# Patient Record
Sex: MALE | Race: WHITE | Employment: OTHER
[De-identification: names, ages, dates, MRNs, and addresses within clinical notes are randomized per-mention and may not be internally consistent; named-entity substitution may affect disease eponyms.]

---

## 2017-01-17 ENCOUNTER — TELEPHONE (OUTPATIENT)
Dept: INTERNAL MEDICINE | Facility: CLINIC | Age: 62
End: 2017-01-17

## 2017-01-25 ENCOUNTER — TELEPHONE (OUTPATIENT)
Dept: ONCOLOGY | Facility: CLINIC | Age: 62
End: 2017-01-25

## 2017-01-25 ENCOUNTER — OFFICE VISIT (OUTPATIENT)
Dept: ONCOLOGY | Facility: CLINIC | Age: 62
End: 2017-01-25

## 2017-01-25 VITALS
DIASTOLIC BLOOD PRESSURE: 67 MMHG | BODY MASS INDEX: 49.42 KG/M2 | RESPIRATION RATE: 16 BRPM | WEIGHT: 287.9 LBS | TEMPERATURE: 97.6 F | SYSTOLIC BLOOD PRESSURE: 133 MMHG | HEART RATE: 62 BPM

## 2017-01-25 DIAGNOSIS — D47.2 MGUS (MONOCLONAL GAMMOPATHY OF UNKNOWN SIGNIFICANCE): ICD-10-CM

## 2017-01-25 DIAGNOSIS — I25.5 ISCHEMIC CARDIOMYOPATHY: Primary | ICD-10-CM

## 2017-01-25 PROCEDURE — 99214 OFFICE O/P EST MOD 30 MIN: CPT | Performed by: INTERNAL MEDICINE

## 2017-01-26 ENCOUNTER — TELEPHONE (OUTPATIENT)
Dept: INTERNAL MEDICINE | Facility: CLINIC | Age: 62
End: 2017-01-26

## 2017-01-27 DIAGNOSIS — Z12.11 COLON CANCER SCREENING: Primary | ICD-10-CM

## 2017-01-27 LAB
CONTROL: ABNORMAL
HEMOCCULT STL QL: POSITIVE

## 2017-01-27 PROCEDURE — 82274 ASSAY TEST FOR BLOOD FECAL: CPT | Performed by: INTERNAL MEDICINE

## 2017-01-28 PROBLEM — K92.2 MICROSCOPIC GASTROINTESTINAL BLEEDING: Status: ACTIVE | Noted: 2017-01-28

## 2017-02-01 ENCOUNTER — TELEPHONE (OUTPATIENT)
Dept: GASTROENTEROLOGY | Facility: CLINIC | Age: 62
End: 2017-02-01

## 2017-03-21 DIAGNOSIS — Z12.11 SCREENING FOR COLON CANCER: Primary | ICD-10-CM

## 2017-03-31 ENCOUNTER — TELEPHONE (OUTPATIENT)
Dept: INTERNAL MEDICINE | Age: 62
End: 2017-03-31

## 2017-04-03 DIAGNOSIS — E11.9 TYPE 2 DIABETES MELLITUS WITHOUT COMPLICATION, UNSPECIFIED LONG TERM INSULIN USE STATUS: ICD-10-CM

## 2017-04-03 DIAGNOSIS — I25.10 CORONARY ARTERY DISEASE INVOLVING NATIVE CORONARY ARTERY OF NATIVE HEART WITHOUT ANGINA PECTORIS: Primary | ICD-10-CM

## 2017-04-04 ENCOUNTER — ANESTHESIA (OUTPATIENT)
Dept: ENDOSCOPY | Age: 62
End: 2017-04-04

## 2017-04-04 ENCOUNTER — ANESTHESIA EVENT (OUTPATIENT)
Dept: ENDOSCOPY | Age: 62
End: 2017-04-04

## 2017-04-04 ENCOUNTER — HOSPITAL ENCOUNTER (OUTPATIENT)
Age: 62
Setting detail: OUTPATIENT SURGERY
Discharge: HOME | End: 2017-04-04
Attending: INTERNAL MEDICINE | Admitting: INTERNAL MEDICINE
Payer: COMMERCIAL

## 2017-04-04 VITALS
RESPIRATION RATE: 18 BRPM | TEMPERATURE: 98.1 F | HEIGHT: 64 IN | WEIGHT: 185 LBS | SYSTOLIC BLOOD PRESSURE: 132 MMHG | BODY MASS INDEX: 31.58 KG/M2 | OXYGEN SATURATION: 98 % | HEART RATE: 58 BPM | DIASTOLIC BLOOD PRESSURE: 73 MMHG

## 2017-04-04 VITALS
RESPIRATION RATE: 13 BRPM | DIASTOLIC BLOOD PRESSURE: 55 MMHG | OXYGEN SATURATION: 100 % | SYSTOLIC BLOOD PRESSURE: 99 MMHG

## 2017-04-04 LAB — GLUCOSE BLD-MCNC: 79 MG/DL (ref 75–110)

## 2017-04-04 PROCEDURE — 2580000003 HC RX 258: Performed by: INTERNAL MEDICINE

## 2017-04-04 PROCEDURE — 88305 TISSUE EXAM BY PATHOLOGIST: CPT

## 2017-04-04 PROCEDURE — 3609010600 HC COLONOSCOPY POLYPECTOMY SNARE/COLD BIOPSY: Performed by: INTERNAL MEDICINE

## 2017-04-04 PROCEDURE — 3700000001 HC ADD 15 MINUTES (ANESTHESIA): Performed by: INTERNAL MEDICINE

## 2017-04-04 PROCEDURE — 82947 ASSAY GLUCOSE BLOOD QUANT: CPT

## 2017-04-04 PROCEDURE — 7100000010 HC PHASE II RECOVERY - FIRST 15 MIN: Performed by: INTERNAL MEDICINE

## 2017-04-04 PROCEDURE — 2580000003 HC RX 258: Performed by: NURSE ANESTHETIST, CERTIFIED REGISTERED

## 2017-04-04 PROCEDURE — 7100000011 HC PHASE II RECOVERY - ADDTL 15 MIN: Performed by: INTERNAL MEDICINE

## 2017-04-04 PROCEDURE — 2500000003 HC RX 250 WO HCPCS: Performed by: NURSE ANESTHETIST, CERTIFIED REGISTERED

## 2017-04-04 PROCEDURE — 6360000002 HC RX W HCPCS: Performed by: NURSE ANESTHETIST, CERTIFIED REGISTERED

## 2017-04-04 PROCEDURE — 3700000000 HC ANESTHESIA ATTENDED CARE: Performed by: INTERNAL MEDICINE

## 2017-04-04 RX ORDER — PROPOFOL 10 MG/ML
INJECTION, EMULSION INTRAVENOUS CONTINUOUS PRN
Status: DISCONTINUED | OUTPATIENT
Start: 2017-04-04 | End: 2017-04-04 | Stop reason: SDUPTHER

## 2017-04-04 RX ORDER — SODIUM CHLORIDE 9 MG/ML
INJECTION, SOLUTION INTRAVENOUS CONTINUOUS PRN
Status: DISCONTINUED | OUTPATIENT
Start: 2017-04-04 | End: 2017-04-04 | Stop reason: SDUPTHER

## 2017-04-04 RX ORDER — LIDOCAINE HYDROCHLORIDE 10 MG/ML
INJECTION, SOLUTION EPIDURAL; INFILTRATION; INTRACAUDAL; PERINEURAL PRN
Status: DISCONTINUED | OUTPATIENT
Start: 2017-04-04 | End: 2017-04-04 | Stop reason: SDUPTHER

## 2017-04-04 RX ORDER — PROPOFOL 10 MG/ML
INJECTION, EMULSION INTRAVENOUS PRN
Status: DISCONTINUED | OUTPATIENT
Start: 2017-04-04 | End: 2017-04-04 | Stop reason: SDUPTHER

## 2017-04-04 RX ORDER — SODIUM CHLORIDE 9 MG/ML
INJECTION, SOLUTION INTRAVENOUS ONCE
Status: COMPLETED | OUTPATIENT
Start: 2017-04-04 | End: 2017-04-04

## 2017-04-04 RX ORDER — KETAMINE HYDROCHLORIDE 100 MG/ML
INJECTION, SOLUTION INTRAMUSCULAR; INTRAVENOUS PRN
Status: DISCONTINUED | OUTPATIENT
Start: 2017-04-04 | End: 2017-04-04 | Stop reason: SDUPTHER

## 2017-04-04 RX ADMIN — SODIUM CHLORIDE: 9 INJECTION, SOLUTION INTRAVENOUS at 11:18

## 2017-04-04 RX ADMIN — LIDOCAINE HYDROCHLORIDE 50 MG: 10 INJECTION, SOLUTION EPIDURAL; INFILTRATION; INTRACAUDAL; PERINEURAL at 12:24

## 2017-04-04 RX ADMIN — PROPOFOL 50 MG: 10 INJECTION, EMULSION INTRAVENOUS at 12:24

## 2017-04-04 RX ADMIN — SODIUM CHLORIDE: 9 INJECTION, SOLUTION INTRAVENOUS at 12:22

## 2017-04-04 RX ADMIN — KETAMINE HYDROCHLORIDE 30 MG: 100 INJECTION, SOLUTION, CONCENTRATE INTRAMUSCULAR; INTRAVENOUS at 12:41

## 2017-04-04 RX ADMIN — PROPOFOL 120 MCG/KG/MIN: 10 INJECTION, EMULSION INTRAVENOUS at 12:24

## 2017-04-04 ASSESSMENT — PAIN SCALES - GENERAL
PAINLEVEL_OUTOF10: 0

## 2017-04-04 ASSESSMENT — PAIN - FUNCTIONAL ASSESSMENT: PAIN_FUNCTIONAL_ASSESSMENT: 0-10

## 2017-04-05 LAB — SURGICAL PATHOLOGY REPORT: NORMAL

## 2017-06-01 ENCOUNTER — OFFICE VISIT (OUTPATIENT)
Dept: INTERNAL MEDICINE | Age: 62
End: 2017-06-01
Payer: COMMERCIAL

## 2017-06-01 ENCOUNTER — HOSPITAL ENCOUNTER (OUTPATIENT)
Age: 62
Setting detail: SPECIMEN
Discharge: HOME | End: 2017-06-01
Payer: COMMERCIAL

## 2017-06-01 VITALS
WEIGHT: 286 LBS | HEIGHT: 64 IN | HEART RATE: 66 BPM | DIASTOLIC BLOOD PRESSURE: 71 MMHG | SYSTOLIC BLOOD PRESSURE: 108 MMHG | BODY MASS INDEX: 48.83 KG/M2

## 2017-06-01 DIAGNOSIS — G47.33 OSA ON CPAP: ICD-10-CM

## 2017-06-01 DIAGNOSIS — Z99.89 OSA ON CPAP: ICD-10-CM

## 2017-06-01 DIAGNOSIS — E11.8 CONTROLLED TYPE 2 DIABETES MELLITUS WITH COMPLICATION, WITHOUT LONG-TERM CURRENT USE OF INSULIN (HCC): Primary | ICD-10-CM

## 2017-06-01 DIAGNOSIS — I25.5 ISCHEMIC CARDIOMYOPATHY: ICD-10-CM

## 2017-06-01 DIAGNOSIS — E11.42 TYPE 2 DIABETES MELLITUS WITH DIABETIC POLYNEUROPATHY, WITHOUT LONG-TERM CURRENT USE OF INSULIN (HCC): ICD-10-CM

## 2017-06-01 DIAGNOSIS — D47.2 MGUS (MONOCLONAL GAMMOPATHY OF UNKNOWN SIGNIFICANCE): ICD-10-CM

## 2017-06-01 DIAGNOSIS — Z95.810 CARDIAC DEFIBRILLATOR IN PLACE: ICD-10-CM

## 2017-06-01 DIAGNOSIS — E66.01 MORBID OBESITY DUE TO EXCESS CALORIES (HCC): ICD-10-CM

## 2017-06-01 DIAGNOSIS — I25.10 CORONARY ARTERY DISEASE INVOLVING NATIVE CORONARY ARTERY OF NATIVE HEART WITHOUT ANGINA PECTORIS: ICD-10-CM

## 2017-06-01 DIAGNOSIS — I50.42 CHRONIC COMBINED SYSTOLIC AND DIASTOLIC HEART FAILURE (HCC): ICD-10-CM

## 2017-06-01 DIAGNOSIS — E78.5 HYPERLIPIDEMIA, UNSPECIFIED HYPERLIPIDEMIA TYPE: ICD-10-CM

## 2017-06-01 DIAGNOSIS — I10 ESSENTIAL HYPERTENSION: ICD-10-CM

## 2017-06-01 PROBLEM — E11.9 TYPE 2 DIABETES MELLITUS WITHOUT COMPLICATION (HCC): Status: ACTIVE | Noted: 2017-06-01

## 2017-06-01 LAB
CREATININE URINE: 239.2 MG/DL (ref 39–259)
HBA1C MFR BLD: 6.9 %
MICROALBUMIN/CREAT 24H UR: 27 MG/L
MICROALBUMIN/CREAT UR-RTO: 11 MCG/MG CREAT

## 2017-06-01 PROCEDURE — 99214 OFFICE O/P EST MOD 30 MIN: CPT | Performed by: INTERNAL MEDICINE

## 2017-06-01 PROCEDURE — 83036 HEMOGLOBIN GLYCOSYLATED A1C: CPT | Performed by: INTERNAL MEDICINE

## 2017-06-01 RX ORDER — CLOPIDOGREL BISULFATE 75 MG/1
TABLET ORAL
Qty: 30 TABLET | Refills: 5 | Status: SHIPPED | OUTPATIENT
Start: 2017-06-01 | End: 2017-07-17 | Stop reason: ALTCHOICE

## 2017-06-01 RX ORDER — OMEPRAZOLE 20 MG/1
CAPSULE, DELAYED RELEASE ORAL
Qty: 30 CAPSULE | Refills: 5 | Status: SHIPPED | OUTPATIENT
Start: 2017-06-01 | End: 2017-12-07 | Stop reason: SDUPTHER

## 2017-06-01 RX ORDER — ATORVASTATIN CALCIUM 40 MG/1
TABLET, FILM COATED ORAL
Qty: 30 TABLET | Refills: 5 | Status: SHIPPED | OUTPATIENT
Start: 2017-06-01 | End: 2017-11-24 | Stop reason: SDUPTHER

## 2017-06-01 RX ORDER — ISOSORBIDE MONONITRATE 30 MG/1
TABLET, EXTENDED RELEASE ORAL
Qty: 30 TABLET | Refills: 5 | Status: SHIPPED | OUTPATIENT
Start: 2017-06-01 | End: 2017-12-07 | Stop reason: SDUPTHER

## 2017-06-01 RX ORDER — ATORVASTATIN CALCIUM 40 MG/1
40 TABLET, FILM COATED ORAL DAILY
Qty: 30 TABLET | Refills: 11 | Status: CANCELLED | OUTPATIENT
Start: 2017-06-01

## 2017-06-01 RX ORDER — LISINOPRIL 5 MG/1
TABLET ORAL
Qty: 30 TABLET | Refills: 5 | Status: SHIPPED | OUTPATIENT
Start: 2017-06-01 | End: 2017-12-07 | Stop reason: SDUPTHER

## 2017-06-01 RX ORDER — FUROSEMIDE 40 MG/1
TABLET ORAL
Qty: 30 TABLET | Refills: 5 | Status: SHIPPED | OUTPATIENT
Start: 2017-06-01 | End: 2017-07-17 | Stop reason: SDUPTHER

## 2017-06-01 RX ORDER — ASPIRIN 81 MG
TABLET, DELAYED RELEASE (ENTERIC COATED) ORAL
COMMUNITY
Start: 2017-05-30 | End: 2017-07-17 | Stop reason: SDUPTHER

## 2017-06-01 RX ORDER — GLIMEPIRIDE 2 MG/1
TABLET ORAL
Qty: 30 TABLET | Refills: 5 | Status: SHIPPED | OUTPATIENT
Start: 2017-06-01 | End: 2017-12-07 | Stop reason: SDUPTHER

## 2017-06-14 ENCOUNTER — PROCEDURE VISIT (OUTPATIENT)
Dept: PODIATRY | Age: 62
End: 2017-06-14
Payer: COMMERCIAL

## 2017-06-14 VITALS
WEIGHT: 281 LBS | HEIGHT: 64 IN | HEART RATE: 64 BPM | DIASTOLIC BLOOD PRESSURE: 67 MMHG | SYSTOLIC BLOOD PRESSURE: 105 MMHG | TEMPERATURE: 98.4 F | BODY MASS INDEX: 47.97 KG/M2

## 2017-06-14 DIAGNOSIS — M79.672 PAIN IN BOTH FEET: ICD-10-CM

## 2017-06-14 DIAGNOSIS — M79.671 PAIN IN BOTH FEET: ICD-10-CM

## 2017-06-14 DIAGNOSIS — L85.3 DRY SKIN: ICD-10-CM

## 2017-06-14 DIAGNOSIS — E11.9 TYPE 2 DIABETES MELLITUS WITHOUT COMPLICATION, WITHOUT LONG-TERM CURRENT USE OF INSULIN (HCC): Primary | ICD-10-CM

## 2017-06-14 DIAGNOSIS — B35.1 ONYCHOMYCOSIS: ICD-10-CM

## 2017-06-14 PROCEDURE — 99213 OFFICE O/P EST LOW 20 MIN: CPT | Performed by: PODIATRIST

## 2017-06-14 PROCEDURE — 11721 DEBRIDE NAIL 6 OR MORE: CPT | Performed by: PODIATRIST

## 2017-06-14 RX ORDER — AMMONIUM LACTATE 12 G/100G
LOTION TOPICAL
Qty: 1 BOTTLE | Refills: 5 | Status: SHIPPED | OUTPATIENT
Start: 2017-06-14 | End: 2017-07-17 | Stop reason: SDUPTHER

## 2017-07-17 ENCOUNTER — OFFICE VISIT (OUTPATIENT)
Dept: PULMONOLOGY | Age: 62
End: 2017-07-17
Payer: COMMERCIAL

## 2017-07-17 VITALS
HEART RATE: 64 BPM | SYSTOLIC BLOOD PRESSURE: 92 MMHG | HEIGHT: 64 IN | OXYGEN SATURATION: 98 % | DIASTOLIC BLOOD PRESSURE: 57 MMHG | BODY MASS INDEX: 48.01 KG/M2 | WEIGHT: 281.2 LBS

## 2017-07-17 DIAGNOSIS — F51.04 PSYCHOPHYSIOLOGIC INSOMNIA: ICD-10-CM

## 2017-07-17 DIAGNOSIS — E66.01 MORBID OBESITY DUE TO EXCESS CALORIES (HCC): ICD-10-CM

## 2017-07-17 DIAGNOSIS — G47.31 COMPLEX SLEEP APNEA SYNDROME: Primary | ICD-10-CM

## 2017-07-17 DIAGNOSIS — I50.22 CHRONIC SYSTOLIC CONGESTIVE HEART FAILURE (HCC): ICD-10-CM

## 2017-07-17 PROCEDURE — 99213 OFFICE O/P EST LOW 20 MIN: CPT | Performed by: INTERNAL MEDICINE

## 2017-07-17 RX ORDER — LANOLIN ALCOHOL/MO/W.PET/CERES
3 CREAM (GRAM) TOPICAL NIGHTLY PRN
Qty: 30 TABLET | Refills: 11 | Status: SHIPPED | OUTPATIENT
Start: 2017-07-17 | End: 2017-12-07 | Stop reason: SDUPTHER

## 2017-07-17 RX ORDER — FUROSEMIDE 40 MG/1
40 TABLET ORAL 2 TIMES DAILY
COMMUNITY
End: 2017-10-03 | Stop reason: SDUPTHER

## 2017-07-17 ASSESSMENT — ENCOUNTER SYMPTOMS
BACK PAIN: 1
EYES NEGATIVE: 1

## 2017-07-17 ASSESSMENT — SLEEP AND FATIGUE QUESTIONNAIRES
HOW LIKELY ARE YOU TO NOD OFF OR FALL ASLEEP WHILE SITTING INACTIVE IN A PUBLIC PLACE: 0
HOW LIKELY ARE YOU TO NOD OFF OR FALL ASLEEP WHEN YOU ARE A PASSENGER IN A CAR FOR AN HOUR WITHOUT A BREAK: 0
HOW LIKELY ARE YOU TO NOD OFF OR FALL ASLEEP WHILE SITTING AND TALKING TO SOMEONE: 0
HOW LIKELY ARE YOU TO NOD OFF OR FALL ASLEEP WHILE SITTING AND READING: 1
HOW LIKELY ARE YOU TO NOD OFF OR FALL ASLEEP WHILE SITTING QUIETLY AFTER LUNCH WITHOUT ALCOHOL: 2
HOW LIKELY ARE YOU TO NOD OFF OR FALL ASLEEP IN A CAR, WHILE STOPPED FOR A FEW MINUTES IN TRAFFIC: 0
HOW LIKELY ARE YOU TO NOD OFF OR FALL ASLEEP WHILE WATCHING TV: 1
ESS TOTAL SCORE: 6
HOW LIKELY ARE YOU TO NOD OFF OR FALL ASLEEP WHILE LYING DOWN TO REST IN THE AFTERNOON WHEN CIRCUMSTANCES PERMIT: 2

## 2017-07-21 ENCOUNTER — HOSPITAL ENCOUNTER (OUTPATIENT)
Facility: MEDICAL CENTER | Age: 62
End: 2017-07-21
Payer: COMMERCIAL

## 2017-07-24 ENCOUNTER — HOSPITAL ENCOUNTER (OUTPATIENT)
Age: 62
Discharge: HOME | End: 2017-07-24
Payer: COMMERCIAL

## 2017-07-24 DIAGNOSIS — D47.2 MGUS (MONOCLONAL GAMMOPATHY OF UNKNOWN SIGNIFICANCE): ICD-10-CM

## 2017-07-24 DIAGNOSIS — D47.2 MGUS (MONOCLONAL GAMMOPATHY OF UNKNOWN SIGNIFICANCE): Primary | ICD-10-CM

## 2017-07-24 LAB
ABSOLUTE EOS #: 0.2 K/UL (ref 0–0.4)
ABSOLUTE LYMPH #: 1.1 K/UL (ref 1–4.8)
ABSOLUTE MONO #: 0.5 K/UL (ref 0.1–1.2)
ALBUMIN SERPL-MCNC: 3.8 G/DL (ref 3.5–5.2)
ALBUMIN/GLOBULIN RATIO: 1 (ref 1–2.5)
ALP BLD-CCNC: 67 U/L (ref 40–129)
ALT SERPL-CCNC: 11 U/L (ref 5–41)
ANION GAP SERPL CALCULATED.3IONS-SCNC: 17 MMOL/L (ref 9–17)
AST SERPL-CCNC: 16 U/L
BASOPHILS # BLD: 0 %
BASOPHILS ABSOLUTE: 0 K/UL (ref 0–0.2)
BILIRUB SERPL-MCNC: 0.54 MG/DL (ref 0.3–1.2)
BUN BLDV-MCNC: 17 MG/DL (ref 8–23)
BUN/CREAT BLD: ABNORMAL (ref 9–20)
CALCIUM SERPL-MCNC: 8.7 MG/DL (ref 8.6–10.4)
CHLORIDE BLD-SCNC: 101 MMOL/L (ref 98–107)
CO2: 25 MMOL/L (ref 20–31)
CREAT SERPL-MCNC: 0.76 MG/DL (ref 0.7–1.2)
DIFFERENTIAL TYPE: ABNORMAL
EOSINOPHILS RELATIVE PERCENT: 3 %
FREE KAPPA/LAMBDA RATIO: 0.93 (ref 0.26–1.65)
GFR AFRICAN AMERICAN: >60 ML/MIN
GFR NON-AFRICAN AMERICAN: >60 ML/MIN
GFR SERPL CREATININE-BSD FRML MDRD: ABNORMAL ML/MIN/{1.73_M2}
GFR SERPL CREATININE-BSD FRML MDRD: ABNORMAL ML/MIN/{1.73_M2}
GLUCOSE BLD-MCNC: 103 MG/DL (ref 70–99)
HCT VFR BLD CALC: 40.8 % (ref 41–53)
HEMOGLOBIN: 13.1 G/DL (ref 13.5–17.5)
IGA: 254 MG/DL (ref 70–400)
IGG: 1058 MG/DL (ref 700–1600)
IGM: 360 MG/DL (ref 40–230)
KAPPA FREE LIGHT CHAINS QNT: 2.61 MG/DL (ref 0.37–1.94)
LAMBDA FREE LIGHT CHAINS QNT: 2.8 MG/DL (ref 0.57–2.63)
LYMPHOCYTES # BLD: 15 %
MCH RBC QN AUTO: 26.5 PG (ref 26–34)
MCHC RBC AUTO-ENTMCNC: 32.1 G/DL (ref 31–37)
MCV RBC AUTO: 82.6 FL (ref 80–100)
MONOCYTES # BLD: 6 %
PDW BLD-RTO: 19 % (ref 12.5–15.4)
PLATELET # BLD: 184 K/UL (ref 140–450)
PLATELET ESTIMATE: ABNORMAL
PMV BLD AUTO: 9.3 FL (ref 6–12)
POTASSIUM SERPL-SCNC: 4.2 MMOL/L (ref 3.7–5.3)
RBC # BLD: 4.95 M/UL (ref 4.5–5.9)
RBC # BLD: ABNORMAL 10*6/UL
SEG NEUTROPHILS: 76 %
SEGMENTED NEUTROPHILS ABSOLUTE COUNT: 5.7 K/UL (ref 1.8–7.7)
SODIUM BLD-SCNC: 143 MMOL/L (ref 135–144)
TOTAL PROTEIN: 7.5 G/DL (ref 6.4–8.3)
WBC # BLD: 7.6 K/UL (ref 3.5–11)
WBC # BLD: ABNORMAL 10*3/UL

## 2017-07-24 PROCEDURE — 85025 COMPLETE CBC W/AUTO DIFF WBC: CPT

## 2017-07-24 PROCEDURE — 36415 COLL VENOUS BLD VENIPUNCTURE: CPT

## 2017-07-24 PROCEDURE — 83883 ASSAY NEPHELOMETRY NOT SPEC: CPT

## 2017-07-24 PROCEDURE — 82784 ASSAY IGA/IGD/IGG/IGM EACH: CPT

## 2017-07-24 PROCEDURE — 80053 COMPREHEN METABOLIC PANEL: CPT

## 2017-07-27 DIAGNOSIS — M10.9 GOUT, UNSPECIFIED: ICD-10-CM

## 2017-07-27 DIAGNOSIS — E11.9 TYPE 2 DIABETES MELLITUS WITHOUT COMPLICATION (HCC): ICD-10-CM

## 2017-07-28 ENCOUNTER — HOSPITAL ENCOUNTER (OUTPATIENT)
Facility: MEDICAL CENTER | Age: 62
End: 2017-07-28
Payer: COMMERCIAL

## 2017-07-31 ENCOUNTER — TELEPHONE (OUTPATIENT)
Dept: ONCOLOGY | Age: 62
End: 2017-07-31

## 2017-07-31 ENCOUNTER — OFFICE VISIT (OUTPATIENT)
Dept: ONCOLOGY | Age: 62
End: 2017-07-31
Payer: COMMERCIAL

## 2017-07-31 VITALS
BODY MASS INDEX: 48.46 KG/M2 | HEART RATE: 65 BPM | WEIGHT: 282.3 LBS | SYSTOLIC BLOOD PRESSURE: 127 MMHG | RESPIRATION RATE: 18 BRPM | TEMPERATURE: 98.1 F | DIASTOLIC BLOOD PRESSURE: 58 MMHG

## 2017-07-31 DIAGNOSIS — D47.2 MGUS (MONOCLONAL GAMMOPATHY OF UNKNOWN SIGNIFICANCE): Primary | ICD-10-CM

## 2017-07-31 PROCEDURE — 99211 OFF/OP EST MAY X REQ PHY/QHP: CPT

## 2017-07-31 PROCEDURE — 99214 OFFICE O/P EST MOD 30 MIN: CPT | Performed by: INTERNAL MEDICINE

## 2017-07-31 RX ORDER — GLUCOSAM/CHON-MSM1/C/MANG/BOSW 500-416.6
TABLET ORAL
Qty: 100 EACH | Refills: 0 | Status: SHIPPED | OUTPATIENT
Start: 2017-07-31 | End: 2017-08-02

## 2017-07-31 RX ORDER — ISOPROPYL ALCOHOL 0.75 G/1
SWAB TOPICAL
Qty: 100 EACH | Refills: 0 | Status: SHIPPED | OUTPATIENT
Start: 2017-07-31 | End: 2018-06-21 | Stop reason: SDUPTHER

## 2017-07-31 RX ORDER — ALLOPURINOL 300 MG/1
TABLET ORAL
Qty: 30 TABLET | Refills: 0 | Status: SHIPPED | OUTPATIENT
Start: 2017-07-31 | End: 2017-12-07 | Stop reason: SDUPTHER

## 2017-08-01 DIAGNOSIS — E11.9 TYPE 2 DIABETES MELLITUS WITHOUT COMPLICATION (HCC): ICD-10-CM

## 2017-08-01 DIAGNOSIS — F41.9 ANXIETY DISORDER, UNSPECIFIED TYPE: ICD-10-CM

## 2017-08-02 RX ORDER — TRAZODONE HYDROCHLORIDE 50 MG/1
TABLET ORAL
Qty: 30 TABLET | Refills: 0 | Status: SHIPPED | OUTPATIENT
Start: 2017-08-02 | End: 2017-12-07 | Stop reason: SDUPTHER

## 2017-08-02 RX ORDER — GLUCOSAM/CHON-MSM1/C/MANG/BOSW 500-416.6
TABLET ORAL
Qty: 100 EACH | Refills: 0 | Status: SHIPPED | OUTPATIENT
Start: 2017-08-02 | End: 2017-12-07 | Stop reason: SDUPTHER

## 2017-08-22 DIAGNOSIS — I25.10 CORONARY ARTERY DISEASE INVOLVING NATIVE CORONARY ARTERY OF NATIVE HEART WITHOUT ANGINA PECTORIS: ICD-10-CM

## 2017-09-12 ENCOUNTER — HOSPITAL ENCOUNTER (INPATIENT)
Age: 62
LOS: 3 days | Discharge: HOME | DRG: 133 | End: 2017-09-15
Attending: EMERGENCY MEDICINE | Admitting: INTERNAL MEDICINE
Payer: COMMERCIAL

## 2017-09-12 ENCOUNTER — APPOINTMENT (OUTPATIENT)
Dept: GENERAL RADIOLOGY | Age: 62
DRG: 133 | End: 2017-09-12
Payer: COMMERCIAL

## 2017-09-12 DIAGNOSIS — I50.42 CHRONIC COMBINED SYSTOLIC AND DIASTOLIC HEART FAILURE (HCC): ICD-10-CM

## 2017-09-12 DIAGNOSIS — I50.9 ACUTE ON CHRONIC CONGESTIVE HEART FAILURE, UNSPECIFIED CONGESTIVE HEART FAILURE TYPE: Primary | ICD-10-CM

## 2017-09-12 DIAGNOSIS — I21.4 NSTEMI (NON-ST ELEVATED MYOCARDIAL INFARCTION) (HCC): ICD-10-CM

## 2017-09-12 LAB
ABSOLUTE EOS #: 0.16 K/UL (ref 0–0.4)
ABSOLUTE LYMPH #: 6.03 K/UL (ref 1–4.8)
ABSOLUTE MONO #: 1.96 K/UL (ref 0.1–0.8)
ANION GAP SERPL CALCULATED.3IONS-SCNC: 17 MMOL/L (ref 9–17)
BASOPHILS # BLD: 1 %
BASOPHILS ABSOLUTE: 0.16 K/UL (ref 0–0.2)
BNP INTERPRETATION: ABNORMAL
BUN BLDV-MCNC: 16 MG/DL (ref 8–23)
BUN/CREAT BLD: ABNORMAL (ref 9–20)
CALCIUM SERPL-MCNC: 8.7 MG/DL (ref 8.6–10.4)
CHLORIDE BLD-SCNC: 100 MMOL/L (ref 98–107)
CO2: 23 MMOL/L (ref 20–31)
CREAT SERPL-MCNC: 1.06 MG/DL (ref 0.7–1.2)
DIFFERENTIAL TYPE: ABNORMAL
EOSINOPHILS RELATIVE PERCENT: 1 %
GFR AFRICAN AMERICAN: >60 ML/MIN
GFR NON-AFRICAN AMERICAN: >60 ML/MIN
GFR SERPL CREATININE-BSD FRML MDRD: ABNORMAL ML/MIN/{1.73_M2}
GFR SERPL CREATININE-BSD FRML MDRD: ABNORMAL ML/MIN/{1.73_M2}
GLUCOSE BLD-MCNC: 313 MG/DL (ref 70–99)
HCT VFR BLD CALC: 46.2 % (ref 41–53)
HEMOGLOBIN: 14.4 G/DL (ref 13.5–17.5)
LYMPHOCYTES # BLD: 37 %
MCH RBC QN AUTO: 26.5 PG (ref 26–34)
MCHC RBC AUTO-ENTMCNC: 31.3 G/DL (ref 31–37)
MCV RBC AUTO: 84.7 FL (ref 80–100)
MONOCYTES # BLD: 12 %
MORPHOLOGY: ABNORMAL
PDW BLD-RTO: 18.8 % (ref 12.5–15.4)
PLATELET # BLD: 279 K/UL (ref 140–450)
PLATELET ESTIMATE: ABNORMAL
PMV BLD AUTO: 10 FL (ref 6–12)
POC TROPONIN I: 0.04 NG/ML (ref 0–0.1)
POC TROPONIN I: 11.16 NG/ML (ref 0–0.1)
POC TROPONIN INTERP: ABNORMAL
POC TROPONIN INTERP: NORMAL
POTASSIUM SERPL-SCNC: 3.6 MMOL/L (ref 3.7–5.3)
PRO-BNP: 1007 PG/ML
RBC # BLD: 5.45 M/UL (ref 4.5–5.9)
RBC # BLD: ABNORMAL 10*6/UL
SEG NEUTROPHILS: 49 %
SEGMENTED NEUTROPHILS ABSOLUTE COUNT: 7.99 K/UL (ref 1.8–7.7)
SODIUM BLD-SCNC: 140 MMOL/L (ref 135–144)
WBC # BLD: 16.3 K/UL (ref 3.5–11)
WBC # BLD: ABNORMAL 10*3/UL

## 2017-09-12 PROCEDURE — 85025 COMPLETE CBC W/AUTO DIFF WBC: CPT

## 2017-09-12 PROCEDURE — 80048 BASIC METABOLIC PNL TOTAL CA: CPT

## 2017-09-12 PROCEDURE — 2580000003 HC RX 258: Performed by: EMERGENCY MEDICINE

## 2017-09-12 PROCEDURE — 96376 TX/PRO/DX INJ SAME DRUG ADON: CPT

## 2017-09-12 PROCEDURE — 84484 ASSAY OF TROPONIN QUANT: CPT

## 2017-09-12 PROCEDURE — 2060000000 HC ICU INTERMEDIATE R&B

## 2017-09-12 PROCEDURE — 96375 TX/PRO/DX INJ NEW DRUG ADDON: CPT

## 2017-09-12 PROCEDURE — 6360000002 HC RX W HCPCS

## 2017-09-12 PROCEDURE — 2500000003 HC RX 250 WO HCPCS: Performed by: EMERGENCY MEDICINE

## 2017-09-12 PROCEDURE — 96365 THER/PROPH/DIAG IV INF INIT: CPT

## 2017-09-12 PROCEDURE — 93005 ELECTROCARDIOGRAM TRACING: CPT

## 2017-09-12 PROCEDURE — 2500000003 HC RX 250 WO HCPCS

## 2017-09-12 PROCEDURE — 51703 INSERT BLADDER CATH COMPLEX: CPT

## 2017-09-12 PROCEDURE — 99285 EMERGENCY DEPT VISIT HI MDM: CPT

## 2017-09-12 PROCEDURE — 71010 XR CHEST PORTABLE: CPT

## 2017-09-12 PROCEDURE — 94660 CPAP INITIATION&MGMT: CPT

## 2017-09-12 PROCEDURE — 96366 THER/PROPH/DIAG IV INF ADDON: CPT

## 2017-09-12 PROCEDURE — 83880 ASSAY OF NATRIURETIC PEPTIDE: CPT

## 2017-09-12 RX ORDER — ONDANSETRON 2 MG/ML
INJECTION INTRAMUSCULAR; INTRAVENOUS
Status: COMPLETED
Start: 2017-09-12 | End: 2017-09-12

## 2017-09-12 RX ORDER — LEVOFLOXACIN 5 MG/ML
750 INJECTION, SOLUTION INTRAVENOUS ONCE
Status: DISCONTINUED | OUTPATIENT
Start: 2017-09-13 | End: 2017-09-13

## 2017-09-12 RX ORDER — DILTIAZEM HYDROCHLORIDE 5 MG/ML
20 INJECTION INTRAVENOUS ONCE
Status: COMPLETED | OUTPATIENT
Start: 2017-09-12 | End: 2017-09-12

## 2017-09-12 RX ORDER — FUROSEMIDE 10 MG/ML
80 INJECTION INTRAMUSCULAR; INTRAVENOUS ONCE
Status: COMPLETED | OUTPATIENT
Start: 2017-09-12 | End: 2017-09-12

## 2017-09-12 RX ORDER — NITROGLYCERIN 20 MG/100ML
5 INJECTION INTRAVENOUS CONTINUOUS
Status: DISCONTINUED | OUTPATIENT
Start: 2017-09-12 | End: 2017-09-13

## 2017-09-12 RX ORDER — FUROSEMIDE 10 MG/ML
INJECTION INTRAMUSCULAR; INTRAVENOUS
Status: COMPLETED
Start: 2017-09-12 | End: 2017-09-12

## 2017-09-12 RX ORDER — ONDANSETRON 2 MG/ML
4 INJECTION INTRAMUSCULAR; INTRAVENOUS ONCE
Status: COMPLETED | OUTPATIENT
Start: 2017-09-12 | End: 2017-09-12

## 2017-09-12 RX ORDER — NITROGLYCERIN 20 MG/100ML
INJECTION INTRAVENOUS
Status: DISCONTINUED
Start: 2017-09-12 | End: 2017-09-13

## 2017-09-12 RX ORDER — DILTIAZEM HYDROCHLORIDE 5 MG/ML
INJECTION INTRAVENOUS
Status: DISPENSED
Start: 2017-09-12 | End: 2017-09-13

## 2017-09-12 RX ORDER — DEXTROSE MONOHYDRATE 50 MG/ML
INJECTION, SOLUTION INTRAVENOUS
Status: DISPENSED
Start: 2017-09-12 | End: 2017-09-13

## 2017-09-13 ENCOUNTER — APPOINTMENT (OUTPATIENT)
Dept: GENERAL RADIOLOGY | Age: 62
DRG: 133 | End: 2017-09-13
Payer: COMMERCIAL

## 2017-09-13 PROBLEM — R79.89 ELEVATED TROPONIN: Status: ACTIVE | Noted: 2017-09-13

## 2017-09-13 PROBLEM — J96.00 ACUTE RESPIRATORY FAILURE (HCC): Status: ACTIVE | Noted: 2017-09-13

## 2017-09-13 PROBLEM — I48.0 PAF (PAROXYSMAL ATRIAL FIBRILLATION) (HCC): Status: ACTIVE | Noted: 2017-09-13

## 2017-09-13 PROBLEM — Z95.810 AICD (AUTOMATIC CARDIOVERTER/DEFIBRILLATOR) PRESENT: Status: ACTIVE | Noted: 2017-09-13

## 2017-09-13 PROBLEM — I50.21 ACUTE SYSTOLIC CONGESTIVE HEART FAILURE (HCC): Status: ACTIVE | Noted: 2017-09-13

## 2017-09-13 PROBLEM — R77.8 ELEVATED TROPONIN: Status: ACTIVE | Noted: 2017-09-13

## 2017-09-13 LAB
ABSOLUTE EOS #: 0 K/UL (ref 0–0.4)
ABSOLUTE LYMPH #: 1 K/UL (ref 1–4.8)
ABSOLUTE MONO #: 0.7 K/UL (ref 0.1–1.2)
ANION GAP SERPL CALCULATED.3IONS-SCNC: 19 MMOL/L (ref 9–17)
BASOPHILS # BLD: 0 %
BASOPHILS ABSOLUTE: 0 K/UL (ref 0–0.2)
BUN BLDV-MCNC: 21 MG/DL (ref 8–23)
BUN/CREAT BLD: ABNORMAL (ref 9–20)
CALCIUM SERPL-MCNC: 8.3 MG/DL (ref 8.6–10.4)
CHLORIDE BLD-SCNC: 97 MMOL/L (ref 98–107)
CO2: 25 MMOL/L (ref 20–31)
CREAT SERPL-MCNC: 0.89 MG/DL (ref 0.7–1.2)
DIFFERENTIAL TYPE: ABNORMAL
EKG ATRIAL RATE: 132 BPM
EKG P AXIS: 5 DEGREES
EKG P-R INTERVAL: 176 MS
EKG Q-T INTERVAL: 386 MS
EKG QRS DURATION: 132 MS
EKG QTC CALCULATION (BAZETT): 571 MS
EKG R AXIS: -56 DEGREES
EKG T AXIS: 74 DEGREES
EKG VENTRICULAR RATE: 132 BPM
EOSINOPHILS RELATIVE PERCENT: 0 %
GFR AFRICAN AMERICAN: >60 ML/MIN
GFR NON-AFRICAN AMERICAN: >60 ML/MIN
GFR SERPL CREATININE-BSD FRML MDRD: ABNORMAL ML/MIN/{1.73_M2}
GFR SERPL CREATININE-BSD FRML MDRD: ABNORMAL ML/MIN/{1.73_M2}
GLUCOSE BLD-MCNC: 107 MG/DL (ref 75–110)
GLUCOSE BLD-MCNC: 133 MG/DL (ref 75–110)
GLUCOSE BLD-MCNC: 152 MG/DL (ref 75–110)
GLUCOSE BLD-MCNC: 161 MG/DL (ref 75–110)
GLUCOSE BLD-MCNC: 162 MG/DL (ref 75–110)
GLUCOSE BLD-MCNC: 195 MG/DL (ref 70–99)
HCT VFR BLD CALC: 39.7 % (ref 41–53)
HEMOGLOBIN: 12.6 G/DL (ref 13.5–17.5)
INR BLD: 1
LV EF: 18 %
LVEF MODALITY: NORMAL
LYMPHOCYTES # BLD: 7 %
MAGNESIUM: 1.5 MG/DL (ref 1.6–2.6)
MAGNESIUM: 1.5 MG/DL (ref 1.6–2.6)
MAGNESIUM: 1.8 MG/DL (ref 1.6–2.6)
MCH RBC QN AUTO: 26.7 PG (ref 26–34)
MCHC RBC AUTO-ENTMCNC: 31.8 G/DL (ref 31–37)
MCV RBC AUTO: 84.2 FL (ref 80–100)
MONOCYTES # BLD: 5 %
PARTIAL THROMBOPLASTIN TIME: 18.4 SEC (ref 21.3–31.3)
PARTIAL THROMBOPLASTIN TIME: 27.4 SEC (ref 21.3–31.3)
PARTIAL THROMBOPLASTIN TIME: 47.2 SEC (ref 21.3–31.3)
PARTIAL THROMBOPLASTIN TIME: 68.8 SEC (ref 21.3–31.3)
PDW BLD-RTO: 18.4 % (ref 12.5–15.4)
PHOSPHORUS: 3.1 MG/DL (ref 2.5–4.5)
PLATELET # BLD: 220 K/UL (ref 140–450)
PLATELET ESTIMATE: ABNORMAL
PMV BLD AUTO: 9.2 FL (ref 6–12)
POTASSIUM SERPL-SCNC: 3.9 MMOL/L (ref 3.7–5.3)
PROTHROMBIN TIME: 10.9 SEC (ref 9.4–12.6)
RBC # BLD: 4.71 M/UL (ref 4.5–5.9)
RBC # BLD: ABNORMAL 10*6/UL
SEG NEUTROPHILS: 88 %
SEGMENTED NEUTROPHILS ABSOLUTE COUNT: 12.5 K/UL (ref 1.8–7.7)
SODIUM BLD-SCNC: 141 MMOL/L (ref 135–144)
TROPONIN INTERP: ABNORMAL
TROPONIN T: 1.32 NG/ML
TROPONIN T: 2.14 NG/ML
TROPONIN T: 2.98 NG/ML
TROPONIN T: 3.18 NG/ML
TSH SERPL DL<=0.05 MIU/L-ACNC: 2.43 MIU/L (ref 0.3–5)
WBC # BLD: 14.2 K/UL (ref 3.5–11)
WBC # BLD: ABNORMAL 10*3/UL

## 2017-09-13 PROCEDURE — 6360000002 HC RX W HCPCS: Performed by: EMERGENCY MEDICINE

## 2017-09-13 PROCEDURE — 85025 COMPLETE CBC W/AUTO DIFF WBC: CPT

## 2017-09-13 PROCEDURE — G8978 MOBILITY CURRENT STATUS: HCPCS

## 2017-09-13 PROCEDURE — 94762 N-INVAS EAR/PLS OXIMTRY CONT: CPT

## 2017-09-13 PROCEDURE — 97166 OT EVAL MOD COMPLEX 45 MIN: CPT

## 2017-09-13 PROCEDURE — 2580000003 HC RX 258: Performed by: STUDENT IN AN ORGANIZED HEALTH CARE EDUCATION/TRAINING PROGRAM

## 2017-09-13 PROCEDURE — 85610 PROTHROMBIN TIME: CPT

## 2017-09-13 PROCEDURE — 71010 XR CHEST PORTABLE: CPT

## 2017-09-13 PROCEDURE — 36415 COLL VENOUS BLD VENIPUNCTURE: CPT

## 2017-09-13 PROCEDURE — 93306 TTE W/DOPPLER COMPLETE: CPT

## 2017-09-13 PROCEDURE — 2060000000 HC ICU INTERMEDIATE R&B

## 2017-09-13 PROCEDURE — 6370000000 HC RX 637 (ALT 250 FOR IP): Performed by: STUDENT IN AN ORGANIZED HEALTH CARE EDUCATION/TRAINING PROGRAM

## 2017-09-13 PROCEDURE — 85730 THROMBOPLASTIN TIME PARTIAL: CPT

## 2017-09-13 PROCEDURE — 97162 PT EVAL MOD COMPLEX 30 MIN: CPT

## 2017-09-13 PROCEDURE — G8988 SELF CARE GOAL STATUS: HCPCS

## 2017-09-13 PROCEDURE — 6360000002 HC RX W HCPCS: Performed by: STUDENT IN AN ORGANIZED HEALTH CARE EDUCATION/TRAINING PROGRAM

## 2017-09-13 PROCEDURE — 97530 THERAPEUTIC ACTIVITIES: CPT

## 2017-09-13 PROCEDURE — 97535 SELF CARE MNGMENT TRAINING: CPT

## 2017-09-13 PROCEDURE — G8979 MOBILITY GOAL STATUS: HCPCS

## 2017-09-13 PROCEDURE — 80048 BASIC METABOLIC PNL TOTAL CA: CPT

## 2017-09-13 PROCEDURE — 93005 ELECTROCARDIOGRAM TRACING: CPT

## 2017-09-13 PROCEDURE — 83735 ASSAY OF MAGNESIUM: CPT

## 2017-09-13 PROCEDURE — G8987 SELF CARE CURRENT STATUS: HCPCS

## 2017-09-13 PROCEDURE — 99223 1ST HOSP IP/OBS HIGH 75: CPT | Performed by: INTERNAL MEDICINE

## 2017-09-13 PROCEDURE — 84484 ASSAY OF TROPONIN QUANT: CPT

## 2017-09-13 PROCEDURE — 84443 ASSAY THYROID STIM HORMONE: CPT

## 2017-09-13 PROCEDURE — 82947 ASSAY GLUCOSE BLOOD QUANT: CPT

## 2017-09-13 PROCEDURE — 87040 BLOOD CULTURE FOR BACTERIA: CPT

## 2017-09-13 PROCEDURE — 84100 ASSAY OF PHOSPHORUS: CPT

## 2017-09-13 RX ORDER — DEXTROSE MONOHYDRATE 25 G/50ML
12.5 INJECTION, SOLUTION INTRAVENOUS PRN
Status: DISCONTINUED | OUTPATIENT
Start: 2017-09-13 | End: 2017-09-15 | Stop reason: HOSPADM

## 2017-09-13 RX ORDER — ATORVASTATIN CALCIUM 40 MG/1
40 TABLET, FILM COATED ORAL DAILY
Status: DISCONTINUED | OUTPATIENT
Start: 2017-09-13 | End: 2017-09-15 | Stop reason: HOSPADM

## 2017-09-13 RX ORDER — MAGNESIUM SULFATE 1 G/100ML
2 INJECTION INTRAVENOUS ONCE
Status: COMPLETED | OUTPATIENT
Start: 2017-09-13 | End: 2017-09-13

## 2017-09-13 RX ORDER — DEXTROSE MONOHYDRATE 50 MG/ML
100 INJECTION, SOLUTION INTRAVENOUS PRN
Status: DISCONTINUED | OUTPATIENT
Start: 2017-09-13 | End: 2017-09-15 | Stop reason: HOSPADM

## 2017-09-13 RX ORDER — PANTOPRAZOLE SODIUM 40 MG/1
40 TABLET, DELAYED RELEASE ORAL
Status: DISCONTINUED | OUTPATIENT
Start: 2017-09-13 | End: 2017-09-15 | Stop reason: HOSPADM

## 2017-09-13 RX ORDER — ONDANSETRON 2 MG/ML
4 INJECTION INTRAMUSCULAR; INTRAVENOUS EVERY 6 HOURS PRN
Status: DISCONTINUED | OUTPATIENT
Start: 2017-09-13 | End: 2017-09-15 | Stop reason: HOSPADM

## 2017-09-13 RX ORDER — POTASSIUM CHLORIDE 20 MEQ/1
40 TABLET, EXTENDED RELEASE ORAL PRN
Status: DISCONTINUED | OUTPATIENT
Start: 2017-09-13 | End: 2017-09-15 | Stop reason: HOSPADM

## 2017-09-13 RX ORDER — ASPIRIN 81 MG/1
81 TABLET ORAL 2 TIMES DAILY
Status: DISCONTINUED | OUTPATIENT
Start: 2017-09-13 | End: 2017-09-15 | Stop reason: HOSPADM

## 2017-09-13 RX ORDER — SODIUM CHLORIDE 0.9 % (FLUSH) 0.9 %
10 SYRINGE (ML) INJECTION EVERY 12 HOURS SCHEDULED
Status: DISCONTINUED | OUTPATIENT
Start: 2017-09-13 | End: 2017-09-15 | Stop reason: HOSPADM

## 2017-09-13 RX ORDER — SODIUM CHLORIDE 0.9 % (FLUSH) 0.9 %
10 SYRINGE (ML) INJECTION PRN
Status: DISCONTINUED | OUTPATIENT
Start: 2017-09-13 | End: 2017-09-15 | Stop reason: HOSPADM

## 2017-09-13 RX ORDER — HEPARIN SODIUM 1000 [USP'U]/ML
4000 INJECTION, SOLUTION INTRAVENOUS; SUBCUTANEOUS PRN
Status: DISCONTINUED | OUTPATIENT
Start: 2017-09-13 | End: 2017-09-14 | Stop reason: ALTCHOICE

## 2017-09-13 RX ORDER — FUROSEMIDE 10 MG/ML
40 INJECTION INTRAMUSCULAR; INTRAVENOUS 2 TIMES DAILY
Status: DISCONTINUED | OUTPATIENT
Start: 2017-09-13 | End: 2017-09-14

## 2017-09-13 RX ORDER — HEPARIN SODIUM 1000 [USP'U]/ML
2000 INJECTION, SOLUTION INTRAVENOUS; SUBCUTANEOUS PRN
Status: DISCONTINUED | OUTPATIENT
Start: 2017-09-13 | End: 2017-09-14 | Stop reason: ALTCHOICE

## 2017-09-13 RX ORDER — HEPARIN SODIUM 10000 [USP'U]/100ML
1000 INJECTION, SOLUTION INTRAVENOUS CONTINUOUS
Status: DISCONTINUED | OUTPATIENT
Start: 2017-09-13 | End: 2017-09-14

## 2017-09-13 RX ORDER — ACETAMINOPHEN 325 MG/1
650 TABLET ORAL EVERY 4 HOURS PRN
Status: DISCONTINUED | OUTPATIENT
Start: 2017-09-13 | End: 2017-09-13 | Stop reason: SDUPTHER

## 2017-09-13 RX ORDER — HEPARIN SODIUM 10000 [USP'U]/100ML
12 INJECTION, SOLUTION INTRAVENOUS CONTINUOUS
Status: DISCONTINUED | OUTPATIENT
Start: 2017-09-13 | End: 2017-09-13

## 2017-09-13 RX ORDER — POTASSIUM CHLORIDE 7.45 MG/ML
10 INJECTION INTRAVENOUS PRN
Status: DISCONTINUED | OUTPATIENT
Start: 2017-09-13 | End: 2017-09-15 | Stop reason: HOSPADM

## 2017-09-13 RX ORDER — NITROGLYCERIN 0.4 MG/1
0.4 TABLET SUBLINGUAL EVERY 5 MIN PRN
Status: DISCONTINUED | OUTPATIENT
Start: 2017-09-13 | End: 2017-09-15 | Stop reason: HOSPADM

## 2017-09-13 RX ORDER — ACETAMINOPHEN 325 MG/1
650 TABLET ORAL EVERY 4 HOURS PRN
Status: DISCONTINUED | OUTPATIENT
Start: 2017-09-13 | End: 2017-09-15 | Stop reason: HOSPADM

## 2017-09-13 RX ORDER — POTASSIUM CHLORIDE 20MEQ/15ML
40 LIQUID (ML) ORAL PRN
Status: DISCONTINUED | OUTPATIENT
Start: 2017-09-13 | End: 2017-09-15 | Stop reason: HOSPADM

## 2017-09-13 RX ORDER — MAGNESIUM SULFATE 1 G/100ML
1 INJECTION INTRAVENOUS PRN
Status: DISCONTINUED | OUTPATIENT
Start: 2017-09-13 | End: 2017-09-15 | Stop reason: HOSPADM

## 2017-09-13 RX ORDER — HEPARIN SODIUM 1000 [USP'U]/ML
4000 INJECTION, SOLUTION INTRAVENOUS; SUBCUTANEOUS ONCE
Status: COMPLETED | OUTPATIENT
Start: 2017-09-13 | End: 2017-09-13

## 2017-09-13 RX ORDER — NICOTINE POLACRILEX 4 MG
15 LOZENGE BUCCAL PRN
Status: DISCONTINUED | OUTPATIENT
Start: 2017-09-13 | End: 2017-09-15 | Stop reason: HOSPADM

## 2017-09-13 RX ADMIN — HEPARIN SODIUM 2000 UNITS: 1000 INJECTION, SOLUTION INTRAVENOUS; SUBCUTANEOUS at 21:06

## 2017-09-13 RX ADMIN — METOPROLOL TARTRATE 25 MG: 25 TABLET ORAL at 21:05

## 2017-09-13 RX ADMIN — LEVOFLOXACIN 750 MG: 5 INJECTION, SOLUTION INTRAVENOUS at 02:52

## 2017-09-13 RX ADMIN — PANTOPRAZOLE SODIUM 40 MG: 40 TABLET, DELAYED RELEASE ORAL at 09:25

## 2017-09-13 RX ADMIN — HEPARIN SODIUM AND DEXTROSE 6.3 UNITS/KG/HR: 10000; 5 INJECTION INTRAVENOUS at 02:55

## 2017-09-13 RX ADMIN — ASPIRIN 81 MG: 81 TABLET ORAL at 09:25

## 2017-09-13 RX ADMIN — Medication 10 ML: at 09:25

## 2017-09-13 RX ADMIN — HEPARIN SODIUM 4000 UNITS: 1000 INJECTION, SOLUTION INTRAVENOUS; SUBCUTANEOUS at 14:08

## 2017-09-13 RX ADMIN — ASPIRIN 81 MG: 81 TABLET ORAL at 21:05

## 2017-09-13 RX ADMIN — METOPROLOL TARTRATE 25 MG: 25 TABLET ORAL at 02:17

## 2017-09-13 RX ADMIN — ATORVASTATIN CALCIUM 40 MG: 40 TABLET, FILM COATED ORAL at 09:25

## 2017-09-13 RX ADMIN — FUROSEMIDE 40 MG: 10 INJECTION, SOLUTION INTRAMUSCULAR; INTRAVENOUS at 21:05

## 2017-09-13 RX ADMIN — HEPARIN SODIUM AND DEXTROSE 10.3 UNITS/KG/HR: 10000; 5 INJECTION INTRAVENOUS at 14:08

## 2017-09-13 RX ADMIN — Medication 10 ML: at 21:05

## 2017-09-13 RX ADMIN — INSULIN LISPRO 2 UNITS: 100 INJECTION, SOLUTION INTRAVENOUS; SUBCUTANEOUS at 21:08

## 2017-09-13 RX ADMIN — FUROSEMIDE 40 MG: 10 INJECTION, SOLUTION INTRAMUSCULAR; INTRAVENOUS at 09:25

## 2017-09-13 RX ADMIN — INSULIN LISPRO 1 UNITS: 100 INJECTION, SOLUTION INTRAVENOUS; SUBCUTANEOUS at 03:14

## 2017-09-13 RX ADMIN — ASPIRIN 81 MG: 81 TABLET ORAL at 02:15

## 2017-09-13 RX ADMIN — FUROSEMIDE 40 MG: 10 INJECTION, SOLUTION INTRAMUSCULAR; INTRAVENOUS at 02:15

## 2017-09-13 RX ADMIN — INSULIN LISPRO 1 UNITS: 100 INJECTION, SOLUTION INTRAVENOUS; SUBCUTANEOUS at 18:24

## 2017-09-13 RX ADMIN — MAGNESIUM SULFATE IN DEXTROSE 2 G: 10 INJECTION, SOLUTION INTRAVENOUS at 06:54

## 2017-09-13 RX ADMIN — HEPARIN SODIUM 4000 UNITS: 1000 INJECTION, SOLUTION INTRAVENOUS; SUBCUTANEOUS at 02:54

## 2017-09-13 RX ADMIN — HEPARIN SODIUM AND DEXTROSE 10.3 UNITS/KG/HR: 10000; 5 INJECTION INTRAVENOUS at 19:24

## 2017-09-13 ASSESSMENT — ENCOUNTER SYMPTOMS
SHORTNESS OF BREATH: 1
NAUSEA: 0
ABDOMINAL PAIN: 0
COUGH: 1
RHINORRHEA: 0
VOMITING: 0

## 2017-09-14 LAB
ABSOLUTE EOS #: 0.1 K/UL (ref 0–0.4)
ABSOLUTE LYMPH #: 1.5 K/UL (ref 1–4.8)
ABSOLUTE MONO #: 0.5 K/UL (ref 0.1–1.2)
ANION GAP SERPL CALCULATED.3IONS-SCNC: 18 MMOL/L (ref 9–17)
BASOPHILS # BLD: 0 %
BASOPHILS ABSOLUTE: 0 K/UL (ref 0–0.2)
BUN BLDV-MCNC: 25 MG/DL (ref 8–23)
BUN/CREAT BLD: ABNORMAL (ref 9–20)
CALCIUM SERPL-MCNC: 8.5 MG/DL (ref 8.6–10.4)
CHLORIDE BLD-SCNC: 96 MMOL/L (ref 98–107)
CO2: 24 MMOL/L (ref 20–31)
CREAT SERPL-MCNC: 0.86 MG/DL (ref 0.7–1.2)
DIFFERENTIAL TYPE: ABNORMAL
EKG ATRIAL RATE: 78 BPM
EKG ATRIAL RATE: 82 BPM
EKG ATRIAL RATE: 94 BPM
EKG P AXIS: 38 DEGREES
EKG P AXIS: 62 DEGREES
EKG P AXIS: 64 DEGREES
EKG P-R INTERVAL: 182 MS
EKG P-R INTERVAL: 196 MS
EKG P-R INTERVAL: 198 MS
EKG Q-T INTERVAL: 356 MS
EKG Q-T INTERVAL: 362 MS
EKG Q-T INTERVAL: 362 MS
EKG QRS DURATION: 134 MS
EKG QRS DURATION: 138 MS
EKG QRS DURATION: 138 MS
EKG QTC CALCULATION (BAZETT): 405 MS
EKG QTC CALCULATION (BAZETT): 422 MS
EKG QTC CALCULATION (BAZETT): 452 MS
EKG R AXIS: -63 DEGREES
EKG R AXIS: -83 DEGREES
EKG R AXIS: -95 DEGREES
EKG T AXIS: -169 DEGREES
EKG T AXIS: 102 DEGREES
EKG T AXIS: 97 DEGREES
EKG VENTRICULAR RATE: 78 BPM
EKG VENTRICULAR RATE: 82 BPM
EKG VENTRICULAR RATE: 94 BPM
EOSINOPHILS RELATIVE PERCENT: 2 %
GFR AFRICAN AMERICAN: >60 ML/MIN
GFR NON-AFRICAN AMERICAN: >60 ML/MIN
GFR SERPL CREATININE-BSD FRML MDRD: ABNORMAL ML/MIN/{1.73_M2}
GFR SERPL CREATININE-BSD FRML MDRD: ABNORMAL ML/MIN/{1.73_M2}
GLUCOSE BLD-MCNC: 118 MG/DL (ref 75–110)
GLUCOSE BLD-MCNC: 126 MG/DL (ref 70–99)
GLUCOSE BLD-MCNC: 145 MG/DL (ref 75–110)
GLUCOSE BLD-MCNC: 171 MG/DL (ref 75–110)
GLUCOSE BLD-MCNC: 198 MG/DL (ref 75–110)
HCT VFR BLD CALC: 40 % (ref 41–53)
HEMOGLOBIN: 12.8 G/DL (ref 13.5–17.5)
LYMPHOCYTES # BLD: 15 %
MAGNESIUM: 1.9 MG/DL (ref 1.6–2.6)
MCH RBC QN AUTO: 26.6 PG (ref 26–34)
MCHC RBC AUTO-ENTMCNC: 31.9 G/DL (ref 31–37)
MCV RBC AUTO: 83.2 FL (ref 80–100)
MONOCYTES # BLD: 5 %
PARTIAL THROMBOPLASTIN TIME: 42.1 SEC (ref 21.3–31.3)
PARTIAL THROMBOPLASTIN TIME: 54.6 SEC (ref 21.3–31.3)
PARTIAL THROMBOPLASTIN TIME: 62.1 SEC (ref 21.3–31.3)
PARTIAL THROMBOPLASTIN TIME: 71.8 SEC (ref 21.3–31.3)
PDW BLD-RTO: 18.6 % (ref 12.5–15.4)
PLATELET # BLD: 165 K/UL (ref 140–450)
PLATELET ESTIMATE: ABNORMAL
PMV BLD AUTO: 8.9 FL (ref 6–12)
POTASSIUM SERPL-SCNC: 3.9 MMOL/L (ref 3.7–5.3)
RBC # BLD: 4.81 M/UL (ref 4.5–5.9)
RBC # BLD: ABNORMAL 10*6/UL
SEG NEUTROPHILS: 78 %
SEGMENTED NEUTROPHILS ABSOLUTE COUNT: 7.6 K/UL (ref 1.8–7.7)
SODIUM BLD-SCNC: 138 MMOL/L (ref 135–144)
TROPONIN INTERP: ABNORMAL
TROPONIN T: 1.58 NG/ML
TROPONIN T: 1.66 NG/ML
TROPONIN T: 1.72 NG/ML
TROPONIN T: 1.88 NG/ML
WBC # BLD: 9.8 K/UL (ref 3.5–11)
WBC # BLD: ABNORMAL 10*3/UL

## 2017-09-14 PROCEDURE — 36415 COLL VENOUS BLD VENIPUNCTURE: CPT

## 2017-09-14 PROCEDURE — 97110 THERAPEUTIC EXERCISES: CPT

## 2017-09-14 PROCEDURE — 6370000000 HC RX 637 (ALT 250 FOR IP): Performed by: INTERNAL MEDICINE

## 2017-09-14 PROCEDURE — 6370000000 HC RX 637 (ALT 250 FOR IP): Performed by: STUDENT IN AN ORGANIZED HEALTH CARE EDUCATION/TRAINING PROGRAM

## 2017-09-14 PROCEDURE — 6360000002 HC RX W HCPCS: Performed by: STUDENT IN AN ORGANIZED HEALTH CARE EDUCATION/TRAINING PROGRAM

## 2017-09-14 PROCEDURE — 83735 ASSAY OF MAGNESIUM: CPT

## 2017-09-14 PROCEDURE — 84484 ASSAY OF TROPONIN QUANT: CPT

## 2017-09-14 PROCEDURE — 6370000000 HC RX 637 (ALT 250 FOR IP): Performed by: NURSE PRACTITIONER

## 2017-09-14 PROCEDURE — 2060000000 HC ICU INTERMEDIATE R&B

## 2017-09-14 PROCEDURE — 2580000003 HC RX 258: Performed by: STUDENT IN AN ORGANIZED HEALTH CARE EDUCATION/TRAINING PROGRAM

## 2017-09-14 PROCEDURE — 85730 THROMBOPLASTIN TIME PARTIAL: CPT

## 2017-09-14 PROCEDURE — 6360000002 HC RX W HCPCS: Performed by: EMERGENCY MEDICINE

## 2017-09-14 PROCEDURE — 97116 GAIT TRAINING THERAPY: CPT

## 2017-09-14 PROCEDURE — 82947 ASSAY GLUCOSE BLOOD QUANT: CPT

## 2017-09-14 PROCEDURE — 80048 BASIC METABOLIC PNL TOTAL CA: CPT

## 2017-09-14 PROCEDURE — 99232 SBSQ HOSP IP/OBS MODERATE 35: CPT | Performed by: INTERNAL MEDICINE

## 2017-09-14 PROCEDURE — 94762 N-INVAS EAR/PLS OXIMTRY CONT: CPT

## 2017-09-14 PROCEDURE — 85025 COMPLETE CBC W/AUTO DIFF WBC: CPT

## 2017-09-14 RX ORDER — MAGNESIUM SULFATE 1 G/100ML
1 INJECTION INTRAVENOUS ONCE
Status: COMPLETED | OUTPATIENT
Start: 2017-09-14 | End: 2017-09-14

## 2017-09-14 RX ORDER — ISOSORBIDE MONONITRATE 30 MG/1
30 TABLET, EXTENDED RELEASE ORAL DAILY
Status: DISCONTINUED | OUTPATIENT
Start: 2017-09-14 | End: 2017-09-15 | Stop reason: HOSPADM

## 2017-09-14 RX ORDER — FUROSEMIDE 40 MG/1
40 TABLET ORAL 2 TIMES DAILY
Status: DISCONTINUED | OUTPATIENT
Start: 2017-09-15 | End: 2017-09-15 | Stop reason: HOSPADM

## 2017-09-14 RX ADMIN — HEPARIN SODIUM 2000 UNITS: 1000 INJECTION, SOLUTION INTRAVENOUS; SUBCUTANEOUS at 15:47

## 2017-09-14 RX ADMIN — FUROSEMIDE 40 MG: 10 INJECTION, SOLUTION INTRAMUSCULAR; INTRAVENOUS at 10:39

## 2017-09-14 RX ADMIN — MAGNESIUM SULFATE IN DEXTROSE 1 G: 10 INJECTION, SOLUTION INTRAVENOUS at 15:45

## 2017-09-14 RX ADMIN — INSULIN LISPRO 1 UNITS: 100 INJECTION, SOLUTION INTRAVENOUS; SUBCUTANEOUS at 17:43

## 2017-09-14 RX ADMIN — INSULIN LISPRO 1 UNITS: 100 INJECTION, SOLUTION INTRAVENOUS; SUBCUTANEOUS at 10:39

## 2017-09-14 RX ADMIN — INSULIN LISPRO 1 UNITS: 100 INJECTION, SOLUTION INTRAVENOUS; SUBCUTANEOUS at 20:37

## 2017-09-14 RX ADMIN — APIXABAN 5 MG: 5 TABLET, FILM COATED ORAL at 21:34

## 2017-09-14 RX ADMIN — HEPARIN SODIUM AND DEXTROSE 10.3 UNITS/KG/HR: 10000; 5 INJECTION INTRAVENOUS at 09:56

## 2017-09-14 RX ADMIN — ASPIRIN 81 MG: 81 TABLET ORAL at 20:37

## 2017-09-14 RX ADMIN — METOPROLOL TARTRATE 25 MG: 25 TABLET ORAL at 20:40

## 2017-09-14 RX ADMIN — ATORVASTATIN CALCIUM 40 MG: 40 TABLET, FILM COATED ORAL at 09:55

## 2017-09-14 RX ADMIN — PANTOPRAZOLE SODIUM 40 MG: 40 TABLET, DELAYED RELEASE ORAL at 09:56

## 2017-09-14 RX ADMIN — Medication 10 ML: at 20:37

## 2017-09-14 RX ADMIN — ISOSORBIDE MONONITRATE 30 MG: 30 TABLET ORAL at 10:39

## 2017-09-14 RX ADMIN — ASPIRIN 81 MG: 81 TABLET ORAL at 09:55

## 2017-09-14 RX ADMIN — Medication 10 ML: at 10:40

## 2017-09-15 VITALS
RESPIRATION RATE: 14 BRPM | BODY MASS INDEX: 46.64 KG/M2 | HEIGHT: 64 IN | HEART RATE: 84 BPM | DIASTOLIC BLOOD PRESSURE: 65 MMHG | SYSTOLIC BLOOD PRESSURE: 99 MMHG | WEIGHT: 273.2 LBS | OXYGEN SATURATION: 95 % | TEMPERATURE: 98.5 F

## 2017-09-15 LAB
ANION GAP SERPL CALCULATED.3IONS-SCNC: 15 MMOL/L (ref 9–17)
BUN BLDV-MCNC: 23 MG/DL (ref 8–23)
BUN/CREAT BLD: ABNORMAL (ref 9–20)
CALCIUM IONIZED: 1.04 MMOL/L (ref 1.13–1.33)
CALCIUM SERPL-MCNC: 8.5 MG/DL (ref 8.6–10.4)
CHLORIDE BLD-SCNC: 97 MMOL/L (ref 98–107)
CO2: 28 MMOL/L (ref 20–31)
CREAT SERPL-MCNC: 0.78 MG/DL (ref 0.7–1.2)
GFR AFRICAN AMERICAN: >60 ML/MIN
GFR NON-AFRICAN AMERICAN: >60 ML/MIN
GFR SERPL CREATININE-BSD FRML MDRD: ABNORMAL ML/MIN/{1.73_M2}
GFR SERPL CREATININE-BSD FRML MDRD: ABNORMAL ML/MIN/{1.73_M2}
GLUCOSE BLD-MCNC: 117 MG/DL (ref 75–110)
GLUCOSE BLD-MCNC: 123 MG/DL (ref 70–99)
GLUCOSE BLD-MCNC: 202 MG/DL (ref 75–110)
MAGNESIUM: 2 MG/DL (ref 1.6–2.6)
POTASSIUM SERPL-SCNC: 3.5 MMOL/L (ref 3.7–5.3)
SODIUM BLD-SCNC: 140 MMOL/L (ref 135–144)
TROPONIN INTERP: ABNORMAL
TROPONIN T: 1.62 NG/ML
TROPONIN T: 1.73 NG/ML
TROPONIN T: 1.83 NG/ML

## 2017-09-15 PROCEDURE — 6360000002 HC RX W HCPCS: Performed by: HOSPITALIST

## 2017-09-15 PROCEDURE — 6370000000 HC RX 637 (ALT 250 FOR IP): Performed by: STUDENT IN AN ORGANIZED HEALTH CARE EDUCATION/TRAINING PROGRAM

## 2017-09-15 PROCEDURE — 94660 CPAP INITIATION&MGMT: CPT

## 2017-09-15 PROCEDURE — 82330 ASSAY OF CALCIUM: CPT

## 2017-09-15 PROCEDURE — 2580000003 HC RX 258: Performed by: HOSPITALIST

## 2017-09-15 PROCEDURE — 36415 COLL VENOUS BLD VENIPUNCTURE: CPT

## 2017-09-15 PROCEDURE — 99232 SBSQ HOSP IP/OBS MODERATE 35: CPT | Performed by: INTERNAL MEDICINE

## 2017-09-15 PROCEDURE — 2580000003 HC RX 258: Performed by: INTERNAL MEDICINE

## 2017-09-15 PROCEDURE — 2580000003 HC RX 258: Performed by: STUDENT IN AN ORGANIZED HEALTH CARE EDUCATION/TRAINING PROGRAM

## 2017-09-15 PROCEDURE — 97530 THERAPEUTIC ACTIVITIES: CPT

## 2017-09-15 PROCEDURE — 83735 ASSAY OF MAGNESIUM: CPT

## 2017-09-15 PROCEDURE — 97110 THERAPEUTIC EXERCISES: CPT

## 2017-09-15 PROCEDURE — 6370000000 HC RX 637 (ALT 250 FOR IP): Performed by: INTERNAL MEDICINE

## 2017-09-15 PROCEDURE — 82947 ASSAY GLUCOSE BLOOD QUANT: CPT

## 2017-09-15 PROCEDURE — 84484 ASSAY OF TROPONIN QUANT: CPT

## 2017-09-15 PROCEDURE — 97116 GAIT TRAINING THERAPY: CPT

## 2017-09-15 PROCEDURE — 80048 BASIC METABOLIC PNL TOTAL CA: CPT

## 2017-09-15 PROCEDURE — 6370000000 HC RX 637 (ALT 250 FOR IP): Performed by: NURSE PRACTITIONER

## 2017-09-15 PROCEDURE — 94762 N-INVAS EAR/PLS OXIMTRY CONT: CPT

## 2017-09-15 RX ORDER — POTASSIUM CHLORIDE 20 MEQ/1
20 TABLET, EXTENDED RELEASE ORAL ONCE
Status: DISCONTINUED | OUTPATIENT
Start: 2017-09-15 | End: 2017-09-15

## 2017-09-15 RX ADMIN — Medication 10 ML: at 08:23

## 2017-09-15 RX ADMIN — POTASSIUM CHLORIDE 40 MEQ: 20 TABLET, EXTENDED RELEASE ORAL at 08:22

## 2017-09-15 RX ADMIN — PANTOPRAZOLE SODIUM 40 MG: 40 TABLET, DELAYED RELEASE ORAL at 06:03

## 2017-09-15 RX ADMIN — Medication 10 ML: at 08:22

## 2017-09-15 RX ADMIN — METOPROLOL TARTRATE 25 MG: 25 TABLET ORAL at 08:22

## 2017-09-15 RX ADMIN — APIXABAN 5 MG: 5 TABLET, FILM COATED ORAL at 08:22

## 2017-09-15 RX ADMIN — ASPIRIN 81 MG: 81 TABLET ORAL at 08:22

## 2017-09-15 RX ADMIN — CALCIUM GLUCONATE 1 G: 94 INJECTION, SOLUTION INTRAVENOUS at 14:56

## 2017-09-15 RX ADMIN — ISOSORBIDE MONONITRATE 30 MG: 30 TABLET ORAL at 08:22

## 2017-09-15 RX ADMIN — FUROSEMIDE 40 MG: 40 TABLET ORAL at 08:22

## 2017-09-15 RX ADMIN — ATORVASTATIN CALCIUM 40 MG: 40 TABLET, FILM COATED ORAL at 08:22

## 2017-09-15 RX ADMIN — INSULIN LISPRO 2 UNITS: 100 INJECTION, SOLUTION INTRAVENOUS; SUBCUTANEOUS at 11:53

## 2017-09-19 ENCOUNTER — HOSPITAL ENCOUNTER (OUTPATIENT)
Age: 62
Setting detail: SPECIMEN
Discharge: HOME | End: 2017-09-19
Payer: COMMERCIAL

## 2017-09-19 DIAGNOSIS — I50.42 CHRONIC COMBINED SYSTOLIC AND DIASTOLIC HEART FAILURE (HCC): ICD-10-CM

## 2017-09-19 LAB
ANION GAP SERPL CALCULATED.3IONS-SCNC: 17 MMOL/L (ref 9–17)
BUN BLDV-MCNC: 26 MG/DL (ref 8–23)
BUN/CREAT BLD: ABNORMAL (ref 9–20)
CALCIUM SERPL-MCNC: 8.8 MG/DL (ref 8.6–10.4)
CHLORIDE BLD-SCNC: 96 MMOL/L (ref 98–107)
CO2: 26 MMOL/L (ref 20–31)
CREAT SERPL-MCNC: 0.91 MG/DL (ref 0.7–1.2)
CULTURE: NORMAL
GFR AFRICAN AMERICAN: >60 ML/MIN
GFR NON-AFRICAN AMERICAN: >60 ML/MIN
GFR SERPL CREATININE-BSD FRML MDRD: ABNORMAL ML/MIN/{1.73_M2}
GFR SERPL CREATININE-BSD FRML MDRD: ABNORMAL ML/MIN/{1.73_M2}
GLUCOSE BLD-MCNC: 148 MG/DL (ref 70–99)
Lab: NORMAL
Lab: NORMAL
POTASSIUM SERPL-SCNC: 4.4 MMOL/L (ref 3.7–5.3)
SODIUM BLD-SCNC: 139 MMOL/L (ref 135–144)
SPECIMEN DESCRIPTION: NORMAL
SPECIMEN DESCRIPTION: NORMAL
STATUS: NORMAL
STATUS: NORMAL

## 2017-09-19 PROCEDURE — 80048 BASIC METABOLIC PNL TOTAL CA: CPT

## 2017-09-19 PROCEDURE — 36415 COLL VENOUS BLD VENIPUNCTURE: CPT

## 2017-09-25 DIAGNOSIS — E11.9 TYPE 2 DIABETES MELLITUS WITHOUT COMPLICATION (HCC): ICD-10-CM

## 2017-09-25 RX ORDER — GLUCOSAM/CHON-MSM1/C/MANG/BOSW 500-416.6
TABLET ORAL
Qty: 100 EACH | Refills: 0 | Status: CANCELLED | OUTPATIENT
Start: 2017-09-25

## 2017-10-03 ENCOUNTER — OFFICE VISIT (OUTPATIENT)
Dept: INTERNAL MEDICINE | Age: 62
End: 2017-10-03
Payer: COMMERCIAL

## 2017-10-03 VITALS
HEART RATE: 82 BPM | WEIGHT: 268 LBS | OXYGEN SATURATION: 97 % | BODY MASS INDEX: 46 KG/M2 | DIASTOLIC BLOOD PRESSURE: 63 MMHG | SYSTOLIC BLOOD PRESSURE: 86 MMHG

## 2017-10-03 DIAGNOSIS — I48.91 ATRIAL FIBRILLATION WITH RVR (HCC): ICD-10-CM

## 2017-10-03 DIAGNOSIS — E11.9 TYPE 2 DIABETES MELLITUS WITHOUT COMPLICATION, WITHOUT LONG-TERM CURRENT USE OF INSULIN (HCC): ICD-10-CM

## 2017-10-03 DIAGNOSIS — Z23 FLU VACCINE NEED: ICD-10-CM

## 2017-10-03 DIAGNOSIS — I50.42 CHRONIC COMBINED SYSTOLIC AND DIASTOLIC HEART FAILURE (HCC): Primary | ICD-10-CM

## 2017-10-03 PROCEDURE — 90688 IIV4 VACCINE SPLT 0.5 ML IM: CPT | Performed by: INTERNAL MEDICINE

## 2017-10-03 PROCEDURE — 99213 OFFICE O/P EST LOW 20 MIN: CPT | Performed by: INTERNAL MEDICINE

## 2017-10-03 PROCEDURE — 90471 IMMUNIZATION ADMIN: CPT | Performed by: INTERNAL MEDICINE

## 2017-10-03 RX ORDER — BLOOD-GLUCOSE METER
1 KIT MISCELLANEOUS DAILY PRN
Qty: 1 KIT | Refills: 0 | Status: SHIPPED | OUTPATIENT
Start: 2017-10-03 | End: 2017-12-07 | Stop reason: SDUPTHER

## 2017-10-03 RX ORDER — FUROSEMIDE 40 MG/1
40 TABLET ORAL 2 TIMES DAILY
Qty: 60 TABLET | Refills: 3 | Status: SHIPPED | OUTPATIENT
Start: 2017-10-03 | End: 2017-12-07 | Stop reason: SDUPTHER

## 2017-10-03 RX ORDER — CLOPIDOGREL BISULFATE 75 MG/1
TABLET ORAL
COMMUNITY
Start: 2017-07-27 | End: 2017-10-03 | Stop reason: ALTCHOICE

## 2017-10-03 RX ORDER — BLOOD PRESSURE TEST KIT
KIT MISCELLANEOUS
COMMUNITY
Start: 2017-09-27 | End: 2018-10-26

## 2017-10-03 NOTE — PROGRESS NOTES
Date of patient's visit: 10/3/2017  YOB: 1955  Patient's Name:  Salo Vega    Patient Care Team:  Lupillo Driscoll MD as PCP - General (Internal Medicine)  Kallie Abdi MD as Consulting Physician (Hematology and Oncology)  Fer Ibrahim MD as Consulting Physician (Cardiology)  Ben Arriaza DO as Consulting Physician (Pulmonology)  Woody Molina MD as Consulting Physician (Ophthalmology)    REASON FOR VISIT: Routine outpatient follow     HISTORY OF PRESENT ILLNESS:    History was obtained from the patient. Saol Vega is a 57 y.o. is here for a  Follow up after hospital visit. He was discharged from [place] [address] on 9/15 for acute decompensated systolic heart failure with EF of 15-20% . He has PMH of CHF, CAD with Stent follows with Dr. Santos Jones, s/p AICD, HTN, IDDM, Sleep apnea on BiPAP, non compliant with medications and cpap. He was on iv lasix and improved. He is using his CPAP more often now and had an appointment with Dr. Santos Jones last month 10 days ago and he was told to take lasix 40 mg twice daily . He was started on eliquis and has been taking it regularly . No complaints , no SOB. Cp or le edema.          Patient Active Problem List   Diagnosis    Chronic combined systolic and diastolic heart failure (HCC)    Chronic kidney disease, stage II (mild)    Morbid obesity ([address]    Hyperlipidemia    Iron deficiency anemia    Cardiac defibrillator in place    Anxiety disorder     DJD (degenerative joint disease)    TANNER variably compliant with BiPAP    CAD (coronary artery disease) s/p stenting of L anterior descending artery 2004    Diabetic retinopathy ([facility]    Ischemic cardiomyopathy    HTN (hypertension)    MGUS (monoclonal gammopathy of unknown significance)    Type 2 diabetes mellitus without complication (HCC)    Acute systolic congestive heart failure (HCC)    Acute respiratory failure (HCC)    Elevated troponin    AICD (automatic cardioverter/defibrillator) change from previous record. Roberto Ashley  reports that he has quit smoking.  He has never used smokeless tobacco.    FAMILY HISTORY:    Reviewed and No change from previous visit    REVIEW OF SYSTEMS:    ENT: negative  Respiratory: no cough, shortness of breath, or wheezing  Cardiovascular: no chest pain or dyspnea on exertion  Gastrointestinal: no abdominal pain, black or bloody stools  Neurological: no TIA or stroke symptoms  Endocrine: negative  Genito-Urinary: no dysuria, trouble voiding, or hematuria  Musculoskeletal: negative  Dermatological: negative    PHYSICAL EXAM:      Vitals:    10/03/17 1331   BP: 86/63   Pulse: 82   SpO2: 97%     General appearance - alert, well appearing, and in no distress  Mental status - alert, oriented to person, place, and time  Chest - clear to auscultation, no wheezes, rales or rhonchi, symmetric air entry  Heart - normal rate, regular rhythm, normal S1, S2, no murmurs, rubs, clicks or gallops  Abdomen - soft, nontender, nondistended, no masses or organomegaly  Neurological - alert, oriented, normal speech, no focal findings or movement disorder noted  Musculoskeletal - no joint tenderness, deformity or swelling  Extremities - peripheral pulses normal, no pedal edema, no clubbing or cyanosis  Skin - normal coloration and turgor, no rashes, no suspicious skin lesions noted    LABORATORY FINDINGS:    CBC:  Lab Results   Component Value Date    WBC 9.8 09/14/2017    HGB 12.8 09/14/2017     09/14/2017     12/08/2011     BMP:    Lab Results   Component Value Date     09/19/2017    K 4.4 09/19/2017    CL 96 09/19/2017    CO2 26 09/19/2017    BUN 26 09/19/2017    CREATININE 0.91 09/19/2017    GLUCOSE 148 09/19/2017    GLUCOSE 123 03/19/2012     HEMOGLOBIN A1C:   Lab Results   Component Value Date    LABA1C 6.9 06/01/2017     MICROALBUMIN URINE:   Lab Results   Component Value Date    MICROALBUR 27 06/01/2017     FASTING LIPID PANEL:  Lab Results   Component Value Date

## 2017-10-03 NOTE — PROGRESS NOTES
Visit Information    Have you changed or started any medications since your last visit including any over-the-counter medicines, vitamins, or herbal medicines? no   Are you having any side effects from any of your medications? -  no  Have you stopped taking any of your medications? Is so, why? -  no    Have you seen any other physician or provider since your last visit? Yes - Records Obtained  Have you had any other diagnostic tests since your last visit? Yes - Records Obtained  Have you been seen in the emergency room and/or had an admission to a hospital since we last saw you? Yes - Records Obtained  Have you had your routine dental cleaning in the past 6 months? no    Have you activated your [employer] account? If not, what are your barriers?  Yes     Patient Care Team:  Chayo Herron MD as PCP - General (Internal Medicine)  Monica Ta MD as Consulting Physician (Hematology and Oncology)  Vibha Carlton MD as Consulting Physician (Cardiology)  Ifeoma Ortega DO as Consulting Physician (Pulmonology)  Nas Boston MD as Consulting Physician (Ophthalmology)    Medical History Review  Past Medical, Family, and Social History reviewed and does contribute to the patient presenting condition    Health Maintenance   Topic Date Due    Diabetic retinal exam  05/06/2014    Flu vaccine (1) 09/01/2017    Hepatitis C screen  01/03/2018 (Originally 1955)    HIV screen  01/03/2018 (Originally 1/23/1970)    Lipid screen  11/04/2017    Diabetic foot exam  12/14/2017    Diabetic hemoglobin A1C test  06/01/2018    Colon cancer screen colonoscopy  04/04/2020    DTaP/Tdap/Td vaccine (2 - Td) 06/30/2026    Zostavax vaccine  Completed    Pneumococcal med risk  Completed

## 2017-10-03 NOTE — MR AVS SNAPSHOT
After Visit Summary             Gera Howard   10/3/2017 1:25 PM   Office Visit    Description:  Male : 1955   Provider:  Fidel Shelton MD   Department:  [facility] and Future Appointments         Below is a list of your follow-up and future appointments. This may not be a complete list as you may have made appointments directly with providers that we are not aware of or your providers may have made some for you. Please call your providers to confirm appointments. It is important to keep your appointments. Please bring your current insurance card, photo ID, co-pay, and all medication bottles to your appointment. If self-pay, payment is expected at the time of service. Your To-Do List     Future Appointments Provider Department Dept Phone    2017 1:00 PM Karis [facility] [phone]    Please arrive 15 minutes prior to appointment time, bring insurance card and photo ID.     2017 1:45 PM Chrisman Gray [address] [place] [address] [phone]    Patient must bring photo ID, insurance card, co-pay and medication bottles to appointment All patient appointments must be kept or cancelled within 24 hours Patient should be prepared to provide PCP name and date of last PCP visit during time of appointment    2018 10:00 AM SCHEDULE, [facility] Elias [facility] Sou[facility] [phone]    If this is a fasting lab, please do not eat or drink past midnight other than water. 2018 1:00 PM Ana Farrar MD [facility] [phone]    Please arrive 15 minutes prior to appointment time, bring insurance card and photo ID.     2018 2:30 PM Adriana [address]one [address] [phone]    Please arrive 15 minutes prior to appointment time, bring insurance card and photo ID.      Follow-Up Return in about 6 months (around 4/3/2018). Information from Your Visit        Department     Name Address Phone Fax    [facility]EL[address] [facility] [address] [facility] [phone]      You Were Seen for:         Comments    Chronic combined systolic and diastolic heart failure ([address]   [428.42. ICD-9-CM]         Vital Signs     Blood Pressure Pulse Weight Oxygen Saturation Body Mass Index Smoking Status    86/63 (Site: Left Arm, Position: Sitting, Cuff Size: Large Adult) 82 268 lb (121.6 kg) 97% 46 kg/m2 Former Smoker      Additional Information about your Body Mass Index (BMI)           Your BMI as listed above is considered obese (30 or more). BMI is an estimate of body fat, calculated from your height and weight. The higher your BMI, the greater your risk of heart disease, high blood pressure, type 2 diabetes, stroke, gallstones, arthritis, sleep apnea, and certain cancers. BMI is not perfect. It may overestimate body fat in athletes and people who are more muscular. Even a small weight loss (between 5 and 10 percent of your current weight) by decreasing your calorie intake and becoming more physically active will help lower your risk of developing or worsening diseases associated with obesity. Learn more at: [employer]             Today's Medication Changes          These changes are accurate as of: 10/3/17  2:48 PM.  If you have any questions, ask your nurse or doctor.                START taking these medications           glucose monitoring kit monitoring kit   Instructions:  1 kit by Does not apply route daily as needed (use daily as needed)   Quantity:  1 kit   Refills:  0         STOP taking these medications           apixaban 5 MG Tabs tablet   Commonly known as:  ELIQUIS       clopidogrel 75 MG tablet   Commonly known as:  PLAVIX            Where to Get Your Medications These medications were sent to [address] [place] [address] [address] [facility],  [address] [place] [address]     Phone:  [phone]     furosemide 40 MG tablet         You can get these medications from any pharmacy     Bring a paper prescription for each of these medications     glucose monitoring kit monitoring kit               Your Current Medications Are              Blood Pressure Monitoring (WRIST BLOOD PRESSURE MONITOR) MISC     furosemide (LASIX) 40 MG tablet Take 1 tablet by mouth 2 times daily    glucose monitoring kit (FREESTYLE) monitoring kit 1 kit by Does not apply route daily as needed (use daily as needed)    aspirin 81 MG tablet Take 1 tablet by mouth 2 times daily    TRUEPLUS LANCETS 33G MISC TEST AS DIRECTED    allopurinol (ZYLOPRIM) 300 MG tablet TAKE 1 TABLET DAILY    Alcohol Swabs (B-D SINGLE USE SWABS REGULAR) PADS USE AS DIRECTED    melatonin (RA MELATONIN) 3 MG TABS tablet Take 1 tablet by mouth nightly as needed (insomnia)    glimepiride (AMARYL) 2 MG tablet TAKE 1 TABLET EVERY MORNING    isosorbide mononitrate (IMDUR) 30 MG extended release tablet TAKE 1 TABLET DAILY    lisinopril (PRINIVIL;ZESTRIL) 5 MG tablet TAKE 1 TABLET DAILY    metFORMIN (GLUCOPHAGE) 1000 MG tablet TAKE 1 TABLET TWICE A DAY WITH MEALS    metoprolol tartrate (LOPRESSOR) 25 MG tablet TAKE 1 TABLET TWICE A DAY    omeprazole (PRILOSEC) 20 MG delayed release capsule TAKE 1 CAPSULE DAILY    atorvastatin (LIPITOR) 40 MG tablet TAKE 1 TABLET DAILY    Alcohol Swabs (ALCOHOL PREP) 70 % PADS USE AS DIRECTED    glucose blood VI test strips (FREESTYLE LITE) strip USE AS DIRECTED TWICE A DAY    ammonium lactate (LAC-HYDRIN) 12 % lotion Apply topically daily after bathing sparing the space between the toes. traZODone (DESYREL) 50 MG tablet TAKE 1 TABLET BY MOUTH NIGHTLY    MULTIPLE VITAMINS-MINERALS PO Take  by mouth daily.       Allergies

## 2017-10-03 NOTE — PROGRESS NOTES
Attending Physician Statement  I have discussed the care of Manjit Martinez, including pertinent history and exam findings with the resident. I have reviewed the key elements of all parts of the encounter with the resident. I agree with the assessment, and status of the problem list as documented. 1. Chronic combined systolic and diastolic heart failure (HCC)  furosemide (LASIX) 40 MG tablet. Compensated at this time  F/u with cardio   2. Type 2 diabetes mellitus without complication, without long-term current use of insulin (HCC)  glucose monitoring kit (FREESTYLE) monitoring kit  Good control of DM. Recheck A1C in 6 months   3. Flu vaccine need  INFLUENZA, QUADV, 3 YRS AND OLDER, IM, MDV, 0.5ML (FLUZONE QUADV)    KY IMMUNIZ ADMIN,1 SINGLE/COMB VAC/TOXOID   4. Atrial fibrillation with RVR (HCC)        The plan and orders should include   Orders Placed This Encounter   Procedures    INFLUENZA, QUADV, 3 YRS AND OLDER, IM, MDV, 0.5ML (FLUZONE QUADV)    [place] IMMUNIZ ADMIN,1 SINGLE/COMB VAC/TOXOID    and this was also documented by the resident. The medication list was reviewed with the resident and is up to date. The return visit should be in 6 months .     Irma Quiroz MD, [address] [place] [address] Internal Medicine Associate & [facility]  Associate  Department of Internal Medicine  Internal Medicine Clerkship - Dolly Borja  10/3/2017, 3:04 PM

## 2017-11-16 ENCOUNTER — HOSPITAL ENCOUNTER (OUTPATIENT)
Age: 62
Discharge: HOME | End: 2017-11-16
Payer: COMMERCIAL

## 2017-11-16 LAB
ALT SERPL-CCNC: 9 U/L (ref 5–41)
AST SERPL-CCNC: 16 U/L
CHOLESTEROL/HDL RATIO: 3.1
CHOLESTEROL: 106 MG/DL
HDLC SERPL-MCNC: 34 MG/DL
LDL CHOLESTEROL: 45 MG/DL (ref 0–130)
TRIGL SERPL-MCNC: 135 MG/DL
VLDLC SERPL CALC-MCNC: ABNORMAL MG/DL (ref 1–30)

## 2017-11-16 PROCEDURE — 84450 TRANSFERASE (AST) (SGOT): CPT

## 2017-11-16 PROCEDURE — 80061 LIPID PANEL: CPT

## 2017-11-16 PROCEDURE — 84460 ALANINE AMINO (ALT) (SGPT): CPT

## 2017-11-16 PROCEDURE — 36415 COLL VENOUS BLD VENIPUNCTURE: CPT

## 2017-11-24 DIAGNOSIS — I25.5 ISCHEMIC CARDIOMYOPATHY: ICD-10-CM

## 2017-11-25 NOTE — TELEPHONE ENCOUNTER
Resident patient
Chronic kidney disease, stage II (mild)     Morbid obesity (HCC)     Hyperlipidemia     Iron deficiency anemia     Cardiac defibrillator in place     Anxiety disorder      DJD (degenerative joint disease)     TANNER variably compliant with BiPAP     CAD (coronary artery disease) s/p stenting of L anterior descending artery 2004     Diabetic retinopathy ([address]     Ischemic cardiomyopathy     HTN (hypertension)     MGUS (monoclonal gammopathy of unknown significance)     Type 2 diabetes mellitus without complication (HCC)     Acute systolic congestive heart failure (HCC)     Acute respiratory failure (HCC)     Elevated troponin     AICD (automatic cardioverter/defibrillator) present     PAF (paroxysmal atrial fibrillation) ([address]     Acute respiratory failure with hypoxia ([address]

## 2017-11-27 RX ORDER — ATORVASTATIN CALCIUM 40 MG/1
TABLET, FILM COATED ORAL
Qty: 30 TABLET | Refills: 3 | Status: SHIPPED | OUTPATIENT
Start: 2017-11-27 | End: 2017-12-07 | Stop reason: SDUPTHER

## 2017-12-07 ENCOUNTER — OFFICE VISIT (OUTPATIENT)
Dept: INTERNAL MEDICINE | Age: 62
End: 2017-12-07
Payer: COMMERCIAL

## 2017-12-07 VITALS
DIASTOLIC BLOOD PRESSURE: 67 MMHG | SYSTOLIC BLOOD PRESSURE: 104 MMHG | BODY MASS INDEX: 45.24 KG/M2 | HEART RATE: 70 BPM | WEIGHT: 265 LBS | HEIGHT: 64 IN

## 2017-12-07 DIAGNOSIS — M1A.00X0 IDIOPATHIC CHRONIC GOUT WITHOUT TOPHUS, UNSPECIFIED SITE: ICD-10-CM

## 2017-12-07 DIAGNOSIS — I50.42 CHRONIC COMBINED SYSTOLIC AND DIASTOLIC HEART FAILURE (HCC): Primary | ICD-10-CM

## 2017-12-07 DIAGNOSIS — K21.9 GASTROESOPHAGEAL REFLUX DISEASE WITHOUT ESOPHAGITIS: ICD-10-CM

## 2017-12-07 DIAGNOSIS — F51.04 PSYCHOPHYSIOLOGIC INSOMNIA: ICD-10-CM

## 2017-12-07 DIAGNOSIS — I25.10 CORONARY ARTERY DISEASE INVOLVING NATIVE CORONARY ARTERY OF NATIVE HEART WITHOUT ANGINA PECTORIS: ICD-10-CM

## 2017-12-07 DIAGNOSIS — M17.0 PRIMARY OSTEOARTHRITIS OF BOTH KNEES: ICD-10-CM

## 2017-12-07 DIAGNOSIS — E11.8 CONTROLLED TYPE 2 DIABETES MELLITUS WITH COMPLICATION, WITHOUT LONG-TERM CURRENT USE OF INSULIN (HCC): ICD-10-CM

## 2017-12-07 DIAGNOSIS — F41.9 ANXIETY DISORDER, UNSPECIFIED TYPE: ICD-10-CM

## 2017-12-07 DIAGNOSIS — Z86.79 ATRIAL FIBRILLATION, CURRENTLY IN SINUS RHYTHM: ICD-10-CM

## 2017-12-07 PROBLEM — R77.8 ELEVATED TROPONIN: Status: RESOLVED | Noted: 2017-09-13 | Resolved: 2017-12-07

## 2017-12-07 PROBLEM — I50.21 ACUTE SYSTOLIC CONGESTIVE HEART FAILURE (HCC): Status: RESOLVED | Noted: 2017-09-13 | Resolved: 2017-12-07

## 2017-12-07 PROBLEM — Z95.810 AICD (AUTOMATIC CARDIOVERTER/DEFIBRILLATOR) PRESENT: Status: RESOLVED | Noted: 2017-09-13 | Resolved: 2017-12-07

## 2017-12-07 PROBLEM — R79.89 ELEVATED TROPONIN: Status: RESOLVED | Noted: 2017-09-13 | Resolved: 2017-12-07

## 2017-12-07 PROBLEM — J96.00 ACUTE RESPIRATORY FAILURE (HCC): Status: RESOLVED | Noted: 2017-09-13 | Resolved: 2017-12-07

## 2017-12-07 PROCEDURE — 3044F HG A1C LEVEL LT 7.0%: CPT | Performed by: STUDENT IN AN ORGANIZED HEALTH CARE EDUCATION/TRAINING PROGRAM

## 2017-12-07 PROCEDURE — 99214 OFFICE O/P EST MOD 30 MIN: CPT | Performed by: STUDENT IN AN ORGANIZED HEALTH CARE EDUCATION/TRAINING PROGRAM

## 2017-12-07 PROCEDURE — G8417 CALC BMI ABV UP PARAM F/U: HCPCS | Performed by: STUDENT IN AN ORGANIZED HEALTH CARE EDUCATION/TRAINING PROGRAM

## 2017-12-07 PROCEDURE — G8427 DOCREV CUR MEDS BY ELIG CLIN: HCPCS | Performed by: STUDENT IN AN ORGANIZED HEALTH CARE EDUCATION/TRAINING PROGRAM

## 2017-12-07 PROCEDURE — G8598 ASA/ANTIPLAT THER USED: HCPCS | Performed by: STUDENT IN AN ORGANIZED HEALTH CARE EDUCATION/TRAINING PROGRAM

## 2017-12-07 PROCEDURE — 1036F TOBACCO NON-USER: CPT | Performed by: STUDENT IN AN ORGANIZED HEALTH CARE EDUCATION/TRAINING PROGRAM

## 2017-12-07 PROCEDURE — G8484 FLU IMMUNIZE NO ADMIN: HCPCS | Performed by: STUDENT IN AN ORGANIZED HEALTH CARE EDUCATION/TRAINING PROGRAM

## 2017-12-07 PROCEDURE — 3017F COLORECTAL CA SCREEN DOC REV: CPT | Performed by: STUDENT IN AN ORGANIZED HEALTH CARE EDUCATION/TRAINING PROGRAM

## 2017-12-07 RX ORDER — TRAZODONE HYDROCHLORIDE 50 MG/1
TABLET ORAL
Qty: 30 TABLET | Refills: 2 | Status: SHIPPED | OUTPATIENT
Start: 2017-12-07 | End: 2018-03-15 | Stop reason: ALTCHOICE

## 2017-12-07 RX ORDER — AMMONIUM LACTATE 12 G/100G
LOTION TOPICAL
Qty: 222 ML | Refills: 3 | Status: SHIPPED | OUTPATIENT
Start: 2017-12-07 | End: 2018-03-15 | Stop reason: SDUPTHER

## 2017-12-07 RX ORDER — GLIMEPIRIDE 2 MG/1
2 TABLET ORAL
Qty: 30 TABLET | Refills: 2 | Status: SHIPPED | OUTPATIENT
Start: 2017-12-07 | End: 2018-03-15 | Stop reason: SDUPTHER

## 2017-12-07 RX ORDER — ISOSORBIDE MONONITRATE 30 MG/1
30 TABLET, EXTENDED RELEASE ORAL DAILY
Qty: 30 TABLET | Refills: 2 | Status: SHIPPED | OUTPATIENT
Start: 2017-12-07 | End: 2018-03-15 | Stop reason: SDUPTHER

## 2017-12-07 RX ORDER — BLOOD-GLUCOSE METER
1 KIT MISCELLANEOUS DAILY PRN
Qty: 1 KIT | Refills: 0 | Status: SHIPPED | OUTPATIENT
Start: 2017-12-07 | End: 2018-03-15 | Stop reason: SDUPTHER

## 2017-12-07 RX ORDER — OMEPRAZOLE 20 MG/1
20 CAPSULE, DELAYED RELEASE ORAL DAILY
Qty: 30 CAPSULE | Refills: 2 | Status: SHIPPED | OUTPATIENT
Start: 2017-12-07 | End: 2018-03-15 | Stop reason: SDUPTHER

## 2017-12-07 RX ORDER — GLUCOSAM/CHON-MSM1/C/MANG/BOSW 500-416.6
TABLET ORAL
Qty: 100 EACH | Refills: 11 | Status: SHIPPED | OUTPATIENT
Start: 2017-12-07 | End: 2018-03-15 | Stop reason: SDUPTHER

## 2017-12-07 RX ORDER — ALLOPURINOL 300 MG/1
300 TABLET ORAL DAILY
Qty: 30 TABLET | Refills: 2 | Status: SHIPPED | OUTPATIENT
Start: 2017-12-07 | End: 2018-02-02 | Stop reason: SDUPTHER

## 2017-12-07 RX ORDER — UBIQUINOL 100 MG
CAPSULE ORAL
Qty: 100 EACH | Refills: 11 | Status: SHIPPED | OUTPATIENT
Start: 2017-12-07 | End: 2018-03-15 | Stop reason: SDUPTHER

## 2017-12-07 RX ORDER — ATORVASTATIN CALCIUM 40 MG/1
TABLET, FILM COATED ORAL
Qty: 30 TABLET | Refills: 2 | Status: SHIPPED | OUTPATIENT
Start: 2017-12-07 | End: 2018-03-15 | Stop reason: SDUPTHER

## 2017-12-07 RX ORDER — LISINOPRIL 5 MG/1
5 TABLET ORAL DAILY
Qty: 30 TABLET | Refills: 2 | Status: SHIPPED | OUTPATIENT
Start: 2017-12-07 | End: 2018-03-15 | Stop reason: SDUPTHER

## 2017-12-07 RX ORDER — FUROSEMIDE 40 MG/1
40 TABLET ORAL 2 TIMES DAILY
Qty: 60 TABLET | Refills: 2 | Status: SHIPPED | OUTPATIENT
Start: 2017-12-07 | End: 2018-03-15 | Stop reason: SDUPTHER

## 2017-12-07 RX ORDER — LANOLIN ALCOHOL/MO/W.PET/CERES
3 CREAM (GRAM) TOPICAL NIGHTLY PRN
Qty: 30 TABLET | Refills: 2 | Status: SHIPPED | OUTPATIENT
Start: 2017-12-07 | End: 2018-03-15 | Stop reason: SDUPTHER

## 2017-12-07 RX ORDER — ACETAMINOPHEN 500 MG
500 TABLET ORAL 2 TIMES DAILY PRN
Qty: 60 TABLET | Refills: 2 | Status: SHIPPED | OUTPATIENT
Start: 2017-12-07 | End: 2018-03-15 | Stop reason: SDUPTHER

## 2017-12-07 ASSESSMENT — ENCOUNTER SYMPTOMS
ORTHOPNEA: 0
ABDOMINAL PAIN: 0
EYE REDNESS: 0
DOUBLE VISION: 0
BLURRED VISION: 0
PHOTOPHOBIA: 0
VOMITING: 0
COUGH: 0
SPUTUM PRODUCTION: 0
CONSTIPATION: 0
SINUS PAIN: 0
NAUSEA: 0
BACK PAIN: 1
SHORTNESS OF BREATH: 0
HEARTBURN: 0
HEMOPTYSIS: 0
EYE PAIN: 0

## 2017-12-07 NOTE — PROGRESS NOTES
Attending Physician Statement [facility])  Internal Medicine Clinic Progress Note    SUBJECTIVE:    Chief Complaint   Patient presents with    6 Month Follow-Up    Forms     transportation forms filled out, on the door     Diabetes     BS been running really well less then 100 every day     Leg Pain     pain in right leg          Tegan Flannery, is here for a follow up visit. Overall patient is doing well. He has systolic congestive heart failure with AICD placed. His weight has remained the same at 263 compared to 2 months ago. He has a baseline shortness of breath at rest.  He denies any chest pain. He has diabetes which is controlled with A1c of 6.9. He is due for medication refills today. He has no new complaints today. Problem list, medications and blood work reviewed      OBJECTIVE:  Patient Active Problem List   Diagnosis    Chronic combined systolic and diastolic heart failure ([address] s/[ AICD placed    Chronic kidney disease, stage II (mild)    Morbid obesity ([address]    Hyperlipidemia    Iron deficiency anemia    Anxiety disorder     DJD (degenerative joint disease)    TANNER variably compliant with BiPAP    CAD (coronary artery disease) s/p stenting of L anterior descending artery 2004    Diabetic retinopathy ([address]    HTN (hypertension)    MGUS (monoclonal gammopathy of unknown significance)    Type 2 diabetes mellitus without complication ([address]    PAF (paroxysmal atrial fibrillation) ([address]       Physical Examination:   Vitals:    12/07/17 1301   BP: 104/67   Pulse: 70       ASSESSMENT:  1. Chronic combined systolic and diastolic heart failure ([address]    2. Coronary artery disease involving native coronary artery of native heart without angina pectoris    3. Anxiety disorder, unspecified type    4. Psychophysiologic insomnia    5. Controlled type 2 diabetes mellitus with complication, without long-term current use of insulin ([address]    6. Primary osteoarthritis of both knees    7.

## 2017-12-07 NOTE — PATIENT INSTRUCTIONS
Return To Clinic 3/15/18. After Visit Summary given and reviewed. bb    It is very important for your care that you keep your appointment. If for some reason you are unable to keep your appointment it is equally important that you call our office at [phone] to cancel your appointment and reschedule. Failure to do so may result in your termination from our practice.      transportation forms faxed back to number on forms and scanned into the chart

## 2017-12-07 NOTE — PROGRESS NOTES
[facility]  [facility] [address] [facility]  [address] [facility] [address]  Dept: [phone]  Dept Fax: [phone]    Office Progress/Follow Up Note  Date of patient's visit: 12/7/2017  Patient's Name:  Darlin Rendon YOB: 1955            Patient Care Team:  Manohar Rowley MD as PCP - General (Internal Medicine)  Georgia Yee MD as Consulting Physician (Hematology and Oncology)  Missael Oneal MD as Consulting Physician (Cardiology)  Layla Peace DO as Consulting Physician (Pulmonology)  Sudarshan Silva MD as Consulting Physician (Ophthalmology)  ================================================================    REASON FOR VISIT/CHIEF COMPLAINT:  6 Month Follow-Up; Forms (transportation forms filled out, on the door ); Diabetes (BS been running really well less then 100 every day ); and Leg Pain (pain in right leg )    HISTORY OF PRESENTING ILLNESS:  History was obtained from: patient. Darlin Rendon is a 57 y.o. is here for a for regular 6 month follow-up he has a past medical history of potential hyperlipidemia,  Artery disease with ejection fraction 25% is post AICD placed , has a history of atrial fibrillation on Eliquis 5 mg twice a day his following cardiologist regularly, aspirin and statin,  lisinopril, beta blockers and Imdur. He said he is doing fine no new complaint, as any chest pain shortness of breath take palpitation dizziness denies any nausea vomiting, constipation, new leg swelling. Blood pressure and blood sugar is within normal limits, BG is under good control as per patient, not on insulin, is following the podiatry for foot examination and ophthalmology for a diabetic retinopathy. Screenings are up-to-date , got flu shot this year. He is complaining of leg pain and right-sided , running from lower back pain downwards up to knee, and on, keep a close of the position of couch while sleeping.   Not Taking any pain medications      Patient Active Problem List   Diagnosis    Chronic combined systolic and diastolic heart failure (HCC) s/[ AICD placed    Chronic kidney disease, stage II (mild)    Morbid [facility]    Hyperlipidemia    Iron deficiency anemia    Anxiety disorder     DJD (degenerative joint disease)    TANNER variably compliant with BiPAP    CAD (coronary artery disease) s/p stenting of L anterior descending artery 2004    Diabetic retinopathy ([facility] [facility]    HTN (hypertension)    MGUS (monoclonal gammopathy of unknown significance)    [facility]    PAF (paroxysmal atrial fibrillation) ([facility]       Health Maintenance Due   Topic Date Due    Diabetic retinal exam  05/06/2014    Diabetic foot exam  12/14/2017       Allergies   Allergen Reactions    Zetia [Ezetimibe] Shortness Of Breath    Bactrim     Cephalexin          Current Outpatient Prescriptions   Medication Sig Dispense Refill    omeprazole (PRILOSEC) 20 MG delayed release capsule Take 1 capsule by mouth Daily 30 capsule 2    atorvastatin (LIPITOR) 40 MG tablet TAKE 1 TABLET DAILY 30 tablet 2    furosemide (LASIX) 40 MG tablet Take 1 tablet by mouth 2 times daily 60 tablet 2    glucose monitoring kit (FREESTYLE) monitoring kit 1 kit by Does not apply route daily as needed (use daily as needed) 1 kit 0    aspirin 81 MG tablet Take 1 tablet by mouth 2 times daily 60 tablet 2    TRUEPLUS LANCETS 33G MISC TEST AS DIRECTED 100 each 11    traZODone (DESYREL) 50 MG tablet TAKE 1 TABLET BY MOUTH NIGHTLY 30 tablet 2    allopurinol (ZYLOPRIM) 300 MG tablet Take 1 tablet by mouth daily 30 tablet 2    melatonin (RA MELATONIN) 3 MG TABS tablet Take 1 tablet by mouth nightly as needed (insomnia) 30 tablet 2    glimepiride (AMARYL) 2 MG tablet Take 1 tablet by mouth every morning (before breakfast) 30 tablet 2    lisinopril (PRINIVIL;ZESTRIL) 5 MG tablet Take 1 tablet by mouth daily 30 tablet 2    isosorbide mononitrate (IMDUR) 30 MG extended release tablet Take 1 tablet by mouth daily 30 tablet 2    metFORMIN (GLUCOPHAGE) 1000 MG tablet Take 1 tablet by mouth 2 times daily (with meals) 60 tablet 2    metoprolol tartrate (LOPRESSOR) 25 MG tablet Take 1 tablet by mouth 2 times daily 60 tablet 2    Alcohol Swabs (ALCOHOL PREP) 70 % PADS USE AS DIRECTED 100 each 11    glucose blood VI test strips (FREESTYLE LITE) strip USE AS DIRECTED TWICE A  each 11    ammonium lactate (LAC-HYDRIN) 12 % lotion Apply topically daily after bathing sparing the space between the toes. 222 mL 3    acetaminophen (TYLENOL) 500 MG tablet Take 1 tablet by mouth 2 times daily as needed for Pain 60 tablet 2    Blood Pressure Monitoring (WRIST BLOOD PRESSURE MONITOR) MISC       Alcohol Swabs (B-D SINGLE USE SWABS REGULAR) PADS USE AS DIRECTED 100 each 0    MULTIPLE VITAMINS-MINERALS PO Take  by mouth daily. No current facility-administered medications for this visit. Social History   Substance Use Topics    Smoking status: Former Smoker    Smokeless tobacco: Never Used      Comment: quit age 22    Alcohol use No       Family History   Problem Relation Age of Onset    Cancer Mother     Heart Disease Mother      due to smoking    Heart Disease Maternal Uncle     Heart Disease Maternal Grandmother         REVIEW OF SYSTEMS:  Review of Systems   Constitutional: Negative. Negative for chills, fever and weight loss. HENT: Negative for ear discharge, ear pain, hearing loss, nosebleeds, sinus pain and tinnitus. Eyes: Negative for blurred vision, double vision, photophobia, pain and redness. Respiratory: Negative for cough, hemoptysis, sputum production and shortness of breath. Cardiovascular: Negative for chest pain, palpitations, orthopnea, claudication and leg swelling. Gastrointestinal: Negative for abdominal pain, constipation, heartburn, nausea and vomiting.    Genitourinary: Negative for frequency, hematuria and urgency. Musculoskeletal: Positive for back pain and joint pain. Skin: Negative for itching and rash. Neurological: Negative for dizziness, tingling and headaches. Psychiatric/Behavioral: Insomnia: .shou. .ros  PHYSICAL EXAM:  Vitals:    12/07/17 1301   BP: 104/67   Site: Left Arm   Position: Sitting   Cuff Size: Medium Adult   Pulse: 70   Weight: 265 lb (120.2 kg)   Height: 5' 4\" (1.626 m)     BP Readings from Last 3 Encounters:   12/07/17 104/67   10/03/17 86/63   09/15/17 99/65        Physical Exam   Constitutional: He is oriented to person, place, and time and well-developed, well-nourished, and in no distress. No distress. HENT:   Head: Normocephalic and atraumatic. Mouth/Throat: No oropharyngeal exudate. Neck: Normal range of motion. Neck supple. No JVD present. No tracheal deviation present. No thyromegaly present. Cardiovascular: Normal rate, regular rhythm, normal heart sounds and intact distal pulses. Exam reveals no gallop and no friction rub. No murmur heard. Pulmonary/Chest: Effort normal and breath sounds normal. No stridor. No respiratory distress. He has no wheezes. He exhibits no tenderness. Abdominal: Bowel sounds are normal. He exhibits no distension and no mass. There is no tenderness. There is no guarding. Musculoskeletal: Normal range of motion. He exhibits no edema, tenderness or deformity. Lymphadenopathy:     He has no cervical adenopathy. Neurological: He is alert and oriented to person, place, and time. He has normal reflexes. He displays normal reflexes. No cranial nerve deficit. He exhibits normal muscle tone. Gait normal. Coordination normal. GCS score is 15. Skin: Skin is warm and dry. No rash noted. He is not diaphoretic. No erythema. No pallor.          DIAGNOSTIC FINDINGS:  CBC:  Lab Results   Component Value Date    WBC 9.8 09/14/2017    HGB 12.8 09/14/2017     09/14/2017     12/08/2011       BMP:    Lab Results (ALCOHOL PREP) 70 % PADS; USE AS DIRECTED  -     glucose blood VI test strips (FREESTYLE LITE) strip; USE AS DIRECTED TWICE A DAY    Primary osteoarthritis of both knees  -     acetaminophen (TYLENOL) 500 MG tablet; Take 1 tablet by mouth 2 times daily as needed for Pain    Gastroesophageal reflux disease without esophagitis  -     omeprazole (PRILOSEC) 20 MG delayed release capsule; Take 1 capsule by mouth Daily    Idiopathic chronic gout without tophus, unspecified site  -     allopurinol (ZYLOPRIM) 300 MG tablet; Take 1 tablet by mouth daily    Atrial fibrillation, currently in sinus rhythm  -     apixaban (ELIQUIS) 5 MG TABS tablet; Take 1 tablet by mouth 2 times daily    Other orders  -     ammonium lactate (LAC-HYDRIN) 12 % lotion; Apply topically daily after bathing sparing the space between the toes. FOLLOW UP AND INSTRUCTIONS:  Return in about 3 months (around 3/7/2018) for HTN, DM-2.    · Elizabeth Cr received counseling on the following healthy behaviors: nutrition, exercise and medication adherence    · Discussed use, benefit, and side effects of prescribed medications. Barriers to medication compliance addressed. All patient questions answered. Pt voiced understanding. · Patient given educational materials - see patient instructions    Pily Paulino MD  PGY-1, Internal Medicine resident  [facility]. 12/7/2017, 2:15 PM    This note is created with the assistance of a speech-recognition program. While intending to generate a document that actually reflects the content of the visit, the document can still have some mistakes which may not have been identified and corrected by editing.

## 2017-12-07 NOTE — PROGRESS NOTES
DIABETES and HYPERTENSION visit    BP Readings from Last 3 Encounters:   10/03/17 86/63   09/15/17 99/65   07/31/17 (!) 127/58        Hemoglobin A1C (%)   Date Value   06/01/2017 6.9   12/29/2016 6.4   06/30/2016 6.0     Microalb/Crt. Ratio (mcg/mg creat)   Date Value   06/01/2017 11     LDL Cholesterol (mg/dL)   Date Value   11/16/2017 45     HDL (mg/dL)   Date Value   11/16/2017 34 (L)     BUN (mg/dL)   Date Value   09/19/2017 26 (H)     CREATININE (mg/dL)   Date Value   09/19/2017 0.91     Glucose (mg/dL)   Date Value   09/19/2017 148 (H)   03/19/2012 123 (H)            Have you changed or started any medications since your last visit including any over-the-counter medicines, vitamins, or herbal medicines? no   Have you stopped taking any of your medications? Is so, why? -  no  Are you having any side effects from any of your medications? - no    Have you seen any other physician or provider since your last visit?  no   Have you had any other diagnostic tests since your last visit? yes    Have you been seen in the emergency room and/or had an admission in a hospital since we last saw you?  no   Have you had your routine dental cleaning in the past 6 months?  no     Have you had your annual diabetic retinal (eye) exam? No   (ensure copy of exam is in the chart)    Do you have an active MyChart account? If no, what is the barrier?   Yes    Patient Care Team:  Naima Vasquez MD as PCP - General (Internal Medicine)  Osvaldo Barnhart MD as Consulting Physician (Hematology and Oncology)  Johanna Morris MD as Consulting Physician (Cardiology)  Meaghan Valderrama DO as Consulting Physician (Pulmonology)  Kaden Ma MD as Consulting Physician (Ophthalmology)    Medical History Review  Past Medical, Family, and Social History reviewed and does contribute to the patient presenting condition    Health Maintenance   Topic Date Due    Diabetic retinal exam  05/06/2014    Diabetic foot exam  12/14/2017    Hepatitis C screen  01/03/2018 (Originally 1955)    HIV screen  01/03/2018 (Originally 1/23/1970)    Diabetic hemoglobin A1C test  06/01/2018    Lipid screen  11/16/2018    Colon cancer screen colonoscopy  04/04/2020    DTaP/Tdap/Td vaccine (2 - Td) 06/30/2026    Zostavax vaccine  Completed    Flu vaccine  Completed    Pneumococcal med risk  Completed

## 2017-12-20 ENCOUNTER — OFFICE VISIT (OUTPATIENT)
Dept: PODIATRY | Age: 62
End: 2017-12-20
Payer: COMMERCIAL

## 2017-12-20 VITALS
BODY MASS INDEX: 46.13 KG/M2 | DIASTOLIC BLOOD PRESSURE: 73 MMHG | TEMPERATURE: 97.9 F | SYSTOLIC BLOOD PRESSURE: 118 MMHG | WEIGHT: 270.2 LBS | HEIGHT: 64 IN | HEART RATE: 61 BPM

## 2017-12-20 DIAGNOSIS — E11.9 TYPE 2 DIABETES MELLITUS WITHOUT COMPLICATION, WITHOUT LONG-TERM CURRENT USE OF INSULIN (HCC): ICD-10-CM

## 2017-12-20 DIAGNOSIS — L84 CALLUS: ICD-10-CM

## 2017-12-20 DIAGNOSIS — L85.3 DRY SKIN: ICD-10-CM

## 2017-12-20 DIAGNOSIS — M79.672 PAIN IN BOTH FEET: Primary | ICD-10-CM

## 2017-12-20 DIAGNOSIS — B35.1 ONYCHOMYCOSIS: ICD-10-CM

## 2017-12-20 DIAGNOSIS — M79.671 PAIN IN BOTH FEET: Primary | ICD-10-CM

## 2017-12-20 PROCEDURE — 99212 OFFICE O/P EST SF 10 MIN: CPT | Performed by: PODIATRIST

## 2017-12-20 PROCEDURE — 11721 DEBRIDE NAIL 6 OR MORE: CPT | Performed by: PODIATRIST

## 2017-12-20 NOTE — PROGRESS NOTES
year     Blood Pressure  BP Readings from Last 3 Encounters:   12/07/17 104/67   10/03/17 86/63   09/15/17 99/65      []  If SBP >140 mmhg - refer to PCP for follow up   []  If DBP > 90 mmhg - refer to PCP for follow up   [] BP is controlled <140/90     Order labs as PCP ordered.   (ie: Lipids, A1C, CMP)

## 2017-12-20 NOTE — PROGRESS NOTES
debrided without incident with sterile nail nippers. Debrided HPK down to the layer of the dermis with #15 blade without incident. Discussed importance of blood sugar monitoring. Patient will return to clinic in 6 months, or sooner should problem arise.     Electronically signed by Bradley Lara DPM on 12/20/2017 at 2:34 PM

## 2018-01-19 ENCOUNTER — HOSPITAL ENCOUNTER (OUTPATIENT)
Facility: MEDICAL CENTER | Age: 63
End: 2018-01-19
Payer: MEDICARE

## 2018-01-22 ENCOUNTER — HOSPITAL ENCOUNTER (OUTPATIENT)
Age: 63
Discharge: HOME | End: 2018-01-22
Payer: MEDICARE

## 2018-01-22 DIAGNOSIS — I25.10 CAD (CORONARY ARTERY DISEASE): ICD-10-CM

## 2018-01-22 DIAGNOSIS — D47.2 MGUS (MONOCLONAL GAMMOPATHY OF UNKNOWN SIGNIFICANCE): Chronic | ICD-10-CM

## 2018-01-22 LAB
ABSOLUTE EOS #: 0.3 K/UL (ref 0–0.44)
ABSOLUTE IMMATURE GRANULOCYTE: <0.03 K/UL (ref 0–0.3)
ABSOLUTE LYMPH #: 1.08 K/UL (ref 1.1–3.7)
ABSOLUTE MONO #: 0.36 K/UL (ref 0.1–1.2)
ALBUMIN SERPL-MCNC: 3.6 G/DL (ref 3.5–5.2)
ALBUMIN/GLOBULIN RATIO: 1 (ref 1–2.5)
ALP BLD-CCNC: 72 U/L (ref 40–129)
ALT SERPL-CCNC: 13 U/L (ref 5–41)
ANION GAP SERPL CALCULATED.3IONS-SCNC: 13 MMOL/L (ref 9–17)
AST SERPL-CCNC: 20 U/L
BASOPHILS # BLD: 0 % (ref 0–2)
BASOPHILS ABSOLUTE: 0.03 K/UL (ref 0–0.2)
BILIRUB SERPL-MCNC: 0.36 MG/DL (ref 0.3–1.2)
BUN BLDV-MCNC: 16 MG/DL (ref 8–23)
BUN/CREAT BLD: ABNORMAL (ref 9–20)
CALCIUM SERPL-MCNC: 8.5 MG/DL (ref 8.6–10.4)
CHLORIDE BLD-SCNC: 100 MMOL/L (ref 98–107)
CO2: 29 MMOL/L (ref 20–31)
CREAT SERPL-MCNC: 0.61 MG/DL (ref 0.7–1.2)
DIFFERENTIAL TYPE: ABNORMAL
EOSINOPHILS RELATIVE PERCENT: 4 % (ref 1–4)
FREE KAPPA/LAMBDA RATIO: 1.14 (ref 0.26–1.65)
GFR AFRICAN AMERICAN: >60 ML/MIN
GFR NON-AFRICAN AMERICAN: >60 ML/MIN
GFR SERPL CREATININE-BSD FRML MDRD: ABNORMAL ML/MIN/{1.73_M2}
GFR SERPL CREATININE-BSD FRML MDRD: ABNORMAL ML/MIN/{1.73_M2}
GLUCOSE BLD-MCNC: 106 MG/DL (ref 70–99)
HCT VFR BLD CALC: 41.6 % (ref 40.7–50.3)
HEMOGLOBIN: 12.7 G/DL (ref 13–17)
IGA: 255 MG/DL (ref 70–400)
IGG: 1009 MG/DL (ref 700–1600)
IGM: 345 MG/DL (ref 40–230)
IMMATURE GRANULOCYTES: 0 %
KAPPA FREE LIGHT CHAINS QNT: 3.08 MG/DL (ref 0.37–1.94)
LAMBDA FREE LIGHT CHAINS QNT: 2.69 MG/DL (ref 0.57–2.63)
LYMPHOCYTES # BLD: 16 % (ref 24–43)
MCH RBC QN AUTO: 26.5 PG (ref 25.2–33.5)
MCHC RBC AUTO-ENTMCNC: 30.5 G/DL (ref 28.4–34.8)
MCV RBC AUTO: 86.7 FL (ref 82.6–102.9)
MONOCYTES # BLD: 5 % (ref 3–12)
NRBC AUTOMATED: 0 PER 100 WBC
PDW BLD-RTO: 16.3 % (ref 11.8–14.4)
PLATELET # BLD: 185 K/UL (ref 138–453)
PLATELET ESTIMATE: ABNORMAL
PMV BLD AUTO: 11.2 FL (ref 8.1–13.5)
POTASSIUM SERPL-SCNC: 3.8 MMOL/L (ref 3.7–5.3)
RBC # BLD: 4.8 M/UL (ref 4.21–5.77)
RBC # BLD: ABNORMAL 10*6/UL
SEG NEUTROPHILS: 75 % (ref 36–65)
SEGMENTED NEUTROPHILS ABSOLUTE COUNT: 5.14 K/UL (ref 1.5–8.1)
SODIUM BLD-SCNC: 142 MMOL/L (ref 135–144)
TOTAL PROTEIN: 7.3 G/DL (ref 6.4–8.3)
WBC # BLD: 6.9 K/UL (ref 3.5–11.3)
WBC # BLD: ABNORMAL 10*3/UL

## 2018-01-22 PROCEDURE — 80053 COMPREHEN METABOLIC PANEL: CPT

## 2018-01-22 PROCEDURE — 83883 ASSAY NEPHELOMETRY NOT SPEC: CPT

## 2018-01-22 PROCEDURE — 82784 ASSAY IGA/IGD/IGG/IGM EACH: CPT

## 2018-01-22 PROCEDURE — 84155 ASSAY OF PROTEIN SERUM: CPT

## 2018-01-22 PROCEDURE — 84165 PROTEIN E-PHORESIS SERUM: CPT

## 2018-01-22 PROCEDURE — 85025 COMPLETE CBC W/AUTO DIFF WBC: CPT

## 2018-01-24 LAB
ALBUMIN (CALCULATED): 4.1 G/DL (ref 3.2–5.2)
ALBUMIN PERCENT: 60 % (ref 45–65)
ALPHA 1 PERCENT: 3 % (ref 3–6)
ALPHA 2 PERCENT: 11 % (ref 6–13)
ALPHA-1-GLOBULIN: 0.2 G/DL (ref 0.1–0.4)
ALPHA-2-GLOBULIN: 0.7 G/DL (ref 0.5–0.9)
BETA GLOBULIN: 0.7 G/DL (ref 0.5–1.1)
BETA PERCENT: 11 % (ref 11–19)
GAMMA GLOBULIN %: 15 % (ref 9–20)
GAMMA GLOBULIN: 1 G/DL (ref 0.5–1.5)
PATHOLOGIST: NORMAL
PROTEIN ELECTROPHORESIS, SERUM: NORMAL
TOTAL PROT. SUM,%: 100 % (ref 98–102)
TOTAL PROT. SUM: 6.7 G/DL (ref 6.3–8.2)
TOTAL PROTEIN: 6.7 G/DL (ref 6.4–8.3)

## 2018-01-26 ENCOUNTER — HOSPITAL ENCOUNTER (OUTPATIENT)
Facility: MEDICAL CENTER | Age: 63
End: 2018-01-26
Payer: MEDICARE

## 2018-01-29 ENCOUNTER — TELEPHONE (OUTPATIENT)
Dept: ONCOLOGY | Age: 63
End: 2018-01-29

## 2018-01-29 ENCOUNTER — OFFICE VISIT (OUTPATIENT)
Dept: ONCOLOGY | Age: 63
End: 2018-01-29
Payer: MEDICARE

## 2018-01-29 VITALS
HEART RATE: 74 BPM | BODY MASS INDEX: 46.23 KG/M2 | TEMPERATURE: 98.6 F | SYSTOLIC BLOOD PRESSURE: 118 MMHG | DIASTOLIC BLOOD PRESSURE: 70 MMHG | RESPIRATION RATE: 18 BRPM | WEIGHT: 269.3 LBS

## 2018-01-29 DIAGNOSIS — I48.0 PAF (PAROXYSMAL ATRIAL FIBRILLATION) (HCC): ICD-10-CM

## 2018-01-29 DIAGNOSIS — D47.2 MGUS (MONOCLONAL GAMMOPATHY OF UNKNOWN SIGNIFICANCE): ICD-10-CM

## 2018-01-29 DIAGNOSIS — N18.2 CHRONIC KIDNEY DISEASE, STAGE II (MILD): Primary | ICD-10-CM

## 2018-01-29 PROCEDURE — 99211 OFF/OP EST MAY X REQ PHY/QHP: CPT

## 2018-01-29 PROCEDURE — 99214 OFFICE O/P EST MOD 30 MIN: CPT | Performed by: INTERNAL MEDICINE

## 2018-01-29 NOTE — PROGRESS NOTES
DIAGNOSIS:   1. MGUS  CURRENT THERAPY:  Observation            REASON FOR VISIT:  Follow-up visit on MGUS. SUMMARY OF THE CASE:  He is a 70 y.o. patient who has multiple comorbidities in the form of cardiomyopathy, hypertension, diabetes, morbid obesity, and mild renal insufficiency that resolved completely. 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 INTERIM HISTORY: Patient is here today for the annual follow-up regarding MGUS. He reports he feels well and has had no change in activity. He denies weight loss, fever, chills. He was admitted to the hospital in September because of atrial fibrillation. He was started on eliquis and he has been on it for a while. No bleeding or bruising  PAST MEDICAL HISTORY: Significant for coronary artery disease, MI, ischemic cardiomyopathy, sleep apnea, diabetes, hypertension and resolved acute renal failure and hyperkalemia. CURRENT MEDICATIONS: Outpatient medications including Lasix, lisinopril, Zocor, allopurinol, aspirin, Plavix, iron, Lopressor, Prilosec, trazodone. SOCIAL HISTORY:  He is nonsmoker or drinker at this point but he quit a few years ago. He used to work as a  in the past. He is unemployed. He lives with a roommate. FAMILY HISTORY: Significant for coronary artery disease and diabetes as well. REVIEW OF SYSTEMS:   Review of Systems :     Constitutional: No fever or chills.  No night sweats, no weight loss   HEENT: negative for sore mouth, sore throat, hoarseness and voice change   Respiratory: negative for cough , sputum, dyspnea, wheezing, hemoptysis, chest pain   Cardiovascular: negative for chest pain, dyspnea, palpitations, orthopnea, PND   Gastrointestinal: negative for nausea, vomiting, diarrhea, constipation, abdominal pain,   Genitourinary: negative for frequency, dysuria, nocturia, urinary incontinence, and hematuria   Hematologic/Lymphatic: negative for easy bruising, bleeding, lymphadenopathy, petechiae and swelling/edema   Endocrine: negative for heat or cold intolerance, tremor, weight changes, change in bowel habits and hair loss   Musculoskeletal: negative for myalgias, arthralgias, pain, joint swelling,and bone pain   Neurological: negative for headaches, dizziness, seizures, weakness, numbness  Integument: negative for rash, skin lesions, bruises.     Physical Exam   /70 (Site: Left Arm, Position: Sitting)   Pulse 74   Temp 98.6 °F (37 °C) (Oral)   Resp 18   Wt 269 lb 4.8 oz (122.2 kg)   BMI 46.23 kg/m²     General appearance - well appearing, no in pain or distress,  He is overweight  Mental status - alert and cooperative   Neck - supple, no palpable  adenopathy , trach midline   Lymphatics - no palpable lymphadenopathy, no hepatosplenomegaly   Chest - clear to auscultation, no wheezes, rales or rhonchi, decreased air entry   Heart - normal rate, regular rhythm, normal S1, S2, no murmurs, distal heart sounds  Abdomen - soft, nontender, nondistended, no masses or organomegaly   Neurological - alert, oriented, normal speech, no focal findings or movement disorder noted   Musculoskeletal - no joint tenderness, deformity or swelling   Extremities - peripheral pulses normal, no pedal edema, no clubbing or cyanosis   Skin - normal coloration and turgor, no rashes, no suspicious skin lesions noted    REVIEW OF LABORATORY DATA:  Lab Results   Component Value Date    WBC 6.9 01/22/2018    HGB 12.7 (L) 01/22/2018    HCT 41.6 01/22/2018    MCV 86.7 01/22/2018     01/22/2018       Chemistry        Component Value Date/Time     01/22/2018 1230    K 3.8 01/22/2018 1230     01/22/2018 1230    CO2 29 01/22/2018 1230    BUN 16 01/22/2018 1230    CREATININE 0.61 (L) 01/22/2018 1230        Component Value Date/Time    CALCIUM 8.5 (L) 01/22/2018 1230    ALKPHOS 72 01/22/2018 1230    AST 20 01/22/2018 1230    ALT 13 01/22/2018 1230    BILITOT 0.36 01/22/2018 1230

## 2018-02-02 DIAGNOSIS — M1A.00X0 IDIOPATHIC CHRONIC GOUT WITHOUT TOPHUS, UNSPECIFIED SITE: ICD-10-CM

## 2018-02-02 RX ORDER — ALLOPURINOL 300 MG/1
300 TABLET ORAL DAILY
Qty: 30 TABLET | Refills: 2 | Status: SHIPPED | OUTPATIENT
Start: 2018-02-02 | End: 2018-03-15 | Stop reason: SDUPTHER

## 2018-02-08 DIAGNOSIS — Z86.79 ATRIAL FIBRILLATION, CURRENTLY IN SINUS RHYTHM: ICD-10-CM

## 2018-02-09 RX ORDER — APIXABAN 5 MG/1
5 TABLET, FILM COATED ORAL 2 TIMES DAILY
Qty: 60 TABLET | Refills: 0 | Status: SHIPPED | OUTPATIENT
Start: 2018-02-09 | End: 2018-03-15 | Stop reason: SDUPTHER

## 2018-03-01 RX ORDER — LANCETS 30 GAUGE
EACH MISCELLANEOUS
Qty: 100 EACH | Refills: 3 | Status: SHIPPED | OUTPATIENT
Start: 2018-03-01 | End: 2018-06-21 | Stop reason: SDUPTHER

## 2018-03-15 ENCOUNTER — OFFICE VISIT (OUTPATIENT)
Dept: INTERNAL MEDICINE | Age: 63
End: 2018-03-15
Payer: MEDICARE

## 2018-03-15 VITALS
HEART RATE: 70 BPM | SYSTOLIC BLOOD PRESSURE: 100 MMHG | BODY MASS INDEX: 45.58 KG/M2 | DIASTOLIC BLOOD PRESSURE: 62 MMHG | WEIGHT: 267 LBS | HEIGHT: 64 IN

## 2018-03-15 DIAGNOSIS — E11.9 TYPE 2 DIABETES MELLITUS WITHOUT COMPLICATION, WITHOUT LONG-TERM CURRENT USE OF INSULIN (HCC): Primary | ICD-10-CM

## 2018-03-15 DIAGNOSIS — F51.04 PSYCHOPHYSIOLOGIC INSOMNIA: ICD-10-CM

## 2018-03-15 DIAGNOSIS — Z11.4 SCREENING FOR HIV (HUMAN IMMUNODEFICIENCY VIRUS): ICD-10-CM

## 2018-03-15 DIAGNOSIS — Z86.79 ATRIAL FIBRILLATION, CURRENTLY IN SINUS RHYTHM: ICD-10-CM

## 2018-03-15 DIAGNOSIS — M1A.00X0 IDIOPATHIC CHRONIC GOUT WITHOUT TOPHUS, UNSPECIFIED SITE: ICD-10-CM

## 2018-03-15 DIAGNOSIS — I25.10 CORONARY ARTERY DISEASE INVOLVING NATIVE CORONARY ARTERY OF NATIVE HEART WITHOUT ANGINA PECTORIS: ICD-10-CM

## 2018-03-15 DIAGNOSIS — E11.8 CONTROLLED TYPE 2 DIABETES MELLITUS WITH COMPLICATION, WITHOUT LONG-TERM CURRENT USE OF INSULIN (HCC): ICD-10-CM

## 2018-03-15 DIAGNOSIS — M17.0 PRIMARY OSTEOARTHRITIS OF BOTH KNEES: ICD-10-CM

## 2018-03-15 DIAGNOSIS — I50.42 CHRONIC COMBINED SYSTOLIC AND DIASTOLIC HEART FAILURE (HCC): ICD-10-CM

## 2018-03-15 DIAGNOSIS — Z00.00 HEALTH CARE MAINTENANCE: ICD-10-CM

## 2018-03-15 DIAGNOSIS — K21.9 GASTROESOPHAGEAL REFLUX DISEASE WITHOUT ESOPHAGITIS: ICD-10-CM

## 2018-03-15 DIAGNOSIS — M10.9 GOUTY ARTHRITIS: ICD-10-CM

## 2018-03-15 LAB — HBA1C MFR BLD: 6.3 %

## 2018-03-15 PROCEDURE — 83036 HEMOGLOBIN GLYCOSYLATED A1C: CPT | Performed by: STUDENT IN AN ORGANIZED HEALTH CARE EDUCATION/TRAINING PROGRAM

## 2018-03-15 PROCEDURE — 99213 OFFICE O/P EST LOW 20 MIN: CPT

## 2018-03-15 PROCEDURE — 99213 OFFICE O/P EST LOW 20 MIN: CPT | Performed by: STUDENT IN AN ORGANIZED HEALTH CARE EDUCATION/TRAINING PROGRAM

## 2018-03-15 RX ORDER — ISOSORBIDE MONONITRATE 30 MG/1
30 TABLET, EXTENDED RELEASE ORAL DAILY
Qty: 30 TABLET | Refills: 2 | Status: SHIPPED | OUTPATIENT
Start: 2018-03-15 | End: 2018-06-01 | Stop reason: SDUPTHER

## 2018-03-15 RX ORDER — AMMONIUM LACTATE 12 G/100G
LOTION TOPICAL
Qty: 222 ML | Refills: 3 | Status: SHIPPED | OUTPATIENT
Start: 2018-03-15 | End: 2018-06-21 | Stop reason: SDUPTHER

## 2018-03-15 RX ORDER — LANCETS 30 GAUGE
EACH MISCELLANEOUS
Qty: 100 EACH | Refills: 3 | Status: CANCELLED | OUTPATIENT
Start: 2018-03-15

## 2018-03-15 RX ORDER — FUROSEMIDE 40 MG/1
40 TABLET ORAL 2 TIMES DAILY
Qty: 60 TABLET | Refills: 2 | Status: SHIPPED | OUTPATIENT
Start: 2018-03-15 | End: 2018-06-21 | Stop reason: SDUPTHER

## 2018-03-15 RX ORDER — BLOOD-GLUCOSE METER
1 KIT MISCELLANEOUS DAILY PRN
Qty: 1 KIT | Refills: 0 | Status: SHIPPED | OUTPATIENT
Start: 2018-03-15 | End: 2018-10-26 | Stop reason: SDUPTHER

## 2018-03-15 RX ORDER — LISINOPRIL 5 MG/1
5 TABLET ORAL DAILY
Qty: 30 TABLET | Refills: 2 | Status: SHIPPED | OUTPATIENT
Start: 2018-03-15 | End: 2018-06-01 | Stop reason: SDUPTHER

## 2018-03-15 RX ORDER — LANOLIN ALCOHOL/MO/W.PET/CERES
3 CREAM (GRAM) TOPICAL NIGHTLY PRN
Qty: 30 TABLET | Refills: 2 | Status: SHIPPED | OUTPATIENT
Start: 2018-03-15 | End: 2018-06-21 | Stop reason: SDUPTHER

## 2018-03-15 RX ORDER — UBIQUINOL 100 MG
CAPSULE ORAL
Qty: 100 EACH | Refills: 11 | Status: SHIPPED | OUTPATIENT
Start: 2018-03-15 | End: 2018-06-21 | Stop reason: SDUPTHER

## 2018-03-15 RX ORDER — ACETAMINOPHEN 500 MG
500 TABLET ORAL 2 TIMES DAILY PRN
Qty: 60 TABLET | Refills: 2 | Status: SHIPPED | OUTPATIENT
Start: 2018-03-15 | End: 2018-06-01 | Stop reason: SDUPTHER

## 2018-03-15 RX ORDER — OMEPRAZOLE 20 MG/1
20 CAPSULE, DELAYED RELEASE ORAL DAILY
Qty: 30 CAPSULE | Refills: 2 | Status: SHIPPED | OUTPATIENT
Start: 2018-03-15 | End: 2018-06-01 | Stop reason: SDUPTHER

## 2018-03-15 RX ORDER — GLUCOSAM/CHON-MSM1/C/MANG/BOSW 500-416.6
TABLET ORAL
Qty: 100 EACH | Refills: 11 | Status: SHIPPED | OUTPATIENT
Start: 2018-03-15 | End: 2018-06-21 | Stop reason: SDUPTHER

## 2018-03-15 RX ORDER — ALLOPURINOL 300 MG/1
300 TABLET ORAL DAILY
Qty: 30 TABLET | Refills: 2 | Status: SHIPPED | OUTPATIENT
Start: 2018-03-15 | End: 2018-06-21 | Stop reason: SDUPTHER

## 2018-03-15 RX ORDER — ATORVASTATIN CALCIUM 40 MG/1
TABLET, FILM COATED ORAL
Qty: 30 TABLET | Refills: 2 | Status: SHIPPED | OUTPATIENT
Start: 2018-03-15 | End: 2018-06-01 | Stop reason: SDUPTHER

## 2018-03-15 RX ORDER — ISOPROPYL ALCOHOL 0.75 G/1
SWAB TOPICAL
Qty: 100 EACH | Refills: 0 | Status: CANCELLED | OUTPATIENT
Start: 2018-03-15

## 2018-03-15 RX ORDER — GLIMEPIRIDE 2 MG/1
2 TABLET ORAL
Qty: 30 TABLET | Refills: 3 | Status: SHIPPED | OUTPATIENT
Start: 2018-03-15 | End: 2018-06-21 | Stop reason: SDUPTHER

## 2018-03-15 ASSESSMENT — PATIENT HEALTH QUESTIONNAIRE - PHQ9
2. FEELING DOWN, DEPRESSED OR HOPELESS: 0
1. LITTLE INTEREST OR PLEASURE IN DOING THINGS: 0
SUM OF ALL RESPONSES TO PHQ QUESTIONS 1-9: 0
SUM OF ALL RESPONSES TO PHQ9 QUESTIONS 1 & 2: 0

## 2018-03-15 NOTE — PROGRESS NOTES
apply route daily as needed (use daily as needed)  -     TRUEPLUS LANCETS 33G MISC; TEST AS DIRECTED  -     melatonin (RA MELATONIN) 3 MG TABS tablet; Take 1 tablet by mouth nightly as needed (insomnia)  -     Alcohol Swabs (B-D SINGLE USE SWABS REGULAR) PADS; USE AS DIRECTED    Atrial fibrillation, currently in sinus rhythm  -     apixaban (ELIQUIS) 5 MG TABS tablet; Take 1 tablet by mouth 2 times daily    Idiopathic chronic gout without tophus, unspecified site  -     allopurinol (ZYLOPRIM) 300 MG tablet; Take 1 tablet by mouth daily    Gastroesophageal reflux disease without esophagitis  -     omeprazole (PRILOSEC) 20 MG delayed release capsule; Take 1 capsule by mouth Daily    Coronary artery disease involving native coronary artery of native heart without angina pectoris  -     atorvastatin (LIPITOR) 40 MG tablet; TAKE 1 TABLET DAILY  -     aspirin 81 MG tablet; Take 1 tablet by mouth 2 times daily    Chronic combined systolic and diastolic heart failure (HCC)  -     furosemide (LASIX) 40 MG tablet; Take 1 tablet by mouth 2 times daily  -     lisinopril (PRINIVIL;ZESTRIL) 5 MG tablet; Take 1 tablet by mouth daily  -     isosorbide mononitrate (IMDUR) 30 MG extended release tablet; Take 1 tablet by mouth daily  -     metoprolol tartrate (LOPRESSOR) 25 MG tablet; Take 1 tablet by mouth 2 times daily    Controlled type 2 diabetes mellitus with complication, without long-term current use of insulin (HCC)  -     metFORMIN (GLUCOPHAGE) 1000 MG tablet; Take 1 tablet by mouth 2 times daily (with meals)  -     glimepiride (AMARYL) 2 MG tablet; Take 1 tablets by mouth every morning (before breakfast)  -     Alcohol Swabs (ALCOHOL PREP) 70 % PADS; USE AS DIRECTED  -     glucose blood VI test strips (FREESTYLE LITE) strip; USE AS DIRECTED TWICE A DAY    Psychophysiologic insomnia  -     melatonin (RA MELATONIN) 3 MG TABS tablet;  Take 1 tablet by mouth nightly as needed (insomnia)  Discontinue trazodone    Primary

## 2018-03-22 ENCOUNTER — TELEPHONE (OUTPATIENT)
Dept: INTERNAL MEDICINE | Age: 63
End: 2018-03-22

## 2018-03-22 DIAGNOSIS — Z13.6 SCREENING FOR AAA (AORTIC ABDOMINAL ANEURYSM): Primary | ICD-10-CM

## 2018-03-22 NOTE — TELEPHONE ENCOUNTER
Fax received from [facility] regarding AAA Screening US from 3/15/18 appointment, originally ordered with dx of Health Maintenance, needs to be changed to Screening. Order Pended.

## 2018-03-28 ENCOUNTER — HOSPITAL ENCOUNTER (OUTPATIENT)
Dept: VASCULAR LAB | Age: 63
Discharge: HOME | End: 2018-03-28
Payer: MEDICARE

## 2018-03-28 DIAGNOSIS — Z13.6 SCREENING FOR AAA (AORTIC ABDOMINAL ANEURYSM): ICD-10-CM

## 2018-03-28 PROCEDURE — 76706 US ABDL AORTA SCREEN AAA: CPT

## 2018-05-14 DIAGNOSIS — Z86.79 ATRIAL FIBRILLATION, CURRENTLY IN SINUS RHYTHM: ICD-10-CM

## 2018-05-14 RX ORDER — APIXABAN 5 MG/1
5 TABLET, FILM COATED ORAL 2 TIMES DAILY
Qty: 60 TABLET | Refills: 0 | Status: SHIPPED | OUTPATIENT
Start: 2018-05-14 | End: 2018-06-21 | Stop reason: SDUPTHER

## 2018-06-01 DIAGNOSIS — E11.8 CONTROLLED TYPE 2 DIABETES MELLITUS WITH COMPLICATION, WITHOUT LONG-TERM CURRENT USE OF INSULIN (HCC): ICD-10-CM

## 2018-06-01 DIAGNOSIS — K21.9 GASTROESOPHAGEAL REFLUX DISEASE WITHOUT ESOPHAGITIS: ICD-10-CM

## 2018-06-01 DIAGNOSIS — I25.10 CORONARY ARTERY DISEASE INVOLVING NATIVE CORONARY ARTERY OF NATIVE HEART WITHOUT ANGINA PECTORIS: ICD-10-CM

## 2018-06-01 DIAGNOSIS — M17.0 PRIMARY OSTEOARTHRITIS OF BOTH KNEES: ICD-10-CM

## 2018-06-01 DIAGNOSIS — I50.42 CHRONIC COMBINED SYSTOLIC AND DIASTOLIC HEART FAILURE (HCC): ICD-10-CM

## 2018-06-01 RX ORDER — ACETAMINOPHEN 500 MG
500 TABLET ORAL 2 TIMES DAILY PRN
Qty: 60 TABLET | Refills: 0 | Status: SHIPPED | OUTPATIENT
Start: 2018-06-01 | End: 2018-06-21 | Stop reason: SDUPTHER

## 2018-06-01 RX ORDER — ATORVASTATIN CALCIUM 40 MG/1
TABLET, FILM COATED ORAL
Qty: 30 TABLET | Refills: 0 | Status: SHIPPED | OUTPATIENT
Start: 2018-06-01 | End: 2018-06-21 | Stop reason: SDUPTHER

## 2018-06-01 RX ORDER — ISOSORBIDE MONONITRATE 30 MG/1
30 TABLET, EXTENDED RELEASE ORAL DAILY
Qty: 30 TABLET | Refills: 0 | Status: SHIPPED | OUTPATIENT
Start: 2018-06-01 | End: 2018-06-21 | Stop reason: SDUPTHER

## 2018-06-01 RX ORDER — OMEPRAZOLE 20 MG/1
20 CAPSULE, DELAYED RELEASE ORAL DAILY
Qty: 30 CAPSULE | Refills: 0 | Status: SHIPPED | OUTPATIENT
Start: 2018-06-01 | End: 2018-06-21 | Stop reason: SDUPTHER

## 2018-06-01 RX ORDER — LISINOPRIL 5 MG/1
5 TABLET ORAL DAILY
Qty: 30 TABLET | Refills: 0 | Status: SHIPPED | OUTPATIENT
Start: 2018-06-01 | End: 2018-06-21 | Stop reason: SDUPTHER

## 2018-06-14 ENCOUNTER — HOSPITAL ENCOUNTER (OUTPATIENT)
Age: 63
Setting detail: SPECIMEN
Discharge: HOME | End: 2018-06-14
Payer: MEDICARE

## 2018-06-14 DIAGNOSIS — Z00.00 HEALTH CARE MAINTENANCE: ICD-10-CM

## 2018-06-14 DIAGNOSIS — Z11.4 SCREENING FOR HIV (HUMAN IMMUNODEFICIENCY VIRUS): ICD-10-CM

## 2018-06-14 LAB
HEPATITIS C ANTIBODY: NONREACTIVE
HIV AG/AB: NONREACTIVE

## 2018-06-14 PROCEDURE — 87389 HIV-1 AG W/HIV-1&-2 AB AG IA: CPT

## 2018-06-14 PROCEDURE — 36415 COLL VENOUS BLD VENIPUNCTURE: CPT

## 2018-06-14 PROCEDURE — 86803 HEPATITIS C AB TEST: CPT

## 2018-06-20 ENCOUNTER — OFFICE VISIT (OUTPATIENT)
Dept: PODIATRY | Age: 63
End: 2018-06-20
Payer: MEDICARE

## 2018-06-20 VITALS
DIASTOLIC BLOOD PRESSURE: 87 MMHG | WEIGHT: 274 LBS | HEART RATE: 84 BPM | SYSTOLIC BLOOD PRESSURE: 137 MMHG | BODY MASS INDEX: 46.78 KG/M2 | HEIGHT: 64 IN

## 2018-06-20 DIAGNOSIS — B35.1 ONYCHOMYCOSIS: ICD-10-CM

## 2018-06-20 DIAGNOSIS — E11.9 TYPE 2 DIABETES MELLITUS WITHOUT COMPLICATION, WITHOUT LONG-TERM CURRENT USE OF INSULIN (HCC): Primary | ICD-10-CM

## 2018-06-20 DIAGNOSIS — L84 CALLUS: ICD-10-CM

## 2018-06-20 DIAGNOSIS — M79.672 PAIN IN BOTH FEET: ICD-10-CM

## 2018-06-20 DIAGNOSIS — M79.671 PAIN IN BOTH FEET: ICD-10-CM

## 2018-06-20 PROCEDURE — 99212 OFFICE O/P EST SF 10 MIN: CPT | Performed by: STUDENT IN AN ORGANIZED HEALTH CARE EDUCATION/TRAINING PROGRAM

## 2018-06-20 PROCEDURE — 11056 PARNG/CUTG B9 HYPRKR LES 2-4: CPT | Performed by: STUDENT IN AN ORGANIZED HEALTH CARE EDUCATION/TRAINING PROGRAM

## 2018-06-20 PROCEDURE — 11721 DEBRIDE NAIL 6 OR MORE: CPT | Performed by: STUDENT IN AN ORGANIZED HEALTH CARE EDUCATION/TRAINING PROGRAM

## 2018-06-21 ENCOUNTER — OFFICE VISIT (OUTPATIENT)
Dept: INTERNAL MEDICINE | Age: 63
End: 2018-06-21
Payer: MEDICARE

## 2018-06-21 VITALS
HEIGHT: 64 IN | WEIGHT: 273.4 LBS | BODY MASS INDEX: 46.67 KG/M2 | HEART RATE: 62 BPM | DIASTOLIC BLOOD PRESSURE: 62 MMHG | SYSTOLIC BLOOD PRESSURE: 102 MMHG

## 2018-06-21 DIAGNOSIS — I48.0 PAF (PAROXYSMAL ATRIAL FIBRILLATION) (HCC): ICD-10-CM

## 2018-06-21 DIAGNOSIS — E78.2 MIXED HYPERLIPIDEMIA: ICD-10-CM

## 2018-06-21 DIAGNOSIS — E11.9 TYPE 2 DIABETES MELLITUS WITHOUT COMPLICATION, WITHOUT LONG-TERM CURRENT USE OF INSULIN (HCC): Primary | ICD-10-CM

## 2018-06-21 DIAGNOSIS — F51.04 PSYCHOPHYSIOLOGIC INSOMNIA: ICD-10-CM

## 2018-06-21 DIAGNOSIS — I10 ESSENTIAL HYPERTENSION: ICD-10-CM

## 2018-06-21 DIAGNOSIS — I25.10 CORONARY ARTERY DISEASE WITHOUT ANGINA PECTORIS, UNSPECIFIED VESSEL OR LESION TYPE, UNSPECIFIED WHETHER NATIVE OR TRANSPLANTED HEART: ICD-10-CM

## 2018-06-21 DIAGNOSIS — G47.33 OSA ON CPAP: ICD-10-CM

## 2018-06-21 DIAGNOSIS — E66.01 MORBID OBESITY (HCC): ICD-10-CM

## 2018-06-21 DIAGNOSIS — M17.0 PRIMARY OSTEOARTHRITIS OF BOTH KNEES: ICD-10-CM

## 2018-06-21 DIAGNOSIS — K21.9 GASTROESOPHAGEAL REFLUX DISEASE WITHOUT ESOPHAGITIS: ICD-10-CM

## 2018-06-21 DIAGNOSIS — I25.10 CORONARY ARTERY DISEASE INVOLVING NATIVE CORONARY ARTERY OF NATIVE HEART WITHOUT ANGINA PECTORIS: ICD-10-CM

## 2018-06-21 DIAGNOSIS — E11.8 CONTROLLED TYPE 2 DIABETES MELLITUS WITH COMPLICATION, WITHOUT LONG-TERM CURRENT USE OF INSULIN (HCC): ICD-10-CM

## 2018-06-21 DIAGNOSIS — Z99.89 OSA ON CPAP: ICD-10-CM

## 2018-06-21 DIAGNOSIS — I50.42 CHRONIC COMBINED SYSTOLIC AND DIASTOLIC HEART FAILURE (HCC): ICD-10-CM

## 2018-06-21 DIAGNOSIS — E11.9 TYPE 2 DIABETES MELLITUS WITHOUT COMPLICATION, WITHOUT LONG-TERM CURRENT USE OF INSULIN (HCC): Chronic | ICD-10-CM

## 2018-06-21 DIAGNOSIS — M1A.00X0 IDIOPATHIC CHRONIC GOUT WITHOUT TOPHUS, UNSPECIFIED SITE: ICD-10-CM

## 2018-06-21 DIAGNOSIS — Z23 NEED FOR VACCINATION WITH 13-POLYVALENT PNEUMOCOCCAL CONJUGATE VACCINE: ICD-10-CM

## 2018-06-21 PROCEDURE — 90670 PCV13 VACCINE IM: CPT | Performed by: STUDENT IN AN ORGANIZED HEALTH CARE EDUCATION/TRAINING PROGRAM

## 2018-06-21 PROCEDURE — 99214 OFFICE O/P EST MOD 30 MIN: CPT | Performed by: STUDENT IN AN ORGANIZED HEALTH CARE EDUCATION/TRAINING PROGRAM

## 2018-06-21 PROCEDURE — 99213 OFFICE O/P EST LOW 20 MIN: CPT | Performed by: INTERNAL MEDICINE

## 2018-06-21 RX ORDER — LISINOPRIL 5 MG/1
5 TABLET ORAL DAILY
Qty: 30 TABLET | Refills: 3 | Status: SHIPPED | OUTPATIENT
Start: 2018-06-21 | End: 2018-07-24 | Stop reason: SDUPTHER

## 2018-06-21 RX ORDER — UBIQUINOL 100 MG
CAPSULE ORAL
Qty: 100 EACH | Refills: 11 | Status: SHIPPED | OUTPATIENT
Start: 2018-06-21 | End: 2018-10-26 | Stop reason: SDUPTHER

## 2018-06-21 RX ORDER — ISOSORBIDE MONONITRATE 30 MG/1
30 TABLET, EXTENDED RELEASE ORAL DAILY
Qty: 30 TABLET | Refills: 3 | Status: SHIPPED | OUTPATIENT
Start: 2018-06-21 | End: 2018-07-24 | Stop reason: SDUPTHER

## 2018-06-21 RX ORDER — ISOPROPYL ALCOHOL 0.75 G/1
SWAB TOPICAL
Qty: 100 EACH | Refills: 0 | Status: SHIPPED | OUTPATIENT
Start: 2018-06-21 | End: 2018-10-26 | Stop reason: SDUPTHER

## 2018-06-21 RX ORDER — OMEPRAZOLE 20 MG/1
20 CAPSULE, DELAYED RELEASE ORAL DAILY
Qty: 30 CAPSULE | Refills: 2 | Status: SHIPPED | OUTPATIENT
Start: 2018-06-21 | End: 2018-07-24 | Stop reason: SDUPTHER

## 2018-06-21 RX ORDER — FUROSEMIDE 40 MG/1
40 TABLET ORAL 2 TIMES DAILY
Qty: 60 TABLET | Refills: 2 | Status: SHIPPED | OUTPATIENT
Start: 2018-06-21 | End: 2018-09-01 | Stop reason: SDUPTHER

## 2018-06-21 RX ORDER — GLIMEPIRIDE 1 MG/1
1 TABLET ORAL
Qty: 30 TABLET | Refills: 2 | Status: SHIPPED | OUTPATIENT
Start: 2018-06-21 | End: 2018-09-01 | Stop reason: SDUPTHER

## 2018-06-21 RX ORDER — ACETAMINOPHEN 500 MG
500 TABLET ORAL 2 TIMES DAILY PRN
Qty: 60 TABLET | Refills: 0 | Status: SHIPPED | OUTPATIENT
Start: 2018-06-21 | End: 2018-10-26 | Stop reason: SDUPTHER

## 2018-06-21 RX ORDER — AMMONIUM LACTATE 12 G/100G
LOTION TOPICAL
Qty: 222 ML | Refills: 3 | Status: SHIPPED | OUTPATIENT
Start: 2018-06-21 | End: 2018-10-26 | Stop reason: SDUPTHER

## 2018-06-21 RX ORDER — GLUCOSAM/CHON-MSM1/C/MANG/BOSW 500-416.6
TABLET ORAL
Qty: 100 EACH | Refills: 11 | Status: SHIPPED | OUTPATIENT
Start: 2018-06-21 | End: 2018-10-26 | Stop reason: SDUPTHER

## 2018-06-21 RX ORDER — LANOLIN ALCOHOL/MO/W.PET/CERES
3 CREAM (GRAM) TOPICAL NIGHTLY PRN
Qty: 30 TABLET | Refills: 2 | Status: SHIPPED | OUTPATIENT
Start: 2018-06-21 | End: 2018-08-13 | Stop reason: SDUPTHER

## 2018-06-21 RX ORDER — LANCETS 30 GAUGE
EACH MISCELLANEOUS
Qty: 100 EACH | Refills: 3 | Status: SHIPPED | OUTPATIENT
Start: 2018-06-21 | End: 2018-10-26 | Stop reason: SDUPTHER

## 2018-06-21 RX ORDER — ATORVASTATIN CALCIUM 40 MG/1
TABLET, FILM COATED ORAL
Qty: 30 TABLET | Refills: 5 | Status: SHIPPED | OUTPATIENT
Start: 2018-06-21 | End: 2018-10-26 | Stop reason: SDUPTHER

## 2018-06-21 RX ORDER — ALLOPURINOL 300 MG/1
300 TABLET ORAL DAILY
Qty: 30 TABLET | Refills: 2 | Status: SHIPPED | OUTPATIENT
Start: 2018-06-21 | End: 2018-09-01 | Stop reason: SDUPTHER

## 2018-06-22 ENCOUNTER — TELEPHONE (OUTPATIENT)
Dept: PULMONOLOGY | Age: 63
End: 2018-06-22

## 2018-07-24 DIAGNOSIS — K21.9 GASTROESOPHAGEAL REFLUX DISEASE WITHOUT ESOPHAGITIS: ICD-10-CM

## 2018-07-24 DIAGNOSIS — I50.42 CHRONIC COMBINED SYSTOLIC AND DIASTOLIC HEART FAILURE (HCC): ICD-10-CM

## 2018-07-24 DIAGNOSIS — E11.8 CONTROLLED TYPE 2 DIABETES MELLITUS WITH COMPLICATION, WITHOUT LONG-TERM CURRENT USE OF INSULIN (HCC): ICD-10-CM

## 2018-07-24 RX ORDER — ISOSORBIDE MONONITRATE 30 MG/1
30 TABLET, EXTENDED RELEASE ORAL DAILY
Qty: 30 TABLET | Refills: 0 | Status: SHIPPED | OUTPATIENT
Start: 2018-07-24 | End: 2018-10-26 | Stop reason: SDUPTHER

## 2018-07-24 RX ORDER — LISINOPRIL 5 MG/1
5 TABLET ORAL DAILY
Qty: 30 TABLET | Refills: 0 | Status: SHIPPED | OUTPATIENT
Start: 2018-07-24 | End: 2018-09-14 | Stop reason: SDUPTHER

## 2018-07-24 RX ORDER — OMEPRAZOLE 20 MG/1
20 CAPSULE, DELAYED RELEASE ORAL DAILY
Qty: 30 CAPSULE | Refills: 0 | Status: SHIPPED | OUTPATIENT
Start: 2018-07-24 | End: 2018-09-01 | Stop reason: SDUPTHER

## 2018-07-24 NOTE — TELEPHONE ENCOUNTER
kidney disease, stage II (mild)     Morbid obesity (HCC)     Hyperlipidemia     Iron deficiency anemia     Anxiety disorder      DJD (degenerative joint disease)     TANNER variably compliant with BiPAP     CAD (coronary artery disease) s/p stenting of L anterior descending artery 2004     Diabetic retinopathy (HCC)     HTN (hypertension)     MGUS (monoclonal gammopathy of unknown significance)     Type 2 diabetes mellitus without complication (HCC)     PAF (paroxysmal atrial fibrillation) ([facility] [address] [address]

## 2018-07-30 DIAGNOSIS — I50.42 CHRONIC COMBINED SYSTOLIC AND DIASTOLIC HEART FAILURE (HCC): ICD-10-CM

## 2018-07-31 RX ORDER — ISOSORBIDE MONONITRATE 30 MG/1
30 TABLET, EXTENDED RELEASE ORAL DAILY
Qty: 30 TABLET | Refills: 0 | Status: SHIPPED | OUTPATIENT
Start: 2018-07-31 | End: 2018-10-01 | Stop reason: SDUPTHER

## 2018-07-31 NOTE — TELEPHONE ENCOUNTER
Refill for imdur      Next Visit Date:  Future Appointments  Date Time Provider Keerthi England   8/13/2018 1:15 PM Magda Doll DO Resp Spec [facility]   10/24/2018 10:15 AM SCHEDULE, [facility]   10/26/2018 11:00 AM Anayeli Watson MD [address] IM [facility]   12/26/2018 1:15 PM Glendy Rosales DPM [facility] Maintenance   Topic Date Due    Shingles Vaccine (1 of 2 - 2 Dose Series) 01/23/2003    Diabetic retinal exam  05/06/2014    Diabetic foot exam  12/14/2017    Flu vaccine (1) 09/01/2018    Lipid screen  11/16/2018    Potassium monitoring  01/22/2019    Creatinine monitoring  01/22/2019    A1C test (Diabetic or Prediabetic)  03/15/2019    Colon cancer screen colonoscopy  04/04/2020    Pneumococcal low/med risk (2 of 2 - PPSV23) 06/30/2021    DTaP/Tdap/Td vaccine (2 - Td) 06/30/2026    AAA screen  Completed    Hepatitis C screen  Completed    HIV screen  Completed       Hemoglobin A1C (%)   Date Value   03/15/2018 6.3   06/01/2017 6.9   12/29/2016 6.4             ( goal A1C is < 7)   Microalb/Crt.  Ratio (mcg/mg creat)   Date Value   06/01/2017 11     LDL Cholesterol (mg/dL)   Date Value   11/16/2017 45   11/04/2016 46       (goal LDL is <100)   AST (U/L)   Date Value   01/22/2018 20     ALT (U/L)   Date Value   01/22/2018 13     BUN (mg/dL)   Date Value   01/22/2018 16     BP Readings from Last 3 Encounters:   06/21/18 102/62   06/20/18 137/87   03/15/18 100/62          (goal 120/80)    All Future Testing planned in [facility]  Lab Frequency Next Occurrence   US SCREENING FOR AAA Once 10/02/2018   CBC with Differential     Comprehensive metabolic panel     IgG, IgA, IgM     Kappa/Lambda quant free light chains serum     Protein electrophoresis, serum                 Patient Active Problem List:     Chronic combined systolic and diastolic heart failure (HCC) s/[ AICD placed     Chronic kidney disease, stage II (mild)     Morbid obesity ([facility] Hyperlipidemia     Iron deficiency anemia     Anxiety disorder      DJD (degenerative joint disease)     TANNER variably compliant with BiPAP     CAD (coronary artery disease) s/p stenting of L anterior descending artery 2004     Diabetic retinopathy (HCC)     HTN (hypertension)     MGUS (monoclonal gammopathy of unknown significance)     Type 2 diabetes mellitus without complication (HCC)     PAF (paroxysmal atrial fibrillation) ([facility] [address]

## 2018-08-13 ENCOUNTER — OFFICE VISIT (OUTPATIENT)
Dept: PULMONOLOGY | Age: 63
End: 2018-08-13
Payer: COMMERCIAL

## 2018-08-13 VITALS
SYSTOLIC BLOOD PRESSURE: 110 MMHG | HEART RATE: 102 BPM | TEMPERATURE: 97 F | DIASTOLIC BLOOD PRESSURE: 76 MMHG | HEIGHT: 64 IN | WEIGHT: 271 LBS | RESPIRATION RATE: 12 BRPM | BODY MASS INDEX: 46.26 KG/M2

## 2018-08-13 DIAGNOSIS — E66.01 MORBID OBESITY DUE TO EXCESS CALORIES (HCC): ICD-10-CM

## 2018-08-13 DIAGNOSIS — G47.31 COMPLEX SLEEP APNEA SYNDROME: Primary | ICD-10-CM

## 2018-08-13 DIAGNOSIS — E11.9 TYPE 2 DIABETES MELLITUS WITHOUT COMPLICATION, WITHOUT LONG-TERM CURRENT USE OF INSULIN (HCC): ICD-10-CM

## 2018-08-13 DIAGNOSIS — F51.04 PSYCHOPHYSIOLOGIC INSOMNIA: ICD-10-CM

## 2018-08-13 DIAGNOSIS — I48.0 PAF (PAROXYSMAL ATRIAL FIBRILLATION) (HCC): ICD-10-CM

## 2018-08-13 DIAGNOSIS — I42.9 CARDIOMYOPATHY, UNSPECIFIED TYPE (HCC): ICD-10-CM

## 2018-08-13 DIAGNOSIS — I50.22 CHRONIC SYSTOLIC CONGESTIVE HEART FAILURE (HCC): ICD-10-CM

## 2018-08-13 PROCEDURE — 99213 OFFICE O/P EST LOW 20 MIN: CPT | Performed by: INTERNAL MEDICINE

## 2018-08-13 RX ORDER — LANOLIN ALCOHOL/MO/W.PET/CERES
3 CREAM (GRAM) TOPICAL NIGHTLY PRN
Qty: 90 TABLET | Refills: 3 | Status: SHIPPED | OUTPATIENT
Start: 2018-08-13 | End: 2018-10-26

## 2018-08-13 ASSESSMENT — ENCOUNTER SYMPTOMS
BACK PAIN: 1
EYES NEGATIVE: 1
APNEA: 1

## 2018-08-13 NOTE — PROGRESS NOTES
6.4 oz (124 kg)   06/20/18 274 lb (124.3 kg)       Results for orders placed or performed during the hospital encounter of 06/14/18   Hepatitis C Antibody   Result Value Ref Range    Hepatitis C Ab NONREACTIVE NR   HIV-1 And HIV-2 Antibodies   Result Value Ref Range    HIV Ag/Ab NONREACTIVE NR       Assessment:      1. Complex sleep apnea syndrome    2. Psychophysiologic insomnia    3. Chronic systolic congestive heart failure ([address]    4. Morbid obesity due to excess calories ([address]    5. PAF (paroxysmal atrial fibrillation) ([address]          Plan:      1. Continue current treatment. Patient declines conventional CPAP. 2. Weight loss. 3. Continue efforts to optimize cardiac function. 4. Refilled melatonin. 5. Sleep with head of the bed elevated in the lateral decubitus position. 6. Flu shot in fall. 7. Return in one year. Sooner if new or advancing symptoms.       Electronically signed by Kianna Luther DO on 8/13/2018 at 1:14 PM

## 2018-08-16 ENCOUNTER — TELEPHONE (OUTPATIENT)
Dept: INTERNAL MEDICINE | Age: 63
End: 2018-08-16

## 2018-09-01 DIAGNOSIS — I50.42 CHRONIC COMBINED SYSTOLIC AND DIASTOLIC HEART FAILURE (HCC): ICD-10-CM

## 2018-09-01 DIAGNOSIS — K21.9 GASTROESOPHAGEAL REFLUX DISEASE WITHOUT ESOPHAGITIS: ICD-10-CM

## 2018-09-01 DIAGNOSIS — M1A.00X0 IDIOPATHIC CHRONIC GOUT WITHOUT TOPHUS, UNSPECIFIED SITE: ICD-10-CM

## 2018-09-01 DIAGNOSIS — E11.8 CONTROLLED TYPE 2 DIABETES MELLITUS WITH COMPLICATION, WITHOUT LONG-TERM CURRENT USE OF INSULIN (HCC): ICD-10-CM

## 2018-09-04 RX ORDER — OMEPRAZOLE 20 MG/1
20 CAPSULE, DELAYED RELEASE ORAL DAILY
Qty: 30 CAPSULE | Refills: 0 | Status: SHIPPED | OUTPATIENT
Start: 2018-09-04 | End: 2018-10-01 | Stop reason: SDUPTHER

## 2018-09-04 RX ORDER — GLIMEPIRIDE 1 MG/1
1 TABLET ORAL
Qty: 30 TABLET | Refills: 0 | Status: SHIPPED | OUTPATIENT
Start: 2018-09-04 | End: 2018-10-26 | Stop reason: SDUPTHER

## 2018-09-04 RX ORDER — FUROSEMIDE 40 MG/1
40 TABLET ORAL 2 TIMES DAILY
Qty: 60 TABLET | Refills: 0 | Status: SHIPPED | OUTPATIENT
Start: 2018-09-04 | End: 2018-10-26 | Stop reason: SDUPTHER

## 2018-09-04 RX ORDER — ALLOPURINOL 300 MG/1
300 TABLET ORAL DAILY
Qty: 30 TABLET | Refills: 0 | Status: SHIPPED | OUTPATIENT
Start: 2018-09-04 | End: 2018-10-26 | Stop reason: SDUPTHER

## 2018-09-04 NOTE — TELEPHONE ENCOUNTER
Escribe request for allupurinol, furosemide and glimiperide . Please escribe if appropriate      Next Visit Date:  10/26/18     Health Maintenance   Topic Date Due    Shingles Vaccine (1 of 2 - 2 Dose Series) 01/23/2003    Diabetic retinal exam  05/06/2014    Diabetic foot exam  12/14/2017    Flu vaccine (1) 09/01/2018    Lipid screen  11/16/2018    Potassium monitoring  01/22/2019    Creatinine monitoring  01/22/2019    A1C test (Diabetic or Prediabetic)  03/15/2019    Colon cancer screen colonoscopy  04/04/2020    Pneumococcal low/med risk (2 of 2 - PPSV23) 06/30/2021    DTaP/Tdap/Td vaccine (2 - Td) 06/30/2026    AAA screen  Completed    Hepatitis C screen  Completed    HIV screen  Completed       Hemoglobin A1C (%)   Date Value   03/15/2018 6.3   06/01/2017 6.9   12/29/2016 6.4             ( goal A1C is < 7)   Microalb/Crt.  Ratio (mcg/mg creat)   Date Value   06/01/2017 11     LDL Cholesterol (mg/dL)   Date Value   11/16/2017 45       (goal LDL is <100)   AST (U/L)   Date Value   01/22/2018 20     ALT (U/L)   Date Value   01/22/2018 13     BUN (mg/dL)   Date Value   01/22/2018 16     BP Readings from Last 3 Encounters:   08/13/18 110/76   06/21/18 102/62   06/20/18 137/87          (goal 120/80)          Patient Active Problem List:     Chronic combined systolic and diastolic heart failure (HCC) s/[ AICD placed     Chronic kidney disease, stage II (mild)     Morbid obesity (HCC)     Hyperlipidemia     Iron deficiency anemia     Anxiety disorder      DJD (degenerative joint disease)     TANNER variably compliant with BiPAP     CAD (coronary artery disease) s/p stenting of L anterior descending artery 2004     Diabetic retinopathy (HCC)     HTN (hypertension)     MGUS (monoclonal gammopathy of unknown significance)     Type 2 diabetes mellitus without complication (HCC)     PAF (paroxysmal atrial fibrillation) ([facility] [address]

## 2018-09-04 NOTE — TELEPHONE ENCOUNTER
Escribe request for metformin and omeprazole . Please escribe if appropriate      Next Visit Date:  10/26/18     Health Maintenance   Topic Date Due    Shingles Vaccine (1 of 2 - 2 Dose Series) 01/23/2003    Diabetic retinal exam  05/06/2014    Diabetic foot exam  12/14/2017    Flu vaccine (1) 09/01/2018    Lipid screen  11/16/2018    Potassium monitoring  01/22/2019    Creatinine monitoring  01/22/2019    A1C test (Diabetic or Prediabetic)  03/15/2019    Colon cancer screen colonoscopy  04/04/2020    Pneumococcal low/med risk (2 of 2 - PPSV23) 06/30/2021    DTaP/Tdap/Td vaccine (2 - Td) 06/30/2026    AAA screen  Completed    Hepatitis C screen  Completed    HIV screen  Completed       Hemoglobin A1C (%)   Date Value   03/15/2018 6.3   06/01/2017 6.9   12/29/2016 6.4             ( goal A1C is < 7)   Microalb/Crt.  Ratio (mcg/mg creat)   Date Value   06/01/2017 11     LDL Cholesterol (mg/dL)   Date Value   11/16/2017 45       (goal LDL is <100)   AST (U/L)   Date Value   01/22/2018 20     ALT (U/L)   Date Value   01/22/2018 13     BUN (mg/dL)   Date Value   01/22/2018 16     BP Readings from Last 3 Encounters:   08/13/18 110/76   06/21/18 102/62   06/20/18 137/87          (goal 120/80)          Patient Active Problem List:     Chronic combined systolic and diastolic heart failure (HCC) s/[ AICD placed     Chronic kidney disease, stage II (mild)     Morbid obesity (HCC)     Hyperlipidemia     Iron deficiency anemia     Anxiety disorder      DJD (degenerative joint disease)     TANNER variably compliant with BiPAP     CAD (coronary artery disease) s/p stenting of L anterior descending artery 2004     Diabetic retinopathy (HCC)     HTN (hypertension)     MGUS (monoclonal gammopathy of unknown significance)     Type 2 diabetes mellitus without complication (HCC)     PAF (paroxysmal atrial fibrillation) ([facility] [address]

## 2018-09-14 DIAGNOSIS — I50.42 CHRONIC COMBINED SYSTOLIC AND DIASTOLIC HEART FAILURE (HCC): ICD-10-CM

## 2018-09-14 NOTE — TELEPHONE ENCOUNTER
serena request for lisinopril, lopressor future appointment scheduled. Health Maintenance   Topic Date Due    Shingles Vaccine (1 of 2 - 2 Dose Series) 01/23/2003    Diabetic retinal exam  05/06/2014    Diabetic foot exam  12/14/2017    Flu vaccine (1) 09/01/2018    Lipid screen  11/16/2018    Potassium monitoring  01/22/2019    Creatinine monitoring  01/22/2019    A1C test (Diabetic or Prediabetic)  03/15/2019    Colon cancer screen colonoscopy  04/04/2020    Pneumococcal low/med risk (2 of 2 - PPSV23) 06/30/2021    DTaP/Tdap/Td vaccine (2 - Td) 06/30/2026    AAA screen  Completed    Hepatitis C screen  Completed    HIV screen  Completed             (applicable per patient's age: Cancer Screenings, Depression Screening, Fall Risk Screening, Immunizations)    Hemoglobin A1C (%)   Date Value   03/15/2018 6.3   06/01/2017 6.9   12/29/2016 6.4     Microalb/Crt.  Ratio (mcg/mg creat)   Date Value   06/01/2017 11     LDL Cholesterol (mg/dL)   Date Value   11/16/2017 45     AST (U/L)   Date Value   01/22/2018 20     ALT (U/L)   Date Value   01/22/2018 13     BUN (mg/dL)   Date Value   01/22/2018 16      (goal A1C is < 7)   (goal LDL is <100) need 30-50% reduction from baseline     BP Readings from Last 3 Encounters:   08/13/18 110/76   06/21/18 102/62   06/20/18 137/87    (goal /80)      All Future Testing planned in [facility]:  Lab Frequency Next Occurrence   US SCREENING FOR AAA Once 10/02/2018   CBC with Differential     Comprehensive metabolic panel     IgG, IgA, IgM     Kappa/Lambda quant free light chains serum     Protein electrophoresis, serum         Next Visit Date:  Future Appointments  Date Time Provider Keerthi [place]   10/24/2018 10:15 AM SCHEDULE, TAYLOR [facility]   10/26/2018 11:00 AM [facility]   12/26/2018 1:15 PM Hafsa Tamez DPM [facility]   8/12/2019 1:00 PM David Coleman DO Resp Spec [facility]            Patient

## 2018-09-17 RX ORDER — LISINOPRIL 5 MG/1
5 TABLET ORAL DAILY
Qty: 30 TABLET | Refills: 0 | Status: SHIPPED | OUTPATIENT
Start: 2018-09-17 | End: 2018-10-01 | Stop reason: SDUPTHER

## 2018-10-01 DIAGNOSIS — I25.10 CORONARY ARTERY DISEASE INVOLVING NATIVE CORONARY ARTERY OF NATIVE HEART WITHOUT ANGINA PECTORIS: ICD-10-CM

## 2018-10-01 DIAGNOSIS — I50.42 CHRONIC COMBINED SYSTOLIC AND DIASTOLIC HEART FAILURE (HCC): ICD-10-CM

## 2018-10-01 DIAGNOSIS — K21.9 GASTROESOPHAGEAL REFLUX DISEASE WITHOUT ESOPHAGITIS: ICD-10-CM

## 2018-10-01 NOTE — TELEPHONE ENCOUNTER
Aspirin pending for refill       Health Maintenance   Topic Date Due    Shingles Vaccine (1 of 2 - 2 Dose Series) 01/23/2003    Diabetic retinal exam  05/06/2014    Diabetic foot exam  12/14/2017    Flu vaccine (1) 09/01/2018    Lipid screen  11/16/2018    Potassium monitoring  01/22/2019    Creatinine monitoring  01/22/2019    A1C test (Diabetic or Prediabetic)  03/15/2019    Colon cancer screen colonoscopy  04/04/2020    Pneumococcal low/med risk (2 of 2 - PPSV23) 06/30/2021    DTaP/Tdap/Td vaccine (2 - Td) 06/30/2026    AAA screen  Completed    Hepatitis C screen  Completed    HIV screen  Completed             (applicable per patient's age: Cancer Screenings, Depression Screening, Fall Risk Screening, Immunizations)    Hemoglobin A1C (%)   Date Value   03/15/2018 6.3   06/01/2017 6.9   12/29/2016 6.4     Microalb/Crt.  Ratio (mcg/mg creat)   Date Value   06/01/2017 11     LDL Cholesterol (mg/dL)   Date Value   11/16/2017 45     AST (U/L)   Date Value   01/22/2018 20     ALT (U/L)   Date Value   01/22/2018 13     BUN (mg/dL)   Date Value   01/22/2018 16      (goal A1C is < 7)   (goal LDL is <100) need 30-50% reduction from baseline     BP Readings from Last 3 Encounters:   08/13/18 110/76   06/21/18 102/62   06/20/18 137/87    (goal /80)      All Future Testing planned in [facility]:  Lab Frequency Next Occurrence   US SCREENING FOR AAA Once 10/02/2018   CBC with Differential     Comprehensive metabolic panel     IgG, IgA, IgM     Kappa/Lambda quant free light chains serum     Protein electrophoresis, serum         Next Visit Date:  Future Appointments  Date Time Provider Keerthi [place]   10/24/2018 10:15 AM SCHEDULE, MHP SV CANCER [facility]   10/26/2018 11:00 AM Max Serna MD [facility] IM [facility]   12/26/2018 1:15 PM Harrell Goodell, DPM [facility]   8/12/2019 1:00 PM Smitha Rudolph DO Resp Spec [facility]            Patient Active Problem List:     Chronic combined systolic and diastolic heart failure (HCC) s/[ AICD placed     Chronic kidney disease, stage II (mild)     Morbid obesity (HCC)     Hyperlipidemia     Iron deficiency anemia     Anxiety disorder      DJD (degenerative joint disease)     TANNER variably compliant with BiPAP     CAD (coronary artery disease) s/p stenting of L anterior descending artery 2004     Diabetic retinopathy (HCC)     HTN (hypertension)     MGUS (monoclonal gammopathy of unknown significance)     Type 2 diabetes mellitus without complication (HCC)     PAF (paroxysmal atrial fibrillation) ([facility] [address]

## 2018-10-05 RX ORDER — OMEPRAZOLE 20 MG/1
CAPSULE, DELAYED RELEASE ORAL
Qty: 30 CAPSULE | Refills: 0 | Status: SHIPPED | OUTPATIENT
Start: 2018-10-05 | End: 2018-10-26 | Stop reason: SDUPTHER

## 2018-10-05 RX ORDER — ASPIRIN 81 MG/1
81 TABLET ORAL 2 TIMES DAILY
Qty: 60 TABLET | Refills: 0 | Status: SHIPPED | OUTPATIENT
Start: 2018-10-05 | End: 2018-10-26 | Stop reason: SDUPTHER

## 2018-10-05 RX ORDER — ISOSORBIDE MONONITRATE 30 MG/1
30 TABLET, EXTENDED RELEASE ORAL DAILY
Qty: 30 TABLET | Refills: 0 | Status: SHIPPED | OUTPATIENT
Start: 2018-10-05 | End: 2018-10-26 | Stop reason: SDUPTHER

## 2018-10-05 RX ORDER — LISINOPRIL 5 MG/1
5 TABLET ORAL DAILY
Qty: 30 TABLET | Refills: 0 | Status: SHIPPED | OUTPATIENT
Start: 2018-10-05 | End: 2018-10-26 | Stop reason: SDUPTHER

## 2018-10-26 ENCOUNTER — OFFICE VISIT (OUTPATIENT)
Dept: INTERNAL MEDICINE | Age: 63
End: 2018-10-26
Payer: COMMERCIAL

## 2018-10-26 VITALS
WEIGHT: 266.6 LBS | SYSTOLIC BLOOD PRESSURE: 103 MMHG | DIASTOLIC BLOOD PRESSURE: 64 MMHG | BODY MASS INDEX: 45.52 KG/M2 | HEART RATE: 66 BPM | HEIGHT: 64 IN

## 2018-10-26 DIAGNOSIS — I48.0 PAF (PAROXYSMAL ATRIAL FIBRILLATION) (HCC): ICD-10-CM

## 2018-10-26 DIAGNOSIS — E11.9 TYPE 2 DIABETES MELLITUS WITHOUT COMPLICATION, WITHOUT LONG-TERM CURRENT USE OF INSULIN (HCC): Primary | ICD-10-CM

## 2018-10-26 DIAGNOSIS — K21.9 GASTROESOPHAGEAL REFLUX DISEASE WITHOUT ESOPHAGITIS: ICD-10-CM

## 2018-10-26 DIAGNOSIS — I50.42 CHRONIC COMBINED SYSTOLIC AND DIASTOLIC HEART FAILURE (HCC): ICD-10-CM

## 2018-10-26 DIAGNOSIS — M17.0 PRIMARY OSTEOARTHRITIS OF BOTH KNEES: ICD-10-CM

## 2018-10-26 DIAGNOSIS — E11.8 CONTROLLED TYPE 2 DIABETES MELLITUS WITH COMPLICATION, WITHOUT LONG-TERM CURRENT USE OF INSULIN (HCC): ICD-10-CM

## 2018-10-26 DIAGNOSIS — M1A.00X0 IDIOPATHIC CHRONIC GOUT WITHOUT TOPHUS, UNSPECIFIED SITE: ICD-10-CM

## 2018-10-26 DIAGNOSIS — Z23 NEED FOR INFLUENZA VACCINATION: ICD-10-CM

## 2018-10-26 DIAGNOSIS — Z13.9 SCREENING PROCEDURE: ICD-10-CM

## 2018-10-26 DIAGNOSIS — I25.10 CORONARY ARTERY DISEASE INVOLVING NATIVE CORONARY ARTERY OF NATIVE HEART WITHOUT ANGINA PECTORIS: ICD-10-CM

## 2018-10-26 LAB — HBA1C MFR BLD: 6.4 %

## 2018-10-26 PROCEDURE — 99211 OFF/OP EST MAY X REQ PHY/QHP: CPT | Performed by: STUDENT IN AN ORGANIZED HEALTH CARE EDUCATION/TRAINING PROGRAM

## 2018-10-26 PROCEDURE — 83036 HEMOGLOBIN GLYCOSYLATED A1C: CPT | Performed by: STUDENT IN AN ORGANIZED HEALTH CARE EDUCATION/TRAINING PROGRAM

## 2018-10-26 PROCEDURE — 99213 OFFICE O/P EST LOW 20 MIN: CPT | Performed by: STUDENT IN AN ORGANIZED HEALTH CARE EDUCATION/TRAINING PROGRAM

## 2018-10-26 PROCEDURE — 90686 IIV4 VACC NO PRSV 0.5 ML IM: CPT | Performed by: STUDENT IN AN ORGANIZED HEALTH CARE EDUCATION/TRAINING PROGRAM

## 2018-10-26 RX ORDER — LISINOPRIL 5 MG/1
5 TABLET ORAL DAILY
Qty: 30 TABLET | Refills: 0 | Status: SHIPPED | OUTPATIENT
Start: 2018-10-26 | End: 2019-02-01 | Stop reason: SDUPTHER

## 2018-10-26 RX ORDER — UBIQUINOL 100 MG
CAPSULE ORAL
Qty: 100 EACH | Refills: 11 | Status: SHIPPED | OUTPATIENT
Start: 2018-10-26 | End: 2019-02-01 | Stop reason: SDUPTHER

## 2018-10-26 RX ORDER — GLUCOSAM/CHON-MSM1/C/MANG/BOSW 500-416.6
TABLET ORAL
Qty: 100 EACH | Refills: 11 | Status: SHIPPED | OUTPATIENT
Start: 2018-10-26 | End: 2019-02-01 | Stop reason: SDUPTHER

## 2018-10-26 RX ORDER — LANCETS 30 GAUGE
EACH MISCELLANEOUS
Qty: 100 EACH | Refills: 3 | Status: SHIPPED | OUTPATIENT
Start: 2018-10-26 | End: 2019-02-01 | Stop reason: SDUPTHER

## 2018-10-26 RX ORDER — BLOOD-GLUCOSE METER
1 KIT MISCELLANEOUS DAILY PRN
Qty: 1 KIT | Refills: 0 | Status: SHIPPED | OUTPATIENT
Start: 2018-10-26 | End: 2019-02-01 | Stop reason: SDUPTHER

## 2018-10-26 RX ORDER — OMEPRAZOLE 20 MG/1
CAPSULE, DELAYED RELEASE ORAL
Qty: 30 CAPSULE | Refills: 0 | Status: SHIPPED | OUTPATIENT
Start: 2018-10-26 | End: 2018-11-26 | Stop reason: SDUPTHER

## 2018-10-26 RX ORDER — ISOPROPYL ALCOHOL 0.75 G/1
SWAB TOPICAL
Qty: 100 EACH | Refills: 0 | Status: SHIPPED | OUTPATIENT
Start: 2018-10-26 | End: 2019-02-01 | Stop reason: SDUPTHER

## 2018-10-26 RX ORDER — AMMONIUM LACTATE 12 G/100G
LOTION TOPICAL
Qty: 222 ML | Refills: 3 | Status: SHIPPED | OUTPATIENT
Start: 2018-10-26 | End: 2019-02-01 | Stop reason: SDUPTHER

## 2018-10-26 RX ORDER — ALLOPURINOL 300 MG/1
300 TABLET ORAL DAILY
Qty: 30 TABLET | Refills: 0 | Status: SHIPPED | OUTPATIENT
Start: 2018-10-26 | End: 2018-11-26 | Stop reason: SDUPTHER

## 2018-10-26 RX ORDER — ISOSORBIDE MONONITRATE 30 MG/1
30 TABLET, EXTENDED RELEASE ORAL DAILY
Qty: 30 TABLET | Refills: 0 | Status: SHIPPED | OUTPATIENT
Start: 2018-10-26 | End: 2018-11-26 | Stop reason: SDUPTHER

## 2018-10-26 RX ORDER — ASPIRIN 81 MG/1
81 TABLET ORAL 2 TIMES DAILY
Qty: 60 TABLET | Refills: 0 | Status: SHIPPED | OUTPATIENT
Start: 2018-10-26 | End: 2019-02-01 | Stop reason: SDUPTHER

## 2018-10-26 RX ORDER — ATORVASTATIN CALCIUM 40 MG/1
TABLET, FILM COATED ORAL
Qty: 30 TABLET | Refills: 5 | Status: SHIPPED | OUTPATIENT
Start: 2018-10-26 | End: 2019-02-01 | Stop reason: SDUPTHER

## 2018-10-26 RX ORDER — ACETAMINOPHEN 500 MG
500 TABLET ORAL 2 TIMES DAILY PRN
Qty: 60 TABLET | Refills: 0 | Status: SHIPPED | OUTPATIENT
Start: 2018-10-26 | End: 2018-11-26 | Stop reason: SDUPTHER

## 2018-10-26 RX ORDER — GLIMEPIRIDE 1 MG/1
1 TABLET ORAL
Qty: 30 TABLET | Refills: 2 | Status: SHIPPED | OUTPATIENT
Start: 2018-10-26 | End: 2018-12-26 | Stop reason: SDUPTHER

## 2018-10-26 RX ORDER — FUROSEMIDE 40 MG/1
40 TABLET ORAL 2 TIMES DAILY
Qty: 60 TABLET | Refills: 0 | Status: SHIPPED | OUTPATIENT
Start: 2018-10-26 | End: 2018-11-26 | Stop reason: SDUPTHER

## 2018-10-26 NOTE — PROGRESS NOTES
numbness/tingling  · Psychiatric: negative for change in mood, affect    PHYSICAL EXAM:  Vitals:    10/26/18 1110   BP: 103/64   Site: Right Upper Arm   Position: Sitting   Cuff Size: Large Adult   Pulse: 66   Weight: 266 lb 9.6 oz (120.9 kg)   Height: 5' 4\" (1.626 m)     BP Readings from Last 3 Encounters:   10/26/18 103/64   08/13/18 110/76   06/21/18 102/62        Physical Exam    · General appearance: awake, alert, cooperative, morbid obesity  · HEENT: Atraumatic, normocephalic, neck supple, normal EOM, thyroid normal, no lymphadenopathy   · Lungs: clear to auscultation bilaterally  · Heart: regular rate and rhythm, S1, S2 normal, no murmur  · Abdomen: soft, non-tender; bowel sounds normal; no masses,  no organomegaly  · Extremities: No cyanosis or edema  · Neurological:  Awake, alert, oriented to name, place and time. Cranial nerves II-XII are grossly intact. Reflexes normal and symmetric. Sensation grossly normal  · Psych-normal affect     DIAGNOSTIC FINDINGS:  CBC:  Lab Results   Component Value Date    WBC 6.9 01/22/2018    HGB 12.7 01/22/2018     01/22/2018     12/08/2011       BMP:    Lab Results   Component Value Date     01/22/2018    K 3.8 01/22/2018     01/22/2018    CO2 29 01/22/2018    BUN 16 01/22/2018    CREATININE 0.61 01/22/2018    GLUCOSE 106 01/22/2018    GLUCOSE 123 03/19/2012       HEMOGLOBIN A1C:   Lab Results   Component Value Date    LABA1C 6.4 10/26/2018       FASTING LIPID PANEL:  Lab Results   Component Value Date    CHOL 106 11/16/2017    HDL 34 (L) 11/16/2017    TRIG 135 11/16/2017       ASSESSMENT AND PLAN:  Mya Chen was seen today for diabetes and hypertension.     Diagnoses and all orders for this visit:    Continue with same treatment,  no changes in medication was made on this office visit    Type 2 diabetes mellitus without complication, without long-term current use of insulin (HCC)  -     POCT glycosylated hemoglobin (Hb A1C)  -     glimepiride (AMARYL)

## 2018-11-06 ENCOUNTER — TELEPHONE (OUTPATIENT)
Dept: INTERNAL MEDICINE | Age: 63
End: 2018-11-06

## 2018-11-26 DIAGNOSIS — M1A.00X0 IDIOPATHIC CHRONIC GOUT WITHOUT TOPHUS, UNSPECIFIED SITE: ICD-10-CM

## 2018-11-26 DIAGNOSIS — E11.8 CONTROLLED TYPE 2 DIABETES MELLITUS WITH COMPLICATION, WITHOUT LONG-TERM CURRENT USE OF INSULIN (HCC): ICD-10-CM

## 2018-11-26 DIAGNOSIS — I50.42 CHRONIC COMBINED SYSTOLIC AND DIASTOLIC HEART FAILURE (HCC): ICD-10-CM

## 2018-11-26 DIAGNOSIS — M17.0 PRIMARY OSTEOARTHRITIS OF BOTH KNEES: ICD-10-CM

## 2018-11-26 DIAGNOSIS — K21.9 GASTROESOPHAGEAL REFLUX DISEASE WITHOUT ESOPHAGITIS: ICD-10-CM

## 2018-11-27 RX ORDER — FUROSEMIDE 40 MG/1
40 TABLET ORAL 2 TIMES DAILY
Qty: 60 TABLET | Refills: 0 | Status: SHIPPED | OUTPATIENT
Start: 2018-11-27 | End: 2018-12-26 | Stop reason: SDUPTHER

## 2018-11-27 RX ORDER — ACETAMINOPHEN 500 MG
500 TABLET ORAL 2 TIMES DAILY PRN
Qty: 60 TABLET | Refills: 0 | Status: SHIPPED | OUTPATIENT
Start: 2018-11-27 | End: 2018-12-26 | Stop reason: SDUPTHER

## 2018-11-27 RX ORDER — ISOSORBIDE MONONITRATE 30 MG/1
30 TABLET, EXTENDED RELEASE ORAL DAILY
Qty: 30 TABLET | Refills: 0 | Status: SHIPPED | OUTPATIENT
Start: 2018-11-27 | End: 2019-02-01 | Stop reason: SDUPTHER

## 2018-11-27 RX ORDER — OMEPRAZOLE 20 MG/1
CAPSULE, DELAYED RELEASE ORAL
Qty: 30 CAPSULE | Refills: 0 | Status: SHIPPED | OUTPATIENT
Start: 2018-11-27 | End: 2019-02-01 | Stop reason: SDUPTHER

## 2018-11-27 RX ORDER — ALLOPURINOL 300 MG/1
TABLET ORAL
Qty: 30 TABLET | Refills: 0 | Status: SHIPPED | OUTPATIENT
Start: 2018-11-27 | End: 2018-12-26 | Stop reason: SDUPTHER

## 2018-12-06 ENCOUNTER — TELEPHONE (OUTPATIENT)
Dept: INTERNAL MEDICINE | Age: 63
End: 2018-12-06

## 2018-12-26 ENCOUNTER — OFFICE VISIT (OUTPATIENT)
Dept: PODIATRY | Age: 63
End: 2018-12-26
Payer: COMMERCIAL

## 2018-12-26 VITALS
DIASTOLIC BLOOD PRESSURE: 69 MMHG | WEIGHT: 268.6 LBS | HEIGHT: 64 IN | SYSTOLIC BLOOD PRESSURE: 113 MMHG | BODY MASS INDEX: 45.85 KG/M2

## 2018-12-26 DIAGNOSIS — B35.1 ONYCHOMYCOSIS: ICD-10-CM

## 2018-12-26 DIAGNOSIS — M79.672 PAIN IN BOTH FEET: ICD-10-CM

## 2018-12-26 DIAGNOSIS — I50.42 CHRONIC COMBINED SYSTOLIC AND DIASTOLIC HEART FAILURE (HCC): ICD-10-CM

## 2018-12-26 DIAGNOSIS — L84 CALLUS: ICD-10-CM

## 2018-12-26 DIAGNOSIS — E11.9 TYPE 2 DIABETES MELLITUS WITHOUT COMPLICATION, WITHOUT LONG-TERM CURRENT USE OF INSULIN (HCC): ICD-10-CM

## 2018-12-26 DIAGNOSIS — M79.671 PAIN IN BOTH FEET: ICD-10-CM

## 2018-12-26 DIAGNOSIS — E11.9 TYPE 2 DIABETES MELLITUS WITHOUT COMPLICATION, WITHOUT LONG-TERM CURRENT USE OF INSULIN (HCC): Primary | ICD-10-CM

## 2018-12-26 DIAGNOSIS — M1A.00X0 IDIOPATHIC CHRONIC GOUT WITHOUT TOPHUS, UNSPECIFIED SITE: ICD-10-CM

## 2018-12-26 DIAGNOSIS — M17.0 PRIMARY OSTEOARTHRITIS OF BOTH KNEES: ICD-10-CM

## 2018-12-26 DIAGNOSIS — E11.8 CONTROLLED TYPE 2 DIABETES MELLITUS WITH COMPLICATION, WITHOUT LONG-TERM CURRENT USE OF INSULIN (HCC): ICD-10-CM

## 2018-12-26 PROBLEM — Z98.61 CORONARY ANGIOPLASTY STATUS: Status: ACTIVE | Noted: 2017-11-07

## 2018-12-26 PROCEDURE — 99213 OFFICE O/P EST LOW 20 MIN: CPT | Performed by: STUDENT IN AN ORGANIZED HEALTH CARE EDUCATION/TRAINING PROGRAM

## 2018-12-26 PROCEDURE — 11055 PARING/CUTG B9 HYPRKER LES 1: CPT | Performed by: STUDENT IN AN ORGANIZED HEALTH CARE EDUCATION/TRAINING PROGRAM

## 2018-12-26 PROCEDURE — 99212 OFFICE O/P EST SF 10 MIN: CPT

## 2018-12-26 PROCEDURE — 11721 DEBRIDE NAIL 6 OR MORE: CPT | Performed by: STUDENT IN AN ORGANIZED HEALTH CARE EDUCATION/TRAINING PROGRAM

## 2018-12-26 NOTE — TELEPHONE ENCOUNTER
Escribe request for Glimepiride . Please escribe if appropriate      Next Visit Date:  2/1/19     Health Maintenance   Topic Date Due    Shingles Vaccine (1 of 2 - 2 Dose Series) 01/23/2003    Diabetic foot exam  12/14/2017    Lipid screen  11/16/2018    Potassium monitoring  01/22/2019    Creatinine monitoring  01/22/2019    A1C test (Diabetic or Prediabetic)  10/26/2019    Diabetic retinal exam  11/12/2019    Colon cancer screen colonoscopy  04/04/2020    Pneumococcal low/med risk (2 of 2 - PPSV23) 06/30/2021    DTaP/Tdap/Td vaccine (2 - Td) 06/30/2026    Flu vaccine  Completed    AAA screen  Completed    Hepatitis C screen  Completed    HIV screen  Completed       Hemoglobin A1C (%)   Date Value   10/26/2018 6.4   03/15/2018 6.3   06/01/2017 6.9             ( goal A1C is < 7)   Microalb/Crt.  Ratio (mcg/mg creat)   Date Value   06/01/2017 11     LDL Cholesterol (mg/dL)   Date Value   11/16/2017 45       (goal LDL is <100)   AST (U/L)   Date Value   01/22/2018 20     ALT (U/L)   Date Value   01/22/2018 13     BUN (mg/dL)   Date Value   01/22/2018 16     BP Readings from Last 3 Encounters:   12/26/18 113/69   10/26/18 103/64   08/13/18 110/76          (goal 120/80)          Patient Active Problem List:     Chronic combined systolic and diastolic heart failure (HCC) s/[ AICD placed     Chronic kidney disease, stage II (mild)     Morbid obesity (HCC)     Hyperlipidemia     Iron deficiency anemia     Anxiety disorder      DJD (degenerative joint disease)     TANNER variably compliant with BiPAP     CAD (coronary artery disease) s/p stenting of L anterior descending artery 2004     Diabetic retinopathy ([facility] [address]     Ischemic cardiomyopathy     HTN (hypertension)     MGUS (monoclonal gammopathy of unknown significance)     Type 2 diabetes mellitus without complication ([facility])     AICD (automatic cardioverter/defibrillator) present     PAF (paroxysmal atrial fibrillation) ([facility])     Coronary angioplasty status

## 2018-12-27 NOTE — PROGRESS NOTES
Patient instructed to remove shoes and socks, instructed to sit in exam chair. Current PCP name is Dr Dominique Rose and date of last visit 10/26/2018. Do you have a follow up visit scheduled?   Yes or no    If yes the date is 02/01/2019
Plan:      Patient seen and evaluated. Nails 1-5, aleshia debrided without incident with sterile nail nippers. Debrided HPK down to the layer of the dermis with #15 blade without incident. Discussed importance of blood sugar monitoring. Patient will return to clinic in 6 months, or sooner should problem arise.     Electronically signed by Jerrica Cates DPM on 12/26/2018 at 2:09 PM

## 2018-12-28 RX ORDER — GLIMEPIRIDE 1 MG/1
1 TABLET ORAL
Qty: 30 TABLET | Refills: 0 | Status: SHIPPED | OUTPATIENT
Start: 2018-12-28 | End: 2019-02-01 | Stop reason: SDUPTHER

## 2018-12-28 RX ORDER — PSEUDOEPHED/ACETAMINOPH/DIPHEN 30MG-500MG
TABLET ORAL
Qty: 60 TABLET | Refills: 0 | Status: SHIPPED | OUTPATIENT
Start: 2018-12-28 | End: 2019-02-01 | Stop reason: SDUPTHER

## 2018-12-28 RX ORDER — ALLOPURINOL 300 MG/1
TABLET ORAL
Qty: 30 TABLET | Refills: 0 | Status: SHIPPED | OUTPATIENT
Start: 2018-12-28 | End: 2019-02-01 | Stop reason: SDUPTHER

## 2018-12-28 RX ORDER — FUROSEMIDE 40 MG/1
TABLET ORAL
Qty: 60 TABLET | Refills: 0 | Status: SHIPPED | OUTPATIENT
Start: 2018-12-28 | End: 2019-02-01 | Stop reason: SDUPTHER

## 2018-12-29 DIAGNOSIS — I50.42 CHRONIC COMBINED SYSTOLIC AND DIASTOLIC HEART FAILURE (HCC): ICD-10-CM

## 2018-12-29 DIAGNOSIS — I25.10 CORONARY ARTERY DISEASE INVOLVING NATIVE CORONARY ARTERY OF NATIVE HEART WITHOUT ANGINA PECTORIS: ICD-10-CM

## 2018-12-31 RX ORDER — LISINOPRIL 5 MG/1
5 TABLET ORAL DAILY
Qty: 30 TABLET | Refills: 0 | Status: SHIPPED | OUTPATIENT
Start: 2018-12-31 | End: 2019-02-01 | Stop reason: SDUPTHER

## 2018-12-31 RX ORDER — ASPIRIN 81 MG/1
TABLET ORAL
Qty: 60 TABLET | Refills: 0 | Status: SHIPPED | OUTPATIENT
Start: 2018-12-31 | End: 2019-01-31 | Stop reason: SDUPTHER

## 2018-12-31 NOTE — TELEPHONE ENCOUNTER
serena request for aspirin, lisinopril future appointment scheduled. Health Maintenance   Topic Date Due    Shingles Vaccine (1 of 2 - 2 Dose Series) 01/23/2003    Diabetic foot exam  12/14/2017    Lipid screen  11/16/2018    Potassium monitoring  01/22/2019    Creatinine monitoring  01/22/2019    A1C test (Diabetic or Prediabetic)  10/26/2019    Diabetic retinal exam  11/12/2019    Colon cancer screen colonoscopy  04/04/2020    Pneumococcal low/med risk (2 of 2 - PPSV23) 06/30/2021    DTaP/Tdap/Td vaccine (2 - Td) 06/30/2026    Flu vaccine  Completed    AAA screen  Completed    Hepatitis C screen  Completed    HIV screen  Completed             (applicable per patient's age: Cancer Screenings, Depression Screening, Fall Risk Screening, Immunizations)    Hemoglobin A1C (%)   Date Value   10/26/2018 6.4   03/15/2018 6.3   06/01/2017 6.9     Microalb/Crt.  Ratio (mcg/mg creat)   Date Value   06/01/2017 11     LDL Cholesterol (mg/dL)   Date Value   11/16/2017 45     AST (U/L)   Date Value   01/22/2018 20     ALT (U/L)   Date Value   01/22/2018 13     BUN (mg/dL)   Date Value   01/22/2018 16      (goal A1C is < 7)   (goal LDL is <100) need 30-50% reduction from baseline     BP Readings from Last 3 Encounters:   12/26/18 113/69   10/26/18 103/64   08/13/18 110/76    (goal /80)      All Future Testing planned in [facility]:  Lab Frequency Next Occurrence   CBC with Differential     Comprehensive metabolic panel     IgG, IgA, IgM     Kappa/Lambda quant free light chains serum     Protein electrophoresis, serum         Next Visit Date:  Future Appointments  Date Time Provider Keerthi [place]   1/25/2019 11:00 AM SCHEDULE, P SV CANCER [facility]   2/1/2019 10:00 AM Tanna Oh MD [facility] IM [facility]   2/1/2019 12:40 PM Dominga Olson MD [facility]   3/27/2019 1:15 PM Tracy Judd DPM [facility]   8/12/2019 1:00 PM Alvarez Daniel,  Resp Spec Evie Jordan Patient Active Problem List:     Chronic combined systolic and diastolic heart failure ([facility] [address] s/[ AICD placed     Chronic kidney disease, stage II (mild)     Morbid obesity (HCC)     Hyperlipidemia     Iron deficiency anemia     Anxiety disorder      DJD (degenerative joint disease)     TANNER variably compliant with BiPAP     CAD (coronary artery disease) s/p stenting of L anterior descending artery 2004     Diabetic retinopathy ([facility] [address]     Ischemic cardiomyopathy     HTN (hypertension)     MGUS (monoclonal gammopathy of unknown significance)     Type 2 diabetes mellitus without complication ([facility])     AICD (automatic cardioverter/defibrillator) present     PAF (paroxysmal atrial fibrillation) ([facility])     Coronary angioplasty status

## 2019-01-23 DIAGNOSIS — D47.2 MGUS (MONOCLONAL GAMMOPATHY OF UNKNOWN SIGNIFICANCE): Primary | ICD-10-CM

## 2019-01-25 ENCOUNTER — HOSPITAL ENCOUNTER (OUTPATIENT)
Facility: MEDICAL CENTER | Age: 64
Discharge: HOME | End: 2019-01-25
Payer: COMMERCIAL

## 2019-01-25 DIAGNOSIS — D47.2 MGUS (MONOCLONAL GAMMOPATHY OF UNKNOWN SIGNIFICANCE): ICD-10-CM

## 2019-01-25 LAB
ABSOLUTE EOS #: 0.3 K/UL (ref 0–0.4)
ABSOLUTE IMMATURE GRANULOCYTE: ABNORMAL K/UL (ref 0–0.3)
ABSOLUTE LYMPH #: 0.9 K/UL (ref 1–4.8)
ABSOLUTE MONO #: 0.5 K/UL (ref 0.2–0.8)
ALBUMIN SERPL-MCNC: 3.8 G/DL (ref 3.5–5.2)
ALBUMIN/GLOBULIN RATIO: ABNORMAL (ref 1–2.5)
ALP BLD-CCNC: 84 U/L (ref 40–129)
ALT SERPL-CCNC: 11 U/L (ref 5–41)
ANION GAP SERPL CALCULATED.3IONS-SCNC: 10 MMOL/L (ref 9–17)
AST SERPL-CCNC: 16 U/L
BASOPHILS # BLD: 0 % (ref 0–2)
BASOPHILS ABSOLUTE: 0 K/UL (ref 0–0.2)
BILIRUB SERPL-MCNC: 0.18 MG/DL (ref 0.3–1.2)
BUN BLDV-MCNC: 17 MG/DL (ref 8–23)
BUN/CREAT BLD: 21 (ref 9–20)
CALCIUM SERPL-MCNC: 8.5 MG/DL (ref 8.6–10.4)
CHLORIDE BLD-SCNC: 103 MMOL/L (ref 98–107)
CO2: 28 MMOL/L (ref 20–31)
CREAT SERPL-MCNC: 0.8 MG/DL (ref 0.7–1.2)
DIFFERENTIAL TYPE: ABNORMAL
EOSINOPHILS RELATIVE PERCENT: 5 % (ref 1–4)
FREE KAPPA/LAMBDA RATIO: 0.93 (ref 0.26–1.65)
GFR AFRICAN AMERICAN: >60 ML/MIN
GFR NON-AFRICAN AMERICAN: >60 ML/MIN
GFR SERPL CREATININE-BSD FRML MDRD: ABNORMAL ML/MIN/{1.73_M2}
GFR SERPL CREATININE-BSD FRML MDRD: ABNORMAL ML/MIN/{1.73_M2}
GLUCOSE BLD-MCNC: 140 MG/DL (ref 70–99)
HCT VFR BLD CALC: 39.8 % (ref 41–53)
HEMOGLOBIN: 12.4 G/DL (ref 13.5–17.5)
IGA: 279 MG/DL (ref 70–400)
IGG: 1149 MG/DL (ref 700–1600)
IGM: 312 MG/DL (ref 40–230)
IMMATURE GRANULOCYTES: ABNORMAL %
KAPPA FREE LIGHT CHAINS QNT: 2.96 MG/DL (ref 0.37–1.94)
LAMBDA FREE LIGHT CHAINS QNT: 3.19 MG/DL (ref 0.57–2.63)
LYMPHOCYTES # BLD: 13 % (ref 24–44)
MCH RBC QN AUTO: 25.9 PG (ref 26–34)
MCHC RBC AUTO-ENTMCNC: 31.3 G/DL (ref 31–37)
MCV RBC AUTO: 82.8 FL (ref 80–100)
MONOCYTES # BLD: 7 % (ref 1–7)
NRBC AUTOMATED: ABNORMAL PER 100 WBC
PDW BLD-RTO: 18.9 % (ref 11.5–14.5)
PLATELET # BLD: 186 K/UL (ref 130–400)
PLATELET ESTIMATE: ABNORMAL
PMV BLD AUTO: 8.6 FL (ref 6–12)
POTASSIUM SERPL-SCNC: 4.1 MMOL/L (ref 3.7–5.3)
RBC # BLD: 4.81 M/UL (ref 4.5–5.9)
RBC # BLD: ABNORMAL 10*6/UL
SEG NEUTROPHILS: 75 % (ref 36–66)
SEGMENTED NEUTROPHILS ABSOLUTE COUNT: 5.2 K/UL (ref 1.8–7.7)
SODIUM BLD-SCNC: 141 MMOL/L (ref 135–144)
TOTAL PROTEIN: 7.3 G/DL (ref 6.4–8.3)
WBC # BLD: 6.9 K/UL (ref 3.5–11)
WBC # BLD: ABNORMAL 10*3/UL

## 2019-01-25 PROCEDURE — 82784 ASSAY IGA/IGD/IGG/IGM EACH: CPT

## 2019-01-25 PROCEDURE — 83883 ASSAY NEPHELOMETRY NOT SPEC: CPT

## 2019-01-25 PROCEDURE — 86334 IMMUNOFIX E-PHORESIS SERUM: CPT

## 2019-01-25 PROCEDURE — 85025 COMPLETE CBC W/AUTO DIFF WBC: CPT

## 2019-01-25 PROCEDURE — 80053 COMPREHEN METABOLIC PANEL: CPT

## 2019-01-25 PROCEDURE — 84155 ASSAY OF PROTEIN SERUM: CPT

## 2019-01-25 PROCEDURE — 36415 COLL VENOUS BLD VENIPUNCTURE: CPT

## 2019-01-25 PROCEDURE — 84165 PROTEIN E-PHORESIS SERUM: CPT

## 2019-01-28 ENCOUNTER — HOSPITAL ENCOUNTER (OUTPATIENT)
Facility: MEDICAL CENTER | Age: 64
End: 2019-01-28
Payer: COMMERCIAL

## 2019-01-28 LAB
ALBUMIN (CALCULATED): 4.3 G/DL (ref 3.2–5.2)
ALBUMIN PERCENT: 62 % (ref 45–65)
ALPHA 1 PERCENT: 2 % (ref 3–6)
ALPHA 2 PERCENT: 10 % (ref 6–13)
ALPHA-1-GLOBULIN: 0.2 G/DL (ref 0.1–0.4)
ALPHA-2-GLOBULIN: 0.7 G/DL (ref 0.5–0.9)
BETA GLOBULIN: 0.7 G/DL (ref 0.5–1.1)
BETA PERCENT: 10 % (ref 11–19)
GAMMA GLOBULIN %: 15 % (ref 9–20)
GAMMA GLOBULIN: 1.1 G/DL (ref 0.5–1.5)
PATHOLOGIST: ABNORMAL
PATHOLOGIST: NORMAL
PROTEIN ELECTROPHORESIS, SERUM: ABNORMAL
SERUM IFX INTERP: NORMAL
TOTAL PROT. SUM,%: 99 % (ref 98–102)
TOTAL PROT. SUM: 7 G/DL (ref 6.3–8.2)
TOTAL PROTEIN: 6.9 G/DL (ref 6.4–8.3)

## 2019-01-31 DIAGNOSIS — I50.42 CHRONIC COMBINED SYSTOLIC AND DIASTOLIC HEART FAILURE (HCC): ICD-10-CM

## 2019-01-31 DIAGNOSIS — I25.10 CORONARY ARTERY DISEASE INVOLVING NATIVE CORONARY ARTERY OF NATIVE HEART WITHOUT ANGINA PECTORIS: ICD-10-CM

## 2019-01-31 RX ORDER — LISINOPRIL 5 MG/1
5 TABLET ORAL DAILY
Qty: 30 TABLET | Refills: 0 | Status: CANCELLED | OUTPATIENT
Start: 2019-01-31

## 2019-02-01 ENCOUNTER — TELEPHONE (OUTPATIENT)
Dept: ONCOLOGY | Age: 64
End: 2019-02-01

## 2019-02-01 ENCOUNTER — OFFICE VISIT (OUTPATIENT)
Dept: INTERNAL MEDICINE | Age: 64
End: 2019-02-01
Payer: COMMERCIAL

## 2019-02-01 ENCOUNTER — OFFICE VISIT (OUTPATIENT)
Dept: ONCOLOGY | Age: 64
End: 2019-02-01
Payer: COMMERCIAL

## 2019-02-01 VITALS
DIASTOLIC BLOOD PRESSURE: 71 MMHG | HEIGHT: 64 IN | HEART RATE: 61 BPM | WEIGHT: 265 LBS | SYSTOLIC BLOOD PRESSURE: 124 MMHG | BODY MASS INDEX: 45.24 KG/M2

## 2019-02-01 VITALS
WEIGHT: 265.8 LBS | HEART RATE: 64 BPM | RESPIRATION RATE: 18 BRPM | SYSTOLIC BLOOD PRESSURE: 98 MMHG | DIASTOLIC BLOOD PRESSURE: 64 MMHG | TEMPERATURE: 97.6 F | BODY MASS INDEX: 45.62 KG/M2

## 2019-02-01 DIAGNOSIS — D47.2 MGUS (MONOCLONAL GAMMOPATHY OF UNKNOWN SIGNIFICANCE): ICD-10-CM

## 2019-02-01 DIAGNOSIS — I48.0 PAF (PAROXYSMAL ATRIAL FIBRILLATION) (HCC): ICD-10-CM

## 2019-02-01 DIAGNOSIS — E66.01 MORBID OBESITY WITH BMI OF 45.0-49.9, ADULT (HCC): ICD-10-CM

## 2019-02-01 DIAGNOSIS — E08.311 DIABETIC RETINOPATHY OF BOTH EYES WITH MACULAR EDEMA ASSOCIATED WITH DIABETES MELLITUS DUE TO UNDERLYING CONDITION, UNSPECIFIED RETINOPATHY SEVERITY (HCC): ICD-10-CM

## 2019-02-01 DIAGNOSIS — E11.8 CONTROLLED TYPE 2 DIABETES MELLITUS WITH COMPLICATION, WITHOUT LONG-TERM CURRENT USE OF INSULIN (HCC): ICD-10-CM

## 2019-02-01 DIAGNOSIS — M1A.00X0 IDIOPATHIC CHRONIC GOUT WITHOUT TOPHUS, UNSPECIFIED SITE: ICD-10-CM

## 2019-02-01 DIAGNOSIS — I50.42 CHRONIC COMBINED SYSTOLIC AND DIASTOLIC HEART FAILURE (HCC): ICD-10-CM

## 2019-02-01 DIAGNOSIS — M17.0 PRIMARY OSTEOARTHRITIS OF BOTH KNEES: ICD-10-CM

## 2019-02-01 DIAGNOSIS — N18.2 CHRONIC KIDNEY DISEASE, STAGE II (MILD): ICD-10-CM

## 2019-02-01 DIAGNOSIS — I50.42 CHRONIC COMBINED SYSTOLIC AND DIASTOLIC HEART FAILURE (HCC): Primary | ICD-10-CM

## 2019-02-01 DIAGNOSIS — I25.10 CORONARY ARTERY DISEASE INVOLVING NATIVE CORONARY ARTERY OF NATIVE HEART WITHOUT ANGINA PECTORIS: ICD-10-CM

## 2019-02-01 DIAGNOSIS — K21.9 GASTROESOPHAGEAL REFLUX DISEASE WITHOUT ESOPHAGITIS: ICD-10-CM

## 2019-02-01 DIAGNOSIS — E11.9 TYPE 2 DIABETES MELLITUS WITHOUT COMPLICATION, WITHOUT LONG-TERM CURRENT USE OF INSULIN (HCC): Primary | ICD-10-CM

## 2019-02-01 DIAGNOSIS — Z13.220 SCREENING FOR HYPERLIPIDEMIA: ICD-10-CM

## 2019-02-01 PROCEDURE — 99214 OFFICE O/P EST MOD 30 MIN: CPT | Performed by: INTERNAL MEDICINE

## 2019-02-01 PROCEDURE — 99211 OFF/OP EST MAY X REQ PHY/QHP: CPT | Performed by: INTERNAL MEDICINE

## 2019-02-01 PROCEDURE — 99213 OFFICE O/P EST LOW 20 MIN: CPT | Performed by: STUDENT IN AN ORGANIZED HEALTH CARE EDUCATION/TRAINING PROGRAM

## 2019-02-01 RX ORDER — ASPIRIN 81 MG/1
TABLET ORAL
Qty: 60 TABLET | Refills: 0 | Status: CANCELLED | OUTPATIENT
Start: 2019-02-01

## 2019-02-01 RX ORDER — UBIQUINOL 100 MG
CAPSULE ORAL
Qty: 100 EACH | Refills: 11 | Status: SHIPPED | OUTPATIENT
Start: 2019-02-01 | End: 2019-05-31 | Stop reason: SDUPTHER

## 2019-02-01 RX ORDER — ACETAMINOPHEN 500 MG
TABLET ORAL
Qty: 60 TABLET | Refills: 0 | Status: ON HOLD | OUTPATIENT
Start: 2019-02-01 | End: 2021-06-25 | Stop reason: HOSPADM

## 2019-02-01 RX ORDER — GLIMEPIRIDE 1 MG/1
1 TABLET ORAL
Qty: 30 TABLET | Refills: 0 | Status: SHIPPED | OUTPATIENT
Start: 2019-02-01 | End: 2019-03-09 | Stop reason: SDUPTHER

## 2019-02-01 RX ORDER — GLUCOSAM/CHON-MSM1/C/MANG/BOSW 500-416.6
TABLET ORAL
Qty: 100 EACH | Refills: 11 | Status: SHIPPED | OUTPATIENT
Start: 2019-02-01 | End: 2019-05-31 | Stop reason: SDUPTHER

## 2019-02-01 RX ORDER — OMEPRAZOLE 20 MG/1
CAPSULE, DELAYED RELEASE ORAL
Qty: 30 CAPSULE | Refills: 0 | Status: SHIPPED | OUTPATIENT
Start: 2019-02-01 | End: 2019-05-16 | Stop reason: SDUPTHER

## 2019-02-01 RX ORDER — ALLOPURINOL 300 MG/1
TABLET ORAL
Qty: 30 TABLET | Refills: 0 | Status: SHIPPED | OUTPATIENT
Start: 2019-02-01 | End: 2019-03-09 | Stop reason: SDUPTHER

## 2019-02-01 RX ORDER — LISINOPRIL 5 MG/1
5 TABLET ORAL DAILY
Qty: 30 TABLET | Refills: 0 | Status: CANCELLED | OUTPATIENT
Start: 2019-02-01

## 2019-02-01 RX ORDER — LANCETS 30 GAUGE
EACH MISCELLANEOUS
Qty: 100 EACH | Refills: 3 | Status: SHIPPED | OUTPATIENT
Start: 2019-02-01 | End: 2020-12-02 | Stop reason: SDUPTHER

## 2019-02-01 RX ORDER — FUROSEMIDE 40 MG/1
TABLET ORAL
Qty: 60 TABLET | Refills: 0 | Status: SHIPPED | OUTPATIENT
Start: 2019-02-01 | End: 2019-03-09 | Stop reason: SDUPTHER

## 2019-02-01 RX ORDER — ATORVASTATIN CALCIUM 40 MG/1
TABLET, FILM COATED ORAL
Qty: 30 TABLET | Refills: 5 | Status: SHIPPED | OUTPATIENT
Start: 2019-02-01 | End: 2019-03-27 | Stop reason: SDUPTHER

## 2019-02-01 RX ORDER — ASPIRIN 81 MG/1
81 TABLET ORAL 2 TIMES DAILY
Qty: 60 TABLET | Refills: 0 | Status: SHIPPED | OUTPATIENT
Start: 2019-02-01 | End: 2019-02-01 | Stop reason: ALTCHOICE

## 2019-02-01 RX ORDER — ISOSORBIDE MONONITRATE 30 MG/1
30 TABLET, EXTENDED RELEASE ORAL DAILY
Qty: 30 TABLET | Refills: 0 | Status: SHIPPED | OUTPATIENT
Start: 2019-02-01 | End: 2019-03-09 | Stop reason: SDUPTHER

## 2019-02-01 RX ORDER — ASPIRIN 81 MG/1
TABLET ORAL
Qty: 60 TABLET | Refills: 0 | Status: SHIPPED | OUTPATIENT
Start: 2019-02-01 | End: 2019-03-09 | Stop reason: SDUPTHER

## 2019-02-01 RX ORDER — AMMONIUM LACTATE 12 G/100G
LOTION TOPICAL
Qty: 222 ML | Refills: 3 | Status: SHIPPED | OUTPATIENT
Start: 2019-02-01 | End: 2020-01-02 | Stop reason: SDUPTHER

## 2019-02-01 RX ORDER — LISINOPRIL 5 MG/1
5 TABLET ORAL DAILY
Qty: 30 TABLET | Refills: 0 | Status: SHIPPED | OUTPATIENT
Start: 2019-02-01 | End: 2019-03-09 | Stop reason: SDUPTHER

## 2019-02-04 ENCOUNTER — TELEPHONE (OUTPATIENT)
Dept: INTERNAL MEDICINE | Age: 64
End: 2019-02-04

## 2019-02-11 ENCOUNTER — HOSPITAL ENCOUNTER (OUTPATIENT)
Age: 64
Setting detail: SPECIMEN
Discharge: HOME | End: 2019-02-11
Payer: COMMERCIAL

## 2019-02-11 DIAGNOSIS — Z13.220 SCREENING FOR HYPERLIPIDEMIA: ICD-10-CM

## 2019-02-11 LAB
CHOLESTEROL, FASTING: 117 MG/DL
CHOLESTEROL/HDL RATIO: 2.9
HDLC SERPL-MCNC: 40 MG/DL
LDL CHOLESTEROL: 47 MG/DL (ref 0–130)
TRIGLYCERIDE, FASTING: 148 MG/DL
VLDLC SERPL CALC-MCNC: ABNORMAL MG/DL (ref 1–30)

## 2019-02-11 PROCEDURE — 36415 COLL VENOUS BLD VENIPUNCTURE: CPT

## 2019-02-11 PROCEDURE — 80061 LIPID PANEL: CPT

## 2019-03-08 DIAGNOSIS — I50.42 CHRONIC COMBINED SYSTOLIC AND DIASTOLIC HEART FAILURE (HCC): ICD-10-CM

## 2019-03-08 DIAGNOSIS — E11.8 CONTROLLED TYPE 2 DIABETES MELLITUS WITH COMPLICATION, WITHOUT LONG-TERM CURRENT USE OF INSULIN (HCC): ICD-10-CM

## 2019-03-08 DIAGNOSIS — I25.10 CORONARY ARTERY DISEASE INVOLVING NATIVE CORONARY ARTERY OF NATIVE HEART WITHOUT ANGINA PECTORIS: ICD-10-CM

## 2019-03-08 DIAGNOSIS — K21.9 GASTROESOPHAGEAL REFLUX DISEASE WITHOUT ESOPHAGITIS: ICD-10-CM

## 2019-03-08 DIAGNOSIS — E11.9 TYPE 2 DIABETES MELLITUS WITHOUT COMPLICATION, WITHOUT LONG-TERM CURRENT USE OF INSULIN (HCC): ICD-10-CM

## 2019-03-08 DIAGNOSIS — M1A.00X0 IDIOPATHIC CHRONIC GOUT WITHOUT TOPHUS, UNSPECIFIED SITE: ICD-10-CM

## 2019-03-08 DIAGNOSIS — M17.0 PRIMARY OSTEOARTHRITIS OF BOTH KNEES: ICD-10-CM

## 2019-03-09 DIAGNOSIS — E11.9 TYPE 2 DIABETES MELLITUS WITHOUT COMPLICATION, WITHOUT LONG-TERM CURRENT USE OF INSULIN (HCC): ICD-10-CM

## 2019-03-09 DIAGNOSIS — E11.8 CONTROLLED TYPE 2 DIABETES MELLITUS WITH COMPLICATION, WITHOUT LONG-TERM CURRENT USE OF INSULIN (HCC): ICD-10-CM

## 2019-03-09 DIAGNOSIS — I25.10 CORONARY ARTERY DISEASE INVOLVING NATIVE CORONARY ARTERY OF NATIVE HEART WITHOUT ANGINA PECTORIS: ICD-10-CM

## 2019-03-09 DIAGNOSIS — M1A.00X0 IDIOPATHIC CHRONIC GOUT WITHOUT TOPHUS, UNSPECIFIED SITE: ICD-10-CM

## 2019-03-09 DIAGNOSIS — I50.42 CHRONIC COMBINED SYSTOLIC AND DIASTOLIC HEART FAILURE (HCC): ICD-10-CM

## 2019-03-11 RX ORDER — LISINOPRIL 5 MG/1
5 TABLET ORAL DAILY
Qty: 30 TABLET | OUTPATIENT
Start: 2019-03-11

## 2019-03-11 RX ORDER — FUROSEMIDE 40 MG/1
TABLET ORAL
Qty: 60 TABLET | Refills: 0 | Status: SHIPPED | OUTPATIENT
Start: 2019-03-11 | End: 2019-03-27 | Stop reason: SDUPTHER

## 2019-03-11 RX ORDER — ASPIRIN 81 MG/1
TABLET ORAL
Qty: 60 TABLET | Refills: 0 | Status: SHIPPED | OUTPATIENT
Start: 2019-03-11 | End: 2019-03-27 | Stop reason: SDUPTHER

## 2019-03-11 RX ORDER — ALLOPURINOL 300 MG/1
TABLET ORAL
Qty: 30 TABLET | Refills: 0 | Status: SHIPPED | OUTPATIENT
Start: 2019-03-11 | End: 2019-03-27 | Stop reason: SDUPTHER

## 2019-03-11 RX ORDER — GLIMEPIRIDE 1 MG/1
TABLET ORAL
Qty: 30 TABLET | Refills: 0 | Status: SHIPPED | OUTPATIENT
Start: 2019-03-11 | End: 2019-05-06 | Stop reason: SDUPTHER

## 2019-03-11 RX ORDER — FUROSEMIDE 40 MG/1
TABLET ORAL
Qty: 60 TABLET | OUTPATIENT
Start: 2019-03-11

## 2019-03-11 RX ORDER — ISOSORBIDE MONONITRATE 30 MG/1
30 TABLET, EXTENDED RELEASE ORAL DAILY
Qty: 30 TABLET | Refills: 0 | Status: SHIPPED | OUTPATIENT
Start: 2019-03-11 | End: 2019-04-10 | Stop reason: SDUPTHER

## 2019-03-11 RX ORDER — ASPIRIN 81 MG/1
81 TABLET ORAL DAILY
Qty: 60 TABLET | OUTPATIENT
Start: 2019-03-11

## 2019-03-11 RX ORDER — ALLOPURINOL 300 MG/1
TABLET ORAL
Qty: 30 TABLET | OUTPATIENT
Start: 2019-03-11

## 2019-03-11 RX ORDER — ACETAMINOPHEN 500 MG
TABLET ORAL
Qty: 60 TABLET | OUTPATIENT
Start: 2019-03-11

## 2019-03-11 RX ORDER — LISINOPRIL 5 MG/1
5 TABLET ORAL DAILY
Qty: 30 TABLET | Refills: 0 | Status: SHIPPED | OUTPATIENT
Start: 2019-03-11 | End: 2019-04-10 | Stop reason: SDUPTHER

## 2019-03-11 RX ORDER — OMEPRAZOLE 20 MG/1
CAPSULE, DELAYED RELEASE ORAL
Qty: 30 CAPSULE | OUTPATIENT
Start: 2019-03-11

## 2019-03-11 RX ORDER — ISOSORBIDE MONONITRATE 30 MG/1
30 TABLET, EXTENDED RELEASE ORAL DAILY
Qty: 30 TABLET | OUTPATIENT
Start: 2019-03-11

## 2019-03-11 RX ORDER — GLIMEPIRIDE 1 MG/1
1 TABLET ORAL
Qty: 30 TABLET | OUTPATIENT
Start: 2019-03-11

## 2019-03-27 ENCOUNTER — OFFICE VISIT (OUTPATIENT)
Dept: PODIATRY | Age: 64
End: 2019-03-27
Payer: COMMERCIAL

## 2019-03-27 VITALS
RESPIRATION RATE: 20 BRPM | HEIGHT: 64 IN | SYSTOLIC BLOOD PRESSURE: 123 MMHG | HEART RATE: 61 BPM | WEIGHT: 263 LBS | BODY MASS INDEX: 44.9 KG/M2 | DIASTOLIC BLOOD PRESSURE: 74 MMHG

## 2019-03-27 DIAGNOSIS — L84 CALLUS: ICD-10-CM

## 2019-03-27 DIAGNOSIS — M79.671 PAIN IN BOTH FEET: ICD-10-CM

## 2019-03-27 DIAGNOSIS — E11.9 TYPE 2 DIABETES MELLITUS WITHOUT COMPLICATION, WITHOUT LONG-TERM CURRENT USE OF INSULIN (HCC): Primary | ICD-10-CM

## 2019-03-27 DIAGNOSIS — L85.3 DRY SKIN: ICD-10-CM

## 2019-03-27 DIAGNOSIS — B35.1 ONYCHOMYCOSIS: ICD-10-CM

## 2019-03-27 DIAGNOSIS — M79.672 PAIN IN BOTH FEET: ICD-10-CM

## 2019-03-27 PROCEDURE — 99213 OFFICE O/P EST LOW 20 MIN: CPT | Performed by: STUDENT IN AN ORGANIZED HEALTH CARE EDUCATION/TRAINING PROGRAM

## 2019-03-27 PROCEDURE — 11055 PARING/CUTG B9 HYPRKER LES 1: CPT | Performed by: STUDENT IN AN ORGANIZED HEALTH CARE EDUCATION/TRAINING PROGRAM

## 2019-03-27 PROCEDURE — 11721 DEBRIDE NAIL 6 OR MORE: CPT | Performed by: STUDENT IN AN ORGANIZED HEALTH CARE EDUCATION/TRAINING PROGRAM

## 2019-03-27 RX ORDER — ALLOPURINOL 300 MG/1
1 TABLET ORAL
COMMUNITY
Start: 2017-07-31 | End: 2019-03-27 | Stop reason: SDUPTHER

## 2019-03-27 RX ORDER — FUROSEMIDE 40 MG/1
1 TABLET ORAL
COMMUNITY
End: 2019-03-27 | Stop reason: SDUPTHER

## 2019-03-27 RX ORDER — LANOLIN ALCOHOL/MO/W.PET/CERES
1 CREAM (GRAM) TOPICAL
COMMUNITY
Start: 2017-07-17 | End: 2020-01-02 | Stop reason: ALTCHOICE

## 2019-03-27 RX ORDER — ATORVASTATIN CALCIUM 40 MG/1
1 TABLET, FILM COATED ORAL
COMMUNITY
Start: 2017-06-01 | End: 2019-03-27 | Stop reason: SDUPTHER

## 2019-03-27 NOTE — PROGRESS NOTES
[address],  [address] [place]  Tel: [phone]   Fax: [phone]    Subjective     CC: Painful toe nails    HPI:  Jesse Smiely is a 75y.o. year old male who presents to clinic today. Patient presents to clinic for diabetic foot exam and for thickened painful nails. Patient denies any numbness, burning, or tingling sensations in his feet today. Reports his diabetes is well controlled and that his PCP  Decreased his diabetes medication. He denies any other pedal complaints today. He follows up with his PCP regularly      Primary care physician is Trisha Lobo MD.    ROS:    Constitutional: Denies nausea, vomiting, fever, chills. Neurologic: Denies numbness, tingling, and burning in the feet. Vascular: Denies symptoms of lower extremity claudication. Skin: Denies open wounds. Otherwise negative except as noted in the HPI.      PMH:  Past Medical History:   Diagnosis Date    Anemia     Anxiety     when he lies flat, no RX    CAD (coronary artery disease)     MI stents x5, follows with cardiology Dr. Chris Flanagan Cardiac defibrillator in place     CHF (congestive heart failure) ([address]     Chronic combined systolic and diastolic heart failure ([address] s/[ AICD placed 9/25/2011    Chronic kidney disease     Diabetic retinopathy ([address] 9/25/2011    Diverticulitis     DJD (degenerative joint disease) 9/25/2011    Edentulous     Gout     HTN (hypertension) 3/30/2012    Hyperlipidemia     Hypertension     Iron deficiency anemia 9/25/2011    Ischemic cardiomyopathy     Morbid obesity ([address] 9/25/2011    Obesity     TANNER variably compliant with BiPAP 9/25/2011    Osteoarthritis     Type II or unspecified type diabetes mellitus without mention of complication, not stated as uncontrolled     Unspecified sleep apnea     uses V-pap       Surgical History:   Past Surgical History:   Procedure Laterality Date    BONE MARROW BIOPSY  2012 2/1/19   Ezra Verma MD   blood glucose test strips (FREESTYLE LITE) strip USE AS DIRECTED TWICE A DAY 2/1/19   Ezra Verma MD   ammonium lactate (LAC-HYDRIN) 12 % lotion Apply topically daily after bathing sparing the space between the toes. 2/1/19   Ezra Verma MD   Lancets MISC Daily 2/1/19   Ezra Verma MD   omeprazole (PRILOSEC) 20 MG delayed release capsule TAKE 1 CAPSULE DAILY 2/1/19   Ezra Verma MD       Objective     Vitals:    03/27/19 1311   BP: 123/74   Pulse: 61   Resp: 20       Lab Results   Component Value Date    LABA1C 6.4 10/26/2018       Physical Exam:  General:  Alert and oriented x3. In no acute distress. Lower Extremity Physical Exam:    Vascular: DP/PT pulses are palpable +2/4, bilateral. CFT is brisk to all digits, Bilateral.  Skin temp is cool to warm proximal to distal, bilateral. No edema or erythema noted. Neuro: Protective sensation intact to 10 /10 pedal sites as tested with [place]-Kim Monofilament 5.07g.  Light touch sensation intact to the level of the digits, Bilateral.     Musculoskeletal: Muscle strength 5/5 for all LE muscle groups, aleshia. No gross deformities noted, bilateral. Pain on palpation of nails 1-5, aleshia. Dermatologic: Nails 1-5, aleshia are thickened, elongated, yellow, and discolored with the presence of subungual debris. Webspaces 1-4 are c/d/i, bilateral. No open lesions or subcutaneous nodules noted, bilateral. Skin is xerotic to the heel, Bilateral.  Hyperkeratotic tissue noted on left hallux IPJ. Imaging: Not indicated    Assessment   Sherley Frankel is a 75 y.o. male with     Diagnosis Orders   1. Type 2 diabetes mellitus without complication, without long-term current use of insulin (HCC)  NE DEBRIDEMENT OF NAILS, 6 OR MORE    HM DIABETES FOOT EXAM   2. Onychomycosis  NE DEBRIDEMENT OF NAILS, 6 OR MORE    HM DIABETES FOOT EXAM   3. Callus     4. Pain in both feet  NE DEBRIDEMENT OF NAILS, 6 OR MORE   5. Dry skin   DIABETES FOOT EXAM        Plan     · Patient seen and evaluated. · Nails 1-5, aleshia debrided without incident with sterile nail nippers. · Debrided HPK down to the layer of the dermis with #15 blade without incident. · Discussed importance of blood sugar monitoring. · Patient will return to clinic in 3 months, or sooner should problem arise. · Please, call the office with any questions or concerns     No orders of the defined types were placed in this encounter.     Orders Placed This Encounter   Procedures     DIABETES FOOT EXAM    TX DEBRIDEMENT OF NAILS, 6 OR MORE       Briggs Levels, DPM   Podiatric Medicine & Surgery   3/27/2019 at 1:40 PM

## 2019-04-10 DIAGNOSIS — M1A.00X0 IDIOPATHIC CHRONIC GOUT WITHOUT TOPHUS, UNSPECIFIED SITE: ICD-10-CM

## 2019-04-10 DIAGNOSIS — I50.42 CHRONIC COMBINED SYSTOLIC AND DIASTOLIC HEART FAILURE (HCC): ICD-10-CM

## 2019-04-10 DIAGNOSIS — E11.8 CONTROLLED TYPE 2 DIABETES MELLITUS WITH COMPLICATION, WITHOUT LONG-TERM CURRENT USE OF INSULIN (HCC): ICD-10-CM

## 2019-04-10 NOTE — TELEPHONE ENCOUNTER
Refill request for all meds pended          Next Visit Date:  Future Appointments   Date Time Provider Keerthi Samanta   5/3/2019 10:35 AM Trisha Lobo MD [facility] IM [facility]   7/3/2019  1:15 PM Julia Lua DPM [facility] Podiatry [facility]   8/12/2019  1:00 PM Ash Perry,  Resp Spec [address] Maintenance   Topic Date Due    Shingles Vaccine (1 of 2) 01/23/2003    A1C test (Diabetic or Prediabetic)  10/26/2019    Diabetic retinal exam  11/12/2019    Potassium monitoring  01/25/2020    Creatinine monitoring  01/25/2020    Lipid screen  02/11/2020    Diabetic foot exam  03/27/2020    Colon cancer screen colonoscopy  04/04/2020    Pneumococcal 65+ years Vaccine (2 of 2 - PPSV23) 06/30/2021    DTaP/Tdap/Td vaccine (2 - Td) 06/30/2026    Flu vaccine  Completed    AAA screen  Completed    Hepatitis C screen  Completed       Hemoglobin A1C (%)   Date Value   10/26/2018 6.4   03/15/2018 6.3   06/01/2017 6.9             ( goal A1C is < 7)   Microalb/Crt.  Ratio (mcg/mg creat)   Date Value   06/01/2017 11     LDL Cholesterol (mg/dL)   Date Value   02/11/2019 47   11/16/2017 45       (goal LDL is <100)   AST (U/L)   Date Value   01/25/2019 16     ALT (U/L)   Date Value   01/25/2019 11     BUN (mg/dL)   Date Value   01/25/2019 17     BP Readings from Last 3 Encounters:   03/27/19 123/74   02/01/19 98/64   02/01/19 124/71          (goal 120/80)    All Future Testing planned in [facility]  Lab Frequency Next Occurrence   CBC with Differential     IgG, IgA, IgM     CBC Auto Differential     Comprehensive Metabolic Panel     IgG     IgA     IgM     [place]/Lambda Quant Free Light Chains Serum     Protein Electrophoresis, Serum                 Patient Active Problem List:     Chronic combined systolic and diastolic heart failure (HCC) s/[ AICD placed     Chronic kidney disease, stage II (mild)     Morbid obesity (HCC)     Hyperlipidemia     Iron deficiency anemia     Anxiety disorder      DJD (degenerative joint disease)     TANNER variably compliant with BiPAP     CAD (coronary artery disease) s/p stenting of L anterior descending artery 2004     Diabetic retinopathy ([address]     Ischemic cardiomyopathy     HTN (hypertension)     MGUS (monoclonal gammopathy of unknown significance)     Type 2 diabetes mellitus without complication (HCC)     AICD (automatic cardioverter/defibrillator) present     PAF (paroxysmal atrial fibrillation) ([address]     Coronary angioplasty status

## 2019-04-13 RX ORDER — ISOSORBIDE MONONITRATE 30 MG/1
30 TABLET, EXTENDED RELEASE ORAL DAILY
Qty: 30 TABLET | Refills: 0 | Status: SHIPPED | OUTPATIENT
Start: 2019-04-13 | End: 2019-05-06 | Stop reason: SDUPTHER

## 2019-04-13 RX ORDER — ALLOPURINOL 300 MG/1
TABLET ORAL
Qty: 30 TABLET | Refills: 0 | Status: SHIPPED | OUTPATIENT
Start: 2019-04-13 | End: 2019-05-06 | Stop reason: SDUPTHER

## 2019-04-13 RX ORDER — LISINOPRIL 5 MG/1
5 TABLET ORAL DAILY
Qty: 30 TABLET | Refills: 0 | Status: SHIPPED | OUTPATIENT
Start: 2019-04-13 | End: 2019-05-06 | Stop reason: SDUPTHER

## 2019-04-13 RX ORDER — FUROSEMIDE 40 MG/1
TABLET ORAL
Qty: 60 TABLET | Refills: 0 | Status: SHIPPED | OUTPATIENT
Start: 2019-04-13 | End: 2019-05-06 | Stop reason: SDUPTHER

## 2019-05-06 DIAGNOSIS — I50.42 CHRONIC COMBINED SYSTOLIC AND DIASTOLIC HEART FAILURE (HCC): ICD-10-CM

## 2019-05-06 DIAGNOSIS — M1A.00X0 IDIOPATHIC CHRONIC GOUT WITHOUT TOPHUS, UNSPECIFIED SITE: ICD-10-CM

## 2019-05-06 DIAGNOSIS — E11.8 CONTROLLED TYPE 2 DIABETES MELLITUS WITH COMPLICATION, WITHOUT LONG-TERM CURRENT USE OF INSULIN (HCC): ICD-10-CM

## 2019-05-06 DIAGNOSIS — E11.9 TYPE 2 DIABETES MELLITUS WITHOUT COMPLICATION, WITHOUT LONG-TERM CURRENT USE OF INSULIN (HCC): ICD-10-CM

## 2019-05-06 DIAGNOSIS — I25.10 CORONARY ARTERY DISEASE INVOLVING NATIVE CORONARY ARTERY OF NATIVE HEART WITHOUT ANGINA PECTORIS: ICD-10-CM

## 2019-05-06 NOTE — TELEPHONE ENCOUNTER
Escribe request for 8 pended medications  . Please escribe if appropriate      Next Visit Date:  5/31/19     Health Maintenance   Topic Date Due    Shingles Vaccine (1 of 2) 01/23/2003    A1C test (Diabetic or Prediabetic)  10/26/2019    Diabetic retinal exam  11/12/2019    Potassium monitoring  01/25/2020    Creatinine monitoring  01/25/2020    Lipid screen  02/11/2020    Diabetic foot exam  03/27/2020    Colon cancer screen colonoscopy  04/04/2020    Pneumococcal 65+ years Vaccine (2 of 2 - PPSV23) 06/30/2021    DTaP/Tdap/Td vaccine (2 - Td) 06/30/2026    Flu vaccine  Completed    AAA screen  Completed    Hepatitis C screen  Completed       Hemoglobin A1C (%)   Date Value   10/26/2018 6.4   03/15/2018 6.3   06/01/2017 6.9             ( goal A1C is < 7)   Microalb/Crt.  Ratio (mcg/mg creat)   Date Value   06/01/2017 11     LDL Cholesterol (mg/dL)   Date Value   02/11/2019 47       (goal LDL is <100)   AST (U/L)   Date Value   01/25/2019 16     ALT (U/L)   Date Value   01/25/2019 11     BUN (mg/dL)   Date Value   01/25/2019 17     BP Readings from Last 3 Encounters:   03/27/19 123/74   02/01/19 98/64   02/01/19 124/71          (goal 120/80)          Patient Active Problem List:     Chronic combined systolic and diastolic heart failure (HCC) s/[ AICD placed     Chronic kidney disease, stage II (mild)     Morbid obesity (HCC)     Hyperlipidemia     Iron deficiency anemia     Anxiety disorder      DJD (degenerative joint disease)     TANNER variably compliant with BiPAP     CAD (coronary artery disease) s/p stenting of L anterior descending artery 2004     Diabetic retinopathy ([address]     Ischemic cardiomyopathy     HTN (hypertension)     MGUS (monoclonal gammopathy of unknown significance)     Type 2 diabetes mellitus without complication (HCC)     AICD (automatic cardioverter/defibrillator) present     PAF (paroxysmal atrial fibrillation) ([facility]     Coronary angioplasty status

## 2019-05-09 RX ORDER — ALLOPURINOL 300 MG/1
TABLET ORAL
Qty: 30 TABLET | Refills: 0 | Status: SHIPPED | OUTPATIENT
Start: 2019-05-09 | End: 2019-05-31 | Stop reason: SDUPTHER

## 2019-05-09 RX ORDER — GLIMEPIRIDE 1 MG/1
TABLET ORAL
Qty: 30 TABLET | Refills: 0 | Status: SHIPPED | OUTPATIENT
Start: 2019-05-09 | End: 2019-05-31 | Stop reason: SDUPTHER

## 2019-05-09 RX ORDER — FUROSEMIDE 40 MG/1
TABLET ORAL
Qty: 60 TABLET | Refills: 0 | Status: SHIPPED | OUTPATIENT
Start: 2019-05-09 | End: 2019-05-31 | Stop reason: SDUPTHER

## 2019-05-09 RX ORDER — LISINOPRIL 5 MG/1
5 TABLET ORAL DAILY
Qty: 30 TABLET | Refills: 0 | Status: SHIPPED | OUTPATIENT
Start: 2019-05-09 | End: 2019-05-31 | Stop reason: SDUPTHER

## 2019-05-09 RX ORDER — ASPIRIN 81 MG/1
TABLET, DELAYED RELEASE ORAL
Qty: 60 TABLET | Refills: 0 | Status: SHIPPED | OUTPATIENT
Start: 2019-05-09 | End: 2019-05-31 | Stop reason: SDUPTHER

## 2019-05-09 RX ORDER — ISOSORBIDE MONONITRATE 30 MG/1
30 TABLET, EXTENDED RELEASE ORAL DAILY
Qty: 30 TABLET | Refills: 0 | Status: SHIPPED | OUTPATIENT
Start: 2019-05-09 | End: 2019-05-31 | Stop reason: SDUPTHER

## 2019-05-16 DIAGNOSIS — K21.9 GASTROESOPHAGEAL REFLUX DISEASE WITHOUT ESOPHAGITIS: ICD-10-CM

## 2019-05-16 NOTE — TELEPHONE ENCOUNTER
Escribe request for omeprazole    Next Visit Date:  Future Appointments   Date Time Provider Keerthi Laceyi   5/31/2019 11:10 AM López Gamboa MD [facility] IM [facility]   7/3/2019  1:15 PM Aloha Kehr, DPM [facility]   8/12/2019  1:00 PM Rebecca Stiles, DO Resp Spec [address] Maintenance   Topic Date Due    Shingles Vaccine (1 of 2) 01/23/2003    A1C test (Diabetic or Prediabetic)  10/26/2019    Diabetic retinal exam  11/12/2019    Potassium monitoring  01/25/2020    Creatinine monitoring  01/25/2020    Lipid screen  02/11/2020    Diabetic foot exam  03/27/2020    Colon cancer screen colonoscopy  04/04/2020    Pneumococcal 65+ years Vaccine (2 of 2 - PPSV23) 06/30/2021    DTaP/Tdap/Td vaccine (2 - Td) 06/30/2026    Flu vaccine  Completed    AAA screen  Completed    Hepatitis C screen  Completed             (applicable per patient's age: Cancer Screenings, Depression Screening, Fall Risk Screening, Immunizations)    Hemoglobin A1C (%)   Date Value   10/26/2018 6.4   03/15/2018 6.3   06/01/2017 6.9     Microalb/Crt.  Ratio (mcg/mg creat)   Date Value   06/01/2017 11     LDL Cholesterol (mg/dL)   Date Value   02/11/2019 47     AST (U/L)   Date Value   01/25/2019 16     ALT (U/L)   Date Value   01/25/2019 11     BUN (mg/dL)   Date Value   01/25/2019 17      (goal A1C is < 7)   (goal LDL is <100) need 30-50% reduction from baseline     BP Readings from Last 3 Encounters:   03/27/19 123/74   02/01/19 98/64   02/01/19 124/71    (goal /80)      All Future Testing planned in [facility]:  Lab Frequency Next Occurrence   CBC with Differential     IgG, IgA, IgM     CBC Auto Differential     Comprehensive Metabolic Panel     IgG     IgA     IgM     [place]/Lambda Quant Free Light Chains Serum     Protein Electrophoresis, Serum              Patient Active Problem List:     Chronic combined systolic and diastolic heart failure (HCC) s/[ AICD placed     Chronic kidney disease, stage II (mild) Morbid obesity ([address]     Hyperlipidemia     Iron deficiency anemia     Anxiety disorder      DJD (degenerative joint disease)     TANNER variably compliant with BiPAP     CAD (coronary artery disease) s/p stenting of L anterior descending artery 2004     Diabetic retinopathy ([address]     Ischemic cardiomyopathy     HTN (hypertension)     MGUS (monoclonal gammopathy of unknown significance)     Type 2 diabetes mellitus without complication (HCC)     AICD (automatic cardioverter/defibrillator) present     PAF (paroxysmal atrial fibrillation) ([address]     Coronary angioplasty status

## 2019-05-19 RX ORDER — OMEPRAZOLE 20 MG/1
CAPSULE, DELAYED RELEASE ORAL
Qty: 30 CAPSULE | Refills: 0 | Status: SHIPPED | OUTPATIENT
Start: 2019-05-19 | End: 2019-07-01 | Stop reason: SDUPTHER

## 2019-05-31 ENCOUNTER — OFFICE VISIT (OUTPATIENT)
Dept: INTERNAL MEDICINE | Age: 64
End: 2019-05-31
Payer: COMMERCIAL

## 2019-05-31 VITALS
WEIGHT: 253 LBS | BODY MASS INDEX: 43.19 KG/M2 | SYSTOLIC BLOOD PRESSURE: 88 MMHG | DIASTOLIC BLOOD PRESSURE: 54 MMHG | HEIGHT: 64 IN | HEART RATE: 83 BPM

## 2019-05-31 DIAGNOSIS — E11.9 TYPE 2 DIABETES MELLITUS WITHOUT COMPLICATION, WITHOUT LONG-TERM CURRENT USE OF INSULIN (HCC): ICD-10-CM

## 2019-05-31 DIAGNOSIS — M1A.00X0 IDIOPATHIC CHRONIC GOUT WITHOUT TOPHUS, UNSPECIFIED SITE: ICD-10-CM

## 2019-05-31 DIAGNOSIS — I50.42 CHRONIC COMBINED SYSTOLIC AND DIASTOLIC HEART FAILURE (HCC): ICD-10-CM

## 2019-05-31 DIAGNOSIS — E11.8 CONTROLLED TYPE 2 DIABETES MELLITUS WITH COMPLICATION, WITHOUT LONG-TERM CURRENT USE OF INSULIN (HCC): ICD-10-CM

## 2019-05-31 DIAGNOSIS — I25.10 CORONARY ARTERY DISEASE INVOLVING NATIVE CORONARY ARTERY OF NATIVE HEART WITHOUT ANGINA PECTORIS: ICD-10-CM

## 2019-05-31 DIAGNOSIS — E08.01 DIABETES MELLITUS DUE TO UNDERLYING CONDITION WITH HYPEROSMOLARITY AND COMA, WITHOUT LONG-TERM CURRENT USE OF INSULIN (HCC): Primary | ICD-10-CM

## 2019-05-31 LAB — HBA1C MFR BLD: 5.9 %

## 2019-05-31 PROCEDURE — 99211 OFF/OP EST MAY X REQ PHY/QHP: CPT | Performed by: INTERNAL MEDICINE

## 2019-05-31 PROCEDURE — 99213 OFFICE O/P EST LOW 20 MIN: CPT | Performed by: INTERNAL MEDICINE

## 2019-05-31 PROCEDURE — 83036 HEMOGLOBIN GLYCOSYLATED A1C: CPT | Performed by: INTERNAL MEDICINE

## 2019-05-31 RX ORDER — ASPIRIN 81 MG/1
TABLET ORAL
Qty: 60 TABLET | Refills: 3 | Status: SHIPPED | OUTPATIENT
Start: 2019-05-31 | End: 2019-11-26 | Stop reason: SDUPTHER

## 2019-05-31 RX ORDER — METOPROLOL SUCCINATE 25 MG/1
25 TABLET, EXTENDED RELEASE ORAL DAILY
Qty: 30 TABLET | Refills: 3 | Status: SHIPPED | OUTPATIENT
Start: 2019-05-31 | End: 2020-01-02 | Stop reason: SDUPTHER

## 2019-05-31 RX ORDER — METOPROLOL SUCCINATE 25 MG/1
25 TABLET, EXTENDED RELEASE ORAL DAILY
COMMUNITY
End: 2019-05-31 | Stop reason: SDUPTHER

## 2019-05-31 RX ORDER — ATORVASTATIN CALCIUM 40 MG/1
40 TABLET, FILM COATED ORAL DAILY
COMMUNITY
End: 2019-05-31 | Stop reason: SDUPTHER

## 2019-05-31 RX ORDER — BLOOD PRESSURE TEST KIT
1 KIT MISCELLANEOUS DAILY
Qty: 1 KIT | Refills: 0 | Status: SHIPPED | OUTPATIENT
Start: 2019-05-31 | End: 2020-01-02

## 2019-05-31 RX ORDER — ISOSORBIDE MONONITRATE 30 MG/1
30 TABLET, EXTENDED RELEASE ORAL DAILY
Qty: 30 TABLET | Refills: 0 | Status: SHIPPED | OUTPATIENT
Start: 2019-05-31 | End: 2019-07-01 | Stop reason: SDUPTHER

## 2019-05-31 RX ORDER — GLIMEPIRIDE 1 MG/1
TABLET ORAL
Qty: 30 TABLET | Refills: 3 | Status: SHIPPED | OUTPATIENT
Start: 2019-05-31 | End: 2019-11-11 | Stop reason: SDUPTHER

## 2019-05-31 RX ORDER — ALLOPURINOL 300 MG/1
TABLET ORAL
Qty: 30 TABLET | Refills: 3 | Status: SHIPPED | OUTPATIENT
Start: 2019-05-31 | End: 2019-11-21 | Stop reason: SDUPTHER

## 2019-05-31 RX ORDER — ATORVASTATIN CALCIUM 40 MG/1
40 TABLET, FILM COATED ORAL NIGHTLY
Qty: 30 TABLET | Refills: 4 | Status: SHIPPED | OUTPATIENT
Start: 2019-05-31 | End: 2019-11-12 | Stop reason: SDUPTHER

## 2019-05-31 RX ORDER — FUROSEMIDE 40 MG/1
TABLET ORAL
Qty: 60 TABLET | Refills: 3 | Status: SHIPPED | OUTPATIENT
Start: 2019-05-31 | End: 2019-11-26 | Stop reason: SDUPTHER

## 2019-05-31 RX ORDER — LISINOPRIL 5 MG/1
5 TABLET ORAL DAILY
Qty: 30 TABLET | Refills: 0 | Status: SHIPPED | OUTPATIENT
Start: 2019-05-31 | End: 2019-07-01 | Stop reason: SDUPTHER

## 2019-05-31 RX ORDER — UBIQUINOL 100 MG
CAPSULE ORAL
Qty: 100 EACH | Refills: 11 | Status: SHIPPED | OUTPATIENT
Start: 2019-05-31

## 2019-05-31 RX ORDER — GLUCOSAM/CHON-MSM1/C/MANG/BOSW 500-416.6
TABLET ORAL
Qty: 100 EACH | Refills: 11 | Status: SHIPPED | OUTPATIENT
Start: 2019-05-31 | End: 2020-01-02 | Stop reason: SDUPTHER

## 2019-05-31 ASSESSMENT — PATIENT HEALTH QUESTIONNAIRE - PHQ9
2. FEELING DOWN, DEPRESSED OR HOPELESS: 0
1. LITTLE INTEREST OR PLEASURE IN DOING THINGS: 0
SUM OF ALL RESPONSES TO PHQ9 QUESTIONS 1 & 2: 0
SUM OF ALL RESPONSES TO PHQ QUESTIONS 1-9: 0
SUM OF ALL RESPONSES TO PHQ QUESTIONS 1-9: 0

## 2019-05-31 ASSESSMENT — ENCOUNTER SYMPTOMS
WHEEZING: 0
BACK PAIN: 0
CHEST TIGHTNESS: 0
NAUSEA: 0
ABDOMINAL PAIN: 0
VOMITING: 0
TROUBLE SWALLOWING: 0
SHORTNESS OF BREATH: 0
VOICE CHANGE: 0

## 2019-05-31 NOTE — PATIENT INSTRUCTIONS
Medications e-scribe to pharmacy of pt's choice. Follow-up appointment scheduled for    08/27/19, AVS given to patient.         tv

## 2019-05-31 NOTE — PROGRESS NOTES
(1 of 2) 01/23/2003    A1C test (Diabetic or Prediabetic)  10/26/2019    Diabetic retinal exam  11/12/2019    Potassium monitoring  01/25/2020    Creatinine monitoring  01/25/2020    Lipid screen  02/11/2020    Diabetic foot exam  03/27/2020    Colon cancer screen colonoscopy  04/04/2020    Pneumococcal 65+ years Vaccine (2 of 2 - PPSV23) 06/30/2021    DTaP/Tdap/Td vaccine (2 - Td) 06/30/2026    Flu vaccine  Completed    AAA screen  Completed    Hepatitis C screen  Completed

## 2019-05-31 NOTE — PROGRESS NOTES
[facility]    Progress Note    Date of patient's visit: 5/31/2019  Date ofBirth: 1955  Patient's Name:  Bang Shine    Patient Care Team:  Gabby Qureshi MD as PCP - General (Internal Medicine)  Kavon Kwok MD as PCP - [facility]  Monica Reynoso MD as Consulting Physician (Hematology and Oncology)  Lillian Zhang MD as Consulting Physician (Cardiology)  Preeti Cid DO as Consulting Physician (Pulmonology)  Erik Mccartney MD as Consulting Physician (Ophthalmology)    REASON FOR VISIT: Routine outpatient follow up diabetes, hypertension and cardio myopathy    HISTORY OFPRESENT ILLNESS:    History was obtained from the patient. Bang Shine is a 75 y.o. is here for a  follow up diabetes, hypertension and cardio myopathy ejection fraction 20-25%    Patient is taking Toprol, Imdur and lisinopril for his hypertension and ischemic cardiomyopathy. Her pressure is 88/54 this morning. Patient denies any shortness of breath, lightheadedness, dizziness or chest pain. Patient has AICD placed and had her last check in February  He is on Eliquis twice a day for A. fib    For his diabetes he takes metformin 1 g twice a day and glipizide 1 mg daily. His blood sugars at home are within range. His A1c is 5.9  He had appointment with podiatry in March. He is going to have his diabetic eye check in November    Next colonoscopy in 2020    Follows with Dr. Elaina Benitez for [facility]          REVIEW OF SYSTEMS:    Review of Systems   Constitutional: Negative for chills and fatigue. HENT: Negative for trouble swallowing and voice change. Eyes: Negative for visual disturbance. Respiratory: Negative for chest tightness, shortness of breath and wheezing. Cardiovascular: Negative for chest pain, palpitations and leg swelling. Gastrointestinal: Negative for abdominal pain, nausea and vomiting. Genitourinary: Negative for dysuria. melatonin 3 MG TABS tablet Take 1 tablet by mouth      acetaminophen (ACETAMINOPHEN EXTRA STRENGTH) 500 MG tablet TAKE 1 TABLET BY MOUTH 2 TIMES A DAY AS NEEDED FOR PAIN 60 tablet 0    TRUEPLUS LANCETS 33G MISC TEST AS DIRECTED 100 each 11    Alcohol Swabs (ALCOHOL PREP) 70 % PADS USE AS DIRECTED 100 each 11    blood glucose test strips (FREESTYLE LITE) strip USE AS DIRECTED TWICE A  each 11    ammonium lactate (LAC-HYDRIN) 12 % lotion Apply topically daily after bathing sparing the space between the toes. 222 mL 3    Lancets MISC Daily 100 each 3    omeprazole (PRILOSEC) 20 MG delayed release capsule TAKE 1 CAPSULE DAILY 30 capsule 0    metFORMIN (GLUCOPHAGE) 1000 MG tablet TAKE 1 TABLET BY MOUTH 2 TIMES A DAY WITH MEALS 60 tablet 0     No current facility-administered medications on file prior to visit. SOCIAL HISTORY    Reviewed and no change from previous record. Poppy Levin  reports that he has quit smoking. His smoking use included cigarettes. He has never used smokeless tobacco.    FAMILY HISTORY:    Reviewed and No change from previous visit      PHYSICAL EXAM:      Vitals:    05/31/19 1108   BP: (!) 88/54   Pulse: 83     Physical Exam   Constitutional: He is oriented to person, place, and time. No distress. HENT:   Mouth/Throat: Oropharynx is clear and moist.   Eyes: Conjunctivae are normal.   Neck: No JVD present. Cardiovascular: Normal rate, regular rhythm and normal heart sounds. Exam reveals no friction rub. No murmur heard. Pulmonary/Chest: Effort normal and breath sounds normal. No respiratory distress. He has no wheezes. He has no rales. Abdominal: Soft. He exhibits no distension. There is no tenderness. Musculoskeletal: He exhibits no edema. Neurological: He is alert and oriented to person, place, and time. No cranial nerve deficit. He exhibits normal muscle tone.          LABORATORY FINDINGS:    CBC:  Lab Results   Component Value Date    WBC 6.9 01/25/2019    HGB 12.4 01/25/2019     01/25/2019     12/08/2011     BMP:    Lab Results   Component Value Date     01/25/2019    K 4.1 01/25/2019     01/25/2019    CO2 28 01/25/2019    BUN 17 01/25/2019    CREATININE 0.80 01/25/2019    GLUCOSE 140 01/25/2019    GLUCOSE 123 03/19/2012     HEMOGLOBIN A1C:   Lab Results   Component Value Date    LABA1C 6.4 10/26/2018     MICROALBUMIN URINE:   Lab Results   Component Value Date    MICROALBUR 27 06/01/2017     FASTINGLIPID PANEL:  Lab Results   Component Value Date    CHOL 106 11/16/2017    HDL 40 (L) 02/11/2019    TRIG 135 11/16/2017     LIVER [email or web address]   THYROID FUNCTION:   Lab Results   Component Value Date    TSH 2.43 09/13/2017      URINE ANALYSIS: No results found for: [id number] Shanita:      Health Maintenance Due   Topic Date Due    Shingles Vaccine (1 of 2) 01/23/2003       ASSESSMENT AND PLAN:    1. Controlled type 2 diabetes mellitus with complication, without long-term current use of insulin (HCC)    - Alcohol Swabs (ALCOHOL PREP) 70 % PADS; USE AS DIRECTED  Dispense: 100 each; Refill: 11  - blood glucose test strips (FREESTYLE LITE) strip; USE AS DIRECTED TWICE A DAY  Dispense: 100 each; Refill: 11  - metFORMIN (GLUCOPHAGE) 1000 MG tablet; TAKE 1 TABLET BY MOUTH 2 TIMES A DAY WITH MEALS  Dispense: 60 tablet; Refill: 5  glimepiride (AMARYL) 1 MG tablet; TAKE 1 TABLET BY MOUTH EVERY MORNING BEFORE BREAKFAST  Dispense: 30 tablet; Refill: 3  - TRUEPLUS LANCETS 33G MISC; TEST AS DIRECTED  Dispense: 100 each; Refill: 11    - POCT glycosylated hemoglobin (Hb A1C)    2. Chronic combined systolic and diastolic heart failure (HCC)    - furosemide (LASIX) 40 MG tablet; TAKE 1 TABLET BY MOUTH 2 TIMES A DAY  Dispense: 60 tablet; Refill: 3  - isosorbide mononitrate (IMDUR) 30 MG extended release tablet; Take 1 tablet by mouth daily  Dispense: 30 tablet;  Refill: 0  - lisinopril (PRINIVIL;ZESTRIL) 5 MG tablet; Take 1 tablet by mouth daily  Dispense: 30 tablet; Refill: 0    3. Idiopathic chronic gout without tophus, unspecified site    - allopurinol (ZYLOPRIM) 300 MG tablet; TAKE 1 TABLET DAILY  Dispense: 30 tablet; Refill: 3    4. Coronary artery disease involving native coronary artery of native heart without angina pectoris    - aspirin (HM ASPIRIN EC LOW DOSE) 81 MG EC tablet; TAKE 1 TABLET TWICE A DAY  Dispense: 60 tablet; Refill: 3      FOLLOW UP:       1. Paual Canales receivedcounseling on the following healthy behaviors: nutrition, exercise and medication adherence  2. Patient given educational materials - see patient instructions  3. Discussed use, benefit, and side effects of prescribed medications. Barriers to medication compliance addressed. All patient questions answered. Pt voiced understanding. 4.  Reviewed prior labs and healthmaintenance  5. Continue current medications, diet and exercise.          Marko Dickinson MD  PGY-3 Internal Medicine Resident  [facility]  5/31/2019  11:32 AM

## 2019-06-07 NOTE — PROGRESS NOTES
Attending Physician Statement GE  I have discussed the care of Rao Cameron, including pertinent history and exam findings with the resident. I have reviewed the key elements of all parts of the encounter with the resident.          Gerard Marin MD

## 2019-07-01 DIAGNOSIS — K21.9 GASTROESOPHAGEAL REFLUX DISEASE WITHOUT ESOPHAGITIS: ICD-10-CM

## 2019-07-01 DIAGNOSIS — I48.0 PAF (PAROXYSMAL ATRIAL FIBRILLATION) (HCC): ICD-10-CM

## 2019-07-01 DIAGNOSIS — I50.42 CHRONIC COMBINED SYSTOLIC AND DIASTOLIC HEART FAILURE (HCC): ICD-10-CM

## 2019-07-01 NOTE — TELEPHONE ENCOUNTER
Request for Omeprazole. Next Visit Date:  Future Appointments   Date Time Provider Keerthi [place]   7/17/2019  2:15 PM Enrique Butcher DPM [facility] Podiatry [facility]   8/12/2019  1:00 PM Andrea Kern DO Resp Spec [address] Maintenance   Topic Date Due    Shingles Vaccine (1 of 2) 01/23/2003    Annual Wellness Visit (AWV)  01/23/2016    Flu vaccine (1) 09/01/2019    Diabetic retinal exam  11/12/2019    Potassium monitoring  01/25/2020    Creatinine monitoring  01/25/2020    Lipid screen  02/11/2020    Diabetic foot exam  03/27/2020    Colon cancer screen colonoscopy  04/04/2020    A1C test (Diabetic or Prediabetic)  05/31/2020    Pneumococcal 65+ years Vaccine (2 of 2 - PPSV23) 06/30/2021    DTaP/Tdap/Td vaccine (2 - Td) 06/30/2026    AAA screen  Completed    Hepatitis C screen  Completed       Hemoglobin A1C (%)   Date Value   05/31/2019 5.9   10/26/2018 6.4   03/15/2018 6.3             ( goal A1C is < 7)   Microalb/Crt.  Ratio (mcg/mg creat)   Date Value   06/01/2017 11     LDL Cholesterol (mg/dL)   Date Value   02/11/2019 47       (goal LDL is <100)   AST (U/L)   Date Value   01/25/2019 16     ALT (U/L)   Date Value   01/25/2019 11     BUN (mg/dL)   Date Value   01/25/2019 17     BP Readings from Last 3 Encounters:   05/31/19 (!) 88/54   03/27/19 123/74   02/01/19 98/64          (goal 120/80)    All Future Testing planned in [facility]  Lab Frequency Next Occurrence   CBC with Differential     IgG, IgA, IgM     CBC Auto Differential     Comprehensive Metabolic Panel     IgG     IgA     IgM     [place]/Lambda Quant Free Light Chains Serum     Protein Electrophoresis, Serum           Patient Active Problem List:     Chronic combined systolic and diastolic heart failure (HCC) s/[ AICD placed     Chronic kidney disease, stage II (mild)     Morbid obesity (HCC)     Hyperlipidemia     Iron deficiency anemia     Anxiety disorder      DJD (degenerative joint disease)     TANNER variably compliant with BiPAP     CAD (coronary artery disease) s/p stenting of L anterior descending artery 2004     Diabetic retinopathy ([address]     Ischemic cardiomyopathy     HTN (hypertension)     MGUS (monoclonal gammopathy of unknown significance)     Type 2 diabetes mellitus without complication (HCC)     AICD (automatic cardioverter/defibrillator) present     PAF (paroxysmal atrial fibrillation) ([address]     Coronary angioplasty status

## 2019-07-01 NOTE — TELEPHONE ENCOUNTER
compliant with BiPAP     CAD (coronary artery disease) s/p stenting of L anterior descending artery 2004     Diabetic retinopathy ([facility] [address]     Ischemic cardiomyopathy     HTN (hypertension)     MGUS (monoclonal gammopathy of unknown significance)     Type 2 diabetes mellitus without complication (HCC)     AICD (automatic cardioverter/defibrillator) present     PAF (paroxysmal atrial fibrillation) ([address]     Coronary angioplasty status

## 2019-07-01 NOTE — TELEPHONE ENCOUNTER
Request for Eliquis. Next Visit Date:  Future Appointments   Date Time Provider Keerthi [place]   7/17/2019  2:15 PM Lord Kendra DPM [facility] Podiatry [facility]   8/12/2019  1:00 PM Edy Swanson DO Resp Spec [address] Maintenance   Topic Date Due    Shingles Vaccine (1 of 2) 01/23/2003    Annual Wellness Visit (AWV)  01/23/2016    Flu vaccine (1) 09/01/2019    Diabetic retinal exam  11/12/2019    Potassium monitoring  01/25/2020    Creatinine monitoring  01/25/2020    Lipid screen  02/11/2020    Diabetic foot exam  03/27/2020    Colon cancer screen colonoscopy  04/04/2020    A1C test (Diabetic or Prediabetic)  05/31/2020    Pneumococcal 65+ years Vaccine (2 of 2 - PPSV23) 06/30/2021    DTaP/Tdap/Td vaccine (2 - Td) 06/30/2026    AAA screen  Completed    Hepatitis C screen  Completed       Hemoglobin A1C (%)   Date Value   05/31/2019 5.9   10/26/2018 6.4   03/15/2018 6.3             ( goal A1C is < 7)   Microalb/Crt.  Ratio (mcg/mg creat)   Date Value   06/01/2017 11     LDL Cholesterol (mg/dL)   Date Value   02/11/2019 47       (goal LDL is <100)   AST (U/L)   Date Value   01/25/2019 16     ALT (U/L)   Date Value   01/25/2019 11     BUN (mg/dL)   Date Value   01/25/2019 17     BP Readings from Last 3 Encounters:   05/31/19 (!) 88/54   03/27/19 123/74   02/01/19 98/64          (goal 120/80)    All Future Testing planned in [facility]  Lab Frequency Next Occurrence   CBC with Differential     IgG, IgA, IgM     CBC Auto Differential     Comprehensive Metabolic Panel     IgG     IgA     IgM     [place]/Lambda Quant Free Light Chains Serum     Protein Electrophoresis, Serum           Patient Active Problem List:     Chronic combined systolic and diastolic heart failure (HCC) s/[ AICD placed     Chronic kidney disease, stage II (mild)     Morbid obesity (HCC)     Hyperlipidemia     Iron deficiency anemia     Anxiety disorder      DJD (degenerative joint disease)     TANNER variably compliant with BiPAP     CAD (coronary artery disease) s/p stenting of L anterior descending artery 2004     Diabetic retinopathy ([address]     Ischemic cardiomyopathy     HTN (hypertension)     MGUS (monoclonal gammopathy of unknown significance)     Type 2 diabetes mellitus without complication (HCC)     AICD (automatic cardioverter/defibrillator) present     PAF (paroxysmal atrial fibrillation) ([address]     Coronary angioplasty status

## 2019-07-05 RX ORDER — OMEPRAZOLE 20 MG/1
CAPSULE, DELAYED RELEASE ORAL
Qty: 30 CAPSULE | Refills: 0 | Status: SHIPPED | OUTPATIENT
Start: 2019-07-05 | End: 2019-09-04 | Stop reason: SDUPTHER

## 2019-07-05 RX ORDER — APIXABAN 5 MG/1
TABLET, FILM COATED ORAL
Qty: 60 TABLET | Refills: 3 | Status: SHIPPED | OUTPATIENT
Start: 2019-07-05 | End: 2019-11-11 | Stop reason: SDUPTHER

## 2019-07-05 RX ORDER — ISOSORBIDE MONONITRATE 30 MG/1
30 TABLET, EXTENDED RELEASE ORAL DAILY
Qty: 30 TABLET | Refills: 0 | Status: SHIPPED | OUTPATIENT
Start: 2019-07-05 | End: 2019-08-02 | Stop reason: SDUPTHER

## 2019-07-05 RX ORDER — LISINOPRIL 5 MG/1
5 TABLET ORAL DAILY
Qty: 30 TABLET | Refills: 0 | Status: SHIPPED | OUTPATIENT
Start: 2019-07-05 | End: 2019-08-02 | Stop reason: SDUPTHER

## 2019-07-17 ENCOUNTER — OFFICE VISIT (OUTPATIENT)
Dept: PODIATRY | Age: 64
End: 2019-07-17
Payer: COMMERCIAL

## 2019-07-17 VITALS
BODY MASS INDEX: 44.22 KG/M2 | WEIGHT: 259 LBS | HEIGHT: 64 IN | HEART RATE: 70 BPM | DIASTOLIC BLOOD PRESSURE: 69 MMHG | SYSTOLIC BLOOD PRESSURE: 107 MMHG

## 2019-07-17 DIAGNOSIS — B35.1 ONYCHOMYCOSIS: Primary | ICD-10-CM

## 2019-07-17 DIAGNOSIS — E11.9 TYPE 2 DIABETES MELLITUS WITHOUT COMPLICATION, WITHOUT LONG-TERM CURRENT USE OF INSULIN (HCC): ICD-10-CM

## 2019-07-17 DIAGNOSIS — L84 CALLUS OF FOOT: ICD-10-CM

## 2019-07-17 PROCEDURE — 11721 DEBRIDE NAIL 6 OR MORE: CPT | Performed by: STUDENT IN AN ORGANIZED HEALTH CARE EDUCATION/TRAINING PROGRAM

## 2019-07-17 PROCEDURE — 99212 OFFICE O/P EST SF 10 MIN: CPT | Performed by: STUDENT IN AN ORGANIZED HEALTH CARE EDUCATION/TRAINING PROGRAM

## 2019-07-17 PROCEDURE — 11056 PARNG/CUTG B9 HYPRKR LES 2-4: CPT | Performed by: STUDENT IN AN ORGANIZED HEALTH CARE EDUCATION/TRAINING PROGRAM

## 2019-07-17 NOTE — PROGRESS NOTES
Patient instructed to remove shoes and socks, instructed to sit in exam chair. Current PCP name is kayla and date of last visit 5/31/19. Do you have a follow up visit scheduled?   no       Diabetic visit information    Blood pressure (Control is BP <140/90)  BP Readings from Last 3 Encounters:   05/31/19 (!) 88/54   03/27/19 123/74   02/01/19 98/64       BP taken with correct size cuff? - Yes   Repeated if > 140/90 Yes      Tobacco use:  Patient  reports that he has quit smoking. His smoking use included cigarettes. He has never used smokeless tobacco.  If Smoker - Cessation materials given? - Yes       Diabetic Health Maintenance Items due  There are no preventive care reminders to display for this patient. Diabetic retinal exam done in last year? - Yes   If No: remind patient that it is due and they should schedule an exam    Medications  Is patient taking any medications for diabetes? -   Yes  Have blood sugars been controlled? Fasting blood sugars under 120   -   Yes   Random home sugars or today's POCT glucose is under 180 -   Yes   []  If No to the above then patient should schedule appt with PCP. Diabetic Plan    A1C Plan  Lab Results   Component Value Date    LABA1C 5.9 05/31/2019    LABA1C 6.4 10/26/2018    LABA1C 6.3 03/15/2018      []  If A1C over 8 and last result >3 months ago - Order A1C and refer to PCP   []  If last A1C over 6 months ago - Order A1C and refer to PCP for follow up   []  If elevated blood sugars > 180 - refer to PCP for follow up    [x]  Blood sugar controlled - A1C under 8 and last check was < 6 months      Cholesterol Plan   Lab Results   Component Value Date    LDLCHOLESTEROL 47 02/11/2019      []  If LDL > 100 and last result >3 months ago - order Fasting lipids and refer to PCP for follow up   []  If LDL < 100 and over 1 year ago - Order Fasting lipids and refer to PCP for follow up   [x] LDL is controlled.   LDL < 100 and checked within the last year     Blood Pressure  BP Readings from Last 3 Encounters:   05/31/19 (!) 88/54   03/27/19 123/74   02/01/19 98/64      []  If SBP >140 mmhg - refer to PCP for follow up   []  If DBP > 90 mmhg - refer to PCP for follow up   [x] BP is controlled <140/90     Order labs as PCP ordered.   (ie: Lipids, A1C, CMP)

## 2019-07-17 NOTE — PATIENT INSTRUCTIONS
Patient Education        Diabetes Foot Health: Care Instructions  Your Care Instructions    When you have diabetes, your feet need extra care and attention. Diabetes can damage the nerve endings and blood vessels in your feet, making you less likely to notice when your feet are injured. Diabetes also limits your body's ability to fight infection and get blood to areas that need it. If you get a minor foot injury, it could become an ulcer or a serious infection. With good foot care, you can prevent most of these problems. Caring for your feet can be quick and easy. Most of the care can be done when you are bathing or getting ready for bed. Follow-up care is a key part of your treatment and safety. Be sure to make and go to all appointments, and call your doctor if you are having problems. It's also a good idea to know your test results and keep a list of the medicines you take. How can you care for yourself at home? · Keep your blood sugar close to normal by watching what and how much you eat, monitoring blood sugar, taking medicines if prescribed, and getting regular exercise. · Do not smoke. Smoking affects blood flow and can make foot problems worse. If you need help quitting, talk to your doctor about stop-smoking programs and medicines. These can increase your chances of quitting for good. · Eat a diet that is low in fats. High fat intake can cause fat to build up in your blood vessels and decrease blood flow. · Inspect your feet daily for blisters, cuts, cracks, or sores. If you cannot see well, use a mirror or have someone help you. · Take care of your feet:  ? Wash your feet every day. Use warm (not hot) water. Check the water temperature with your wrists or other part of your body, not your feet. ? Dry your feet well. Pat them dry. Do not rub the skin on your feet too hard. Dry well between your toes.  If the skin on your feet stays moist, bacteria or a fungus can grow, which can lead to infection. ? Keep your skin soft. Use moisturizing skin cream to keep the skin on your feet soft and prevent calluses and cracks. But do not put the cream between your toes, and stop using any cream that causes a rash. ? Clean underneath your toenails carefully. Do not use a sharp object to clean underneath your toenails. Use the blunt end of a nail file or other rounded tool. ? Trim and file your toenails straight across to prevent ingrown toenails. Use a nail clipper, not scissors. Use an emery board to smooth the edges. · Change socks daily. Socks without seams are best, because seams often rub the feet. You can find socks for people with diabetes from specialty catalogs. · Look inside your shoes every day for things like gravel or torn linings, which could cause blisters or sores. · Buy shoes that fit well:  ? Look for shoes that have plenty of space around the toes. This helps prevent bunions and blisters. ? Try on shoes while wearing the kind of socks you will usually wear with the shoes. ? Avoid plastic shoes. They may rub your feet and cause blisters. Good shoes should be made of materials that are flexible and breathable, such as leather or cloth. ? Break in new shoes slowly by wearing them for no more than an hour a day for several days. Take extra time to check your feet for red areas, blisters, or other problems after you wear new shoes. · Do not go barefoot. Do not wear sandals, and do not wear shoes with very thin soles. Thin soles are easy to puncture. They also do not protect your feet from hot pavement or cold weather. · Have your doctor check your feet during each visit. If you have a foot problem, see your doctor. Do not try to treat an early foot problem at home. Home remedies or treatments that you can buy without a prescription (such as corn removers) can be harmful. · Always get early treatment for foot problems.  A minor irritation can lead to a major problem if not properly cared

## 2019-08-02 DIAGNOSIS — I50.42 CHRONIC COMBINED SYSTOLIC AND DIASTOLIC HEART FAILURE (HCC): ICD-10-CM

## 2019-08-03 RX ORDER — ISOSORBIDE MONONITRATE 30 MG/1
30 TABLET, EXTENDED RELEASE ORAL DAILY
Qty: 30 TABLET | Refills: 0 | Status: SHIPPED | OUTPATIENT
Start: 2019-08-03 | End: 2019-09-04 | Stop reason: SDUPTHER

## 2019-08-03 RX ORDER — LISINOPRIL 5 MG/1
5 TABLET ORAL DAILY
Qty: 30 TABLET | Refills: 0 | Status: SHIPPED | OUTPATIENT
Start: 2019-08-03 | End: 2019-09-04 | Stop reason: SDUPTHER

## 2019-08-12 ENCOUNTER — OFFICE VISIT (OUTPATIENT)
Dept: PULMONOLOGY | Age: 64
End: 2019-08-12
Payer: COMMERCIAL

## 2019-08-12 VITALS
WEIGHT: 256 LBS | HEIGHT: 64 IN | HEART RATE: 69 BPM | SYSTOLIC BLOOD PRESSURE: 114 MMHG | RESPIRATION RATE: 12 BRPM | DIASTOLIC BLOOD PRESSURE: 68 MMHG | BODY MASS INDEX: 43.71 KG/M2 | OXYGEN SATURATION: 99 %

## 2019-08-12 DIAGNOSIS — G47.33 OSA ON CPAP: Primary | ICD-10-CM

## 2019-08-12 DIAGNOSIS — F51.04 PSYCHOPHYSIOLOGIC INSOMNIA: ICD-10-CM

## 2019-08-12 DIAGNOSIS — I50.22 CHRONIC SYSTOLIC CONGESTIVE HEART FAILURE (HCC): ICD-10-CM

## 2019-08-12 DIAGNOSIS — I48.0 PAF (PAROXYSMAL ATRIAL FIBRILLATION) (HCC): ICD-10-CM

## 2019-08-12 DIAGNOSIS — E11.9 TYPE 2 DIABETES MELLITUS WITHOUT COMPLICATION, WITHOUT LONG-TERM CURRENT USE OF INSULIN (HCC): ICD-10-CM

## 2019-08-12 DIAGNOSIS — Z99.89 OSA ON CPAP: Primary | ICD-10-CM

## 2019-08-12 DIAGNOSIS — I42.9 CARDIOMYOPATHY, UNSPECIFIED TYPE (HCC): ICD-10-CM

## 2019-08-12 DIAGNOSIS — G47.31 COMPLEX SLEEP APNEA SYNDROME: ICD-10-CM

## 2019-08-12 DIAGNOSIS — E66.01 MORBID OBESITY DUE TO EXCESS CALORIES (HCC): ICD-10-CM

## 2019-08-12 PROCEDURE — 99213 OFFICE O/P EST LOW 20 MIN: CPT | Performed by: INTERNAL MEDICINE

## 2019-08-12 ASSESSMENT — ENCOUNTER SYMPTOMS
EYES NEGATIVE: 1
RESPIRATORY NEGATIVE: 1

## 2019-08-12 NOTE — PROGRESS NOTES
Subjective:      Patient ID: Johanna Andino is a 75 y.o. male. HPI  Follow-up visit for sleep apnea. Since his visit a year ago he is actually been doing well. Claims this is that he has lost a great deal of weight although not reflected in readings on chart. Variably compliant with BiPAP. States \"I do not feel any better with it. \"  Nonetheless denies loud snoring disturbed sleep or excessive daytime sleepiness. Claims that his hemoglobin A1c levels are less than 6. Review of Systems   Constitutional: Negative. HENT: Negative. Eyes: Negative. Respiratory: Negative. Cardiovascular: Negative. All other systems reviewed and are negative. Objective:     Physical Exam   Constitutional: He is oriented to person, place, and time. He appears well-developed and well-nourished. HENT:   Large tongue , low hanging soft palate and large uvula. Overall pharyngeal orifice moderately decreased     Eyes: Conjunctivae are normal. No scleral icterus. Neck: Neck supple. No JVD present. No tracheal deviation present. No thyromegaly present. Cardiovascular: Normal rate, regular rhythm and normal heart sounds. Exam reveals no gallop. No murmur heard. Pulmonary/Chest: Effort normal. No respiratory distress. He has no wheezes. He has no rales. He exhibits no tenderness. Abdominal: Soft. There is no tenderness. Musculoskeletal: He exhibits no edema. Lymphadenopathy:     He has no cervical adenopathy. Neurological: He is alert and oriented to person, place, and time. Skin: Skin is warm and dry. Nursing note and vitals reviewed. Wt Readings from Last 3 Encounters:   08/12/19 256 lb (116.1 kg)   07/17/19 259 lb (117.5 kg)   05/31/19 253 lb (114.8 kg)          Results for orders placed or performed in visit on 05/31/19   POCT glycosylated hemoglobin (Hb A1C)   Result Value Ref Range    Hemoglobin A1C 5.9 %       Assessment:         1. TANNER variably compliant with BiPAP    2.  Complex

## 2019-09-01 ENCOUNTER — HOSPITAL ENCOUNTER (INPATIENT)
Age: 64
LOS: 2 days | Discharge: HOME | DRG: 281 | End: 2019-09-03
Attending: EMERGENCY MEDICINE | Admitting: INTERNAL MEDICINE
Payer: COMMERCIAL

## 2019-09-01 ENCOUNTER — APPOINTMENT (OUTPATIENT)
Dept: GENERAL RADIOLOGY | Age: 64
DRG: 281 | End: 2019-09-01
Payer: COMMERCIAL

## 2019-09-01 DIAGNOSIS — R07.9 CHEST PAIN AT REST: Primary | ICD-10-CM

## 2019-09-01 PROBLEM — E87.6 HYPOKALEMIA: Status: ACTIVE | Noted: 2019-09-01

## 2019-09-01 LAB
ABSOLUTE EOS #: 0.25 K/UL (ref 0–0.44)
ABSOLUTE IMMATURE GRANULOCYTE: 0.03 K/UL (ref 0–0.3)
ABSOLUTE LYMPH #: 2.62 K/UL (ref 1.1–3.7)
ABSOLUTE MONO #: 0.76 K/UL (ref 0.1–1.2)
ALBUMIN SERPL-MCNC: 4 G/DL (ref 3.5–5.2)
ALBUMIN/GLOBULIN RATIO: 1 (ref 1–2.5)
ALP BLD-CCNC: 79 U/L (ref 40–129)
ALT SERPL-CCNC: 11 U/L (ref 5–41)
ANION GAP SERPL CALCULATED.3IONS-SCNC: 17 MMOL/L (ref 9–17)
AST SERPL-CCNC: 17 U/L
BASOPHILS # BLD: 0 % (ref 0–2)
BASOPHILS ABSOLUTE: 0.04 K/UL (ref 0–0.2)
BILIRUB SERPL-MCNC: 0.48 MG/DL (ref 0.3–1.2)
BNP INTERPRETATION: ABNORMAL
BUN BLDV-MCNC: 27 MG/DL (ref 8–23)
BUN/CREAT BLD: ABNORMAL (ref 9–20)
CALCIUM SERPL-MCNC: 8.8 MG/DL (ref 8.6–10.4)
CHLORIDE BLD-SCNC: 98 MMOL/L (ref 98–107)
CO2: 23 MMOL/L (ref 20–31)
CREAT SERPL-MCNC: 0.76 MG/DL (ref 0.7–1.2)
DIFFERENTIAL TYPE: ABNORMAL
EOSINOPHILS RELATIVE PERCENT: 3 % (ref 1–4)
GFR AFRICAN AMERICAN: >60 ML/MIN
GFR NON-AFRICAN AMERICAN: >60 ML/MIN
GFR SERPL CREATININE-BSD FRML MDRD: ABNORMAL ML/MIN/{1.73_M2}
GFR SERPL CREATININE-BSD FRML MDRD: ABNORMAL ML/MIN/{1.73_M2}
GLUCOSE BLD-MCNC: 117 MG/DL (ref 75–110)
GLUCOSE BLD-MCNC: 149 MG/DL (ref 70–99)
GLUCOSE BLD-MCNC: 172 MG/DL (ref 75–110)
HCT VFR BLD CALC: 39 % (ref 40.7–50.3)
HCT VFR BLD CALC: 45.4 % (ref 40.7–50.3)
HEMOGLOBIN: 12.1 G/DL (ref 13–17)
HEMOGLOBIN: 13.2 G/DL (ref 13–17)
IMMATURE GRANULOCYTES: 0 %
INR BLD: 1
LYMPHOCYTES # BLD: 27 % (ref 24–43)
MCH RBC QN AUTO: 25.2 PG (ref 25.2–33.5)
MCH RBC QN AUTO: 26.4 PG (ref 25.2–33.5)
MCHC RBC AUTO-ENTMCNC: 29.1 G/DL (ref 28.4–34.8)
MCHC RBC AUTO-ENTMCNC: 31 G/DL (ref 28.4–34.8)
MCV RBC AUTO: 85.2 FL (ref 82.6–102.9)
MCV RBC AUTO: 86.6 FL (ref 82.6–102.9)
MONOCYTES # BLD: 8 % (ref 3–12)
NRBC AUTOMATED: 0 PER 100 WBC
NRBC AUTOMATED: 0 PER 100 WBC
PARTIAL THROMBOPLASTIN TIME: 25.1 SEC (ref 20.5–30.5)
PARTIAL THROMBOPLASTIN TIME: 27.6 SEC (ref 20.5–30.5)
PDW BLD-RTO: 17 % (ref 11.8–14.4)
PDW BLD-RTO: 17.2 % (ref 11.8–14.4)
PLATELET # BLD: 197 K/UL (ref 138–453)
PLATELET # BLD: 217 K/UL (ref 138–453)
PLATELET ESTIMATE: ABNORMAL
PMV BLD AUTO: 11.3 FL (ref 8.1–13.5)
PMV BLD AUTO: 11.5 FL (ref 8.1–13.5)
POTASSIUM SERPL-SCNC: 3.6 MMOL/L (ref 3.7–5.3)
PRO-BNP: 702 PG/ML
PROTHROMBIN TIME: 10.6 SEC (ref 9–12)
RBC # BLD: 4.58 M/UL (ref 4.21–5.77)
RBC # BLD: 5.24 M/UL (ref 4.21–5.77)
RBC # BLD: ABNORMAL 10*6/UL
SEG NEUTROPHILS: 62 % (ref 36–65)
SEGMENTED NEUTROPHILS ABSOLUTE COUNT: 5.96 K/UL (ref 1.5–8.1)
SODIUM BLD-SCNC: 138 MMOL/L (ref 135–144)
TOTAL PROTEIN: 8 G/DL (ref 6.4–8.3)
TROPONIN INTERP: ABNORMAL
TROPONIN INTERP: NORMAL
TROPONIN T: ABNORMAL NG/ML
TROPONIN T: NORMAL NG/ML
TROPONIN, HIGH SENSITIVITY: 117 NG/L (ref 0–22)
TROPONIN, HIGH SENSITIVITY: 12 NG/L (ref 0–22)
TROPONIN, HIGH SENSITIVITY: 131 NG/L (ref 0–22)
TROPONIN, HIGH SENSITIVITY: 133 NG/L (ref 0–22)
TROPONIN, HIGH SENSITIVITY: 24 NG/L (ref 0–22)
WBC # BLD: 8.6 K/UL (ref 3.5–11.3)
WBC # BLD: 9.7 K/UL (ref 3.5–11.3)
WBC # BLD: ABNORMAL 10*3/UL

## 2019-09-01 PROCEDURE — 6370000000 HC RX 637 (ALT 250 FOR IP): Performed by: STUDENT IN AN ORGANIZED HEALTH CARE EDUCATION/TRAINING PROGRAM

## 2019-09-01 PROCEDURE — 85730 THROMBOPLASTIN TIME PARTIAL: CPT

## 2019-09-01 PROCEDURE — 36415 COLL VENOUS BLD VENIPUNCTURE: CPT

## 2019-09-01 PROCEDURE — 6360000002 HC RX W HCPCS: Performed by: STUDENT IN AN ORGANIZED HEALTH CARE EDUCATION/TRAINING PROGRAM

## 2019-09-01 PROCEDURE — 2060000000 HC ICU INTERMEDIATE R&B

## 2019-09-01 PROCEDURE — 83880 ASSAY OF NATRIURETIC PEPTIDE: CPT

## 2019-09-01 PROCEDURE — 71045 X-RAY EXAM CHEST 1 VIEW: CPT

## 2019-09-01 PROCEDURE — 99285 EMERGENCY DEPT VISIT HI MDM: CPT

## 2019-09-01 PROCEDURE — 85025 COMPLETE CBC W/AUTO DIFF WBC: CPT

## 2019-09-01 PROCEDURE — 93005 ELECTROCARDIOGRAM TRACING: CPT | Performed by: STUDENT IN AN ORGANIZED HEALTH CARE EDUCATION/TRAINING PROGRAM

## 2019-09-01 PROCEDURE — 85027 COMPLETE CBC AUTOMATED: CPT

## 2019-09-01 PROCEDURE — 84484 ASSAY OF TROPONIN QUANT: CPT

## 2019-09-01 PROCEDURE — 80053 COMPREHEN METABOLIC PANEL: CPT

## 2019-09-01 PROCEDURE — 99223 1ST HOSP IP/OBS HIGH 75: CPT | Performed by: INTERNAL MEDICINE

## 2019-09-01 PROCEDURE — 82947 ASSAY GLUCOSE BLOOD QUANT: CPT

## 2019-09-01 PROCEDURE — 85610 PROTHROMBIN TIME: CPT

## 2019-09-01 RX ORDER — HEPARIN SODIUM 1000 [USP'U]/ML
2000 INJECTION, SOLUTION INTRAVENOUS; SUBCUTANEOUS PRN
Status: DISCONTINUED | OUTPATIENT
Start: 2019-09-01 | End: 2019-09-03

## 2019-09-01 RX ORDER — POTASSIUM CHLORIDE 7.45 MG/ML
10 INJECTION INTRAVENOUS PRN
Status: DISCONTINUED | OUTPATIENT
Start: 2019-09-01 | End: 2019-09-03 | Stop reason: HOSPADM

## 2019-09-01 RX ORDER — HEPARIN SODIUM 10000 [USP'U]/100ML
8.6 INJECTION, SOLUTION INTRAVENOUS CONTINUOUS
Status: DISCONTINUED | OUTPATIENT
Start: 2019-09-01 | End: 2019-09-02

## 2019-09-01 RX ORDER — PANTOPRAZOLE SODIUM 40 MG/1
40 TABLET, DELAYED RELEASE ORAL
Status: DISCONTINUED | OUTPATIENT
Start: 2019-09-02 | End: 2019-09-03 | Stop reason: HOSPADM

## 2019-09-01 RX ORDER — SODIUM CHLORIDE 0.9 % (FLUSH) 0.9 %
10 SYRINGE (ML) INJECTION EVERY 12 HOURS SCHEDULED
Status: DISCONTINUED | OUTPATIENT
Start: 2019-09-01 | End: 2019-09-03 | Stop reason: HOSPADM

## 2019-09-01 RX ORDER — UREA 10 %
1 LOTION (ML) TOPICAL NIGHTLY PRN
Status: DISCONTINUED | OUTPATIENT
Start: 2019-09-02 | End: 2019-09-03 | Stop reason: HOSPADM

## 2019-09-01 RX ORDER — ASPIRIN 81 MG/1
81 TABLET ORAL DAILY
Status: DISCONTINUED | OUTPATIENT
Start: 2019-09-02 | End: 2019-09-03 | Stop reason: HOSPADM

## 2019-09-01 RX ORDER — LISINOPRIL 5 MG/1
5 TABLET ORAL NIGHTLY
Status: DISCONTINUED | OUTPATIENT
Start: 2019-09-01 | End: 2019-09-03 | Stop reason: HOSPADM

## 2019-09-01 RX ORDER — ASPIRIN 81 MG/1
324 TABLET, CHEWABLE ORAL ONCE
Status: COMPLETED | OUTPATIENT
Start: 2019-09-01 | End: 2019-09-01

## 2019-09-01 RX ORDER — HEPARIN SODIUM 1000 [USP'U]/ML
4000 INJECTION, SOLUTION INTRAVENOUS; SUBCUTANEOUS PRN
Status: DISCONTINUED | OUTPATIENT
Start: 2019-09-01 | End: 2019-09-03

## 2019-09-01 RX ORDER — ACETAMINOPHEN 500 MG
1000 TABLET ORAL ONCE
Status: DISCONTINUED | OUTPATIENT
Start: 2019-09-01 | End: 2019-09-01

## 2019-09-01 RX ORDER — FUROSEMIDE 40 MG/1
40 TABLET ORAL 2 TIMES DAILY
Status: DISCONTINUED | OUTPATIENT
Start: 2019-09-01 | End: 2019-09-03 | Stop reason: HOSPADM

## 2019-09-01 RX ORDER — NICOTINE POLACRILEX 4 MG
15 LOZENGE BUCCAL PRN
Status: DISCONTINUED | OUTPATIENT
Start: 2019-09-01 | End: 2019-09-03 | Stop reason: HOSPADM

## 2019-09-01 RX ORDER — ACETAMINOPHEN 325 MG/1
650 TABLET ORAL EVERY 4 HOURS PRN
Status: DISCONTINUED | OUTPATIENT
Start: 2019-09-01 | End: 2019-09-03 | Stop reason: HOSPADM

## 2019-09-01 RX ORDER — LISINOPRIL 5 MG/1
5 TABLET ORAL DAILY
Status: DISCONTINUED | OUTPATIENT
Start: 2019-09-02 | End: 2019-09-01

## 2019-09-01 RX ORDER — DEXTROSE MONOHYDRATE 25 G/50ML
12.5 INJECTION, SOLUTION INTRAVENOUS PRN
Status: DISCONTINUED | OUTPATIENT
Start: 2019-09-01 | End: 2019-09-03 | Stop reason: HOSPADM

## 2019-09-01 RX ORDER — ATORVASTATIN CALCIUM 40 MG/1
40 TABLET, FILM COATED ORAL NIGHTLY
Status: DISCONTINUED | OUTPATIENT
Start: 2019-09-01 | End: 2019-09-03 | Stop reason: HOSPADM

## 2019-09-01 RX ORDER — SODIUM CHLORIDE 0.9 % (FLUSH) 0.9 %
10 SYRINGE (ML) INJECTION PRN
Status: DISCONTINUED | OUTPATIENT
Start: 2019-09-01 | End: 2019-09-03 | Stop reason: HOSPADM

## 2019-09-01 RX ORDER — ALLOPURINOL 300 MG/1
300 TABLET ORAL DAILY
Status: DISCONTINUED | OUTPATIENT
Start: 2019-09-02 | End: 2019-09-03 | Stop reason: HOSPADM

## 2019-09-01 RX ORDER — DEXTROSE MONOHYDRATE 50 MG/ML
100 INJECTION, SOLUTION INTRAVENOUS PRN
Status: DISCONTINUED | OUTPATIENT
Start: 2019-09-01 | End: 2019-09-03 | Stop reason: HOSPADM

## 2019-09-01 RX ORDER — ISOSORBIDE MONONITRATE 30 MG/1
30 TABLET, EXTENDED RELEASE ORAL DAILY
Status: DISCONTINUED | OUTPATIENT
Start: 2019-09-02 | End: 2019-09-03 | Stop reason: HOSPADM

## 2019-09-01 RX ORDER — ONDANSETRON 2 MG/ML
4 INJECTION INTRAMUSCULAR; INTRAVENOUS EVERY 6 HOURS PRN
Status: DISCONTINUED | OUTPATIENT
Start: 2019-09-01 | End: 2019-09-03 | Stop reason: HOSPADM

## 2019-09-01 RX ORDER — POTASSIUM CHLORIDE 20 MEQ/1
40 TABLET, EXTENDED RELEASE ORAL PRN
Status: DISCONTINUED | OUTPATIENT
Start: 2019-09-01 | End: 2019-09-03 | Stop reason: HOSPADM

## 2019-09-01 RX ORDER — METOPROLOL SUCCINATE 25 MG/1
25 TABLET, EXTENDED RELEASE ORAL DAILY
Status: DISCONTINUED | OUTPATIENT
Start: 2019-09-02 | End: 2019-09-03 | Stop reason: HOSPADM

## 2019-09-01 RX ADMIN — ASPIRIN 81 MG 324 MG: 81 TABLET ORAL at 15:40

## 2019-09-01 RX ADMIN — HEPARIN SODIUM AND DEXTROSE 10 ML/HR: 10000; 5 INJECTION INTRAVENOUS at 22:22

## 2019-09-01 RX ADMIN — LISINOPRIL 5 MG: 5 TABLET ORAL at 22:22

## 2019-09-01 RX ADMIN — Medication 1 MG: at 22:22

## 2019-09-01 RX ADMIN — INSULIN LISPRO 1 UNITS: 100 INJECTION, SOLUTION INTRAVENOUS; SUBCUTANEOUS at 22:29

## 2019-09-01 RX ADMIN — DESMOPRESSIN ACETATE 40 MG: 0.2 TABLET ORAL at 22:22

## 2019-09-01 RX ADMIN — FUROSEMIDE 40 MG: 40 TABLET ORAL at 22:22

## 2019-09-01 ASSESSMENT — ENCOUNTER SYMPTOMS
ABDOMINAL PAIN: 0
CONSTIPATION: 0
DIARRHEA: 0
VOMITING: 0
SINUS PAIN: 0
SHORTNESS OF BREATH: 1
COLOR CHANGE: 0
APNEA: 0
CHEST TIGHTNESS: 1
NAUSEA: 0
SINUS PRESSURE: 0
BLOOD IN STOOL: 0
COUGH: 0
TROUBLE SWALLOWING: 0
CHOKING: 0
ABDOMINAL DISTENTION: 0

## 2019-09-01 ASSESSMENT — PAIN SCALES - GENERAL
PAINLEVEL_OUTOF10: 0
PAINLEVEL_OUTOF10: 0

## 2019-09-01 NOTE — ED PROVIDER NOTES
[facility] [address]  Emergency Department Encounter   Emergency Medicine Resident     Pt Name: Randee Mueller  MRN: [id number]  [place] 1955  Date of evaluation: 9/1/19  PCP:  Villa Akins MD    CHIEF COMPLAINT       Chief Complaint   Patient presents with    Dizziness       HISTORY OF PRESENT ILLNESS  (Location/Symptom, Timing/Onset, Context/Setting, Quality,Duration, Modifying Factors, Severity.)      Randee Mueller is a 75 y.o. male with history of coronary artery disease and CHF who presents with acute substernal chest pain, which began this morning and he had some associated dizziness. Chest pain began on rest and has since subsided. Patient states that his blood pressure was elevated at home. patient states that the pain was substernal and felt like pressure which radiated to the left side of his neck. States that he has a cardiac history of stenting in 2005 due to an MI. He also has a defibrillator in place but states that he has not received a shock today. He reports some shortness of breath. Denies fever, cough, back pain, loss of motor function, loss of sensation, seizure-like activity, loss of consciousness, nausea, vomiting, diarrhea, or recent trauma.       PAST MEDICAL / SURGICAL / SOCIAL / FAMILY HISTORY      has a past medical history of Anemia, Anxiety, CAD (coronary artery disease), Cardiac defibrillator in place, Cardiomyopathy [facility]), CHF (congestive heart failure) ([address] Chronic combined systolic and diastolic heart failure ([address] s/[ AICD placed, Chronic kidney disease, Complex sleep apnea syndrome, Diabetic retinopathy ([address] Diverticulitis, DJD (degenerative joint disease), Edentulous, Gout, HTN (hypertension), Hyperlipidemia, Hypertension, Iron deficiency anemia, Ischemic cardiomyopathy, Morbid obesity ([address] Obesity, TANNER variably compliant with BiPAP, Osteoarthritis, PAF (paroxysmal atrial fibrillation) ([address] Psychophysiologic insomnia, Type II or unspecified type diabetes mellitus without mention of complication, not stated as uncontrolled, and Unspecified sleep apnea. has a past surgical history that includes Colonoscopy (2007); pacemaker placement ( or ); Cardiac catheterization (); Cardiac catheterization (2013); Coronary angioplasty (, ); Cardiac defibrillator placement (2015); bone marrow biopsy ( approx); and pr colsc flx w/rmvl of tumor polyp lesion snare tq (N/A, 2017).     Social History     Socioeconomic History    Marital status: Single     Spouse name: Not on file    Number of children: Not on file    Years of education: Not on file    Highest education level: Not on file   Occupational History    Not on file   Social Needs    Financial resource strain: Not on file    Food insecurity:     Worry: Not on file     Inability: Not on file    Transportation needs:     Medical: Not on file     Non-medical: Not on file   Tobacco Use    Smoking status: Former Smoker     Packs/day: 1.00     Years: 3.00     Pack years: 3.00     Types: Cigarettes     Start date: 1971     Last attempt to quit: 1974     Years since quittin.6    Smokeless tobacco: Never Used   Substance and Sexual Activity    Alcohol use: No     Alcohol/week: 0.0 standard drinks    Drug use: Not Currently     Types: Marijuana     Comment: hx THC quit approx     Sexual activity: Not Currently     Partners: Female   Lifestyle    Physical activity:     Days per week: Not on file     Minutes per session: Not on file    Stress: Not on file   Relationships    Social connections:     Talks on phone: Not on file     Gets together: Not on file     Attends Orthodox service: Not on file     Active member of club or organization: Not on file     Attends meetings of clubs or organizations: Not on file     Relationship status: Not on file    Intimate partner violence:     Fear of current or ex partner: Not on file     Emotionally abused: Not on file to Spiritual Services    OT eval and treat    PT evaluation and treat    Initiate Oxygen Therapy Protocol    Pulse oximetry, continuous    CPAP    Pacer Interrogate    POCT Glucose    POC Glucose Fingerstick    POC Glucose Fingerstick    EKG 12 Lead    EKG 12 Lead    EKG 12 lead    EKG 12 Lead    ECHO Complete 2D W Doppler W Color    Insert peripheral IV    PATIENT STATUS (FROM ED OR OR/PROCEDURAL) Inpatient       MEDICATIONS ORDERED:  Orders Placed This Encounter   Medications    DISCONTD: acetaminophen (TYLENOL) tablet 1,000 mg    aspirin chewable tablet 324 mg    allopurinol (ZYLOPRIM) tablet 300 mg    aspirin EC tablet 81 mg    atorvastatin (LIPITOR) tablet 40 mg    apixaban (ELIQUIS) tablet 5 mg    furosemide (LASIX) tablet 40 mg    isosorbide mononitrate (IMDUR) extended release tablet 30 mg    lisinopril (PRINIVIL;ZESTRIL) tablet 5 mg    metoprolol succinate (TOPROL XL) extended release tablet 25 mg    pantoprazole (PROTONIX) tablet 40 mg    sodium chloride flush 0.9 % injection 10 mL    sodium chloride flush 0.9 % injection 10 mL    OR Linked Order Group     potassium chloride (KLOR-CON M) extended release tablet 40 mEq     potassium bicarb-citric acid (EFFER-K) effervescent tablet 40 mEq     potassium chloride 10 mEq/100 mL IVPB (Peripheral Line)    magnesium hydroxide (MILK OF MAGNESIA) 400 MG/5ML suspension 30 mL    ondansetron (ZOFRAN) injection 4 mg    acetaminophen (TYLENOL) tablet 650 mg    glucose (GLUTOSE) 40 % oral gel 15 g    dextrose 50 % IV solution    glucagon (rDNA) injection 1 mg    dextrose 5 % solution    insulin lispro (HUMALOG) injection vial 0-12 Units    insulin lispro (HUMALOG) injection vial 0-6 Units    DISCONTD: enoxaparin (LOVENOX) injection 100 mg    heparin 25,000 units in dextrose 5% 250 mL infusion       DDX: Emergent: ACS/NSTEMI/STEMI/angina, arrhythmia, trauma, aortic dissection,  PE, PNA, pneumothroax, esophageal rupture, Non-African American >60 >60 mL/min    GFR African American >60 >60 mL/min    GFR Comment          GFR Staging NOT REPORTED    Protime-INR   Result Value Ref Range    Protime 10.6 9.0 - 12.0 sec    INR 1.0    APTT   Result Value Ref Range    PTT 27.6 20.5 - 30.5 sec   Troponin   Result Value Ref Range    Troponin, High Sensitivity 12 0 - 22 ng/L    Troponin T NOT REPORTED <0.03 ng/mL    Troponin Interp NOT REPORTED    Troponin   Result Value Ref Range    Troponin, High Sensitivity 24 (H) 0 - 22 ng/L    Troponin T NOT REPORTED <0.03 ng/mL    Troponin Interp NOT REPORTED    Brain Natriuretic Peptide   Result Value Ref Range    Pro- (H) <300 pg/mL    BNP Interpretation Pro-BNP Reference Range:    Troponin   Result Value Ref Range    Troponin, High Sensitivity 117 (HH) 0 - 22 ng/L    Troponin T NOT REPORTED <0.03 ng/mL    Troponin Interp NOT REPORTED    Troponin   Result Value Ref Range    Troponin, High Sensitivity 133 (HH) 0 - 22 ng/L    Troponin T NOT REPORTED <0.03 ng/mL    Troponin Interp NOT REPORTED    POC Glucose Fingerstick   Result Value Ref Range    POC Glucose 117 (H) 75 - 110 mg/dL   POC Glucose Fingerstick   Result Value Ref Range    POC Glucose 172 (H) 75 - 110 mg/dL   EKG 12 Lead   Result Value Ref Range    Ventricular Rate 84 BPM    Atrial Rate 84 BPM    P-R Interval 192 ms    QRS Duration 138 ms    Q-T Interval 390 ms    QTc Calculation (Bazett) 460 ms    P Axis 37 degrees    R Axis -61 degrees    T Axis 133 degrees       IMPRESSION: Troponin elevated, trending up. Elevated BNP. RADIOLOGY:  Xr Chest Portable    Result Date: 9/1/2019  EXAMINATION: ONE XRAY VIEW OF THE CHEST 9/1/2019 1:04 pm COMPARISON: None. HISTORY: ORDERING SYSTEM PROVIDED HISTORY: Chest pain TECHNOLOGIST PROVIDED HISTORY: Chest pain Reason for Exam: RT upper chest pain/  AP erect Acuity: Unknown Type of Exam: Unknown FINDINGS: Mild cardiomegaly. Pacemaker defibrillator placed via left subclavian approach. Mild CHF.   No

## 2019-09-01 NOTE — H&P
ASSESSMENT & PLAN     ASSESSMENT / PLAN:     IMPRESSION  This is a 75 y.o. male who presented with fatigue and chest pain and found to have NSTEMI. NSTEMI  -EKG showing T wave changes, elevation in V2  -Repeat EKG at 6pm and tomorrow AM  -Cardiology consulted  -Trend troponins. 12 --> 24   -If troponins continue to rise, will start heparin drip    Essential HTN  -Well controlled  -Resume home dose Imdur 30 mg daily, lisinopril 5 mg daily, toprol XL 25 mg daily  -Check orthostatics    Paroxysmal A. Fib  -Currently NSR  -Resume home dose eliquis 5 mg BID  -EKG at 6pm and tomorrow AM  -Pacer interrogation     Diabetes mellitus type 2   -Hold home metformin  -Medium dose ISS  -Hypoglycemia protocol  -Carb control diet    Chronic combined systolic and diastolic CHF s/p AICD  -EF 15-20% (9/13/17)  -echo ordered  -Lasix 40 mg BID  -Strict I and Os  -Daily weights  -monitor for signs of fluid overload  -Carb control diet  -Fluid restriction    Hypokalemia  -3.6 on admission  -K replacement protocol     CAD s/p LAD stent (2004)  -Resume home metoprolol, ASA, plavix, statin    Obstructive sleep apnea  -CPAP at night    Gout  -No acute flare  -Resume home allopurinol 300 mg daily      DVT ppx: On eliquis  GI ppx: protonix 40 mg daily    PT/OT/SW: following  Discharge Planning:     Angelita Stevens MD  Internal Medicine Resident, PGY-1  [address] [place], [place]  9/1/2019, 4:11 PM   Attending Physician Statement  I have discussed the care of Zehra Barnhart, including pertinent history and exam findings,  with the resident. I have seen and examined the patient and the key elements of all parts of the encounter have been performed by me. I agree with the assessment, plan and orders as documented by the resident with additions . Attending Physician Statement  I have discussed the care of Zehra Barnhart, including pertinent history and exam findings,  with the resident.  I have seen

## 2019-09-01 NOTE — ED NOTES
Patient to ed with c/o lightheadedness and a feeling of pain to chest that started before arrival today. Patient states he had to get some work done in yard so was sitting drinking coffee. Patient states he began having a very lightheaded feeling accompanied by chest pain, sob and the need to have a bowel movement. Patient states he no longer feels lightheaded or dizzy now but has a slight headache. Patient states he could also feel heart beating inside chest during the time of dizziness. Patient states hx of heart problems. Patient has defibrillator but denies it going off. Patient is alert and oriented upon arrival. Patient placed on cardiac monitor including BP and pulse ox.       Geronimo Mcgraw RN  09/01/19 [id number]

## 2019-09-01 NOTE — ED NOTES
Patient resting on stretcher and denies any concerns at this time. Will continue to monitor.          Areli Rome RN  09/01/19 [id number]

## 2019-09-02 LAB
ABSOLUTE EOS #: 0.2 K/UL (ref 0–0.44)
ABSOLUTE IMMATURE GRANULOCYTE: 0.03 K/UL (ref 0–0.3)
ABSOLUTE LYMPH #: 1.69 K/UL (ref 1.1–3.7)
ABSOLUTE MONO #: 0.71 K/UL (ref 0.1–1.2)
ANION GAP SERPL CALCULATED.3IONS-SCNC: 15 MMOL/L (ref 9–17)
BASOPHILS # BLD: 1 % (ref 0–2)
BASOPHILS ABSOLUTE: 0.04 K/UL (ref 0–0.2)
BUN BLDV-MCNC: 20 MG/DL (ref 8–23)
BUN/CREAT BLD: ABNORMAL (ref 9–20)
CALCIUM SERPL-MCNC: 8.4 MG/DL (ref 8.6–10.4)
CHLORIDE BLD-SCNC: 99 MMOL/L (ref 98–107)
CO2: 26 MMOL/L (ref 20–31)
CREAT SERPL-MCNC: 0.76 MG/DL (ref 0.7–1.2)
DIFFERENTIAL TYPE: ABNORMAL
EOSINOPHILS RELATIVE PERCENT: 2 % (ref 1–4)
GFR AFRICAN AMERICAN: >60 ML/MIN
GFR NON-AFRICAN AMERICAN: >60 ML/MIN
GFR SERPL CREATININE-BSD FRML MDRD: ABNORMAL ML/MIN/{1.73_M2}
GFR SERPL CREATININE-BSD FRML MDRD: ABNORMAL ML/MIN/{1.73_M2}
GLUCOSE BLD-MCNC: 122 MG/DL (ref 75–110)
GLUCOSE BLD-MCNC: 191 MG/DL (ref 75–110)
GLUCOSE BLD-MCNC: 84 MG/DL (ref 70–99)
HCT VFR BLD CALC: 39.8 % (ref 40.7–50.3)
HEMOGLOBIN: 12 G/DL (ref 13–17)
IMMATURE GRANULOCYTES: 0 %
LYMPHOCYTES # BLD: 19 % (ref 24–43)
MAGNESIUM: 1.6 MG/DL (ref 1.6–2.6)
MCH RBC QN AUTO: 26 PG (ref 25.2–33.5)
MCHC RBC AUTO-ENTMCNC: 30.2 G/DL (ref 28.4–34.8)
MCV RBC AUTO: 86.1 FL (ref 82.6–102.9)
MONOCYTES # BLD: 8 % (ref 3–12)
NRBC AUTOMATED: 0 PER 100 WBC
PARTIAL THROMBOPLASTIN TIME: 27.4 SEC (ref 20.5–30.5)
PARTIAL THROMBOPLASTIN TIME: 38.2 SEC (ref 20.5–30.5)
PDW BLD-RTO: 17.2 % (ref 11.8–14.4)
PLATELET # BLD: 208 K/UL (ref 138–453)
PLATELET ESTIMATE: ABNORMAL
PMV BLD AUTO: 11.9 FL (ref 8.1–13.5)
POTASSIUM SERPL-SCNC: 3.3 MMOL/L (ref 3.7–5.3)
RBC # BLD: 4.62 M/UL (ref 4.21–5.77)
RBC # BLD: ABNORMAL 10*6/UL
SEG NEUTROPHILS: 70 % (ref 36–65)
SEGMENTED NEUTROPHILS ABSOLUTE COUNT: 6.17 K/UL (ref 1.5–8.1)
SODIUM BLD-SCNC: 140 MMOL/L (ref 135–144)
WBC # BLD: 8.8 K/UL (ref 3.5–11.3)
WBC # BLD: ABNORMAL 10*3/UL

## 2019-09-02 PROCEDURE — 6360000002 HC RX W HCPCS: Performed by: STUDENT IN AN ORGANIZED HEALTH CARE EDUCATION/TRAINING PROGRAM

## 2019-09-02 PROCEDURE — 80048 BASIC METABOLIC PNL TOTAL CA: CPT

## 2019-09-02 PROCEDURE — 85025 COMPLETE CBC W/AUTO DIFF WBC: CPT

## 2019-09-02 PROCEDURE — 93005 ELECTROCARDIOGRAM TRACING: CPT | Performed by: STUDENT IN AN ORGANIZED HEALTH CARE EDUCATION/TRAINING PROGRAM

## 2019-09-02 PROCEDURE — 82947 ASSAY GLUCOSE BLOOD QUANT: CPT

## 2019-09-02 PROCEDURE — 83735 ASSAY OF MAGNESIUM: CPT

## 2019-09-02 PROCEDURE — 6370000000 HC RX 637 (ALT 250 FOR IP): Performed by: STUDENT IN AN ORGANIZED HEALTH CARE EDUCATION/TRAINING PROGRAM

## 2019-09-02 PROCEDURE — 94761 N-INVAS EAR/PLS OXIMETRY MLT: CPT

## 2019-09-02 PROCEDURE — 2580000003 HC RX 258: Performed by: STUDENT IN AN ORGANIZED HEALTH CARE EDUCATION/TRAINING PROGRAM

## 2019-09-02 PROCEDURE — 36415 COLL VENOUS BLD VENIPUNCTURE: CPT

## 2019-09-02 PROCEDURE — 85730 THROMBOPLASTIN TIME PARTIAL: CPT

## 2019-09-02 PROCEDURE — 2060000000 HC ICU INTERMEDIATE R&B

## 2019-09-02 PROCEDURE — 6370000000 HC RX 637 (ALT 250 FOR IP): Performed by: INTERNAL MEDICINE

## 2019-09-02 RX ORDER — POTASSIUM CHLORIDE 20 MEQ/1
40 TABLET, EXTENDED RELEASE ORAL ONCE
Status: COMPLETED | OUTPATIENT
Start: 2019-09-02 | End: 2019-09-02

## 2019-09-02 RX ADMIN — FUROSEMIDE 40 MG: 40 TABLET ORAL at 16:53

## 2019-09-02 RX ADMIN — Medication 1 MG: at 22:10

## 2019-09-02 RX ADMIN — POTASSIUM CHLORIDE 40 MEQ: 1500 TABLET, EXTENDED RELEASE ORAL at 06:12

## 2019-09-02 RX ADMIN — ACETAMINOPHEN 650 MG: 325 TABLET ORAL at 22:14

## 2019-09-02 RX ADMIN — METOPROLOL SUCCINATE 25 MG: 25 TABLET, FILM COATED, EXTENDED RELEASE ORAL at 11:32

## 2019-09-02 RX ADMIN — ALLOPURINOL 300 MG: 300 TABLET ORAL at 08:12

## 2019-09-02 RX ADMIN — ISOSORBIDE MONONITRATE 30 MG: 30 TABLET ORAL at 08:12

## 2019-09-02 RX ADMIN — APIXABAN 5 MG: 5 TABLET, FILM COATED ORAL at 22:09

## 2019-09-02 RX ADMIN — Medication 10 ML: at 22:10

## 2019-09-02 RX ADMIN — HEPARIN SODIUM 4000 UNITS: 1000 INJECTION INTRAVENOUS; SUBCUTANEOUS at 06:12

## 2019-09-02 RX ADMIN — LISINOPRIL 5 MG: 5 TABLET ORAL at 22:10

## 2019-09-02 RX ADMIN — POTASSIUM CHLORIDE 40 MEQ: 1500 TABLET, EXTENDED RELEASE ORAL at 14:08

## 2019-09-02 RX ADMIN — PANTOPRAZOLE SODIUM 40 MG: 40 TABLET, DELAYED RELEASE ORAL at 08:12

## 2019-09-02 RX ADMIN — DESMOPRESSIN ACETATE 40 MG: 0.2 TABLET ORAL at 22:10

## 2019-09-02 RX ADMIN — Medication 81 MG: at 08:12

## 2019-09-02 RX ADMIN — FUROSEMIDE 40 MG: 40 TABLET ORAL at 08:12

## 2019-09-02 RX ADMIN — HEPARIN SODIUM AND DEXTROSE 12.6 UNITS/KG/HR: 10000; 5 INJECTION INTRAVENOUS at 06:13

## 2019-09-02 ASSESSMENT — PAIN SCALES - GENERAL: PAINLEVEL_OUTOF10: 4

## 2019-09-02 NOTE — CARE COORDINATION
Case Management Initial Discharge Plan  Merline Jacob,             Met with:patient and friend to discuss discharge plans. Information verified: address, contacts, phone number, , insurance Yes  PCP: Del Hoang MD  Date of last visit: 3 months ago    Insurance Provider: Jeanette King    Discharge Planning    Living Arrangements:  Alone   Support Systems:  Family Members, Friends/Neighbors    Home has 1 stories  3 stairs to climb to get into front door, n/a stairs to climb to reach second floor  Location of bedroom/bathroom in home main    Patient able to perform ADL's:Independent    Current Services (outpatient & in home) none  DME equipment: none  DME provider: n/a    Pharmacy: [facility] Medications:  No  Does patient want to participate in local refill/ meds to beds program?  Yes    Potential Assistance Needed:  N/A    Patient agreeable to home care: No  [place] of choice provided:  n/a    Prior SNF/Rehab Placement and Facility: none  Agreeable to SNF/Rehab: No  [place] of choice provided: n/a   Evaluation: n/a    Expected Discharge date:  19  Patient expects to be discharged to:  home  Follow Up Appointment: Best Day/ Time: Monday AM    Transportation provider: friend  Transportation arrangements needed for discharge: No    Readmission Risk              Risk of Unplanned Readmission:        19             Does patient have a readmission risk score greater than 14?: Yes  If yes, follow-up appointment must be made within 7 days of discharge. Discharge Plan: home, will need pcp appt, and may need cardiac rehab. AMI zone given.           Electronically signed by Madhu Herron RN on 19 at 3:08 PM

## 2019-09-02 NOTE — CONSULTS
 sodium chloride flush  10 mL Intravenous 2 times per day    insulin lispro  0-12 Units Subcutaneous TID WC    insulin lispro  0-6 Units Subcutaneous Nightly    lisinopril  5 mg Oral Nightly     Continuous Infusions:   dextrose        Outpatient Medications Marked as Taking for the 9/1/19 encounter [facility] Encounter)   Medication Sig Dispense Refill    isosorbide mononitrate (IMDUR) 30 MG extended release tablet TAKE 1 TABLET BY MOUTH DAILY 30 tablet 0    lisinopril (PRINIVIL;ZESTRIL) 5 MG tablet TAKE 1 TABLET BY MOUTH DAILY (Patient taking differently: Take 5 mg by mouth daily Pt taking at night) 30 tablet 0    omeprazole (PRILOSEC) 20 MG delayed release capsule TAKE 1 CAPSULE DAILY 30 capsule 0    ELIQUIS 5 MG TABS tablet TAKE 1 TABLET BY MOUTH 2 TIMES DAILY 60 tablet 3    atorvastatin (LIPITOR) 40 MG tablet Take 1 tablet by mouth nightly 30 tablet 4    metoprolol succinate (TOPROL XL) 25 MG extended release tablet Take 1 tablet by mouth daily 30 tablet 3    furosemide (LASIX) 40 MG tablet TAKE 1 TABLET BY MOUTH 2 TIMES A DAY 60 tablet 3    allopurinol (ZYLOPRIM) 300 MG tablet TAKE 1 TABLET DAILY 30 tablet 3    aspirin (HM ASPIRIN EC LOW DOSE) 81 MG EC tablet TAKE 1 TABLET TWICE A DAY 60 tablet 3    glimepiride (AMARYL) 1 MG tablet TAKE 1 TABLET BY MOUTH EVERY MORNING BEFORE BREAKFAST 30 tablet 3    metFORMIN (GLUCOPHAGE) 1000 MG tablet TAKE 1 TABLET BY MOUTH 2 TIMES A DAY WITH MEALS 60 tablet 5    melatonin 3 MG TABS tablet Take 1 tablet by mouth      ammonium lactate (LAC-HYDRIN) 12 % lotion Apply topically daily after bathing sparing the space between the toes. 222 mL 3       Allergies:   Zetia [ezetimibe]; Bactrim; and Cephalexin    Social History:    reports that he quit smoking about 45 years ago. His smoking use included cigarettes. He started smoking about 48 years ago. He has a 3.00 pack-year smoking history.  He has never used smokeless tobacco. He reports that he has current or past

## 2019-09-02 NOTE — PROGRESS NOTES
[facility]  Internal Medicine Teaching Residency Program  Inpatient Daily Progress Note  ______________________________________________________________________________    Patient: El Lowery  YOB: 1955   NJO:[id number]    Acct: [de-identified]     Room: 2010/2010-01  Admit date: 9/1/2019  Today's date: 09/02/19  Number of days in the hospital: 1    SUBJECTIVE   Admitting Diagnosis: NSTEMI (non-ST elevated myocardial infarction) ([facility] [address]  CC: lightheadedness, chest pain  Pt examined at bedside. Chart & results reviewed. Patient feeling very good this AM. Used bipap overnight. Denies any CP, SOB, diaphoresis. Cardiology had not yet rounded on patient. Remains NPO, was started on heparin drip last night. ROS:  Constitutional:  negative for chills, fevers, sweats  Respiratory:  negative for cough, dyspnea on exertion, hemoptysis, shortness of breath, wheezing  Cardiovascular:  negative for chest pain, chest pressure/discomfort, lower extremity edema, palpitations  Gastrointestinal:  negative for abdominal pain, constipation, diarrhea, nausea, vomiting  Neurological:  negative for dizziness, headache  BRIEF HISTORY     The patient is a pleasant 75 y.o. male with PMH of CAD s/p stenting of LAD (2004), chronic combined systolic and diastolic CHF s/p AICD, essental HTN, paroxysmal A. Fib, type 2 DM who presents with a chief complaint of lightheadedness and chest pain. Patient states he woke up this morning and had a coffee while watching TV. Shortly after, he become lightheaded and dizzy while sitting. It was not caused by standing up nor did he lose consciousness. He denies any palpitations or diaphoresis. He then made it to the bathroom to have a BM and this did not worsen or improve his lightheadedness. He then drove himself to the ED. He states he has only had 1-2 similar episodes to this in the past 10 years.   Patient also states he had associated chest

## 2019-09-03 VITALS
WEIGHT: 255.5 LBS | DIASTOLIC BLOOD PRESSURE: 58 MMHG | OXYGEN SATURATION: 100 % | RESPIRATION RATE: 18 BRPM | SYSTOLIC BLOOD PRESSURE: 99 MMHG | HEIGHT: 64 IN | TEMPERATURE: 98.1 F | HEART RATE: 65 BPM | BODY MASS INDEX: 43.62 KG/M2

## 2019-09-03 LAB
ABSOLUTE EOS #: 0.21 K/UL (ref 0–0.44)
ABSOLUTE IMMATURE GRANULOCYTE: 0.03 K/UL (ref 0–0.3)
ABSOLUTE LYMPH #: 1.36 K/UL (ref 1.1–3.7)
ABSOLUTE MONO #: 0.65 K/UL (ref 0.1–1.2)
ANION GAP SERPL CALCULATED.3IONS-SCNC: 13 MMOL/L (ref 9–17)
BASOPHILS # BLD: 0 % (ref 0–2)
BASOPHILS ABSOLUTE: 0.03 K/UL (ref 0–0.2)
BUN BLDV-MCNC: 19 MG/DL (ref 8–23)
BUN/CREAT BLD: ABNORMAL (ref 9–20)
CALCIUM SERPL-MCNC: 8.3 MG/DL (ref 8.6–10.4)
CHLORIDE BLD-SCNC: 103 MMOL/L (ref 98–107)
CO2: 26 MMOL/L (ref 20–31)
CREAT SERPL-MCNC: 0.88 MG/DL (ref 0.7–1.2)
DIFFERENTIAL TYPE: ABNORMAL
EKG ATRIAL RATE: 107 BPM
EKG ATRIAL RATE: 68 BPM
EKG ATRIAL RATE: 75 BPM
EKG ATRIAL RATE: 80 BPM
EKG P AXIS: 32 DEGREES
EKG P AXIS: 37 DEGREES
EKG P AXIS: 46 DEGREES
EKG P AXIS: 52 DEGREES
EKG P-R INTERVAL: 186 MS
EKG P-R INTERVAL: 190 MS
EKG P-R INTERVAL: 198 MS
EKG P-R INTERVAL: 200 MS
EKG Q-T INTERVAL: 320 MS
EKG Q-T INTERVAL: 352 MS
EKG Q-T INTERVAL: 360 MS
EKG Q-T INTERVAL: 434 MS
EKG QRS DURATION: 134 MS
EKG QRS DURATION: 142 MS
EKG QRS DURATION: 142 MS
EKG QRS DURATION: 148 MS
EKG QTC CALCULATION (BAZETT): 402 MS
EKG QTC CALCULATION (BAZETT): 405 MS
EKG QTC CALCULATION (BAZETT): 427 MS
EKG QTC CALCULATION (BAZETT): 461 MS
EKG R AXIS: -59 DEGREES
EKG R AXIS: -60 DEGREES
EKG R AXIS: -61 DEGREES
EKG R AXIS: -61 DEGREES
EKG T AXIS: -164 DEGREES
EKG T AXIS: 106 DEGREES
EKG T AXIS: 112 DEGREES
EKG T AXIS: 126 DEGREES
EKG VENTRICULAR RATE: 107 BPM
EKG VENTRICULAR RATE: 68 BPM
EKG VENTRICULAR RATE: 75 BPM
EKG VENTRICULAR RATE: 80 BPM
EOSINOPHILS RELATIVE PERCENT: 3 % (ref 1–4)
GFR AFRICAN AMERICAN: >60 ML/MIN
GFR NON-AFRICAN AMERICAN: >60 ML/MIN
GFR SERPL CREATININE-BSD FRML MDRD: ABNORMAL ML/MIN/{1.73_M2}
GFR SERPL CREATININE-BSD FRML MDRD: ABNORMAL ML/MIN/{1.73_M2}
GLUCOSE BLD-MCNC: 104 MG/DL (ref 70–99)
GLUCOSE BLD-MCNC: 109 MG/DL (ref 75–110)
GLUCOSE BLD-MCNC: 135 MG/DL (ref 75–110)
GLUCOSE BLD-MCNC: 152 MG/DL (ref 75–110)
HCT VFR BLD CALC: 41.1 % (ref 40.7–50.3)
HEMOGLOBIN: 12 G/DL (ref 13–17)
IMMATURE GRANULOCYTES: 0 %
LYMPHOCYTES # BLD: 18 % (ref 24–43)
MCH RBC QN AUTO: 25 PG (ref 25.2–33.5)
MCHC RBC AUTO-ENTMCNC: 29.2 G/DL (ref 28.4–34.8)
MCV RBC AUTO: 85.6 FL (ref 82.6–102.9)
MONOCYTES # BLD: 9 % (ref 3–12)
NRBC AUTOMATED: 0 PER 100 WBC
PDW BLD-RTO: 17.4 % (ref 11.8–14.4)
PLATELET # BLD: 181 K/UL (ref 138–453)
PLATELET ESTIMATE: ABNORMAL
PMV BLD AUTO: 10.8 FL (ref 8.1–13.5)
POTASSIUM SERPL-SCNC: 4 MMOL/L (ref 3.7–5.3)
RBC # BLD: 4.8 M/UL (ref 4.21–5.77)
RBC # BLD: ABNORMAL 10*6/UL
SEG NEUTROPHILS: 70 % (ref 36–65)
SEGMENTED NEUTROPHILS ABSOLUTE COUNT: 5.1 K/UL (ref 1.5–8.1)
SODIUM BLD-SCNC: 142 MMOL/L (ref 135–144)
WBC # BLD: 7.4 K/UL (ref 3.5–11.3)
WBC # BLD: ABNORMAL 10*3/UL

## 2019-09-03 PROCEDURE — 93010 ELECTROCARDIOGRAM REPORT: CPT | Performed by: INTERNAL MEDICINE

## 2019-09-03 PROCEDURE — 6370000000 HC RX 637 (ALT 250 FOR IP): Performed by: STUDENT IN AN ORGANIZED HEALTH CARE EDUCATION/TRAINING PROGRAM

## 2019-09-03 PROCEDURE — 80048 BASIC METABOLIC PNL TOTAL CA: CPT

## 2019-09-03 PROCEDURE — 2580000003 HC RX 258: Performed by: STUDENT IN AN ORGANIZED HEALTH CARE EDUCATION/TRAINING PROGRAM

## 2019-09-03 PROCEDURE — 85025 COMPLETE CBC W/AUTO DIFF WBC: CPT

## 2019-09-03 PROCEDURE — 36415 COLL VENOUS BLD VENIPUNCTURE: CPT

## 2019-09-03 PROCEDURE — 99238 HOSP IP/OBS DSCHRG MGMT 30/<: CPT | Performed by: INTERNAL MEDICINE

## 2019-09-03 PROCEDURE — 82947 ASSAY GLUCOSE BLOOD QUANT: CPT

## 2019-09-03 RX ADMIN — MAGNESIUM HYDROXIDE 30 ML: 400 SUSPENSION ORAL at 11:45

## 2019-09-03 RX ADMIN — FUROSEMIDE 40 MG: 40 TABLET ORAL at 07:59

## 2019-09-03 RX ADMIN — Medication 81 MG: at 07:59

## 2019-09-03 RX ADMIN — FUROSEMIDE 40 MG: 40 TABLET ORAL at 17:14

## 2019-09-03 RX ADMIN — ALLOPURINOL 300 MG: 300 TABLET ORAL at 07:59

## 2019-09-03 RX ADMIN — APIXABAN 5 MG: 5 TABLET, FILM COATED ORAL at 08:04

## 2019-09-03 RX ADMIN — ISOSORBIDE MONONITRATE 30 MG: 30 TABLET ORAL at 07:59

## 2019-09-03 RX ADMIN — Medication 10 ML: at 08:03

## 2019-09-03 RX ADMIN — INSULIN LISPRO 2 UNITS: 100 INJECTION, SOLUTION INTRAVENOUS; SUBCUTANEOUS at 11:44

## 2019-09-03 RX ADMIN — METOPROLOL SUCCINATE 25 MG: 25 TABLET, FILM COATED, EXTENDED RELEASE ORAL at 07:58

## 2019-09-03 RX ADMIN — PANTOPRAZOLE SODIUM 40 MG: 40 TABLET, DELAYED RELEASE ORAL at 07:58

## 2019-09-03 ASSESSMENT — PAIN SCALES - GENERAL: PAINLEVEL_OUTOF10: 0

## 2019-09-03 NOTE — FLOWSHEET NOTE
SPIRITUAL CARE PROGRESS NOTE    Spiritual Assessment: Spiritual Consult rec'd for purpose of discussing the Advance Directives with pt. Pt has been brought up as a \"believer\" in a [address]. However,, pt has looked at various mainline religions, and not one fits his ideas of the consuelo he holds. He admits he does pray, and know that the content of our discussion on Advance Directives will lead to prayer and pondering just what he wishes to have in these documents. Pt is very involved in this discussion, and has very many questions, has concerns about various potential life situations, the limits and benefits of having the Advance Directives completed. Pt admits that he has spoken about doing these documents, with his cousin who is an . Intervention: confirmed reason for this consultation, spent a lengthy time going over the documents, answering pt's very appropriate and thought-filled questions. Inquired as to pt's consuelo status as well, offered prayer with pt. Enc. Pt to ask questions of his cousins - and pointed out the number to Spiritual care office here in hospital, that he may call as needed, for direction or questions, as pt is to be discharged most likely today, and will not have time to read the documents, and ponder his responses. Outcome:Pt was pleased to have opportunity to sit and have a discussion with  re the advance directives, pt. Had most well thought out questions, and concerns, and these were addressed by the  to pt's satisfaction.  left the \"You have a voice\" leaflet and a packet of advance directive materials, and instructed pt to have nurse call Spiritual care if is able to address documents, prior to discharge, or to call Spiritual care dept. With questions. Reminded pt NOT to sign documents until have appropriate witnesses. Pt appreciated time and information and prayer.                            09/03/19 1025   Encounter

## 2019-09-03 NOTE — PROGRESS NOTES
lasted about 1 hour in total and ended while in the ED. The pain is located in the left parasternal region and also in his neck. Nothing alleviates or worsens the pain. He described it as a mild pressure-type pain. The pain had since resolved at the time of my examination. He denies any SOB or diaphoresis. Patient is a nonsmoker, nondrinker, no illicit drug use. He denies any fever,chills, sick contacts, recent travel.     EKG in the ED showed normal sinus rhythm, nonspecific intraventricular conduction delay suggesting LBBB, ST elevation in V2, T wave inversions in aVL. Initial troponins were 12 but rising to 24 then 117. OBJECTIVE     Vital Signs:  /73   Pulse 58   Temp 97.7 °F (36.5 °C) (Oral)   Resp 16   Ht 5' 4\" (1.626 m)   Wt 255 lb 8 oz (115.9 kg)   SpO2 98%   BMI 43.86 kg/m²     Temp (24hrs), Av.1 °F (36.7 °C), Min:97.7 °F (36.5 °C), Max:98.4 °F (36.9 °C)    In: -   Out: 300 [Urine:300]    Physical Exam:  Constitutional: This is a well developed, well nourished, obese 75y.o. year old male who is alert, oriented, cooperative and in no apparent distress. Head:normocephalic and atraumatic. EENT:  PERRLA. No conjunctival injections. Septum was midline, mucosa was without erythema, exudates or cobblestoning. No thrush was noted. Neck: Supple without thyromegaly. No elevated JVP. Trachea was midline. Respiratory:  Breath sounds bilaterally were clear to auscultation. There were no wheezes, rhonchi or rales. There is no intercostal retraction or use of accessory muscles. Cardiovascular: Regular without murmur, clicks, gallops or rubs. Abdomen: Nontender to palpation. BS +. Slightly rounded and soft without organomegaly. No rebound, rigidity or guarding was appreciated. Lymphatic: No lymphadenopathy. Musculoskeletal: Normal curvature of the spine. No gross muscle weakness. Extremities:  No lower extremity edema, ulcerations, tenderness, varicosities or erythema.   Muscle 09/02/19  0448 09/02/19  1146   APTT 25.1 27.4 38.2*     CARDIAC ENZYMES: No results for input(s): CKMB, CKMBINDEX, TROPONINI in the last 72 hours. Invalid input(s): CKTOTAL;3  FASTING LIPID PANEL:  Lab Results   Component Value Date    CHOL 106 11/16/2017    HDL 40 (L) 02/11/2019    TRIG 135 11/16/2017     LIVER PROFILE:   Recent Labs     09/01/19  1300   AST 17   ALT 11   BILITOT 0.48   ALKPHOS 79      MICROBIOLOGY:   Lab Results   Component Value Date/Time    CULTURE NO GROWTH 6 DAYS 09/13/2017 01:59 AM    CULTURE  09/13/2017 01:59 AM     [address], [address] [place] [address] [phone]    CULTURE NO GROWTH 6 DAYS 09/13/2017 01:59 AM    CULTURE  09/13/2017 01:59 AM     [address] [phone]       Imaging:    Xr Chest Portable    Result Date: 9/1/2019  Mild CHF . ASSESSMENT & PLAN     ASSESSMENT / PLAN:     NSTEMI  -EKG showing T wave changes, elevation in V2  -Cardiology following  -Trend troponins. 12 --> 24 -->131  -No invasive procedures per cardiology  -Heparin drip stopped, resume eliquis  -cardiac diet     Essential HTN  -Well controlled  -Resume home dose Imdur 30 mg daily, lisinopril 5 mg daily, toprol XL 25 mg daily     Paroxysmal A. Fib  -Still in NSR  -Eliquis restarted  -Pacer interrogation done today.  OK to DC today if pacer interrogation is unremarkable and cleared by cardiology     Diabetes mellitus type 2   -Hold home metformin  -Medium dose ISS  -Hypoglycemia protocol  -Carb control diet     Chronic combined systolic and diastolic CHF s/p AICD  -EF 15-20% (9/13/17)  -echo ordered  -Lasix 40 mg BID  -Strict I and Os  -Daily weights  -monitor for signs of fluid overload  -Carb control diet  -Fluid restriction     Hypokalemia  -3.6 on admission  -K replacement protocol      CAD s/p LAD stent (2004)  -Resume home metoprolol, ASA, plavix, statin     Obstructive sleep apnea  -CPAP at night     Gout  -No acute flare  -Resume home

## 2019-09-03 NOTE — PROGRESS NOTES
Physical Therapy  DATE: 9/3/2019    NAME: Kristy Camacho  MRN: [id number]   : 1955    Patient not seen this date for Physical Therapy due to:  [] Blood transfusion in progress  [] Hemodialysis  []  Patient Declined  [] Spine Precautions   [] Strict Bedrest  [] Surgery/ Procedure  [] Testing      [x] Other - Pt's Troponin levels are rising as of last test. PT to check back. [] PT being discontinued at this time. Patient independent. No further needs. [] PT being discontinued at this time as the patient has been transferred to palliative care. No further needs. Deidra Murillo, This treatment/evaluation completed by signing SPT. Signing PT agrees with treatment and documentation.

## 2019-09-03 NOTE — PROGRESS NOTES
Patient cleared by Cardiology for discharge. Discharge order placed.     Marcia Huber MD  PGY-2, Department of Internal Medicine  [address], [place]  9/3/2019 4:34 PM

## 2019-09-04 ENCOUNTER — CARE COORDINATION (OUTPATIENT)
Dept: CASE MANAGEMENT | Age: 64
End: 2019-09-04

## 2019-09-04 DIAGNOSIS — I21.4 NSTEMI (NON-ST ELEVATED MYOCARDIAL INFARCTION) (HCC): Primary | ICD-10-CM

## 2019-09-04 DIAGNOSIS — K21.9 GASTROESOPHAGEAL REFLUX DISEASE WITHOUT ESOPHAGITIS: ICD-10-CM

## 2019-09-04 DIAGNOSIS — I50.42 CHRONIC COMBINED SYSTOLIC AND DIASTOLIC HEART FAILURE (HCC): ICD-10-CM

## 2019-09-04 RX ORDER — LISINOPRIL 5 MG/1
5 TABLET ORAL DAILY
Qty: 30 TABLET | Refills: 0 | Status: SHIPPED | OUTPATIENT
Start: 2019-09-04 | End: 2019-10-09 | Stop reason: SDUPTHER

## 2019-09-04 RX ORDER — ISOSORBIDE MONONITRATE 30 MG/1
30 TABLET, EXTENDED RELEASE ORAL DAILY
Qty: 30 TABLET | Refills: 0 | Status: SHIPPED | OUTPATIENT
Start: 2019-09-04 | End: 2019-10-09 | Stop reason: SDUPTHER

## 2019-09-04 RX ORDER — OMEPRAZOLE 20 MG/1
CAPSULE, DELAYED RELEASE ORAL
Qty: 30 CAPSULE | Refills: 0 | Status: SHIPPED | OUTPATIENT
Start: 2019-09-04 | End: 2019-10-09 | Stop reason: SDUPTHER

## 2019-09-04 NOTE — TELEPHONE ENCOUNTER
Refill request for meds pended        Next Visit Date:  Future Appointments   Date Time Provider Keerthi [place]   9/17/2019  3:20 PM LIDIA Tran CNP [facility]   10/17/2019 11:30 AM Janak Saldana MD [facility]   10/23/2019  1:15 PM Jo Scott DPM [facility]   8/17/2020  1:00 PM Carmita Adolfo, DO Resp Spec [address] Maintenance   Topic Date Due    Shingles Vaccine (2 of 3) 06/17/2015    Annual Wellness Visit (AWV)  01/23/2016    Flu vaccine (1) 09/01/2019    Diabetic retinal exam  11/12/2019    Lipid screen  02/11/2020    Diabetic foot exam  03/27/2020    Colon cancer screen colonoscopy  04/04/2020    A1C test (Diabetic or Prediabetic)  05/31/2020    Potassium monitoring  09/03/2020    Creatinine monitoring  09/03/2020    Pneumococcal 65+ years Vaccine (2 of 2 - PPSV23) 06/30/2021    DTaP/Tdap/Td vaccine (2 - Td) 06/30/2026    AAA screen  Completed    Hepatitis C screen  Completed       Hemoglobin A1C (%)   Date Value   05/31/2019 5.9   10/26/2018 6.4   03/15/2018 6.3             ( goal A1C is < 7)   Microalb/Crt.  Ratio (mcg/mg creat)   Date Value   06/01/2017 11     LDL Cholesterol (mg/dL)   Date Value   02/11/2019 47   11/16/2017 45       (goal LDL is <100)   AST (U/L)   Date Value   09/01/2019 17     ALT (U/L)   Date Value   09/01/2019 11     BUN (mg/dL)   Date Value   09/03/2019 19     BP Readings from Last 3 Encounters:   09/03/19 (!) 99/58   08/12/19 114/68   07/17/19 107/69          (goal 120/80)    All Future Testing planned in [facility]  Lab Frequency Next Occurrence   CBC with Differential     IgG, IgA, IgM     CBC Auto Differential     Comprehensive Metabolic Panel     IgG     IgA     IgM     [place]oxie/Lambda Quant Free Light Chains Serum     Protein Electrophoresis, Serum                 Patient Active Problem List:     Chronic combined systolic and diastolic heart failure (HCC) s/[ AICD placed     Chronic kidney disease, stage II (mild)     Chronic gout     Morbid obesity (HCC)     Hyperlipidemia     Iron deficiency anemia     Anxiety disorder      DJD (degenerative joint disease)     TANNER variably compliant with BiPAP     CAD (coronary artery disease) s/p stenting of L anterior descending artery 2004     Diabetic retinopathy ([facility] [address]     Ischemic cardiomyopathy     HTN (hypertension)     NSTEMI (non-ST elevated myocardial infarction) (HCC)     MGUS (monoclonal gammopathy of unknown significance)     Type 2 diabetes mellitus without complication ([facility])     AICD (automatic cardioverter/defibrillator) present     PAF (paroxysmal atrial fibrillation) ([facility])     Coronary angioplasty status     Hypokalemia

## 2019-09-05 NOTE — DISCHARGE SUMMARY
pressure-type pain.      EKG in the ED showed normal sinus rhythm, nonspecific intraventricular conduction delay suggesting LBBB, ST elevation in V2, T wave inversions in aVL. Initial troponins were 12 but rising to 24 then 117. Heparin drip was started overnight and eliquis was held. Cardiology were consulted and diagnosed the patient with demand ischemia. Heparin drip was stopped the following day and chest pain resolved. Patient was discharged on 9/3/2019 with plan to follow up with cardiology.       Procedures/ Significant Interventions:    none    Consults:     Consults:     Final Specialist Recommendations/Findings:   IP CONSULT TO CARDIOLOGY  IP CONSULT TO INTERNAL MEDICINE  IP CONSULT TO SOCIAL WORK  IP CONSULT TO CARDIOLOGY  IP CONSULT TO [address] [place] [address] Acquired Infections: none    Discharge Functional Status:  stable    DISCHARGE PLAN     Disposition: home    Patient Instructions:   Discharge Medication List as of 9/3/2019  5:21 PM      CONTINUE these medications which have NOT CHANGED    Details   isosorbide mononitrate (IMDUR) 30 MG extended release tablet TAKE 1 TABLET BY MOUTH DAILY, Disp-30 tablet, R-0Normal      lisinopril (PRINIVIL;ZESTRIL) 5 MG tablet TAKE 1 TABLET BY MOUTH DAILY, Disp-30 tablet, R-0Normal      ELIQUIS 5 MG TABS tablet TAKE 1 TABLET BY MOUTH 2 TIMES DAILY, Disp-60 tablet, R-3Normal      omeprazole (PRILOSEC) 20 MG delayed release capsule TAKE 1 CAPSULE DAILY, Disp-30 capsule, R-0Normal      atorvastatin (LIPITOR) 40 MG tablet Take 1 tablet by mouth nightly, Disp-30 tablet, R-4Normal      metoprolol succinate (TOPROL XL) 25 MG extended release tablet Take 1 tablet by mouth daily, Disp-30 tablet, R-3Normal      furosemide (LASIX) 40 MG tablet TAKE 1 TABLET BY MOUTH 2 TIMES A DAY, Disp-60 tablet, R-3Normal      allopurinol (ZYLOPRIM) 300 MG tablet TAKE 1 TABLET DAILY, Disp-30 tablet, R-3Normal      aspirin (HM ASPIRIN EC LOW DOSE) 81 MG EC tablet TAKE 1 TABLET TWICE A DAY, Disp-60 tablet, R-3Normal      glimepiride (AMARYL) 1 MG tablet TAKE 1 TABLET BY MOUTH EVERY MORNING BEFORE BREAKFAST, Disp-30 tablet, R-3Normal      metFORMIN (GLUCOPHAGE) 1000 MG tablet TAKE 1 TABLET BY MOUTH 2 TIMES A DAY WITH MEALS, Disp-60 tablet, R-5Normal      melatonin 3 MG TABS tablet Take 1 tablet by mouthHistorical Med      ammonium lactate (LAC-HYDRIN) 12 % lotion Apply topically daily after bathing sparing the space between the toes. , Disp-222 mL, R-3, Normal      Alcohol Swabs (ALCOHOL PREP) 70 % PADS Disp-100 each, R-11, NormalUSE AS DIRECTED      blood glucose test strips (FREESTYLE LITE) strip Disp-100 each, R-11, NormalUSE AS DIRECTED TWICE A DAY      !! TRUEPLUS LANCETS 33G MISC Disp-100 each, R-11, NormalTEST AS DIRECTED      Blood Pressure KIT DAILY Starting Fri 5/31/2019, Disp-1 kit, R-0, Print      acetaminophen (ACETAMINOPHEN EXTRA STRENGTH) 500 MG tablet TAKE 1 TABLET BY MOUTH 2 TIMES A DAY AS NEEDED FOR PAIN, Disp-60 tablet, R-0Normal      !! Lancets MISC Disp-100 each, R-3, NormalDaily       ! ! - Potential duplicate medications found. Please discuss with provider. Activity: activity as tolerated    Diet: cardiac diet    Follow-up:    Molly Burrell MD  [place] [address]  [address]  [phone]          Your cardiologist      September 17th 2019 at 3:20pm      Patient Instructions: f/u with cardiologist  Follow up labs: none  Follow up imaging: none    Note that over 30 minutes was spent in preparing discharge papers, discussing discharge with patient, medication review, etc.      Maciej Casas MD, MD  Internal Medicine Resident, PGY-1  [facility];  [place], [place]  9/5/2019, 2:44 PM

## 2019-09-09 ENCOUNTER — CARE COORDINATION (OUTPATIENT)
Dept: CASE MANAGEMENT | Age: 64
End: 2019-09-09

## 2019-09-12 ENCOUNTER — CARE COORDINATION (OUTPATIENT)
Dept: CASE MANAGEMENT | Age: 64
End: 2019-09-12

## 2019-09-12 NOTE — CARE COORDINATION
Regi [facility] Transitions Follow Up Call    2019    Patient: Shashank Mccloud  Patient : 1955   MRN: [id number]  Reason for Admission: Chest pain at rest  Discharge Date: 9/3/19 RARS: Readmission Risk Score: 18         Spoke with: Shashank Mccloud    Was able to contact [facility]han[facility] Last for transitional outreach. He stated that he was doing well. He was just cleaning the house. He denied chest pain, shortness of breath, cough or N/V. He thinks that the chest pain came from him drinking caffeine. He has not taken in any caffeine and he is feeling better. He has a cardiology appointment on Tues. No questions or concerns at this time    Care Transitions Subsequent and Final Call    Subsequent and Final Calls  Do you have any ongoing symptoms?:  No  Have your medications changed?:  No  Do you have any questions related to your medications?:  No  Do you currently have any active services?:  No  Do you have any needs or concerns that I can assist you with?:  No  Care Transitions Interventions  Other Interventions:             Follow Up  Future Appointments   Date Time Provider Keerthi [place]   2019  3:20 PM LIDIA Garrett - CNP [facility]   10/17/2019 11:30 AM Kip Bolton MD [facility]   10/23/2019  1:15 PM Heather Ruiz DPM [facility]iat[facility]   2020  1:00 PM Isai Glalegos DO Resp Spec Foster Hendrix RN

## 2019-09-18 ENCOUNTER — CARE COORDINATION (OUTPATIENT)
Dept: CASE MANAGEMENT | Age: 64
End: 2019-09-18

## 2019-09-18 NOTE — CARE COORDINATION
Regi 45 Transitions Follow Up Call    2019    Patient: Darline Marcelino  Patient : 1955   MRN: [id number]  Reason for Admission: . Discharge Date: 9/3/19 RARS: Readmission Risk Score: 18         Final call- unable to reach patient, left vm message informing patient of final call, left contact information, encouraged to call with any needs or concerns, care transitions completed//JU    Care Transitions Subsequent and Final Call    Subsequent and Final Calls  Care Transitions Interventions  Other Interventions:             Follow Up  Future Appointments   Date Time Provider Keerthi [place]   2019  3:20 PM LIDIA Freitas - CNP [facility]   10/23/2019  1:15 PM Renata Lovelace DPM [facility] Podiatry [facility]   10/24/2019 11:30 AM Parrish Cramer MD [facility]   2020  1:00 PM Bonita Berger DO Resp Spec Obadiah Severe, RN

## 2019-09-30 ENCOUNTER — OFFICE VISIT (OUTPATIENT)
Dept: INTERNAL MEDICINE | Age: 64
End: 2019-09-30
Payer: COMMERCIAL

## 2019-09-30 VITALS
WEIGHT: 251 LBS | BODY MASS INDEX: 43.08 KG/M2 | OXYGEN SATURATION: 98 % | DIASTOLIC BLOOD PRESSURE: 59 MMHG | HEART RATE: 72 BPM | SYSTOLIC BLOOD PRESSURE: 104 MMHG

## 2019-09-30 DIAGNOSIS — Z23 NEED FOR SHINGLES VACCINE: ICD-10-CM

## 2019-09-30 DIAGNOSIS — I25.10 CORONARY ARTERY DISEASE INVOLVING NATIVE CORONARY ARTERY OF NATIVE HEART WITHOUT ANGINA PECTORIS: ICD-10-CM

## 2019-09-30 DIAGNOSIS — I48.0 PAF (PAROXYSMAL ATRIAL FIBRILLATION) (HCC): ICD-10-CM

## 2019-09-30 DIAGNOSIS — I50.42 CHRONIC COMBINED SYSTOLIC AND DIASTOLIC HEART FAILURE (HCC): Primary | ICD-10-CM

## 2019-09-30 DIAGNOSIS — Z23 NEED FOR INFLUENZA VACCINATION: ICD-10-CM

## 2019-09-30 PROCEDURE — 1111F DSCHRG MED/CURRENT MED MERGE: CPT | Performed by: NURSE PRACTITIONER

## 2019-09-30 PROCEDURE — 90686 IIV4 VACC NO PRSV 0.5 ML IM: CPT | Performed by: NURSE PRACTITIONER

## 2019-10-09 DIAGNOSIS — K21.9 GASTROESOPHAGEAL REFLUX DISEASE WITHOUT ESOPHAGITIS: ICD-10-CM

## 2019-10-09 DIAGNOSIS — I50.42 CHRONIC COMBINED SYSTOLIC AND DIASTOLIC HEART FAILURE (HCC): ICD-10-CM

## 2019-10-10 RX ORDER — ISOSORBIDE MONONITRATE 30 MG/1
30 TABLET, EXTENDED RELEASE ORAL DAILY
Qty: 30 TABLET | Refills: 0 | Status: SHIPPED | OUTPATIENT
Start: 2019-10-10 | End: 2019-11-11 | Stop reason: SDUPTHER

## 2019-10-10 RX ORDER — OMEPRAZOLE 20 MG/1
CAPSULE, DELAYED RELEASE ORAL
Qty: 30 CAPSULE | Refills: 0 | Status: SHIPPED | OUTPATIENT
Start: 2019-10-10 | End: 2019-11-11 | Stop reason: SDUPTHER

## 2019-10-10 RX ORDER — LISINOPRIL 5 MG/1
5 TABLET ORAL DAILY
Qty: 30 TABLET | Refills: 0 | Status: SHIPPED | OUTPATIENT
Start: 2019-10-10 | End: 2019-11-11 | Stop reason: SDUPTHER

## 2019-10-23 ENCOUNTER — OFFICE VISIT (OUTPATIENT)
Dept: PODIATRY | Age: 64
End: 2019-10-23
Payer: COMMERCIAL

## 2019-10-23 VITALS
BODY MASS INDEX: 43.19 KG/M2 | HEIGHT: 64 IN | HEART RATE: 68 BPM | WEIGHT: 253 LBS | DIASTOLIC BLOOD PRESSURE: 68 MMHG | SYSTOLIC BLOOD PRESSURE: 107 MMHG

## 2019-10-23 DIAGNOSIS — E11.9 TYPE 2 DIABETES MELLITUS WITHOUT COMPLICATION, WITHOUT LONG-TERM CURRENT USE OF INSULIN (HCC): ICD-10-CM

## 2019-10-23 DIAGNOSIS — B35.1 ONYCHOMYCOSIS: Primary | ICD-10-CM

## 2019-10-23 DIAGNOSIS — L84 CALLUS OF FOOT: ICD-10-CM

## 2019-10-23 DIAGNOSIS — M79.671 PAIN IN BOTH FEET: ICD-10-CM

## 2019-10-23 DIAGNOSIS — M79.672 PAIN IN BOTH FEET: ICD-10-CM

## 2019-10-23 PROCEDURE — 99213 OFFICE O/P EST LOW 20 MIN: CPT | Performed by: STUDENT IN AN ORGANIZED HEALTH CARE EDUCATION/TRAINING PROGRAM

## 2019-10-23 PROCEDURE — 11056 PARNG/CUTG B9 HYPRKR LES 2-4: CPT | Performed by: STUDENT IN AN ORGANIZED HEALTH CARE EDUCATION/TRAINING PROGRAM

## 2019-10-23 PROCEDURE — 11721 DEBRIDE NAIL 6 OR MORE: CPT | Performed by: STUDENT IN AN ORGANIZED HEALTH CARE EDUCATION/TRAINING PROGRAM

## 2019-10-23 PROCEDURE — 99212 OFFICE O/P EST SF 10 MIN: CPT | Performed by: STUDENT IN AN ORGANIZED HEALTH CARE EDUCATION/TRAINING PROGRAM

## 2019-11-11 DIAGNOSIS — I48.0 PAF (PAROXYSMAL ATRIAL FIBRILLATION) (HCC): ICD-10-CM

## 2019-11-11 DIAGNOSIS — I25.10 CORONARY ARTERY DISEASE INVOLVING NATIVE CORONARY ARTERY OF NATIVE HEART WITHOUT ANGINA PECTORIS: ICD-10-CM

## 2019-11-11 DIAGNOSIS — K21.9 GASTROESOPHAGEAL REFLUX DISEASE WITHOUT ESOPHAGITIS: ICD-10-CM

## 2019-11-11 DIAGNOSIS — I50.42 CHRONIC COMBINED SYSTOLIC AND DIASTOLIC HEART FAILURE (HCC): ICD-10-CM

## 2019-11-11 DIAGNOSIS — E11.9 TYPE 2 DIABETES MELLITUS WITHOUT COMPLICATION, WITHOUT LONG-TERM CURRENT USE OF INSULIN (HCC): ICD-10-CM

## 2019-11-12 RX ORDER — LISINOPRIL 5 MG/1
5 TABLET ORAL DAILY
Qty: 30 TABLET | Refills: 0 | Status: SHIPPED | OUTPATIENT
Start: 2019-11-12 | End: 2019-12-12 | Stop reason: SDUPTHER

## 2019-11-12 RX ORDER — GLIMEPIRIDE 1 MG/1
TABLET ORAL
Qty: 30 TABLET | Refills: 0 | Status: SHIPPED | OUTPATIENT
Start: 2019-11-12 | End: 2019-12-12 | Stop reason: SDUPTHER

## 2019-11-12 RX ORDER — APIXABAN 5 MG/1
TABLET, FILM COATED ORAL
Qty: 60 TABLET | Refills: 3 | Status: SHIPPED | OUTPATIENT
Start: 2019-11-12 | End: 2019-11-21 | Stop reason: SDUPTHER

## 2019-11-12 RX ORDER — ISOSORBIDE MONONITRATE 30 MG/1
30 TABLET, EXTENDED RELEASE ORAL DAILY
Qty: 30 TABLET | Refills: 0 | Status: SHIPPED | OUTPATIENT
Start: 2019-11-12 | End: 2019-12-16 | Stop reason: SDUPTHER

## 2019-11-12 RX ORDER — OMEPRAZOLE 20 MG/1
CAPSULE, DELAYED RELEASE ORAL
Qty: 30 CAPSULE | Refills: 0 | Status: SHIPPED | OUTPATIENT
Start: 2019-11-12 | End: 2019-12-12 | Stop reason: SDUPTHER

## 2019-11-12 RX ORDER — ATORVASTATIN CALCIUM 40 MG/1
TABLET, FILM COATED ORAL
Qty: 30 TABLET | Refills: 5 | Status: SHIPPED | OUTPATIENT
Start: 2019-11-12 | End: 2020-01-02 | Stop reason: SDUPTHER

## 2019-11-21 DIAGNOSIS — I50.42 CHRONIC COMBINED SYSTOLIC AND DIASTOLIC HEART FAILURE (HCC): ICD-10-CM

## 2019-11-21 DIAGNOSIS — M1A.00X0 IDIOPATHIC CHRONIC GOUT WITHOUT TOPHUS, UNSPECIFIED SITE: ICD-10-CM

## 2019-11-21 DIAGNOSIS — I48.0 PAF (PAROXYSMAL ATRIAL FIBRILLATION) (HCC): ICD-10-CM

## 2019-11-21 DIAGNOSIS — I25.10 CORONARY ARTERY DISEASE INVOLVING NATIVE CORONARY ARTERY OF NATIVE HEART WITHOUT ANGINA PECTORIS: ICD-10-CM

## 2019-11-26 RX ORDER — ALLOPURINOL 300 MG/1
TABLET ORAL
Qty: 30 TABLET | Refills: 3 | Status: SHIPPED | OUTPATIENT
Start: 2019-11-26 | End: 2020-01-02 | Stop reason: SDUPTHER

## 2019-11-26 RX ORDER — FUROSEMIDE 40 MG/1
TABLET ORAL
Qty: 60 TABLET | Refills: 3 | Status: SHIPPED | OUTPATIENT
Start: 2019-11-26 | End: 2020-01-02 | Stop reason: SDUPTHER

## 2019-11-26 RX ORDER — ASPIRIN 81 MG/1
TABLET ORAL
Qty: 60 TABLET | Refills: 3 | Status: SHIPPED | OUTPATIENT
Start: 2019-11-26 | End: 2020-01-02 | Stop reason: SDUPTHER

## 2019-12-12 DIAGNOSIS — E11.9 TYPE 2 DIABETES MELLITUS WITHOUT COMPLICATION, WITHOUT LONG-TERM CURRENT USE OF INSULIN (HCC): ICD-10-CM

## 2019-12-12 DIAGNOSIS — K21.9 GASTROESOPHAGEAL REFLUX DISEASE WITHOUT ESOPHAGITIS: ICD-10-CM

## 2019-12-12 DIAGNOSIS — I50.42 CHRONIC COMBINED SYSTOLIC AND DIASTOLIC HEART FAILURE (HCC): ICD-10-CM

## 2019-12-15 RX ORDER — GLIMEPIRIDE 1 MG/1
TABLET ORAL
Qty: 30 TABLET | Refills: 0 | Status: SHIPPED | OUTPATIENT
Start: 2019-12-15 | End: 2020-01-02 | Stop reason: ALTCHOICE

## 2019-12-15 RX ORDER — OMEPRAZOLE 20 MG/1
CAPSULE, DELAYED RELEASE ORAL
Qty: 30 CAPSULE | Refills: 0 | Status: SHIPPED | OUTPATIENT
Start: 2019-12-15 | End: 2020-01-02 | Stop reason: SDUPTHER

## 2019-12-15 RX ORDER — LISINOPRIL 5 MG/1
5 TABLET ORAL DAILY
Qty: 30 TABLET | Refills: 0 | Status: SHIPPED | OUTPATIENT
Start: 2019-12-15 | End: 2020-01-02 | Stop reason: SDUPTHER

## 2019-12-16 DIAGNOSIS — I50.42 CHRONIC COMBINED SYSTOLIC AND DIASTOLIC HEART FAILURE (HCC): ICD-10-CM

## 2019-12-17 RX ORDER — ISOSORBIDE MONONITRATE 30 MG/1
30 TABLET, EXTENDED RELEASE ORAL DAILY
Qty: 30 TABLET | Refills: 0 | Status: SHIPPED | OUTPATIENT
Start: 2019-12-17 | End: 2020-01-02 | Stop reason: SDUPTHER

## 2020-01-02 ENCOUNTER — OFFICE VISIT (OUTPATIENT)
Dept: INTERNAL MEDICINE | Age: 65
End: 2020-01-02
Payer: COMMERCIAL

## 2020-01-02 VITALS
HEART RATE: 62 BPM | BODY MASS INDEX: 43.19 KG/M2 | WEIGHT: 253 LBS | DIASTOLIC BLOOD PRESSURE: 69 MMHG | SYSTOLIC BLOOD PRESSURE: 108 MMHG | HEIGHT: 64 IN

## 2020-01-02 LAB — HBA1C MFR BLD: 5.6 %

## 2020-01-02 PROCEDURE — 99213 OFFICE O/P EST LOW 20 MIN: CPT | Performed by: STUDENT IN AN ORGANIZED HEALTH CARE EDUCATION/TRAINING PROGRAM

## 2020-01-02 PROCEDURE — 83036 HEMOGLOBIN GLYCOSYLATED A1C: CPT | Performed by: STUDENT IN AN ORGANIZED HEALTH CARE EDUCATION/TRAINING PROGRAM

## 2020-01-02 PROCEDURE — 99211 OFF/OP EST MAY X REQ PHY/QHP: CPT | Performed by: INTERNAL MEDICINE

## 2020-01-02 RX ORDER — ATORVASTATIN CALCIUM 40 MG/1
40 TABLET, FILM COATED ORAL DAILY
Qty: 30 TABLET | Refills: 5 | Status: SHIPPED | OUTPATIENT
Start: 2020-01-02 | End: 2021-01-06 | Stop reason: SDUPTHER

## 2020-01-02 RX ORDER — OMEPRAZOLE 20 MG/1
20 CAPSULE, DELAYED RELEASE ORAL DAILY
Qty: 30 CAPSULE | Refills: 0 | Status: SHIPPED | OUTPATIENT
Start: 2020-01-02 | End: 2020-02-06

## 2020-01-02 RX ORDER — GLUCOSAM/CHON-MSM1/C/MANG/BOSW 500-416.6
TABLET ORAL
Qty: 100 EACH | Refills: 11 | Status: SHIPPED | OUTPATIENT
Start: 2020-01-02 | End: 2022-02-04

## 2020-01-02 RX ORDER — ASPIRIN 81 MG/1
TABLET ORAL
Qty: 60 TABLET | Refills: 3 | Status: SHIPPED | OUTPATIENT
Start: 2020-01-02 | End: 2020-05-10

## 2020-01-02 RX ORDER — AMMONIUM LACTATE 12 G/100G
LOTION TOPICAL
Qty: 222 ML | Refills: 3 | Status: SHIPPED | OUTPATIENT
Start: 2020-01-02 | End: 2022-09-14

## 2020-01-02 RX ORDER — FUROSEMIDE 40 MG/1
TABLET ORAL
Qty: 60 TABLET | Refills: 3 | Status: SHIPPED | OUTPATIENT
Start: 2020-01-02 | End: 2020-05-10

## 2020-01-02 RX ORDER — ISOSORBIDE MONONITRATE 30 MG/1
30 TABLET, EXTENDED RELEASE ORAL DAILY
Qty: 30 TABLET | Refills: 0 | Status: SHIPPED | OUTPATIENT
Start: 2020-01-02 | End: 2020-02-06

## 2020-01-02 RX ORDER — ALLOPURINOL 300 MG/1
300 TABLET ORAL DAILY
Qty: 30 TABLET | Refills: 3 | Status: SHIPPED | OUTPATIENT
Start: 2020-01-02 | End: 2020-05-10

## 2020-01-02 RX ORDER — METOPROLOL SUCCINATE 25 MG/1
25 TABLET, EXTENDED RELEASE ORAL DAILY
Qty: 30 TABLET | Refills: 3 | Status: SHIPPED | OUTPATIENT
Start: 2020-01-02 | End: 2020-05-10

## 2020-01-02 RX ORDER — LISINOPRIL 5 MG/1
5 TABLET ORAL DAILY
Qty: 30 TABLET | Refills: 0 | Status: SHIPPED | OUTPATIENT
Start: 2020-01-02 | End: 2020-02-06

## 2020-01-02 ASSESSMENT — PATIENT HEALTH QUESTIONNAIRE - PHQ9
SUM OF ALL RESPONSES TO PHQ QUESTIONS 1-9: 0
SUM OF ALL RESPONSES TO PHQ9 QUESTIONS 1 & 2: 0
2. FEELING DOWN, DEPRESSED OR HOPELESS: 0
SUM OF ALL RESPONSES TO PHQ QUESTIONS 1-9: 0
1. LITTLE INTEREST OR PLEASURE IN DOING THINGS: 0

## 2020-01-02 NOTE — PATIENT INSTRUCTIONS
infection. ? Keep your skin soft. Use moisturizing skin cream to keep the skin on your feet soft and prevent calluses and cracks. But do not put the cream between your toes, and stop using any cream that causes a rash. ? Clean underneath your toenails carefully. Do not use a sharp object to clean underneath your toenails. Use the blunt end of a nail file or other rounded tool. ? Trim and file your toenails straight across to prevent ingrown toenails. Use a nail clipper, not scissors. Use an emery board to smooth the edges. · Change socks daily. Socks without seams are best, because seams often rub the feet. You can find socks for people with diabetes from specialty catalogs. · Look inside your shoes every day for things like gravel or torn linings, which could cause blisters or sores. · Buy shoes that fit well:  ? Look for shoes that have plenty of space around the toes. This helps prevent bunions and blisters. ? Try on shoes while wearing the kind of socks you will usually wear with the shoes. ? Avoid plastic shoes. They may rub your feet and cause blisters. Good shoes should be made of materials that are flexible and breathable, such as leather or cloth. ? Break in new shoes slowly by wearing them for no more than an hour a day for several days. Take extra time to check your feet for red areas, blisters, or other problems after you wear new shoes. · Do not go barefoot. Do not wear sandals, and do not wear shoes with very thin soles. Thin soles are easy to puncture. They also do not protect your feet from hot pavement or cold weather. · Have your doctor check your feet during each visit. If you have a foot problem, see your doctor. Do not try to treat an early foot problem at home. Home remedies or treatments that you can buy without a prescription (such as corn removers) can be harmful. · Always get early treatment for foot problems.  A minor irritation can lead to a major problem if not properly cared for early. When should you call for help? Call your doctor now or seek immediate medical care if:    · You have a foot sore, an ulcer or break in the skin that is not healing after 4 days, bleeding corns or calluses, or an ingrown toenail.     · You have blue or black areas, which can mean bruising or blood flow problems.     · You have peeling skin or tiny blisters between your toes or cracking or oozing of the skin.     · You have a fever for more than 24 hours and a foot sore.     · You have new numbness or tingling in your feet that does not go away after you move your feet or change positions.     · You have unexplained or unusual swelling of the foot or ankle.    Watch closely for changes in your health, and be sure to contact your doctor if:    · You cannot do proper foot care. Where can you learn more? Go to https://inMarketpepiceweb.[employer]. org and sign in to your [employer] account. Enter A739 in the ClickDelivery box to learn more about \"Diabetes Foot Health: Care Instructions. \"     If you do not have an account, please click on the \"Sign Up Now\" link. Current as of: July 25, 2018  Content Version: 12.1  © 5520-5069 Healthwise, Incorporated. Care instructions adapted under license by [facility] ([facility]). If you have questions about a medical condition or this instruction, always ask your healthcare professional. [place] [address] any warranty or liability for your use of this information.

## 2020-01-02 NOTE — PROGRESS NOTES
DIABETES and HYPERTENSION visit    BP Readings from Last 3 Encounters:   10/23/19 107/68   09/30/19 (!) 104/59   09/03/19 (!) 99/58        Hemoglobin A1C (%)   Date Value   05/31/2019 5.9   10/26/2018 6.4   03/15/2018 6.3     Microalb/Crt. Ratio (mcg/mg creat)   Date Value   06/01/2017 11     LDL Cholesterol (mg/dL)   Date Value   02/11/2019 47     HDL (mg/dL)   Date Value   02/11/2019 40 (L)     BUN (mg/dL)   Date Value   09/03/2019 19     CREATININE (mg/dL)   Date Value   09/03/2019 0.88     Glucose (mg/dL)   Date Value   09/03/2019 104 (H)   03/19/2012 123 (H)            Have you changed or started any medications since your last visit including any over-the-counter medicines, vitamins, or herbal medicines? no   Have you stopped taking any of your medications? Is so, why? -  no  Are you having any side effects from any of your medications? - no    Have you seen any other physician or provider since your last visit?  no   Have you had any other diagnostic tests since your last visit?  no   Have you been seen in the emergency room and/or had an admission in a hospital since we last saw you?  no   Have you had your routine dental cleaning in the past 6 months?  no     Have you had your annual diabetic retinal (eye) exam? Yes   (ensure copy of exam is in the chart)    Do you have an active MyChart account? If no, what is the barrier?   Yes    Patient Care Team:  Darlyne Mortimer, MD as PCP - General (Internal Medicine)  LIDIA Ibarra - CNP as PCP - [facility] Provider  Eric Calvert MD as Consulting Physician (Hematology and Oncology)  Courtney Dobbins MD as Consulting Physician (Cardiology)  Joanna Steward DO as Consulting Physician (Pulmonology)  Sindy Bruce MD as Consulting Physician (Ophthalmology)    Medical History Review  Past Medical, Family, and Social History reviewed and does not contribute to the patient presenting condition    Health Maintenance   Topic Date Due   

## 2020-01-02 NOTE — PROGRESS NOTES
[facility] [address] [address]  Dept: [phone]  Dept Fax: [phone]    Office Progress/Follow Up Note  Date ofpatient's visit: 1/2/2020  Patient's Name:  Chester Phipps YOB: 1955            Patient Care Team:  Ortiz Solis MD as PCP - General (Internal Medicine)  LIDIA Olvera CNP as PCP - [address] [place] [address] Provider  Roxana Gayle MD as Consulting Physician (Hematology and Oncology)  Lavelle Kamara MD as Consulting Physician (Cardiology)  Linna Nageotte, DO as Consulting Physician (Pulmonology)  Yeyo Velasco MD as Consulting Physician (Ophthalmology)  Ileana Molina MD as Consulting Physician (Internal Medicine)  ================================================================    REASON FOR VISIT/CHIEF COMPLAINT:  3 Month Follow-Up; Hypertension; and Diabetes    HISTORY OF PRESENTING ILLNESS:    78-year-old male is here for follow-up of her diabetes mellitus, hypertension, he has a history of ischemic cardiomyopathy with multiple stent placed in 2015, ejection fraction recent 115 to 20% status post AICD placed RVSP 28, patient has a history of atrial fibrillation on Eliquis 5 mg twice a day, is following cardiology regularly,  Currently he is on aspirin, statin, metoprolol 25 mg XL, lisinopril 5 mg and Imdur 30 mg, Lasix 40 mg once a day  For diabetes mellitus he is on metformin 1 g twice daily and glimepiride 1 mg, that were decreased last time 5 months back. Currently he is HbA1c in office is 5.    Denies any shortness of breath, leg swelling, dizziness  He follows podiatry and ophthalmology for retinal exam  Gout on allopurinol 300 mg daily  Melatonin for insomnia  Physical examination is benign, he do not smoke or drink alcohol      Patient Active Problem List   Diagnosis    Chronic combined systolic and diastolic heart failure ([address] s/[ AICD placed    Chronic kidney disease, stage II (mild)    Chronic gout    Morbid obesity (HCC)    Hyperlipidemia    Iron deficiency anemia    Anxiety disorder     DJD (degenerative joint disease)    TANNER variably compliant with BiPAP    CAD (coronary artery disease) s/p stenting of L anterior descending artery 2004    Diabetic retinopathy ([facility]    Ischemic cardiomyopathy    HTN (hypertension)    NSTEMI (non-ST elevated myocardial infarction) (HCC)    MGUS (monoclonal gammopathy of unknown significance)    Type 2 diabetes mellitus without complication (HCC)    AICD (automatic cardioverter/defibrillator) present    PAF (paroxysmal atrial fibrillation) ([facility]    Coronary angioplasty status    Hypokalemia       Health Maintenance Due   Topic Date Due    Shingles Vaccine (2 of 3) 06/17/2015    Annual Wellness Visit (AWV)  06/19/2019       Allergies   Allergen Reactions    Zetia [Ezetimibe] Shortness Of Breath    Bactrim     Cephalexin     Cephalexin     Sulfamethoxazole-Trimethoprim          Current Outpatient Medications   Medication Sig Dispense Refill    isosorbide mononitrate (IMDUR) 30 MG extended release tablet TAKE 1 TABLET BY MOUTH DAILY 30 tablet 0    lisinopril (PRINIVIL;ZESTRIL) 5 MG tablet TAKE 1 TABLET BY MOUTH DAILY 30 tablet 0    omeprazole (PRILOSEC) 20 MG delayed release capsule TAKE 1 CAPSULE BY MOUTH DAILY 30 capsule 0    glimepiride (AMARYL) 1 MG tablet TAKE 1 TABLET BY MOUTH DAILY IN THE MORNING BEFORE BREAKFAST 30 tablet 0    allopurinol (ZYLOPRIM) 300 MG tablet TAKE 1 TABLET DAILY 30 tablet 3    apixaban (ELIQUIS) 5 MG TABS tablet TAKE 1 TABLET BY MOUTH 2 TIMES DAILY 60 tablet 3    furosemide (LASIX) 40 MG tablet TAKE 1 TABLET BY MOUTH 2 TIMES A DAY 60 tablet 3    aspirin (HM ASPIRIN EC LOW DOSE) 81 MG EC tablet TAKE 1 TABLET TWICE A DAY 60 tablet 3    atorvastatin (LIPITOR) 40 MG tablet TAKE 1 TABLET DAILY 30 tablet 5    zoster recombinant adjuvanted vaccine (SHINGRIX) 50 MCG/0.5ML SUSR injection Inject 0.5 mLs change in bowel habits, abdominal pain  · Genitourinary:Negative for change in bladder habits, dysuria, hematuria. · Musculoskeletal: Negative for joint pain   · Neurological: Negative for headache, change in muscle strength numbness/tingling  · Psychiatric: negative for change in mood, affect  PHYSICAL EXAM:  Vitals:    01/02/20 1126   BP: 108/69   Site: Left Upper Arm   Position: Sitting   Cuff Size: Medium Adult   Pulse: 62   Weight: 253 lb (114.8 kg)   Height: 5' 4\" (1.626 m)     BP Readings from Last 3 Encounters:   01/02/20 108/69   10/23/19 107/68   09/30/19 (!) 104/59        Physical Exam    · General appearance: awake, alert, cooperative  · HEENT: Atraumatic, normocephalic, neck supple, normal EOM, thyroid normal, no lymphadenopathy   · Lungs: clear to auscultation bilaterally  · Heart: regular rate and rhythm, S1, S2 normal, no murmur  · Abdomen: soft, non-tender; bowel sounds normal; no masses,  no organomegaly  · Extremities: No cyanosis or edema  · Neurological:  Awake, alert, oriented to name, place and time. Cranial nerves II-XII are grossly intact. Reflexes normal and symmetric. Sensation grossly normal  · Psych-normal affect     DIAGNOSTIC FINDINGS:  CBC:  Lab Results   Component Value Date    WBC 7.4 09/03/2019    HGB 12.0 09/03/2019     09/03/2019     12/08/2011       BMP:    Lab Results   Component Value Date     09/03/2019    K 4.0 09/03/2019     09/03/2019    CO2 26 09/03/2019    BUN 19 09/03/2019    CREATININE 0.88 09/03/2019    GLUCOSE 104 09/03/2019    GLUCOSE 123 03/19/2012       HEMOGLOBIN A1C:   Lab Results   Component Value Date    LABA1C 5.9 05/31/2019       FASTING LIPID PANEL:  Lab Results   Component Value Date    CHOL 106 11/16/2017    HDL 40 (L) 02/11/2019    TRIG 135 11/16/2017       ASSESSMENT AND PLAN:  There are no diagnoses linked to this encounter.    Hypertension  Ischemic cardiomyopathy status post AICD, ejection fraction 10 to 15%  Continue aspirin, Imdur, statin, metoprolol 25 mg XL, lisinopril 5 mg, and Lasix 40 mg daily, follows cardiology  All medications refilled on this admission    Gout  Continue allopurinol 300 mg daily  \  Diabetes mellitus  HbA1c 5.6 today   discontinue glimepiride  Continue metoprolol for now  Check HbA1c in 3 months    FOLLOW UP AND INSTRUCTIONS:  · No follow-ups on file. · Prakash De Los Santos received counseling on the following healthy behaviors: {Health Counselin    · Discussed use, benefit, and side effects of prescribed medications. Barriers to medication compliance addressed. All patient questions answered. Pt voiced understanding. · Patient given educational materials - see patient instructions      Shahab Cruz MD  PGY-3, Internal Medicine resident  [facility]. 2020, 11:58 AM    This note is created with the assistance of a speech-recognition program. While intending to generate a document that actually reflects the content of thevisit, the document can still have some mistakes which may not have been identified and corrected by editing. Attending Physician Statement  I have discussed the care of Vandana Sauceda, including pertinent history and exam findings,  with the resident. I have reviewed the key elements of all parts of the encounter with the resident. I agree with the assessment, plan and orders as documented by the resident. (GE Modifier)    BP and blood glucose under stable control. Urged continued close fu with cardiology. Will d/c glimepiride.

## 2020-01-27 ENCOUNTER — HOSPITAL ENCOUNTER (OUTPATIENT)
Facility: MEDICAL CENTER | Age: 65
Discharge: HOME | End: 2020-01-27
Payer: COMMERCIAL

## 2020-01-27 DIAGNOSIS — D47.2 MGUS (MONOCLONAL GAMMOPATHY OF UNKNOWN SIGNIFICANCE): ICD-10-CM

## 2020-01-27 LAB
ABSOLUTE EOS #: 0.14 K/UL (ref 0–0.44)
ABSOLUTE IMMATURE GRANULOCYTE: 0.01 K/UL (ref 0–0.3)
ABSOLUTE LYMPH #: 0.99 K/UL (ref 1.1–3.7)
ABSOLUTE MONO #: 0.43 K/UL (ref 0.1–1.2)
ALBUMIN SERPL-MCNC: 4 G/DL (ref 3.5–5.2)
ALBUMIN/GLOBULIN RATIO: ABNORMAL (ref 1–2.5)
ALP BLD-CCNC: 72 U/L (ref 40–129)
ALT SERPL-CCNC: 10 U/L (ref 5–41)
ANION GAP SERPL CALCULATED.3IONS-SCNC: 13 MMOL/L (ref 9–17)
AST SERPL-CCNC: 17 U/L
BASOPHILS # BLD: 1 % (ref 0–2)
BASOPHILS ABSOLUTE: 0.03 K/UL (ref 0–0.2)
BILIRUB SERPL-MCNC: 0.49 MG/DL (ref 0.3–1.2)
BUN BLDV-MCNC: 19 MG/DL (ref 8–23)
BUN/CREAT BLD: 22 (ref 9–20)
CALCIUM SERPL-MCNC: 9 MG/DL (ref 8.6–10.4)
CHLORIDE BLD-SCNC: 99 MMOL/L (ref 98–107)
CO2: 29 MMOL/L (ref 20–31)
CREAT SERPL-MCNC: 0.86 MG/DL (ref 0.7–1.2)
DIFFERENTIAL TYPE: ABNORMAL
EOSINOPHILS RELATIVE PERCENT: 3 % (ref 1–4)
FREE KAPPA/LAMBDA RATIO: 1.36 (ref 0.26–1.65)
GFR AFRICAN AMERICAN: >60 ML/MIN
GFR NON-AFRICAN AMERICAN: >60 ML/MIN
GFR SERPL CREATININE-BSD FRML MDRD: ABNORMAL ML/MIN/{1.73_M2}
GFR SERPL CREATININE-BSD FRML MDRD: ABNORMAL ML/MIN/{1.73_M2}
GLUCOSE BLD-MCNC: 112 MG/DL (ref 70–99)
HCT VFR BLD CALC: 41.2 % (ref 40.7–50.3)
HEMOGLOBIN: 12.5 G/DL (ref 13–17)
IGA: 307 MG/DL (ref 70–400)
IGG: 1162 MG/DL (ref 700–1600)
IGM: 318 MG/DL (ref 40–230)
IMMATURE GRANULOCYTES: 0 %
KAPPA FREE LIGHT CHAINS QNT: 3.07 MG/DL (ref 0.37–1.94)
LAMBDA FREE LIGHT CHAINS QNT: 2.26 MG/DL (ref 0.57–2.63)
LYMPHOCYTES # BLD: 18 % (ref 24–43)
MCH RBC QN AUTO: 26 PG (ref 25.2–33.5)
MCHC RBC AUTO-ENTMCNC: 30.3 G/DL (ref 28.4–34.8)
MCV RBC AUTO: 85.7 FL (ref 82.6–102.9)
MONOCYTES # BLD: 8 % (ref 3–12)
NRBC AUTOMATED: 0 PER 100 WBC
PDW BLD-RTO: 16.9 % (ref 11.8–14.4)
PLATELET # BLD: 166 K/UL (ref 138–453)
PLATELET ESTIMATE: ABNORMAL
PMV BLD AUTO: 11.9 FL (ref 8.1–13.5)
POTASSIUM SERPL-SCNC: 3.9 MMOL/L (ref 3.7–5.3)
RBC # BLD: 4.81 M/UL (ref 4.21–5.77)
RBC # BLD: ABNORMAL 10*6/UL
SEG NEUTROPHILS: 70 % (ref 36–65)
SEGMENTED NEUTROPHILS ABSOLUTE COUNT: 3.94 K/UL (ref 1.5–8.1)
SODIUM BLD-SCNC: 141 MMOL/L (ref 135–144)
TOTAL PROTEIN: 7.6 G/DL (ref 6.4–8.3)
WBC # BLD: 5.5 K/UL (ref 3.5–11.3)
WBC # BLD: ABNORMAL 10*3/UL

## 2020-01-27 PROCEDURE — 85025 COMPLETE CBC W/AUTO DIFF WBC: CPT

## 2020-01-27 PROCEDURE — 80053 COMPREHEN METABOLIC PANEL: CPT

## 2020-01-27 PROCEDURE — 84155 ASSAY OF PROTEIN SERUM: CPT

## 2020-01-27 PROCEDURE — 83883 ASSAY NEPHELOMETRY NOT SPEC: CPT

## 2020-01-27 PROCEDURE — 84165 PROTEIN E-PHORESIS SERUM: CPT

## 2020-01-27 PROCEDURE — 82784 ASSAY IGA/IGD/IGG/IGM EACH: CPT

## 2020-01-27 PROCEDURE — 36415 COLL VENOUS BLD VENIPUNCTURE: CPT

## 2020-01-28 LAB
ALBUMIN (CALCULATED): 4 G/DL (ref 3.2–5.2)
ALBUMIN PERCENT: 57 % (ref 45–65)
ALPHA 1 PERCENT: 3 % (ref 3–6)
ALPHA 2 PERCENT: 11 % (ref 6–13)
ALPHA-1-GLOBULIN: 0.2 G/DL (ref 0.1–0.4)
ALPHA-2-GLOBULIN: 0.8 G/DL (ref 0.5–0.9)
BETA GLOBULIN: 0.8 G/DL (ref 0.5–1.1)
BETA PERCENT: 11 % (ref 11–19)
GAMMA GLOBULIN %: 18 % (ref 9–20)
GAMMA GLOBULIN: 1.3 G/DL (ref 0.5–1.5)
PATHOLOGIST: NORMAL
PROTEIN ELECTROPHORESIS, SERUM: NORMAL
TOTAL PROT. SUM,%: 100 % (ref 98–102)
TOTAL PROT. SUM: 7.1 G/DL (ref 6.3–8.2)
TOTAL PROTEIN: 7 G/DL (ref 6.4–8.3)

## 2020-01-29 ENCOUNTER — OFFICE VISIT (OUTPATIENT)
Dept: PODIATRY | Age: 65
End: 2020-01-29
Payer: COMMERCIAL

## 2020-01-29 VITALS
DIASTOLIC BLOOD PRESSURE: 64 MMHG | HEIGHT: 64 IN | BODY MASS INDEX: 42.85 KG/M2 | WEIGHT: 251 LBS | SYSTOLIC BLOOD PRESSURE: 103 MMHG | HEART RATE: 67 BPM

## 2020-01-29 PROCEDURE — 11721 DEBRIDE NAIL 6 OR MORE: CPT | Performed by: STUDENT IN AN ORGANIZED HEALTH CARE EDUCATION/TRAINING PROGRAM

## 2020-01-29 PROCEDURE — 11056 PARNG/CUTG B9 HYPRKR LES 2-4: CPT | Performed by: STUDENT IN AN ORGANIZED HEALTH CARE EDUCATION/TRAINING PROGRAM

## 2020-01-29 PROCEDURE — 99212 OFFICE O/P EST SF 10 MIN: CPT | Performed by: STUDENT IN AN ORGANIZED HEALTH CARE EDUCATION/TRAINING PROGRAM

## 2020-01-29 NOTE — PROGRESS NOTES
toes. 1/2/20  Yes Dyan Lopez MD   allopurinol (ZYLOPRIM) 300 MG tablet Take 1 tablet by mouth daily 1/2/20  Yes Dyan Lopez MD   omeprazole (PRILOSEC) 20 MG delayed release capsule Take 1 capsule by mouth Daily 1/2/20  Yes Dyan Lopez MD   metFORMIN (GLUCOPHAGE) 1000 MG tablet TAKE 1 TABLET BY MOUTH 2 TIMES A DAY WITH MEALS 1/2/20  Yes Dyan Lopez MD   metoprolol succinate (TOPROL XL) 25 MG extended release tablet Take 1 tablet by mouth daily 1/2/20  Yes Dyan Lopez MD   isosorbide mononitrate (IMDUR) 30 MG extended release tablet Take 1 tablet by mouth daily 1/2/20  Yes Dyan Lopez MD   lisinopril (PRINIVIL;ZESTRIL) 5 MG tablet Take 1 tablet by mouth daily 1/2/20  Yes Dyan Lopez MD   TRUEPLUS LANCETS 33G MISC TEST AS DIRECTED 1/2/20  Yes Dyan Lopez MD   Alcohol Swabs (ALCOHOL PREP) 70 % PADS USE AS DIRECTED 5/31/19  Yes Daron Farley MD   acetaminophen (ACETAMINOPHEN EXTRA STRENGTH) 500 MG tablet TAKE 1 TABLET BY MOUTH 2 TIMES A DAY AS NEEDED FOR PAIN 2/1/19  Yes Dyan Lopez MD   Lancets MISC Daily 2/1/19  Yes Dyan Lopez MD       Objective     Vitals:    01/29/20 1311   BP: 103/64   Pulse: 67       Lab Results   Component Value Date    LABA1C 5.6 01/02/2020       Physical Exam:  General:  Alert and oriented x3. In no acute distress. Lower Extremity Physical Exam:  Vascular: DP/PT pulses are palpable +2/4, bilateral. CFT is brisk to all digits, Bilateral. Skin temp is cool to warm proximal to distal, bilateral. No edema or erythema noted. Neuro: Protective sensation intact to 10 /10 pedal sites as tested with Berwick-Kim Monofilament 5.07g.  Light touch sensation intact to the level of the digits, Bilateral.     Musculoskeletal: Muscle strength 5/5 for all LE muscle groups, aleshia. No gross deformities noted, bilateral. Pain on palpation of nails 1-5, aleshia.      Dermatologic:  Nails 1-5 are thickened, elongated, and

## 2020-02-04 ENCOUNTER — TELEPHONE (OUTPATIENT)
Dept: INTERNAL MEDICINE | Age: 65
End: 2020-02-04

## 2020-02-04 NOTE — TELEPHONE ENCOUNTER
Patient calling about lump in ear/on earlobe. He states he noticed it Friday afternoon & it busted Monday morning. Patient states he last had this occur 8 years ago & throughout his adult life he has probably had almost a dozen show up periodically but only one at a time. The bump is always swollen, red, & tender. Patient states he put a band-aid on it after it busted & when he took if off this morning there was a black spot where it was red the day before. Patient states the bump was oozing dark red blood & clear fluid. The last bump patient had he states there was something in it that felt like a grain of sand that came out when it busted. He was seen at an urgent care back in 2011 where he states the doctor walked right in, looked at his ear, said he knew exactly what it was, & prescribed an antibiotic, which patient states almost killed him - he was hospitalized due to this & found out he is allergic to the antibiotic. Patient is requesting advice on this matter as he has never been told exactly what the bump is. Referral to dermatology may be appropriate. Please advise.

## 2020-02-06 RX ORDER — LISINOPRIL 5 MG/1
5 TABLET ORAL DAILY
Qty: 30 TABLET | Refills: 0 | Status: SHIPPED | OUTPATIENT
Start: 2020-02-06 | End: 2020-02-20 | Stop reason: SDUPTHER

## 2020-02-06 RX ORDER — ISOSORBIDE MONONITRATE 30 MG/1
30 TABLET, EXTENDED RELEASE ORAL DAILY
Qty: 30 TABLET | Refills: 0 | Status: SHIPPED | OUTPATIENT
Start: 2020-02-06 | End: 2020-02-20 | Stop reason: SDUPTHER

## 2020-02-06 RX ORDER — OMEPRAZOLE 20 MG/1
20 CAPSULE, DELAYED RELEASE ORAL DAILY
Qty: 30 CAPSULE | Refills: 0 | Status: SHIPPED | OUTPATIENT
Start: 2020-02-06 | End: 2020-02-20 | Stop reason: SDUPTHER

## 2020-02-07 ENCOUNTER — HOSPITAL ENCOUNTER (OUTPATIENT)
Facility: MEDICAL CENTER | Age: 65
End: 2020-02-07
Payer: COMMERCIAL

## 2020-02-07 NOTE — TELEPHONE ENCOUNTER
I cant tell or prescribe medicating without examining , Kindly make appointment ASAP with me or any of resident or PCR resident. If got worse he need to go to ED.

## 2020-02-10 ENCOUNTER — OFFICE VISIT (OUTPATIENT)
Dept: ONCOLOGY | Age: 65
End: 2020-02-10
Payer: COMMERCIAL

## 2020-02-10 ENCOUNTER — TELEPHONE (OUTPATIENT)
Dept: ONCOLOGY | Age: 65
End: 2020-02-10

## 2020-02-10 VITALS
TEMPERATURE: 97.4 F | WEIGHT: 255.8 LBS | HEART RATE: 65 BPM | SYSTOLIC BLOOD PRESSURE: 128 MMHG | BODY MASS INDEX: 43.91 KG/M2 | DIASTOLIC BLOOD PRESSURE: 72 MMHG

## 2020-02-10 PROCEDURE — 99214 OFFICE O/P EST MOD 30 MIN: CPT | Performed by: INTERNAL MEDICINE

## 2020-02-10 PROCEDURE — 99211 OFF/OP EST MAY X REQ PHY/QHP: CPT | Performed by: INTERNAL MEDICINE

## 2020-02-10 NOTE — PROGRESS NOTES
Ref. Range 1/27/2020 13:05   IgA Latest Ref Range: 70 - 400 mg/dL 307   Total IgG Latest Ref Range: 700 - 1600 mg/dL 1162   IgM Latest Ref Range: 40 - 230 mg/dL 318 (H)     Results for Desmond Duxbury (MRN [id number]) as of 2/10/2020 14:59   Ref. Range 1/27/2020 13:05   Sattley Free Light Chains QNT Latest Ref Range: 0.37 - 1.94 mg/dL 3.07 (H)   Lambda Free Light Chains QNT Latest Ref Range: 0.57 - 2.63 mg/dL 2.26   Free Kappa/Lambda Ratio Latest Ref Range: 0.26 - 1.65  1.36         IMPRESSION:    3 A 70year-old patient with IgM MGUS, without evidence of end organ damage or any progression by blood testing today, he is asymptomatic, no cytopenia. 2. Multiple comorbidities including diabetes and coronary artery disease: stable over the last year    3. New onset atrial fibrillation, examinations today's shows regular rhythm. Likely his atrial fibrillation is paroxysmal      Plan    1. We reviewed his lab work and his counts remain elevated but within range of fluctuation. 2. Clinically he is doing well with no symptoms. 3. We will continue with observation unchanged. 4. We discussed his pain and he plans to follow up with PCP. 5. Exam shows mild boil behind ear and I recommend he use antibiotic cream.   6. Return in 1 year.            AMERICA AGUILAR [facility] MD [place]  Hematologist/Medical Oncologist  Cell: [phone]

## 2020-02-11 NOTE — TELEPHONE ENCOUNTER
Phone call to patient - he states the bump has pretty much resolved itself yet again. I scheduled an appointment with you 2/20. As it is a recurring issue, a punch biopsy may be able to provide more information. Patient states he has never had any testing done on it.

## 2020-02-18 ENCOUNTER — HOSPITAL ENCOUNTER (OUTPATIENT)
Age: 65
Setting detail: SPECIMEN
Discharge: HOME | End: 2020-02-18
Payer: COMMERCIAL

## 2020-02-18 LAB
CHOLESTEROL/HDL RATIO: 2.8
CHOLESTEROL: 120 MG/DL
HDLC SERPL-MCNC: 43 MG/DL
LDL CHOLESTEROL: 54 MG/DL (ref 0–130)
TRIGL SERPL-MCNC: 114 MG/DL
VLDLC SERPL CALC-MCNC: NORMAL MG/DL (ref 1–30)

## 2020-02-18 PROCEDURE — 80061 LIPID PANEL: CPT

## 2020-02-18 PROCEDURE — 36415 COLL VENOUS BLD VENIPUNCTURE: CPT

## 2020-02-20 ENCOUNTER — OFFICE VISIT (OUTPATIENT)
Dept: INTERNAL MEDICINE | Age: 65
End: 2020-02-20
Payer: COMMERCIAL

## 2020-02-20 VITALS
SYSTOLIC BLOOD PRESSURE: 112 MMHG | HEART RATE: 67 BPM | WEIGHT: 252 LBS | HEIGHT: 64 IN | BODY MASS INDEX: 43.02 KG/M2 | DIASTOLIC BLOOD PRESSURE: 68 MMHG

## 2020-02-20 PROCEDURE — 99211 OFF/OP EST MAY X REQ PHY/QHP: CPT | Performed by: INTERNAL MEDICINE

## 2020-02-20 PROCEDURE — 99213 OFFICE O/P EST LOW 20 MIN: CPT | Performed by: STUDENT IN AN ORGANIZED HEALTH CARE EDUCATION/TRAINING PROGRAM

## 2020-02-20 RX ORDER — OMEPRAZOLE 20 MG/1
20 CAPSULE, DELAYED RELEASE ORAL DAILY
Qty: 30 CAPSULE | Refills: 0 | Status: SHIPPED | OUTPATIENT
Start: 2020-02-20 | End: 2020-04-06

## 2020-02-20 RX ORDER — LISINOPRIL 5 MG/1
5 TABLET ORAL DAILY
Qty: 30 TABLET | Refills: 0 | Status: SHIPPED | OUTPATIENT
Start: 2020-02-20 | End: 2020-04-06

## 2020-02-20 RX ORDER — ISOSORBIDE MONONITRATE 30 MG/1
30 TABLET, EXTENDED RELEASE ORAL DAILY
Qty: 30 TABLET | Refills: 0 | Status: SHIPPED | OUTPATIENT
Start: 2020-02-20 | End: 2020-04-06

## 2020-02-20 NOTE — PATIENT INSTRUCTIONS
Labs given to patient, they will have them done before their next     Printed script for shingles       Follow-up appointment scheduled for    05/28/20   , AVS given to patient.       tv

## 2020-02-20 NOTE — PROGRESS NOTES
[facility] [address] [address]  Dept: [phone]  Dept Fax: [phone]    Office Progress/Follow Up Note  Date ofpatient's visit: 2/20/2020  Patient's Name:  Jocelyn Bryant YOB: 1955            Patient Care Team:  Darlyne Mortimer, MD as PCP - General (Internal Medicine)  LIDIA Ibarra CNP as PCP - [facility] Provider  Eric Calvert MD as Consulting Physician (Hematology and Oncology)  Courtney Dobbins MD as Consulting Physician (Cardiology)  Joanna Steward DO as Consulting Physician (Pulmonology)  Sindy Bruce MD as Consulting Physician (Ophthalmology)  Yen Garza MD as Consulting Physician (Internal Medicine)  ================================================================    REASON FOR VISIT/CHIEF COMPLAINT:  Mass (f/u bump on ear comes and goes.) and Health Maintenance (shingles vac script pended.)    HISTORY OF PRESENTING ILLNESS:      24-year-old male is here for follow-up of her diabetes mellitus, hypertension, he has a history of ischemic cardiomyopathy with multiple stent placed in 2015, ejection fraction recent 115 to 20% status post AICD placed RVSP 28, patient has a history of atrial fibrillation on Eliquis 5 mg twice a day, is following cardiology regularly,  Currently he is on aspirin, statin, metoprolol 25 mg XL, lisinopril 5 mg and Imdur 30 mg, Lasix 40 mg once a day  For diabetes mellitus he is on metformin 1 g twice daily glimepiride was discontinued on last visit,  HbA1c was 5.3 denies any shortness of breath, leg swelling, dizziness  He follows podiatry and ophthalmology for retinal exam last foot exam in January 2020 and last retinal exam and November 2019  Gout on allopurinol 300 mg daily  Melatonin for insomnia  Patient is active complaint is bone pain both in lower and upper extremities since 2 3 months and getting worse  Physical examination is benign, he do not smoke or drink alcohol       Patient Active Problem List   Diagnosis    Chronic combined systolic and diastolic heart failure (HCC) s/[ AICD placed    Chronic kidney disease, stage II (mild)    Chronic gout    Morbid obesity ([facility]    Hyperlipidemia    Iron deficiency anemia    Anxiety disorder     DJD (degenerative joint disease)    TANNER variably compliant with BiPAP    CAD (coronary artery disease) s/p stenting of L anterior descending artery 2004    Diabetic retinopathy ([facility]    Ischemic cardiomyopathy    HTN (hypertension)    NSTEMI (non-ST elevated myocardial infarction) ([facility]    MGUS (monoclonal gammopathy of unknown significance)    Type 2 diabetes mellitus without complication (HCC)    AICD (automatic cardioverter/defibrillator) present    PAF (paroxysmal atrial fibrillation) ([facility]    Coronary angioplasty status    Hypokalemia       Health Maintenance Due   Topic Date Due    Hepatitis B vaccine (1 of 3 - Risk 3-dose series) 01/23/1972    Shingles Vaccine (2 of 3) 06/17/2015    Annual Wellness Visit (AWV)  06/19/2019       Allergies   Allergen Reactions    Zetia [Ezetimibe] Shortness Of Breath    Bactrim     Cephalexin     Cephalexin     Sulfamethoxazole-Trimethoprim          Current Outpatient Medications   Medication Sig Dispense Refill    zoster recombinant adjuvanted vaccine (SHINGRIX) 50 MCG/0.5ML SUSR injection Inject 0.5 mLs into the muscle once for 1 dose 50 MCG IM then repeat 2-6 months.  1 each 1    lisinopril (PRINIVIL;ZESTRIL) 5 MG tablet Take 1 tablet by mouth daily 30 tablet 0    omeprazole (PRILOSEC) 20 MG delayed release capsule Take 1 capsule by mouth Daily 30 capsule 0    isosorbide mononitrate (IMDUR) 30 MG extended release tablet Take 1 tablet by mouth daily 30 tablet 0    blood glucose test strips (FREESTYLE LITE) strip USE AS DIRECTED TWICE A  each 11    furosemide (LASIX) 40 MG tablet TAKE 1 TABLET BY MOUTH 2 TIMES A DAY 60 tablet 3    atorvastatin (LIPITOR) 40 MG tablet Take 1 tablet by mouth daily 30 tablet 5    aspirin (HM ASPIRIN EC LOW DOSE) 81 MG EC tablet TAKE 1 TABLET TWICE A DAY 60 tablet 3    apixaban (ELIQUIS) 5 MG TABS tablet TAKE 1 TABLET BY MOUTH 2 TIMES DAILY 60 tablet 3    ammonium lactate (LAC-HYDRIN) 12 % lotion Apply topically daily after bathing sparing the space between the toes. 222 mL 3    allopurinol (ZYLOPRIM) 300 MG tablet Take 1 tablet by mouth daily 30 tablet 3    metFORMIN (GLUCOPHAGE) 1000 MG tablet TAKE 1 TABLET BY MOUTH 2 TIMES A DAY WITH MEALS 60 tablet 5    metoprolol succinate (TOPROL XL) 25 MG extended release tablet Take 1 tablet by mouth daily 30 tablet 3    TRUEPLUS LANCETS 33G MISC TEST AS DIRECTED 100 each 11    Alcohol Swabs (ALCOHOL PREP) 70 % PADS USE AS DIRECTED 100 each 11    acetaminophen (ACETAMINOPHEN EXTRA STRENGTH) 500 MG tablet TAKE 1 TABLET BY MOUTH 2 TIMES A DAY AS NEEDED FOR PAIN 60 tablet 0    Lancets MISC Daily 100 each 3     No current facility-administered medications for this visit. Social History     Tobacco Use    Smoking status: Former Smoker     Packs/day: 1.00     Years: 3.00     Pack years: 3.00     Types: Cigarettes     Start date: 1971     Last attempt to quit: 1974     Years since quittin.1    Smokeless tobacco: Never Used   Substance Use Topics    Alcohol use: No     Alcohol/week: 0.0 standard drinks    Drug use: Not Currently     Types: Marijuana     Comment: hx THC quit approx        Family History   Problem Relation Age of Onset    Cancer Mother     Heart Disease Mother         due to smoking    Heart Disease Maternal Uncle     Heart Disease Maternal Grandmother         REVIEW OF SYSTEMS:  Review of Systems  · Constitutional: Negative for Fever, chills  · Eyes: Negative for visual changes, diplopia  · ENT: Negative for mouth sores, sore throat.   · Cardiovascular: Negative for lightheadedness ,chest pain, palpitations   · Respiratory:Negative for

## 2020-02-24 ENCOUNTER — HOSPITAL ENCOUNTER (OUTPATIENT)
Age: 65
Setting detail: SPECIMEN
Discharge: HOME | End: 2020-02-24
Payer: COMMERCIAL

## 2020-02-24 LAB
CREATININE URINE: 125.9 MG/DL (ref 39–259)
ESTIMATED AVERAGE GLUCOSE: 146 MG/DL
FOLATE: 6.3 NG/ML
HBA1C MFR BLD: 6.7 % (ref 4–6)
MICROALBUMIN/CREAT 24H UR: <12 MG/L
MICROALBUMIN/CREAT UR-RTO: NORMAL MCG/MG CREAT
TSH SERPL DL<=0.05 MIU/L-ACNC: 3.06 MIU/L (ref 0.3–5)
URIC ACID: 4.7 MG/DL (ref 3.4–7)
VITAMIN B-12: 290 PG/ML (ref 232–1245)
VITAMIN D 25-HYDROXY: 15.6 NG/ML (ref 30–100)

## 2020-02-24 PROCEDURE — 82746 ASSAY OF FOLIC ACID SERUM: CPT

## 2020-02-24 PROCEDURE — 82607 VITAMIN B-12: CPT

## 2020-02-24 PROCEDURE — 84550 ASSAY OF BLOOD/URIC ACID: CPT

## 2020-02-24 PROCEDURE — 82570 ASSAY OF URINE CREATININE: CPT

## 2020-02-24 PROCEDURE — 36415 COLL VENOUS BLD VENIPUNCTURE: CPT

## 2020-02-24 PROCEDURE — 84443 ASSAY THYROID STIM HORMONE: CPT

## 2020-02-24 PROCEDURE — 83036 HEMOGLOBIN GLYCOSYLATED A1C: CPT

## 2020-02-24 PROCEDURE — 82043 UR ALBUMIN QUANTITATIVE: CPT

## 2020-02-24 PROCEDURE — 82306 VITAMIN D 25 HYDROXY: CPT

## 2020-02-27 RX ORDER — ERGOCALCIFEROL 1.25 MG/1
50000 CAPSULE ORAL WEEKLY
Qty: 8 CAPSULE | Refills: 0 | Status: SHIPPED | OUTPATIENT
Start: 2020-02-27 | End: 2020-04-06

## 2020-02-27 RX ORDER — B-COMPLEX WITH VITAMIN C
1 TABLET ORAL DAILY
Qty: 30 TABLET | Refills: 0 | Status: SHIPPED | OUTPATIENT
Start: 2020-02-27 | End: 2020-03-31

## 2020-03-19 ENCOUNTER — HOSPITAL ENCOUNTER (INPATIENT)
Age: 65
LOS: 2 days | Discharge: HOME | DRG: 291 | End: 2020-03-21
Attending: EMERGENCY MEDICINE | Admitting: INTERNAL MEDICINE
Payer: COMMERCIAL

## 2020-03-19 ENCOUNTER — APPOINTMENT (OUTPATIENT)
Dept: GENERAL RADIOLOGY | Age: 65
DRG: 291 | End: 2020-03-19
Payer: COMMERCIAL

## 2020-03-19 ENCOUNTER — APPOINTMENT (OUTPATIENT)
Dept: CT IMAGING | Age: 65
DRG: 291 | End: 2020-03-19
Payer: COMMERCIAL

## 2020-03-19 PROBLEM — I50.23 ACUTE ON CHRONIC SYSTOLIC HEART FAILURE (HCC): Status: ACTIVE | Noted: 2020-03-19

## 2020-03-19 PROBLEM — J06.9 VIRAL UPPER RESPIRATORY TRACT INFECTION: Status: ACTIVE | Noted: 2020-03-19

## 2020-03-19 LAB
ABSOLUTE EOS #: 0.17 K/UL (ref 0–0.44)
ABSOLUTE IMMATURE GRANULOCYTE: 0.03 K/UL (ref 0–0.3)
ABSOLUTE LYMPH #: 1.82 K/UL (ref 1.1–3.7)
ABSOLUTE MONO #: 0.59 K/UL (ref 0.1–1.2)
ADENOVIRUS PCR: NOT DETECTED
ANION GAP SERPL CALCULATED.3IONS-SCNC: 15 MMOL/L (ref 9–17)
BASOPHILS # BLD: 1 % (ref 0–2)
BASOPHILS ABSOLUTE: 0.04 K/UL (ref 0–0.2)
BNP INTERPRETATION: ABNORMAL
BORDETELLA PARAPERTUSSIS: NOT DETECTED
BORDETELLA PERTUSSIS PCR: NOT DETECTED
BUN BLDV-MCNC: 14 MG/DL (ref 8–23)
BUN/CREAT BLD: ABNORMAL (ref 9–20)
CALCIUM SERPL-MCNC: 9.1 MG/DL (ref 8.6–10.4)
CHLAMYDIA PNEUMONIAE BY PCR: NOT DETECTED
CHLORIDE BLD-SCNC: 102 MMOL/L (ref 98–107)
CO2: 24 MMOL/L (ref 20–31)
CORONAVIRUS 229E PCR: NOT DETECTED
CORONAVIRUS HKU1 PCR: NOT DETECTED
CORONAVIRUS NL63 PCR: NOT DETECTED
CORONAVIRUS OC43 PCR: NOT DETECTED
CREAT SERPL-MCNC: 0.76 MG/DL (ref 0.7–1.2)
DIFFERENTIAL TYPE: ABNORMAL
EOSINOPHILS RELATIVE PERCENT: 2 % (ref 1–4)
GFR AFRICAN AMERICAN: >60 ML/MIN
GFR NON-AFRICAN AMERICAN: >60 ML/MIN
GFR SERPL CREATININE-BSD FRML MDRD: ABNORMAL ML/MIN/{1.73_M2}
GFR SERPL CREATININE-BSD FRML MDRD: ABNORMAL ML/MIN/{1.73_M2}
GLUCOSE BLD-MCNC: 126 MG/DL (ref 75–110)
GLUCOSE BLD-MCNC: 130 MG/DL (ref 75–110)
GLUCOSE BLD-MCNC: 201 MG/DL (ref 70–99)
HCT VFR BLD CALC: 43.2 % (ref 40.7–50.3)
HEMOGLOBIN: 13.1 G/DL (ref 13–17)
HUMAN METAPNEUMOVIRUS PCR: NOT DETECTED
IMMATURE GRANULOCYTES: 0 %
INFLUENZA A BY PCR: NOT DETECTED
INFLUENZA A H1 (2009) PCR: ABNORMAL
INFLUENZA A H1 PCR: ABNORMAL
INFLUENZA A H3 PCR: ABNORMAL
INFLUENZA B BY PCR: NOT DETECTED
LYMPHOCYTES # BLD: 23 % (ref 24–43)
MCH RBC QN AUTO: 26 PG (ref 25.2–33.5)
MCHC RBC AUTO-ENTMCNC: 30.3 G/DL (ref 28.4–34.8)
MCV RBC AUTO: 85.9 FL (ref 82.6–102.9)
MONOCYTES # BLD: 7 % (ref 3–12)
MYCOPLASMA PNEUMONIAE PCR: NOT DETECTED
NRBC AUTOMATED: 0 PER 100 WBC
PARAINFLUENZA 1 PCR: NOT DETECTED
PARAINFLUENZA 2 PCR: NOT DETECTED
PARAINFLUENZA 3 PCR: NOT DETECTED
PARAINFLUENZA 4 PCR: NOT DETECTED
PDW BLD-RTO: 17.5 % (ref 11.8–14.4)
PLATELET # BLD: 222 K/UL (ref 138–453)
PLATELET ESTIMATE: ABNORMAL
PMV BLD AUTO: 10.9 FL (ref 8.1–13.5)
POTASSIUM SERPL-SCNC: 4.2 MMOL/L (ref 3.7–5.3)
PRO-BNP: 1846 PG/ML
RBC # BLD: 5.03 M/UL (ref 4.21–5.77)
RBC # BLD: ABNORMAL 10*6/UL
RESP SYNCYTIAL VIRUS PCR: NOT DETECTED
RHINO/ENTEROVIRUS PCR: DETECTED
SEG NEUTROPHILS: 67 % (ref 36–65)
SEGMENTED NEUTROPHILS ABSOLUTE COUNT: 5.43 K/UL (ref 1.5–8.1)
SODIUM BLD-SCNC: 141 MMOL/L (ref 135–144)
SPECIMEN DESCRIPTION: ABNORMAL
TROPONIN INTERP: ABNORMAL
TROPONIN INTERP: NORMAL
TROPONIN T: ABNORMAL NG/ML
TROPONIN T: NORMAL NG/ML
TROPONIN, HIGH SENSITIVITY: 122 NG/L (ref 0–22)
TROPONIN, HIGH SENSITIVITY: 15 NG/L (ref 0–22)
TROPONIN, HIGH SENSITIVITY: 179 NG/L (ref 0–22)
TROPONIN, HIGH SENSITIVITY: 28 NG/L (ref 0–22)
WBC # BLD: 8.1 K/UL (ref 3.5–11.3)
WBC # BLD: ABNORMAL 10*3/UL

## 2020-03-19 PROCEDURE — 71260 CT THORAX DX C+: CPT

## 2020-03-19 PROCEDURE — 85025 COMPLETE CBC W/AUTO DIFF WBC: CPT

## 2020-03-19 PROCEDURE — 1200000000 HC SEMI PRIVATE

## 2020-03-19 PROCEDURE — 99223 1ST HOSP IP/OBS HIGH 75: CPT | Performed by: INTERNAL MEDICINE

## 2020-03-19 PROCEDURE — 82947 ASSAY GLUCOSE BLOOD QUANT: CPT

## 2020-03-19 PROCEDURE — 80048 BASIC METABOLIC PNL TOTAL CA: CPT

## 2020-03-19 PROCEDURE — 6370000000 HC RX 637 (ALT 250 FOR IP): Performed by: STUDENT IN AN ORGANIZED HEALTH CARE EDUCATION/TRAINING PROGRAM

## 2020-03-19 PROCEDURE — 36415 COLL VENOUS BLD VENIPUNCTURE: CPT

## 2020-03-19 PROCEDURE — 0100U HC RESPIRPTHGN MULT REV TRANS & AMP PRB TECH 21 TRGT: CPT

## 2020-03-19 PROCEDURE — 83880 ASSAY OF NATRIURETIC PEPTIDE: CPT

## 2020-03-19 PROCEDURE — 6360000004 HC RX CONTRAST MEDICATION: Performed by: STUDENT IN AN ORGANIZED HEALTH CARE EDUCATION/TRAINING PROGRAM

## 2020-03-19 PROCEDURE — 6360000002 HC RX W HCPCS: Performed by: STUDENT IN AN ORGANIZED HEALTH CARE EDUCATION/TRAINING PROGRAM

## 2020-03-19 PROCEDURE — 84484 ASSAY OF TROPONIN QUANT: CPT

## 2020-03-19 PROCEDURE — 99285 EMERGENCY DEPT VISIT HI MDM: CPT

## 2020-03-19 PROCEDURE — 2580000003 HC RX 258: Performed by: STUDENT IN AN ORGANIZED HEALTH CARE EDUCATION/TRAINING PROGRAM

## 2020-03-19 PROCEDURE — 93005 ELECTROCARDIOGRAM TRACING: CPT | Performed by: STUDENT IN AN ORGANIZED HEALTH CARE EDUCATION/TRAINING PROGRAM

## 2020-03-19 PROCEDURE — 71046 X-RAY EXAM CHEST 2 VIEWS: CPT

## 2020-03-19 RX ORDER — NICOTINE POLACRILEX 4 MG
15 LOZENGE BUCCAL PRN
Status: DISCONTINUED | OUTPATIENT
Start: 2020-03-19 | End: 2020-03-21 | Stop reason: HOSPADM

## 2020-03-19 RX ORDER — ACETAMINOPHEN 650 MG/1
650 SUPPOSITORY RECTAL EVERY 6 HOURS PRN
Status: DISCONTINUED | OUTPATIENT
Start: 2020-03-19 | End: 2020-03-21 | Stop reason: HOSPADM

## 2020-03-19 RX ORDER — DEXTROSE MONOHYDRATE 50 MG/ML
100 INJECTION, SOLUTION INTRAVENOUS PRN
Status: DISCONTINUED | OUTPATIENT
Start: 2020-03-19 | End: 2020-03-21 | Stop reason: HOSPADM

## 2020-03-19 RX ORDER — ONDANSETRON 2 MG/ML
4 INJECTION INTRAMUSCULAR; INTRAVENOUS EVERY 6 HOURS PRN
Status: DISCONTINUED | OUTPATIENT
Start: 2020-03-19 | End: 2020-03-21 | Stop reason: HOSPADM

## 2020-03-19 RX ORDER — ATORVASTATIN CALCIUM 40 MG/1
40 TABLET, FILM COATED ORAL DAILY
Status: DISCONTINUED | OUTPATIENT
Start: 2020-03-19 | End: 2020-03-21 | Stop reason: HOSPADM

## 2020-03-19 RX ORDER — SODIUM CHLORIDE 0.9 % (FLUSH) 0.9 %
10 SYRINGE (ML) INJECTION EVERY 12 HOURS SCHEDULED
Status: DISCONTINUED | OUTPATIENT
Start: 2020-03-19 | End: 2020-03-21 | Stop reason: HOSPADM

## 2020-03-19 RX ORDER — POLYETHYLENE GLYCOL 3350 17 G/17G
17 POWDER, FOR SOLUTION ORAL DAILY PRN
Status: DISCONTINUED | OUTPATIENT
Start: 2020-03-19 | End: 2020-03-21 | Stop reason: HOSPADM

## 2020-03-19 RX ORDER — 0.9 % SODIUM CHLORIDE 0.9 %
1000 INTRAVENOUS SOLUTION INTRAVENOUS ONCE
Status: DISCONTINUED | OUTPATIENT
Start: 2020-03-19 | End: 2020-03-19

## 2020-03-19 RX ORDER — ACETAMINOPHEN 325 MG/1
650 TABLET ORAL EVERY 6 HOURS PRN
Status: DISCONTINUED | OUTPATIENT
Start: 2020-03-19 | End: 2020-03-21 | Stop reason: HOSPADM

## 2020-03-19 RX ORDER — PROMETHAZINE HYDROCHLORIDE 25 MG/1
12.5 TABLET ORAL EVERY 6 HOURS PRN
Status: DISCONTINUED | OUTPATIENT
Start: 2020-03-19 | End: 2020-03-21 | Stop reason: HOSPADM

## 2020-03-19 RX ORDER — 0.9 % SODIUM CHLORIDE 0.9 %
500 INTRAVENOUS SOLUTION INTRAVENOUS ONCE
Status: COMPLETED | OUTPATIENT
Start: 2020-03-19 | End: 2020-03-19

## 2020-03-19 RX ORDER — INSULIN GLARGINE 100 [IU]/ML
10 INJECTION, SOLUTION SUBCUTANEOUS NIGHTLY
Status: DISCONTINUED | OUTPATIENT
Start: 2020-03-19 | End: 2020-03-21 | Stop reason: HOSPADM

## 2020-03-19 RX ORDER — ISOSORBIDE MONONITRATE 30 MG/1
30 TABLET, EXTENDED RELEASE ORAL DAILY
Status: DISCONTINUED | OUTPATIENT
Start: 2020-03-19 | End: 2020-03-21 | Stop reason: HOSPADM

## 2020-03-19 RX ORDER — ACETAMINOPHEN 325 MG/1
650 TABLET ORAL EVERY 4 HOURS PRN
Status: DISCONTINUED | OUTPATIENT
Start: 2020-03-19 | End: 2020-03-19

## 2020-03-19 RX ORDER — SODIUM CHLORIDE 0.9 % (FLUSH) 0.9 %
10 SYRINGE (ML) INJECTION EVERY 12 HOURS SCHEDULED
Status: DISCONTINUED | OUTPATIENT
Start: 2020-03-19 | End: 2020-03-19

## 2020-03-19 RX ORDER — PANTOPRAZOLE SODIUM 40 MG/1
40 TABLET, DELAYED RELEASE ORAL
Status: DISCONTINUED | OUTPATIENT
Start: 2020-03-20 | End: 2020-03-21 | Stop reason: HOSPADM

## 2020-03-19 RX ORDER — LISINOPRIL 5 MG/1
5 TABLET ORAL DAILY
Status: DISCONTINUED | OUTPATIENT
Start: 2020-03-19 | End: 2020-03-21 | Stop reason: HOSPADM

## 2020-03-19 RX ORDER — FUROSEMIDE 10 MG/ML
40 INJECTION INTRAMUSCULAR; INTRAVENOUS 2 TIMES DAILY
Status: DISCONTINUED | OUTPATIENT
Start: 2020-03-19 | End: 2020-03-20

## 2020-03-19 RX ORDER — DEXTROSE MONOHYDRATE 25 G/50ML
12.5 INJECTION, SOLUTION INTRAVENOUS PRN
Status: DISCONTINUED | OUTPATIENT
Start: 2020-03-19 | End: 2020-03-21 | Stop reason: HOSPADM

## 2020-03-19 RX ORDER — SODIUM CHLORIDE 0.9 % (FLUSH) 0.9 %
10 SYRINGE (ML) INJECTION PRN
Status: DISCONTINUED | OUTPATIENT
Start: 2020-03-19 | End: 2020-03-21 | Stop reason: HOSPADM

## 2020-03-19 RX ORDER — SODIUM CHLORIDE 0.9 % (FLUSH) 0.9 %
10 SYRINGE (ML) INJECTION PRN
Status: DISCONTINUED | OUTPATIENT
Start: 2020-03-19 | End: 2020-03-19

## 2020-03-19 RX ORDER — ALLOPURINOL 300 MG/1
300 TABLET ORAL DAILY
Status: DISCONTINUED | OUTPATIENT
Start: 2020-03-19 | End: 2020-03-21 | Stop reason: HOSPADM

## 2020-03-19 RX ORDER — METOPROLOL SUCCINATE 25 MG/1
25 TABLET, EXTENDED RELEASE ORAL DAILY
Status: DISCONTINUED | OUTPATIENT
Start: 2020-03-19 | End: 2020-03-21 | Stop reason: HOSPADM

## 2020-03-19 RX ADMIN — SODIUM CHLORIDE, PRESERVATIVE FREE 10 ML: 5 INJECTION INTRAVENOUS at 21:05

## 2020-03-19 RX ADMIN — FUROSEMIDE 40 MG: 10 INJECTION, SOLUTION INTRAMUSCULAR; INTRAVENOUS at 18:59

## 2020-03-19 RX ADMIN — SODIUM CHLORIDE 500 ML: 9 INJECTION, SOLUTION INTRAVENOUS at 11:14

## 2020-03-19 RX ADMIN — DESMOPRESSIN ACETATE 40 MG: 0.2 TABLET ORAL at 18:08

## 2020-03-19 RX ADMIN — IOHEXOL 75 ML: 350 INJECTION, SOLUTION INTRAVENOUS at 12:04

## 2020-03-19 RX ADMIN — INSULIN GLARGINE 10 UNITS: 100 INJECTION, SOLUTION SUBCUTANEOUS at 21:05

## 2020-03-19 NOTE — ED NOTES
Pt back from [facility], [address] [facility]  03/19/20 0253

## 2020-03-19 NOTE — H&P
of complication, not stated as uncontrolled     Unspecified sleep apnea     uses V-pap       Past Surgical History:        Procedure Laterality Date    BONE MARROW BIOPSY  2012 approx    CARDIAC CATHETERIZATION  8-2007    severe stenosis pre-stent area    CARDIAC CATHETERIZATION  09/11/2013    Very peripheral stenosis, not amendable to PCI, stents patent    CARDIAC DEFIBRILLATOR PLACEMENT  8/26/2015    COLONOSCOPY  11 7 2007   [address] 2004    stent LAD    PACEMAKER PLACEMENT  2005 or 2006    AICD    [place] COLSC FLX W/RMVL OF TUMOR POLYP LESION SNARE TQ N/A 4/4/2017    COLONOSCOPY POLYPECTOMY SNARE/COLD BIOPSY performed by Eder Stringer DO at [facility]       Allergies: Allergies   Allergen Reactions    Zetia [Ezetimibe] Shortness Of Breath    Bactrim     Cephalexin     Cephalexin     Sulfamethoxazole-Trimethoprim          Home Meds:   Prior to Admission medications    Medication Sig Start Date End Date Taking?  Authorizing Provider   vitamin D (ERGOCALCIFEROL) 1.25 MG (16687 UT) CAPS capsule Take 1 capsule by mouth once a week 2/27/20   Inocencia Keyes MD   Calcium Carbonate-Vitamin D (OYSTER SHELL CALCIUM/D) 500-200 MG-UNIT TABS Take 1 tablet by mouth daily 2/27/20 2/26/21  Inocencia Keyes MD   lisinopril (PRINIVIL;ZESTRIL) 5 MG tablet Take 1 tablet by mouth daily 2/20/20   Jalil Roblero MD   omeprazole (PRILOSEC) 20 MG delayed release capsule Take 1 capsule by mouth Daily 2/20/20   Jalil Roblero MD   isosorbide mononitrate (IMDUR) 30 MG extended release tablet Take 1 tablet by mouth daily 2/20/20   Jalil Roblero MD   blood glucose test strips (FREESTYLE LITE) strip USE AS DIRECTED TWICE A DAY 1/2/20   Jalil Roblero MD   furosemide (LASIX) 40 MG tablet TAKE 1 TABLET BY MOUTH 2 TIMES A DAY 1/2/20   Jalil Roblero MD   atorvastatin (LIPITOR) 40 MG tablet Take 1 tablet by mouth daily 1/2/20   Jalil Roblero MD Problems:    TANNER variably compliant with BiPAP    CAD (coronary artery disease) s/p stenting of L anterior descending artery 2004    Ischemic cardiomyopathy    Chronic gout    Morbid obesity ([facility]    Hyperlipidemia    HTN (hypertension)    Type 2 diabetes mellitus without complication ([facility]    AICD (automatic cardioverter/defibrillator) present    PAF (paroxysmal atrial fibrillation) (HCC)    Acute on chronic systolic heart failure (HCC)    Viral upper respiratory tract infection  Resolved Problems:    * No resolved hospital problems. *    Acute on chronic systolic and diastolic congestive heart failure due to URI  We will start patient on Lasix 40 mg IV BD. We will get a 2D echo  We will check BMP daily. Will check troponins for 2 more occurrences. Check daily weights. Inpatient consult to heart failure nurse placed. Continue telemetry monitoring. Will check Viral panel for URI    Coronary artery disease status post stenting of LAD in 2004  Continue Lipitor for now. Will hold off on aspirin for any anticipated intervention. Hypertension  Continue Imdur 30 mg daily  Continue lisinopril 5 mg daily  Continue Toprol-XL 25 mg daily  Lasix 40 mg IV BD    Hyperlipidemia  Lipitor 40 mg daily  We will check lipid panel    Type 2 diabetes mellitus  Lantus 10 units daily with medium dose sliding scale. POCT glucose before meals and at bedtime. Hypoglycemia protocol in place. Paroxysmal atrial fibrillation  Continue Toprol-XL 25 mg daily. Will hold off on Eliquis for any anticipated intervention. Chronic gout  Allopurinol 300 mg daily    Obstructive sleep apnea with BiPAP at home  BiPaP ordered. DVT Prophylaxis: EPC Cuffs. Will hold off on Anticoagulation for now until a definite plan from Cardiology   Diet: Cardiac diet, low-sodium  Discharge Planning: Consult to  placed    The primary Internal medicine team assigned to the patient will be following up the patient on the floor.  The above

## 2020-03-19 NOTE — ED PROVIDER NOTES
[facility]     Emergency Department     Faculty Attestation        I performed a history and physical examination of the patient and discussed management with the resident. I reviewed the residents note and agree with the documented findings and plan of care. Any areas of disagreement are noted on the chart. I was personally present for the key portions of any procedures. I have documented in the chart those procedures where I was not present during the key portions. I have reviewed the emergency nurses triage note. I agree with the chief complaint, past medical history, past surgical history, allergies, medications, social and family history as documented unless otherwise noted below. For Physician Assistant/ Nurse Practitioner cases/documentation I have personally evaluated this patient and have completed at least one if not all key elements of the E/M (history, physical exam, and MDM). Additional findings are as noted. Vital Signs:    Pulse: 135  Resp: 24  Temp: 98.1 °F (36.7 °C) SpO2: 96 %  PCP:  Liliya Jones MD    Pertinent Comments:         EKG Interpretation    Interpreted by emergency department physician    Rhythm: Sinus rhythm, but Tachycardic  Rate: Tachycardic at 133 bpm  Axis: normal  Conduction: normal  ST Segments: LVH in the anterior lateral leads with no change from previous  T Waves: no acute change  Q Waves: no acute change    Clinical Impression: Sinus Tachycardia with unchanged overall morphology when compared to previous EKG on 9/1/2019 at 1244 hrs. Critical Care  None      (Please note that portions of this note were completed with a voice recognition program. Efforts were made to edit the dictations but occasionally words are mis-transcribed.  Whenever words are used in this note in any gender, they shall be construed as though they were used in the gender appropriate to the circumstances; and whenever words are used in this note in the singular or plural form, they shall be construed as though they were used in the form appropriate to the circumstances.)    MD Nasreen Islas  Attending Emergency Medicine Physician             Ashkan Peter MD  03/19/20 818 DM Andrade MD  03/19/20 2947

## 2020-03-20 LAB
ANION GAP SERPL CALCULATED.3IONS-SCNC: 12 MMOL/L (ref 9–17)
BUN BLDV-MCNC: 16 MG/DL (ref 8–23)
BUN/CREAT BLD: ABNORMAL (ref 9–20)
CALCIUM SERPL-MCNC: 8.8 MG/DL (ref 8.6–10.4)
CHLORIDE BLD-SCNC: 99 MMOL/L (ref 98–107)
CHOLESTEROL/HDL RATIO: 3.1
CHOLESTEROL: 124 MG/DL
CO2: 28 MMOL/L (ref 20–31)
CREAT SERPL-MCNC: 0.92 MG/DL (ref 0.7–1.2)
EKG ATRIAL RATE: 133 BPM
EKG P AXIS: -13 DEGREES
EKG P-R INTERVAL: 176 MS
EKG Q-T INTERVAL: 296 MS
EKG QRS DURATION: 130 MS
EKG QTC CALCULATION (BAZETT): 440 MS
EKG R AXIS: -55 DEGREES
EKG T AXIS: 98 DEGREES
EKG VENTRICULAR RATE: 133 BPM
GFR AFRICAN AMERICAN: >60 ML/MIN
GFR NON-AFRICAN AMERICAN: >60 ML/MIN
GFR SERPL CREATININE-BSD FRML MDRD: ABNORMAL ML/MIN/{1.73_M2}
GFR SERPL CREATININE-BSD FRML MDRD: ABNORMAL ML/MIN/{1.73_M2}
GLUCOSE BLD-MCNC: 121 MG/DL (ref 75–110)
GLUCOSE BLD-MCNC: 130 MG/DL (ref 75–110)
GLUCOSE BLD-MCNC: 134 MG/DL (ref 70–99)
GLUCOSE BLD-MCNC: 145 MG/DL (ref 75–110)
GLUCOSE BLD-MCNC: 182 MG/DL (ref 75–110)
HCT VFR BLD CALC: 38.4 % (ref 40.7–50.3)
HDLC SERPL-MCNC: 40 MG/DL
HEMOGLOBIN: 12 G/DL (ref 13–17)
LDL CHOLESTEROL: 67 MG/DL (ref 0–130)
LV EF: 18 %
LVEF MODALITY: NORMAL
MCH RBC QN AUTO: 26.9 PG (ref 25.2–33.5)
MCHC RBC AUTO-ENTMCNC: 31.3 G/DL (ref 28.4–34.8)
MCV RBC AUTO: 86.1 FL (ref 82.6–102.9)
NRBC AUTOMATED: 0 PER 100 WBC
PARTIAL THROMBOPLASTIN TIME: 27.4 SEC (ref 20.5–30.5)
PARTIAL THROMBOPLASTIN TIME: 29.7 SEC (ref 20.5–30.5)
PARTIAL THROMBOPLASTIN TIME: 31.7 SEC (ref 20.5–30.5)
PARTIAL THROMBOPLASTIN TIME: 32.9 SEC (ref 20.5–30.5)
PDW BLD-RTO: 17.7 % (ref 11.8–14.4)
PLATELET # BLD: 225 K/UL (ref 138–453)
PMV BLD AUTO: 11.1 FL (ref 8.1–13.5)
POTASSIUM SERPL-SCNC: 4 MMOL/L (ref 3.7–5.3)
RBC # BLD: 4.46 M/UL (ref 4.21–5.77)
SODIUM BLD-SCNC: 139 MMOL/L (ref 135–144)
TRIGL SERPL-MCNC: 84 MG/DL
VLDLC SERPL CALC-MCNC: ABNORMAL MG/DL (ref 1–30)
WBC # BLD: 8.4 K/UL (ref 3.5–11.3)

## 2020-03-20 PROCEDURE — 80061 LIPID PANEL: CPT

## 2020-03-20 PROCEDURE — C8929 TTE W OR WO FOL WCON,DOPPLER: HCPCS

## 2020-03-20 PROCEDURE — 6360000002 HC RX W HCPCS: Performed by: STUDENT IN AN ORGANIZED HEALTH CARE EDUCATION/TRAINING PROGRAM

## 2020-03-20 PROCEDURE — 2580000003 HC RX 258: Performed by: STUDENT IN AN ORGANIZED HEALTH CARE EDUCATION/TRAINING PROGRAM

## 2020-03-20 PROCEDURE — 85027 COMPLETE CBC AUTOMATED: CPT

## 2020-03-20 PROCEDURE — 6370000000 HC RX 637 (ALT 250 FOR IP): Performed by: STUDENT IN AN ORGANIZED HEALTH CARE EDUCATION/TRAINING PROGRAM

## 2020-03-20 PROCEDURE — 82947 ASSAY GLUCOSE BLOOD QUANT: CPT

## 2020-03-20 PROCEDURE — 6360000002 HC RX W HCPCS

## 2020-03-20 PROCEDURE — 80048 BASIC METABOLIC PNL TOTAL CA: CPT

## 2020-03-20 PROCEDURE — 36415 COLL VENOUS BLD VENIPUNCTURE: CPT

## 2020-03-20 PROCEDURE — 93010 ELECTROCARDIOGRAM REPORT: CPT | Performed by: INTERNAL MEDICINE

## 2020-03-20 PROCEDURE — 1200000000 HC SEMI PRIVATE

## 2020-03-20 PROCEDURE — 99233 SBSQ HOSP IP/OBS HIGH 50: CPT | Performed by: INTERNAL MEDICINE

## 2020-03-20 PROCEDURE — 85730 THROMBOPLASTIN TIME PARTIAL: CPT

## 2020-03-20 RX ORDER — ASPIRIN 81 MG/1
81 TABLET ORAL 2 TIMES DAILY
Status: DISCONTINUED | OUTPATIENT
Start: 2020-03-20 | End: 2020-03-21 | Stop reason: HOSPADM

## 2020-03-20 RX ORDER — HEPARIN SODIUM 1000 [USP'U]/ML
4000 INJECTION, SOLUTION INTRAVENOUS; SUBCUTANEOUS PRN
Status: DISCONTINUED | OUTPATIENT
Start: 2020-03-20 | End: 2020-03-21

## 2020-03-20 RX ORDER — FUROSEMIDE 10 MG/ML
20 INJECTION INTRAMUSCULAR; INTRAVENOUS 2 TIMES DAILY
Status: DISCONTINUED | OUTPATIENT
Start: 2020-03-20 | End: 2020-03-21

## 2020-03-20 RX ORDER — HEPARIN SODIUM 1000 [USP'U]/ML
2000 INJECTION, SOLUTION INTRAVENOUS; SUBCUTANEOUS PRN
Status: DISCONTINUED | OUTPATIENT
Start: 2020-03-20 | End: 2020-03-21

## 2020-03-20 RX ORDER — HEPARIN SODIUM 10000 [USP'U]/100ML
8.8 INJECTION, SOLUTION INTRAVENOUS CONTINUOUS
Status: DISCONTINUED | OUTPATIENT
Start: 2020-03-20 | End: 2020-03-20

## 2020-03-20 RX ORDER — HEPARIN SODIUM 1000 [USP'U]/ML
4000 INJECTION, SOLUTION INTRAVENOUS; SUBCUTANEOUS ONCE
Status: COMPLETED | OUTPATIENT
Start: 2020-03-20 | End: 2020-03-20

## 2020-03-20 RX ORDER — FUROSEMIDE 10 MG/ML
20 INJECTION INTRAMUSCULAR; INTRAVENOUS 2 TIMES DAILY
Status: DISCONTINUED | OUTPATIENT
Start: 2020-03-20 | End: 2020-03-20

## 2020-03-20 RX ADMIN — HEPARIN SODIUM 4000 UNITS: 1000 INJECTION, SOLUTION INTRAVENOUS; SUBCUTANEOUS at 02:55

## 2020-03-20 RX ADMIN — Medication 81 MG: at 02:52

## 2020-03-20 RX ADMIN — FUROSEMIDE 20 MG: 10 INJECTION, SOLUTION INTRAMUSCULAR; INTRAVENOUS at 19:55

## 2020-03-20 RX ADMIN — HEPARIN SODIUM AND DEXTROSE 10.82 UNITS/KG/HR: 10000; 5 INJECTION INTRAVENOUS at 10:04

## 2020-03-20 RX ADMIN — Medication 81 MG: at 10:11

## 2020-03-20 RX ADMIN — DESMOPRESSIN ACETATE 40 MG: 0.2 TABLET ORAL at 10:10

## 2020-03-20 RX ADMIN — HEPARIN SODIUM AND DEXTROSE 8.8 UNITS/KG/HR: 10000; 5 INJECTION INTRAVENOUS at 02:54

## 2020-03-20 RX ADMIN — LISINOPRIL 5 MG: 5 TABLET ORAL at 11:17

## 2020-03-20 RX ADMIN — INSULIN LISPRO 2 UNITS: 100 INJECTION, SOLUTION INTRAVENOUS; SUBCUTANEOUS at 19:02

## 2020-03-20 RX ADMIN — Medication 81 MG: at 19:54

## 2020-03-20 RX ADMIN — PANTOPRAZOLE SODIUM 40 MG: 40 TABLET, DELAYED RELEASE ORAL at 10:11

## 2020-03-20 RX ADMIN — HEPARIN SODIUM 2000 UNITS: 1000 INJECTION, SOLUTION INTRAVENOUS; SUBCUTANEOUS at 10:05

## 2020-03-20 RX ADMIN — HEPARIN SODIUM 4000 UNITS: 1000 INJECTION, SOLUTION INTRAVENOUS; SUBCUTANEOUS at 18:53

## 2020-03-20 RX ADMIN — ACETAMINOPHEN 650 MG: 325 TABLET ORAL at 05:42

## 2020-03-20 RX ADMIN — INSULIN GLARGINE 10 UNITS: 100 INJECTION, SOLUTION SUBCUTANEOUS at 19:54

## 2020-03-20 RX ADMIN — ALLOPURINOL 300 MG: 300 TABLET ORAL at 10:09

## 2020-03-20 RX ADMIN — SODIUM CHLORIDE, PRESERVATIVE FREE 10 ML: 5 INJECTION INTRAVENOUS at 19:58

## 2020-03-20 RX ADMIN — FUROSEMIDE 40 MG: 10 INJECTION, SOLUTION INTRAMUSCULAR; INTRAVENOUS at 10:07

## 2020-03-20 RX ADMIN — ISOSORBIDE MONONITRATE 30 MG: 30 TABLET ORAL at 11:17

## 2020-03-20 RX ADMIN — METOPROLOL SUCCINATE 25 MG: 25 TABLET, FILM COATED, EXTENDED RELEASE ORAL at 10:09

## 2020-03-20 RX ADMIN — HEPARIN SODIUM AND DEXTROSE 14.81 UNITS/KG/HR: 10000; 5 INJECTION INTRAVENOUS at 18:54

## 2020-03-20 ASSESSMENT — PAIN SCALES - GENERAL: PAINLEVEL_OUTOF10: 4

## 2020-03-20 NOTE — PROGRESS NOTES
On-call internal med resident notified of troponin result. Orders placed. Cardiology consulted per order.

## 2020-03-20 NOTE — PROGRESS NOTES
[facility]  Internal Medicine Teaching Residency Program  Inpatient Daily Progress Note  ______________________________________________________________________________    Patient: Lida Prabhakar  YOB: 1955   CMF:[id number]    Acct: [de-identified]     Room: 2021/2021-01  Admit date: 3/19/2020  Today's date: 03/20/20  Number of days in the hospital: 1    SUBJECTIVE   Admitting Diagnosis: Acute on chronic combined systolic and diastolic congestive heart failure ([address]  CC: Shortness of breath  Pt examined at bedside. Chart & results reviewed. Patient seen and examined at bedside. Alert and oriented. Stable vitals. Labs are normal as well except her troponins which are elevating. The patient is currently on heparin drip. Cardiology consulted. ROS:  Constitutional:  negative for chills, fevers, sweats  Respiratory:  negative for cough, dyspnea on exertion, hemoptysis, shortness of breath, wheezing  Cardiovascular:  negative for chest pain, chest pressure/discomfort, lower extremity edema, palpitations  Gastrointestinal:  negative for abdominal pain, constipation, diarrhea, nausea, vomiting  Neurological:  negative for dizziness, headache  BRIEF HISTORY     History was obtained from chart review and the patient. Lida Prabhakar is a 77 y.o. male who comes into the hospital with complaints of shortness of breath associated with cough with initially clear sputum production which turned green and then changed to clear again for the last 9 days. The patient also had associated complaints of nasal congestion for the last 9 days. He did have complaints of lightheadedness for a while but it resolved. He denied any complaints of chest pain, palpitations, fever, chills, nausea, vomiting, muscle or joint pains. The patient woke up this morning to defecate.   While he was applying pressure, he felt really short of breath after that so he made the decision to chloride flush, 10 mL, PRN        Diagnostic Labs:  CBC:   Recent Labs     03/19/20  1100 03/20/20  0139   WBC 8.1 8.4   RBC 5.03 4.46   HGB 13.1 12.0*   HCT 43.2 38.4*   MCV 85.9 86.1   RDW 17.5* 17.7*    225     BMP:   Recent Labs     03/19/20  1100 03/20/20  0450    139   K 4.2 4.0    99   CO2 24 28   BUN 14 16   CREATININE 0.76 0.92     APTT:   Recent Labs     03/20/20  0139 03/20/20  0714   APTT 27.4 31.7*     FASTING LIPID PANEL:  Lab Results   Component Value Date    CHOL 124 03/20/2020    HDL 40 (L) 03/20/2020    TRIG 84 03/20/2020       MICROBIOLOGY:   Lab Results   Component Value Date/Time    CULTURE NO GROWTH 6 DAYS 09/13/2017 01:59 AM    CULTURE  09/13/2017 01:59 AM     [facility] [address] [address] [phone]    CULTURE NO GROWTH 6 DAYS 09/13/2017 01:59 AM    CULTURE  09/13/2017 01:59 AM     [facility] [address] [address] [phone]       Imaging:    Xr Chest Standard (2 Vw)    Result Date: 3/19/2020  Cardiomegaly and pulmonary vascular congestion consistent with CHF. Ct Chest Pulmonary Embolism W Contrast    Result Date: 3/19/2020  1. No definite pulmonary embolism. Portions of the peripheral aspect of pulmonary artery branches are suboptimally evaluated. 2. Calcific atherosclerosis aorta and coronary arteries. 3. Cardiomegaly with left ventricular prominence. 4. Partial visualization of what appears to be cholelithiasis, confirmed on prior study.        ASSESSMENT & PLAN     ASSESSMENT / PLAN:     Principal Problem:    Acute on chronic combined systolic and diastolic congestive heart failure (HCC)  Active Problems:    TANNER variably compliant with BiPAP    CAD (coronary artery disease) s/p stenting of L anterior descending artery 2004    Ischemic cardiomyopathy    Chronic gout    Morbid obesity ([address]    Hyperlipidemia    HTN (hypertension)    Type 2 diabetes mellitus without complication ([address]    AICD (automatic cardioverter/defibrillator) present    PAF (paroxysmal atrial fibrillation) (HCC)    Acute on chronic systolic heart failure (HCC)    Viral upper respiratory tract infection    Acute on chronic congestive heart failure ([address]  Resolved Problems:    * No resolved hospital problems. *    Acute on chronic systolic and diastolic congestive heart failure due to URI  Continue Lasix 40 mg IV BD. We will get a 2D echo  We will check BMP daily. Troponin have been rising. Patient in heparin gtt. Awaiting Cardiology recommendations  Check daily weights. Inpatient consult to heart failure nurse placed. Continue telemetry monitoring.     Coronary artery disease status post stenting of LAD in 2004  Continue Lipitor for now. Continue Aspirin     Hypertension  Continue Imdur 30 mg daily  Continue lisinopril 5 mg daily  Continue Toprol-XL 25 mg daily  Lasix 40 mg IV BD     Hyperlipidemia  Lipitor 40 mg daily     Type 2 diabetes mellitus  Lantus 10 units daily with medium dose sliding scale. POCT glucose before meals and at bedtime. Hypoglycemia protocol in place.     Paroxysmal atrial fibrillation  Continue Toprol-XL 25 mg daily.   Patient is on heparin gtt     Chronic gout  Allopurinol 300 mg daily     Obstructive sleep apnea with BiPAP at home  BiPaP ordered.     DVT Prophylaxis: On Heparin gtt  Diet: Cardiac diet, low-sodium  Discharge Planning: Consult to  placed    Mary Chisholm MD  PGY-2, Department of Internal Medicine  [facility], [facility]  3/20/2020 9:56 AM

## 2020-03-20 NOTE — CONSULTS
patent    CARDIAC DEFIBRILLATOR PLACEMENT  8/26/2015    COLONOSCOPY  11 7 2007    CORONARY ANGIOPLASTY  1998, 2004    stent LAD    PACEMAKER PLACEMENT  2005 or 2006    AICD    GA COLSC FLX W/RMVL OF TUMOR POLYP LESION SNARE TQ N/A 4/4/2017    COLONOSCOPY POLYPECTOMY SNARE/COLD BIOPSY performed by Babs Beck DO at [facility]       Medications:   Scheduled Meds:   aspirin  81 mg Oral BID    allopurinol  300 mg Oral Daily    atorvastatin  40 mg Oral Daily    furosemide  40 mg Intravenous BID    isosorbide mononitrate  30 mg Oral Daily    lisinopril  5 mg Oral Daily    metoprolol succinate  25 mg Oral Daily    pantoprazole  40 mg Oral QAM AC    insulin lispro  0-12 Units Subcutaneous TID WC    insulin lispro  0-6 Units Subcutaneous Nightly    insulin glargine  10 Units Subcutaneous Nightly    sodium chloride flush  10 mL Intravenous 2 times per day     Continuous Infusions:   heparin (porcine) 10.82 Units/kg/hr (03/20/20 1004)    dextrose        No outpatient medications have been marked as taking for the 3/19/20 encounter [facility] Encounter). Allergies:   Zetia [ezetimibe]; Bactrim; Cephalexin; Cephalexin; and Sulfamethoxazole-trimethoprim    Social History:    reports that he quit smoking about 46 years ago. His smoking use included cigarettes. He started smoking about 49 years ago. He has a 3.00 pack-year smoking history. He has never used smokeless tobacco. He reports previous drug use. Drug: Marijuana. He reports that he does not drink alcohol. Family History:    family history includes Cancer in his mother; Heart Disease in his maternal grandmother, maternal uncle, and mother. Review of Systems:     Constitutional: No fever/chills. HENT: No headache, neck pain or neck stiffnes. No sore throat or dysphagia. Eyes: No blurred vision. Respiratory: As above. Cardiovascular: As above. Gastrointestinal: Negative. Genitourinary: Negative  Endocrine: Negative.    Musculoskeletal: Negative. Skin: Negative. Allergic/Immunologic: Negative. Neurologic: Negative. Hematological: Negative. Psychiatric: Negative. All other systems are are noted to be otherwise negative. Physical Exam:   BP (!) 128/58   Pulse 84   Temp 97.5 °F (36.4 °C) (Oral)   Resp 22   Ht 5' 5\" (1.651 m)   Wt 250 lb (113.4 kg)   SpO2 96%   BMI 41.60 kg/m²     Intake/Output Summary (Last 24 hours) at 3/20/2020 1608  Last data filed at 3/20/2020 0544  Gross per 24 hour   Intake 26 ml   Output 500 ml   Net -474 ml       GENERAL:  Alert, appropriate, oriented, in NAD. HEENT:  Head is atraumatic and normocephalic. No Pallor. No icterus. NECK: Supple without any thyromegaly. LUNGS: Generally decreased breath sounds. CARDIAC: S1, S2, RRR. ABD:  Soft non-tender . EXT: No edema. MS: No obvious deformities. SKIN: No obvious skin rashes. NEURO: No focal neurologic deficits. Labs/ Ancillary data:     CBC:   Recent Labs     03/19/20  1100 03/20/20  0139   WBC 8.1 8.4   HGB 13.1 12.0*    225     BMP:    Recent Labs     03/19/20  1100 03/20/20  0450    139   K 4.2 4.0    99   CO2 24 28   BUN 14 16   CREATININE 0.76 0.92   GLUCOSE 201* 134*     Troponin:   Recent Labs     03/19/20  2248   TROPONINT NOT REPORTED   Lipids:   Recent Labs     03/20/20  0450   CHOL 124   HDL 40*     Imaging:    CXR: Increased pulmonary vascular congestion. Echo: Severe LV systolic dysfunction    EKG: Sinus tachycardia, IVCD. Impression :     Acute on chronic systolic dysfunction. Viral URI  Type II  MI  CAD with hx of LAD stent and non-revascularizable CAD. Severe LV systolic dysfunction   Type II DM. Hyperlipidemia. PAF - currently in sinus rhythm. Orthostatic hypotension. Plan :     Supportive care. Agree with diuresis. FU BMP. Reduce diuresis if orthosis worsens. Elevated enzymes more from HF exacerbation - doubt ACS. OK to DC heparin. Will follow.     Thank you very much for allowing us to

## 2020-03-20 NOTE — PROGRESS NOTES
Resident on call team:  Oncology notified regarding orthostatics positive. Currently on IV Lasix 40 mg. Cardiology evaluated and okay to discontinue heparin. Decreased Lasix to 20 mg and administer at 9 PM, after rechecking orthostatics. Reviewed echo with EF 15-20%. Currently BP low normotensive, not requiring any fluids. Discontinued heparin drip as per cardiology. Will notify the primary team in the a.m. and continue to monitor the patient.       Julianne Pantoja MD, PGY-1  Internal Medicine Residency Program  [facility]  3/20/2020 7:27 PM

## 2020-03-21 VITALS
SYSTOLIC BLOOD PRESSURE: 121 MMHG | OXYGEN SATURATION: 97 % | HEART RATE: 64 BPM | BODY MASS INDEX: 41.65 KG/M2 | TEMPERATURE: 97.8 F | WEIGHT: 250 LBS | RESPIRATION RATE: 18 BRPM | HEIGHT: 65 IN | DIASTOLIC BLOOD PRESSURE: 64 MMHG

## 2020-03-21 LAB
ANION GAP SERPL CALCULATED.3IONS-SCNC: 14 MMOL/L (ref 9–17)
BUN BLDV-MCNC: 20 MG/DL (ref 8–23)
BUN/CREAT BLD: ABNORMAL (ref 9–20)
CALCIUM SERPL-MCNC: 8.6 MG/DL (ref 8.6–10.4)
CHLORIDE BLD-SCNC: 100 MMOL/L (ref 98–107)
CO2: 23 MMOL/L (ref 20–31)
CREAT SERPL-MCNC: 0.87 MG/DL (ref 0.7–1.2)
GFR AFRICAN AMERICAN: >60 ML/MIN
GFR NON-AFRICAN AMERICAN: >60 ML/MIN
GFR SERPL CREATININE-BSD FRML MDRD: ABNORMAL ML/MIN/{1.73_M2}
GFR SERPL CREATININE-BSD FRML MDRD: ABNORMAL ML/MIN/{1.73_M2}
GLUCOSE BLD-MCNC: 121 MG/DL (ref 70–99)
GLUCOSE BLD-MCNC: 124 MG/DL (ref 75–110)
GLUCOSE BLD-MCNC: 125 MG/DL (ref 75–110)
PARTIAL THROMBOPLASTIN TIME: 24.2 SEC (ref 20.5–30.5)
PARTIAL THROMBOPLASTIN TIME: 24.8 SEC (ref 20.5–30.5)
POTASSIUM SERPL-SCNC: 3.6 MMOL/L (ref 3.7–5.3)
SODIUM BLD-SCNC: 137 MMOL/L (ref 135–144)

## 2020-03-21 PROCEDURE — 6370000000 HC RX 637 (ALT 250 FOR IP): Performed by: STUDENT IN AN ORGANIZED HEALTH CARE EDUCATION/TRAINING PROGRAM

## 2020-03-21 PROCEDURE — 97530 THERAPEUTIC ACTIVITIES: CPT

## 2020-03-21 PROCEDURE — 80048 BASIC METABOLIC PNL TOTAL CA: CPT

## 2020-03-21 PROCEDURE — 6360000002 HC RX W HCPCS: Performed by: STUDENT IN AN ORGANIZED HEALTH CARE EDUCATION/TRAINING PROGRAM

## 2020-03-21 PROCEDURE — 82947 ASSAY GLUCOSE BLOOD QUANT: CPT

## 2020-03-21 PROCEDURE — 97116 GAIT TRAINING THERAPY: CPT

## 2020-03-21 PROCEDURE — 36415 COLL VENOUS BLD VENIPUNCTURE: CPT

## 2020-03-21 PROCEDURE — 99232 SBSQ HOSP IP/OBS MODERATE 35: CPT | Performed by: INTERNAL MEDICINE

## 2020-03-21 PROCEDURE — 2580000003 HC RX 258: Performed by: STUDENT IN AN ORGANIZED HEALTH CARE EDUCATION/TRAINING PROGRAM

## 2020-03-21 PROCEDURE — 85730 THROMBOPLASTIN TIME PARTIAL: CPT

## 2020-03-21 PROCEDURE — 97162 PT EVAL MOD COMPLEX 30 MIN: CPT

## 2020-03-21 RX ORDER — FUROSEMIDE 40 MG/1
40 TABLET ORAL 2 TIMES DAILY
Status: DISCONTINUED | OUTPATIENT
Start: 2020-03-21 | End: 2020-03-21 | Stop reason: HOSPADM

## 2020-03-21 RX ADMIN — ALLOPURINOL 300 MG: 300 TABLET ORAL at 08:27

## 2020-03-21 RX ADMIN — LISINOPRIL 5 MG: 5 TABLET ORAL at 08:27

## 2020-03-21 RX ADMIN — DESMOPRESSIN ACETATE 40 MG: 0.2 TABLET ORAL at 08:27

## 2020-03-21 RX ADMIN — SODIUM CHLORIDE, PRESERVATIVE FREE 10 ML: 5 INJECTION INTRAVENOUS at 08:29

## 2020-03-21 RX ADMIN — APIXABAN 5 MG: 5 TABLET, FILM COATED ORAL at 09:43

## 2020-03-21 RX ADMIN — Medication 81 MG: at 08:27

## 2020-03-21 RX ADMIN — ISOSORBIDE MONONITRATE 30 MG: 30 TABLET ORAL at 08:27

## 2020-03-21 RX ADMIN — PANTOPRAZOLE SODIUM 40 MG: 40 TABLET, DELAYED RELEASE ORAL at 05:37

## 2020-03-21 RX ADMIN — METOPROLOL SUCCINATE 25 MG: 25 TABLET, FILM COATED, EXTENDED RELEASE ORAL at 08:28

## 2020-03-21 RX ADMIN — INSULIN LISPRO 4 UNITS: 100 INJECTION, SOLUTION INTRAVENOUS; SUBCUTANEOUS at 12:17

## 2020-03-21 RX ADMIN — FUROSEMIDE 20 MG: 10 INJECTION, SOLUTION INTRAMUSCULAR; INTRAVENOUS at 08:28

## 2020-03-21 ASSESSMENT — PAIN SCALES - GENERAL: PAINLEVEL_OUTOF10: 0

## 2020-03-21 NOTE — PROGRESS NOTES
Physical Therapy    Facility/Department: [facility] CAR 2  Initial Assessment    NAME: Lida Prabhakar  : 1955  MRN: [id number]    Date of Service: 3/21/2020    Discharge Recommendations:  Home independently     Assessment   Assessment: Pt independent with functional mobility and gait; discharge PT. Treatment Diagnosis: general weakness  Prognosis: Good  Decision Making: Medium Complexity  Patient Education: Purpose of PT eval and reasons for discharge  No Skilled PT: At baseline function  REQUIRES PT FOLLOW UP: No  Activity Tolerance  Activity Tolerance: Patient Tolerated treatment well       Patient Diagnosis(es): The primary encounter diagnosis was Acute on chronic congestive heart failure, unspecified heart failure type ([address] Diagnoses of Shortness of breath, Tachycardia, and Acute on chronic combined systolic and diastolic congestive heart failure ([address] were also pertinent to this visit. has a past medical history of Anemia, Anxiety, CAD (coronary artery disease), Cardiac defibrillator in place, Cardiomyopathy [facility]), CHF (congestive heart failure) ([address] Chronic combined systolic and diastolic heart failure ([address] s/[ AICD placed, Chronic kidney disease, Complex sleep apnea syndrome, Diabetic retinopathy ([address] Diverticulitis, DJD (degenerative joint disease), Edentulous, Gout, HTN (hypertension), Hyperlipidemia, Hypertension, Iron deficiency anemia, Ischemic cardiomyopathy, Morbid obesity ([address] Obesity, TANNER variably compliant with BiPAP, Osteoarthritis, PAF (paroxysmal atrial fibrillation) ([address] Psychophysiologic insomnia, Type II or unspecified type diabetes mellitus without mention of complication, not stated as uncontrolled, and Unspecified sleep apnea. has a past surgical history that includes Colonoscopy (2007); pacemaker placement ( or ); Cardiac catheterization (-); Cardiac catheterization (2013); Coronary angioplasty (, );  Cardiac defibrillator placement 1332)  Mobility Inpatient CMS 0-100% Score: 0 (03/21/20 1332)  Mobility Inpatient without Stair CMS G-Code Modifier : [facility] (03/21/20 1332)       Goals  Short term goals  Time Frame for Short term goals: 1 visit  Short term goal 1: Pt to demonstrate independence with transfers, functional mobility, and gait to ensure safe discharge home.    Patient Goals   Patient goals : home following discharge       Therapy Time   Individual Concurrent Group Co-treatment   Time In 1151         Time Out 1233         Minutes Dipak Nascimento, PT, DPT, CMPT

## 2020-03-21 NOTE — PROGRESS NOTES
really short of breath after that so he made the decision to come to the emergency department for evaluation.     In the emergency department, the patient was afebrile. Tachycardic in 120s and 130s. Blood pressure was elevated as well. The patient labs showed a troponin level of 15 which increased to 28. His proBNP was elevated at 1800 as well which is higher than his baseline. His chest x-ray showed cardiomegaly and pulmonary vascular congestion. CT scan of the chest was done for pulmonary embolism which was negative. Calcific atherosclerosis of the aorta and coronary arteries was noted. Cardiomegaly was also noted.     The patient's past medical history is significant for ischemic cardiomyopathy resulting in chronic diastolic and systolic dysfunction. His last ejection fraction was 15 to 20% in September 2017. The patient has AICD placed. He follows Dr. Sonja Guzman for Cardiology as outpatient. He has a history of stent in the LAD. Last cardiac catheterization showed his left anterior descending artery stenosis to be non- revascularizeable. He was found to have severe akinesis of the anterior wall. The patient also has history of paroxysmal atrial fibrillation for which she is on Eliquis for anticoagulation. He is [place] Heart Association functional class III. His past medical history is also significant for hypertension and diabetes mellitus. The patient is on metformin for diabetes mellitus. He is on multiple medications for hypertension. The patient has obstructive sleep apnea for which he uses CPAP or BiPAP at home but is not compliant with it. The patient states that he is compliant with all of his medications. He is unsure about his dry weight. He used to follow CHF clinic before but is not following it anymore. He is not sure about his dry weight as well.     OBJECTIVE     Vital Signs:  /64   Pulse 73   Temp 97.8 °F (36.6 °C) (Oral)   Resp 23   Ht 5' 5\" (1.651 m)   Wt 250 lb (113.4 kg)   SpO2 97%   BMI 41.60 kg/m²     Temp (24hrs), Av.2 °F (36.8 °C), Min:97.8 °F (36.6 °C), Max:98.6 °F (37 °C)    In: -   Out: 150 [Urine:150]    Physical Exam:  General appearance - alert, well appearing, and in no distress. The patient is morbidly obese. Mental status - alert, oriented to person, place, and time  Eyes - pupils equal and reactive, extraocular eye movements intact  Mouth - mucous membranes moist, pharynx normal without lesions  Chest - clear to auscultation, crackles heard in the bases bilaterally. Heart - normal rate, regular rhythm, normal S1, S2, no murmurs, rubs, clicks or gallops  Abdomen - soft, nontender, nondistended, no masses or organomegaly  Musculoskeletal - no joint tenderness, deformity or swelling  Extremities - peripheral pulses normal, 1+ pedal edema, no clubbing or cyanosis  Skin - normal coloration and turgor, no rashes, no suspicious skin lesions noted. Multiple scratch marks noted in the lower extremities bilaterally.       Medications:  Scheduled Medications:    apixaban  5 mg Oral BID    furosemide  40 mg Oral BID    aspirin  81 mg Oral BID    allopurinol  300 mg Oral Daily    atorvastatin  40 mg Oral Daily    isosorbide mononitrate  30 mg Oral Daily    lisinopril  5 mg Oral Daily    metoprolol succinate  25 mg Oral Daily    pantoprazole  40 mg Oral QAM AC    insulin lispro  0-12 Units Subcutaneous TID WC    insulin lispro  0-6 Units Subcutaneous Nightly    insulin glargine  10 Units Subcutaneous Nightly    sodium chloride flush  10 mL Intravenous 2 times per day     Continuous Infusions:    dextrose       PRN Medicationsglucose, 15 g, PRN  dextrose, 12.5 g, PRN  glucagon (rDNA), 1 mg, PRN  dextrose, 100 mL/hr, PRN  acetaminophen, 650 mg, Q6H PRN    Or  acetaminophen, 650 mg, Q6H PRN  polyethylene glycol, 17 g, Daily PRN  promethazine, 12.5 mg, Q6H PRN    Or  ondansetron, 4 mg, Q6H PRN  sodium chloride flush, 10 mL, PRN        Diagnostic

## 2020-03-22 LAB — GLUCOSE BLD-MCNC: 227 MG/DL (ref 75–110)

## 2020-03-22 NOTE — DISCHARGE SUMMARY
glucose test strips (FREESTYLE LITE) strip Disp-100 each, R-11, PrintUSE AS DIRECTED TWICE A DAY      furosemide (LASIX) 40 MG tablet TAKE 1 TABLET BY MOUTH 2 TIMES A DAY, Disp-60 tablet, R-3Normal      atorvastatin (LIPITOR) 40 MG tablet Take 1 tablet by mouth daily, Disp-30 tablet, R-5Print      aspirin (HM ASPIRIN EC LOW DOSE) 81 MG EC tablet TAKE 1 TABLET TWICE A DAY, Disp-60 tablet, R-3Normal      apixaban (ELIQUIS) 5 MG TABS tablet TAKE 1 TABLET BY MOUTH 2 TIMES DAILY, Disp-60 tablet, R-3Normal      ammonium lactate (LAC-HYDRIN) 12 % lotion Apply topically daily after bathing sparing the space between the toes. , Disp-222 mL, R-3, Normal      allopurinol (ZYLOPRIM) 300 MG tablet Take 1 tablet by mouth daily, Disp-30 tablet, R-3Print      metFORMIN (GLUCOPHAGE) 1000 MG tablet TAKE 1 TABLET BY MOUTH 2 TIMES A DAY WITH MEALS, Disp-60 tablet, R-5Normal      metoprolol succinate (TOPROL XL) 25 MG extended release tablet Take 1 tablet by mouth daily, Disp-30 tablet, R-3Normal      !! TRUEPLUS LANCETS 33G MISC Disp-100 each, R-11, PrintTEST AS DIRECTED      Alcohol Swabs (ALCOHOL PREP) 70 % PADS Disp-100 each, R-11, NormalUSE AS DIRECTED      acetaminophen (ACETAMINOPHEN EXTRA STRENGTH) 500 MG tablet TAKE 1 TABLET BY MOUTH 2 TIMES A DAY AS NEEDED FOR PAIN, Disp-60 tablet, R-0Normal      !! Lancets MISC Disp-100 each, R-3, NormalDaily       ! ! - Potential duplicate medications found. Please discuss with provider.         Activity: activity as tolerated    Diet: cardiac diet    Disposition: home    Follow-up:  in the next few days with Khang Milian MD,  in the next few days with Dr Anita Nielsen      Time spent on discharge is more than 30 minutes in the examination, evaluation, counseling and review of medications and discharge plan      Tod Martinez MD  [address], PGY-2  [address] [place], [place]  3/22/2020,11:37 AM

## 2020-03-23 ENCOUNTER — CARE COORDINATION (OUTPATIENT)
Dept: CASE MANAGEMENT | Age: 65
End: 2020-03-23

## 2020-03-23 NOTE — CARE COORDINATION
[facility] Transitions Initial Follow Up Call    Call within 2 business days of discharge: Yes    Patient: Tami Canas Patient : 1955   MRN: [id number]  Reason for Admission: CHF  Discharge Date: 3/21/20 RARS: Readmission Risk Score: 17      Last Discharge [facility]       Complaint Diagnosis Description Type Department Provider    3/19/20 Shortness of Breath; Dental Pain Acute on chronic congestive heart failure, unspecified heart failure type ([address] . .. ED to Hosp-Admission (Discharged) (ADMITTED) [facility] 2 Karen Ann MD; Yuliya Hurtado ... Spoke with: [address]: [facility]    Non-face-to-face services provided:  Obtained and reviewed discharge summary and/or continuity of care documents     Spoke with patient who states he is feeling much better today. He denies any shortness of breath, swelling, chest pains, has cough of clear sputum, denies f/c, n/v. He states he has no swelling today and is feeling improved. He has to go to the store for essentials today, he was advised to limit his outings as much as possible. He denies any needs or concerns at this time. COVID-19 Screening Initial Follow-up Note    Patient contacted regarding COVID-19  risk. Care Transition Nurse/ Ambulatory Care Manager contacted the family by telephone to perform post discharge assessment. Verified name and  with patient as identifiers. Provided introduction to self, and explanation of the CTN/ACM role, and reason for call due to risk factors for infection and/or exposure to COVID-19.      Symptoms reviewed with patient who verbalized the following symptoms:   Fever no    Fatigue no   Pain or aching joints no  Cough yes  Shortness of breath no    Confusion or unusual change in mental status no    Chills or shaking no    Sweating no    Fast heart rate no    Fast breathing no    Dizziness/lightheadedness no    Less urine output no    Cold, clammy, and pale skin no  Low body

## 2020-03-24 ASSESSMENT — ENCOUNTER SYMPTOMS
SHORTNESS OF BREATH: 1
PHOTOPHOBIA: 0
VOMITING: 0
CONSTIPATION: 0
RHINORRHEA: 1
BACK PAIN: 0
WHEEZING: 0
DIARRHEA: 0
ABDOMINAL PAIN: 0
COUGH: 0
NAUSEA: 0
CHEST TIGHTNESS: 0

## 2020-03-24 NOTE — ED PROVIDER NOTES
[address]  ED  Emergency Department Encounter  Emergency Medicine Resident     Pt Name: Stan Flores  MRN: [id number]  [place] 1955  Date of evaluation: 3/24/20  PCP:  Girish Carlin MD    [address] [place] [address]       Chief Complaint   Patient presents with    Shortness of Breath    Dental Pain       HISTORY [facility]  (Location/Symptom, Timing/Onset, Context/Setting, Quality, Duration, Modifying Factors,Severity.)      Stan Flores is a 77 y.o. male who presents with shortness of breath and multiple teeth that hurt. Patient states that he has been taking his heart failure medications appropriately. Patient has a history of heart failure, cardiomyopathy, CKD, hypertension, anxiety. He also is type 2 diabetes. Patient has an AICD. Patient also complains of nasal congestion. No chest pain, no known sick contacts, no recent travel. PAST MEDICAL / SURGICAL / SOCIAL / FAMILY HISTORY      has a past medical history of Anemia, Anxiety, CAD (coronary artery disease), Cardiac defibrillator in place, Cardiomyopathy [facility]), CHF (congestive heart failure) ([address] Chronic combined systolic and diastolic heart failure ([address] s/[ AICD placed, Chronic kidney disease, Complex sleep apnea syndrome, Diabetic retinopathy ([address] Diverticulitis, DJD (degenerative joint disease), Edentulous, Gout, HTN (hypertension), Hyperlipidemia, Hypertension, Iron deficiency anemia, Ischemic cardiomyopathy, Morbid obesity ([address] Obesity, TANNER variably compliant with BiPAP, Osteoarthritis, PAF (paroxysmal atrial fibrillation) ([address] Psychophysiologic insomnia, Type II or unspecified type diabetes mellitus without mention of complication, not stated as uncontrolled, and Unspecified sleep apnea. has a past surgical history that includes Colonoscopy (11 7 2007); pacemaker placement (2005 or 2006); Cardiac catheterization (8-2007); Cardiac catheterization (09/11/2013);  Coronary angioplasty Uncle     Heart Disease Maternal Grandmother         Allergies:  Zetia [ezetimibe]; Bactrim; Cephalexin; Cephalexin; and Sulfamethoxazole-trimethoprim    Home Medications:  Prior to Admission medications    Medication Sig Start Date End Date Taking? Authorizing Provider   vitamin D (ERGOCALCIFEROL) 1.25 MG (45168 UT) CAPS capsule Take 1 capsule by mouth once a week 2/27/20   Olive Puentes MD   Calcium Carbonate-Vitamin D (OYSTER SHELL CALCIUM/D) 500-200 MG-UNIT TABS Take 1 tablet by mouth daily 2/27/20 2/26/21  Olive Puentes MD   lisinopril (PRINIVIL;ZESTRIL) 5 MG tablet Take 1 tablet by mouth daily 2/20/20   Tali Mancera MD   omeprazole (PRILOSEC) 20 MG delayed release capsule Take 1 capsule by mouth Daily 2/20/20   Tali Mancera MD   isosorbide mononitrate (IMDUR) 30 MG extended release tablet Take 1 tablet by mouth daily 2/20/20   Tali Mancera MD   blood glucose test strips (FREESTYLE LITE) strip USE AS DIRECTED TWICE A DAY 1/2/20   Tali Mancera MD   furosemide (LASIX) 40 MG tablet TAKE 1 TABLET BY MOUTH 2 TIMES A DAY 1/2/20   aTli Mancera MD   atorvastatin (LIPITOR) 40 MG tablet Take 1 tablet by mouth daily 1/2/20   Tali Mancera MD   aspirin (HM ASPIRIN EC LOW DOSE) 81 MG EC tablet TAKE 1 TABLET TWICE A DAY 1/2/20   Tali Mancera MD   apixaban (ELIQUIS) 5 MG TABS tablet TAKE 1 TABLET BY MOUTH 2 TIMES DAILY 1/2/20   Tali Mancera MD   ammonium lactate (LAC-HYDRIN) 12 % lotion Apply topically daily after bathing sparing the space between the toes.  1/2/20   Tali Mancera MD   allopurinol (ZYLOPRIM) 300 MG tablet Take 1 tablet by mouth daily 1/2/20   Tali Mancera MD   metFORMIN (GLUCOPHAGE) 1000 MG tablet TAKE 1 TABLET BY MOUTH 2 TIMES A DAY WITH MEALS 1/2/20   Tali Mancera MD   metoprolol succinate (TOPROL XL) 25 MG extended release tablet Take 1 tablet by mouth daily 1/2/20   Tali Mancera MD Fingerstick    POC Glucose Fingerstick    POC Glucose Fingerstick    POC Glucose Fingerstick    EKG 12 Lead    EKG REPORT    RHYTHM STRIP REPORT    ECHO Complete 2D W Doppler W Color    Saline lock IV    Insert peripheral IV    PATIENT STATUS (FROM ED OR OR/PROCEDURAL) Inpatient    Discharge patient       MEDICATIONS ORDERED:  Orders Placed This Encounter   Medications    DISCONTD: 0.9 % sodium chloride bolus    0.9 % sodium chloride bolus    iohexol (OMNIPAQUE 350) solution 75 mL    DISCONTD: allopurinol (ZYLOPRIM) tablet 300 mg    DISCONTD: atorvastatin (LIPITOR) tablet 40 mg    DISCONTD: furosemide (LASIX) injection 40 mg    DISCONTD: isosorbide mononitrate (IMDUR) extended release tablet 30 mg    DISCONTD: lisinopril (PRINIVIL;ZESTRIL) tablet 5 mg    DISCONTD: metoprolol succinate (TOPROL XL) extended release tablet 25 mg    DISCONTD: pantoprazole (PROTONIX) tablet 40 mg    DISCONTD: insulin lispro (HUMALOG) injection vial 0-12 Units    DISCONTD: insulin lispro (HUMALOG) injection vial 0-6 Units    DISCONTD: insulin glargine (LANTUS) injection vial 10 Units    DISCONTD: glucose (GLUTOSE) 40 % oral gel 15 g    DISCONTD: dextrose 50 % IV solution    DISCONTD: glucagon (rDNA) injection 1 mg    DISCONTD: dextrose 5 % solution    DISCONTD: sodium chloride flush 0.9 % injection 10 mL    DISCONTD: sodium chloride flush 0.9 % injection 10 mL    DISCONTD: acetaminophen (TYLENOL) tablet 650 mg    DISCONTD: acetaminophen (TYLENOL) suppository 650 mg    DISCONTD: polyethylene glycol (GLYCOLAX) packet 17 g    DISCONTD: promethazine (PHENERGAN) tablet 12.5 mg    DISCONTD: ondansetron (ZOFRAN) injection 4 mg    DISCONTD: sodium chloride flush 0.9 % injection 10 mL    DISCONTD: sodium chloride flush 0.9 % injection 10 mL    DISCONTD: acetaminophen (TYLENOL) tablet 650 mg    heparin (porcine) injection 4,000 Units    DISCONTD: heparin (porcine) injection 4,000 Units    DISCONTD: heparin (porcine) injection 2,000 Units    DISCONTD: heparin 25,000 units in dextrose 5% 250 mL infusion    DISCONTD: aspirin EC tablet 81 mg    DISCONTD: furosemide (LASIX) injection 20 mg    DISCONTD: furosemide (LASIX) injection 20 mg    DISCONTD: apixaban (ELIQUIS) tablet 5 mg    DISCONTD: furosemide (LASIX) tablet 40 mg       DDX: CHF, COPD, pneumonia    Initial MDM/Plan: 77 y.o. male who presents with shortness of breath and sinus congestion. Patient also has several teeth that hurt. Patient arrives in no acute distress, but is tachypneic and tachycardic. Vital signs otherwise stable. Patient has a history of A. fib, likely contributing to his tachycardia. He also appears dehydrated clinically and will start IV fluids slowly given his CHF. Plan for cardiac work-up, likely admission.     DIAGNOSTIC RESULTS / EMERGENCYDEPARTMENT COURSE / MDM     LABS:  Labs Reviewed   RESPIRATORY VIRUS PCR PANEL - Abnormal; Notable for the following components:       Result Value    Rhino/Enterovirus PCR DETECTED (*)     All other components within normal limits   CBC WITH AUTO DIFFERENTIAL - Abnormal; Notable for the following components:    RDW 17.5 (*)     Seg Neutrophils 67 (*)     Lymphocytes 23 (*)     All other components within normal limits   BASIC METABOLIC PANEL W/ REFLEX TO MG FOR LOW K - Abnormal; Notable for the following components:    Glucose 201 (*)     All other components within normal limits   TROPONIN - Abnormal; Notable for the following components:    Troponin, High Sensitivity 28 (*)     All other components within normal limits   BRAIN NATRIURETIC PEPTIDE - Abnormal; Notable for the following components:    Pro-BNP 1,846 (*)     All other components within normal limits   TROPONIN - Abnormal; Notable for the following components:    Troponin, High Sensitivity 122 (*)     All other components within normal limits   TROPONIN - Abnormal; Notable for the following components:    Troponin, High Sensitivity 179 (*)     All other components within normal limits   LIPID PANEL - Abnormal; Notable for the following components:    HDL 40 (*)     All other components within normal limits   BASIC METABOLIC PANEL - Abnormal; Notable for the following components:    Glucose 134 (*)     All other components within normal limits   CBC - Abnormal; Notable for the following components:    Hemoglobin 12.0 (*)     Hematocrit 38.4 (*)     RDW 17.7 (*)     All other components within normal limits   APTT - Abnormal; Notable for the following components:    PTT 31.7 (*)     All other components within normal limits   APTT - Abnormal; Notable for the following components:    PTT 32.9 (*)     All other components within normal limits   BASIC METABOLIC PANEL - Abnormal; Notable for the following components:    Glucose 121 (*)     Potassium 3.6 (*)     All other components within normal limits   POC GLUCOSE FINGERSTICK - Abnormal; Notable for the following components:    POC Glucose 126 (*)     All other components within normal limits   POC GLUCOSE FINGERSTICK - Abnormal; Notable for the following components:    POC Glucose 130 (*)     All other components within normal limits   POC GLUCOSE FINGERSTICK - Abnormal; Notable for the following components:    POC Glucose 121 (*)     All other components within normal limits   POC GLUCOSE FINGERSTICK - Abnormal; Notable for the following components:    POC Glucose 145 (*)     All other components within normal limits   POC GLUCOSE FINGERSTICK - Abnormal; Notable for the following components:    POC Glucose 182 (*)     All other components within normal limits   POC GLUCOSE FINGERSTICK - Abnormal; Notable for the following components:    POC Glucose 130 (*)     All other components within normal limits   POC GLUCOSE FINGERSTICK - Abnormal; Notable for the following components:    POC Glucose 125 (*)     All other components within normal limits   POC GLUCOSE FINGERSTICK - Abnormal; Notable for the following components:    POC Glucose 124 (*)     All other components within normal limits   POC GLUCOSE FINGERSTICK - Abnormal; Notable for the following components:    POC Glucose 227 (*)     All other components within normal limits   TROPONIN   APTT   APTT   APTT   APTT   POCT GLUCOSE   POCT GLUCOSE   POCT GLUCOSE   POCT GLUCOSE   POCT GLUCOSE   POCT GLUCOSE   POCT GLUCOSE   POCT GLUCOSE         RADIOLOGY:  No results found. EKG  EKG Interpretation    Interpreted by emergency department physician    Rhythm: normal sinus   Rate: tachycardia  Axis: normal  Ectopy: none  Conduction: normal  ST Segments: no acute change  T Waves: no acute change  Q Waves: none    Clinical Impression: non-specific EKG,LVH    Kathy Mobley Anita      All EKG's are interpreted by the Emergency Department Physicianwho either signs or Co-signs this chart in the absence of a cardiologist.    EMERGENCY DEPARTMENT COURSE:  ED Course as of Mar 24 0903   Thu Mar 19, 2020   1256 Troponin(!):    Troponin, High Sensitivity 28(!)   Troponin T NOT REPORTED   Troponin Interp NOT REPORTED [JG]   1256 Trop increase from 15-28. Will consult cardiology. Discussed patient with Internal medicine about admission for heart failure. [JG]   1300 Discussed patient with cardiology, no need to heparin at this time as he is asymptomatic. Patient admitted to internal medicine. [JG]      ED Course User Index  [JG] Kathysanchez Howard, DO         PROCEDURES:  None    CONSULTS:  IP CONSULT TO INTERNAL MEDICINE  IP CONSULT TO CARDIOLOGY  IP CONSULT TO CASE MANAGEMENT  IP CONSULT TO HEART FAILURE NURSE/COORDINATOR  IP CONSULT TO HEART FAILURE NURSE/COORDINATOR    CRITICAL CARE:  Please see attending note    FINAL IMPRESSION      1. Acute on chronic congestive heart failure, unspecified heart failure type ([address]    2. Shortness of breath    3. Tachycardia    4.  Acute on chronic combined systolic and diastolic congestive heart failure ([address]

## 2020-03-30 ENCOUNTER — CARE COORDINATION (OUTPATIENT)
Dept: CASE MANAGEMENT | Age: 65
End: 2020-03-30

## 2020-03-30 NOTE — TELEPHONE ENCOUNTER
Request for oyster shell - medication pended. Please fill if appropriate. Next Visit Date:  Future Appointments   Date Time Provider Keerthi Laceyi   4/29/2020  1:15 PM Emperatriz Stevens DPM [facility] Podiatry [facility]   5/28/2020 11:10 AM Mildred Ramos MD [facility]   8/17/2020  1:00 PM Elizabeth Davis, DO Resp Spec [address] Maintenance   Topic Date Due    Annual Wellness Visit (AWV)  06/19/2019    Colon cancer screen colonoscopy  04/04/2020    Shingles Vaccine (3 of 3) 05/04/2020    Diabetic retinal exam  11/15/2020    Diabetic foot exam  01/29/2021    A1C test (Diabetic or Prediabetic)  02/24/2021    Lipid screen  03/20/2021    Potassium monitoring  03/21/2021    Creatinine monitoring  03/21/2021    Pneumococcal 65+ years Vaccine (2 of 2 - PPSV23) 06/30/2021    DTaP/Tdap/Td vaccine (2 - Td) 06/30/2026    Flu vaccine  Completed    AAA screen  Completed    Hepatitis C screen  Completed    Hepatitis A vaccine  Aged Out    Hib vaccine  Aged Out    Meningococcal (ACWY) vaccine  Aged Out       Hemoglobin A1C (%)   Date Value   02/24/2020 6.7 (H)   01/02/2020 5.6   05/31/2019 5.9             ( goal A1C is < 7)   Microalb/Crt.  Ratio (mcg/mg creat)   Date Value   02/24/2020 CANNOT BE CALCULATED     LDL Cholesterol (mg/dL)   Date Value   03/20/2020 67       (goal LDL is <100)   AST (U/L)   Date Value   01/27/2020 17     ALT (U/L)   Date Value   01/27/2020 10     BUN (mg/dL)   Date Value   03/21/2020 20     BP Readings from Last 3 Encounters:   03/21/20 121/64   02/20/20 112/68   02/10/20 128/72          (goal 120/80)    All Future Testing planned in [facility]  Lab Frequency Next Occurrence   Immunoglobulin Panel (IgG, IgA, IgM) Once 02/10/2020   CBC Auto Differential     Comprehensive Metabolic Panel     [place]/Lambda Free Lt Chains, Serum Quant     Electrophoresis Protein, Serum without Reflex to Immunofixation           Patient Active Problem List:     Acute on chronic combined systolic and diastolic congestive heart failure (HCC)     Chronic kidney disease, stage II (mild)     Chronic gout     Morbid obesity (HCC)     Hyperlipidemia     Iron deficiency anemia     Anxiety disorder      DJD (degenerative joint disease)     TANNER variably compliant with BiPAP     CAD (coronary artery disease) s/p stenting of L anterior descending artery 2004     Diabetic retinopathy ([address]     Ischemic cardiomyopathy     HTN (hypertension)     NSTEMI (non-ST elevated myocardial infarction) (HCC)     MGUS (monoclonal gammopathy of unknown significance)     Type 2 diabetes mellitus without complication ([address]     AICD (automatic cardioverter/defibrillator) present     PAF (paroxysmal atrial fibrillation) ([address]     Coronary angioplasty status     Hypokalemia     Acute on chronic systolic heart failure (HCC)     Viral upper respiratory tract infection     Acute on chronic congestive heart failure ([address]

## 2020-03-30 NOTE — CARE COORDINATION
[place] [address] Transitions Follow Up Call    3/30/2020    Patient: James Villareal  Patient : 1955   MRN: [id number]  Reason for Admission: CHF  Discharge Date: 3/21/20 RARS: Readmission Risk Score: 17         Spoke with: Jen Hernandez    Spoke with patient who states he is feeling well and having no new or ongoing symptoms. He denies any chest pains, shortness of breath, cough, f/c, n/v or other symptoms. He has been adhering to the stay at home order and only going out for necessities. He states his PCP office is not taking patients at this time and he has been in contact with both cardiology and pulmonology offices and will follow up with them in the next couple of weeks. Denies any needs or concerns. COVID-19 Screening Follow-up Note    Patient contacted regarding COVID-19 risk and screening. Care Transition Nurse/ Ambulatory Care Manager contacted the patient by telephone to perform follow-up assessment. Verified name and  with patient as identifiers. Patient has following risk factor of heart failure. Symptoms reviewed with patient who verbalized the following symptoms: no new/worsening symptoms       Due to no new or worsening symptoms encounter was not routed to provider for escalation. Education provided regarding infection prevention, and signs and symptoms of COVID-19 and when to seek medical attention with patient who verbalized understanding. Discussed exposure protocols and quarantine from [address] you at higher risk for severe illness  and given an opportunity for questions and concerns. The patient agrees to contact the COVID-19 hotline [phone] or PCP office for questions related to their healthcare. CTN/ACM provided contact information for future reference. From CDC: Are you at higher risk for severe illness?  Wash your hands often.  Avoid close contact (6 feet, which is about two arm lengths) with people who are sick.    Put distance

## 2020-03-31 RX ORDER — B-COMPLEX WITH VITAMIN C
1 TABLET ORAL DAILY
Qty: 30 TABLET | Refills: 0 | Status: SHIPPED | OUTPATIENT
Start: 2020-03-31 | End: 2020-04-06

## 2020-04-06 ENCOUNTER — CARE COORDINATION (OUTPATIENT)
Dept: CASE MANAGEMENT | Age: 65
End: 2020-04-06

## 2020-04-06 RX ORDER — ERGOCALCIFEROL 1.25 MG/1
50000 CAPSULE ORAL WEEKLY
Qty: 8 CAPSULE | Refills: 0 | Status: SHIPPED | OUTPATIENT
Start: 2020-04-06 | End: 2020-05-01 | Stop reason: ALTCHOICE

## 2020-04-06 RX ORDER — B-COMPLEX WITH VITAMIN C
1 TABLET ORAL DAILY
Qty: 30 TABLET | Refills: 0 | Status: SHIPPED | OUTPATIENT
Start: 2020-04-06 | End: 2020-05-12 | Stop reason: SDUPTHER

## 2020-04-06 NOTE — TELEPHONE ENCOUNTER
MD [address] [facility]  [facility]   8/17/2020  1:00 PM Nicholas Villarreal DO Resp Spec Maximilian Alok            Patient Active Problem List:     Acute on chronic combined systolic and diastolic congestive heart failure ([facility]     Chronic kidney disease, stage II (mild)     Chronic gout     Morbid obesity (HCC)     Hyperlipidemia     Iron deficiency anemia     Anxiety disorder      DJD (degenerative joint disease)     TANNER variably compliant with BiPAP     CAD (coronary artery disease) s/p stenting of L anterior descending artery 2004     Diabetic retinopathy ([facility]     Ischemic cardiomyopathy     HTN (hypertension)     NSTEMI (non-ST elevated myocardial infarction) ([facility])     MGUS (monoclonal gammopathy of unknown significance)     Type 2 diabetes mellitus without complication ([facility]     AICD (automatic cardioverter/defibrillator) present     PAF (paroxysmal atrial fibrillation) ([facility]     Coronary angioplasty status     Hypokalemia     Acute on chronic systolic heart failure (HCC)     Viral upper respiratory tract infection     Acute on chronic congestive heart failure ([facility]

## 2020-04-06 NOTE — CARE COORDINATION
[facility] Transitions Final Call    2020    Patient: Jacquelyn Zimmerman  Patient : 1955   MRN: [id number]  Reason for Admission: CHF  Discharge Date: 3/21/20 RARS: Readmission Risk Score: 17         Spoke with: Kari Bergman    Spoke with patient who states he is feeling much better. He denies any f/c, achy joints, or other viral type symptoms. He does daily weights, no weight gains, no swelling and no shortness of breath. Denies any needs or concerns. Will have e-visit with cardiology on the . You Patient resolved from the Care Transitions episode on 20  Patient/family has been provided the following resources and education related to COVID-19:                         Signs, symptoms and red flags related to COVID-19            CDC exposure and quarantine guidelines            [employer] exposure contact - [phone]            Contact for their local Department of Health                 Patient currently reports that the following symptoms have improved:  cough and no new/worsening symptoms     No further outreach scheduled with this CTN/ACM. Episode of Care resolved. Patient has this CTN/ACM contact information if future needs arise.     June Magdaleno RN

## 2020-05-01 RX ORDER — ERGOCALCIFEROL 1.25 MG/1
50000 CAPSULE ORAL WEEKLY
Qty: 8 CAPSULE | Refills: 0 | OUTPATIENT
Start: 2020-05-01

## 2020-05-04 NOTE — TELEPHONE ENCOUNTER
Refill request for pended medications. If appropriate please send medication(s) to patients pharmacy. Next appt: 5/28/2020      Health Maintenance   Topic Date Due    Annual Wellness Visit (AWV)  06/19/2019    Colon cancer screen colonoscopy  04/04/2020    Shingles Vaccine (3 of 3) 05/04/2020    Diabetic retinal exam  11/15/2020    Diabetic foot exam  01/29/2021    A1C test (Diabetic or Prediabetic)  02/24/2021    Lipid screen  03/20/2021    Potassium monitoring  03/21/2021    Creatinine monitoring  03/21/2021    Pneumococcal 65+ years Vaccine (2 of 2 - PPSV23) 06/30/2021    DTaP/Tdap/Td vaccine (2 - Td) 06/30/2026    Flu vaccine  Completed    AAA screen  Completed    Hepatitis C screen  Completed    Hepatitis A vaccine  Aged Out    Hib vaccine  Aged Out    Meningococcal (ACWY) vaccine  Aged Out       Hemoglobin A1C (%)   Date Value   02/24/2020 6.7 (H)   01/02/2020 5.6   05/31/2019 5.9             ( goal A1C is < 7)   Microalb/Crt.  Ratio (mcg/mg creat)   Date Value   02/24/2020 CANNOT BE CALCULATED     LDL Cholesterol (mg/dL)   Date Value   03/20/2020 67       (goal LDL is <100)   AST (U/L)   Date Value   01/27/2020 17     ALT (U/L)   Date Value   01/27/2020 10     BUN (mg/dL)   Date Value   03/21/2020 20     BP Readings from Last 3 Encounters:   03/21/20 121/64   02/20/20 112/68   02/10/20 128/72          (goal 120/80)          Patient Active Problem List:     Acute on chronic combined systolic and diastolic congestive heart failure (HCC)     Chronic kidney disease, stage II (mild)     Chronic gout     Morbid obesity (HCC)     Hyperlipidemia     Iron deficiency anemia     Anxiety disorder      DJD (degenerative joint disease)     TANNER variably compliant with BiPAP     CAD (coronary artery disease) s/p stenting of L anterior descending artery 2004     Diabetic retinopathy ([facility]     Ischemic cardiomyopathy     HTN (hypertension)     NSTEMI (non-ST elevated myocardial infarction) ([facility]     MGUS

## 2020-05-10 RX ORDER — FUROSEMIDE 40 MG/1
TABLET ORAL
Qty: 60 TABLET | Refills: 3 | Status: SHIPPED | OUTPATIENT
Start: 2020-05-10 | End: 2020-10-26 | Stop reason: SDUPTHER

## 2020-05-10 RX ORDER — ASPIRIN 81 MG/1
TABLET ORAL
Qty: 60 TABLET | Refills: 3 | Status: SHIPPED | OUTPATIENT
Start: 2020-05-10 | End: 2021-02-04

## 2020-05-10 RX ORDER — METOPROLOL SUCCINATE 25 MG/1
25 TABLET, EXTENDED RELEASE ORAL DAILY
Qty: 30 TABLET | Refills: 3 | Status: SHIPPED | OUTPATIENT
Start: 2020-05-10 | End: 2021-08-13 | Stop reason: DRUGHIGH

## 2020-05-10 RX ORDER — ALLOPURINOL 300 MG/1
TABLET ORAL
Qty: 30 TABLET | Refills: 3 | Status: SHIPPED | OUTPATIENT
Start: 2020-05-10 | End: 2020-10-26 | Stop reason: SDUPTHER

## 2020-05-11 NOTE — TELEPHONE ENCOUNTER
([address]     Chronic kidney disease, stage II (mild)     Chronic gout     Morbid obesity (HCC)     Hyperlipidemia     Iron deficiency anemia     Anxiety disorder      DJD (degenerative joint disease)     TANNER variably compliant with BiPAP     CAD (coronary artery disease) s/p stenting of L anterior descending artery 2004     Diabetic retinopathy ([address]     Ischemic cardiomyopathy     HTN (hypertension)     NSTEMI (non-ST elevated myocardial infarction) (HCC)     MGUS (monoclonal gammopathy of unknown significance)     Type 2 diabetes mellitus without complication ([address]     AICD (automatic cardioverter/defibrillator) present     PAF (paroxysmal atrial fibrillation) ([address]     Coronary angioplasty status     Hypokalemia     Acute on chronic systolic heart failure (HCC)     Viral upper respiratory tract infection     Acute on chronic congestive heart failure ([address]

## 2020-05-12 RX ORDER — B-COMPLEX WITH VITAMIN C
1 TABLET ORAL DAILY
Qty: 30 TABLET | Refills: 0 | Status: SHIPPED | OUTPATIENT
Start: 2020-05-12 | End: 2021-04-26

## 2020-05-26 ENCOUNTER — TELEPHONE (OUTPATIENT)
Dept: INTERNAL MEDICINE | Age: 65
End: 2020-05-26

## 2020-06-24 NOTE — PATIENT INSTRUCTIONS
Patient Education        Diabetes Foot Health: Care Instructions  Your Care Instructions     When you have diabetes, your feet need extra care and attention. Diabetes can damage the nerve endings and blood vessels in your feet, making you less likely to notice when your feet are injured. Diabetes also limits your body's ability to fight infection and get blood to areas that need it. If you get a minor foot injury, it could become an ulcer or a serious infection. With good foot care, you can prevent most of these problems. Caring for your feet can be quick and easy. Most of the care can be done when you are bathing or getting ready for bed. Follow-up care is a key part of your treatment and safety. Be sure to make and go to all appointments, and call your doctor if you are having problems. It's also a good idea to know your test results and keep a list of the medicines you take. How can you care for yourself at home? · Keep your blood sugar close to normal by watching what and how much you eat, monitoring blood sugar, taking medicines if prescribed, and getting regular exercise. · Do not smoke. Smoking affects blood flow and can make foot problems worse. If you need help quitting, talk to your doctor about stop-smoking programs and medicines. These can increase your chances of quitting for good. · Eat a diet that is low in fats. High fat intake can cause fat to build up in your blood vessels and decrease blood flow. · Inspect your feet daily for blisters, cuts, cracks, or sores. If you cannot see well, use a mirror or have someone help you. · Take care of your feet:  ? Wash your feet every day. Use warm (not hot) water. Check the water temperature with your wrists or other part of your body, not your feet. ? Dry your feet well. Pat them dry. Do not rub the skin on your feet too hard. Dry well between your toes.  If the skin on your feet stays moist, bacteria or a fungus can grow, which can lead to infection. ? Keep your skin soft. Use moisturizing skin cream to keep the skin on your feet soft and prevent calluses and cracks. But do not put the cream between your toes, and stop using any cream that causes a rash. ? Clean underneath your toenails carefully. Do not use a sharp object to clean underneath your toenails. Use the blunt end of a nail file or other rounded tool. ? Trim and file your toenails straight across to prevent ingrown toenails. Use a nail clipper, not scissors. Use an emery board to smooth the edges. · Change socks daily. Socks without seams are best, because seams often rub the feet. You can find socks for people with diabetes from specialty catalogs. · Look inside your shoes every day for things like gravel or torn linings, which could cause blisters or sores. · Buy shoes that fit well:  ? Look for shoes that have plenty of space around the toes. This helps prevent bunions and blisters. ? Try on shoes while wearing the kind of socks you will usually wear with the shoes. ? Avoid plastic shoes. They may rub your feet and cause blisters. Good shoes should be made of materials that are flexible and breathable, such as leather or cloth. ? Break in new shoes slowly by wearing them for no more than an hour a day for several days. Take extra time to check your feet for red areas, blisters, or other problems after you wear new shoes. · Do not go barefoot. Do not wear sandals, and do not wear shoes with very thin soles. Thin soles are easy to puncture. They also do not protect your feet from hot pavement or cold weather. · Have your doctor check your feet during each visit. If you have a foot problem, see your doctor. Do not try to treat an early foot problem at home. Home remedies or treatments that you can buy without a prescription (such as corn removers) can be harmful. · Always get early treatment for foot problems.  A minor irritation can lead to a major problem if not properly cared for early. When should you call for help? Call your doctor now or seek immediate medical care if:  · You have a foot sore, an ulcer or break in the skin that is not healing after 4 days, bleeding corns or calluses, or an ingrown toenail. · You have blue or black areas, which can mean bruising or blood flow problems. · You have peeling skin or tiny blisters between your toes or cracking or oozing of the skin. · You have a fever for more than 24 hours and a foot sore. · You have new numbness or tingling in your feet that does not go away after you move your feet or change positions. · You have unexplained or unusual swelling of the foot or ankle. Watch closely for changes in your health, and be sure to contact your doctor if:  · You cannot do proper foot care. Where can you learn more? Go to [email or web address] and sign in to your [employer] account. Enter A739 in the Yillio box to learn more about \"Diabetes Foot Health: Care Instructions. \"     If you do not have an account, please click on the \"Sign Up Now\" link. Current as of: December 20, 2019               Content Version: 12.5  © 6805-7773 Healthwise, Incorporated. Care instructions adapted under license by [facility] ([facility]). If you have questions about a medical condition or this instruction, always ask your healthcare professional. [place] [address] any warranty or liability for your use of this information.

## 2020-07-01 ENCOUNTER — OFFICE VISIT (OUTPATIENT)
Dept: PODIATRY | Age: 65
End: 2020-07-01
Payer: COMMERCIAL

## 2020-07-01 VITALS
HEART RATE: 71 BPM | BODY MASS INDEX: 41.21 KG/M2 | DIASTOLIC BLOOD PRESSURE: 68 MMHG | SYSTOLIC BLOOD PRESSURE: 122 MMHG | WEIGHT: 241.4 LBS | HEIGHT: 64 IN

## 2020-07-01 LAB
CHP ED QC CHECK: NORMAL
GLUCOSE BLD-MCNC: 123 MG/DL

## 2020-07-01 PROCEDURE — 82962 GLUCOSE BLOOD TEST: CPT | Performed by: STUDENT IN AN ORGANIZED HEALTH CARE EDUCATION/TRAINING PROGRAM

## 2020-07-01 PROCEDURE — 11056 PARNG/CUTG B9 HYPRKR LES 2-4: CPT | Performed by: STUDENT IN AN ORGANIZED HEALTH CARE EDUCATION/TRAINING PROGRAM

## 2020-07-01 PROCEDURE — 11721 DEBRIDE NAIL 6 OR MORE: CPT | Performed by: STUDENT IN AN ORGANIZED HEALTH CARE EDUCATION/TRAINING PROGRAM

## 2020-07-01 PROCEDURE — 99213 OFFICE O/P EST LOW 20 MIN: CPT | Performed by: STUDENT IN AN ORGANIZED HEALTH CARE EDUCATION/TRAINING PROGRAM

## 2020-07-01 NOTE — PROGRESS NOTES
[address],  [address]  Tel: [phone]   Fax: [phone]    Subjective     CC: Painful toe nails    HPI:  Maia Peace is a 77y.o. year old male who presents to clinic today. Patient presents to clinic for diabetic foot exam, complaining of cramping of his left foot and painful calluses. He denies any numbness, burning, or tingling sensation in his feet. He states his blood sugar is well controlled in the 90 to low 100s. His last hemoglobin A1c was 6.7 in February of 2020. He admits to dry skin to both feet and has been using his ammonium lactate as previously prescribed, and states it has been helping. Primary care physician is Yen Dixon MD.    ROS:    Constitutional: Denies nausea, vomiting, fever, chills. Neurologic: Denies numbness, tingling, and burning in the feet. Vascular: Denies symptoms of lower extremity claudication. Skin: Denies open wounds. Otherwise negative except as noted in the HPI.      PMH:  Past Medical History:   Diagnosis Date    Anemia     Anxiety     when he lies flat, no RX    CAD (coronary artery disease)     MI stents x5, follows with cardiology Dr. Brittney Wheeler Cardiac defibrillator in place     Cardiomyopathy [facility])     CHF (congestive heart failure) ([address]     Chronic combined systolic and diastolic heart failure ([address] s/[ AICD placed 9/25/2011    Chronic kidney disease     Complex sleep apnea syndrome     Diabetic retinopathy ([address] 9/25/2011    Diverticulitis     DJD (degenerative joint disease) 9/25/2011    Edentulous     Gout     HTN (hypertension) 3/30/2012    Hyperlipidemia     Hypertension     Iron deficiency anemia 9/25/2011    Ischemic cardiomyopathy     Morbid obesity ([address] 9/25/2011    Obesity     Morbid obesity due to excess calories    TANNER variably compliant with BiPAP 9/25/2011    Osteoarthritis     PAF (paroxysmal atrial fibrillation) ([address]     Psychophysiologic insomnia     Type II or unspecified type diabetes mellitus without mention of complication, not stated as uncontrolled     Unspecified sleep apnea     uses V-pap       Surgical History:   Past Surgical History:   Procedure Laterality Date    BONE MARROW BIOPSY  2012 approx    CARDIAC CATHETERIZATION      severe stenosis pre-stent area    CARDIAC CATHETERIZATION  2013    Very peripheral stenosis, not amendable to PCI, stents patent    CARDIAC DEFIBRILLATOR PLACEMENT  2015    COLONOSCOPY  11 7 2007   [address]  ,     stent LAD    PACEMAKER PLACEMENT   or     AICD    HI COLSC FLX W/RMVL OF TUMOR POLYP LESION SNARE TQ N/A 2017    COLONOSCOPY POLYPECTOMY SNARE/COLD BIOPSY performed by Suze Quijano DO at [facility]       Social History:  Social History     Tobacco Use    Smoking status: Former Smoker     Packs/day: 1.00     Years: 3.00     Pack years: 3.00     Types: Cigarettes     Start date: 1971     Last attempt to quit: 1974     Years since quittin.5    Smokeless tobacco: Never Used   Substance Use Topics    Alcohol use: No     Alcohol/week: 0.0 standard drinks    Drug use: Not Currently     Types: Marijuana     Comment: hx THC quit approx        Medications:  Prior to Admission medications    Medication Sig Start Date End Date Taking?  Authorizing Provider   Calcium Carbonate-Vitamin D (OYSTER SHELL CALCIUM/D) 500-200 MG-UNIT TABS Take 1 tablet by mouth daily 20 Yes Anthony Zapata MD   allopurinol (ZYLOPRIM) 300 MG tablet TAKE 1 TABLET DAILY 5/10/20  Yes Anthony Zapata MD   apixaban (ELIQUIS) 5 MG TABS tablet TAKE 1 TABLET BY MOUTH 2 TIMES DAILY 5/10/20  Yes Anthony Zapata MD   furosemide (LASIX) 40 MG tablet TAKE 1 TABLET BY MOUTH 2 TIMES A DAY 5/10/20  Yes Anthony Zapata MD   aspirin (ASPIRIN LOW DOSE) 81 MG EC tablet TAKE 1 TABLET TWICE A DAY 5/10/20  Yes Royce Hodgkin, MD   metoprolol succinate (TOPROL XL) 25 MG extended release tablet TAKE 1 TABLET BY MOUTH DAILY 5/10/20  Yes Royce Hodgkin, MD   isosorbide mononitrate (IMDUR) 30 MG extended release tablet TAKE 1 TABLET BY MOUTH DAILY 4/6/20  Yes Royce Hodgkin, MD   omeprazole (PRILOSEC) 20 MG delayed release capsule TAKE 1 CAPSULE BY MOUTH DAILY 4/6/20  Yes Royce Hodgkin, MD   lisinopril (PRINIVIL;ZESTRIL) 5 MG tablet TAKE 1 TABLET BY MOUTH DAILY 4/6/20  Yes Royce Hodgkin, MD   blood glucose test strips (FREESTYLE LITE) strip USE AS DIRECTED TWICE A DAY 1/2/20  Yes Vasu Walden MD   atorvastatin (LIPITOR) 40 MG tablet Take 1 tablet by mouth daily 1/2/20  Yes Vasu Walden MD   ammonium lactate (LAC-HYDRIN) 12 % lotion Apply topically daily after bathing sparing the space between the toes. 1/2/20  Yes Vasu Walden MD   metFORMIN (GLUCOPHAGE) 1000 MG tablet TAKE 1 TABLET BY MOUTH 2 TIMES A DAY WITH MEALS 1/2/20  Yes Vasu Wadlen MD   TRUEPLUS LANCETS 33G MISC TEST AS DIRECTED 1/2/20  Yes Vasu Walden MD   Alcohol Swabs (ALCOHOL PREP) 70 % PADS USE AS DIRECTED 5/31/19  Yes Gurjit Grace MD   acetaminophen (ACETAMINOPHEN EXTRA STRENGTH) 500 MG tablet TAKE 1 TABLET BY MOUTH 2 TIMES A DAY AS NEEDED FOR PAIN 2/1/19  Yes Vasu Walden MD   Lancets MISC Daily 2/1/19  Yes Vasu Walden MD       Objective     Vitals:    07/01/20 1303   BP: 122/68   Pulse:        Lab Results   Component Value Date    LABA1C 6.7 (H) 02/24/2020       Physical Exam:  General:  Alert and oriented x3. In no acute distress. Lower Extremity Physical Exam:  Vascular: DP/PT pulses are palpable +2/4, bilateral. CFT is brisk to all digits, Bilateral. Skin temp is cool to warm proximal to distal, bilateral. No edema or erythema noted.     Neuro: Protective sensation intact to 10 /10 pedal sites as tested with [place] Monofilament 5.07g.  Light touch sensation intact to the level of the digits, Bilateral.     Musculoskeletal: Muscle strength 5/5 for all LE muscle groups, aleshia. No gross deformities noted, bilateral. Pain on palpation of nails 1-5, aleshia. Decreased motion of 1st MPJ b/l without pain or crepitus. Patient reports cramping when he dorsiflexes his left hallux, especially at night. Not reproducible in clinic. Dermatologic:  Nails 1-5 are thickened, elongated, and discolored with presence of subungual debris bilateral.  Interdigital macerations absent. No open lesions or subcutaneous nodules noted, bilateral.  Skin is well hydrated to plantar aspects of feet b/l  No open lesions noted. Hyperkeratotic tissue noted on hallux IPJ bilat and hyperkeratotic tissue to sub 5th MPJ bilateral is minimal.     Imaging: Not indicated    Assessment   Lizzy Edwards is a 77 y.o. male with     Diagnosis Orders   1. Type 2 diabetes mellitus without complication, without long-term current use of insulin ([facility])  POCT Glucose     DIABETES FOOT EXAM   2. Edema of lower extremity     3. Cramping of feet          Plan     · Patient seen and evaluated. · Debrided nails 1-5 bilateral with sterile nail nippers without incident. · Debrided hyperkeratotic as noted above  · Discussed with patient to continue using ammonium lactate as prescribed for dry, xerotic skin. Discussed with patient the importance of staying hydrated to help with the cramping he is experiencing at night. Recommended stretching of lower extremity to help prevent cramps. · Patient to return to clinic in 6 months for follow-up. Patient to return sooner if any new problem arises. · Please, call the office with any questions or concerns     No orders of the defined types were placed in this encounter.     Orders Placed This Encounter   Procedures    POCT Glucose     DIABETES FOOT [address] [facility] SPECIALTY [facility]   Podiatric Medicine & Surgery 7/1/2020 at 1:56 PM

## 2020-07-01 NOTE — PROGRESS NOTES
Patient instructed to remove shoes and socks, instructed to sit in exam chair. Current PCP name is Hali Salas MD and date of last visit 2/20/20. Do you have a follow up visit scheduled?   no    If yes the date is  Diabetic visit information    Blood pressure (Control is BP <140/90)  BP Readings from Last 3 Encounters:   07/01/20 (!) 66/47   03/21/20 121/64   02/20/20 112/68       BP taken with correct size cuff? - Yes   Repeated if > 140/90 Yes      Tobacco use:  Patient  reports that he quit smoking about 46 years ago. His smoking use included cigarettes. He started smoking about 49 years ago. He has a 3.00 pack-year smoking history. He has never used smokeless tobacco.  If Smoker - Cessation materials given?- No       Diabetic Health Maintenance Items due  There are no preventive care reminders to display for this patient. Diabetic retinal exam done in last year? - Yes   If No: remind patient that it is due and they should schedule an exam    Medications  Is patient taking any medications for diabetes? -   Yes  Have blood sugars been controlled? Fasting blood sugars under 120   -   Yes   Random home sugars or today's POCT glucose is under 180 -   Yes   []  If No to the above then patient should schedule appt with PCP.      Diabetic Plan    A1C Plan  Lab Results   Component Value Date    LABA1C 6.7 (H) 02/24/2020    LABA1C 5.6 01/02/2020    LABA1C 5.9 05/31/2019      []  If A1C over 8 and last result >3 months ago - Order A1C and refer to PCP   []  If last A1C over 6 months ago - Order A1C and refer to PCP for follow up   []  If elevated blood sugars > 180 - refer to PCP for follow up    []  Blood sugar controlled - A1C under 8 and last check was < 6 months      Cholesterol Plan   Lab Results   Component Value Date    LDLCHOLESTEROL 67 03/20/2020      []  If LDL > 100 and last result >3 months ago - order Fasting lipids and refer to PCP for follow up   []  If LDL < 100 and over 1 year ago - Order Fasting lipids and refer to PCP for follow up   [] LDL is controlled. LDL < 100 and checked within the last year     Blood Pressure  BP Readings from Last 3 Encounters:   07/01/20 (!) 66/47   03/21/20 121/64   02/20/20 112/68      []  If SBP >140 mmhg - refer to PCP for follow up   []  If DBP > 90 mmhg - refer to PCP for follow up   [] BP is controlled <140/90     Order labs as PCP ordered.   (ie: Lipids, A1C, CMP)

## 2020-07-24 ENCOUNTER — OFFICE VISIT (OUTPATIENT)
Dept: PRIMARY CARE CLINIC | Age: 65
End: 2020-07-24
Payer: COMMERCIAL

## 2020-07-24 VITALS
TEMPERATURE: 97.9 F | BODY MASS INDEX: 40.51 KG/M2 | HEART RATE: 58 BPM | SYSTOLIC BLOOD PRESSURE: 100 MMHG | OXYGEN SATURATION: 99 % | DIASTOLIC BLOOD PRESSURE: 62 MMHG | WEIGHT: 236 LBS

## 2020-07-24 PROCEDURE — 99214 OFFICE O/P EST MOD 30 MIN: CPT | Performed by: NURSE PRACTITIONER

## 2020-07-24 RX ORDER — AMIODARONE HYDROCHLORIDE 200 MG/1
200 TABLET ORAL DAILY
COMMUNITY
Start: 2020-07-12 | End: 2020-08-13 | Stop reason: SDUPTHER

## 2020-07-24 RX ORDER — LIDOCAINE 4 G/G
1 PATCH TOPICAL DAILY
Qty: 30 PATCH | Refills: 0 | Status: SHIPPED | OUTPATIENT
Start: 2020-07-24 | End: 2020-08-23

## 2020-07-24 RX ORDER — PANTOPRAZOLE SODIUM 40 MG/1
40 TABLET, DELAYED RELEASE ORAL
COMMUNITY
Start: 2020-07-12 | End: 2020-07-26

## 2020-07-24 ASSESSMENT — ENCOUNTER SYMPTOMS
ABDOMINAL PAIN: 0
BLOOD IN STOOL: 0
CHEST TIGHTNESS: 0
WHEEZING: 0
TROUBLE SWALLOWING: 0
SHORTNESS OF BREATH: 0
CONSTIPATION: 0
DIARRHEA: 0
VOMITING: 0

## 2020-07-24 NOTE — PROGRESS NOTES
Visit Information    Have you changed or started any medications since your last visit including any over-the-counter medicines, vitamins, or herbal medicines? yes - multi vit  B-pollen  Are you having any side effects from any of your medications? -  no  Have you stopped taking any of your medications? Is so, why? -  no    Have you seen any other physician or provider since your last visit? Yes - Records Requested  Have you had any other diagnostic tests since your last visit? Yes - Records Requested  Have you been seen in the emergency room and/or had an admission to a hospital since we last saw you? Yes - Records Requested  Have you had your routine dental cleaning in the past 6 months? no    Have you activated your MyChart account? If not, what are your barriers?  Yes     Patient Care Team:  Mic Zavala MD as PCP - General (Internal Medicine)  LIDIA Marc - CNP as PCP - [facility] Provider  Valeria Bettencourt MD as Consulting Physician (Hematology and Oncology)  Salvador Osman MD as Consulting Physician (Cardiology)  Cee Dave DO as Consulting Physician (Pulmonology)  Navneet Pereira MD as Consulting Physician (Ophthalmology)  Michelle Duarte MD as Consulting Physician (Internal Medicine)    Medical History Review  Past Medical, Family, and Social History reviewed and does not contribute to the patient presenting condition    Health Maintenance   Topic Date Due    Annual Wellness Visit (AWV)  06/19/2019    Colon cancer screen colonoscopy  04/04/2020    Shingles Vaccine (3 of 3) 05/04/2020    Flu vaccine (1) 09/01/2020    Diabetic retinal exam  11/15/2020    A1C test (Diabetic or Prediabetic)  02/24/2021    Lipid screen  03/20/2021    Potassium monitoring  03/21/2021    Creatinine monitoring  03/21/2021    Pneumococcal 65+ years Vaccine (2 of 2 - PPSV23) 06/30/2021    Diabetic foot exam  07/01/2021    DTaP/Tdap/Td vaccine (2 - Td) 06/30/2026    AAA screen Completed    Hepatitis C screen  Completed    Hepatitis A vaccine  Aged Out    Hib vaccine  Aged Out    Meningococcal (ACWY) vaccine  Aged Out

## 2020-07-24 NOTE — PROGRESS NOTES
[facility]  [address] [place] [id number]  Dept: [phone]  Dept Fax: [phone]    Patient Care Team:  Anna Packer MD as PCP - General (Internal Medicine)  LIDIA Avalos CNP as PCP - [facility]  Judd Herrera MD as Consulting Physician (Hematology and Oncology)  Anselmo Garcia MD as Consulting Physician (Cardiology)  Mary Talbert DO as Consulting Physician (Pulmonology)  Jason Amador MD as Consulting Physician (Ophthalmology)  Chad Reinoso MD as Consulting Physician (Internal Medicine)    2020    Lissy Bunch (:  1955) dwayne 77 y.o. male, here for evaluation of the following medical concerns:   Chief Complaint   Patient presents with   [address]         77-year-old  male is here to establish care. He reports a history of diabetes mellitus, hypertension, he has a history of ischemic cardiomyopathy with multiple stent placed in , ejection fraction recent 115 to 20% status post AICD, patient has a history of atrial fibrillation on Eliquis 5 mg twice a day, is following cardiology regularly. For diabetes mellitus he is on metformin 1 g twice daily. He denies any shortness of breath, leg swelling, dizziness  He follows podiatry and ophthalmology for retinal exam last foot exam in 2020 and last retinal exam and 2019  Gout on allopurinol 300 mg daily    Patient admits his previous complaint was bone pain both in lower and upper extremities however he chose to take vitamin D and calcium and that has since improved. Had F/U with urology as he had hematuria from the henry, but it has resolved. Patient states balloon was not fully deflated when henry was removed. He has TANNER, admits he struggles to wear a mask.  Has a f/u with Pulmonology coming  Using incentive Spirometer at home    Recently admitted to [facility] following cardiac arrest from 7/5/20-7/11/20    DISCHARGE DIAGNOSES:   1. Cardiac arrest secondary to sustained V-tach status post CPR with ROSC. Likely secondary to ischemic cardiomyopathy. 2. Cardiogenic shock: Resolved  3. Acute hypoxic respiratory failure: Resolved, extubated7/6/2020  4. Ischemic cardiomyopathy with heart failure reduced ejection fraction with EF 10-15%, status post AICD placement  5. Coronary artery disease with history of non revascularizable chronic disease  6. Community-acquired pneumonia  7. Paroxysmal atrial fibrillation  8. Type 2 diabetes mellitus  9. Mild asymptomatic hypokalemia   10. Critical care myopathy with weakness  11. Hematuria secondary to Pinto trauma: Stable    HOSPITAL COURSE SUMMARY:  24-year-old man with known history of coronary artery disease , hypertension, hyperlipidemia, type 2 diabetes, TANNER, diverticulitis proximal atrial fibrillation, ischemic cardiomyopathy, heart failure with reduced ejection fraction status post AICD placement was admitted to the hospital after episode of cardiac arrest secondary to sustained ventricular tachycardia. He received CPR, 2 rounds of epinephrine and 2 amps of sodium bicarbonate, downtime of 8 minute and had ROSC. He was admitted to the cardiac ICU on vasopressin infusion and norepinephrine infusion for hypertension. Patient eventually improved, was extubated and was transferred to floors. Is also diagnosed with community-acquired pneumonia, was on ceftriaxone in the hospital and will complete a 7 day course with p.o. Augmentin. Patient is discharged with plan for outpatient follow-up with cardiology. Per notes, patient's AICD was interrogated by Cardiology Service while in the hospital. Patient will be discharged with home health care and home physical therapy with a rolling walker.  Patient has been advised to avoid strenuous activities and exertion, and come to the ER immediately if he has any new or worsening symptoms or if his hematuria persists. Review of Systems   Constitutional: Positive for fatigue. Negative for appetite change, fever and unexpected weight change. HENT: Negative for hearing loss and trouble swallowing. Eyes: Negative for visual disturbance. Respiratory: Negative for chest tightness, shortness of breath and wheezing. Cardiovascular: Positive for chest pain (chest wall form CPR). Negative for palpitations. Gastrointestinal: Negative for abdominal pain, blood in stool, constipation, diarrhea and vomiting. Endocrine: Negative for polydipsia and polyuria. Genitourinary: Negative for decreased urine volume, difficulty urinating, dysuria, frequency, hematuria and urgency. Musculoskeletal: Negative for myalgias and neck pain. Skin: Negative for wound. Neurological: Negative for seizures, numbness and headaches. Psychiatric/Behavioral: Negative for suicidal ideas. The patient is not nervous/anxious. Prior to Visit Medications    Medication Sig Taking?  Authorizing Provider   pantoprazole (PROTONIX) 40 MG tablet Take 40 mg by mouth every morning (before breakfast) Yes Historical Provider, MD   amiodarone (CORDARONE) 200 MG tablet Take 200 mg by mouth daily Yes Historical Provider, MD   lidocaine 4 % external patch Place 1 patch onto the skin daily Yes Figueroa Miles, APRN - CNP   allopurinol (ZYLOPRIM) 300 MG tablet TAKE 1 TABLET DAILY Yes Janak Saldana MD   apixaban (ELIQUIS) 5 MG TABS tablet TAKE 1 TABLET BY MOUTH 2 TIMES DAILY Yes Janak Saldana MD   furosemide (LASIX) 40 MG tablet TAKE 1 TABLET BY MOUTH 2 TIMES A DAY Yes Janak Saldana MD   aspirin (ASPIRIN LOW DOSE) 81 MG EC tablet TAKE 1 TABLET TWICE A DAY Yes Janak Saldana MD   metoprolol succinate (TOPROL XL) 25 MG extended release tablet TAKE 1 TABLET BY MOUTH DAILY Yes Janak Saldana MD   isosorbide mononitrate (IMDUR) 30 MG extended release tablet TAKE 1 Currently     Types: Marijuana     Comment: hx THC quit approx 2004    Sexual activity: Not Currently     Partners: Female   Lifestyle    Physical activity     Days per week: Not on file     Minutes per session: Not on file    Stress: Not on file   Relationships    Social connections     Talks on phone: Not on file     Gets together: Not on file     Attends Alevism service: Not on file     Active member of club or organization: Not on file     Attends meetings of clubs or organizations: Not on file     Relationship status: Not on file    Intimate partner violence     Fear of current or ex partner: Not on file     Emotionally abused: Not on file     Physically abused: Not on file     Forced sexual activity: Not on file   Other Topics Concern    Not on file   Social History Narrative    Not on file        Family History   Problem Relation Age of Onset    Cancer Mother     Heart Disease Mother         due to smoking    Heart Disease Maternal Uncle     Heart Disease Maternal Grandmother        Vitals:    07/24/20 0858   BP: 100/62   Site: Right Upper Arm   Position: Sitting   Cuff Size: Medium Adult   Pulse: 58   Temp: 97.9 °F (36.6 °C)   SpO2: 99%   Weight: 236 lb (107 kg)     Estimated body mass index is 40.51 kg/m² as calculated from the following:    Height as of 7/1/20: 5' 4\" (1.626 m). Weight as of this encounter: 236 lb (107 kg). Physical Exam  Vitals signs and nursing note reviewed. Constitutional:       General: He is not in acute distress. Appearance: He is well-developed. He is obese. HENT:      Head: Normocephalic. Eyes:      General: No scleral icterus. Pupils: Pupils are equal, round, and reactive to light. Neck:      Musculoskeletal: Normal range of motion and neck supple. Cardiovascular:      Rate and Rhythm: Normal rate and regular rhythm. Pulmonary:      Effort: Pulmonary effort is normal.      Breath sounds: Normal breath sounds.    Chest:      Chest wall: Tenderness present. Abdominal:      General: Abdomen is protuberant. Palpations: Abdomen is soft. Skin:     General: Skin is warm and dry. Neurological:      Mental Status: He is alert and oriented to person, place, and time. Coordination: Coordination normal.   Psychiatric:         Behavior: Behavior normal. Behavior is cooperative. Thought Content: Thought content normal.         Judgment: Judgment normal.         ASSESSMENT     Diagnosis Orders   1. Anterior chest wall pain  lidocaine 4 % external patch   2. Essential hypertension     3. Coronary artery disease without angina pectoris, unspecified vessel or lesion type, unspecified whether native or transplanted heart     4. TANNER variably compliant with BiPAP            PLAN:  Orders Placed This Encounter   Medications    lidocaine 4 % external patch     Sig: Place 1 patch onto the skin daily     Dispense:  30 patch     Refill:  0         1. Patient does not require any refills today. Hospital course reviewed. 2. Patient states hematuria has resolved, however he has an appointment later today with urology for further evaluation. 3. Agreed chest wall pain secondary to compressions. Mainly substernal pain. NSAIDs not advisable given his CAD. Lidocaine patches ordered, patient advised to purchase salonpas or other OTC topical patch if insurance would not cover lidocaine patches  4. Cardiology follow-up also planned. FOLLOW UP AND INSTRUCTIONS:  Return in about 3 months (around 10/24/2020). · Domingo Manzano received counseling on the following healthy behaviors:exercise and medication adherence    · Discussed use, benefit, and side effects of prescribed medications. Barriers to  medication compliance addressed. All patient questions answered. Pt voiced  understanding.      · Patient given educational materials - see patient instructions    · Patient advised to contact scheduling offices for any referrals or imaging orders  placed

## 2020-08-13 RX ORDER — AMIODARONE HYDROCHLORIDE 200 MG/1
200 TABLET ORAL DAILY
Qty: 30 TABLET | Refills: 0 | Status: ON HOLD | OUTPATIENT
Start: 2020-08-13 | End: 2021-06-25

## 2020-08-13 NOTE — TELEPHONE ENCOUNTER
Patient called back, stated he needs refill on Amiodarone. Patient stated he was put on this medication while in the hospital and the doctor that put him on it told patient that his pcp needs to manage it. Please advise      Health Maintenance   Topic Date Due    Annual Wellness Visit (AWV)  06/19/2019    Colon cancer screen colonoscopy  04/04/2020    Shingles Vaccine (3 of 3) 05/04/2020    Flu vaccine (1) 09/01/2020    Diabetic retinal exam  11/15/2020    A1C test (Diabetic or Prediabetic)  02/24/2021    Lipid screen  03/20/2021    Potassium monitoring  03/21/2021    Creatinine monitoring  03/21/2021    Pneumococcal 65+ years Vaccine (2 of 2 - PPSV23) 06/30/2021    Diabetic foot exam  07/01/2021    DTaP/Tdap/Td vaccine (2 - Td) 06/30/2026    AAA screen  Completed    Hepatitis C screen  Completed    Hepatitis A vaccine  Aged Out    Hib vaccine  Aged Out    Meningococcal (ACWY) vaccine  Aged Out             (applicable per patient's age: Cancer Screenings, Depression Screening, Fall Risk Screening, Immunizations)    Hemoglobin A1C (%)   Date Value   02/24/2020 6.7 (H)   01/02/2020 5.6   05/31/2019 5.9     Microalb/Crt.  Ratio (mcg/mg creat)   Date Value   02/24/2020 CANNOT BE CALCULATED     LDL Cholesterol (mg/dL)   Date Value   03/20/2020 67     AST (U/L)   Date Value   01/27/2020 17     ALT (U/L)   Date Value   01/27/2020 10     BUN (mg/dL)   Date Value   03/21/2020 20      (goal A1C is < 7)   (goal LDL is <100) need 30-50% reduction from baseline     BP Readings from Last 3 Encounters:   07/24/20 100/62   07/01/20 122/68   03/21/20 121/64    (goal /80)      All Future Testing planned in [facility]:  Lab Frequency Next Occurrence   Immunoglobulin Panel (IgG, IgA, IgM) Once 02/10/2020   CBC Auto Differential     Comprehensive Metabolic Panel     [place]/Lambda Free Lt Chains, Serum Quant     Electrophoresis Protein, Serum without Reflex to Immunofixation         Next Visit Date:  Future Appointments   Date Time Provider Keerthi [place]   8/17/2020  1:00 PM Darlene Roman DO Resp Spec [facility]   10/26/2020 12:30 PM LIDIA Pearson - CNP [facility]   1/6/2021 12:45 PM Matthieu Castorena DPM [facility] Pod[facility] [address] [facility]            Patient Active Problem List:     Acute on chronic combined systolic and diastolic congestive heart failure (HCC)     Chronic kidney disease, stage II (mild)     Chronic gout     Morbid obesity (HCC)     Hyperlipidemia     Iron deficiency anemia     Anxiety disorder      DJD (degenerative joint disease)     TANNER variably compliant with BiPAP     CAD (coronary artery disease) s/p stenting of L anterior descending artery 2004     Diabetic retinopathy ([address]     Ischemic cardiomyopathy     HTN (hypertension)     NSTEMI (non-ST elevated myocardial infarction) ([facility])     MGUS (monoclonal gammopathy of unknown significance)     Type 2 diabetes mellitus without complication ([address]     AICD (automatic cardioverter/defibrillator) present     PAF (paroxysmal atrial fibrillation) ([address]     Coronary angioplasty status     Hypokalemia     Acute on chronic systolic heart failure (HCC)     Viral upper respiratory tract infection     Acute on chronic congestive heart failure ([address]

## 2020-08-17 ENCOUNTER — VIRTUAL VISIT (OUTPATIENT)
Dept: PULMONOLOGY | Age: 65
End: 2020-08-17
Payer: COMMERCIAL

## 2020-08-17 PROCEDURE — 99213 OFFICE O/P EST LOW 20 MIN: CPT | Performed by: INTERNAL MEDICINE

## 2020-08-17 ASSESSMENT — ENCOUNTER SYMPTOMS
EYES NEGATIVE: 1
SHORTNESS OF BREATH: 1
CHEST TIGHTNESS: 1
BACK PAIN: 1
APNEA: 1

## 2020-08-17 NOTE — PROGRESS NOTES
2020    TELEHEALTH EVALUATION -- Audio/Visual (During NFEQG-27 public health emergency)    Patient and physician are located in their individual locations. This is visit is completed via [employer] application []/ Doxy. me[] / Telephone [x]     HPI:    Luz Ford (:  1955) has requested an audio/video evaluation for the following concern(s):    Follow-up visit for sleep apnea. Since his last visit over a year ago he states that he has not been very compliant with BiPAP. Offers no clear explanation. He states that in early July he suffered a cardiac arrest while at home. Circumstances not clear. Claims that he got up out of bed felt dizzy and called 911. Reportedly he was able to walk out to the ambulance however he was admitted to the [facility] and reports going through \"2 rounds of CPR. \"  Complains of a sore, tender chest.  Patient has an ICD in place. Follows with Dr. Anabelle Brady for cardiology. Review of Systems   Constitutional: Negative. HENT: Negative. Eyes: Negative. Respiratory: Positive for apnea, chest tightness and shortness of breath. Cardiovascular: Positive for chest pain. Musculoskeletal: Positive for back pain and gait problem. All other systems reviewed and are negative. Prior to Visit Medications    Medication Sig Taking?  Authorizing Provider   amiodarone (CORDARONE) 200 MG tablet Take 1 tablet by mouth daily Yes LIDIA Lombardi - CNP   lidocaine 4 % external patch Place 1 patch onto the skin daily Yes LIDIA Lombardi CNP   Calcium Carbonate-Vitamin D (OYSTER SHELL CALCIUM/D) 500-200 MG-UNIT TABS Take 1 tablet by mouth daily Yes Eleno García MD   allopurinol (ZYLOPRIM) 300 MG tablet TAKE 1 TABLET DAILY Yes Eleno García MD   apixaban (ELIQUIS) 5 MG TABS tablet TAKE 1 TABLET BY MOUTH 2 TIMES DAILY Yes Eleno García MD   furosemide (LASIX) 40 MG tablet TAKE 1 TABLET BY MOUTH 2 TIMES A DAY Yes Yisel Crowe MD   aspirin (ASPIRIN LOW DOSE) 81 MG EC tablet TAKE 1 TABLET TWICE A DAY Yes Yisel Crowe MD   metoprolol succinate (TOPROL XL) 25 MG extended release tablet TAKE 1 TABLET BY MOUTH DAILY Yes Yisel Crowe MD   isosorbide mononitrate (IMDUR) 30 MG extended release tablet TAKE 1 TABLET BY MOUTH DAILY Yes Yisel Crowe MD   lisinopril (PRINIVIL;ZESTRIL) 5 MG tablet TAKE 1 TABLET BY MOUTH DAILY Yes Yisel Crowe MD   blood glucose test strips (FREESTYLE LITE) strip USE AS DIRECTED TWICE A DAY Yes Jocelyn Tyson MD   atorvastatin (LIPITOR) 40 MG tablet Take 1 tablet by mouth daily Yes Jocelyn Tyson MD   ammonium lactate (LAC-HYDRIN) 12 % lotion Apply topically daily after bathing sparing the space between the toes. Yes Jocelyn Tyson MD   metFORMIN (GLUCOPHAGE) 1000 MG tablet TAKE 1 TABLET BY MOUTH 2 TIMES A DAY WITH MEALS Yes Jocelyn Tyson MD   TRUEPLUS LANCETS 33G MISC TEST AS DIRECTED Yes Jocelyn Tyson MD   Alcohol Swabs (ALCOHOL PREP) 70 % PADS USE AS DIRECTED Yes Salo Belle MD   acetaminophen (ACETAMINOPHEN EXTRA STRENGTH) 500 MG tablet TAKE 1 TABLET BY MOUTH 2 TIMES A DAY AS NEEDED FOR PAIN Yes Jocelyn Tyson MD   Lancets [address] [facility] Daily Yes Jocelyn Tyson MD       Social History     Tobacco Use    Smoking status: Former Smoker     Packs/day: 1.00     Years: 3.00     Pack years: 3.00     Types: Cigarettes     Start date: 1971     Last attempt to quit: 1974     Years since quittin.6    Smokeless tobacco: Never Used   Substance Use Topics    Alcohol use: No     Alcohol/week: 0.0 standard drinks    Drug use: Not Currently     Types: Marijuana     Comment: hx THC quit approx             RECORD REVIEW: Previous medical records were reviewed at today's visit.     Wt Readings from Last 3 Encounters:   20 236 lb (107 kg)   20 241 lb 6.4 oz (109.5 maker is aware that that he may receive a bill for this telephone service, depending on his insurance coverage, and has provided verbal consent to proceed: Yes      I affirm this is a Patient Initiated Episode with an Established Patient who has not had a related appointment within my department in the past 7 days or scheduled within the next 24 hours.     Total Time: minutes: 11-20 minutes    Note: not billable if this call serves to triage the patient into an appointment for the relevant concern

## 2020-08-19 RX ORDER — LISINOPRIL 5 MG/1
5 TABLET ORAL DAILY
Qty: 30 TABLET | Refills: 3 | Status: SHIPPED | OUTPATIENT
Start: 2020-08-19 | End: 2020-10-26 | Stop reason: SDUPTHER

## 2020-08-19 RX ORDER — ISOSORBIDE MONONITRATE 30 MG/1
30 TABLET, EXTENDED RELEASE ORAL DAILY
Qty: 30 TABLET | Refills: 3 | Status: SHIPPED | OUTPATIENT
Start: 2020-08-19 | End: 2020-10-26 | Stop reason: SDUPTHER

## 2020-10-26 ENCOUNTER — OFFICE VISIT (OUTPATIENT)
Dept: PRIMARY CARE CLINIC | Age: 65
End: 2020-10-26
Payer: COMMERCIAL

## 2020-10-26 VITALS
OXYGEN SATURATION: 100 % | SYSTOLIC BLOOD PRESSURE: 107 MMHG | WEIGHT: 233.4 LBS | TEMPERATURE: 98.4 F | HEART RATE: 56 BPM | DIASTOLIC BLOOD PRESSURE: 65 MMHG | BODY MASS INDEX: 40.06 KG/M2

## 2020-10-26 PROBLEM — J06.9 VIRAL UPPER RESPIRATORY TRACT INFECTION: Status: RESOLVED | Noted: 2020-03-19 | Resolved: 2020-10-26

## 2020-10-26 PROCEDURE — 99214 OFFICE O/P EST MOD 30 MIN: CPT | Performed by: NURSE PRACTITIONER

## 2020-10-26 RX ORDER — ALLOPURINOL 300 MG/1
TABLET ORAL
Qty: 30 TABLET | Refills: 5 | Status: SHIPPED | OUTPATIENT
Start: 2020-10-26 | End: 2021-05-05

## 2020-10-26 RX ORDER — FUROSEMIDE 40 MG/1
20 TABLET ORAL 2 TIMES DAILY
Qty: 90 TABLET | Refills: 1 | Status: SHIPPED | OUTPATIENT
Start: 2020-10-26 | End: 2021-04-09

## 2020-10-26 RX ORDER — LISINOPRIL 5 MG/1
5 TABLET ORAL DAILY
Qty: 30 TABLET | Refills: 3 | Status: SHIPPED | OUTPATIENT
Start: 2020-10-26 | End: 2021-05-05

## 2020-10-26 RX ORDER — ISOSORBIDE MONONITRATE 30 MG/1
30 TABLET, EXTENDED RELEASE ORAL DAILY
Qty: 30 TABLET | Refills: 3 | Status: SHIPPED | OUTPATIENT
Start: 2020-10-26 | End: 2021-05-05

## 2020-10-26 ASSESSMENT — ENCOUNTER SYMPTOMS
CONSTIPATION: 0
BLOOD IN STOOL: 0
SHORTNESS OF BREATH: 0
DIARRHEA: 0
VOMITING: 0
TROUBLE SWALLOWING: 0
CHEST TIGHTNESS: 0
WHEEZING: 0
ABDOMINAL PAIN: 0

## 2020-10-26 NOTE — PROGRESS NOTES
[facility]  [id number] [facility] [address]  Dept: [phone]  Dept Fax: [phone]    Patient Care Team:  LIDIA Fuller CNP as PCP - General (Family Medicine)  LIDIA Fuller CNP as PCP - [facility] Empaneled Provider  David Cole MD as Consulting Physician (Hematology and Oncology)  Cami Chris MD as Consulting Physician (Cardiology)  Walker Ventura DO as Consulting Physician (Pulmonology)  Renuka Grace MD as Consulting Physician (Ophthalmology)  Palmira Flores MD as Consulting Physician (Internal Medicine)    10/26/2020     Maurice Mcrae (:  1955)is a 77 y.o. male, here for evaluation of the following medical concerns:   Chief Complaint   Patient presents with    3 Month Follow-Up       38-year-old  male is here for a routine follow up. He reports a history of diabetes mellitus, hypertension, he has a history of ischemic cardiomyopathy with multiple stent placed in , ejection fraction recent 115 to 20% status post AICD, patient has a history of atrial fibrillation on Eliquis 5 mg twice a day, is following cardiology regularly, Dr Connie Giraldo. For diabetes mellitus he is on metformin 1 g twice daily. FBS running 100-110 normally  He denies any shortness of breath, leg swelling, dizziness  He follows podiatry and ophthalmology for retinal exam last foot exam in 2020 and last retinal exam and 2019. Eye exam now scheduled for . Gout on allopurinol 300 mg daily    Had F/U with urology as he had hematuria from the henry, but it has resolved. Patient states balloon was not fully deflated when henry was removed. Vickii Amend states he had a follow-up with urology  following a kidney biopsy on . Advised there is no evidence of malignancy. Lesions were hyperdense cysts, repeat CT scan was recommended in 6 months.     He has TANNER, admits he struggles to wear a mask. Pulmonology encourages him to wear it, he was given a new machine. Still struggling with wearing the CPAP  Using incentive Spirometer at home    . Review of Systems   Constitutional: Positive for fatigue. Negative for appetite change, fever and unexpected weight change. HENT: Negative for hearing loss and trouble swallowing. Eyes: Negative for visual disturbance. Respiratory: Negative for chest tightness, shortness of breath and wheezing. Cardiovascular: Negative for chest pain and palpitations. Gastrointestinal: Negative for abdominal pain, blood in stool, constipation, diarrhea and vomiting. Endocrine: Negative for polydipsia and polyuria. Genitourinary: Negative for decreased urine volume, difficulty urinating, dysuria, frequency, hematuria and urgency. Musculoskeletal: Negative for myalgias and neck pain. Skin: Negative for wound. Neurological: Negative for dizziness, seizures, numbness and headaches. Psychiatric/Behavioral: Negative for suicidal ideas. The patient is not nervous/anxious. Prior to Visit Medications    Medication Sig Taking?  Authorizing Provider   lisinopril (PRINIVIL;ZESTRIL) 5 MG tablet Take 1 tablet by mouth daily Yes LIDIA Palomo CNP   allopurinol (ZYLOPRIM) 300 MG tablet TAKE 1 TABLET DAILY Yes LIDIA Palomo CNP   apixaban (ELIQUIS) 5 MG TABS tablet TAKE 1 TABLET BY MOUTH 2 TIMES DAILY Yes LIDIA Palomo CNP   isosorbide mononitrate (IMDUR) 30 MG extended release tablet Take 1 tablet by mouth daily Yes LIDIA Palomo CNP   furosemide (LASIX) 40 MG tablet Take 0.5 tablets by mouth 2 times daily Yes LIDIA Palomo CNP   metFORMIN (GLUCOPHAGE) 1000 MG tablet TAKE 1 TABLET BY MOUTH 2 TIMES A DAY WITH MEALS Yes LIDIA Palomo CNP   amiodarone (CORDARONE) 200 MG tablet Take 1 tablet by mouth daily Yes LIDIA Palomo CNP   aspirin (ASPIRIN LOW DOSE) 81 MG EC tablet TAKE 1 TABLET TWICE A DAY Yes Janak Saldana MD   metoprolol succinate (TOPROL XL) 25 MG extended release tablet TAKE 1 TABLET BY MOUTH DAILY Yes Janak Saldana MD   blood glucose test strips (FREESTYLE LITE) strip USE AS DIRECTED TWICE A DAY Yes Mariaelena Carney MD   atorvastatin (LIPITOR) 40 MG tablet Take 1 tablet by mouth daily Yes Mariaelena Carney MD   ammonium lactate (LAC-HYDRIN) 12 % lotion Apply topically daily after bathing sparing the space between the toes. Yes Mariaelena Carney MD   TRUEPLUS LANCETS 33G MISC TEST AS DIRECTED Yes Mariaelena Carney MD   Alcohol Swabs (ALCOHOL PREP) 70 % PADS USE AS DIRECTED Yes Jordan Reese MD   acetaminophen (ACETAMINOPHEN EXTRA STRENGTH) 500 MG tablet TAKE 1 TABLET BY MOUTH 2 TIMES A DAY AS NEEDED FOR PAIN Yes Mariaelena Carney MD   Lancets MISC Daily Yes Mariaelena Carney MD   Calcium Carbonate-Vitamin D (OYSTER SHELL CALCIUM/D) 500-200 MG-UNIT TABS Take 1 tablet by mouth daily  Patient not taking: Reported on 10/26/2020  Janak Saldana MD        Social History     Tobacco Use    Smoking status: Former Smoker     Packs/day: 1.00     Years: 3.00     Pack years: 3.00     Types: Cigarettes     Start date: 1971     Last attempt to quit: 1974     Years since quittin.8    Smokeless tobacco: Never Used   Substance Use Topics    Alcohol use: No     Alcohol/week: 0.0 standard drinks        Vitals:    10/26/20 1220   BP: 107/65   Site: Right Upper Arm   Position: Sitting   Cuff Size: Large Adult   Pulse: 56   Temp: 98.4 °F (36.9 °C)   TempSrc: Temporal   SpO2: 100%   Weight: 233 lb 6.4 oz (105.9 kg)     Estimated body mass index is 40.06 kg/m² as calculated from the following:    Height as of 20: 5' 4\" (1.626 m). Weight as of this encounter: 233 lb 6.4 oz (105.9 kg).     DIAGNOSTIC FINDINGS:  CBC:  Lab Results   Component Value Date    WBC 8.4 2020    HGB 12.0 2020     03/20/2020     12/08/2011       BMP:    Lab Results   Component Value Date     03/21/2020    K 3.6 03/21/2020     03/21/2020    CO2 23 03/21/2020    BUN 20 03/21/2020    CREATININE 0.87 03/21/2020    GLUCOSE 123 07/01/2020    GLUCOSE 123 03/19/2012       HEMOGLOBIN A1C:   Lab Results   Component Value Date    LABA1C 6.7 02/24/2020       FASTING LIPID PANEL:  Lab Results   Component Value Date    CHOL 124 03/20/2020    HDL 40 (L) 03/20/2020    TRIG 84 03/20/2020       Physical Exam  Vitals signs and nursing note reviewed. Constitutional:       General: He is not in acute distress. Appearance: He is well-developed. He is obese. HENT:      Head: Normocephalic. Eyes:      General: No scleral icterus. Pupils: Pupils are equal, round, and reactive to light. Neck:      Musculoskeletal: Normal range of motion and neck supple. Cardiovascular:      Rate and Rhythm: Normal rate and regular rhythm. Pulmonary:      Effort: Pulmonary effort is normal.      Breath sounds: Normal breath sounds. Abdominal:      General: Abdomen is protuberant. Palpations: Abdomen is soft. Musculoskeletal:        Feet:    Skin:     General: Skin is warm and dry. Neurological:      Mental Status: He is alert and oriented to person, place, and time. Coordination: Coordination normal.   Psychiatric:         Behavior: Behavior normal. Behavior is cooperative. Thought Content: Thought content normal.         Judgment: Judgment normal.         ASSESSMENT     Diagnosis Orders   1. Chronic combined systolic and diastolic heart failure (HCC)  lisinopril (PRINIVIL;ZESTRIL) 5 MG tablet    isosorbide mononitrate (IMDUR) 30 MG extended release tablet    furosemide (LASIX) 40 MG tablet   2. Screening for colon cancer  POCT FECAL IMMUNOCHEMICAL TEST (FIT)   3. Idiopathic chronic gout without tophus, unspecified site  allopurinol (ZYLOPRIM) 300 MG tablet   4.  PAF (paroxysmal atrial fibrillation) ([facility] apixaban (ELIQUIS) 5 MG TABS tablet   5. Type 2 diabetes mellitus without complication, without long-term current use of insulin ([facility]     6. Diabetic retinopathy of both eyes with macular edema associated with diabetes mellitus due to underlying condition, unspecified retinopathy severity ([facility]     7. TANNER variably compliant with BiPAP            PLAN:  Orders Placed This Encounter   Medications    lisinopril (PRINIVIL;ZESTRIL) 5 MG tablet     Sig: Take 1 tablet by mouth daily     Dispense:  30 tablet     Refill:  3    allopurinol (ZYLOPRIM) 300 MG tablet     Sig: TAKE 1 TABLET DAILY     Dispense:  30 tablet     Refill:  5    apixaban (ELIQUIS) 5 MG TABS tablet     Sig: TAKE 1 TABLET BY MOUTH 2 TIMES DAILY     Dispense:  60 tablet     Refill:  3    isosorbide mononitrate (IMDUR) 30 MG extended release tablet     Sig: Take 1 tablet by mouth daily     Dispense:  30 tablet     Refill:  3    furosemide (LASIX) 40 MG tablet     Sig: Take 0.5 tablets by mouth 2 times daily     Dispense:  90 tablet     Refill:  1         1. Blood pressure at goal.  Nataly Records routine annual blood work from his oncologist, CBC and CMP are pending for early February. 2. Urged use of CPAP when resting or watching television, may utilize when not sleeping. Patient verbalized understanding  3. Fasting blood sugars at goal, no ADRs reported with metformin. 4. No evidence of infection to great toe, patient advised toenail may likely fall off. Advised to protect his foot and make sure he schedules his annual podiatry appointment. FOLLOW UP AND INSTRUCTIONS:  Return in about 6 months (around 4/26/2021) for Diabetes, CHF. · Olen Schwab received counseling on the following healthy behaviors:nutrition and medication adherence    · Discussed use, benefit, and side effects of prescribed medications. Barriers to  medication compliance addressed. All patient questions answered.   Pt  verbalized understanding of all instructions

## 2020-10-26 NOTE — PROGRESS NOTES
Visit Information    Have you changed or started any medications since your last visit including any over-the-counter medicines, vitamins, or herbal medicines? no   Are you having any side effects from any of your medications? -  no  Have you stopped taking any of your medications? Is so, why? -  no    Have you seen any other physician or provider since your last visit? No  Have you had any other diagnostic tests since your last visit? No  Have you been seen in the emergency room and/or had an admission to a hospital since we last saw you? No  Have you had your routine dental cleaning in the past 6 months? no    Have you activated your [employer] account? If not, what are your barriers?  Yes     Patient Care Team:  LIDIA Glez CNP as PCP - General (Family Medicine)  LIDIA Glez CNP as PCP - [facility] Provider  Portia Maloney MD as Consulting Physician (Hematology and Oncology)  Vikki Goff MD as Consulting Physician (Cardiology)  Mauri Andino DO as Consulting Physician (Pulmonology)  Elijah Chambers MD as Consulting Physician (Ophthalmology)  Ilene Alford MD as Consulting Physician (Internal Medicine)    Medical History Review  Past Medical, Family, and Social History reviewed and does not contribute to the patient presenting condition    Health Maintenance   Topic Date Due    Annual Wellness Visit (AWV)  06/19/2019    Colon cancer screen colonoscopy  04/04/2020    Diabetic retinal exam  11/15/2020    A1C test (Diabetic or Prediabetic)  02/24/2021    Lipid screen  03/20/2021    Potassium monitoring  03/21/2021    Creatinine monitoring  03/21/2021    Diabetic foot exam  07/01/2021    DTaP/Tdap/Td vaccine (2 - Td) 06/30/2026    Flu vaccine  Completed    Shingles Vaccine  Completed    Pneumococcal 65+ years Vaccine  Completed    AAA screen  Completed    Hepatitis C screen  Completed    Hepatitis A vaccine  Aged Out    Hib vaccine  Aged Out    Meningococcal (ACWY) vaccine  Aged Out

## 2020-12-02 ENCOUNTER — TELEPHONE (OUTPATIENT)
Dept: PRIMARY CARE CLINIC | Age: 65
End: 2020-12-02

## 2020-12-02 RX ORDER — GLUCOSAMINE HCL/CHONDROITIN SU 500-400 MG
1 CAPSULE ORAL DAILY
Qty: 300 STRIP | Refills: 2 | Status: SHIPPED | OUTPATIENT
Start: 2020-12-02 | End: 2021-12-01

## 2020-12-02 RX ORDER — LANCETS 30 GAUGE
EACH MISCELLANEOUS
Qty: 100 EACH | Refills: 3 | Status: SHIPPED | OUTPATIENT
Start: 2020-12-02

## 2020-12-02 NOTE — TELEPHONE ENCOUNTER
Patient called and stated his insurance is no longer going to cover his lancets and strips for his Freestyle meter. He was told he needs to change to One Step meter. He would like a prescription for the meter, strips and lancets to go to his pharmacy. Please advise      Health Maintenance   Topic Date Due    Annual Wellness Visit (AWV)  06/19/2019    Colon cancer screen colonoscopy  04/04/2020    Diabetic retinal exam  11/15/2020    A1C test (Diabetic or Prediabetic)  02/24/2021    Lipid screen  03/20/2021    Potassium monitoring  03/21/2021    Creatinine monitoring  03/21/2021    Diabetic foot exam  07/01/2021    DTaP/Tdap/Td vaccine (2 - Td) 06/30/2026    Flu vaccine  Completed    Shingles Vaccine  Completed    Pneumococcal 65+ years Vaccine  Completed    AAA screen  Completed    Hepatitis C screen  Completed    Hepatitis A vaccine  Aged Out    Hib vaccine  Aged Out    Meningococcal (ACWY) vaccine  Aged Out             (applicable per patient's age: Cancer Screenings, Depression Screening, Fall Risk Screening, Immunizations)    Hemoglobin A1C (%)   Date Value   02/24/2020 6.7 (H)   01/02/2020 5.6   05/31/2019 5.9     Microalb/Crt.  Ratio (mcg/mg creat)   Date Value   02/24/2020 CANNOT BE CALCULATED     LDL Cholesterol (mg/dL)   Date Value   03/20/2020 67     AST (U/L)   Date Value   01/27/2020 17     ALT (U/L)   Date Value   01/27/2020 10     BUN (mg/dL)   Date Value   03/21/2020 20      (goal A1C is < 7)   (goal LDL is <100) need 30-50% reduction from baseline     BP Readings from Last 3 Encounters:   10/26/20 107/65   07/24/20 100/62   07/01/20 122/68    (goal /80)      All Future Testing planned in [facility]:  Lab Frequency Next Occurrence   Immunoglobulin Panel (IgG, IgA, IgM) Once 02/10/2020   POCT FECAL IMMUNOCHEMICAL TEST (FIT) Once 11/26/2020   CBC Auto Differential     Comprehensive Metabolic Panel     [place]/Lambda Free Lt Chains, Serum Quant     Electrophoresis Protein, Serum without Reflex to Immunofixation         Next Visit Date:  Future Appointments   Date Time Provider Department Center   1/6/2021 12:45 PM Maida Arguello DPM [facility] Podiatry [facility]   2/5/2021 11:15 AM SCHEDULE, [facility]   2/12/2021  3:00 PM Mikael Corley MD [facility]   4/26/2021 12:30 PM LIDIA Peacock - CNP [facility] WALK IN [facility]   8/23/2021  2:30 PM Ferny Lewis DO Resp Spec [address]            Patient Active Problem List:     Acute on chronic combined systolic and diastolic congestive heart failure (HCC)     Chronic kidney disease, stage II (mild)     Chronic gout     Morbid obesity ([address]     Hyperlipidemia     Iron deficiency anemia     Anxiety disorder      DJD (degenerative joint disease)     TANNER variably compliant with BiPAP     CAD (coronary artery disease) s/p stenting of L anterior descending artery 2004     Diabetic retinopathy ([address]     Ischemic cardiomyopathy     HTN (hypertension)     NSTEMI (non-ST elevated myocardial infarction) ([facility])     MGUS (monoclonal gammopathy of unknown significance)     Type 2 diabetes mellitus without complication ([facility])     AICD (automatic cardioverter/defibrillator) present     PAF (paroxysmal atrial fibrillation) ([facility])     Coronary angioplasty status     Hypokalemia     Acute on chronic systolic heart failure ([address]     Acute on chronic congestive heart failure ([address]

## 2020-12-22 ENCOUNTER — TELEPHONE (OUTPATIENT)
Dept: PRIMARY CARE CLINIC | Age: 65
End: 2020-12-22

## 2020-12-22 NOTE — TELEPHONE ENCOUNTER
Pt is requesting a prescription for an Handicap Placard in order too receive submit to [facility] for renewal, previously prescribe by previous PCP , due to CHF. Patient is fine with request being mailed to home,pls advise

## 2020-12-23 NOTE — TELEPHONE ENCOUNTER
Please let Cruz Salgado know his handicap placard is ready for him to come , or can be mailed to him if he prefers

## 2021-01-06 ENCOUNTER — OFFICE VISIT (OUTPATIENT)
Dept: PODIATRY | Age: 66
End: 2021-01-06
Payer: COMMERCIAL

## 2021-01-06 VITALS
SYSTOLIC BLOOD PRESSURE: 102 MMHG | WEIGHT: 235 LBS | TEMPERATURE: 98 F | HEART RATE: 64 BPM | DIASTOLIC BLOOD PRESSURE: 60 MMHG | BODY MASS INDEX: 40.34 KG/M2

## 2021-01-06 DIAGNOSIS — I48.0 PAF (PAROXYSMAL ATRIAL FIBRILLATION) (HCC): ICD-10-CM

## 2021-01-06 DIAGNOSIS — M79.672 PAIN IN BOTH FEET: ICD-10-CM

## 2021-01-06 DIAGNOSIS — B35.1 ONYCHOMYCOSIS: ICD-10-CM

## 2021-01-06 DIAGNOSIS — E11.9 TYPE 2 DIABETES MELLITUS WITHOUT COMPLICATION, WITHOUT LONG-TERM CURRENT USE OF INSULIN (HCC): Primary | ICD-10-CM

## 2021-01-06 DIAGNOSIS — R60.0 EDEMA OF LOWER EXTREMITY: ICD-10-CM

## 2021-01-06 DIAGNOSIS — M79.671 PAIN IN BOTH FEET: ICD-10-CM

## 2021-01-06 DIAGNOSIS — L84 CALLUS OF FOOT: ICD-10-CM

## 2021-01-06 DIAGNOSIS — I25.10 CORONARY ARTERY DISEASE INVOLVING NATIVE CORONARY ARTERY OF NATIVE HEART WITHOUT ANGINA PECTORIS: ICD-10-CM

## 2021-01-06 PROCEDURE — 99212 OFFICE O/P EST SF 10 MIN: CPT | Performed by: STUDENT IN AN ORGANIZED HEALTH CARE EDUCATION/TRAINING PROGRAM

## 2021-01-06 PROCEDURE — 11056 PARNG/CUTG B9 HYPRKR LES 2-4: CPT | Performed by: STUDENT IN AN ORGANIZED HEALTH CARE EDUCATION/TRAINING PROGRAM

## 2021-01-06 PROCEDURE — 99213 OFFICE O/P EST LOW 20 MIN: CPT | Performed by: STUDENT IN AN ORGANIZED HEALTH CARE EDUCATION/TRAINING PROGRAM

## 2021-01-06 RX ORDER — ATORVASTATIN CALCIUM 40 MG/1
40 TABLET, FILM COATED ORAL DAILY
Qty: 30 TABLET | Refills: 5 | Status: SHIPPED | OUTPATIENT
Start: 2021-01-06 | End: 2021-07-09

## 2021-01-06 NOTE — TELEPHONE ENCOUNTER
Health Maintenance   Topic Date Due    Annual Wellness Visit (AWV)  06/19/2019    Colon cancer screen colonoscopy  04/04/2020    Diabetic retinal exam  11/15/2020    A1C test (Diabetic or Prediabetic)  02/24/2021    Lipid screen  03/20/2021    Potassium monitoring  03/21/2021    Creatinine monitoring  03/21/2021    Diabetic foot exam  07/01/2021    DTaP/Tdap/Td vaccine (2 - Td) 06/30/2026    Flu vaccine  Completed    Shingles Vaccine  Completed    Pneumococcal 65+ years Vaccine  Completed    AAA screen  Completed    Hepatitis C screen  Completed    Hepatitis A vaccine  Aged Out    Hib vaccine  Aged Out    Meningococcal (ACWY) vaccine  Aged Out             (applicable per patient's age: Cancer Screenings, Depression Screening, Fall Risk Screening, Immunizations)    Hemoglobin A1C (%)   Date Value   02/24/2020 6.7 (H)   01/02/2020 5.6   05/31/2019 5.9     Microalb/Crt.  Ratio (mcg/mg creat)   Date Value   02/24/2020 CANNOT BE CALCULATED     LDL Cholesterol (mg/dL)   Date Value   03/20/2020 67     AST (U/L)   Date Value   01/27/2020 17     ALT (U/L)   Date Value   01/27/2020 10     BUN (mg/dL)   Date Value   03/21/2020 20      (goal A1C is < 7)   (goal LDL is <100) need 30-50% reduction from baseline     BP Readings from Last 3 Encounters:   10/26/20 107/65   07/24/20 100/62   07/01/20 122/68    (goal /80)      All Future Testing planned in [facility]:  Lab Frequency Next Occurrence   Immunoglobulin Panel (IgG, IgA, IgM) Once 02/10/2020   POCT FECAL IMMUNOCHEMICAL TEST (FIT) Once 11/26/2020   CBC Auto Differential     Comprehensive Metabolic Panel     [place]/Lambda Free Lt Chains, Serum Quant     Electrophoresis Protein, Serum without Reflex to Immunofixation         Next Visit Date:  Future Appointments   Date Time Provider Keerthi [place]   2/5/2021 11:15 AM SCHEDULE, [facility]   2/12/2021  3:00 PM Zenobia Uribe MD [facility]   4/26/2021 12:30 PM Kendra Johansen Yaquelin Drivers, APRN - CNP [facility] WALK IN [facility]   8/23/2021  2:30 PM Kyaw Sampson DO Resp Spec [facility]            Patient Active Problem List:     Acute on chronic combined systolic and diastolic congestive heart failure ([address]     Chronic kidney disease, stage II (mild)     Chronic gout     Morbid obesity (HCC)     Hyperlipidemia     Iron deficiency anemia     Anxiety disorder      DJD (degenerative joint disease)     TANNER variably compliant with BiPAP     CAD (coronary artery disease) s/p stenting of L anterior descending artery 2004     Diabetic retinopathy ([address]     Ischemic cardiomyopathy     HTN (hypertension)     NSTEMI (non-ST elevated myocardial infarction) (HCC)     MGUS (monoclonal gammopathy of unknown significance)     Type 2 diabetes mellitus without complication ([address]     AICD (automatic cardioverter/defibrillator) present     PAF (paroxysmal atrial fibrillation) ([address]     Coronary angioplasty status     Hypokalemia     Acute on chronic systolic heart failure ([address]     Acute on chronic congestive heart failure ([address]

## 2021-01-06 NOTE — PROGRESS NOTES
Patient instructed to remove shoes and socks and instructed to sit in exam chair. Current PCP is LIDIA Louie CNP and date of last visit was 10/26/20. Do you have a follow up visit scheduled? No  If yes, the date is n/a  Diabetic visit information    Blood pressure (Control is BP <140/90)  BP Readings from Last 3 Encounters:   10/26/20 107/65   07/24/20 100/62   07/01/20 122/68       BP taken with correct size cuff? - Yes   Repeated if > 140/90 Yes      Tobacco use:  Patient  reports that he quit smoking about 47 years ago. His smoking use included cigarettes. He started smoking about 50 years ago. He has a 3.00 pack-year smoking history. He has never used smokeless tobacco.  If Smoker - Cessation materials given? - Yes       Diabetic Health Maintenance Items due  Diabetes Management   Topic Date Due    Diabetic retinal exam  11/15/2020       Diabetic retinal exam done in last year? - Yes   If No: remind patient that it is due and they should schedule an exam    Medications  Is patient taking any medications for diabetes? -   Yes  Have blood sugars been controlled? Fasting blood sugars under 120   -   Yes   Random home sugars or today's POCT glucose is under 180 -   Yes   []  If No to the above then patient should schedule appt with PCP.      Diabetic Plan    A1C Plan  Lab Results   Component Value Date    LABA1C 6.7 (H) 02/24/2020    LABA1C 5.6 01/02/2020    LABA1C 5.9 05/31/2019      []  If A1C over 8 and last result >3 months ago - Order A1C and refer to PCP   []  If last A1C over 6 months ago - Order A1C and refer to PCP for follow up   []  If elevated blood sugars > 180 - refer to PCP for follow up    []  Blood sugar controlled - A1C under 8 and last check was < 6 months      Cholesterol Plan   Lab Results   Component Value Date    LDLCHOLESTEROL 67 03/20/2020      []  If LDL > 100 and last result >3 months ago - order Fasting lipids and refer to PCP for follow up   []  If LDL < 100 and over 1 year ago - Order Fasting lipids and refer to PCP for follow up   [] LDL is controlled. LDL < 100 and checked within the last year     Blood Pressure  BP Readings from Last 3 Encounters:   10/26/20 107/65   07/24/20 100/62   07/01/20 122/68      []  If SBP >140 mmhg - refer to PCP for follow up   []  If DBP > 90 mmhg - refer to PCP for follow up   [] BP is controlled <140/90     Order labs as PCP ordered.   (ie: Lipids, A1C, CMP)

## 2021-01-06 NOTE — PROGRESS NOTES
[address],  S Harry Ochoa  Tel: [phone]   Fax: [phone]    Subjective     CC: Painful toe nails    HPI:  Carolyn Bennett is a 77y.o. year old male who presents to clinic today for routine diabetic foot exam. Reports cramping to left foot and calluses of the left foot. who presents to clinic today. Denies numbness, burning, or tingling sensation in his feet. Reports he dropped a weight on his right hallux nail approximately 1 month ago but went to PCP, no issues other than \"dried blood\" under the nail. Patient has DM2, states his blood sugar well controlled, most recent HgbA1c was 6.7% (2/24/20). Admits to dry skin of both feet but has been using ammonium lactate PRN. No other pedal complaints. Primary care physician is LIDIA Gagnon CNP.    ROS:    Constitutional: Denies nausea, vomiting, fever, chills. Neurologic: Denies numbness, tingling, and burning in the feet. Vascular: Denies symptoms of lower extremity claudication. Skin: Denies open wounds. Otherwise negative except as noted in the HPI.      PMH:  Past Medical History:   Diagnosis Date    Anemia     Anxiety     when he lies flat, no RX    CAD (coronary artery disease)     MI stents x5, follows with cardiology Dr. Margie Eddy Cardiac defibrillator in place     Cardiomyopathy [facility])     CHF (congestive heart failure) ([address]     Chronic combined systolic and diastolic heart failure ([address] s/[ AICD placed 9/25/2011    Chronic kidney disease     Complex sleep apnea syndrome     Diabetic retinopathy ([address] 9/25/2011    Diverticulitis     DJD (degenerative joint disease) 9/25/2011    Edentulous     Gout     HTN (hypertension) 3/30/2012    Hyperlipidemia     Hypertension     Iron deficiency anemia 9/25/2011    Ischemic cardiomyopathy     Morbid obesity ([address] 9/25/2011    Obesity     Morbid obesity due to excess calories    TANNER variably compliant with BiPAP 9/25/2011  Osteoarthritis     PAF (paroxysmal atrial fibrillation) (HCC)     Psychophysiologic insomnia     Type II or unspecified type diabetes mellitus without mention of complication, not stated as uncontrolled     Unspecified sleep apnea     uses V-pap       Surgical History:   Past Surgical History:   Procedure Laterality Date    BONE MARROW BIOPSY  2012 approx    CARDIAC CATHETERIZATION  8-    severe stenosis pre-stent area    CARDIAC CATHETERIZATION  2013    Very peripheral stenosis, not amendable to PCI, stents patent    CARDIAC DEFIBRILLATOR PLACEMENT  2015    COLONOSCOPY  [address]  ,     stent LAD    PACEMAKER PLACEMENT   or 2006    AICD    OR COLSC FLX W/RMVL OF TUMOR POLYP LESION SNARE TQ N/A 2017    COLONOSCOPY POLYPECTOMY SNARE/COLD BIOPSY performed by Rod Corrales DO at [facility]       Social History:  Social History     Tobacco Use    Smoking status: Former Smoker     Packs/day: 1.00     Years: 3.00     Pack years: 3.00     Types: Cigarettes     Start date: 1971     Quit date: 1974     Years since quittin.0    Smokeless tobacco: Never Used   Substance Use Topics    Alcohol use: No     Alcohol/week: 0.0 standard drinks    Drug use: Not Currently     Types: Marijuana     Comment: hx THC quit approx        Medications:  Prior to Admission medications    Medication Sig Start Date End Date Taking?  Authorizing Provider   apixaban (ELIQUIS) 5 MG TABS tablet TAKE 1 TABLET BY MOUTH 2 TIMES DAILY 21  Yes LIDIA Alberto CNP   atorvastatin (LIPITOR) 40 MG tablet Take 1 tablet by mouth daily 21  Yes LIDIA Alberto CNP   Handicap Placard MISC by Does not apply route Exp: 20  Yes LIDIA Alberto CNP   Lancets [address] [facility] Daily 20  Yes LIDIA Alberto CNP   Blood Gluc Meter Disp-Strips (BLOOD GLUCOSE METER DISPOSABLE) SHAN Daily and as needed 20  Yes LIDIA Alberto - CNP   lisinopril (PRINIVIL;ZESTRIL) 5 MG tablet Take 1 tablet by mouth daily 10/26/20  Yes LIDIA Howard - CNP   allopurinol (ZYLOPRIM) 300 MG tablet TAKE 1 TABLET DAILY 10/26/20  Yes LIDIA Howard - CNP   isosorbide mononitrate (IMDUR) 30 MG extended release tablet Take 1 tablet by mouth daily 10/26/20  Yes LIDIA Howard - CNP   furosemide (LASIX) 40 MG tablet Take 0.5 tablets by mouth 2 times daily 10/26/20 1/24/21 Yes LIDIA Howard - CNP   metFORMIN (GLUCOPHAGE) 1000 MG tablet TAKE 1 TABLET BY MOUTH 2 TIMES A DAY WITH MEALS 9/24/20  Yes LIDIA Howard - CNP   Calcium Carbonate-Vitamin D (OYSTER SHELL CALCIUM/D) 500-200 MG-UNIT TABS Take 1 tablet by mouth daily 5/12/20 5/12/21 Yes Quentin Bañuelos MD   aspirin (ASPIRIN LOW DOSE) 81 MG EC tablet TAKE 1 TABLET TWICE A DAY 5/10/20  Yes Quentin Bañuelos MD   metoprolol succinate (TOPROL XL) 25 MG extended release tablet TAKE 1 TABLET BY MOUTH DAILY 5/10/20  Yes Quentin Bañuelos MD   blood glucose test strips (FREESTYLE LITE) strip USE AS DIRECTED TWICE A DAY 1/2/20  Yes Katie Oscar MD   ammonium lactate (LAC-HYDRIN) 12 % lotion Apply topically daily after bathing sparing the space between the toes. 1/2/20  Yes Katie Oscar MD   TRUEPLUS LANCETS 33G MISC TEST AS DIRECTED 1/2/20  Yes Katie Oscar MD   Alcohol Swabs (ALCOHOL PREP) 70 % PADS USE AS DIRECTED 5/31/19  Yes Rowan Colin MD   acetaminophen (ACETAMINOPHEN EXTRA STRENGTH) 500 MG tablet TAKE 1 TABLET BY MOUTH 2 TIMES A DAY AS NEEDED FOR PAIN 2/1/19  Yes Katie Oscar MD   blood glucose monitor strips 1 strip by Other route daily Test 1 times a day & as needed for symptoms of irregular blood glucose.  12/2/20 1/1/21  LIDIA Howard CNP   amiodarone (CORDARONE) 200 MG tablet Take 1 tablet by mouth daily 8/13/20 10/26/20  LIDIA Howard - CNP       Objective     Vitals:    01/06/21 0394 BP: 102/60   Pulse: 64   Temp: 98 °F (36.7 °C)       Lab Results   Component Value Date    LABA1C 6.7 (H) 02/24/2020       Physical Exam:  General:  Alert and oriented x3. In no acute distress. Lower Extremity Physical Exam:  Vascular: DP/PT pulses are palpable +2/4, bilateral. CFT is brisk to all digits, Bilateral. Skin temp is cool to warm proximal to distal, bilateral. No edema or erythema noted. Neuro: Protective sensation intact to 10 /10 pedal sites as tested with Kelly-Kim Monofilament 5.07g.  Light touch sensation intact to the level of the digits, Bilateral.     Musculoskeletal: Muscle strength 5/5 for all LE muscle groups, aleshia. No gross deformities noted, bilateral. No tenderness to palpation of nails, 1-5, bilateral. Decreased motion of 1st MPJ b/l without pain or crepitus. Patient reports cramping when he dorsiflexes his left hallux, especially at night. Not reproducible in clinic. Dermatologic:  Nails 1-5 are WNL on the left foot, 2-5 on the right foot. Hallux nail on right foot has subungual hematoma present, nail well adhered to nail bed, no evidence of loosening. Interdigital macerations absent. No open lesions or subcutaneous nodules noted, bilateral.  Skin is well hydrated to plantar aspects of feet b/l. Hyperkeratotic lesions noted to IPJ of hallux on the left foot and sub 5th metatarsal, left. Imaging: Not indicated    Assessment   Marylen Hoe is a 77 y.o. male with     Diagnosis Orders   1. Type 2 diabetes mellitus without complication, without long-term current use of insulin ([facility])   DIABETES FOOT EXAM   2. Onychomycosis     3. Pain in both feet     4. Edema of lower extremity     5. Callus of foot          Plan     · Patient seen and evaluated. · Hyperkeratotic lesions x 2 debrided with #15 blade without incident. · Instructed patient to continue using ammonium lactate PRN to bilateral feet. · Patient to return to clinic in 1 year for follow-up.  Patient to return sooner if any new problem arises.   · Please, call the office with any questions or concerns   · Discussed with Dr. Jamar Coon DPM   Podiatric Medicine & Surgery   1/6/2021 at 1:59 PM

## 2021-02-04 DIAGNOSIS — I25.10 CORONARY ARTERY DISEASE INVOLVING NATIVE CORONARY ARTERY OF NATIVE HEART WITHOUT ANGINA PECTORIS: ICD-10-CM

## 2021-02-04 RX ORDER — ASPIRIN 81 MG/1
TABLET ORAL
Qty: 60 TABLET | Refills: 3 | Status: SHIPPED | OUTPATIENT
Start: 2021-02-04 | End: 2021-06-07

## 2021-02-05 ENCOUNTER — HOSPITAL ENCOUNTER (OUTPATIENT)
Facility: MEDICAL CENTER | Age: 66
Discharge: HOME | End: 2021-02-05
Payer: COMMERCIAL

## 2021-02-05 DIAGNOSIS — D47.2 MGUS (MONOCLONAL GAMMOPATHY OF UNKNOWN SIGNIFICANCE): ICD-10-CM

## 2021-02-05 LAB
ABSOLUTE EOS #: 0.26 K/UL (ref 0–0.44)
ABSOLUTE IMMATURE GRANULOCYTE: 0.01 K/UL (ref 0–0.3)
ABSOLUTE LYMPH #: 1.28 K/UL (ref 1.1–3.7)
ABSOLUTE MONO #: 0.48 K/UL (ref 0.1–1.2)
ALBUMIN SERPL-MCNC: 3.8 G/DL (ref 3.5–5.2)
ALBUMIN/GLOBULIN RATIO: ABNORMAL (ref 1–2.5)
ALP BLD-CCNC: 72 U/L (ref 40–129)
ALT SERPL-CCNC: 5 U/L (ref 5–41)
ANION GAP SERPL CALCULATED.3IONS-SCNC: 11 MMOL/L (ref 9–17)
AST SERPL-CCNC: 15 U/L
BASOPHILS # BLD: 1 % (ref 0–2)
BASOPHILS ABSOLUTE: 0.05 K/UL (ref 0–0.2)
BILIRUB SERPL-MCNC: 0.31 MG/DL (ref 0.3–1.2)
BUN BLDV-MCNC: 39 MG/DL (ref 8–23)
BUN/CREAT BLD: 26 (ref 9–20)
CALCIUM SERPL-MCNC: 8.6 MG/DL (ref 8.6–10.4)
CHLORIDE BLD-SCNC: 101 MMOL/L (ref 98–107)
CO2: 28 MMOL/L (ref 20–31)
CREAT SERPL-MCNC: 1.48 MG/DL (ref 0.7–1.2)
DIFFERENTIAL TYPE: ABNORMAL
EOSINOPHILS RELATIVE PERCENT: 4 % (ref 1–4)
GFR AFRICAN AMERICAN: 57 ML/MIN
GFR NON-AFRICAN AMERICAN: 47 ML/MIN
GFR SERPL CREATININE-BSD FRML MDRD: ABNORMAL ML/MIN/{1.73_M2}
GFR SERPL CREATININE-BSD FRML MDRD: ABNORMAL ML/MIN/{1.73_M2}
GLUCOSE BLD-MCNC: 117 MG/DL (ref 70–99)
HCT VFR BLD CALC: 37.7 % (ref 40.7–50.3)
HEMOGLOBIN: 11.2 G/DL (ref 13–17)
IMMATURE GRANULOCYTES: 0 %
LYMPHOCYTES # BLD: 20 % (ref 24–43)
MCH RBC QN AUTO: 26.5 PG (ref 25.2–33.5)
MCHC RBC AUTO-ENTMCNC: 29.7 G/DL (ref 28.4–34.8)
MCV RBC AUTO: 89.1 FL (ref 82.6–102.9)
MONOCYTES # BLD: 8 % (ref 3–12)
NRBC AUTOMATED: 0 PER 100 WBC
PDW BLD-RTO: 17.3 % (ref 11.8–14.4)
PLATELET # BLD: 179 K/UL (ref 138–453)
PLATELET ESTIMATE: ABNORMAL
PMV BLD AUTO: 10.1 FL (ref 8.1–13.5)
POTASSIUM SERPL-SCNC: 4.3 MMOL/L (ref 3.7–5.3)
RBC # BLD: 4.23 M/UL (ref 4.21–5.77)
RBC # BLD: ABNORMAL 10*6/UL
SEG NEUTROPHILS: 67 % (ref 36–65)
SEGMENTED NEUTROPHILS ABSOLUTE COUNT: 4.26 K/UL (ref 1.5–8.1)
SODIUM BLD-SCNC: 140 MMOL/L (ref 135–144)
TOTAL PROTEIN: 7.5 G/DL (ref 6.4–8.3)
WBC # BLD: 6.3 K/UL (ref 3.5–11.3)
WBC # BLD: ABNORMAL 10*3/UL

## 2021-02-05 PROCEDURE — 84165 PROTEIN E-PHORESIS SERUM: CPT

## 2021-02-05 PROCEDURE — 82784 ASSAY IGA/IGD/IGG/IGM EACH: CPT

## 2021-02-05 PROCEDURE — 84155 ASSAY OF PROTEIN SERUM: CPT

## 2021-02-05 PROCEDURE — 36415 COLL VENOUS BLD VENIPUNCTURE: CPT

## 2021-02-05 PROCEDURE — 85025 COMPLETE CBC W/AUTO DIFF WBC: CPT

## 2021-02-05 PROCEDURE — 83883 ASSAY NEPHELOMETRY NOT SPEC: CPT

## 2021-02-05 PROCEDURE — 80053 COMPREHEN METABOLIC PANEL: CPT

## 2021-02-06 LAB
FREE KAPPA/LAMBDA RATIO: 1.37 (ref 0.26–1.65)
IGA: 262 MG/DL (ref 70–400)
IGG: 1090 MG/DL (ref 700–1600)
IGM: 277 MG/DL (ref 40–230)
KAPPA FREE LIGHT CHAINS QNT: 4.38 MG/DL (ref 0.37–1.94)
LAMBDA FREE LIGHT CHAINS QNT: 3.19 MG/DL (ref 0.57–2.63)

## 2021-02-08 ENCOUNTER — HOSPITAL ENCOUNTER (OUTPATIENT)
Facility: MEDICAL CENTER | Age: 66
End: 2021-02-08
Payer: COMMERCIAL

## 2021-02-08 LAB
ALBUMIN (CALCULATED): 4.6 G/DL (ref 3.2–5.2)
ALBUMIN PERCENT: 62 % (ref 45–65)
ALPHA 1 PERCENT: 3 % (ref 3–6)
ALPHA 2 PERCENT: 10 % (ref 6–13)
ALPHA-1-GLOBULIN: 0.2 G/DL (ref 0.1–0.4)
ALPHA-2-GLOBULIN: 0.8 G/DL (ref 0.5–0.9)
BETA GLOBULIN: 0.9 G/DL (ref 0.5–1.1)
BETA PERCENT: 12 % (ref 11–19)
GAMMA GLOBULIN %: 14 % (ref 9–20)
GAMMA GLOBULIN: 1 G/DL (ref 0.5–1.5)
PATHOLOGIST: NORMAL
PROTEIN ELECTROPHORESIS, SERUM: NORMAL
TOTAL PROT. SUM,%: 101 % (ref 98–102)
TOTAL PROT. SUM: 7.5 G/DL (ref 6.3–8.2)
TOTAL PROTEIN: 7.5 G/DL (ref 6.4–8.3)

## 2021-02-12 ENCOUNTER — OFFICE VISIT (OUTPATIENT)
Dept: ONCOLOGY | Age: 66
End: 2021-02-12
Payer: COMMERCIAL

## 2021-02-12 ENCOUNTER — TELEPHONE (OUTPATIENT)
Dept: ONCOLOGY | Age: 66
End: 2021-02-12

## 2021-02-12 VITALS
SYSTOLIC BLOOD PRESSURE: 118 MMHG | HEART RATE: 53 BPM | TEMPERATURE: 98 F | BODY MASS INDEX: 41.45 KG/M2 | WEIGHT: 241.5 LBS | DIASTOLIC BLOOD PRESSURE: 60 MMHG | RESPIRATION RATE: 16 BRPM

## 2021-02-12 DIAGNOSIS — D47.2 MGUS (MONOCLONAL GAMMOPATHY OF UNKNOWN SIGNIFICANCE): ICD-10-CM

## 2021-02-12 DIAGNOSIS — N18.2 CHRONIC KIDNEY DISEASE, STAGE II (MILD): Primary | ICD-10-CM

## 2021-02-12 PROCEDURE — 99211 OFF/OP EST MAY X REQ PHY/QHP: CPT | Performed by: INTERNAL MEDICINE

## 2021-02-12 PROCEDURE — 99214 OFFICE O/P EST MOD 30 MIN: CPT | Performed by: INTERNAL MEDICINE

## 2021-02-12 NOTE — TELEPHONE ENCOUNTER
AUDREY HERE FOR MD VISIT  RV 1 YR W/ CDP CMP SPEP KAPPA/LAMBDA FREE LT CHAINS  BEFORE RV  LABS CDP CMP SPEP KAPPA/LAMBDA FREE LT CHAINS 2/2022  MD VISIT 2/2022  AVS PRINTED W/ INSTRUCTIONS

## 2021-02-12 NOTE — PROGRESS NOTES
DIAGNOSIS:   1. MGUS  CURRENT THERAPY:  Observation      REASON FOR VISIT:  Follow-up visit on MGUS. SUMMARY OF THE CASE:  He is a 74 y.o. patient who has multiple comorbidities in the form of cardiomyopathy, hypertension, diabetes, morbid obesity, and mild renal insufficiency that resolved completely. 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 INTERIM HISTORY: Patient is here today for the annual follow-up regarding MGUS and to review lab work. He reports he is doing well currently with no issues. He did have cardiac event last year and feels the best he has in 6 years since recovery. He had some hematuria following [place] Cath removal, imaging was done showing renal masses, biopsy was negative. He does so some low weight strength training. PAST MEDICAL HISTORY: Significant for coronary artery disease, MI, ischemic cardiomyopathy, sleep apnea, diabetes, hypertension and resolved acute renal failure and hyperkalemia. CURRENT MEDICATIONS: Outpatient medications including Lasix, lisinopril, Zocor, allopurinol, aspirin, Plavix, iron, Lopressor, Prilosec, trazodone. SOCIAL HISTORY:  He is nonsmoker or drinker at this point but he quit a few years ago. He used to work as a  in the past. He is unemployed. He lives with a roommate. FAMILY HISTORY: Significant for coronary artery disease and diabetes as well. REVIEW OF SYSTEMS:     Constitutional: No fever or chills.  No night sweats, no weight loss   HEENT: negative for sore mouth, sore throat, hoarseness and voice change   Respiratory: negative for cough , sputum, dyspnea, wheezing, hemoptysis, chest pain   Cardiovascular: negative for chest pain, dyspnea, palpitations, orthopnea, PND   Gastrointestinal: negative for nausea, vomiting, diarrhea, constipation, abdominal pain,   Genitourinary: negative for frequency, dysuria, nocturia, urinary incontinence, and hematuria Hematologic/Lymphatic: negative for easy bruising, bleeding, lymphadenopathy, petechiae and swelling/edema   Endocrine: negative for heat or cold intolerance, tremor, weight changes, change in bowel habits and hair loss   Musculoskeletal: negative for myalgias, arthralgias, joint swelling,and bone pain  Neurological: negative for headaches, dizziness, seizures, weakness, numbness  Integument: negative for rash, skin lesions, bruises. +boil behind right ear    Physical Exam   /60   Pulse 53   Temp 98 °F (36.7 °C) (Oral)   Resp 16   Wt 241 lb 8 oz (109.5 kg)   BMI 41.45 kg/m²     General appearance - well appearing, no in pain or distress,  He is overweight  Mental status - alert and cooperative   Neck - supple, no palpable  adenopathy , trach midline   Lymphatics - no palpable lymphadenopathy, no hepatosplenomegaly   Chest - clear to auscultation, no wheezes, rales or rhonchi, decreased air entry   Heart - Afib  Abdomen - soft, nontender, nondistended, no masses or organomegaly   Neurological - alert, oriented, normal speech, no focal findings or movement disorder noted   Musculoskeletal - no joint tenderness, deformity or swelling   Extremities - peripheral pulses normal, no pedal edema, no clubbing or cyanosis   Skin - normal coloration and turgor, no rashes, no suspicious skin lesions noted +boil behind right ear    REVIEW OF LABORATORY DATA:  SPEP 1/27/2020  IGM LAMBDA. THE APPROXIMATE DENSITOMETRIC QUANTITATION OF PARAPROTEIN IS   0.21 G/DL. SPEP 2/2021  IgM, Lambda. THE APPROXIMATE DENSITOMETRIC QUANTITATION OF PARAPROTEIN IS   0.17 G/DL.    Lab Results   Component Value Date    WBC 6.3 02/05/2021    HGB 11.2 (L) 02/05/2021    HCT 37.7 (L) 02/05/2021    MCV 89.1 02/05/2021     02/05/2021       Chemistry        Component Value Date/Time     02/05/2021 1125    K 4.3 02/05/2021 1125     02/05/2021 1125    CO2 28 02/05/2021 1125    BUN 39 (H) 02/05/2021 1125    CREATININE 1.48 (H) 02/05/2021 1125        Component Value Date/Time    CALCIUM 8.6 02/05/2021 1125    ALKPHOS 72 02/05/2021 1125    AST 15 02/05/2021 1125    ALT 5 02/05/2021 1125    BILITOT 0.31 02/05/2021 1125        Results for Doc Orozco (MRN [id number]) as of 2/10/2020 14:59   Ref. Range 1/27/2020 13:05   IgA Latest Ref Range: 70 - 400 mg/dL 307   Total IgG Latest Ref Range: 700 - 1600 mg/dL 1162   IgM Latest Ref Range: 40 - 230 mg/dL 318 (H)        Results for Doc Orozco (MRN [id number]) as of 2/12/2021 15:26   Ref. Range 1/25/2019 11:56 1/27/2020 13:05 2/5/2021 11:25   Lluveras Free Light Chains QNT Latest Ref Range: 0.37 - 1.94 mg/dL 2.96 (H) 3.07 (H) 4.38 (H)   Lambda Free Light Chains QNT Latest Ref Range: 0.57 - 2.63 mg/dL 3.19 (H) 2.26 3.19 (H)   Free Kappa/Lambda Ratio Latest Ref Range: 0.26 - 1.65  0.93 1.36 1.37     IMPRESSION:    3 A 70year-old patient with IgM MGUS, without evidence of end organ damage or any progression by blood testing today, he is asymptomatic, no cytopenia. 2. Multiple comorbidities including diabetes and coronary artery disease: stable over the last year    3. New onset atrial fibrillation, examinations today's shows regular rhythm. Likely his atrial fibrillation is paroxysmal      Plan  1. We reviewed his lab work and his SPEP shows no change, counts and electrolytes are stable. 2. He is doing well with no symptoms. 3. We will continue with surveillance per the guidelines. 4. He is aware of Afib and we will defer to cardiology. 5. He plan to receive COVID vaccination. 6. Return in 1 year with repeat lab work.          [facility] MD Barney  Hematologist/Medical Oncologist  Cell: [phone]

## 2021-04-06 DIAGNOSIS — E11.8 CONTROLLED TYPE 2 DIABETES MELLITUS WITH COMPLICATION, WITHOUT LONG-TERM CURRENT USE OF INSULIN (HCC): ICD-10-CM

## 2021-04-09 DIAGNOSIS — I50.42 CHRONIC COMBINED SYSTOLIC AND DIASTOLIC HEART FAILURE (HCC): ICD-10-CM

## 2021-04-09 RX ORDER — FUROSEMIDE 40 MG/1
TABLET ORAL
Qty: 90 TABLET | Refills: 1 | Status: SHIPPED | OUTPATIENT
Start: 2021-04-09 | End: 2021-09-28

## 2021-04-09 NOTE — TELEPHONE ENCOUNTER
E-scribe request for . Please review and e-scribe if applicable. Last Visit Date:    Next Visit Date:  4/26/2021    Hemoglobin A1C (%)   Date Value   02/24/2020 6.7 (H)   01/02/2020 5.6   05/31/2019 5.9             ( goal A1C is < 7)   Microalb/Crt.  Ratio (mcg/mg creat)   Date Value   02/24/2020 CANNOT BE CALCULATED     LDL Cholesterol (mg/dL)   Date Value   03/20/2020 67       (goal LDL is <100)   AST (U/L)   Date Value   02/05/2021 15     ALT (U/L)   Date Value   02/05/2021 5     BUN (mg/dL)   Date Value   02/05/2021 39 (H)     BP Readings from Last 3 Encounters:   02/12/21 118/60   01/06/21 102/60   10/26/20 107/65          (goal 120/80)        Patient Active Problem List:     Acute on chronic combined systolic and diastolic congestive heart failure (HCC)     Chronic kidney disease, stage II (mild)     Chronic gout     Morbid obesity ([facility])     Hyperlipidemia     Iron deficiency anemia     Anxiety disorder      DJD (degenerative joint disease)     TANNER variably compliant with BiPAP     CAD (coronary artery disease) s/p stenting of L anterior descending artery 2004     Diabetic retinopathy ([address]     Ischemic cardiomyopathy     HTN (hypertension)     NSTEMI (non-ST elevated myocardial infarction) ([facility])     MGUS (monoclonal gammopathy of unknown significance)     Type 2 diabetes mellitus without complication ([address]     AICD (automatic cardioverter/defibrillator) present     PAF (paroxysmal atrial fibrillation) ([address]     Coronary angioplasty status     Hypokalemia     Acute on chronic systolic heart failure (HCC)     Acute on chronic congestive heart failure ([address]      ----Yared Crouch

## 2021-04-26 ENCOUNTER — OFFICE VISIT (OUTPATIENT)
Dept: PRIMARY CARE CLINIC | Age: 66
End: 2021-04-26
Payer: COMMERCIAL

## 2021-04-26 VITALS
BODY MASS INDEX: 41.68 KG/M2 | WEIGHT: 242.8 LBS | TEMPERATURE: 98.1 F | HEART RATE: 59 BPM | OXYGEN SATURATION: 98 % | SYSTOLIC BLOOD PRESSURE: 104 MMHG | DIASTOLIC BLOOD PRESSURE: 63 MMHG

## 2021-04-26 DIAGNOSIS — N18.30 CKD STAGE 3 DUE TO TYPE 2 DIABETES MELLITUS (HCC): ICD-10-CM

## 2021-04-26 DIAGNOSIS — I50.42 CHRONIC COMBINED SYSTOLIC AND DIASTOLIC HEART FAILURE (HCC): ICD-10-CM

## 2021-04-26 DIAGNOSIS — I48.0 PAF (PAROXYSMAL ATRIAL FIBRILLATION) (HCC): ICD-10-CM

## 2021-04-26 DIAGNOSIS — I10 ESSENTIAL HYPERTENSION: ICD-10-CM

## 2021-04-26 DIAGNOSIS — E11.22 CKD STAGE 3 DUE TO TYPE 2 DIABETES MELLITUS (HCC): ICD-10-CM

## 2021-04-26 DIAGNOSIS — E66.01 MORBID OBESITY DUE TO EXCESS CALORIES (HCC): ICD-10-CM

## 2021-04-26 DIAGNOSIS — Z12.11 SCREENING FOR COLON CANCER: ICD-10-CM

## 2021-04-26 DIAGNOSIS — E11.9 TYPE 2 DIABETES MELLITUS WITHOUT COMPLICATION, WITHOUT LONG-TERM CURRENT USE OF INSULIN (HCC): Primary | ICD-10-CM

## 2021-04-26 DIAGNOSIS — E08.311 DIABETIC RETINOPATHY OF BOTH EYES WITH MACULAR EDEMA ASSOCIATED WITH DIABETES MELLITUS DUE TO UNDERLYING CONDITION, UNSPECIFIED RETINOPATHY SEVERITY (HCC): ICD-10-CM

## 2021-04-26 LAB — HBA1C MFR BLD: 6.3 %

## 2021-04-26 PROCEDURE — 83036 HEMOGLOBIN GLYCOSYLATED A1C: CPT | Performed by: NURSE PRACTITIONER

## 2021-04-26 PROCEDURE — 99214 OFFICE O/P EST MOD 30 MIN: CPT | Performed by: NURSE PRACTITIONER

## 2021-04-26 SDOH — ECONOMIC STABILITY: INCOME INSECURITY: HOW HARD IS IT FOR YOU TO PAY FOR THE VERY BASICS LIKE FOOD, HOUSING, MEDICAL CARE, AND HEATING?: NOT HARD AT ALL

## 2021-04-26 SDOH — ECONOMIC STABILITY: TRANSPORTATION INSECURITY
IN THE PAST 12 MONTHS, HAS LACK OF TRANSPORTATION KEPT YOU FROM MEETINGS, WORK, OR FROM GETTING THINGS NEEDED FOR DAILY LIVING?: NO

## 2021-04-26 SDOH — ECONOMIC STABILITY: FOOD INSECURITY: WITHIN THE PAST 12 MONTHS, YOU WORRIED THAT YOUR FOOD WOULD RUN OUT BEFORE YOU GOT MONEY TO BUY MORE.: NEVER TRUE

## 2021-04-26 ASSESSMENT — PATIENT HEALTH QUESTIONNAIRE - PHQ9
SUM OF ALL RESPONSES TO PHQ QUESTIONS 1-9: 0
SUM OF ALL RESPONSES TO PHQ9 QUESTIONS 1 & 2: 0
SUM OF ALL RESPONSES TO PHQ QUESTIONS 1-9: 0
SUM OF ALL RESPONSES TO PHQ QUESTIONS 1-9: 0

## 2021-04-26 ASSESSMENT — ENCOUNTER SYMPTOMS
BLOOD IN STOOL: 0
VOMITING: 0
SHORTNESS OF BREATH: 0
TROUBLE SWALLOWING: 0
DIARRHEA: 0
CHEST TIGHTNESS: 0
WHEEZING: 0
CONSTIPATION: 0
ABDOMINAL PAIN: 0

## 2021-04-26 NOTE — PROGRESS NOTES
[facility]  [id number] [facility] [address]  Dept: [phone]  Dept Fax: [phone]    Patient Care Team:  LIDIA Marquez CNP as PCP - General (Family Medicine)  LIDIA Marquez CNP as PCP - [facility] Provider  Joey Lynch MD as Consulting Physician (Hematology and Oncology)  Jacinta Gonzales MD as Consulting Physician (Cardiology)  Valentin Stallings DO as Consulting Physician (Pulmonology)  Lizzy Gonzalez MD as Consulting Physician (Ophthalmology)  Debra Nance MD as Consulting Physician (Internal Medicine)    2021     Sunny Galindo (:  1955)is a 74 y.o. male, here for evaluation of the following medical concerns:   Chief Complaint   Patient presents with    6 Month Follow-Up       59-year-old  male is here for a routine follow up. He reports a history of diabetes mellitus, hypertension, he has a history of ischemic cardiomyopathy with multiple stent placed in , ejection fraction recent 115 to 20% status post AICD, patient has a history of atrial fibrillation on Eliquis 5 mg twice a day, is following cardiology regularly, Dr Raya Lala. For diabetes mellitus he is on metformin 1 g twice daily. FBS running 100-110 normally  He denies any shortness of breath, leg swelling, dizziness  He follows podiatry and ophthalmology for retinal exam last foot exam in 2020 and last retinal exam and 2019. Eye exam now scheduled for . Gout on allopurinol 300 mg daily    Had F/U with urology on  as he had hematuria from the henry, but it has resolved. Patient states balloon was not fully deflated when henry was removed. Blade Valenzuela states he had a kidney biopsy on . Advised there is no evidence of malignancy. Lesions were hyperdense cysts, repeat CT scan was recommended in 6 months, which he just completed at the end of March.  No further visible blood, no trouble emptying bladder or starting his stream    He has TANNER, admits he struggles to wear a mask. Pulmonology encourages him to wear it, he was given a new machine. Still struggling with wearing the CPAP  Using incentive Spirometer at home    . Review of Systems   Constitutional: Positive for fatigue. Negative for appetite change, fever and unexpected weight change. HENT: Negative for hearing loss and trouble swallowing. Eyes: Negative for visual disturbance. Respiratory: Negative for chest tightness, shortness of breath and wheezing. Cardiovascular: Negative for chest pain and palpitations. Gastrointestinal: Negative for abdominal pain, blood in stool, constipation, diarrhea and vomiting. Endocrine: Negative for polydipsia and polyuria. Genitourinary: Negative for decreased urine volume, difficulty urinating, dysuria, frequency, hematuria and urgency. Musculoskeletal: Negative for myalgias and neck pain. Skin: Negative for wound. Neurological: Negative for dizziness, seizures, numbness and headaches. Psychiatric/Behavioral: Negative for suicidal ideas. The patient is not nervous/anxious. Prior to Visit Medications    Medication Sig Taking?  Authorizing Provider   furosemide (LASIX) 40 MG tablet TAKE 1/2 TABLET BY MOUTH 2 TIMES A DAY Yes LIDIA Simon CNP   metFORMIN (GLUCOPHAGE) 1000 MG tablet Take 1 tablet by mouth 2 times daily (with meals) Yes LIDIA Simon CNP   aspirin 81 MG EC tablet TAKE 1 TABLET TWICE A DAY Yes LIDIA Simon CNP   apixaban (ELIQUIS) 5 MG TABS tablet TAKE 1 TABLET BY MOUTH 2 TIMES DAILY Yes LIDIA Simon CNP   atorvastatin (LIPITOR) 40 MG tablet Take 1 tablet by mouth daily Yes LIDIA Simon CNP   Handicap Placard MISC by Does not apply route Exp: 1/2025 Yes LIDIA Simon CNP   Lancets MISC Daily Yes LIDIA Simon CNP   Blood Gluc Meter Disp-Strips (BLOOD GLUCOSE METER DISPOSABLE) SHAN Daily and as needed Yes Gaynel Goldberg, APRN - CNP   blood glucose monitor strips 1 strip by Other route daily Test 1 times a day & as needed for symptoms of irregular blood glucose. Yes Gaynel Goldberg, APRN - CNP   lisinopril (PRINIVIL;ZESTRIL) 5 MG tablet Take 1 tablet by mouth daily Yes Gaynel Goldberg, APRN - CNP   allopurinol (ZYLOPRIM) 300 MG tablet TAKE 1 TABLET DAILY Yes Gaynel Goldberg, APRN - CNP   isosorbide mononitrate (IMDUR) 30 MG extended release tablet Take 1 tablet by mouth daily Yes Gaynel Goldberg, APRN - CNP   amiodarone (CORDARONE) 200 MG tablet Take 1 tablet by mouth daily Yes Gaynel Goldberg, APRN - CNP   metoprolol succinate (TOPROL XL) 25 MG extended release tablet TAKE 1 TABLET BY MOUTH DAILY Yes Zulma Welsh MD   blood glucose test strips (FREESTYLE LITE) strip USE AS DIRECTED TWICE A DAY Yes Kate Medina MD   ammonium lactate (LAC-HYDRIN) 12 % lotion Apply topically daily after bathing sparing the space between the toes.  Yes Kate Medina MD   TRUEPLUS LANCETS 33G MISC TEST AS DIRECTED Yes Kate Medina MD   Alcohol Swabs (ALCOHOL PREP) 70 % PADS USE AS DIRECTED Yes Boom Banuelos MD   acetaminophen (ACETAMINOPHEN EXTRA STRENGTH) 500 MG tablet TAKE 1 TABLET BY MOUTH 2 TIMES A DAY AS NEEDED FOR PAIN Yes Kate Medina MD        Social History     Tobacco Use    Smoking status: Former Smoker     Packs/day: 1.00     Years: 3.00     Pack years: 3.00     Types: Cigarettes     Start date: 1971     Quit date: 1974     Years since quittin.2    Smokeless tobacco: Never Used   Substance Use Topics    Alcohol use: No     Alcohol/week: 0.0 standard drinks        Vitals:    21 1234   BP: 104/63   Site: Left Upper Arm   Position: Sitting   Cuff Size: Large Adult   Pulse: 59   Temp: 98.1 °F (36.7 °C)   TempSrc: Temporal   SpO2: 98%   Weight: 242 lb 12.8 oz (110.1 kg)     Estimated body mass index is 41.68 kg/m² as calculated from the following:    Height as of 7/1/20: 5' 4\" (1.626 m). Weight as of this encounter: 242 lb 12.8 oz (110.1 kg). DIAGNOSTIC FINDINGS:  CBC:  Lab Results   Component Value Date    WBC 6.3 02/05/2021    HGB 11.2 02/05/2021     02/05/2021     12/08/2011       BMP:    Lab Results   Component Value Date     02/05/2021    K 4.3 02/05/2021     02/05/2021    CO2 28 02/05/2021    BUN 39 02/05/2021    CREATININE 1.48 02/05/2021    GLUCOSE 117 02/05/2021    GLUCOSE 123 03/19/2012       HEMOGLOBIN A1C:   Lab Results   Component Value Date    LABA1C 6.3 04/26/2021       FASTING LIPID PANEL:  Lab Results   Component Value Date    CHOL 124 03/20/2020    HDL 40 (L) 03/20/2020    TRIG 84 03/20/2020       Physical Exam  Vitals signs and nursing note reviewed. Constitutional:       General: He is not in acute distress. Appearance: He is well-developed. He is obese. HENT:      Head: Normocephalic. Eyes:      General: No scleral icterus. Pupils: Pupils are equal, round, and reactive to light. Neck:      Musculoskeletal: Normal range of motion and neck supple. Cardiovascular:      Rate and Rhythm: Normal rate and regular rhythm. Pulmonary:      Effort: Pulmonary effort is normal.      Breath sounds: Normal breath sounds. Abdominal:      General: Abdomen is protuberant. Palpations: Abdomen is soft. Skin:     General: Skin is warm and dry. Neurological:      Mental Status: He is alert and oriented to person, place, and time. Coordination: Coordination normal.   Psychiatric:         Behavior: Behavior normal. Behavior is cooperative. Thought Content: Thought content normal.         Judgment: Judgment normal.         ASSESSMENT     Diagnosis Orders   1. Type 2 diabetes mellitus without complication, without long-term current use of insulin (HCC)  POCT glycosylated hemoglobin (Hb A1C)    TSH With Reflex Ft4   2. Screening for colon cancer     3. signature was used to authenticate this note. --LIDIA Chaidez CNP on 4/26/2021 at 1:03 PM  Visit Information    Have you changed or started any medications since your last visit including any over-the-counter medicines, vitamins, or herbal medicines? no   Are you having any side effects from any of your medications? -  no  Have you stopped taking any of your medications? Is so, why? -  no    Have you seen any other physician or provider since your last visit? Yes - Records Obtained  Have you had any other diagnostic tests since your last visit? No  Have you been seen in the emergency room and/or had an admission to a hospital since we last saw you? No  Have you had your routine dental cleaning in the past 6 months? no    Have you activated your [employer] account? If not, what are your barriers?  Yes     Patient Care Team:  LIDIA Chaidez CNP as PCP - General (Family Medicine)  LIDIA Chaidez CNP as PCP - [facility] Provider  Valarie Oliver MD as Consulting Physician (Hematology and Oncology)  Adri Khan MD as Consulting Physician (Cardiology)  Carol Monday, DO as Consulting Physician (Pulmonology)  Liat Bond MD as Consulting Physician (Ophthalmology)  Susan Villa MD as Consulting Physician (Internal Medicine)    Medical History Review  Past Medical, Family, and Social History reviewed and does not contribute to the patient presenting condition    Health Maintenance   Topic Date Due    Annual Wellness Visit (AWV)  Never done    Colon cancer screen colonoscopy  04/04/2020    Diabetic retinal exam  11/15/2020    TSH testing  02/24/2021    Lipid screen  03/20/2021    Diabetic foot exam  01/06/2022    Potassium monitoring  02/05/2022    Creatinine monitoring  02/05/2022    A1C test (Diabetic or Prediabetic)  04/26/2022    DTaP/Tdap/Td vaccine (2 - Td) 06/30/2026    Flu vaccine  Completed    Shingles Vaccine  Completed    Pneumococcal 65+ years Vaccine

## 2021-05-05 DIAGNOSIS — M1A.00X0 IDIOPATHIC CHRONIC GOUT WITHOUT TOPHUS, UNSPECIFIED SITE: ICD-10-CM

## 2021-05-05 DIAGNOSIS — I50.42 CHRONIC COMBINED SYSTOLIC AND DIASTOLIC HEART FAILURE (HCC): ICD-10-CM

## 2021-05-05 RX ORDER — LISINOPRIL 5 MG/1
5 TABLET ORAL DAILY
Qty: 30 TABLET | Refills: 3 | Status: SHIPPED | OUTPATIENT
Start: 2021-05-05 | End: 2021-08-23

## 2021-05-05 RX ORDER — ISOSORBIDE MONONITRATE 30 MG/1
30 TABLET, EXTENDED RELEASE ORAL DAILY
Qty: 30 TABLET | Refills: 3 | Status: SHIPPED | OUTPATIENT
Start: 2021-05-05 | End: 2021-08-30 | Stop reason: SDUPTHER

## 2021-05-05 RX ORDER — ALLOPURINOL 300 MG/1
TABLET ORAL
Qty: 30 TABLET | Refills: 5 | Status: SHIPPED | OUTPATIENT
Start: 2021-05-05 | End: 2021-08-30 | Stop reason: SDUPTHER

## 2021-06-05 DIAGNOSIS — I25.10 CORONARY ARTERY DISEASE INVOLVING NATIVE CORONARY ARTERY OF NATIVE HEART WITHOUT ANGINA PECTORIS: ICD-10-CM

## 2021-06-07 RX ORDER — ASPIRIN 81 MG/1
TABLET ORAL
Qty: 60 TABLET | Refills: 3 | Status: SHIPPED | OUTPATIENT
Start: 2021-06-07 | End: 2021-12-01

## 2021-06-22 ENCOUNTER — HOSPITAL ENCOUNTER (INPATIENT)
Age: 66
LOS: 3 days | Discharge: HOME | DRG: 291 | End: 2021-06-25
Attending: EMERGENCY MEDICINE | Admitting: INTERNAL MEDICINE
Payer: COMMERCIAL

## 2021-06-22 ENCOUNTER — APPOINTMENT (OUTPATIENT)
Dept: GENERAL RADIOLOGY | Age: 66
DRG: 291 | End: 2021-06-22
Payer: COMMERCIAL

## 2021-06-22 DIAGNOSIS — R06.03 RESPIRATORY DISTRESS: ICD-10-CM

## 2021-06-22 DIAGNOSIS — I50.9 ACUTE ON CHRONIC CONGESTIVE HEART FAILURE, UNSPECIFIED HEART FAILURE TYPE (HCC): Primary | ICD-10-CM

## 2021-06-22 PROBLEM — I50.21 ACUTE HFREF (HEART FAILURE WITH REDUCED EJECTION FRACTION) (HCC): Status: ACTIVE | Noted: 2021-06-22

## 2021-06-22 LAB
-: NORMAL
ABSOLUTE EOS #: 0.32 K/UL (ref 0–0.44)
ABSOLUTE IMMATURE GRANULOCYTE: 0.06 K/UL (ref 0–0.3)
ABSOLUTE LYMPH #: 4.38 K/UL (ref 1.1–3.7)
ABSOLUTE MONO #: 0.88 K/UL (ref 0.1–1.2)
ALBUMIN SERPL-MCNC: 3.9 G/DL (ref 3.5–5.2)
ALBUMIN/GLOBULIN RATIO: 1 (ref 1–2.5)
ALLEN TEST: ABNORMAL
ALP BLD-CCNC: 81 U/L (ref 40–129)
ALT SERPL-CCNC: 12 U/L (ref 5–41)
ANION GAP SERPL CALCULATED.3IONS-SCNC: 15 MMOL/L (ref 9–17)
AST SERPL-CCNC: 23 U/L
BASOPHILS # BLD: 1 % (ref 0–2)
BASOPHILS ABSOLUTE: 0.06 K/UL (ref 0–0.2)
BILIRUB SERPL-MCNC: 0.37 MG/DL (ref 0.3–1.2)
BNP INTERPRETATION: ABNORMAL
BUN BLDV-MCNC: 26 MG/DL (ref 8–23)
BUN/CREAT BLD: ABNORMAL (ref 9–20)
CALCIUM SERPL-MCNC: 8.8 MG/DL (ref 8.6–10.4)
CARBOXYHEMOGLOBIN: 1.4 % (ref 0–5)
CHLORIDE BLD-SCNC: 99 MMOL/L (ref 98–107)
CO2: 20 MMOL/L (ref 20–31)
CREAT SERPL-MCNC: 1.26 MG/DL (ref 0.7–1.2)
DIFFERENTIAL TYPE: ABNORMAL
EOSINOPHILS RELATIVE PERCENT: 3 % (ref 1–4)
FIO2: ABNORMAL
GFR AFRICAN AMERICAN: >60 ML/MIN
GFR NON-AFRICAN AMERICAN: 57 ML/MIN
GFR SERPL CREATININE-BSD FRML MDRD: ABNORMAL ML/MIN/{1.73_M2}
GFR SERPL CREATININE-BSD FRML MDRD: ABNORMAL ML/MIN/{1.73_M2}
GLUCOSE BLD-MCNC: 140 MG/DL (ref 75–110)
GLUCOSE BLD-MCNC: 250 MG/DL (ref 70–99)
HCO3 VENOUS: 27.4 MMOL/L (ref 24–30)
HCT VFR BLD CALC: 41.2 % (ref 40.7–50.3)
HEMOGLOBIN: 12.3 G/DL (ref 13–17)
IMMATURE GRANULOCYTES: 1 %
LYMPHOCYTES # BLD: 39 % (ref 24–43)
MCH RBC QN AUTO: 26.9 PG (ref 25.2–33.5)
MCHC RBC AUTO-ENTMCNC: 29.9 G/DL (ref 28.4–34.8)
MCV RBC AUTO: 90.2 FL (ref 82.6–102.9)
METHEMOGLOBIN: ABNORMAL % (ref 0–1.5)
MODE: ABNORMAL
MONOCYTES # BLD: 8 % (ref 3–12)
NEGATIVE BASE EXCESS, VEN: 0.7 MMOL/L (ref 0–2)
NOTIFICATION TIME: ABNORMAL
NOTIFICATION: ABNORMAL
NRBC AUTOMATED: 0.2 PER 100 WBC
O2 DEVICE/FLOW/%: ABNORMAL
O2 SAT, VEN: 75.9 % (ref 60–85)
OXYHEMOGLOBIN: ABNORMAL % (ref 95–98)
PATIENT TEMP: 37
PCO2, VEN, TEMP ADJ: ABNORMAL MMHG (ref 39–55)
PCO2, VEN: 64.5 (ref 39–55)
PDW BLD-RTO: 16.6 % (ref 11.8–14.4)
PEEP/CPAP: ABNORMAL
PH VENOUS: 7.25 (ref 7.32–7.42)
PH, VEN, TEMP ADJ: ABNORMAL (ref 7.32–7.42)
PLATELET # BLD: 264 K/UL (ref 138–453)
PLATELET ESTIMATE: ABNORMAL
PMV BLD AUTO: 11.3 FL (ref 8.1–13.5)
PO2, VEN, TEMP ADJ: ABNORMAL MMHG (ref 30–50)
PO2, VEN: 50.1 (ref 30–50)
POSITIVE BASE EXCESS, VEN: ABNORMAL MMOL/L (ref 0–2)
POTASSIUM SERPL-SCNC: 4.5 MMOL/L (ref 3.7–5.3)
PRO-BNP: 2283 PG/ML
PSV: ABNORMAL
PT. POSITION: ABNORMAL
RBC # BLD: 4.57 M/UL (ref 4.21–5.77)
RBC # BLD: ABNORMAL 10*6/UL
REASON FOR REJECTION: NORMAL
RESPIRATORY RATE: ABNORMAL
SAMPLE SITE: ABNORMAL
SEG NEUTROPHILS: 48 % (ref 36–65)
SEGMENTED NEUTROPHILS ABSOLUTE COUNT: 5.54 K/UL (ref 1.5–8.1)
SET RATE: ABNORMAL
SODIUM BLD-SCNC: 134 MMOL/L (ref 135–144)
TEXT FOR RESPIRATORY: ABNORMAL
TOTAL HB: ABNORMAL G/DL (ref 12–16)
TOTAL PROTEIN: 7.7 G/DL (ref 6.4–8.3)
TOTAL RATE: ABNORMAL
TROPONIN INTERP: NORMAL
TROPONIN INTERP: NORMAL
TROPONIN T: NORMAL NG/ML
TROPONIN T: NORMAL NG/ML
TROPONIN, HIGH SENSITIVITY: 15 NG/L (ref 0–22)
TROPONIN, HIGH SENSITIVITY: 16 NG/L (ref 0–22)
VT: ABNORMAL
WBC # BLD: 11.2 K/UL (ref 3.5–11.3)
WBC # BLD: ABNORMAL 10*3/UL
ZZ NTE CLEAN UP: ORDERED TEST: NORMAL
ZZ NTE WITH NAME CLEAN UP: SPECIMEN SOURCE: NORMAL

## 2021-06-22 PROCEDURE — 2700000000 HC OXYGEN THERAPY PER DAY

## 2021-06-22 PROCEDURE — 94761 N-INVAS EAR/PLS OXIMETRY MLT: CPT

## 2021-06-22 PROCEDURE — 99223 1ST HOSP IP/OBS HIGH 75: CPT | Performed by: NURSE PRACTITIONER

## 2021-06-22 PROCEDURE — 82805 BLOOD GASES W/O2 SATURATION: CPT

## 2021-06-22 PROCEDURE — 71045 X-RAY EXAM CHEST 1 VIEW: CPT

## 2021-06-22 PROCEDURE — 96366 THER/PROPH/DIAG IV INF ADDON: CPT

## 2021-06-22 PROCEDURE — 6370000000 HC RX 637 (ALT 250 FOR IP): Performed by: INTERNAL MEDICINE

## 2021-06-22 PROCEDURE — 99285 EMERGENCY DEPT VISIT HI MDM: CPT

## 2021-06-22 PROCEDURE — 80053 COMPREHEN METABOLIC PANEL: CPT

## 2021-06-22 PROCEDURE — 5A09357 ASSISTANCE WITH RESPIRATORY VENTILATION, LESS THAN 24 CONSECUTIVE HOURS, CONTINUOUS POSITIVE AIRWAY PRESSURE: ICD-10-PCS | Performed by: INTERNAL MEDICINE

## 2021-06-22 PROCEDURE — 2060000000 HC ICU INTERMEDIATE R&B

## 2021-06-22 PROCEDURE — 83880 ASSAY OF NATRIURETIC PEPTIDE: CPT

## 2021-06-22 PROCEDURE — 36415 COLL VENOUS BLD VENIPUNCTURE: CPT

## 2021-06-22 PROCEDURE — 2580000003 HC RX 258: Performed by: INTERNAL MEDICINE

## 2021-06-22 PROCEDURE — 93005 ELECTROCARDIOGRAM TRACING: CPT

## 2021-06-22 PROCEDURE — 84484 ASSAY OF TROPONIN QUANT: CPT

## 2021-06-22 PROCEDURE — 82947 ASSAY GLUCOSE BLOOD QUANT: CPT

## 2021-06-22 PROCEDURE — 2500000003 HC RX 250 WO HCPCS: Performed by: STUDENT IN AN ORGANIZED HEALTH CARE EDUCATION/TRAINING PROGRAM

## 2021-06-22 PROCEDURE — 85025 COMPLETE CBC W/AUTO DIFF WBC: CPT

## 2021-06-22 PROCEDURE — 96365 THER/PROPH/DIAG IV INF INIT: CPT

## 2021-06-22 PROCEDURE — 94660 CPAP INITIATION&MGMT: CPT

## 2021-06-22 RX ORDER — SODIUM CHLORIDE 0.9 % (FLUSH) 0.9 %
10 SYRINGE (ML) INJECTION PRN
Status: DISCONTINUED | OUTPATIENT
Start: 2021-06-22 | End: 2021-06-25 | Stop reason: HOSPADM

## 2021-06-22 RX ORDER — ONDANSETRON 4 MG/1
4 TABLET, ORALLY DISINTEGRATING ORAL EVERY 8 HOURS PRN
Status: DISCONTINUED | OUTPATIENT
Start: 2021-06-22 | End: 2021-06-25 | Stop reason: HOSPADM

## 2021-06-22 RX ORDER — ALLOPURINOL 300 MG/1
300 TABLET ORAL DAILY
Status: DISCONTINUED | OUTPATIENT
Start: 2021-06-22 | End: 2021-06-25 | Stop reason: HOSPADM

## 2021-06-22 RX ORDER — ACETAMINOPHEN 325 MG/1
650 TABLET ORAL EVERY 6 HOURS PRN
Status: DISCONTINUED | OUTPATIENT
Start: 2021-06-22 | End: 2021-06-25 | Stop reason: HOSPADM

## 2021-06-22 RX ORDER — SODIUM CHLORIDE 9 MG/ML
25 INJECTION, SOLUTION INTRAVENOUS PRN
Status: DISCONTINUED | OUTPATIENT
Start: 2021-06-22 | End: 2021-06-25 | Stop reason: HOSPADM

## 2021-06-22 RX ORDER — FUROSEMIDE 10 MG/ML
40 INJECTION INTRAMUSCULAR; INTRAVENOUS 2 TIMES DAILY
Status: DISCONTINUED | OUTPATIENT
Start: 2021-06-22 | End: 2021-06-23

## 2021-06-22 RX ORDER — ATORVASTATIN CALCIUM 80 MG/1
40 TABLET, FILM COATED ORAL NIGHTLY
Status: DISCONTINUED | OUTPATIENT
Start: 2021-06-22 | End: 2021-06-25 | Stop reason: HOSPADM

## 2021-06-22 RX ORDER — LISINOPRIL 5 MG/1
5 TABLET ORAL DAILY
Status: DISCONTINUED | OUTPATIENT
Start: 2021-06-22 | End: 2021-06-24

## 2021-06-22 RX ORDER — NITROGLYCERIN 20 MG/100ML
5-200 INJECTION INTRAVENOUS CONTINUOUS
Status: DISCONTINUED | OUTPATIENT
Start: 2021-06-22 | End: 2021-06-23

## 2021-06-22 RX ORDER — ACETAMINOPHEN 650 MG/1
650 SUPPOSITORY RECTAL EVERY 6 HOURS PRN
Status: DISCONTINUED | OUTPATIENT
Start: 2021-06-22 | End: 2021-06-25 | Stop reason: HOSPADM

## 2021-06-22 RX ORDER — ONDANSETRON 2 MG/ML
4 INJECTION INTRAMUSCULAR; INTRAVENOUS EVERY 6 HOURS PRN
Status: DISCONTINUED | OUTPATIENT
Start: 2021-06-22 | End: 2021-06-25 | Stop reason: HOSPADM

## 2021-06-22 RX ORDER — SODIUM CHLORIDE 0.9 % (FLUSH) 0.9 %
5-40 SYRINGE (ML) INJECTION EVERY 12 HOURS SCHEDULED
Status: DISCONTINUED | OUTPATIENT
Start: 2021-06-22 | End: 2021-06-25 | Stop reason: HOSPADM

## 2021-06-22 RX ORDER — ASPIRIN 81 MG/1
81 TABLET ORAL 2 TIMES DAILY
Status: DISCONTINUED | OUTPATIENT
Start: 2021-06-22 | End: 2021-06-25 | Stop reason: HOSPADM

## 2021-06-22 RX ORDER — NICOTINE POLACRILEX 4 MG
15 LOZENGE BUCCAL PRN
Status: DISCONTINUED | OUTPATIENT
Start: 2021-06-22 | End: 2021-06-25 | Stop reason: HOSPADM

## 2021-06-22 RX ORDER — DEXTROSE MONOHYDRATE 50 MG/ML
100 INJECTION, SOLUTION INTRAVENOUS PRN
Status: DISCONTINUED | OUTPATIENT
Start: 2021-06-22 | End: 2021-06-25 | Stop reason: HOSPADM

## 2021-06-22 RX ORDER — METOPROLOL SUCCINATE 25 MG/1
25 TABLET, EXTENDED RELEASE ORAL DAILY
Status: DISCONTINUED | OUTPATIENT
Start: 2021-06-22 | End: 2021-06-25 | Stop reason: HOSPADM

## 2021-06-22 RX ORDER — DEXTROSE MONOHYDRATE 25 G/50ML
12.5 INJECTION, SOLUTION INTRAVENOUS PRN
Status: DISCONTINUED | OUTPATIENT
Start: 2021-06-22 | End: 2021-06-25 | Stop reason: HOSPADM

## 2021-06-22 RX ADMIN — SODIUM CHLORIDE, PRESERVATIVE FREE 10 ML: 5 INJECTION INTRAVENOUS at 21:08

## 2021-06-22 RX ADMIN — NITROGLYCERIN 10 MCG/MIN: 20 INJECTION INTRAVENOUS at 14:59

## 2021-06-22 RX ADMIN — APIXABAN 5 MG: 5 TABLET, FILM COATED ORAL at 21:06

## 2021-06-22 RX ADMIN — ATORVASTATIN CALCIUM 40 MG: 80 TABLET, FILM COATED ORAL at 21:05

## 2021-06-22 ASSESSMENT — ENCOUNTER SYMPTOMS
COUGH: 1
PHOTOPHOBIA: 0
CHEST TIGHTNESS: 1
VOMITING: 0
NAUSEA: 0
SHORTNESS OF BREATH: 1
ABDOMINAL PAIN: 0

## 2021-06-22 ASSESSMENT — PAIN SCALES - GENERAL: PAINLEVEL_OUTOF10: 0

## 2021-06-22 NOTE — ED NOTES
Pt provided with urinal and placed in gown, call light within reach     [facility], RN  06/22/21 7028

## 2021-06-22 NOTE — ED NOTES
Bed: 28  Expected date:   Expected time:   Means of arrival:   Comments:  POORNIMA [address] [facility] [place], CANDE  06/22/21 2353

## 2021-06-22 NOTE — ED NOTES
Report received, pt resting comfortably on stretcher and eating dinner, no respiratory distress noted     [facility] [place], [facility]  06/22/21 1950

## 2021-06-22 NOTE — ED TRIAGE NOTES
Pt arrived to the ED with c/o difficulty breathing. Pt upon arrival was on bi pap, throwing up pink frothy vomit. Pt was then placed on our BiPAP machine. Pt states he has a hx of cardiac arrest in July of last year. Pt is alert and oriented x4. Pt was given 1 spray of nitro in route. Pt denies chest pain at this time. Pt was having troubles breathing 15 minutes prior to EMS picking him.

## 2021-06-22 NOTE — ED PROVIDER NOTES
[facility] ED     Emergency Department     Faculty Attestation    I performed a history and physical examination of the patient and discussed management with the resident. I reviewed the residents note and agree with the documented findings and plan of care. Any areas of disagreement are noted on the chart. I was personally present for the key portions of any procedures. I have documented in the chart those procedures where I was not present during the key portions. I have reviewed the emergency nurses triage note. I agree with the chief complaint, past medical history, past surgical history, allergies, medications, social and family history as documented unless otherwise noted below. For Physician Assistant/ Nurse Practitioner cases/documentation I have personally evaluated this patient and have completed at least one if not all key elements of the E/M (history, physical exam, and MDM). Additional findings are as noted. With history of CHF presents with worsening shortness of breath over the past few days. He says he was coughing today. He denies fever. He denies chest pain. Patient was brought in on CPAP by EMS and continued on BiPAP here. He is tolerating that well at this time. There is diffuse rales on auscultation of the lungs bilaterally. Abdomen is soft and nontender. The bilateral calves are mildly edematous. Will start patient on nitro drip. Will get EKG, chest x-ray, labs and plan to admit patient.     EKG Interpretation    Interpreted by emergency department physician    Rhythm: atrial fibrillation - rapid  Rate: 100-110  Axis: left  Ectopy: none  Conduction: nonspecific interventricular conduction block  ST Segments: nonspecific changes  T Waves: non specific changes  Q Waves: nonspecific    Clinical Impression: non-specific EKG and atrial fibrillation (chronic)    MD Jo Talavera MD  Attending Emergency  Physician              Zeeshan Carrasco, MD  06/22/21 1455

## 2021-06-22 NOTE — ED PROVIDER NOTES
[facility] [address]  Emergency Department Encounter  EmergencyMedicine Resident     Pt Name:Claudio Keating  MRN: [id number]  [place] 1955  Date of evaluation: 6/22/21  PCP:  LIDIA Palomo CNP    CHIEF COMPLAINT       Chief Complaint   Patient presents with    Respiratory Distress     hx of cardiac arrest last july        HISTORY OF PRESENT ILLNESS  (Location/Symptom, Timing/Onset, Context/Setting, Quality, Duration, Modifying Factors, Severity.)      Geovanny Clark is a 74 y.o. male who presents with difficulty breathing. Patient with history of CHF, prior cardiac arrest 1 year ago. States symptoms have been getting progressively worse over the past 2 days and today he has been coughing up blood-tinged sputum. Patient reports severe trouble breathing at rest.  States that he is supposed to wear a CPAP machine at night at home but does not wear it because it is uncomfortable. Denies recent fever, chills, chest pain, abdominal pain, nausea, vomiting    PAST MEDICAL / SURGICAL / SOCIAL / FAMILY HISTORY      has a past medical history of Anemia, Anxiety, CAD (coronary artery disease), Cardiac defibrillator in place, Cardiomyopathy [facility]), CHF (congestive heart failure) ([facility] [address] Chronic combined systolic and diastolic heart failure ([address] s/[ AICD placed, Chronic kidney disease, Complex sleep apnea syndrome, Diabetic retinopathy ([address] Diverticulitis, DJD (degenerative joint disease), Edentulous, Gout, HTN (hypertension), Hyperlipidemia, Hypertension, Iron deficiency anemia, Ischemic cardiomyopathy, Morbid obesity ([address] Obesity, TANNER variably compliant with BiPAP, Osteoarthritis, PAF (paroxysmal atrial fibrillation) ([address] Psychophysiologic insomnia, Type II or unspecified type diabetes mellitus without mention of complication, not stated as uncontrolled, and Unspecified sleep apnea. has a past surgical history that includes Colonoscopy (11 7 2007); pacemaker placement (2005 or 2006);  Cardiac catheterization (); Cardiac catheterization (2013); Coronary angioplasty (, ); Cardiac defibrillator placement (2015); bone marrow biopsy (2012); and pr colsc flx w/rmvl of tumor polyp lesion snare tq (N/A, 2017). Social History     Socioeconomic History    Marital status: Single     Spouse name: Not on file    Number of children: Not on file    Years of education: Not on file    Highest education level: Not on file   Occupational History    Not on file   Tobacco Use    Smoking status: Former Smoker     Packs/day: 1.00     Years: 3.00     Pack years: 3.00     Types: Cigarettes     Start date: 1971     Quit date: 1974     Years since quittin.11    Smokeless tobacco: Never Used   Vaping Use    Vaping Use: Never used   Substance and Sexual Activity    Alcohol use: No     Alcohol/week: 0.0 standard drinks    Drug use: Not Currently     Types: Marijuana     Comment: hx THC quit approx     Sexual activity: Not Currently     Partners: Female   Other Topics Concern    Not on file   Social History Narrative    Not on file     Social Determinants of Health     Financial Resource Strain: Low Risk     Difficulty of Paying Living Expenses: Not hard at all   Food Insecurity: No Food Insecurity    Worried About [address] in the Last Year: Never true    [address] in the Last Year: Never true   Transportation Needs: No Transportation Needs    Lack of Transportation (Medical): No    Lack of Transportation (Non-Medical):  No   Physical Activity:     Days of Exercise per Week:     Minutes of Exercise per Session:    Stress:     Feeling of Stress :    Social Connections:     Frequency of Communication with Friends and Family:     Frequency of Social Gatherings with Friends and Family:     Attends Jewish Services:     Active Member of Clubs or Organizations:     Attends Club or Organization Meetings:     Marital Status:    Intimate Partner Violence:     Fear of Current or Ex-Partner:     Emotionally Abused:     Physically Abused:     Sexually Abused:        Family History   Problem Relation Age of Onset    Cancer Mother     Heart Disease Mother         due to smoking    Heart Disease Maternal Uncle     Heart Disease Maternal Grandmother        Allergies:  Zetia [ezetimibe], Bactrim, Cephalexin, Cephalexin, and Sulfamethoxazole-trimethoprim    Home Medications:  Prior to Admission medications    Medication Sig Start Date End Date Taking? Authorizing Provider   aspirin (ASPIRIN ADULT LOW STRENGTH) 81 MG EC tablet TAKE 1 TABLET TWICE A DAY 6/7/21   LIDIA Shepard CNP   lisinopril (PRINIVIL;ZESTRIL) 5 MG tablet TAKE 1 TABLET BY MOUTH DAILY 5/5/21   LIDIA Shepard CNP   isosorbide mononitrate (IMDUR) 30 MG extended release tablet TAKE 1 TABLET BY MOUTH DAILY 5/5/21   LIDIA Shepard CNP   allopurinol (ZYLOPRIM) 300 MG tablet TAKE 1 TABLET DAILY 5/5/21   LIDIA Shepard CNP   furosemide (LASIX) 40 MG tablet TAKE 1/2 TABLET BY MOUTH 2 TIMES A DAY 4/9/21   LIDIA Shepard CNP   metFORMIN (GLUCOPHAGE) 1000 MG tablet Take 1 tablet by mouth 2 times daily (with meals) 4/6/21   LIDIA Shepard CNP   apixaban (ELIQUIS) 5 MG TABS tablet TAKE 1 TABLET BY MOUTH 2 TIMES DAILY 1/6/21   LIDIA Shepard CNP   atorvastatin (LIPITOR) 40 MG tablet Take 1 tablet by mouth daily 1/6/21   LIDIA Shepard CNP   Handicap Placard MISC by Does not apply route Exp: 1/2025 12/23/20   LIDIA Shepard CNP   Lancets MISC Daily 12/2/20   LIDIA Shepard CNP   Blood Gluc Meter Disp-Strips (BLOOD GLUCOSE METER DISPOSABLE) SHAN Daily and as needed 12/2/20   LIDIA Shepard CNP   blood glucose monitor strips 1 strip by Other route daily Test 1 times a day & as needed for symptoms of irregular blood glucose.  12/2/20 4/26/21  LIDIA Shepard CNP   amiodarone (CORDARONE) 200 MG tablet Take 1 tablet by mouth daily 8/13/20 4/26/21  Madalynn Area, APRN - CNP   metoprolol succinate (TOPROL XL) 25 MG extended release tablet TAKE 1 TABLET BY MOUTH DAILY 5/10/20   Reinaldo Conley MD   blood glucose test strips (FREESTYLE LITE) strip USE AS DIRECTED TWICE A DAY 1/2/20   Dougie Carter MD   ammonium lactate (LAC-HYDRIN) 12 % lotion Apply topically daily after bathing sparing the space between the toes. 1/2/20   Dougie Carter MD   TRUEPLUS LANCETS 33G MISC TEST AS DIRECTED 1/2/20   Dougie Carter MD   Alcohol Swabs (ALCOHOL PREP) 70 % PADS USE AS DIRECTED 5/31/19   Maeve Mcnair MD   acetaminophen (ACETAMINOPHEN EXTRA STRENGTH) 500 MG tablet TAKE 1 TABLET BY MOUTH 2 TIMES A DAY AS NEEDED FOR PAIN 2/1/19   Dougie Carter MD       REVIEW OF SYSTEMS    (2-9 systems for level 4, 10 or more for level 5)      Review of Systems   Constitutional: Positive for fatigue. Negative for fever. Eyes: Negative for photophobia. Respiratory: Positive for cough, chest tightness and shortness of breath. Gastrointestinal: Negative for abdominal pain, nausea and vomiting. Genitourinary: Negative for decreased urine volume, dysuria and flank pain. Musculoskeletal: Negative for neck pain and neck stiffness. Skin: Negative for rash and wound. Neurological: Negative for dizziness, syncope and weakness. Psychiatric/Behavioral: Negative for confusion. PHYSICAL EXAM   (up to 7 for level 4, 8 or more for level 5)      INITIAL VITALS:   BP (!) 142/80   Pulse 60   Temp 97.7 °F (36.5 °C) (Axillary)   Resp 19   Ht 5' 4\" (1.626 m)   Wt 242 lb (109.8 kg)   SpO2 100%   BMI 41.54 kg/m²     Physical Exam  Constitutional:       General: He is in acute distress. Appearance: He is ill-appearing. HENT:      Head: Normocephalic and atraumatic. Nose: No congestion or rhinorrhea. Eyes:      Extraocular Movements: Extraocular movements intact.       Pupils: Pupils are equal, round, and reactive to light. Cardiovascular:      Rate and Rhythm: Tachycardia present. Rhythm irregular. Heart sounds: No friction rub. No gallop. Pulmonary:      Effort: Respiratory distress present. Breath sounds: Rhonchi and rales (Diffuse) present. No wheezing. Abdominal:      General: There is no distension. Tenderness: There is no abdominal tenderness. There is no guarding or rebound. Musculoskeletal:      Right lower leg: No edema. Left lower leg: No edema. Skin:     Findings: No erythema, lesion or rash. Neurological:      Mental Status: He is alert. Sensory: No sensory deficit. Motor: No weakness. DIFFERENTIAL  DIAGNOSIS     PLAN (LABS / IMAGING / EKG):  Orders Placed This Encounter   Procedures    XR CHEST PORTABLE    CBC Auto Differential    Comprehensive Metabolic Panel w/ Reflex to MG    Troponin    Brain Natriuretic Peptide    SPECIMEN REJECTION    PREVIOUS SPECIMEN    Blood Gas, Venous    Basic Metabolic Panel w/ Reflex to MG    CBC auto differential    Lipid panel - fasting    TSH with Reflex    ADULT DIET; Regular; 5 carb choices (75 gm/meal);  Low Sodium (2 gm)    Vital signs per unit routine    Notify physician    Up with assistance    Daily weights    Intake and output    Encourage deep breathing and coughing every two hours while awake    Place intermittent pneumatic compression device    HYPOGLYCEMIA TREATMENT: blood glucose less than 50 mg/dL and patient  ALERT and TOLERATING PO    HYPOGLYCEMIA TREATMENT: blood glucose less than 70 mg/dL and patient ALERT and TOLERATING PO    HYPOGLYCEMIA TREATMENT: blood glucose less than 70 mg/dL and patient NOT ALERT or NPO    Full Code    Inpatient consult to Hospitalist    Inpatient consult to Heart Failure Nurse/Coordinator    Inpatient consult to Dietitian    Inpatient consult to Respiratory Care    OT eval and treat    PT evaluation and treat    BIPAP  Initiate Oxygen Therapy Protocol    Pulse Oximetry Spot Check    POCT Glucose    POC Glucose Fingerstick    EKG 12 Lead    ECHO Complete 2D W Doppler W Color    PATIENT STATUS (FROM ED OR OR/PROCEDURAL) Inpatient       MEDICATIONS ORDERED:  Orders Placed This Encounter   Medications    nitroGLYCERIN 50 mg in dextrose 5% 250 mL infusion    acetaminophen (TYLENOL) tablet 650 mg    allopurinol (ZYLOPRIM) tablet 300 mg    apixaban (ELIQUIS) tablet 5 mg    aspirin EC tablet 81 mg    atorvastatin (LIPITOR) tablet 40 mg    furosemide (LASIX) injection 40 mg    lisinopril (PRINIVIL;ZESTRIL) tablet 5 mg    metoprolol succinate (TOPROL XL) extended release tablet 25 mg    sodium chloride flush 0.9 % injection 5-40 mL    sodium chloride flush 0.9 % injection 10 mL    0.9 % sodium chloride infusion    OR Linked Order Group     ondansetron (ZOFRAN-ODT) disintegrating tablet 4 mg     ondansetron (ZOFRAN) injection 4 mg    OR Linked Order Group     acetaminophen (TYLENOL) tablet 650 mg     acetaminophen (TYLENOL) suppository 650 mg    insulin lispro (HUMALOG) injection vial 0-12 Units    insulin lispro (HUMALOG) injection vial 0-6 Units    glucose (GLUTOSE) 40 % oral gel 15 g    dextrose 50 % IV solution    glucagon (rDNA) injection 1 mg    dextrose 5 % solution    perflutren lipid microspheres (DEFINITY) injection 1.65 mg    bisacodyl (DULCOLAX) EC tablet 5 mg       DDX: CHF exacerbation, COPD exacerbation, pneumonia, bronchitis, ACS    DIAGNOSTIC RESULTS / EMERGENCY DEPARTMENT COURSE / MDM   LAB RESULTS:  Results for orders placed or performed during the hospital encounter of 06/22/21   CBC Auto Differential   Result Value Ref Range    WBC 11.2 3.5 - 11.3 k/uL    RBC 4.57 4.21 - 5.77 m/uL    Hemoglobin 12.3 (L) 13.0 - 17.0 g/dL    Hematocrit 41.2 40.7 - 50.3 %    MCV 90.2 82.6 - 102.9 fL    MCH 26.9 25.2 - 33.5 pg    MCHC 29.9 28.4 - 34.8 g/dL    RDW 16.6 (H) 11.8 - 14.4 %    Platelets 594 143 - 453 k/uL    MPV 11.3 8.1 - 13.5 fL    NRBC Automated 0.2 (H) 0.0 per 100 WBC    Differential Type NOT REPORTED     Seg Neutrophils 48 36 - 65 %    Lymphocytes 39 24 - 43 %    Monocytes 8 3 - 12 %    Eosinophils % 3 1 - 4 %    Basophils 1 0 - 2 %    Immature Granulocytes 1 (H) 0 %    Segs Absolute 5.54 1.50 - 8.10 k/uL    Absolute Lymph # 4.38 (H) 1.10 - 3.70 k/uL    Absolute Mono # 0.88 0.10 - 1.20 k/uL    Absolute Eos # 0.32 0.00 - 0.44 k/uL    Basophils Absolute 0.06 0.00 - 0.20 k/uL    Absolute Immature Granulocyte 0.06 0.00 - 0.30 k/uL    WBC Morphology NOT REPORTED     RBC Morphology ANISOCYTOSIS PRESENT     Platelet Estimate NOT REPORTED    Comprehensive Metabolic Panel w/ Reflex to MG   Result Value Ref Range    Glucose 250 (H) 70 - 99 mg/dL    BUN 26 (H) 8 - 23 mg/dL    CREATININE 1.26 (H) 0.70 - 1.20 mg/dL    Bun/Cre Ratio NOT REPORTED 9 - 20    Calcium 8.8 8.6 - 10.4 mg/dL    Sodium 134 (L) 135 - 144 mmol/L    Potassium 4.5 3.7 - 5.3 mmol/L    Chloride 99 98 - 107 mmol/L    CO2 20 20 - 31 mmol/L    Anion Gap 15 9 - 17 mmol/L    Alkaline Phosphatase 81 40 - 129 U/L    ALT 12 5 - 41 U/L    AST 23 <40 U/L    Total Bilirubin 0.37 0.3 - 1.2 mg/dL    Total Protein 7.7 6.4 - 8.3 g/dL    Albumin 3.9 3.5 - 5.2 g/dL    Albumin/Globulin Ratio 1.0 1.0 - 2.5    GFR Non- 57 (L) >60 mL/min    GFR African American >60 >60 mL/min    GFR Comment          GFR Staging NOT REPORTED    Troponin   Result Value Ref Range    Troponin, High Sensitivity 15 0 - 22 ng/L    Troponin T NOT REPORTED <0.03 ng/mL    Troponin Interp NOT REPORTED    Troponin   Result Value Ref Range    Troponin, High Sensitivity 16 0 - 22 ng/L    Troponin T NOT REPORTED <0.03 ng/mL    Troponin Interp NOT REPORTED    Brain Natriuretic Peptide   Result Value Ref Range    Pro-BNP 2,283 (H) <300 pg/mL    BNP Interpretation Pro-BNP Reference Range:    SPECIMEN REJECTION   Result Value Ref Range    Specimen Source . BLOOD     Ordered Test VBG Reason for Rejection       Unable to perform testing: Specimen quantity not sufficient.    - NOT REPORTED    Blood Gas, Venous   Result Value Ref Range    pH, Ramon 7.250 (L) 7.320 - 7.420    pCO2, Ramon 64.5 (H) 39 - 55    pO2, Ramon 50.1 (H) 30 - 50    HCO3, Venous 27.4 24 - 30 mmol/L    Positive Base Excess, Ramon NOT REPORTED 0.0 - 2.0 mmol/L    Negative Base Excess, Ramon 0.7 0.0 - 2.0 mmol/L    O2 Sat, Ramon 75.9 60.0 - 85.0 %    Total Hb NOT REPORTED 12.0 - 16.0 g/dl    Oxyhemoglobin NOT REPORTED 95.0 - 98.0 %    Carboxyhemoglobin 1.4 0 - 5 %    Methemoglobin NOT REPORTED 0.0 - 1.5 %    Pt Temp 37.0     pH, Ramon, Temp Adj NOT REPORTED 7.320 - 7.420    pCO2, Ramon, Temp Adj NOT REPORTED 39 - 55 mmHg    pO2, Ramon, Temp Adj NOT REPORTED 30 - 50 mmHg    O2 Device/Flow/% NOT REPORTED     Respiratory Rate NOT REPORTED     Pb Test NOT REPORTED     Sample Site NOT REPORTED     Pt. Position NOT REPORTED     Mode NOT REPORTED     Set Rate NOT REPORTED     Total Rate NOT REPORTED     VT NOT REPORTED     FIO2 INFORMATION NOT PROVIDED     Peep/Cpap NOT REPORTED     PSV NOT REPORTED     Text for Respiratory NOT REPORTED     NOTIFICATION NOT REPORTED     NOTIFICATION TIME NOT REPORTED    POC Glucose Fingerstick   Result Value Ref Range    POC Glucose 140 (H) 75 - 110 mg/dL       IMPRESSION/ ED Course: 18-year-old male presenting with respiratory distress, tachypneic, tachycardic on arrival.  Patient with frothy, pink sputum while coughing. Patient was placed on BiPAP in emergency department with improvement in respiratory rate and effort afterwards. Oxygen saturation 97 to 100% on BiPAP. Patient started on nitroglycerin drip as well due to diffuse rales heard on auscultation of the lungs and concern for severe CHF exacerbation. Of note, patient's last echocardiogram had 15% ejection fraction. Hemodynamically stable throughout entire emergency department course.   Patient improved significantly after short time on BiPAP and nitroglycerin administration. Patient was able to be weaned down to 4 L nasal cannula in emergency department. Admitted to medicine service for CHF exacerbation with respiratory distress. RADIOLOGY:  XR CHEST PORTABLE    Result Date: 6/22/2021  EXAMINATION: ONE XRAY VIEW OF THE CHEST 6/22/2021 3:06 pm COMPARISON: 19 March 2020 HISTORY: ORDERING SYSTEM PROVIDED HISTORY: Respiratory distress TECHNOLOGIST PROVIDED HISTORY: Respiratory distress Reason for Exam: portable, upright FINDINGS: AP portable view of the chest time stamped at 1458 hours demonstrates overlying cardiac monitoring electrodes, borderline cardiac size an indwelling unipolar pacemaker entering from the right with intact lead in appropriate position. Pulmonary vascular congestion is noted without extrapleural air. Cardiomegaly and pulmonary vascular congestion. No gross effusions. EKG  EKG Interpretation    Interpreted by me    Rhythm: Rapid atrial fibrillation  Rate: 100 to 110 bpm  Axis: Left axis deviation  Ectopy: none  Conduction: Atrial fibrillation  ST Segments: no acute change  T Waves: no acute change  Q Waves: none    Clinical Impression: Chronic rapid A. fib, nonspecific EKG    All EKG's are interpreted by the Emergency Department Physician who either signs or Co-signs this chart in the absence of a cardiologist.      PROCEDURES:  None    CONSULTS:  IP CONSULT TO HOSPITALIST  IP CONSULT TO HEART FAILURE NURSE/COORDINATOR  IP CONSULT TO DIETITIAN  IP CONSULT TO RESPIRATORY CARE    CRITICAL CARE:  Please see attending note    FINAL IMPRESSION      1. Acute on chronic congestive heart failure, unspecified heart failure type ([address]    2. Respiratory distress          DISPOSITION / PLAN     DISPOSITION Admitted 06/22/2021 04:57:16 PM      PATIENT REFERRED TO:  No follow-up provider specified.     DISCHARGE MEDICATIONS:  Current Discharge Medication List          Ciro Christian DO  Emergency Medicine Resident    (Please note that portions of thisnote were completed with a voice recognition program.  Efforts were made to edit the dictations but occasionally words are mis-transcribed.)        Rock Stone,   Resident  06/22/21 [id number] EOAE (evoked otoacoustic emission)

## 2021-06-23 LAB
ABSOLUTE EOS #: 0.12 K/UL (ref 0–0.44)
ABSOLUTE IMMATURE GRANULOCYTE: <0.03 K/UL (ref 0–0.3)
ABSOLUTE LYMPH #: 1.3 K/UL (ref 1.1–3.7)
ABSOLUTE MONO #: 0.64 K/UL (ref 0.1–1.2)
ANION GAP SERPL CALCULATED.3IONS-SCNC: 10 MMOL/L (ref 9–17)
BASOPHILS # BLD: 0 % (ref 0–2)
BASOPHILS ABSOLUTE: <0.03 K/UL (ref 0–0.2)
BUN BLDV-MCNC: 34 MG/DL (ref 8–23)
BUN/CREAT BLD: ABNORMAL (ref 9–20)
CALCIUM SERPL-MCNC: 8.3 MG/DL (ref 8.6–10.4)
CHLORIDE BLD-SCNC: 101 MMOL/L (ref 98–107)
CHOLESTEROL/HDL RATIO: 2.9
CHOLESTEROL: 124 MG/DL
CO2: 28 MMOL/L (ref 20–31)
CREAT SERPL-MCNC: 1.37 MG/DL (ref 0.7–1.2)
DIFFERENTIAL TYPE: ABNORMAL
EOSINOPHILS RELATIVE PERCENT: 2 % (ref 1–4)
GFR AFRICAN AMERICAN: >60 ML/MIN
GFR NON-AFRICAN AMERICAN: 52 ML/MIN
GFR SERPL CREATININE-BSD FRML MDRD: ABNORMAL ML/MIN/{1.73_M2}
GFR SERPL CREATININE-BSD FRML MDRD: ABNORMAL ML/MIN/{1.73_M2}
GLUCOSE BLD-MCNC: 148 MG/DL (ref 75–110)
GLUCOSE BLD-MCNC: 148 MG/DL (ref 75–110)
GLUCOSE BLD-MCNC: 91 MG/DL (ref 75–110)
GLUCOSE BLD-MCNC: 99 MG/DL (ref 70–99)
HCT VFR BLD CALC: 35.8 % (ref 40.7–50.3)
HDLC SERPL-MCNC: 43 MG/DL
HEMOGLOBIN: 10.6 G/DL (ref 13–17)
IMMATURE GRANULOCYTES: 0 %
LDL CHOLESTEROL: 55 MG/DL (ref 0–130)
LYMPHOCYTES # BLD: 16 % (ref 24–43)
MCH RBC QN AUTO: 26.8 PG (ref 25.2–33.5)
MCHC RBC AUTO-ENTMCNC: 29.6 G/DL (ref 28.4–34.8)
MCV RBC AUTO: 90.4 FL (ref 82.6–102.9)
MONOCYTES # BLD: 8 % (ref 3–12)
NRBC AUTOMATED: 0 PER 100 WBC
PDW BLD-RTO: 16.5 % (ref 11.8–14.4)
PLATELET # BLD: 197 K/UL (ref 138–453)
PLATELET ESTIMATE: ABNORMAL
PMV BLD AUTO: 11.7 FL (ref 8.1–13.5)
POTASSIUM SERPL-SCNC: 4.4 MMOL/L (ref 3.7–5.3)
RBC # BLD: 3.96 M/UL (ref 4.21–5.77)
RBC # BLD: ABNORMAL 10*6/UL
SEG NEUTROPHILS: 73 % (ref 36–65)
SEGMENTED NEUTROPHILS ABSOLUTE COUNT: 5.82 K/UL (ref 1.5–8.1)
SODIUM BLD-SCNC: 139 MMOL/L (ref 135–144)
THYROXINE, FREE: 0.59 NG/DL (ref 0.93–1.7)
TRIGL SERPL-MCNC: 129 MG/DL
TSH SERPL DL<=0.05 MIU/L-ACNC: 13.61 MIU/L (ref 0.3–5)
VLDLC SERPL CALC-MCNC: NORMAL MG/DL (ref 1–30)
WBC # BLD: 7.9 K/UL (ref 3.5–11.3)
WBC # BLD: ABNORMAL 10*3/UL

## 2021-06-23 PROCEDURE — 80061 LIPID PANEL: CPT

## 2021-06-23 PROCEDURE — 6370000000 HC RX 637 (ALT 250 FOR IP): Performed by: NURSE PRACTITIONER

## 2021-06-23 PROCEDURE — 80048 BASIC METABOLIC PNL TOTAL CA: CPT

## 2021-06-23 PROCEDURE — 6370000000 HC RX 637 (ALT 250 FOR IP): Performed by: INTERNAL MEDICINE

## 2021-06-23 PROCEDURE — 94761 N-INVAS EAR/PLS OXIMETRY MLT: CPT

## 2021-06-23 PROCEDURE — 2060000000 HC ICU INTERMEDIATE R&B

## 2021-06-23 PROCEDURE — 6360000002 HC RX W HCPCS: Performed by: INTERNAL MEDICINE

## 2021-06-23 PROCEDURE — 82947 ASSAY GLUCOSE BLOOD QUANT: CPT

## 2021-06-23 PROCEDURE — 36415 COLL VENOUS BLD VENIPUNCTURE: CPT

## 2021-06-23 PROCEDURE — 99233 SBSQ HOSP IP/OBS HIGH 50: CPT | Performed by: INTERNAL MEDICINE

## 2021-06-23 PROCEDURE — 94660 CPAP INITIATION&MGMT: CPT

## 2021-06-23 PROCEDURE — 85025 COMPLETE CBC W/AUTO DIFF WBC: CPT

## 2021-06-23 PROCEDURE — 84439 ASSAY OF FREE THYROXINE: CPT

## 2021-06-23 PROCEDURE — 84443 ASSAY THYROID STIM HORMONE: CPT

## 2021-06-23 PROCEDURE — 2580000003 HC RX 258: Performed by: INTERNAL MEDICINE

## 2021-06-23 PROCEDURE — 2700000000 HC OXYGEN THERAPY PER DAY

## 2021-06-23 RX ORDER — AMIODARONE HYDROCHLORIDE 200 MG/1
200 TABLET ORAL DAILY
Status: DISCONTINUED | OUTPATIENT
Start: 2021-06-23 | End: 2021-06-25 | Stop reason: HOSPADM

## 2021-06-23 RX ORDER — FUROSEMIDE 10 MG/ML
40 INJECTION INTRAMUSCULAR; INTRAVENOUS DAILY
Status: DISCONTINUED | OUTPATIENT
Start: 2021-06-24 | End: 2021-06-24

## 2021-06-23 RX ORDER — LEVOTHYROXINE SODIUM 0.03 MG/1
25 TABLET ORAL DAILY
Status: DISCONTINUED | OUTPATIENT
Start: 2021-06-24 | End: 2021-06-23

## 2021-06-23 RX ORDER — LEVOTHYROXINE SODIUM 0.03 MG/1
12.5 TABLET ORAL DAILY
Status: DISCONTINUED | OUTPATIENT
Start: 2021-06-24 | End: 2021-06-25 | Stop reason: HOSPADM

## 2021-06-23 RX ORDER — MIDODRINE HYDROCHLORIDE 5 MG/1
5 TABLET ORAL ONCE
Status: COMPLETED | OUTPATIENT
Start: 2021-06-23 | End: 2021-06-23

## 2021-06-23 RX ADMIN — SODIUM CHLORIDE, PRESERVATIVE FREE 10 ML: 5 INJECTION INTRAVENOUS at 22:28

## 2021-06-23 RX ADMIN — FUROSEMIDE 40 MG: 10 INJECTION, SOLUTION INTRAMUSCULAR; INTRAVENOUS at 09:25

## 2021-06-23 RX ADMIN — METOPROLOL SUCCINATE 25 MG: 25 TABLET, FILM COATED, EXTENDED RELEASE ORAL at 09:24

## 2021-06-23 RX ADMIN — MIDODRINE HYDROCHLORIDE 5 MG: 5 TABLET ORAL at 00:25

## 2021-06-23 RX ADMIN — APIXABAN 5 MG: 5 TABLET, FILM COATED ORAL at 22:27

## 2021-06-23 RX ADMIN — INSULIN LISPRO 2 UNITS: 100 INJECTION, SOLUTION INTRAVENOUS; SUBCUTANEOUS at 11:57

## 2021-06-23 RX ADMIN — INSULIN LISPRO 1 UNITS: 100 INJECTION, SOLUTION INTRAVENOUS; SUBCUTANEOUS at 22:28

## 2021-06-23 RX ADMIN — ATORVASTATIN CALCIUM 40 MG: 80 TABLET, FILM COATED ORAL at 22:27

## 2021-06-23 RX ADMIN — Medication 81 MG: at 09:25

## 2021-06-23 RX ADMIN — APIXABAN 5 MG: 5 TABLET, FILM COATED ORAL at 09:25

## 2021-06-23 RX ADMIN — LISINOPRIL 5 MG: 5 TABLET ORAL at 09:24

## 2021-06-23 RX ADMIN — INSULIN LISPRO 2 UNITS: 100 INJECTION, SOLUTION INTRAVENOUS; SUBCUTANEOUS at 09:23

## 2021-06-23 RX ADMIN — Medication 81 MG: at 22:27

## 2021-06-23 RX ADMIN — SODIUM CHLORIDE, PRESERVATIVE FREE 10 ML: 5 INJECTION INTRAVENOUS at 09:26

## 2021-06-23 RX ADMIN — ALLOPURINOL 300 MG: 300 TABLET ORAL at 09:24

## 2021-06-23 ASSESSMENT — ENCOUNTER SYMPTOMS
COLOR CHANGE: 0
ABDOMINAL DISTENTION: 0
COUGH: 1
TROUBLE SWALLOWING: 0
VOMITING: 0
SORE THROAT: 0
DIARRHEA: 0
WHEEZING: 0
ABDOMINAL PAIN: 0
PHOTOPHOBIA: 0
CHEST TIGHTNESS: 0
NAUSEA: 0
SHORTNESS OF BREATH: 1

## 2021-06-23 ASSESSMENT — PAIN SCALES - GENERAL: PAINLEVEL_OUTOF10: 0

## 2021-06-23 NOTE — PLAN OF CARE
Problem: RESPIRATORY  Intervention: Respiratory assessment  Note:   PROVIDE ADEQUATE OXYGENATION WITH ACCEPTABLE SP02/ABG'S    [x]  IDENTIFY APPROPRIATE OXYGEN THERAPY  [x]   MONITOR SP02/ABG'S AS NEEDED   [x]   PATIENT EDUCATION AS NEEDED     NONINVASIVE VENTILATION    PROVIDE OPTIMAL VENTILATION/ACCEPTABLE SPO2   IMPLEMENT NONINVASIVE VENTILATION PROTOCOL   MAINTAIN ACCEPTABLE SPO2   ASSESS SKIN INTEGRITY/BREAKDOWN SCORE   PATIENT EDUCATION AS NEEDED   BIPAP AS NEEDED

## 2021-06-23 NOTE — H&P
[facility]  Office: [address], DO, Lehmaninderjit Mobley, DO, Kuldip Zuleta, DO, Tatiana Peña, DO, Yovany Pollock MD, Pavel Gil MD, Brandon Yoder MD, Sugey Muñoz MD, Benjy Cesar MD, Dyan Melgar MD, Toya Santiago MD, Luke Palma MD, Jabier Rodriguez, DO, Chichi Godinez MD, Carolin Martinez DO, Gomez Cope MD,  Shorty Fong, DO, Amber Sellers MD, Sen Farnsworth MD, Estella Bahena MD, Jenniffer Raymond MD, Lan Manzano [facility], [facility], CNP, Maria Del Rosario Ayala, CNP, Josy Fairbanks, [facility], Fred Reyes, CNP, Cyndy Colin, CNP, Jason Garza, CNP, Herson Jacinto, CNP, Sanjay Nicole, CNP, Porsha Sheppard PA-C, Pepper Love, [facility], Naima Aguero, CNP, Cecil Fleming, CNP, Nelsy Ray, CNP, Renata Vora, CNP, Damon Webb, CNP, Wally Dahl, [facility]    HISTORY AND PHYSICAL EXAMINATION            Date:   6/23/2021  Patient name:  Librado Martin  Date of admission:  6/22/2021  2:48 PM  MRN:   [id number]  Account:  [de-identified]  YOB: 1955  PCP:    LIDIA Harris CNP  Room:   3014/3014-01  Code Status:    Full Code    Chief Complaint:     Chief Complaint   Patient presents with    Respiratory Distress     hx of cardiac arrest last july        History Obtained From:     patient, electronic medical record    History of Present Illness:     Librado Martin is a 74 y.o. Non-/non  male who presents with Respiratory Distress (hx of cardiac arrest last july )   and is admitted to the hospital for the management of Acute HFrEF (heart failure with reduced ejection fraction) ([facility]     This is a 43-year-old male with underlying history of hypertension, CAD, type 2 diabetes, cardiomyopathy/ severe LVSD s/p cardiac arrest in July, 2020 s/p AICD, CHF,IgM MGUS (follows with hem/onc), hematuria (follows with urology), and TANNER with varying BiPAP compliance presents to the emergency department with worsening shortness of breath over the last 3 days and new cough with pink frothy sputum production that started earlier today. Patient called cardiology office prior to ER presentation. States he was told to take additional dose of metoprolol and additional dose of p.o. Lasix. In total, patient has taken 3 doses of home Lasix prior to ER presentation. Patient endorses dyspnea at rest but most significantly on activity. Was gardening and doing housework today and had to stop multiple times for rest periods. Denies symptoms of orthopnea. Does admit to possibly missing a dose of Lasix yesterday and today. Denies any chest pain, abdominal pain, nausea/vomiting, diarrhea, fevers, myalgias or arthralgias, and denies bilateral lower extremity edema. Patient in respiratory distress on arrival with tachypnea and tachycardia. Was placed on BiPAP in the emergency department with rapid improvement in symptoms. Pertinent presenting labs include sodium: 134, BUN: 26, serum creatinine: 1.26, GFR: 57, glucose: 140, proBNP: 2283, troponin: 15>16, hemoglobin: 12.3, VB.25/64.5/50/27.4, CXR: Cardiomegaly and pulmonary vascular congestion. Patient was started on nitroglycerin drip in the ER and continued on BiPAP. He is admitted to [facility] for further management of CHF exacerbation. 2D ECHO (3/2109)  Left ventricle is enlarged. Global left ventricular systolic function isseverely reduced. Calculated ejection fraction is 13 % by Negron's method. Visually estimated EF 15-20%. [place] akinetic. Normal right ventricular size and function. No significant valvular regurgitation or stenosis seen. No pericardial effusion seen.       On my arrival, patient is awake alert and oriented resting in bed. No signs are stable with oxygen saturations at 98% on 3 L nasal cannula. Endorses improvement in symptoms since arrival.  Nitro drip has since been weaned off.   A long discussion about the importance of BiPAP compliance with underlying history of TANNER, patient follows outpatient with Dr. Angel Abraham. Patient is agreeable to wearing BiPAP overnight considering current status.     Past Medical History:     Past Medical History:   Diagnosis Date    Anemia     Anxiety     when he lies flat, no RX    CAD (coronary artery disease)     MI stents x5, follows with cardiology Dr. Awilda Cohen Cardiac defibrillator in place     Cardiomyopathy [facility])     CHF (congestive heart failure) ([address]     Chronic combined systolic and diastolic heart failure ([address] s/[ AICD placed 9/25/2011    Chronic kidney disease     Complex sleep apnea syndrome     Diabetic retinopathy ([facility] 9/25/2011    Diverticulitis     DJD (degenerative joint disease) 9/25/2011    Edentulous     Gout     HTN (hypertension) 3/30/2012    Hyperlipidemia     Hypertension     Iron deficiency anemia 9/25/2011    Ischemic cardiomyopathy     Morbid obesity ([address] 9/25/2011    Obesity     Morbid obesity due to excess calories    TANNER variably compliant with BiPAP 9/25/2011    Osteoarthritis     PAF (paroxysmal atrial fibrillation) (HCC)     Psychophysiologic insomnia     Type II or unspecified type diabetes mellitus without mention of complication, not stated as uncontrolled     Unspecified sleep apnea     uses V-pap        Past Surgical History:     Past Surgical History:   Procedure Laterality Date    BONE MARROW BIOPSY  2012 approx    CARDIAC CATHETERIZATION  8-2007    severe stenosis pre-stent area    CARDIAC CATHETERIZATION  09/11/2013    Very peripheral stenosis, not amendable to PCI, stents patent    CARDIAC DEFIBRILLATOR PLACEMENT  8/26/2015    COLONOSCOPY  11 7 2007   [address]  1998, 2004    stent LAD    PACEMAKER PLACEMENT  2005 or 2006    AICD    [place] COLSC FLX W/RMVL OF TUMOR POLYP LESION SNARE TQ N/A 4/4/2017    COLONOSCOPY POLYPECTOMY SNARE/COLD BIOPSY performed by Mi Rubio DO at [facility]        Medications Prior to Admission:     Prior to Admission medications    Medication Sig Start Date End Date Taking? Authorizing Provider   aspirin (ASPIRIN ADULT LOW STRENGTH) 81 MG EC tablet TAKE 1 TABLET TWICE A DAY 6/7/21   LIDIA Cui CNP   lisinopril (PRINIVIL;ZESTRIL) 5 MG tablet TAKE 1 TABLET BY MOUTH DAILY 5/5/21   LIDIA Cui CNP   isosorbide mononitrate (IMDUR) 30 MG extended release tablet TAKE 1 TABLET BY MOUTH DAILY 5/5/21   LIDIA Cui CNP   allopurinol (ZYLOPRIM) 300 MG tablet TAKE 1 TABLET DAILY 5/5/21   LIDIA Cui CNP   furosemide (LASIX) 40 MG tablet TAKE 1/2 TABLET BY MOUTH 2 TIMES A DAY 4/9/21   LIDIA Cui CNP   metFORMIN (GLUCOPHAGE) 1000 MG tablet Take 1 tablet by mouth 2 times daily (with meals) 4/6/21   LIDIA Cui CNP   apixaban (ELIQUIS) 5 MG TABS tablet TAKE 1 TABLET BY MOUTH 2 TIMES DAILY 1/6/21   LIDIA Cui CNP   atorvastatin (LIPITOR) 40 MG tablet Take 1 tablet by mouth daily 1/6/21   LIDIA Cui CNP   Handicap Placard MISC by Does not apply route Exp: 1/2025 12/23/20   LIDIA Cui CNP   Lancets MISC Daily 12/2/20   LIDIA Cui CNP   Blood Gluc Meter Disp-Strips (BLOOD GLUCOSE METER DISPOSABLE) SHAN Daily and as needed 12/2/20   LIDIA Cui CNP   blood glucose monitor strips 1 strip by Other route daily Test 1 times a day & as needed for symptoms of irregular blood glucose.  12/2/20 4/26/21  LIDIA Cui CNP   amiodarone (CORDARONE) 200 MG tablet Take 1 tablet by mouth daily 8/13/20 4/26/21  LIDIA Cui CNP   metoprolol succinate (TOPROL XL) 25 MG extended release tablet TAKE 1 TABLET BY MOUTH DAILY 5/10/20   Brenda Stokes MD   blood glucose test strips (FREESTYLE LITE) strip USE AS DIRECTED TWICE A DAY 1/2/20   Francis Barba MD   ammonium lactate (LAC-HYDRIN) 12 % lotion Apply topically daily after bathing sparing the space between the toes. 1/2/20   Dion Bowen MD   TRUEPLUS LANCETS 33G MISC TEST AS DIRECTED 1/2/20   Dion Bowen MD   Alcohol Swabs (ALCOHOL PREP) 70 % PADS USE AS DIRECTED 5/31/19   Stella Hitchcock MD   acetaminophen (ACETAMINOPHEN EXTRA STRENGTH) 500 MG tablet TAKE 1 TABLET BY MOUTH 2 TIMES A DAY AS NEEDED FOR PAIN 2/1/19   Dion Bowen MD        Allergies:     Zetia [ezetimibe], Bactrim, Cephalexin, Cephalexin, and Sulfamethoxazole-trimethoprim    Social History:     Tobacco:    reports that he quit smoking about 47 years ago. His smoking use included cigarettes. He started smoking about 50 years ago. He has a 3.00 pack-year smoking history. He has never used smokeless tobacco.  Alcohol:      reports no history of alcohol use. Drug Use:  reports previous drug use. Drug: Marijuana. Family History:     Family History   Problem Relation Age of Onset    Cancer Mother     Heart Disease Mother         due to smoking    Heart Disease Maternal Uncle     Heart Disease Maternal Grandmother        Review of Systems:     Positive and Negative as described in HPI. Review of Systems   Constitutional: Positive for activity change. Negative for diaphoresis, fatigue and fever. HENT: Negative for sore throat and trouble swallowing. Eyes: Negative for photophobia and visual disturbance. Respiratory: Positive for cough and shortness of breath. Negative for chest tightness and wheezing. Cardiovascular: Negative for chest pain, palpitations and leg swelling. Gastrointestinal: Negative for abdominal distention, abdominal pain, diarrhea, nausea and vomiting. Endocrine: Negative for polyphagia and polyuria. Genitourinary: Negative for decreased urine volume, difficulty urinating and hematuria. Musculoskeletal: Negative for arthralgias, myalgias, neck pain and neck stiffness. Skin: Negative for color change and wound. Allergic/Immunologic: Negative for immunocompromised state.    Neurological: Skin:     General: Skin is warm and dry. Capillary Refill: Capillary refill takes less than 2 seconds. Coloration: Skin is not jaundiced. Findings: No bruising, erythema or lesion. Neurological:      General: No focal deficit present. Mental Status: He is alert and oriented to person, place, and time. Mental status is at baseline. Cranial Nerves: No cranial nerve deficit. Sensory: No sensory deficit. Motor: No weakness. Psychiatric:         Mood and Affect: Mood normal.         Behavior: Behavior normal.         Thought Content:  Thought content normal.         Judgment: Judgment normal.         Investigations:      Laboratory Testing:  Recent Results (from the past 24 hour(s))   CBC Auto Differential    Collection Time: 06/22/21  2:59 PM   Result Value Ref Range    WBC 11.2 3.5 - 11.3 k/uL    RBC 4.57 4.21 - 5.77 m/uL    Hemoglobin 12.3 (L) 13.0 - 17.0 g/dL    Hematocrit 41.2 40.7 - 50.3 %    MCV 90.2 82.6 - 102.9 fL    MCH 26.9 25.2 - 33.5 pg    MCHC 29.9 28.4 - 34.8 g/dL    RDW 16.6 (H) 11.8 - 14.4 %    Platelets 080 855 - 380 k/uL    MPV 11.3 8.1 - 13.5 fL    NRBC Automated 0.2 (H) 0.0 per 100 WBC    Differential Type NOT REPORTED     Seg Neutrophils 48 36 - 65 %    Lymphocytes 39 24 - 43 %    Monocytes 8 3 - 12 %    Eosinophils % 3 1 - 4 %    Basophils 1 0 - 2 %    Immature Granulocytes 1 (H) 0 %    Segs Absolute 5.54 1.50 - 8.10 k/uL    Absolute Lymph # 4.38 (H) 1.10 - 3.70 k/uL    Absolute Mono # 0.88 0.10 - 1.20 k/uL    Absolute Eos # 0.32 0.00 - 0.44 k/uL    Basophils Absolute 0.06 0.00 - 0.20 k/uL    Absolute Immature Granulocyte 0.06 0.00 - 0.30 k/uL    WBC Morphology NOT REPORTED     RBC Morphology ANISOCYTOSIS PRESENT     Platelet Estimate NOT REPORTED    Comprehensive Metabolic Panel w/ Reflex to MG    Collection Time: 06/22/21  2:59 PM   Result Value Ref Range    Glucose 250 (H) 70 - 99 mg/dL    BUN 26 (H) 8 - 23 mg/dL    CREATININE 1.26 (H) 0.70 - 1.20 mg/dL Bun/Cre Ratio NOT REPORTED 9 - 20    Calcium 8.8 8.6 - 10.4 mg/dL    Sodium 134 (L) 135 - 144 mmol/L    Potassium 4.5 3.7 - 5.3 mmol/L    Chloride 99 98 - 107 mmol/L    CO2 20 20 - 31 mmol/L    Anion Gap 15 9 - 17 mmol/L    Alkaline Phosphatase 81 40 - 129 U/L    ALT 12 5 - 41 U/L    AST 23 <40 U/L    Total Bilirubin 0.37 0.3 - 1.2 mg/dL    Total Protein 7.7 6.4 - 8.3 g/dL    Albumin 3.9 3.5 - 5.2 g/dL    Albumin/Globulin Ratio 1.0 1.0 - 2.5    GFR Non- 57 (L) >60 mL/min    GFR African American >60 >60 mL/min    GFR Comment          GFR Staging NOT REPORTED    Troponin    Collection Time: 06/22/21  2:59 PM   Result Value Ref Range    Troponin, High Sensitivity 15 0 - 22 ng/L    Troponin T NOT REPORTED <0.03 ng/mL    Troponin Interp NOT REPORTED    Brain Natriuretic Peptide    Collection Time: 06/22/21  2:59 PM   Result Value Ref Range    Pro-BNP 2,283 (H) <300 pg/mL    BNP Interpretation Pro-BNP Reference Range:    Troponin    Collection Time: 06/22/21  4:16 PM   Result Value Ref Range    Troponin, High Sensitivity 16 0 - 22 ng/L    Troponin T NOT REPORTED <0.03 ng/mL    Troponin Interp NOT REPORTED    SPECIMEN REJECTION    Collection Time: 06/22/21  4:16 PM   Result Value Ref Range    Specimen Source . BLOOD     Ordered Test VBG     Reason for Rejection       Unable to perform testing: Specimen quantity not sufficient.    - NOT REPORTED    Blood Gas, Venous    Collection Time: 06/22/21  4:45 PM   Result Value Ref Range    pH, Ramon 7.250 (L) 7.320 - 7.420    pCO2, Ramon 64.5 (H) 39 - 55    pO2, Ramon 50.1 (H) 30 - 50    HCO3, Venous 27.4 24 - 30 mmol/L    Positive Base Excess, Ramon NOT REPORTED 0.0 - 2.0 mmol/L    Negative Base Excess, Ramon 0.7 0.0 - 2.0 mmol/L    O2 Sat, Ramon 75.9 60.0 - 85.0 %    Total Hb NOT REPORTED 12.0 - 16.0 g/dl    Oxyhemoglobin NOT REPORTED 95.0 - 98.0 %    Carboxyhemoglobin 1.4 0 - 5 %    Methemoglobin NOT REPORTED 0.0 - 1.5 %    Pt Temp 37.0     pH, Ramon, Temp Adj NOT REPORTED 7.320 - 7.420    pCO2, Ramon, Temp Adj NOT REPORTED 39 - 55 mmHg    pO2, Ramon, Temp Adj NOT REPORTED 30 - 50 mmHg    O2 Device/Flow/% NOT REPORTED     Respiratory Rate NOT REPORTED     Pb Test NOT REPORTED     Sample Site NOT REPORTED     Pt. Position NOT REPORTED     Mode NOT REPORTED     Set Rate NOT REPORTED     Total Rate NOT REPORTED     VT NOT REPORTED     FIO2 INFORMATION NOT PROVIDED     Peep/Cpap NOT REPORTED     PSV NOT REPORTED     Text for Respiratory NOT REPORTED     NOTIFICATION NOT REPORTED     NOTIFICATION TIME NOT REPORTED    POC Glucose Fingerstick    Collection Time: 06/22/21  8:26 PM   Result Value Ref Range    POC Glucose 140 (H) 75 - 110 mg/dL       Imaging/Diagnostics:  XR CHEST PORTABLE    Result Date: 6/22/2021  Cardiomegaly and pulmonary vascular congestion. No gross effusions. Assessment :      Hospital Problems         Last Modified POA    * (Principal) Acute HFrEF (heart failure with reduced ejection fraction) ([address] 6/23/2021 Yes    TANNER variably compliant with BiPAP 6/23/2021 Yes    CAD (coronary artery disease) s/p stenting of L anterior descending artery 2004 6/23/2021 Yes    Ischemic cardiomyopathy 6/23/2021 Yes    Chronic kidney disease, stage II (mild) 6/23/2021 Yes    Normocytic normochromic anemia 6/23/2021 Yes    HTN (hypertension) 6/23/2021 Yes    Type 2 diabetes mellitus without complication ([address] 9/18/0314 Yes    AICD (automatic cardioverter/defibrillator) present 6/23/2021 Yes    Acute respiratory failure with hypoxia ([address] 6/23/2021 Yes          Plan:     Patient status inpatient in the Progressive Unit/Step down    1. AHFrEF: EF: 10-15% on ECHO in 2019, IV Lasix BID for diuresis, Nitro gtt has since been weaned off, continue BiPAP as needed for support and wean supplemental oxygen as tolerated, continue beta blocker so long as BP tolerates, daily weights, am CXR, trend bnp, I/O, and continuous telemetry. Follows OP with Dr. Enmanuel De La Vega  2.  Acute Respiratory Failure with hypoxia: 2/1, continue treatment as above and BiPAP as needed, continue to monitor  3. CKD: serum creatinine is around baseline, continue to trend with daily labs and diurese as above  4. PAF: on eliquis for long term AC, amiodarone held for now and resume betablocker  5. HTN: wean nitro gtt as tolerated and resume home antihypertensives lisinopril, beta blockers, and now on IV diuretics  6. Type II DM: ACHS glucose checks, ISS for glycemic control, and hypoglycemia protocol  7. Normocytic Normochromic Anemia: anemia workup  8. TANNER: varying BiPAP compliance, continue with HS use and follow up OP with pulmonology  9. ICM s/p AICD  10. Routine labs, home medications resumed, cardiac diet as tolerated  11. DVT prophylaxis sufficient with Eliquis  12. Full Code    Consultations:   IP CONSULT TO HOSPITALIST  IP CONSULT TO HEART FAILURE NURSE/COORDINATOR  IP CONSULT TO DIETITIAN  IP CONSULT TO RESPIRATORY CARE    Patient is admitted as inpatient status because of co-morbidities listed above, severity of signs and symptoms as outlined, requirement for current medical therapies and most importantly because of direct risk to patient if care not provided in a hospital setting. Expected length of stay > 48 hours.     LIDIA Mac NP  6/23/2021  12:55 AM    Copy sent to LIDIA Dueñas - CNP

## 2021-06-23 NOTE — PROGRESS NOTES
Congestive Heart Failure Education completed and charted. CHF booklet given. Patient was receptive to education. Discussed the  importance of medication compliance. Discussed the importance of a heart healthy diet. Discussed 2000 mg sodium-restricted daily diet. Patient instructed to limit fluid intake to  1.5 to 2 liters per day. Patient instructed to weigh self at the same time of each day each morning, reinforced teaching to monitor for 3-5 lb weight increase over 1-2 days notify physician if change noted. Signs and symptoms of CHF discussed with patient, such as feeling more tired than normal, feeling short of breath, coughing that increases when lying down, sudden weight gain, swelling of the feet, legs or belly. Patient verbalized understanding to notify physician office if these symptoms occur.     EF 13%     Patient refusing referral to  CHF Clinic today

## 2021-06-23 NOTE — ED NOTES
Pt & family notified of room number, pt in no distress at this time, tolerating 4LNC without distress     [facility] [address] [facility]  06/22/21 2288

## 2021-06-23 NOTE — CARE COORDINATION
Case Management Initial Discharge Plan  Tony Licona             Met with:patient to discuss discharge plans. Information verified: address, contacts, phone number, , insurance Yes  Insurance Provider: [facility]    Emergency Contact/Next of Kin name & number: Madison Huber cousin  [phone]  Who are involved in patient's support system? Sayra Walker and also pt's landlord and friend Nicolerosemary Coffey  [phone]    PCP: Marika Lambert, APRN - CNP  Date of last visit:       Discharge Planning    Living Arrangements:  Alone     Home has 1 stories  3 stairs to climb to get into front door    Patient able to perform ADL's:Independent    Current Services (outpatient & in home) none  DME equipment: cpap,walker(doesnt use), cane(rare),glucometer  DME provider:     Is patient receiving oral anticoagulation therapy? Yes    If indicated:   Physician managing anticoagulation treatment: PCP  Where does patient obtain lab work for ATC treatment? N/a eliquis      Potential Assistance Needed:  N/A    Patient agreeable to home care: No  [place] of choice provided:  n/a    Prior SNF/Rehab Placement and Facility: none  Agreeable to SNF/Rehab: No  [place] of choice provided: n/a     Evaluation: no    Expected Discharge date:  21    Patient expects to be discharged to:  home    If home: is the family and/or caregiver wiling & able to provide support at home? yes  Who will be providing this support? Cousins or friend    Follow Up Appointment: Best Day/ Time:      Transportation provider: family or friend  Transportation arrangements needed for discharge: No    Readmission Risk              Risk of Unplanned Readmission:  21             Does patient have a readmission risk score greater than 14?: Yes  If yes, follow-up appointment must be made within 7 days of discharge.      Goals of Care: Fluid off heart      Educated pt on transitional options, provided freedom of choice and are agreeable with plan      Discharge Plan: Home independent, has family/friends for support if needed.           Electronically signed by Kanu Perdomo RN on 6/23/21 at 3:50 PM EDT

## 2021-06-23 NOTE — PLAN OF CARE
Problem: Falls - Risk of:  Goal: Will remain free from falls  Description: Will remain free from falls  Outcome: Ongoing  Goal: Absence of physical injury  Description: Absence of physical injury  Outcome: Ongoing     Problem: Breathing Pattern - Ineffective:  Goal: Ability to achieve and maintain a regular respiratory rate will improve  Description: Ability to achieve and maintain a regular respiratory rate will improve  Outcome: Ongoing     Problem: Skin Integrity:  Goal: Will show no infection signs and symptoms  Description: Will show no infection signs and symptoms  Outcome: Ongoing  Goal: Absence of new skin breakdown  Description: Absence of new skin breakdown  Outcome: Ongoing

## 2021-06-23 NOTE — PROGRESS NOTES
Nutrition Education    · Verbally reviewed information with Patient  · Educated on Heart Healthy- Reduced Sodium Nutrition Therapy. Pt received education previously and has been implementing changes in his diet. Recently, he had fallen back into his old ways. Reiterated the importance of balance and moderation. Pt was receptive to information and had no further questions at that time. · Written educational materials provided. · Contact name and number provided.   .    Electronically signed by Kyra Coulter on 6/23/21 at 10:39 AM EDT    Contact: [phone]

## 2021-06-23 NOTE — PROGRESS NOTES
[facility]  Occupational Therapy Not Seen Note    DATE: 2021    NAME: Juli Jiang  MRN: [id number]   : 1955      Patient not seen this date for Occupational Therapy due to:    Patient is not appropriate for active participation in OT evaluation/treatment at this time d/t low blood pressure; BP 71/35 upon entering room pt in supine, ck'd again 87/51. RN notified.      Next Scheduled Treatment: CK     Electronically signed by Ivan Saez S/OT on 2021 at 1:23 PM

## 2021-06-23 NOTE — ED NOTES
Report called to [facility], pt resting without distress     Nallely Harding  06/22/21 2042

## 2021-06-23 NOTE — PROGRESS NOTES
Physical Therapy        Physical Therapy Cancel Note      DATE: 2021    NAME: Cynthia Corona  MRN: [id number]   : 1955      Patient not seen this date for Physical Therapy due to:    Patient is not appropriate for PT evaluation/treatment at this time d/t Pt hypotensive upon writer's arrival with BP of 73/45 mmHg. Covering RN notified, PT will check back for eval appropriateness .       Electronically signed by REDD Angel on 2021 at 1:36 PM

## 2021-06-23 NOTE — ED NOTES
Report attempted, Receiving RN unavailable at this time, RN transferring another patient to ICU at this time     [facility]  06/22/21 2018

## 2021-06-23 NOTE — PROGRESS NOTES
[facility]  Office: [address], DO, Kimo Gear, DO, Frances Molina, DO, Armando Ceballos Blood, DO, Harvinder Ashton MD, Rosa Rosenthal MD, Keith Michelle MD, Adam Gale MD, Gary Pichardo MD, Sugey Boyd MD, Nathalie Malagon MD, Madison Keenan MD, Ai Mora, DO, Mel Feng MD, Georges Walsh, DO, Rose Marie Bryan MD,  Veronika Ahn, DO, Rowan Dolan MD, Bob Gomez MD, Piedad Mccoy MD, Felipa Zhang MD, Kimani Maria, Faizan Claros, CNP, Damian Shah, CNP, Madina Mask, CNS, Elsa Okabena, CNP, Verena Can, CNP, Ascencion Manriquez, CNP, Mariano Lieberman, CNP, Leonel Streeter, CNP, Lendel Nissen, PA-C, Yasmeen Peña, [facility], Trinidad Gilmore, CNP, Carmen Trejo, CNP, Blanca Cook, CNP, Miryam Rodriguez, CNP, Logan Man, CNP, Portia Donaldson, [address] [place] [address]    Progress Note    6/23/2021    3:57 PM    Name:   Magdalene Gaucher  MRN:     [id number]     Acct:      [de-identified]   Room:   [address] [place] [address] Day:  1  Admit Date:  6/22/2021  2:48 PM    PCP:   LIDIA Glez CNP  Code Status:  Full Code    Subjective:     C/C:   Chief Complaint   Patient presents with    Respiratory Distress     hx of cardiac arrest last july      Interval History Status: improved. Patient feels significantly improved today compared to arrival.  He is resting comfortably in bed and able to communicate in full sentences. He currently denies any chest pain, nausea, vomiting, diarrhea. He still has some dyspnea on exertion. Blood pressure has been running low ranging between 78/48 and 107/56. Patient states his pressures normally run in this range. No current evidence of endorgan damage. Patient mentation is intact and extremities are warm to touch. He states he feels 100% better today compared to arrival.     Brief History:    This is a 78-year-old male with underlying history of hypertension, CAD, type 2 diabetes, cardiomyopathy/ severe LVSD s/p cardiac arrest in  s/p AICD, CHF,IgM MGUS (follows with hem/onc), hematuria (follows with urology), and TANNER with varying BiPAP compliance presents to the emergency department with worsening shortness of breath over the last 3 days and new cough with pink frothy sputum production that started earlier today. Patient called cardiology office prior to ER presentation. States he was told to take additional dose of metoprolol and additional dose of p.o. Lasix. In total, patient has taken 3 doses of home Lasix prior to ER presentation. Patient endorses dyspnea at rest but most significantly on activity. Was gardening and doing housework today and had to stop multiple times for rest periods. Denies symptoms of orthopnea. Does admit to possibly missing a dose of Lasix yesterday and today. Denies any chest pain, abdominal pain, nausea/vomiting, diarrhea, fevers, myalgias or arthralgias, and denies bilateral lower extremity edema. Patient in respiratory distress on arrival with tachypnea and tachycardia. Was placed on BiPAP in the emergency department with rapid improvement in symptoms. Pertinent presenting labs include sodium: 134, BUN: 26, serum creatinine: 1.26, GFR: 57, glucose: 140, proBNP: 2283, troponin: 15>16, hemoglobin: 12.3, VB.25/64.5/50/27.4, CXR: Cardiomegaly and pulmonary vascular congestion. Patient was started on nitroglycerin drip in the ER and continued on BiPAP. He is admitted to [facility] for further management of CHF exacerbation.     Review of Systems:     Constitutional:  negative for chills, fevers, sweats  Respiratory:  negative for cough, admits to dyspnea on exertion, shortness of breath, denies wheezing  Cardiovascular:  negative for chest pain, chest pressure/discomfort,admits to some lower extremity edema, no palpitations  Gastrointestinal:  negative for abdominal pain, constipation, diarrhea, nausea, vomiting  Neurological:  negative for dizziness, headache    Medications: Allergies: Allergies   Allergen Reactions    Zetia [Ezetimibe] Shortness Of Breath    Bactrim Other (See Comments)     palpitations    Cephalexin     Cephalexin     Sulfamethoxazole-Trimethoprim        Current Meds:   Scheduled Meds:    allopurinol  300 mg Oral Daily    apixaban  5 mg Oral BID    aspirin  81 mg Oral BID    atorvastatin  40 mg Oral Nightly    furosemide  40 mg Intravenous BID    lisinopril  5 mg Oral Daily    metoprolol succinate  25 mg Oral Daily    sodium chloride flush  5-40 mL Intravenous 2 times per day    insulin lispro  0-12 Units Subcutaneous TID WC    insulin lispro  0-6 Units Subcutaneous Nightly     Continuous Infusions:    nitroGLYCERIN Stopped (06/22/21 1752)    sodium chloride      dextrose       PRN Meds: acetaminophen, sodium chloride flush, sodium chloride, ondansetron **OR** ondansetron, acetaminophen **OR** acetaminophen, glucose, dextrose, glucagon (rDNA), dextrose, perflutren lipid microspheres, bisacodyl    Data:     Past Medical History:   has a past medical history of Anemia, Anxiety, CAD (coronary artery disease), Cardiac defibrillator in place, Cardiomyopathy [facility]), CHF (congestive heart failure) ([facility] [address] Chronic combined systolic and diastolic heart failure ([facility] [address] s/[ AICD placed, Chronic kidney disease, Complex sleep apnea syndrome, Diabetic retinopathy ([facility] [address] Diverticulitis, DJD (degenerative joint disease), Edentulous, Gout, HTN (hypertension), Hyperlipidemia, Hypertension, Iron deficiency anemia, Ischemic cardiomyopathy, Morbid obesity ([facility] [address] Obesity, TANNER variably compliant with BiPAP, Osteoarthritis, PAF (paroxysmal atrial fibrillation) ([facility] [address] Psychophysiologic insomnia, Type II or unspecified type diabetes mellitus without mention of complication, not stated as uncontrolled, and Unspecified sleep apnea. Social History:   reports that he quit smoking about 47 years ago. His smoking use included cigarettes.  He started smoking about 50 years ago. He has a 3.00 pack-year smoking history. He has never used smokeless tobacco. He reports previous drug use. Drug: Marijuana. He reports that he does not drink alcohol. Family History:   Family History   Problem Relation Age of Onset    Cancer Mother     Heart Disease Mother         due to smoking    Heart Disease Maternal Uncle     Heart Disease Maternal Grandmother        Vitals:  BP (!) 80/47   Pulse 57   Temp 98 °F (36.7 °C) (Oral)   Resp 13   Ht 5' 4\" (1.626 m)   Wt 242 lb (109.8 kg)   SpO2 99%   BMI 41.54 kg/m²   Temp (24hrs), Av.7 °F (36.5 °C), Min:97 °F (36.1 °C), Max:98.3 °F (36.8 °C)    Recent Labs     21  0851   POCGLU 140* 148*       I/O (24Hr):     Intake/Output Summary (Last 24 hours) at 2021 1557  Last data filed at 2021 1100  Gross per 24 hour   Intake --   Output 500 ml   Net -500 ml       Labs:  Hematology:  Recent Labs     21  0503   WBC 11.2 7.9   RBC 4.57 3.96*   HGB 12.3* 10.6*   HCT 41.2 35.8*   MCV 90.2 90.4   MCH 26.9 26.8   MCHC 29.9 29.6   RDW 16.6* 16.5*    197   MPV 11.3 11.7     Chemistry:  Recent Labs     21  1616 21  0503   *  --  139   K 4.5  --  4.4   CL 99  --  101   CO2 20  --  28   GLUCOSE 250*  --  99   BUN 26*  --  34*   CREATININE 1.26*  --  1.37*   ANIONGAP 15  --  10   LABGLOM 57*  --  52*   GFRAA >60  --  >60   CALCIUM 8.8  --  8.3*   PROBNP 2,283*  --   --    TROPHS 15 16  --      Recent Labs     21  14521  0503 21  0851   PROT 7.7  --   --   --    LABALBU 3.9  --   --   --    TSH  --   --  13.61*  --    AST 23  --   --   --    ALT 12  --   --   --    ALKPHOS 81  --   --   --    BILITOT 0.37  --   --   --    CHOL  --   --  124  --    HDL  --   --  43  --    LDLCHOLESTEROL  --   --  55  --    CHOLHDLRATIO  --   --  2.9  --    TRIG  --   --  129  --    VLDL  --   --  NOT REPORTED  --    POCGLU  -- 140*  --  148*     ABG:  Lab Results   Component Value Date    FIO2 INFORMATION NOT PROVIDED 06/22/2021     Lab Results   Component Value Date/Time    SPECIAL RT HAND ML 09/13/2017 01:59 AM    SPECIAL RT ARM 24ML 09/13/2017 01:59 AM     Lab Results   Component Value Date/Time    CULTURE NO GROWTH 6 DAYS 09/13/2017 01:59 AM    CULTURE  09/13/2017 01:59 AM     Anurag Palmaab [id number] [facility], [address] [place] [address] [phone]    CULTURE NO GROWTH 6 DAYS 09/13/2017 01:59 AM    CULTURE  09/13/2017 01:59 AM     Anurag Schwab [address] [facility], [address] [place] [address] [phone]       Radiology:  XR CHEST PORTABLE    Result Date: 6/22/2021  Cardiomegaly and pulmonary vascular congestion. No gross effusions. Physical Examination:        General appearance:  alert, cooperative and no distress, chronically ill appearing male. Mental Status:  oriented to person, place and time and normal affect  Lungs:  Diminished breath sounds at bases normal effort. No crackles. Heart:  regular rate and rhythm, no murmur  Abdomen:  soft, nontender, nondistended, normal bowel sounds, no masses, hepatomegaly, splenomegaly  Extremities:  no edema, redness, tenderness in the calves  Skin:  no gross lesions, rashes, induration    Assessment:        Hospital Problems         Last Modified POA    * (Principal) Acute HFrEF (heart failure with reduced ejection fraction) ([facility] [address] 6/23/2021 Yes    TANNER variably compliant with BiPAP 6/23/2021 Yes    CAD (coronary artery disease) s/p stenting of L anterior descending artery 2004 6/23/2021 Yes    Ischemic cardiomyopathy 6/23/2021 Yes    Chronic kidney disease, stage II (mild) 6/23/2021 Yes    Normocytic normochromic anemia 6/23/2021 Yes    HTN (hypertension) 6/23/2021 Yes    Type 2 diabetes mellitus without complication ([address] 7/04/4992 Yes    AICD (automatic cardioverter/defibrillator) present 6/23/2021 Yes    Acute respiratory failure with hypoxia ([address] 6/23/2021 Yes          Plan:        1.  Acute exacerbation of HFrEF with hypotension: Echo from 3/21 EF:13%. Pro-BNP 2283. Improved with IV lasix initially. Now appears Euvolemic. Monitor I/O's, check daily weight. Pt does have hypotension however he states he chronically runs low which could be l 2/2 his advanced CHF. Monitor for signs of end organ damage. Spoke with Cardiology office, will see pt in am. Continue GDMT as tolerated. Bipap PRN. Replace electrolytes. No midodrine given educed EF. 2. Acute Kidney injury: Scr 0.9 at baseline, up to 1.37 today. Could be 2/2 cardiorenal syndrome. Pt now appears more Euvolemic. Monitor Scr. Avoid nephrotoxic agents. Continue diureses if blood pressure allows. 3. Paroxysmal Atrial fibrillation: Continue Eliquis, amiodarone, BB. Hold parameters added  4. Hx of NICM: continue ASA, Statin, BB. Add ACE if tolerated and Scr improves. AICD in place  5. Due to mellitus type II with hyperglycemia: continue current regimen. 6. Obesity BMI of 41: recommend weight loss and lifestyle modification. 7. Hypothyroidism: TSH: 13. T4: 0.59. Ideal body weight 58 kg. Will start very low dose synthroid and monitor for cardiac symptoms. Of note, pt is on amiodarone. 8. Gout: continue allopurinol  9.  PT/OT        July Cooper DO  6/23/2021  3:57 PM

## 2021-06-23 NOTE — ED NOTES
ED to inpatient nurses report    Chief Complaint   Patient presents with    Respiratory Distress     hx of cardiac arrest last july       Present to ED from home with c/o shortness of breath, pt on Cpap upon arrival by EMS  LOC: Alert & oriented x 4  Vital signs   Vitals:    06/22/21 1805 06/22/21 1846 06/22/21 1932 06/22/21 1947   BP: (!) 95/54  110/69 109/66   Pulse: 52 52 58 61   Resp: 15 15 18 19   Temp:       TempSrc:       SpO2:  99% 100% 97%   Weight:       Height:          Oxygen Baseline room air    Current needs required 4L NC, pt was taken off bipap at 1812  LDAs:   Peripheral IV 06/22/21 Left Antecubital (Active)   Site Assessment Clean;Dry; Intact 06/22/21 1454   Line Status Blood return noted; Flushed 06/22/21 1454   Dressing Status Clean;Dry; Intact 06/22/21 1454   Dressing Intervention New 06/22/21 1454     Mobility: up x 1 assist  Pending ED orders: labs, medications and bipap PRN  Present condition: pt resting comfortably at this time, no distress noted, tolerating 4L NC at this time, no respiratory at this time  Code Status: Full code  Consults:  []  Hospitalist  Completed  [x] yes [] no  []  Medicine  Completed  [] yes [] No  []  Cardiology  Completed  [] yes [] No  []  GI   Completed  [] yes [] No  []  Neurology  Completed  [] yes [] No  []  Nephrology Completed  [] yes [] No  []  Vascular  Completed  [] yes [] No   []  Surgery  Completed  [] yes [] No   []  Urology  Completed  [] yes [] No   []  Plastics  Completed  [] yes [] No   []  ENT  Completed  [] yes [] No   []  Other Completed  [] yes [] No  Pertinent event(s)   Pertinent event(s)  Electronically signed by Jadon Appiah RN on 6/22/2021 at 8:11 PM     Jadon Appiah [facility]  06/22/21 2016

## 2021-06-23 NOTE — PROGRESS NOTES
Paged primary team to make aware bp dropping since arrival to floor. Midodrine one time dose ordered. Pt on bipap and responding to questions.

## 2021-06-24 ENCOUNTER — APPOINTMENT (OUTPATIENT)
Dept: GENERAL RADIOLOGY | Age: 66
DRG: 291 | End: 2021-06-24
Payer: COMMERCIAL

## 2021-06-24 LAB
ABSOLUTE EOS #: 0.15 K/UL (ref 0–0.44)
ABSOLUTE IMMATURE GRANULOCYTE: <0.03 K/UL (ref 0–0.3)
ABSOLUTE LYMPH #: 0.99 K/UL (ref 1.1–3.7)
ABSOLUTE MONO #: 0.4 K/UL (ref 0.1–1.2)
ALBUMIN SERPL-MCNC: 3.2 G/DL (ref 3.5–5.2)
ANION GAP SERPL CALCULATED.3IONS-SCNC: 9 MMOL/L (ref 9–17)
BASOPHILS # BLD: 0 % (ref 0–2)
BASOPHILS ABSOLUTE: <0.03 K/UL (ref 0–0.2)
BNP INTERPRETATION: ABNORMAL
BUN BLDV-MCNC: 34 MG/DL (ref 8–23)
BUN/CREAT BLD: ABNORMAL (ref 9–20)
CALCIUM SERPL-MCNC: 8.2 MG/DL (ref 8.6–10.4)
CHLORIDE BLD-SCNC: 103 MMOL/L (ref 98–107)
CO2: 27 MMOL/L (ref 20–31)
CREAT SERPL-MCNC: 1.22 MG/DL (ref 0.7–1.2)
DIFFERENTIAL TYPE: ABNORMAL
EOSINOPHILS RELATIVE PERCENT: 3 % (ref 1–4)
FERRITIN: 16 UG/L (ref 30–400)
GFR AFRICAN AMERICAN: >60 ML/MIN
GFR NON-AFRICAN AMERICAN: 59 ML/MIN
GFR SERPL CREATININE-BSD FRML MDRD: ABNORMAL ML/MIN/{1.73_M2}
GFR SERPL CREATININE-BSD FRML MDRD: ABNORMAL ML/MIN/{1.73_M2}
GLUCOSE BLD-MCNC: 101 MG/DL (ref 70–99)
GLUCOSE BLD-MCNC: 113 MG/DL (ref 75–110)
GLUCOSE BLD-MCNC: 136 MG/DL (ref 75–110)
GLUCOSE BLD-MCNC: 197 MG/DL (ref 75–110)
GLUCOSE BLD-MCNC: 92 MG/DL (ref 75–110)
HCT VFR BLD CALC: 35.2 % (ref 40.7–50.3)
HEMOGLOBIN: 10.5 G/DL (ref 13–17)
IMMATURE GRANULOCYTES: 0 %
IRON SATURATION: 15 % (ref 20–55)
IRON: 36 UG/DL (ref 59–158)
LYMPHOCYTES # BLD: 18 % (ref 24–43)
MAGNESIUM: 2.1 MG/DL (ref 1.6–2.6)
MCH RBC QN AUTO: 27.1 PG (ref 25.2–33.5)
MCHC RBC AUTO-ENTMCNC: 29.8 G/DL (ref 28.4–34.8)
MCV RBC AUTO: 90.7 FL (ref 82.6–102.9)
MONOCYTES # BLD: 7 % (ref 3–12)
NRBC AUTOMATED: 0 PER 100 WBC
PDW BLD-RTO: 16.4 % (ref 11.8–14.4)
PHOSPHORUS: 4 MG/DL (ref 2.5–4.5)
PLATELET # BLD: 168 K/UL (ref 138–453)
PLATELET ESTIMATE: ABNORMAL
PMV BLD AUTO: 11.4 FL (ref 8.1–13.5)
POTASSIUM SERPL-SCNC: 4.4 MMOL/L (ref 3.7–5.3)
PRO-BNP: 3081 PG/ML
RBC # BLD: 3.88 M/UL (ref 4.21–5.77)
RBC # BLD: ABNORMAL 10*6/UL
SEG NEUTROPHILS: 72 % (ref 36–65)
SEGMENTED NEUTROPHILS ABSOLUTE COUNT: 3.87 K/UL (ref 1.5–8.1)
SODIUM BLD-SCNC: 139 MMOL/L (ref 135–144)
TOTAL IRON BINDING CAPACITY: 243 UG/DL (ref 250–450)
UNSATURATED IRON BINDING CAPACITY: 207 UG/DL (ref 112–347)
WBC # BLD: 5.4 K/UL (ref 3.5–11.3)
WBC # BLD: ABNORMAL 10*3/UL

## 2021-06-24 PROCEDURE — 97166 OT EVAL MOD COMPLEX 45 MIN: CPT

## 2021-06-24 PROCEDURE — 6370000000 HC RX 637 (ALT 250 FOR IP): Performed by: INTERNAL MEDICINE

## 2021-06-24 PROCEDURE — 6360000002 HC RX W HCPCS: Performed by: INTERNAL MEDICINE

## 2021-06-24 PROCEDURE — 83550 IRON BINDING TEST: CPT

## 2021-06-24 PROCEDURE — 71045 X-RAY EXAM CHEST 1 VIEW: CPT

## 2021-06-24 PROCEDURE — 36415 COLL VENOUS BLD VENIPUNCTURE: CPT

## 2021-06-24 PROCEDURE — 2060000000 HC ICU INTERMEDIATE R&B

## 2021-06-24 PROCEDURE — 83735 ASSAY OF MAGNESIUM: CPT

## 2021-06-24 PROCEDURE — 2580000003 HC RX 258: Performed by: INTERNAL MEDICINE

## 2021-06-24 PROCEDURE — 99232 SBSQ HOSP IP/OBS MODERATE 35: CPT | Performed by: INTERNAL MEDICINE

## 2021-06-24 PROCEDURE — 97535 SELF CARE MNGMENT TRAINING: CPT

## 2021-06-24 PROCEDURE — 82947 ASSAY GLUCOSE BLOOD QUANT: CPT

## 2021-06-24 PROCEDURE — 85025 COMPLETE CBC W/AUTO DIFF WBC: CPT

## 2021-06-24 PROCEDURE — 83880 ASSAY OF NATRIURETIC PEPTIDE: CPT

## 2021-06-24 PROCEDURE — 6360000002 HC RX W HCPCS

## 2021-06-24 PROCEDURE — 76937 US GUIDE VASCULAR ACCESS: CPT

## 2021-06-24 PROCEDURE — 94660 CPAP INITIATION&MGMT: CPT

## 2021-06-24 PROCEDURE — 83540 ASSAY OF IRON: CPT

## 2021-06-24 PROCEDURE — 97530 THERAPEUTIC ACTIVITIES: CPT

## 2021-06-24 PROCEDURE — 82728 ASSAY OF FERRITIN: CPT

## 2021-06-24 PROCEDURE — 97162 PT EVAL MOD COMPLEX 30 MIN: CPT

## 2021-06-24 PROCEDURE — 80069 RENAL FUNCTION PANEL: CPT

## 2021-06-24 RX ORDER — FUROSEMIDE 10 MG/ML
INJECTION INTRAMUSCULAR; INTRAVENOUS
Status: COMPLETED
Start: 2021-06-24 | End: 2021-06-24

## 2021-06-24 RX ORDER — FUROSEMIDE 20 MG/1
20 TABLET ORAL 2 TIMES DAILY
Status: DISCONTINUED | OUTPATIENT
Start: 2021-06-24 | End: 2021-06-25 | Stop reason: HOSPADM

## 2021-06-24 RX ORDER — ISOSORBIDE MONONITRATE 30 MG/1
30 TABLET, EXTENDED RELEASE ORAL DAILY
Status: DISCONTINUED | OUTPATIENT
Start: 2021-06-24 | End: 2021-06-25 | Stop reason: HOSPADM

## 2021-06-24 RX ORDER — LISINOPRIL 5 MG/1
5 TABLET ORAL DAILY
Status: DISCONTINUED | OUTPATIENT
Start: 2021-06-24 | End: 2021-06-25 | Stop reason: HOSPADM

## 2021-06-24 RX ADMIN — LEVOTHYROXINE SODIUM 12.5 MCG: 25 TABLET ORAL at 09:00

## 2021-06-24 RX ADMIN — Medication 81 MG: at 21:51

## 2021-06-24 RX ADMIN — Medication 81 MG: at 09:01

## 2021-06-24 RX ADMIN — APIXABAN 5 MG: 5 TABLET, FILM COATED ORAL at 09:01

## 2021-06-24 RX ADMIN — IRON SUCROSE 300 MG: 20 INJECTION, SOLUTION INTRAVENOUS at 12:33

## 2021-06-24 RX ADMIN — INSULIN LISPRO 2 UNITS: 100 INJECTION, SOLUTION INTRAVENOUS; SUBCUTANEOUS at 12:30

## 2021-06-24 RX ADMIN — SODIUM CHLORIDE, PRESERVATIVE FREE 10 ML: 5 INJECTION INTRAVENOUS at 21:52

## 2021-06-24 RX ADMIN — FUROSEMIDE 40 MG: 10 INJECTION, SOLUTION INTRAVENOUS at 09:00

## 2021-06-24 RX ADMIN — ATORVASTATIN CALCIUM 40 MG: 80 TABLET, FILM COATED ORAL at 21:52

## 2021-06-24 RX ADMIN — ALLOPURINOL 300 MG: 300 TABLET ORAL at 09:01

## 2021-06-24 RX ADMIN — AMIODARONE HYDROCHLORIDE 200 MG: 200 TABLET ORAL at 09:01

## 2021-06-24 RX ADMIN — APIXABAN 5 MG: 5 TABLET, FILM COATED ORAL at 21:51

## 2021-06-24 RX ADMIN — SODIUM CHLORIDE, PRESERVATIVE FREE 10 ML: 5 INJECTION INTRAVENOUS at 09:00

## 2021-06-24 ASSESSMENT — PAIN SCALES - GENERAL
PAINLEVEL_OUTOF10: 0
PAINLEVEL_OUTOF10: 2

## 2021-06-24 ASSESSMENT — PAIN DESCRIPTION - LOCATION: LOCATION: KNEE

## 2021-06-24 ASSESSMENT — PAIN DESCRIPTION - ORIENTATION: ORIENTATION: LEFT

## 2021-06-24 NOTE — PLAN OF CARE
Problem: Falls - Risk of:  Goal: Will remain free from falls  Description: Will remain free from falls  Outcome: Ongoing  Goal: Absence of physical injury  Description: Absence of physical injury  Outcome: Ongoing     Problem: Breathing Pattern - Ineffective:  Goal: Ability to achieve and maintain a regular respiratory rate will improve  Description: Ability to achieve and maintain a regular respiratory rate will improve  Outcome: Ongoing     Problem: Skin Integrity:  Goal: Will show no infection signs and symptoms  Description: Will show no infection signs and symptoms  Outcome: Ongoing  Goal: Absence of new skin breakdown  Description: Absence of new skin breakdown  Outcome: Ongoing     Problem: Anxiety:  Goal: Level of anxiety will decrease  Description: Level of anxiety will decrease  Outcome: Ongoing     Problem: Infection:  Goal: Will remain free from infection  Description: Will remain free from infection  Outcome: Ongoing     Problem: Safety:  Goal: Free from accidental physical injury  Description: Free from accidental physical injury  Outcome: Ongoing  Goal: Free from intentional harm  Description: Free from intentional harm  Outcome: Ongoing     Problem: Daily Care:  Goal: Daily care needs are met  Description: Daily care needs are met  Outcome: Ongoing     Problem: Pain:  Goal: Patient's pain/discomfort is manageable  Description: Patient's pain/discomfort is manageable  Outcome: Ongoing     Problem: Skin Integrity:  Goal: Skin integrity will stabilize  Description: Skin integrity will stabilize  Outcome: Ongoing     Problem: Discharge Planning:  Goal: Patients continuum of care needs are met  Description: Patients continuum of care needs are met  Outcome: Ongoing

## 2021-06-24 NOTE — CONSULTS
Cardiovascular Consult Note     TODAY'S DATE: 6/24/2021    Patient name: Yared Haines   YOB: 1955  Date of admission:  6/22/2021       Patient seen, examined. Previous clinical entries reviewed. All available laboratory, imaging and ancillary data reviewed. Reason for Consult: Heart failure    History of present Illness:     Yared Haines is a 74 y.o. male with past medical history significant for ischemic cardiomyopathy with severe left ventricular systolic dysfunction and nonrevascularizable coronary artery disease involving the left anterior descending artery, paroxysmal atrial fibrillation on Eliquis, hypertension, hyperlipidemia, iron deficiency anemia and morbid obesity who has been having episodes of increasing fatigue and shortness of breath along with some palpitations while he was gardening and doing housework today. He denies any new worsening lower extremity edema or orthopnea. On routine work-up in the emergency room, his lab indicis showed normal troponin, reduced hemoglobin along with cardiomegaly and increased pulmonary vascular congestion. He was seen in the emergency room and started on nitroglycerin drip and continued on BiPAP. All these have been weaned off. Since his admission, he has remained stable. He was noted to have mildly worsening anemia with blood indices showing iron deficiency anemia. He denies any new chest pain. His lower extremity edema is stable.     Past Medical History:    has a past medical history of Anemia, Anxiety, CAD (coronary artery disease), Cardiac defibrillator in place, Cardiomyopathy [facility]), CHF (congestive heart failure) ([facility] [address] Chronic combined systolic and diastolic heart failure ([facility] [address] s/[ AICD placed, Chronic kidney disease, Complex sleep apnea syndrome, Diabetic retinopathy ([facility] [address] Diverticulitis, DJD (degenerative joint disease), Edentulous, Gout, HTN (hypertension), Hyperlipidemia, Hypertension, Iron deficiency anemia, Ischemic cardiomyopathy, Morbid obesity ([facility] [address] Obesity, TANNER variably compliant with BiPAP, Osteoarthritis, PAF (paroxysmal atrial fibrillation) ([facility] [address] Psychophysiologic insomnia, Type II or unspecified type diabetes mellitus without mention of complication, not stated as uncontrolled, and Unspecified sleep apnea. Surgical History:     Past Surgical History:   Procedure Laterality Date    BONE MARROW BIOPSY  2012 approx    CARDIAC CATHETERIZATION  8-2007    severe stenosis pre-stent area    CARDIAC CATHETERIZATION  09/11/2013    Very peripheral stenosis, not amendable to PCI, stents patent    CARDIAC DEFIBRILLATOR PLACEMENT  8/26/2015    COLONOSCOPY  11 7 2007   [address]  1998, 2004    stent LAD    PACEMAKER PLACEMENT  2005 or 2006    AICD    GA COLSC FLX W/RMVL OF TUMOR POLYP LESION SNARE TQ N/A 4/4/2017    COLONOSCOPY POLYPECTOMY SNARE/COLD BIOPSY performed by Mary Ann Posada DO at [facility]       Medications:   Scheduled Meds:   amiodarone  200 mg Oral Daily    furosemide  40 mg Intravenous Daily    levothyroxine  12.5 mcg Oral Daily    allopurinol  300 mg Oral Daily    apixaban  5 mg Oral BID    aspirin  81 mg Oral BID    atorvastatin  40 mg Oral Nightly    [Held by provider] lisinopril  5 mg Oral Daily    metoprolol succinate  25 mg Oral Daily    sodium chloride flush  5-40 mL Intravenous 2 times per day    insulin lispro  0-12 Units Subcutaneous TID WC    insulin lispro  0-6 Units Subcutaneous Nightly     Continuous Infusions:   sodium chloride      dextrose        No outpatient medications have been marked as taking for the 6/22/21 encounter [facility] Encounter). Allergies:   Zetia [ezetimibe], Bactrim, Cephalexin, Cephalexin, and Sulfamethoxazole-trimethoprim    Social History:    reports that he quit smoking about 47 years ago. His smoking use included cigarettes. He started smoking about 50 years ago. He has a 3.00 pack-year smoking history.  He has never used fraction close to 20% with apical dyskinesis. No significant changes from his prior echocardiograms. Impression :     Acute on chronic systolic heart failure. Ischemic cardiomyopathy with severe left ventricular systolic dysfunction. Presence of ICD. Coronary disease with history of nonrevascularizable left anterior descending artery with apical akinesis. Diabetes mellitus. Hypertension. Hyperlipidemia. Iron deficiency anemia. Chronic kidney disease-stage III. Morbid obesity. Plan : For his iron deficiency anemia, I would recommend to give some IV infusion of iron (please see orders). Continue current cardiac medications. We will hold off on repeating echocardiogram for right now. We will follow patient with you. Thank you very much for allowing us to participate in the care of this patient. Please call us with any questions.     Electronically signed by Alma Lyons MD on 6/24/2021 at 10:51 AM

## 2021-06-24 NOTE — PROGRESS NOTES
Occupational Therapy   Occupational Therapy Initial Assessment  Date: 2021   Patient Name: Maurice Mcrae  MRN: [id number]     : 1955    Date of Service: 2021  Obtained From Medical Chart:   Maurice Mcrae is a 74 y.o. male with past medical history significant for ischemic cardiomyopathy with severe left ventricular systolic dysfunction and nonrevascularizable coronary artery disease involving the left anterior descending artery, paroxysmal atrial fibrillation on Eliquis, hypertension, hyperlipidemia, iron deficiency anemia and morbid obesity who has been having episodes of increasing fatigue and shortness of breath along with some palpitations while he was gardening and doing housework today. He denies any new worsening lower extremity edema or orthopnea. On routine work-up in the emergency room, his lab indicis showed normal troponin, reduced hemoglobin along with cardiomegaly and increased pulmonary vascular congestion. He was seen in the emergency room and started on nitroglycerin drip and continued on BiPAP. All these have been weaned off. Since his admission, he has remained stable. He was noted to have mildly worsening anemia with blood indices showing iron deficiency anemia. He denies any new chest pain. His lower extremity edema is stable. Discharge Recommendations:  Patient would benefit from continued therapy after discharge  OT Equipment Recommendations  Equipment Needed: Yes  Mobility Devices: Basilia John; ADL Assistive Devices  Walker: Rolling  ADL Assistive Devices: Shower Chair without back    Assessment   Performance deficits / Impairments: Decreased functional mobility ; Decreased endurance;Decreased ADL status; Decreased balance;Decreased high-level IADLs;Decreased safe awareness  Assessment: Pt in recliner on arrival. Fucntional transfers/mobility completed at [facility] w/ RW. Pt required VC for RW use and to keep RW on the floor.  Pt engaged in toileting in seating at [facility] and catheterization (8-2007); Cardiac catheterization (09/11/2013); Coronary angioplasty (1998, 2004); Cardiac defibrillator placement (8/26/2015); bone marrow biopsy (2012 approx); and pr colsc flx w/rmvl of tumor polyp lesion snare tq (N/A, 4/4/2017). Restrictions  Restrictions/Precautions  Required Braces or Orthoses?: No  Position Activity Restriction  Other position/activity restrictions: Up w/ assist    Subjective   General  Patient assessed for rehabilitation services?: Yes  Family / Caregiver Present: No  Diagnosis: Acute HFrEF (heart failure with reduced ejection fraction) ([facility] [address]  General Comment  Comments: RN ok'd pt for OT eval. Pt pleasant and cooperative throughout. Patient Currently in Pain: Yes  Pain Assessment  Pain Assessment: 0-10  Pain Level: 2  Pain Location: Knee  Pain Orientation: Left  Non-Pharmaceutical Pain Intervention(s): Ambulation/Increased Activity; Distraction  Response to Pain Intervention: Patient Satisfied  Vital Signs  Patient Currently in Pain: Yes    Social/Functional History  Social/Functional History  Lives With: Alone  Type of Home: House  Home Layout: One level  Home Access: Stairs to enter with rails  Entrance Stairs - Number of Steps: 3  Entrance Stairs - Rails: Left  Bathroom Shower/Tub: Tub/Shower unit  Bathroom Toilet: Standard  Home Equipment: Rolling walker, Cane  ADL Assistance: Independent  Homemaking Assistance: Independent  Homemaking Responsibilities: Yes  Meal Prep Responsibility: Primary  Laundry Responsibility: Primary  Cleaning Responsibility: Primary  Shopping Responsibility: Primary  Ambulation Assistance: Independent  Transfer Assistance: Independent  Active : Yes  Mode of Transportation: Car  Occupation: Retired  Type of occupation:  for [employer], transportation [address]: [employer]     Objective   Vision: Impaired  Vision Exceptions: Wears glasses for reading  Hearing: Within functional limits    Orientation  Overall Orientation Status: Within Functional Limits     Balance  Sitting Balance: Stand by assistance (Edge of recliner/toilet ~15min)  Standing Balance: Contact guard assistance  Standing Balance  Time: ~3-4min  Activity: At sink and toilet for ADLs  Comment: W/ RW, demoed B UE release from RW  Functional Mobility  Functional - Mobility Device: Rolling Walker  Activity: To/from bathroom  Assist Level: Contact guard assistance  Functional Mobility Comments: Required VC for RW use and to keept RW on floor  Toilet Transfers  Toilet - Technique: Ambulating  Equipment Used: Grab bars  Toilet Transfer: Contact guard assistance  ADL  Feeding: Modified independent ;Setup  Grooming: Modified independent ;Setup; Increased time to complete (Pt engaged in hand washing at sink at Mod I, CGA for standing balance only)  UE Bathing: Stand by assistance;Setup; Increased time to complete  LE Bathing: Contact guard assistance; Increased time to complete;Setup  UE Dressing: Stand by assistance; Increased time to complete;Setup  LE Dressing: Contact guard assistance; Increased time to complete;Setup (Pt doffed/donned socks while edge of recliner w/ figure 4 tech)  Toileting: Contact guard assistance; Increased time to complete;Setup (Pt engaged in toileting in seated position at [facility], pericare completed in standing at [facility] w/ B UE release from RW)  Tone RUE  RUE Tone: Normotonic  Tone LUE  LUE Tone: Normotonic  Coordination  Movements Are Fluid And Coordinated: Yes     Bed mobility  Comment: Pt in recliner on arrival and exit  Transfers  Sit to stand: Contact guard assistance  Stand to sit: Contact guard assistance     Cognition  Overall Cognitive Status: WFL     Sensation  Overall Sensation Status: WFL      LUE AROM (degrees)  LUE AROM : WFL  Left Hand AROM (degrees)  Left Hand AROM: WFL  RUE AROM (degrees)  RUE AROM : WFL  Right Hand AROM (degrees)  Right Hand AROM: WFL  LUE Strength  Gross LUE Strength: WFL  LUE Strength Comment: grossly 4+/5  RUE Strength  Gross RUE Strength: WFL  RUE Strength Comment: grossly 4+/5      Plan   Plan  Times per week: 3-4 x/wk  Current Treatment Recommendations: Functional Mobility Training, Patient/Caregiver Education & Training, Endurance Training, Equipment Evaluation, Education, & procurement, Home Management Training, Balance Training, Safety Education & Training, Self-Care / ADL    AM-PAC Score  AM-PAC Inpatient Daily Activity Raw Score: 20 (06/24/21 1104)  AM-PAC Inpatient ADL T-Scale Score : 42.03 (06/24/21 1104)  ADL Inpatient CMS 0-100% Score: 38.32 (06/24/21 1104)  ADL Inpatient CMS G-Code Modifier : Judd Barroso (06/24/21 1104)    Goals  Short term goals  Short term goal 1: Pt will, by discharge  Short term goal 2: Demo UB/LB ADLs at Mod I  Short term goal 3: Demo functional mobility/trnasfers at [facility] w/ LRD PRN  Short term goal 4: Demo +10 min of dynamic standing balance reaching outside PAIGE at SBA w/ LRD PRN to engage in ADLs  Short term goal 5: Demo increased safety awareness throughout functional mobility and ADLs w/ 0 VC       Therapy Time   Individual Concurrent Group Co-treatment   Time In 0928         Time Out 0957         Minutes 29         Timed Code Treatment Minutes: 24 Minutes       Preeti Soler S/OT I will STOP taking the medications listed below when I get home from the hospital:    naproxen 500 mg oral tablet  -- 1 tab(s) by mouth 2 times a day, As Needed -for moderate pain  -- Check with your doctor before becoming pregnant.  May cause drowsiness or dizziness.  Obtain medical advice before taking any non-prescription drugs as some may affect the action of this medication.  Take with food or milk.

## 2021-06-24 NOTE — PROGRESS NOTES
[facility]  Office: [address], DO, Doc Kras, DO, Opal Ng, DO, Jessica Hall Blood, DO, Akila Garcia MD, Wang Hernandez MD, Nilsa Wilks MD, Michael Oliva MD, Ashkan Henry MD, Garrett Mcdaniels MD, Yovany Saini MD, Tiffany Quiñones MD, Jenelle Bergman, DO, Hunter Reyes MD, Rosalio Cano, DO, Regina Luther MD,  Payam Barragan, DO, Damián Ramirez MD, To Quiñonez MD, Lillie Hall MD, Morena Small MD, Abdelrahman Chavez, Juana Hernandez, CNP, Gerardo Tolbert CNP, Ashley Ruelas, CNS, Conrad Colbert, CNP, Jerome Faulkner, CNP, Eli Hall, CNP, Agustin Fragoso, CNP, Deepika Solders, CNP, Kendrick Campos PA-C, Vinicius Agustin, [facility], Candice Flores, CNP, Virginia Soda, CNP, Anish Husbands, CNP, Colletta Moros, CNP, Adriana Diaz, CNP, [facility], [address] [place] [address]    Progress Note    6/24/2021    1:42 PM    Name:   Perez Pacheco  MRN:     [id number]     Acct:      [de-identified]   Room:   [address] [place] [address] Day:  2  Admit Date:  6/22/2021  2:48 PM    PCP:   LIDIA Mercedes CNP  Code Status:  Full Code    Subjective:     C/C:   Chief Complaint   Patient presents with    Respiratory Distress     hx of cardiac arrest last july      Interval History Status: improved. Patient feels better today but is still complaining of some weakness and shortness of breath. Blood pressure has been stable overnight. Denies any numbness, tingling, fevers, chills or chest pain    Brief History:    This is a 67-year-old male with underlying history of hypertension, CAD, type 2 diabetes, cardiomyopathy/ severe LVSD s/p cardiac arrest in July, 2020 s/p AICD, CHF,IgM MGUS (follows with hem/onc), hematuria (follows with urology), and TANNER with varying BiPAP compliance presents to the emergency department with worsening shortness of breath over the last 3 days and new cough with pink frothy sputum production that started earlier today. Patient called cardiology office prior to ER presentation. States he was told to take additional dose of metoprolol and additional dose of p.o. Lasix. In total, patient has taken 3 doses of home Lasix prior to ER presentation. Patient endorses dyspnea at rest but most significantly on activity. Was gardening and doing housework today and had to stop multiple times for rest periods. Denies symptoms of orthopnea. Does admit to possibly missing a dose of Lasix yesterday and today. Denies any chest pain, abdominal pain, nausea/vomiting, diarrhea, fevers, myalgias or arthralgias, and denies bilateral lower extremity edema. Patient in respiratory distress on arrival with tachypnea and tachycardia. Was placed on BiPAP in the emergency department with rapid improvement in symptoms. Pertinent presenting labs include sodium: 134, BUN: 26, serum creatinine: 1.26, GFR: 57, glucose: 140, proBNP: 2283, troponin: 15>16, hemoglobin: 12.3, VB.25/64.5/50/27.4, CXR: Cardiomegaly and pulmonary vascular congestion. Patient was started on nitroglycerin drip in the ER and continued on BiPAP. He is admitted to [facility] for further management of CHF exacerbation. Review of Systems:     Constitutional:  negative for chills, fevers, sweats  Respiratory:  negative for cough, admits to dyspnea on exertion, shortness of breath is improving, denies wheezing  Cardiovascular:  negative for chest pain, chest pressure/discomfort,admits to some lower extremity edema, no palpitations  Gastrointestinal:  negative for abdominal pain, constipation, diarrhea, nausea, vomiting  Neurological:  negative for dizziness, headache    Medications: Allergies:     Allergies   Allergen Reactions    Zetia [Ezetimibe] Shortness Of Breath    Bactrim Other (See Comments)     palpitations    Cephalexin     Cephalexin     Sulfamethoxazole-Trimethoprim        Current Meds:   Scheduled Meds:    iron sucrose  300 mg Intravenous Once    [START ON 6/25/2021] iron sucrose  200 mg Intravenous Once    furosemide  20 mg Oral BID    isosorbide mononitrate  30 mg Oral Daily    amiodarone  200 mg Oral Daily    levothyroxine  12.5 mcg Oral Daily    allopurinol  300 mg Oral Daily    apixaban  5 mg Oral BID    aspirin  81 mg Oral BID    atorvastatin  40 mg Oral Nightly    [Held by provider] lisinopril  5 mg Oral Daily    metoprolol succinate  25 mg Oral Daily    sodium chloride flush  5-40 mL Intravenous 2 times per day    insulin lispro  0-12 Units Subcutaneous TID WC    insulin lispro  0-6 Units Subcutaneous Nightly     Continuous Infusions:    sodium chloride      dextrose       PRN Meds: acetaminophen, sodium chloride flush, sodium chloride, ondansetron **OR** ondansetron, acetaminophen **OR** acetaminophen, glucose, dextrose, glucagon (rDNA), dextrose, perflutren lipid microspheres, bisacodyl    Data:     Past Medical History:   has a past medical history of Anemia, Anxiety, CAD (coronary artery disease), Cardiac defibrillator in place, Cardiomyopathy [facility]), CHF (congestive heart failure) ([facility] [address] Chronic combined systolic and diastolic heart failure ([address] s/[ AICD placed, Chronic kidney disease, Complex sleep apnea syndrome, Diabetic retinopathy ([address] Diverticulitis, DJD (degenerative joint disease), Edentulous, Gout, HTN (hypertension), Hyperlipidemia, Hypertension, Iron deficiency anemia, Ischemic cardiomyopathy, Morbid obesity ([address] Obesity, TANNER variably compliant with BiPAP, Osteoarthritis, PAF (paroxysmal atrial fibrillation) ([facility] [address] Psychophysiologic insomnia, Type II or unspecified type diabetes mellitus without mention of complication, not stated as uncontrolled, and Unspecified sleep apnea. Social History:   reports that he quit smoking about 47 years ago. His smoking use included cigarettes. He started smoking about 50 years ago. He has a 3.00 pack-year smoking history.  He has never used smokeless tobacco. He reports previous drug use. Drug: Marijuana. He reports that he does not drink alcohol. Family History:   Family History   Problem Relation Age of Onset    Cancer Mother     Heart Disease Mother         due to smoking    Heart Disease Maternal Uncle     Heart Disease Maternal Grandmother        Vitals:  BP (!) 148/119   Pulse 56   Temp 97.9 °F (36.6 °C) (Oral)   Resp 14   Ht 5' 4\" (1.626 m)   Wt 241 lb 6.5 oz (109.5 kg)   SpO2 95%   BMI 41.44 kg/m²   Temp (24hrs), Av °F (36.7 °C), Min:97.7 °F (36.5 °C), Max:98.5 °F (36.9 °C)    Recent Labs     21  1106 21  1629 21  1856 21  0649   POCGLU 197* 91 148* 92       I/O (24Hr):     Intake/Output Summary (Last 24 hours) at 2021 1342  Last data filed at 2021 1233  Gross per 24 hour   Intake 690 ml   Output 1850 ml   Net -1160 ml       Labs:  Hematology:  Recent Labs     21  1459 21  0503 21  0610   WBC 11.2 7.9 5.4   RBC 4.57 3.96* 3.88*   HGB 12.3* 10.6* 10.5*   HCT 41.2 35.8* 35.2*   MCV 90.2 90.4 90.7   MCH 26.9 26.8 27.1   MCHC 29.9 29.6 29.8   RDW 16.6* 16.5* 16.4*    197 168   MPV 11.3 11.7 11.4     Chemistry:  Recent Labs     21  1459 21  1616 21  0503 21  0610   *  --  139 139   K 4.5  --  4.4 4.4   CL 99  --  101 103   CO2 20  --  28 27   GLUCOSE 250*  --  99 101*   BUN 26*  --  34* 34*   CREATININE 1.26*  --  1.37* 1.22*   MG  --   --   --  2.1   ANIONGAP 15  --  10 9   LABGLOM 57*  --  52* 59*   GFRAA >60  --  >60 >60   CALCIUM 8.8  --  8.3* 8.2*   PHOS  --   --   --  4.0   PROBNP 2,283*  --   --  3,081*   TROPHS 15 16  --   --      Recent Labs     21  1459 216 21  0503 21  0851 21  1106 21  1629 21  1856 21  0610 21  0649   PROT 7.7  --   --   --   --   --   --   --   --    LABALBU 3.9  --   --   --   --   --   --  3.2*  --    TSH  --   --  13.61*  --   --   --   --   --   --    AST 23  --   --   --   --   --   -- BiPAP 6/23/2021 Yes    CAD (coronary artery disease) s/p stenting of L anterior descending artery 2004 6/23/2021 Yes    Ischemic cardiomyopathy 6/23/2021 Yes    Chronic kidney disease, stage II (mild) 6/23/2021 Yes    Normocytic normochromic anemia 6/23/2021 Yes    HTN (hypertension) 6/23/2021 Yes    Type 2 diabetes mellitus without complication ([facility] [address] 2/34/5377 Yes    AICD (automatic cardioverter/defibrillator) present 6/23/2021 Yes    Acute respiratory failure with hypoxia ([facility] [address] 6/23/2021 Yes          Plan:        1. Acute exacerbation of HFrEF with chronic hypotension: Echo from 3/21 EF:13%. Pro-BNP 2283. Improved with IV lasix initially. Now appears Euvolemic however still does not feel like hes back to baseline. Continue Lisinopril, Toprol XL, and Imdur . Switch IV lasix back to PO. Will give 2 doses of IV iron. Monitor pressures, if stable will d/c in am.   2. Acute Kidney injury-resolved: Scr 0.9 at baseline, up to 1.37 today. Likely pre-renal 2/2 decreased EVV. Pt now appears more Euvolemic. Monitor Scr. Avoid nephrotoxic agents. 3. Functional SHYLA: give IV iron today and tomorrow. Will need age appropriate cancer screening including colonoscopy. 4. Paroxysmal Atrial fibrillation: Continue Eliquis, amiodarone, BB. Hold parameters added  5. Hx of NICM and Vtach arrest: continue ASA, Statin, BB. AICD in place. Continue Amiodarone  6. Due to mellitus type II with hyperglycemia: continue current regimen. 7. Obesity BMI of 41: recommend weight loss and lifestyle modification. 8. Hypothyroidism: TSH: 13. T4: 0.59. Ideal body weight 58 kg. Will start  low dose synthroid and monitor for cardiac symptoms. Will need outpt follow up with PCP   9. Gout: continue allopurinol  10. PT/OT      Dispo: Give IV iron today and tomorrow. Restart antihypertensive regimen and transition IV Lasix to p.o.   Monitor overnight, will likely discharge in the morning if his symptoms improve    Ezekiel Dakin, DO  6/24/2021  1:41 PM

## 2021-06-24 NOTE — DISCHARGE SUMMARY
[facility]  Office: [address], DO, Oni Uriostegui, DO, Adal Macedo, DO, Riri Peña, DO, Jeimy Palacios MD, Galilea Grimes MD, Edgar Wilhelm MD, Jimena Yu MD, Jessica Grossman MD, Heidi Mathews MD, Mildred Santiago MD, Chino Macedo MD, Manda Sharma, DO, Franciso Schaumann, MD, Clinton Vora DO, José Luis Armstrong MD,  Anahi Loredo DO, Sandi Emmanuel MD, Denman Najjar, MD, Keiko Varela MD, Paola Sanchez MD, Ryanne Vazquez, [facility] Connor Grant CNP, Rudi Carballo, CNP, Xi Goff, CNS, Marshal Fragoso, CNP, Eh Michael, CNP, Gloria Romero, CNP, Judy Giordano, CNP, Jerod Gilmore, CNP, Rafael Odom PA-C, Quinton Perez, [facility], Riley Del Angel, CNP, Dayan Gillis, CNP, Suma Preciado, CNP, Sylvie Higginbotham, CNP, Gigi Farley, CNP, Lis Pierson, [facility]    Discharge Summary     Patient ID: He Daigle  :  1955   MRN: [id number]     ACCOUNT:  [de-identified]   Patient's PCP: LIDIA Shepard CNP  Admit Date: 2021   Discharge Date: 2021     Length of Stay: 3  Code Status:  Full Code  Admitting Physician: Mohamud Major DO  Discharge Physician: Mohamud Major DO     Active Discharge Diagnoses:     Hospital Problem Lists:  Principal Problem:    Acute HFrEF (heart failure with reduced ejection fraction) [facility])  Active Problems:    TANNER variably compliant with BiPAP    CAD (coronary artery disease) s/p stenting of L anterior descending artery     Ischemic cardiomyopathy    Chronic kidney disease, stage II (mild)    Normocytic normochromic anemia    Iron deficiency anemia    HTN (hypertension)    Type 2 diabetes mellitus without complication (HCC)    AICD (automatic cardioverter/defibrillator) present    Acute respiratory failure with hypoxia ([facility] [address]  Resolved Problems:    * No resolved hospital problems.  *      Admission Condition:  serious     Discharged Condition: stable    Hospital Stay:     Hospital Course:  Pilo García is a 74 y.o. male with underlying history of hypertension, CAD, type 2 diabetes, cardiomyopathy/ severe LVSD s/p cardiac arrest in July, 2020 s/p AICD, CHF,IgM MGUS (follows with hem/onc), hematuria (follows with urology), and TANNER with varying BiPAP compliance presents to the emergency department with worsening shortness of breath over the last 3 days and new cough with pink frothy sputum production. Patient also noted to be in respiratory distress requiring NIVV. Chest x-ray showed cardiomegaly and pulmonary vascular congestion without gross effusions. He was admitted and treated with IV Lasix with resolution of his symptoms. Patient was also treated for an acute kidney injury which was thought to be cardiorenal.  On discharge, creatinine was back at baseline. Currently, patient is medically stable for discharge. He follows with his cardiologist Dr. Viktor Chaudhary. He will need follow-up with his cardiologist within 1 to 2 weeks of discharge and with his primary care physician within 1 week. Patient directed to return to the hospital if he develop worsening chest pain, shortness of breath comfortable to breathing, nausea, vomiting, diarrhea    Significant therapeutic interventions: see above. He was started on synthroid but no other medication changes were made.      Significant Diagnostic Studies:   Labs / Micro:  CBC:   Lab Results   Component Value Date    WBC 5.4 06/24/2021    RBC 3.88 06/24/2021    RBC 4.85 12/08/2011    HGB 10.5 06/24/2021    HCT 35.2 06/24/2021    MCV 90.7 06/24/2021    MCH 27.1 06/24/2021    MCHC 29.8 06/24/2021    RDW 16.4 06/24/2021     06/24/2021     12/08/2011     BMP:    Lab Results   Component Value Date    GLUCOSE 99 06/25/2021    GLUCOSE 123 03/19/2012     06/25/2021    K 4.7 06/25/2021     06/25/2021    CO2 29 06/25/2021    ANIONGAP 7 06/25/2021    BUN 32 06/25/2021    CREATININE 1.26 06/25/2021    BUNCRER NOT REPORTED 06/25/2021    CALCIUM 8.5 06/25/2021    LABGLOM 57 06/25/2021    GFRAA >60 06/25/2021    GFR      06/25/2021    GFR NOT REPORTED 06/25/2021        Radiology:  XR CHEST PORTABLE    Result Date: 6/24/2021  Improved vascular congestion. No acute findings. XR CHEST PORTABLE    Result Date: 6/22/2021  Cardiomegaly and pulmonary vascular congestion. No gross effusions. Consultations:    Consults:     Final Specialist Recommendations/Findings:   IP CONSULT TO HOSPITALIST  IP CONSULT TO HEART FAILURE NURSE/COORDINATOR  IP CONSULT TO DIETITIAN  IP CONSULT TO RESPIRATORY CARE  IP CONSULT TO CARDIOLOGY  IP CONSULT TO IV TEAM      The patient was seen and examined on day of discharge and this discharge summary is in conjunction with any daily progress note from day of discharge. Discharge plan:     Disposition: Home    Physician Follow Up:     Starr Kinney, APRN - CNP  [address]  [phone]    In 1 week  Follow up     [facility], [address] [place] [address] [address] [facility]  [phone]    Schedule an appointment as soon as possible for a visit  Managment of [address] [facility]  [address]  [address] [place] [address]  [phone]  Schedule an appointment as soon as possible for a visit  Colonoscopy       Requiring Further Evaluation/Follow Up POST HOSPITALIZATION/Incidental Findings:   -Age-appropriate cancer screening including colonoscopy for iron deficiency anemia  -Continue outpatient follow-up with cardiology  -Follow-up with primary care physician for management of hypothyroidism  -Check your blood pressure daily, return to the hospital develop any headache, heaviness, dizziness, nausea, vomiting, diarrhea or shortness of breath.     Diet: cardiac diet    Activity: As tolerated    Instructions to Patient: see above    Discharge Medications:      Medication List      START taking these medications    levothyroxine 25 MCG tablet  Commonly known as: SYNTHROID  Take 0.5 tablets by mouth Daily  Start taking on: June 26, 2021        Fabian Ramirez taking these medications    Alcohol Prep 70 % Pads  USE AS DIRECTED     allopurinol 300 MG tablet  Commonly known as: ZYLOPRIM  TAKE 1 TABLET DAILY     amiodarone 200 MG tablet  Commonly known as: CORDARONE  Take 1 tablet by mouth daily     ammonium lactate 12 % lotion  Commonly known as: Lac-Hydrin  Apply topically daily after bathing sparing the space between the toes. apixaban 5 MG Tabs tablet  Commonly known as: Eliquis  TAKE 1 TABLET BY MOUTH 2 TIMES DAILY     aspirin 81 MG EC tablet  Commonly known as: Aspirin Adult Low Strength  TAKE 1 TABLET TWICE A DAY     atorvastatin 40 MG tablet  Commonly known as: LIPITOR  Take 1 tablet by mouth daily     BLOOD GLUCOSE METER DISPOSABLE Vi  Daily and as needed     * blood glucose test strips strip  Commonly known as: FREESTYLE LITE  USE AS DIRECTED TWICE A DAY     * blood glucose test strips  1 strip by Other route daily Test 1 times a day & as needed for symptoms of irregular blood glucose. furosemide 40 MG tablet  Commonly known as: LASIX  TAKE 1/2 TABLET BY MOUTH 2 TIMES A DAY     Handicap Placard Misc  by Does not apply route Exp: 1/2025     isosorbide mononitrate 30 MG extended release tablet  Commonly known as: IMDUR  TAKE 1 TABLET BY MOUTH DAILY     lisinopril 5 MG tablet  Commonly known as: PRINIVIL;ZESTRIL  TAKE 1 TABLET BY MOUTH DAILY     metFORMIN 1000 MG tablet  Commonly known as: GLUCOPHAGE  Take 1 tablet by mouth 2 times daily (with meals)     metoprolol succinate 25 MG extended release tablet  Commonly known as: TOPROL XL  TAKE 1 TABLET BY MOUTH DAILY     * TRUEplus Lancets 33G Misc  TEST AS DIRECTED     * Lancets Misc  Daily         * This list has 4 medication(s) that are the same as other medications prescribed for you.  Read the directions carefully, and ask your doctor or other care provider to review them with you. STOP taking these medications    acetaminophen 500 MG tablet  Commonly known as: Acetaminophen Extra Strength           Where to Get Your Medications      Information about where to get these medications is not yet available    Ask your nurse or doctor about these medications  · amiodarone 200 MG tablet  · levothyroxine 25 MCG tablet         No discharge procedures on file. Time Spent on discharge is  39 mins in patient examination, evaluation, counseling as well as medication reconciliation, prescriptions for required medications, discharge plan and follow up. Electronically signed by   Ezekiel Dakin, DO  6/25/2021  3:42 PM      Thank you Dr. Nimo Frankel, APRN - CNP for the opportunity to be involved in this patient's care.

## 2021-06-24 NOTE — PROGRESS NOTES
[facility] phoned Dr. Barbi Freitas office to make aware of consult. Initially [facility] came to see pt. But pt. Is a  Dr. Briana Guillory pt.

## 2021-06-24 NOTE — PROGRESS NOTES
Physical Therapy    Facility/Department: [facility] CAR 3  Initial Assessment    NAME: Corby Quintero  : 1955  MRN: [id number]  Chief Complaint   Patient presents with    Respiratory Distress     hx of cardiac arrest last july      Date of Service: 2021    Discharge Recommendations: Further therapy recommended at discharge. PT Equipment Recommendations  Equipment Needed: No  Other: pt reports ownign RW    Assessment   Body structures, Functions, Activity limitations: Decreased functional mobility ; Decreased ADL status; Decreased endurance;Decreased balance;Decreased posture  Assessment: Pt ambulated a total of 140ft with CGA, completed transfers with CGA and RW. Pt could benefit from continued PT in order to improve functional ambulation. Pt currently safe to return to prior living situation. Prognosis: Good  Decision Making: Medium Complexity  PT Education: Goals; General Safety;PT Role;Plan of Care;Precautions;Transfer Training;Gait Training;Functional Mobility Training  REQUIRES PT FOLLOW UP: Yes  Activity Tolerance  Activity Tolerance: Patient Tolerated treatment well  Activity Tolerance: pt denies SOB       Patient Diagnosis(es): The primary encounter diagnosis was Acute on chronic congestive heart failure, unspecified heart failure type ([address] A diagnosis of Respiratory distress was also pertinent to this visit.      has a past medical history of Anemia, Anxiety, CAD (coronary artery disease), Cardiac defibrillator in place, Cardiomyopathy [facility]), CHF (congestive heart failure) ([address] Chronic combined systolic and diastolic heart failure ([address] s/[ AICD placed, Chronic kidney disease, Complex sleep apnea syndrome, Diabetic retinopathy ([address] Diverticulitis, DJD (degenerative joint disease), Edentulous, Gout, HTN (hypertension), Hyperlipidemia, Hypertension, Iron deficiency anemia, Ischemic cardiomyopathy, Morbid obesity ([address] Obesity, TANNER variably compliant with BiPAP, Osteoarthritis, PAF (paroxysmal atrial fibrillation) ([facility] [address] Psychophysiologic insomnia, Type II or unspecified type diabetes mellitus without mention of complication, not stated as uncontrolled, and Unspecified sleep apnea. has a past surgical history that includes Colonoscopy (11 7 2007); pacemaker placement (2005 or 2006); Cardiac catheterization (8-2007); Cardiac catheterization (09/11/2013); Coronary angioplasty (1998, 2004); Cardiac defibrillator placement (8/26/2015); bone marrow biopsy (2012 approx); and pr colsc flx w/rmvl of tumor polyp lesion snare tq (N/A, 4/4/2017). Restrictions  Restrictions/Precautions  Restrictions/Precautions: Fall Risk (up with assist)  Required Braces or Orthoses?: No  Position Activity Restriction  Other position/activity restrictions: Up w/ assist  Vision/Hearing  Vision: Impaired  Vision Exceptions: Wears glasses for reading  Hearing: Within functional limits     Subjective  General  Patient assessed for rehabilitation services?: Yes  Response To Previous Treatment: Not applicable  Family / Caregiver Present: No  Follows Commands: Within Functional Limits  General Comment  Comments: pt BP upon arrival 85/86, nurse in room states \"okay\" to do exerises and retake BP to determine ability for OOB activity  Subjective  Subjective: Pt and RN agreeable to PT. Pt in recliner upon arrival and exit. Pt cooperative and pleasant throughout.   Pain Screening  Patient Currently in Pain: Denies  Pain Assessment  Pain Assessment: 0-10  Pain Level: 0  Vital Signs  Patient Currently in Pain: Denies       Orientation  Orientation  Overall Orientation Status: Within Functional Limits  Social/Functional History  Social/Functional History  Lives With: Alone  Type of Home: House  Home Layout: One level  Home Access: Stairs to enter with rails  Entrance Stairs - Number of Steps: 3  Entrance Stairs - Rails: Left  Bathroom Shower/Tub: Tub/Shower unit  Bathroom Toilet: Standard  Home Equipment: Rolling walker, Cane  ADL Assistance: Independent  Homemaking Assistance: Independent  Homemaking Responsibilities: Yes  Meal Prep Responsibility: Primary  Laundry Responsibility: Primary  Cleaning Responsibility: Primary  Shopping Responsibility: Primary  Ambulation Assistance: Independent  Transfer Assistance: Independent  Active : Yes  Mode of Transportation: Car  Occupation: Retired  Type of occupation:  for [employer],   Leisure & Hobbies: gardening  Additional Comments: information obtained from OT eval; pt reports not using RW since last year  Cognition   Cognition  Overall Cognitive Status: WFL    Objective        Seated LE exercise program: Long Arc Quads, hip abduction/adduction, heel/toe raises, and marches. Reps: x10    Joint Mobility  Spine: WFL  ROM RLE: WFL  ROM LLE: WFL  ROM RUE: WFL  ROM LUE: WFL  Strength RLE  Strength RLE: WFL  Comment: grossly 4-/5  Strength LLE  Strength LLE: WFL  Comment: grossly 4-/5  Strength RUE  Strength RUE: WFL  Comment: grossly 4-/5  Strength LUE  Strength LUE: WFL  Comment: grossly 4-/5  Tone RLE  RLE Tone: Normotonic  Tone LLE  LLE Tone: Normotonic  Sensation  Overall Sensation Status: WFL (denies numbness and tingling)  Bed mobility  Comment: not formally assessed at this date due to pt being in recliner upon arrival and exit; exercises completed in recliner, BP taken after 111/56  Transfers  Sit to Stand: Contact guard assistance  Stand to sit: Contact guard assistance  Comment: assessed with RW. BP taken upon standing 112/66. Per RN, if pt non-symptomatic than able to ambulate.   Ambulation  Ambulation?: Yes  More Ambulation?: Yes  Ambulation 1  Surface: level tile  Device: Rolling Walker  Assistance: Contact guard assistance  Gait Deviations: Increased PAIGE  Distance: 120ft  Comments: with increased distance ambulation slowed, but consistent and no LOB noted  Ambulation 2  Surface - 2: level tile  Device 2: No device  Assistance 2: Contact guard assistance  Gait Deviations: Slow Brianne; Increased PAIGE  Distance: 20ft  Comments: no LOB noted; BP taken upon sitting 154/80; pt reports no SOB, HA, lightheadedness or dizziness  Stairs/Curb  Stairs?: No     Balance  Posture: Fair  Sitting - Static: Good;-  Sitting - Dynamic: Fair;+  Standing - Static: Fair  Standing - Dynamic: Fair  Comments: standing balance assessed without RW        Plan   Plan  Times per week: 5x/wk  Current Treatment Recommendations: ROM, Balance Training, Strengthening, Functional Mobility Training, Gait Training, Transfer Training, ADL/Self-care Training, Stair training, Endurance Training, Home Exercise Program, Safety Education & Training, Patient/Caregiver Education & Training, Positioning  Safety Devices  Type of devices: All fall risk precautions in place, Call light within reach, Chair alarm in place, Patient at risk for falls, Left in bed, Nurse notified  Restraints  Initially in place: No      AM-PAC Score  AM-PAC Inpatient Mobility Raw Score : 18 (06/24/21 1603)  AM-PAC Inpatient T-Scale Score : 43.63 (06/24/21 1603)  Mobility Inpatient CMS 0-100% Score: 46.58 (06/24/21 1603)  Mobility Inpatient CMS G-Code Modifier : CK (06/24/21 1603)          Goals  Short term goals  Time Frame for Short term goals: 14 visits  Short term goal 1: Ambulate 300ft independenetly without AD  Short term goal 2: Ascend/descend 3 stairs with left handrail with supervision  Short term goal 3: Demo independent bed mobility  Short term goal 4: Perform transfers independently  Short term goal 5: Perform Good- dynamic standing balance       Therapy Time   Individual Concurrent Group Co-treatment   Time In 1438         Time Out 1507         Minutes 29         Timed Code Treatment Minutes: 23 Minutes       Tali Jasso    Evaluation/treatment performed by Student PT under the supervision of co-signing PT who agrees with all evaluation/treatment and documentation.

## 2021-06-25 VITALS
WEIGHT: 243.17 LBS | HEIGHT: 64 IN | DIASTOLIC BLOOD PRESSURE: 61 MMHG | SYSTOLIC BLOOD PRESSURE: 105 MMHG | BODY MASS INDEX: 41.51 KG/M2 | HEART RATE: 54 BPM | TEMPERATURE: 97.9 F | RESPIRATION RATE: 18 BRPM | OXYGEN SATURATION: 97 %

## 2021-06-25 LAB
ALBUMIN SERPL-MCNC: 3.2 G/DL (ref 3.5–5.2)
ANION GAP SERPL CALCULATED.3IONS-SCNC: 7 MMOL/L (ref 9–17)
BUN BLDV-MCNC: 32 MG/DL (ref 8–23)
BUN/CREAT BLD: ABNORMAL (ref 9–20)
CALCIUM SERPL-MCNC: 8.5 MG/DL (ref 8.6–10.4)
CHLORIDE BLD-SCNC: 119 MMOL/L (ref 98–107)
CO2: 29 MMOL/L (ref 20–31)
CREAT SERPL-MCNC: 1.26 MG/DL (ref 0.7–1.2)
GFR AFRICAN AMERICAN: >60 ML/MIN
GFR NON-AFRICAN AMERICAN: 57 ML/MIN
GFR SERPL CREATININE-BSD FRML MDRD: ABNORMAL ML/MIN/{1.73_M2}
GFR SERPL CREATININE-BSD FRML MDRD: ABNORMAL ML/MIN/{1.73_M2}
GLUCOSE BLD-MCNC: 159 MG/DL (ref 75–110)
GLUCOSE BLD-MCNC: 93 MG/DL (ref 75–110)
GLUCOSE BLD-MCNC: 99 MG/DL (ref 70–99)
MAGNESIUM: 2.3 MG/DL (ref 1.6–2.6)
PHOSPHORUS: 3.8 MG/DL (ref 2.5–4.5)
POTASSIUM SERPL-SCNC: 4.7 MMOL/L (ref 3.7–5.3)
SODIUM BLD-SCNC: 141 MMOL/L (ref 135–144)
SODIUM BLD-SCNC: 155 MMOL/L (ref 135–144)

## 2021-06-25 PROCEDURE — 6370000000 HC RX 637 (ALT 250 FOR IP): Performed by: INTERNAL MEDICINE

## 2021-06-25 PROCEDURE — 6360000002 HC RX W HCPCS: Performed by: INTERNAL MEDICINE

## 2021-06-25 PROCEDURE — 80069 RENAL FUNCTION PANEL: CPT

## 2021-06-25 PROCEDURE — 2580000003 HC RX 258: Performed by: INTERNAL MEDICINE

## 2021-06-25 PROCEDURE — 83735 ASSAY OF MAGNESIUM: CPT

## 2021-06-25 PROCEDURE — 36415 COLL VENOUS BLD VENIPUNCTURE: CPT

## 2021-06-25 PROCEDURE — 99239 HOSP IP/OBS DSCHRG MGMT >30: CPT | Performed by: INTERNAL MEDICINE

## 2021-06-25 PROCEDURE — 84295 ASSAY OF SERUM SODIUM: CPT

## 2021-06-25 PROCEDURE — 97110 THERAPEUTIC EXERCISES: CPT

## 2021-06-25 PROCEDURE — 82947 ASSAY GLUCOSE BLOOD QUANT: CPT

## 2021-06-25 PROCEDURE — 97116 GAIT TRAINING THERAPY: CPT

## 2021-06-25 RX ORDER — LEVOTHYROXINE SODIUM 0.03 MG/1
12.5 TABLET ORAL DAILY
Qty: 15 TABLET | Refills: 0 | Status: SHIPPED | OUTPATIENT
Start: 2021-06-26 | End: 2021-08-02

## 2021-06-25 RX ORDER — AMIODARONE HYDROCHLORIDE 200 MG/1
200 TABLET ORAL DAILY
Qty: 30 TABLET | Refills: 0 | Status: SHIPPED | OUTPATIENT
Start: 2021-06-25 | End: 2021-08-13 | Stop reason: ALTCHOICE

## 2021-06-25 RX ADMIN — AMIODARONE HYDROCHLORIDE 200 MG: 200 TABLET ORAL at 08:29

## 2021-06-25 RX ADMIN — ALLOPURINOL 300 MG: 300 TABLET ORAL at 08:30

## 2021-06-25 RX ADMIN — APIXABAN 5 MG: 5 TABLET, FILM COATED ORAL at 08:28

## 2021-06-25 RX ADMIN — Medication 81 MG: at 08:28

## 2021-06-25 RX ADMIN — SODIUM CHLORIDE, PRESERVATIVE FREE 10 ML: 5 INJECTION INTRAVENOUS at 08:30

## 2021-06-25 RX ADMIN — LEVOTHYROXINE SODIUM 12.5 MCG: 25 TABLET ORAL at 08:29

## 2021-06-25 RX ADMIN — METOPROLOL SUCCINATE 25 MG: 25 TABLET, FILM COATED, EXTENDED RELEASE ORAL at 08:29

## 2021-06-25 RX ADMIN — IRON SUCROSE 200 MG: 20 INJECTION, SOLUTION INTRAVENOUS at 12:44

## 2021-06-25 RX ADMIN — FUROSEMIDE 20 MG: 20 TABLET ORAL at 08:30

## 2021-06-25 ASSESSMENT — PAIN SCALES - GENERAL
PAINLEVEL_OUTOF10: 0

## 2021-06-25 NOTE — DISCHARGE INSTR - COC
Continuity of Care Form    Patient Name: Fadia Keyes   :  1955  MRN:  [id number]    Admit date:  2021  Discharge date:  ***    Code Status Order: Full Code   Advance Directives:   Advance Care Flowsheet Documentation       Date/Time Healthcare Directive Type of Healthcare Directive Copy in [address] Agent's Name Healthcare Agent's Phone Number    [phone]  No, patient does not have an advance directive for healthcare treatment -- -- -- -- --            Admitting Physician:  Dejon Martinez DO  PCP: LIDIA Gallegos CNP    Discharging Nurse: [facility]/Room#: 4188/4223-89  Discharging Unit Phone Number: ***    Emergency Contact:   Extended Emergency Contact Information  Primary Emergency Contact: Oscar [address] Phone: [phone]  Work Phone: [phone]  Mobile Phone: [phone]  Relation: Other  Hearing or visual needs: None  Other needs: None  Preferred language: English   needed?  No  Secondary Emergency Contact: Gely sherman [address] [employer] Phone: [phone]  Relation: None    Past Surgical History:  Past Surgical History:   Procedure Laterality Date    BONE MARROW BIOPSY  2012 approx    CARDIAC CATHETERIZATION  -    severe stenosis pre-stent area    CARDIAC CATHETERIZATION  2013    Very peripheral stenosis, not amendable to PCI, stents patent    CARDIAC DEFIBRILLATOR PLACEMENT  2015    COLONOSCOPY  11 7     [address] [place] [address]    stent LAD    PACEMAKER PLACEMENT  2005 or 2006    AICD    MN COLSC FLX W/RMVL OF TUMOR POLYP LESION SNARE TQ N/A 2017    COLONOSCOPY POLYPECTOMY SNARE/COLD BIOPSY performed by Ai Ball DO at [facility]       Immunization History:   Immunization History   Administered Date(s) Administered    COVID-19, POORNIMA Kim, 30mcg/0.3mL 2021, 03/10/2021    Influenza 10/04/2012, 2013    Influenza Vaccine, unspecified formulation 11/21/2013, 10/30/2015    Influenza Virus Vaccine 11/21/2014, 10/01/2016, 10/03/2017    Influenza Whole 10/01/2016    Influenza, Quadv, IM, (6 mo and older Fluzone, Flulaval, Fluarix and 3 yrs and older Afluria) 10/03/2017    Influenza, Quadv, IM, PF (6 mo and older Fluzone, Flulaval, Fluarix, and 3 yrs and older Afluria) 10/05/2016, 10/26/2018, 09/30/2019    Influenza, Quadv, adjuvanted, 65 yrs +, IM, PF (Fluad) 09/25/2020    Pneumococcal Conjugate 13-valent ([id number]) 06/21/2018    Pneumococcal Polysaccharide ([id number]) 06/30/2016, 09/25/2020    Tdap (Boostrix, Adacel) 06/30/2016    Zoster Live (Zostavax) 04/22/2015    Zoster Recombinant (Shingrix) 03/09/2020, 08/18/2020       Active Problems:  Patient Active Problem List   Diagnosis Code    Acute on chronic combined systolic and diastolic congestive heart failure (HCC) I50.43    Chronic kidney disease, stage II (mild) N18.2    Chronic gout M1A. 9XX0    Morbid obesity ([facility] E66.01    Normocytic normochromic anemia D64.9    Hyperlipidemia E78.5    Iron deficiency anemia D50.9    Anxiety disorder  F41.9    DJD (degenerative joint disease) M19.90    TANNER variably compliant with BiPAP G47.33, Z99.89    CAD (coronary artery disease) s/p stenting of L anterior descending artery 2004 I25.10    Diabetic retinopathy ([facility] [facility] E11.319    Ischemic cardiomyopathy I25.5    HTN (hypertension) I10    NSTEMI (non-ST elevated myocardial infarction) ([facility]) I21.4    MGUS (monoclonal gammopathy of unknown significance) D47.2    Type 2 diabetes mellitus without complication ([facility]) N94.1    AICD (automatic cardioverter/defibrillator) present Z95.810    PAF (paroxysmal atrial fibrillation) ([facility]) I48.0    Acute respiratory failure with hypoxia ([facility]) J96.01    Coronary angioplasty status Z98.61    Hypokalemia E87.6    Acute on chronic systolic heart failure (HCC) I50.23    Acute on chronic congestive heart failure (HCC) I50.9    Acute HFrEF (heart failure with reduced ejection fraction) (HCC) I50.21       Isolation/Infection:   Isolation            No Isolation          Patient Infection Status       None to display            Nurse Assessment:  Last Vital Signs: /61   Pulse 59   Temp 97.9 °F (36.6 °C) (Oral)   Resp 18   Ht 5' 4\" (1.626 m)   Wt 243 lb 2.7 oz (110.3 kg)   SpO2 97%   BMI 41.74 kg/m²     Last documented pain score (0-10 scale): Pain Level: 0  Last Weight:   Wt Readings from Last 1 Encounters:   06/25/21 243 lb 2.7 oz (110.3 kg)     Mental Status:  {IP PT MENTAL STATUS:20030:::0}    IV Access:  { JOAQUIM IV ACCESS:868846682:::0}    Nursing Mobility/ADLs:  Walking   {CHP DME ADLs:583352930:::0}  Transfer  {CHP DME ADLs:753473931:::0}  Bathing  {CHP DME ADLs:407977585:::0}  Dressing  {CHP DME ADLs:613699459:::0}  Toileting  {CHP DME ADLs:577149634:::0}  Feeding  {CHP DME ADLs:375327842:::0}  Med Admin  {CHP DME ADLs:301619520:::0}  Med Delivery   { JOAQUIM MED Delivery:511303267:::0}    Wound Care Documentation and Therapy:  Incision 08/26/15 Shoulder Left (Active)   Number of days: 2129        Elimination:  Continence: Bowel: {YES / TM:15264}  Bladder: {YES / UY:69059}  Urinary Catheter: {Urinary Catheter:027760364:::0}   Colostomy/Ileostomy/Ileal Conduit: {YES / OW:45780}       Date of Last BM: ***    Intake/Output Summary (Last 24 hours) at 6/25/2021 1335  Last data filed at 6/25/2021 1200  Gross per 24 hour   Intake 1050.9 ml   Output 1125 ml   Net -74.1 ml     I/O last 3 completed shifts: In: 1020.9 [P.O.:690;  I.V.:330.9]  Out: 1475 [Urine:1475]    Safety Concerns:     [address] JOAQUIM Safety Concerns:025915247:::0}    Impairments/Disabilities:      [address] Impairments/Disabilities:011226298:::0}    Nutrition Therapy:  Current Nutrition Therapy:   [address] JOAQUIM Diet List:267050407:::0}    Routes of Feeding: {CHP DME Other Feedings:395815245:::0}  Liquids: {Slp liquid thickness:86031}  Daily Fluid Restriction: {CHP DME Yes amt example:940669325:::0}  Last Modified

## 2021-06-25 NOTE — PROGRESS NOTES
Pt discharged via wheelchair to private car. prescriptions sent to [facility]. Pt denies further needs, all questions answered, discharge instructions explained.

## 2021-06-25 NOTE — PROGRESS NOTES
[facility]  Office: [address], DO, Lena Glen, DO, Lois Mcguire, DO, Katherynrhonda Josh Blood, DO, Yusef Carter MD, Goyo Kerns MD, Paola Orlando MD, Sky Williamson MD, Salvatore Cartwright MD, Sissy Ochoa MD, Anita Manuel MD, Kiley Garcia MD, Eugene Rojas, DO, Claudio Whittaker MD, Maciej Tovar, DO, Damon Garcia MD,  Yeny Sifuentes DO, Chin Campos MD, Raza Wagner MD, Reba Hernandez MD, Alistair Montiel MD, Kvng Wallace, Dyan Jerome, CNP, Anny Wong, [facility], Lashonda Muñoz, CNS, Barbara Crowell, CNP, Morena Vasquez, CNP, Bernice Frankel, CNP, Sylvester Penn, CNP, Ehsan Parents, CNP, Cheli Hayes PA-C, Betsey Willson, [facility], Alek Shay, CNP, Hugo Phoenix, CNP, Arlet Kruse, CNP, Jessica Ruiz, CNP, Jacklyn Frankel, CNP, Renetta Roman, [address] [place] [address]    Progress Note    6/25/2021    1:23 PM    Name:   Daniel Gregg  MRN:     [id number]     Acct:      [de-identified]   Room:   [address] [place] [address] Day:  3  Admit Date:  6/22/2021  2:48 PM    PCP:   LIDIA Cartagena CNP  Code Status:  Full Code    Subjective:     C/C:   Chief Complaint   Patient presents with    Respiratory Distress     hx of cardiac arrest last july      Interval History Status: improved. Today, patient is back to his baseline. He is able to ambulate without difficulty. He denies any chest pain, shortness of breath, difficulty breathing, nausea, vomiting, diarrhea. No fevers or chills. Vital signs overnight have been stable. Received IV iron yesterday and good. Brief History:    This is a 26-year-old male with underlying history of hypertension, CAD, type 2 diabetes, cardiomyopathy/ severe LVSD s/p cardiac arrest in July, 2020 s/p AICD, CHF,IgM MGUS (follows with hem/onc), hematuria (follows with urology), and TANNER with varying BiPAP compliance presents to the emergency department with worsening shortness of breath over the last 3 days and new cough with pink frothy sputum production that started earlier today. Patient called cardiology office prior to ER presentation. States he was told to take additional dose of metoprolol and additional dose of p.o. Lasix. In total, patient has taken 3 doses of home Lasix prior to ER presentation. Patient endorses dyspnea at rest but most significantly on activity. Was gardening and doing housework today and had to stop multiple times for rest periods. Denies symptoms of orthopnea. Does admit to possibly missing a dose of Lasix yesterday and today. Denies any chest pain, abdominal pain, nausea/vomiting, diarrhea, fevers, myalgias or arthralgias, and denies bilateral lower extremity edema. Patient in respiratory distress on arrival with tachypnea and tachycardia. Was placed on BiPAP in the emergency department with rapid improvement in symptoms. Pertinent presenting labs include sodium: 134, BUN: 26, serum creatinine: 1.26, GFR: 57, glucose: 140, proBNP: 2283, troponin: 15>16, hemoglobin: 12.3, VB.25/64.5/50/27.4, CXR: Cardiomegaly and pulmonary vascular congestion. Patient was started on nitroglycerin drip in the ER and continued on BiPAP. He is admitted to [facility] for further management of CHF exacerbation. Review of Systems:     Constitutional:  negative for chills, fevers, sweats  Respiratory:  negative for cough, admits to dyspnea on exertion, shortness of breath is improving, denies wheezing  Cardiovascular:  negative for chest pain, chest pressure/discomfort,admits to some lower extremity edema, no palpitations  Gastrointestinal:  negative for abdominal pain, constipation, diarrhea, nausea, vomiting  Neurological:  negative for dizziness, headache    Medications: Allergies:     Allergies   Allergen Reactions    Zetia [Ezetimibe] Shortness Of Breath    Bactrim Other (See Comments)     palpitations    Cephalexin     Cephalexin     Sulfamethoxazole-Trimethoprim        Current Meds:   Scheduled Meds:    iron sucrose  200 mg Intravenous Once    furosemide  20 mg Oral BID    isosorbide mononitrate  30 mg Oral Daily    lisinopril  5 mg Oral Daily    amiodarone  200 mg Oral Daily    levothyroxine  12.5 mcg Oral Daily    allopurinol  300 mg Oral Daily    apixaban  5 mg Oral BID    aspirin  81 mg Oral BID    atorvastatin  40 mg Oral Nightly    metoprolol succinate  25 mg Oral Daily    sodium chloride flush  5-40 mL Intravenous 2 times per day    insulin lispro  0-12 Units Subcutaneous TID WC    insulin lispro  0-6 Units Subcutaneous Nightly     Continuous Infusions:    sodium chloride      dextrose       PRN Meds: acetaminophen, sodium chloride flush, sodium chloride, ondansetron **OR** ondansetron, acetaminophen **OR** acetaminophen, glucose, dextrose, glucagon (rDNA), dextrose, perflutren lipid microspheres, bisacodyl    Data:     Past Medical History:   has a past medical history of Anemia, Anxiety, CAD (coronary artery disease), Cardiac defibrillator in place, Cardiomyopathy [facility]), CHF (congestive heart failure) ([facility] Chronic combined systolic and diastolic heart failure ([facility] s/[ AICD placed, Chronic kidney disease, Complex sleep apnea syndrome, Diabetic retinopathy ([facility] Diverticulitis, DJD (degenerative joint disease), Edentulous, Gout, HTN (hypertension), Hyperlipidemia, Hypertension, Iron deficiency anemia, Ischemic cardiomyopathy, Morbid obesity ([facility] Obesity, TANNER variably compliant with BiPAP, Osteoarthritis, PAF (paroxysmal atrial fibrillation) ([facility] Psychophysiologic insomnia, Type II or unspecified type diabetes mellitus without mention of complication, not stated as uncontrolled, and Unspecified sleep apnea. Social History:   reports that he quit smoking about 47 years ago. His smoking use included cigarettes. He started smoking about 50 years ago. He has a 3.00 pack-year smoking history.  He has never used smokeless tobacco. He reports previous drug use. Drug: Marijuana. He reports that he does not drink alcohol. Family History:   Family History   Problem Relation Age of Onset    Cancer Mother     Heart Disease Mother         due to smoking    Heart Disease Maternal Uncle     Heart Disease Maternal Grandmother        Vitals:  /61   Pulse 59   Temp 97.9 °F (36.6 °C) (Oral)   Resp 18   Ht 5' 4\" (1.626 m)   Wt 243 lb 2.7 oz (110.3 kg)   SpO2 97%   BMI 41.74 kg/m²   Temp (24hrs), Av.8 °F (36.6 °C), Min:97 °F (36.1 °C), Max:97.9 °F (36.6 °C)    Recent Labs     21  1630 21  1957 21  0709 21  1114   POCGLU 113* 136* 93 159*       I/O (24Hr):     Intake/Output Summary (Last 24 hours) at 2021 1323  Last data filed at 2021 1200  Gross per 24 hour   Intake 1050.9 ml   Output 1125 ml   Net -74.1 ml       Labs:  Hematology:  Recent Labs     21  1459 21  0503 21  0610   WBC 11.2 7.9 5.4   RBC 4.57 3.96* 3.88*   HGB 12.3* 10.6* 10.5*   HCT 41.2 35.8* 35.2*   MCV 90.2 90.4 90.7   MCH 26.9 26.8 27.1   MCHC 29.9 29.6 29.8   RDW 16.6* 16.5* 16.4*    197 168   MPV 11.3 11.7 11.4     Chemistry:  Recent Labs     21  1459 21  1616 21  0503 21  0610 21  0604   *  --  139 139 155*   K 4.5  --  4.4 4.4 4.7   CL 99  --  101 103 119*   CO2 20  --  28 27 29   GLUCOSE 250*  --  99 101* 99   BUN 26*  --  34* 34* 32*   CREATININE 1.26*  --  1.37* 1.22* 1.26*   MG  --   --   --  2.1 2.3   ANIONGAP 15  --  10 9 7*   LABGLOM 57*  --  52* 59* 57*   GFRAA >60  --  >60 >60 >60   CALCIUM 8.8  --  8.3* 8.2* 8.5*   PHOS  --   --   --  4.0 3.8   PROBNP 2,283*  --   --  3,081*  --    TROPHS 15 16  --   --   --      Recent Labs     21  1459 21  0503 21  1856 21  0610 21  0649 21  1630 21  1957 21  0604 21  0709 21  1114   PROT 7.7  --   --   --   --   --   --   --   --   --    LABALBU 3.9  --   --  3.2*  --   --   --  3.2*  --   --    TSH  --  13.61*  --   --   --   --   --   --   --   --    AST 23  --   --   --   --   --   --   --   --   --    ALT 12  --   --   --   --   --   --   --   --   --    ALKPHOS 81  --   --   --   --   --   --   --   --   --    BILITOT 0.37  --   --   --   --   --   --   --   --   --    CHOL  --  124  --   --   --   --   --   --   --   --    HDL  --  43  --   --   --   --   --   --   --   --    LDLCHOLESTEROL  --  55  --   --   --   --   --   --   --   --    CHOLHDLRATIO  --  2.9  --   --   --   --   --   --   --   --    TRIG  --  129  --   --   --   --   --   --   --   --    VLDL  --  NOT REPORTED  --   --   --   --   --   --   --   --    POCGLU  --   --  148*  --  92 113* 136*  --  93 159*     ABG:  Lab Results   Component Value Date    FIO2 INFORMATION NOT PROVIDED 06/22/2021     Lab Results   Component Value Date/Time    SPECIAL RT HAND ML 09/13/2017 01:59 AM    SPECIAL RT ARM 24ML 09/13/2017 01:59 AM     Lab Results   Component Value Date/Time    CULTURE NO GROWTH 6 DAYS 09/13/2017 01:59 AM    CULTURE  09/13/2017 01:59 AM     [employer] [address] [place] [address] [phone]    CULTURE NO GROWTH 6 DAYS 09/13/2017 01:59 AM    CULTURE  09/13/2017 01:59 AM     [facility] [address] [place] [address] [phone]       Radiology:  XR CHEST PORTABLE    Result Date: 6/22/2021  Cardiomegaly and pulmonary vascular congestion. No gross effusions. Physical Examination:        General appearance:  alert, cooperative and no distress, chronically ill appearing male. Mental Status:  oriented to person, place and time and normal affect  Lungs:  Diminished breath sounds at bases normal effort. No crackles.    Heart:  regular rate and rhythm, no murmur  Abdomen:  soft, nontender, nondistended, normal bowel sounds, no masses, hepatomegaly, splenomegaly  Extremities:  no edema, redness, tenderness in the calves  Skin:  no gross lesions, rashes, induration    Assessment:        Hospital Problems         Last Modified POA    * (Principal) Acute HFrEF (heart failure with reduced ejection fraction) ([facility] 6/23/2021 Yes    TANNER variably compliant with BiPAP 6/23/2021 Yes    CAD (coronary artery disease) s/p stenting of L anterior descending artery 2004 6/23/2021 Yes    Ischemic cardiomyopathy 6/23/2021 Yes    Chronic kidney disease, stage II (mild) 6/23/2021 Yes    Normocytic normochromic anemia 6/23/2021 Yes    Iron deficiency anemia 6/24/2021 Yes    HTN (hypertension) 6/23/2021 Yes    Type 2 diabetes mellitus without complication ([facility] 5/22/5906 Yes    AICD (automatic cardioverter/defibrillator) present 6/23/2021 Yes    Acute respiratory failure with hypoxia ([facility] 6/23/2021 Yes          Plan:        1. Acute exacerbation of HFrEF with chronic hypotension: Echo from 3/21 EF:13%. Pro-BNP 2283. Improved with IV lasix initially. Now appears Euvolemic however still does not feel like hes back to baseline. Continue Lisinopril, Toprol XL, and Imdur . Switch IV lasix back to PO. Will give 2 doses of IV iron. Monitor pressures, if stable will d/c in am.   2. Acute Kidney injury-resolved: Pt now appears more Euvolemic. Monitor Scr. Avoid nephrotoxic agents. 3. Functional SHYLA: s/p IV iron. Will need age appropriate cancer screening including colonoscopy. 4. Paroxysmal Atrial fibrillation: Continue Eliquis, amiodarone, BB.   5. Hx of NICM and Vtach arrest: continue ASA, Statin, BB. AICD in place. Continue Amiodarone  6. Due to mellitus type II with hyperglycemia: continue current regimen. 7. Obesity BMI of 41: recommend weight loss and lifestyle modification. 8. Hypothyroidism: TSH: 13. T4: 0.59. Ideal body weight 58 kg. Will start  low dose synthroid and monitor for cardiac symptoms. Will need outpt follow up with PCP   9. Gout: continue allopurinol  10. PT/OT      Dispo: recheck sodium if stable will discharge today.      Ezekiel Dakin, DO  6/25/2021  1:23 PM

## 2021-06-25 NOTE — PLAN OF CARE
Problem: Falls - Risk of:  Goal: Will remain free from falls  Description: Will remain free from falls  6/25/2021 1056 by Kaylynn Du RN  Outcome: Met This Shift  6/25/2021 0517 by Rick Lawton RN  Outcome: Ongoing  Goal: Absence of physical injury  Description: Absence of physical injury  6/25/2021 1056 by Kaylynn Du RN  Outcome: Met This Shift  6/25/2021 0517 by Rick Lawton RN  Outcome: Ongoing     Problem: Breathing Pattern - Ineffective:  Goal: Ability to achieve and maintain a regular respiratory rate will improve  Description: Ability to achieve and maintain a regular respiratory rate will improve  6/25/2021 1056 by Kaylynn Du RN  Outcome: Met This Shift  6/25/2021 0517 by Rick Lawton RN  Outcome: Ongoing     Problem: Skin Integrity:  Goal: Will show no infection signs and symptoms  Description: Will show no infection signs and symptoms  6/25/2021 1056 by Kaylynn Du RN  Outcome: Met This Shift  6/25/2021 0517 by Rick Lawton RN  Outcome: Ongoing  Goal: Absence of new skin breakdown  Description: Absence of new skin breakdown  6/25/2021 1056 by Kaylynn Du RN  Outcome: Met This Shift  6/25/2021 0517 by Rick Lawton RN  Outcome: Ongoing     Problem: Infection:  Goal: Will remain free from infection  Description: Will remain free from infection  6/25/2021 1056 by Kaylynn Du RN  Outcome: Met This Shift  6/25/2021 0517 by Rick Lawton RN  Outcome: Ongoing     Problem: Safety:  Goal: Free from accidental physical injury  Description: Free from accidental physical injury  6/25/2021 1056 by Kaylynn Du RN  Outcome: Met This Shift  6/25/2021 0517 by Rick Lawton RN  Outcome: Ongoing  Goal: Free from intentional harm  Description: Free from intentional harm  6/25/2021 1056 by Kaylynn Du RN  Outcome: Met This Shift  6/25/2021 0517 by Rick Lawton RN  Outcome: Ongoing     Problem: Daily Care:  Goal: Daily care needs are met  Description: Daily care needs are met  6/25/2021 1056 by Deisy Bar RN  Outcome: Met This Shift  6/25/2021 0517 by Sheree Boland RN  Outcome: Ongoing     Problem: Pain:  Goal: Patient's pain/discomfort is manageable  Description: Patient's pain/discomfort is manageable  6/25/2021 1056 by Deisy Bar RN  Outcome: Met This Shift  6/25/2021 0517 by Sheree Boland RN  Outcome: Ongoing  Goal: Pain level will decrease  Description: Pain level will decrease  6/25/2021 1056 by Deisy Bar RN  Outcome: Met This Shift  6/25/2021 0517 by Sheree Boland RN  Outcome: Ongoing  Goal: Control of acute pain  Description: Control of acute pain  6/25/2021 1056 by Deisy Bar RN  Outcome: Met This Shift  6/25/2021 0517 by Sheree Boland RN  Outcome: Ongoing  Goal: Control of chronic pain  Description: Control of chronic pain  6/25/2021 1056 by Deisy Bar RN  Outcome: Met This Shift  6/25/2021 0517 by Sheree Boland RN  Outcome: Ongoing     Problem: Skin Integrity:  Goal: Skin integrity will stabilize  Description: Skin integrity will stabilize  6/25/2021 1056 by Deisy Bar RN  Outcome: Met This Shift  6/25/2021 0517 by Sheree Boland RN  Outcome: Ongoing     Problem: Discharge Planning:  Goal: Patients continuum of care needs are met  Description: Patients continuum of care needs are met  6/25/2021 1056 by Deisy Bar RN  Outcome: Met This Shift  6/25/2021 0517 by Sheree Boland RN  Outcome: Ongoing

## 2021-06-25 NOTE — PLAN OF CARE
Problem: Falls - Risk of:  Goal: Will remain free from falls  Description: Will remain free from falls  Outcome: Ongoing  Goal: Absence of physical injury  Description: Absence of physical injury  Outcome: Ongoing     Problem: Breathing Pattern - Ineffective:  Goal: Ability to achieve and maintain a regular respiratory rate will improve  Description: Ability to achieve and maintain a regular respiratory rate will improve  Outcome: Ongoing     Problem: Skin Integrity:  Goal: Will show no infection signs and symptoms  Description: Will show no infection signs and symptoms  Outcome: Ongoing  Goal: Absence of new skin breakdown  Description: Absence of new skin breakdown  Outcome: Ongoing     Problem: Anxiety:  Goal: Level of anxiety will decrease  Description: Level of anxiety will decrease  Outcome: Ongoing     Problem: Infection:  Goal: Will remain free from infection  Description: Will remain free from infection  Outcome: Ongoing     Problem: Safety:  Goal: Free from accidental physical injury  Description: Free from accidental physical injury  Outcome: Ongoing  Goal: Free from intentional harm  Description: Free from intentional harm  Outcome: Ongoing     Problem: Daily Care:  Goal: Daily care needs are met  Description: Daily care needs are met  Outcome: Ongoing     Problem: Pain:  Goal: Patient's pain/discomfort is manageable  Description: Patient's pain/discomfort is manageable  Outcome: Ongoing  Goal: Pain level will decrease  Description: Pain level will decrease  Outcome: Ongoing  Goal: Control of acute pain  Description: Control of acute pain  Outcome: Ongoing  Goal: Control of chronic pain  Description: Control of chronic pain  Outcome: Ongoing     Problem: Skin Integrity:  Goal: Skin integrity will stabilize  Description: Skin integrity will stabilize  Outcome: Ongoing     Problem: Discharge Planning:  Goal: Patients continuum of care needs are met  Description: Patients continuum of care needs are met  Outcome: Ongoing

## 2021-06-25 NOTE — PROGRESS NOTES
Jamilah Hermosillo, [facility] complete. Acute [facility]FrEF (heart failure with reduced ejection fraction) [facility] [I50.21] . Vitals:    06/24/21 2342   BP:    Pulse:    Resp: 17   Temp:    SpO2:    . Patients home meds are   Prior to Admission medications    Medication Sig Start Date End Date Taking? Authorizing Provider   aspirin (ASPIRIN ADULT LOW STRENGTH) 81 MG EC tablet TAKE 1 TABLET TWICE A DAY 6/7/21   LIDIA Gallegos CNP   lisinopril (PRINIVIL;ZESTRIL) 5 MG tablet TAKE 1 TABLET BY MOUTH DAILY 5/5/21   LIDIA Gallegos CNP   isosorbide mononitrate (IMDUR) 30 MG extended release tablet TAKE 1 TABLET BY MOUTH DAILY 5/5/21   LIDIA Gallegos CNP   allopurinol (ZYLOPRIM) 300 MG tablet TAKE 1 TABLET DAILY 5/5/21   LIDIA Gallegos CNP   furosemide (LASIX) 40 MG tablet TAKE 1/2 TABLET BY MOUTH 2 TIMES A DAY 4/9/21   LIDIA Gallegos CNP   metFORMIN (GLUCOPHAGE) 1000 MG tablet Take 1 tablet by mouth 2 times daily (with meals) 4/6/21   LIDIA Gallegos CNP   apixaban (ELIQUIS) 5 MG TABS tablet TAKE 1 TABLET BY MOUTH 2 TIMES DAILY 1/6/21   LIDIA Gallegos CNP   atorvastatin (LIPITOR) 40 MG tablet Take 1 tablet by mouth daily 1/6/21   LIDIA Gallegos CNP   Handicap Placard MISC by Does not apply route Exp: 1/2025 12/23/20   LIDIA Gallegos CNP   Lancets MISC Daily 12/2/20   LIDIA Gallegos CNP   Blood Gluc Meter Disp-Strips (BLOOD GLUCOSE METER DISPOSABLE) SHAN Daily and as needed 12/2/20   LIDIA Gallegos CNP   blood glucose monitor strips 1 strip by Other route daily Test 1 times a day & as needed for symptoms of irregular blood glucose.  12/2/20 4/26/21  LIDIA Gallegos CNP   amiodarone (CORDARONE) 200 MG tablet Take 1 tablet by mouth daily 8/13/20 4/26/21  LIDIA Gallegos - CNP   metoprolol succinate (TOPROL XL) 25 MG extended release tablet TAKE 1 TABLET BY MOUTH DAILY 5/10/20   Sky Lowery MD   blood glucose test strips (FREESTYLE LITE) strip USE AS DIRECTED TWICE A DAY 1/2/20   Mitch Torres MD   ammonium lactate (LAC-HYDRIN) 12 % lotion Apply topically daily after bathing sparing the space between the toes.  1/2/20   Mitch Torres MD   TRUEPLUS LANCETS 33G MISC TEST AS DIRECTED 1/2/20   Mitch Torres MD   Alcohol Swabs (ALCOHOL PREP) 70 % PADS USE AS DIRECTED 5/31/19   Heike Freeman MD   acetaminophen (ACETAMINOPHEN EXTRA STRENGTH) 500 MG tablet TAKE 1 TABLET BY MOUTH 2 TIMES A DAY AS NEEDED FOR PAIN 2/1/19   Mitch Torres MD   .      Assessment       RR 17  Breath Sounds: clear diminished      · Bronchodilator assessment at level  1  · []    Bronchodilator Assessment  BRONCHODILATOR ASSESSMENT SCORE  Score 0 1 2 3 4 5   Breath Sounds   []  Patient Baseline [x]  No Wheeze good aeration []  Faint, scattered wheezing, good aeration []  Expiratory Wheezing and or moderately diminished []  Insp/Exp wheeze and/or very diminished []  Insp/Exp and/ or marked distress   Respiratory Rate   []  Patient Baseline [x]  Less than 20 []  Less than 20 []  20-25 []  Greater than 25 []  Greater than 25   Peak flow % of Pred or PB [x]  NA   []  Greater than 90%  []  81-90% []  71-80% []  Less than or equal to 70%  or unable to perform []  Unable due to Respiratory Distress   Dyspnea re []  Patient Baseline [x]  No SOB []  No SOB []  SOB on exertion []  SOB min activity []  At rest/acute   e FEV% Predicted       [x]  NA []  Above 69%  []  Unable []  Above 60-69%  []  Unable []  Above 50-59%  []  Unable []  Above 35-49%  []  Unable []  Less than 35%  []  Unable

## 2021-06-25 NOTE — PROGRESS NOTES
Physical Therapy  Facility/Department: [facility] CAR 3  Daily Treatment Note  NAME: Alysha Martinez  : 1955  MRN: [id number]    Date of Service: 2021    Discharge Recommendations:  Patient would benefit from continued therapy after discharge   PT Equipment Recommendations  Equipment Needed: No  Other: pt reports owning RW    Assessment   Body structures, Functions, Activity limitations: Decreased functional mobility ; Decreased ADL status; Decreased endurance;Decreased balance;Decreased posture  Assessment: Pt ambulated a total of 300ft with CGA progressing to SBA both with and without RW, completed transfers with CGA. Pt could benefit from continued PT in order to improve functional ambulation. Pt currently safe to return to prior living situation. Prognosis: Good  PT Education: Goals; General Safety;PT Role;Precautions;Transfer Training;Gait Training;Functional Mobility Training;Home Exercise Program;Energy Conservation  Patient Education: Claudia Cassatt for seated and standing B ALEKSANDAR exs, all questions answered at this time  REQUIRES PT FOLLOW UP: Yes  Activity Tolerance  Activity Tolerance: Patient Tolerated treatment well;Patient limited by endurance     Patient Diagnosis(es): The primary encounter diagnosis was Acute on chronic congestive heart failure, unspecified heart failure type ([facility] [address] A diagnosis of Respiratory distress was also pertinent to this visit.      has a past medical history of Anemia, Anxiety, CAD (coronary artery disease), Cardiac defibrillator in place, Cardiomyopathy [facility]), CHF (congestive heart failure) ([address] Chronic combined systolic and diastolic heart failure ([address] s/[ AICD placed, Chronic kidney disease, Complex sleep apnea syndrome, Diabetic retinopathy ([address] Diverticulitis, DJD (degenerative joint disease), Edentulous, Gout, HTN (hypertension), Hyperlipidemia, Hypertension, Iron deficiency anemia, Ischemic cardiomyopathy, Morbid obesity ([address] Obesity, TANNER variably compliant with BiPAP, Osteoarthritis, PAF (paroxysmal atrial fibrillation) ([facility] [address] Psychophysiologic insomnia, Type II or unspecified type diabetes mellitus without mention of complication, not stated as uncontrolled, and Unspecified sleep apnea. has a past surgical history that includes Colonoscopy (11 7 2007); pacemaker placement (2005 or 2006); Cardiac catheterization (8-2007); Cardiac catheterization (09/11/2013); Coronary angioplasty (1998, 2004); Cardiac defibrillator placement (8/26/2015); bone marrow biopsy (2012 approx); and pr colsc flx w/rmvl of tumor polyp lesion snare tq (N/A, 4/4/2017). Restrictions  Restrictions/Precautions  Restrictions/Precautions: Fall Risk (up with assist)  Required Braces or Orthoses?: No  Position Activity Restriction  Other position/activity restrictions: Up w/ assist  Subjective   General  Response To Previous Treatment: Patient with no complaints from previous session. Family / Caregiver Present: No  Subjective  Subjective: Pt and RN agreeable to PT. Pt resting in bed upon arrival, cooperative and pleasant throughout. General Comment  Comments: Pt left seated in recliner with call light within reach, alarm activated  Pain Screening  Patient Currently in Pain: Denies  Vital Signs  Patient Currently in Pain: Denies       Orientation  Orientation  Overall Orientation Status: Within Functional Limits  Cognition      Objective   Bed mobility  Rolling to Right: Stand by assistance  Supine to Sit: Stand by assistance  Sit to Supine:  (pt left seated in recliner)  Scooting: Stand by assistance  Transfers  Sit to Stand: Contact guard assistance  Stand to sit: Stand by assistance  Stand Pivot Transfers: Contact guard assistance  Comment:  increased time to complete, no LOB noted  Ambulation  Ambulation?: Yes  More Ambulation?: Yes  Ambulation 1  Surface: level tile  Device: Rolling Walker  Assistance: Stand by assistance  Gait Deviations: Increased PAIGE; Slow Brianne  Distance: 100ft  Comments: no LOB, very low reliance on RW  Ambulation 2  Surface - 2: level tile  Device 2: No device  Assistance 2: Contact guard assistance progressing to SBA  Gait Deviations: Slow Brianne; Increased PAIGE  Distance: 200ft  Comments: no LOB, larger lateral sways with unassisted ambulation  Stairs/Curb  Stairs?: No     Balance  Posture: Good  Sitting - Static: Good  Sitting - Dynamic: Good  Standing - Static: Fair;+  Standing - Dynamic: Fair;+  Comments: standing balance assessed without RW  Exercises  Seated LE exercise program: Long Arc Quads, hip abduction/adduction, heel/toe raises, and marches. Reps: 20x   Gluteal sets, Heel Slides, Quad Sets, SLR. Reps: x10 long sitting in recliner  Comments: Pt educated on B LE seated and standing exs, all questions answered     Goals  Short term goals  Time Frame for Short term goals: 14 visits  Short term goal 1: Ambulate 300ft independenetly without AD  Short term goal 2: Ascend/descend 3 stairs with left handrail with supervision  Short term goal 3: Demo independent bed mobility  Short term goal 4: Perform transfers independently  Short term goal 5: Perform Good- dynamic standing balance    Plan    Plan  Times per week: 5x/wk  Current Treatment Recommendations: ROM, Balance Training, Strengthening, Functional Mobility Training, Gait Training, Transfer Training, ADL/Self-care Training, Stair training, Endurance Training, Home Exercise Program, Safety Education & Training, Patient/Caregiver Education & Training, Positioning  Safety Devices  Type of devices:  All fall risk precautions in place, Call light within reach, Chair alarm in place, Patient at risk for falls, Nurse notified, Left in chair  Restraints  Initially in place: No     Therapy Time   Individual Concurrent Group Co-treatment   Time In 1011         Time Out 1049         Minutes 38         Timed Code Treatment Minutes: [address]

## 2021-06-25 NOTE — PROGRESS NOTES
Physician Progress Note      Gabby Harding  [facility] #:                  [id number]  :                       1955  ADMIT DATE:       2021 2:48 PM  DISCH DATE:  RESPONDING  PROVIDER #:        Evan Lawler          QUERY TEXT:    Pt admitted with Acute systolic CHF . Pt noted to also have HTN, CAD and   ischemic cardiomyopathy . If possible, please document in progress notes and   discharge summary the etiology of CHF, if able to be determined. The medical record reflects the following:  Risk Factors: history dm, ckd, htn, cad, ischemic cardiomyopathy  Clinical Indicators: noted acute on chronic systolic chf with history of htn,   ischemic cardiomyopathy  and cad. bp  on admission 142/80. Treatment: NTG iv, iv lasix    Please Call if any questions Thank you  Ziyad CAMPO CCDS  Options provided:  -- CHF due to Hypertensive Heart Disease  -- CHF due to Hypertensive Heart Disease and CAD  -- CHF not due to Hypertension but due to CAD  -- CHF due to Hypertensive Heart Disease and ICMP  -- CHF not due to Hypertension but due to ICMP  -- Other - I will add my own diagnosis  -- Disagree - Not applicable / Not valid  -- Disagree - Clinically unable to determine / Unknown  -- Refer to Clinical Documentation Reviewer    PROVIDER RESPONSE TEXT:    This patient has CHF not due to hypertensive heart disease, CHF is due to   ischemic cardiomyopathy. Query created by: Georges Wolfe on 2021 7:31 AM      QUERY TEXT:    Patient admitted with BMI 41.6 . If possible, please document in progress   notes and discharge summary if you are evaluating and /or treating any of the   following:     The medical record reflects the following:  Risk Factors: history of dm, chf, htn, ckd cad  Clinical Indicators: BMI 41.6  Treatment: dietician consult, carb control diet    Please Call if any questions Thank you  Ziyad CAMPO CCDS  Options provided:  -- Obesity  -- Morbid obesity  -- Overweight  -- BMI not clinically significant  -- Other - I will add my own diagnosis  -- Disagree - Not applicable / Not valid  -- Disagree - Clinically unable to determine / Unknown  -- Refer to Clinical Documentation Reviewer    PROVIDER RESPONSE TEXT:    This patient has morbid obesity. Query created by: Jose E Hudson on 6/23/2021 7:33 AM      Electronically signed by:   Kathyanne Collet 6/25/2021 7:37 AM

## 2021-06-25 NOTE — CARE COORDINATION
Transitional Planning    Spoke to patient about plan for discharge. He plans to go home. He lives alone, but has friends and neighbors to help him if he needs it. He can call a friend for a ride home.

## 2021-06-28 ENCOUNTER — CARE COORDINATION (OUTPATIENT)
Dept: CASE MANAGEMENT | Age: 66
End: 2021-06-28

## 2021-06-28 DIAGNOSIS — I50.21 ACUTE HFREF (HEART FAILURE WITH REDUCED EJECTION FRACTION) (HCC): Primary | ICD-10-CM

## 2021-06-28 LAB
EKG ATRIAL RATE: 52 BPM
EKG Q-T INTERVAL: 332 MS
EKG QRS DURATION: 146 MS
EKG QTC CALCULATION (BAZETT): 443 MS
EKG R AXIS: -58 DEGREES
EKG T AXIS: 96 DEGREES
EKG VENTRICULAR RATE: 107 BPM

## 2021-06-28 PROCEDURE — 1111F DSCHRG MED/CURRENT MED MERGE: CPT | Performed by: NURSE PRACTITIONER

## 2021-06-28 NOTE — CARE COORDINATION
[address] Transitions Initial Follow Up Call    Call within 2 business days of discharge: Yes    Patient: Alison Ayala Patient : 1955   MRN: [id number]  Reason for Admission: CHF  Discharge Date: 21 RARS: Readmission Risk Score: 24      Last Discharge [facility]       Complaint Diagnosis Description Type Department Provider    21 Respiratory Distress Acute on chronic congestive heart failure, unspecified heart failure type ([address] . .. ED to Hosp-Admission (Discharged) (ADMITTED) [facility]  [facility]; Cory Fuchs . .. Spoke with: Geremias: [facility]    Was able to contact Lyndsey Dinero for initial transitional outreach. He stated that he was doing \"pretty good\". He said that he breathing was \"good\", no cough, swelling or chest pain. Medications reviewed and all medications in the home. 1111F order completed. No home care at discharge. He has appointment with cardiologist on  and receptive to have writer make follow up with PCP. Explained CTN role and he was receptive to further outreach. Contact information provided. Non-face-to-face services provided:  Scheduled appointment with PCP-  Scheduled appointment with Specialist-cardio     Obtained and reviewed discharge summary and/or continuity of care documents  Assessment and support for treatment adherence and medication management-reviwed     Transitions of Care Initial Call    Was this an external facility discharge? No Discharge Facility:     Challenges to be reviewed by the provider   Additional needs identified to be addressed with provider: No  none             Method of communication with provider : none      Advance Care Planning:   Does patient have an Advance Directive:  reviewed and current. Was this a readmission?  No  Patient stated reason for admission: shortness of breath  Patients top risk factors for readmission: lack of knowledge about disease and medical condition-CHF    Care Transition Nurse (CTN) contacted the patient by telephone to perform post hospital discharge assessment. Verified name and  with patient as identifiers. Provided introduction to self, and explanation of the CTN role. CTN reviewed discharge instructions, medical action plan and red flags with patient who verbalized understanding. Patient given an opportunity to ask questions and does not have any further questions or concerns at this time. Were discharge instructions available to patient? Yes. Reviewed appropriate site of care based on symptoms and resources available to patient including: PCP, Specialist and When to call 911. The patient agrees to contact the PCP office for questions related to their healthcare. Medication reconciliation was performed with patient, who verbalizes understanding of administration of home medications. .     Covid Risk Education     Educated patient about risk for severe COVID-19 due to risk factors according to CDC guidelines. CTN reviewed discharge instructions, medical action plan and red flag symptoms with the patient who verbalized understanding. Discussed COVID vaccination status: Yes and has received both vaccines. Education provided on COVID-19 vaccination as appropriate. Discussed exposure protocols and quarantine with CDC Guidelines. Patient was given an opportunity to verbalize any questions and concerns and agrees to contact CTN or health care provider for questions related to their healthcare. Reviewed and educated parent on any new and changed medications related to discharge diagnosis. Was patient discharged with a pulse oximeter? No Discussed and confirmed pulse oximeter discharge instructions and when to notify provider or seek emergency care. CTN provided contact information. Plan for follow-up call in 5-7 days based on severity of symptoms and risk factors.         Care Transitions 24 Hour Call    Do you have any ongoing symptoms?: No  Do you have a copy of your discharge instructions?: Yes  Do you have all of your prescriptions and are they filled?: Yes  Have you been contacted by a [address] [place] [address] Avenue?: No  Care Transitions Interventions         Follow Up  Future Appointments   Date Time Provider Keerthi England   8/23/2021  2:30 PM Roslyn Damon DO Resp Spec [facility]   10/27/2021  1:30 PM Mari Carroll, APRN - [address] RN

## 2021-07-02 ENCOUNTER — TELEPHONE (OUTPATIENT)
Dept: PRIMARY CARE CLINIC | Age: 66
End: 2021-07-02

## 2021-07-02 ENCOUNTER — OFFICE VISIT (OUTPATIENT)
Dept: PRIMARY CARE CLINIC | Age: 66
End: 2021-07-02
Payer: COMMERCIAL

## 2021-07-02 ENCOUNTER — HOSPITAL ENCOUNTER (OUTPATIENT)
Age: 66
Discharge: HOME | End: 2021-07-02
Payer: COMMERCIAL

## 2021-07-02 VITALS
OXYGEN SATURATION: 98 % | BODY MASS INDEX: 41.37 KG/M2 | TEMPERATURE: 98.1 F | HEART RATE: 56 BPM | WEIGHT: 241 LBS | DIASTOLIC BLOOD PRESSURE: 60 MMHG | SYSTOLIC BLOOD PRESSURE: 101 MMHG

## 2021-07-02 DIAGNOSIS — E11.9 TYPE 2 DIABETES MELLITUS WITHOUT COMPLICATION, WITHOUT LONG-TERM CURRENT USE OF INSULIN (HCC): ICD-10-CM

## 2021-07-02 DIAGNOSIS — N18.30 CKD STAGE 3 DUE TO TYPE 2 DIABETES MELLITUS (HCC): ICD-10-CM

## 2021-07-02 DIAGNOSIS — Z12.11 COLON CANCER SCREENING: ICD-10-CM

## 2021-07-02 DIAGNOSIS — E11.22 CKD STAGE 3 DUE TO TYPE 2 DIABETES MELLITUS (HCC): ICD-10-CM

## 2021-07-02 DIAGNOSIS — D50.9 IRON DEFICIENCY ANEMIA, UNSPECIFIED IRON DEFICIENCY ANEMIA TYPE: ICD-10-CM

## 2021-07-02 DIAGNOSIS — E03.2 HYPOTHYROIDISM DUE TO MEDICATION: ICD-10-CM

## 2021-07-02 DIAGNOSIS — I50.21 ACUTE HFREF (HEART FAILURE WITH REDUCED EJECTION FRACTION) (HCC): Primary | ICD-10-CM

## 2021-07-02 DIAGNOSIS — I25.10 CORONARY ARTERY DISEASE WITHOUT ANGINA PECTORIS, UNSPECIFIED VESSEL OR LESION TYPE, UNSPECIFIED WHETHER NATIVE OR TRANSPLANTED HEART: ICD-10-CM

## 2021-07-02 DIAGNOSIS — I25.5 ISCHEMIC CARDIOMYOPATHY: ICD-10-CM

## 2021-07-02 LAB
ANION GAP SERPL CALCULATED.3IONS-SCNC: 12 MMOL/L (ref 9–17)
BUN BLDV-MCNC: 34 MG/DL (ref 8–23)
BUN/CREAT BLD: ABNORMAL (ref 9–20)
CALCIUM SERPL-MCNC: 8.7 MG/DL (ref 8.6–10.4)
CHLORIDE BLD-SCNC: 98 MMOL/L (ref 98–107)
CO2: 26 MMOL/L (ref 20–31)
CREAT SERPL-MCNC: 1.41 MG/DL (ref 0.7–1.2)
GFR AFRICAN AMERICAN: >60 ML/MIN
GFR NON-AFRICAN AMERICAN: 50 ML/MIN
GFR SERPL CREATININE-BSD FRML MDRD: ABNORMAL ML/MIN/{1.73_M2}
GFR SERPL CREATININE-BSD FRML MDRD: ABNORMAL ML/MIN/{1.73_M2}
GLUCOSE BLD-MCNC: 96 MG/DL (ref 70–99)
POTASSIUM SERPL-SCNC: 4.5 MMOL/L (ref 3.7–5.3)
SODIUM BLD-SCNC: 136 MMOL/L (ref 135–144)

## 2021-07-02 PROCEDURE — 1111F DSCHRG MED/CURRENT MED MERGE: CPT | Performed by: NURSE PRACTITIONER

## 2021-07-02 PROCEDURE — 36415 COLL VENOUS BLD VENIPUNCTURE: CPT

## 2021-07-02 PROCEDURE — 99214 OFFICE O/P EST MOD 30 MIN: CPT | Performed by: NURSE PRACTITIONER

## 2021-07-02 PROCEDURE — 80048 BASIC METABOLIC PNL TOTAL CA: CPT

## 2021-07-02 RX ORDER — FERROUS SULFATE 325(65) MG
325 TABLET ORAL
Qty: 30 TABLET | Refills: 5 | Status: SHIPPED | OUTPATIENT
Start: 2021-07-02 | End: 2021-12-01

## 2021-07-02 ASSESSMENT — ENCOUNTER SYMPTOMS
ABDOMINAL PAIN: 0
VOMITING: 0
DIARRHEA: 0
BLOOD IN STOOL: 0
SHORTNESS OF BREATH: 0
CHEST TIGHTNESS: 0
TROUBLE SWALLOWING: 0
WHEEZING: 0
CONSTIPATION: 0

## 2021-07-02 NOTE — PROGRESS NOTES
Visit Information    Have you changed or started any medications since your last visit including any over-the-counter medicines, vitamins, or herbal medicines? no   Are you having any side effects from any of your medications? -  no  Have you stopped taking any of your medications? Is so, why? -  no    Have you seen any other physician or provider since your last visit? Yes - Records Obtained  Have you had any other diagnostic tests since your last visit? Yes - Records Obtained  Have you been seen in the emergency room and/or had an admission to a hospital since we last saw you? Yes - Records Obtained  Have you had your routine dental cleaning in the past 6 months? no    Have you activated your [employer] account? If not, what are your barriers?  Yes     Patient Care Team:  LIDIA Huntley CNP as PCP - General (Family Medicine)  LIDIA Huntley CNP as PCP - [facility]  Teetee Rangel MD as Consulting Physician (Hematology and Oncology)  Linnette Lobo MD as Consulting Physician (Cardiology)  Azeb Alvarado DO as Consulting Physician (Pulmonology)  Portia Brooks MD as Consulting Physician (Ophthalmology)  Ila Forrester MD as Consulting Physician (Internal Medicine)  Eduadro Bobby RN as Care Transitions Nurse    Medical History Review  Past Medical, Family, and Social History reviewed and does contribute to the patient presenting condition    Health Maintenance   Topic Date Due    Annual Wellness Visit (AWV)  Never done    Colon cancer screen colonoscopy  04/04/2020    Diabetic retinal exam  11/15/2020    Flu vaccine (1) 09/01/2021    Diabetic foot exam  01/06/2022    A1C test (Diabetic or Prediabetic)  04/26/2022    Lipid screen  06/23/2022    TSH testing  06/23/2022    Potassium monitoring  06/25/2022    Creatinine monitoring  06/25/2022    DTaP/Tdap/Td vaccine (2 - Td or Tdap) 06/30/2026    Shingles Vaccine  Completed    Pneumococcal 65+ years Vaccine  Completed  COVID-19 Vaccine  Completed    AAA screen  Completed    Hepatitis C screen  Completed    Hepatitis A vaccine  Aged Out    Hib vaccine  Aged Out    Meningococcal (ACWY) vaccine  Aged Out

## 2021-07-02 NOTE — TELEPHONE ENCOUNTER
----- Message from Neto Terry sent at 6/28/2021  9:48 AM EDT -----  Subject: Hospital Follow Up    QUESTIONS  What hospital was the Patient Discharged from? 6/22/2021  Date of Discharge? 2021-06-25  Discharge Location? Home  Reason for hospitalization as patient stated? Patient states that he had a   hard time breathing. What question does the patient have, if applicable? He states that he   would like to ask about a prescription that he was supposed to be given.  ---------------------------------------------------------------------------  --------------  CALL BACK INFO  What is the best way for the office to contact you? OK to leave message on   voicemail  Preferred Call Back Phone Number? [phone]  ---------------------------------------------------------------------------  --------------  SCRIPT ANSWERS  Relationship to Patient? Self  Appointment reason? Well Care/Follow Ups  Select a Well Care/Follow Ups appointment reason? Adult Hospital Follow Up   [Inpatient Discharge, [address]  (Patient requests to see provider urgently. )? No  (Has the patient been discharged from the hospital within 2 business days   AND does not have a Telephone Encounter  Follow Up From [address] [place] [address]   documented in [address]?)?  Yes

## 2021-07-02 NOTE — PROGRESS NOTES
Post-Discharge Transitional Care Management Services or Hospital Follow Up      Yared Haines   YOB: 1955    Date of Office Visit:  7/2/2021  Date of Hospital Admission: 6/22/21  Date of Hospital Discharge: 6/25/21  Readmission Risk Score(high >=14%.  Medium >=10%):Readmission Risk Score: 24      Care management risk score Rising risk (score 2-5) and Complex Care (Scores >=6): 3     Non face to face  following discharge, date last encounter closed (first attempt may have been earlier): 6/28/2021  3:26 PM 6/28/2021  3:26 PM    Call initiated 2 business days of discharge: Yes     Patient Active Problem List   Diagnosis    Acute on chronic combined systolic and diastolic congestive heart failure (HCC)    Chronic kidney disease, stage II (mild)    Chronic gout    Morbid obesity ([address]    Normocytic normochromic anemia    Hyperlipidemia    Iron deficiency anemia    Anxiety disorder     DJD (degenerative joint disease)    TANNER variably compliant with BiPAP    CAD (coronary artery disease) s/p stenting of L anterior descending artery 2004    Diabetic retinopathy ([address]    Ischemic cardiomyopathy    HTN (hypertension)    NSTEMI (non-ST elevated myocardial infarction) ([address]    MGUS (monoclonal gammopathy of unknown significance)    Type 2 diabetes mellitus without complication ([address]    AICD (automatic cardioverter/defibrillator) present    [facility]ox[facility] fibrillation) [facility]    Acute respiratory failure with hypoxia ([address]    Coronary angioplasty status    Hypokalemia    Acute on chronic systolic heart failure (HCC)    Acute on chronic congestive heart failure (HCC)    Acute HFrEF (heart failure with reduced ejection fraction) ([facility])       Allergies   Allergen Reactions    Zetia [Ezetimibe] Shortness Of Breath    Bactrim Other (See Comments)     palpitations    Cephalexin     Cephalexin     Sulfamethoxazole-Trimethoprim        Medications listed as ordered at the time of discharge from hospital   Yesy Smith   Home Medication Instructions ROCHELLE:    Printed on:07/02/21 3721   Medication Information                      Alcohol Swabs (ALCOHOL PREP) 70 % PADS  USE AS DIRECTED             allopurinol (ZYLOPRIM) 300 MG tablet  TAKE 1 TABLET DAILY             amiodarone (CORDARONE) 200 MG tablet  Take 1 tablet by mouth daily             ammonium lactate (LAC-HYDRIN) 12 % lotion  Apply topically daily after bathing sparing the space between the toes. apixaban (ELIQUIS) 5 MG TABS tablet  TAKE 1 TABLET BY MOUTH 2 TIMES DAILY             aspirin (ASPIRIN ADULT LOW STRENGTH) 81 MG EC tablet  TAKE 1 TABLET TWICE A DAY             atorvastatin (LIPITOR) 40 MG tablet  Take 1 tablet by mouth daily             Blood Gluc Meter Disp-Strips (BLOOD GLUCOSE METER DISPOSABLE) SHAN  Daily and as needed             blood glucose monitor strips  1 strip by Other route daily Test 1 times a day & as needed for symptoms of irregular blood glucose.              blood glucose test strips (FREESTYLE LITE) strip  USE AS DIRECTED TWICE A DAY             ferrous sulfate (IRON 325) 325 (65 Fe) MG tablet  Take 1 tablet by mouth daily (with breakfast)             furosemide (LASIX) 40 MG tablet  TAKE 1/2 TABLET BY MOUTH 2 TIMES A DAY             Handicap Placard MISC  by Does not apply route Exp: 1/2025             isosorbide mononitrate (IMDUR) 30 MG extended release tablet  TAKE 1 TABLET BY MOUTH DAILY             Lancets MISC  Daily             levothyroxine (SYNTHROID) 25 MCG tablet  Take 0.5 tablets by mouth Daily             lisinopril (PRINIVIL;ZESTRIL) 5 MG tablet  TAKE 1 TABLET BY MOUTH DAILY             metFORMIN (GLUCOPHAGE) 1000 MG tablet  Take 1 tablet by mouth 2 times daily (with meals)             metoprolol succinate (TOPROL XL) 25 MG extended release tablet  TAKE 1 TABLET BY MOUTH DAILY             TRUEPLUS LANCETS 33G MISC  TEST AS DIRECTED                   Medications marked \"taking\" at this time  Outpatient Medications Marked as Taking for the 7/2/21 encounter (Office Visit) with Mortimer Singleton, LIDIA - CNP   Medication Sig Dispense Refill    ferrous sulfate (IRON 325) 325 (65 Fe) MG tablet Take 1 tablet by mouth daily (with breakfast) 30 tablet 5    amiodarone (CORDARONE) 200 MG tablet Take 1 tablet by mouth daily 30 tablet 0    levothyroxine (SYNTHROID) 25 MCG tablet Take 0.5 tablets by mouth Daily 15 tablet 0    aspirin (ASPIRIN ADULT LOW STRENGTH) 81 MG EC tablet TAKE 1 TABLET TWICE A DAY 60 tablet 3    lisinopril (PRINIVIL;ZESTRIL) 5 MG tablet TAKE 1 TABLET BY MOUTH DAILY 30 tablet 3    isosorbide mononitrate (IMDUR) 30 MG extended release tablet TAKE 1 TABLET BY MOUTH DAILY 30 tablet 3    allopurinol (ZYLOPRIM) 300 MG tablet TAKE 1 TABLET DAILY 30 tablet 5    furosemide (LASIX) 40 MG tablet TAKE 1/2 TABLET BY MOUTH 2 TIMES A DAY 90 tablet 1    metFORMIN (GLUCOPHAGE) 1000 MG tablet Take 1 tablet by mouth 2 times daily (with meals) 60 tablet 2    apixaban (ELIQUIS) 5 MG TABS tablet TAKE 1 TABLET BY MOUTH 2 TIMES DAILY 60 tablet 3    atorvastatin (LIPITOR) 40 MG tablet Take 1 tablet by mouth daily 30 tablet 5    Handicap Placard MISC by Does not apply route Exp: 1/2025 1 each 0    Lancets MISC Daily 100 each 3    Blood Gluc Meter Disp-Strips (BLOOD GLUCOSE METER DISPOSABLE) SHAN Daily and as needed 1 Device 0    metoprolol succinate (TOPROL XL) 25 MG extended release tablet TAKE 1 TABLET BY MOUTH DAILY 30 tablet 3    ammonium lactate (LAC-HYDRIN) 12 % lotion Apply topically daily after bathing sparing the space between the toes. 222 mL 3    Alcohol Swabs (ALCOHOL PREP) 70 % PADS USE AS DIRECTED 100 each 11        Medications patient taking as of now reconciled against medications ordered at time of hospital discharge: Yes    Chief Complaint   Patient presents with    Follow-Up from [facility] Dr Kj Gallegos in 1 week, has an echo coming up.     Overall, Elaine Rios states he feels he is back to his baseline for his breathing and energy. He denies any fevers or chills. Checks weights daily at home, no significant changes since his hospital discharge. Swelling is well controlled. Anamika Josue is taking his levothyroxine daily with his other medications. He admits he has had no prior history of hypothyroidism and this is a new diagnosis. No trouble swallowing, no neck swelling. He is also asking about a diagnosis of anemia on his chart. He admits he has had no recent colonoscopy, his first scope was normal but they did find polyps in his most recent scope. He did fall off schedule with Covid. Inpatient course: Discharge summary reviewed- see chart. Hospital Stay:      Hospital Course:  Corby Cleary is a 74 y.o. male with underlying history of hypertension, CAD, type 2 diabetes, cardiomyopathy/ severe LVSD s/p cardiac arrest in July, 2020 s/p AICD, CHF,IgM MGUS (follows with hem/onc), hematuria (follows with urology), and TANNER with varying BiPAP compliance presents to the emergency department with worsening shortness of breath over the last 3 days and new cough with pink frothy sputum production. Patient also noted to be in respiratory distress requiring NIVV. Chest x-ray showed cardiomegaly and pulmonary vascular congestion without gross effusions. He was admitted and treated with IV Lasix with resolution of his symptoms. Patient was also treated for an acute kidney injury which was thought to be cardiorenal.  On discharge, creatinine was back at baseline. Currently, patient is medically stable for discharge. He follows with his cardiologist Dr. Bonilla Chung. He will need follow-up with his cardiologist within 1 to 2 weeks of discharge and with his primary care physician within 1 week.   Patient directed to return to the hospital if he develop worsening chest pain, shortness of breath comfortable to breathing, nausea, vomiting, diarrhea     Significant therapeutic interventions: see above. He was started on synthroid but no other medication changes were made. Interval history/Current status: Stable, at baseline    Review of Systems   Constitutional: Positive for fatigue. Negative for appetite change, fever and unexpected weight change. HENT: Negative for hearing loss and trouble swallowing. Eyes: Negative for visual disturbance. Respiratory: Negative for chest tightness, shortness of breath and wheezing. Cardiovascular: Negative for chest pain and palpitations. Gastrointestinal: Negative for abdominal pain, blood in stool, constipation, diarrhea and vomiting. Endocrine: Negative for polydipsia and polyuria. Genitourinary: Negative for decreased urine volume, difficulty urinating, dysuria, frequency, hematuria and urgency. Musculoskeletal: Negative for myalgias and neck pain. Skin: Negative for wound. Neurological: Negative for dizziness, seizures, numbness and headaches. Psychiatric/Behavioral: Negative for suicidal ideas. The patient is not nervous/anxious. Vitals:    07/02/21 1214   BP: 101/60   Site: Left Upper Arm   Position: Sitting   Cuff Size: Medium Adult   Pulse: 56   Temp: 98.1 °F (36.7 °C)   SpO2: 98%   Weight: 241 lb (109.3 kg)     Body mass index is 41.37 kg/m². Wt Readings from Last 3 Encounters:   07/02/21 241 lb (109.3 kg)   06/25/21 243 lb 2.7 oz (110.3 kg)   04/26/21 242 lb 12.8 oz (110.1 kg)     BP Readings from Last 3 Encounters:   07/02/21 101/60   06/25/21 105/61   04/26/21 104/63     Lab Results   Component Value Date    TSH 13.61 (H) 06/23/2021         Physical Exam  Vitals and nursing note reviewed. Constitutional:       General: He is not in acute distress. Appearance: He is well-developed. He is obese. HENT:      Head: Normocephalic. Eyes:      General: No scleral icterus. Pupils: Pupils are equal, round, and reactive to light.    Cardiovascular:      Rate and Rhythm: Normal rate and regular rhythm. Pulmonary:      Effort: Pulmonary effort is normal.      Breath sounds: Normal breath sounds. Abdominal:      General: Abdomen is protuberant. Palpations: Abdomen is soft. Musculoskeletal:      Cervical back: Normal range of motion and neck supple. Skin:     General: Skin is warm and dry. Neurological:      Mental Status: He is alert and oriented to person, place, and time. Coordination: Coordination normal.   Psychiatric:         Behavior: Behavior normal. Behavior is cooperative. Thought Content: Thought content normal.         Judgment: Judgment normal.             Assessment/Plan:  1. Acute HFrEF (heart failure with reduced ejection fraction) ([facility]  Follows routinely with cardiology. Continue with furosemide half tablet twice a day and keep follow-up appointment. - RI DISCHARGE MEDS RECONCILED W/ CURRENT OUTPATIENT MED LIST    2. Coronary artery disease without angina pectoris, unspecified vessel or lesion type, unspecified whether native or transplanted heart  - RI DISCHARGE MEDS RECONCILED W/ CURRENT OUTPATIENT MED LIST    3. Ischemic cardiomyopathy    4. Type 2 diabetes mellitus without complication, without long-term current use of insulin ([facility])  No changes today, no reported hyper glycemia    5. CKD stage 3 due to type 2 diabetes mellitus ([facility]  Patient creatinine is stable at 1.29. Will monitor at this time, repeat labs in 3 to 4 weeks. - Basic Metabolic Panel; Future    6. Iron deficiency anemia, unspecified iron deficiency anemia type  Patient with decreased iron stores found while hospitalized. Patient was due back in 2020 for colonoscopy due to polyps found in 2017 scope. Patient denies any active bleeding. Will start daily ferritin replacement, advised he must not take within 2 hours of dairy products. He is agreeable to scheduling with gastroenterology for colonoscopy.   Patient to follow-up in 3 months, will repeat CBC at that time  - ferrous sulfate (IRON 325) 325 (65 Fe) MG tablet; Take 1 tablet by mouth daily (with breakfast)  Dispense: 30 tablet; Refill: 5  - Umm Damon MD, Gastroenterology, Darya Youssef    7. Colon cancer screening  - Umm Damon MD, Gastroenterology, Darya Youssef    8. Hypothyroidism  -Likely secondary to amiodarone use. Patient is on very low-dose level levothyroxine at 12.5 mcg. TSH in 3 to 4 weeks to reevaluate. Encourage patient to take 30 minutes prior to other medications with a glass of water.   Realized understanding        Medical Decision Making: moderate complexity

## 2021-07-06 ENCOUNTER — CARE COORDINATION (OUTPATIENT)
Dept: CASE MANAGEMENT | Age: 66
End: 2021-07-06

## 2021-07-06 ENCOUNTER — TELEPHONE (OUTPATIENT)
Dept: GASTROENTEROLOGY | Age: 66
End: 2021-07-06

## 2021-07-06 ENCOUNTER — TELEPHONE (OUTPATIENT)
Dept: PRIMARY CARE CLINIC | Age: 66
End: 2021-07-06

## 2021-07-06 DIAGNOSIS — N18.30 CKD STAGE 3 DUE TO TYPE 2 DIABETES MELLITUS (HCC): Primary | ICD-10-CM

## 2021-07-06 DIAGNOSIS — E11.22 CKD STAGE 3 DUE TO TYPE 2 DIABETES MELLITUS (HCC): Primary | ICD-10-CM

## 2021-07-06 NOTE — TELEPHONE ENCOUNTER
Please notify Josey Vital I reviewed his lab results. Unfortunately, his kidney function levels are trending back up compared to his numbers at discharge. I am going to refer him to nephrology, a kidney specialist, further evaluation and management.

## 2021-07-06 NOTE — CARE COORDINATION
[facility]    2021    Patient: Alysha Martinez  Patient : 1955   MRN: [id number]  Reason for Admission: CHF  Discharge Date: 21 RARS: Readmission Risk Score: 24         Spoke with: Alysha Martinez    Was able to contact Yoana Tracey for transitional outreach. He stated that he was doing \"good\". He denied SOB, cough, swelling and he lost a pound. He got referral at his PCP follow up for Nephro and Gastroenterology, he waiting for a call back. He said that he was started on an iron pill. Reviewed possible side effects. He had no questions or concerns,  Will be follow up with cardiology on . Care Transitions Follow Up Call    Needs to be reviewed by the provider   Additional needs identified to be addressed with provider: No  none             Method of communication with provider : none      Care Transition Nurse (CTN) contacted the patient by telephone to follow up after admission on 21. Verified name and  with patient as identifiers. Addressed changes since last contact: none  Discussed follow-up appointments. If no appointment was previously scheduled, appointment scheduling offered: No.   Is follow up appointment scheduled within 7 days of discharge? Yes. Advance Care Planning:   Does patient have an Advance Directive:  reviewed and current. CTN reviewed discharge instructions, medical action plan and red flags with patient and discussed any barriers to care and/or understanding of plan of care after discharge. Discussed appropriate site of care based on symptoms and resources available to patient including: PCP, Specialist and When to call 911. The patient agrees to contact the PCP office for questions related to their healthcare.      Patients top risk factors for readmission: lack of knowledge about disease and medical condition-CHF  Interventions to address risk factors: Assessment and support for treatment adherence and medication management-reviewed    Non-[facility] follow up appointment(s): 7/8 cardiology Dr Prabhu Mittal provided contact information for future needs. Plan for follow-up call in 7-10 days based on severity of symptoms and risk factors. Plan for next call: routine follow up/cardiology appt            Care Transitions Subsequent and Final Call    Subsequent and Final Calls  Do you have any ongoing symptoms?: No  Have your medications changed?: Yes  Patient Reports: iron pill was added  Do you have any questions related to your medications?: No  Do you currently have any active services?: No  Care Transitions Interventions  Other Interventions:            Follow Up  Future Appointments   Date Time Provider Keerthi England   8/23/2021  2:30 PM Ferny Lewis DO Resp Spec [facility]   10/27/2021  1:30 PM LIDIA Peacock - CNP [facility] Rico Amado RN

## 2021-07-06 NOTE — TELEPHONE ENCOUNTER
Pt LVM to schedule colon from referral.    It appears his last colon was on 4/4/17 with Dr Armando Angeles at [facility]; 2 polyps.

## 2021-07-07 NOTE — TELEPHONE ENCOUNTER
Writer called back and spoke with patient regarding scheduling Colonoscopy. Per answer on Questionnaire patient needs New patient office visit. Patient taking Plavix and ASA- Patient schedule 8/16/21 at [facility].

## 2021-07-09 DIAGNOSIS — I48.0 PAF (PAROXYSMAL ATRIAL FIBRILLATION) (HCC): ICD-10-CM

## 2021-07-09 DIAGNOSIS — E11.8 CONTROLLED TYPE 2 DIABETES MELLITUS WITH COMPLICATION, WITHOUT LONG-TERM CURRENT USE OF INSULIN (HCC): ICD-10-CM

## 2021-07-09 DIAGNOSIS — I25.10 CORONARY ARTERY DISEASE INVOLVING NATIVE CORONARY ARTERY OF NATIVE HEART WITHOUT ANGINA PECTORIS: ICD-10-CM

## 2021-07-09 RX ORDER — ATORVASTATIN CALCIUM 40 MG/1
40 TABLET, FILM COATED ORAL DAILY
Qty: 30 TABLET | Refills: 5 | Status: SHIPPED | OUTPATIENT
Start: 2021-07-09 | End: 2022-01-31

## 2021-07-13 ENCOUNTER — CARE COORDINATION (OUTPATIENT)
Dept: CASE MANAGEMENT | Age: 66
End: 2021-07-13

## 2021-07-13 NOTE — CARE COORDINATION
[facility] 45 Transitions Follow Up Call    2021    Patient: Cordelia Zamudio  Patient : 1955   MRN: [id number]  Reason for Admission: CHF  Discharge Date: 21 RARS: Readmission Risk Score: 24         Spoke with: Cordelia Zamudio    Was able to contact Mari Simms for transitional outreach. He stated that he was doing \"pretty good\". He was currently out walking. He denied any shortness of breath, cough, chest pain or swelling. He said that he went to the cardiologist and only have an echocardiogram.  He has not heard back about it. He also said that he ran out of some of his medications and the PCP reordered them and he got them a couple of days ago. He had no questions or concerns at this time. Care Transitions Follow Up Call    Needs to be reviewed by the provider   Additional needs identified to be addressed with provider: No  none             Method of communication with provider : none      Care Transition Nurse (CTN) contacted the patient by telephone to follow up after admission on 21. Verified name and  with patient as identifiers. Addressed changes since last contact: none  Discussed follow-up appointments. If no appointment was previously scheduled, appointment scheduling offered: No.   Is follow up appointment scheduled within 7 days of discharge? Yes. Advance Care Planning:   Does patient have an Advance Directive: reviewed and current. CTN reviewed discharge instructions, medical action plan and red flags with patient and discussed any barriers to care and/or understanding of plan of care after discharge. Discussed appropriate site of care based on symptoms and resources available to patient including: PCP, Specialist and When to call 911. The patient agrees to contact the PCP office for questions related to their healthcare.      Patients top risk factors for readmission: lack of knowledge about disease and medical condition-CHF/DM  Interventions to address risk factors: Assessment and support for treatment adherence and medication management-reviewed      Non-[facility] follow up appointment(s):     CTN provided contact information for future needs. Plan for follow-up call in 7-10 days based on severity of symptoms and risk factors. Plan for next call: routine follow up            Care Transitions Subsequent and Final Call    Subsequent and Final Calls  Do you have any ongoing symptoms?: No  Have your medications changed?: No  Do you have any questions related to your medications?: No  Do you currently have any active services?: No  Do you have any needs or concerns that I can assist you with?: No  Care Transitions Interventions  Other Interventions:            Follow Up  Future Appointments   Date Time Provider Keerthi Laceyi   8/16/2021  2:45 PM Dahlia Cordoba MD [facility]   8/23/2021  2:30 PM Sadia Nino DO Resp Spec [facility]   10/27/2021  1:30 PM LIDIA Jordan - CNP [facility]   12/14/2021  2:10 PM Justus Rojas MD [facility] None       Joshua Roberts, RN

## 2021-07-20 ENCOUNTER — CARE COORDINATION (OUTPATIENT)
Dept: CASE MANAGEMENT | Age: 66
End: 2021-07-20

## 2021-07-20 NOTE — CARE COORDINATION
[facility] 45 Transitions Follow Up Call    2021    Patient: Grover Simpson  Patient : 1955   MRN: [id number]  Reason for Admission: CHF  Discharge Date: 21 RARS: Readmission Risk Score: 24         Spoke with: Julian Massey    Was able to contact Julian Massey for transitional outreach. He stated that he was doing \"pretty good\". He denied chest pain, swelling or shortness of breath. He did say that he had a rash on his arm and he applied a plant based crease and it went away. He had no questions or concerns. Care Transitions Follow Up Call          Needs to be reviewed by the provider    Additional needs identified to be addressed with provider: No  none                 Method of communication with provider : none        Care Transition Nurse (CTN) contacted the patient by telephone to follow up after admission on 21. Verified name and  with patient as identifiers.     Addressed changes since last contact: none  Discussed follow-up appointments. If no appointment was previously scheduled, appointment scheduling offered: No.   Is follow up appointment scheduled within 7 days of discharge? Yes.     Advance Care Planning:   Does patient have an Advance Directive:  reviewed and current.      CTN reviewed discharge instructions, medical action plan and red flags with patient and discussed any barriers to care and/or understanding of plan of care after discharge. Discussed appropriate site of care based on symptoms and resources available to patient including: PCP, Specialist and When to call 911.  The patient agrees to contact the PCP office for questions related to their healthcare.      Patients top risk factors for readmission: lack of knowledge about disease and medical condition-CHF  Interventions to address risk factors: Assessment and support for treatment adherence and medication management-reviewed     Non-[facility] follow up appointment(s):  cardiology Dr Mainor Stock provided contact information for future needs. Plan for follow-up call in 7-10 days based on severity of symptoms and risk factors. Plan for next call: routine follow up/cardiology appt         Care Transitions Subsequent and Final Call    Subsequent and Final Calls  Do you have any ongoing symptoms?: No  Have your medications changed?: No  Do you have any questions related to your medications?: No  Do you currently have any active services?: No  Do you have any needs or concerns that I can assist you with?: No  Care Transitions Interventions  Other Interventions:            Follow Up  Future Appointments   Date Time Provider Keerthi England   8/16/2021  2:45 PM Saba Rangel MD [facility]   8/23/2021  2:30 PM Arnel Benjamin DO Resp Spec [facility]   10/27/2021  1:30 PM LIDIA Castro - CNP [facility]   12/14/2021  2:10 PM Jimmie Rosa MD [facility] None       Mary Gonzales, RN

## 2021-07-26 ENCOUNTER — CARE COORDINATION (OUTPATIENT)
Dept: CASE MANAGEMENT | Age: 66
End: 2021-07-26

## 2021-07-26 NOTE — CARE COORDINATION
[facility] Transitions Follow Up Call    2021    Patient: Elizabeth Ash  Patient : 1955   MRN: [id number]  Reason for Admission: CHF  Discharge Date: 21 RARS: Readmission Risk Score: 24         Spoke with: Elizabeth Ash    Was able to contact Ailin Antony for final outreach. He stated that he was doing \"good\". He denied any chest pain, shortness of breath or swelling. He denied any needs. Informed of final outreach and he was in agreement   Episode ended. Care Transitions Follow Up Call    Needs to be reviewed by the provider   Additional needs identified to be addressed with provider: No  none             Method of communication with provider : none      Care Transition Nurse (CTN) contacted the patient by telephone to follow up after admission on 21. Verified name and  with patient as identifiers. Addressed changes since last contact: none  Discussed follow-up appointments. If no appointment was previously scheduled, appointment scheduling offered: No.   Is follow up appointment scheduled within 7 days of discharge? Yes. CTN reviewed discharge instructions, medical action plan and red flags with patient and discussed any barriers to care and/or understanding of plan of care after discharge. Discussed appropriate site of care based on symptoms and resources available to patient including: PCP, Specialist and When to call 911. The patient agrees to contact the PCP office for questions related to their healthcare. Patients top risk factors for readmission: medical condition-CHF  Interventions to address risk factors: Assessment and support for treatment adherence and medication management-reviewed      Non-[facility] follow up appointment(s):     CTN provided contact information for future needs. No further follow-up call indicated based on severity of symptoms and risk factors. Plan for next call: final call episode ended.             Care Transitions Subsequent and Final Call    Subsequent and Final Calls  Do you have any ongoing symptoms?: No  Have your medications changed?: No  Do you have any questions related to your medications?: No  Do you currently have any active services?: No  Do you have any needs or concerns that I can assist you with?: No  Care Transitions Interventions  Other Interventions:            Follow Up  Future Appointments   Date Time Provider Keerthi England   8/16/2021  2:45 PM Oswaldo Means MD sv gr lks [facility]   8/23/2021  2:30 PM Quita Motley DO Resp Spec [facility]   10/27/2021  1:30 PM Marien Homans, APRN - CNP [facility]   12/14/2021  2:10 PM Kristopher Philip MD [facility] None       Caleb Ibarra RN

## 2021-08-02 ENCOUNTER — HOSPITAL ENCOUNTER (INPATIENT)
Age: 66
LOS: 1 days | Discharge: HOME | DRG: 291 | End: 2021-08-03
Attending: EMERGENCY MEDICINE | Admitting: INTERNAL MEDICINE
Payer: COMMERCIAL

## 2021-08-02 ENCOUNTER — APPOINTMENT (OUTPATIENT)
Dept: GENERAL RADIOLOGY | Age: 66
DRG: 291 | End: 2021-08-02
Payer: COMMERCIAL

## 2021-08-02 DIAGNOSIS — I50.1: Primary | ICD-10-CM

## 2021-08-02 LAB
ABSOLUTE EOS #: 0.35 K/UL (ref 0–0.44)
ABSOLUTE IMMATURE GRANULOCYTE: 0.03 K/UL (ref 0–0.3)
ABSOLUTE LYMPH #: 3.43 K/UL (ref 1.1–3.7)
ABSOLUTE MONO #: 0.74 K/UL (ref 0.1–1.2)
ALLEN TEST: ABNORMAL
ANION GAP SERPL CALCULATED.3IONS-SCNC: 15 MMOL/L (ref 9–17)
BASOPHILS # BLD: 1 % (ref 0–2)
BASOPHILS ABSOLUTE: 0.06 K/UL (ref 0–0.2)
BNP INTERPRETATION: ABNORMAL
BUN BLDV-MCNC: 20 MG/DL (ref 8–23)
BUN/CREAT BLD: ABNORMAL (ref 9–20)
CALCIUM SERPL-MCNC: 8.6 MG/DL (ref 8.6–10.4)
CARBOXYHEMOGLOBIN: 1.7 % (ref 0–5)
CHLORIDE BLD-SCNC: 104 MMOL/L (ref 98–107)
CO2: 22 MMOL/L (ref 20–31)
CREAT SERPL-MCNC: 1.54 MG/DL (ref 0.7–1.2)
DIFFERENTIAL TYPE: ABNORMAL
EOSINOPHILS RELATIVE PERCENT: 4 % (ref 1–4)
FIO2: ABNORMAL
GFR AFRICAN AMERICAN: 55 ML/MIN
GFR NON-AFRICAN AMERICAN: 45 ML/MIN
GFR SERPL CREATININE-BSD FRML MDRD: ABNORMAL ML/MIN/{1.73_M2}
GFR SERPL CREATININE-BSD FRML MDRD: ABNORMAL ML/MIN/{1.73_M2}
GLUCOSE BLD-MCNC: 143 MG/DL (ref 75–110)
GLUCOSE BLD-MCNC: 217 MG/DL (ref 70–99)
GLUCOSE BLD-MCNC: 88 MG/DL (ref 75–110)
HCO3 VENOUS: 26.6 MMOL/L (ref 24–30)
HCT VFR BLD CALC: 43 % (ref 40.7–50.3)
HEMOGLOBIN: 12.8 G/DL (ref 13–17)
IMMATURE GRANULOCYTES: 0 %
LYMPHOCYTES # BLD: 37 % (ref 24–43)
MCH RBC QN AUTO: 27.9 PG (ref 25.2–33.5)
MCHC RBC AUTO-ENTMCNC: 29.8 G/DL (ref 28.4–34.8)
MCV RBC AUTO: 93.7 FL (ref 82.6–102.9)
METHEMOGLOBIN: ABNORMAL % (ref 0–1.5)
MODE: ABNORMAL
MONOCYTES # BLD: 8 % (ref 3–12)
NEGATIVE BASE EXCESS, VEN: 0.9 MMOL/L (ref 0–2)
NOTIFICATION TIME: ABNORMAL
NOTIFICATION: ABNORMAL
NRBC AUTOMATED: 0 PER 100 WBC
O2 DEVICE/FLOW/%: ABNORMAL
O2 SAT, VEN: 65.9 % (ref 60–85)
OXYHEMOGLOBIN: ABNORMAL % (ref 95–98)
PATIENT TEMP: 37
PCO2, VEN, TEMP ADJ: ABNORMAL MMHG (ref 39–55)
PCO2, VEN: 60.3 (ref 39–55)
PDW BLD-RTO: 17.4 % (ref 11.8–14.4)
PEEP/CPAP: ABNORMAL
PH VENOUS: 7.27 (ref 7.32–7.42)
PH, VEN, TEMP ADJ: ABNORMAL (ref 7.32–7.42)
PLATELET # BLD: 230 K/UL (ref 138–453)
PLATELET ESTIMATE: ABNORMAL
PMV BLD AUTO: 11 FL (ref 8.1–13.5)
PO2, VEN, TEMP ADJ: ABNORMAL MMHG (ref 30–50)
PO2, VEN: 40 (ref 30–50)
POSITIVE BASE EXCESS, VEN: ABNORMAL MMOL/L (ref 0–2)
POTASSIUM SERPL-SCNC: 3.9 MMOL/L (ref 3.7–5.3)
PRO-BNP: 2269 PG/ML
PSV: ABNORMAL
PT. POSITION: ABNORMAL
RBC # BLD: 4.59 M/UL (ref 4.21–5.77)
RBC # BLD: ABNORMAL 10*6/UL
RESPIRATORY RATE: ABNORMAL
SAMPLE SITE: ABNORMAL
SEG NEUTROPHILS: 50 % (ref 36–65)
SEGMENTED NEUTROPHILS ABSOLUTE COUNT: 4.62 K/UL (ref 1.5–8.1)
SET RATE: ABNORMAL
SODIUM BLD-SCNC: 141 MMOL/L (ref 135–144)
TEXT FOR RESPIRATORY: ABNORMAL
TOTAL HB: ABNORMAL G/DL (ref 12–16)
TOTAL RATE: ABNORMAL
TROPONIN INTERP: NORMAL
TROPONIN T: NORMAL NG/ML
TROPONIN, HIGH SENSITIVITY: 16 NG/L (ref 0–22)
VT: ABNORMAL
WBC # BLD: 9.2 K/UL (ref 3.5–11.3)
WBC # BLD: ABNORMAL 10*3/UL

## 2021-08-02 PROCEDURE — G0378 HOSPITAL OBSERVATION PER HR: HCPCS

## 2021-08-02 PROCEDURE — 2580000003 HC RX 258: Performed by: STUDENT IN AN ORGANIZED HEALTH CARE EDUCATION/TRAINING PROGRAM

## 2021-08-02 PROCEDURE — 96375 TX/PRO/DX INJ NEW DRUG ADDON: CPT

## 2021-08-02 PROCEDURE — 2500000003 HC RX 250 WO HCPCS: Performed by: STUDENT IN AN ORGANIZED HEALTH CARE EDUCATION/TRAINING PROGRAM

## 2021-08-02 PROCEDURE — 82805 BLOOD GASES W/O2 SATURATION: CPT

## 2021-08-02 PROCEDURE — 82947 ASSAY GLUCOSE BLOOD QUANT: CPT

## 2021-08-02 PROCEDURE — 99283 EMERGENCY DEPT VISIT LOW MDM: CPT

## 2021-08-02 PROCEDURE — 93005 ELECTROCARDIOGRAM TRACING: CPT | Performed by: STUDENT IN AN ORGANIZED HEALTH CARE EDUCATION/TRAINING PROGRAM

## 2021-08-02 PROCEDURE — 94660 CPAP INITIATION&MGMT: CPT

## 2021-08-02 PROCEDURE — 96366 THER/PROPH/DIAG IV INF ADDON: CPT

## 2021-08-02 PROCEDURE — 71045 X-RAY EXAM CHEST 1 VIEW: CPT

## 2021-08-02 PROCEDURE — 96365 THER/PROPH/DIAG IV INF INIT: CPT

## 2021-08-02 PROCEDURE — 96374 THER/PROPH/DIAG INJ IV PUSH: CPT

## 2021-08-02 PROCEDURE — 80048 BASIC METABOLIC PNL TOTAL CA: CPT

## 2021-08-02 PROCEDURE — 2700000000 HC OXYGEN THERAPY PER DAY

## 2021-08-02 PROCEDURE — 36415 COLL VENOUS BLD VENIPUNCTURE: CPT

## 2021-08-02 PROCEDURE — 6370000000 HC RX 637 (ALT 250 FOR IP): Performed by: STUDENT IN AN ORGANIZED HEALTH CARE EDUCATION/TRAINING PROGRAM

## 2021-08-02 PROCEDURE — 84484 ASSAY OF TROPONIN QUANT: CPT

## 2021-08-02 PROCEDURE — 83880 ASSAY OF NATRIURETIC PEPTIDE: CPT

## 2021-08-02 PROCEDURE — 85025 COMPLETE CBC W/AUTO DIFF WBC: CPT

## 2021-08-02 PROCEDURE — 2060000000 HC ICU INTERMEDIATE R&B

## 2021-08-02 PROCEDURE — 6360000002 HC RX W HCPCS: Performed by: STUDENT IN AN ORGANIZED HEALTH CARE EDUCATION/TRAINING PROGRAM

## 2021-08-02 PROCEDURE — 99223 1ST HOSP IP/OBS HIGH 75: CPT | Performed by: INTERNAL MEDICINE

## 2021-08-02 RX ORDER — NICOTINE POLACRILEX 4 MG
15 LOZENGE BUCCAL PRN
Status: DISCONTINUED | OUTPATIENT
Start: 2021-08-02 | End: 2021-08-03 | Stop reason: HOSPADM

## 2021-08-02 RX ORDER — ASPIRIN 81 MG/1
81 TABLET ORAL DAILY
Status: DISCONTINUED | OUTPATIENT
Start: 2021-08-02 | End: 2021-08-03 | Stop reason: HOSPADM

## 2021-08-02 RX ORDER — NITROGLYCERIN 20 MG/100ML
5-200 INJECTION INTRAVENOUS CONTINUOUS
Status: DISCONTINUED | OUTPATIENT
Start: 2021-08-02 | End: 2021-08-03

## 2021-08-02 RX ORDER — ACETAMINOPHEN 325 MG/1
650 TABLET ORAL EVERY 6 HOURS PRN
Status: DISCONTINUED | OUTPATIENT
Start: 2021-08-02 | End: 2021-08-03 | Stop reason: HOSPADM

## 2021-08-02 RX ORDER — POLYETHYLENE GLYCOL 3350 17 G/17G
17 POWDER, FOR SOLUTION ORAL DAILY PRN
Status: DISCONTINUED | OUTPATIENT
Start: 2021-08-02 | End: 2021-08-03 | Stop reason: HOSPADM

## 2021-08-02 RX ORDER — SODIUM CHLORIDE 0.9 % (FLUSH) 0.9 %
5-40 SYRINGE (ML) INJECTION EVERY 12 HOURS SCHEDULED
Status: DISCONTINUED | OUTPATIENT
Start: 2021-08-02 | End: 2021-08-03 | Stop reason: HOSPADM

## 2021-08-02 RX ORDER — ONDANSETRON 4 MG/1
4 TABLET, ORALLY DISINTEGRATING ORAL EVERY 8 HOURS PRN
Status: DISCONTINUED | OUTPATIENT
Start: 2021-08-02 | End: 2021-08-03 | Stop reason: HOSPADM

## 2021-08-02 RX ORDER — DEXTROSE MONOHYDRATE 25 G/50ML
12.5 INJECTION, SOLUTION INTRAVENOUS PRN
Status: DISCONTINUED | OUTPATIENT
Start: 2021-08-02 | End: 2021-08-03 | Stop reason: HOSPADM

## 2021-08-02 RX ORDER — FUROSEMIDE 10 MG/ML
40 INJECTION INTRAMUSCULAR; INTRAVENOUS ONCE
Status: DISCONTINUED | OUTPATIENT
Start: 2021-08-02 | End: 2021-08-03 | Stop reason: HOSPADM

## 2021-08-02 RX ORDER — DEXTROSE MONOHYDRATE 50 MG/ML
100 INJECTION, SOLUTION INTRAVENOUS PRN
Status: DISCONTINUED | OUTPATIENT
Start: 2021-08-02 | End: 2021-08-03 | Stop reason: HOSPADM

## 2021-08-02 RX ORDER — SODIUM CHLORIDE 9 MG/ML
25 INJECTION, SOLUTION INTRAVENOUS PRN
Status: DISCONTINUED | OUTPATIENT
Start: 2021-08-02 | End: 2021-08-03 | Stop reason: HOSPADM

## 2021-08-02 RX ORDER — SODIUM CHLORIDE 0.9 % (FLUSH) 0.9 %
5-40 SYRINGE (ML) INJECTION PRN
Status: DISCONTINUED | OUTPATIENT
Start: 2021-08-02 | End: 2021-08-03 | Stop reason: HOSPADM

## 2021-08-02 RX ORDER — ONDANSETRON 2 MG/ML
4 INJECTION INTRAMUSCULAR; INTRAVENOUS EVERY 6 HOURS PRN
Status: DISCONTINUED | OUTPATIENT
Start: 2021-08-02 | End: 2021-08-03 | Stop reason: HOSPADM

## 2021-08-02 RX ORDER — FUROSEMIDE 10 MG/ML
40 INJECTION INTRAMUSCULAR; INTRAVENOUS ONCE
Status: COMPLETED | OUTPATIENT
Start: 2021-08-02 | End: 2021-08-02

## 2021-08-02 RX ORDER — ACETAMINOPHEN 650 MG/1
650 SUPPOSITORY RECTAL EVERY 6 HOURS PRN
Status: DISCONTINUED | OUTPATIENT
Start: 2021-08-02 | End: 2021-08-03 | Stop reason: HOSPADM

## 2021-08-02 RX ORDER — AMIODARONE HYDROCHLORIDE 200 MG/1
200 TABLET ORAL DAILY
Status: DISCONTINUED | OUTPATIENT
Start: 2021-08-02 | End: 2021-08-03 | Stop reason: HOSPADM

## 2021-08-02 RX ADMIN — APIXABAN 5 MG: 5 TABLET, FILM COATED ORAL at 21:54

## 2021-08-02 RX ADMIN — NITROGLYCERIN 50 MCG/MIN: 20 INJECTION INTRAVENOUS at 06:57

## 2021-08-02 RX ADMIN — SODIUM CHLORIDE, PRESERVATIVE FREE 10 ML: 5 INJECTION INTRAVENOUS at 20:22

## 2021-08-02 RX ADMIN — FUROSEMIDE 40 MG: 10 INJECTION, SOLUTION INTRAMUSCULAR; INTRAVENOUS at 06:58

## 2021-08-02 RX ADMIN — INSULIN LISPRO 2 UNITS: 100 INJECTION, SOLUTION INTRAVENOUS; SUBCUTANEOUS at 13:09

## 2021-08-02 RX ADMIN — SODIUM CHLORIDE, PRESERVATIVE FREE 10 ML: 5 INJECTION INTRAVENOUS at 10:21

## 2021-08-02 ASSESSMENT — ENCOUNTER SYMPTOMS
DIARRHEA: 0
CONSTIPATION: 0
RHINORRHEA: 1
BACK PAIN: 0
COUGH: 1
CHEST TIGHTNESS: 1
ABDOMINAL PAIN: 0
NAUSEA: 0
PHOTOPHOBIA: 0
SHORTNESS OF BREATH: 0
WHEEZING: 0
VOMITING: 0

## 2021-08-02 ASSESSMENT — PAIN SCALES - GENERAL
PAINLEVEL_OUTOF10: 0

## 2021-08-02 NOTE — CONSULTS
Cardiovascular Consult Note     TODAY'S DATE: 8/2/2021    Patient name: Juan Antonio Wallace   YOB: 1955  Date of admission:  8/2/2021       Patient seen, examined. Previous clinical entries reviewed. All available laboratory, imaging and ancillary data reviewed. Reason for Consult: Heart failure    History of present Illness:     Juan Antonio Wallace is a 74 y.o. male with past medical history significant for nonrevascularizable coronary artery disease and severe left ventricular systolic dysfunction with ICD backup, chronic systolic heart failure who presented to [facility] with complaints of acute onset shortness of breath earlier this morning but significantly gotten worse at his apartment. When he presented to the emergency room at [facility] he was found to be in heart failure with mild pulmonary edema. He was put on oxygen and was given IV Lasix. His symptoms improved. He is currently back to his baseline. He is no new complaints of chest pain, worsening lower extremity edema, orthopnea or paroxysmal nocturnal dyspnea. On questioning the patient, she has diet has not been upto par over the last couple days. He has been taking his medications regularly.       Past Medical History:    has a past medical history of Anemia, Anxiety, CAD (coronary artery disease), Cardiac defibrillator in place, Cardiomyopathy [facility]), CHF (congestive heart failure) ([address] Chronic combined systolic and diastolic heart failure ([address] s/[ AICD placed, Chronic kidney disease, Complex sleep apnea syndrome, Diabetic retinopathy ([address] Diverticulitis, DJD (degenerative joint disease), Edentulous, Gout, HTN (hypertension), Hyperlipidemia, Hypertension, Iron deficiency anemia, Ischemic cardiomyopathy, Morbid obesity ([address] Obesity, TANNER variably compliant with BiPAP, Osteoarthritis, PAF (paroxysmal atrial fibrillation) ([address] Psychophysiologic insomnia, Type II or unspecified type diabetes mellitus without mention of complication, not stated as uncontrolled, and Unspecified sleep apnea. Surgical History:     Past Surgical History:   Procedure Laterality Date    BONE MARROW BIOPSY  2012 approx    CARDIAC CATHETERIZATION  8-2007    severe stenosis pre-stent area    CARDIAC CATHETERIZATION  09/11/2013    Very peripheral stenosis, not amendable to PCI, stents patent    CARDIAC DEFIBRILLATOR PLACEMENT  8/26/2015    COLONOSCOPY  11 7 2007   [address]  1998, 2004    stent LAD    PACEMAKER PLACEMENT  2005 or 2006    AICD    OR COLSC FLX W/RMVL OF TUMOR POLYP LESION SNARE TQ N/A 4/4/2017    COLONOSCOPY POLYPECTOMY SNARE/COLD BIOPSY performed by Michele Marin DO at [facility]       Medications:   Scheduled Meds:   amiodarone  200 mg Oral Daily    apixaban  5 mg Oral BID    aspirin  81 mg Oral Daily    sodium chloride flush  5-40 mL Intravenous 2 times per day    furosemide  40 mg Intravenous Once    insulin lispro  0-12 Units Subcutaneous TID WC    insulin lispro  0-6 Units Subcutaneous Nightly     Continuous Infusions:   nitroGLYCERIN Stopped (08/02/21 1117)    sodium chloride      dextrose        No outpatient medications have been marked as taking for the 8/2/21 encounter [facility] Encounter). Allergies:   Zetia [ezetimibe], Bactrim, Cephalexin, Cephalexin, and Sulfamethoxazole-trimethoprim    Social History:    reports that he quit smoking about 47 years ago. His smoking use included cigarettes. He started smoking about 50 years ago. He has a 3.00 pack-year smoking history. He has never used smokeless tobacco. He reports previous drug use. Drug: Marijuana. He reports that he does not drink alcohol. Family History:    family history includes Cancer in his mother; Heart Disease in his maternal grandmother, maternal uncle, and mother. Review of Systems:     As above. All other systems are reviewed and are negative.       Physical Exam:   BP (!) 100/54   Pulse 55   Temp 98.3 °F (36.8 °C) (Oral)   Resp 15   Ht 5' 4\" (1.626 m)   Wt 248 lb 7.3 oz (112.7 kg)   SpO2 100%   BMI 42.65 kg/m²   No intake or output data in the 24 hours ending 08/02/21 1738    GENERAL:  Alert, appropriate, oriented, in NAD. HEENT:  Head is atraumatic and normocephalic. No Pallor. No icterus. NECK: Supple without any thyromegaly. LUNGS: Generally clear to auscultation  CARDIAC: S1, S2, RRR. ABD:  Soft non-tender . EXT: Trace edema. MS: No obvious deformities. SKIN: No obvious skin rashes. NEURO: No focal neurologic deficits. Labs/ Ancillary data:     CBC:   Recent Labs     08/02/21  0647   WBC 9.2   HGB 12.8*        BMP:    Recent Labs     08/02/21  0647      K 3.9      CO2 22   BUN 20   CREATININE 1.54*   GLUCOSE 217*     Troponin:   Recent Labs     08/02/21  0647   TROPONINT NOT REPORTED       Imaging:    CXR: Mild increase in pulmonary vascular congestion on the right side. Echo: Last echocardiogram with severe left ventricular systolic dysfunction. EKG: Not available for review. Impression :     Acute on chronic systolic heart failure. Ischemic cardiomyopathy with severe left ventricular systolic dysfunction. Presence of ICD. Coronary artery disease with nonrevascularizable left anterior descending artery and severe apical aneurysm. Paroxysmal atrial fibrillation-on anticoagulation. Morbid obesity. Plan :     Diurese as tolerated. Close follow-up of renal function. Resume home medications. We will follow patient with you. Thank you very much for allowing us to participate in the care of this patient. Please call us with any questions.       Electronically signed by Sachi Aguero MD on 8/2/2021 at 5:38 PM

## 2021-08-02 NOTE — CARE COORDINATION
Case Management Initial Discharge Plan  Cynthia Corona             Met with:patient to discuss discharge plans. Information verified: address, contacts, phone number, , insurance Yes  Insurance Provider: Delma Crocker    Emergency Contact/Next of Kin name & number: Franco Ray (cousin) [phone]  Who are involved in patient's support system? Family, friends    PCP: LIDIA Hutson CNP  Date of last visit: four weeks      Discharge Planning    Living Arrangements:  Alone     Home has 1 stories  3 stairs to climb to get into front door, stairs to climb to reach second floor  Location of bedroom/bathroom in home     Patient able to perform ADL's:Independent    Current Services (outpatient & in home)   DME equipment:   DME provider:     Is patient receiving oral anticoagulation therapy? No    If indicated:   Physician managing anticoagulation treatment:   Where does patient obtain lab work for ATC treatment? Potential Assistance Needed:       Patient agreeable to home care: No  [place] of choice provided:  n/a    Prior SNF/Rehab Placement and Facility:   Agreeable to SNF/Rehab: No  [place] of choice provided: n/a     Evaluation: no    Expected Discharge date:       Patient expects to be discharged to: If home: is the family and/or caregiver wiling & able to provide support at home? yes  Who will be providing this support? Family, friends    Follow Up Appointment: Best Day/ Time:      Transportation provider:   Transportation arrangements needed for discharge: No    Readmission Risk              Risk of Unplanned Readmission:  18             Does patient have a readmission risk score greater than 14?: Yes  If yes, follow-up appointment must be made within 7 days of discharge.      Goals of Care: breathe easier      Educated patient on transitional options, provided freedom of choice and are agreeable with plan      Discharge Plan: home independently          Electronically signed by Jb Bowling RN on 8/2/21 at [address] [facility] EDT

## 2021-08-02 NOTE — ED PROVIDER NOTES
[facility]  Emergency Department Encounter  EmergencyMedicine Resident     Pt Name:Claudio Goldberg  MRN: [id number]  [place] 1955  Date of evaluation: 8/2/21  PCP:  LIDIA Shepard CNP    CHIEF COMPLAINT       Chief Complaint   Patient presents with    Congestive Heart Failure     PT arrived from home with sudden onset SOB d/t CHF exacerbation. PT o2 sat 89% RA audible crackles bilaterally with frothy sputum when coughing. HISTORY OF PRESENT ILLNESS  (Location/Symptom, Timing/Onset, Context/Setting, Quality, Duration, Modifying Factors, Severity.)      He Daigle is a 74 y.o. male who presents with acute onset shortness of breath approximately 30 minutes prior to arrival.  Patient has a history of CHF, states this feels similar to his previous episodes. Patient also has a history of diabetes. Patient states that the shortness of breath is getting worse. He is satting in the low 80s on room air, placed on BiPAP shortly after arrival.  Patient denies any chest pain, denies any shocks from his AICD: Feels well he is doing well he. Patient has been coughing up pink frothy sputum. The coughing and shortness of breath is worse with exertion.     PAST MEDICAL / SURGICAL / SOCIAL / FAMILY HISTORY      has a past medical history of Anemia, Anxiety, CAD (coronary artery disease), Cardiac defibrillator in place, Cardiomyopathy [facility]), CHF (congestive heart failure) ([address] Chronic combined systolic and diastolic heart failure ([address] s/[ AICD placed, Chronic kidney disease, Complex sleep apnea syndrome, Diabetic retinopathy ([address] Diverticulitis, DJD (degenerative joint disease), Edentulous, Gout, HTN (hypertension), Hyperlipidemia, Hypertension, Iron deficiency anemia, Ischemic cardiomyopathy, Morbid obesity ([address] Obesity, TANNER variably compliant with BiPAP, Osteoarthritis, PAF (paroxysmal atrial fibrillation) ([address] Psychophysiologic insomnia, Type II or unspecified type diabetes mellitus without mention of complication, not stated as uncontrolled, and Unspecified sleep apnea. has a past surgical history that includes Colonoscopy (2007); pacemaker placement ( or ); Cardiac catheterization (); Cardiac catheterization (2013); Coronary angioplasty (, ); Cardiac defibrillator placement (2015); bone marrow biopsy (2012); and pr colsc flx w/rmvl of tumor polyp lesion snare tq (N/A, 2017). Social History     Socioeconomic History    Marital status: Single     Spouse name: Not on file    Number of children: Not on file    Years of education: Not on file    Highest education level: Not on file   Occupational History    Not on file   Tobacco Use    Smoking status: Former Smoker     Packs/day: 1.00     Years: 3.00     Pack years: 3.00     Types: Cigarettes     Start date: 1971     Quit date: 1974     Years since quittin.5    Smokeless tobacco: Never Used   Vaping Use    Vaping Use: Never used   Substance and Sexual Activity    Alcohol use: No     Alcohol/week: 0.0 standard drinks    Drug use: Not Currently     Types: Marijuana     Comment: hx THC quit approx     Sexual activity: Not Currently     Partners: Female   Other Topics Concern    Not on file   Social History Narrative    Not on file     Social Determinants of Health     Financial Resource Strain: Low Risk     Difficulty of Paying Living Expenses: Not hard at all   Food Insecurity: No Food Insecurity    Worried About [address] in the Last Year: Never true    [address] in the Last Year: Never true   Transportation Needs: No Transportation Needs    Lack of Transportation (Medical): No    Lack of Transportation (Non-Medical):  No   Physical Activity:     Days of Exercise per Week:     Minutes of Exercise per Session:    Stress:     Feeling of Stress :    Social Connections:     Frequency of Communication with Friends and Family:     tablet TAKE 1/2 TABLET BY MOUTH 2 TIMES A DAY 4/9/21   LIDIA Erazo CNP   Handicap Placard MISC by Does not apply route Exp: 1/2025 12/23/20   LIDIA Erazo CNP   Lancets MISC Daily 12/2/20   LIDIA Erazo CNP   Blood Gluc Meter Disp-Strips (BLOOD GLUCOSE METER DISPOSABLE) SHAN Daily and as needed 12/2/20   LIDIA Erazo CNP   blood glucose monitor strips 1 strip by Other route daily Test 1 times a day & as needed for symptoms of irregular blood glucose. 12/2/20 4/26/21  LIDIA Erazo CNP   metoprolol succinate (TOPROL XL) 25 MG extended release tablet TAKE 1 TABLET BY MOUTH DAILY 5/10/20   Jean-Pierre Lozano MD   blood glucose test strips (FREESTYLE LITE) strip USE AS DIRECTED TWICE A DAY 1/2/20   Monica Valenzuela MD   ammonium lactate (LAC-HYDRIN) 12 % lotion Apply topically daily after bathing sparing the space between the toes. 1/2/20   Monica Valenzuela MD   TRUEPLUS LANCETS 33G MISC TEST AS DIRECTED 1/2/20   Monica Valenzuela MD   Alcohol Swabs (ALCOHOL PREP) 70 % PADS USE AS DIRECTED 5/31/19   Karma Barr MD       REVIEW OF SYSTEMS    (2-9 systems for level 4, 10 or more for level 5)      Review of Systems   Constitutional: Positive for fatigue. Negative for chills, diaphoresis and fever. HENT: Positive for rhinorrhea. Negative for congestion. Eyes: Negative for photophobia and visual disturbance. Respiratory: Positive for cough and chest tightness. Negative for shortness of breath and wheezing. Cardiovascular: Negative for chest pain, palpitations and leg swelling. Gastrointestinal: Negative for abdominal pain, constipation, diarrhea, nausea and vomiting. Endocrine: Negative for polydipsia, polyphagia and polyuria. Genitourinary: Negative for difficulty urinating, dysuria, flank pain and hematuria. Musculoskeletal: Negative for arthralgias, back pain, neck pain and neck stiffness.    Neurological: Negative for EKG):  Orders Placed This Encounter   Procedures    XR CHEST PORTABLE    CBC Auto Differential    Basic Metabolic Panel w/ Reflex to MG    Brain Natriuretic Peptide    Troponin    PREVIOUS SPECIMEN    Blood Gas, Venous    Basic Metabolic Panel w/ Reflex to MG    CBC auto differential    ADULT DIET;  Regular; Low Fat/Low Chol/High Fiber/2 gm Na; 1500 ml    Vital signs per unit routine    Notify physician    Up as tolerated    Up with assistance    Daily weights    Intake and output    Neurovascular checks    Elevate Head of Bed     Strict intake and output    HYPOGLYCEMIA TREATMENT: blood glucose less than 50 mg/dL and patient  ALERT and TOLERATING PO    HYPOGLYCEMIA TREATMENT: blood glucose less than 70 mg/dL and patient ALERT and TOLERATING PO    HYPOGLYCEMIA TREATMENT: blood glucose less than 70 mg/dL and patient NOT ALERT or NPO    Full Code    Inpatient consult to Internal Medicine    Inpatient consult to Case Management    Inpatient Consult to Heart Failure Nurse/Coordinator    Inpatient consult to Cardiology    OT eval and treat    PT evaluation and treat    BIPAP    Initiate Oxygen Therapy Protocol    POCT Glucose    EKG 12 Lead    ECHO Complete 2D W Doppler W Color    PATIENT STATUS (FROM ED OR OR/PROCEDURAL) Inpatient       MEDICATIONS ORDERED:  Orders Placed This Encounter   Medications    nitroGLYCERIN 50 mg in dextrose 5% 250 mL infusion    furosemide (LASIX) injection 40 mg    amiodarone (CORDARONE) tablet 200 mg    apixaban (ELIQUIS) tablet 5 mg    aspirin EC tablet 81 mg    sodium chloride flush 0.9 % injection 5-40 mL    sodium chloride flush 0.9 % injection 5-40 mL    0.9 % sodium chloride infusion    OR Linked Order Group     ondansetron (ZOFRAN-ODT) disintegrating tablet 4 mg     ondansetron (ZOFRAN) injection 4 mg    polyethylene glycol (GLYCOLAX) packet 17 g    OR Linked Order Group     acetaminophen (TYLENOL) tablet 650 mg     acetaminophen (TYLENOL) suppository 650 mg    furosemide (LASIX) injection 40 mg    insulin lispro (HUMALOG) injection vial 0-12 Units    insulin lispro (HUMALOG) injection vial 0-6 Units    glucose (GLUTOSE) 40 % oral gel 15 g    dextrose 50 % IV solution    glucagon (rDNA) injection 1 mg    dextrose 5 % solution       DDX: CHF, flash pulmonary edema, STEMI, ACS, pneumonia, pneumothorax    MDM/IMPRESSION: This is a 58-year-old male presenting with shortness of breath. Acute onset approximately half hour prior to arrival.  Patient placed on BiPAP, tolerating well. Plan for x-ray, cardiac work-up, admission. Will place patient on nitro drip and likely give Lasix pending chest x-ray reviewed.     DIAGNOSTIC RESULTS / EMERGENCY DEPARTMENT COURSE / MDM   LAB RESULTS:  Results for orders placed or performed during the hospital encounter of 08/02/21   CBC Auto Differential   Result Value Ref Range    WBC 9.2 3.5 - 11.3 k/uL    RBC 4.59 4.21 - 5.77 m/uL    Hemoglobin 12.8 (L) 13.0 - 17.0 g/dL    Hematocrit 43.0 40.7 - 50.3 %    MCV 93.7 82.6 - 102.9 fL    MCH 27.9 25.2 - 33.5 pg    MCHC 29.8 28.4 - 34.8 g/dL    RDW 17.4 (H) 11.8 - 14.4 %    Platelets 199 810 - 092 k/uL    MPV 11.0 8.1 - 13.5 fL    NRBC Automated 0.0 0.0 per 100 WBC    Differential Type NOT REPORTED     Seg Neutrophils 50 36 - 65 %    Lymphocytes 37 24 - 43 %    Monocytes 8 3 - 12 %    Eosinophils % 4 1 - 4 %    Basophils 1 0 - 2 %    Immature Granulocytes 0 0 %    Segs Absolute 4.62 1.50 - 8.10 k/uL    Absolute Lymph # 3.43 1.10 - 3.70 k/uL    Absolute Mono # 0.74 0.10 - 1.20 k/uL    Absolute Eos # 0.35 0.00 - 0.44 k/uL    Basophils Absolute 0.06 0.00 - 0.20 k/uL    Absolute Immature Granulocyte 0.03 0.00 - 0.30 k/uL    WBC Morphology NOT REPORTED     RBC Morphology ANISOCYTOSIS PRESENT     Platelet Estimate NOT REPORTED    Basic Metabolic Panel w/ Reflex to MG   Result Value Ref Range    Glucose 217 (H) 70 - 99 mg/dL    BUN 20 8 - 23 mg/dL    CREATININE 1.54 (H) 0.70 - 1.20 mg/dL    Bun/Cre Ratio NOT REPORTED 9 - 20    Calcium 8.6 8.6 - 10.4 mg/dL    Sodium 141 135 - 144 mmol/L    Potassium 3.9 3.7 - 5.3 mmol/L    Chloride 104 98 - 107 mmol/L    CO2 22 20 - 31 mmol/L    Anion Gap 15 9 - 17 mmol/L    GFR Non-African American 45 (L) >60 mL/min    GFR  55 (L) >60 mL/min    GFR Comment          GFR Staging NOT REPORTED    Brain Natriuretic Peptide   Result Value Ref Range    Pro-BNP 2,269 (H) <300 pg/mL    BNP Interpretation Pro-BNP Reference Range:    Troponin   Result Value Ref Range    Troponin, High Sensitivity 16 0 - 22 ng/L    Troponin T NOT REPORTED <0.03 ng/mL    Troponin Interp NOT REPORTED    Blood Gas, Venous   Result Value Ref Range    pH, Ramon 7.267 (L) 7.320 - 7.420    pCO2, Ramon 60.3 (H) 39 - 55    pO2, Ramon 40.0 30 - 50    HCO3, Venous 26.6 24 - 30 mmol/L    Positive Base Excess, Ramon NOT REPORTED 0.0 - 2.0 mmol/L    Negative Base Excess, Ramon 0.9 0.0 - 2.0 mmol/L    O2 Sat, Ramon 65.9 60.0 - 85.0 %    Total Hb NOT REPORTED 12.0 - 16.0 g/dl    Oxyhemoglobin NOT REPORTED 95.0 - 98.0 %    Carboxyhemoglobin 1.7 0 - 5 %    Methemoglobin NOT REPORTED 0.0 - 1.5 %    Pt Temp 37.0     pH, Ramon, Temp Adj NOT REPORTED 7.320 - 7.420    pCO2, Ramon, Temp Adj NOT REPORTED 39 - 55 mmHg    pO2, Ramon, Temp Adj NOT REPORTED 30 - 50 mmHg    O2 Device/Flow/% NOT REPORTED     Respiratory Rate NOT REPORTED     Pb Test NOT REPORTED     Sample Site NOT REPORTED     Pt. Position NOT REPORTED     Mode NOT REPORTED     Set Rate NOT REPORTED     Total Rate NOT REPORTED     VT NOT REPORTED     FIO2 INFORMATION NOT PROVIDED     Peep/Cpap NOT REPORTED     PSV NOT REPORTED     Text for Respiratory NOT REPORTED     NOTIFICATION NOT REPORTED     NOTIFICATION TIME NOT REPORTED          RADIOLOGY:  XR CHEST PORTABLE   Final Result   Stable cardiomegaly with interstitial prominence most pronounced in the right   lower lung field suggesting vascular congestion/edema. EKG      All EKG's are interpreted by the Emergency Department Physician who either signs or Co-signs this chart in the absence of a cardiologist.    EMERGENCY DEPARTMENT COURSE:  ED Course as of Aug 02 1612   Mon Aug 02, 2021   2945 With CHF exacerbation with acute pulmonary edema. Admitted to internal medicine. [JG]      ED Course User Index  [JG] Maricarmen Gonzalez DO        PROCEDURES:      CONSULTS:  IP CONSULT TO INTERNAL MEDICINE  IP CONSULT TO CASE MANAGEMENT  IP CONSULT TO HEART FAILURE NURSE/COORDINATOR  IP CONSULT TO CARDIOLOGY  IP CONSULT TO RESPIRATORY CARE    CRITICAL CARE:      FINAL IMPRESSION      1. Acute cardiac pulmonary edema ([address]          DISPOSITION / PLAN     DISPOSITION Admitted 08/02/2021 08:04:30 AM      PATIENT REFERRED TO:  No follow-up provider specified.     DISCHARGE MEDICATIONS:  Current Discharge Medication List          Maricarmen Gonzalez DO  Emergency Medicine Resident    (Please note that portions of thisnote were completed with a voice recognition program.  Efforts were made to edit the dictations but occasionally words are mis-transcribed.)     Maricarmen Gonzalez DO  Resident  08/02/21 9269

## 2021-08-02 NOTE — PROGRESS NOTES
Patient seen and examined with intern medicine residents  Patient with multiple medical problems and comorbid obesity, heart failure with reduced ejection fraction s/p AICD, hypothyroidism, A. 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  Fib  Cardiology consult  Patient was not watching his salt intake  TSH was high, patient told that he ran out of his thyroid pills, will not increase Synthroid at this time especially with history of heart failure, A. fib  Patient was in the hospital in the month of June this year with almost similar presentation  Will need to follow closely with heart failure clinic  Perhaps, may be a candidate for Entresto although creatinine is slightly high will defer to cardiologist  Full dictation to follow

## 2021-08-03 VITALS
DIASTOLIC BLOOD PRESSURE: 56 MMHG | HEART RATE: 51 BPM | TEMPERATURE: 98.4 F | HEIGHT: 64 IN | WEIGHT: 250.66 LBS | RESPIRATION RATE: 20 BRPM | SYSTOLIC BLOOD PRESSURE: 114 MMHG | OXYGEN SATURATION: 94 % | BODY MASS INDEX: 42.79 KG/M2

## 2021-08-03 LAB
ABSOLUTE EOS #: 0.25 K/UL (ref 0–0.44)
ABSOLUTE IMMATURE GRANULOCYTE: <0.03 K/UL (ref 0–0.3)
ABSOLUTE LYMPH #: 1.47 K/UL (ref 1.1–3.7)
ABSOLUTE MONO #: 0.43 K/UL (ref 0.1–1.2)
ANION GAP SERPL CALCULATED.3IONS-SCNC: 11 MMOL/L (ref 9–17)
BASOPHILS # BLD: 1 % (ref 0–2)
BASOPHILS ABSOLUTE: 0.03 K/UL (ref 0–0.2)
BUN BLDV-MCNC: 27 MG/DL (ref 8–23)
BUN/CREAT BLD: ABNORMAL (ref 9–20)
CALCIUM SERPL-MCNC: 8.2 MG/DL (ref 8.6–10.4)
CHLORIDE BLD-SCNC: 99 MMOL/L (ref 98–107)
CO2: 26 MMOL/L (ref 20–31)
CREAT SERPL-MCNC: 1.49 MG/DL (ref 0.7–1.2)
DIFFERENTIAL TYPE: ABNORMAL
EOSINOPHILS RELATIVE PERCENT: 4 % (ref 1–4)
GFR AFRICAN AMERICAN: 57 ML/MIN
GFR NON-AFRICAN AMERICAN: 47 ML/MIN
GFR SERPL CREATININE-BSD FRML MDRD: ABNORMAL ML/MIN/{1.73_M2}
GFR SERPL CREATININE-BSD FRML MDRD: ABNORMAL ML/MIN/{1.73_M2}
GLUCOSE BLD-MCNC: 104 MG/DL (ref 75–110)
GLUCOSE BLD-MCNC: 108 MG/DL (ref 70–99)
GLUCOSE BLD-MCNC: 148 MG/DL (ref 75–110)
GLUCOSE BLD-MCNC: 162 MG/DL (ref 75–110)
HCT VFR BLD CALC: 35.9 % (ref 40.7–50.3)
HEMOGLOBIN: 10.8 G/DL (ref 13–17)
IMMATURE GRANULOCYTES: 0 %
LYMPHOCYTES # BLD: 23 % (ref 24–43)
MCH RBC QN AUTO: 27.9 PG (ref 25.2–33.5)
MCHC RBC AUTO-ENTMCNC: 30.1 G/DL (ref 28.4–34.8)
MCV RBC AUTO: 92.8 FL (ref 82.6–102.9)
MONOCYTES # BLD: 7 % (ref 3–12)
NRBC AUTOMATED: 0 PER 100 WBC
PDW BLD-RTO: 17.2 % (ref 11.8–14.4)
PLATELET # BLD: 188 K/UL (ref 138–453)
PLATELET ESTIMATE: ABNORMAL
PMV BLD AUTO: 11.1 FL (ref 8.1–13.5)
POTASSIUM SERPL-SCNC: 4 MMOL/L (ref 3.7–5.3)
RBC # BLD: 3.87 M/UL (ref 4.21–5.77)
RBC # BLD: ABNORMAL 10*6/UL
SEG NEUTROPHILS: 66 % (ref 36–65)
SEGMENTED NEUTROPHILS ABSOLUTE COUNT: 4.18 K/UL (ref 1.5–8.1)
SODIUM BLD-SCNC: 136 MMOL/L (ref 135–144)
WBC # BLD: 6.4 K/UL (ref 3.5–11.3)
WBC # BLD: ABNORMAL 10*3/UL

## 2021-08-03 PROCEDURE — 94761 N-INVAS EAR/PLS OXIMETRY MLT: CPT

## 2021-08-03 PROCEDURE — 97161 PT EVAL LOW COMPLEX 20 MIN: CPT

## 2021-08-03 PROCEDURE — 97530 THERAPEUTIC ACTIVITIES: CPT

## 2021-08-03 PROCEDURE — 82947 ASSAY GLUCOSE BLOOD QUANT: CPT

## 2021-08-03 PROCEDURE — 6370000000 HC RX 637 (ALT 250 FOR IP): Performed by: STUDENT IN AN ORGANIZED HEALTH CARE EDUCATION/TRAINING PROGRAM

## 2021-08-03 PROCEDURE — 85025 COMPLETE CBC W/AUTO DIFF WBC: CPT

## 2021-08-03 PROCEDURE — 2700000000 HC OXYGEN THERAPY PER DAY

## 2021-08-03 PROCEDURE — 97535 SELF CARE MNGMENT TRAINING: CPT

## 2021-08-03 PROCEDURE — 36415 COLL VENOUS BLD VENIPUNCTURE: CPT

## 2021-08-03 PROCEDURE — 99239 HOSP IP/OBS DSCHRG MGMT >30: CPT | Performed by: INTERNAL MEDICINE

## 2021-08-03 PROCEDURE — G0378 HOSPITAL OBSERVATION PER HR: HCPCS

## 2021-08-03 PROCEDURE — 2580000003 HC RX 258: Performed by: STUDENT IN AN ORGANIZED HEALTH CARE EDUCATION/TRAINING PROGRAM

## 2021-08-03 PROCEDURE — 80048 BASIC METABOLIC PNL TOTAL CA: CPT

## 2021-08-03 PROCEDURE — 97165 OT EVAL LOW COMPLEX 30 MIN: CPT

## 2021-08-03 RX ADMIN — SODIUM CHLORIDE, PRESERVATIVE FREE 10 ML: 5 INJECTION INTRAVENOUS at 08:30

## 2021-08-03 RX ADMIN — APIXABAN 5 MG: 5 TABLET, FILM COATED ORAL at 08:30

## 2021-08-03 RX ADMIN — INSULIN LISPRO 2 UNITS: 100 INJECTION, SOLUTION INTRAVENOUS; SUBCUTANEOUS at 12:04

## 2021-08-03 RX ADMIN — AMIODARONE HYDROCHLORIDE 200 MG: 200 TABLET ORAL at 08:30

## 2021-08-03 RX ADMIN — Medication 81 MG: at 08:30

## 2021-08-03 ASSESSMENT — PAIN SCALES - GENERAL
PAINLEVEL_OUTOF10: 0
PAINLEVEL_OUTOF10: 0

## 2021-08-03 NOTE — PROGRESS NOTES
(degenerative joint disease), Edentulous, Gout, HTN (hypertension), Hyperlipidemia, Hypertension, Iron deficiency anemia, Ischemic cardiomyopathy, Morbid obesity ([address] Obesity, TANNER variably compliant with BiPAP, Osteoarthritis, PAF (paroxysmal atrial fibrillation) ([facility] Psychophysiologic insomnia, Type II or unspecified type diabetes mellitus without mention of complication, not stated as uncontrolled, and Unspecified sleep apnea. has a past surgical history that includes Colonoscopy (11 7 2007); pacemaker placement (2005 or 2006); Cardiac catheterization (8-2007); Cardiac catheterization (09/11/2013); Coronary angioplasty (1998, 2004); Cardiac defibrillator placement (8/26/2015); bone marrow biopsy (2012 approx); and pr colsc flx w/rmvl of tumor polyp lesion snare tq (N/A, 4/4/2017).     Restrictions  Restrictions/Precautions  Restrictions/Precautions: Cardiac, General Precautions, Fall Risk, Up as Tolerated  Required Braces or Orthoses?: No  Implants present? : Pacemaker  Position Activity Restriction  Other position/activity restrictions: up w/ assist, up as tolerated  Vision/Hearing  Vision: Impaired  Vision Exceptions: Wears glasses for reading  Hearing: Within functional limits     Subjective  General  Patient assessed for rehabilitation services?: Yes  Response To Previous Treatment: Not applicable  Family / Caregiver Present: No  Follows Commands: Within Functional Limits  Pain Screening  Patient Currently in Pain: Denies  Vital Signs  Patient Currently in Pain: Denies       Orientation  Orientation  Overall Orientation Status: Within Functional Limits  Social/Functional History  Social/Functional History  Lives With: Alone  Type of Home: House  Home Layout: One level  Home Access: Stairs to enter with rails  Entrance Stairs - Number of Steps: 3  Entrance Stairs - Rails: Left  Bathroom Shower/Tub: Tub/Shower unit  Bathroom Toilet: Standard  Bathroom Equipment: Grab bars in shower  Home Equipment: Rolling walker Cane (pt states he uses straight cane prn)  ADL Assistance: Independent  Homemaking Assistance: Independent  Homemaking Responsibilities: Yes  Meal Prep Responsibility: Primary  Laundry Responsibility: Primary  Cleaning Responsibility: Primary  Shopping Responsibility: Primary  Ambulation Assistance: Independent (uses cane \"sometimes\")  Transfer Assistance: Independent  Active : Yes  Mode of Transportation: Car  Occupation: Retired  Type of occupation:   Leisure & Hobbies: gardening  Additional Comments: Pt reports supportive neighbors able to assist PRN    Objective     Observation/Palpation  Posture: Good    AROM RLE (degrees)  RLE AROM: WFL  AROM LLE (degrees)  LLE AROM : WFL  AROM RUE (degrees)  RUE AROM : WFL  AROM LUE (degrees)  LUE AROM : WFL  Strength RLE  Strength RLE: WFL  Strength LLE  Strength LLE: WFL  Strength RUE  Strength RUE: WFL  Strength LUE  Strength LUE: WFL  Tone RLE  RLE Tone: Normotonic  Tone LLE  LLE Tone: Normotonic  Motor Control  Gross Motor?: WFL  Sensation  Overall Sensation Status: WFL  Bed mobility  Comment: pt up in chair upon PT arrival and retired to chair at end of PT session; per OT real, independent supine to sit  Transfers  Sit to Stand: Independent  Stand to sit:  Independent  Stand Pivot Transfers: Independent  Ambulation  Ambulation?: Yes  Ambulation 1  Surface: level tile  Device: No Device  Assistance: Independent  Quality of Gait: no LOB  Gait Deviations: Slow Brianne  Distance: 220'x1  Stairs/Curb  Stairs?: Yes  Stairs  # Steps : 3  Rails: Left ascending  Device: No Device  Assistance: Independent     Balance  Posture: Fair  Sitting - Static: Good  Sitting - Dynamic: Good  Standing - Static: Good  Standing - Dynamic: Good        Plan   Plan  Times per week: DC PT--pt is independent  Safety Devices  Type of devices: Call light within reach, Gait belt, Left in chair, Nurse notified  Restraints  Initially in place: No    AM-PAC Score  AM-PAC Inpatient Mobility Raw Score : 24 (08/03/21 1455)  AM-PAC Inpatient T-Scale Score : 61.14 (08/03/21 1455)  Mobility Inpatient CMS 0-100% Score: 0 (08/03/21 1455)  Mobility Inpatient CMS G-Code Modifier : CH (08/03/21 1455)          Goals  Short term goals  Time Frame for Short term goals: 1 visit  Short term goal 1: evvaluate and give recommendations: pt is independent; continued PT not necessary  Patient Goals   Patient goals : return home independently       Therapy Time   Individual Concurrent Group Co-treatment   Time In 1         Time Out 1453         Minutes 27                 Nigel Mccallum, PT

## 2021-08-03 NOTE — PROGRESS NOTES
Physician Progress Note      PATIENTSerene Ask  CSN #:                  [id number]  :                       1955  ADMIT DATE:       2021 6:34 AM  DISCH DATE:  RESPONDING  PROVIDER #:        Milad Villanueva          QUERY TEXT:    Pt admitted with AECHF. Pt noted to also have hypertension, ischemic   cardiomyopathy and CAD. If possible, please document in progress notes and   discharge summary the etiology of CHF, if able to be determined. The medical record reflects the following:  Risk Factors: HTN, ICMP, CAD  Clinical Indicators: Acute on chronic combined systolic and diastolic   congestive heart failure, CAD s/p stenting of L anterior descending artery   , Ischemic cardiomyopathy with severe left ventricular systolic   dysfunction s/p AICD  Treatment: IV Lasix, nitro gtt, lisinopril, metoprolol, CXR, cariology   consult, pending echo    Call if any questions. Thank you, Emmy Dickinson, [facility] [phone]  Options provided:  -- CHF due to Hypertensive Heart Disease  -- CHF due to Hypertensive Heart Disease and CAD  -- CHF not due to Hypertension but due to CAD  -- CHF due to Hypertensive Heart Disease and ICMP  -- CHF not due to Hypertension but due to ICMP  -- Other - I will add my own diagnosis  -- Disagree - Not applicable / Not valid  -- Disagree - Clinically unable to determine / Unknown  -- Refer to Clinical Documentation Reviewer    PROVIDER RESPONSE TEXT:    This patient has CHF due to hypertensive heart disease and CAD.     Query created by: Estephania Emery on 8/3/2021 7:12 AM      Electronically signed by:  Milad Villanueva 8/3/2021 8:30 AM

## 2021-08-03 NOTE — PROGRESS NOTES
Elizabeth Ash was evaluated today and a DME order was entered for a shower chair  because he requires this to successfully complete daily living tasks of bathing, grooming and hygiene. A shower chair is necessary due to the patient's impaired ambulation and avoid any accidental fall in shower. The need for this equipment was discussed with the patient and he understands and is in agreement.

## 2021-08-03 NOTE — H&P
[address]     Department of Internal Medicine - Staff Internal Medicine Teaching Service          ADMISSION NOTE/HISTORY AND PHYSICAL EXAMINATION   Date: 8/2/2021  Patient Name: Geovanny Clark  Date of admission: 8/2/2021  6:34 AM  YOB: 1955  PCP: LIDIA Palomo CNP  History Obtained From:  patient    CHIEF COMPLAINT     Chief complaint: SOB    HISTORY OF PRESENTING ILLNESS     The patient is a pleasant 74 y.o. male presents with a chief complaint of SOB and funny feeling of impending doom. He has a Hx of CHF with 13%EF on lasix 20mg,AICD in place,  COPD, CKD II, TANNER on CPAP (Non compliant), diabetes on metformin 1000mg daily, CAD. He developed shortness of breath this morning after a walk around the house. He took all his medications as prescribed this morning; and has been compliant. He admits to eating more outside over the last week with less control over his prescribed diet, and has also been drinking more water. There is no chest pain, new onset cough, palpitation, vomiting, diarrhea, abdominal pain, dysuria, asthenia, headaches. At the ED he was placed on nitro drip and Bipap with amelioration of his SOB. His CXR is consistent with bilateral vascular congestion and infiltrates.        Review of Systems:  General ROS: Completed and except as mentioned above were negative   HEENT ROS: Completed and except as mentioned above were negative   Allergy and Immunology ROS:  Completed and except as mentioned above were negative  Hematological and Lymphatic ROS:  Completed and except as mentioned above were negative  Respiratory ROS:  Completed and except as mentioned above were negative  Cardiovascular ROS:  Completed and except as mentioned above were negative  Gastrointestinal ROS: Completed and except as mentioned above were negative  Genito-Urinary ROS:  Completed and except as mentioned above were negative  Musculoskeletal ROS:  Completed and except as mentioned above were negative  Neurological ROS:  Completed and except as mentioned above were negative  Skin & Dermatological ROS:  Completed and except as mentioned above were negative  Psychological ROS:  Completed and except as mentioned above were negative    PAST MEDICAL HISTORY     Past Medical History:   Diagnosis Date    Anemia     Anxiety     when he lies flat, no RX    CAD (coronary artery disease)     MI stents x5, follows with cardiology Dr. El Franco Cardiac defibrillator in place     Cardiomyopathy [facility])     CHF (congestive heart failure) ([address]     Chronic combined systolic and diastolic heart failure ([address] s/[ AICD placed 9/25/2011    Chronic kidney disease     Complex sleep apnea syndrome     Diabetic retinopathy ([facility] [address] 9/25/2011    Diverticulitis     DJD (degenerative joint disease) 9/25/2011    Edentulous     Gout     HTN (hypertension) 3/30/2012    Hyperlipidemia     Hypertension     Iron deficiency anemia 9/25/2011    Ischemic cardiomyopathy     Morbid obesity ([address] 9/25/2011    Obesity     Morbid obesity due to excess calories    TANNER variably compliant with BiPAP 9/25/2011    Osteoarthritis     PAF (paroxysmal atrial fibrillation) (HCC)     Psychophysiologic insomnia     Type II or unspecified type diabetes mellitus without mention of complication, not stated as uncontrolled     Unspecified sleep apnea     uses V-pap       PAST SURGICAL HISTORY     Past Surgical History:   Procedure Laterality Date    BONE MARROW BIOPSY  2012 approx    CARDIAC CATHETERIZATION  8-2007    severe stenosis pre-stent area    CARDIAC CATHETERIZATION  09/11/2013    Very peripheral stenosis, not amendable to PCI, stents patent    CARDIAC DEFIBRILLATOR PLACEMENT  8/26/2015    COLONOSCOPY  11 7 2007   [address]  1998, 2004    stent LAD    PACEMAKER PLACEMENT  2005 or 2006    AICD    GA COLSC FLX W/RMVL OF TUMOR POLYP LESION SNARE TQ N/A 4/4/2017    COLONOSCOPY POLYPECTOMY SNARE/COLD BIOPSY performed by Jessica Gmoez DO at [facility]       ALLERGIES     Zetia [ezetimibe], Bactrim, Cephalexin, Cephalexin, and Sulfamethoxazole-trimethoprim    MEDICATIONS PRIOR TO ADMISSION     Prior to Admission medications    Medication Sig Start Date End Date Taking?  Authorizing Provider   levothyroxine (SYNTHROID) 25 MCG tablet TAKE ONE-HALF OF A TABLET ONCE A DAY 8/2/21 10/31/21  Marien Homans, APRN - CNP   metFORMIN (GLUCOPHAGE) 1000 MG tablet TAKE 1 TABLET BY MOUTH 2 TIMES DAILY (WITH MEALS) 7/9/21   Marien Homans, APRN - CNP   apixaban (ELIQUIS) 5 MG TABS tablet TAKE 1 TABLET BY MOUTH 2 TIMES DAILY 7/9/21   Marien Homans, APRN - CNP   atorvastatin (LIPITOR) 40 MG tablet TAKE 1 TABLET BY MOUTH DAILY 7/9/21   Marien Homans, APRN - CNP   ferrous sulfate (IRON 325) 325 (65 Fe) MG tablet Take 1 tablet by mouth daily (with breakfast) 7/2/21   Marien Homans, APRN - CNP   amiodarone (CORDARONE) 200 MG tablet Take 1 tablet by mouth daily 6/25/21 7/25/21  Estephania Campos DO   aspirin (ASPIRIN ADULT LOW STRENGTH) 81 MG EC tablet TAKE 1 TABLET TWICE A DAY 6/7/21   Marien Homans, APRN - CNP   lisinopril (PRINIVIL;ZESTRIL) 5 MG tablet TAKE 1 TABLET BY MOUTH DAILY 5/5/21   Marien Homans, APRN - CNP   isosorbide mononitrate (IMDUR) 30 MG extended release tablet TAKE 1 TABLET BY MOUTH DAILY 5/5/21   Marien Homans, APRN - CNP   allopurinol (ZYLOPRIM) 300 MG tablet TAKE 1 TABLET DAILY 5/5/21   Marien Homans, APRN - CNP   furosemide (LASIX) 40 MG tablet TAKE 1/2 TABLET BY MOUTH 2 TIMES A DAY 4/9/21   Marien Homans, APRN - CNP   Handicap Placard MISC by Does not apply route Exp: 1/2025 12/23/20   Marien Homans, APRN - CNP   Lancets MISC Daily 12/2/20   Marien Homans, APRN - CNP   Blood Gluc Meter Disp-Strips (BLOOD GLUCOSE METER DISPOSABLE) SHAN Daily and as needed 12/2/20   Marien Homans, LIDIA - CNP   blood glucose monitor strips 1 strip by Other route daily Test 1 times a day & as needed for symptoms of irregular blood glucose. 20  Rashad Carnye APRN - CNP   metoprolol succinate (TOPROL XL) 25 MG extended release tablet TAKE 1 TABLET BY MOUTH DAILY 5/10/20   Tricia Granados MD   blood glucose test strips (FREESTYLE LITE) strip USE AS DIRECTED TWICE A DAY 20   Ti Serna MD   ammonium lactate (LAC-HYDRIN) 12 % lotion Apply topically daily after bathing sparing the space between the toes. 20   Ti Serna MD   TRUEPLUS LANCETS 33G [address] [facility] TEST AS DIRECTED 20   Ti Serna MD   Alcohol Swabs (ALCOHOL PREP) 70 % PADS USE AS DIRECTED 19   Mari Holloway MD       SOCIAL HISTORY     Tobacco: denies  Alcohol: no  Illicits: no  Occupation:     FAMILY HISTORY     Family History   Problem Relation Age of Onset    Cancer Mother     Heart Disease Mother         due to smoking    Heart Disease Maternal Uncle     Heart Disease Maternal Grandmother        PHYSICAL EXAM     Vitals: BP (!) 100/54   Pulse 55   Temp 98.3 °F (36.8 °C) (Oral)   Resp 15   Ht 5' 4\" (1.626 m)   Wt 248 lb 7.3 oz (112.7 kg)   SpO2 100%   BMI 42.65 kg/m²   Tmax: Temp (24hrs), Av °F (36.7 °C), Min:97.6 °F (36.4 °C), Max:98.3 °F (36.8 °C)    Last Body weight:   Wt Readings from Last 3 Encounters:   21 248 lb 7.3 oz (112.7 kg)   21 241 lb (109.3 kg)   21 243 lb 2.7 oz (110.3 kg)     Body Mass Index : Body mass index is 42.65 kg/m². PHYSICAL EXAMINATION:  Constitutional: This is a well developed, well nourished, Greater than 36 - Morbid Obesity / Extreme Obesity / Grade III 74y.o. year old male who is alert, oriented, cooperative and in no apparent distress. Head:normocephalic and atraumatic. EENT:  PERRLA. No conjunctival injections. Septum was midline, mucosa was without erythema, exudates or cobblestoning. No thrush was noted. Neck: Supple without thyromegaly. No elevated JVP. Trachea was midline.   Respiratory: Chest was symmetrical without dullness to percussion. Generalized crackles on auscultation. There were no wheezes, rhonchi or rales. There is no intercostal retraction or use of accessory muscles. No egophony noted. Cardiovascular: Regular without murmur, clicks, gallops or rubs. Abdomen: Slightly rounded and soft without organomegaly. No rebound, rigidity or guarding was appreciated. Lymphatic: No lymphadenopathy. Musculoskeletal: Normal curvature of the spine. No gross muscle weakness. Extremities:  No lower extremity edema, ulcerations, tenderness, varicosities or erythema. Muscle size, tone and strength are normal.  No involuntary movements are noted. Skin:  Warm and dry. Good color, turgor and pigmentation. No lesions or scars.   No cyanosis or clubbing  Neurological/Psychiatric: The patient's general behavior, level of consciousness, thought content and emotional status is normal.          INVESTIGATIONS     Laboratory Testing:     Recent Results (from the past 24 hour(s))   CBC Auto Differential    Collection Time: 08/02/21  6:47 AM   Result Value Ref Range    WBC 9.2 3.5 - 11.3 k/uL    RBC 4.59 4.21 - 5.77 m/uL    Hemoglobin 12.8 (L) 13.0 - 17.0 g/dL    Hematocrit 43.0 40.7 - 50.3 %    MCV 93.7 82.6 - 102.9 fL    MCH 27.9 25.2 - 33.5 pg    MCHC 29.8 28.4 - 34.8 g/dL    RDW 17.4 (H) 11.8 - 14.4 %    Platelets 610 999 - 727 k/uL    MPV 11.0 8.1 - 13.5 fL    NRBC Automated 0.0 0.0 per 100 WBC    Differential Type NOT REPORTED     Seg Neutrophils 50 36 - 65 %    Lymphocytes 37 24 - 43 %    Monocytes 8 3 - 12 %    Eosinophils % 4 1 - 4 %    Basophils 1 0 - 2 %    Immature Granulocytes 0 0 %    Segs Absolute 4.62 1.50 - 8.10 k/uL    Absolute Lymph # 3.43 1.10 - 3.70 k/uL    Absolute Mono # 0.74 0.10 - 1.20 k/uL    Absolute Eos # 0.35 0.00 - 0.44 k/uL    Basophils Absolute 0.06 0.00 - 0.20 k/uL    Absolute Immature Granulocyte 0.03 0.00 - 0.30 k/uL    WBC Morphology NOT REPORTED     RBC Morphology ANISOCYTOSIS PRESENT     Platelet Estimate NOT REPORTED    Basic Metabolic Panel w/ Reflex to MG    Collection Time: 08/02/21  6:47 AM   Result Value Ref Range    Glucose 217 (H) 70 - 99 mg/dL    BUN 20 8 - 23 mg/dL    CREATININE 1.54 (H) 0.70 - 1.20 mg/dL    Bun/Cre Ratio NOT REPORTED 9 - 20    Calcium 8.6 8.6 - 10.4 mg/dL    Sodium 141 135 - 144 mmol/L    Potassium 3.9 3.7 - 5.3 mmol/L    Chloride 104 98 - 107 mmol/L    CO2 22 20 - 31 mmol/L    Anion Gap 15 9 - 17 mmol/L    GFR Non-African American 45 (L) >60 mL/min    GFR  55 (L) >60 mL/min    GFR Comment          GFR Staging NOT REPORTED    Brain Natriuretic Peptide    Collection Time: 08/02/21  6:47 AM   Result Value Ref Range    Pro-BNP 2,269 (H) <300 pg/mL    BNP Interpretation Pro-BNP Reference Range:    Troponin    Collection Time: 08/02/21  6:47 AM   Result Value Ref Range    Troponin, High Sensitivity 16 0 - 22 ng/L    Troponin T NOT REPORTED <0.03 ng/mL    Troponin Interp NOT REPORTED    Blood Gas, Venous    Collection Time: 08/02/21  7:02 AM   Result Value Ref Range    pH, Ramon 7.267 (L) 7.320 - 7.420    pCO2, Ramon 60.3 (H) 39 - 55    pO2, Ramon 40.0 30 - 50    HCO3, Venous 26.6 24 - 30 mmol/L    Positive Base Excess, Ramon NOT REPORTED 0.0 - 2.0 mmol/L    Negative Base Excess, Ramon 0.9 0.0 - 2.0 mmol/L    O2 Sat, Ramon 65.9 60.0 - 85.0 %    Total Hb NOT REPORTED 12.0 - 16.0 g/dl    Oxyhemoglobin NOT REPORTED 95.0 - 98.0 %    Carboxyhemoglobin 1.7 0 - 5 %    Methemoglobin NOT REPORTED 0.0 - 1.5 %    Pt Temp 37.0     pH, Ramon, Temp Adj NOT REPORTED 7.320 - 7.420    pCO2, Ramon, Temp Adj NOT REPORTED 39 - 55 mmHg    pO2, Ramon, Temp Adj NOT REPORTED 30 - 50 mmHg    O2 Device/Flow/% NOT REPORTED     Respiratory Rate NOT REPORTED     Pb Test NOT REPORTED     Sample Site NOT REPORTED     Pt.  Position NOT REPORTED     Mode NOT REPORTED     Set Rate NOT REPORTED     Total Rate NOT REPORTED     VT NOT REPORTED     FIO2 INFORMATION NOT PROVIDED Peep/Cpap NOT REPORTED     PSV NOT REPORTED     Text for Respiratory NOT REPORTED     NOTIFICATION NOT REPORTED     NOTIFICATION TIME NOT REPORTED    POC Glucose Fingerstick    Collection Time: 08/02/21 11:52 AM   Result Value Ref Range    POC Glucose 143 (H) 75 - 110 mg/dL       Imaging:   XR CHEST PORTABLE    Result Date: 8/2/2021  Stable cardiomegaly with interstitial prominence most pronounced in the right lower lung field suggesting vascular congestion/edema. ASSESSMENT & PLAN     ASSESSMENT / PLAN:     IMPRESSION  This is a 74 y.o. male who presented with SOB and found to have Acute on chronic CHF.  Patient admitted to inpatient status for management of his pulmonary edema due to CHF    Principal problem:  Acute on chronic combined systolic and diastolic congestive heart failure (HCC) SOB, Hx CHF EF of 13%, ICD in place, non compliance to diet regime, pulmonary edema on CXR, lung crackles, orthopnea, Hx CAD on lisinopril, metoprolol  Plan: Lasix 40mg IV   Cardiology consult   Stop nitro drip   Heart failure nurse consult for counseling   Cardiology consult to patients cardiologist Dr Lela Perales      Acute Hypercapnic respiratory failure on BiPAP: SOB, pCO2 60.3, pH 7.267  Plan: Use BiPAP as needed    Active Problems:    TANNER variably compliant with BiPAP      CAD (coronary artery disease) s/p stenting of L anterior descending artery 2004  Plan: Continue metoprolol, lisinopril      Chronic kidney disease, stage II (mild) Cr 1.54  Plan: daily BMP monitoring    Type 2 diabetes mellitus without complication ([facility]): diabetic on metformin, glycemia 217--> 143-->88  Plan: Insulin low dose sliding scale      Hyperlipidemia  Plan: Continue atorvastatin        PAF (paroxysmal atrial fibrillation) ([facility] [address] ICD in place  Plan: continue apixaban 5mg daily, AMIO 200MG DAILY, aspirin 81mg daily              DVT ppx: on anticoagulation  GI ppx: pepcid    PT/OT/SW: consulted  Discharge Planning: case management    Kylie Sweeney Kim Barrientos MD  Internal Medicine Resident, PGY-1  [facility];  [place], [place]  8/2/2021, 8:27 PM

## 2021-08-03 NOTE — PROGRESS NOTES
Pt discharged to home, discharge instructions reviewed, med list and follow up instructions given, pt states understanding of all.   IV removed

## 2021-08-03 NOTE — PROGRESS NOTES
Occupational Therapy   Occupational Therapy Initial Assessment  Date: 8/3/2021   Patient Name: Yared Haines  MRN: [id number]     : 1955    Date of Service: 8/3/2021  Chief Complaint   Patient presents with    Congestive Heart Failure     PT arrived from home with sudden onset SOB d/t CHF exacerbation. PT o2 sat 89% RA audible crackles bilaterally with frothy sputum when coughing. Discharge Recommendations:    No occupational therapy recommended at discharge        Assessment   Assessment: Pt independent w/ ADLs and functional mobility, no further acute therapy recommended, OT d/c  Prognosis: Good  Decision Making: Low Complexity  Patient Education: Pt educated on POC and purpose of eval, good return  No Skilled OT: Independent with functional mobility; No OT goals identified; Independent with ADL's  REQUIRES OT FOLLOW UP: Yes  Activity Tolerance  Activity Tolerance: Patient Tolerated treatment well  Safety Devices  Safety Devices in place: Yes  Type of devices: Call light within reach; Left in chair;Gait belt  Restraints  Initially in place: No           Patient Diagnosis(es): The encounter diagnosis was Acute cardiac pulmonary edema ([address]      has a past medical history of Anemia, Anxiety, CAD (coronary artery disease), Cardiac defibrillator in place, Cardiomyopathy [facility]), CHF (congestive heart failure) ([address] Chronic combined systolic and diastolic heart failure ([address] s/[ AICD placed, Chronic kidney disease, Complex sleep apnea syndrome, Diabetic retinopathy ([address] Diverticulitis, DJD (degenerative joint disease), Edentulous, Gout, HTN (hypertension), Hyperlipidemia, Hypertension, Iron deficiency anemia, Ischemic cardiomyopathy, Morbid obesity ([address] Obesity, TANNER variably compliant with BiPAP, Osteoarthritis, PAF (paroxysmal atrial fibrillation) ([address] Psychophysiologic insomnia, Type II or unspecified type diabetes mellitus without mention of complication, not stated as uncontrolled, and Unspecified sleep apnea. has a past surgical history that includes Colonoscopy (11 7 2007); pacemaker placement (2005 or 2006); Cardiac catheterization (8-2007); Cardiac catheterization (09/11/2013); Coronary angioplasty (1998, 2004); Cardiac defibrillator placement (8/26/2015); bone marrow biopsy (2012 approx); and pr colsc flx w/rmvl of tumor polyp lesion snare tq (N/A, 4/4/2017).            Restrictions  Restrictions/Precautions  Required Braces or Orthoses?: No  Implants present? : Pacemaker  Position Activity Restriction  Other position/activity restrictions: up w/ assist, up as tolerated    Subjective   General  Patient assessed for rehabilitation services?: Yes  Family / Caregiver Present: No  Diagnosis: acute cardiac pumonary edema  General Comment  Comments: RN ok'd for therapy this morning, pt agreeable and pleasant throughout  Patient Currently in Pain: Denies    Social/Functional History  Social/Functional History  Lives With: Alone  Type of Home: House  Home Layout: One level  Home Access: Stairs to enter with rails  Entrance Stairs - Number of Steps: 3  Entrance Stairs - Rails: Left  Bathroom Shower/Tub: Tub/Shower unit  Bathroom Toilet: Standard  Bathroom Equipment: Grab bars in shower  Home Equipment: Rolling walker (pt reports no use of DME at baseline)  ADL Assistance: [address]: Independent  Homemaking Responsibilities: Yes  Meal Prep Responsibility: Primary  Laundry Responsibility: Primary  Cleaning Responsibility: Primary  Shopping Responsibility: Primary  Ambulation Assistance: Independent  Transfer Assistance: Independent  Active : Yes  Mode of Transportation: Car  Occupation: Retired  Type of occupation:   Leisure & Hobbies: gardening  Additional Comments: Pt reports supportive neighbors able to assist PRN       Objective   Vision: Impaired  Vision Exceptions: Wears glasses for reading  Hearing: Within functional limits    Orientation  Overall Orientation Status: Within Functional Limits     Balance  Sitting Balance: Independent (~15 minutes on EOB and chair)  Standing Balance: Independent  Standing Balance  Time: ~8 minutes  Activity: dynamic standing w/ supervision for safety at sink to complete oral care and facewashing  Functional Mobility  Functional - Mobility Device: No device  Activity: To/from bathroom  Assist Level: Supervision  Functional Mobility Comments: Pt completed functional mobility to/from bathroom w/ SBA for safety to complete oral care and facewashing at sink. ADL  Feeding: Independent  Grooming: Independent  UE Bathing: Independent  LE Bathing: Independent  UE Dressing: Independent  LE Dressing: Independent  Toileting: Independent  Additional Comments: OT faclitated pt in independently donning gown and B socks on EOB using figure 4 technique. Pt completed oral care and facewashing while standing at sink in bathroom w/ supervision for safety. Pt demo no difficulty completing tasks. Tone RUE  RUE Tone: Normotonic  Tone LUE  LUE Tone: Normotonic  Coordination  Movements Are Fluid And Coordinated: Yes     Bed mobility  Supine to Sit: Independent  Sit to Supine: Unable to assess (pt retired to chair)  Scooting: Independent  Transfers  Sit to stand: Independent  Stand to sit:  Independent     Cognition  Overall Cognitive Status: WFL        Sensation  Overall Sensation Status: WFL (pt denies any N/T)        LUE AROM (degrees)  LUE AROM : WFL  Left Hand AROM (degrees)  Left Hand AROM: WFL  RUE AROM (degrees)  RUE AROM : WFL  Right Hand AROM (degrees)  Right Hand AROM: WFL  LUE Strength  Gross LUE Strength: WFL  L Shoulder Flex: 5/5  L Elbow Flex: 5/5  L Elbow Ext: 5/5  L Hand General: 5/5  RUE Strength  Gross RUE Strength: WFL  R Shoulder Flex: 5/5  R Elbow Flex: 5/5  R Elbow Ext: 5/5  R Hand General: 5/5                   Plan   Plan  Times per week: OT d/c      AM-PAC Score        AM-PAC Inpatient Daily Activity Raw Score: 24 (08/03/21 1207)  AM-PAC Inpatient ADL T-Scale Score : 57.54 (08/03/21 1207)  ADL Inpatient CMS 0-100% Score: 0 (08/03/21 1207)  ADL Inpatient CMS G-Code Modifier : [facility] (08/03/21 1207)      Therapy Time   Individual Concurrent Group Co-treatment   Time In 0949         Time Out 1015         Minutes 26         Timed Code Treatment Minutes: [address], OT/S

## 2021-08-03 NOTE — PLAN OF CARE
Problem: Falls - Risk of:  Goal: Will remain free from falls  Description: Will remain free from falls  Outcome: Ongoing  Goal: Absence of physical injury  Description: Absence of physical injury  Outcome: Ongoing     Problem: Sensory:  Goal: Ability to identify factors that increase the pain will improve  Description: Ability to identify factors that increase the pain will improve  Outcome: Ongoing  Goal: Ability to demonstrate ways to enhance comfort will improve  Description: Ability to demonstrate ways to enhance comfort will improve  Outcome: Ongoing  Goal: General experience of comfort will improve  Description: General experience of comfort will improve  Outcome: Ongoing

## 2021-08-03 NOTE — PROGRESS NOTES
infiltrates. OBJECTIVE     Vital Signs:  BP 90/61   Pulse 52   Temp 97.7 °F (36.5 °C) (Oral)   Resp 14   Ht 5' 4\" (1.626 m)   Wt 250 lb 10.6 oz (113.7 kg)   SpO2 99%   BMI 43.03 kg/m²     Temp (24hrs), Av.8 °F (36.6 °C), Min:97.6 °F (36.4 °C), Max:98.3 °F (36.8 °C)    In: 230   Out: 600 [Urine:600]    Physical Exam:  Constitutional: This is a well developed, well nourished, Greater than 40 - Morbid Obesity / Extreme Obesity / Grade III 74y.o. year old male who is alert, oriented, cooperative and in no apparent distress. Head:normocephalic and atraumatic. EENT:  PERRLA. No conjunctival injections. Septum was midline, mucosa was without erythema, exudates or cobblestoning. No thrush was noted. Neck: Supple without thyromegaly. No elevated JVP. Trachea was midline. Respiratory: Chest was symmetrical.  There is no intercostal retraction or use of accessory muscles. No egophony noted. Cardiovascular: Regular without murmur, clicks, gallops or rubs. Abdomen: Slightly rounded and soft without organomegaly. No rebound, rigidity or guarding was appreciated. Lymphatic: No lymphadenopathy. Musculoskeletal: Normal curvature of the spine. No gross muscle weakness. Extremities:  No lower extremity edema, ulcerations, tenderness, varicosities or erythema. Muscle size, tone and strength are normal.  No involuntary movements are noted. Skin:  Warm and dry. Good color, turgor and pigmentation. No lesions or scars.   No cyanosis or clubbing  Neurological/Psychiatric: The patient's general behavior, level of consciousness, thought content and emotional status is normal.        Medications:  Scheduled Medications:    amiodarone  200 mg Oral Daily    apixaban  5 mg Oral BID    aspirin  81 mg Oral Daily    sodium chloride flush  5-40 mL Intravenous 2 times per day    furosemide  40 mg Intravenous Once    insulin lispro  0-12 Units Subcutaneous TID     insulin lispro  0-6 Units Subcutaneous Nightly     Continuous Infusions:    nitroGLYCERIN Stopped (08/02/21 1117)    sodium chloride      dextrose       PRN Medicationssodium chloride flush, 5-40 mL, PRN  sodium chloride, 25 mL, PRN  ondansetron, 4 mg, Q8H PRN   Or  ondansetron, 4 mg, Q6H PRN  polyethylene glycol, 17 g, Daily PRN  acetaminophen, 650 mg, Q6H PRN   Or  acetaminophen, 650 mg, Q6H PRN  glucose, 15 g, PRN  dextrose, 12.5 g, PRN  glucagon (rDNA), 1 mg, PRN  dextrose, 100 mL/hr, PRN        Diagnostic Labs:  CBC:   Recent Labs     08/02/21  0647 08/03/21  0502   WBC 9.2 6.4   RBC 4.59 3.87*   HGB 12.8* 10.8*   HCT 43.0 35.9*   MCV 93.7 92.8   RDW 17.4* 17.2*    188     BMP:   Recent Labs     08/02/21  0647 08/03/21  0502    136   K 3.9 4.0    99   CO2 22 26   BUN 20 27*   CREATININE 1.54* 1.49*     BNP: No results for input(s): BNP in the last 72 hours. PT/INR: No results for input(s): PROTIME, INR in the last 72 hours. APTT: No results for input(s): APTT in the last 72 hours. CARDIAC ENZYMES: No results for input(s): CKMB, CKMBINDEX, TROPONINI in the last 72 hours. Invalid input(s): CKTOTAL;3  FASTING LIPID PANEL:  Lab Results   Component Value Date    CHOL 124 06/23/2021    HDL 43 06/23/2021    TRIG 129 06/23/2021     LIVER PROFILE: No results for input(s): AST, ALT, ALB, BILIDIR, BILITOT, ALKPHOS in the last 72 hours. MICROBIOLOGY:   Lab Results   Component Value Date/Time    CULTURE NO GROWTH 6 DAYS 09/13/2017 01:59 AM    CULTURE  09/13/2017 01:59 AM     [address], [address] [place] [address] [phone]    CULTURE NO GROWTH 6 DAYS 09/13/2017 01:59 AM    CULTURE  09/13/2017 01:59 AM     [facility] [id number] [facility], [address] [place] [address] [phone]       Imaging:    XR CHEST PORTABLE    Result Date: 8/2/2021  Stable cardiomegaly with interstitial prominence most pronounced in the right lower lung field suggesting vascular congestion/edema.        ASSESSMENT & PLAN     ASSESSMENT / PLAN:     Acute on chronic combined systolic and diastolic congestive heart failure (HCC) SOB, Hx CHF EF of 13%, ICD in place, non compliance to diet regime, pulmonary edema on CXR, lung crackles, orthopnea, Hx CAD on lisinopril, metoprolol, I/O over last 24 hours -370  Plan:   Heart failure nurse consult for counseling              Dr Ben Weaver saw him yesterday and recommends he continues his home medications as prescribed   Will discharge   Out patient follow-up        Acute Hypercapnic respiratory failure on BiPAP: SOB, pCO2 60.3, pH 7.267  Plan: Use BiPAP as needed     Active Problems:    TANNER variably compliant with BiPAP       CAD (coronary artery disease) s/p stenting of L anterior descending artery 2004  Plan: Continue metoprolol, lisinopril        Chronic kidney disease, stage II (mild) Cr 1.54  Plan: daily BMP monitoring      NSTEMI (non-ST elevated myocardial infarction) ([facility]  Plan: Continue metoprolol, lisinopril       Type 2 diabetes mellitus without complication ([facility]): diabetic on metformin, glycemia 217--> 143-->88  Plan: Insulin low dose sliding scale       Hyperlipidemia  Plan: Continue atorvastatin        PAF (paroxysmal atrial fibrillation) ([facility] ICD in place  Plan: continue apixaban 5mg daily, AMIO 200MG DAILY, aspirin 81mg daily                 DVT ppx: on anticoagulation  GI ppx: pepcid     PT/OT/SW: consulted  Discharge Planning: case management        Gerri Villafuerte MD  Internal Medicine Resident, PGY-1  [facility]; [place], [place]  8/3/2021, 6:57 AM      I have discussed the care of Og Lopez , including pertinent history and exam findings,    today with the resident. I have seen and examined the patient and the key elements of all parts of the encounter have been performed by me . I agree with the assessment, plan and orders as documented by the resident.      Principal Problem:    Acute on chronic combined systolic and diastolic congestive heart failure [facility])  Active Problems:    TANNER variably compliant with BiPAP    CAD (coronary artery disease) s/p stenting of L anterior descending artery 2004    Ischemic cardiomyopathy    Chronic kidney disease, stage II (mild)    Chronic gout    Hyperlipidemia    Iron deficiency anemia    HTN (hypertension)    NSTEMI (non-ST elevated myocardial infarction) (HCC)    Type 2 diabetes mellitus without complication ([facility])    AICD (automatic cardioverter/defibrillator) present    PAF (paroxysmal atrial fibrillation) ([facility]    Hypothyroidism due to medication    Acute cardiac pulmonary edema ([facility])  Resolved Problems:    * No resolved hospital problems. *        Overall  course ;                                   are improving over time.         Patient is feeling much better today  Feels that he is back to baseline  Discharge planning once okay with cardiologist.          Electronically signed by Emory Felton MD

## 2021-08-03 NOTE — CARE COORDINATION
Met with pt to discuss transitional planning. He is agreeable with plan of home independently. Notified him that insurance will not cover shower chair and if he needs it, he will have to pay out of pocket. He is understanding and states that he does not require it. He denies other needs and has transportation home. He wants to make his follow up appointments because he has other appointments scheduled next week.  He will call in the AM    Discharge 1 [facility] Case Management Department  Written by: Matthew Hicks RN    Patient Name: eLonie Adrian  Attending Provider: Starr Colvin MD  Admit Date: 2021  6:34 AM  MRN: [id number]  Account: [de-identified]                     : 1955  Discharge Date: 8/3/2021      Disposition: home independently    Matthew Hicks RN

## 2021-08-04 ENCOUNTER — CARE COORDINATION (OUTPATIENT)
Dept: CASE MANAGEMENT | Age: 66
End: 2021-08-04

## 2021-08-04 DIAGNOSIS — I50.1: Primary | ICD-10-CM

## 2021-08-04 PROCEDURE — 1111F DSCHRG MED/CURRENT MED MERGE: CPT | Performed by: NURSE PRACTITIONER

## 2021-08-04 NOTE — CARE COORDINATION
[address] Transitions Initial Follow Up Call    Call within 2 business days of discharge: Yes    Patient: Fadia Keyes Patient : 1955   MRN: [id number]  Reason for Admission: Acute cardiac pulmonary edema  Discharge Date: 8/3/21 RARS: Readmission Risk Score: 23      Last Discharge [facility]       Complaint Diagnosis Description Type Department Provider    21 Congestive Heart Failure Acute cardiac pulmonary edema [facility]) ED to Hosp-Admission (Discharged) (ADMITTED) [facility] CAR 3 Trip Morales MD; Anne Corey MD           Spoke with: Geremias: [facility]    Was able to contact Edith Luigi for transitional outreach. He stated that he was doing better, just tired. He said that his breathing was better, but it is not as good as it could be. He said that he was not short of breath this morning when he walked to the food bank or up a ramp. He said that he is coughing occasionally now. This morning he coughed up a small amount of brown colored sputum. Reviewed daily weight, low sodium diet and fluid restriction. He feels that he may have not eaten healthy the last week or so and that may have contributed to the fluid build up. Medications reviewed and all medications in the home 1111F order completed. He did say that he was getting low on the amiodarone and will be calling for a refill. Offered to assist with scheduling PCP and cardiology appointment, but he declined. He said that he was going to call today and make appointments. Edith Meyers if familiar with care transitions and was receptive to further outreach. Contact information provided.     Non-face-to-face services provided:  Scheduled appointment with PCP-pt to call PCP  Scheduled appointment with Specialist-pt to call cardiology  Obtained and reviewed discharge summary and/or continuity of care documents  Assessment and support for treatment adherence and medication management-reviewed     Transitions of Care Initial Call    Was this an external facility discharge? No Discharge Facility: [facility]    Challenges to be reviewed by the provider   Additional needs identified to be addressed with provider: No  none             Method of communication with provider : none      Advance Care Planning:   Does patient have an Advance Directive: reviewed and current. Was this a readmission? No  Patient stated reason for admission: shortness of breath  Patients top risk factors for readmission: financial, lack of knowledge about disease and medical condition-CHF/DM    Care Transition Nurse (CTN) contacted the patient by telephone to perform post hospital discharge assessment. Verified name and  with patient as identifiers. Provided introduction to self, and explanation of the CTN role. CTN reviewed discharge instructions, medical action plan and red flags with patient who verbalized understanding. Patient given an opportunity to ask questions and does not have any further questions or concerns at this time. Were discharge instructions available to patient? Yes. Reviewed appropriate site of care based on symptoms and resources available to patient including: PCP, Specialist and When to call 911. The patient agrees to contact the PCP office for questions related to their healthcare. Medication reconciliation was performed with patient, who verbalizes understanding of administration of home medications. Covid Risk Education     Educated patient about risk for severe COVID-19 due to risk factors according to CDC guidelines. CTN reviewed discharge instructions, medical action plan and red flag symptoms with the patient who verbalized understanding. Discussed COVID vaccination status: Yes and pt has been vaccinated. Education provided on COVID-19 vaccination as appropriate. Discussed exposure protocols and quarantine with CDC Guidelines.  Patient was given an opportunity to verbalize any questions and concerns and agrees to contact CTN or health care provider for questions related to their healthcare. Reviewed and educated patient on any new and changed medications related to discharge diagnosis. Was patient discharged with a pulse oximeter? No Discussed and confirmed pulse oximeter discharge instructions and when to notify provider or seek emergency care. CTN provided contact information. Plan for follow-up call in 3-5 days based on severity of symptoms and risk factors.   Plan for next call: follow up on appointments        Care Transitions 24 Hour Call    Do you have any ongoing symptoms?: No  Do you have a copy of your discharge instructions?: Yes  Do you have all of your prescriptions and are they filled?: Yes  Have you been contacted by a [address]?: No  Have you scheduled your follow up appointment?: No  Were you discharged with any Home Care or Post Acute Services: No  Do you feel like you have everything you need to keep you well at home?: Yes  Care Transitions Interventions         Follow Up  Future Appointments   Date Time Provider Keerthi England   8/16/2021  2:45 Caryle Nicely, MD sv gr lks [facility]   8/23/2021  2:30 PM Torsten Klein, DO Resp Spec [facility]   10/27/2021  1:30 PM [facility], APRN - [address] [facility]   12/14/2021  2:10 [place] [id number] [address], RN

## 2021-08-04 NOTE — DISCHARGE SUMMARY
[address]     Department of Internal Medicine - Staff Internal Medicine Teaching Service    INPATIENT DISCHARGE SUMMARY      Patient Identification:  Tony Licona is a 74 y.o. male. :  1955  MRN: [id number]     Acct: [de-identified]   PCP: LIDIA Jefferson CNP  Admit Date:  2021  Discharge date and time: 8/3/2021  6:26 PM   Attending Provider: No att. providers found                                     [address] Problem Lists:  Principal Problem:    Acute on chronic combined systolic and diastolic congestive heart failure ([facility]  Active Problems:    TANNER variably compliant with BiPAP    CAD (coronary artery disease) s/p stenting of L anterior descending artery 2004    Ischemic cardiomyopathy    Chronic kidney disease, stage II (mild)    Chronic gout    Hyperlipidemia    Iron deficiency anemia    HTN (hypertension)    NSTEMI (non-ST elevated myocardial infarction) ([facility]    Type 2 diabetes mellitus without complication ([facility]    AICD (automatic cardioverter/defibrillator) present    PAF (paroxysmal atrial fibrillation) ([facility]    Hypothyroidism due to medication    Acute cardiac pulmonary edema (HCC)  Resolved Problems:    * No resolved hospital problems. [facility] STAY     Brief Inpatient course:   Tony Licona is a 74 y.o. male who was admitted for the management of Acute on chronic combined systolic and diastolic congestive heart failure [facility]), presented to the emergency department with SOB and history of CHF.  Diagnosed and managed for Acute Hypercapnic respiratory failure exacerbation of CHF probably due to medication and diet noncompiance;  on  CKD, CAD, diabetes      Procedures/ Significant Interventions:    none    Consults:     Consults:     Final Specialist Recommendations/Findings:   IP CONSULT TO INTERNAL MEDICINE  IP CONSULT TO CASE MANAGEMENT  IP CONSULT TO HEART FAILURE NURSE/COORDINATOR  IP CONSULT TO CARDIOLOGY Any Hospital Acquired Infections: none    Discharge Functional Status:  stable    DISCHARGE PLAN     Disposition: home    Patient Instructions:   Discharge Medication List as of 8/3/2021  4:00 PM      CONTINUE these medications which have NOT CHANGED    Details   levothyroxine (SYNTHROID) 25 MCG tablet TAKE ONE-HALF OF A TABLET ONCE A DAY, Disp-45 tablet, R-0Normal      metFORMIN (GLUCOPHAGE) 1000 MG tablet TAKE 1 TABLET BY MOUTH 2 TIMES DAILY (WITH MEALS), Disp-60 tablet, R-2Normal      apixaban (ELIQUIS) 5 MG TABS tablet TAKE 1 TABLET BY MOUTH 2 TIMES DAILY, Disp-60 tablet, R-3Normal      atorvastatin (LIPITOR) 40 MG tablet TAKE 1 TABLET BY MOUTH DAILY, Disp-30 tablet, R-5Normal      ferrous sulfate (IRON 325) 325 (65 Fe) MG tablet Take 1 tablet by mouth daily (with breakfast), Disp-30 tablet, R-5Normal      amiodarone (CORDARONE) 200 MG tablet Take 1 tablet by mouth daily, Disp-30 tablet, R-0Normal      aspirin (ASPIRIN ADULT LOW STRENGTH) 81 MG EC tablet TAKE 1 TABLET TWICE A DAY, Disp-60 tablet, R-3Normal      lisinopril (PRINIVIL;ZESTRIL) 5 MG tablet TAKE 1 TABLET BY MOUTH DAILY, Disp-30 tablet, R-3Normal      isosorbide mononitrate (IMDUR) 30 MG extended release tablet TAKE 1 TABLET BY MOUTH DAILY, Disp-30 tablet, R-3Normal      allopurinol (ZYLOPRIM) 300 MG tablet TAKE 1 TABLET DAILY, Disp-30 tablet, R-5Normal      furosemide (LASIX) 40 MG tablet TAKE 1/2 TABLET BY MOUTH 2 TIMES A DAY, Disp-90 tablet, R-1Normal      Handicap Placard [facility] Starting Wed 12/23/2020, Disp-1 each, R-0, PrintExp: 1/2025      !!  Lancets MISC Disp-100 each, R-3, NormalDaily      Blood Gluc Meter Disp-Strips (BLOOD GLUCOSE METER DISPOSABLE) SHAN Disp-1 Device, R-0, NormalDaily and as needed      metoprolol succinate (TOPROL XL) 25 MG extended release tablet TAKE 1 TABLET BY MOUTH DAILY, Disp-30 tablet, R-3Normal      blood glucose test strips (FREESTYLE LITE) strip Disp-100 each, R-11, PrintUSE AS DIRECTED TWICE A DAY take any vitamins, over-the-counter medicine, or herbal products without talking to your doctor first. Gill Strong not take ibuprofen (Advil or Motrin) and naproxen (Aleve) without talking to your doctor first. They could make your heart failure worse.     · You may take some of the following medicine. ? Angiotensin-converting enzyme inhibitors (ACEIs) or angiotensin II receptor blockers (ARBs) reduce the heart's workload, lower blood pressure, and reduce swelling. Taking an ACEI or ARB may lower your chance of needing to be hospitalized. ? Beta-blockers can slow heart rate, decrease blood pressure, and improve your condition. Taking a beta-blocker may lower your chance of needing to be hospitalized. ? Diuretics, also called water pills, reduce swelling. You will get more details on the specific medicines your doctor prescribes. Diet    · Your doctor may suggest that you limit sodium. Your doctor can tell you how much sodium is right for you. An example is less than 3,000 mg a day. This includes all the salt you eat in cooking or in packaged foods. People get most of their sodium from processed foods. Fast food and restaurant meals also tend to be very high in sodium.     · Ask your doctor how much liquid you can drink each day. You may have to limit liquids. Weight    · Weigh yourself without clothing at the same time each day. Record your weight. Call your doctor if you have a sudden weight gain, such as more than 2 to 3 pounds in a day or 5 pounds in a week. (Your doctor may suggest a different range of weight gain.) A sudden weight gain may mean that your heart failure is getting worse. Activity level    · Start light exercise (if your doctor says it is okay). Even if you can only do a small amount, exercise will help you get stronger, have more energy, and manage your weight and your stress. Walking is an easy way to get exercise. Start out by walking a little more than you did before.  Bit by bit, increase the amount you walk.     · When you exercise, watch for signs that your heart is working too hard. You are pushing yourself too hard if you cannot talk while you are exercising. If you become short of breath or dizzy or have chest pain, stop, sit down, and rest.     · If you feel \"wiped out\" the day after you exercise, walk slower or for a shorter distance until you can work up to a better pace.     · Get enough rest at night. Sleeping with 1 or 2 pillows under your upper body and head may help you breathe easier. Lifestyle changes    · Do not smoke. Smoking can make a heart condition worse. If you need help quitting, talk to your doctor about stop-smoking programs and medicines. These can increase your chances of quitting for good. Quitting smoking may be the most important step you can take to protect your heart.     · Limit alcohol to 2 drinks a day for men and 1 drink a day for women. Too much alcohol can cause health problems.     · Avoid getting sick from colds and the flu. Get a pneumococcal vaccine shot. If you have had one before, ask your doctor whether you need another dose. Get a flu shot each year. If you must be around people with colds or the flu, wash your hands often. When should you call for help? Call 911  if you have symptoms of sudden heart failure such as:    · You have severe trouble breathing.     · You cough up pink, foamy mucus.     · You have a new irregular or rapid heartbeat. Call your doctor now or seek immediate medical care if:    · You have new or increased shortness of breath.     · You are dizzy or lightheaded, or you feel like you may faint.     · You have sudden weight gain, such as more than 2 to 3 pounds in a day or 5 pounds in a week. (Your doctor may suggest a different range of weight gain.)     · You have increased swelling in your legs, ankles, or feet.     · You are suddenly so tired or weak that you cannot do your usual activities.    Watch closely for changes in your [employer], and be sure to contact your doctor if you develop new symptoms. Where can you learn more? Go to https://chpepiceweb.[employer]. org and sign in to your [employer] account. Enter U993 in the [facility] box to learn more about \"Heart Failure: Care Instructions. \"     If you do not have an account, please click on the \"Sign Up Now\" link. Current as of: August 31, 2020               Content Version: 12.9  © 2329-5624 [employer]. Care instructions adapted under license by [facility] ([facility]). If you have questions about a medical condition or this instruction, always ask your healthcare professional. [place] [address] any warranty or liability for your use of this information. Follow up labs: none  Follow up imaging: none    Note that over 30 minutes was spent in preparing discharge papers, discussing discharge with patient, medication review, etc.      Jimbo Jean MD, MD  Internal Medicine Resident, PGY-1  [facility]; [place], [place]  8/4/2021, 3:52 PM      Attending Physician Statement  I have discussed the care of Kylee Dolan and I have examined the patient myselft and taken ros and hpi , including pertinent history and exam findings,  with the resident. I have reviewed the key elements of all parts of the encounter with the resident. I agree with the assessment, plan and orders as documented by the resident. I spent over 35 mins in direct patient care as above and reviewing medications and counseling for discharge .         Electronically signed by Marguerite Hutton MD

## 2021-08-04 NOTE — PROGRESS NOTES
Congestive Heart Failure Education completed and charted. CHF booklet given. Patient was receptive to education on 8/3/21. Discussed the  importance of medication compliance. Discussed the importance of a heart healthy diet. Discussed 2000 mg sodium-restricted daily diet. Patient instructed to limit fluid intake to  1.5 to 2 liters per day. Patient instructed to weigh self at the same time of each day each morning, reinforced teaching to monitor for 3-5 lb weight increase over 1-2 days notify physician if change noted. Signs and symptoms of CHF discussed with patient, such as feeling more tired than normal, feeling short of breath, coughing that increases when lying down, sudden weight gain, swelling of the feet, legs or belly. Patient verbalized understanding to notify physician office if these symptoms occur.     Echo 2/2020 EF 13%  Pt has been seen in the  CHF Clinic in the past.   Patient refusing referral to  [address] [place], [place] [address]nikita Lee

## 2021-08-05 LAB
EKG ATRIAL RATE: 129 BPM
EKG Q-T INTERVAL: 396 MS
EKG QRS DURATION: 198 MS
EKG QTC CALCULATION (BAZETT): 566 MS
EKG R AXIS: -74 DEGREES
EKG T AXIS: 64 DEGREES
EKG VENTRICULAR RATE: 123 BPM

## 2021-08-05 PROCEDURE — 93010 ELECTROCARDIOGRAM REPORT: CPT | Performed by: INTERNAL MEDICINE

## 2021-08-06 ENCOUNTER — CARE COORDINATION (OUTPATIENT)
Dept: CASE MANAGEMENT | Age: 66
End: 2021-08-06

## 2021-08-06 NOTE — CARE COORDINATION
[facility] [facility] Transitions Follow Up Call    2021    Patient: Lucía Felton  Patient : 1955   MRN: [id number]  Reason for Admission: Acute cardiac pulmonary edema  Discharge Date: 8/3/21 RARS: Readmission Risk Score: 23         Attempt to reach patient for Care Transitions. [facility] requesting return call. Contact information provided. [phone]    Care Transitions Subsequent and Final Call    Subsequent and Final Calls  Care Transitions Interventions  Other Interventions:            Follow Up  Future Appointments   Date Time Provider Keerthi England   2021  8:15 AM LIDIA Hammonds CNP [facility]   2021  2:45 PM Braeden Gaxiola MD sv gr lks [facility]   2021  2:30 PM Bonnie Armstrong DO Resp Spec [facility]   10/27/2021  1:30 PM LIDIA Hammonds CNP [facility]   2021  2:10 PM Constantin Rhodes MD [facility] None       Toña Lockett RN

## 2021-08-09 DIAGNOSIS — I50.42 CHRONIC COMBINED SYSTOLIC AND DIASTOLIC HEART FAILURE (HCC): Primary | ICD-10-CM

## 2021-08-10 ENCOUNTER — CARE COORDINATION (OUTPATIENT)
Dept: CASE MANAGEMENT | Age: 66
End: 2021-08-10

## 2021-08-10 NOTE — CARE COORDINATION
[facility] Transitions Follow Up Call    8/10/2021    Patient: Karen Alcocer  Patient : 1955   MRN: [id number]  Reason for Admission: CHF  Discharge Date: 8/3/21 RARS: Readmission Risk Score: 23         Attempt to reach patient for Care Transitions. [facility] requesting return call. Contact information provided. [phone]    Care Transitions Subsequent and Final Call    Subsequent and Final Calls  Care Transitions Interventions  Other Interventions:            Follow Up  Future Appointments   Date Time Provider Keerthi England   2021  2:45 PM Ida Delacruz MD sv gr lks [facility]   2021  2:30 PM Daniel Goldstein DO Resp Spec [facility]   10/27/2021  1:30 PM LIDIA Pastrana - CNP [facility]   2021  2:10 PM Gely Avina MD [facility] None       Estrada Solis RN

## 2021-08-10 NOTE — PROGRESS NOTES
Physician Progress Note      Ike Leblanc  CSN #:                  [id number]  :                       1955  ADMIT DATE:       2021 6:34 AM  DISCH DATE:        8/3/2021 6:26 PM  RESPONDING  PROVIDER #:        Rene Left TEXT:    Patient admitted with CHF exacerbation. Noted documentation of NSTEMI on   internal med prog note and d/c summary on 8/3. In order to support the   diagnosis of NSTEMI, please include additional clinical indicators in your   documentation. Or please document if the diagnosis of NSTEMI has been ruled   out after further study. The medical record reflects the following:  Risk Factors: HTN, ICMP  Clinical Indicators: no mention of NSTEMI noted in prior progress notes, no   mention of NSTEMI per cardiology, no treatment noted, one troponin result of   16, \"EKG without significant changes from EKG done 2021\"  Treatment: cardiology consult, troponin, EKG    Call if any questions. Thank you, Cb Stephenson, [address] [place] [address]  Options provided:  -- NSTEMI present as evidenced by, Please document evidence. -- NSTEMI was ruled out  -- Other - I will add my own diagnosis  -- Disagree - Not applicable / Not valid  -- Disagree - Clinically unable to determine / Unknown  -- Refer to Clinical Documentation Reviewer    PROVIDER RESPONSE TEXT:    NSTEMI was ruled out after study.     Query created by: Paul Tamez on 2021 11:46 AM      Electronically signed by:  Eva Cotto 8/10/2021 10:52 AM

## 2021-08-11 ENCOUNTER — CARE COORDINATION (OUTPATIENT)
Dept: CASE MANAGEMENT | Age: 66
End: 2021-08-11

## 2021-08-11 ENCOUNTER — TELEPHONE (OUTPATIENT)
Dept: PRIMARY CARE CLINIC | Age: 66
End: 2021-08-11

## 2021-08-11 ENCOUNTER — TELEPHONE (OUTPATIENT)
Dept: PHARMACY | Facility: CLINIC | Age: 66
End: 2021-08-11

## 2021-08-11 NOTE — TELEPHONE ENCOUNTER
Received a referral:  from Care Coordinator to review patients medications. Called patient to schedule a time to speak with a pharmacist over the telephone. Spoke to patient and advised them of the above message. Patient verified understanding and scheduled their appointment: tomorrow at [address] [place] [address].    [facility] free: [phone]

## 2021-08-11 NOTE — CARE COORDINATION
[facility] Transitions Follow Up Call    2021    Patient: Leonie Adrian  Patient : 1955   MRN: [id number]  Reason for Admission: CHF  Discharge Date: 8/3/21 RARS: Readmission Risk Score: 23         Attempt to reach patient for Care Transitions. [facility] requesting return call. Contact information provided. Terence 46 returned call. He stated that he was doing \"good\" and denied any shortness of breath, cough, swelling or chest pain. He said that he tried to get the Amiodarone reordered but has not heard from any one. Noted in Epic that PCP would like him to have the cardiologist handle that medication. Call placed to Dr Nura Salcedo, cardiologist and spoke with staff. She said that she will notify physician to send order to [facility]. She also stated that Dr Nura Salcedo would like pt to have a PFT and lab work done. She will send order to out patient lab and she requested the PFT fax so she could send the order. Fax number supplied. Gabriela Dudley was called back and notified of call the cardiology. Also reviewed with Gabriela Dudley about the CHF clinic, dietary referral and pharmacy referral.  He was receptive to all, all though he thought that he told his PCP he did not need CHF clinic. He will call the clinic and speak with them. Care Transitions Follow Up Call    Needs to be reviewed by the provider   Additional needs identified to be addressed with provider: No  none             Method of communication with provider : none      Care Transition Nurse (CTN) contacted the patient by telephone to follow up after admission on 21. Verified name and  with patient as identifiers. Addressed changes since last contact: none  Discussed follow-up appointments. If no appointment was previously scheduled, appointment scheduling offered: No.   Is follow up appointment scheduled within 7 days of discharge?  No.      CTN reviewed discharge instructions, medical action plan and red flags with patient and discussed any barriers to care and/or understanding of plan of care after discharge. Discussed appropriate site of care based on symptoms and resources available to patient including: PCP, Specialist and When to call 911. The patient agrees to contact the PCP office for questions related to their healthcare. Patients top risk factors for readmission: lack of knowledge about disease and medical condition-CHF  Interventions to address risk factors: Obtained and reviewed discharge summary and/or continuity of care documents      [facility] follow up appointment(s): 8/19 cardiology    CTN provided contact information for future needs. Plan for follow-up call in 1-2 days based on severity of symptoms and risk factors. Plan for next call: follow up on lab order            Care Transitions Subsequent and Final Call    Subsequent and Final Calls  Care Transitions Interventions  Other Interventions:            Follow Up  Future Appointments   Date Time Provider Keerthi England   8/16/2021  2:45 PM Jorge Orona MD sv gr lks [facility]   8/23/2021  2:30 PM Peter Kenny,  Resp Spec [facility]   8/30/2021  1:30 PM LIDIA Bhatti - CNP [facility]   10/27/2021  1:30 PM LIDIA Bhatti - CNP [facility]   12/14/2021  2:10 PM Arpita Spring MD [facility] None       Magen Crawley RN

## 2021-08-11 NOTE — TELEPHONE ENCOUNTER
----- Message from Laveda Brunner sent at 8/10/2021  1:37 PM EDT -----  Subject: Refill Request    QUESTIONS  Name of Medication? amiodarone (CORDARONE) 200 MG tablet  Patient-reported dosage and instructions? 1 tablet by mouth daily  How many days do you have left? 3  Preferred Pharmacy? [facility] phone number (if available)? [phone]  Additional Information for Provider? Pharmacy will not refill this   medication because it was originally prescribed by Dr. Fer Luna. They need   a prescription from Dr. Sky Allen. Please have Dr. Sky Allen create a new   prescription and send it over to [address] [place] [address] - [facility].   ---------------------------------------------------------------------------  --------------  [address]  What is the best way for the office to contact you? OK to leave message on   voicemail  Preferred Call Back Phone Number?  [phone]

## 2021-08-11 NOTE — TELEPHONE ENCOUNTER
Mahsa Hurtado RN  [facility] Clinical [facility]   KW  Pt has chf and will need medications education and review

## 2021-08-12 ENCOUNTER — CARE COORDINATION (OUTPATIENT)
Dept: CASE MANAGEMENT | Age: 66
End: 2021-08-12

## 2021-08-12 ENCOUNTER — CARE COORDINATION (OUTPATIENT)
Dept: CARE COORDINATION | Age: 66
End: 2021-08-12

## 2021-08-12 NOTE — CARE COORDINATION
monitor strips 1 strip by Other route daily Test 1 times a day & as needed for symptoms of irregular blood glucose. 300 strip 2    metoprolol succinate (TOPROL XL) 25 MG extended release tablet TAKE 1 TABLET BY MOUTH DAILY 30 tablet 3    blood glucose test strips (FREESTYLE LITE) strip USE AS DIRECTED TWICE A  each 11    ammonium lactate (LAC-HYDRIN) 12 % lotion Apply topically daily after bathing sparing the space between the toes. 222 mL 3    TRUEPLUS LANCETS 33G MISC TEST AS DIRECTED 100 each 11    Alcohol Swabs (ALCOHOL PREP) 70 % PADS USE AS DIRECTED 100 each 11     No current facility-administered medications for this visit. Biochemical Data, Medical Tests and Procedures:    Lab Results   Component Value Date    LABA1C 6.3 04/26/2021     Lab Results   Component Value Date     02/24/2020       Lab Results   Component Value Date    CHOL 124 06/23/2021    CHOL 124 03/20/2020    CHOL 120 02/18/2020     Lab Results   Component Value Date    TRIG 129 06/23/2021    TRIG 84 03/20/2020    TRIG 114 02/18/2020     Lab Results   Component Value Date    HDL 43 06/23/2021    HDL 40 (L) 03/20/2020    HDL 43 02/18/2020     Anthropometric Measurements:    Height: 64 inches (162.6 cm)  Weight: 250 lb (113.7 kg)  BMI: 43.03 (obesity class III)  IBW: 130 lb (59.09 kg) +-10%  %IBW: 192.3%  AdBW: 178 lb (80.9 kg)     Physical Exam Findings:  Deferred    Nutrition Interview: RD called pt, explained reason for call and role in care. Pt states appetite is \"not too hungry\", typically eats 2-3 meals/day. See food recall below. RD explained the importance of watching sodium to prevent body from holding extra fluid. RD explained the nutrition plan for heart failure usually limits the sodium from food and beverages to no more than 2000 mg per day. Discussed avoiding the salt shaker when eating/cooking- RD encouraged pt to put it in a place that is not accessible and to use alternatives such as Kenny  Cece Or pepper, rosemary, etc. Explained how sodium is hidden in a lot of foods and the importance of reading food labels-choosing foods with 140 mg of sodium or less per serving. Discussed draining/rinsing canned goods to decrease sodium content. Reviewed foods high in sodium to avoid/limit eating. Pt explains he bought [employer] soup and it still has about 400 mg of sodium per 1/2 cup serving- pt is surprised by this- RD explained if you eat more than a serving, the sodium doubles and recommended limiting intake of canned soup. RD acknowledged how patient is reading food labels and encouraged him to continue to do so. RD noted patient is instructed to limit fluid intake to 1.5 L - 2 L per day. RD explained a fluid restricted diet- pt verbalizes understanding. RD reviewed the importance of weighing self daily. Pt states this AM weight 244 lb. Reviewed to call PCP if pt has a 3 lb weight gain in one day or a 5 lb weight gain in 2 days. Pt verbalizes understanding. RD explained the importance of eating balanced meals consistently throughout the day and taking medicine as directed to help manage BS. Discussed eating small balanced snacks instead of skipping meals completely. Reviewed the importance of monitoring BS daily- pt states this AM  mg/dL. RD reviewed BS goals with patient. Pt has no nutrition related questions at this time. RD offered to mail educational handouts to pt to reinforce concepts discussed during phone conversation, pt accepted and prefers MyChart. 24-Hour Diet Recall  Breakfast  Consumed: no example provided per pt    Lunch  Consumed: baked sweet potato and fruit    Dinner  Consumed: no example provided per pt    Beverages: water and fruit juice- RD explained it is better to eat a whole piece of fruit than it is to drink juice. Discussed watching serving sizes.      Blood sugar checks: pt is checking BS daily- per pt this AM  mg/dL    Nutrition Diagnosis:  #1 Problem Food and nutrition-related knowledge deficit       Etiology related to lack of prior nutrition related education regarding sodium       Signs/Symptoms as evidenced by referral for cardiac diet education     Nutrition Intervention:     Estimated Needs  cardiac diet and diabetic diet providing 1700 kcals to promote wt loss ([address] based on AdBW for obesity class III). Estimated daily CHO Needs: 234 g (based on 45-65% total calorie intake)  Estimated daily Protein Needs: 65-81 g (based on 0.8-1.0 g/kg based on AdBW for obesity class III)  Estimated daily Fluid Needs: oer chart review 6-8 cups per day    #1 Nutrition Information: Provided patient with Managing Blood Sugar, Heart Failure Nutrition Therapy and Fluid Restricted Diet handouts. For reinforcement of concepts discussed during nutrition interview. #2 Nutrition Counseling: Used open-ended questions to assess patients perceived susceptibility and severity of disease state. Discussed potential impact of health threat on patient's lifestyle. Used open-ended questions to assess patient's perception regarding benefits of and barriers to implementation of nutrition therapy. #3 Nutrition Education: Clearly defined the benefits of nutrition therapy. Summarized and affirmed positive aspects of current nutrition patterns. Provided education regarding value of adherence to cardiac diet and diabetic diet. Discussed ways to establish applying concepts of alternatives and choices regarding implementation of diet. Explored ideas for small, incremental goals to initiate behavior change. Nutrition Monitoring and Evaluation:     Indicator/Goal Criteria   #1 Eat balanced meals consistently throughout the day. #1 Focus on eating 3 meals/day. Make these meals balanced using the MyPlate ONRDFDBCN-1/8 plate fruits and/or vegetables, 1/4 plate protein and 1/4 plate starchy carbohydrates with 8 oz glass of low fat milk if desired. #2 Monitor daily sodium intake. Keep sodium from food and beverages to no more than 2000 mg/day. #2 Avoid the salt shaker. Read food labels to help choose lower sodium options (<140 mg of sodium per serving). #3 Monitor daily weights and check BS daily. #3 Weigh self daily in the AM and check BS. Keep a log. Follow Up: RD will call pt in 2 weeks to follow up and make sure pt received handouts in mail. RD will answer any nutrition related questions at this time.      [address] [facility], [address] [place] [address]

## 2021-08-13 ENCOUNTER — SCHEDULED TELEPHONE ENCOUNTER (OUTPATIENT)
Dept: PHARMACY | Facility: CLINIC | Age: 66
End: 2021-08-13

## 2021-08-13 ENCOUNTER — HOSPITAL ENCOUNTER (OUTPATIENT)
Age: 66
Discharge: HOME | End: 2021-08-13
Payer: COMMERCIAL

## 2021-08-13 LAB
ALT SERPL-CCNC: 10 U/L (ref 5–41)
AST SERPL-CCNC: 16 U/L

## 2021-08-13 PROCEDURE — 84450 TRANSFERASE (AST) (SGOT): CPT

## 2021-08-13 PROCEDURE — 36415 COLL VENOUS BLD VENIPUNCTURE: CPT

## 2021-08-13 PROCEDURE — 84460 ALANINE AMINO (ALT) (SGPT): CPT

## 2021-08-13 RX ORDER — METOPROLOL SUCCINATE 50 MG/1
50 TABLET, EXTENDED RELEASE ORAL DAILY
COMMUNITY

## 2021-08-13 NOTE — TELEPHONE ENCOUNTER
CLINICAL PHARMACY NOTE - Medication Review  Patient outreach to review medications - Spoke with patient. SUBJECTIVE/OBJECTIVE:   Alysha Martinez is a 74 y.o. male referred to a clinical pharmacy specialist by care coordination. All meds and indications reviewed. Medications:  Medication Sig    levothyroxine (SYNTHROID) 25 MCG tablet TAKE ONE-HALF OF A TABLET ONCE A DAY    metFORMIN (GLUCOPHAGE) 1000 MG tablet TAKE 1 TABLET BY MOUTH 2 TIMES DAILY (WITH MEALS)    apixaban (ELIQUIS) 5 MG TABS tablet TAKE 1 TABLET BY MOUTH 2 TIMES DAILY    atorvastatin (LIPITOR) 40 MG tablet TAKE 1 TABLET BY MOUTH DAILY    ferrous sulfate (IRON 325) 325 (65 Fe) MG tablet Take 1 tablet by mouth daily (with breakfast)    amiodarone (CORDARONE) 200 MG tablet Take 1 tablet by mouth daily    aspirin (ASPIRIN ADULT LOW STRENGTH) 81 MG EC tablet TAKE 1 TABLET TWICE A DAY    lisinopril (PRINIVIL;ZESTRIL) 5 MG tablet TAKE 1 TABLET BY MOUTH DAILY    isosorbide mononitrate (IMDUR) 30 MG extended release tablet TAKE 1 TABLET BY MOUTH DAILY    allopurinol (ZYLOPRIM) 300 MG tablet TAKE 1 TABLET DAILY    furosemide (LASIX) 40 MG tablet TAKE 1/2 TABLET BY MOUTH 2 TIMES A DAY    Handicap Placard MISC by Does not apply route Exp: 1/2025    Lancets MISC Daily    Blood Gluc Meter Disp-Strips (BLOOD GLUCOSE METER DISPOSABLE) SHAN Daily and as needed    blood glucose monitor strips 1 strip by Other route daily Test 1 times a day & as needed for symptoms of irregular blood glucose.  metoprolol succinate (TOPROL XL) 25 MG extended release tablet TAKE 1 TABLET BY MOUTH DAILY    blood glucose test strips (FREESTYLE LITE) strip USE AS DIRECTED TWICE A DAY    ammonium lactate (LAC-HYDRIN) 12 % lotion Apply topically daily after bathing sparing the space between the toes.     TRUEPLUS LANCETS 33G MISC TEST AS DIRECTED    Alcohol Swabs (ALCOHOL PREP) 70 % PADS USE AS DIRECTED       Additional Medications (including OTC/Herbal Supplements):  · bee pollen  · Multivitamin made of foods kept in fridge    Allergies: Allergies   Allergen Reactions    Zetia [Ezetimibe] Shortness Of Breath    Bactrim Other (See Comments)     palpitations    Cephalexin     Cephalexin     Sulfamethoxazole-Trimethoprim        Pertinent Labs/Vitals:  BP Readings from Last 3 Encounters:   21 (!) 114/56   21 101/60   21 105/61     Lab Results   Component Value Date    LABMICR CANNOT BE CALCULATED 2020     Lab Results   Component Value Date    LABA1C 6.3 2021    LABA1C 6.7 (H) 2020    LABA1C 5.6 2020     Lab Results   Component Value Date    CHOL 124 2021    TRIG 129 2021    HDL 43 2021    LDLCHOLESTEROL 55 2021     ALT   Date Value Ref Range Status   2021 12 5 - 41 U/L Final     AST   Date Value Ref Range Status   2021 23 <40 U/L Final     The ASCVD Risk score (Gita Thomas, et al., 2013) failed to calculate for the following reasons:     The patient has a prior MI or stroke diagnosis     Lab Results   Component Value Date    CREATININE 1.49 (H) 2021     eGFR: 47 mL/min/1.73 m^2    Social History:   Social History     Tobacco Use    Smoking status: Former Smoker     Packs/day: 1.00     Years: 3.00     Pack years: 3.00     Types: Cigarettes     Start date: 1971     Quit date: 1974     Years since quittin.5    Smokeless tobacco: Never Used   Substance Use Topics    Alcohol use: No     Alcohol/week: 0.0 standard drinks       Immunizations:   Most Recent Immunizations   Administered Date(s) Administered    COVID-19, Pfizer, PF, 30mcg/0.3mL 03/10/2021    Influenza 2013    Influenza Vaccine, unspecified formulation 10/30/2015    Influenza Virus Vaccine 10/03/2017    Influenza Whole 10/01/2016    Influenza, Quadv, IM, (6 mo and older Fluzone, Flulaval, Fluarix and 3 yrs and older Afluria) 10/03/2017    Influenza, Quadv, IM, PF (6 mo and older Fluzone, Flulaval, Fluarix, and 3 yrs and older Afluria) 09/30/2019    Influenza, Quadv, adjuvanted, 65 yrs +, IM, PF (Fluad) 09/25/2020    Pneumococcal Conjugate 13-valent ([id number]) 06/21/2018    Pneumococcal Polysaccharide ([id number]) 09/25/2020    Tdap (Boostrix, Adacel) 06/30/2016    Zoster Live (Zostavax) 04/22/2015    Zoster Recombinant (Shingrix) 08/18/2020     Last Echo:  3/2020:  Left ventricle is enlarged. Global left ventricular systolic function is  severely reduced. Calculated ejection fraction is 13 % by Negron's method. Visually estimated EF 15-20%. [place] akinetic. Definity used to optimize left ventricular systolic function and rule out  thrombus. No thrombus seen via Definity. Normal right ventricular size and function. No significant valvular regurgitation or stenosis seen. No pericardial effusion seen. Heart Failure Questionnaire:   Question Yes No Comments   Have you had a change in weight (>5lbs)? x Is weighing daily- weight now 243#   Are you carrying more water? (For example: Are your shoes fitting the same or tighter? Ankle edema? Do you have any sense of increase in water weight?)  x    Do you have more shortness of breath than usual?  x    Are you using more pillows at night to sleep?  x Only uses 1 pillow and elevated bed about 8 inches   Have you been at all dizzy or have felt like you will faint? (Upon standing?)  x    Have you felt more tired than usual lately? (Examples: while doing housework, grocery shopping, exercise/walking)   Sometimes. Some days more tired than others. Some days feels good   Are you having any problems sleeping?  x    Has your appetite changed recently? (i.e. loss of appetite)   States a lot of times is not hungry and eats twice a day     ASSESSMENT/PLAN:   - General Assessment:   · Adherence: does not use pill box. Does have a pill box. But denies missing doses.    · Cost: denies  · Drug interactions: No clinically significant interactions identified via Jose as category D or higher. · Renal dosing: No renal adjustments necessary. · Smoking status: former  · Blood pressure: Is at BP target. Usually low, does monitor his BP  · Lipids: Patient has a history of ASCVD and is therefore a candidate for high-intensity statin therapy based on updated guidelines. tolerating atorvastatin 40 mg    - Heart Failure:   Acute on chronic systolic heart failure. Ischemic cardiomyopathy with severe left ventricular systolic dysfunction. Presence of ICD. Coronary artery disease with nonrevascularizable left anterior descending artery and severe apical aneurysm. Paroxysmal atrial fibrillation-on anticoagulation. Morbid obesity. Heart attack in 2004  · Consider entresto?- has severely low EF but creatinine increasing and BP low. He states he will see nephrology too and cardio.  Drugs to AVOID: NSAIDs   Following low salt diet: tries to. But admits last 2 admissions prob from over eating salt. Reviewed canned, frozen and process foods should be avoided   Watch fluid intake as well   Is weighing daily reviewed 2.5 gain in a day or 5 in a week    - Diabetes:    Glycemic goal: <7.0% and directed by provider.  Is at blood glucose goal.   Current symptoms/problems: none   Home blood sugar records: fasting range: 100-120   Any episodes of hypoglycemia: no   Current testing supplies/frequency: has supply   Diet/exercise: berries, avoids carbs, has cut way down on bread   Diabetic eye exam- reminded    - Upcoming appointments:   Future Appointments   Date Time Provider Keerthi England   8/13/2021  3:00 PM SCHEDULE, [facility] CLINICAL PHARMACY [facility] Clin Rx None   8/16/2021  2:45 PM Kaleb Hdz MD sv gr lks [facility]   8/23/2021  2:30 PM Mahesh Beaver DO Resp Spec [facility]   8/26/2021  2:00 PM [facility] CLINIC RM 1 [facility]   8/30/2021  1:30 PM LIDIA Rider CNP [facility]   10/27/2021  1:30 PM LIDIA Rider CNP [facility]   12/14/2021  2:10 PM Sadia Jha MD AFL Neph [place] None       Argyle Olszewski, PharmD, [address] [facility] Pharmacist  Department: [phone]  ======================================  For Pharmacy Admin Tracking Only     Gap Closed?: Yes    Time Spent (min): 60

## 2021-08-16 ENCOUNTER — OFFICE VISIT (OUTPATIENT)
Dept: GASTROENTEROLOGY | Age: 66
End: 2021-08-16

## 2021-08-16 VITALS — BODY MASS INDEX: 40.85 KG/M2 | WEIGHT: 238 LBS | SYSTOLIC BLOOD PRESSURE: 81 MMHG | DIASTOLIC BLOOD PRESSURE: 58 MMHG

## 2021-08-16 DIAGNOSIS — Z12.11 COLON CANCER SCREENING: Primary | ICD-10-CM

## 2021-08-16 PROCEDURE — 99999 PR OFFICE/OUTPT VISIT,PROCEDURE ONLY: CPT | Performed by: INTERNAL MEDICINE

## 2021-08-16 ASSESSMENT — ENCOUNTER SYMPTOMS
RESPIRATORY NEGATIVE: 1
EYES NEGATIVE: 1
GASTROINTESTINAL NEGATIVE: 1
ALLERGIC/IMMUNOLOGIC NEGATIVE: 1

## 2021-08-16 NOTE — PROGRESS NOTES
Reason for Referral:  Evaluate for screening colonoscopy      No referring provider defined for this encounter. Chief Complaint   Patient presents with    New Patient     colonoscopy screening- patient denies any other GI problems at this time    Colon Cancer Screening     Patient states having 2 colonoscopies in the past- unsure of who did the procedures but states they were at [facility] (2017 colon with Wendelyn Benge). Patient currently taking eliquis. HISTORY OF PRESENT ILLNESS: Magalie Courtney is a 74 y.o. male with a past history remarkable for A-fib, CHF, CKD, Cardiomyopathy, HTN, HL, CAD s/p PCI 2004, previously on DAPT, on ASA currently, type II DM, metformin, referred for evaluation for screening colonoscopy. Patient denies any subjective weight loss, no overt signs of GI bleeding. Last colonoscopy performed in 2017 where the patient was identified to have an adenomatous polyp. Hypotension and elevated BNP recently. Smoker: none  Drinking history: none  Illicit drugs: none   Abdominal surgeries:none  Prior Colonoscopy: 2017--Dr. Alonzo, small subcentimeter polyps in the ascending colon that was removed endoscopically. Small 2 mm polyp in the transverse colon that could not be relocated. Repeat colonoscopy recommended in 3 years  Prior EGD: None   FH of GI issues: none       Past Medical,Family, and Social History reviewed and does contribute to the patient presentingcondition. Patient's PMH/PSH,SH,PSYCH Hx, MEDs, ALLERGIES, and ROS were all reviewed and updated in the appropriate sections.     PAST MEDICAL HISTORY:  Past Medical History:   Diagnosis Date    Anemia     Anxiety     when he lies flat, no RX    CAD (coronary artery disease)     MI stents x5, follows with cardiology Dr. Jeff Mackey Cardiac defibrillator in place     Cardiomyopathy [facility])     CHF [facility]estive [facility] failure) [facility]     Chronic combined systolic and diastolic heart failure ([facility] s/[ AICD placed 9/25/2011  Chronic kidney disease     Complex sleep apnea syndrome     Diabetic retinopathy ([facility] 9/25/2011    Diverticulitis     DJD (degenerative joint disease) 9/25/2011    Edentulous     Gout     HTN (hypertension) 3/30/2012    Hyperlipidemia     Hypertension     Iron deficiency anemia 9/25/2011    Ischemic cardiomyopathy     Morbid obesity ([facility] 9/25/2011    Obesity     Morbid obesity due to excess calories    TANNER variably compliant with BiPAP 9/25/2011    Osteoarthritis     PAF (paroxysmal atrial fibrillation) (HCC)     Psychophysiologic insomnia     Type II or unspecified type diabetes mellitus without mention of complication, not stated as uncontrolled     Unspecified sleep apnea     uses V-pap       Past Surgical History:   Procedure Laterality Date    BONE MARROW BIOPSY  2012 approx    CARDIAC CATHETERIZATION  8-2007    severe stenosis pre-stent area    CARDIAC CATHETERIZATION  09/11/2013    Very peripheral stenosis, not amendable to PCI, stents patent    CARDIAC DEFIBRILLATOR PLACEMENT  8/26/2015    COLONOSCOPY  11 7 2007   [address]  1998, 2004    stent LAD    PACEMAKER PLACEMENT  2005 or 2006    AICD    RI COLSC FLX W/RMVL OF TUMOR POLYP LESION SNARE TQ N/A 4/4/2017    COLONOSCOPY POLYPECTOMY SNARE/COLD BIOPSY performed by Chava Shaw DO at [facility]       CURRENT MEDICATIONS:    Current Outpatient Medications:     metoprolol succinate (TOPROL XL) 50 MG extended release tablet, Take 50 mg by mouth daily, Disp: , Rfl:     levothyroxine (SYNTHROID) 25 MCG tablet, TAKE ONE-HALF OF A TABLET ONCE A DAY, Disp: 45 tablet, Rfl: 0    metFORMIN (GLUCOPHAGE) 1000 MG tablet, TAKE 1 TABLET BY MOUTH 2 TIMES DAILY (WITH MEALS), Disp: 60 tablet, Rfl: 2    apixaban (ELIQUIS) 5 MG TABS tablet, TAKE 1 TABLET BY MOUTH 2 TIMES DAILY, Disp: 60 tablet, Rfl: 3    atorvastatin (LIPITOR) 40 MG tablet, TAKE 1 TABLET BY MOUTH DAILY, Disp: 30 tablet, Rfl: 5    ferrous sulfate (IRON 325) 325 (65 Fe) MG tablet, Take 1 tablet by mouth daily (with breakfast), Disp: 30 tablet, Rfl: 5    aspirin (ASPIRIN ADULT LOW STRENGTH) 81 MG EC tablet, TAKE 1 TABLET TWICE A DAY, Disp: 60 tablet, Rfl: 3    lisinopril (PRINIVIL;ZESTRIL) 5 MG tablet, TAKE 1 TABLET BY MOUTH DAILY, Disp: 30 tablet, Rfl: 3    isosorbide mononitrate (IMDUR) 30 MG extended release tablet, TAKE 1 TABLET BY MOUTH DAILY, Disp: 30 tablet, Rfl: 3    allopurinol (ZYLOPRIM) 300 MG tablet, TAKE 1 TABLET DAILY, Disp: 30 tablet, Rfl: 5    furosemide (LASIX) 40 MG tablet, TAKE 1/2 TABLET BY MOUTH 2 TIMES A DAY, Disp: 90 tablet, Rfl: 1    Handicap Placard MISC, by Does not apply route Exp: 1/2025, Disp: 1 each, Rfl: 0    Blood Gluc Meter Disp-Strips (BLOOD GLUCOSE METER DISPOSABLE) SHAN, Daily and as needed, Disp: 1 Device, Rfl: 0    blood glucose test strips (FREESTYLE LITE) strip, USE AS DIRECTED TWICE A DAY, Disp: 100 each, Rfl: 11    ammonium lactate (LAC-HYDRIN) 12 % lotion, Apply topically daily after bathing sparing the space between the toes. , Disp: 222 mL, Rfl: 3    TRUEPLUS LANCETS 33G MISC, TEST AS DIRECTED, Disp: 100 each, Rfl: 11    Alcohol Swabs (ALCOHOL PREP) 70 % PADS, USE AS DIRECTED, Disp: 100 each, Rfl: 11    Lancets MISC, Daily, Disp: 100 each, Rfl: 3    blood glucose monitor strips, 1 strip by Other route daily Test 1 times a day & as needed for symptoms of irregular blood glucose., Disp: 300 strip, Rfl: 2    ALLERGIES:   Allergies   Allergen Reactions    Zetia [Ezetimibe] Shortness Of Breath    Bactrim Other (See Comments)     palpitations    Cephalexin     Cephalexin     Sulfamethoxazole-Trimethoprim        FAMILY HISTORY:       Problem Relation Age of Onset    Cancer Mother     Heart Disease Mother         due to smoking    Heart Disease Maternal Uncle     Heart Disease Maternal Grandmother          SOCIAL HISTORY:   Social History     Socioeconomic History    Marital status: Single     Spouse name: Not on file    Number of children: Not on file    Years of education: Not on file    Highest education level: Not on file   Occupational History    Not on file   Tobacco Use    Smoking status: Former Smoker     Packs/day: 1.00     Years: 3.00     Pack years: 3.00     Types: Cigarettes     Start date: 1971     Quit date: 1974     Years since quittin.5    Smokeless tobacco: Never Used   Vaping Use    Vaping Use: Never used   Substance and Sexual Activity    Alcohol use: No     Alcohol/week: 0.0 standard drinks    Drug use: Not Currently     Types: Marijuana     Comment: hx THC quit approx     Sexual activity: Not Currently     Partners: Female   Other Topics Concern    Not on file   Social History Narrative    Not on file     Social Determinants of Health     Financial Resource Strain: Low Risk     Difficulty of Paying Living Expenses: Not hard at all   Food Insecurity: No Food Insecurity    Worried About [address] in the Last Year: Never true    [facility] in the Last Year: Never true   Transportation Needs: No Transportation Needs    Lack of Transportation (Medical): No    Lack of Transportation (Non-Medical): No   Physical Activity:     Days of Exercise per Week:     Minutes of Exercise per Session:    Stress:     Feeling of Stress :    Social Connections:     Frequency of Communication with Friends and Family:     Frequency of Social Gatherings with Friends and Family:     Attends [employer] Services:     Active Member of Clubs or Organizations:     Attends Club or Organization Meetings:     Marital Status:    Intimate Partner Violence:     Fear of Current or Ex-Partner:     Emotionally Abused:     Physically Abused:     Sexually Abused:          REVIEW OF SYSTEMS: A 12-point review of systems was obtained and pertinent positives and negatives were listed below. REVIEW OF SYSTEMS:     Constitutional: No fever, no chills, no lethargy, no weakness.   HEENT: No headache, otalgia, itchy eyes, nasal discharge or sore throat. Cardiac:  No chest pain, dyspnea, orthopnea or PND. Chest:   No cough, phlegm or wheezing. Abdomen:      Detailed by MA   Neuro:  No focal weakness, abnormal movements or seizure like activity. Skin:   No rashes, no itching. :   No hematuria, no pyuria, no dysuria, no flank pain. Extremities:  No swelling or joint pains. ROS was otherwise negative    Review of Systems   Constitutional: Negative. HENT: Negative. Eyes: Negative. Respiratory: Negative. Cardiovascular: Negative. Gastrointestinal: Negative. Endocrine: Negative. Genitourinary: Negative. Musculoskeletal: Negative. Allergic/Immunologic: Negative. Neurological: Positive for dizziness and light-headedness. Hematological: Bruises/bleeds easily. Psychiatric/Behavioral: Negative. All other systems reviewed and are negative. PHYSICAL EXAMINATION: Vital signs reviewed per the nursing documentation. BP (!) 88/55   Wt 238 lb (108 kg)   BMI 40.85 kg/m²   Body mass index is 40.85 kg/m². Physical Exam    Physical Exam   Constitutional: Patient is oriented to person, place, and time. Patient appears well-developed and well-nourished. HENT:   Head: Normocephalic and atraumatic. Eyes: Pupils are equal, round, and reactive to light. EOM are normal.   Neck: Normal range of motion. Neck supple. No JVD present. No tracheal deviation present. No thyromegaly present. Cardiovascular: Normal rate, regular rhythm, normal heart sounds and intact distal pulses. Pulmonary/Chest: Effort normal and breath sounds normal. No stridor. No respiratory distress. He has no wheezes. He has no rales. He exhibits no tenderness. Abdominal: Soft. Bowel sounds are normal. He exhibits no distension and no mass. There is no tenderness. There is no rebound and no guarding. No hernia. Musculoskeletal: Normal range of motion.    Lymphadenopathy:    Patient has no cervical adenopathy. Neurological: Patient is alert and oriented to person, place, and time. Psychiatric: Patient has a normal mood and affect. Patient behavior is normal.       LABORATORY DATA: Reviewed  Lab Results   Component Value Date    WBC 6.4 08/03/2021    HGB 10.8 (L) 08/03/2021    HCT 35.9 (L) 08/03/2021    MCV 92.8 08/03/2021     08/03/2021     08/03/2021    K 4.0 08/03/2021    CL 99 08/03/2021    CO2 26 08/03/2021    BUN 27 (H) 08/03/2021    CREATININE 1.49 (H) 08/03/2021    LABPROT 6.7 12/03/2012    LABALBU 3.2 (L) 06/25/2021    BILITOT 0.37 06/22/2021    ALKPHOS 81 06/22/2021    AST 16 08/13/2021    ALT 10 08/13/2021    INR 1.0 09/01/2019         Lab Results   Component Value Date    RBC 3.87 (L) 08/03/2021    HGB 10.8 (L) 08/03/2021    MCV 92.8 08/03/2021    MCH 27.9 08/03/2021    MCHC 30.1 08/03/2021    RDW 17.2 (H) 08/03/2021    MPV 11.1 08/03/2021    BASOPCT 1 08/03/2021    LYMPHSABS 1.47 08/03/2021    MONOSABS 0.43 08/03/2021    NEUTROABS 4.18 08/03/2021    EOSABS 0.25 08/03/2021    BASOSABS 0.03 08/03/2021         DIAGNOSTIC TESTING:     XR CHEST PORTABLE    Result Date: 8/2/2021  EXAMINATION: ONE XRAY VIEW OF THE CHEST 8/2/2021 7:20 am COMPARISON: 06/24/2021 HISTORY: ORDERING SYSTEM PROVIDED HISTORY: CHF, SOB Reason for Exam: portable, upright FINDINGS: Stable cardiomegaly. Interstitial prominence most pronounced in the right lower lung field. No sizable effusion or pneumothorax. Left-sided ICD remains in place. Osseous structures grossly intact. Stable cardiomegaly with interstitial prominence most pronounced in the right lower lung field suggesting vascular congestion/edema. IMPRESSION:  Blanchie Roof is a 74 y.o. male with a past history remarkable for A-fib, CHF, CKD, Cardiomyopathy, HTN, HL, CAD s/p PCI 2004, previously on DAPT, on ASA currently, type II DM, metformin, referred for evaluation for screening colonoscopy.   Patient denies any subjective weight loss, no overt signs of GI bleeding. Last colonoscopy performed in 2017 where the patient was identified to have an adenomatous polyp. Assessment  1. Colon cancer screening        PLAN:    1) Screening colonoscopy- Will need clearance from cardiology to hold Eliquis 3-4 days prior to procedure. ASA to be held for 24 hrs. High risk case, EF extremely low around 13 % based on most recent echocardiogram. Recent discharge for systolic HF/CHF exacerbation. Will obtain stool occult testing to risk stratify. Planning for colonoscopy depending on stool test.    2) Denies any blood in the stool. No active GI symptoms at this time. 3) Slightly hypotension today-- elevated BNP recently. Needs cardiac optimization and discussion with PCP with regards to BP medications      Thank you for allowing me to participate in the care of Mr. Cristo Merrill. For any further questions please do not hesitate to contact me. I have reviewed and agree with the MA/LPN ROS please refer to their documentation from today's encounter on a separate note. Oswaldo Means MD, MPH   [facility]  Office #: [phone]          this note is created with the assistance of a speech recognition program.  While intending to generate a document that actually reflects the content of the visit, the document can still have some errors including those of syntax and sound a like substitutions which may escape proof reading. It such instances, actual meaning can be extrapolated by contextual diversion.

## 2021-08-19 ENCOUNTER — CARE COORDINATION (OUTPATIENT)
Dept: CASE MANAGEMENT | Age: 66
End: 2021-08-19

## 2021-08-20 ENCOUNTER — CARE COORDINATION (OUTPATIENT)
Dept: CASE MANAGEMENT | Age: 66
End: 2021-08-20

## 2021-08-20 DIAGNOSIS — Z12.11 COLON CANCER SCREENING: ICD-10-CM

## 2021-08-20 LAB
CONTROL: PRESENT
HEMOCCULT STL QL: POSITIVE

## 2021-08-20 PROCEDURE — 82274 ASSAY TEST FOR BLOOD FECAL: CPT | Performed by: INTERNAL MEDICINE

## 2021-08-23 ENCOUNTER — OFFICE VISIT (OUTPATIENT)
Dept: PULMONOLOGY | Age: 66
End: 2021-08-23
Payer: COMMERCIAL

## 2021-08-23 VITALS
BODY MASS INDEX: 39.95 KG/M2 | WEIGHT: 234 LBS | SYSTOLIC BLOOD PRESSURE: 124 MMHG | TEMPERATURE: 97.4 F | HEART RATE: 63 BPM | DIASTOLIC BLOOD PRESSURE: 68 MMHG | HEIGHT: 64 IN | OXYGEN SATURATION: 98 % | RESPIRATION RATE: 16 BRPM

## 2021-08-23 DIAGNOSIS — G47.31 COMPLEX SLEEP APNEA SYNDROME: ICD-10-CM

## 2021-08-23 DIAGNOSIS — E66.01 MORBID OBESITY DUE TO EXCESS CALORIES (HCC): ICD-10-CM

## 2021-08-23 DIAGNOSIS — E11.22 TYPE 2 DIABETES MELLITUS WITH DIABETIC CHRONIC KIDNEY DISEASE, UNSPECIFIED CKD STAGE, UNSPECIFIED WHETHER LONG TERM INSULIN USE (HCC): ICD-10-CM

## 2021-08-23 DIAGNOSIS — G47.33 OSA ON CPAP: Primary | ICD-10-CM

## 2021-08-23 DIAGNOSIS — E11.9 TYPE 2 DIABETES MELLITUS WITHOUT COMPLICATION, WITHOUT LONG-TERM CURRENT USE OF INSULIN (HCC): ICD-10-CM

## 2021-08-23 DIAGNOSIS — I50.22 CHRONIC SYSTOLIC CONGESTIVE HEART FAILURE (HCC): ICD-10-CM

## 2021-08-23 DIAGNOSIS — I48.0 PAF (PAROXYSMAL ATRIAL FIBRILLATION) (HCC): ICD-10-CM

## 2021-08-23 DIAGNOSIS — Z99.89 OSA ON CPAP: Primary | ICD-10-CM

## 2021-08-23 DIAGNOSIS — F51.04 PSYCHOPHYSIOLOGIC INSOMNIA: ICD-10-CM

## 2021-08-23 PROCEDURE — 99213 OFFICE O/P EST LOW 20 MIN: CPT | Performed by: INTERNAL MEDICINE

## 2021-08-23 RX ORDER — SACUBITRIL AND VALSARTAN 24; 26 MG/1; MG/1
TABLET, FILM COATED ORAL
COMMUNITY
Start: 2021-08-20

## 2021-08-23 RX ORDER — AMIODARONE HYDROCHLORIDE 100 MG/1
TABLET ORAL
COMMUNITY
End: 2021-08-26 | Stop reason: ALTCHOICE

## 2021-08-23 ASSESSMENT — ENCOUNTER SYMPTOMS
BACK PAIN: 1
SHORTNESS OF BREATH: 1
CHEST TIGHTNESS: 1
EYES NEGATIVE: 1

## 2021-08-23 ASSESSMENT — SLEEP AND FATIGUE QUESTIONNAIRES
HOW LIKELY ARE YOU TO NOD OFF OR FALL ASLEEP WHILE SITTING INACTIVE IN A PUBLIC PLACE: 1
ESS TOTAL SCORE: 12
HOW LIKELY ARE YOU TO NOD OFF OR FALL ASLEEP WHILE WATCHING TV: 2
HOW LIKELY ARE YOU TO NOD OFF OR FALL ASLEEP WHILE SITTING AND TALKING TO SOMEONE: 0
HOW LIKELY ARE YOU TO NOD OFF OR FALL ASLEEP WHILE LYING DOWN TO REST IN THE AFTERNOON WHEN CIRCUMSTANCES PERMIT: 3
HOW LIKELY ARE YOU TO NOD OFF OR FALL ASLEEP WHEN YOU ARE A PASSENGER IN A CAR FOR AN HOUR WITHOUT A BREAK: 0
HOW LIKELY ARE YOU TO NOD OFF OR FALL ASLEEP IN A CAR, WHILE STOPPED FOR A FEW MINUTES IN TRAFFIC: 0
HOW LIKELY ARE YOU TO NOD OFF OR FALL ASLEEP WHILE SITTING QUIETLY AFTER LUNCH WITHOUT ALCOHOL: 3
HOW LIKELY ARE YOU TO NOD OFF OR FALL ASLEEP WHILE SITTING AND READING: 3

## 2021-08-23 NOTE — PROGRESS NOTES
Subjective:      Patient ID: He Daigle is a 74 y.o. male being seen in my clinic for   Chief Complaint   Patient presents with    Sleep Apnea     pt have concerns with breathing issues and heart racing when he have to walk       HPI  Follow-up visit for sleep apnea. Since his last visit a year ago he states that he has had several hospitalizations. Ostensibly, for congestive heart failure. Most recent hospitalization occurred a week ago. Chest x-ray reviewed. Looks like pulmonary edema. Patient states that his cardiologist started him on Entresto. Patient states that his cardiologist told him he has pulmonary fibrosis. I carefully reviewed his previous chest x-rays as well as a CT scan of the chest done last fall. Specifically, I see no evidence for pulmonary fibrosis. Patient is variably compliant with BiPAP. \"I am trying to use it more. \"  Will not share however how much she is using it. No compliance download available. Patient has received both of his Covid vaccinations. Review of Systems   Constitutional: Negative. HENT: Negative. Eyes: Negative. Respiratory: Positive for chest tightness and shortness of breath. Cardiovascular: Positive for chest pain and palpitations. Musculoskeletal: Positive for back pain and gait problem. Psychiatric/Behavioral: The patient is nervous/anxious. All other systems reviewed and are negative.       Objective:     Vitals:    08/23/21 1439   BP: 124/68   Site: Left Upper Arm   Position: Sitting   Cuff Size: Medium Adult   Pulse: 63   Resp: 16   Temp: 97.4 °F (36.3 °C)   TempSrc: Temporal   SpO2: 98%  Comment: demetrius   Weight: 234 lb (106.1 kg)   Height: 5' 4\" (1.626 m)     Current Outpatient Medications   Medication Sig Dispense Refill    amiodarone (PACERONE) 100 MG tablet Take by mouth      ENTRESTO 24-26 MG per tablet       metoprolol succinate (TOPROL XL) 50 MG extended release tablet Take 50 mg by mouth daily      levothyroxine (SYNTHROID) 25 MCG tablet TAKE ONE-HALF OF A TABLET ONCE A DAY 45 tablet 0    metFORMIN (GLUCOPHAGE) 1000 MG tablet TAKE 1 TABLET BY MOUTH 2 TIMES DAILY (WITH MEALS) 60 tablet 2    apixaban (ELIQUIS) 5 MG TABS tablet TAKE 1 TABLET BY MOUTH 2 TIMES DAILY 60 tablet 3    atorvastatin (LIPITOR) 40 MG tablet TAKE 1 TABLET BY MOUTH DAILY 30 tablet 5    ferrous sulfate (IRON 325) 325 (65 Fe) MG tablet Take 1 tablet by mouth daily (with breakfast) 30 tablet 5    aspirin (ASPIRIN ADULT LOW STRENGTH) 81 MG EC tablet TAKE 1 TABLET TWICE A DAY 60 tablet 3    isosorbide mononitrate (IMDUR) 30 MG extended release tablet TAKE 1 TABLET BY MOUTH DAILY 30 tablet 3    allopurinol (ZYLOPRIM) 300 MG tablet TAKE 1 TABLET DAILY 30 tablet 5    furosemide (LASIX) 40 MG tablet TAKE 1/2 TABLET BY MOUTH 2 TIMES A DAY 90 tablet 1    Handicap Placard MISC by Does not apply route Exp: 1/2025 1 each 0    Blood Gluc Meter Disp-Strips (BLOOD GLUCOSE METER DISPOSABLE) SHAN Daily and as needed 1 Device 0    blood glucose test strips (FREESTYLE LITE) strip USE AS DIRECTED TWICE A  each 11    ammonium lactate (LAC-HYDRIN) 12 % lotion Apply topically daily after bathing sparing the space between the toes. 222 mL 3    TRUEPLUS LANCETS 33G MISC TEST AS DIRECTED 100 each 11    Alcohol Swabs (ALCOHOL PREP) 70 % PADS USE AS DIRECTED 100 each 11    lisinopril (PRINIVIL;ZESTRIL) 5 MG tablet TAKE 1 TABLET BY MOUTH DAILY 30 tablet 3    Lancets MISC Daily 100 each 3    blood glucose monitor strips 1 strip by Other route daily Test 1 times a day & as needed for symptoms of irregular blood glucose. 300 strip 2     No current facility-administered medications for this visit. Physical Exam  Vitals and nursing note reviewed. Constitutional:       Appearance: He is well-developed. He is obese. HENT:      Mouth/Throat:      Mouth: Mucous membranes are moist.      Pharynx: Oropharynx is clear. No oropharyngeal exudate.       Comments: Large tongue , low hanging soft palate and large uvula. Overall pharyngeal orifice moderately decreased    Eyes:      General: No scleral icterus. Conjunctiva/sclera: Conjunctivae normal.   Neck:      Thyroid: No thyromegaly. Vascular: No JVD. Trachea: No tracheal deviation. Cardiovascular:      Rate and Rhythm: Normal rate and regular rhythm. Heart sounds: Normal heart sounds. No murmur heard. No gallop. Comments: Heart sounds are distant. Pulmonary:      Effort: Pulmonary effort is normal. No respiratory distress. Breath sounds: No wheezing or rales. Chest:      Chest wall: No tenderness. Abdominal:      Palpations: Abdomen is soft. Tenderness: There is no abdominal tenderness. Musculoskeletal:      Cervical back: Neck supple. Right lower leg: No edema. Left lower leg: No edema. Lymphadenopathy:      Cervical: No cervical adenopathy. Skin:     General: Skin is warm and dry. Neurological:      Mental Status: He is alert and oriented to person, place, and time. Wt Readings from Last 3 Encounters:   08/23/21 234 lb (106.1 kg)   08/16/21 238 lb (108 kg)   08/03/21 250 lb 10.6 oz (113.7 kg)     Results for orders placed or performed in visit on 08/20/21   POCT Fecal Immunochemical Test (FIT)   Result Value Ref Range    OCCULT BLOOD FECAL Positive     Control Present        :      1. TANNER variably compliant with BiPAP    2. Complex sleep apnea syndrome    3. Chronic systolic congestive heart failure ([address]    4. Psychophysiologic insomnia    5. Morbid obesity due to excess calories ([address]    6. PAF (paroxysmal atrial fibrillation) ([address]    7. Type 2 diabetes mellitus without complication, without long-term current use of insulin ([address]    8.  Type 2 diabetes mellitus with diabetic chronic kidney disease, unspecified CKD stage, unspecified whether long term insulin use ([address]      Patient Active Problem List   Diagnosis    Acute on chronic combined systolic and diastolic congestive heart failure (HCC)    Chronic kidney disease, stage II (mild)    Chronic gout    Morbid obesity (HCC)    Normocytic normochromic anemia    Hyperlipidemia    Iron deficiency anemia    Anxiety disorder     DJD (degenerative joint disease)    TANNER variably compliant with BiPAP    CAD (coronary artery disease) s/p stenting of L anterior descending artery 2004    Diabetic retinopathy ([facility]    Ischemic cardiomyopathy    HTN (hypertension)    NSTEMI (non-ST elevated myocardial infarction) [facility]    MGUS (monoclonal gammopathy of unknown significance)    Type 2 diabetes mellitus with chronic kidney disease ([facility])    AICD (automatic cardioverter/defibrillator) present    PAF (paroxysmal atrial fibrillation) ([facility]    Acute respiratory failure with hypoxia ([facility])    Coronary angioplasty status    Hypokalemia    Acute on chronic systolic heart failure ([facility])    Acute on chronic congestive heart failure ([facility])    Acute HFrEF (heart failure with reduced ejection fraction) ([facility]    Hypothyroidism due to medication    Acute cardiac pulmonary edema ([facility] [address]         Plan:      1. Encourage BiPAP use   2. Avoid sedative hypnotics and alcohol at bedtime  3. Weight loss  4. Encourage cautions when driving and other high risk activities of not adequately treated for sleep apnea  5. Instructed to bring CPAP machine for use post op  6. Reassured patient that he does not have evidence for pulmonary fibrosis. 7. Follow-up with cardiologist regarding heart failure. 8. Flu shot in fall. 9. Return in 1 year. No orders of the defined types were placed in this encounter. No orders of the defined types were placed in this encounter. Return in about 1 year (around 8/23/2022).        Electronically signed by Yasmany Jenkins DO on 8/23/2021at 4:44 PM

## 2021-08-26 ENCOUNTER — HOSPITAL ENCOUNTER (OUTPATIENT)
Dept: OTHER | Age: 66
Discharge: HOME | End: 2021-08-26
Payer: COMMERCIAL

## 2021-08-26 ENCOUNTER — CARE COORDINATION (OUTPATIENT)
Dept: CARE COORDINATION | Age: 66
End: 2021-08-26

## 2021-08-26 ENCOUNTER — CARE COORDINATION (OUTPATIENT)
Dept: CASE MANAGEMENT | Age: 66
End: 2021-08-26

## 2021-08-26 VITALS
BODY MASS INDEX: 40.13 KG/M2 | RESPIRATION RATE: 20 BRPM | WEIGHT: 233.8 LBS | OXYGEN SATURATION: 98 % | DIASTOLIC BLOOD PRESSURE: 62 MMHG | SYSTOLIC BLOOD PRESSURE: 102 MMHG | HEART RATE: 68 BPM

## 2021-08-26 DIAGNOSIS — Z99.89 OSA ON CPAP: ICD-10-CM

## 2021-08-26 DIAGNOSIS — I25.5 ISCHEMIC CARDIOMYOPATHY: ICD-10-CM

## 2021-08-26 DIAGNOSIS — I50.23 ACUTE ON CHRONIC SYSTOLIC HEART FAILURE (HCC): Primary | ICD-10-CM

## 2021-08-26 DIAGNOSIS — G47.33 OSA ON CPAP: ICD-10-CM

## 2021-08-26 PROBLEM — I50.22 CHRONIC SYSTOLIC (CONGESTIVE) HEART FAILURE (HCC): Status: ACTIVE | Noted: 2020-03-19

## 2021-08-26 PROCEDURE — 99212 OFFICE O/P EST SF 10 MIN: CPT

## 2021-08-26 PROCEDURE — 99214 OFFICE O/P EST MOD 30 MIN: CPT | Performed by: NURSE PRACTITIONER

## 2021-08-26 ASSESSMENT — ENCOUNTER SYMPTOMS
COUGH: 0
EYE DISCHARGE: 0
BLOOD IN STOOL: 0
SHORTNESS OF BREATH: 1
ABDOMINAL PAIN: 0

## 2021-08-26 NOTE — PROGRESS NOTES
CHF Clinic at [facility]    Office: [phone] Fax: [address] [facility]  [address] [id number]  Dept: [phone]  Loc: [phone]    Elizabeth Ash is a 74 y.o. male who presents today for CHF evaluation. HPI:     + shortness of breath, with any activity , this has improved since last admit. States he recovers easily. Has been walking slower and taking things easier.     +   Fatigue, improved since admit   Denies Edema   Denies chest pain   Denies  palpitations , feels his heart beats harder  Denies  orthopnea , HOB is elevated   Pt was started on entresto    Typically sbp is over 100s. Today, 97/68  Next appt next thurs for entresto f/u . Pt returns to  [facility] after being seen here many yrs ago.              Past Medical History:   Diagnosis Date    Anemia     Anxiety     when he lies flat, no RX    CAD (coronary artery disease)     MI stents x5, follows with cardiology Dr. Gibson Ripa Cardiac defibrillator in place     Cardiomyopathy [facility])     CHF (congestive heart failure) ([address]     Chronic combined systolic and diastolic heart failure ([address] s/[ AICD placed 9/25/2011    Chronic kidney disease     Complex sleep apnea syndrome     Diabetic retinopathy ([address] 9/25/2011    Diverticulitis     DJD (degenerative joint disease) 9/25/2011    Edentulous     Gout     HTN (hypertension) 3/30/2012    Hyperlipidemia     Hypertension     Iron deficiency anemia 9/25/2011    Ischemic cardiomyopathy     Morbid obesity ([address] 9/25/2011    Obesity     Morbid obesity due to excess calories    TANNER variably compliant with BiPAP 9/25/2011    Osteoarthritis     PAF (paroxysmal atrial fibrillation) (HCC)     Psychophysiologic insomnia     Type II or unspecified type diabetes mellitus without mention of complication, not stated as uncontrolled     Unspecified sleep apnea uses V-pap     Past Surgical History:   Procedure Laterality Date    BONE MARROW BIOPSY  2012 approx    CARDIAC CATHETERIZATION  8-    severe stenosis pre-stent area    CARDIAC CATHETERIZATION  2013    Very peripheral stenosis, not amendable to PCI, stents patent    CARDIAC DEFIBRILLATOR PLACEMENT  2015    COLONOSCOPY  11 7 2007   [address]  ,     stent LAD    PACEMAKER PLACEMENT  2005 or 2006    AICD    VT COLSC FLX W/RMVL OF TUMOR POLYP LESION SNARE TQ N/A 2017    COLONOSCOPY POLYPECTOMY SNARE/COLD BIOPSY performed by Dimitri Sheets DO at [facility]       Family History   Problem Relation Age of Onset    Cancer Mother     Heart Disease Mother         due to smoking    Heart Disease Maternal Uncle     Heart Disease Maternal Grandmother        Social History     Tobacco Use    Smoking status: Former Smoker     Packs/day: 1.00     Years: 3.00     Pack years: 3.00     Types: Cigarettes     Start date: 1971     Quit date: 1974     Years since quittin.6    Smokeless tobacco: Never Used   Substance Use Topics    Alcohol use: No     Alcohol/week: 0.0 standard drinks      Current Outpatient Medications   Medication Sig Dispense Refill    ENTRESTO 24-26 MG per tablet       metoprolol succinate (TOPROL XL) 50 MG extended release tablet Take 50 mg by mouth daily      levothyroxine (SYNTHROID) 25 MCG tablet TAKE ONE-HALF OF A TABLET ONCE A DAY 45 tablet 0    metFORMIN (GLUCOPHAGE) 1000 MG tablet TAKE 1 TABLET BY MOUTH 2 TIMES DAILY (WITH MEALS) 60 tablet 2    apixaban (ELIQUIS) 5 MG TABS tablet TAKE 1 TABLET BY MOUTH 2 TIMES DAILY 60 tablet 3    atorvastatin (LIPITOR) 40 MG tablet TAKE 1 TABLET BY MOUTH DAILY 30 tablet 5    ferrous sulfate (IRON 325) 325 (65 Fe) MG tablet Take 1 tablet by mouth daily (with breakfast) 30 tablet 5    aspirin (ASPIRIN ADULT LOW STRENGTH) 81 MG EC tablet TAKE 1 TABLET TWICE A DAY 60 tablet 3    isosorbide mononitrate (IMDUR) 30 MG extended release tablet TAKE 1 TABLET BY MOUTH DAILY 30 tablet 3    allopurinol (ZYLOPRIM) 300 MG tablet TAKE 1 TABLET DAILY 30 tablet 5    furosemide (LASIX) 40 MG tablet TAKE 1/2 TABLET BY MOUTH 2 TIMES A DAY 90 tablet 1    Handicap Placard MISC by Does not apply route Exp: 1/2025 1 each 0    Blood Gluc Meter Disp-Strips (BLOOD GLUCOSE METER DISPOSABLE) SHAN Daily and as needed 1 Device 0    blood glucose monitor strips 1 strip by Other route daily Test 1 times a day & as needed for symptoms of irregular blood glucose. 300 strip 2    blood glucose test strips (FREESTYLE LITE) strip USE AS DIRECTED TWICE A  each 11    ammonium lactate (LAC-HYDRIN) 12 % lotion Apply topically daily after bathing sparing the space between the toes. 222 mL 3    TRUEPLUS LANCETS 33G MISC TEST AS DIRECTED 100 each 11    Alcohol Swabs (ALCOHOL PREP) 70 % PADS USE AS DIRECTED 100 each 11    Lancets MISC Daily 100 each 3     No current facility-administered medications for this encounter. Allergies   Allergen Reactions    Zetia [Ezetimibe] Shortness Of Breath    Bactrim Other (See Comments)     palpitations    Cephalexin     Cephalexin     Sulfamethoxazole-Trimethoprim          Subjective:      Review of Systems   Constitutional: Positive for fatigue. Negative for activity change, chills and fever. Eyes: Negative for discharge and visual disturbance. Respiratory: Positive for shortness of breath. Negative for cough. Cardiovascular: Negative for chest pain, palpitations and leg swelling. Gastrointestinal: Negative for abdominal pain and blood in stool. Endocrine: Negative for cold intolerance and heat intolerance. Genitourinary: Negative for dysuria and flank pain. Musculoskeletal: Negative for joint swelling and myalgias. Skin: Negative for pallor and rash. Neurological: Positive for dizziness (rare). Negative for headaches.    Psychiatric/Behavioral: Negative for hallucinations and suicidal ideas. Objective:     Physical Exam  Vitals and nursing note reviewed. Constitutional:       Appearance: He is obese. Comments: Pt is unkempt. Large abdominal  pannus   HENT:      Head: Normocephalic and atraumatic. Eyes:      General: No scleral icterus. Conjunctiva/sclera: Conjunctivae normal.   Cardiovascular:      Rate and Rhythm: Normal rate and regular rhythm. Heart sounds: Normal heart sounds. Comments: AICD noted in L chest wall    Pulmonary:      Effort: Pulmonary effort is normal.      Breath sounds: Normal breath sounds. No wheezing or rales. Abdominal:      General: Bowel sounds are normal.      Palpations: Abdomen is soft. Musculoskeletal:         General: Normal range of motion. Cervical back: Normal range of motion. Right lower leg: No edema. Left lower leg: No edema. Skin:     General: Skin is warm and dry. Comments: multiple scabbed over wounds on L forearm. Neurological:      Mental Status: He is alert and oriented to person, place, and time. /62   Pulse 68   Resp 20   Wt 233 lb 12.8 oz (106.1 kg)   SpO2 98%   BMI 40.13 kg/m²   O2 Device: None (Room air)          Echo CONCLUSIONS     Summary  Left ventricle is enlarged. Global left ventricular systolic function is  severely reduced. Calculated ejection fraction is 13 % by Negron's method. Visually estimated EF 15-20%. Schofield akinetic. Definity used to optimize left ventricular systolic function and rule out  thrombus. No thrombus seen via Definity. Normal right ventricular size and function. No significant valvular regurgitation or stenosis seen. No pericardial effusion seen.     Signature  ----------------------------------------------------------------------------   Electronically signed by Arielle Nichols(Sonographer) on 03/20/2020      Lower Extremity Measurements in cm.    R Calf Circumference (cm): 33 cm  L Calf Circumference (cm): 34 cm  R Ankle Circumference (cm): 20 cm  L Ankle Circumference (cm): 20 cm    CBC:   Lab Results   Component Value Date    WBC 6.4 08/03/2021    RBC 3.87 08/03/2021    RBC 4.85 12/08/2011    HGB 10.8 08/03/2021    HCT 35.9 08/03/2021    MCV 92.8 08/03/2021    MCH 27.9 08/03/2021    MCHC 30.1 08/03/2021    RDW 17.2 08/03/2021     08/03/2021     12/08/2011    MPV 11.1 08/03/2021     CMP:    Lab Results   Component Value Date     08/03/2021    K 4.0 08/03/2021    CL 99 08/03/2021    CO2 26 08/03/2021    BUN 27 08/03/2021    CREATININE 1.49 08/03/2021    GFRAA 57 08/03/2021    LABGLOM 47 08/03/2021    GLUCOSE 108 08/03/2021    GLUCOSE 123 03/19/2012    PROT 7.7 06/22/2021    LABALBU 3.2 06/25/2021    LABALBU 4.1 11/11/2011    CALCIUM 8.2 08/03/2021    BILITOT 0.37 06/22/2021    ALKPHOS 81 06/22/2021    AST 16 08/13/2021    ALT 10 08/13/2021     Lab Results   Component Value Date    LABA1C 6.3 04/26/2021           :Assessment      1. Acute on chronic systolic heart failure [address]    2. Ischemic cardiomyopathy    3. TANNER variably compliant with BiPAP        :Plan      1. Acute on chronic systolic heart failure ([facility]    2. Ischemic cardiomyopathy    3. TANNER variably compliant with BiPAP        Wt is down 30 lbs since the last time pt was seen here. On Guideline-Directed Medication Therapy:     ACEI / ARB / ARNi: Entresto 24/26 mg b.i.d. Beta - blocker  Diuretic Therapy  Vasodilator     The current medical regimen is effective;  continue present plan and medications. Keep appt with dr Mihai Vieyra in one week. Have labs done after entresto start. His last bun/Cr were 27/1.49    Will watch pt closely per labs   Advised to continue to use cpap machine     RTC 1 mo. No orders of the defined types were placed in this encounter. No orders of the defined types were placed in this encounter. Patientgiven verbal and/or written educational instructions. Follow up as directed.   I have reviewed and agree with the nursing documentation. Verbally reviewed medication list with patient; patient verbalized understanding. Discussed 2000mg/day sodium restricted diet; patient verbalized understanding. Moderate daily exercise encouraged as tolerated. Discussed rest breaks as needed; patient verbalized understanding. Patient instructed to weigh self at the same time of each day, using same clothes and same scale; reinforced teaching to monitor for 3-5 lb weight increase over 1-2 days, and to notify the CHF clinic at [phone] or physician office if weight change noted. Patient verbalized understanding. Risks of smoking discussed with the patient if applicable; patient strongly discouraged to smoke. Patient verbalized understanding. Signs and symptoms of CHF discussed with patient, such as feeling more tired than normal, feeling short of breath, coughing that increases when you lie down, sudden weight gain, swelling of your feet, legs or belly. Patient verbalized understanding to notify the CHF clinic at [phone] or physician office if these symptoms occur. Compliance with plan of care and further disease process causes discussed with patient, patient encouraged to keep all follow up appointments. Patient verbalized understanding. Echocardiogram reviewed. Labs reviewed. Medications reviewed.       Electronically signedby LIDIA Griffith CNP on 8/26/2021 at 2:42 PM

## 2021-08-26 NOTE — CARE COORDINATION
Reg[facility] Transitions Follow Up Call    2021    Patient: Darby Cantu  Patient : 1955   MRN: [id number]  Reason for Admission: CHF  Discharge Date: 8/3/21 RARS: Readmission Risk Score: 23         Spoke with: Darby Cantu    Was able to contact Elaine Rios for transitional outreach. He stated that he was doing 'relatively good\". Informed him that he had an appointment in 15 minutes with the CHF clinic and asked if he could make it. He said that he thought it was at 2:30. Pt agreeable to have a call tomorrow. Care Transitions Subsequent and Final Call    Subsequent and Final Calls  Do you have any ongoing symptoms?: No  Have your medications changed?: No  Do you have any questions related to your medications?: No  Do you currently have any active services?: Yes  Are you currently active with any services?: Outpatient/Community Services  Do you have any needs or concerns that I can assist you with?: No  Care Transitions Interventions    Pharmacist: Completed      Registered Dietician: Completed    Other Interventions:            Follow Up  Future Appointments   Date Time Provider Keerthi England   2021  2:00 PM [facility] CHF CLINIC RM 1 [facility] CHF CLI [facility]   2021  1:30 PM Mortimer Singleton, APRN - CNP [facility]   10/25/2021  2:00 PM Mayda Rahman MD sv gr lks [facility]   10/27/2021  1:30 PM Mortimer Singleton, APRN - CNP [facility]   2021  2:10 PM Gary Cardenas MD AFL [facility] None   2022  1:15 PM Cat Dela Cruz DO Resp Spec Timbo Lovell RN

## 2021-08-27 ENCOUNTER — CARE COORDINATION (OUTPATIENT)
Dept: CASE MANAGEMENT | Age: 66
End: 2021-08-27

## 2021-08-27 NOTE — CARE COORDINATION
[facility]or[facility] Transitions Follow Up Call    2021    Patient: Rickie Manzanares  Patient : 1955   MRN: [id number]  Reason for Admission: CHF  Discharge Date: 8/3/21 RARS: Readmission Risk Score: 23         Spoke with: Rickie Manzanares    Was able to contact Zhanna Carter for transitional outreach. He stated that he was doing \"good\". He said that he went and saw the GI and he said that he needs to have a colonoscopy, but the GI physician was concerned about his low blood pressure. He also saw his cardiologist, Dr Mary Mejia and he discontinued the lisinopril and started him on Entrestro and never restarted the Amiodarone. He stated that he has not had any palpations since being off the amiodarone. He also said that Dr Mary Mejia discussed his heart disease extensively and his prognosis. He did make the CHF clinic appointment. He also saw his pulmonologist who suggested that he may benefit from Cardiac rehab also. He will be seeing PCP on Monday and cardio again next week. He was also receptive to having a ACM referral and writer will send today. He had no further questions or concerns at this time. Care Transitions Follow Up Call    Needs to be reviewed by the provider   Additional needs identified to be addressed with provider: No  none             Method of communication with provider : none      Care Transition Nurse (CTN) contacted the patient by telephone to follow up after admission on 21. Verified name and  with patient as identifiers. Addressed changes since last contact: none  Discussed follow-up appointments. If no appointment was previously scheduled, appointment scheduling offered: No.   Is follow up appointment scheduled within 7 days of discharge? No and office could not schedule earlier. CTN reviewed discharge instructions, medical action plan and red flags with patient and discussed any barriers to care and/or understanding of plan of care after discharge.  Discussed appropriate site of care based on symptoms and resources available to patient including: PCP, Specialist and When to call 911. The patient agrees to contact the PCP office for questions related to their healthcare. Patients top risk factors for readmission: medical condition-CHF  Interventions to address risk factors: Obtained and reviewed discharge summary and/or continuity of care documents      Non-[facility] follow up appointment(s):     CTN provided contact information for future needs. Plan for follow-up call in 10-14 days based on severity of symptoms and risk factors. Plan for next call: referral to ambulatory care manager-refferal sent today and final call            Care Transitions Subsequent and Final Call    Subsequent and Final Calls  Do you have any ongoing symptoms?: No  Have your medications changed?: Yes  Patient Reports: stopped amiodarone, lisinopril  started Entrestro  Do you have any questions related to your medications?: No  Do you currently have any active services?: Yes  Are you currently active with any services?: Outpatient/Community Services  Do you have any needs or concerns that I can assist you with?: No  Care Transitions Interventions    Pharmacist: Completed      Registered Dietician: Completed    Other Interventions:            Follow Up  Future Appointments   Date Time Provider Keerthi England   8/30/2021  1:30 PM LIDIA العلي CNP [facility]   9/23/2021  2:00 PM [facility] CHF CLINIC RM 1 [facility] CHF CLI [facility]   10/25/2021  2:00 [facility]   10/27/2021  1:30 PM LIDIA العلي CNP [facility]   12/14/2021  2:10 PM Ioana Dorado MD [facility] None   8/29/2022  1:15 PM Jacque Vicente DO Resp Spec Joseph Calvo RN

## 2021-08-30 ENCOUNTER — HOSPITAL ENCOUNTER (OUTPATIENT)
Age: 66
Discharge: HOME | End: 2021-08-30
Payer: COMMERCIAL

## 2021-08-30 ENCOUNTER — OFFICE VISIT (OUTPATIENT)
Dept: PRIMARY CARE CLINIC | Age: 66
End: 2021-08-30
Payer: COMMERCIAL

## 2021-08-30 ENCOUNTER — TELEPHONE (OUTPATIENT)
Dept: PRIMARY CARE CLINIC | Age: 66
End: 2021-08-30

## 2021-08-30 VITALS
HEART RATE: 58 BPM | WEIGHT: 234 LBS | TEMPERATURE: 97.7 F | DIASTOLIC BLOOD PRESSURE: 67 MMHG | SYSTOLIC BLOOD PRESSURE: 105 MMHG | BODY MASS INDEX: 40.17 KG/M2 | OXYGEN SATURATION: 95 %

## 2021-08-30 DIAGNOSIS — E03.2 HYPOTHYROIDISM DUE TO MEDICATION: ICD-10-CM

## 2021-08-30 DIAGNOSIS — R19.5 POSITIVE FIT (FECAL IMMUNOCHEMICAL TEST): ICD-10-CM

## 2021-08-30 DIAGNOSIS — I50.42 CHRONIC COMBINED SYSTOLIC AND DIASTOLIC HEART FAILURE (HCC): ICD-10-CM

## 2021-08-30 DIAGNOSIS — M1A.00X0 IDIOPATHIC CHRONIC GOUT WITHOUT TOPHUS, UNSPECIFIED SITE: ICD-10-CM

## 2021-08-30 DIAGNOSIS — E03.2 HYPOTHYROIDISM DUE TO MEDICATION: Primary | ICD-10-CM

## 2021-08-30 DIAGNOSIS — N18.31 TYPE 2 DIABETES MELLITUS WITH STAGE 3A CHRONIC KIDNEY DISEASE, WITHOUT LONG-TERM CURRENT USE OF INSULIN (HCC): ICD-10-CM

## 2021-08-30 DIAGNOSIS — I50.42 CHRONIC COMBINED SYSTOLIC AND DIASTOLIC HEART FAILURE (HCC): Primary | ICD-10-CM

## 2021-08-30 DIAGNOSIS — E11.22 TYPE 2 DIABETES MELLITUS WITH STAGE 3A CHRONIC KIDNEY DISEASE, WITHOUT LONG-TERM CURRENT USE OF INSULIN (HCC): ICD-10-CM

## 2021-08-30 LAB
ANION GAP SERPL CALCULATED.3IONS-SCNC: 16 MMOL/L (ref 9–17)
BUN BLDV-MCNC: 33 MG/DL (ref 8–23)
BUN/CREAT BLD: ABNORMAL (ref 9–20)
CALCIUM SERPL-MCNC: 8.9 MG/DL (ref 8.6–10.4)
CHLORIDE BLD-SCNC: 103 MMOL/L (ref 98–107)
CO2: 22 MMOL/L (ref 20–31)
CREAT SERPL-MCNC: 1.45 MG/DL (ref 0.7–1.2)
GFR AFRICAN AMERICAN: 59 ML/MIN
GFR NON-AFRICAN AMERICAN: 48 ML/MIN
GFR SERPL CREATININE-BSD FRML MDRD: ABNORMAL ML/MIN/{1.73_M2}
GFR SERPL CREATININE-BSD FRML MDRD: ABNORMAL ML/MIN/{1.73_M2}
GLUCOSE BLD-MCNC: 112 MG/DL (ref 70–99)
POTASSIUM SERPL-SCNC: 4.6 MMOL/L (ref 3.7–5.3)
SODIUM BLD-SCNC: 141 MMOL/L (ref 135–144)
THYROXINE, FREE: 0.86 NG/DL (ref 0.93–1.7)
TSH SERPL DL<=0.05 MIU/L-ACNC: 10.02 MIU/L (ref 0.3–5)

## 2021-08-30 PROCEDURE — 84443 ASSAY THYROID STIM HORMONE: CPT

## 2021-08-30 PROCEDURE — 1111F DSCHRG MED/CURRENT MED MERGE: CPT | Performed by: NURSE PRACTITIONER

## 2021-08-30 PROCEDURE — 80048 BASIC METABOLIC PNL TOTAL CA: CPT

## 2021-08-30 PROCEDURE — 36415 COLL VENOUS BLD VENIPUNCTURE: CPT

## 2021-08-30 PROCEDURE — 84439 ASSAY OF FREE THYROXINE: CPT

## 2021-08-30 PROCEDURE — 99214 OFFICE O/P EST MOD 30 MIN: CPT | Performed by: NURSE PRACTITIONER

## 2021-08-30 RX ORDER — ISOSORBIDE MONONITRATE 30 MG/1
30 TABLET, EXTENDED RELEASE ORAL DAILY
Qty: 30 TABLET | Refills: 3 | Status: SHIPPED | OUTPATIENT
Start: 2021-08-30 | End: 2021-12-01

## 2021-08-30 RX ORDER — ALLOPURINOL 300 MG/1
300 TABLET ORAL DAILY
Qty: 30 TABLET | Refills: 5 | Status: SHIPPED | OUTPATIENT
Start: 2021-08-30 | End: 2022-03-21

## 2021-08-30 RX ORDER — LEVOTHYROXINE SODIUM 0.05 MG/1
50 TABLET ORAL DAILY
Qty: 90 TABLET | Refills: 1 | Status: SHIPPED | OUTPATIENT
Start: 2021-08-30 | End: 2021-11-04 | Stop reason: DRUGHIGH

## 2021-08-30 RX ORDER — LEVOTHYROXINE SODIUM 0.05 MG/1
50 TABLET ORAL DAILY
Qty: 90 TABLET | Refills: 1 | Status: CANCELLED | OUTPATIENT
Start: 2021-08-30 | End: 2021-11-28

## 2021-08-30 ASSESSMENT — ENCOUNTER SYMPTOMS
DIARRHEA: 0
WHEEZING: 0
TROUBLE SWALLOWING: 0
SHORTNESS OF BREATH: 1
ABDOMINAL PAIN: 0
CONSTIPATION: 0
VOMITING: 0
BLOOD IN STOOL: 0
CHEST TIGHTNESS: 0

## 2021-08-30 NOTE — PROGRESS NOTES
Visit Information    Have you changed or started any medications since your last visit including any over-the-counter medicines, vitamins, or herbal medicines? yes -    Are you having any side effects from any of your medications? -  no  Have you stopped taking any of your medications? Is so, why? -  no    Have you seen any other physician or provider since your last visit? Yes - Records Obtained  Have you had any other diagnostic tests since your last visit? Yes - Records Obtained  Have you been seen in the emergency room and/or had an admission to a hospital since we last saw you? Yes - Records Obtained  Have you had your routine dental cleaning in the past 6 months? no    Have you activated your [employer] account? If not, what are your barriers?  Yes     Patient Care Team:  LIDIA Roe CNP as PCP - General (Family Medicine)  LIDIA Roe CNP as PCP - [facility]  Matthew Ortiz MD as Consulting Physician (Hematology and Oncology)  Natalie Little MD as Consulting Physician (Cardiology)  Yadi Cuellar DO as Consulting Physician (Pulmonology)  Robert Fuchs MD as Consulting Physician (Ophthalmology)  Alexis Giraldo MD as Consulting Physician (Internal Medicine)  Hollie Nobles RN as Care Transitions Nurse  John Miguel RD, LD as Dietitian (Dietitian)  Lakisha Garcia as Ambulatory Care Manager    Medical History Review  Past Medical, Family, and Social History reviewed and does not contribute to the patient presenting condition    Health Maintenance   Topic Date Due    Annual Wellness Visit (AWV)  Never done    Colon cancer screen colonoscopy  04/04/2020    Diabetic retinal exam  11/15/2020    Diabetic microalbuminuria test  02/24/2021    Flu vaccine (1) 09/01/2021    Diabetic foot exam  01/06/2022    A1C test (Diabetic or Prediabetic)  04/26/2022    Lipid screen  06/23/2022    TSH testing  06/23/2022    Potassium monitoring  08/03/2022    Creatinine monitoring

## 2021-08-30 NOTE — PROGRESS NOTES
Post-Discharge Transitional Care Management Services or Hospital Follow Up      Coryb Cleary   YOB: 1955    Date of Office Visit:  8/30/2021  Date of Hospital Admission: 8/2/21  Date of Hospital Discharge: 8/3/21  Readmission Risk Score(high >=14%.  Medium >=10%):Readmission Risk Score: 23      Care management risk score Rising risk (score 2-5) and Complex Care (Scores >=6): 7     Non face to face  following discharge, date last encounter closed (first attempt may have been earlier): *No documented post hospital discharge outreach found in the last 14 days *No documented post hospital discharge outreach found in the last 14 days    Call initiated 2 business days of discharge: *No response recorded in the last 14 days     Patient Active Problem List   Diagnosis    Acute on chronic combined systolic and diastolic congestive heart failure (HCC)    Chronic kidney disease, stage II (mild)    Chronic gout    Morbid obesity ([address]    Normocytic normochromic anemia    Hyperlipidemia    Iron deficiency anemia    Anxiety disorder     DJD (degenerative joint disease)    TANNER variably compliant with BiPAP    CAD (coronary artery disease) s/p stenting of L anterior descending artery 2004    Diabetic retinopathy ([address]    Ischemic cardiomyopathy    HTN (hypertension)    NSTEMI (non-ST elevated myocardial infarction) ([address]    MGUS (monoclonal gammopathy of unknown significance)    Type 2 diabetes mellitus with chronic kidney disease ([address]    AICD (automatic cardioverter/defibrillator) present    PAF (paroxysmal atrial fibrillation) ([address]    Acute respiratory failure with hypoxia ([address]    Coronary angioplasty status    Hypokalemia    Acute on chronic systolic heart failure (HCC)    Acute on chronic congestive heart failure (HCC)    Acute HFrEF (heart failure with reduced ejection fraction) ([address]    Hypothyroidism due to medication    Acute cardiac pulmonary edema (HCC)       Allergies Allergen Reactions    Zetia [Ezetimibe] Shortness Of Breath    Bactrim Other (See Comments)     palpitations    Cephalexin     Cephalexin     Sulfamethoxazole-Trimethoprim        Medications listed as ordered at the time of discharge from [facility] Medication Instructions ROCHELLE:    Printed on:08/30/21 1417   Medication Information                      Alcohol Swabs (ALCOHOL PREP) 70 % PADS  USE AS DIRECTED             allopurinol (ZYLOPRIM) 300 MG tablet  Take 1 tablet by mouth daily             ammonium lactate (LAC-HYDRIN) 12 % lotion  Apply topically daily after bathing sparing the space between the toes. apixaban (ELIQUIS) 5 MG TABS tablet  TAKE 1 TABLET BY MOUTH 2 TIMES DAILY             aspirin (ASPIRIN ADULT LOW STRENGTH) 81 MG EC tablet  TAKE 1 TABLET TWICE A DAY             atorvastatin (LIPITOR) 40 MG tablet  TAKE 1 TABLET BY MOUTH DAILY             Blood Gluc Meter Disp-Strips (BLOOD GLUCOSE METER DISPOSABLE) SHAN  Daily and as needed             blood glucose monitor strips  1 strip by Other route daily Test 1 times a day & as needed for symptoms of irregular blood glucose.              blood glucose test strips (FREESTYLE LITE) strip  USE AS DIRECTED TWICE A DAY             ENTRESTO 24-26 MG per tablet               ferrous sulfate (IRON 325) 325 (65 Fe) MG tablet  Take 1 tablet by mouth daily (with breakfast)             furosemide (LASIX) 40 MG tablet  TAKE 1/2 TABLET BY MOUTH 2 TIMES A DAY             Handicap Placard MISC  by Does not apply route Exp: 1/2025             isosorbide mononitrate (IMDUR) 30 MG extended release tablet  Take 1 tablet by mouth daily             Lancets MISC  Daily             levothyroxine (SYNTHROID) 25 MCG tablet  TAKE ONE-HALF OF A TABLET ONCE A DAY             metFORMIN (GLUCOPHAGE) 1000 MG tablet  TAKE 1 TABLET BY MOUTH 2 TIMES DAILY (WITH MEALS)             metoprolol succinate (TOPROL XL) 50 MG extended release tablet  Take 50 mg by mouth daily             TRUEPLUS LANCETS 33G MISC  TEST AS DIRECTED                   Medications marked \"taking\" at this time  Outpatient Medications Marked as Taking for the 8/30/21 encounter (Office Visit) with LIDIA Butt - CNP   Medication Sig Dispense Refill    isosorbide mononitrate (IMDUR) 30 MG extended release tablet Take 1 tablet by mouth daily 30 tablet 3    allopurinol (ZYLOPRIM) 300 MG tablet Take 1 tablet by mouth daily 30 tablet 5    ENTRESTO 24-26 MG per tablet       metoprolol succinate (TOPROL XL) 50 MG extended release tablet Take 50 mg by mouth daily      levothyroxine (SYNTHROID) 25 MCG tablet TAKE ONE-HALF OF A TABLET ONCE A DAY 45 tablet 0    metFORMIN (GLUCOPHAGE) 1000 MG tablet TAKE 1 TABLET BY MOUTH 2 TIMES DAILY (WITH MEALS) 60 tablet 2    apixaban (ELIQUIS) 5 MG TABS tablet TAKE 1 TABLET BY MOUTH 2 TIMES DAILY 60 tablet 3    atorvastatin (LIPITOR) 40 MG tablet TAKE 1 TABLET BY MOUTH DAILY 30 tablet 5    ferrous sulfate (IRON 325) 325 (65 Fe) MG tablet Take 1 tablet by mouth daily (with breakfast) 30 tablet 5    aspirin (ASPIRIN ADULT LOW STRENGTH) 81 MG EC tablet TAKE 1 TABLET TWICE A DAY 60 tablet 3    furosemide (LASIX) 40 MG tablet TAKE 1/2 TABLET BY MOUTH 2 TIMES A DAY 90 tablet 1        Medications patient taking as of now reconciled against medications ordered at time of hospital discharge: Yes    Chief Complaint   Patient presents with   [facility] ED Follow-up     Cardiac Pulmonary Edema       Overall slightly better than when he went to the hospital, not back to his normal.  Still SOB, but able to \"catch my breathe\". Told by Dr Ana Lilia Gilbert, cardiology, that would likely be his normal   Amiodarone has been stopped, last dose was in June  Lisinopril also stopped. Replaced with Entresto. Started on thyroid medication since June, has been taking on an empty stomach and waiting 60 min.        Inpatient course: Discharge summary reviewed- see chart.    Brief Inpatient course:   Rickie Manzanares is a 74 y.o. male who was admitted for the management of Acute on chronic combined systolic and diastolic congestive heart failure [facility]), presented to the emergency department with SOB and history of CHF. Diagnosed and managed for Acute Hypercapnic respiratory failure exacerbation of CHF probably due to medication and diet noncompiance;  on  CKD, CAD, diabetes    Interval history/Current status: improving    Review of Systems   Constitutional: Positive for fatigue. Negative for appetite change, fever and unexpected weight change. HENT: Negative for hearing loss and trouble swallowing. Eyes: Negative for visual disturbance. Respiratory: Positive for shortness of breath. Negative for chest tightness and wheezing. Cardiovascular: Negative for chest pain and palpitations. Gastrointestinal: Negative for abdominal pain, blood in stool, constipation, diarrhea and vomiting. Endocrine: Negative for polydipsia and polyuria. Genitourinary: Negative for decreased urine volume, difficulty urinating, dysuria, frequency, hematuria and urgency. Musculoskeletal: Negative for myalgias and neck pain. Skin: Negative for wound. Neurological: Negative for dizziness, seizures, numbness and headaches. Psychiatric/Behavioral: Negative for suicidal ideas. The patient is not nervous/anxious. Vitals:    08/30/21 1331   BP: 105/67   Site: Right Upper Arm   Position: Sitting   Cuff Size: Large Adult   Pulse: 58   Temp: 97.7 °F (36.5 °C)   TempSrc: Temporal   SpO2: 95%   Weight: 234 lb (106.1 kg)     Body mass index is 40.17 kg/m². Wt Readings from Last 3 Encounters:   08/30/21 234 lb (106.1 kg)   08/26/21 233 lb 12.8 oz (106.1 kg)   08/23/21 234 lb (106.1 kg)     BP Readings from Last 3 Encounters:   08/30/21 105/67   08/26/21 102/62   08/23/21 124/68       Physical Exam        Assessment/Plan:  1.  Chronic combined systolic and diastolic heart failure [facility])  Patient recently started on Entresto, BMP ordered today to monitor kidney function  - [place] DISCHARGE MEDS RECONCILED W/ CURRENT OUTPATIENT MED LIST  - Basic Metabolic Panel; Future  - isosorbide mononitrate (IMDUR) 30 MG extended release tablet; Take 1 tablet by mouth daily  Dispense: 30 tablet; Refill: 3    2. Hypothyroidism due to medication  Last TSH at 13 in June. Patient compliant with levothyroxine, due for repeat TSH  - TSH With Reflex Ft4; Future    3. Positive FIT (fecal immunochemical test)  Patient aware, will need cardiology clearance before proceeding with colonoscopy. 4. Type 2 diabetes mellitus with stage 3a chronic kidney disease, without long-term current use of insulin ([facility]  Following with nephrology    5. Idiopathic chronic gout without tophus, unspecified site  - allopurinol (ZYLOPRIM) 300 MG tablet; Take 1 tablet by mouth daily  Dispense: 30 tablet;  Refill: 5        Medical Decision Making: moderate complexity

## 2021-08-31 ENCOUNTER — TELEPHONE (OUTPATIENT)
Dept: ONCOLOGY | Age: 66
End: 2021-08-31

## 2021-08-31 NOTE — TELEPHONE ENCOUNTER
He has been prescribed allopurinol for the last 5 years, it is used to treat gout.   I just refilled an existing prescription that has been on for 5 years

## 2021-08-31 NOTE — TELEPHONE ENCOUNTER
Informed pt of results. Pt voiced understanding. States he would like clarification on Allopurinol 300mg doesn't remember speaking about this at visit.

## 2021-09-02 ENCOUNTER — CARE COORDINATION (OUTPATIENT)
Dept: CARE COORDINATION | Age: 66
End: 2021-09-02

## 2021-09-02 NOTE — CARE COORDINATION
Og Lopez  9/2/2021    Registered Dietitian Progress Note for Care Coordination    Assessment: Vianney Yu is a 74 y.o. male. RD referred for CHF. RD spoke with patient for initial nutrition assessment on 8/12 and outreached 8/26 for follow up- left VM this outreach. RD called to follow up with pt today 9/2. Pt states he received the educational handouts sent to his [facility]- found the information helpful. RD discussed previous goals with pt. Pt states he is eating really well- pt explains he is eating small balanced meals/snacks consistently throughout the day. Pt states for breakfast he is eating a tomato with a protein, for lunch fruit and protein drink and for dinner he cooks a meal. Pt states tonight for dinner he is going to make salmon with cauliflower and de. Pt states he is watching his sodium intake \"very very close\" and monitoring daily weights- per pt weight is stable and  lb. Pt is checking BS daily- per pt this AM  mg/dL. RD acknowledged the awesome work patient has made and encouraged him to continue making small changes. No nutrition related questions at this time. RD offered to follow up with patient in a few weeks, pt accepted and very appreciative. Barriers to meeting goals: overwhelmed by complexity of regimen    Action:  Reiterated the importance of eating balanced meals/snacks consistently, taking medicine as directed and monitoring BS daily. Reiterated the importance of watching sodium intake daily and monitoring daily weights. See assessment note above. Nutrition Monitoring and Evaluation  Indicator/Goal Criteria Progress   #1 Eat balanced meals consistently throughout the day.  #1 Focus on eating 3 meals/day. Make these meals balanced using the MyPlate [id number]-0/2 plate fruits and/or vegetables, 1/4 plate protein and 1/4 plate starchy carbohydrates with 8 oz glass of low fat milk if desired. #1 Pt is eating balanced meals/snacks consistently throughout the day. #2  Monitor daily sodium intake. Keep sodium from food and beverages to no more than 2000 mg/day. #2 Avoid the salt shaker. Read food labels to help choose lower sodium options (<140 mg of sodium per serving). #2 Pt states he is watching his sodium intake \"very very close\"    #3  Monitor daily weights and check BS daily.  #3 Weigh self daily in the AM and check BS. Keep a log.  #3 Pt is monitoring daily weight and BS. Pt states weight is stable and  lb. Pt states this AM  mg/dL        Plan of Care:  RD encouraged pt to keep working toward goals set. RD will follow up with pt to discuss any questions pt has and check the progress toward goals. Follow Up:    RD will call pt in 4 weeks to follow up and answer any nutrition related questions at that time.      [address] [facility], [address] [place] [address]

## 2021-09-03 ENCOUNTER — CARE COORDINATION (OUTPATIENT)
Dept: CARE COORDINATION | Age: 66
End: 2021-09-03

## 2021-09-03 SDOH — ECONOMIC STABILITY: HOUSING INSECURITY
IN THE LAST 12 MONTHS, WAS THERE A TIME WHEN YOU DID NOT HAVE A STEADY PLACE TO SLEEP OR SLEPT IN A SHELTER (INCLUDING NOW)?: NO

## 2021-09-03 SDOH — HEALTH STABILITY: PHYSICAL HEALTH: ON AVERAGE, HOW MANY MINUTES DO YOU ENGAGE IN EXERCISE AT THIS LEVEL?: 0 MIN

## 2021-09-03 SDOH — ECONOMIC STABILITY: INCOME INSECURITY: IN THE LAST 12 MONTHS, WAS THERE A TIME WHEN YOU WERE NOT ABLE TO PAY THE MORTGAGE OR RENT ON TIME?: NO

## 2021-09-03 SDOH — ECONOMIC STABILITY: HOUSING INSECURITY: IN THE LAST 12 MONTHS, HOW MANY PLACES HAVE YOU LIVED?: 1

## 2021-09-03 SDOH — HEALTH STABILITY: PHYSICAL HEALTH: ON AVERAGE, HOW MANY DAYS PER WEEK DO YOU ENGAGE IN MODERATE TO STRENUOUS EXERCISE (LIKE A BRISK WALK)?: 0 DAYS

## 2021-09-03 ASSESSMENT — SOCIAL DETERMINANTS OF HEALTH (SDOH)
DO YOU BELONG TO ANY CLUBS OR ORGANIZATIONS SUCH AS CHURCH GROUPS UNIONS, FRATERNAL OR ATHLETIC GROUPS, OR SCHOOL GROUPS?: NO
HOW OFTEN DO YOU ATTEND CHURCH OR RELIGIOUS SERVICES?: NEVER
HOW OFTEN DO YOU GET TOGETHER WITH FRIENDS OR RELATIVES?: ONCE A WEEK
IN A TYPICAL WEEK, HOW MANY TIMES DO YOU TALK ON THE PHONE WITH FAMILY, FRIENDS, OR NEIGHBORS?: MORE THAN THREE TIMES A WEEK
HOW OFTEN DO YOU ATTENT MEETINGS OF THE CLUB OR ORGANIZATION YOU BELONG TO?: NEVER
ARE YOU MARRIED, WIDOWED, DIVORCED, SEPARATED, NEVER MARRIED, OR LIVING WITH A PARTNER?: NEVER MARRIED

## 2021-09-03 ASSESSMENT — LIFESTYLE VARIABLES: HOW OFTEN DO YOU HAVE A DRINK CONTAINING ALCOHOL: NEVER

## 2021-09-03 NOTE — LETTER
9/3/2021    [address] [facility]      Dear Fadia Keyes,    My name is Godwin Mckenna RN and I am a registered nurse who partners with LIDIA Gallegos CNP to improve patients' health. LIDIA Gallegos CNP believes you would benefit from working with me. As a member of your health care team, I would work with other providers involved in your care, offer education for your specific health conditions, and connect you with additional resources as needed. I will collaborate with LIDIA Gallegos CNP to support you in following your treatment plan. The additional support I provide is no additional cost to you. My primary focus is to help you achieve specific goals and improve your health. We are committed to walk with you on this journey and look forward to working with you. Please call me to further discuss your healthcare needs. I am available by phone or for appointments at the office. You can reach me at [phone].     In good health,     Godwin Mckenna RN

## 2021-09-03 NOTE — CARE COORDINATION
Ambulatory Care Coordination Note  9/3/2021  CM Risk Score: 7  Charlson 10 Year Mortality Risk Score: 100%     ACC: Thor Colón RN    Summary Note: spoke with patient, states he is doing good. BS running 100-115, BP is better  systolic low 96'E  Diastolic. No dizziness or headaches. Just changed from lisinopril to toprol and tolerating well  Plan to f/u in about 1 week to develop plan of care    Ambulatory Care Coordination Assessment    Care Coordination Protocol  Program Enrollment: Complex Care  Referral from Primary Care Provider: No  Week 1 - Initial Assessment     Do you have all of your prescriptions and are they filled?: Yes  Are you able to afford your medications?: Yes     Do you have Home O2 Therapy?: No      Ability to seek help/take action for Emergent Urgent situations i.e. fire, crime, inclement weather or health crisis. : Independent  Ability to ambulate to restroom: Independent  Ability handle personal hygeine needs (bathing/dressing/grooming): Independent  Ability to manage Medications: Independent  Ability to prepare Food Preparation: Independent  Ability to maintain home (clean home, laundry): Independent  Ability to drive and/or has transportation: Independent  Ability to do shopping: Independent  Ability to manage finances: Independent  Is patient able to live independently?: Yes     Current Housing: Private Residence        Per the Fall Risk Screening, did the patient have 2 or more falls or 1 fall with injury in the past year?: No     Frequent urination at night?: No  Do you use rails/bars?: Yes  Do you have a non-slip tub mat?: No        Suggested Interventions and Community Resources                  Prior to Admission medications    Medication Sig Start Date End Date Taking?  Authorizing Provider   isosorbide mononitrate (IMDUR) 30 MG extended release tablet Take 1 tablet by mouth daily 8/30/21  Yes LIDIA Cooper CNP   allopurinol (ZYLOPRIM) 300 MG tablet Take 1 tablet by mouth daily 8/30/21 9/29/21 Yes LIDIA Dee CNP   levothyroxine (SYNTHROID) 50 MCG tablet Take 1 tablet by mouth Daily 8/30/21 11/28/21 Yes LIDIA Dee CNP   ENTRESTO 24-26 MG per tablet  8/20/21  Yes Historical Provider, MD   metoprolol succinate (TOPROL XL) 50 MG extended release tablet Take 50 mg by mouth daily   Yes Historical Provider, MD   metFORMIN (GLUCOPHAGE) 1000 MG tablet TAKE 1 TABLET BY MOUTH 2 TIMES DAILY (WITH MEALS) 7/9/21  Yes LIDIA Dee CNP   apixaban (ELIQUIS) 5 MG TABS tablet TAKE 1 TABLET BY MOUTH 2 TIMES DAILY 7/9/21  Yes LIDIA Dee CNP   atorvastatin (LIPITOR) 40 MG tablet TAKE 1 TABLET BY MOUTH DAILY 7/9/21  Yes LIDIA Dee CNP   ferrous sulfate (IRON 325) 325 (65 Fe) MG tablet Take 1 tablet by mouth daily (with breakfast) 7/2/21  Yes LIDIA Dee CNP   aspirin (ASPIRIN ADULT LOW STRENGTH) 81 MG EC tablet TAKE 1 TABLET TWICE A DAY 6/7/21  Yes LIDIA Dee CNP   furosemide (LASIX) 40 MG tablet TAKE 1/2 TABLET BY MOUTH 2 TIMES A DAY 4/9/21  Yes LIDIA Dee CNP   Handicap Placard MISC by Does not apply route Exp: 1/2025 12/23/20  Yes LIDIA Dee CNP   Lancets MISC Daily 12/2/20  Yes LIDIA Dee CNP   Blood Gluc Meter Disp-Strips (BLOOD GLUCOSE METER DISPOSABLE) SHAN Daily and as needed 12/2/20  Yes LIDIA Dee CNP   blood glucose monitor strips 1 strip by Other route daily Test 1 times a day & as needed for symptoms of irregular blood glucose. 12/2/20 9/3/21 Yes LIDIA Dee CNP   blood glucose test strips (FREESTYLE LITE) strip USE AS DIRECTED TWICE A DAY 1/2/20  Yes Idalia Mcrae MD   ammonium lactate (LAC-HYDRIN) 12 % lotion Apply topically daily after bathing sparing the space between the toes.  1/2/20  Yes Idalia Mcrae MD   TRUEPLUS LANCETS 33G MISC TEST AS DIRECTED 1/2/20  Yes Idalia Mcrae MD   Alcohol Swabs (ALCOHOL PREP) 70 % PADS USE AS DIRECTED 5/31/19  Yes Joe Khanna MD       Future Appointments   Date Time Provider Keerthi [place]   9/23/2021  2:00 PM [facility] RM 1 [facility]F [facility]   10/25/2021  2:00 PM Nancy Fernandes MD sv gr lks [facility]   10/27/2021  1:30 PM Dmitriy Tran, APRN - CNP [facility]ALK [facility]   12/14/2021  2:10 PM Annmarie Ray MD [facility]   2/14/2022  1:00 PM Wicho Cates MD SV [facility]   8/29/2022  1:15 PM Ferny Lewis DO Resp Spec [facility]     , ACC: Ran Holder RN  CM Risk Score: 7  Charlson 10 Year Mortality Risk Score: 100%     Care Coordination Interventions    Program Enrollment: Complex Care  Referral from Primary Care Provider: No  Suggested Interventions and Community Resources       ,   Diabetes Assessment    Medic Alert ID: No  Meal Planning: Avoidance of concentrated sweets   How often do you test your blood sugar?: Other   Do you have barriers with adherence to non-pharmacologic self-management interventions?  (Nutrition/Exercise/Self-Monitoring): No   Have you ever had to go to the ED for symptoms of low blood sugar?: No       No patient-reported symptoms   Do you have hyperglycemia symptoms?: No   Do you have hypoglycemia symptoms?: No   Last Blood Sugar Value: 107   Blood Sugar Monitoring Regimen: Morning Fasting, At Bedtime   Blood Sugar Trends: No Change       and   Congestive Heart Failure Assessment    Are you currently restricting fluids?: No Restriction  Do you understand a low sodium diet?: Yes  Do you understand how to read food labels?: Yes  How many restaurant meals do you eat per week?: 0  Do you salt your food before tasting it?: No     No patient-reported symptoms      Symptoms:  None: Yes      Symptom course: stable  Patient-reported weight (lb): 234  Weight trend: decreasing steadily  Salt intake watch compared to last visit: stable

## 2021-09-07 ENCOUNTER — CARE COORDINATION (OUTPATIENT)
Dept: CARE COORDINATION | Age: 66
End: 2021-09-07

## 2021-09-10 ENCOUNTER — CARE COORDINATION (OUTPATIENT)
Dept: CARE COORDINATION | Age: 66
End: 2021-09-10

## 2021-09-15 ENCOUNTER — CARE COORDINATION (OUTPATIENT)
Dept: CARE COORDINATION | Age: 66
End: 2021-09-15

## 2021-09-15 NOTE — CARE COORDINATION
Ambulatory Care Coordination Note  9/15/2021  CM Risk Score: 7  Charlson 10 Year Mortality Risk Score: 100%     ACC: Jodeen Heimlich, RN    Summary Note: Called, left message on voice mail. Will attempt to contact in about 1 week    Diabetes:    -Monitor BS Daily  -Review BS Long on call  -Review diet  CHF:   -Monitor daily weights  -Report any increase in weight of 3# in a day or 5# over 5 days  -Increase daily exercise as tolerated. - Limit fluid intake   - CHF Clinic appointment 9/23/21  HTN  - Monitor B/P daily   - Review at each call  Needs to have Labs completed          Care Coordination Interventions    Program Enrollment: Complex Care  Referral from Primary Care Provider: No  Suggested Interventions and Community Resources         Goals Addressed    None         Prior to Admission medications    Medication Sig Start Date End Date Taking?  Authorizing Provider   isosorbide mononitrate (IMDUR) 30 MG extended release tablet Take 1 tablet by mouth daily 8/30/21   LIDIA Pastrana CNP   allopurinol (ZYLOPRIM) 300 MG tablet Take 1 tablet by mouth daily 8/30/21 9/29/21  LIDIA Pastrana CNP   levothyroxine (SYNTHROID) 50 MCG tablet Take 1 tablet by mouth Daily 8/30/21 11/28/21  LIDIA Pastrana CNP   ENTRESTO 24-26 MG per tablet  8/20/21   Historical Provider, MD   metoprolol succinate (TOPROL XL) 50 MG extended release tablet Take 50 mg by mouth daily    Historical Provider, MD   metFORMIN (GLUCOPHAGE) 1000 MG tablet TAKE 1 TABLET BY MOUTH 2 TIMES DAILY (WITH MEALS) 7/9/21   LIDIA Pastrana CNP   apixaban (ELIQUIS) 5 MG TABS tablet TAKE 1 TABLET BY MOUTH 2 TIMES DAILY 7/9/21   LIDIA Pastrana CNP   atorvastatin (LIPITOR) 40 MG tablet TAKE 1 TABLET BY MOUTH DAILY 7/9/21   LIDIA Pastrana CNP   ferrous sulfate (IRON 325) 325 (65 Fe) MG tablet Take 1 tablet by mouth daily (with breakfast) 7/2/21   LIDIA Pastrana CNP   aspirin (ASPIRIN ADULT LOW STRENGTH) 81 MG EC tablet TAKE 1 TABLET TWICE A DAY 6/7/21   LIDIA Huntley CNP   furosemide (LASIX) 40 MG tablet TAKE 1/2 TABLET BY MOUTH 2 TIMES A DAY 4/9/21   LIDIA Huntley CNP   Handicap Placard MISC by Does not apply route Exp: 1/2025 12/23/20   LIDIA Huntley CNP   Lancets MISC Daily 12/2/20   LIDIA Huntley CNP   Blood Gluc Meter Disp-Strips (BLOOD GLUCOSE METER DISPOSABLE) SHAN Daily and as needed 12/2/20   LIDIA Huntley CNP   blood glucose monitor strips 1 strip by Other route daily Test 1 times a day & as needed for symptoms of irregular blood glucose. 12/2/20 9/3/21  LIDIA Huntley CNP   blood glucose test strips (FREESTYLE LITE) strip USE AS DIRECTED TWICE A DAY 1/2/20   Ila Forrester MD   ammonium lactate (LAC-HYDRIN) 12 % lotion Apply topically daily after bathing sparing the space between the toes. 1/2/20   Ila Forrester MD   TRUEPLUS LANCETS 33G MISC TEST AS DIRECTED 1/2/20   Ila Forrester MD   Alcohol Swabs (ALCOHOL PREP) 70 % PADS USE AS DIRECTED 5/31/19   Ally Lyons MD       Future Appointments   Date Time Provider Keerthi England   9/23/2021  2:00 PM [facility] CLINIC RM 1 [facility]   10/25/2021  2:00 PM Zhanna Cadena MD sv gr lks [facility]   10/27/2021  1:30 PM LIDIA Huntley CNP [facility] WALK IN [facility]   12/14/2021  2:10 PM Julia Simental MD [facility]   2/14/2022  1:00  Matias Norris MD SV Cancer [facility]   8/29/2022  1:15 PM Azeb Alvarado DO Resp Spec [facility]     ,   Diabetes Assessment    Medic Alert ID: No  Meal Planning: Avoidance of concentrated sweets   How often do you test your blood sugar?: Other   Do you have barriers with adherence to non-pharmacologic self-management interventions?  (Nutrition/Exercise/Self-Monitoring): No   Have you ever had to go to the ED for symptoms of low blood sugar?: No           and   Congestive Heart Failure Assessment    Are you currently restricting fluids?: No Restriction  Do you understand a low sodium diet?: Yes  Do you understand how to read food labels?: Yes  How many restaurant meals do you eat per week?: 0  Do you salt your food before tasting it?: No         Symptoms:

## 2021-09-23 ENCOUNTER — HOSPITAL ENCOUNTER (OUTPATIENT)
Dept: OTHER | Age: 66
Discharge: HOME | End: 2021-09-23
Payer: COMMERCIAL

## 2021-09-23 VITALS
SYSTOLIC BLOOD PRESSURE: 122 MMHG | HEART RATE: 66 BPM | BODY MASS INDEX: 39.99 KG/M2 | OXYGEN SATURATION: 98 % | DIASTOLIC BLOOD PRESSURE: 72 MMHG | WEIGHT: 233 LBS | RESPIRATION RATE: 16 BRPM

## 2021-09-23 PROCEDURE — 99212 OFFICE O/P EST SF 10 MIN: CPT

## 2021-09-23 NOTE — PROGRESS NOTES
Date:  2021  Time:  2:02 PM    CHF Clinic at [facility]    Office: [phone] Fax: [phone]    Re:  Kylee Dolan   Patient : 1955    Vital Signs: /72   Pulse 66   Resp 16   Wt 233 lb (105.7 kg)   SpO2 98%   BMI 39.99 kg/m²                                                   No results for input(s): CBC, HGB, HCT, WBC, PLATELET, NA, K, CL, CO2, BUN, CREATININE, GLUCOSE, BNP, INR in the last 72 hours. Respiratory:    Assessment  Charting Type: Reassessment    Breath Sounds  Right Upper Lobe: Clear  Right Middle Lobe: Clear  Right Lower Lobe: Clear  Left Upper Lobe: Clear  Left Lower Lobe: Clear              Peripheral Vascular  RLE Edema: None  LLE Edema: None      Complaints: No new complaints     Physician Orders no new orders     Comment : Weight is the same as last months visit. He has a AICD [place] device seen  a few weeks ago no new medication changes per patient. He states he occasionally has SOB with to much activity but relieved with rest. Overall, states he's feeling better. Reviewed in detail low sodium diet of 2000mg per day he does all his own cooking which sounds like he follows a good diet. Fluid restriction of 64 ounces per day We will see in 1 month 10/21/2021.      Electronically signed by Alexia Mata RN on 2021 at 2:02 PM

## 2021-09-27 DIAGNOSIS — I50.42 CHRONIC COMBINED SYSTOLIC AND DIASTOLIC HEART FAILURE (HCC): ICD-10-CM

## 2021-09-27 NOTE — TELEPHONE ENCOUNTER
Health Maintenance   Topic Date Due    Annual Wellness Visit (AWV)  Never done    Colon cancer screen colonoscopy  04/04/2020    Diabetic retinal exam  11/15/2020    Diabetic microalbuminuria test  02/24/2021    Flu vaccine (1) 09/01/2021    Diabetic foot exam  01/06/2022    A1C test (Diabetic or Prediabetic)  04/26/2022    Lipid screen  06/23/2022    TSH testing  08/30/2022    Potassium monitoring  08/30/2022    Creatinine monitoring  08/30/2022    DTaP/Tdap/Td vaccine (2 - Td or Tdap) 06/30/2026    Shingles Vaccine  Completed    Pneumococcal 65+ years Vaccine  Completed    COVID-19 Vaccine  Completed    AAA screen  Completed    Hepatitis C screen  Completed    Hepatitis A vaccine  Aged Out    Hib vaccine  Aged Out    Meningococcal (ACWY) vaccine  Aged Out             (applicable per patient's age: Cancer Screenings, Depression Screening, Fall Risk Screening, Immunizations)    Hemoglobin A1C (%)   Date Value   04/26/2021 6.3   02/24/2020 6.7 (H)   01/02/2020 5.6     Microalb/Crt.  Ratio (mcg/mg creat)   Date Value   02/24/2020 CANNOT BE CALCULATED     LDL Cholesterol (mg/dL)   Date Value   06/23/2021 55     AST (U/L)   Date Value   08/13/2021 16     ALT (U/L)   Date Value   08/13/2021 10     BUN (mg/dL)   Date Value   08/30/2021 33 (H)      (goal A1C is < 7)   (goal LDL is <100) need 30-50% reduction from baseline     BP Readings from Last 3 Encounters:   09/23/21 122/72   08/30/21 105/67   08/26/21 102/62    (goal /80)      All Future Testing planned in [facility]:  Lab Frequency Next Occurrence   POCT FECAL IMMUNOCHEMICAL TEST (FIT) Once 11/26/2020   Electrophoresis Protein, Serum without Reflex to Immunofixation Once 02/12/2021   Lipid, Fasting Once 10/17/2021   CBC Auto Differential     Comprehensive Metabolic Panel     [place]/Lambda Free Lt Chains, Serum Quant         Next Visit Date:  Future Appointments   Date Time Provider Keerthi England   10/21/2021  2:00 PM [facility] CHF CLINIC RM 1 [facility]VZ CHF CLI [facility]   10/25/2021  2:00 PM Mayda Rahman MD sv gr lks [facility]   10/27/2021  1:30 PM Mortimer Singleton, APRN - CNP ST V WALK IN [facility]   12/14/2021  2:10 PM Gary Cardenas MD AFL Neph [facility] None   2/14/2022  1:00 PM Fidencio Batres MD SV Cancer Ct [facility]   8/29/2022  1:15 PM Cat Dela Cruz DO Resp Spec Rufus Rinne            Patient Active Problem List:     Acute on chronic combined systolic and diastolic congestive heart failure ([address]     Chronic kidney disease, stage II (mild)     Chronic gout     Morbid obesity ([address]     Normocytic normochromic anemia     Hyperlipidemia     Iron deficiency anemia     Anxiety disorder      DJD (degenerative joint disease)     TANNER variably compliant with BiPAP     CAD (coronary artery disease) s/p stenting of L anterior descending artery 2004     Diabetic retinopathy ([address]     Ischemic cardiomyopathy     HTN (hypertension)     NSTEMI (non-ST elevated myocardial infarction) ([facility])     MGUS (monoclonal gammopathy of unknown significance)     Type 2 diabetes mellitus with chronic kidney disease ([address]     AICD (automatic cardioverter/defibrillator) present     PAF (paroxysmal atrial fibrillation) ([facility])     Acute respiratory failure with hypoxia ([facility])     Coronary angioplasty status     Hypokalemia     Acute on chronic systolic heart failure (HCC)     Acute on chronic congestive heart failure (HCC)     Acute HFrEF (heart failure with reduced ejection fraction) ([address]     Hypothyroidism due to medication     Acute cardiac pulmonary edema ([address]

## 2021-09-28 RX ORDER — FUROSEMIDE 40 MG/1
TABLET ORAL
Qty: 90 TABLET | Refills: 1 | Status: SHIPPED | OUTPATIENT
Start: 2021-09-28 | End: 2022-04-04

## 2021-09-29 ENCOUNTER — CARE COORDINATION (OUTPATIENT)
Dept: CARE COORDINATION | Age: 66
End: 2021-09-29

## 2021-09-29 NOTE — CARE COORDINATION
Contacted [facility] and left voicemail regarding Dietitian follow up. Left call back number and will follow up as appropriate.        [address] [facility], [address] [place] [address]

## 2021-10-06 ENCOUNTER — CARE COORDINATION (OUTPATIENT)
Dept: CARE COORDINATION | Age: 66
End: 2021-10-06

## 2021-10-06 NOTE — CARE COORDINATION
Contacted Bg Guevara and left voicemail regarding Dietitian follow up. Left call back number. YEFRI spoke with patient for initial nutrition assessment on 8/12 and has been following up with patient. RD outreached 9/29 and today 10/6- left VM both outreaches. YEFRI will continue to follow/assist with patient return call.        [address] [facility], [address] [place] [address]

## 2021-10-06 NOTE — CARE COORDINATION
Sandra Said  10/6/2021    Registered Dietitian Progress Note for Care Coordination    Assessment: Bert Hudson is a 74 y.o. male. RD referred for CHF. RD spoke with patient for initial nutrition assessment on 8/12 and has been following up with patient. RD called for final follow up with pt today 10/6/21. Pt states he is doing \"pretty good. \" RD discussed previous goals with pt. Pt is eating balanced meals consistently and following a low sodium diet. Reiterated the importance of paying close attention to food labels and the sodium content per serving. Pt verbalizes understanding. Pt is monitoring daily weight- per pt weight is stable and  lb. Pt is monitoring BS daily and per pt this AM  mg/dL. Pt has no nutrition related questions or concerns at this time. Barriers to meeting goals: overwhelmed by complexity of regimen    Action:  Reiterated the importance of eating balanced meals/snacks consistently, taking medicine as directed and monitoring BS daily. Reiterated the importance of watching sodium intake daily and monitoring daily weights. See assessment note above. Nutrition Monitoring and Evaluation  Indicator/Goal Criteria Progress   #1 Eat balanced meals consistently throughout the day.  #1 Focus on eating 3 meals/day. Make these meals balanced using the MyPlate [id number]-8/8 plate fruits and/or vegetables, 1/4 plate protein and 1/4 plate starchy carbohydrates with 8 oz glass of low fat milk if desired. #1 Pt is eating balanced meals/snacks consistently throughout the day. #2  Monitor daily sodium intake. Keep sodium from food and beverages to no more than 2000 mg/day. #2 Avoid the salt shaker. Read food labels to help choose lower sodium options (<140 mg of sodium per serving).   #2 Pt states he is watching his sodium intake \"very very close\"    #3  Monitor daily weights and check BS daily.  #3 Weigh self daily in the AM and check BS. Keep a log.  #3 Pt is monitoring daily weight and BS. Pt states weight is stable and  lb. Pt states this AM  mg/dL.         Plan of Care:  RD encouraged pt to keep working toward goals set. RD explained to pt this is final follow up call and provided contact information to pt. Encouraged pt to call RD in future with any nutrition related questions or concerns. Follow Up:    Final follow up call today 10/6/21. RD will continue to follow/assist with patient return call.         [address] [facility], [address] [place] [address]

## 2021-10-11 ENCOUNTER — CARE COORDINATION (OUTPATIENT)
Dept: CARE COORDINATION | Age: 66
End: 2021-10-11

## 2021-10-11 NOTE — CARE COORDINATION
Ambulatory Care Coordination Note  10/11/2021  CM Risk Score: 7  Charlson 10 Year Mortality Risk Score: 100%     ACC: Payal Ridley    Summary Note: Kenji Austin is accepting care coordination. He reports he is doing \"OK\". He is currently working with [facility], dietician. He is doing daily weights and B/P checks, all of which are stable. B/P today 112/72. He continues going to the CHF clinic. At this time he does not have any questions. He is scheduled for his diabetic eye exam on 11/8/21. Plan:  Discuss a podiatry appointment with next encounter. F/U in 2 weeks. Ambulatory Care Coordination Assessment    Care Coordination Protocol  Program Enrollment: Complex Care  Referral from Primary Care Provider: No  Week 1 - Initial Assessment     Do you have all of your prescriptions and are they filled?: Yes  Are you able to afford your medications?: Yes     Do you have Home O2 Therapy?: No      Ability to seek help/take action for Emergent Urgent situations i.e. fire, crime, inclement weather or health crisis. : Independent  Ability to ambulate to restroom: Independent  Ability handle personal hygeine needs (bathing/dressing/grooming): Independent  Ability to manage Medications: Independent  Ability to prepare Food Preparation: Independent  Ability to maintain home (clean home, laundry): Independent  Ability to drive and/or has transportation: Independent  Ability to do shopping: Independent  Ability to manage finances:  Independent  Is patient able to live independently?: Yes     Current Housing: Apartment        Per the Fall Risk Screening, did the patient have 2 or more falls or 1 fall with injury in the past year?: No     Frequent urination at night?: No  Do you use rails/bars?: Yes  Do you have a non-slip tub mat?: No     Are you experiencing loss of meaning?: No  Are you experiencing loss of hope and peace?: No     Suggested Interventions and Community Resources   Disease Specific Clinic: Completed (Comment: CHF) Registered Dietician: Completed   Zone Management Tools: Completed         Schedule an appointment with the patient's PCP, Set up/Review an Education Plan, Set up/Review Goals              Prior to Admission medications    Medication Sig Start Date End Date Taking?  Authorizing Provider   furosemide (LASIX) 40 MG tablet TAKE 1/2 TABLET BY MOUTH 2 TIMES A DAY 9/28/21   LIDIA Sosa CNP   isosorbide mononitrate (IMDUR) 30 MG extended release tablet Take 1 tablet by mouth daily 8/30/21   LIDIA Sosa CNP   allopurinol (ZYLOPRIM) 300 MG tablet Take 1 tablet by mouth daily 8/30/21 9/29/21  [facility]   levothyroxine (SYNTHROID) 50 MCG tablet Take 1 tablet by mouth Daily 8/30/21 11/28/21  [facility] Center[facility] CNP   ENTRESTO 24-26 MG per tablet  8/20/21   Historical Provider, MD   metoprolol succinate (TOPROL XL) 50 MG extended release tablet Take 50 mg by mouth daily    Historical Provider, MD   metFORMIN (GLUCOPHAGE) 1000 MG tablet TAKE 1 TABLET BY MOUTH 2 TIMES DAILY (WITH MEALS) 7/9/21   [facility]   apixaban (ELIQUIS) 5 MG TABS tablet TAKE 1 TABLET BY MOUTH 2 TIMES DAILY 7/9/21   LIDIA Sosa CNP   atorvastatin (LIPITOR) 40 MG tablet TAKE 1 TABLET BY MOUTH DAILY 7/9/21   [facility]   ferrous sulfate (IRON 325) 325 (65 Fe) MG tablet Take 1 tablet by mouth daily (with breakfast) 7/2/21   LIDIA Sosa CNP   aspirin (ASPIRIN ADULT LOW STRENGTH) 81 MG EC tablet TAKE 1 TABLET TWICE A DAY 6/7/21   [facility] Center[facility] CNP   Handicap Placard MISC by Does not apply route Exp: 1/2025 12/23/20   LIDIA Sosa CNP   Lancets MISC Daily 12/2/20   LIDIA Sosa CNP   Blood Gluc Meter Disp-Strips (BLOOD GLUCOSE METER DISPOSABLE) SHAN Daily and as needed 12/2/20   LIDIA Sosa CNP   blood glucose monitor strips 1 strip by Other route daily Test 1 times a day & as needed for symptoms of irregular blood glucose. 12/2/20 9/23/21  LIDIA Snowden CNP   blood glucose test strips (FREESTYLE LITE) strip USE AS DIRECTED TWICE A DAY 1/2/20   Charity Ly MD   ammonium lactate (LAC-HYDRIN) 12 % lotion Apply topically daily after bathing sparing the space between the toes.  1/2/20   Charity Ly MD   TRUEPLUS LANCETS 33G MISC TEST AS DIRECTED 1/2/20   Charity Ly MD   Alcohol Swabs (ALCOHOL PREP) 70 % PADS USE AS DIRECTED 5/31/19   Aviva Isaacs MD       Future Appointments   Date Time Provider Keerthi England   10/21/2021  2:00 PM STV CHF CLINIC RM 1 [facility] CHF [facility]   10/27/2021 11:30 AM Susana Peterson MD sv gr lks [facility]   10/27/2021  1:30 PM LIDIA Snowden - CNP ST V WALK IN [facility]   12/14/2021  2:10 PM Merritt Mayer MD AFL Neph [facility] None   2/14/2022  1:00 PM Jaret2 Matias Norris MD SV Cancer Ct [facility]   8/29/2022  1:15 PM Fatemeh Flash A María, DO Resp Spec Nader Ceron

## 2021-10-21 ENCOUNTER — HOSPITAL ENCOUNTER (OUTPATIENT)
Dept: OTHER | Age: 66
Discharge: HOME | End: 2021-10-21
Payer: MEDICAID

## 2021-10-21 VITALS
HEART RATE: 77 BPM | RESPIRATION RATE: 20 BRPM | DIASTOLIC BLOOD PRESSURE: 70 MMHG | SYSTOLIC BLOOD PRESSURE: 122 MMHG | OXYGEN SATURATION: 98 % | WEIGHT: 232.2 LBS | BODY MASS INDEX: 39.86 KG/M2

## 2021-10-21 PROCEDURE — 99212 OFFICE O/P EST SF 10 MIN: CPT

## 2021-10-21 NOTE — PROGRESS NOTES
Date:  10/21/2021  Time:  2:14 PM    CHF Clinic at [facility]    Office: [phone] Fax: [phone]    Re:  Zenaida Vazquez   Patient : 1955    Vital Signs: /70   Pulse 77   Resp 20   Wt 232 lb 3.2 oz (105.3 kg)   SpO2 98%   BMI 39.86 kg/m²                       O2 Device: None (Room air)                           No results for input(s): CBC, HGB, HCT, WBC, PLATELET, NA, K, CL, CO2, BUN, CREATININE, GLUCOSE, BNP, INR in the last 72 hours. Respiratory:    Assessment  Charting Type: Reassessment    Breath Sounds  Right Upper Lobe: Clear  Right Middle Lobe: Clear  Right Lower Lobe: Clear  Left Upper Lobe: Clear  Left Lower Lobe: Clear    Cough/Sputum  Cough: None       Peripheral Vascular  RLE Edema: None  LLE Edema: None    Complaints: Nothing new    Comment : Patient here ambulatory for routine visit. Weight decreased one pound in one month. No new or acute s/s CHF. Discussed low salt diet and fluid limits. States compliance with medications. On Entresto. His lasix is 20 mg 2x day. Compliant with physician office visits. Next visit here 4 weeks 2021.     Electronically signed by Roddy Ta RN on 10/21/2021 at 2:14 PM

## 2021-10-22 ENCOUNTER — CARE COORDINATION (OUTPATIENT)
Dept: CARE COORDINATION | Age: 66
End: 2021-10-22

## 2021-10-27 ENCOUNTER — OFFICE VISIT (OUTPATIENT)
Dept: GASTROENTEROLOGY | Age: 66
End: 2021-10-27
Payer: MEDICAID

## 2021-10-27 ENCOUNTER — OFFICE VISIT (OUTPATIENT)
Dept: PRIMARY CARE CLINIC | Age: 66
End: 2021-10-27
Payer: MEDICAID

## 2021-10-27 VITALS
DIASTOLIC BLOOD PRESSURE: 60 MMHG | OXYGEN SATURATION: 98 % | TEMPERATURE: 98 F | WEIGHT: 235.8 LBS | SYSTOLIC BLOOD PRESSURE: 97 MMHG | BODY MASS INDEX: 40.47 KG/M2 | HEART RATE: 58 BPM

## 2021-10-27 VITALS — SYSTOLIC BLOOD PRESSURE: 131 MMHG | WEIGHT: 233 LBS | BODY MASS INDEX: 39.99 KG/M2 | DIASTOLIC BLOOD PRESSURE: 78 MMHG

## 2021-10-27 DIAGNOSIS — Z23 NEED FOR INFLUENZA VACCINATION: ICD-10-CM

## 2021-10-27 DIAGNOSIS — I50.42 CHRONIC COMBINED SYSTOLIC AND DIASTOLIC HEART FAILURE (HCC): Primary | ICD-10-CM

## 2021-10-27 DIAGNOSIS — Z12.11 COLON CANCER SCREENING: Primary | ICD-10-CM

## 2021-10-27 DIAGNOSIS — N18.31 TYPE 2 DIABETES MELLITUS WITH STAGE 3A CHRONIC KIDNEY DISEASE, WITHOUT LONG-TERM CURRENT USE OF INSULIN (HCC): ICD-10-CM

## 2021-10-27 DIAGNOSIS — E11.22 TYPE 2 DIABETES MELLITUS WITH STAGE 3A CHRONIC KIDNEY DISEASE, WITHOUT LONG-TERM CURRENT USE OF INSULIN (HCC): ICD-10-CM

## 2021-10-27 DIAGNOSIS — E03.2 HYPOTHYROIDISM DUE TO MEDICATION: ICD-10-CM

## 2021-10-27 PROCEDURE — 99214 OFFICE O/P EST MOD 30 MIN: CPT | Performed by: NURSE PRACTITIONER

## 2021-10-27 PROCEDURE — 90694 VACC AIIV4 NO PRSRV 0.5ML IM: CPT | Performed by: NURSE PRACTITIONER

## 2021-10-27 PROCEDURE — 99213 OFFICE O/P EST LOW 20 MIN: CPT | Performed by: INTERNAL MEDICINE

## 2021-10-27 PROCEDURE — 90471 IMMUNIZATION ADMIN: CPT | Performed by: NURSE PRACTITIONER

## 2021-10-27 ASSESSMENT — ENCOUNTER SYMPTOMS
DIARRHEA: 0
VOMITING: 0
CONSTIPATION: 0
EYE DISCHARGE: 0
SHORTNESS OF BREATH: 0
EYE ITCHING: 0
EYE REDNESS: 0
WHEEZING: 0
ABDOMINAL PAIN: 1
ALLERGIC/IMMUNOLOGIC NEGATIVE: 1
TROUBLE SWALLOWING: 0
BLOOD IN STOOL: 0
ABDOMINAL PAIN: 0
RESPIRATORY NEGATIVE: 1
EYE PAIN: 0
BLURRED VISION: 1
CHEST TIGHTNESS: 0
EYES NEGATIVE: 1

## 2021-10-27 NOTE — PROGRESS NOTES
[facility]  [id number] [facility] [address]  Dept: [phone]  Dept Fax: [phone]    Patient Care Team:  LIDIA Snowden CNP as PCP - General (Family Medicine)  LIDIA Snowden CNP as PCP - [facility] Empaneled Provider  Sol Giles MD as Consulting Physician (Hematology and Oncology)  Lul Escobar MD as Consulting Physician (Cardiology)  Lashonda Marcano DO as Consulting Physician (Pulmonology)  Marci Juan MD as Consulting Physician (Ophthalmology)  Charity Ly MD as Consulting Physician (Internal Medicine)  [facility] [address] [place] [address]  Susana Peterson MD as Consulting Physician (Gastroenterology)    10/27/2021     Bg Guevara (:  )RV a 74 y.o. male, here for evaluation of the following medical concerns:   Chief Complaint   Patient presents with    Hypertension    Diabetes       17-year-old  male is here for a routine follow up. He reports a history of diabetes mellitus, hypertension, he has a history of ischemic cardiomyopathy with multiple stent placed in , ejection fraction recent 15 to 20% status post AICD, patient has a history of atrial fibrillation on Eliquis 5 mg twice a day, is following cardiology regularly, Dr Jermain Lawton. For diabetes mellitus he is on metformin 1 g twice daily. FBS running 100-110 normally  He denies any shortness of breath, leg swelling, dizziness. He does attend heart failure clinic. He follows podiatry and ophthalmology for retinal exam last foot exam in 2020   Gout on allopurinol 300 mg daily    See's eye doctor on the , noticed worsening vision, more blurred. Needing stronger reading glasses. He has also notcied a \"spot\" in is left eye. A brown spot there with his eye open. There is no eye pain or eye redness, no discharge. Recent trauma to the eye.     Saw cardiology yesterday, does have implanted monitor in place. No medication changes. See's podaitry in Jan, goes yearly    Hypertension  This is a chronic problem. The problem is unchanged. The problem is controlled. Associated symptoms include blurred vision. Pertinent negatives include no chest pain, headaches, neck pain, palpitations or shortness of breath. Risk factors for coronary artery disease include male gender, obesity and smoking/tobacco exposure. Diabetes  He presents for his follow-up diabetic visit. He has type 2 diabetes mellitus. There are no hypoglycemic associated symptoms. Pertinent negatives for hypoglycemia include no dizziness, headaches, nervousness/anxiousness or seizures. Associated symptoms include blurred vision and fatigue. Pertinent negatives for diabetes include no chest pain, no foot ulcerations, no polydipsia and no polyuria. Symptoms are stable. Risk factors for coronary artery disease include obesity, male sex, diabetes mellitus and tobacco exposure. Current diabetic treatment includes oral agent (monotherapy). There is no change in his home blood glucose trend. .    Review of Systems   Constitutional: Positive for fatigue. Negative for appetite change, fever and unexpected weight change. HENT: Negative for hearing loss and trouble swallowing. Eyes: Positive for blurred vision. Negative for pain, discharge, redness, itching and visual disturbance. Respiratory: Negative for chest tightness, shortness of breath and wheezing. Cardiovascular: Negative for chest pain and palpitations. Gastrointestinal: Negative for abdominal pain, blood in stool, constipation, diarrhea and vomiting. Endocrine: Negative for polydipsia and polyuria. Genitourinary: Negative for decreased urine volume, difficulty urinating, dysuria, frequency, hematuria and urgency. Musculoskeletal: Negative for myalgias and neck pain. Skin: Negative for rash and wound.    Neurological: Negative for dizziness, seizures, numbness and headaches. Psychiatric/Behavioral: Negative for suicidal ideas. The patient is not nervous/anxious. Prior to Visit Medications    Medication Sig Taking? Authorizing Provider   furosemide (LASIX) 40 MG tablet TAKE 1/2 TABLET BY MOUTH 2 TIMES A DAY Yes LIDIA Grimm CNP   isosorbide mononitrate (IMDUR) 30 MG extended release tablet Take 1 tablet by mouth daily Yes LIDIA Grimm - CNP   levothyroxine (SYNTHROID) 50 MCG tablet Take 1 tablet by mouth Daily Yes LIDIA Grimm CNP   ENTRESTO 24-26 MG per tablet  Yes Historical Provider, MD   metoprolol succinate (TOPROL XL) 50 MG extended release tablet Take 50 mg by mouth daily Yes Historical Provider, MD   metFORMIN (GLUCOPHAGE) 1000 MG tablet TAKE 1 TABLET BY MOUTH 2 TIMES DAILY (WITH MEALS) Yes LIDIA Grimm - CNP   apixaban (ELIQUIS) 5 MG TABS tablet TAKE 1 TABLET BY MOUTH 2 TIMES DAILY Yes LIDIA Grimm CNP   atorvastatin (LIPITOR) 40 MG tablet TAKE 1 TABLET BY MOUTH DAILY Yes Manda Cooper APRN - CNP   ferrous sulfate (IRON 325) 325 (65 Fe) MG tablet Take 1 tablet by mouth daily (with breakfast) Yes LIDIA Grimm CNP   aspirin (ASPIRIN ADULT LOW STRENGTH) 81 MG EC tablet TAKE 1 TABLET TWICE A DAY Yes LIDIA Grimm CNP   Handicap Placard MISC by Does not apply route Exp: 1/2025 Yes LIDIA Grimm CNP   Lancets MISC Daily Yes LIDIA Grimm CNP   Blood Gluc Meter Disp-Strips (BLOOD GLUCOSE METER DISPOSABLE) SHAN Daily and as needed Yes LIDIA Grimm - CNP   blood glucose test strips (FREESTYLE LITE) strip USE AS DIRECTED TWICE A DAY Yes Coral Marks MD   ammonium lactate (LAC-HYDRIN) 12 % lotion Apply topically daily after bathing sparing the space between the toes.  Yes Coral Marks MD   TRUEPLUS LANCETS 33G MISC TEST AS DIRECTED Yes Coral Marks MD   Alcohol Swabs (ALCOHOL PREP) 70 % PADS USE AS DIRECTED Yes Gilbert Musa MD   allopurinol (ZYLOPRIM) 300 MG tablet Take 1 tablet by mouth daily  LIDIA Carranza CNP   blood glucose monitor strips 1 strip by Other route daily Test 1 times a day & as needed for symptoms of irregular blood glucose. LIDIA Carranza CNP        Social History     Tobacco Use    Smoking status: Former Smoker     Packs/day: 1.00     Years: 3.00     Pack years: 3.00     Types: Cigarettes     Start date: 1971     Quit date: 1974     Years since quittin.8    Smokeless tobacco: Never Used   Substance Use Topics    Alcohol use: No     Alcohol/week: 0.0 standard drinks        Vitals:    10/27/21 1331   BP: 97/60   Site: Right Upper Arm   Position: Sitting   Pulse: 58   Temp: 98 °F (36.7 °C)   TempSrc: Temporal   SpO2: 98%   Weight: 235 lb 12.8 oz (107 kg)     Estimated body mass index is 40.47 kg/m² as calculated from the following:    Height as of 21: 5' 4\" (1.626 m). Weight as of this encounter: 235 lb 12.8 oz (107 kg). DIAGNOSTIC FINDINGS:  CBC:  Lab Results   Component Value Date    WBC 6.4 2021    HGB 10.8 2021     2021     2011       BMP:    Lab Results   Component Value Date     2021    K 4.6 2021     2021    CO2 22 2021    BUN 33 2021    CREATININE 1.45 2021    GLUCOSE 112 2021    GLUCOSE 123 2012       HEMOGLOBIN A1C:   Lab Results   Component Value Date    LABA1C 6.3 2021       FASTING LIPID PANEL:  Lab Results   Component Value Date    CHOL 124 2021    HDL 43 2021    TRIG 129 2021       Physical Exam  Vitals and nursing note reviewed. Constitutional:       General: He is not in acute distress. Appearance: He is well-developed. He is obese. HENT:      Head: Normocephalic. Eyes:      General: No scleral icterus. Right eye: No discharge. Left eye: No discharge. Pupils: Pupils are equal, round, and reactive to light.    Cardiovascular: Rate and Rhythm: Normal rate and regular rhythm. Pulmonary:      Effort: Pulmonary effort is normal.      Breath sounds: Normal breath sounds. Abdominal:      General: Abdomen is protuberant. Palpations: Abdomen is soft. Musculoskeletal:      Cervical back: Normal range of motion and neck supple. Skin:     General: Skin is warm and dry. Neurological:      Mental Status: He is alert and oriented to person, place, and time. Coordination: Coordination normal.   Psychiatric:         Behavior: Behavior normal. Behavior is cooperative. Thought Content: Thought content normal.         Judgment: Judgment normal.         ASSESSMENT     Diagnosis Orders   1. Chronic combined systolic and diastolic heart failure ([address]     2. Need for influenza vaccination  INFLUENZA, QUADV, ADJUVANTED, 65 YRS =, IM, PF, PREFILL SYR, 0.5ML (FLUAD)   3. Hypothyroidism due to medication  TSH With Reflex Ft4   4. Type 2 diabetes mellitus with stage 3a chronic kidney disease, without long-term current use of insulin ([facility])            PLAN:  No orders of the defined types were placed in this encounter. 1. Influenza vaccine provided today. 2. Last TSH at 10, levothyroxine was increased to 50 mcg. Taking daily prior to previous medications or meals. Due for repeat to verify if euthyroid. 3. No red flag concerns on eye exam today. Advised to keep eye appointment to follow-up regarding floater. FOLLOW UP AND INSTRUCTIONS:  Return in about 4 months (around 2/27/2022) for Diabetes, CHF. · Eleanor Cardenas received counseling on the following healthy behaviors:nutrition and medication adherence    · Discussed use, benefit, and side effects of prescribed medications. Barriers to  medication compliance addressed. All patient questions answered. Pt  verbalized understanding of all instructions given.     · Patient given educational materials - see patient instructions      · Patient advised to contact scheduling offices for any referrals or imaging orders  placed today if they have not been contacted in 48 hours. Return in about 4 months (around 2/27/2022) for Diabetes, CHF. An electronic signature was used to authenticate this note.     --LIDIA Bui - CNP on 10/27/2021 at 2:45 PM

## 2021-10-27 NOTE — TELEPHONE ENCOUNTER
Writer sent Clearance to Cardiology Dr Yen Rueda and Internal medicine   Dr Karime Meade for Eliquis/ASA. Writer will await respond.

## 2021-10-27 NOTE — PROGRESS NOTES
Visit Information    Have you changed or started any medications since your last visit including any over-the-counter medicines, vitamins, or herbal medicines? no   Are you having any side effects from any of your medications? -  no  Have you stopped taking any of your medications? Is so, why? -  no    Have you seen any other physician or provider since your last visit? Yes - Records Obtained  Have you had any other diagnostic tests since your last visit? Yes - Records Obtained  Have you been seen in the emergency room and/or had an admission to a hospital since we last saw you? No  Have you had your routine dental cleaning in the past 6 months? no    Have you activated your [employer] account? If not, what are your barriers?  Yes     Patient Care Team:  LIDIA Robert CNP as PCP - General (Family Medicine)  LIDIA Robert CNP as PCP - [address] [place] [address] Provider  Taurus Sánchez MD as Consulting Physician (Hematology and Oncology)  Albertina Fry MD as Consulting Physician (Cardiology)  Eneida Murcia DO as Consulting Physician (Pulmonology)  Luis Rosenthal MD as Consulting Physician (Ophthalmology)  Angel Negron MD as Consulting Physician (Internal Medicine)  Norina Kussmaul as [facility]  Vicky Caba MD as Consulting Physician (Gastroenterology)    Medical History Review  Past Medical, Family, and Social History reviewed and does contribute to the patient presenting condition    Health Maintenance   Topic Date Due    Colon cancer screen colonoscopy  04/04/2020    Diabetic retinal exam  11/15/2020    Diabetic microalbuminuria test  02/24/2021    Flu vaccine (1) 09/01/2021    COVID-19 Vaccine (3 - Pfizer booster) 09/10/2021    Diabetic foot exam  01/06/2022    A1C test (Diabetic or Prediabetic)  04/26/2022    Lipid screen  06/23/2022    TSH testing  08/30/2022    Potassium monitoring  08/30/2022    Creatinine monitoring  08/30/2022    DTaP/Tdap/Td vaccine (2 - Td or Tdap) 06/30/2026    Shingles Vaccine  Completed    Pneumococcal 65+ years Vaccine  Completed    AAA screen  Completed    Hepatitis C screen  Completed    Hepatitis A vaccine  Aged Out    Hib vaccine  Aged Out    Meningococcal (ACWY) vaccine  Aged Out

## 2021-10-27 NOTE — PROGRESS NOTES
GI FOLLOW UP    INTERVAL HISTORY:       No referring provider defined for this encounter. Chief Complaint   Patient presents with    Follow-up     positive FIT test- discuss colonoscopy       1. Colon cancer screening          HISTORY OF PRESENT ILLNESS: Paula Valdez is a 74 y.o. male with a past history remarkable for A-fib, CHF, CKD, Cardiomyopathy, HTN, HL, CAD s/p PCI 2004, previously on DAPT, on ASA currently, type II DM, metformin, referred for evaluation for screening colonoscopy. Patient denies any subjective weight loss, no overt signs of GI bleeding. Last colonoscopy performed in 2017 where the patient was identified to have an adenomatous polyp.     Hypotension and elevated BNP recently.      Smoker: none  Drinking history: none  Illicit drugs: none   Abdominal surgeries:none  Prior Colonoscopy: 2017--Dr. Alonzo, small subcentimeter polyps in the ascending colon that was removed endoscopically. Small 2 mm polyp in the transverse colon that could not be relocated. Repeat colonoscopy recommended in 3 years  Prior EGD: None   FH of GI issues: none       Past Medical,Family, and Social History reviewed and does contribute to the patient presenting condition. Patient's PMH/PSH,SH,PSYCH Hx, MEDs, ALLERGIES, and ROS were all reviewed and updated in the appropriate sections.     PAST MEDICAL HISTORY:  Past Medical History:   Diagnosis Date    Anemia     Anxiety     when he lies flat, no RX    CAD (coronary artery disease)     MI stents x5, follows with cardiology Dr. Paige Garduno Cardiac defibrillator in place     Cardiomyopathy [facility])     CH[facility]estive [facility]) [facility]     Chronic combined systolic and diastolic heart failure ([facility] s/[ AICD placed 9/25/2011    Chronic kidney disease     Complex sleep apnea syndrome     Diabetic retinopathy ([facility] 9/25/2011    Diverticulitis  DJD (degenerative joint disease) 9/25/2011    Edentulous     Gout     HTN (hypertension) 3/30/2012    Hyperlipidemia     Hypertension     Iron deficiency anemia 9/25/2011    Ischemic cardiomyopathy     Morbid obesity ([address] 9/25/2011    Obesity     Morbid obesity due to excess calories    TANNER variably compliant with BiPAP 9/25/2011    Osteoarthritis     PAF (paroxysmal atrial fibrillation) (HCC)     Psychophysiologic insomnia     Type II or unspecified type diabetes mellitus without mention of complication, not stated as uncontrolled     Unspecified sleep apnea     uses V-pap       Past Surgical History:   Procedure Laterality Date    BONE MARROW BIOPSY  2012 approx    CARDIAC CATHETERIZATION  8-2007    severe stenosis pre-stent area    CARDIAC CATHETERIZATION  09/11/2013    Very peripheral stenosis, not amendable to PCI, stents patent    CARDIAC DEFIBRILLATOR PLACEMENT  8/26/2015    COLONOSCOPY  11 7 2007   [address]  1998, 2004    stent LAD    PACEMAKER PLACEMENT  2005 or 2006    AICD    UT COLSC FLX W/RMVL OF TUMOR POLYP LESION SNARE TQ N/A 4/4/2017    COLONOSCOPY POLYPECTOMY SNARE/COLD BIOPSY performed by Srikanth Dimas DO at [facility]       CURRENT MEDICATIONS:    Current Outpatient Medications:     furosemide (LASIX) 40 MG tablet, TAKE 1/2 TABLET BY MOUTH 2 TIMES A DAY, Disp: 90 tablet, Rfl: 1    isosorbide mononitrate (IMDUR) 30 MG extended release tablet, Take 1 tablet by mouth daily, Disp: 30 tablet, Rfl: 3    levothyroxine (SYNTHROID) 50 MCG tablet, Take 1 tablet by mouth Daily, Disp: 90 tablet, Rfl: 1    ENTRESTO 24-26 MG per tablet, , Disp: , Rfl:     metoprolol succinate (TOPROL XL) 50 MG extended release tablet, Take 50 mg by mouth daily, Disp: , Rfl:     metFORMIN (GLUCOPHAGE) 1000 MG tablet, TAKE 1 TABLET BY MOUTH 2 TIMES DAILY (WITH MEALS), Disp: 60 tablet, Rfl: 2    apixaban (ELIQUIS) 5 MG TABS tablet, TAKE 1 TABLET BY MOUTH 2 TIMES DAILY, Disp: 60 tablet, Rfl: 3    atorvastatin (LIPITOR) 40 MG tablet, TAKE 1 TABLET BY MOUTH DAILY, Disp: 30 tablet, Rfl: 5    ferrous sulfate (IRON 325) 325 (65 Fe) MG tablet, Take 1 tablet by mouth daily (with breakfast), Disp: 30 tablet, Rfl: 5    aspirin (ASPIRIN ADULT LOW STRENGTH) 81 MG EC tablet, TAKE 1 TABLET TWICE A DAY, Disp: 60 tablet, Rfl: 3    Handicap Placard MISC, by Does not apply route Exp: 1/2025, Disp: 1 each, Rfl: 0    Lancets MISC, Daily, Disp: 100 each, Rfl: 3    Blood Gluc Meter Disp-Strips (BLOOD GLUCOSE METER DISPOSABLE) SHAN, Daily and as needed, Disp: 1 Device, Rfl: 0    blood glucose test strips (FREESTYLE LITE) strip, USE AS DIRECTED TWICE A DAY, Disp: 100 each, Rfl: 11    ammonium lactate (LAC-HYDRIN) 12 % lotion, Apply topically daily after bathing sparing the space between the toes. , Disp: 222 mL, Rfl: 3    TRUEPLUS LANCETS 33G MISC, TEST AS DIRECTED, Disp: 100 each, Rfl: 11    Alcohol Swabs (ALCOHOL PREP) 70 % PADS, USE AS DIRECTED, Disp: 100 each, Rfl: 11    allopurinol (ZYLOPRIM) 300 MG tablet, Take 1 tablet by mouth daily, Disp: 30 tablet, Rfl: 5    blood glucose monitor strips, 1 strip by Other route daily Test 1 times a day & as needed for symptoms of irregular blood glucose., Disp: 300 strip, Rfl: 2    ALLERGIES:   Allergies   Allergen Reactions    Zetia [Ezetimibe] Shortness Of Breath    Bactrim Other (See Comments)     palpitations    Cephalexin     Cephalexin     Sulfamethoxazole-Trimethoprim        FAMILY HISTORY:       Problem Relation Age of Onset    Cancer Mother     Heart Disease Mother         due to smoking    Heart Disease Maternal Uncle     Heart Disease Maternal Grandmother          SOCIAL HISTORY:   Social History     Socioeconomic History    Marital status: Single     Spouse name: Not on file    Number of children: 0    Years of education: 12    Highest education level: High school graduate   Occupational History    Occupation: retired   Tobacco Use  Smoking status: Former Smoker     Packs/day: 1.00     Years: 3.00     Pack years: 3.00     Types: Cigarettes     Start date: 1971     Quit date: 1974     Years since quittin.8    Smokeless tobacco: Never Used   Vaping Use    Vaping Use: Never used   Substance and Sexual Activity    Alcohol use: No     Alcohol/week: 0.0 standard drinks    Drug use: Not Currently     Types: Marijuana     Comment: hx THC quit approx     Sexual activity: Not Currently     Partners: Female   Other Topics Concern    Not on file   Social History Narrative    Not on file     Social Determinants of Health     Financial Resource Strain: Low Risk     Difficulty of Paying Living Expenses: Not hard at all   Food Insecurity: No Food Insecurity    Worried About [address] in the Last Year: Never true    [address] in the Last Year: Never true   Transportation Needs: No Transportation Needs    Lack of Transportation (Medical): No    Lack of Transportation (Non-Medical): No   Physical Activity: Inactive    Days of Exercise per Week: 0 days    Minutes of Exercise per Session: 0 min   Stress: No Stress Concern Present    Feeling of Stress : Only a little   Social Connections: Socially Isolated    Frequency of Communication with Friends and Family: More than three times a week    Frequency of Social Gatherings with Friends and Family: Once a week    Attends [employer] Services: Never    Active Member of Clubs or Organizations: No    Attends Club or Organization Meetings: Never    Marital Status: Never    Intimate Partner Violence:     Fear of Current or Ex-Partner:     Emotionally Abused:     Physically Abused:     Sexually Abused:        REVIEW OF SYSTEMS: A 12-point review of systems was obtained and pertinent positives and negatives were listed below. REVIEW OF SYSTEMS:     Constitutional: No fever, no chills, no lethargy, no weakness.   HEENT:  No headache, otalgia, itchy eyes, nasal discharge or sore throat. Cardiac:  No chest pain, dyspnea, orthopnea or PND. Chest:   No cough, phlegm or wheezing. Abdomen:      Detailed by MA   Neuro:  No focal weakness, abnormal movements or seizure like activity. Skin:   No rashes, no itching. :   No hematuria, no pyuria, no dysuria, no flank pain. Extremities:  No swelling or joint pains. ROS was otherwise negative    Review of Systems   Constitutional: Negative. HENT: Negative. Eyes: Negative. Respiratory: Negative. Cardiovascular: Negative. Gastrointestinal: Positive for abdominal pain. Endocrine: Negative. Genitourinary: Negative. Musculoskeletal: Negative. Skin: Negative. Allergic/Immunologic: Negative. Neurological: Negative. Hematological: Bruises/bleeds easily. Psychiatric/Behavioral: Negative. All other systems reviewed and are negative. PHYSICAL EXAMINATION: Vital signs reviewed per the nursing documentation. Wt 233 lb (105.7 kg)   BMI 39.99 kg/m²   Body mass index is 39.99 kg/m². Physical Exam    Physical Exam   Constitutional: Patient is oriented to person, place, and time. Patient appears well-developed and well-nourished. HENT:   Head: Normocephalic and atraumatic. Eyes: Pupils are equal, round, and reactive to light. EOM are normal.   Neck: Normal range of motion. Neck supple. No JVD present. No tracheal deviation present. No thyromegaly present. Cardiovascular: Normal rate, regular rhythm, normal heart sounds and intact distal pulses. Pulmonary/Chest: Effort normal and breath sounds normal. No stridor. No respiratory distress. He has no wheezes. He has no rales. He exhibits no tenderness. Abdominal: Soft. Bowel sounds are normal. He exhibits no distension and no mass. There is no tenderness. There is no rebound and no guarding. No hernia. Musculoskeletal: Normal range of motion. Lymphadenopathy:    Patient has no cervical adenopathy.    Neurological: Patient is alert and oriented to person, place, and time. Psychiatric: Patient has a normal mood and affect. Patient behavior is normal.       LABORATORY DATA: Reviewed  Lab Results   Component Value Date    WBC 6.4 08/03/2021    HGB 10.8 (L) 08/03/2021    HCT 35.9 (L) 08/03/2021    MCV 92.8 08/03/2021     08/03/2021     08/30/2021    K 4.6 08/30/2021     08/30/2021    CO2 22 08/30/2021    BUN 33 (H) 08/30/2021    CREATININE 1.45 (H) 08/30/2021    LABPROT 6.7 12/03/2012    LABALBU 3.2 (L) 06/25/2021    BILITOT 0.37 06/22/2021    ALKPHOS 81 06/22/2021    AST 16 08/13/2021    ALT 10 08/13/2021    INR 1.0 09/01/2019         Lab Results   Component Value Date    RBC 3.87 (L) 08/03/2021    HGB 10.8 (L) 08/03/2021    MCV 92.8 08/03/2021    MCH 27.9 08/03/2021    MCHC 30.1 08/03/2021    RDW 17.2 (H) 08/03/2021    MPV 11.1 08/03/2021    BASOPCT 1 08/03/2021    LYMPHSABS 1.47 08/03/2021    MONOSABS 0.43 08/03/2021    NEUTROABS 4.18 08/03/2021    EOSABS 0.25 08/03/2021    BASOSABS 0.03 08/03/2021         DIAGNOSTIC TESTING:     No results found. IMPRESSION: Laxmi Galarza is a 74 y.o. male with a past history remarkable for A-fib, CHF, CKD, Cardiomyopathy, HTN, HL, CAD s/p PCI 2004, previously on DAPT, on ASA currently, type II DM, metformin, referred for evaluation for screening colonoscopy. Patient denies any subjective weight loss, no overt signs of GI bleeding. Last colonoscopy performed in 2017 where the patient was identified to have an adenomatous polyp.         Assessment  1. Colon cancer screening        PLAN:    1) colorectal cancer screening, will schedule for diagnostic colonoscopy-positive fit test.  Eliquis needs to be held for 4-5 days prior to colonoscopy. Patient denies any overt GI bleeding at this time. Overall, clinically doing okay from a GI standpoint. 2) Hold ASA for 24 hrs prior to procedure. 3) Need clearance to perform procedure from cardiology.      Thank you for allowing me to participate in the care of Mr. Brandee Hu. For any further questions please do not hesitate to contact me. I have reviewed and agree with the ROS entered by the MA/LPN from today's encounter documented in a separate note. Vicky Caba MD, MPH   [facility]  Office #: [phone]    this note is created with the assistance of a speech recognition program.  While intending to generate a document that actually reflects the content of the visit, the document can still have some errors including those of syntax and sound a like substitutions which may escape proof reading. It such instances, actual meaning can be extrapolated by contextual diversion.

## 2021-11-03 DIAGNOSIS — E11.8 CONTROLLED TYPE 2 DIABETES MELLITUS WITH COMPLICATION, WITHOUT LONG-TERM CURRENT USE OF INSULIN (HCC): ICD-10-CM

## 2021-11-03 NOTE — TELEPHONE ENCOUNTER
Hemoglobin A1C (%)   Date Value   04/26/2021 6.3   02/24/2020 6.7 (H)   01/02/2020 5.6             ( goal A1C is < 7)   Microalb/Crt.  Ratio (mcg/mg creat)   Date Value   02/24/2020 CANNOT BE CALCULATED     LDL Cholesterol (mg/dL)   Date Value   06/23/2021 55       (goal LDL is <100)   AST (U/L)   Date Value   08/13/2021 16     ALT (U/L)   Date Value   08/13/2021 10     BUN (mg/dL)   Date Value   08/30/2021 33 (H)     BP Readings from Last 3 Encounters:   10/27/21 97/60   10/27/21 131/78   10/21/21 122/70          (goal 120/80)        Patient Active Problem List:     Acute on chronic combined systolic and diastolic congestive heart failure (HCC)     Chronic kidney disease, stage II (mild)     Chronic gout     Morbid obesity ([facility])     Normocytic normochromic anemia     Hyperlipidemia     Iron deficiency anemia     Anxiety disorder      DJD (degenerative joint disease)     TANNER variably compliant with BiPAP     CAD (coronary artery disease) s/p stenting of L anterior descending artery 2004     Diabetic retinopathy ([address]     Ischemic cardiomyopathy     HTN (hypertension)     NSTEMI (non-ST elevated myocardial infarction) ([facility])     MGUS (monoclonal gammopathy of unknown significance)     Type 2 diabetes mellitus with chronic kidney disease ([address]     AICD (automatic cardioverter/defibrillator) present     PAF (paroxysmal atrial fibrillation) ([facility])     Acute respiratory failure with hypoxia ([facility])     Coronary angioplasty status     Hypokalemia     Acute on chronic systolic heart failure (HCC)     Acute on chronic congestive heart failure ([facility])     Acute HFrEF (heart failure with reduced ejection fraction) ([address]     Hypothyroidism due to medication     Acute cardiac pulmonary edema ([address]      ----Aldair Mcconnell

## 2021-11-04 ENCOUNTER — TELEPHONE (OUTPATIENT)
Dept: PRIMARY CARE CLINIC | Age: 66
End: 2021-11-04

## 2021-11-04 ENCOUNTER — TELEPHONE (OUTPATIENT)
Dept: GASTROENTEROLOGY | Age: 66
End: 2021-11-04

## 2021-11-04 ENCOUNTER — HOSPITAL ENCOUNTER (OUTPATIENT)
Age: 66
Discharge: HOME | End: 2021-11-04
Payer: MEDICAID

## 2021-11-04 DIAGNOSIS — E03.2 HYPOTHYROIDISM DUE TO MEDICATION: ICD-10-CM

## 2021-11-04 LAB
THYROXINE, FREE: 1.06 NG/DL (ref 0.93–1.7)
TSH SERPL DL<=0.05 MIU/L-ACNC: 7.18 MIU/L (ref 0.3–5)

## 2021-11-04 PROCEDURE — 84439 ASSAY OF FREE THYROXINE: CPT

## 2021-11-04 PROCEDURE — 36415 COLL VENOUS BLD VENIPUNCTURE: CPT

## 2021-11-04 PROCEDURE — 84443 ASSAY THYROID STIM HORMONE: CPT

## 2021-11-04 RX ORDER — LEVOTHYROXINE SODIUM 0.07 MG/1
75 TABLET ORAL DAILY
Qty: 90 TABLET | Refills: 0 | Status: SHIPPED | OUTPATIENT
Start: 2021-11-04 | End: 2021-11-09

## 2021-11-04 NOTE — TELEPHONE ENCOUNTER
Notify Bayron Fay his thyroid level results are back, still elevated but improved. I sent a new prescription for his levothyroxine, now at a higher dose in order to fully control his hypothyroidism. The new dose is going to be 75 mcg a day.

## 2021-11-04 NOTE — TELEPHONE ENCOUNTER
Writer will review clearance with Dr Veronica Alarcon since note from 10/27/21 state hold Eliquis 4-5 days and clearance received say hold 2days.   Still waiting on Clearance from cardiology

## 2021-11-04 NOTE — TELEPHONE ENCOUNTER
Rec'd clearance from [place], ok to hold Eliquis 2 days, ok to hold aspirin 7 days. Need cardiac clearance before scheduling.

## 2021-11-08 NOTE — TELEPHONE ENCOUNTER
Clearance to primary physician resent explaining Dr Jong Rojas would prefer to hold Eliquis 3 days. Will wait for respond and still waiting for Cardiology clearance.

## 2021-11-09 RX ORDER — LEVOTHYROXINE SODIUM 0.07 MG/1
75 TABLET ORAL DAILY
Qty: 90 TABLET | Refills: 0 | Status: SHIPPED | OUTPATIENT
Start: 2021-11-09 | End: 2021-12-01

## 2021-11-09 NOTE — TELEPHONE ENCOUNTER
Pt called stating pharmacy never received prescription, called pharmacy to verify, need prescription resent.

## 2021-11-15 NOTE — TELEPHONE ENCOUNTER
Writer spoke to [facility] inquiring if cardiac clearance has been filled out. She states dr has clearance on his desk and needs to sign it. She will remind dr of clearance needing signed and asked for our office fax number. Mike Crystal will fax clearance after it is signed.

## 2021-11-17 NOTE — TELEPHONE ENCOUNTER
Writer called patient to discuss scheduling procedure. No answer left vm informing him we received clearance to hold Eliquis x5 days prior to procedure  and ASA 7 prior to procedure and a couple options on days for procedure at [address].

## 2021-11-17 NOTE — TELEPHONE ENCOUNTER
Rec'd cardiac clearance from [facility]. He has moderate but acceptable risk to undergo colonscopy. He is stable to hold his anit-platelets for five days if needed. Pt is stable to hold Eliquis for 3 days prior to procedure. Eliquis should be restarted 24 hours after the procedure if there is no bleeding from the procedure. Need to call pt to schedule proc; both clearances rec'd and scanned into media on pt's chart.

## 2021-11-18 ENCOUNTER — HOSPITAL ENCOUNTER (OUTPATIENT)
Dept: OTHER | Age: 66
Discharge: HOME | End: 2021-11-18
Payer: MEDICAID

## 2021-11-18 VITALS
RESPIRATION RATE: 20 BRPM | DIASTOLIC BLOOD PRESSURE: 74 MMHG | SYSTOLIC BLOOD PRESSURE: 130 MMHG | HEART RATE: 65 BPM | WEIGHT: 231.4 LBS | OXYGEN SATURATION: 99 % | BODY MASS INDEX: 39.72 KG/M2

## 2021-11-18 PROCEDURE — 99212 OFFICE O/P EST SF 10 MIN: CPT

## 2021-11-18 RX ORDER — BISACODYL 5 MG
TABLET, DELAYED RELEASE (ENTERIC COATED) ORAL
Qty: 4 TABLET | Refills: 0 | Status: SHIPPED | OUTPATIENT
Start: 2021-11-18 | End: 2022-02-15

## 2021-11-18 RX ORDER — POLYETHYLENE GLYCOL 3350 17 G/17G
POWDER, FOR SOLUTION ORAL
Qty: 238 G | Refills: 0 | Status: SHIPPED | OUTPATIENT
Start: 2021-11-18 | End: 2022-06-28

## 2021-11-18 NOTE — TELEPHONE ENCOUNTER
Writer called and spoke with patient regarding clearance and scheduling colon screen. Writer informed patient he was cleared to hold Eliquis for 3 days prior to procedure(Sunday 12/12/21) and hold ASA 5 days prior to procedure (Tuesday 12/14/21) by his Cardiologist. Patient agreed to schedule procedure for Friday 12/17/21 at [facility] with Dr Cristina Leon . Miralax/Dul bowel prep instructions given to patient over the phone, patient voice understanding and Rx sent to [place] per patients request. Patient requested all procedure information be sent via "[employer] Writer will also sent a hard copy by mail.

## 2021-11-22 ENCOUNTER — CARE COORDINATION (OUTPATIENT)
Dept: CARE COORDINATION | Age: 66
End: 2021-11-22

## 2021-11-24 ENCOUNTER — CARE COORDINATION (OUTPATIENT)
Dept: CARE COORDINATION | Age: 66
End: 2021-11-24

## 2021-11-24 NOTE — CARE COORDINATION
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 He states he had an injection to the eye and his site has improved. He states he has a follow up the first part of December. He states he is getting together with his family for Thanksgiving. He reports \"they know what I am going through\". He states they don't salt the food when cooking. CM discussed the weight perimeters to watch for with daily weights noting when to call his PCP office or CM. Caleb Butler expressed an understanding. Plan:  F/U with Caleb Butler after the holiday. Congestive Heart Failure Assessment    Are you currently restricting fluids?: 1800cc  Do you understand a low sodium diet?: Yes  Do you understand how to read food labels?: Yes  How many restaurant meals do you eat per week?: 0  Do you salt your food before tasting it?: No     No patient-reported symptoms      Symptoms:     Symptom course: stable  Patient-reported weight (lb): 231  Weight trend: stable  Salt intake watch compared to last visit: stable               Care Coordination Interventions    Program Enrollment: Complex Care  Referral from Primary Care Provider: No  Suggested Interventions and Community Resources  Disease Specific Clinic: Completed (Comment: CHF)  Registered Dietician: Completed  Zone Management Tools: Completed (Comment: CHF, DM)         Goals Addressed    None         Prior to Admission medications    Medication Sig Start Date End Date Taking?  Authorizing Provider   polyethylene glycol (GLYCOLAX) 17 GM/SCOOP powder Use as directed following your patient instructions given by office 11/18/21   Demetris Lacy MD   bisacodyl (BISACODYL) 5 MG EC tablet Take 4 tabs at 10am the day prior to Colonoscopy 11/18/21   Demetris Lacy MD   levothyroxine (SYNTHROID) 75 MCG tablet Take 1 tablet by mouth Daily 11/9/21 2/7/22  LIDIA Snowden CNP   metFORMIN (GLUCOPHAGE) 1000 MG tablet Take 1 tablet by mouth 2 times daily (with meals) 11/3/21   LIDIA Snowden CNP   furosemide (LASIX) 40 MG tablet TAKE 1/2 TABLET BY MOUTH 2 TIMES A DAY 9/28/21   LIDIA Snowden CNP   isosorbide mononitrate (IMDUR) 30 MG extended release tablet Take 1 tablet by mouth daily 8/30/21   LIDIA Snowden CNP   allopurinol (ZYLOPRIM) 300 MG tablet Take 1 tablet by mouth daily 8/30/21 11/18/21  LIDIA Snowden CNP   ENTRESTO 24-26 MG per tablet  8/20/21   Historical Provider, MD   metoprolol succinate (TOPROL XL) 50 MG extended release tablet Take 50 mg by mouth daily    Historical Provider, MD   apixaban (ELIQUIS) 5 MG TABS tablet TAKE 1 TABLET BY MOUTH 2 TIMES DAILY 7/9/21   LIDIA Snowden CNP   atorvastatin (LIPITOR) 40 MG tablet TAKE 1 TABLET BY MOUTH DAILY 7/9/21   LIDIA Snowden CNP   ferrous sulfate (IRON 325) 325 (65 Fe) MG tablet Take 1 tablet by mouth daily (with breakfast) 7/2/21   LIDIA Snowden CNP   aspirin (ASPIRIN ADULT LOW STRENGTH) 81 MG EC tablet TAKE 1 TABLET TWICE A DAY 6/7/21   LIDIA Snowden CNP   Handicap Placard MISC by Does not apply route Exp: 1/2025 12/23/20   LIDIA Snowden CNP   Lancets MISC Daily 12/2/20   LIDIA Snowden CNP   Blood Gluc Meter Disp-Strips (BLOOD GLUCOSE METER DISPOSABLE) SHAN Daily and as needed 12/2/20   LIDIA Snowden CNP   blood glucose monitor strips 1 strip by Other route daily Test 1 times a day & as needed for symptoms of irregular blood glucose. 12/2/20 10/21/21  LIDIA Snowden CNP   blood glucose test strips (FREESTYLE LITE) strip USE AS DIRECTED TWICE A DAY 1/2/20   Charity Ly MD   ammonium lactate (LAC-HYDRIN) 12 % lotion Apply topically daily after bathing sparing the space between the toes.  1/2/20   Charity Ly MD   TRUEPLUS LANCETS 33G MISC TEST AS DIRECTED 1/2/20   Josh Carrillo MD   Alcohol Swabs (ALCOHOL PREP) 70 % PADS USE AS DIRECTED 5/31/19   Darek Ely MD       Future Appointments   Date Time Provider Keerthi England   12/14/2021  2:10 PM Dixie Garza MD [facility] None   12/20/2021  1:00 PM [facility]IN[facility] RM 2 [facility]F [facility]   1/10/2022  3:45 PM Nevin Hernandez MD sv gr lks [facility]   2/14/2022  1:00 PM Slick Sherman MD SV Cancer [facility]   2/28/2022  1:30 PM LIDIA Tinajero - CNP [facility]   8/29/2022  1:15 PM Jimmy Daniel, DO Resp Spec [address] [facility]

## 2021-11-30 DIAGNOSIS — I48.0 PAF (PAROXYSMAL ATRIAL FIBRILLATION) (HCC): ICD-10-CM

## 2021-12-01 DIAGNOSIS — E11.9 TYPE 2 DIABETES MELLITUS WITHOUT COMPLICATION, WITHOUT LONG-TERM CURRENT USE OF INSULIN (HCC): ICD-10-CM

## 2021-12-01 DIAGNOSIS — I50.42 CHRONIC COMBINED SYSTOLIC AND DIASTOLIC HEART FAILURE (HCC): ICD-10-CM

## 2021-12-01 DIAGNOSIS — D50.9 IRON DEFICIENCY ANEMIA, UNSPECIFIED IRON DEFICIENCY ANEMIA TYPE: ICD-10-CM

## 2021-12-01 DIAGNOSIS — E03.2 HYPOTHYROIDISM DUE TO MEDICATION: ICD-10-CM

## 2021-12-01 DIAGNOSIS — I25.10 CORONARY ARTERY DISEASE INVOLVING NATIVE CORONARY ARTERY OF NATIVE HEART WITHOUT ANGINA PECTORIS: ICD-10-CM

## 2021-12-01 RX ORDER — ISOSORBIDE MONONITRATE 30 MG/1
30 TABLET, EXTENDED RELEASE ORAL DAILY
Qty: 30 TABLET | Refills: 3 | Status: SHIPPED | OUTPATIENT
Start: 2021-12-01 | End: 2022-04-11

## 2021-12-01 RX ORDER — BLOOD SUGAR DIAGNOSTIC
STRIP MISCELLANEOUS
Qty: 100 STRIP | Refills: 2 | Status: SHIPPED | OUTPATIENT
Start: 2021-12-01 | End: 2022-05-16

## 2021-12-01 RX ORDER — FERROUS SULFATE 325(65) MG
TABLET ORAL
Qty: 30 TABLET | Refills: 5 | Status: SHIPPED | OUTPATIENT
Start: 2021-12-01 | End: 2022-06-06

## 2021-12-01 RX ORDER — LEVOTHYROXINE SODIUM 0.07 MG/1
75 TABLET ORAL DAILY
Qty: 90 TABLET | Refills: 0 | Status: SHIPPED | OUTPATIENT
Start: 2021-12-01 | End: 2022-02-28 | Stop reason: SDUPTHER

## 2021-12-01 RX ORDER — ASPIRIN 81 MG/1
TABLET ORAL
Qty: 60 TABLET | Refills: 3 | Status: SHIPPED | OUTPATIENT
Start: 2021-12-01 | End: 2022-03-21

## 2021-12-08 NOTE — TELEPHONE ENCOUNTER
Pt left msg on ofc vm 12/8/21 @ 11:41am stating he was to receive his bowel prep instructions through his [address], but did not receive them. Please return phone call to . Writer sent prep instructions today to pt's [address]. Writer returned pt's phone call and spoke to pt in regards to his bowel prep instructions. Writer informed pt his prep instructions have been sent to his [address] and we went over bowel prep instructions w/ pt over the phone. Pt voices his understanding. Pt also states he is having an injection in his eye on Monday and wants to know if there is anything that might interfere w/ colon proc. Writer instructed pt to call his dr arely is that is giving him his eye injection and inform them he is sched for colon proc on 12/17/21 and ask if anything from the injection would affect his colon proc. Pt states he will call that office.

## 2021-12-10 ENCOUNTER — HOSPITAL ENCOUNTER (OUTPATIENT)
Age: 66
Discharge: HOME | End: 2021-12-10
Payer: MEDICAID

## 2021-12-10 LAB
ANION GAP SERPL CALCULATED.3IONS-SCNC: 16 MMOL/L (ref 9–17)
BUN BLDV-MCNC: 19 MG/DL (ref 8–23)
BUN/CREAT BLD: ABNORMAL (ref 9–20)
CALCIUM SERPL-MCNC: 8.6 MG/DL (ref 8.6–10.4)
CHLORIDE BLD-SCNC: 106 MMOL/L (ref 98–107)
CO2: 24 MMOL/L (ref 20–31)
CREAT SERPL-MCNC: 0.99 MG/DL (ref 0.7–1.2)
GFR AFRICAN AMERICAN: >60 ML/MIN
GFR NON-AFRICAN AMERICAN: >60 ML/MIN
GFR SERPL CREATININE-BSD FRML MDRD: ABNORMAL ML/MIN/{1.73_M2}
GFR SERPL CREATININE-BSD FRML MDRD: ABNORMAL ML/MIN/{1.73_M2}
GLUCOSE BLD-MCNC: 111 MG/DL (ref 70–99)
POTASSIUM SERPL-SCNC: 4.5 MMOL/L (ref 3.7–5.3)
SODIUM BLD-SCNC: 146 MMOL/L (ref 135–144)

## 2021-12-10 PROCEDURE — 36415 COLL VENOUS BLD VENIPUNCTURE: CPT

## 2021-12-10 PROCEDURE — 80048 BASIC METABOLIC PNL TOTAL CA: CPT

## 2021-12-17 ENCOUNTER — ANESTHESIA EVENT (OUTPATIENT)
Dept: ENDOSCOPY | Age: 66
End: 2021-12-17
Payer: MEDICAID

## 2021-12-17 ENCOUNTER — HOSPITAL ENCOUNTER (OUTPATIENT)
Age: 66
Setting detail: OUTPATIENT SURGERY
Discharge: HOME | End: 2021-12-17
Attending: INTERNAL MEDICINE | Admitting: INTERNAL MEDICINE
Payer: MEDICAID

## 2021-12-17 ENCOUNTER — ANESTHESIA (OUTPATIENT)
Dept: ENDOSCOPY | Age: 66
End: 2021-12-17
Payer: MEDICAID

## 2021-12-17 VITALS
HEART RATE: 50 BPM | WEIGHT: 230 LBS | HEIGHT: 64 IN | BODY MASS INDEX: 39.27 KG/M2 | OXYGEN SATURATION: 100 % | DIASTOLIC BLOOD PRESSURE: 59 MMHG | RESPIRATION RATE: 16 BRPM | SYSTOLIC BLOOD PRESSURE: 91 MMHG | TEMPERATURE: 96.8 F

## 2021-12-17 VITALS — OXYGEN SATURATION: 100 % | SYSTOLIC BLOOD PRESSURE: 79 MMHG | DIASTOLIC BLOOD PRESSURE: 50 MMHG

## 2021-12-17 LAB — GLUCOSE BLD-MCNC: 78 MG/DL (ref 75–110)

## 2021-12-17 PROCEDURE — 82947 ASSAY GLUCOSE BLOOD QUANT: CPT

## 2021-12-17 PROCEDURE — 2580000003 HC RX 258: Performed by: INTERNAL MEDICINE

## 2021-12-17 PROCEDURE — 7100000011 HC PHASE II RECOVERY - ADDTL 15 MIN: Performed by: INTERNAL MEDICINE

## 2021-12-17 PROCEDURE — 2709999900 HC NON-CHARGEABLE SUPPLY: Performed by: INTERNAL MEDICINE

## 2021-12-17 PROCEDURE — 7100000010 HC PHASE II RECOVERY - FIRST 15 MIN: Performed by: INTERNAL MEDICINE

## 2021-12-17 PROCEDURE — 3700000001 HC ADD 15 MINUTES (ANESTHESIA): Performed by: INTERNAL MEDICINE

## 2021-12-17 PROCEDURE — 3609027000 HC COLONOSCOPY: Performed by: INTERNAL MEDICINE

## 2021-12-17 PROCEDURE — 3700000000 HC ANESTHESIA ATTENDED CARE: Performed by: INTERNAL MEDICINE

## 2021-12-17 PROCEDURE — 2500000003 HC RX 250 WO HCPCS: Performed by: NURSE ANESTHETIST, CERTIFIED REGISTERED

## 2021-12-17 PROCEDURE — 6360000002 HC RX W HCPCS: Performed by: NURSE ANESTHETIST, CERTIFIED REGISTERED

## 2021-12-17 PROCEDURE — 2580000003 HC RX 258: Performed by: NURSE ANESTHETIST, CERTIFIED REGISTERED

## 2021-12-17 RX ORDER — SODIUM CHLORIDE 9 MG/ML
INJECTION, SOLUTION INTRAVENOUS CONTINUOUS PRN
Status: DISCONTINUED | OUTPATIENT
Start: 2021-12-17 | End: 2021-12-17 | Stop reason: SDUPTHER

## 2021-12-17 RX ORDER — SODIUM CHLORIDE 9 MG/ML
INJECTION, SOLUTION INTRAVENOUS ONCE
Status: COMPLETED | OUTPATIENT
Start: 2021-12-17 | End: 2021-12-17

## 2021-12-17 RX ORDER — LIDOCAINE HYDROCHLORIDE 10 MG/ML
INJECTION, SOLUTION EPIDURAL; INFILTRATION; INTRACAUDAL; PERINEURAL PRN
Status: DISCONTINUED | OUTPATIENT
Start: 2021-12-17 | End: 2021-12-17 | Stop reason: SDUPTHER

## 2021-12-17 RX ORDER — PROPOFOL 10 MG/ML
INJECTION, EMULSION INTRAVENOUS PRN
Status: DISCONTINUED | OUTPATIENT
Start: 2021-12-17 | End: 2021-12-17 | Stop reason: SDUPTHER

## 2021-12-17 RX ADMIN — SODIUM CHLORIDE: 9 INJECTION, SOLUTION INTRAVENOUS at 10:50

## 2021-12-17 RX ADMIN — PROPOFOL 270 MG: 10 INJECTION, EMULSION INTRAVENOUS at 12:19

## 2021-12-17 RX ADMIN — SODIUM CHLORIDE: 9 INJECTION, SOLUTION INTRAVENOUS at 10:34

## 2021-12-17 RX ADMIN — LIDOCAINE HYDROCHLORIDE 50 MG: 10 INJECTION, SOLUTION EPIDURAL; INFILTRATION; INTRACAUDAL; PERINEURAL at 12:19

## 2021-12-17 ASSESSMENT — PULMONARY FUNCTION TESTS
PIF_VALUE: 1
PIF_VALUE: 0
PIF_VALUE: 0
PIF_VALUE: 1
PIF_VALUE: 0
PIF_VALUE: 1
PIF_VALUE: 0
PIF_VALUE: 1
PIF_VALUE: 1
PIF_VALUE: 0
PIF_VALUE: 1
PIF_VALUE: 0
PIF_VALUE: 1

## 2021-12-17 ASSESSMENT — PAIN SCALES - GENERAL
PAINLEVEL_OUTOF10: 0

## 2021-12-17 ASSESSMENT — PAIN - FUNCTIONAL ASSESSMENT: PAIN_FUNCTIONAL_ASSESSMENT: 0-10

## 2021-12-17 NOTE — H&P
History and Physical    Pt Name: Mor Burroughs  MRN: [id number]  YOB: 1955  Date of evaluation: 12/17/2021  Primary Care Physician: LIDIA Werner CNP    SUBJECTIVE:   History of Chief Complaint:    Mor Burroughs is a 74 y.o. male who is scheduled today for COLORECTAL CANCER SCREENING, NOT HIGH RISK. Patient reports he had recently mailed in a cologuard test which was positive for blood. Patient denies history of rectal bleeding, diarrhea, constipation, abdominal pain, dysphagia, nausea or vomiting. He has a history of previous colonoscopy with polyps. Allergies  is allergic to zetia [ezetimibe], bactrim, cephalexin, cephalexin, and sulfamethoxazole-trimethoprim. Medications  Prior to Admission medications    Medication Sig Start Date End Date Taking?  Authorizing Provider   isosorbide mononitrate (IMDUR) 30 MG extended release tablet TAKE 1 TABLET BY MOUTH DAILY 12/1/21   LIDIA Werner CNP   FEROSUL 325 (65 Fe) MG tablet TAKE 1 TABLET BY MOUTH DAILY (WITH BREAKFAST) 12/1/21   LIDIA Werner CNP   levothyroxine (SYNTHROID) 75 MCG tablet TAKE 1 TABLET BY MOUTH DAILY 12/1/21 3/1/22  LIDIA Werner CNP   ONETOUCH VERIO strip TEST BLOOD SUGAR ONCE A DAY AND AS NEEDED FOR SYMPTOMS OF IRREGULAR BLOOD SUGAR 12/1/21   LIDIA Werner CNP   aspirin 81 MG EC tablet TAKE 1 TABLET TWICE A DAY 12/1/21   LIDIA Werner CNP   apixaban (ELIQUIS) 5 MG TABS tablet TAKE 1 TABLET BY MOUTH 2 TIMES DAILY 11/30/21   LIDIA Werner CNP   polyethylene glycol [facility]) 17 GM/SCOOP powder Use as directed following your patient instructions given by office 11/18/21   Willi Lua MD   bisacodyl (BISACODYL) 5 MG EC tablet Take 4 tabs at 10am the day prior to Colonoscopy 11/18/21   Willi Lua MD   metFORMIN (GLUCOPHAGE) 1000 MG tablet Take 1 tablet by mouth 2 times daily (with meals) 11/3/21   LIDIA Werner CNP   furosemide (LASIX) 40 MG tablet TAKE 1/2 TABLET BY MOUTH 2 TIMES A DAY 9/28/21   LIDIA Villagomez CNP   allopurinol (ZYLOPRIM) 300 MG tablet Take 1 tablet by mouth daily 8/30/21 11/18/21  LIDIA Villagomez CNP   ENTRESTO 24-26 MG per tablet  8/20/21   Historical Provider, MD   metoprolol succinate (TOPROL XL) 50 MG extended release tablet Take 50 mg by mouth daily    Historical Provider, MD   atorvastatin (LIPITOR) 40 MG tablet TAKE 1 TABLET BY MOUTH DAILY 7/9/21   LIDIA Villagomez CNP   Handicap Placard MISC by Does not apply route Exp: 1/2025 12/23/20   LIDIA Villagomez CNP   Lancets MISC Daily 12/2/20   LIDIA Villagomez CNP   Blood Gluc Meter Disp-Strips (BLOOD GLUCOSE METER DISPOSABLE) SHAN Daily and as needed 12/2/20   LIDIA Villagomez CNP   blood glucose test strips (FREESTYLE LITE) strip USE AS DIRECTED TWICE A DAY 1/2/20   Benedict Maxwell MD   ammonium lactate (LAC-HYDRIN) 12 % lotion Apply topically daily after bathing sparing the space between the toes.  1/2/20   Benedict Maxwell MD   TRUEPLUS LANCETS 33G MISC TEST AS DIRECTED 1/2/20   Benedict Maxwell MD   Alcohol Swabs (ALCOHOL PREP) 70 % PADS USE AS DIRECTED 5/31/19   Iwona Marroquin MD     Past Medical History    has a past medical history of Anemia, Anxiety, CAD (coronary artery disease), Cardiac defibrillator in place, Cardiomyopathy [facility]), CHF (congestive heart failure) ([address] Chronic combined systolic and diastolic heart failure ([address] s/[ AICD placed, Chronic kidney disease, Complex sleep apnea syndrome, Diabetic retinopathy ([address] Diverticulitis, DJD (degenerative joint disease), Edentulous, Gout, HTN (hypertension), Hyperlipidemia, Hypertension, Iron deficiency anemia, Ischemic cardiomyopathy, Morbid obesity ([address] Obesity, TANNER variably compliant with BiPAP, Osteoarthritis, PAF (paroxysmal atrial fibrillation) ([address] Psychophysiologic insomnia, Type II or unspecified type diabetes mellitus without mention of complication, not stated as uncontrolled, and Unspecified sleep apnea. Past Surgical History   has a past surgical history that includes Colonoscopy (2007); pacemaker placement ( or ); Cardiac catheterization (); Cardiac catheterization (2013); Coronary angioplasty (, ); Cardiac defibrillator placement (2015); bone marrow biopsy (2012); and pr colsc flx w/rmvl of tumor polyp lesion snare tq (N/A, 2017). Social History   reports that he quit smoking about 47 years ago. His smoking use included cigarettes. He started smoking about 50 years ago. He has a 3.00 pack-year smoking history. He has never used smokeless tobacco.   reports no history of alcohol use. reports previous drug use. Drug: Marijuana [employer]). Marital Status single  Children no  Occupation retired  Family History  Family Status   Relation Name Status    Mother      Father     Cecil HILLIARD       family history includes Cancer in his mother; Heart Disease in his maternal grandmother, maternal uncle, and mother. Review of Systems:  CONSTITUTIONAL:   negative for fevers, chills, fatigue and malaise    EYES:   negative for double vision, blurred vision and photophobia    HEENT:   negative for tinnitus, epistaxis and sore throat     RESPIRATORY:   negative for cough, shortness of breath, wheezing     CARDIOVASCULAR:   negative for chest pain, palpitations, syncope, edema     GASTROINTESTINAL:   negative for nausea, vomiting     GENITOURINARY:   negative for incontinence     MUSCULOSKELETAL:   negative for neck or back pain     NEUROLOGICAL:   Negative for weakness and tingling  negative for headaches and dizziness     PSYCHIATRIC:   negative for anxiety       OBJECTIVE:   VITALS:  height is 5' 4\" (1.626 m) and weight is 230 lb (104.3 kg). His infrared temperature is 97.5 °F (36.4 °C). His blood pressure is 142/71 (abnormal) and his pulse is 62.  His respiration is 20 and oxygen saturation

## 2021-12-17 NOTE — OP NOTE
Operative Note      Patient: Michelle Nice  YOB: 1955  MRN: [id number]    Date of Procedure: 12/17/2021    Pre-Op Diagnosis: COLON SCREENING    Post-Op Diagnosis: POOR PREP. Procedure(s):  COLONOSCOPY DIAGNOSTIC    Surgeon(s):  Hadley Cho MD    Assistant:   * No surgical staff found *    Anesthesia: Monitor Anesthesia Care    Estimated Blood Loss (mL): Minimal    Complications: None    Specimens:   * No specimens in log *    Implants:  * No implants in log *      Drains: * No LDAs found *              [facility]     [facility] ENDOSCOPY     COLONOSCOPY    PROCEDURE DATE: 12/17/21    REFERRING PHYSICIAN: No ref. provider found     PRIMARY CARE PROVIDER: LIDIA Lopez - [facility]    ATTENDING PHYSICIAN: Hadley Cho MD     HISTORY: Mr. Michelle Nice is a 74 y.o. male who presents to the [facility] endoscopy unit for colonoscopy. The patient's clinical history is remarkable for CAD, s/p PCI, CHF, CKD, TANNER, obesity, prior history of colon polyps, referred for positive fit. He is currently medically stable and appropriate for the planned procedure. PREOPERATIVE DIAGNOSIS: previous adenomatous polyp. PROCEDURES:   Transanal Colonoscopy --diagnostic. POSTPROCEDURE DIAGNOSIS:    POOR PREP--unable to visualized right colon, limited exam    1) Diverticulosis, left side  2) Hemorrhoids, moderate external    MEDICATIONS:     MAC per anesthesia     EBL <10cc      INSTRUMENT: Olympus CF-H180 AL Pediatric flexible Colonoscope. PREPARATION: The nature and character of the procedure as well as risks, benefits, and alternatives were discussed with the patient and informed consent was obtained. Complications were said to include, but were not limited to: medication allergy, medication reaction, cardiovascular and respiratory problems, bleeding, perforation, infection, and/or missed diagnosis.  Following arrival in the endoscopy room, the patient was placed in the left lateral decubitus position and final time-out accomplished in the presence of the nursing staff. Baseline vital signs were obtained and reviewed, and IV sedation was subsequently initiated. FINDINGS: Rectal examination demonstrated no significant visible external abnormality and digital palpation was unremarkable. Following adequate conscious sedation the colonoscope was introduced and advanced under direct visualization to the cecum, which was identified by the ileocecal valve and appendiceal orifice. The bowel preparation was felt to be POOR. This included copious amounts of thick stool that was not able to be adequately irrigated and aspirated. Cecal intubation time was 8 minutes. Once maximally inserted, the endoscope was withdrawn and the mucosa was carefully inspected. The mucosal exam was limited. Retroflexion was performed in the rectum and hemorrhoids seen. Withdrawal time was 16 minutes. IMPRESSION:     POOR PREP--unable to visualized right colon, limited exam    1) Diverticulosis, left side  2) Hemorrhoids, moderate external    RECOMMENDATIONS:   1) Follow up with referring provider, as previously scheduled. 2) Repeat Colonoscopy in 3-6 months with extended bowel prep         Rob Bazan MD  [facility]   12/17/21    This note is created with the assistance of a speech recognition program.  While intending to generate a document that actually reflects the content of the visit, the document can still have some errors including those of syntax and sound a like substitutions which may escape proof reading. It such instances, actual meaning can be extrapolated by contextual diversion. The patient was counseled at length about the risks of joe Covid-19 during their perioperative period and any recovery window from their procedure.   The patient was made aware that joe Covid-19  may worsen their prognosis for recovering from their procedure  and lend to a higher morbidity and/or mortality risk. All material risks, benefits, and reasonable alternatives including postponing the procedure were discussed. The patient DOES wish to proceed with the procedure at this time.         Electronically signed by Alyson Mars MD on 12/17/2021 at 12:53 PM

## 2021-12-17 NOTE — ANESTHESIA PRE PROCEDURE
Department of Anesthesiology  Preprocedure Note       Name:  Myah Kumari   Age:  74 y.o.  :  1955                                          MRN:  [id number]         Date:  2021      Surgeon: Nuno Lindsey):  Surinder Patton MD    Procedure: Procedure(s):  COLORECTAL CANCER SCREENING, NOT HIGH RISK    Medications prior to admission:   Prior to Admission medications    Medication Sig Start Date End Date Taking?  Authorizing Provider   isosorbide mononitrate (IMDUR) 30 MG extended release tablet TAKE 1 TABLET BY MOUTH DAILY 21  Yes LIDIA Mitchell CNP   FEROSUL 325 (65 Fe) MG tablet TAKE 1 TABLET BY MOUTH DAILY (WITH BREAKFAST) 21  Yes LIDIA Mitchell CNP   levothyroxine (SYNTHROID) 75 MCG tablet TAKE 1 TABLET BY MOUTH DAILY 12/1/21 3/1/22 Yes LIDIA Mitchell CNP   metFORMIN (GLUCOPHAGE) 1000 MG tablet Take 1 tablet by mouth 2 times daily (with meals) 11/3/21  Yes LIDIA Mitchell CNP   furosemide (LASIX) 40 MG tablet TAKE 1/2 TABLET BY MOUTH 2 TIMES A DAY 21  Yes LIDIA Mitchell CNP   ENTRESTO 24-26 MG per tablet  21  Yes Historical Provider, MD   metoprolol succinate (TOPROL XL) 50 MG extended release tablet Take 50 mg by mouth daily   Yes Historical Provider, MD   atorvastatin (LIPITOR) 40 MG tablet TAKE 1 TABLET BY MOUTH DAILY 21  Yes LIDIA Mitchell CNP   ONETOUCH VERIO strip TEST BLOOD SUGAR ONCE A DAY AND AS NEEDED FOR SYMPTOMS OF IRREGULAR BLOOD SUGAR 21   LIDIA Mitchell CNP   aspirin 81 MG EC tablet TAKE 1 TABLET TWICE A DAY 21   LIDIA Mitchell CNP   apixaban (ELIQUIS) 5 MG TABS tablet TAKE 1 TABLET BY MOUTH 2 TIMES DAILY 21   LIDIA Mitchell CNP   polyethylene glycol [facility]) 17 GM/SCOOP powder Use as directed following your patient instructions given by office 21   Surinder Patton MD   bisacodyl (BISACODYL) 5 MG EC tablet Take 4 tabs at 10am the day prior to Colonoscopy 21 Angy Felton MD   allopurinol (ZYLOPRIM) 300 MG tablet Take 1 tablet by mouth daily 8/30/21 11/18/21  LIDIA Villagomez CNP   Handicap Placard MISC by Does not apply route Exp: 1/2025 12/23/20   LIDIA Villagomez CNP   Lancets MISC Daily 12/2/20   LIDIA Villagomez CNP   Blood Gluc Meter Disp-Strips (BLOOD GLUCOSE METER DISPOSABLE) SHAN Daily and as needed 12/2/20   LIDIA Villagomez CNP   blood glucose test strips (FREESTYLE LITE) strip USE AS DIRECTED TWICE A DAY 1/2/20   Benedict Maxwell MD   ammonium lactate (LAC-HYDRIN) 12 % lotion Apply topically daily after bathing sparing the space between the toes. 1/2/20   Benedict Maxwell MD   TRUEPLUS LANCETS 33G MISC TEST AS DIRECTED 1/2/20   Benedict Maxwell MD   Alcohol Swabs (ALCOHOL PREP) 70 % PADS USE AS DIRECTED 5/31/19   Iwona Marroquin MD       Current medications:    Current Facility-Administered Medications   Medication Dose Route Frequency Provider Last Rate Last Admin    0.9 % sodium chloride infusion   IntraVENous Once Angy Felton MD           Allergies: Allergies   Allergen Reactions    Zetia [Ezetimibe] Shortness Of Breath    Bactrim Other (See Comments)     palpitations    Cephalexin     Cephalexin     Sulfamethoxazole-Trimethoprim        Problem List:    Patient Active Problem List   Diagnosis Code    Acute on chronic combined systolic and diastolic congestive heart failure (HCC) I50.43    Chronic kidney disease, stage II (mild) N18.2    Chronic gout M1A. 9XX0    Morbid obesity ([facility] [address] E66.01    Normocytic normochromic anemia D64.9    Hyperlipidemia E78.5    Iron deficiency anemia D50.9    Anxiety disorder  F41.9    DJD (degenerative joint disease) M19.90    TANNER variably compliant with BiPAP G47.33, Z99.89    CAD (coronary artery disease) s/p stenting of L anterior descending artery 2004 I25.10    Diabetic retinopathy (HCC) E11.319    Ischemic cardiomyopathy I25.5    HTN (hypertension) I10    NSTEMI (non-ST elevated myocardial infarction) ([facility]) I21.4    MGUS (monoclonal gammopathy of unknown significance) D47.2    Type 2 diabetes mellitus with chronic kidney disease ([facility]) E11.22    AICD (automatic cardioverter/defibrillator) present Z95.810    PAF (paroxysmal atrial fibrillation) ([facility]) I48.0    Acute respiratory failure with hypoxia ([facility]) J96.01    Coronary angioplasty status Z98.61    Hypokalemia E87.6    Acute on chronic systolic heart failure ([facility]) I50.23    Acute on chronic congestive heart failure ([facility]) I50.9    Acute HFrEF (heart failure with reduced ejection fraction) ([facility]) I50.21    Hypothyroidism due to medication E03.2    Acute cardiac pulmonary edema ([facility]) I50.1       Past Medical History:        Diagnosis Date    Anemia     Anxiety     when he lies flat, no RX    CAD (coronary artery disease)     MI stents x5, follows with cardiology Dr. Munir Doyle Cardiac defibrillator in place     Cardiomyopathy [facility])     CHF (congestive heart failure) ([address]     Chronic combined systolic and diastolic heart failure ([address] s/[ AICD placed 9/25/2011    Chronic kidney disease     Complex sleep apnea syndrome     Diabetic retinopathy ([address] 9/25/2011    Diverticulitis     DJD (degenerative joint disease) 9/25/2011    Edentulous     Gout     HTN (hypertension) 3/30/2012    Hyperlipidemia     Hypertension     Iron deficiency anemia 9/25/2011    Ischemic cardiomyopathy     Morbid obesity ([address] 9/25/2011    Obesity     Morbid obesity due to excess calories    TANNER variably compliant with BiPAP 9/25/2011    Osteoarthritis     PAF (paroxysmal atrial fibrillation) ([facility])     Psychophysiologic insomnia     Type II or unspecified type diabetes mellitus without mention of complication, not stated as uncontrolled     Unspecified sleep apnea     uses V-pap       Past Surgical History:        Procedure Laterality Date    BONE MARROW BIOPSY  2012 approx    CARDIAC CATHETERIZATION  8-2007 severe stenosis pre-stent area    CARDIAC CATHETERIZATION  2013    Very peripheral stenosis, not amendable to PCI, stents patent    CARDIAC DEFIBRILLATOR PLACEMENT  2015    COLONOSCOPY  11 7 2007   [address]  ,     stent LAD    PACEMAKER PLACEMENT   or 2006    AICD    NC COLSC FLX W/RMVL OF TUMOR POLYP LESION SNARE TQ N/A 2017    COLONOSCOPY POLYPECTOMY SNARE/COLD BIOPSY performed by Karo Rosas DO at [facility]       Social History:    Social History     Tobacco Use    Smoking status: Former Smoker     Packs/day: 1.00     Years: 3.00     Pack years: 3.00     Types: Cigarettes     Start date: 1971     Quit date: 1974     Years since quittin.9    Smokeless tobacco: Never Used   Substance Use Topics    Alcohol use: No     Alcohol/week: 0.0 standard drinks                                Counseling given: Not Answered      Vital Signs (Current):   Vitals:    21 1023   BP: (!) 142/71   Pulse: 62   Resp: 20   Temp: 36.4 °C (97.5 °F)   TempSrc: Infrared   SpO2: 98%   Weight: 230 lb (104.3 kg)   Height: 5' 4\" (1.626 m)                                              BP Readings from Last 3 Encounters:   21 (!) 142/71   21 112/60   21 130/74       NPO Status: Time of last liquid consumption: 0915 (sip with meds)                        Time of last solid consumption: 0000                        Date of last liquid consumption: 21                        Date of last solid food consumption: 12/15/21    BMI:   Wt Readings from Last 3 Encounters:   21 230 lb (104.3 kg)   21 230 lb (104.3 kg)   21 231 lb 6.4 oz (105 kg)     Body mass index is 39.48 kg/m².     CBC:   Lab Results   Component Value Date    WBC 6.4 2021    RBC 3.87 2021    RBC 4.85 2011    HGB 10.8 2021    HCT 35.9 2021    MCV 92.8 2021    RDW 17.2 2021     2021     2011       CMP:   Lab Results   Component Value Date     12/10/2021    K 4.5 12/10/2021     12/10/2021    CO2 24 12/10/2021    BUN 19 12/10/2021    CREATININE 0.99 12/10/2021    GFRAA >60 12/10/2021    LABGLOM >60 12/10/2021    GLUCOSE 111 12/10/2021    GLUCOSE 123 03/19/2012    PROT 7.7 06/22/2021    CALCIUM 8.6 12/10/2021    BILITOT 0.37 06/22/2021    ALKPHOS 81 06/22/2021    AST 16 08/13/2021    ALT 10 08/13/2021       POC Tests: No results for input(s): POCGLU, POCNA, POCK, POCCL, POCBUN, POCHEMO, POCHCT in the last 72 hours.     Coags:   Lab Results   Component Value Date    PROTIME 10.6 09/01/2019    INR 1.0 09/01/2019    APTT 24.8 03/21/2020       HCG (If Applicable): No results found for: PREGTESTUR, PREGSERUM, HCG, HCGQUANT     ABGs: No results found for: PHART, PO2ART, BNA3YAD, RZD0FCI, BEART, G7AHLQRQ     Type & Screen (If Applicable):  No results found for: LABABO, LABRH    Drug/Infectious Status (If Applicable):  Lab Results   Component Value Date    HEPCAB NONREACTIVE 06/14/2018       COVID-19 Screening (If Applicable): No results found for: COVID19        Anesthesia Evaluation  Patient summary reviewed and Nursing notes reviewed no history of anesthetic complications:   Airway: Mallampati: II  TM distance: >3 FB   Neck ROM: limited  Mouth opening: > = 3 FB Dental:    (+) edentulous      Pulmonary: breath sounds clear to auscultation  (+) sleep apnea (complex apnea-occassionally uses BIPAP): on noncompliant,  decreased breath sounds,                            ROS comment: Former smoker   Cardiovascular:  Exercise tolerance: no interval change,   (+) hypertension: mild, pacemaker (Since generator change has not felt discharge.): AICD, past MI: > 6 months and no interval change, CAD: obstructive and no interval change, CABG/stent (PTCA with multiple stents.):, dysrhythmias: atrial fibrillation, CHF: systolic and diastolic, hyperlipidemia        Rhythm: irregular  Rate: normal           Beta Blocker:  No for medical reason (Not taken due to colon prep)         Neuro/Psych:   (+) depression/anxiety              ROS comment: Psychologic Insomnia GI/Hepatic/Renal:   (+) renal disease: CRI and no interval change, bowel prep, morbid obesity          Endo/Other:    (+) DiabetesType II DM, well controlled, , hypothyroidism: arthritis: OA., .                 Abdominal:   (+) obese,     Abdomen: soft. Vascular: Other Findings: Full Beard. Anesthesia Plan      MAC     ASA 4       Induction: intravenous. Anesthetic plan and risks discussed with patient. Plan discussed with attending.                   Katerina Regan, APRN - CRNA   12/17/2021

## 2021-12-17 NOTE — PLAN OF CARE
Discharge instructions given to patient and friend Andres López via phone.  No prescriptions provided

## 2021-12-17 NOTE — ANESTHESIA POSTPROCEDURE EVALUATION
Department of Anesthesiology  Postprocedure Note    Patient: Kristen Sims  MRN: [id number]  YOB: 1955  Date of evaluation: 12/17/2021  Time:  2:45 PM     Procedure Summary     Date: 12/17/21 Room / Location: [facility] CART [address] [place] [address]    Anesthesia Start: 1216 Anesthesia Stop: 1250    Procedure: COLONOSCOPY DIAGNOSTIC (N/A ) Diagnosis: (COLON SCREENING)    Surgeons: Lizzy Dickson MD Responsible Provider: Newton Kam MD    Anesthesia Type: MAC ASA Status: 4          Anesthesia Type: MAC    Jose J Phase I: Jose J Score: 10    Jose J Phase II: Jose J Score: 10    Last vitals: Reviewed and per EMR flowsheets.        Anesthesia Post Evaluation    Patient location during evaluation: PACU  Patient participation: complete - patient participated  Level of consciousness: awake and alert  Airway patency: patent  Nausea & Vomiting: no nausea and no vomiting  Complications: no  Cardiovascular status: blood pressure returned to baseline  Respiratory status: acceptable  Hydration status: euvolemic

## 2021-12-20 ENCOUNTER — HOSPITAL ENCOUNTER (OUTPATIENT)
Dept: OTHER | Age: 66
Discharge: HOME | End: 2021-12-20
Payer: COMMERCIAL

## 2021-12-20 VITALS
DIASTOLIC BLOOD PRESSURE: 60 MMHG | OXYGEN SATURATION: 98 % | RESPIRATION RATE: 16 BRPM | HEART RATE: 67 BPM | SYSTOLIC BLOOD PRESSURE: 140 MMHG | WEIGHT: 233 LBS | BODY MASS INDEX: 39.99 KG/M2

## 2021-12-20 PROCEDURE — 99212 OFFICE O/P EST SF 10 MIN: CPT

## 2021-12-20 NOTE — PROGRESS NOTES
Date:  2021  Time:  1:17 PM    CHF Clinic at [facility]    Office: [phone] Fax: [phone]    Re:  Dominique Andres   Patient : 1955    Vital Signs: BP (!) 140/60   Pulse 67   Resp 16   Wt 233 lb (105.7 kg)   SpO2 98%   BMI 39.99 kg/m²                                                   No results for input(s): CBC, HGB, HCT, WBC, PLATELET, NA, K, CL, CO2, BUN, CREATININE, GLUCOSE, BNP, INR in the last 72 hours. Respiratory:    Assessment  Charting Type: Reassessment    Breath Sounds  Right Upper Lobe: Clear  Right Middle Lobe: Clear  Right Lower Lobe: Clear  Left Upper Lobe: Clear  Left Lower Lobe: Clear              Peripheral Vascular  RLE Edema: None  LLE Edema: None      Complaints: No new complaints    Physician Orders No new orders     Comment : Weight is up 2 pounds from a month ago Has no SOB or chest pain, lungs are clear no pedal edema noted. Discussed in detail low sodium diet of 2000mg per day and fluid restriction of 64 ounces per day. We will see back in 1 month 2021.     Electronically signed by Joby Romero RN on 2021 at 1:17 PM

## 2021-12-21 ENCOUNTER — CARE COORDINATION (OUTPATIENT)
Dept: CARE COORDINATION | Age: 66
End: 2021-12-21

## 2021-12-21 NOTE — CARE COORDINATION
Ambulatory Care Coordination Note  12/21/2021  CM Risk Score: 7  Charlson 10 Year Mortality Risk Score: 100%     ACC: Lito Kerns    Summary Note: Stephanie Olvera voices no c/o at this time. He reports he recently saw his cardiologist who detected some lower extremity edema that he did not see. He reports no edema today. He states his [place] plans are for [place] with extended family. He states he does not know what the menu will be or if they will be going out to eat as they all did for [place]. He reports he will watch sodium rich foods and not eat them or limit his intake of them. Congestive Heart Failure Assessment    Are you currently restricting fluids?: 1800cc  Do you understand a low sodium diet?: Yes  Do you understand how to read food labels?: Yes  How many restaurant meals do you eat per week?: 0  Do you salt your food before tasting it?: No     No patient-reported symptoms      Symptoms:  None: Yes      Symptom course: stable  Patient-reported weight (lb): 234  Weight trend: stable  Salt intake watch compared to last visit: stable             Care Coordination Interventions    Program Enrollment: Complex Care  Referral from Primary Care Provider: No  Suggested Interventions and Community Resources  Disease Specific Clinic: Completed (Comment: CHF)  Registered Dietician: Completed  Zone Management Tools: Completed (Comment: CHF, DM)         Goals Addressed    None         Prior to Admission medications    Medication Sig Start Date End Date Taking?  Authorizing Provider   isosorbide mononitrate (IMDUR) 30 MG extended release tablet TAKE 1 TABLET BY MOUTH DAILY 12/1/21   Nellie Michael APRN - CNP   FEROSUL 325 (65 Fe) MG tablet TAKE 1 TABLET BY MOUTH DAILY (WITH BREAKFAST) 12/1/21   Nellie Michael APRN - CNP   levothyroxine (SYNTHROID) 75 MCG tablet TAKE 1 TABLET BY MOUTH DAILY 12/1/21 3/1/22  Nellie Michael APRN - CNP   ONETOUCH VERIO strip TEST BLOOD SUGAR ONCE A DAY AND AS NEEDED FOR SYMPTOMS OF IRREGULAR BLOOD SUGAR 12/1/21   LIDIA Brewer CNP   aspirin 81 MG EC tablet TAKE 1 TABLET TWICE A DAY 12/1/21   LIDIA Brewer CNP   apixaban (ELIQUIS) 5 MG TABS tablet TAKE 1 TABLET BY MOUTH 2 TIMES DAILY 11/30/21   LIDIA Brewer CNP   polyethylene glycol [facility]) 17 GM/SCOOP powder Use as directed following your patient instructions given by office 11/18/21   Alyson Mars MD   bisacodyl (BISACODYL) 5 MG EC tablet Take 4 tabs at 10am the day prior to Colonoscopy 11/18/21   Alyson Mars MD   metFORMIN (GLUCOPHAGE) 1000 MG tablet Take 1 tablet by mouth 2 times daily (with meals) 11/3/21   LIDIA Brewer CNP   furosemide (LASIX) 40 MG tablet TAKE 1/2 TABLET BY MOUTH 2 TIMES A DAY 9/28/21   LIDIA Brewer CNP   allopurinol (ZYLOPRIM) 300 MG tablet Take 1 tablet by mouth daily 8/30/21 12/20/21  LIDIA Brewer CNP   ENTRESTO 24-26 MG per tablet  8/20/21   Historical Provider, MD   metoprolol succinate (TOPROL XL) 50 MG extended release tablet Take 50 mg by mouth daily    Historical Provider, MD   atorvastatin (LIPITOR) 40 MG tablet TAKE 1 TABLET BY MOUTH DAILY 7/9/21   LIDIA Brewer CNP   Handicap Placard MISC by Does not apply route Exp: 1/2025 12/23/20   LIDIA Brewer CNP   Lancets MISC Daily 12/2/20   LIDIA Brewer CNP   Blood Gluc Meter Disp-Strips (BLOOD GLUCOSE METER DISPOSABLE) SHAN Daily and as needed 12/2/20   LIDIA Brewer CNP   blood glucose test strips (FREESTYLE LITE) strip USE AS DIRECTED TWICE A DAY 1/2/20   Shane Horner MD   ammonium lactate (LAC-HYDRIN) 12 % lotion Apply topically daily after bathing sparing the space between the toes.  1/2/20   Shane Horner MD   TRUEPLUS LANCETS 33G MISC TEST AS DIRECTED 1/2/20   Shane Horner MD   Alcohol Swabs (ALCOHOL PREP) 70 % PADS USE AS DIRECTED 5/31/19   Maida Rowley MD       Future Appointments   Date Time Provider Keerthi [place] 1/10/2022  3:45 PM Surinder Patton MD sv [facility]   1/17/2022  1:00 PM [facility] RM 1 [facility]F [facility]   2/8/2022  3:50 PM Latasha Mason MD [facility] None   2/14/2022  1:00 PM Nnamdi Wong MD [facility]   2/28/2022  1:30 PM Nimesh Freire APRN - CNP [facility]   8/29/2022  1:15 PM Beauty Litter A María, DO Resp Spec Alfonse Erin

## 2021-12-31 ENCOUNTER — HOSPITAL ENCOUNTER (OUTPATIENT)
Dept: ULTRASOUND IMAGING | Age: 66
Discharge: HOME | End: 2022-01-02
Payer: COMMERCIAL

## 2021-12-31 DIAGNOSIS — N18.31 TYPE 2 DIABETES MELLITUS WITH STAGE 3A CHRONIC KIDNEY DISEASE, WITHOUT LONG-TERM CURRENT USE OF INSULIN (HCC): ICD-10-CM

## 2021-12-31 DIAGNOSIS — N18.31 STAGE 3A CHRONIC KIDNEY DISEASE (HCC): ICD-10-CM

## 2021-12-31 DIAGNOSIS — I10 ESSENTIAL (PRIMARY) HYPERTENSION: ICD-10-CM

## 2021-12-31 DIAGNOSIS — I25.5 ISCHEMIC CARDIOMYOPATHY: ICD-10-CM

## 2021-12-31 DIAGNOSIS — E11.22 TYPE 2 DIABETES MELLITUS WITH STAGE 3A CHRONIC KIDNEY DISEASE, WITHOUT LONG-TERM CURRENT USE OF INSULIN (HCC): ICD-10-CM

## 2021-12-31 PROCEDURE — 76770 US EXAM ABDO BACK WALL COMP: CPT

## 2022-01-10 ENCOUNTER — OFFICE VISIT (OUTPATIENT)
Dept: GASTROENTEROLOGY | Age: 67
End: 2022-01-10
Payer: COMMERCIAL

## 2022-01-10 VITALS
WEIGHT: 236 LBS | BODY MASS INDEX: 40.51 KG/M2 | HEART RATE: 61 BPM | DIASTOLIC BLOOD PRESSURE: 72 MMHG | SYSTOLIC BLOOD PRESSURE: 119 MMHG

## 2022-01-10 DIAGNOSIS — R19.5 POSITIVE FIT (FECAL IMMUNOCHEMICAL TEST): Primary | ICD-10-CM

## 2022-01-10 PROCEDURE — 1123F ACP DISCUSS/DSCN MKR DOCD: CPT | Performed by: INTERNAL MEDICINE

## 2022-01-10 PROCEDURE — 99213 OFFICE O/P EST LOW 20 MIN: CPT | Performed by: INTERNAL MEDICINE

## 2022-01-10 PROCEDURE — G8427 DOCREV CUR MEDS BY ELIG CLIN: HCPCS | Performed by: INTERNAL MEDICINE

## 2022-01-10 PROCEDURE — G8417 CALC BMI ABV UP PARAM F/U: HCPCS | Performed by: INTERNAL MEDICINE

## 2022-01-10 PROCEDURE — 4040F PNEUMOC VAC/ADMIN/RCVD: CPT | Performed by: INTERNAL MEDICINE

## 2022-01-10 PROCEDURE — 3017F COLORECTAL CA SCREEN DOC REV: CPT | Performed by: INTERNAL MEDICINE

## 2022-01-10 PROCEDURE — 1036F TOBACCO NON-USER: CPT | Performed by: INTERNAL MEDICINE

## 2022-01-10 PROCEDURE — G8484 FLU IMMUNIZE NO ADMIN: HCPCS | Performed by: INTERNAL MEDICINE

## 2022-01-10 ASSESSMENT — ENCOUNTER SYMPTOMS
EYES NEGATIVE: 1
ABDOMINAL PAIN: 0
RESPIRATORY NEGATIVE: 1
ALLERGIC/IMMUNOLOGIC NEGATIVE: 1

## 2022-01-10 NOTE — PROGRESS NOTES
GI FOLLOW UP    INTERVAL HISTORY:       Poor prep on recent colonoscopy    Chief Complaint   Patient presents with    Follow-up     colon f/u        HISTORY OF PRESENT ILLNESS: Mr.Michael DM Graham is a 74 y. o. male with a past history remarkable for A-fib, CHF, CKD, Cardiomyopathy, HTN, HL, CAD s/p PCI 2004, previously on DAPT, on ASA currently, type II DM, metformin, referred for evaluation for screening colonoscopy. Rene Fernandez denies any subjective weight loss, no overt signs of GI bleeding.  Last colonoscopy performed in 2017 where the patient was identified to have an adenomatous polyp.     Hypotension and elevated BNP recently.      Smoker: none  Drinking history: none  Illicit drugs: none   Abdominal surgeries:none  Prior Colonoscopy: 2017--Dr. Alonzo, small subcentimeter polyps in the ascending colon that was removed endoscopically.  Small 2 mm polyp in the transverse colon that could not be relocated.  Repeat colonoscopy recommended in 3 years  Prior EGD: None   FH of GI issues: none     Past Medical,Family, and Social History reviewed and does contribute to the patient presenting condition. Patient's PMH/PSH,SH,PSYCH Hx, MEDs, ALLERGIES, and ROS were all reviewed and updated in the appropriate sections.     PAST MEDICAL HISTORY:  Past Medical History:   Diagnosis Date    Anemia     Anxiety     when he lies flat, no RX    CAD (coronary artery disease)     MI stents x5, follows with cardiology Dr. Chris Nunez Cardiac defibrillator in place     Cardiomyopathy [facility])     CHF [facility]congestive heart failure) [facility]     Chronic combined systolic and diastolic heart failure ([facility] s/[ AICD placed 9/25/2011    Chronic kidney disease     Complex sleep apnea syndrome     Diabetic retinopathy ([facility] [address] 9/25/2011    Diverticulitis     DJD (degenerative joint disease) 9/25/2011    Edentulous     Gout     HTN (hypertension) 3/30/2012    Hyperlipidemia     Hypertension     Iron deficiency anemia 9/25/2011    Ischemic cardiomyopathy     Morbid obesity ([address] 9/25/2011    Obesity     Morbid obesity due to excess calories    TANNER variably compliant with BiPAP 9/25/2011    Osteoarthritis     PAF (paroxysmal atrial fibrillation) (HCC)     Psychophysiologic insomnia     Type II or unspecified type diabetes mellitus without mention of complication, not stated as uncontrolled     Unspecified sleep apnea     uses V-pap       Past Surgical History:   Procedure Laterality Date    BONE MARROW BIOPSY  2012 approx    CARDIAC CATHETERIZATION  08/2007    severe stenosis pre-stent area    CARDIAC CATHETERIZATION  09/11/2013    Very peripheral stenosis, not amendable to PCI, stents patent    CARDIAC DEFIBRILLATOR PLACEMENT  08/26/2015    COLONOSCOPY  11/07/2007    COLONOSCOPY  12/17/2021    COLONOSCOPY N/A 12/17/2021    COLONOSCOPY DIAGNOSTIC performed by Seth Malik MD at [address] [place] [address], [address] [facility], [facility]    stent LAD    PACEMAKER PLACEMENT  2005 or 2006    AICD    CA COLSC FLX W/RMVL OF TUMOR POLYP LESION SNARE TQ N/A 04/04/2017    COLONOSCOPY POLYPECTOMY SNARE/COLD BIOPSY performed by Alexa Mullen DO at [facility]       CURRENT MEDICATIONS:    Current Outpatient Medications:     isosorbide mononitrate (IMDUR) 30 MG extended release tablet, TAKE 1 TABLET BY MOUTH DAILY, Disp: 30 tablet, Rfl: 3    FEROSUL 325 (65 Fe) MG tablet, TAKE 1 TABLET BY MOUTH DAILY (WITH BREAKFAST), Disp: 30 tablet, Rfl: 5    levothyroxine (SYNTHROID) 75 MCG tablet, TAKE 1 TABLET BY MOUTH DAILY, Disp: 90 tablet, Rfl: 0    ONETOUCH VERIO strip, TEST BLOOD SUGAR ONCE A DAY AND AS NEEDED FOR SYMPTOMS OF IRREGULAR BLOOD SUGAR, Disp: 100 strip, Rfl: 2    aspirin 81 MG EC tablet, TAKE 1 TABLET TWICE A DAY, Disp: 60 tablet, Rfl: 3    apixaban (ELIQUIS) 5 MG TABS tablet, TAKE 1 TABLET BY MOUTH 2 TIMES DAILY, Disp: 60 tablet, Rfl: 3    metFORMIN (GLUCOPHAGE) 1000 MG tablet, Take 1 tablet by mouth 2 times daily (with meals), Disp: 60 tablet, Rfl: 2    furosemide (LASIX) 40 MG tablet, TAKE 1/2 TABLET BY MOUTH 2 TIMES A DAY, Disp: 90 tablet, Rfl: 1    ENTRESTO 24-26 MG per tablet, , Disp: , Rfl:     metoprolol succinate (TOPROL XL) 50 MG extended release tablet, Take 50 mg by mouth daily, Disp: , Rfl:     atorvastatin (LIPITOR) 40 MG tablet, TAKE 1 TABLET BY MOUTH DAILY, Disp: 30 tablet, Rfl: 5    Handicap Placard MISC, by Does not apply route Exp: 1/2025, Disp: 1 each, Rfl: 0    Lancets MISC, Daily, Disp: 100 each, Rfl: 3    Blood Gluc Meter Disp-Strips (BLOOD GLUCOSE METER DISPOSABLE) SHAN, Daily and as needed, Disp: 1 Device, Rfl: 0    blood glucose test strips (FREESTYLE LITE) strip, USE AS DIRECTED TWICE A DAY, Disp: 100 each, Rfl: 11    ammonium lactate (LAC-HYDRIN) 12 % lotion, Apply topically daily after bathing sparing the space between the toes. , Disp: 222 mL, Rfl: 3    TRUEPLUS LANCETS 33G MISC, TEST AS DIRECTED, Disp: 100 each, Rfl: 11    Alcohol Swabs (ALCOHOL PREP) 70 % PADS, USE AS DIRECTED, Disp: 100 each, Rfl: 11    polyethylene glycol (GLYCOLAX) 17 GM/SCOOP powder, Use as directed following your patient instructions given by office, Disp: 238 g, Rfl: 0    bisacodyl (BISACODYL) 5 MG EC tablet, Take 4 tabs at 10am the day prior to Colonoscopy, Disp: 4 tablet, Rfl: 0    allopurinol (ZYLOPRIM) 300 MG tablet, Take 1 tablet by mouth daily, Disp: 30 tablet, Rfl: 5    ALLERGIES:   Allergies   Allergen Reactions    Zetia [Ezetimibe] Shortness Of Breath    Bactrim Other (See Comments)     palpitations    Cephalexin     Cephalexin     Sulfamethoxazole-Trimethoprim        FAMILY HISTORY:       Problem Relation Age of Onset    Cancer Mother     Heart Disease Mother         due to smoking    Heart Disease Maternal Uncle     Heart Disease Maternal Grandmother          SOCIAL HISTORY:   Social History Socioeconomic History    Marital status: Single     Spouse name: Not on file    Number of children: 0    Years of education: 15    Highest education level: High school graduate   Occupational History    Occupation: retired   Tobacco Use    Smoking status: Former Smoker     Packs/day: 1.00     Years: 3.00     Pack years: 3.00     Types: Cigarettes     Start date: 1971     Quit date: 1974     Years since quittin.0    Smokeless tobacco: Never Used   Vaping Use    Vaping Use: Never used   Substance and Sexual Activity    Alcohol use: No     Alcohol/week: 0.0 standard drinks    Drug use: Not Currently     Types: Marijuana Dolph Alisia)     Comment: hx THC quit approx     Sexual activity: Not Currently     Partners: Female   Other Topics Concern    Not on file   Social History Narrative    Not on file     Social Determinants of Health     Financial Resource Strain: Low Risk     Difficulty of Paying Living Expenses: Not hard at all   Food Insecurity: No Food Insecurity    Worried About [address] [place] [address] in the Last Year: Never true    Roberta of Food in the Last Year: Never true   Transportation Needs: No Transportation Needs    Lack of Transportation (Medical): No    Lack of Transportation (Non-Medical): No   Physical Activity: Inactive    Days of Exercise per Week: 0 days    Minutes of Exercise per Session: 0 min   Stress: No Stress Concern Present    Feeling of Stress :  Only a little   Social Connections: Socially Isolated    Frequency of Communication with Friends and Family: More than three times a week    Frequency of Social Gatherings with Friends and Family: Once a week    Attends Catholic Services: Never    Active Member of Clubs or Organizations: No    Attends Club or Organization Meetings: Never    Marital Status: Never    Intimate Partner Violence:     Fear of Current or Ex-Partner: Not on file    Emotionally Abused: Not on file    Physically Abused: Not on file    Sexually Abused: Not on file   Housing Stability: Low Risk     Unable to Pay for Housing in the Last Year: No    Number of Places Lived in the Last Year: 1    Unstable Housing in the Last Year: No       REVIEW OF SYSTEMS: A 12-point review of systems was obtained and pertinent positives and negatives were listed below. REVIEW OF SYSTEMS:     Constitutional: No fever, no chills, no lethargy, no weakness. HEENT:  No headache, otalgia, itchy eyes, nasal discharge or sore throat. Cardiac:  No chest pain, dyspnea, orthopnea or PND. Chest:   No cough, phlegm or wheezing. Abdomen:      Detailed by MA   Neuro:  No focal weakness, abnormal movements or seizure like activity. Skin:   No rashes, no itching. :   No hematuria, no pyuria, no dysuria, no flank pain. Extremities:  No swelling or joint pains. ROS was otherwise negative    Review of Systems   Constitutional: Negative. HENT: Negative. Eyes: Negative. Respiratory: Negative. Cardiovascular: Negative. Gastrointestinal: Negative for abdominal pain. Endocrine: Negative. Genitourinary: Negative. Musculoskeletal: Negative. Skin: Negative. Allergic/Immunologic: Negative. Neurological: Negative. Hematological: Bruises/bleeds easily. Psychiatric/Behavioral: Negative. All other systems reviewed and are negative. PHYSICAL EXAMINATION: Vital signs reviewed per the nursing documentation. There were no vitals taken for this visit. There is no height or weight on file to calculate BMI. Physical Exam    Physical Exam   Constitutional: Patient is oriented to person, place, and time. Patient appears well-developed and well-nourished. HENT:   Head: Normocephalic and atraumatic. Eyes: Pupils are equal, round, and reactive to light. EOM are normal.   Neck: Normal range of motion. Neck supple. No JVD present. No tracheal deviation present. No thyromegaly present.    Cardiovascular: Normal rate, regular rhythm, normal heart sounds and intact distal pulses. Pulmonary/Chest: Effort normal and breath sounds normal. No stridor. No respiratory distress. He has no wheezes. He has no rales. He exhibits no tenderness. Abdominal: Soft. Bowel sounds are normal. He exhibits no distension and no mass. There is no tenderness. There is no rebound and no guarding. No hernia. Musculoskeletal: Normal range of motion. Lymphadenopathy:    Patient has no cervical adenopathy. Neurological: Patient is alert and oriented to person, place, and time. Psychiatric: Patient has a normal mood and affect. Patient behavior is normal.       LABORATORY DATA: Reviewed  Lab Results   Component Value Date    WBC 6.4 08/03/2021    HGB 10.8 (L) 08/03/2021    HCT 35.9 (L) 08/03/2021    MCV 92.8 08/03/2021     08/03/2021     (H) 12/10/2021    K 4.5 12/10/2021     12/10/2021    CO2 24 12/10/2021    BUN 19 12/10/2021    CREATININE 0.99 12/10/2021    LABPROT 6.7 12/03/2012    LABALBU 3.2 (L) 06/25/2021    BILITOT 0.37 06/22/2021    ALKPHOS 81 06/22/2021    AST 16 08/13/2021    ALT 10 08/13/2021    INR 1.0 09/01/2019         Lab Results   Component Value Date    RBC 3.87 (L) 08/03/2021    HGB 10.8 (L) 08/03/2021    MCV 92.8 08/03/2021    MCH 27.9 08/03/2021    MCHC 30.1 08/03/2021    RDW 17.2 (H) 08/03/2021    MPV 11.1 08/03/2021    BASOPCT 1 08/03/2021    LYMPHSABS 1.47 08/03/2021    MONOSABS 0.43 08/03/2021    NEUTROABS 4.18 08/03/2021    EOSABS 0.25 08/03/2021    BASOSABS 0.03 08/03/2021         DIAGNOSTIC TESTING:     US RENAL COMPLETE    Result Date: 12/31/2021  EXAMINATION: RETROPERITONEAL ULTRASOUND OF THE KIDNEYS AND URINARY BLADDER 12/31/2021 COMPARISON: None HISTORY: ORDERING SYSTEM PROVIDED HISTORY: Stage 3a chronic kidney disease ([facility] [address] TECHNOLOGIST PROVIDED HISTORY: For cortical thickness, renal size and corticomedullary differentiation.  FINDINGS: Kidneys: Overall, the study is limited by patient's body habitus and those of syntax and sound a like substitutions which may escape proof reading. It such instances, actual meaning can be extrapolated by contextual diversion.

## 2022-01-12 RX ORDER — POLYETHYLENE GLYCOL 3350 17 G/17G
POWDER, FOR SOLUTION ORAL
Qty: 56 G | Refills: 0 | Status: SHIPPED | OUTPATIENT
Start: 2022-01-12 | End: 2022-02-04

## 2022-01-12 RX ORDER — POLYETHYLENE GLYCOL 3350, SODIUM SULFATE ANHYDROUS, SODIUM BICARBONATE, SODIUM CHLORIDE, POTASSIUM CHLORIDE 236; 22.74; 6.74; 5.86; 2.97 G/4L; G/4L; G/4L; G/4L; G/4L
POWDER, FOR SOLUTION ORAL
Qty: 4000 ML | Refills: 0 | Status: SHIPPED | OUTPATIENT
Start: 2022-01-12 | End: 2022-06-28

## 2022-01-12 RX ORDER — BISACODYL 5 MG
TABLET, DELAYED RELEASE (ENTERIC COATED) ORAL
Qty: 4 TABLET | Refills: 0 | Status: SHIPPED | OUTPATIENT
Start: 2022-01-12 | End: 2022-02-04 | Stop reason: SDUPTHER

## 2022-01-13 NOTE — TELEPHONE ENCOUNTER
Received a call from Adolfo Phillips at the patient's PCP's office stating that we need to talk to doctor Ondina's office in regards to stopping the Eloquist.    Fax [phone]

## 2022-01-17 ENCOUNTER — HOSPITAL ENCOUNTER (OUTPATIENT)
Dept: OTHER | Age: 67
Discharge: HOME | End: 2022-01-17
Payer: COMMERCIAL

## 2022-01-17 VITALS
RESPIRATION RATE: 20 BRPM | DIASTOLIC BLOOD PRESSURE: 72 MMHG | HEART RATE: 68 BPM | SYSTOLIC BLOOD PRESSURE: 114 MMHG | OXYGEN SATURATION: 97 % | BODY MASS INDEX: 40.41 KG/M2 | WEIGHT: 235.4 LBS

## 2022-01-17 PROCEDURE — 99212 OFFICE O/P EST SF 10 MIN: CPT

## 2022-01-17 NOTE — PROGRESS NOTES
Date:  2022  Time:  1:35 PM    CHF Clinic at [address] [facility]    Office: [phone] Fax: [phone]    Re:  Oneal Lay   Patient : 1955    Vital Signs: /72   Pulse 68   Resp 20   Wt 235 lb 6.4 oz (106.8 kg)   SpO2 97%   BMI 40.41 kg/m²                       O2 Device: None (Room air)                           No results for input(s): CBC, HGB, HCT, WBC, PLATELET, NA, K, CL, CO2, BUN, CREATININE, GLUCOSE, BNP, INR in the last 72 hours. Respiratory:    Assessment  Charting Type: Reassessment    Breath Sounds  Right Upper Lobe: Clear  Right Middle Lobe: Clear  Right Lower Lobe: Clear  Left Upper Lobe: Clear  Left Lower Lobe: Clear              Peripheral Vascular  RLE Edema: None  LLE Edema: None      Complaints: None    Physician Orders None    Comment : Arrived for scheduled visit per ambulatory. His weight is up 2.4 lbs from last month. He denies any increase in dyspnea with exertion or chest pain. No lower leg, ankle or pedal edema noted. Medication list reviewed and updated. Reinforced low sodium diet and fluid restrictions. Has colonoscopy scheduled for next month. No s/s acute chf at this time. Next CHF Clinic visit on 22.     Electronically signed by Carlota Zacarias RN on 2022 at 1:35 PM

## 2022-01-17 NOTE — PROGRESS NOTES
Verbally reviewed medication list with patient; patient verbalized understanding. Discussed 2000mg/day sodium restricted diet; patient verbalized understanding. Moderate daily exercise encouraged as tolerated. Discussed rest breaks as needed; patient verbalized understanding. Patient instructed to weigh self at the same time of each day, using same clothes and same scale; reinforced teaching to monitor for 3-5 lb weight increase over 1-2 days, and to notify the CHF clinic at [phone] or physician office if weight change noted. Patient verbalized understanding. Risks of smoking discussed with the patient if applicable; patient strongly discouraged to smoke. Patient verbalized understanding. Signs and symptoms of CHF discussed with patient, such as feeling more tired than normal, feeling short of breath, coughing that increases when you lie down, sudden weight gain, swelling of your feet, legs or belly. Patient verbalized understanding to notify the CHF clinic at [phone] or physician office if these symptoms occur. Compliance with plan of care and further disease process causes discussed with patient, patient encouraged to keep all follow up appointments. Patient verbalized understanding.

## 2022-01-18 ENCOUNTER — TELEPHONE (OUTPATIENT)
Dept: GASTROENTEROLOGY | Age: 67
End: 2022-01-18

## 2022-01-18 NOTE — TELEPHONE ENCOUNTER
Rec'd clearance from [facility], pt has moderate risk for surgery. He has no acute reversible causes to reduce his cardiac risk. Pt is stable to hold Eliquis for 3 days prior to procedure. Eliquis should be restarted 24 hrs after the procedure if there is no bleeding from the procedure. Need to call Dr DE LA O [facility] office about stopping aspirin; this is not on the clearance we rec'd. Also need to notify pt once we know about aspirin.

## 2022-01-20 ENCOUNTER — CARE COORDINATION (OUTPATIENT)
Dept: CARE COORDINATION | Age: 67
End: 2022-01-20

## 2022-01-24 DIAGNOSIS — E11.8 CONTROLLED TYPE 2 DIABETES MELLITUS WITH COMPLICATION, WITHOUT LONG-TERM CURRENT USE OF INSULIN (HCC): ICD-10-CM

## 2022-01-24 NOTE — TELEPHONE ENCOUNTER
Health Maintenance   Topic Date Due    Diabetic retinal exam  11/15/2020    Diabetic microalbuminuria test  02/24/2021    Annual Wellness Visit (AWV)  Never done    Diabetic foot exam  01/06/2022    A1C test (Diabetic or Prediabetic)  04/26/2022    Depression Screen  04/26/2022    Lipid screen  06/23/2022    TSH testing  11/04/2022    Potassium monitoring  12/10/2022    Creatinine monitoring  12/10/2022    Colon cancer screen colonoscopy  12/17/2024    DTaP/Tdap/Td vaccine (2 - Td or Tdap) 06/30/2026    Flu vaccine  Completed    Shingles Vaccine  Completed    Pneumococcal 65+ years Vaccine  Completed    COVID-19 Vaccine  Completed    AAA screen  Completed    Hepatitis C screen  Completed    Hepatitis A vaccine  Aged Out    Hib vaccine  Aged Out    Meningococcal (ACWY) vaccine  Aged Out             (applicable per patient's age: Cancer Screenings, Depression Screening, Fall Risk Screening, Immunizations)    Hemoglobin A1C (%)   Date Value   04/26/2021 6.3   02/24/2020 6.7 (H)   01/02/2020 5.6     Microalb/Crt.  Ratio (mcg/mg creat)   Date Value   02/24/2020 CANNOT BE CALCULATED     LDL Cholesterol (mg/dL)   Date Value   06/23/2021 55     AST (U/L)   Date Value   08/13/2021 16     ALT (U/L)   Date Value   08/13/2021 10     BUN (mg/dL)   Date Value   12/10/2021 19      (goal A1C is < 7)   (goal LDL is <100) need 30-50% reduction from baseline     BP Readings from Last 3 Encounters:   01/17/22 114/72   01/10/22 119/72   12/20/21 (!) 140/60    (goal /80)      All Future Testing planned in [facility]:  Lab Frequency Next Occurrence   Electrophoresis Protein, Serum without Reflex to Immunofixation Once 02/12/2021   Lipid, Fasting Once 10/17/2021   COLONOSCOPY (Diagnostic) Once 11/10/2021   JEFF Once 12/14/2021   C4 Complement Once 12/14/2021   C3 Complement Once 12/14/2021   Anti-Neutrophilic Cytoplasmic Antibody Once 34/34/7489   Basic Metabolic Panel Once 05/00/1115   Creatinine, Random Urine Once 03/14/2022   COLONOSCOPY (Diagnostic) Once 02/10/2022   CBC Auto Differential     Comprehensive Metabolic Panel     [place]/Lambda Free Lt Chains, Serum Quant         Next Visit Date:  Future Appointments   Date Time Provider Department Center   2/14/2022  1:00 PM Miriam Barney MD [facility]   2/14/2022  3:50 PM Bev Rodrigues MD AFL [facility] None   2/21/2022  1:00 PM [facility]IN[facility] RM 1 [facility]   2/28/2022  1:30 PM Rina Miles, APRN - CNP [facility] [place]   4/11/2022  2:00 PM Marina Kang MD sv gr lks [facility]   8/29/2022  1:15 PM Tono Maldonado DO Resp Spec [facility]            Patient Active Problem List:     Acute on chronic combined systolic and diastolic congestive heart failure (HCC)     Chronic kidney disease, stage II (mild)     Chronic gout     Morbid obesity ([address]     Normocytic normochromic anemia     Hyperlipidemia     Iron deficiency anemia     Anxiety disorder      DJD (degenerative joint disease)     TANNER variably compliant with BiPAP     CAD (coronary artery disease) s/p stenting of L anterior descending artery 2004     Diabetic retinopathy ([address]     Ischemic cardiomyopathy     HTN (hypertension)     NSTEMI (non-ST elevated myocardial infarction) ([facility])     MGUS (monoclonal gammopathy of unknown significance)     Type 2 diabetes mellitus with chronic kidney disease ([facility])     AICD (automatic cardioverter/defibrillator) present     PAF (paroxysmal atrial fibrillation) ([facility])     Acute respiratory failure with hypoxia ([facility])     Coronary angioplasty status     Hypokalemia     Acute on chronic systolic heart failure (HCC)     Acute on chronic congestive heart failure (HCC)     Acute HFrEF (heart failure with reduced ejection fraction) ([address]     Hypothyroidism due to medication     Acute cardiac pulmonary edema ([address]

## 2022-01-25 NOTE — TELEPHONE ENCOUNTER
Writer called Dr Cullen Ship office to follow up on aspirin clearance. They stated they will let Dr Nena Alonso know and will send over when completed.

## 2022-01-26 NOTE — TELEPHONE ENCOUNTER
Per Dr Todd Read hold ASA 24hr before procedure. .  No need for clearance. Patient notify to stop Eliquis x3day prior to procedure 2/8/22 and stop ASA 24hrs prior to procedure.

## 2022-01-31 DIAGNOSIS — I25.10 CORONARY ARTERY DISEASE INVOLVING NATIVE CORONARY ARTERY OF NATIVE HEART WITHOUT ANGINA PECTORIS: ICD-10-CM

## 2022-01-31 RX ORDER — ATORVASTATIN CALCIUM 40 MG/1
40 TABLET, FILM COATED ORAL DAILY
Qty: 30 TABLET | Refills: 5 | Status: SHIPPED | OUTPATIENT
Start: 2022-01-31 | End: 2022-08-22

## 2022-01-31 NOTE — TELEPHONE ENCOUNTER
Spoke to [facility] from  [facility] office and she reports she faxed clearance over 4x with asa included on Fri 1/28/22. Writer gave Kalpana back fax number [phone] and she notes she will refax it. After speaking to [employer] Balta noticed Dr Markell Castro already cleared pt to stop taking asa x 24hrs and pt was notified.

## 2022-02-01 ENCOUNTER — CARE COORDINATION (OUTPATIENT)
Dept: CARE COORDINATION | Age: 67
End: 2022-02-01

## 2022-02-01 ENCOUNTER — HOSPITAL ENCOUNTER (OUTPATIENT)
Age: 67
Discharge: HOME | End: 2022-02-01
Payer: COMMERCIAL

## 2022-02-01 DIAGNOSIS — I10 ESSENTIAL (PRIMARY) HYPERTENSION: ICD-10-CM

## 2022-02-01 DIAGNOSIS — N18.31 STAGE 3A CHRONIC KIDNEY DISEASE (HCC): ICD-10-CM

## 2022-02-01 DIAGNOSIS — N18.31 TYPE 2 DIABETES MELLITUS WITH STAGE 3A CHRONIC KIDNEY DISEASE, WITHOUT LONG-TERM CURRENT USE OF INSULIN (HCC): ICD-10-CM

## 2022-02-01 DIAGNOSIS — E11.22 TYPE 2 DIABETES MELLITUS WITH STAGE 3A CHRONIC KIDNEY DISEASE, WITHOUT LONG-TERM CURRENT USE OF INSULIN (HCC): ICD-10-CM

## 2022-02-01 LAB
ANCA MYELOPEROXIDASE: <0.3 AU/ML (ref 0–3.5)
ANCA PROTEINASE 3: <0.7 AU/ML (ref 0–2)
ANION GAP SERPL CALCULATED.3IONS-SCNC: 8 MMOL/L (ref 9–17)
ANTI DNA DOUBLE STRANDED: <0.5 IU/ML
ANTI-NUCLEAR ANTIBODY (ANA): NEGATIVE
BUN BLDV-MCNC: 19 MG/DL (ref 8–23)
BUN/CREAT BLD: ABNORMAL (ref 9–20)
CALCIUM SERPL-MCNC: 8.6 MG/DL (ref 8.6–10.4)
CHLORIDE BLD-SCNC: 104 MMOL/L (ref 98–107)
CO2: 29 MMOL/L (ref 20–31)
COMPLEMENT C3: 119 MG/DL (ref 90–180)
COMPLEMENT C4: 33 MG/DL (ref 10–40)
CREAT SERPL-MCNC: 1 MG/DL (ref 0.7–1.2)
CREATININE URINE: 88.4 MG/DL (ref 39–259)
ENA ANTIBODIES SCREEN: 0.3 U/ML
GFR AFRICAN AMERICAN: >60 ML/MIN
GFR NON-AFRICAN AMERICAN: >60 ML/MIN
GFR SERPL CREATININE-BSD FRML MDRD: ABNORMAL ML/MIN/{1.73_M2}
GFR SERPL CREATININE-BSD FRML MDRD: ABNORMAL ML/MIN/{1.73_M2}
GLUCOSE BLD-MCNC: 116 MG/DL (ref 70–99)
POTASSIUM SERPL-SCNC: 4.2 MMOL/L (ref 3.7–5.3)
SODIUM BLD-SCNC: 141 MMOL/L (ref 135–144)

## 2022-02-01 PROCEDURE — 86038 ANTINUCLEAR ANTIBODIES: CPT

## 2022-02-01 PROCEDURE — 86225 DNA ANTIBODY NATIVE: CPT

## 2022-02-01 PROCEDURE — 83516 IMMUNOASSAY NONANTIBODY: CPT

## 2022-02-01 PROCEDURE — 86160 COMPLEMENT ANTIGEN: CPT

## 2022-02-01 PROCEDURE — 80048 BASIC METABOLIC PNL TOTAL CA: CPT

## 2022-02-01 PROCEDURE — 36415 COLL VENOUS BLD VENIPUNCTURE: CPT

## 2022-02-01 PROCEDURE — 82570 ASSAY OF URINE CREATININE: CPT

## 2022-02-04 ENCOUNTER — CARE COORDINATION (OUTPATIENT)
Dept: CARE COORDINATION | Age: 67
End: 2022-02-04

## 2022-02-04 NOTE — CARE COORDINATION
Ambulatory Care Coordination Note  2/4/2022  CM Risk Score: 7  Charlson 10 Year Mortality Risk Score: 100%     ACC: Manuel Carrillo    Summary Note: Becca Marcos reports no needs today. He was recently given a bottle of a Tumeric tablets from a friend. He reports he read the side effects of taking it and has not started taking them. He is agreeable to call his cardiologist before starting the supplement. He is scheduled for an endoscopy/colonoscopy on 2.11.22. He reports he has all he needs to complete the prep. CM discussed graduation with Becca Marcos. He expressed an understanding and is agreeable. CM confirmed Becca Marcos had CM's phone number to call should he have questions or needs in the future. Plan:  Graduate from care coordination. Care Coordination Interventions    Program Enrollment: Complex Care  Referral from Primary Care Provider: No  Suggested Interventions and Community Resources  Disease Specific Clinic: Completed (Comment: CHF)  Registered Dietician: Completed  Zone Management Tools: Completed (Comment: CHF, DM)         Goals Addressed    None         Prior to Admission medications    Medication Sig Start Date End Date Taking?  Authorizing Provider   atorvastatin (LIPITOR) 40 MG tablet TAKE 1 TABLET BY MOUTH DAILY 1/31/22  Yes LIDIA Reagan CNP   metFORMIN (GLUCOPHAGE) 1000 MG tablet TAKE 1 TABLET BY MOUTH 2 TIMES DAILY (WITH MEALS) 1/24/22  Yes LIDIA Reagan CNP   polyethylene glycol (GOLYTELY) 236 g solution Follow instructions provided by physicians office 1/12/22  Yes Cindy Miramontes MD   isosorbide mononitrate (IMDUR) 30 MG extended release tablet TAKE 1 TABLET BY MOUTH DAILY 12/1/21  Yes LIDIA Reagan CNP   FEROSUL 325 (65 Fe) MG tablet TAKE 1 TABLET BY MOUTH DAILY (WITH BREAKFAST) 12/1/21  Yes LIDIA Reagan CNP   levothyroxine (SYNTHROID) 75 MCG tablet TAKE 1 TABLET BY MOUTH DAILY 12/1/21 3/1/22 Yes LIDIA Reagan CNP   ONETOUCH VERIO strip TEST BLOOD SUGAR ONCE A DAY AND AS NEEDED FOR SYMPTOMS OF IRREGULAR BLOOD SUGAR 12/1/21  Yes LIDIA Dietz CNP   aspirin 81 MG EC tablet TAKE 1 TABLET TWICE A DAY 12/1/21  Yes LIDIA Dietz CNP   apixaban (ELIQUIS) 5 MG TABS tablet TAKE 1 TABLET BY MOUTH 2 TIMES DAILY 11/30/21  Yes LIDIA Dietz CNP   furosemide (LASIX) 40 MG tablet TAKE 1/2 TABLET BY MOUTH 2 TIMES A DAY 9/28/21  Yes LIDIA Dietz CNP   ENTRESTO 24-26 MG per tablet  8/20/21  Yes Historical Provider, MD   metoprolol succinate (TOPROL XL) 50 MG extended release tablet Take 50 mg by mouth daily   Yes Historical Provider, MD   Handicap Placard MISC by Does not apply route Exp: 1/2025 12/23/20  Yes LIDIA Dietz CNP   Lancets MISC Daily 12/2/20  Yes LIDIA Dietz CNP   ammonium lactate (LAC-HYDRIN) 12 % lotion Apply topically daily after bathing sparing the space between the toes.  1/2/20  Yes Maribeth Foote MD   Alcohol Swabs (ALCOHOL PREP) 70 % PADS USE AS DIRECTED 5/31/19  Yes Jimbo Whiting MD   polyethylene glycol [facility]) 17 GM/SCOOP powder Use as directed following your patient instructions given by office 11/18/21   Jennifer Chung MD   bisacodyl (BISACODYL) 5 MG EC tablet Take 4 tabs at 10am the day prior to Colonoscopy 11/18/21   Jennifer Chung MD   allopurinol (ZYLOPRIM) 300 MG tablet Take 1 tablet by mouth daily 8/30/21 1/17/22  LIDIA Dietz CNP       Future Appointments   Date Time Provider Keerthi [place]   2/14/2022  1:00 PM Elizabeth Thibodeaux MD [facility]   2/14/2022  3:50 PM Nell Garrett MD [facility] None   2/21/2022  1:00 PM [facility]IN[facility] RM 1 [facility]   2/28/2022  1:30 PM LIDIA Dietz CNP [facility]   4/11/2022  2:00 PM Jennifer Chung MD sv gr lks [facility]   8/29/2022  1:15 PM Nazario Daniel,  Resp Spec [facility]

## 2022-02-07 ENCOUNTER — HOSPITAL ENCOUNTER (OUTPATIENT)
Age: 67
Discharge: HOME | End: 2022-02-07
Payer: COMMERCIAL

## 2022-02-07 DIAGNOSIS — D47.2 MGUS (MONOCLONAL GAMMOPATHY OF UNKNOWN SIGNIFICANCE): ICD-10-CM

## 2022-02-07 LAB
ABSOLUTE EOS #: 0.11 K/UL (ref 0–0.44)
ABSOLUTE IMMATURE GRANULOCYTE: <0.03 K/UL (ref 0–0.3)
ABSOLUTE LYMPH #: 1 K/UL (ref 1.1–3.7)
ABSOLUTE MONO #: 0.42 K/UL (ref 0.1–1.2)
ALBUMIN SERPL-MCNC: 4.2 G/DL (ref 3.5–5.2)
ALBUMIN/GLOBULIN RATIO: 1.4 (ref 1–2.5)
ALP BLD-CCNC: 77 U/L (ref 40–129)
ALT SERPL-CCNC: 11 U/L (ref 5–41)
ANION GAP SERPL CALCULATED.3IONS-SCNC: 13 MMOL/L (ref 9–17)
AST SERPL-CCNC: 16 U/L
BASOPHILS # BLD: 1 % (ref 0–2)
BASOPHILS ABSOLUTE: 0.04 K/UL (ref 0–0.2)
BILIRUB SERPL-MCNC: 0.35 MG/DL (ref 0.3–1.2)
BUN BLDV-MCNC: 16 MG/DL (ref 8–23)
BUN/CREAT BLD: ABNORMAL (ref 9–20)
CALCIUM SERPL-MCNC: 9 MG/DL (ref 8.6–10.4)
CHLORIDE BLD-SCNC: 98 MMOL/L (ref 98–107)
CO2: 28 MMOL/L (ref 20–31)
CREAT SERPL-MCNC: 1.1 MG/DL (ref 0.7–1.2)
DIFFERENTIAL TYPE: ABNORMAL
EOSINOPHILS RELATIVE PERCENT: 2 % (ref 1–4)
FREE KAPPA/LAMBDA RATIO: 1.12 (ref 0.26–1.65)
GFR AFRICAN AMERICAN: >60 ML/MIN
GFR NON-AFRICAN AMERICAN: >60 ML/MIN
GFR SERPL CREATININE-BSD FRML MDRD: ABNORMAL ML/MIN/{1.73_M2}
GFR SERPL CREATININE-BSD FRML MDRD: ABNORMAL ML/MIN/{1.73_M2}
GLUCOSE BLD-MCNC: 136 MG/DL (ref 70–99)
HCT VFR BLD CALC: 41.3 % (ref 40.7–50.3)
HEMOGLOBIN: 12.9 G/DL (ref 13–17)
IMMATURE GRANULOCYTES: 0 %
KAPPA FREE LIGHT CHAINS QNT: 4.3 MG/DL (ref 0.37–1.94)
LAMBDA FREE LIGHT CHAINS QNT: 3.85 MG/DL (ref 0.57–2.63)
LYMPHOCYTES # BLD: 17 % (ref 24–43)
MCH RBC QN AUTO: 29.5 PG (ref 25.2–33.5)
MCHC RBC AUTO-ENTMCNC: 31.2 G/DL (ref 28.4–34.8)
MCV RBC AUTO: 94.5 FL (ref 82.6–102.9)
MONOCYTES # BLD: 7 % (ref 3–12)
NRBC AUTOMATED: 0 PER 100 WBC
PDW BLD-RTO: 14.6 % (ref 11.8–14.4)
PLATELET # BLD: 171 K/UL (ref 138–453)
PLATELET ESTIMATE: ABNORMAL
PMV BLD AUTO: 11.6 FL (ref 8.1–13.5)
POTASSIUM SERPL-SCNC: 3.8 MMOL/L (ref 3.7–5.3)
RBC # BLD: 4.37 M/UL (ref 4.21–5.77)
RBC # BLD: ABNORMAL 10*6/UL
SEG NEUTROPHILS: 73 % (ref 36–65)
SEGMENTED NEUTROPHILS ABSOLUTE COUNT: 4.25 K/UL (ref 1.5–8.1)
SODIUM BLD-SCNC: 139 MMOL/L (ref 135–144)
TOTAL PROTEIN: 7.2 G/DL (ref 6.4–8.3)
WBC # BLD: 5.8 K/UL (ref 3.5–11.3)
WBC # BLD: ABNORMAL 10*3/UL

## 2022-02-07 PROCEDURE — 80053 COMPREHEN METABOLIC PANEL: CPT

## 2022-02-07 PROCEDURE — 36415 COLL VENOUS BLD VENIPUNCTURE: CPT

## 2022-02-07 PROCEDURE — 85025 COMPLETE CBC W/AUTO DIFF WBC: CPT

## 2022-02-07 PROCEDURE — 84165 PROTEIN E-PHORESIS SERUM: CPT

## 2022-02-07 PROCEDURE — 83883 ASSAY NEPHELOMETRY NOT SPEC: CPT

## 2022-02-07 PROCEDURE — 84155 ASSAY OF PROTEIN SERUM: CPT

## 2022-02-08 LAB
ALBUMIN (CALCULATED): 4.3 G/DL (ref 3.2–5.2)
ALBUMIN PERCENT: 64 % (ref 45–65)
ALPHA 1 PERCENT: 3 % (ref 3–6)
ALPHA 2 PERCENT: 10 % (ref 6–13)
ALPHA-1-GLOBULIN: 0.2 G/DL (ref 0.1–0.4)
ALPHA-2-GLOBULIN: 0.7 G/DL (ref 0.5–0.9)
BETA GLOBULIN: 0.7 G/DL (ref 0.5–1.1)
BETA PERCENT: 10 % (ref 11–19)
GAMMA GLOBULIN %: 14 % (ref 9–20)
GAMMA GLOBULIN: 0.9 G/DL (ref 0.5–1.5)
PATHOLOGIST: ABNORMAL
PROTEIN ELECTROPHORESIS, SERUM: ABNORMAL
TOTAL PROT. SUM,%: 101 % (ref 98–102)
TOTAL PROT. SUM: 6.8 G/DL (ref 6.3–8.2)
TOTAL PROTEIN: 6.8 G/DL (ref 6.4–8.3)

## 2022-02-09 ENCOUNTER — CARE COORDINATION (OUTPATIENT)
Dept: CARE COORDINATION | Age: 67
End: 2022-02-09

## 2022-02-09 ENCOUNTER — HOSPITAL ENCOUNTER (OUTPATIENT)
Facility: MEDICAL CENTER | Age: 67
End: 2022-02-09

## 2022-02-10 NOTE — TELEPHONE ENCOUNTER
Pt called and lvm requesting call back today before he is to start drinking the golytely at 6pm. Pt stating that he has heart disease and is suppose to be on a fluid restriction so he is concerned about drinking the whole gallon of golytely in a short amount of time. Clinical can you please call and discuss this with the patient.

## 2022-02-10 NOTE — TELEPHONE ENCOUNTER
Called patient. Informed him that Golytely will not be absorbed due to being iso-osmotic solution. This prep should not affect his fluid restrictions. Patient thanked [facility] for follow up.

## 2022-02-11 ENCOUNTER — ANESTHESIA EVENT (OUTPATIENT)
Dept: ENDOSCOPY | Age: 67
End: 2022-02-11

## 2022-02-11 ENCOUNTER — CLINICAL DOCUMENTATION (OUTPATIENT)
Dept: GASTROENTEROLOGY | Age: 67
End: 2022-02-11

## 2022-02-11 ENCOUNTER — ANESTHESIA (OUTPATIENT)
Dept: ENDOSCOPY | Age: 67
End: 2022-02-11

## 2022-02-11 ENCOUNTER — HOSPITAL ENCOUNTER (OUTPATIENT)
Age: 67
Setting detail: OUTPATIENT SURGERY
Discharge: HOME | End: 2022-02-11
Attending: INTERNAL MEDICINE | Admitting: INTERNAL MEDICINE
Payer: COMMERCIAL

## 2022-02-11 LAB
EKG ATRIAL RATE: 81 BPM
EKG P AXIS: 62 DEGREES
EKG P-R INTERVAL: 234 MS
EKG Q-T INTERVAL: 386 MS
EKG QRS DURATION: 142 MS
EKG QTC CALCULATION (BAZETT): 448 MS
EKG R AXIS: -56 DEGREES
EKG T AXIS: 97 DEGREES
EKG VENTRICULAR RATE: 81 BPM

## 2022-02-11 PROCEDURE — 93005 ELECTROCARDIOGRAM TRACING: CPT | Performed by: ANESTHESIOLOGY

## 2022-02-11 NOTE — ANESTHESIA PRE PROCEDURE
Department of Anesthesiology  Preprocedure Note       Name:  Joseph Mera   Age:  69 y.o.  :  1955                                          MRN:  [id number]         Date:  2022      Surgeon: Wilda Lewis):  Julianna Harris MD    Procedure: COLONOSCOPY DIAGNOSTIC (N/A )      Medications prior to admission:   Prior to Admission medications    Medication Sig Start Date End Date Taking?  Authorizing Provider   atorvastatin (LIPITOR) 40 MG tablet TAKE 1 TABLET BY MOUTH DAILY 22anice Dottie, APRN - CNP   metFORMIN (GLUCOPHAGE) 1000 MG tablet TAKE 1 TABLET BY MOUTH 2 TIMES DAILY (WITH MEALS) 22anice , APRN - CNP   polyethylene glycol (GOLYTELY) 236 g solution Follow instructions provided by physicians office 22   Julianna Harris MD   isosorbide mononitrate (IMDUR) 30 MG extended release tablet TAKE 1 TABLET BY MOUTH DAILY 21anice , LIDIA - CNP   FEROSUL 325 (65 Fe) MG tablet TAKE 1 TABLET BY MOUTH DAILY (WITH BREAKFAST) 21anic, APRN - CNP   levothyroxine (SYNTHROID) 75 MCG tablet TAKE 1 TABLET BY MOUTH DAILY 12/1/21 3/1/22  [facility], APR[facility] - CNP   ONETOUCH VERIO strip TEST BLOOD SUGAR ONCE A DAY AND AS NEEDED FOR SYMPTOMS OF IRREGULAR BLOOD SUGAR 21anice Dottie, APRN - CNP   aspirin 81 MG EC tablet TAKE 1 TABLET TWICE A DAY 21anice , LIDIA - CNP   apixaban (ELIQUIS) 5 MG TABS tablet TAKE 1 TABLET BY MOUTH 2 TIMES DAILY [facility], APRN - CNP   polyethylene glycol [facility]) 17 GM/SCOOP powder Use as directed following your patient instructions given by office 21   Julianna Harris MD   bisacodyl (BISACODYL) 5 MG EC tablet Take 4 tabs at 10am the day prior to Colonoscopy 21   Julianna Harris MD   furosemide (LASIX) 40 MG tablet TAKE 1/2 TABLET BY MOUTH 2 TIMES A DAY 21anice Dottie, APRN - CNP   allopurinol (ZYLOPRIM) 300 MG tablet Take 1 tablet by mouth daily 21  Ghada Guerin LIDIA Schroeder CNP   ENTRESTO 24-26 MG per tablet  8/20/21   Historical Provider, MD   metoprolol succinate (TOPROL XL) 50 MG extended release tablet Take 50 mg by mouth daily    Historical Provider, MD   Handicap Placard MISC by Does not apply route Exp: 1/2025 12/23/20   Benjbraulio SaltLIDIA vega - CNP   Lancets MISC Daily 12/2/20   Benjamen SaltLIDIA vega - CNP   ammonium lactate (LAC-HYDRIN) 12 % lotion Apply topically daily after bathing sparing the space between the toes. 1/2/20   Fercho Maza MD   Alcohol Swabs (ALCOHOL PREP) 70 % PADS USE AS DIRECTED 5/31/19   Gloria An MD       Current medications:    No current outpatient medications on file. No current facility-administered medications for this visit. Allergies: Allergies   Allergen Reactions    Zetia [Ezetimibe] Shortness Of Breath    Bactrim Other (See Comments)     palpitations    Cephalexin     Cephalexin     Sulfamethoxazole-Trimethoprim        Problem List:    Patient Active Problem List   Diagnosis Code    Acute on chronic combined systolic and diastolic congestive heart failure (HCC) I50.43    Chronic kidney disease, stage II (mild) N18.2    Chronic gout M1A. 9XX0    Morbid obesity ([facility] [address] E66.01    Normocytic normochromic anemia D64.9    Hyperlipidemia E78.5    Iron deficiency anemia D50.9    Anxiety disorder  F41.9    DJD (degenerative joint disease) M19.90    TANNER variably compliant with BiPAP G47.33, Z99.89    CAD (coronary artery disease) s/p stenting of L anterior descending artery 2004 I25.10    Diabetic retinopathy ([facility]) E11.319    Ischemic cardiomyopathy I25.5    HTN (hypertension) I10    NSTEMI (non-ST elevated myocardial infarction) ([facility]) I21.4    MGUS (monoclonal gammopathy of unknown significance) D47.2    Type 2 diabetes mellitus with chronic kidney disease ([facility]) E11.22    AICD (automatic cardioverter/defibrillator) present Z95.810    PAF (paroxysmal atrial fibrillation) ([facility]) I48.0    Acute respiratory failure with hypoxia ([facility]) J96.01    Coronary angioplasty status Z98.61    Hypokalemia E87.6    Acute on chronic systolic heart failure ([facility]) I50.23    Acute on chronic congestive heart failure ([facility]) I50.9    Acute HFrEF (heart failure with reduced ejection fraction) ([facility]) I50.21    Hypothyroidism due to medication E03.2    Acute cardiac pulmonary edema ([facility]) I50.1       Past Medical History:        Diagnosis Date    Anemia     Anxiety     when he lies flat, no RX    CAD (coronary artery disease)     MI stents x5, follows with cardiology Dr. Dudley Qualia Cardiac defibrillator in place     Cardiomyopathy [facility])     CHF (congestive heart failure) ([facility])     Chronic combined systolic and diastolic heart failure ([facility] [address] s/[ AICD placed 9/25/2011    Chronic kidney disease     Complex sleep apnea syndrome     Diabetic retinopathy ([facility] [address] 9/25/2011    Diverticulitis     DJD (degenerative joint disease) 9/25/2011    Edentulous     Gout     HTN (hypertension) 3/30/2012    Hyperlipidemia     Hypertension     Iron deficiency anemia 9/25/2011    Ischemic cardiomyopathy     Morbid obesity ([facility] [address] 9/25/2011    Obesity     Morbid obesity due to excess calories    TANNER variably compliant with BiPAP 9/25/2011    Osteoarthritis     PAF (paroxysmal atrial fibrillation) ([facility])     Psychophysiologic insomnia     Type II or unspecified type diabetes mellitus without mention of complication, not stated as uncontrolled     Unspecified sleep apnea     uses V-pap       Past Surgical History:        Procedure Laterality Date    BONE MARROW BIOPSY  2012 approx    CARDIAC CATHETERIZATION  08/2007    severe stenosis pre-stent area    CARDIAC CATHETERIZATION  09/11/2013    Very peripheral stenosis, not amendable to PCI, stents patent    CARDIAC DEFIBRILLATOR PLACEMENT  08/26/2015    COLONOSCOPY  11/07/2007    COLONOSCOPY  12/17/2021    COLONOSCOPY N/A 12/17/2021    COLONOSCOPY DIAGNOSTIC performed by Bailey Del Angel MD at [address] [facility] [address] [facility], 2004    stent LAD    PACEMAKER PLACEMENT  2005 or 2006    AICD    NH COLSC FLX W/RMVL OF TUMOR POLYP LESION SNARE TQ N/A 2017    COLONOSCOPY POLYPECTOMY SNARE/COLD BIOPSY performed by Elaina Blair DO at [facility]       Social History:    Social History     Tobacco Use    Smoking status: Former Smoker     Packs/day: 1.00     Years: 3.00     Pack years: 3.00     Types: Cigarettes     Start date: 1971     Quit date: 1974     Years since quittin.1    Smokeless tobacco: Never Used   Substance Use Topics    Alcohol use: No     Alcohol/week: 0.0 standard drinks                                Counseling given: Not Answered      Vital Signs (Current): There were no vitals filed for this visit.                                            BP Readings from Last 3 Encounters:   22 114/72   01/10/22 119/72   21 (!) 140/60       NPO Status:  mn                                                                               BMI:   Wt Readings from Last 3 Encounters:   22 235 lb 6.4 oz (106.8 kg)   01/10/22 236 lb (107 kg)   21 233 lb (105.7 kg)     There is no height or weight on file to calculate BMI.    CBC:   Lab Results   Component Value Date    WBC 5.8 2022    RBC 4.37 2022    RBC 4.85 2011    HGB 12.9 2022    HCT 41.3 2022    MCV 94.5 2022    RDW 14.6 2022     2022     2011       CMP:   Lab Results   Component Value Date     2022    K 3.8 2022    CL 98 2022    CO2 28 2022    BUN 16 2022    CREATININE 1.10 2022    GFRAA >60 2022    LABGLOM >60 2022    GLUCOSE 136 2022    GLUCOSE 123 2012    PROT 6.8 2022    PROT 7.2 2022    CALCIUM 9.0 2022    BILITOT 0.35 2022    ALKPHOS 77 2022    AST 16 2022    ALT 11 2022       POC Tests: No results for input(s): POCGLU, POCNA, POCK, POCCL, POCBUN, POCHEMO, POCHCT in the last 72 hours. Coags:   Lab Results   Component Value Date    PROTIME 10.6 09/01/2019    INR 1.0 09/01/2019    APTT 24.8 03/21/2020       HCG (If Applicable): No results found for: PREGTESTUR, PREGSERUM, HCG, HCGQUANT     ABGs: No results found for: PHART, PO2ART, QXE8ZYD, WVA5PYE, BEART, Q5ZEEROL     Type & Screen (If Applicable):  No results found for: LABABO, [address]    Anesthesia Evaluation  Patient summary reviewed no history of anesthetic complications:   Airway: Mallampati: III        Dental:    (+) edentulous      Pulmonary:normal exam  breath sounds clear to auscultation  (+) sleep apnea: on CPAP,                             Cardiovascular:  Exercise tolerance: good (>4 METS),   (+) hypertension:, pacemaker:, past MI:, CAD:, CHF:,         Rhythm: regular  Rate: normal                 ROS comment: Last pacer check 5 mon ago     Neuro/Psych:   (+) psychiatric history:            GI/Hepatic/Renal:        (-) liver disease and no renal disease       Endo/Other:    (+) DiabetesType II DM, , hypothyroidism::., .                 Abdominal:             Vascular: negative vascular ROS. Other Findings: Wide QRS tachycardia  Right bundle branch block  Left anterior fascicular block  ** Bifascicular block **  Anteroseptal infarct , age undetermined  Abnormal ECG      Specimen Collected: 08/02/21 06:40          CONCLUSIONS     Summary  Left ventricle is enlarged. Global left ventricular systolic function is  severely reduced. Calculated ejection fraction is 13 % by Negron's method. Visually estimated EF 15-20%. [place] akinetic. Definity used to optimize left ventricular systolic function and rule out  thrombus. No thrombus seen via Definity. Normal right ventricular size and function. No significant valvular regurgitation or stenosis seen. No pericardial effusion seen.        Anesthesia Plan      MAC     ASA 4 Induction: intravenous. Anesthetic plan and risks discussed with patient. Plan discussed with CRNA. EKG DOS shows new LBBB. Will need cardiac clearance before proceeding.   No chest pain or SOB      Mark Bryan MD   2/11/2022

## 2022-02-11 NOTE — PROGRESS NOTES
Patient was identified to have NEW LBBB on EKG not previously seen. Deemed unsafe for procedure with re-evaluation from cardiology. Procedure to be rescheduled after patient is seen by cardiology. Per Anesthesia, needs to be re-evaluated by cardiology. Discussed with patient, he agreed to reschedule.

## 2022-02-11 NOTE — PROGRESS NOTES
Dr. Casi Calderon reviews EKG, states new EKG changes. Discuses case concerns with Dr. Foster Navarro.   Pre-procedure delayed, awaiting further instructions

## 2022-02-11 NOTE — H&P
powder Use as directed following your patient instructions given by office 11/18/21   Gema Hernandez MD   bisacodyl (BISACODYL) 5 MG EC tablet Take 4 tabs at 10am the day prior to Colonoscopy 11/18/21   Gema Hernandez MD   furosemide (LASIX) 40 MG tablet TAKE 1/2 TABLET BY MOUTH 2 TIMES A DAY 9/28/21   LIDIA Card CNP   allopurinol (ZYLOPRIM) 300 MG tablet Take 1 tablet by mouth daily 8/30/21 1/17/22  LIDIA Card CNP   ENTRESTO 24-26 MG per tablet  8/20/21   Historical Provider, MD   metoprolol succinate (TOPROL XL) 50 MG extended release tablet Take 50 mg by mouth daily    Historical Provider, MD   Handicap Placard MISC by Does not apply route Exp: 1/2025 12/23/20   LIDIA Card CNP   Lancets MISC Daily 12/2/20   LIDIA Card CNP   ammonium lactate (LAC-HYDRIN) 12 % lotion Apply topically daily after bathing sparing the space between the toes. 1/2/20   Piyush Shaw MD   Alcohol Swabs (ALCOHOL PREP) 70 % PADS USE AS DIRECTED 5/31/19   Babatunde Pringle MD     Past Medical History    has a past medical history of Anemia, Anxiety, CAD (coronary artery disease), Cardiac defibrillator in place, Cardiomyopathy [facility]), CHF (congestive heart failure) ([address] Chronic combined systolic and diastolic heart failure ([address] s/[ AICD placed, Chronic kidney disease, Complex sleep apnea syndrome, Diabetic retinopathy ([address] Diverticulitis, DJD (degenerative joint disease), Edentulous, Gout, HTN (hypertension), Hyperlipidemia, Hypertension, Iron deficiency anemia, Ischemic cardiomyopathy, Morbid obesity ([address] Obesity, TANNER variably compliant with BiPAP, Osteoarthritis, PAF (paroxysmal atrial fibrillation) ([address] Psychophysiologic insomnia, Type II or unspecified type diabetes mellitus without mention of complication, not stated as uncontrolled, and Unspecified sleep apnea.   Past Surgical History   has a past surgical history that includes Colonoscopy (11/07/2007); pacemaker placement ( or ); Cardiac catheterization (2007); Cardiac catheterization (2013); Coronary angioplasty (, ); Cardiac defibrillator placement (2015); bone marrow biopsy (2012); pr colsc flx w/rmvl of tumor polyp lesion snare tq (N/A, 2017); Colonoscopy (2021); and Colonoscopy (N/A, 2021). Social History   reports that he quit smoking about 48 years ago. His smoking use included cigarettes. He started smoking about 51 years ago. He has a 3.00 pack-year smoking history. He has never used smokeless tobacco.   reports no history of alcohol use. reports previous drug use. Drug: Marijuana Dolph Wilson). Marital Status single  Children none  Occupation retired  Family History  Family Status   Relation Name Status    Mother      Father     Wesley Mcnamara FARHAD       family history includes Cancer in his mother; Heart Disease in his maternal grandmother, maternal uncle, and mother. Review of Systems:  CONSTITUTIONAL:   negative for fevers, chills, fatigue and malaise    EYES:   negative for double vision, blurred vision and photophobia    HEENT:   negative for tinnitus, epistaxis and sore throat     RESPIRATORY:   negative for cough, shortness of breath, wheezing     CARDIOVASCULAR:   negative for chest pain, palpitations, syncope, edema     GASTROINTESTINAL:   negative for nausea, vomiting     GENITOURINARY:   negative for incontinence     MUSCULOSKELETAL:   negative for neck or back pain     NEUROLOGICAL:   Negative for weakness and tingling  negative for headaches and dizziness     PSYCHIATRIC:   negative for anxiety       OBJECTIVE:   VITALS:  vitals were not taken for this visit. see nursing flowsheet. CONSTITUTIONAL:alert & oriented x 3, no acute distress. Calm and pleasant. SKIN:  Warm and dry, no rashes to exposed areas of skin. HEAD:  Normocephalic, atraumatic. EYES: PERRL. EOMs intact. EARS:  Intact and equal bilaterally.   No edema or thickening, without lumps, lesions, or discharge. Hearing grossly WNL. NOSE:  Nares patent. No rhinorrhea. MOUTH/THROAT:  Mucous membranes pink and moist, uvula midline, edentulous. NECK: Supple, no lymphadenopathy. LUNGS: Respirations even and non-labored. Clear to auscultation bilaterally, no wheezes, rales, or rhonchi. CARDIOVASCULAR: Regular rate and rhythm, no murmurs/rubs/gallops. ABDOMEN: soft, non-tender, rotund, bowel sounds active x 4. EXTREMITIES: No edema to bilateral lower extremities. No varicosities to bilateral lower extremities. NEUROLOGIC: CN II-XII are grossly intact. Gait not assessed. IMPRESSIONS:   POSITIVE FIT  PLANS:   COLONOSCOPY DIAGNOSTIC.     LIDIA Vargas CNP   Electronically signed 2/11/2022 at 8:04 AM

## 2022-02-14 ENCOUNTER — OFFICE VISIT (OUTPATIENT)
Dept: ONCOLOGY | Age: 67
End: 2022-02-14
Payer: COMMERCIAL

## 2022-02-14 ENCOUNTER — TELEPHONE (OUTPATIENT)
Dept: ONCOLOGY | Age: 67
End: 2022-02-14

## 2022-02-14 VITALS
BODY MASS INDEX: 40.47 KG/M2 | TEMPERATURE: 97.7 F | RESPIRATION RATE: 20 BRPM | SYSTOLIC BLOOD PRESSURE: 111 MMHG | DIASTOLIC BLOOD PRESSURE: 60 MMHG | WEIGHT: 235.8 LBS | HEART RATE: 57 BPM

## 2022-02-14 DIAGNOSIS — D47.2 MGUS (MONOCLONAL GAMMOPATHY OF UNKNOWN SIGNIFICANCE): Primary | ICD-10-CM

## 2022-02-14 DIAGNOSIS — E66.01 OBESITY, CLASS III, BMI 40-49.9 (MORBID OBESITY) (HCC): ICD-10-CM

## 2022-02-14 DIAGNOSIS — I25.5 ISCHEMIC CARDIOMYOPATHY: ICD-10-CM

## 2022-02-14 PROCEDURE — 99214 OFFICE O/P EST MOD 30 MIN: CPT | Performed by: INTERNAL MEDICINE

## 2022-02-14 PROCEDURE — 99211 OFF/OP EST MAY X REQ PHY/QHP: CPT

## 2022-02-14 NOTE — PROGRESS NOTES
DIAGNOSIS:   1. MGUS  CURRENT THERAPY:  Observation      REASON FOR VISIT:  Follow-up visit on MGUS. SUMMARY OF THE CASE:  He is a 69 y.o. patient who has multiple comorbidities in the form of cardiomyopathy, hypertension, diabetes, morbid obesity, and mild renal insufficiency that resolved completely. 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 INTERIM HISTORY: Patient is here today for the annual follow-up regarding MGUS and to review lab work. He reports he is doing well currently with no issues. He is doing well. He was seen by nephrology was assured that his kidneys are doing okay. Cardiology also stated that his cardiac condition is stable. PAST MEDICAL HISTORY: Significant for coronary artery disease, MI, ischemic cardiomyopathy, sleep apnea, diabetes, hypertension and resolved acute renal failure and hyperkalemia. CURRENT MEDICATIONS: Outpatient medications including Lasix, lisinopril, Zocor, allopurinol, aspirin, Plavix, iron, Lopressor, Prilosec, trazodone. SOCIAL HISTORY:  He is nonsmoker or drinker at this point but he quit a few years ago. He used to work as a  in the past. He is unemployed. He lives with a roommate. FAMILY HISTORY: Significant for coronary artery disease and diabetes as well. REVIEW OF SYSTEMS:     Constitutional: No fever or chills.  No night sweats, no weight loss   HEENT: negative for sore mouth, sore throat, hoarseness and voice change   Respiratory: negative for cough , sputum, dyspnea, wheezing, hemoptysis, chest pain   Cardiovascular: negative for chest pain, dyspnea, palpitations, orthopnea, PND   Gastrointestinal: negative for nausea, vomiting, diarrhea, constipation, abdominal pain,   Genitourinary: negative for frequency, dysuria, nocturia, urinary incontinence, and hematuria   Hematologic/Lymphatic: negative for easy bruising, bleeding, lymphadenopathy, petechiae and swelling/edema Endocrine: negative for heat or cold intolerance, tremor, weight changes, change in bowel habits and hair loss   Musculoskeletal: negative for myalgias, arthralgias, joint swelling,and bone pain  Neurological: negative for headaches, dizziness, seizures, weakness, numbness  Integument: negative for rash, skin lesions, bruises. +boil behind right ear    Physical Exam   /60 (Site: Left Upper Arm, Position: Sitting, Cuff Size: Large Adult)   Pulse 57   Temp 97.7 °F (36.5 °C) (Temporal)   Resp 20   Wt 235 lb 12.8 oz (107 kg)   BMI 40.47 kg/m²     General appearance - well appearing, no in pain or distress,  He is overweight  Mental status - alert and cooperative   Neck - supple, no palpable  adenopathy , trach midline   Lymphatics - no palpable lymphadenopathy, no hepatosplenomegaly   Chest - clear to auscultation, no wheezes, rales or rhonchi, decreased air entry   Heart - Afib  Abdomen - soft, nontender, nondistended, no masses or organomegaly   Neurological - alert, oriented, normal speech, no focal findings or movement disorder noted   Musculoskeletal - no joint tenderness, deformity or swelling   Extremities - peripheral pulses normal, no pedal edema, no clubbing or cyanosis   Skin - normal coloration and turgor, no rashes, no suspicious skin lesions noted +boil behind right ear    REVIEW OF LABORATORY DATA:  SPEP 1/27/2020  IGM LAMBDA. THE APPROXIMATE DENSITOMETRIC QUANTITATION OF PARAPROTEIN IS   0.21 G/DL. SPEP 2/2021  IgM, Lambda. THE APPROXIMATE DENSITOMETRIC QUANTITATION OF PARAPROTEIN IS   0.17 G/DL. SPEP 2/2022  IgM,   Lambda. THE APPROXIMATE DENSITOMETRIC QUANTITATION OF PARAPROTEIN IS   0.17 G/DL.   Lab Results   Component Value Date    WBC 5.8 02/07/2022    HGB 12.9 (L) 02/07/2022    HCT 41.3 02/07/2022    MCV 94.5 02/07/2022     02/07/2022       Chemistry        Component Value Date/Time     02/07/2022 1117    K 3.8 02/07/2022 1117    CL 98 02/07/2022 1117    CO2 28 02/07/2022 1117    BUN 16 02/07/2022 1117    CREATININE 1.10 02/07/2022 1117        Component Value Date/Time    CALCIUM 9.0 02/07/2022 1117    ALKPHOS 77 02/07/2022 1117    AST 16 02/07/2022 1117    ALT 11 02/07/2022 1117    BILITOT 0.35 02/07/2022 1117        Results for Negro Jackson (MRN [id number]) as of 2/10/2020 14:59   Ref. Range 1/27/2020 13:05   IgA Latest Ref Range: 70 - 400 mg/dL 307   Total IgG Latest Ref Range: 700 - 1600 mg/dL 1162   IgM Latest Ref Range: 40 - 230 mg/dL 318 (H)        Results for Negro Jackson (MRN [id number]) as of 2/12/2021 15:26   Ref. Range 1/25/2019 11:56 1/27/2020 13:05 2/5/2021 11:25   Hercules Free Light Chains QNT Latest Ref Range: 0.37 - 1.94 mg/dL 2.96 (H) 3.07 (H) 4.38 (H)   Lambda Free Light Chains QNT Latest Ref Range: 0.57 - 2.63 mg/dL 3.19 (H) 2.26 3.19 (H)   Free Kappa/Lambda Ratio Latest Ref Range: 0.26 - 1.65  0.93 1.36 1.37     IMPRESSION:    3 A 70year-old patient with IgM MGUS, without evidence of end organ damage or any progression by blood testing today, he is asymptomatic, no cytopenia. 2. Multiple comorbidities including diabetes and coronary artery disease: stable over the last year    3. New onset atrial fibrillation, examinations today's shows regular rhythm. Likely his atrial fibrillation is paroxysmal      Plan  1. We reviewed his lab work and his SPEP shows no change, counts and electrolytes are stable. 2. He is doing well with no symptoms. 3. We will continue with surveillance per the guidelines. 4. Cardiac and renal function are stable  5.  Return in 1 year        [facility]  Hematologist/Medical Oncologist

## 2022-02-14 NOTE — TELEPHONE ENCOUNTER
AUDREY HERE FOR MD VISIT  RV IN 3/2023 NEED CDP CMP SPEP AND LT CHAINS IMMUNOGLOBULINS PRIOR TO RV    LABS CDP SPEP CMP KAPPA/LAMBDA FREE LT CHAINS IGG IGA IGM ORDERS GIVEN TO PT ON EXIT TO BE DONE 1 WK BEFORE RV  MD VISIT 3/2023   AVS PRINTED W/ INSTRUCTIONS AND GIVEN TO PT ON EXIT

## 2022-02-21 ENCOUNTER — HOSPITAL ENCOUNTER (OUTPATIENT)
Dept: OTHER | Age: 67
Discharge: HOME | End: 2022-02-21
Payer: COMMERCIAL

## 2022-02-21 VITALS
RESPIRATION RATE: 20 BRPM | OXYGEN SATURATION: 98 % | HEART RATE: 67 BPM | WEIGHT: 238.8 LBS | BODY MASS INDEX: 40.99 KG/M2 | SYSTOLIC BLOOD PRESSURE: 120 MMHG | DIASTOLIC BLOOD PRESSURE: 60 MMHG

## 2022-02-21 PROCEDURE — 99212 OFFICE O/P EST SF 10 MIN: CPT

## 2022-02-21 ASSESSMENT — PAIN DESCRIPTION - ORIENTATION: ORIENTATION: RIGHT;LEFT

## 2022-02-21 ASSESSMENT — PAIN DESCRIPTION - LOCATION: LOCATION: HAND

## 2022-02-21 ASSESSMENT — PAIN DESCRIPTION - DESCRIPTORS: DESCRIPTORS: DISCOMFORT

## 2022-02-21 ASSESSMENT — PAIN DESCRIPTION - PAIN TYPE: TYPE: CHRONIC PAIN

## 2022-02-21 ASSESSMENT — PAIN SCALES - GENERAL: PAINLEVEL_OUTOF10: 2

## 2022-02-21 NOTE — PROGRESS NOTES
Date:  2022  Time:  1:33 PM    CHF Clinic at [facility]    Office: [phone] Fax: [phone]    Re:  Madonna Zimmer   Patient : 1955    Vital Signs: /60   Pulse 67   Resp 20   Wt 238 lb 12.8 oz (108.3 kg)   SpO2 98%   BMI 40.99 kg/m²                       O2 Device: None (Room air)                           No results for input(s): CBC, HGB, HCT, WBC, PLATELET, NA, K, CL, CO2, BUN, CREATININE, GLUCOSE, BNP, INR in the last 72 hours. Respiratory:    Assessment  Charting Type: Reassessment    Breath Sounds  Right Upper Lobe: Clear  Right Middle Lobe: Clear  Right Lower Lobe: Clear  Left Upper Lobe: Clear  Left Lower Lobe: Clear    Cough/Sputum  Cough: None         Peripheral Vascular  RLE Edema: +1,Pitting  LLE Edema: +1,Pitting      Complaints: None at this time    Physician Orders None    Comment : Patient arrived for scheduled visit per ambulatory. His weight is up 3 lbs from last month. Colonoscopy scheduled for last week cancelled, EKG showed new LBBB, want cardiology to clear patient prior to procedure. Has upcoming appt. With Dr. Verónica Louie scheduled. Saw nephrology and hematology-oncology last week as well. Patient says he had some dyspnea with exertion last week after drinking gallon of Golytely as prep for colonoscopy. Feels better now, no dyspnea noted. Did ambulate back to clinic from front of hospital without any difficulty. Mild lower leg, ankle and pedal edema noted, with a slight increase from last month. Medication list reviewed and updated. Reinforced low sodium diet and fluid restrictions. No s/s acute chf at this time. Next CHF Clinic visit on 22.     Electronically signed by Jammie Ramirez RN on 2022 at 1:33 PM

## 2022-02-21 NOTE — PROGRESS NOTES
Verbally reviewed medication list with patient; patient verbalized understanding. Discussed 2000mg/day sodium restricted diet; patient verbalized understanding. Moderate daily exercise encouraged as tolerated. Discussed rest breaks as needed; patient verbalized understanding. Patient instructed to weigh self at the same time of each day, using same clothes and same scale; reinforced teaching to monitor for 3-5 lb weight increase over 1-2 days, and to notify the CHF clinic at [phone] or physician office if weight change noted. Patient verbalized understanding. Risks of smoking discussed with the patient if applicable; patient strongly discouraged to smoke. Patient verbalized understanding. Signs and symptoms of CHF discussed with patient, such as feeling more tired than normal, feeling short of breath, coughing that increases when you lie down, sudden weight gain, swelling of your feet, legs or belly. Patient verbalized understanding to notify the CHF clinic at [phone] or physician office if these symptoms occur. Compliance with plan of care and further disease process causes discussed with patient, patient encouraged to keep all follow up appointments. Patient verbalized understanding.

## 2022-02-28 ENCOUNTER — OFFICE VISIT (OUTPATIENT)
Dept: PRIMARY CARE CLINIC | Age: 67
End: 2022-02-28
Payer: COMMERCIAL

## 2022-02-28 ENCOUNTER — HOSPITAL ENCOUNTER (OUTPATIENT)
Age: 67
Discharge: HOME | End: 2022-02-28
Payer: COMMERCIAL

## 2022-02-28 VITALS
DIASTOLIC BLOOD PRESSURE: 63 MMHG | WEIGHT: 234 LBS | OXYGEN SATURATION: 96 % | TEMPERATURE: 97.3 F | SYSTOLIC BLOOD PRESSURE: 103 MMHG | BODY MASS INDEX: 40.17 KG/M2 | HEART RATE: 63 BPM

## 2022-02-28 DIAGNOSIS — D50.9 IRON DEFICIENCY ANEMIA, UNSPECIFIED IRON DEFICIENCY ANEMIA TYPE: ICD-10-CM

## 2022-02-28 DIAGNOSIS — E11.8 CONTROLLED TYPE 2 DIABETES MELLITUS WITH COMPLICATION, WITHOUT LONG-TERM CURRENT USE OF INSULIN (HCC): Primary | ICD-10-CM

## 2022-02-28 DIAGNOSIS — E03.2 HYPOTHYROIDISM DUE TO MEDICATION: ICD-10-CM

## 2022-02-28 DIAGNOSIS — I50.42 CHRONIC COMBINED SYSTOLIC AND DIASTOLIC HEART FAILURE (HCC): ICD-10-CM

## 2022-02-28 DIAGNOSIS — I48.0 PAF (PAROXYSMAL ATRIAL FIBRILLATION) (HCC): ICD-10-CM

## 2022-02-28 LAB — TSH SERPL DL<=0.05 MIU/L-ACNC: 3.17 MIU/L (ref 0.3–5)

## 2022-02-28 PROCEDURE — 84443 ASSAY THYROID STIM HORMONE: CPT

## 2022-02-28 PROCEDURE — 36415 COLL VENOUS BLD VENIPUNCTURE: CPT

## 2022-02-28 PROCEDURE — 99213 OFFICE O/P EST LOW 20 MIN: CPT | Performed by: NURSE PRACTITIONER

## 2022-02-28 RX ORDER — LEVOTHYROXINE SODIUM 0.07 MG/1
75 TABLET ORAL DAILY
Qty: 90 TABLET | Refills: 1 | Status: SHIPPED | OUTPATIENT
Start: 2022-02-28 | End: 2022-10-11

## 2022-02-28 ASSESSMENT — ENCOUNTER SYMPTOMS
BLOOD IN STOOL: 0
EYE ITCHING: 0
BLURRED VISION: 1
DIARRHEA: 0
SHORTNESS OF BREATH: 0
WHEEZING: 0
CONSTIPATION: 0
EYE PAIN: 0
TROUBLE SWALLOWING: 0
EYE REDNESS: 0
ABDOMINAL PAIN: 0
CHEST TIGHTNESS: 0
VOMITING: 0
EYE DISCHARGE: 0

## 2022-02-28 NOTE — PROGRESS NOTES
[facility]  [id number] [facility] [address]  Dept: [phone]  Dept Fax: [phone]    Patient Care Team:  LIDIA Reagan CNP as PCP - General (Family Medicine)  LIDIA Reagan CNP as PCP - [address] [place] [address] Dr [facility] Provider  González Cook MD as Consulting Physician (Hematology and Oncology)  Sierra Hernandez MD as Consulting Physician (Cardiology)  Jake Sales DO as Consulting Physician (Pulmonology)  Aisha Romero MD as Consulting Physician (Ophthalmology)  Matilda Valenzuela MD as Consulting Physician (Internal Medicine)  Cindy Miramontes MD as Consulting Physician (Gastroenterology)    2022     Mariah Peterson (:  1955)is a 69 y.o. male, here for evaluation of the following medical concerns:   Chief Complaint   Patient presents with    Follow-up     Dm, CHF       77-year-old  male is here for a routine follow up. He reports a history of diabetes mellitus, hypertension, he has a history of ischemic cardiomyopathy with multiple stent placed in , ejection fraction recent 15 to 20% status post AICD, patient has a history of atrial fibrillation on Eliquis 5 mg twice a day, is following cardiology regularly, Dr Izabella Hughes. For diabetes mellitus he is on metformin 1 g twice daily. FBS running 100-110 normally  He denies any shortness of breath, leg swelling, dizziness. He does attend heart failure clinic. He follows podiatry and ophthalmology for retinal exam last foot exam in 2020   Gout on allopurinol 300 mg daily    Follow with cardiology, does have implanted monitor in place. No medication changes. See's podiatry in , goes yearly    Also follows with oncology annually, states recent numbers were good and will continue to observe    See's Dr Mukesh Keenan at [facility] for known kidney mass    Hypertension  This is a chronic problem. The problem is unchanged.  The problem is controlled. Associated symptoms include blurred vision. Pertinent negatives include no chest pain, headaches, neck pain, palpitations or shortness of breath. Risk factors for coronary artery disease include male gender, obesity and smoking/tobacco exposure. Diabetes  He presents for his follow-up diabetic visit. He has type 2 diabetes mellitus. There are no hypoglycemic associated symptoms. Pertinent negatives for hypoglycemia include no dizziness, headaches, nervousness/anxiousness or seizures. Associated symptoms include blurred vision and fatigue. Pertinent negatives for diabetes include no chest pain, no foot ulcerations, no polydipsia and no polyuria. Symptoms are stable. Risk factors for coronary artery disease include obesity, male sex, diabetes mellitus and tobacco exposure. Current diabetic treatment includes oral agent (monotherapy). There is no change in his home blood glucose trend. .    Review of Systems   Constitutional: Positive for fatigue. Negative for appetite change, fever and unexpected weight change. HENT: Negative for hearing loss and trouble swallowing. Eyes: Positive for blurred vision. Negative for pain, discharge, redness, itching and visual disturbance. Respiratory: Negative for chest tightness, shortness of breath and wheezing. Cardiovascular: Negative for chest pain and palpitations. Gastrointestinal: Negative for abdominal pain, blood in stool, constipation, diarrhea and vomiting. Endocrine: Negative for polydipsia and polyuria. Genitourinary: Negative for decreased urine volume, difficulty urinating, dysuria, frequency, hematuria and urgency. Musculoskeletal: Negative for myalgias and neck pain. Skin: Negative for rash and wound. Neurological: Negative for dizziness, seizures, numbness and headaches. Psychiatric/Behavioral: Negative for suicidal ideas. The patient is not nervous/anxious. Prior to Visit Medications    Medication Sig Taking? Authorizing Provider   atorvastatin (LIPITOR) 40 MG tablet TAKE 1 TABLET BY MOUTH DAILY Yes LIDIA Cade CNP   metFORMIN (GLUCOPHAGE) 1000 MG tablet TAKE 1 TABLET BY MOUTH 2 TIMES DAILY (WITH MEALS) Yes LIDIA Cade CNP   isosorbide mononitrate (IMDUR) 30 MG extended release tablet TAKE 1 TABLET BY MOUTH DAILY Yes LIDIA Cade CNP   FEROSUL 325 (65 Fe) MG tablet TAKE 1 TABLET BY MOUTH DAILY (WITH BREAKFAST) Yes LIDIA Cade CNP   levothyroxine (SYNTHROID) 75 MCG tablet TAKE 1 TABLET BY MOUTH DAILY Yes LIDIA Cade CNP   ONETOUCH VERIO strip TEST BLOOD SUGAR ONCE A DAY AND AS NEEDED FOR SYMPTOMS OF IRREGULAR BLOOD SUGAR Yes LIDIA Cade CNP   aspirin 81 MG EC tablet TAKE 1 TABLET TWICE A DAY Yes LIDIA Cade CNP   apixaban (ELIQUIS) 5 MG TABS tablet TAKE 1 TABLET BY MOUTH 2 TIMES DAILY Yes LIDIA Cade CNP   furosemide (LASIX) 40 MG tablet TAKE 1/2 TABLET BY MOUTH 2 TIMES A DAY Yes LIDIA Cade CNP   ENTRESTO 24-26 MG per tablet  Yes Historical Provider, MD   metoprolol succinate (TOPROL XL) 50 MG extended release tablet Take 50 mg by mouth daily Yes Historical Provider, MD   Handicap Placard MISC by Does not apply route Exp: 1/2025 Yes LIDIA Cade CNP   Lancets MISC Daily Yes LIDIA Cade CNP   ammonium lactate (LAC-HYDRIN) 12 % lotion Apply topically daily after bathing sparing the space between the toes.  Yes David Bee MD   Alcohol Swabs (ALCOHOL PREP) 70 % PADS USE AS DIRECTED Yes David Benton MD   polyethylene glycol (GOLYTELY) 236 g solution Follow instructions provided by physicians office  Patient not taking: Reported on 2/28/2022  Ranjan Angulo MD   polyethylene glycol [facility]) 17 GM/SCOOP powder Use as directed following your patient instructions given by office  Ranjan Angulo MD   allopurinol (ZYLOPRIM) 300 MG tablet Take 1 tablet by mouth daily  LIDIA Cade CNP        Social History     Tobacco Use    Smoking status: Former Smoker     Packs/day: 1.00     Years: 3.00     Pack years: 3.00     Types: Cigarettes     Start date: 1971     Quit date: 1974     Years since quittin.1    Smokeless tobacco: Never Used   Substance Use Topics    Alcohol use: No     Alcohol/week: 0.0 standard drinks        Vitals:    22 1316   BP: 103/63   Site: Left Upper Arm   Position: Sitting   Cuff Size: Large Adult   Pulse: 63   Temp: 97.3 °F (36.3 °C)   SpO2: 96%   Weight: 234 lb (106.1 kg)     Estimated body mass index is 40.17 kg/m² as calculated from the following:    Height as of 21: 5' 4\" (1.626 m). Weight as of this encounter: 234 lb (106.1 kg). DIAGNOSTIC FINDINGS:  CBC:  Lab Results   Component Value Date    WBC 5.8 2022    HGB 12.9 2022     2022     2011       BMP:    Lab Results   Component Value Date     2022    K 3.8 2022    CL 98 2022    CO2 28 2022    BUN 16 2022    CREATININE 1.10 2022    GLUCOSE 136 2022    GLUCOSE 123 2012       HEMOGLOBIN A1C:   Lab Results   Component Value Date    LABA1C 6.3 2021       FASTING LIPID PANEL:  Lab Results   Component Value Date    CHOL 124 2021    HDL 43 2021    TRIG 129 2021     Lab Results   Component Value Date    TSH 7.18 (H) 2021         Physical Exam  Vitals and nursing note reviewed. Constitutional:       General: He is not in acute distress. Appearance: He is well-developed. He is obese. HENT:      Head: Normocephalic. Eyes:      General: No scleral icterus. Right eye: No discharge. Left eye: No discharge. Pupils: Pupils are equal, round, and reactive to light. Cardiovascular:      Rate and Rhythm: Normal rate and regular rhythm. Pulmonary:      Effort: Pulmonary effort is normal.      Breath sounds: Normal breath sounds.    Abdominal:      General: Abdomen is protuberant. Palpations: Abdomen is soft. Musculoskeletal:      Cervical back: Normal range of motion and neck supple. Skin:     General: Skin is warm and dry. Neurological:      Mental Status: He is alert and oriented to person, place, and time. Coordination: Coordination normal.   Psychiatric:         Behavior: Behavior normal. Behavior is cooperative. Thought Content: Thought content normal.         Judgment: Judgment normal.         ASSESSMENT     Diagnosis Orders   1. Controlled type 2 diabetes mellitus with complication, without long-term current use of insulin ([address]     2. Chronic combined systolic and diastolic heart failure ([address]     3. PAF (paroxysmal atrial fibrillation) ([facility]     4. Hypothyroidism due to medication  TSH With Reflex Ft4   5. Iron deficiency anemia, unspecified iron deficiency anemia type            PLAN:  No orders of the defined types were placed in this encounter. 1. Last TSH at 7, levothyroxine was increased to 75 mcg daily. Due for repeat TSH to monitor thyroid. 2. Diabetes well controlled at this time, no changes made today. 3. Blood pressure remains at goal patient continues to follow routinely with Dr. Cynthia Lopes. 4. Patient states he did have 2 failed attempts at colonoscopy with difficulty with prep. There are plans to repeat again with half gallon of GoLYTELY. FOLLOW UP AND INSTRUCTIONS:  Return in about 4 months (around 6/28/2022) for Thyroid, CHF. · Olivier Courtney received counseling on the following healthy behaviors:nutrition and medication adherence    · Discussed use, benefit, and side effects of prescribed medications. Barriers to  medication compliance addressed. All patient questions answered. Pt  verbalized understanding of all instructions given.     · Patient given educational materials - see patient instructions      · Patient advised to contact scheduling offices for any referrals or imaging orders  placed today if they have not been contacted in 48 hours. Return in about 4 months (around 6/28/2022) for Thyroid, CHF. An electronic signature was used to authenticate this note.     --LIDIA Watt CNP on 2/28/2022 at 2:20 PM

## 2022-02-28 NOTE — PROGRESS NOTES
Visit Information    Have you changed or started any medications since your last visit including any over-the-counter medicines, vitamins, or herbal medicines? no   Are you having any side effects from any of your medications? -  no  Have you stopped taking any of your medications? Is so, why? -  no    Have you seen any other physician or provider since your last visit? Yes - Records Obtained  Have you had any other diagnostic tests since your last visit? Yes - Records Obtained  Have you been seen in the emergency room and/or had an admission to a hospital since we last saw you? No  Have you had your routine dental cleaning in the past 6 months? no    Have you activated your [employer] account? If not, what are your barriers?  Yes     Patient Care Team:  LIDIA Jeffers CNP as PCP - General (Family Medicine)  LIDIA Jeffers CNP as PCP [facility]  Lindsey Small MD as Consulting Physician (Hematology and Oncology)  Senait Abdi MD as Consulting Physician (Cardiology)  Sharmin Nickerson DO as Consulting Physician (Pulmonology)  April Grant MD as Consulting Physician (Ophthalmology)  Bartolo Morales MD as Consulting Physician (Internal Medicine)  Dania Bennett MD as Consulting Physician (Gastroenterology)    Medical History Review  Past Medical, Family, and Social History reviewed and does contribute to the patient presenting condition    Health Maintenance   Topic Date Due    Diabetic retinal exam  11/15/2020    Diabetic microalbuminuria test  02/24/2021    Annual Wellness Visit (AWV)  Never done    Diabetic foot exam  01/06/2022    A1C test (Diabetic or Prediabetic)  04/26/2022    Depression Screen  04/26/2022    Lipid screen  06/23/2022    TSH testing  11/04/2022    Potassium monitoring  02/07/2023    Creatinine monitoring  02/07/2023    Colorectal Cancer Screen  12/17/2024    DTaP/Tdap/Td vaccine (2 - Td or Tdap) 06/30/2026    Flu vaccine  Completed    Shingles Vaccine  Completed    Pneumococcal 65+ years Vaccine  Completed    COVID-19 Vaccine  Completed    AAA screen  Completed    Hepatitis C screen  Completed    Hepatitis A vaccine  Aged Out    Hib vaccine  Aged Out    Meningococcal (ACWY) vaccine  Aged Out

## 2022-03-21 DIAGNOSIS — I25.10 CORONARY ARTERY DISEASE INVOLVING NATIVE CORONARY ARTERY OF NATIVE HEART WITHOUT ANGINA PECTORIS: ICD-10-CM

## 2022-03-21 DIAGNOSIS — M1A.00X0 IDIOPATHIC CHRONIC GOUT WITHOUT TOPHUS, UNSPECIFIED SITE: ICD-10-CM

## 2022-03-21 RX ORDER — ALLOPURINOL 300 MG/1
300 TABLET ORAL DAILY
Qty: 30 TABLET | Refills: 5 | Status: SHIPPED | OUTPATIENT
Start: 2022-03-21 | End: 2022-08-22

## 2022-03-21 RX ORDER — ASPIRIN 81 MG/1
TABLET ORAL
Qty: 60 TABLET | Refills: 3 | Status: SHIPPED | OUTPATIENT
Start: 2022-03-21 | End: 2022-07-11

## 2022-03-21 NOTE — TELEPHONE ENCOUNTER
Health Maintenance   Topic Date Due    Diabetic retinal exam  11/15/2020    Diabetic microalbuminuria test  02/24/2021    Annual Wellness Visit (AWV)  Never done    Diabetic foot exam  01/06/2022    A1C test (Diabetic or Prediabetic)  04/26/2022    Depression Screen  04/26/2022    Lipid screen  06/23/2022    Potassium monitoring  02/07/2023    Creatinine monitoring  02/07/2023    TSH testing  02/28/2023    Colorectal Cancer Screen  12/17/2024    DTaP/Tdap/Td vaccine (2 - Td or Tdap) 06/30/2026    Flu vaccine  Completed    Shingles Vaccine  Completed    Pneumococcal 65+ years Vaccine  Completed    COVID-19 Vaccine  Completed    AAA screen  Completed    Hepatitis C screen  Completed    Hepatitis A vaccine  Aged Out    Hib vaccine  Aged Out    Meningococcal (ACWY) vaccine  Aged Out             (applicable per patient's age: Cancer Screenings, Depression Screening, Fall Risk Screening, Immunizations)    Hemoglobin A1C (%)   Date Value   04/26/2021 6.3   02/24/2020 6.7 (H)   01/02/2020 5.6     Microalb/Crt.  Ratio (mcg/mg creat)   Date Value   02/24/2020 CANNOT BE CALCULATED     LDL Cholesterol (mg/dL)   Date Value   06/23/2021 55     AST (U/L)   Date Value   02/07/2022 16     ALT (U/L)   Date Value   02/07/2022 11     BUN (mg/dL)   Date Value   02/07/2022 16      (goal A1C is < 7)   (goal LDL is <100) need 30-50% reduction from baseline     BP Readings from Last 3 Encounters:   02/28/22 103/63   02/21/22 120/60   02/14/22 110/60    (goal /80)      All Future Testing planned in [facility]:  Lab Frequency Next Occurrence   Lipid, Fasting Once 10/17/2021   COLONOSCOPY (Diagnostic) Once 11/10/2021   COLONOSCOPY (Diagnostic) Once 02/10/2022   Immunoglobulin Panel (IgG, IgA, IgM)     Mc[place]/Lambda Free Lt Chains, Serum Quant     Electrophoresis Protein, Serum without Reflex to Immunofixation     CBC Auto Differential     Comprehensive Metabolic Panel     CBC Every 4 months    Basic Metabolic Panel Every 4 months    Albumin Every 4 months    Microalbumin / Creatinine Urine Ratio Every 4 months        Next Visit Date:  Future Appointments   Date Time Provider Keerthi England   3/22/2022  1:00 PM [facility] RM 1 [facility] CHF CLI  [facility]   4/11/2022  2:00 PM Angie Ashley MD sv gr lks [facility]   6/28/2022  1:30 PM LIDIA Simon - CNP [facility] WALK IN [facility]   7/12/2022  1:10 PM Hugh Granado MD AFL Neph [facility]   8/29/2022  1:15 PM Merlin Ceja DO Resp Spec [facility]            Patient Active Problem List:     Acute on chronic combined systolic and diastolic congestive heart failure (HCC)     Chronic kidney disease, stage II (mild)     Chronic gout     Morbid obesity ([facility])     Normocytic normochromic anemia     Hyperlipidemia     Iron deficiency anemia     Anxiety disorder      DJD (degenerative joint disease)     TANNER variably compliant with BiPAP     CAD (coronary artery disease) s/p stenting of L anterior descending artery 2004     Diabetic retinopathy ([address]     Ischemic cardiomyopathy     HTN (hypertension)     NSTEMI (non-ST elevated myocardial infarction) ([facility])     MGUS (monoclonal gammopathy of unknown significance)     Type 2 diabetes mellitus with chronic kidney disease ([address]     AICD (automatic cardioverter/defibrillator) present     PAF (paroxysmal atrial fibrillation) ([facility])     Acute respiratory failure with hypoxia ([facility])     Coronary angioplasty status     Hypokalemia     Acute on chronic systolic heart failure (HCC)     Acute on chronic congestive heart failure ([facility])     Acute HFrEF (heart failure with reduced ejection fraction) ([address]     Hypothyroidism due to medication     Acute cardiac pulmonary edema ([address]

## 2022-03-29 ENCOUNTER — HOSPITAL ENCOUNTER (OUTPATIENT)
Dept: OTHER | Age: 67
Discharge: HOME | End: 2022-03-29
Payer: COMMERCIAL

## 2022-03-29 VITALS
SYSTOLIC BLOOD PRESSURE: 104 MMHG | OXYGEN SATURATION: 99 % | BODY MASS INDEX: 40.61 KG/M2 | RESPIRATION RATE: 20 BRPM | WEIGHT: 236.6 LBS | HEART RATE: 68 BPM | DIASTOLIC BLOOD PRESSURE: 60 MMHG

## 2022-03-29 PROCEDURE — 99212 OFFICE O/P EST SF 10 MIN: CPT

## 2022-03-29 NOTE — PROGRESS NOTES
Date:  3/29/2022  Time:  1:21 PM    CHF Clinic at [facility]    Office: [phone] Fax: [phone]    Re:  Merlin [place]   Patient : 1955    Vital Signs: /60   Pulse 68   Resp 20   Wt 236 lb 9.6 oz (107.3 kg)   SpO2 99%   BMI 40.61 kg/m²                       O2 Device: None (Room air)                           No results for input(s): CBC, HGB, HCT, WBC, PLATELET, NA, K, CL, CO2, BUN, CREATININE, GLUCOSE, BNP, INR in the last 72 hours. Respiratory:         Breath Sounds  Right Upper Lobe: Clear  Right Middle Lobe: Clear  Right Lower Lobe: Clear  Left Upper Lobe: Clear  Left Lower Lobe: Clear              Peripheral Vascular  RLE Edema: None  LLE Edema: None      Complaints: no new complaints      Comment : Wt is down 2.2 lbs in one month. Pt ambulated from registration to the CHF Clinic without c/o of increased shortness of breath. He denies fatigue, dizziness, chest pain or any other complaints. He has been compliant with the 2 gm sodium diet and fluid restriction of 2 liters daily. No acute S/S of CHF noted today. Reviewed all medications with pt. Current diuretic: Lasix ( 40 mg tab) he takes one half tablet twice daily. Pt has been trying to stay active. States with the warmer weather coming, he plans to increase his activity and walks in the neighborhood. He states he has a f/u with Dr Ananya Espitia next month. Next f/u here in 1 month. Pt instructed to call for appointment sooner if increased S/S of CHF develop prior to next scheduled appt.      Electronically signed by Elio Meigs, RN on 3/29/2022 at 1:21 PM

## 2022-03-31 ENCOUNTER — TELEPHONE (OUTPATIENT)
Dept: GASTROENTEROLOGY | Age: 67
End: 2022-03-31

## 2022-03-31 DIAGNOSIS — R19.5 POSITIVE FIT (FECAL IMMUNOCHEMICAL TEST): Primary | ICD-10-CM

## 2022-03-31 NOTE — TELEPHONE ENCOUNTER
Called patient. Informed him that follow up appointment for 04/11 is being cancelled. Patient needs colonoscopy first.    Clearance is scanned in media. Informed patient I would send a message to our schedulers and he will be contacted to be scheduled for the procedure and follow up.

## 2022-04-01 DIAGNOSIS — I50.42 CHRONIC COMBINED SYSTOLIC AND DIASTOLIC HEART FAILURE (HCC): ICD-10-CM

## 2022-04-01 NOTE — TELEPHONE ENCOUNTER
Health Maintenance   Topic Date Due    Diabetic retinal exam  11/15/2020    Diabetic microalbuminuria test  02/24/2021    Annual Wellness Visit (AWV)  Never done    Diabetic foot exam  01/06/2022    A1C test (Diabetic or Prediabetic)  04/26/2022    Depression Screen  04/26/2022    Lipid screen  06/23/2022    Potassium monitoring  02/07/2023    Creatinine monitoring  02/07/2023    TSH testing  02/28/2023    Colorectal Cancer Screen  12/17/2024    DTaP/Tdap/Td vaccine (2 - Td or Tdap) 06/30/2026    Flu vaccine  Completed    Shingles Vaccine  Completed    Pneumococcal 65+ years Vaccine  Completed    COVID-19 Vaccine  Completed    AAA screen  Completed    Hepatitis C screen  Completed    Hepatitis A vaccine  Aged Out    Hib vaccine  Aged Out    Meningococcal (ACWY) vaccine  Aged Out             (applicable per patient's age: Cancer Screenings, Depression Screening, Fall Risk Screening, Immunizations)    Hemoglobin A1C (%)   Date Value   04/26/2021 6.3   02/24/2020 6.7 (H)   01/02/2020 5.6     Microalb/Crt.  Ratio (mcg/mg creat)   Date Value   02/24/2020 CANNOT BE CALCULATED     LDL Cholesterol (mg/dL)   Date Value   06/23/2021 55     AST (U/L)   Date Value   02/07/2022 16     ALT (U/L)   Date Value   02/07/2022 11     BUN (mg/dL)   Date Value   02/07/2022 16      (goal A1C is < 7)   (goal LDL is <100) need 30-50% reduction from baseline     BP Readings from Last 3 Encounters:   03/29/22 104/60   02/28/22 103/63   02/21/22 120/60    (goal /80)      All Future Testing planned in [facility]:  Lab Frequency Next Occurrence   Lipid, Fasting Once 10/17/2021   COLONOSCOPY (Diagnostic) Once 11/10/2021   COLONOSCOPY (Diagnostic) Once 02/10/2022   Immunoglobulin Panel (IgG, IgA, IgM)     [place]/Lambda Free Lt Chains, Serum Quant     Electrophoresis Protein, Serum without Reflex to Immunofixation     CBC Auto Differential     Comprehensive Metabolic Panel     CBC Every 4 months    Basic Metabolic Panel Every 4 months    Albumin Every 4 months    Microalbumin / Creatinine Urine Ratio Every 4 months        Next Visit Date:  Future Appointments   Date Time Provider Keerthi England   4/26/2022  1:00 PM [facility]F CLINIC RM 1 [facility] CHF CLI [facility]   6/28/2022  1:30 PM Mortimer Singleton, APRN - CNP [facility] WALK IN [place]   7/11/2022  2:00 PM Mayda Rahman MD ST V GI [facility]   7/12/2022  1:10 PM Gary Cardenas MD AFL Neph [place] None   8/29/2022  1:15 PM Cat Dela Cruz DO Resp Spec [email or web address]            Patient Active Problem List:     Acute on chronic combined systolic and diastolic congestive heart failure (HCC)     Chronic kidney disease, stage II (mild)     Chronic gout     Morbid obesity ([facility])     Normocytic normochromic anemia     Hyperlipidemia     Iron deficiency anemia     Anxiety disorder      DJD (degenerative joint disease)     TANNER variably compliant with BiPAP     CAD (coronary artery disease) s/p stenting of L anterior descending artery 2004     Diabetic retinopathy ([facility]     Ischemic cardiomyopathy     HTN (hypertension)     NSTEMI (non-ST elevated myocardial infarction) ([facility])     MGUS (monoclonal gammopathy of unknown significance)     Type 2 diabetes mellitus with chronic kidney disease ([facility])     AICD (automatic cardioverter/defibrillator) present     PAF (paroxysmal atrial fibrillation) ([facility])     Acute respiratory failure with hypoxia ([facility])     Coronary angioplasty status     Hypokalemia     Acute on chronic systolic heart failure (HCC)     Acute on chronic congestive heart failure (HCC)     Acute HFrEF (heart failure with reduced ejection fraction) ([address]     Hypothyroidism due to medication     Acute cardiac pulmonary edema ([address]

## 2022-04-01 NOTE — TELEPHONE ENCOUNTER
Procedure scheduled/Coleen Mukherjee [place]  Colonoscopy/Diagnostic  5/2/22  11:30am   Lisset Camacho instructions mailed    Vaccinated    Patient notified by phone/mail

## 2022-04-04 RX ORDER — SODIUM, POTASSIUM,MAG SULFATES 17.5-3.13G
SOLUTION, RECONSTITUTED, ORAL ORAL
Qty: 1 EACH | Refills: 0 | Status: SHIPPED | OUTPATIENT
Start: 2022-04-04

## 2022-04-04 RX ORDER — FUROSEMIDE 40 MG/1
TABLET ORAL
Qty: 90 TABLET | Refills: 1 | Status: SHIPPED | OUTPATIENT
Start: 2022-04-04 | End: 2022-08-19 | Stop reason: SDUPTHER

## 2022-04-07 DIAGNOSIS — I48.0 PAF (PAROXYSMAL ATRIAL FIBRILLATION) (HCC): ICD-10-CM

## 2022-04-09 DIAGNOSIS — I48.0 PAF (PAROXYSMAL ATRIAL FIBRILLATION) (HCC): ICD-10-CM

## 2022-04-11 DIAGNOSIS — I48.0 PAF (PAROXYSMAL ATRIAL FIBRILLATION) (HCC): ICD-10-CM

## 2022-04-11 DIAGNOSIS — I50.42 CHRONIC COMBINED SYSTOLIC AND DIASTOLIC HEART FAILURE (HCC): ICD-10-CM

## 2022-04-11 RX ORDER — ISOSORBIDE MONONITRATE 30 MG/1
30 TABLET, EXTENDED RELEASE ORAL DAILY
Qty: 30 TABLET | Refills: 3 | Status: SHIPPED | OUTPATIENT
Start: 2022-04-11 | End: 2022-08-22

## 2022-04-11 NOTE — TELEPHONE ENCOUNTER
Health Maintenance   Topic Date Due    Diabetic retinal exam  11/15/2020    Diabetic microalbuminuria test  02/24/2021    Annual Wellness Visit (AWV)  Never done    Diabetic foot exam  01/06/2022    A1C test (Diabetic or Prediabetic)  04/26/2022    Depression Screen  04/26/2022    Lipid screen  06/23/2022    Potassium monitoring  02/07/2023    Creatinine monitoring  02/07/2023    TSH testing  02/28/2023    Colorectal Cancer Screen  12/17/2024    DTaP/Tdap/Td vaccine (2 - Td or Tdap) 06/30/2026    Flu vaccine  Completed    Shingles Vaccine  Completed    Pneumococcal 65+ years Vaccine  Completed    COVID-19 Vaccine  Completed    AAA screen  Completed    Hepatitis C screen  Completed    Hepatitis A vaccine  Aged Out    Hib vaccine  Aged Out    Meningococcal (ACWY) vaccine  Aged Out             (applicable per patient's age: Cancer Screenings, Depression Screening, Fall Risk Screening, Immunizations)    Hemoglobin A1C (%)   Date Value   04/26/2021 6.3   02/24/2020 6.7 (H)   01/02/2020 5.6     Microalb/Crt.  Ratio (mcg/mg creat)   Date Value   02/24/2020 CANNOT BE CALCULATED     LDL Cholesterol (mg/dL)   Date Value   06/23/2021 55     AST (U/L)   Date Value   02/07/2022 16     ALT (U/L)   Date Value   02/07/2022 11     BUN (mg/dL)   Date Value   02/07/2022 16      (goal A1C is < 7)   (goal LDL is <100) need 30-50% reduction from baseline     BP Readings from Last 3 Encounters:   03/29/22 104/60   02/28/22 103/63   02/21/22 120/60    (goal /80)      All Future Testing planned in [facility]:  Lab Frequency Next Occurrence   Lipid, Fasting Once 10/17/2021   COLONOSCOPY (Diagnostic) Once 11/10/2021   COLONOSCOPY (Diagnostic) Once 02/10/2022   Immunoglobulin Panel (IgG, IgA, IgM)     [place]/Lambda Free Lt Chains, Serum Quant     Electrophoresis Protein, Serum without Reflex to Immunofixation     CBC Auto Differential     Comprehensive Metabolic Panel     CBC Every 4 months    Basic Metabolic Panel Every 4 months    Albumin Every 4 months    Microalbumin / Creatinine Urine Ratio Every 4 months        Next Visit Date:  Future Appointments   Date Time Provider Keerthi [place]   4/26/2022  1:00 PM [facility] CHF CLINIC RM 1 STV[facility] CHF CLI [facility]   6/28/2022  1:30 PM Rashad Carney, APRN - CNP [facility] WALK IN [facility]   7/11/2022  2:00 PM Al Henry MD ST [facility]   7/12/2022  1:10 PM Edmund Canada MD [facility]   8/29/2022  1:15 PM Marisa Quiroz, DO Resp Spec [facility]            Patient Active Problem List:     Acute on chronic combined systolic and diastolic congestive heart failure (HCC)     Chronic kidney disease, stage II (mild)     Chronic gout     Morbid obesity (HCC)     Normocytic normochromic anemia     Hyperlipidemia     Iron deficiency anemia     Anxiety disorder      DJD (degenerative joint disease)     TANNER variably compliant with BiPAP     CAD (coronary artery disease) s/p stenting of L anterior descending artery 2004     Diabetic retinopathy ([address]     Ischemic cardiomyopathy     HTN (hypertension)     NSTEMI (non-ST elevated myocardial infarction) ([facility])     MGUS (monoclonal gammopathy of unknown significance)     Type 2 diabetes mellitus with chronic kidney disease ([facility])     AICD (automatic cardioverter/defibrillator) present     PAF (paroxysmal atrial fibrillation) ([facility])     Acute respiratory failure with hypoxia ([facility])     Coronary angioplasty status     Hypokalemia     Acute on chronic systolic heart failure (HCC)     Acute on chronic congestive heart failure (HCC)     Acute HFrEF (heart failure with reduced ejection fraction) ([address]     Hypothyroidism due to medication     Acute cardiac pulmonary edema ([address]

## 2022-04-11 NOTE — TELEPHONE ENCOUNTER
Health Maintenance   Topic Date Due    Diabetic retinal exam  11/15/2020    Diabetic microalbuminuria test  02/24/2021    Annual Wellness Visit (AWV)  Never done    Diabetic foot exam  01/06/2022    A1C test (Diabetic or Prediabetic)  04/26/2022    Depression Screen  04/26/2022    Lipid screen  06/23/2022    Potassium monitoring  02/07/2023    Creatinine monitoring  02/07/2023    TSH testing  02/28/2023    Colorectal Cancer Screen  12/17/2024    DTaP/Tdap/Td vaccine (2 - Td or Tdap) 06/30/2026    Flu vaccine  Completed    Shingles Vaccine  Completed    Pneumococcal 65+ years Vaccine  Completed    COVID-19 Vaccine  Completed    AAA screen  Completed    Hepatitis C screen  Completed    Hepatitis A vaccine  Aged Out    Hib vaccine  Aged Out    Meningococcal (ACWY) vaccine  Aged Out             (applicable per patient's age: Cancer Screenings, Depression Screening, Fall Risk Screening, Immunizations)    Hemoglobin A1C (%)   Date Value   04/26/2021 6.3   02/24/2020 6.7 (H)   01/02/2020 5.6     Microalb/Crt.  Ratio (mcg/mg creat)   Date Value   02/24/2020 CANNOT BE CALCULATED     LDL Cholesterol (mg/dL)   Date Value   06/23/2021 55     AST (U/L)   Date Value   02/07/2022 16     ALT (U/L)   Date Value   02/07/2022 11     BUN (mg/dL)   Date Value   02/07/2022 16      (goal A1C is < 7)   (goal LDL is <100) need 30-50% reduction from baseline     BP Readings from Last 3 Encounters:   03/29/22 104/60   02/28/22 103/63   02/21/22 120/60    (goal /80)      All Future Testing planned in [facility]:  Lab Frequency Next Occurrence   Lipid, Fasting Once 10/17/2021   COLONOSCOPY (Diagnostic) Once 11/10/2021   COLONOSCOPY (Diagnostic) Once 02/10/2022   Immunoglobulin Panel (IgG, IgA, IgM)     [place]/Lambda Free Lt Chains, Serum Quant     Electrophoresis Protein, Serum without Reflex to Immunofixation     CBC Auto Differential     Comprehensive Metabolic Panel     CBC Every 4 months    Basic Metabolic Panel Every 4 months    Albumin Every 4 months    Microalbumin / Creatinine Urine Ratio Every 4 months        Next Visit Date:  Future Appointments   Date Time Provider Keerthi [place]   4/26/2022  1:00 PM STV CHF CLINIC RM 1 STV[facility] CHF CLI  [facility]   6/28/2022  1:30 PM LIDIA Noguera - CNP ST V WALK IN [facility]   7/11/2022  2:00 PM Karine Ward MD [facility]   7/12/2022  1:10 PM Jose Evans MD [facility]   8/29/2022  1:15 PM Nicolle Gaspar DO Resp Spec [facility]            Patient Active Problem List:     Acute on chronic combined systolic and diastolic congestive heart failure (HCC)     Chronic kidney disease, stage II (mild)     Chronic gout     Morbid obesity (HCC)     Normocytic normochromic anemia     Hyperlipidemia     Iron deficiency anemia     Anxiety disorder      DJD (degenerative joint disease)     TANNER variably compliant with BiPAP     CAD (coronary artery disease) s/p stenting of L anterior descending artery 2004     Diabetic retinopathy ([address]     Ischemic cardiomyopathy     HTN (hypertension)     NSTEMI (non-ST elevated myocardial infarction) ([facility])     MGUS (monoclonal gammopathy of unknown significance)     Type 2 diabetes mellitus with chronic kidney disease (HCC)     AICD (automatic cardioverter/defibrillator) present     PAF (paroxysmal atrial fibrillation) ([facility])     Acute respiratory failure with hypoxia ([facility])     Coronary angioplasty status     Hypokalemia     Acute on chronic systolic heart failure (HCC)     Acute on chronic congestive heart failure (HCC)     Acute HFrEF (heart failure with reduced ejection fraction) ([address]     Hypothyroidism due to medication     Acute cardiac pulmonary edema ([address]

## 2022-04-18 DIAGNOSIS — E11.8 CONTROLLED TYPE 2 DIABETES MELLITUS WITH COMPLICATION, WITHOUT LONG-TERM CURRENT USE OF INSULIN (HCC): ICD-10-CM

## 2022-04-19 NOTE — TELEPHONE ENCOUNTER
Health Maintenance   Topic Date Due    Diabetic retinal exam  11/15/2020    Diabetic microalbuminuria test  02/24/2021    Annual Wellness Visit (AWV)  Never done    Diabetic foot exam  01/06/2022    A1C test (Diabetic or Prediabetic)  04/26/2022    Depression Screen  04/26/2022    Lipid screen  06/23/2022    Potassium monitoring  02/07/2023    Creatinine monitoring  02/07/2023    TSH testing  02/28/2023    Colorectal Cancer Screen  12/17/2024    DTaP/Tdap/Td vaccine (2 - Td or Tdap) 06/30/2026    Flu vaccine  Completed    Shingles Vaccine  Completed    Pneumococcal 65+ years Vaccine  Completed    COVID-19 Vaccine  Completed    AAA screen  Completed    Hepatitis C screen  Completed    Hepatitis A vaccine  Aged Out    Hib vaccine  Aged Out    Meningococcal (ACWY) vaccine  Aged Out             (applicable per patient's age: Cancer Screenings, Depression Screening, Fall Risk Screening, Immunizations)    Hemoglobin A1C (%)   Date Value   04/26/2021 6.3   02/24/2020 6.7 (H)   01/02/2020 5.6     Microalb/Crt.  Ratio (mcg/mg creat)   Date Value   02/24/2020 CANNOT BE CALCULATED     LDL Cholesterol (mg/dL)   Date Value   06/23/2021 55     AST (U/L)   Date Value   02/07/2022 16     ALT (U/L)   Date Value   02/07/2022 11     BUN (mg/dL)   Date Value   02/07/2022 16      (goal A1C is < 7)   (goal LDL is <100) need 30-50% reduction from baseline     BP Readings from Last 3 Encounters:   03/29/22 104/60   02/28/22 103/63   02/21/22 120/60    (goal /80)      All Future Testing planned in [facility]:  Lab Frequency Next Occurrence   Lipid, Fasting Once 10/17/2021   COLONOSCOPY (Diagnostic) Once 11/10/2021   COLONOSCOPY (Diagnostic) Once 02/10/2022   Immunoglobulin Panel (IgG, IgA, IgM)     [place]/Lambda Free Lt Chains, Serum Quant     Electrophoresis Protein, Serum without Reflex to Immunofixation     CBC Auto Differential     Comprehensive Metabolic Panel     CBC Every 4 months    Basic Metabolic Panel Every 4 months    Albumin Every 4 months    Microalbumin / Creatinine Urine Ratio Every 4 months        Next Visit Date:  Future Appointments   Date Time Provider Keerthi [place]   4/26/2022  1:00 PM STV CHF CLINIC RM 1 STVZ CHF CLI [facility]   6/28/2022  1:30 PM Cleola Canavan, APRN - CNP ST [facility] WALK IN [address]   7/11/2022  2:00 PM Yu Carter MD [facility]   7/12/2022  1:10 PM Sparkle Cobb MD [facility]   8/29/2022  1:15 PM Tai Mcnair DO Resp Spec [facility]            Patient Active Problem List:     Acute on chronic combined systolic and diastolic congestive heart failure (HCC)     Chronic kidney disease, stage II (mild)     Chronic gout     Morbid obesity ([facility])     Normocytic normochromic anemia     Hyperlipidemia     Iron deficiency anemia     Anxiety disorder      DJD (degenerative joint disease)     TANNER variably compliant with BiPAP     CAD (coronary artery disease) s/p stenting of L anterior descending artery 2004     Diabetic retinopathy ([address]     Ischemic cardiomyopathy     HTN (hypertension)     NSTEMI (non-ST elevated myocardial infarction) ([facility])     MGUS (monoclonal gammopathy of unknown significance)     Type 2 diabetes mellitus with chronic kidney disease ([facility])     AICD (automatic cardioverter/defibrillator) present     PAF (paroxysmal atrial fibrillation) ([facility])     Acute respiratory failure with hypoxia ([facility])     Coronary angioplasty status     Hypokalemia     Acute on chronic systolic heart failure (HCC)     Acute on chronic congestive heart failure (HCC)     Acute HFrEF (heart failure with reduced ejection fraction) ([address]     Hypothyroidism due to medication     Acute cardiac pulmonary edema ([address]

## 2022-04-26 ENCOUNTER — HOSPITAL ENCOUNTER (OUTPATIENT)
Dept: OTHER | Age: 67
Discharge: HOME | End: 2022-04-26
Payer: COMMERCIAL

## 2022-04-26 VITALS
RESPIRATION RATE: 20 BRPM | DIASTOLIC BLOOD PRESSURE: 74 MMHG | WEIGHT: 235.4 LBS | BODY MASS INDEX: 40.41 KG/M2 | OXYGEN SATURATION: 97 % | SYSTOLIC BLOOD PRESSURE: 110 MMHG | HEART RATE: 77 BPM

## 2022-04-26 PROCEDURE — 99212 OFFICE O/P EST SF 10 MIN: CPT

## 2022-04-26 NOTE — PROGRESS NOTES
Date:  2022  Time:  1:23 PM    CHF Clinic at [facility]    Office: [phone] Fax: [phone]    Re:  Rod Myers   Patient : 1955    Vital Signs: /74   Pulse 77   Resp 20   Wt 235 lb 6.4 oz (106.8 kg)   SpO2 97%   BMI 40.41 kg/m²                       O2 Device: None (Room air)                           No results for input(s): CBC, HGB, HCT, WBC, PLATELET, NA, K, CL, CO2, BUN, CREATININE, GLUCOSE, BNP, INR in the last 72 hours. Respiratory:         Breath Sounds  Right Upper Lobe: Clear  Right Middle Lobe: Clear  Right Lower Lobe: Clear  Left Upper Lobe: Clear  Left Lower Lobe: Clear              Peripheral Vascular  RLE Edema: None  LLE Edema: None      Complaints:No new complaints    Comment : Wt is down 0.6 lbs in one month. Pt feels good. He denies complaints of chest pain, increased shortness of breath, fatigue, dizziness. [place] and pleased that he can walk for over one mile without stopping or shortness of breath. He has been preparing his own meals and limits processed foods. States he follow the 2 gm low sodium diet and fluid limits of 2 liters daily. He reports his bloods sugar levels have been \" good\". FBS this a.m. was 101 mg/dl. He is compliant with all medications. Current diuretic is Lasix 40 mg once half tablet twice daily. No acute S/S of CHF noted today. He said he's scheduled to see cardiology, Dr Liseth Gonzales , this Friday. Also reports he's scheduled for colonoscopy on 22. Next f/u here in one month.       Electronically signed by Eneida Stringer RN on 2022 at 1:23 PM

## 2022-05-16 ENCOUNTER — HOSPITAL ENCOUNTER (OUTPATIENT)
Age: 67
Setting detail: OUTPATIENT SURGERY
Discharge: HOME | End: 2022-05-16
Attending: INTERNAL MEDICINE | Admitting: INTERNAL MEDICINE
Payer: COMMERCIAL

## 2022-05-16 ENCOUNTER — ANESTHESIA EVENT (OUTPATIENT)
Dept: OPERATING ROOM | Age: 67
End: 2022-05-16
Payer: COMMERCIAL

## 2022-05-16 ENCOUNTER — ANESTHESIA (OUTPATIENT)
Dept: OPERATING ROOM | Age: 67
End: 2022-05-16
Payer: COMMERCIAL

## 2022-05-16 VITALS
SYSTOLIC BLOOD PRESSURE: 107 MMHG | HEART RATE: 61 BPM | DIASTOLIC BLOOD PRESSURE: 68 MMHG | WEIGHT: 233 LBS | HEIGHT: 64 IN | TEMPERATURE: 95.5 F | OXYGEN SATURATION: 100 % | BODY MASS INDEX: 39.78 KG/M2 | RESPIRATION RATE: 14 BRPM

## 2022-05-16 DIAGNOSIS — E11.9 TYPE 2 DIABETES MELLITUS WITHOUT COMPLICATION, WITHOUT LONG-TERM CURRENT USE OF INSULIN (HCC): ICD-10-CM

## 2022-05-16 LAB — GLUCOSE BLD-MCNC: 78 MG/DL (ref 75–110)

## 2022-05-16 PROCEDURE — 88305 TISSUE EXAM BY PATHOLOGIST: CPT

## 2022-05-16 PROCEDURE — 7100000041 HC SPAR PHASE II RECOVERY - ADDTL 15 MIN: Performed by: INTERNAL MEDICINE

## 2022-05-16 PROCEDURE — 2580000003 HC RX 258: Performed by: INTERNAL MEDICINE

## 2022-05-16 PROCEDURE — 3700000001 HC ADD 15 MINUTES (ANESTHESIA): Performed by: INTERNAL MEDICINE

## 2022-05-16 PROCEDURE — 6360000002 HC RX W HCPCS: Performed by: NURSE ANESTHETIST, CERTIFIED REGISTERED

## 2022-05-16 PROCEDURE — 3700000000 HC ANESTHESIA ATTENDED CARE: Performed by: INTERNAL MEDICINE

## 2022-05-16 PROCEDURE — 3609010300 HC COLONOSCOPY W/BIOPSY SINGLE/MULTIPLE: Performed by: INTERNAL MEDICINE

## 2022-05-16 PROCEDURE — 82947 ASSAY GLUCOSE BLOOD QUANT: CPT

## 2022-05-16 PROCEDURE — 2709999900 HC NON-CHARGEABLE SUPPLY: Performed by: INTERNAL MEDICINE

## 2022-05-16 PROCEDURE — 2500000003 HC RX 250 WO HCPCS: Performed by: NURSE ANESTHETIST, CERTIFIED REGISTERED

## 2022-05-16 PROCEDURE — 7100000040 HC SPAR PHASE II RECOVERY - FIRST 15 MIN: Performed by: INTERNAL MEDICINE

## 2022-05-16 PROCEDURE — 45380 COLONOSCOPY AND BIOPSY: CPT | Performed by: INTERNAL MEDICINE

## 2022-05-16 RX ORDER — PROPOFOL 10 MG/ML
INJECTION, EMULSION INTRAVENOUS CONTINUOUS PRN
Status: DISCONTINUED | OUTPATIENT
Start: 2022-05-16 | End: 2022-05-16 | Stop reason: SDUPTHER

## 2022-05-16 RX ORDER — LIDOCAINE HYDROCHLORIDE 10 MG/ML
INJECTION, SOLUTION EPIDURAL; INFILTRATION; INTRACAUDAL; PERINEURAL PRN
Status: DISCONTINUED | OUTPATIENT
Start: 2022-05-16 | End: 2022-05-16 | Stop reason: SDUPTHER

## 2022-05-16 RX ORDER — MIDAZOLAM HYDROCHLORIDE 1 MG/ML
INJECTION INTRAMUSCULAR; INTRAVENOUS PRN
Status: DISCONTINUED | OUTPATIENT
Start: 2022-05-16 | End: 2022-05-16 | Stop reason: SDUPTHER

## 2022-05-16 RX ORDER — KETAMINE HCL IN NACL, ISO-OSM 100MG/10ML
SYRINGE (ML) INJECTION PRN
Status: DISCONTINUED | OUTPATIENT
Start: 2022-05-16 | End: 2022-05-16 | Stop reason: SDUPTHER

## 2022-05-16 RX ORDER — BLOOD SUGAR DIAGNOSTIC
STRIP MISCELLANEOUS
Qty: 100 STRIP | Refills: 2 | Status: SHIPPED | OUTPATIENT
Start: 2022-05-16

## 2022-05-16 RX ORDER — SODIUM CHLORIDE 9 MG/ML
INJECTION, SOLUTION INTRAVENOUS CONTINUOUS
Status: DISCONTINUED | OUTPATIENT
Start: 2022-05-16 | End: 2022-05-16 | Stop reason: HOSPADM

## 2022-05-16 RX ADMIN — SODIUM CHLORIDE: 9 INJECTION, SOLUTION INTRAVENOUS at 12:06

## 2022-05-16 RX ADMIN — SODIUM CHLORIDE: 9 INJECTION, SOLUTION INTRAVENOUS at 10:38

## 2022-05-16 RX ADMIN — MIDAZOLAM HYDROCHLORIDE 1 MG: 1 INJECTION, SOLUTION INTRAMUSCULAR; INTRAVENOUS at 12:08

## 2022-05-16 RX ADMIN — PROPOFOL 50 MCG/KG/MIN: 10 INJECTION, EMULSION INTRAVENOUS at 12:08

## 2022-05-16 RX ADMIN — Medication 10 MG: at 12:18

## 2022-05-16 RX ADMIN — Medication 10 MG: at 12:08

## 2022-05-16 RX ADMIN — LIDOCAINE HYDROCHLORIDE 50 MG: 10 INJECTION, SOLUTION EPIDURAL; INFILTRATION; INTRACAUDAL; PERINEURAL at 12:08

## 2022-05-16 ASSESSMENT — PAIN SCALES - GENERAL
PAINLEVEL_OUTOF10: 0
PAINLEVEL_OUTOF10: 0

## 2022-05-16 ASSESSMENT — PAIN - FUNCTIONAL ASSESSMENT: PAIN_FUNCTIONAL_ASSESSMENT: 0-10

## 2022-05-16 NOTE — H&P
History and Physical    Pt Name: Lucía Felton  MRN: [id number]  YOB: 1955  Date of evaluation: 5/16/2022  Primary Care Physician: LIDIA Hammonds CNP    SUBJECTIVE:   History of Chief Complaint:    Lucía Felton is a 69 y.o. male who is scheduled today for colonoscopy. Last colonscopy 2017. He had been scheduled for one in February 2022 but procedure cancelled due to EKG changes. He states he has since been cleared by cardiology. Allergies  is allergic to zetia [ezetimibe], bactrim, cephalexin, cephalexin, and sulfamethoxazole-trimethoprim. Medications  Prior to Admission medications    Medication Sig Start Date End Date Taking?  Authorizing Provider   metFORMIN (GLUCOPHAGE) 1000 MG tablet TAKE 1 TABLET BY MOUTH 2 TIMES DAILY (WITH MEALS) 4/19/22   LIDIA Hammonds CNP   isosorbide mononitrate (IMDUR) 30 MG extended release tablet TAKE 1 TABLET BY MOUTH DAILY 4/11/22   LIDIA Hammonds CNP   apixaban (ELIQUIS) 5 MG TABS tablet TAKE 1 TABLET BY MOUTH 2 TIMES DAILY 4/11/22   LIDIA Hammonds CNP   Na Sulfate-K Sulfate-Mg Sulf (SUPREP) 17.5-3.13-1.6 GM/177ML SOLN solution Take as directed for bowel prep/colonoscopy 4/4/22   Braeden Gaxiola MD   furosemide (LASIX) 40 MG tablet TAKE 1/2 TABLET BY MOUTH 2 TIMES A DAY 4/4/22   LIDIA Hammonds CNP   allopurinol (ZYLOPRIM) 300 MG tablet TAKE 1 TABLET BY MOUTH DAILY 3/21/22 4/20/22  LIDIA Hammonds CNP   aspirin (HM ASPIRIN EC LOW DOSE) 81 MG EC tablet TAKE 1 TABLET BY MOUTH TWICE A DAY 3/21/22   LIDIA Hammonds CNP   levothyroxine (SYNTHROID) 75 MCG tablet Take 1 tablet by mouth Daily 2/28/22 5/29/22  LIDIA Hammonds CNP   atorvastatin (LIPITOR) 40 MG tablet TAKE 1 TABLET BY MOUTH DAILY 1/31/22   LIDIA Hammonds CNP   polyethylene glycol (GOLYTELY) 236 g solution Follow instructions provided by physicians office  Patient not taking: Reported on 2/28/2022 1/12/22   Braeden Gaxiola MD catheterization (2013); Coronary angioplasty (, ); Cardiac defibrillator placement (2015); bone marrow biopsy (2012); pr colsc flx w/rmvl of tumor polyp lesion snare tq (N/A, 2017); Colonoscopy (2021); and Colonoscopy (N/A, 2021). Social History   reports that he quit smoking about 48 years ago. His smoking use included cigarettes. He started smoking about 51 years ago. He has a 3.00 pack-year smoking history. He has never used smokeless tobacco.   reports previous alcohol use. reports previous drug use. Drug: Marijuana [place]). Marital Status single  Children none   Occupation retired   Family History  Family Status   Relation Name Status    Mother      Father     Daniel HILLIARD       family history includes Cancer in his mother; Heart Disease in his maternal grandmother, maternal uncle, and mother. Review of Systems:  CONSTITUTIONAL:   negative for fevers, chills, fatigue and malaise    EYES:   negative for double vision, blurred vision and photophobia    HEENT:   negative for tinnitus, epistaxis and sore throat     RESPIRATORY:   negative for cough, shortness of breath, wheezing     CARDIOVASCULAR:   negative for chest pain, palpitations, syncope, edema     GASTROINTESTINAL:   negative for nausea, vomiting     GENITOURINARY:   negative for incontinence     MUSCULOSKELETAL:   negative for neck or back pain     NEUROLOGICAL:   Negative for weakness and tingling  negative for headaches and dizziness     PSYCHIATRIC:   negative for anxiety       OBJECTIVE:   VITALS:  height is 5' 4\" (1.626 m) and weight is 233 lb (105.7 kg). His temperature is 96.8 °F (36 °C). His blood pressure is 119/64 and his pulse is 66. His respiration is 14 and oxygen saturation is 96%. CONSTITUTIONAL:alert & oriented x 3, no acute distress. Calm and pleasant.    SKIN:  Warm and dry, no rashes on exposed areas of skin   HEAD:  Normocephalic, atraumatic   EYES:

## 2022-05-16 NOTE — TELEPHONE ENCOUNTER
Health Maintenance   Topic Date Due    Annual Wellness Visit (AWV)  Never done    Diabetic microalbuminuria test  02/24/2021    Diabetic retinal exam  11/09/2021    Diabetic foot exam  01/06/2022    A1C test (Diabetic or Prediabetic)  04/26/2022    Depression Screen  04/26/2022    Lipids  06/23/2022    Colorectal Cancer Screen  12/17/2024    DTaP/Tdap/Td vaccine (2 - Td or Tdap) 06/30/2026    Flu vaccine  Completed    Shingles vaccine  Completed    Pneumococcal 65+ years Vaccine  Completed    COVID-19 Vaccine  Completed    AAA screen  Completed    Hepatitis C screen  Completed    Hepatitis A vaccine  Aged Out    Hib vaccine  Aged Out    Meningococcal (ACWY) vaccine  Aged Out             (applicable per patient's age: Cancer Screenings, Depression Screening, Fall Risk Screening, Immunizations)    Hemoglobin A1C (%)   Date Value   04/26/2021 6.3   02/24/2020 6.7 (H)   01/02/2020 5.6     Microalb/Crt.  Ratio (mcg/mg creat)   Date Value   02/24/2020 CANNOT BE CALCULATED     LDL Cholesterol (mg/dL)   Date Value   06/23/2021 55     AST (U/L)   Date Value   02/07/2022 16     ALT (U/L)   Date Value   02/07/2022 11     BUN (mg/dL)   Date Value   02/07/2022 16      (goal A1C is < 7)   (goal LDL is <100) need 30-50% reduction from baseline     BP Readings from Last 3 Encounters:   05/16/22 107/68   04/26/22 110/74   03/29/22 104/60    (goal /80)      All Future Testing planned in [facility]:  Lab Frequency Next Occurrence   COLONOSCOPY (Diagnostic) Once 02/10/2022   Immunoglobulin Panel (IgG, IgA, IgM)     [place]/Lambda Free Lt Chains, Serum Quant     Electrophoresis Protein, Serum without Reflex to Immunofixation     CBC Auto Differential     Comprehensive Metabolic Panel     CBC Every 4 months    Basic Metabolic Panel Every 4 months    Albumin Every 4 months    Microalbumin / Creatinine Urine Ratio Every 4 months        Next Visit Date:  Future Appointments   Date Time Provider Keerthi England 5/24/2022  1:00 PM STV CHF CLINIC RM 2 [facility] CHF CLI [facility]   6/28/2022  1:30 PM LIDIA Garcia - CNP ST [facility] WALK IN [facility]   7/11/2022  2:00 PM Hector Burroughs MD ST V GI [facility]   7/12/2022  1:10 PM Loreta Dixon MD [facility]   8/29/2022  1:15 PM Yehuda Whiteside DO Resp Spec [facility]            Patient Active Problem List:     Acute on chronic combined systolic and diastolic congestive heart failure (HCC)     Chronic kidney disease, stage II (mild)     Chronic gout     Morbid obesity ([facility])     Normocytic normochromic anemia     Hyperlipidemia     Iron deficiency anemia     Anxiety disorder      DJD (degenerative joint disease)     TANNER variably compliant with BiPAP     CAD (coronary artery disease) s/p stenting of L anterior descending artery 2004     Diabetic retinopathy ([facility]     Ischemic cardiomyopathy     HTN (hypertension)     NSTEMI (non-ST elevated myocardial infarction) ([facility])     MGUS (monoclonal gammopathy of unknown significance)     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

## 2022-05-16 NOTE — OP NOTE
Operative Note      Patient: Geovanny Clark  YOB: 1955  MRN: [id number]    Date of Procedure: 5/16/2022    Pre-Op Diagnosis: POSITIVE FIT    Post-Op Diagnosis: Suboptimal prep, ascending colon polyp       Procedure(s):  COLONOSCOPY WITH BIOPSY    Surgeon(s):  Willian Mai MD    Assistant:   * No surgical staff found *    Anesthesia: Monitor Anesthesia Care    Estimated Blood Loss (mL): Minimal    Complications: None    Specimens:   ID Type Source Tests Collected by Time Destination   A : DESCENDING COLON POLYP Tissue Colon-Descending SURGICAL PATHOLOGY Willian Mai MD 5/16/2022 1226        Implants:  * No implants in log *      Drains: * No LDAs found *              GASTROENTEROLOGY     [facility] ENDOSCOPY     COLONOSCOPY    PROCEDURE DATE: 05/16/22    REFERRING PHYSICIAN: No ref. provider found     PRIMARY CARE PROVIDER: LIDIA Palomo - CNP    ATTENDING PHYSICIAN: Willian Mai MD     HISTORY: Mr. Geovanny Clark is a 69 y.o. male who presents to the [facility] endoscopy unit for colonoscopy. The patient's clinical history is remarkable for CAD, CHF, HTN, HL, Obesity, referred for positive FIT test. He is currently medically stable and appropriate for the planned procedure. PREOPERATIVE DIAGNOSIS: positive stool test.     PROCEDURES:   Transanal Colonoscopy with polypectomy (cold biopsy). POSTPROCEDURE DIAGNOSIS:    FAIR to SUBOPTIMAL PREP     1) Moderate to severe left sided diverticulosis  2) 6mm ascending colon polyp s/p cold biopsy and removal  3) Moderate external hemorrhoids    MEDICATIONS:     MAC per anesthesia     EBL <10cc    INSTRUMENT: Olympus CF-H190 AL Pediatric flexible Colonoscope. PREPARATION: The nature and character of the procedure as well as risks, benefits, and alternatives were discussed with the patient and informed consent was obtained.  Complications were said to include, but were not limited to: medication allergy, medication reaction, cardiovascular and respiratory problems, bleeding, perforation, infection, and/or missed diagnosis. Following arrival in the endoscopy room, the patient was placed in the left lateral decubitus position and final time-out accomplished in the presence of the nursing staff. Baseline vital signs were obtained and reviewed, and IV sedation was subsequently initiated. FINDINGS: Rectal examination demonstrated no significant visible external abnormality and digital palpation was unremarkable. Following adequate conscious sedation the colonoscope was introduced and advanced under direct visualization to the cecum, which was identified by the ileocecal valve and appendiceal orifice. The bowel preparation was felt to be SUBOPTIMAL. This included moderate amounts of thick stool that was not completely able to be adequately irrigated and aspirated. Cecal intubation time was 9 minutes. Once maximally inserted, the endoscope was withdrawn and the mucosa was carefully inspected. The mucosal exam was revealed diverticulosis, small polyps. Retroflexion was performed in the rectum and moderate hemorrhoids. Withdrawal time was 15 minutes. IMPRESSION:     FAIR to SUBOPTIMAL PREP--heavy amount of partially digested food/seeds/nuts identified in the right colon. 1) Moderate to severe left sided diverticulosis  2) 6mm ascending colon polyp s/p cold biopsy and removal  3) Moderate external hemorrhoids    RECOMMENDATIONS:   1) Follow up with referring provider, as previously scheduled. 2) Repeat Colonoscopy in 3 yrs. Recommend extended bowel prep on next scheduling, avoidance of seeds and nuts. 3) Resume AC/AP therapy tonight.        [facility]  Gastroenterology   05/16/22    This note is created with the assistance of a speech recognition program.  While intending to generate a document that actually reflects the content of the visit, the document can still have some errors including those of syntax and sound a like substitutions which may escape proof reading. It such instances, actual meaning can be extrapolated by contextual diversion. The patient was counseled at length about the risks of joe Covid-19 during their perioperative period and any recovery window from their procedure. The patient was made aware that joe Covid-19  may worsen their prognosis for recovering from their procedure  and lend to a higher morbidity and/or mortality risk. All material risks, benefits, and reasonable alternatives including postponing the procedure were discussed. The patient DOES wish to proceed with the procedure at this time.         Electronically signed by Hector Burroughs MD on 5/16/2022 at 1:11 PM

## 2022-05-17 LAB — SURGICAL PATHOLOGY REPORT: NORMAL

## 2022-05-24 ENCOUNTER — HOSPITAL ENCOUNTER (OUTPATIENT)
Dept: OTHER | Age: 67
Discharge: HOME | End: 2022-05-24
Payer: COMMERCIAL

## 2022-05-24 VITALS
DIASTOLIC BLOOD PRESSURE: 62 MMHG | RESPIRATION RATE: 20 BRPM | SYSTOLIC BLOOD PRESSURE: 102 MMHG | BODY MASS INDEX: 40.75 KG/M2 | HEART RATE: 51 BPM | OXYGEN SATURATION: 98 % | WEIGHT: 237.4 LBS

## 2022-05-24 PROCEDURE — 99212 OFFICE O/P EST SF 10 MIN: CPT

## 2022-05-24 NOTE — PROGRESS NOTES
Verbally reviewed medication list with patient; patient verbalized understanding. Discussed 2000mg/day sodium restricted diet; patient verbalized understanding. Moderate daily exercise encouraged as tolerated. Discussed rest breaks as needed; patient verbalized understanding. Patient instructed to weigh self at the same time of each day, using same clothes and same scale; reinforced teaching to monitor for 3-5 lb weight increase over 1-2 days, and to notify the CHF clinic at [phone] or physician office if weight change noted. Patient verbalized understanding. Risks of smoking discussed with the patient if applicable; patient strongly discouraged to smoke. Patient verbalized understanding. Signs and symptoms of CHF discussed with patient, such as feeling more tired than normal, feeling short of breath, coughing that increases when you lie down, sudden weight gain, swelling of your feet, legs or belly. Patient verbalized understanding to notify the CHF clinic at [phone] or physician office if these symptoms occur. Compliance with plan of care and further disease process causes discussed with patient, patient encouraged to keep all follow up appointments. Patient verbalized understanding.

## 2022-05-24 NOTE — PROGRESS NOTES
Date:  2022  Time:  2:05 PM    CHF Clinic at [facility]    Office: [phone] Fax: [phone]    Re:  Karen Alcocer   Patient : 1955    Vital Signs: /62   Pulse 51   Resp 20   Wt 237 lb 6.4 oz (107.7 kg)   SpO2 98%   BMI 40.75 kg/m²                       O2 Device: None (Room air)                           No results for input(s): CBC, HGB, HCT, WBC, PLATELET, NA, K, CL, CO2, BUN, CREATININE, GLUCOSE, BNP, INR in the last 72 hours. Respiratory:    Assessment  Charting Type: Reassessment    Breath Sounds  Right Upper Lobe: Clear  Right Middle Lobe: Clear  Right Lower Lobe: Clear  Left Upper Lobe: Clear  Left Lower Lobe: Clear    Cough/Sputum  Cough: Productive  Frequency: Occasional  Sputum Amount: Small  Sputum Color: Clear         Peripheral Vascular  RLE Edema: None  LLE Edema: None      Complaints: None at this time    Physician Orders None    Comment : Arrived for scheduled visit per ambulatory. His weight is up 2 lbs from last month. He denies any increase in dyspnea with exertion or chest pain. No lower leg, ankle or pedal edema noted, feels good today. Had colonoscopy 2 weeks ago. Has several upcoming dr.'s appt.'s with Cardiology, GI and Respiratory. Medication list reviewed and updated. Reinforced low sodium diet and fluid restrictions. No s/s acute chf at this time. Next CHF Clinic visit on 22.     Electronically signed by Fabien Camacho RN on 2022 at 2:05 PM

## 2022-06-06 DIAGNOSIS — D50.9 IRON DEFICIENCY ANEMIA, UNSPECIFIED IRON DEFICIENCY ANEMIA TYPE: ICD-10-CM

## 2022-06-06 RX ORDER — FERROUS SULFATE 325(65) MG
TABLET ORAL
Qty: 30 TABLET | Refills: 5 | Status: SHIPPED | OUTPATIENT
Start: 2022-06-06 | End: 2022-08-22

## 2022-06-06 NOTE — TELEPHONE ENCOUNTER
Health Maintenance   Topic Date Due    Annual Wellness Visit (AWV)  Never done    Diabetic microalbuminuria test  02/24/2021    Diabetic retinal exam  11/09/2021    Diabetic foot exam  01/06/2022    A1C test (Diabetic or Prediabetic)  04/26/2022    Depression Screen  04/26/2022    Lipids  06/23/2022    Prostate Specific Antigen (PSA) Screening or Monitoring  10/01/2022    Colorectal Cancer Screen  05/16/2025    DTaP/Tdap/Td vaccine (2 - Td or Tdap) 06/30/2026    Flu vaccine  Completed    Shingles vaccine  Completed    Pneumococcal 65+ years Vaccine  Completed    COVID-19 Vaccine  Completed    AAA screen  Completed    Hepatitis C screen  Completed    Hepatitis A vaccine  Aged Out    Hib vaccine  Aged Out    Meningococcal (ACWY) vaccine  Aged Out             (applicable per patient's age: Cancer Screenings, Depression Screening, Fall Risk Screening, Immunizations)    Hemoglobin A1C (%)   Date Value   04/26/2021 6.3   02/24/2020 6.7 (H)   01/02/2020 5.6     Microalb/Crt.  Ratio (mcg/mg creat)   Date Value   02/24/2020 CANNOT BE CALCULATED     LDL Cholesterol (mg/dL)   Date Value   06/23/2021 55     AST (U/L)   Date Value   02/07/2022 16     ALT (U/L)   Date Value   02/07/2022 11     BUN (mg/dL)   Date Value   02/07/2022 16      (goal A1C is < 7)   (goal LDL is <100) need 30-50% reduction from baseline     BP Readings from Last 3 Encounters:   05/24/22 102/62   05/16/22 107/68   04/26/22 110/74    (goal /80)      All Future Testing planned in [facility]:  Lab Frequency Next Occurrence   COLONOSCOPY (Diagnostic) Once 02/10/2022   Immunoglobulin Panel (IgG, IgA, IgM)     [place]/Lambda Free Lt Chains, Serum Quant     Electrophoresis Protein, Serum without Reflex to Immunofixation     CBC Auto Differential     Comprehensive Metabolic Panel     CBC Every 4 months    Basic Metabolic Panel Every 4 months    Albumin Every 4 months    Microalbumin / Creatinine Urine Ratio Every 4 months        Next Visit Date:  Future Appointments   Date Time Provider Department Center   6/22/2022  1:00 PM [facility] CHF CLINIC RM 1 [facility] CHF CLI [facility]   6/28/2022  1:30 PM LIDIA Dee - CNP ST V WALK IN [facility]   7/11/2022  2:00 PM Alex Gonzalez MD ST [facility] GI [facility]   7/12/2022  1:10 PM Dariel Forrester MD AFL [facility] None   8/29/2022  1:15 PM Cathcorina Alondra, DO Resp Spec [facility]            Patient Active Problem List:     Acute on chronic combined systolic and diastolic congestive heart failure (HCC)     Chronic kidney disease, stage II (mild)     Chronic gout     Morbid obesity (HCC)     Normocytic normochromic anemia     Hyperlipidemia     Iron deficiency anemia     Anxiety disorder      DJD (degenerative joint disease)     TANNER variably compliant with BiPAP     CAD (coronary artery disease) s/p stenting of L anterior descending artery 2004     Diabetic retinopathy ([facility] [address]     Ischemic cardiomyopathy     HTN (hypertension)     NSTEMI (non-ST elevated myocardial infarction) ([facility])     MGUS (monoclonal gammopathy of unknown significance)     Type 2 diabetes mellitus with chronic kidney disease (HCC)     AICD (automatic cardioverter/defibrillator) present     PAF (paroxysmal atrial fibrillation) ([facility])     Acute respiratory failure with hypoxia ([facility])     Coronary angioplasty status     Hypokalemia     Acute on chronic systolic heart failure (HCC)     Acute on chronic congestive heart failure (HCC)     Acute HFrEF (heart failure with reduced ejection fraction) ([facility] [address]     Hypothyroidism due to medication     Acute cardiac pulmonary edema (HCC)     Adenomatous polyp of ascending colon

## 2022-06-22 ENCOUNTER — HOSPITAL ENCOUNTER (OUTPATIENT)
Dept: OTHER | Age: 67
Discharge: HOME | End: 2022-06-22
Payer: COMMERCIAL

## 2022-06-22 VITALS
SYSTOLIC BLOOD PRESSURE: 104 MMHG | WEIGHT: 234.6 LBS | OXYGEN SATURATION: 97 % | HEART RATE: 67 BPM | DIASTOLIC BLOOD PRESSURE: 60 MMHG | BODY MASS INDEX: 40.27 KG/M2 | RESPIRATION RATE: 20 BRPM

## 2022-06-22 PROCEDURE — 99212 OFFICE O/P EST SF 10 MIN: CPT

## 2022-06-22 NOTE — PROGRESS NOTES
Date:  2022  Time:  1:17 PM    CHF Clinic at [facility]    Office: [phone] Fax: [phone]    Re:  Isabel Che   Patient : 1955    Vital Signs: /60   Pulse 67   Resp 20   Wt 234 lb 9.6 oz (106.4 kg)   SpO2 97%   BMI 40.27 kg/m²                       O2 Device: None (Room air)                           No results for input(s): CBC, HGB, HCT, WBC, PLATELET, NA, K, CL, CO2, BUN, CREATININE, GLUCOSE, BNP, INR in the last 72 hours. Respiratory:         Breath Sounds  Right Upper Lobe: Clear  Right Middle Lobe: Clear  Right Lower Lobe: Clear  Left Upper Lobe: Clear  Left Lower Lobe: Clear    Cough/Sputum  Cough: None         Peripheral Vascular  RLE Edema: None  LLE Edema: None      Complaints: no new complaints    Comment : Wt is down 2.8 lbs in one month. Pt feels good. He ambulated from registration to the [address] [place] [address] without increased EDGE or shortness of breath. Denies chest pain, dizziness, fatigue. He is compliant with all medications. Current diuretic is Lasix 40 mg tab takes one half tablet twice daily. No acute S/S of CHF noted today. Reminded pt to follow a low 2 gm sodium diet and fluid restriction of 2 liters daily. No other issues or c/o. He has a f/u with  His PCP next week and with cardiology, Dr Jazmin Mullen, in August. Next f/u here in one month.      Electronically signed by Yony Castillo RN on 2022 at 1:17 PM

## 2022-06-28 ENCOUNTER — OFFICE VISIT (OUTPATIENT)
Dept: PRIMARY CARE CLINIC | Age: 67
End: 2022-06-28
Payer: COMMERCIAL

## 2022-06-28 VITALS
DIASTOLIC BLOOD PRESSURE: 62 MMHG | WEIGHT: 234 LBS | SYSTOLIC BLOOD PRESSURE: 95 MMHG | TEMPERATURE: 97.3 F | BODY MASS INDEX: 40.17 KG/M2 | OXYGEN SATURATION: 97 % | HEART RATE: 60 BPM

## 2022-06-28 DIAGNOSIS — E11.9 TYPE 2 DIABETES MELLITUS WITHOUT COMPLICATION, WITHOUT LONG-TERM CURRENT USE OF INSULIN (HCC): Primary | ICD-10-CM

## 2022-06-28 DIAGNOSIS — E03.2 HYPOTHYROIDISM DUE TO MEDICATION: ICD-10-CM

## 2022-06-28 DIAGNOSIS — Z12.5 PROSTATE CANCER SCREENING: ICD-10-CM

## 2022-06-28 LAB — HBA1C MFR BLD: 6 %

## 2022-06-28 PROCEDURE — 1123F ACP DISCUSS/DSCN MKR DOCD: CPT | Performed by: NURSE PRACTITIONER

## 2022-06-28 PROCEDURE — 3044F HG A1C LEVEL LT 7.0%: CPT | Performed by: NURSE PRACTITIONER

## 2022-06-28 PROCEDURE — 99213 OFFICE O/P EST LOW 20 MIN: CPT | Performed by: NURSE PRACTITIONER

## 2022-06-28 PROCEDURE — 83036 HEMOGLOBIN GLYCOSYLATED A1C: CPT | Performed by: NURSE PRACTITIONER

## 2022-06-28 SDOH — ECONOMIC STABILITY: TRANSPORTATION INSECURITY
IN THE PAST 12 MONTHS, HAS THE LACK OF TRANSPORTATION KEPT YOU FROM MEDICAL APPOINTMENTS OR FROM GETTING MEDICATIONS?: NO

## 2022-06-28 SDOH — ECONOMIC STABILITY: FOOD INSECURITY: WITHIN THE PAST 12 MONTHS, THE FOOD YOU BOUGHT JUST DIDN'T LAST AND YOU DIDN'T HAVE MONEY TO GET MORE.: NEVER TRUE

## 2022-06-28 SDOH — ECONOMIC STABILITY: FOOD INSECURITY: WITHIN THE PAST 12 MONTHS, YOU WORRIED THAT YOUR FOOD WOULD RUN OUT BEFORE YOU GOT MONEY TO BUY MORE.: NEVER TRUE

## 2022-06-28 ASSESSMENT — PATIENT HEALTH QUESTIONNAIRE - PHQ9
2. FEELING DOWN, DEPRESSED OR HOPELESS: 0
SUM OF ALL RESPONSES TO PHQ9 QUESTIONS 1 & 2: 0
1. LITTLE INTEREST OR PLEASURE IN DOING THINGS: 0
SUM OF ALL RESPONSES TO PHQ QUESTIONS 1-9: 0

## 2022-06-28 ASSESSMENT — SOCIAL DETERMINANTS OF HEALTH (SDOH): HOW HARD IS IT FOR YOU TO PAY FOR THE VERY BASICS LIKE FOOD, HOUSING, MEDICAL CARE, AND HEATING?: NOT HARD AT ALL

## 2022-06-28 ASSESSMENT — ENCOUNTER SYMPTOMS
SHORTNESS OF BREATH: 0
BLURRED VISION: 1

## 2022-06-28 NOTE — PROGRESS NOTES
[facility]  [id number] [facility] [address]  Dept: [phone]  Dept Fax: [phone]    Patient Care Team:  LIDIA العلي CNP as PCP - General (Family Medicine)  LIDIA العلي CNP as PCP - [facility]  Donald Milian MD as Consulting Physician (Hematology and Oncology)  Manjula Marshall MD as Consulting Physician (Cardiology)  Jacque Vicente DO as Consulting Physician (Pulmonology)  Nasreen Marquez MD as Consulting Physician (Ophthalmology)  Mitch Torres MD as Consulting Physician (Internal Medicine)  Rhoda Apley, MD as Consulting Physician (Gastroenterology)    2022     Diana Dixon (:  1955)is a 69 y.o. male, here for evaluation of the following medical concerns:   Chief Complaint   Patient presents with    Thyroid Problem     4M F/U    Congestive Heart Failure     Diana Dixon presents today for routine follow-up for diabetes and hypothyroidism. His last visit, underwent colonoscopy on 2022. Vies to repeat colonoscopy in 3 years with extended prep. Polyps present in ascending colon      68-year-old  male is here for a routine follow up. He reports a history of diabetes mellitus, hypertension, he has a history of ischemic cardiomyopathy with multiple stent placed in , ejection fraction recent 15 to 20% status post AICD, patient has a history of atrial fibrillation on Eliquis 5 mg twice a day, is following cardiology regularly, Dr Bryant Do. For diabetes mellitus he is on metformin 1 g twice daily. FBS running 100-110 normally  He denies any shortness of breath, leg swelling, dizziness. He does attend heart failure clinic. He follows podiatry and ophthalmology for retinal exam last foot exam in 2020   Gout on allopurinol 300 mg daily    Follow with cardiology, does have implanted monitor in place.  No medication changes. See's podiatry yearly, but has not been back in over 1 year    Also follows with oncology annually, states recent numbers were good and will continue to observe    See's Dr Fabrice Hilario at [facility] for known kidney mass    Hypertension  This is a chronic problem. The problem is unchanged. The problem is controlled. Associated symptoms include blurred vision. Pertinent negatives include no chest pain, headaches, neck pain, palpitations or shortness of breath. Risk factors for coronary artery disease include male gender, obesity and smoking/tobacco exposure. Diabetes  He presents for his follow-up diabetic visit. He has type 2 diabetes mellitus. There are no hypoglycemic associated symptoms. Pertinent negatives for hypoglycemia include no dizziness, headaches, nervousness/anxiousness or seizures. Associated symptoms include blurred vision and fatigue. Pertinent negatives for diabetes include no chest pain, no foot ulcerations, no polydipsia and no polyuria. Symptoms are stable. Risk factors for coronary artery disease include obesity, male sex, diabetes mellitus and tobacco exposure. Current diabetic treatment includes oral agent (monotherapy). There is no change in his home blood glucose trend. .    Review of Systems   Constitutional: Positive for fatigue. Eyes: Positive for blurred vision. Respiratory: Negative for shortness of breath. Cardiovascular: Negative for chest pain and palpitations. Endocrine: Negative for polydipsia and polyuria. Musculoskeletal: Negative for neck pain. Neurological: Negative for dizziness, seizures and headaches. Psychiatric/Behavioral: The patient is not nervous/anxious. Prior to Visit Medications    Medication Sig Taking?  Authorizing Provider   FEROSUL 325 (65 Fe) MG tablet TAKE 1 TABLET BY MOUTH DAILY (WITH BREAKFAST) Yes LIDIA Stubbs - CNP   ONETOUCH VERIO strip USE TO CHECK BLOOD SUGAR ONCE A DAY AND AS NEEDED FOR SYMPTOMS OF IRREGULAR BLOOD SUGAR Yes LIDIA Huntley CNP   metFORMIN (GLUCOPHAGE) 1000 MG tablet TAKE 1 TABLET BY MOUTH 2 TIMES DAILY (WITH MEALS) Yes LIDIA Huntley CNP   isosorbide mononitrate (IMDUR) 30 MG extended release tablet TAKE 1 TABLET BY MOUTH DAILY Yes LIDIA Huntley CNP   apixaban (ELIQUIS) 5 MG TABS tablet TAKE 1 TABLET BY MOUTH 2 TIMES DAILY Yes LDIIA Huntley CNP   Na Sulfate-K Sulfate-Mg Sulf (SUPREP) 17.5-3.13-1.6 GM/177ML SOLN solution Take as directed for bowel prep/colonoscopy Yes Zhanna Cadena MD   furosemide (LASIX) 40 MG tablet TAKE 1/2 TABLET BY MOUTH 2 TIMES A DAY Yes LIDIA Huntley CNP   allopurinol (ZYLOPRIM) 300 MG tablet TAKE 1 TABLET BY MOUTH DAILY Yes LIDIA Huntley CNP   aspirin (HM ASPIRIN EC LOW DOSE) 81 MG EC tablet TAKE 1 TABLET BY MOUTH TWICE A DAY Yes LIDIA Huntley CNP   levothyroxine (SYNTHROID) 75 MCG tablet Take 1 tablet by mouth Daily Yes LIDIA Huntley CNP   atorvastatin (LIPITOR) 40 MG tablet TAKE 1 TABLET BY MOUTH DAILY Yes LIDIA Huntley CNP   ENTRESTO 24-26 MG per tablet  Yes Historical Provider, MD   metoprolol succinate (TOPROL XL) 50 MG extended release tablet Take 50 mg by mouth daily Yes Historical Provider, MD   Handicap Placard MISC by Does not apply route Exp: 2025 Yes LIDIA Huntley CNP   Lancets MISC Daily Yes LIDIA Huntley CNP   ammonium lactate (LAC-HYDRIN) 12 % lotion Apply topically daily after bathing sparing the space between the toes.  Yes Ila Forrester MD   Alcohol Swabs (ALCOHOL PREP) 70 % PADS USE AS DIRECTED Yes Ally Lyons MD        Social History     Tobacco Use    Smoking status: Former Smoker     Packs/day: 1.00     Years: 3.00     Pack years: 3.00     Types: Cigarettes     Start date: 1971     Quit date: 1974     Years since quittin.5    Smokeless tobacco: Never Used   Substance Use Topics    Alcohol use: Not Currently     Alcohol/week: 0.0 standard drinks     Comment: 15 years ago        Vitals:    06/28/22 1330   BP: 95/62   Site: Right Upper Arm   Position: Sitting   Pulse: 60   Temp: 97.3 °F (36.3 °C)   TempSrc: Infrared   SpO2: 97%   Weight: 234 lb (106.1 kg)     Estimated body mass index is 40.17 kg/m² as calculated from the following:    Height as of 5/16/22: 5' 4\" (1.626 m). Weight as of this encounter: 234 lb (106.1 kg). DIAGNOSTIC FINDINGS:  CBC:  Lab Results   Component Value Date    WBC 5.8 02/07/2022    HGB 12.9 02/07/2022     02/07/2022     12/08/2011       BMP:    Lab Results   Component Value Date     02/07/2022    K 3.8 02/07/2022    CL 98 02/07/2022    CO2 28 02/07/2022    BUN 16 02/07/2022    CREATININE 1.10 02/07/2022    GLUCOSE 136 02/07/2022    GLUCOSE 123 03/19/2012       HEMOGLOBIN A1C:   Lab Results   Component Value Date    LABA1C 6.0 06/28/2022       FASTING LIPID PANEL:  Lab Results   Component Value Date    CHOL 124 06/23/2021    HDL 43 06/23/2021    TRIG 129 06/23/2021     Lab Results   Component Value Date    TSH 3.17 02/28/2022         Physical Exam  Vitals and nursing note reviewed. Constitutional:       General: He is not in acute distress. Appearance: He is well-developed. He is obese. HENT:      Head: Normocephalic. Eyes:      General: No scleral icterus. Right eye: No discharge. Left eye: No discharge. Pupils: Pupils are equal, round, and reactive to light. Cardiovascular:      Rate and Rhythm: Normal rate and regular rhythm. Pulmonary:      Effort: Pulmonary effort is normal.      Breath sounds: Normal breath sounds. Abdominal:      General: Abdomen is protuberant. Palpations: Abdomen is soft. Musculoskeletal:      Cervical back: Normal range of motion and neck supple. Skin:     General: Skin is warm and dry. Neurological:      Mental Status: He is alert and oriented to person, place, and time.       Coordination: Coordination normal. Psychiatric:         Behavior: Behavior normal. Behavior is cooperative. Thought Content: Thought content normal.         Judgment: Judgment normal.         ASSESSMENT     Diagnosis Orders   1. Type 2 diabetes mellitus without complication, without long-term current use of insulin (HCC)  POCT glycosylated hemoglobin (Hb A1C)    Microalbumin, Ur    Lipid, Fasting   2. Hypothyroidism due to medication     3. Prostate cancer screening  PSA Screening          PLAN:  No orders of the defined types were placed in this encounter. 1. Mary Kraft is euthyroid with current levothyroxine dosing, continue 75 micrograms daily. 2. No evidence of hypoglycemic episodes, continue current treatment. 3. Labs as above. 4. Health maintenance reviewed, heavily encouraged patient to schedule annual foot exam    FOLLOW UP AND INSTRUCTIONS:  Return in about 4 months (around 10/28/2022) for Diabetes, HTN. · Mary Kraft received counseling on the following healthy behaviors:nutrition and medication adherence    · Discussed use, benefit, and side effects of prescribed medications. Barriers to  medication compliance addressed. All patient questions answered. Pt  verbalized understanding of all instructions given. · Patient given educational materials - see patient instructions      · Patient advised to contact scheduling offices for any referrals or imaging orders  placed today if they have not been contacted in 48 hours. Return in about 4 months (around 10/28/2022) for Diabetes, HTN. An electronic signature was used to authenticate this note. --Kym Cabot, APRN - CNP on 6/28/2022 at 3:42 PM  Visit Information    Have you changed or started any medications since your last visit including any over-the-counter medicines, vitamins, or herbal medicines? no   Are you having any side effects from any of your medications? -  no  Have you stopped taking any of your medications?  Is so, why? -  no    Have you seen any other physician or provider since your last visit? No  Have you had any other diagnostic tests since your last visit? No  Have you been seen in the emergency room and/or had an admission to a hospital since we last saw you? No  Have you had your routine dental cleaning in the past 6 months? no    Have you activated your [email or web address] account? If not, what are your barriers?  Yes     Patient Care Team:  Madalynn Area APRKARO - CNP as PCP - General (Family Medicine)  Madalynn AreaLIDIA - CNP as PCP - [facility]  Kwame Hudson MD as Consulting Physician (Hematology and Oncology)  Jordis Goldberg, MD as Consulting Physician (Cardiology)  Torsten Klein DO as Consulting Physician (Pulmonology)  Yasmin Guillaume MD as Consulting Physician (Ophthalmology)  Dougie Carter MD as Consulting Physician (Internal Medicine)  Erum Hong MD as Consulting Physician (Gastroenterology)    Medical History Review  Past Medical, Family, and Social History reviewed and does not contribute to the patient presenting condition    Health Maintenance   Topic Date Due    Annual Wellness Visit (AWV)  Never done    Diabetic microalbuminuria test  02/24/2021    Diabetic retinal exam  11/09/2021    Diabetic foot exam  01/06/2022    Lipids  06/23/2022    Prostate Specific Antigen (PSA) Screening or Monitoring  10/01/2022    A1C test (Diabetic or Prediabetic)  06/28/2023    Depression Screen  06/28/2023    Colorectal Cancer Screen  05/16/2025    DTaP/Tdap/Td vaccine (2 - Td or Tdap) 06/30/2026    Flu vaccine  Completed    Shingles vaccine  Completed    Pneumococcal 65+ years Vaccine  Completed    COVID-19 Vaccine  Completed    AAA screen  Completed    Hepatitis C screen  Completed    Hepatitis A vaccine  Aged Out    Hib vaccine  Aged Out    Meningococcal (ACWY) vaccine  Aged Out

## 2022-07-06 ENCOUNTER — HOSPITAL ENCOUNTER (OUTPATIENT)
Age: 67
Discharge: HOME | End: 2022-07-06
Payer: COMMERCIAL

## 2022-07-06 DIAGNOSIS — E11.9 TYPE 2 DIABETES MELLITUS WITHOUT COMPLICATION, WITHOUT LONG-TERM CURRENT USE OF INSULIN (HCC): ICD-10-CM

## 2022-07-06 DIAGNOSIS — Z12.5 PROSTATE CANCER SCREENING: ICD-10-CM

## 2022-07-06 LAB
CHOLESTEROL, FASTING: 116 MG/DL
CHOLESTEROL/HDL RATIO: 2.6
CREATININE URINE: 126.1 MG/DL (ref 39–259)
HDLC SERPL-MCNC: 44 MG/DL
LDL CHOLESTEROL: 54 MG/DL (ref 0–130)
MICROALBUMIN/CREAT 24H UR: <12 MG/L
MICROALBUMIN/CREAT UR-RTO: NORMAL MCG/MG CREAT
PROSTATE SPECIFIC ANTIGEN: 1.48 NG/ML
TRIGLYCERIDE, FASTING: 90 MG/DL

## 2022-07-06 PROCEDURE — 82570 ASSAY OF URINE CREATININE: CPT

## 2022-07-06 PROCEDURE — G0103 PSA SCREENING: HCPCS

## 2022-07-06 PROCEDURE — 80061 LIPID PANEL: CPT

## 2022-07-06 PROCEDURE — 82043 UR ALBUMIN QUANTITATIVE: CPT

## 2022-07-06 PROCEDURE — 36415 COLL VENOUS BLD VENIPUNCTURE: CPT

## 2022-07-08 DIAGNOSIS — E11.8 CONTROLLED TYPE 2 DIABETES MELLITUS WITH COMPLICATION, WITHOUT LONG-TERM CURRENT USE OF INSULIN (HCC): ICD-10-CM

## 2022-07-08 DIAGNOSIS — I25.10 CORONARY ARTERY DISEASE INVOLVING NATIVE CORONARY ARTERY OF NATIVE HEART WITHOUT ANGINA PECTORIS: ICD-10-CM

## 2022-07-08 NOTE — TELEPHONE ENCOUNTER
Next Visit Date:  10/31/2022    Hemoglobin A1C (%)   Date Value   06/28/2022 6.0   04/26/2021 6.3   02/24/2020 6.7 (H)             ( goal A1C is < 7)   Microalb/Crt.  Ratio (mcg/mg creat)   Date Value   07/06/2022 Can not be calculated     LDL Cholesterol (mg/dL)   Date Value   07/06/2022 54       (goal LDL is <100)   AST (U/L)   Date Value   02/07/2022 16     ALT (U/L)   Date Value   02/07/2022 11     BUN (mg/dL)   Date Value   02/07/2022 16     BP Readings from Last 3 Encounters:   06/28/22 95/62   06/22/22 104/60   05/24/22 102/62          (goal 120/80)        Patient Active Problem List:     Acute on chronic combined systolic and diastolic congestive heart failure (HCC)     Chronic kidney disease, stage II (mild)     Chronic gout     Morbid obesity ([facility])     Normocytic normochromic anemia     Hyperlipidemia     Iron deficiency anemia     Anxiety disorder      DJD (degenerative joint disease)     TANNER variably compliant with BiPAP     CAD (coronary artery disease) s/p stenting of L anterior descending artery 2004     Diabetic retinopathy ([address]     Ischemic cardiomyopathy     HTN (hypertension)     NSTEMI (non-ST elevated myocardial infarction) ([facility])     MGUS (monoclonal gammopathy of unknown significance)     Type 2 diabetes mellitus with chronic kidney disease ([address]     AICD (automatic cardioverter/defibrillator) present     PAF (paroxysmal atrial fibrillation) ([facility])     Acute respiratory failure with hypoxia ([facility])     Coronary angioplasty status     Hypokalemia     Acute on chronic systolic heart failure (HCC)     Acute on chronic congestive heart failure ([facility])     Acute HFrEF (heart failure with reduced ejection fraction) ([address]     Hypothyroidism due to medication     Acute cardiac pulmonary edema ([facility])     Adenomatous polyp of ascending colon      ----Abelardo Varner

## 2022-07-11 ENCOUNTER — OFFICE VISIT (OUTPATIENT)
Dept: GASTROENTEROLOGY | Age: 67
End: 2022-07-11
Payer: COMMERCIAL

## 2022-07-11 VITALS
HEART RATE: 74 BPM | SYSTOLIC BLOOD PRESSURE: 125 MMHG | WEIGHT: 236.6 LBS | BODY MASS INDEX: 40.39 KG/M2 | DIASTOLIC BLOOD PRESSURE: 72 MMHG | HEIGHT: 64 IN

## 2022-07-11 DIAGNOSIS — K57.90 DIVERTICULOSIS: ICD-10-CM

## 2022-07-11 DIAGNOSIS — D12.6 ADENOMATOUS POLYP OF COLON, UNSPECIFIED PART OF COLON: Primary | ICD-10-CM

## 2022-07-11 PROCEDURE — 1123F ACP DISCUSS/DSCN MKR DOCD: CPT | Performed by: INTERNAL MEDICINE

## 2022-07-11 PROCEDURE — 99213 OFFICE O/P EST LOW 20 MIN: CPT | Performed by: INTERNAL MEDICINE

## 2022-07-11 RX ORDER — ASPIRIN 81 MG/1
TABLET ORAL
Qty: 60 TABLET | Refills: 3 | Status: SHIPPED | OUTPATIENT
Start: 2022-07-11

## 2022-07-11 ASSESSMENT — ENCOUNTER SYMPTOMS
COUGH: 0
BLOOD IN STOOL: 0
WHEEZING: 0
DIARRHEA: 0
VOICE CHANGE: 0
APNEA: 0
COLOR CHANGE: 0
ABDOMINAL PAIN: 0
CONSTIPATION: 0
SORE THROAT: 0
NAUSEA: 0
ANAL BLEEDING: 0
VOMITING: 0
SHORTNESS OF BREATH: 0
CHOKING: 0
TROUBLE SWALLOWING: 0
RECTAL PAIN: 0
ABDOMINAL DISTENTION: 0

## 2022-07-11 NOTE — PROGRESS NOTES
GI FOLLOW UP    INTERVAL HISTORY:     Status post colonoscopy where the patient was identified to have a tubular adenoma status post endoscopic removal  Diverticulosis  Suboptimal bowel prep      Chief Complaint   Patient presents with    Colonoscopy     No issues       1. Adenomatous polyp of colon, unspecified part of colon    2. Diverticulosis          HISTORY OF PRESENT ILLNESS: Mr.Michael DM Graham is a 74 y. o. male with a past history remarkable for A-fib, CHF, CKD, Cardiomyopathy, HTN, HL, CAD s/p PCI 2004, previously on DAPT, on ASA currently, type II DM, metformin, referred for evaluation for screening colonoscopy. Alannah Sunny denies any subjective weight loss, no overt signs of GI bleeding.  Last colonoscopy performed in 2017 where the patient was identified to have an adenomatous polyp.     Hypotension and elevated BNP recently.      Smoker: none  Drinking history: none  Illicit drugs: none   Abdominal surgeries:none  Prior Colonoscopy: 2017--Dr. Alonzo, small subcentimeter polyps in the ascending colon that was removed endoscopically.  Small 2 mm polyp in the transverse colon that could not be relocated.  Repeat colonoscopy recommended in 3 years  Prior EGD: None   FH of GI issues: none     Past Medical,Family, and Social History reviewed and does contribute to the patient presenting condition. Patient's PMH/PSH,SH,PSYCH Hx, MEDs, ALLERGIES, and ROS were all reviewed and updated in the appropriate sections.     PAST MEDICAL HISTORY:  Past Medical History:   Diagnosis Date    Anemia     Anxiety     when he lies flat, no RX    CAD (coronary artery disease)     MI stents x5, follows with cardiology Dr. Munir Doyle Cardiac defibrillator in place     Cardiomyopathy [facility])     CHF (congestive heart failure) ([facility]     Chronic combined systolic and diastolic heart failure ([facility] s/[ AICD placed 9/25/2011    Chronic kidney disease     Complex sleep apnea syndrome     Diabetic retinopathy ([address] 9/25/2011    Diverticulitis     DJD (degenerative joint disease) 9/25/2011    Edentulous     Gout     HTN (hypertension) 3/30/2012    Hyperlipidemia     Hypertension     Iron deficiency anemia 9/25/2011    Ischemic cardiomyopathy     Morbid obesity ([address] 9/25/2011    Obesity     Morbid obesity due to excess calories    TANNER variably compliant with BiPAP 9/25/2011    Osteoarthritis     PAF (paroxysmal atrial fibrillation) (HCC)     Psychophysiologic insomnia     Thyroid disease     Type II or unspecified type diabetes mellitus without mention of complication, not stated as uncontrolled     Unspecified sleep apnea     uses V-pap       Past Surgical History:   Procedure Laterality Date    BONE MARROW BIOPSY  2012 approx    CARDIAC CATHETERIZATION  08/2007    severe stenosis pre-stent area    CARDIAC CATHETERIZATION  09/11/2013    Very peripheral stenosis, not amendable to PCI, stents patent    CARDIAC DEFIBRILLATOR PLACEMENT  08/26/2015    COLONOSCOPY  11/07/2007    COLONOSCOPY  12/17/2021    COLONOSCOPY N/A 12/17/2021    COLONOSCOPY DIAGNOSTIC performed by Alyson Mars MD at  [place] [address] COLONOSCOPY N/A 5/16/2022    COLONOSCOPY WITH BIOPSY performed by Alyson Mars MD at [address], [facility]    stent LAD    MT COLSC FLX W/RMVL OF TUMOR POLYP LESION SNARE TQ N/A 04/04/2017    COLONOSCOPY POLYPECTOMY SNARE/COLD BIOPSY performed by Srikanth Dimas DO at [facility]       CURRENT MEDICATIONS:    Current Outpatient Medications:     aspirin (ASPIRIN LOW DOSE) 81 MG EC tablet, TAKE 1 TABLET BY MOUTH TWICE A DAY, Disp: 60 tablet, Rfl: 3    metFORMIN (GLUCOPHAGE) 1000 MG tablet, TAKE 1 TABLET BY MOUTH 2 TIMES DAILY (WITH MEALS), Disp: 60 tablet, Rfl: 2    FEROSUL 325 (65 Fe) MG tablet, TAKE 1 TABLET BY MOUTH DAILY (WITH BREAKFAST), Disp: 30 tablet, Rfl: 5   ONETOUCH VERIO strip, USE TO CHECK BLOOD SUGAR ONCE A DAY AND AS NEEDED FOR SYMPTOMS OF IRREGULAR BLOOD SUGAR, Disp: 100 strip, Rfl: 2    isosorbide mononitrate (IMDUR) 30 MG extended release tablet, TAKE 1 TABLET BY MOUTH DAILY, Disp: 30 tablet, Rfl: 3    apixaban (ELIQUIS) 5 MG TABS tablet, TAKE 1 TABLET BY MOUTH 2 TIMES DAILY, Disp: 60 tablet, Rfl: 3    Na Sulfate-K Sulfate-Mg Sulf (SUPREP) 17.5-3.13-1.6 GM/177ML SOLN solution, Take as directed for bowel prep/colonoscopy, Disp: 1 each, Rfl: 0    furosemide (LASIX) 40 MG tablet, TAKE 1/2 TABLET BY MOUTH 2 TIMES A DAY, Disp: 90 tablet, Rfl: 1    atorvastatin (LIPITOR) 40 MG tablet, TAKE 1 TABLET BY MOUTH DAILY, Disp: 30 tablet, Rfl: 5    ENTRESTO 24-26 MG per tablet, , Disp: , Rfl:     metoprolol succinate (TOPROL XL) 50 MG extended release tablet, Take 50 mg by mouth daily, Disp: , Rfl:     Handicap Placard MISC, by Does not apply route Exp: 1/2025, Disp: 1 each, Rfl: 0    Lancets MISC, Daily, Disp: 100 each, Rfl: 3    ammonium lactate (LAC-HYDRIN) 12 % lotion, Apply topically daily after bathing sparing the space between the toes. , Disp: 222 mL, Rfl: 3    Alcohol Swabs (ALCOHOL PREP) 70 % PADS, USE AS DIRECTED, Disp: 100 each, Rfl: 11    allopurinol (ZYLOPRIM) 300 MG tablet, TAKE 1 TABLET BY MOUTH DAILY, Disp: 30 tablet, Rfl: 5    levothyroxine (SYNTHROID) 75 MCG tablet, Take 1 tablet by mouth Daily, Disp: 90 tablet, Rfl: 1    ALLERGIES:   Allergies   Allergen Reactions    Zetia [Ezetimibe] Shortness Of Breath    Bactrim Other (See Comments)     palpitations    Cephalexin     Cephalexin     Sulfamethoxazole-Trimethoprim        FAMILY HISTORY:       Problem Relation Age of Onset    Cancer Mother     Heart Disease Mother         due to smoking    Heart Disease Maternal Uncle     Heart Disease Maternal Grandmother          SOCIAL HISTORY:   Social History     Socioeconomic History    Marital status: Single     Spouse name: Not on file    Number of children: 0    Years of education: 15    Highest education level: High school graduate   Occupational History    Occupation: retired   Tobacco Use    Smoking status: Former Smoker     Packs/day: 1.00     Years: 3.00     Pack years: 3.00     Types: Cigarettes     Start date: 1971     Quit date: 1974     Years since quittin.5    Smokeless tobacco: Never Used   Vaping Use    Vaping Use: Never used   Substance and Sexual Activity    Alcohol use: Not Currently     Alcohol/week: 0.0 standard drinks     Comment: 15 years ago    Drug use: Not Currently     Types: Marijuana (Weed)     Comment: hx THC quit approx     Sexual activity: Not Currently     Partners: Female   Other Topics Concern    Not on file   Social History Narrative    Not on file     Social Determinants of Health     Financial Resource Strain: Low Risk     Difficulty of Paying Living Expenses: Not hard at all   Food Insecurity: No Food Insecurity    Worried About [address] [place] [address] in the Last Year: Never true    Roberta of Food in the Last Year: Never true   Transportation Needs: No Transportation Needs    Lack of Transportation (Medical): No    Lack of Transportation (Non-Medical): No   Physical Activity: Inactive    Days of Exercise per Week: 0 days    Minutes of Exercise per Session: 0 min   Stress: No Stress Concern Present    Feeling of Stress :  Only a little   Social Connections: Socially Isolated    Frequency of Communication with Friends and Family: More than three times a week    Frequency of Social Gatherings with Friends and Family: Once a week    Attends Orthodoxy Services: Never    Active Member of Clubs or Organizations: No    Attends Club or Organization Meetings: Never    Marital Status: Never    Intimate Partner Violence:     Fear of Current or Ex-Partner: Not on file    Emotionally Abused: Not on file    Physically Abused: Not on file    Sexually Abused: Not on file   Housing Stability: Low Risk     Unable to Pay for Housing in the Last Year: No    Number of Places Lived in the Last Year: 1    Unstable Housing in the Last Year: No       REVIEW OF SYSTEMS: A 12-point review of systems was obtained and pertinent positives and negatives were listed below. REVIEW OF SYSTEMS:     Constitutional: No fever, no chills, no lethargy, no weakness. HEENT:  No headache, otalgia, itchy eyes, nasal discharge or sore throat. Cardiac:  No chest pain, dyspnea, orthopnea or PND. Chest:   No cough, phlegm or wheezing. Abdomen:      Detailed by MA   Neuro:  No focal weakness, abnormal movements or seizure like activity. Skin:   No rashes, no itching. :   No hematuria, no pyuria, no dysuria, no flank pain. Extremities:  No swelling or joint pains. ROS was otherwise negative    Review of Systems   Constitutional: Negative for appetite change, fatigue, fever and unexpected weight change. HENT: Negative for sore throat, trouble swallowing and voice change. Eyes: Negative for visual disturbance. Respiratory: Negative for apnea, cough, choking, shortness of breath and wheezing. Cardiovascular: Negative for chest pain, palpitations and leg swelling. Gastrointestinal: Negative for abdominal distention, abdominal pain, anal bleeding, blood in stool, constipation, diarrhea, nausea, rectal pain and vomiting. Genitourinary: Negative for difficulty urinating. Skin: Negative for color change and rash. Neurological: Negative for dizziness, seizures, weakness, light-headedness, numbness and headaches. Hematological: Does not bruise/bleed easily. Psychiatric/Behavioral: Negative for confusion and sleep disturbance. The patient is not nervous/anxious. PHYSICAL EXAMINATION: Vital signs reviewed per the nursing documentation. /72   Pulse 74   Ht 5' 4\" (1.626 m)   Wt 236 lb 9.6 oz (107.3 kg)   BMI 40.61 kg/m²   Body mass index is 40.61 kg/m².    Physical Exam    Physical Exam   Constitutional: Patient is oriented to person, place, and time. Patient appears well-developed and well-nourished. HENT:   Head: Normocephalic and atraumatic. Eyes: Pupils are equal, round, and reactive to light. EOM are normal.   Neck: Normal range of motion. Neck supple. No JVD present. No tracheal deviation present. No thyromegaly present. Cardiovascular: Normal rate, regular rhythm, normal heart sounds and intact distal pulses. Pulmonary/Chest: Effort normal and breath sounds normal. No stridor. No respiratory distress. He has no wheezes. He has no rales. He exhibits no tenderness. Abdominal: Soft. Bowel sounds are normal. He exhibits no distension and no mass. There is no tenderness. There is no rebound and no guarding. No hernia. Musculoskeletal: Normal range of motion. Lymphadenopathy:    Patient has no cervical adenopathy. Neurological: Patient is alert and oriented to person, place, and time. Psychiatric: Patient has a normal mood and affect. Patient behavior is normal.       LABORATORY DATA: Reviewed  Lab Results   Component Value Date    WBC 5.8 02/07/2022    HGB 12.9 (L) 02/07/2022    HCT 41.3 02/07/2022    MCV 94.5 02/07/2022     02/07/2022     02/07/2022    K 3.8 02/07/2022    CL 98 02/07/2022    CO2 28 02/07/2022    BUN 16 02/07/2022    CREATININE 1.10 02/07/2022    LABPROT 6.7 12/03/2012    LABALBU 4.2 02/07/2022    BILITOT 0.35 02/07/2022    ALKPHOS 77 02/07/2022    AST 16 02/07/2022    ALT 11 02/07/2022    INR 1.0 09/01/2019         Lab Results   Component Value Date    RBC 4.37 02/07/2022    HGB 12.9 (L) 02/07/2022    MCV 94.5 02/07/2022    MCH 29.5 02/07/2022    MCHC 31.2 02/07/2022    RDW 14.6 (H) 02/07/2022    MPV 11.6 02/07/2022    BASOPCT 1 02/07/2022    LYMPHSABS 1.00 (L) 02/07/2022    MONOSABS 0.42 02/07/2022    NEUTROABS 4.25 02/07/2022    EOSABS 0.11 02/07/2022    BASOSABS 0.04 02/07/2022         DIAGNOSTIC TESTING:     No results found. IMPRESSION: Mr.Michael DM Graham is a 74 y. o. male with a past history remarkable for A-fib, CHF, CKD, Cardiomyopathy, HTN, HL, CAD s/p PCI 2004, previously on DAPT, on ASA currently, type II DM, metformin, referred for evaluation for screening colonoscopy. Mateo Coates denies any subjective weight loss, no overt signs of GI bleeding.  Last colonoscopy performed in 2017 where the patient was identified to have an adenomatous polyp. Repeat colonoscopy performed where the patient was identified to have isolated tubular adenoma that was removed. Subcentimeter        Assessment  1. Adenomatous polyp of colon, unspecified part of colon    2. Diverticulosis        Wilfredo Soriano was seen today for colonoscopy. Diagnoses and all orders for this visit:    Adenomatous polyp of colon, unspecified part of colon-small subcentimeter in the ascending colon, removed. Path findings disclose tubular adenoma. Repeat colonoscopy in 3 years due to prior history of colon polyps and bowel prep quality. Diverticulosis- moderate to severe, increase dietary fiber with fruits and vegetables. Education provided in this visit. Overall, clinically doing well from GI standpoint. RTC: 3 years, patient to return to GI office should he develop GI symptoms or GI issues will need to be addressed prior to my scheduled visit. Additional comments: Thank you for allowing me to participate in the care of Mr. Nicolasa Lebron. For any further questions please do not hesitate to contact me. I have reviewed and agree with the ROS entered by the MA/LPN from today's encounter documented in a separate note.         Nevin Hernandez MD, MPH   Board Certified in Gastroenterology  Board Certified in [address] [place] [address] #: [phone]    this note is created with the assistance of a speech recognition program.  While intending to generate a document that actually reflects the content of the visit, the document can still have some errors including those of syntax and sound a like substitutions which may escape proof reading. It such instances, actual meaning can be extrapolated by contextual diversion.

## 2022-07-12 ENCOUNTER — TELEPHONE (OUTPATIENT)
Dept: PRIMARY CARE CLINIC | Age: 67
End: 2022-07-12

## 2022-07-12 NOTE — TELEPHONE ENCOUNTER
Pt called wanting to inform pcp that his cardiologist states  that he should take a whole tablet  Of lasix instead of a half tablet, pt has been having breathing problems this past week and decided to see his cardiologist the patient also states that he does have enough at this time and will call if he needs any refills

## 2022-07-15 ENCOUNTER — HOSPITAL ENCOUNTER (OUTPATIENT)
Age: 67
Discharge: HOME | End: 2022-07-15
Payer: COMMERCIAL

## 2022-07-15 DIAGNOSIS — N18.31 STAGE 3A CHRONIC KIDNEY DISEASE (HCC): ICD-10-CM

## 2022-07-15 LAB
ANION GAP SERPL CALCULATED.3IONS-SCNC: 11 MMOL/L (ref 9–17)
BUN BLDV-MCNC: 18 MG/DL (ref 8–23)
CALCIUM SERPL-MCNC: 8.7 MG/DL (ref 8.6–10.4)
CHLORIDE BLD-SCNC: 100 MMOL/L (ref 98–107)
CO2: 30 MMOL/L (ref 20–31)
CREAT SERPL-MCNC: 1.26 MG/DL (ref 0.7–1.2)
GFR AFRICAN AMERICAN: >60 ML/MIN
GFR NON-AFRICAN AMERICAN: 57 ML/MIN
GFR SERPL CREATININE-BSD FRML MDRD: ABNORMAL ML/MIN/{1.73_M2}
GLUCOSE BLD-MCNC: 119 MG/DL (ref 70–99)
POTASSIUM SERPL-SCNC: 4.2 MMOL/L (ref 3.7–5.3)
SODIUM BLD-SCNC: 141 MMOL/L (ref 135–144)

## 2022-07-15 PROCEDURE — 80048 BASIC METABOLIC PNL TOTAL CA: CPT

## 2022-07-15 PROCEDURE — 36415 COLL VENOUS BLD VENIPUNCTURE: CPT

## 2022-07-20 ENCOUNTER — HOSPITAL ENCOUNTER (OUTPATIENT)
Dept: OTHER | Age: 67
Discharge: HOME | End: 2022-07-20
Payer: COMMERCIAL

## 2022-07-20 VITALS
RESPIRATION RATE: 20 BRPM | DIASTOLIC BLOOD PRESSURE: 60 MMHG | OXYGEN SATURATION: 96 % | SYSTOLIC BLOOD PRESSURE: 104 MMHG | BODY MASS INDEX: 39.75 KG/M2 | WEIGHT: 231.6 LBS | HEART RATE: 67 BPM

## 2022-07-20 DIAGNOSIS — I50.22 CHRONIC SYSTOLIC (CONGESTIVE) HEART FAILURE (HCC): Primary | ICD-10-CM

## 2022-07-20 PROBLEM — I50.21 ACUTE HFREF (HEART FAILURE WITH REDUCED EJECTION FRACTION) (HCC): Status: RESOLVED | Noted: 2021-06-22 | Resolved: 2022-07-20

## 2022-07-20 PROCEDURE — 99212 OFFICE O/P EST SF 10 MIN: CPT

## 2022-07-20 PROCEDURE — 99213 OFFICE O/P EST LOW 20 MIN: CPT | Performed by: NURSE PRACTITIONER

## 2022-07-20 ASSESSMENT — ENCOUNTER SYMPTOMS
SHORTNESS OF BREATH: 1
EYE DISCHARGE: 0
ABDOMINAL PAIN: 0
COUGH: 0
BLOOD IN STOOL: 0

## 2022-07-20 NOTE — PROGRESS NOTES
CHF Clinic at [address] [facility]    Office: [phone] Fax: [phone] [facility]  [address] [address]  Dept: [phone]  Loc: [phone]    Krystle Garcia is a 69 y.o. male who presents today for CHF evaluation. HPI:     + mild shortness of breath, improved since incrases in lasix per dr Jacinto Greer  + mild  fatigue  Denies Edema   + chest pain , had discomfort prior to ssing dr Jacinto Greer last week. A \"tingle\"None since   Denies  palpitations    Pt inclines bed routinely         Has home health rn coming into home soon.  Per his insurance       Past Medical History:   Diagnosis Date    Anemia     Anxiety     when he lies flat, no RX    CAD (coronary artery disease)     MI stents x5, follows with cardiology Dr. Higginbotham Reasons defibrillator in place     Cardiomyopathy [facility])     CHF (congestive heart failure) ([address]     Chronic combined systolic and diastolic heart failure ([address] s/[ AICD placed 9/25/2011    Chronic kidney disease     Complex sleep apnea syndrome     Diabetic retinopathy ([address] 9/25/2011    Diverticulitis     DJD (degenerative joint disease) 9/25/2011    Edentulous     Gout     HTN (hypertension) 3/30/2012    Hyperlipidemia     Hypertension     Iron deficiency anemia 9/25/2011    Ischemic cardiomyopathy     Morbid obesity ([address] 9/25/2011    Obesity     Morbid obesity due to excess calories    TANNER variably compliant with BiPAP 9/25/2011    Osteoarthritis     PAF (paroxysmal atrial fibrillation) (HCC)     Psychophysiologic insomnia     Thyroid disease     Type II or unspecified type diabetes mellitus without mention of complication, not stated as uncontrolled     Unspecified sleep apnea     uses V-pap     Past Surgical History:   Procedure Laterality Date    BONE MARROW BIOPSY  2012 approx    CARDIAC CATHETERIZATION  08/2007    severe stenosis pre-stent area    CARDIAC CATHETERIZATION 2013    Very peripheral stenosis, not amendable to PCI, stents patent    CARDIAC DEFIBRILLATOR PLACEMENT  2015    COLONOSCOPY  2007    COLONOSCOPY  2021    COLONOSCOPY N/A 2021    COLONOSCOPY DIAGNOSTIC performed by Hadley Cho MD at [facility]    COLONOSCOPY N/A 2022    COLONOSCOPY WITH BIOPSY performed by Hadley Cho MD at [address], 2004    stent LAD    LA COLSC FLX W/RMVL OF TUMOR POLYP LESION SNARE TQ N/A 2017    COLONOSCOPY POLYPECTOMY SNARE/COLD BIOPSY performed by Nasir Morse DO at [facility]       Family History   Problem Relation Age of Onset    Cancer Mother     Heart Disease Mother         due to smoking    Heart Disease Maternal Uncle     Heart Disease Maternal Grandmother        Social History     Tobacco Use    Smoking status: Former     Packs/day: 1.00     Years: 3.00     Pack years: 3.00     Types: Cigarettes     Start date: 1971     Quit date: 1974     Years since quittin.5    Smokeless tobacco: Never   Substance Use Topics    Alcohol use: Not Currently     Alcohol/week: 0.0 standard drinks     Comment: 15 years ago      Current Outpatient Medications   Medication Sig Dispense Refill    aspirin (ASPIRIN LOW DOSE) 81 MG EC tablet TAKE 1 TABLET BY MOUTH TWICE A DAY 60 tablet 3    metFORMIN (GLUCOPHAGE) 1000 MG tablet TAKE 1 TABLET BY MOUTH 2 TIMES DAILY (WITH MEALS) 60 tablet 2    FEROSUL 325 (65 Fe) MG tablet TAKE 1 TABLET BY MOUTH DAILY (WITH BREAKFAST) 30 tablet 5    ONETOUCH VERIO strip USE TO CHECK BLOOD SUGAR ONCE A DAY AND AS NEEDED FOR SYMPTOMS OF IRREGULAR BLOOD SUGAR 100 strip 2    isosorbide mononitrate (IMDUR) 30 MG extended release tablet TAKE 1 TABLET BY MOUTH DAILY 30 tablet 3    apixaban (ELIQUIS) 5 MG TABS tablet TAKE 1 TABLET BY MOUTH 2 TIMES DAILY 60 tablet 3    Na Sulfate-K Sulfate-Mg Sulf (SUPREP) 17.5-3.13-1.6 GM/177ML SOLN solution Take as directed for bowel prep/colonoscopy (Patient not taking: Reported on 7/12/2022) 1 each 0    furosemide (LASIX) 40 MG tablet TAKE 1/2 TABLET BY MOUTH 2 TIMES A DAY (Patient taking differently: 40 mg) 90 tablet 1    allopurinol (ZYLOPRIM) 300 MG tablet TAKE 1 TABLET BY MOUTH DAILY 30 tablet 5    levothyroxine (SYNTHROID) 75 MCG tablet Take 1 tablet by mouth Daily 90 tablet 1    atorvastatin (LIPITOR) 40 MG tablet TAKE 1 TABLET BY MOUTH DAILY 30 tablet 5    ENTRESTO 24-26 MG per tablet       metoprolol succinate (TOPROL XL) 50 MG extended release tablet Take 50 mg by mouth daily      Handicap Placard MISC by Does not apply route Exp: 1/2025 1 each 0    Lancets MISC Daily 100 each 3    ammonium lactate (LAC-HYDRIN) 12 % lotion Apply topically daily after bathing sparing the space between the toes. 222 mL 3    Alcohol Swabs (ALCOHOL PREP) 70 % PADS USE AS DIRECTED 100 each 11     No current facility-administered medications for this encounter. Allergies   Allergen Reactions    Zetia [Ezetimibe] Shortness Of Breath    Bactrim Other (See Comments)     palpitations    Cephalexin     Cephalexin     Sulfamethoxazole-Trimethoprim          Subjective:      Review of Systems   Constitutional:  Positive for fatigue. Negative for activity change, chills and fever. Eyes:  Negative for discharge and visual disturbance. Respiratory:  Positive for shortness of breath. Negative for cough. Cardiovascular:  Negative for chest pain and leg swelling. Gastrointestinal:  Negative for abdominal pain and blood in stool. Endocrine: Negative for cold intolerance and heat intolerance. Genitourinary:  Negative for dysuria and flank pain. Musculoskeletal:  Negative for joint swelling and myalgias. Skin:  Negative for pallor and rash. Neurological:  Positive for light-headedness (none sicne episode and saw cardio). Negative for dizziness and headaches. Psychiatric/Behavioral:  Negative for hallucinations and suicidal ideas.       Objective:     Physical Exam  Vitals and nursing note reviewed. Constitutional:       Appearance: He is obese. Comments:      HENT:      Head: Normocephalic and atraumatic. Eyes:      General: No scleral icterus. Conjunctiva/sclera: Conjunctivae normal.   Cardiovascular:      Rate and Rhythm: Normal rate and regular rhythm. Heart sounds: Normal heart sounds. Pulmonary:      Effort: Pulmonary effort is normal.      Breath sounds: Normal breath sounds. No wheezing or rales. Abdominal:      Palpations: Abdomen is soft. Musculoskeletal:         General: Normal range of motion. Cervical back: Normal range of motion. Right lower leg: No edema. Left lower leg: No edema. Skin:     General: Skin is warm and dry. Neurological:      Mental Status: He is alert and oriented to person, place, and time. /60   Pulse 67   Resp 20   Wt 231 lb 9.6 oz (105.1 kg)   SpO2 96%   BMI 39.75 kg/m²   O2 Device: None (Room air)       Lower Extremity Measurements in cm.    R Calf Circumference (cm): 34.5 cm  L Calf Circumference (cm): 35.5 cm  R Ankle Circumference (cm): 21.5 cm  L Ankle Circumference (cm): 21.5 cm    CBC:   Lab Results   Component Value Date/Time    WBC 5.8 02/07/2022 11:17 AM    RBC 4.37 02/07/2022 11:17 AM    RBC 4.85 12/08/2011 09:43 AM    HGB 12.9 02/07/2022 11:17 AM    HCT 41.3 02/07/2022 11:17 AM    MCV 94.5 02/07/2022 11:17 AM    MCH 29.5 02/07/2022 11:17 AM    MCHC 31.2 02/07/2022 11:17 AM    RDW 14.6 02/07/2022 11:17 AM     02/07/2022 11:17 AM     12/08/2011 09:43 AM    MPV 11.6 02/07/2022 11:17 AM     CMP:    Lab Results   Component Value Date/Time     07/15/2022 12:09 PM    K 4.2 07/15/2022 12:09 PM     07/15/2022 12:09 PM    CO2 30 07/15/2022 12:09 PM    BUN 18 07/15/2022 12:09 PM    CREATININE 1.26 07/15/2022 12:09 PM    GFRAA >60 07/15/2022 12:09 PM    LABGLOM 57 07/15/2022 12:09 PM    GLUCOSE 119 07/15/2022 12:09 PM    GLUCOSE 123 03/19/2012 10:46 AM    PROT 6.8 02/07/2022 11:17 AM    PROT 7.2 02/07/2022 11:17 AM    LABALBU 4.2 02/07/2022 11:17 AM    LABALBU 4.1 11/11/2011 01:00 PM    CALCIUM 8.7 07/15/2022 12:09 PM    BILITOT 0.35 02/07/2022 11:17 AM    ALKPHOS 77 02/07/2022 11:17 AM    AST 16 02/07/2022 11:17 AM    ALT 11 02/07/2022 11:17 AM     Lab Results   Component Value Date    LABA1C 6.0 06/28/2022       Summary  Left ventricle is enlarged. Global left ventricular systolic function is  severely reduced. Calculated ejection fraction is 13 % by Negron's method. Visually estimated EF 15-20%. Freeborn akinetic. Definity used to optimize left ventricular systolic function and rule out  thrombus. No thrombus seen via Definity. Normal right ventricular size and function. No significant valvular regurgitation or stenosis seen. No pericardial effusion seen. Signature  ----------------------------------------------------------------------------   Electronically signed by Arielle Lizarraga(Sonographer) on 03/20/2020    :Assessment      1. Chronic systolic (congestive) heart failure ([facility]        :Plan      1. Chronic systolic (congestive) heart failure (HCC)      Wt is down 3 lbs . leg measurements are up slightly    On Guideline-Directed Medication Therapy:     ACEI / ARB / ARNi: Entresto 24/26 mg b.i.d. Beta - blocker  Diuretic     Pt increased his lasix from 20 bid to 40 mg bid last week. He is advised labs to f/u on any renal chem change after this increase. Pt feeling better overall since his incrased in diuretic dose. We will see hime back in one month. See Cardiology in the interim before our next appt. RTC 8/17  Pt will see Cardiology dr Claudia Chung on Aug 5. Orders Placed This Encounter   Procedures    Basic Metabolic Panel     Standing Status:   Standing     Number of Occurrences:   1     No orders of the defined types were placed in this encounter. Patientgiven verbal and/or written educational instructions. Follow up as directed.   I have reviewed and agree with the nursing documentation. Verbally reviewed medication list with patient; patient verbalized understanding. Discussed 2000mg/day sodium restricted diet; patient verbalized understanding. Moderate daily exercise encouraged as tolerated. Discussed rest breaks as needed; patient verbalized understanding. Patient instructed to weigh self at the same time of each day, using same clothes and same scale; reinforced teaching to monitor for 3-5 lb weight increase over 1-2 days, and to notify the CHF clinic at [phone] or physician office if weight change noted. Patient verbalized understanding. Risks of smoking discussed with the patient if applicable; patient strongly discouraged to smoke. Patient verbalized understanding. Signs and symptoms of CHF discussed with patient, such as feeling more tired than normal, feeling short of breath, coughing that increases when you lie down, sudden weight gain, swelling of your feet, legs or belly. Patient verbalized understanding to notify the CHF clinic at [phone] or physician office if these symptoms occur. Compliance with plan of care and further disease process causes discussed with patient, patient encouraged to keep all follow up appointments. Patient verbalized understanding. Echocardiogram reviewed. Labs reviewed. Medications reviewed.   Labs ordered       Electronically signedby LIDIA Kellogg CNP on 7/20/2022 at 1:59 PM

## 2022-07-26 ENCOUNTER — TELEPHONE (OUTPATIENT)
Dept: CARDIOLOGY CLINIC | Age: 67
End: 2022-07-26

## 2022-07-26 DIAGNOSIS — I50.43 ACUTE ON CHRONIC COMBINED SYSTOLIC AND DIASTOLIC CONGESTIVE HEART FAILURE (HCC): Primary | ICD-10-CM

## 2022-07-26 NOTE — TELEPHONE ENCOUNTER
Pt contacted Anibal Mason for new lab order. Today's Lab order to replace lab order placed on 7/20/22.

## 2022-07-28 ENCOUNTER — HOSPITAL ENCOUNTER (OUTPATIENT)
Age: 67
Discharge: HOME | End: 2022-07-28
Payer: COMMERCIAL

## 2022-07-28 DIAGNOSIS — I50.43 ACUTE ON CHRONIC COMBINED SYSTOLIC AND DIASTOLIC CONGESTIVE HEART FAILURE (HCC): ICD-10-CM

## 2022-07-28 LAB
ANION GAP SERPL CALCULATED.3IONS-SCNC: 10 MMOL/L (ref 9–17)
BUN BLDV-MCNC: 27 MG/DL (ref 8–23)
CALCIUM SERPL-MCNC: 8.9 MG/DL (ref 8.6–10.4)
CHLORIDE BLD-SCNC: 102 MMOL/L (ref 98–107)
CO2: 31 MMOL/L (ref 20–31)
CREAT SERPL-MCNC: 1.09 MG/DL (ref 0.7–1.2)
GFR AFRICAN AMERICAN: >60 ML/MIN
GFR NON-AFRICAN AMERICAN: >60 ML/MIN
GFR SERPL CREATININE-BSD FRML MDRD: ABNORMAL ML/MIN/{1.73_M2}
GLUCOSE BLD-MCNC: 107 MG/DL (ref 70–99)
POTASSIUM SERPL-SCNC: 4.7 MMOL/L (ref 3.7–5.3)
SODIUM BLD-SCNC: 143 MMOL/L (ref 135–144)

## 2022-07-28 PROCEDURE — 36415 COLL VENOUS BLD VENIPUNCTURE: CPT

## 2022-07-28 PROCEDURE — 80048 BASIC METABOLIC PNL TOTAL CA: CPT

## 2022-08-17 ENCOUNTER — HOSPITAL ENCOUNTER (OUTPATIENT)
Dept: OTHER | Age: 67
Discharge: HOME | End: 2022-08-17
Payer: COMMERCIAL

## 2022-08-17 VITALS
RESPIRATION RATE: 20 BRPM | BODY MASS INDEX: 39.48 KG/M2 | DIASTOLIC BLOOD PRESSURE: 68 MMHG | SYSTOLIC BLOOD PRESSURE: 94 MMHG | OXYGEN SATURATION: 96 % | WEIGHT: 230 LBS | HEART RATE: 69 BPM

## 2022-08-17 PROCEDURE — 99212 OFFICE O/P EST SF 10 MIN: CPT

## 2022-08-17 ASSESSMENT — PAIN DESCRIPTION - PAIN TYPE: TYPE: CHRONIC PAIN

## 2022-08-17 ASSESSMENT — PAIN DESCRIPTION - LOCATION: LOCATION: HAND

## 2022-08-17 ASSESSMENT — PAIN SCALES - GENERAL: PAINLEVEL_OUTOF10: 1

## 2022-08-17 NOTE — PROGRESS NOTES
Date:  2022  Time:  1:50 PM    CHF Clinic at [address] [facility]    Office: [phone] Fax: [phone]    Re:  Heather Kearns   Patient : 1955    Vital Signs: BP 94/68   Pulse 69   Resp 20   Wt 230 lb (104.3 kg)   SpO2 96%   BMI 39.48 kg/m²                       O2 Device: None (Room air)                           No results for input(s): CBC, HGB, HCT, WBC, PLATELET, NA, K, CL, CO2, BUN, CREATININE, GLUCOSE, BNP, INR in the last 72 hours. Respiratory:    Assessment  Charting Type: Reassessment    Breath Sounds  Right Upper Lobe: Clear  Right Middle Lobe: Clear  Right Lower Lobe: Clear  Left Upper Lobe: Clear  Left Lower Lobe: Clear    Cough/Sputum  Cough: None         Peripheral Vascular  RLE Edema: None  LLE Edema: None      Complaints: none    Comment : Patient weight has decreased this month by 1.6 lbs. Reviewed 2 gram sodium diet which he remains compliant. Reviewed 64 oz fluid restrictions also compliant. Allergies are unchanged. Lungs clear, no cough. Medications reviewed by Flores Fong the Pharmacist. No reports of recent falls. Leg measurements are = or < last month with no noted edema present. He states several upcoming appointments with his eye Dr,Dr Jerica Mercado and Dr DE LA O [facility] office for an EKG.     Next appointment set for 22 @ 1pm  Electronically signed by Raoul Rico RN on 2022 at 1:50 PM

## 2022-08-19 DIAGNOSIS — I50.42 CHRONIC COMBINED SYSTOLIC AND DIASTOLIC HEART FAILURE (HCC): ICD-10-CM

## 2022-08-19 DIAGNOSIS — I48.0 PAF (PAROXYSMAL ATRIAL FIBRILLATION) (HCC): ICD-10-CM

## 2022-08-19 DIAGNOSIS — M1A.00X0 IDIOPATHIC CHRONIC GOUT WITHOUT TOPHUS, UNSPECIFIED SITE: ICD-10-CM

## 2022-08-19 DIAGNOSIS — D50.9 IRON DEFICIENCY ANEMIA, UNSPECIFIED IRON DEFICIENCY ANEMIA TYPE: ICD-10-CM

## 2022-08-19 DIAGNOSIS — I25.10 CORONARY ARTERY DISEASE INVOLVING NATIVE CORONARY ARTERY OF NATIVE HEART WITHOUT ANGINA PECTORIS: ICD-10-CM

## 2022-08-19 RX ORDER — FUROSEMIDE 40 MG/1
TABLET ORAL
Qty: 90 TABLET | Refills: 1 | Status: SHIPPED | OUTPATIENT
Start: 2022-08-19 | End: 2022-08-25 | Stop reason: SDUPTHER

## 2022-08-19 NOTE — TELEPHONE ENCOUNTER
Niesha Guzman RN  -- 7/20/2022 13:43   >> Uli Vargas   Wed Jul 20, 2022  1:43 PM   Dose increased to 1 40 mg tablet twice daily by cardiology   Niesha Guzman RN  -- 7/20/2022 13:42   >> Uli Vargas   Wed Jul 20, 2022  1:42 PM       Medication note as to how pt should be taking lasix.  pt states pcp has been filling all his meds and he is in need of a refill

## 2022-08-22 RX ORDER — ATORVASTATIN CALCIUM 40 MG/1
40 TABLET, FILM COATED ORAL DAILY
Qty: 30 TABLET | Refills: 5 | Status: SHIPPED | OUTPATIENT
Start: 2022-08-22

## 2022-08-22 RX ORDER — FERROUS SULFATE 325(65) MG
TABLET ORAL
Qty: 30 TABLET | Refills: 5 | Status: SHIPPED | OUTPATIENT
Start: 2022-08-22

## 2022-08-22 RX ORDER — ALLOPURINOL 300 MG/1
300 TABLET ORAL DAILY
Qty: 30 TABLET | Refills: 5 | Status: SHIPPED | OUTPATIENT
Start: 2022-08-22 | End: 2022-10-11

## 2022-08-22 RX ORDER — ISOSORBIDE MONONITRATE 30 MG/1
30 TABLET, EXTENDED RELEASE ORAL DAILY
Qty: 30 TABLET | Refills: 3 | Status: SHIPPED | OUTPATIENT
Start: 2022-08-22

## 2022-08-22 NOTE — TELEPHONE ENCOUNTER
Health Maintenance   Topic Date Due    Annual Wellness Visit (AWV)  Never done    Diabetic retinal exam  11/15/2020    Diabetic foot exam  01/06/2022    Flu vaccine (1) 09/01/2022    A1C test (Diabetic or Prediabetic)  06/28/2023    Depression Screen  06/28/2023    Diabetic microalbuminuria test  07/06/2023    Lipids  07/06/2023    Colorectal Cancer Screen  05/16/2025    DTaP/Tdap/Td vaccine (2 - Td or Tdap) 06/30/2026    Shingles vaccine  Completed    Pneumococcal 65+ years Vaccine  Completed    COVID-19 Vaccine  Completed    AAA screen  Completed    Hepatitis C screen  Completed    Hepatitis A vaccine  Aged Out    Hib vaccine  Aged Out    Meningococcal (ACWY) vaccine  Aged Out             (applicable per patient's age: Cancer Screenings, Depression Screening, Fall Risk Screening, Immunizations)    Hemoglobin A1C (%)   Date Value   06/28/2022 6.0   04/26/2021 6.3   02/24/2020 6.7 (H)     Microalb/Crt.  Ratio (mcg/mg creat)   Date Value   07/06/2022 Can not be calculated     LDL Cholesterol (mg/dL)   Date Value   07/06/2022 54     AST (U/L)   Date Value   02/07/2022 16     ALT (U/L)   Date Value   02/07/2022 11     BUN (mg/dL)   Date Value   07/28/2022 27 (H)      (goal A1C is < 7)   (goal LDL is <100) need 30-50% reduction from baseline     BP Readings from Last 3 Encounters:   08/17/22 94/68   07/20/22 104/60   07/12/22 120/68    (goal /80)      All Future Testing planned in [facility]:  Lab Frequency Next Occurrence   CBC with Auto Differential Once 12/12/2022   Magnesium Once 93/58/9558   Basic Metabolic Panel Once 63/02/4741   Creatinine, Random Urine Once 12/12/2022   Protein, urine, random Once 12/12/2022   Immunoglobulin Panel (IgG, IgA, IgM)     Buckley/Lambda Free Lt Chains, Serum Quant     Electrophoresis Protein, Serum without Reflex to Immunofixation     CBC Auto Differential     Comprehensive Metabolic Panel     CBC Every 4 months    Basic Metabolic Panel Every 4 months    Albumin Every 4 months Microalbumin / Creatinine Urine Ratio Every 4 months        Next Visit Date:  Future Appointments   Date Time Provider Keerthi Samanta   8/29/2022  1:15 PM Toña Ochoa DO Resp Spec [facility]   9/14/2022  1:00 PM [facility] CHF CLINIC RM 1 [facility] CHF [facility]   10/31/2022  1:30 PM Ardia Gottron, APRN - CNP [facility]   2/14/2023  1:10 PM Tramaine Hare MD AFL [facility] None            Patient Active Problem List:     Acute on chronic combined systolic and diastolic congestive heart failure (HCC)     Chronic kidney disease, stage II (mild)     Chronic gout     Morbid obesity (HCC)     Normocytic normochromic anemia     Hyperlipidemia     Iron deficiency anemia     Anxiety disorder      DJD (degenerative joint disease)     TANNER variably compliant with BiPAP     CAD (coronary artery disease) s/p stenting of L anterior descending artery 2004     Diabetic retinopathy ([facility] [address]     Ischemic cardiomyopathy     HTN (hypertension)     NSTEMI (non-ST elevated myocardial infarction) (HCC)     MGUS (monoclonal gammopathy of unknown significance)     Type 2 diabetes mellitus with chronic kidney disease (HCC)     AICD (automatic cardioverter/defibrillator) present     PAF (paroxysmal atrial fibrillation) (HCC)     Coronary angioplasty status     Hypokalemia     Chronic systolic (congestive) heart failure (HCC)     Hypothyroidism due to medication     Acute cardiac pulmonary edema (HCC)     Adenomatous polyp of ascending colon

## 2022-08-29 ENCOUNTER — OFFICE VISIT (OUTPATIENT)
Dept: PULMONOLOGY | Age: 67
End: 2022-08-29
Payer: COMMERCIAL

## 2022-08-29 VITALS
OXYGEN SATURATION: 97 % | HEART RATE: 72 BPM | DIASTOLIC BLOOD PRESSURE: 60 MMHG | BODY MASS INDEX: 38.93 KG/M2 | HEIGHT: 64 IN | RESPIRATION RATE: 15 BRPM | WEIGHT: 228 LBS | SYSTOLIC BLOOD PRESSURE: 99 MMHG

## 2022-08-29 DIAGNOSIS — E66.01 CLASS 2 SEVERE OBESITY DUE TO EXCESS CALORIES WITH SERIOUS COMORBIDITY AND BODY MASS INDEX (BMI) OF 39.0 TO 39.9 IN ADULT (HCC): ICD-10-CM

## 2022-08-29 DIAGNOSIS — I50.22 CHRONIC SYSTOLIC CONGESTIVE HEART FAILURE (HCC): ICD-10-CM

## 2022-08-29 DIAGNOSIS — I48.0 PAF (PAROXYSMAL ATRIAL FIBRILLATION) (HCC): ICD-10-CM

## 2022-08-29 DIAGNOSIS — Z99.89 OSA ON CPAP: Primary | ICD-10-CM

## 2022-08-29 DIAGNOSIS — E11.22 TYPE 2 DIABETES MELLITUS WITH DIABETIC CHRONIC KIDNEY DISEASE, UNSPECIFIED CKD STAGE, UNSPECIFIED WHETHER LONG TERM INSULIN USE (HCC): ICD-10-CM

## 2022-08-29 DIAGNOSIS — G47.33 OSA ON CPAP: Primary | ICD-10-CM

## 2022-08-29 DIAGNOSIS — R06.02 SHORTNESS OF BREATH: ICD-10-CM

## 2022-08-29 PROCEDURE — 1123F ACP DISCUSS/DSCN MKR DOCD: CPT | Performed by: INTERNAL MEDICINE

## 2022-08-29 PROCEDURE — 99213 OFFICE O/P EST LOW 20 MIN: CPT | Performed by: INTERNAL MEDICINE

## 2022-08-29 PROCEDURE — 3044F HG A1C LEVEL LT 7.0%: CPT | Performed by: INTERNAL MEDICINE

## 2022-08-29 RX ORDER — AMMONIUM LACTATE 12 G/100G
LOTION TOPICAL
Qty: 225 G | Refills: 3 | OUTPATIENT
Start: 2022-08-29

## 2022-08-29 ASSESSMENT — ENCOUNTER SYMPTOMS
EYES NEGATIVE: 1
SHORTNESS OF BREATH: 1

## 2022-08-30 NOTE — PROGRESS NOTES
Subjective:      Patient ID: Dominique Andres is a 69 y.o. male being seen in my clinic for   Chief Complaint   Patient presents with    Sleep Apnea       HPI  Follow-up visit for sleep apnea. Since his last visit a year ago he states that he has not been very compliant with CPAP. \"I do not tolerate it very well. \"  States that he is more short of breath. Follows with cardiology for dilated cardiomyopathy with severe left ventricular systolic dysfunction and ejection fraction of 13%. Denies cough or wheeze. Also reports fatigue and exercise intolerance. Previous pulmonary function studies demonstrated mild extra pulmonic restriction. Likely related to obesity. Up-to-date with his COVID vaccinations. Review of Systems   Constitutional: Negative. HENT: Negative. Eyes: Negative. Respiratory:  Positive for shortness of breath. Cardiovascular: Negative. All other systems reviewed and are negative.     Objective:     Vitals:    08/29/22 1320 08/29/22 1323   BP: (!) 78/56 99/60   Site: Left Upper Arm Right Lower Arm   Position: Sitting Sitting   Cuff Size: Large Adult Medium Adult   Pulse: 72    Resp: 15    SpO2: 97%  Comment: room air at rest    Weight: 228 lb (103.4 kg)    Height: 5' 4\" (1.626 m)      Current Outpatient Medications   Medication Sig Dispense Refill    furosemide (LASIX) 40 MG tablet Take 1 tablet by mouth 2 times daily TAKE ONE TABLET BY MOUTH 2 TIMES A DAY PER CARDIOLOGIST 180 tablet 3    atorvastatin (LIPITOR) 40 MG tablet TAKE 1 TABLET BY MOUTH DAILY 30 tablet 5    isosorbide mononitrate (IMDUR) 30 MG extended release tablet TAKE 1 TABLET BY MOUTH DAILY 30 tablet 3    apixaban (ELIQUIS) 5 MG TABS tablet TAKE 1 TABLET BY MOUTH 2 TIMES DAILY 60 tablet 3    allopurinol (ZYLOPRIM) 300 MG tablet TAKE 1 TABLET BY MOUTH DAILY 30 tablet 5    FEROSUL 325 (65 Fe) MG tablet TAKE 1 TABLET BY MOUTH DAILY (WITH BREAKFAST) 30 tablet 5    aspirin (ASPIRIN LOW DOSE) 81 MG EC tablet TAKE 1 TABLET BY MOUTH TWICE A DAY 60 tablet 3    metFORMIN (GLUCOPHAGE) 1000 MG tablet TAKE 1 TABLET BY MOUTH 2 TIMES DAILY (WITH MEALS) 60 tablet 2    ONETOUCH VERIO strip USE TO CHECK BLOOD SUGAR ONCE A DAY AND AS NEEDED FOR SYMPTOMS OF IRREGULAR BLOOD SUGAR 100 strip 2    Na Sulfate-K Sulfate-Mg Sulf (SUPREP) 17.5-3.13-1.6 GM/177ML SOLN solution Take as directed for bowel prep/colonoscopy 1 each 0    ENTRESTO 24-26 MG per tablet       metoprolol succinate (TOPROL XL) 50 MG extended release tablet Take 50 mg by mouth daily      Handicap Placard MISC by Does not apply route Exp: 1/2025 1 each 0    Lancets MISC Daily 100 each 3    ammonium lactate (LAC-HYDRIN) 12 % lotion Apply topically daily after bathing sparing the space between the toes. 222 mL 3    Alcohol Swabs (ALCOHOL PREP) 70 % PADS USE AS DIRECTED 100 each 11    levothyroxine (SYNTHROID) 75 MCG tablet Take 1 tablet by mouth Daily 90 tablet 1     No current facility-administered medications for this visit. Physical Exam  Vitals and nursing note reviewed. Constitutional:       Appearance: He is well-developed. He is obese. HENT:      Nose: Nose normal. No congestion. Mouth/Throat:      Mouth: Mucous membranes are moist.      Pharynx: Oropharynx is clear. No oropharyngeal exudate. Eyes:      General: No scleral icterus. Conjunctiva/sclera: Conjunctivae normal.   Neck:      Thyroid: No thyromegaly. Vascular: No JVD. Trachea: No tracheal deviation. Cardiovascular:      Rate and Rhythm: Normal rate and regular rhythm. Heart sounds: Normal heart sounds. No murmur heard. No gallop. Comments: S3 noted. Pulmonary:      Effort: Pulmonary effort is normal. No respiratory distress. Breath sounds: No wheezing or rales. Chest:      Chest wall: No tenderness. Abdominal:      Palpations: Abdomen is soft. Tenderness: There is no abdominal tenderness. Musculoskeletal:      Cervical back: Neck supple.    Lymphadenopathy: Cervical: No cervical adenopathy. Skin:     General: Skin is warm and dry. Findings: No lesion or rash. Neurological:      Mental Status: He is alert and oriented to person, place, and time. Wt Readings from Last 3 Encounters:   08/29/22 228 lb (103.4 kg)   08/17/22 230 lb (104.3 kg)   07/20/22 231 lb 9.6 oz (105.1 kg)     Results for orders placed or performed during the hospital encounter of 87/73/19   Basic Metabolic Panel   Result Value Ref Range    Glucose 107 (H) 70 - 99 mg/dL    BUN 27 (H) 8 - 23 mg/dL    Creatinine 1.09 0.70 - 1.20 mg/dL    Calcium 8.9 8.6 - 10.4 mg/dL    Sodium 143 135 - 144 mmol/L    Potassium 4.7 3.7 - 5.3 mmol/L    Chloride 102 98 - 107 mmol/L    CO2 31 20 - 31 mmol/L    Anion Gap 10 9 - 17 mmol/L    GFR Non-African American >60 >60 mL/min    GFR African American >60 >60 mL/min    GFR Comment             :      1. TANNER variably compliant with BiPAP    2. Chronic systolic congestive heart failure (HCC)    3. Class 2 severe obesity due to excess calories with serious comorbidity and body mass index (BMI) of 39.0 to 39.9 in adult ([address]    4. PAF (paroxysmal atrial fibrillation) ([address]    5. Type 2 diabetes mellitus with diabetic chronic kidney disease, unspecified CKD stage, unspecified whether long term insulin use ([address]    6.  Shortness of breath      Patient Active Problem List   Diagnosis    Acute on chronic combined systolic and diastolic congestive heart failure (HCC)    Chronic kidney disease, stage II (mild)    Chronic gout    Morbid obesity (HCC)    Normocytic normochromic anemia    Hyperlipidemia    Iron deficiency anemia    Anxiety disorder     DJD (degenerative joint disease)    TANNER variably compliant with BiPAP    CAD (coronary artery disease) s/p stenting of L anterior descending artery 2004    Diabetic retinopathy ([address]    Ischemic cardiomyopathy    HTN (hypertension)    NSTEMI (non-ST elevated myocardial infarction) (HCC)    MGUS (monoclonal gammopathy of unknown significance)    Type 2 diabetes mellitus with chronic kidney disease (HCC)    AICD (automatic cardioverter/defibrillator) present    PAF (paroxysmal atrial fibrillation) (HCC)    Coronary angioplasty status    Hypokalemia    Chronic systolic (congestive) heart failure (HCC)    Hypothyroidism due to medication    Acute cardiac pulmonary edema (HCC)    Adenomatous polyp of ascending colon         Plan:      Encourage CPAP use   Avoid sedative hypnotics and alcohol at bedtime  Weight loss  Exercise caution when driving and other high risk activities of not adequately treated for sleep apnea  Instructed to bring CPAP machine for use post op  Return in 1 year. No orders of the defined types were placed in this encounter. No orders of the defined types were placed in this encounter. Return in about 1 year (around 8/29/2023).        Electronically signed by Crystal Castaneda DO on 8/29/2022at 8:57 PM left sided weakness/SEIZURES

## 2022-09-02 DIAGNOSIS — M1A.00X0 IDIOPATHIC CHRONIC GOUT WITHOUT TOPHUS, UNSPECIFIED SITE: ICD-10-CM

## 2022-09-02 RX ORDER — ALLOPURINOL 300 MG/1
300 TABLET ORAL DAILY
Qty: 30 TABLET | Refills: 5 | OUTPATIENT
Start: 2022-09-02 | End: 2022-10-02

## 2022-09-07 ENCOUNTER — OFFICE VISIT (OUTPATIENT)
Dept: PODIATRY | Age: 67
End: 2022-09-07
Payer: COMMERCIAL

## 2022-09-07 VITALS
BODY MASS INDEX: 38.93 KG/M2 | DIASTOLIC BLOOD PRESSURE: 64 MMHG | HEIGHT: 64 IN | WEIGHT: 228 LBS | SYSTOLIC BLOOD PRESSURE: 104 MMHG | HEART RATE: 66 BPM

## 2022-09-07 DIAGNOSIS — M79.671 PAIN IN BOTH FEET: ICD-10-CM

## 2022-09-07 DIAGNOSIS — L84 CALLUS OF FOOT: ICD-10-CM

## 2022-09-07 DIAGNOSIS — B35.1 DERMATOPHYTOSIS OF NAIL: ICD-10-CM

## 2022-09-07 DIAGNOSIS — M79.672 PAIN IN BOTH FEET: ICD-10-CM

## 2022-09-07 DIAGNOSIS — E11.9 TYPE 2 DIABETES MELLITUS WITHOUT COMPLICATION, WITHOUT LONG-TERM CURRENT USE OF INSULIN (HCC): ICD-10-CM

## 2022-09-07 DIAGNOSIS — L85.3 XEROSIS OF SKIN: ICD-10-CM

## 2022-09-07 DIAGNOSIS — B35.1 OM (ONYCHOMYCOSIS): Primary | ICD-10-CM

## 2022-09-07 PROCEDURE — 3044F HG A1C LEVEL LT 7.0%: CPT | Performed by: PODIATRIST

## 2022-09-07 PROCEDURE — 1123F ACP DISCUSS/DSCN MKR DOCD: CPT | Performed by: PODIATRIST

## 2022-09-07 PROCEDURE — 99213 OFFICE O/P EST LOW 20 MIN: CPT | Performed by: PODIATRIST

## 2022-09-07 PROCEDURE — 11721 DEBRIDE NAIL 6 OR MORE: CPT | Performed by: PODIATRIST

## 2022-09-07 PROCEDURE — 11056 PARNG/CUTG B9 HYPRKR LES 2-4: CPT | Performed by: PODIATRIST

## 2022-09-07 RX ORDER — AMMONIUM LACTATE 12 G/100G
CREAM TOPICAL
Qty: 385 G | Refills: 1 | Status: SHIPPED | OUTPATIENT
Start: 2022-09-07 | End: 2022-10-07

## 2022-09-07 NOTE — PROGRESS NOTES
[address],  [address] Harry Ochoa  Tel: [phone]   Fax: [phone]    Subjective     CC: Painful toe nails and calluses     HPI:  Dolores Lauren is a 69y.o. year old male who presents to clinic today for painful nails and calluses. Reports he has a hard time bending over to trim his nails. His nails are elongated and thickened which rub against his shoegear and socks. Denies numbness, burning, or tingling sensation in his feet. Patient has DM2, states his blood sugar well controlled, most recent HgbA1c was 6.0% (6/28/22). Reports he dropped something on his right big toe causing a split in his hallux nail. However, pain has been manageable and nail is growing back. Admits to dry skin of both feet but has been using ammonium lactate PRN. No other pedal complaints. Primary care physician is LIDIA Frost CNP.    ROS:    Constitutional: Denies nausea, vomiting, fever, chills. Neurologic: Denies numbness, tingling, and burning in the feet. Vascular: Denies symptoms of lower extremity claudication. Skin: Denies open wounds. Otherwise negative except as noted in the HPI.      PMH:  Past Medical History:   Diagnosis Date    Acute HFrEF (heart failure with reduced ejection fraction) ([address] 6/22/2021    Acute on chronic congestive heart failure (HCC)     Acute respiratory failure with hypoxia (HCC)     Anemia     Anxiety     when he lies flat, no RX    CAD (coronary artery disease)     MI stents x5, follows with cardiology Dr. Zhang Life    Cardiac defibrillator in place     Cardiomyopathy [facility])     CHF (congestive heart failure) ([address]     Chronic combined systolic and diastolic heart failure ([address] s/[ AICD placed 9/25/2011    Chronic kidney disease     Complex sleep apnea syndrome     Diabetic retinopathy ([address] 9/25/2011    Diverticulitis     DJD (degenerative joint disease) 9/25/2011    Edentulous     Gout     HTN (hypertension) 3/30/2012    Hyperlipidemia Hypertension     Iron deficiency anemia 2011    Ischemic cardiomyopathy     Morbid obesity ([address] 2011    Obesity     Morbid obesity due to excess calories    TANNER variably compliant with BiPAP 2011    Osteoarthritis     PAF (paroxysmal atrial fibrillation) (HCC)     Psychophysiologic insomnia     Thyroid disease     Type II or unspecified type diabetes mellitus without mention of complication, not stated as uncontrolled     Unspecified sleep apnea     uses V-pap       Surgical History:   Past Surgical History:   Procedure Laterality Date    BONE MARROW BIOPSY  2012 approx    CARDIAC CATHETERIZATION  2007    severe stenosis pre-stent area    CARDIAC CATHETERIZATION  2013    Very peripheral stenosis, not amendable to PCI, stents patent    CARDIAC DEFIBRILLATOR PLACEMENT  2015    COLONOSCOPY  2007    COLONOSCOPY  2021    COLONOSCOPY N/A 2021    COLONOSCOPY DIAGNOSTIC performed by Rob Bazan MD at [facility]    COLONOSCOPY N/A 2022    COLONOSCOPY WITH BIOPSY performed by Rob Bazan MD at [address] [place] [address], [facility]    stent LAD    OH COLSC FLX W/RMVL OF TUMOR POLYP LESION SNARE TQ N/A 2017    COLONOSCOPY POLYPECTOMY SNARE/COLD BIOPSY performed by Rosa Isela Herrera DO at [facility]       Social History:  Social History     Tobacco Use    Smoking status: Former     Packs/day: 1.00     Years: 3.00     Pack years: 3.00     Types: Cigarettes     Start date: 1971     Quit date: 1974     Years since quittin.7    Smokeless tobacco: Never   Vaping Use    Vaping Use: Never used   Substance Use Topics    Alcohol use: Not Currently     Alcohol/week: 0.0 standard drinks     Comment: 15 years ago    Drug use: Not Currently     Types: Marijuana Valdene Clark)     Comment: hx THC quit approx        Medications:  Prior to Admission medications    Medication Sig Start Date End Date Taking?  Authorizing Provider   furosemide (LASIX) 40 MG tablet Take 1 tablet by mouth 2 times daily TAKE ONE TABLET BY MOUTH 2 TIMES A DAY PER CARDIOLOGIST 8/25/22   Yoandy Murphy MD   atorvastatin (LIPITOR) 40 MG tablet TAKE 1 TABLET BY MOUTH DAILY 8/22/22   LIDIA Villagomez CNP   isosorbide mononitrate (IMDUR) 30 MG extended release tablet TAKE 1 TABLET BY MOUTH DAILY 8/22/22   LIDIA Villagomez CNP   apixaban (ELIQUIS) 5 MG TABS tablet TAKE 1 TABLET BY MOUTH 2 TIMES DAILY 8/22/22   LIDIA Villagomez CNP   allopurinol (ZYLOPRIM) 300 MG tablet TAKE 1 TABLET BY MOUTH DAILY 8/22/22 9/21/22  LIDIA Villagomez CNP   FEROSUL 325 (65 Fe) MG tablet TAKE 1 TABLET BY MOUTH DAILY (WITH BREAKFAST) 8/22/22   LIDIA Villagomez CNP   aspirin (ASPIRIN LOW DOSE) 81 MG EC tablet TAKE 1 TABLET BY MOUTH TWICE A DAY 7/11/22   LIDIA Villagomez CNP   metFORMIN (GLUCOPHAGE) 1000 MG tablet TAKE 1 TABLET BY MOUTH 2 TIMES DAILY (WITH MEALS) 7/11/22   LIDIA Villagomez CNP   ONETOUCH VERIO strip USE TO CHECK BLOOD SUGAR ONCE A DAY AND AS NEEDED FOR SYMPTOMS OF IRREGULAR BLOOD SUGAR 5/16/22   LIDIA Villagomez CNP   Na Sulfate-K Sulfate-Mg Sulf (SUPREP) 17.5-3.13-1.6 GM/177ML SOLN solution Take as directed for bowel prep/colonoscopy 4/4/22   Angy Felton MD   levothyroxine (SYNTHROID) 75 MCG tablet Take 1 tablet by mouth Daily 2/28/22 7/20/22  LIDIA Villagomez CNP   ENTRESTO 24-26 MG per tablet  8/20/21   Historical Provider, MD   metoprolol succinate (TOPROL XL) 50 MG extended release tablet Take 50 mg by mouth daily    Historical Provider, MD   Handicap Placard MISC by Does not apply route Exp: 1/2025 12/23/20   LIDIA Villagomez CNP   Lancets MISC Daily 12/2/20   LIDIA Villagomez CNP   ammonium lactate (LAC-HYDRIN) 12 % lotion Apply topically daily after bathing sparing the space between the toes.  1/2/20   Benedict Maxwell MD   Alcohol Swabs (ALCOHOL PREP) 70 % PADS USE AS DIRECTED 5/31/19   Iwona Marroquin MD Objective     Vitals:    09/07/22 1338   BP: 104/64   Pulse: 66       Lab Results   Component Value Date    LABA1C 6.0 06/28/2022       Physical Exam:  General:  Alert and oriented x3. In no acute distress. Lower Extremity Physical Exam:  Vascular: DP/PT pulses are palpable +2/4, bilateral. CFT is brisk to all digits, Bilateral. Skin temp is cool to warm proximal to distal, bilateral. No edema or erythema noted. Neuro: Protective sensation intact to 10 /10 pedal sites as tested with [place] Monofilament 5.07g.  Light touch sensation intact to the level of the digits, Bilateral.     Musculoskeletal: Muscle strength 5/5 for all LE muscle groups, aleshia. No gross deformities noted, bilateral. No tenderness to palpation of nails, 1-5, bilateral. Decreased motion of 1st MPJ b/l without pain or crepitus. Patient reports cramping when he dorsiflexes his left hallux, especially at night. Not reproducible in clinic. Dermatologic:  Nails 1-5 are WNL on the left foot, 2-5 on the right foot. Hallux nail on right foot has subungual hematoma present, nail well adhered to nail bed, no evidence of loosening. Interdigital macerations absent. No open lesions or subcutaneous nodules noted, bilateral.  Skin is well hydrated to plantar aspects of feet b/l. Hyperkeratotic lesions noted to IPJ of hallux on the left foot and sub 5th metatarsal, left. HPK noted to left heel. Imaging: Not indicated    Assessment   Mile Puckett is a 69 y.o. male with     Diagnosis Orders   1. OM (onychomycosis)        2. Callus of foot        3. Type 2 diabetes mellitus without complication, without long-term current use of insulin ([facility]               Plan     Patient seen and evaluated. Hyperkeratotic lesions x 3 debrided with #15 blade without incident. Nails 1-10 sharply debrided with nail nipper without incident. Instructed patient to continue using ammonium lactate PRN to bilateral feet.   Patient to return to clinic in 6 months or sooner if new concerns arise  Rx ammonium lactate   Please, call the office with any questions or concerns   Discussed with Dr. Preet Rico    I performed a history and physical examination of the patient and discussed management with the resident. I reviewed the residents note and agree with the documented findings and plan of care. Any areas of disagreement are noted on the chart. I was personally present for the key portions of any procedures. I have documented in the chart those procedures where I was not present during the key portions. I have reviewed the Podiatry Resident progress note. I agree with the chief complaint, past medical history, past surgical history, allergies, medications, social and family history as documented unless otherwise noted below. Documentation of the HPI, Physical Exam and Medical Decision Making performed by medical students or scribes is based on my personal performance of the HPI, PE and MDM. I have personally evaluated this patient and have completed at least one if not all key elements of the E/M (history, physical exam, and MDM). Additional findings are as noted.      Electronically signed by Miguel Cramer DPM on 9/19/2022 at 3:10 PM   Marry Leger   Podiatric Medicine & Surgery   9/7/2022 at 2:05 PM

## 2022-09-07 NOTE — PROGRESS NOTES
Patient instructed to remove shoes and socks and instructed to sit in exam chair. Current PCP is LIDIA Castro CNP and date of last visit was 06/28/2022. Do you have a follow up visit scheduled?   No  If yes, the date is

## 2022-09-09 DIAGNOSIS — D47.2 MGUS (MONOCLONAL GAMMOPATHY OF UNKNOWN SIGNIFICANCE): Primary | ICD-10-CM

## 2022-09-14 ENCOUNTER — HOSPITAL ENCOUNTER (OUTPATIENT)
Dept: OTHER | Age: 67
Discharge: HOME | End: 2022-09-14
Payer: COMMERCIAL

## 2022-09-14 VITALS
DIASTOLIC BLOOD PRESSURE: 60 MMHG | OXYGEN SATURATION: 99 % | BODY MASS INDEX: 39.31 KG/M2 | RESPIRATION RATE: 18 BRPM | SYSTOLIC BLOOD PRESSURE: 104 MMHG | HEART RATE: 62 BPM | WEIGHT: 229 LBS

## 2022-09-14 PROCEDURE — 99212 OFFICE O/P EST SF 10 MIN: CPT

## 2022-09-14 NOTE — PROGRESS NOTES
Date:  2022  Time:  1:35 PM    CHF Clinic at [facility]    Office: [phone] Fax: [phone]    Re:  Eliseo Kenny   Patient : 1955    Vital Signs: /60   Pulse 62   Resp 18   Wt 229 lb (103.9 kg)   SpO2 99%   BMI 39.31 kg/m²                       O2 Device: None (Room air)                           No results for input(s): CBC, HGB, HCT, WBC, PLATELET, NA, K, CL, CO2, BUN, CREATININE, GLUCOSE, BNP, INR in the last 72 hours. Respiratory:         Breath Sounds  Right Upper Lobe: Clear  Right Middle Lobe: Clear  Right Lower Lobe: Clear  Left Upper Lobe: Clear  Left Lower Lobe: Clear    Cough/Sputum  Cough: None         Peripheral Vascular  RLE Edema: None  LLE Edema: None      Complaints: none     Comment : Patient knowledgeable on medications and compliant,reviewed with pharmacist. Allergies reviewed. Weight loss of 1 lb since last visit. States compliancy with 2 gram sodium diet and 64 oz fluid restriction when reviewed. Lungs are clear and no edema noted. Leg measurements are virtually unchanged.    Scheduled for next visit 10/12/22    Electronically signed by Linda Figueroa RN on 2022 at 1:35 PM

## 2022-09-22 DIAGNOSIS — I50.42 CHRONIC COMBINED SYSTOLIC AND DIASTOLIC HEART FAILURE (HCC): ICD-10-CM

## 2022-09-22 RX ORDER — FUROSEMIDE 40 MG/1
TABLET ORAL
Qty: 90 TABLET | Refills: 1 | OUTPATIENT
Start: 2022-09-22

## 2022-10-11 ENCOUNTER — HOSPITAL ENCOUNTER (OUTPATIENT)
Dept: OTHER | Age: 67
Discharge: HOME | End: 2022-10-11
Payer: COMMERCIAL

## 2022-10-11 VITALS
OXYGEN SATURATION: 98 % | HEART RATE: 64 BPM | WEIGHT: 234.8 LBS | SYSTOLIC BLOOD PRESSURE: 100 MMHG | BODY MASS INDEX: 40.3 KG/M2 | RESPIRATION RATE: 20 BRPM | DIASTOLIC BLOOD PRESSURE: 70 MMHG

## 2022-10-11 PROCEDURE — 99212 OFFICE O/P EST SF 10 MIN: CPT

## 2022-10-11 NOTE — PROGRESS NOTES
Date:  10/11/2022  Time:  1:32 PM    CHF Clinic at [address] [facility]    Office: [phone] Fax: [phone]    Re:  Linda Beck   Patient : 1955    Vital Signs: /70   Pulse 64   Resp 20   Wt 234 lb 12.8 oz (106.5 kg)   SpO2 98%   BMI 40.30 kg/m²                       O2 Device: None (Room air)                           No results for input(s): CBC, HGB, HCT, WBC, PLATELET, NA, K, CL, CO2, BUN, CREATININE, GLUCOSE, BNP, INR in the last 72 hours. Respiratory:    Assessment  Charting Type: Reassessment    Breath Sounds  Right Upper Lobe: Clear  Right Middle Lobe: Clear  Right Lower Lobe: Clear  Left Upper Lobe: Clear  Left Lower Lobe: Clear    Cough/Sputum  Cough: Productive  Frequency: Occasional  Sputum Amount: Small  Sputum Color: White, Grey         Peripheral Vascular  RLE Edema: None  LLE Edema: None      Complaints: None at this time    Physician Orders None    Comment : Arrived for scheduled visit per ambulatory. His weight is up 5.8 lbs from last month. Says he has been drinking more liquids over the last few days. Denies any increase in dyspnea with exertion or chest pain. No lower leg, ankle or pedal edema noted. Medication list updated. Reinforced low sodium diet and fluid restrictions. No s/s acute chf at this time. Has appt. With cardiology, Dr. Sarah Beth Rich on 10/25/22. Next chf clinic visit 22.     Electronically signed by Erin Peres RN on 10/11/2022 at 1:32 PM

## 2022-10-11 NOTE — PROGRESS NOTES
Verbally reviewed medication list with patient; patient verbalized understanding. Discussed 2000mg/day sodium restricted diet; patient verbalized understanding. Moderate daily exercise encouraged as tolerated. Discussed rest breaks as needed; patient verbalized understanding. Patient instructed to weigh self at the same time of each day, using same clothes and same scale; reinforced teaching to monitor for 3-5 lb weight increase over 1-2 days, and to notify the CHF clinic at [phone] or physician office if weight change noted. Patient verbalized understanding. Risks of smoking discussed with the patient if applicable; patient strongly discouraged to smoke. Patient verbalized understanding. Signs and symptoms of CHF discussed with patient, such as feeling more tired than normal, feeling short of breath, coughing that increases when you lie down, sudden weight gain, swelling of your feet, legs or belly. Patient verbalized understanding to notify the CHF clinic at [phone] or physician office if these symptoms occur. Compliance with plan of care and further disease process causes discussed with patient, patient encouraged to keep all follow up appointments. Patient verbalized understanding.

## 2022-11-08 ENCOUNTER — HOSPITAL ENCOUNTER (OUTPATIENT)
Dept: OTHER | Age: 67
Discharge: HOME | End: 2022-11-08
Payer: COMMERCIAL

## 2022-11-08 VITALS
OXYGEN SATURATION: 99 % | BODY MASS INDEX: 39.65 KG/M2 | HEART RATE: 68 BPM | SYSTOLIC BLOOD PRESSURE: 108 MMHG | DIASTOLIC BLOOD PRESSURE: 68 MMHG | WEIGHT: 231 LBS | RESPIRATION RATE: 20 BRPM

## 2022-11-08 PROCEDURE — 99212 OFFICE O/P EST SF 10 MIN: CPT

## 2022-11-08 NOTE — PROGRESS NOTES
Date:  2022  Time:  1:23 PM    CHF Clinic at [address] [place] [address]    Office: [phone] Fax: [phone]    Re:  Mignon Torrez   Patient : 1955    Vital Signs: /68   Pulse 68   Resp 20   Wt 231 lb (104.8 kg)   SpO2 99%   BMI 39.65 kg/m²                       O2 Device: None (Room air)                           No results for input(s): CBC, HGB, HCT, WBC, PLATELET, NA, K, CL, CO2, BUN, CREATININE, GLUCOSE, BNP, INR in the last 72 hours. Respiratory:    Assessment  Charting Type: Reassessment    Breath Sounds  Right Upper Lobe: Clear  Right Middle Lobe: Clear  Right Lower Lobe: Clear  Left Upper Lobe: Clear  Left Lower Lobe: Clear    Cough/Sputum  Cough: Productive  Frequency: Infrequent  Sputum Amount: Small  Sputum Color: Clear       Peripheral Vascular  RLE Edema: None  LLE Edema: None    Complaints: Nothing new      Comment : Patient here ambulatory for routine visit. Weight down 3 lbs in one month. No new or acute s/s CHF. Discussed low salt diet and fluid limits. States compliance with meds. Lasix is 40 mg 2x day. Recently visited Dr Franky Bhakta. No med changes. Next visit here 4 weeks 2022. Instructed to phone here if increased s/s CHF.      Electronically signed by Juan Rich RN on 2022 at 1:23 PM

## 2022-11-14 ENCOUNTER — OFFICE VISIT (OUTPATIENT)
Dept: PRIMARY CARE CLINIC | Age: 67
End: 2022-11-14
Payer: COMMERCIAL

## 2022-11-14 VITALS
DIASTOLIC BLOOD PRESSURE: 58 MMHG | BODY MASS INDEX: 38.79 KG/M2 | OXYGEN SATURATION: 99 % | TEMPERATURE: 97.6 F | HEART RATE: 54 BPM | WEIGHT: 226 LBS | SYSTOLIC BLOOD PRESSURE: 95 MMHG

## 2022-11-14 DIAGNOSIS — E03.2 HYPOTHYROIDISM DUE TO MEDICATION: ICD-10-CM

## 2022-11-14 DIAGNOSIS — E11.8 CONTROLLED TYPE 2 DIABETES MELLITUS WITH COMPLICATION, WITHOUT LONG-TERM CURRENT USE OF INSULIN (HCC): ICD-10-CM

## 2022-11-14 DIAGNOSIS — M1A.00X0 IDIOPATHIC CHRONIC GOUT WITHOUT TOPHUS, UNSPECIFIED SITE: ICD-10-CM

## 2022-11-14 DIAGNOSIS — E11.9 TYPE 2 DIABETES MELLITUS WITHOUT COMPLICATION, WITHOUT LONG-TERM CURRENT USE OF INSULIN (HCC): ICD-10-CM

## 2022-11-14 DIAGNOSIS — I25.10 CORONARY ARTERY DISEASE INVOLVING NATIVE CORONARY ARTERY OF NATIVE HEART WITHOUT ANGINA PECTORIS: ICD-10-CM

## 2022-11-14 DIAGNOSIS — I50.42 CHRONIC COMBINED SYSTOLIC AND DIASTOLIC HEART FAILURE (HCC): Primary | ICD-10-CM

## 2022-11-14 DIAGNOSIS — R06.02 SHORTNESS OF BREATH: ICD-10-CM

## 2022-11-14 PROCEDURE — 99214 OFFICE O/P EST MOD 30 MIN: CPT | Performed by: NURSE PRACTITIONER

## 2022-11-14 PROCEDURE — 1123F ACP DISCUSS/DSCN MKR DOCD: CPT | Performed by: NURSE PRACTITIONER

## 2022-11-14 PROCEDURE — 3078F DIAST BP <80 MM HG: CPT | Performed by: NURSE PRACTITIONER

## 2022-11-14 PROCEDURE — 3074F SYST BP LT 130 MM HG: CPT | Performed by: NURSE PRACTITIONER

## 2022-11-14 PROCEDURE — 3044F HG A1C LEVEL LT 7.0%: CPT | Performed by: NURSE PRACTITIONER

## 2022-11-14 RX ORDER — ASPIRIN 81 MG/1
TABLET ORAL
Qty: 60 TABLET | Refills: 3 | Status: SHIPPED | OUTPATIENT
Start: 2022-11-14

## 2022-11-14 ASSESSMENT — ENCOUNTER SYMPTOMS
TROUBLE SWALLOWING: 0
SHORTNESS OF BREATH: 1
VOMITING: 0
CONSTIPATION: 0
CHEST TIGHTNESS: 0
BLOOD IN STOOL: 0
EYE ITCHING: 0
EYE DISCHARGE: 0
ABDOMINAL PAIN: 0
WHEEZING: 0
EYE REDNESS: 0
DIARRHEA: 0
BLURRED VISION: 1
EYE PAIN: 0

## 2022-11-14 NOTE — PROGRESS NOTES
[facility]  [id number] [facility] [address]  Dept: [phone]  Dept Fax: [phone]    Patient Care Team:  LIDIA Suazo CNP as PCP - General (Family Medicine)  LIDIA Suazo CNP as PCP - [facility]  Nataliya Coleman MD as Consulting Physician (Hematology and Oncology)  Rolan Castleman, MD as Consulting Physician (Cardiology)  Jabier Prado DO as Consulting Physician (Pulmonology)  Rubina Green MD as Consulting Physician (Ophthalmology)  Vanessa Umanzor MD as Consulting Physician (Internal Medicine)  Cassandra Garcia MD as Consulting Physician (Gastroenterology)    2022     Riley Byrd (:  1955)is a 69 y.o. male, here for evaluation of the following medical concerns:   Chief Complaint   Patient presents with    Diabetes     4M F/U       68-year-old  male is here for a routine follow up. He reports a history of diabetes mellitus, hypertension, he has a history of ischemic cardiomyopathy with multiple stent placed in , ejection fraction recent 15 to 20% status post AICD, patient has a history of atrial fibrillation on Eliquis 5 mg twice a day, is following cardiology regularly, Dr Uli Miranda. For diabetes mellitus he is on metformin 1 g twice daily. FBS running 100-110 normally  He denies any shortness of breath, leg swelling, dizziness. He does attend heart failure clinic. He follows podiatry and ophthalmology for retinal exam last foot exam in 2020   Gout on allopurinol 300 mg daily    Follow with cardiology, does have implanted monitor in place. No medication changes. See's podiatry yearly, but has not been back in over 1 year    Also follows with oncology annually, states recent numbers were good and will continue to observe    See's Dr Bassam Prieto at [address] for known kidney mass    Hypertension  This is a chronic problem.  The problem is unchanged. The problem is controlled. Associated symptoms include blurred vision and shortness of breath. Pertinent negatives include no chest pain, headaches, neck pain or palpitations. Risk factors for coronary artery disease include male gender, obesity and smoking/tobacco exposure. Diabetes  He presents for his follow-up diabetic visit. He has type 2 diabetes mellitus. There are no hypoglycemic associated symptoms. Pertinent negatives for hypoglycemia include no dizziness, headaches, nervousness/anxiousness or seizures. Associated symptoms include blurred vision and fatigue. Pertinent negatives for diabetes include no chest pain, no foot ulcerations, no polydipsia and no polyuria. Symptoms are stable. Risk factors for coronary artery disease include obesity, male sex, diabetes mellitus and tobacco exposure. Current diabetic treatment includes oral agent (monotherapy). There is no change in his home blood glucose trend. .    Review of Systems   Constitutional:  Positive for fatigue. Negative for appetite change, fever and unexpected weight change. HENT:  Negative for hearing loss and trouble swallowing. Eyes:  Positive for blurred vision. Negative for pain, discharge, redness, itching and visual disturbance. Respiratory:  Positive for shortness of breath. Negative for chest tightness and wheezing. Cardiovascular:  Negative for chest pain and palpitations. Gastrointestinal:  Negative for abdominal pain, blood in stool, constipation, diarrhea and vomiting. Endocrine: Negative for polydipsia and polyuria. Genitourinary:  Negative for decreased urine volume, difficulty urinating, dysuria, frequency, hematuria and urgency. Musculoskeletal:  Negative for myalgias and neck pain. Skin:  Negative for rash and wound. Neurological:  Negative for dizziness, seizures, numbness and headaches. Psychiatric/Behavioral:  Negative for suicidal ideas. The patient is not nervous/anxious.       Prior to Visit Medications    Medication Sig Taking?  Authorizing Provider   metFORMIN (GLUCOPHAGE) 1000 MG tablet Take 1 tablet by mouth 2 times daily (with meals) Yes LIDIA Adkins CNP   aspirin (ASPIRIN LOW DOSE) 81 MG EC tablet TAKE 1 TABLET BY MOUTH TWICE A DAY Yes LIDIA Adkins CNP   furosemide (LASIX) 40 MG tablet Take 1 tablet by mouth 2 times daily TAKE ONE TABLET BY MOUTH 2 TIMES  starting 10/11/2022 Yes James Ramon MD   atorvastatin (LIPITOR) 40 MG tablet TAKE 1 TABLET BY MOUTH DAILY Yes LIDIA Adkins CNP   isosorbide mononitrate (IMDUR) 30 MG extended release tablet TAKE 1 TABLET BY MOUTH DAILY Yes LIDIA Adkins CNP   apixaban (ELIQUIS) 5 MG TABS tablet TAKE 1 TABLET BY MOUTH 2 TIMES DAILY Yes LIDIA Adkins CNP   allopurinol (ZYLOPRIM) 300 MG tablet TAKE 1 TABLET BY MOUTH DAILY Yes LIDIA Adkins CNP   FEROSUL 325 (65 Fe) MG tablet TAKE 1 TABLET BY MOUTH DAILY (WITH BREAKFAST) Yes LIDIA Adkins CNP   ONETOUCH VERIO strip USE TO CHECK BLOOD SUGAR ONCE A DAY AND AS NEEDED FOR SYMPTOMS OF IRREGULAR BLOOD SUGAR Yes LIDIA Adkins CNP   Na Sulfate-K Sulfate-Mg Sulf (SUPREP) 17.5-3.13-1.6 GM/177ML SOLN solution Take as directed for bowel prep/colonoscopy Yes Shahzad Molina MD   levothyroxine (SYNTHROID) 75 MCG tablet Take 1 tablet by mouth Daily Yes LIDIA Adkins CNP   ENTRESTO 24-26 MG per tablet  Yes Historical Provider, MD   metoprolol succinate (TOPROL XL) 50 MG extended release tablet Take 50 mg by mouth daily Yes Historical Provider, MD   Handicap Placard MISC by Does not apply route Exp: 1/2025 Yes LIDIA Adkins CNP   Lancets MISC Daily Yes LIDIA Adkins CNP   Alcohol Swabs (ALCOHOL PREP) 70 % PADS USE AS DIRECTED Yes Ronit Lopez MD        Social History     Tobacco Use    Smoking status: Former     Packs/day: 1.00     Years: 3.00     Pack years: 3.00     Types: Cigarettes     Start date: 1/1/1971 Quit date: 1974     Years since quittin.9    Smokeless tobacco: Never   Substance Use Topics    Alcohol use: Not Currently     Alcohol/week: 0.0 standard drinks     Comment: 15 years ago        Vitals:    22 1252   BP: (!) 95/58   Site: Right Upper Arm   Position: Sitting   Pulse: 54   Temp: 97.6 °F (36.4 °C)   TempSrc: Infrared   SpO2: 99%   Weight: 226 lb (102.5 kg)     Estimated body mass index is 38.79 kg/m² as calculated from the following:    Height as of 22: 5' 4\" (1.626 m). Weight as of this encounter: 226 lb (102.5 kg). DIAGNOSTIC FINDINGS:  CBC:  Lab Results   Component Value Date/Time    WBC 5.8 2022 11:17 AM    HGB 12.9 2022 11:17 AM     2022 11:17 AM     2011 09:43 AM       BMP:    Lab Results   Component Value Date/Time     2022 10:13 AM    K 4.7 2022 10:13 AM     2022 10:13 AM    CO2 31 2022 10:13 AM    BUN 27 2022 10:13 AM    CREATININE 1.09 2022 10:13 AM    GLUCOSE 107 2022 10:13 AM    GLUCOSE 123 2012 10:46 AM       HEMOGLOBIN A1C:   Lab Results   Component Value Date/Time    LABA1C 6.0 2022 12:59 PM       FASTING LIPID PANEL:  Lab Results   Component Value Date    CHOL 124 2021    HDL 44 2022    TRIG 129 2021       Physical Exam  Vitals and nursing note reviewed. Constitutional:       General: He is not in acute distress. Appearance: He is well-developed. He is obese. HENT:      Head: Normocephalic. Eyes:      General: No scleral icterus. Right eye: No discharge. Left eye: No discharge. Pupils: Pupils are equal, round, and reactive to light. Cardiovascular:      Rate and Rhythm: Normal rate and regular rhythm. Pulmonary:      Effort: Pulmonary effort is normal.      Breath sounds: Normal breath sounds. Abdominal:      General: Abdomen is protuberant. Palpations: Abdomen is soft.    Musculoskeletal: Cervical back: Normal range of motion and neck supple. Skin:     General: Skin is warm and dry. Neurological:      Mental Status: He is alert and oriented to person, place, and time. Coordination: Coordination normal.   Psychiatric:         Behavior: Behavior normal. Behavior is cooperative. Thought Content: Thought content normal.         Judgment: Judgment normal.       ASSESSMENT     Diagnosis Orders   1. Chronic combined systolic and diastolic heart failure ([address]        2. Type 2 diabetes mellitus without complication, without long-term current use of insulin ([facility])  Hemoglobin A1C      3. Hypothyroidism due to medication        4. Controlled type 2 diabetes mellitus with complication, without long-term current use of insulin (HCC)  Hemoglobin A1C    metFORMIN (GLUCOPHAGE) 1000 MG tablet      5. Idiopathic chronic gout without tophus, unspecified site        6. Shortness of breath  Full PFT Study With Bronchodilator      7. Coronary artery disease involving native coronary artery of native heart without angina pectoris  aspirin (ASPIRIN LOW DOSE) 81 MG EC tablet             PLAN:  Orders Placed This Encounter   Medications    metFORMIN (GLUCOPHAGE) 1000 MG tablet     Sig: Take 1 tablet by mouth 2 times daily (with meals)     Dispense:  60 tablet     Refill:  2     RXRD. Refill Due on 07/13/22. Queued by Blossom Castrejon .    aspirin (ASPIRIN LOW DOSE) 81 MG EC tablet     Sig: TAKE 1 TABLET BY MOUTH TWICE A DAY     Dispense:  60 tablet     Refill:  3     RXRD. Refill Due on 07/13/22. Queued by Blossom Castrejon . Blood pressure mildly low, but patient's baseline. Asymptomatic. Given complaints of shortness of breath with comorbid CHF, will check PFTs evaluate for any potential COPD. FOLLOW UP AND INSTRUCTIONS:  Return in about 4 months (around 3/14/2023) for CHF, Diabetes.     Yanira Ramirez received counseling on the following healthy behaviors:medication adherence    Discussed use, benefit, and side effects of prescribed medications. Barriers to  medication compliance addressed. All patient questions answered. Pt  verbalized understanding of all instructions given. Patient given educational materials - see patient instructions      Patient advised to contact scheduling offices for any referrals or imaging orders  placed today if they have not been contacted in 48 hours. Return in about 4 months (around 3/14/2023) for CHF, Diabetes. An electronic signature was used to authenticate this note. --LIDIA Dangelo CNP on 11/14/2022 at 1:36 PM  Visit Information    Have you changed or started any medications since your last visit including any over-the-counter medicines, vitamins, or herbal medicines? no   Are you having any side effects from any of your medications? -  no  Have you stopped taking any of your medications? Is so, why? -  no    Have you seen any other physician or provider since your last visit? Yes - Records Obtained  Have you had any other diagnostic tests since your last visit? Yes - Records Obtained  Have you been seen in the emergency room and/or had an admission to a hospital since we last saw you? No  Have you had your routine dental cleaning in the past 6 months? no    Have you activated your [employer] account? If not, what are your barriers?  Yes     Patient Care Team:  LIDIA Dangelo CNP as PCP - General (Family Medicine)  LIDIA Dangelo CNP as PCP - [facility]  Dylon Chaudhary MD as Consulting Physician (Hematology and Oncology)  Bre Anguiano MD as Consulting Physician (Cardiology)  Chapis Mckeon DO as Consulting Physician (Pulmonology)  Jacquelyn Hilliard MD as Consulting Physician (Ophthalmology)  Kristin Aguero MD as Consulting Physician (Internal Medicine)  Buckley Duane, MD as Consulting Physician (Gastroenterology)    Medical History Review  Past Medical, Family, and Social History reviewed and does not contribute to the patient presenting condition    Health Maintenance   Topic Date Due    Diabetic retinal exam  11/15/2020    Annual Wellness Visit (AWV)  Never done    Diabetic foot exam  01/06/2022    A1C test (Diabetic or Prediabetic)  06/28/2023    Depression Screen  06/28/2023    Diabetic microalbuminuria test  07/06/2023    Lipids  07/06/2023    Colorectal Cancer Screen  05/16/2025    DTaP/Tdap/Td vaccine (2 - Td or Tdap) 06/30/2026    Flu vaccine  Completed    Shingles vaccine  Completed    Pneumococcal 65+ years Vaccine  Completed    COVID-19 Vaccine  Completed    AAA screen  Completed    Hepatitis C screen  Completed    Hepatitis A vaccine  Aged Out    Hib vaccine  Aged Out    Meningococcal (ACWY) vaccine  Aged Out

## 2022-11-17 DIAGNOSIS — E03.2 HYPOTHYROIDISM DUE TO MEDICATION: ICD-10-CM

## 2022-11-17 DIAGNOSIS — E11.9 TYPE 2 DIABETES MELLITUS WITHOUT COMPLICATION, WITHOUT LONG-TERM CURRENT USE OF INSULIN (HCC): ICD-10-CM

## 2022-11-17 RX ORDER — LANCETS 30 GAUGE
EACH MISCELLANEOUS
Qty: 100 EACH | Refills: 3 | Status: SHIPPED | OUTPATIENT
Start: 2022-11-17

## 2022-11-17 RX ORDER — LEVOTHYROXINE SODIUM 0.07 MG/1
75 TABLET ORAL DAILY
Qty: 90 TABLET | Refills: 1 | Status: SHIPPED | OUTPATIENT
Start: 2022-11-17 | End: 2023-02-15

## 2022-11-17 NOTE — TELEPHONE ENCOUNTER
Health Maintenance   Topic Date Due    Diabetic retinal exam  11/15/2020    Annual Wellness Visit (AWV)  Never done    Diabetic foot exam  01/06/2022    A1C test (Diabetic or Prediabetic)  06/28/2023    Depression Screen  06/28/2023    Diabetic microalbuminuria test  07/06/2023    Lipids  07/06/2023    Colorectal Cancer Screen  05/16/2025    DTaP/Tdap/Td vaccine (2 - Td or Tdap) 06/30/2026    Flu vaccine  Completed    Shingles vaccine  Completed    Pneumococcal 65+ years Vaccine  Completed    COVID-19 Vaccine  Completed    AAA screen  Completed    Hepatitis C screen  Completed    Hepatitis A vaccine  Aged Out    Hib vaccine  Aged Out    Meningococcal (ACWY) vaccine  Aged Out             (applicable per patient's age: Cancer Screenings, Depression Screening, Fall Risk Screening, Immunizations)    Hemoglobin A1C (%)   Date Value   06/28/2022 6.0   04/26/2021 6.3   02/24/2020 6.7 (H)     Microalb/Crt.  Ratio (mcg/mg creat)   Date Value   07/06/2022 Can not be calculated     LDL Cholesterol (mg/dL)   Date Value   07/06/2022 54     AST (U/L)   Date Value   02/07/2022 16     ALT (U/L)   Date Value   02/07/2022 11     BUN (mg/dL)   Date Value   07/28/2022 27 (H)      (goal A1C is < 7)   (goal LDL is <100) need 30-50% reduction from baseline     BP Readings from Last 3 Encounters:   11/14/22 (!) 95/58   11/08/22 108/68   10/11/22 100/70    (goal /80)      All Future Testing planned in [facility]:  Lab Frequency Next Occurrence   CBC with Auto Differential Once 12/12/2022   Magnesium Once 30/11/1856   Basic Metabolic Panel Once 55/33/9361   Creatinine, Random Urine Once 12/12/2022   Protein, urine, random Once 12/12/2022   CBC with Auto Differential Once 03/09/2023   Comprehensive Metabolic Panel Once 64/96/9423   [place]/Lambda Quantitative Free Light Chains, Serum Once 03/09/2023   Electrophoresis Protein, Serum Once 03/09/2023   Immunoglobulins Panel Once 03/09/2023   Full PFT Study With Bronchodilator Once 11/14/2022 Hemoglobin A1C Once 02/14/2023   Immunoglobulin Panel (IgG, IgA, IgM)     [place]/Lambda Free Lt Chains, Serum Quant     Electrophoresis Protein, Serum without Reflex to Immunofixation     CBC Auto Differential     Comprehensive Metabolic Panel     CBC Every 4 months    Basic Metabolic Panel Every 4 months    Albumin Every 4 months    Microalbumin / Creatinine Urine Ratio Every 4 months        Next Visit Date:  Future Appointments   Date Time Provider Keerthi [place]   11/23/2022  9:30 AM STV PFT RM 1 [facility]Z PFT [facility]   12/6/2022  1:00 PM STV CHF CLINIC RM 1 STVZ CHF CLI [facility]   2/14/2023  1:10 PM Nicho Cobb MD AFL Neph [facility] None   3/6/2023  1:00 PM Pedro Gomez MD SV Cancer [facility]   3/14/2023 12:45 PM ILDIA Russo - CNP ST [facility] WALK IN [facility]   4/10/2023 12:45 PM Larissa Mitchell DPM ACC Podiatry [facility]   8/28/2023  1:15 PM Judit Israel, DO Resp Spec [facility]            Patient Active Problem List:     Acute on chronic combined systolic and diastolic congestive heart failure (HCC)     Chronic kidney disease, stage II (mild)     Chronic gout     Morbid obesity (HCC)     Normocytic normochromic anemia     Hyperlipidemia     Iron deficiency anemia     Anxiety disorder      DJD (degenerative joint disease)     TANNER variably compliant with BiPAP     CAD (coronary artery disease) s/p stenting of L anterior descending artery 2004     Diabetic retinopathy ([facility] [address] [address]     Ischemic cardiomyopathy     HTN (hypertension)     NSTEMI (non-ST elevated myocardial infarction) (HCC)     MGUS (monoclonal gammopathy of unknown significance)     Type 2 diabetes mellitus with chronic kidney disease (HCC)     AICD (automatic cardioverter/defibrillator) present     PAF (paroxysmal atrial fibrillation) (HCC)     Coronary angioplasty status     Hypokalemia     Chronic systolic (congestive) heart failure (HCC)     Hypothyroidism due to medication     Acute cardiac pulmonary edema (HCC)     Adenomatous polyp of ascending colon

## 2022-11-17 NOTE — TELEPHONE ENCOUNTER
Health Maintenance   Topic Date Due    Diabetic retinal exam  11/15/2020    Annual Wellness Visit (AWV)  Never done    Diabetic foot exam  01/06/2022    A1C test (Diabetic or Prediabetic)  06/28/2023    Depression Screen  06/28/2023    Diabetic microalbuminuria test  07/06/2023    Lipids  07/06/2023    Colorectal Cancer Screen  05/16/2025    DTaP/Tdap/Td vaccine (2 - Td or Tdap) 06/30/2026    Flu vaccine  Completed    Shingles vaccine  Completed    Pneumococcal 65+ years Vaccine  Completed    COVID-19 Vaccine  Completed    AAA screen  Completed    Hepatitis C screen  Completed    Hepatitis A vaccine  Aged Out    Hib vaccine  Aged Out    Meningococcal (ACWY) vaccine  Aged Out             (applicable per patient's age: Cancer Screenings, Depression Screening, Fall Risk Screening, Immunizations)    Hemoglobin A1C (%)   Date Value   06/28/2022 6.0   04/26/2021 6.3   02/24/2020 6.7 (H)     Microalb/Crt.  Ratio (mcg/mg creat)   Date Value   07/06/2022 Can not be calculated     LDL Cholesterol (mg/dL)   Date Value   07/06/2022 54     AST (U/L)   Date Value   02/07/2022 16     ALT (U/L)   Date Value   02/07/2022 11     BUN (mg/dL)   Date Value   07/28/2022 27 (H)      (goal A1C is < 7)   (goal LDL is <100) need 30-50% reduction from baseline     BP Readings from Last 3 Encounters:   11/14/22 (!) 95/58   11/08/22 108/68   10/11/22 100/70    (goal /80)      All Future Testing planned in [facility]:  Lab Frequency Next Occurrence   CBC with Auto Differential Once 12/12/2022   Magnesium Once 44/17/7113   Basic Metabolic Panel Once 94/07/5693   Creatinine, Random Urine Once 12/12/2022   Protein, urine, random Once 12/12/2022   CBC with Auto Differential Once 03/09/2023   Comprehensive Metabolic Panel Once 72/16/5251   [place]/Lambda Quantitative Free Light Chains, Serum Once 03/09/2023   Electrophoresis Protein, Serum Once 03/09/2023   Immunoglobulins Panel Once 03/09/2023   Full PFT Study With Bronchodilator Once 11/14/2022 Hemoglobin A1C Once 02/14/2023   Immunoglobulin Panel (IgG, IgA, IgM)     [place]/Lambda Free Lt Chains, Serum Quant     Electrophoresis Protein, Serum without Reflex to Immunofixation     CBC Auto Differential     Comprehensive Metabolic Panel     CBC Every 4 months    Basic Metabolic Panel Every 4 months    Albumin Every 4 months    Microalbumin / Creatinine Urine Ratio Every 4 months        Next Visit Date:  Future Appointments   Date Time Provider Keerthi [place]   11/23/2022  9:30 AM STV PFT RM 1 STVZ PFT [facility]   12/6/2022  1:00 PM STV CHF CLINIC RM 1 STVZ CHF CLI [facility]   2/14/2023  1:10 PM Callie Lux MD AFL Neph [place] None   3/6/2023  1:00 PM rFanky Gurrola MD SV Cancer Ct [facility]   3/14/2023 12:45 PM LIDIA Guevara - CNP ST V WALK IN [address]   4/10/2023 12:45 PM Carl Fiore DPM ACC Podiatry [facility]   8/28/2023  1:15 PM Ana Luisa Angeles DO Resp Spec [facility]            Patient Active Problem List:     Acute on chronic combined systolic and diastolic congestive heart failure (HCC)     Chronic kidney disease, stage II (mild)     Chronic gout     Morbid obesity (HCC)     Normocytic normochromic anemia     Hyperlipidemia     Iron deficiency anemia     Anxiety disorder      DJD (degenerative joint disease)     TANNER variably compliant with BiPAP     CAD (coronary artery disease) s/p stenting of L anterior descending artery 2004     Diabetic retinopathy ([facility] [address]     Ischemic cardiomyopathy     HTN (hypertension)     NSTEMI (non-ST elevated myocardial infarction) (HCC)     MGUS (monoclonal gammopathy of unknown significance)     Type 2 diabetes mellitus with chronic kidney disease (HCC)     AICD (automatic cardioverter/defibrillator) present     PAF (paroxysmal atrial fibrillation) (HCC)     Coronary angioplasty status     Hypokalemia     Chronic systolic (congestive) heart failure (HCC)     Hypothyroidism due to medication     Acute cardiac pulmonary edema (HCC)     Adenomatous polyp of ascending colon

## 2022-11-23 ENCOUNTER — HOSPITAL ENCOUNTER (OUTPATIENT)
Dept: PULMONOLOGY | Age: 67
Discharge: HOME | End: 2022-11-23
Payer: COMMERCIAL

## 2022-11-23 DIAGNOSIS — R06.02 SHORTNESS OF BREATH: ICD-10-CM

## 2022-11-23 PROCEDURE — 94729 DIFFUSING CAPACITY: CPT

## 2022-11-23 PROCEDURE — 94726 PLETHYSMOGRAPHY LUNG VOLUMES: CPT

## 2022-11-23 PROCEDURE — 94664 DEMO&/EVAL PT USE INHALER: CPT

## 2022-11-23 PROCEDURE — 94060 EVALUATION OF WHEEZING: CPT

## 2022-11-23 PROCEDURE — 94640 AIRWAY INHALATION TREATMENT: CPT

## 2022-11-25 NOTE — PROCEDURES
[facility]                               PULMONARY FUNCTION    PATIENT NAME: Lori Rodarte                 :        1955  MED REC NO:   [id number]                             ROOM:  ACCOUNT NO:   [de-identified]                           ADMIT DATE: 2022  PROVIDER:     Celine Thao    DATE OF PROCEDURE:  2022    Spirometry does not show any obstructive ventilatory defect and has an  FEV1 of 2.32 liters and FVC of 2.69 liters. The post-bronchodilator study does not show significant change and has  an FEV1 of 2.39 liters and FVC of 2.74 liters. Lung volumes show total lung capacity that is decreased at 71%  predicted. Diffusion capacity is normal at 164% predicted. Airway resistance is decreased at 72% predicted. Specific conductance is increased at 180% predicted. Flow-volume loop shows a restrictive ventilatory pattern. IMPRESSION:  The patient has extrapulmonic restrictive ventilatory  pattern secondary to body habitus. Clinical correlation is recommended  and the patient has an FEV1 of 2.39 liters and FVC of 2.74 liters.         Gabe Menendez    D: 2022 18:35:43       T: 2022 0:34:19     [id number]  Job#: [id number]     Doc#: [id number]    CC:

## 2022-12-06 ENCOUNTER — HOSPITAL ENCOUNTER (OUTPATIENT)
Dept: OTHER | Age: 67
Discharge: HOME | End: 2022-12-06
Payer: COMMERCIAL

## 2022-12-06 VITALS
DIASTOLIC BLOOD PRESSURE: 60 MMHG | BODY MASS INDEX: 38.62 KG/M2 | RESPIRATION RATE: 20 BRPM | OXYGEN SATURATION: 100 % | SYSTOLIC BLOOD PRESSURE: 104 MMHG | HEART RATE: 68 BPM | WEIGHT: 225 LBS

## 2022-12-06 PROCEDURE — 99212 OFFICE O/P EST SF 10 MIN: CPT

## 2022-12-06 NOTE — PROGRESS NOTES
Date:  2022  Time:  1:19 PM    CHF Clinic at [address] [place] [address]    Office: [phone] Fax: [phone]    Re:  Cristina Montero   Patient : 1955    Vital Signs: /60   Pulse 68   Resp 20   Wt 225 lb (102.1 kg)   SpO2 100%   BMI 38.62 kg/m²                       O2 Device: None (Room air)                           No results for input(s): CBC, HGB, HCT, WBC, PLATELET, NA, K, CL, CO2, BUN, CREATININE, GLUCOSE, BNP, INR in the last 72 hours. Respiratory:    Assessment  Charting Type: Reassessment    Breath Sounds  Right Upper Lobe: Clear  Right Middle Lobe: Clear  Right Lower Lobe: Clear  Left Upper Lobe: Clear  Left Lower Lobe: Clear    Cough/Sputum  Cough: Productive  Frequency: Infrequent  Sputum Amount: Small       Peripheral Vascular  RLE Edema: Trace  LLE Edema: Trace    Complaints: Nothing new      Comment : Patient here ambulatory for routine visit. Weight decreased 6 lbs in one month. No new or acute s/s CHF. Discussed low salt diet and fluid limits. States compliance with meds. Lasix is 40 mg 2x day. Next visit here 6 weeks 2023. He is Seeing Dr Nguyễn Leavenworth also in January, he states.      Electronically signed by Kvng Good RN on 2022 at 1:19 PM

## 2022-12-13 DIAGNOSIS — I50.42 CHRONIC COMBINED SYSTOLIC AND DIASTOLIC HEART FAILURE (HCC): ICD-10-CM

## 2022-12-13 DIAGNOSIS — I48.0 PAF (PAROXYSMAL ATRIAL FIBRILLATION) (HCC): ICD-10-CM

## 2022-12-13 RX ORDER — ISOSORBIDE MONONITRATE 30 MG/1
30 TABLET, EXTENDED RELEASE ORAL DAILY
Qty: 30 TABLET | Refills: 5 | Status: SHIPPED | OUTPATIENT
Start: 2022-12-13 | End: 2023-12-13

## 2022-12-13 NOTE — TELEPHONE ENCOUNTER
Last visit: 11/14/22   Last Med refill: 8/22/22  Does patient have enough medication for 72 hours: Yes    Next Visit Date:  Future Appointments   Date Time Provider Keerthi Samanta   1/17/2023  1:00 PM [facility] RM 1 [facility] CHF CLI [facility]   2/14/2023  1:10 PM Elvi Ramsay MD [facility] None   3/6/2023  1:00  [facility]   3/14/2023 12:45 PM Grzegorz Woo, APRN - CNP [facility]   4/10/2023 12:45 PM Debora Webb DPM [facility] Podiatry [facility]   8/28/2023  1:15 PM Jose G Pinto DO Resp Spec [facility]       Health Maintenance   Topic Date Due    Diabetic retinal exam  11/15/2020    Annual Wellness Visit (AWV)  Never done    Diabetic foot exam  01/06/2022    A1C test (Diabetic or Prediabetic)  06/28/2023    Depression Screen  06/28/2023    Diabetic microalbuminuria test  07/06/2023    Lipids  07/06/2023    Colorectal Cancer Screen  05/16/2025    DTaP/Tdap/Td vaccine (2 - Td or Tdap) 06/30/2026    Flu vaccine  Completed    Shingles vaccine  Completed    Pneumococcal 65+ years Vaccine  Completed    COVID-19 Vaccine  Completed    AAA screen  Completed    Hepatitis C screen  Completed    Hepatitis A vaccine  Aged Out    Hib vaccine  Aged Out    Meningococcal (ACWY) vaccine  Aged Out       Hemoglobin A1C (%)   Date Value   06/28/2022 6.0   04/26/2021 6.3   02/24/2020 6.7 (H)             ( goal A1C is < 7)   Microalb/Crt.  Ratio (mcg/mg creat)   Date Value   07/06/2022 Can not be calculated     LDL Cholesterol (mg/dL)   Date Value   07/06/2022 54   06/23/2021 55       (goal LDL is <100)   AST (U/L)   Date Value   02/07/2022 16     ALT (U/L)   Date Value   02/07/2022 11     BUN (mg/dL)   Date Value   07/28/2022 27 (H)     BP Readings from Last 3 Encounters:   12/06/22 104/60   11/14/22 (!) 95/58   11/08/22 108/68          (goal 120/80)    All Future Testing planned in [facility]  Lab Frequency Next Occurrence   CBC with Auto Differential Once 12/12/2022   Magnesium Once 56/97/3666   Basic Metabolic Panel Once 62/66/6878   Creatinine, Random Urine Once 12/12/2022   Protein, urine, random Once 12/12/2022   CBC with Auto Differential Once 03/09/2023   Comprehensive Metabolic Panel Once 42/73/4700   [place]/Lambda Quantitative Free Light Chains, Serum Once 03/09/2023   Electrophoresis Protein, Serum Once 03/09/2023   Immunoglobulins Panel Once 03/09/2023   Hemoglobin A1C Once 02/14/2023   Immunoglobulin Panel (IgG, IgA, IgM)     [place]/Lambda Free Lt Chains, Serum Quant     Electrophoresis Protein, Serum without Reflex to Immunofixation     CBC Auto Differential     Comprehensive Metabolic Panel     CBC Every 4 months    Basic Metabolic Panel Every 4 months    Albumin Every 4 months    Microalbumin / Creatinine Urine Ratio Every 4 months                Patient Active Problem List:     Acute on chronic combined systolic and diastolic congestive heart failure (HCC)     Chronic kidney disease, stage II (mild)     Chronic gout     Morbid obesity ([facility])     Normocytic normochromic anemia     Hyperlipidemia     Iron deficiency anemia     Anxiety disorder      DJD (degenerative joint disease)     TANNER variably compliant with BiPAP     CAD (coronary artery disease) s/p stenting of L anterior descending artery 2004     Diabetic retinopathy ([facility])     Ischemic cardiomyopathy     HTN (hypertension)     NSTEMI (non-ST elevated myocardial infarction) ([facility])     MGUS (monoclonal gammopathy of unknown significance)     Type 2 diabetes mellitus with chronic kidney disease ([facility])     AICD (automatic cardioverter/defibrillator) present     PAF (paroxysmal atrial fibrillation) ([facility])     Coronary angioplasty status     Hypokalemia     Chronic systolic (congestive) heart failure ([facility])     Hypothyroidism due to medication     Acute cardiac pulmonary edema ([facility])     Adenomatous polyp of ascending colon

## 2023-01-17 ENCOUNTER — HOSPITAL ENCOUNTER (OUTPATIENT)
Dept: OTHER | Age: 68
Discharge: HOME | End: 2023-01-17
Payer: COMMERCIAL

## 2023-01-17 VITALS
SYSTOLIC BLOOD PRESSURE: 100 MMHG | RESPIRATION RATE: 20 BRPM | WEIGHT: 223.6 LBS | DIASTOLIC BLOOD PRESSURE: 70 MMHG | OXYGEN SATURATION: 96 % | BODY MASS INDEX: 38.38 KG/M2 | HEART RATE: 74 BPM

## 2023-01-17 DIAGNOSIS — Z99.89 OSA ON CPAP: ICD-10-CM

## 2023-01-17 DIAGNOSIS — I50.22 CHRONIC SYSTOLIC (CONGESTIVE) HEART FAILURE (HCC): Primary | ICD-10-CM

## 2023-01-17 DIAGNOSIS — G47.33 OSA ON CPAP: ICD-10-CM

## 2023-01-17 PROCEDURE — 99212 OFFICE O/P EST SF 10 MIN: CPT

## 2023-01-17 PROCEDURE — 99214 OFFICE O/P EST MOD 30 MIN: CPT | Performed by: NURSE PRACTITIONER

## 2023-01-17 ASSESSMENT — ENCOUNTER SYMPTOMS
EYE DISCHARGE: 0
COUGH: 1
SHORTNESS OF BREATH: 1
ABDOMINAL PAIN: 0
BLOOD IN STOOL: 0

## 2023-01-17 NOTE — PROGRESS NOTES
CHF Clinic at [address] [place] [address]    Office: [phone] Fax: [phone] X [facility]F CLINIC  [phone]  Dept: [phone]  Loc: [phone]    Leonie Adrian is a 69 y.o. male who presents today for CHF evaluation. HPI:     + mild shortness of breath, had PFT recently , restrictive issues, not obstructive   + mild  fatigue, low energy, at baseline   Denies Edema   + chest pain ,  No palps     Pt inclines bed routinely , using 1 pillow. Had home health rn coming into home once   Sang, variable compliant.        Past Medical History:   Diagnosis Date    Acute HFrEF (heart failure with reduced ejection fraction) ([address] 6/22/2021    Acute on chronic combined systolic and diastolic congestive heart failure ([address] 9/25/2011    Acute on chronic congestive heart failure (HCC)     Acute respiratory failure with hypoxia (HCC)     Anemia     Anxiety     when he lies flat, no RX    CAD (coronary artery disease)     MI stents x5, follows with cardiology Dr. Nura Salcedo    Cardiac defibrillator in place     Cardiomyopathy [facility])     CHF (congestive heart failure) ([address]     Chronic combined systolic and diastolic heart failure ([address] s/[ AICD placed 9/25/2011    Chronic kidney disease     Complex sleep apnea syndrome     Diabetic retinopathy ([address] 9/25/2011    Diverticulitis     DJD (degenerative joint disease) 9/25/2011    Edentulous     Gout     HTN (hypertension) 3/30/2012    Hyperlipidemia     Hypertension     Iron deficiency anemia 9/25/2011    Ischemic cardiomyopathy     Morbid obesity ([address] 9/25/2011    Obesity     Morbid obesity due to excess calories    SANG variably compliant with BiPAP 9/25/2011    Osteoarthritis     PAF (paroxysmal atrial fibrillation) (HCC)     Psychophysiologic insomnia     Thyroid disease     Type II or unspecified type diabetes mellitus without mention of complication, not stated as uncontrolled     Unspecified sleep apnea     uses V-pap     Past Surgical History:   Procedure Laterality Date    BONE MARROW BIOPSY  2012 approx    CARDIAC CATHETERIZATION  2007    severe stenosis pre-stent area    CARDIAC CATHETERIZATION  2013    Very peripheral stenosis, not amendable to PCI, stents patent    CARDIAC DEFIBRILLATOR PLACEMENT  2015    [facility]    COLONOSCOPY  2007    COLONOSCOPY  2021    COLONOSCOPY N/A 2021    COLONOSCOPY DIAGNOSTIC performed by Joellen White MD at [facility]    COLONOSCOPY N/A 2022    COLONOSCOPY WITH BIOPSY performed by Joellen White MD at [address] [place] [address], [facility]    stent LAD    SD COLSC FLX W/RMVL OF TUMOR POLYP LESION SNARE TQ N/A 2017    COLONOSCOPY POLYPECTOMY SNARE/COLD BIOPSY performed by Karlos Aguilar DO at [facility]       Family History   Problem Relation Age of Onset    Cancer Mother     Heart Disease Mother         due to smoking    Heart Disease Maternal Uncle     Heart Disease Maternal Grandmother        Social History     Tobacco Use    Smoking status: Former     Packs/day: 1.00     Years: 3.00     Pack years: 3.00     Types: Cigarettes     Start date: 1971     Quit date: 1974     Years since quittin.0    Smokeless tobacco: Never   Substance Use Topics    Alcohol use: Not Currently     Alcohol/week: 0.0 standard drinks     Comment: 15 years ago      Current Outpatient Medications   Medication Sig Dispense Refill    isosorbide mononitrate (IMDUR) 30 MG extended release tablet Take 1 tablet by mouth daily 30 tablet 5    apixaban (ELIQUIS) 5 MG TABS tablet TAKE 1 TABLET BY MOUTH 2 TIMES DAILY 60 tablet 3    Lancets (ONETOUCH DELICA PLUS [id number]) MISC USE AS DIRECTED TO CHECK BLOOD SUGAR DAILY 100 each 3    levothyroxine (SYNTHROID) 75 MCG tablet TAKE 1 TABLET BY MOUTH DAILY 90 tablet 1    metFORMIN (GLUCOPHAGE) 1000 MG tablet Take 1 tablet by mouth 2 times daily (with meals) 60 tablet 2    aspirin (ASPIRIN LOW DOSE) 81 MG EC tablet TAKE 1 TABLET BY MOUTH TWICE A DAY 60 tablet 3    furosemide (LASIX) 40 MG tablet Take 1 tablet by mouth 2 times daily TAKE ONE TABLET BY MOUTH 2 TIMES  starting 10/11/2022 180 tablet 3    atorvastatin (LIPITOR) 40 MG tablet TAKE 1 TABLET BY MOUTH DAILY 30 tablet 5    allopurinol (ZYLOPRIM) 300 MG tablet TAKE 1 TABLET BY MOUTH DAILY 30 tablet 5    FEROSUL 325 (65 Fe) MG tablet TAKE 1 TABLET BY MOUTH DAILY (WITH BREAKFAST) 30 tablet 5    ONETOUCH VERIO strip USE TO CHECK BLOOD SUGAR ONCE A DAY AND AS NEEDED FOR SYMPTOMS OF IRREGULAR BLOOD SUGAR 100 strip 2    ENTRESTO 24-26 MG per tablet       metoprolol succinate (TOPROL XL) 50 MG extended release tablet Take 50 mg by mouth daily      Handicap Placard MISC by Does not apply route Exp: 1/2025 1 each 0    Alcohol Swabs (ALCOHOL PREP) 70 % PADS USE AS DIRECTED 100 each 11     No current facility-administered medications for this encounter. Allergies   Allergen Reactions    Zetia [Ezetimibe] Shortness Of Breath    Bactrim Other (See Comments)     palpitations    Cephalexin     Cephalexin     Sulfamethoxazole-Trimethoprim          Subjective:      Review of Systems   Constitutional:  Positive for fatigue. Negative for activity change, chills and fever. Eyes:  Negative for discharge and visual disturbance. Respiratory:  Positive for cough (rare) and shortness of breath. Cardiovascular:  Negative for chest pain, palpitations and leg swelling. Gastrointestinal:  Negative for abdominal pain and blood in stool. Endocrine: Negative for cold intolerance and heat intolerance. Genitourinary:  Negative for dysuria and flank pain. Musculoskeletal:  Negative for joint swelling and myalgias. Skin:  Negative for pallor and rash. Neurological:  Positive for light-headedness (rare). Negative for dizziness and headaches. Psychiatric/Behavioral:  Negative for hallucinations and suicidal ideas.       Objective: Physical Exam  Vitals and nursing note reviewed. Constitutional:       Appearance: He is obese. Comments:      HENT:      Head: Normocephalic and atraumatic. Eyes:      General: No scleral icterus. Conjunctiva/sclera: Conjunctivae normal.   Cardiovascular:      Rate and Rhythm: Normal rate and regular rhythm. Heart sounds: Normal heart sounds. Pulmonary:      Effort: Pulmonary effort is normal.      Breath sounds: Normal breath sounds. No wheezing or rales. Abdominal:      Palpations: Abdomen is soft. Comments: Obese , + panus , soft   Musculoskeletal:         General: Normal range of motion. Cervical back: Normal range of motion. Right lower leg: No edema. Left lower leg: No edema. Skin:     General: Skin is warm and dry. Neurological:      Mental Status: He is alert and oriented to person, place, and time. /70   Pulse 74   Resp 20   Wt 223 lb 9.6 oz (101.4 kg)   SpO2 96%   BMI 38.38 kg/m²   O2 Device: None (Room air)       Lower Extremity Measurements in cm.    R Calf Circumference (cm): 33 cm  L Calf Circumference (cm): 34 cm  R Ankle Circumference (cm): 21 cm  L Ankle Circumference (cm): 20 cm    CBC:   Lab Results   Component Value Date/Time    WBC 5.8 02/07/2022 11:17 AM    RBC 4.37 02/07/2022 11:17 AM    RBC 4.85 12/08/2011 09:43 AM    HGB 12.9 02/07/2022 11:17 AM    HCT 41.3 02/07/2022 11:17 AM    MCV 94.5 02/07/2022 11:17 AM    MCH 29.5 02/07/2022 11:17 AM    MCHC 31.2 02/07/2022 11:17 AM    RDW 14.6 02/07/2022 11:17 AM     02/07/2022 11:17 AM     12/08/2011 09:43 AM    MPV 11.6 02/07/2022 11:17 AM     CMP:    Lab Results   Component Value Date/Time     07/28/2022 10:13 AM    K 4.7 07/28/2022 10:13 AM     07/28/2022 10:13 AM    CO2 31 07/28/2022 10:13 AM    BUN 27 07/28/2022 10:13 AM    CREATININE 1.09 07/28/2022 10:13 AM    GFRAA >60 07/28/2022 10:13 AM    LABGLOM >60 07/28/2022 10:13 AM    GLUCOSE 107 07/28/2022 10:13 AM    GLUCOSE 123 03/19/2012 10:46 AM    PROT 6.8 02/07/2022 11:17 AM    PROT 7.2 02/07/2022 11:17 AM    LABALBU 4.2 02/07/2022 11:17 AM    LABALBU 4.1 11/11/2011 01:00 PM    CALCIUM 8.9 07/28/2022 10:13 AM    BILITOT 0.35 02/07/2022 11:17 AM    ALKPHOS 77 02/07/2022 11:17 AM    AST 16 02/07/2022 11:17 AM    ALT 11 02/07/2022 11:17 AM     Lab Results   Component Value Date    LABA1C 6.0 06/28/2022       Summary  Left ventricle is enlarged. Global left ventricular systolic function is  severely reduced. Calculated ejection fraction is 13 % by Negron's method. Visually estimated EF 15-20%. [place] akinetic. Definity used to optimize left ventricular systolic function and rule out  thrombus. No thrombus seen via Definity. Normal right ventricular size and function. No significant valvular regurgitation or stenosis seen. No pericardial effusion seen. Signature  ----------------------------------------------------------------------------   Electronically signed by Arielle Armenta(Sonographer) on 03/20/2020    :Assessment      1. Chronic systolic (congestive) heart failure ([facility]    2. TANNER variably compliant with BiPAP          :Plan      1. Chronic systolic (congestive) heart failure ([address]    2. TANNER variably compliant with BiPAP        Wt is  down   1.4 lbs .   leg measurements are up slightly    On Guideline-Directed Medication Therapy:     ACEI / ARB / ARNi: Entresto 24/26 mg b.i.d. Beta - blocker  Diuretic       Pt has labs ordered from Hem/onc . Will use those for review next mth. RTC 1 month. Pt will see Cardiology dr Steven Hall , 1/27/23. Pt reminded of the importance of taking all meds as ordered. Pt reminded to follow a low sodium diet , 2000 mg/ day, and fluid limits of 2 liters daily. No orders of the defined types were placed in this encounter. No orders of the defined types were placed in this encounter. Patientgiven verbal and/or written educational instructions. Follow up as directed. I have reviewed and agree with the nursing documentation. Verbally reviewed medication list with patient; patient verbalized understanding. Discussed 2000mg/day sodium restricted diet; patient verbalized understanding. Moderate daily exercise encouraged as tolerated. Discussed rest breaks as needed; patient verbalized understanding. Patient instructed to weigh self at the same time of each day, using same clothes and same scale; reinforced teaching to monitor for 3-5 lb weight increase over 1-2 days, and to notify the CHF clinic at [phone] or physician office if weight change noted. Patient verbalized understanding. Risks of smoking discussed with the patient if applicable; patient strongly discouraged to smoke. Patient verbalized understanding. Signs and symptoms of CHF discussed with patient, such as feeling more tired than normal, feeling short of breath, coughing that increases when you lie down, sudden weight gain, swelling of your feet, legs or belly. Patient verbalized understanding to notify the CHF clinic at [phone] or physician office if these symptoms occur. Compliance with plan of care and further disease process causes discussed with patient, patient encouraged to keep all follow up appointments. Patient verbalized understanding. Echocardiogram reviewed. Labs reviewed. Medications reviewed.         Electronically signedby LIDIA Carter CNP on 1/17/2023 at 1:25 PM

## 2023-02-09 ENCOUNTER — HOSPITAL ENCOUNTER (OUTPATIENT)
Age: 68
Discharge: HOME | End: 2023-02-09
Payer: COMMERCIAL

## 2023-02-09 DIAGNOSIS — I25.10 CORONARY ARTERY DISEASE INVOLVING NATIVE CORONARY ARTERY OF NATIVE HEART WITHOUT ANGINA PECTORIS: ICD-10-CM

## 2023-02-09 DIAGNOSIS — I25.5 ISCHEMIC CARDIOMYOPATHY: ICD-10-CM

## 2023-02-09 DIAGNOSIS — E11.22 TYPE 2 DIABETES MELLITUS WITH STAGE 3A CHRONIC KIDNEY DISEASE, WITHOUT LONG-TERM CURRENT USE OF INSULIN (HCC): ICD-10-CM

## 2023-02-09 DIAGNOSIS — N18.31 TYPE 2 DIABETES MELLITUS WITH STAGE 3A CHRONIC KIDNEY DISEASE, WITHOUT LONG-TERM CURRENT USE OF INSULIN (HCC): ICD-10-CM

## 2023-02-09 DIAGNOSIS — I10 ESSENTIAL (PRIMARY) HYPERTENSION: ICD-10-CM

## 2023-02-09 DIAGNOSIS — E11.8 CONTROLLED TYPE 2 DIABETES MELLITUS WITH COMPLICATION, WITHOUT LONG-TERM CURRENT USE OF INSULIN (HCC): ICD-10-CM

## 2023-02-09 LAB
ABSOLUTE EOS #: 0.22 K/UL (ref 0–0.44)
ABSOLUTE IMMATURE GRANULOCYTE: <0.03 K/UL (ref 0–0.3)
ABSOLUTE LYMPH #: 1.25 K/UL (ref 1.1–3.7)
ABSOLUTE MONO #: 0.46 K/UL (ref 0.1–1.2)
ANION GAP SERPL CALCULATED.3IONS-SCNC: 16 MMOL/L (ref 9–17)
BASOPHILS # BLD: 1 % (ref 0–2)
BASOPHILS ABSOLUTE: 0.05 K/UL (ref 0–0.2)
BUN SERPL-MCNC: 19 MG/DL (ref 8–23)
CALCIUM SERPL-MCNC: 9.3 MG/DL (ref 8.6–10.4)
CHLORIDE SERPL-SCNC: 100 MMOL/L (ref 98–107)
CO2 SERPL-SCNC: 25 MMOL/L (ref 20–31)
CREAT SERPL-MCNC: 0.96 MG/DL (ref 0.7–1.2)
CREATININE URINE: 116.9 MG/DL (ref 39–259)
EOSINOPHILS RELATIVE PERCENT: 4 % (ref 1–4)
GFR SERPL CREATININE-BSD FRML MDRD: >60 ML/MIN/1.73M2
GLUCOSE SERPL-MCNC: 147 MG/DL (ref 70–99)
HCT VFR BLD AUTO: 41.7 % (ref 40.7–50.3)
HGB BLD-MCNC: 12.9 G/DL (ref 13–17)
IMMATURE GRANULOCYTES: 0 %
LYMPHOCYTES # BLD: 21 % (ref 24–43)
MAGNESIUM SERPL-MCNC: 1.7 MG/DL (ref 1.6–2.6)
MCH RBC QN AUTO: 28.4 PG (ref 25.2–33.5)
MCHC RBC AUTO-ENTMCNC: 30.9 G/DL (ref 28.4–34.8)
MCV RBC AUTO: 91.9 FL (ref 82.6–102.9)
MONOCYTES # BLD: 8 % (ref 3–12)
NRBC AUTOMATED: 0 PER 100 WBC
PDW BLD-RTO: 15 % (ref 11.8–14.4)
PLATELET # BLD AUTO: 200 K/UL (ref 138–453)
PMV BLD AUTO: 11.4 FL (ref 8.1–13.5)
POTASSIUM SERPL-SCNC: 4 MMOL/L (ref 3.7–5.3)
RBC # BLD: 4.54 M/UL (ref 4.21–5.77)
RBC # BLD: ABNORMAL 10*6/UL
SEG NEUTROPHILS: 66 % (ref 36–65)
SEGMENTED NEUTROPHILS ABSOLUTE COUNT: 4.07 K/UL (ref 1.5–8.1)
SODIUM SERPL-SCNC: 141 MMOL/L (ref 135–144)
TOTAL PROTEIN, URINE: 8 MG/DL
WBC # BLD AUTO: 6.1 K/UL (ref 3.5–11.3)

## 2023-02-09 PROCEDURE — 85025 COMPLETE CBC W/AUTO DIFF WBC: CPT

## 2023-02-09 PROCEDURE — 36415 COLL VENOUS BLD VENIPUNCTURE: CPT

## 2023-02-09 PROCEDURE — 82570 ASSAY OF URINE CREATININE: CPT

## 2023-02-09 PROCEDURE — 80048 BASIC METABOLIC PNL TOTAL CA: CPT

## 2023-02-09 PROCEDURE — 84156 ASSAY OF PROTEIN URINE: CPT

## 2023-02-09 PROCEDURE — 83735 ASSAY OF MAGNESIUM: CPT

## 2023-02-09 RX ORDER — ATORVASTATIN CALCIUM 40 MG/1
TABLET, FILM COATED ORAL
Qty: 30 TABLET | Refills: 5 | Status: SHIPPED | OUTPATIENT
Start: 2023-02-09

## 2023-02-15 ENCOUNTER — HOSPITAL ENCOUNTER (OUTPATIENT)
Dept: OTHER | Age: 68
Discharge: HOME | End: 2023-02-15
Payer: COMMERCIAL

## 2023-02-15 VITALS
RESPIRATION RATE: 16 BRPM | DIASTOLIC BLOOD PRESSURE: 60 MMHG | BODY MASS INDEX: 38.42 KG/M2 | WEIGHT: 223.8 LBS | SYSTOLIC BLOOD PRESSURE: 102 MMHG | HEART RATE: 68 BPM | OXYGEN SATURATION: 99 %

## 2023-02-15 PROCEDURE — 99212 OFFICE O/P EST SF 10 MIN: CPT

## 2023-02-15 NOTE — PROGRESS NOTES
Date:  2/15/2023  Time:  1:37 PM    CHF Clinic at [facility]    Office: [phone] Fax: [phone]    Re:  Kieran Hung   Patient : 1955    Vital Signs: /60   Pulse 68   Resp 16   Wt 223 lb 12.8 oz (101.5 kg)   SpO2 99%   BMI 38.42 kg/m²                                                   No results for input(s): CBC, HGB, HCT, WBC, PLATELET, NA, K, CL, CO2, BUN, CREATININE, GLUCOSE, BNP, INR in the last 72 hours. Respiratory:    Assessment  Charting Type: Reassessment    Breath Sounds  Right Upper Lobe: Clear  Right Middle Lobe: Clear  Right Lower Lobe: Clear  Left Upper Lobe: Clear  Left Lower Lobe: Clear    Cough/Sputum  Cough: None         Peripheral Vascular  RLE Edema: None  LLE Edema: None      Complaints: No new complaints    Physician Orders No new orders     Comment : Weight is the same as last months visit. Denies any SOB or chest pain has no pedal edema lungs are clear. Discussed in detail low sodium diet ,64 ounces of fluid per day. We will see in 1 month 3/15/2023.      Electronically signed by Jazmine Vargas RN on 2/15/2023 at 1:37 PM

## 2023-03-02 ENCOUNTER — HOSPITAL ENCOUNTER (OUTPATIENT)
Age: 68
Discharge: HOME | End: 2023-03-02
Payer: COMMERCIAL

## 2023-03-02 DIAGNOSIS — N18.31 TYPE 2 DIABETES MELLITUS WITH STAGE 3A CHRONIC KIDNEY DISEASE, WITHOUT LONG-TERM CURRENT USE OF INSULIN (HCC): ICD-10-CM

## 2023-03-02 DIAGNOSIS — I25.5 ISCHEMIC CARDIOMYOPATHY: ICD-10-CM

## 2023-03-02 DIAGNOSIS — D47.2 MGUS (MONOCLONAL GAMMOPATHY OF UNKNOWN SIGNIFICANCE): Primary | ICD-10-CM

## 2023-03-02 DIAGNOSIS — I10 ESSENTIAL (PRIMARY) HYPERTENSION: ICD-10-CM

## 2023-03-02 DIAGNOSIS — E66.01 SEVERE OBESITY (BMI 35.0-39.9) WITH COMORBIDITY (HCC): ICD-10-CM

## 2023-03-02 DIAGNOSIS — E11.22 TYPE 2 DIABETES MELLITUS WITH STAGE 3A CHRONIC KIDNEY DISEASE, WITHOUT LONG-TERM CURRENT USE OF INSULIN (HCC): ICD-10-CM

## 2023-03-02 DIAGNOSIS — D47.2 MGUS (MONOCLONAL GAMMOPATHY OF UNKNOWN SIGNIFICANCE): ICD-10-CM

## 2023-03-02 DIAGNOSIS — N18.31 STAGE 3A CHRONIC KIDNEY DISEASE (HCC): ICD-10-CM

## 2023-03-02 DIAGNOSIS — I50.42 CHRONIC COMBINED SYSTOLIC AND DIASTOLIC HEART FAILURE (HCC): ICD-10-CM

## 2023-03-02 DIAGNOSIS — E11.8 CONTROLLED TYPE 2 DIABETES MELLITUS WITH COMPLICATION, WITHOUT LONG-TERM CURRENT USE OF INSULIN (HCC): ICD-10-CM

## 2023-03-02 DIAGNOSIS — E11.9 TYPE 2 DIABETES MELLITUS WITHOUT COMPLICATION, WITHOUT LONG-TERM CURRENT USE OF INSULIN (HCC): ICD-10-CM

## 2023-03-02 LAB
ABSOLUTE EOS #: 0.21 K/UL (ref 0–0.44)
ABSOLUTE IMMATURE GRANULOCYTE: 0.02 K/UL (ref 0–0.3)
ABSOLUTE LYMPH #: 0.86 K/UL (ref 1.1–3.7)
ABSOLUTE MONO #: 0.52 K/UL (ref 0.1–1.2)
ALBUMIN SERPL-MCNC: 3.9 G/DL (ref 3.5–5.2)
ALP SERPL-CCNC: 75 U/L (ref 40–129)
ALT SERPL-CCNC: 8 U/L (ref 5–41)
ANION GAP SERPL CALCULATED.3IONS-SCNC: 10 MMOL/L (ref 9–17)
AST SERPL-CCNC: 11 U/L
BASOPHILS # BLD: 0 % (ref 0–2)
BASOPHILS ABSOLUTE: 0.03 K/UL (ref 0–0.2)
BILIRUB SERPL-MCNC: 0.5 MG/DL (ref 0.3–1.2)
BUN SERPL-MCNC: 33 MG/DL (ref 8–23)
BUN/CREAT BLD: 28 (ref 9–20)
CALCIUM SERPL-MCNC: 8.8 MG/DL (ref 8.6–10.4)
CHLORIDE SERPL-SCNC: 102 MMOL/L (ref 98–107)
CO2 SERPL-SCNC: 29 MMOL/L (ref 20–31)
CREAT SERPL-MCNC: 1.19 MG/DL (ref 0.7–1.2)
CREATININE URINE: 84.3 MG/DL (ref 39–259)
EOSINOPHILS RELATIVE PERCENT: 3 % (ref 1–4)
EST. AVERAGE GLUCOSE BLD GHB EST-MCNC: 140 MG/DL
GFR SERPL CREATININE-BSD FRML MDRD: >60 ML/MIN/1.73M2
GLUCOSE SERPL-MCNC: 113 MG/DL (ref 70–99)
HBA1C MFR BLD: 6.5 % (ref 4–6)
HCT VFR BLD AUTO: 38.8 % (ref 40.7–50.3)
HCT VFR BLD AUTO: 38.8 % (ref 40.7–50.3)
HGB BLD-MCNC: 12 G/DL (ref 13–17)
HGB BLD-MCNC: 12 G/DL (ref 13–17)
IGA SERPL-MCNC: 270 MG/DL (ref 70–400)
IGG: 1021 MG/DL (ref 700–1600)
IGM: 244 MG/DL (ref 40–230)
IMMATURE GRANULOCYTES: 0 %
LYMPHOCYTES # BLD: 13 % (ref 24–43)
MCH RBC QN AUTO: 28 PG (ref 25.2–33.5)
MCH RBC QN AUTO: 28 PG (ref 25.2–33.5)
MCHC RBC AUTO-ENTMCNC: 30.9 G/DL (ref 28.4–34.8)
MCHC RBC AUTO-ENTMCNC: 30.9 G/DL (ref 28.4–34.8)
MCV RBC AUTO: 90.4 FL (ref 82.6–102.9)
MCV RBC AUTO: 90.4 FL (ref 82.6–102.9)
MONOCYTES # BLD: 8 % (ref 3–12)
NRBC AUTOMATED: 0 PER 100 WBC
NRBC AUTOMATED: 0 PER 100 WBC
PDW BLD-RTO: 15.5 % (ref 11.8–14.4)
PDW BLD-RTO: 15.5 % (ref 11.8–14.4)
PLATELET # BLD AUTO: 158 K/UL (ref 138–453)
PLATELET # BLD AUTO: 158 K/UL (ref 138–453)
PMV BLD AUTO: 10.9 FL (ref 8.1–13.5)
PMV BLD AUTO: 10.9 FL (ref 8.1–13.5)
POTASSIUM SERPL-SCNC: 3.9 MMOL/L (ref 3.7–5.3)
PROT SERPL-MCNC: 7.2 G/DL (ref 6.4–8.3)
RBC # BLD: 4.29 M/UL (ref 4.21–5.77)
RBC # BLD: 4.29 M/UL (ref 4.21–5.77)
RBC # BLD: ABNORMAL 10*6/UL
SEG NEUTROPHILS: 76 % (ref 36–65)
SEGMENTED NEUTROPHILS ABSOLUTE COUNT: 5.15 K/UL (ref 1.5–8.1)
SODIUM SERPL-SCNC: 141 MMOL/L (ref 135–144)
TOTAL PROTEIN, URINE: 5 MG/DL
URINE TOTAL PROTEIN CREATININE RATIO: 0.06 (ref 0–0.2)
WBC # BLD AUTO: 6.8 K/UL (ref 3.5–11.3)
WBC # BLD AUTO: 6.8 K/UL (ref 3.5–11.3)

## 2023-03-02 PROCEDURE — 84165 PROTEIN E-PHORESIS SERUM: CPT

## 2023-03-02 PROCEDURE — 84100 ASSAY OF PHOSPHORUS: CPT

## 2023-03-02 PROCEDURE — 82306 VITAMIN D 25 HYDROXY: CPT

## 2023-03-02 PROCEDURE — 85025 COMPLETE CBC W/AUTO DIFF WBC: CPT

## 2023-03-02 PROCEDURE — 82040 ASSAY OF SERUM ALBUMIN: CPT

## 2023-03-02 PROCEDURE — 84156 ASSAY OF PROTEIN URINE: CPT

## 2023-03-02 PROCEDURE — 82570 ASSAY OF URINE CREATININE: CPT

## 2023-03-02 PROCEDURE — 83970 ASSAY OF PARATHORMONE: CPT

## 2023-03-02 PROCEDURE — 83036 HEMOGLOBIN GLYCOSYLATED A1C: CPT

## 2023-03-02 PROCEDURE — 82784 ASSAY IGA/IGD/IGG/IGM EACH: CPT

## 2023-03-02 PROCEDURE — 84155 ASSAY OF PROTEIN SERUM: CPT

## 2023-03-02 PROCEDURE — 85027 COMPLETE CBC AUTOMATED: CPT

## 2023-03-02 PROCEDURE — 80053 COMPREHEN METABOLIC PANEL: CPT

## 2023-03-02 PROCEDURE — 80048 BASIC METABOLIC PNL TOTAL CA: CPT

## 2023-03-02 PROCEDURE — 36415 COLL VENOUS BLD VENIPUNCTURE: CPT

## 2023-03-02 PROCEDURE — 83521 IG LIGHT CHAINS FREE EACH: CPT

## 2023-03-03 LAB
25(OH)D3 SERPL-MCNC: 26.2 NG/ML
ALBUMIN (CALCULATED): 4.4 G/DL (ref 3.2–5.2)
ALBUMIN PERCENT: 61 % (ref 45–65)
ALBUMIN SERPL-MCNC: 4.1 G/DL (ref 3.5–5.2)
ALPHA 1 PERCENT: 3 % (ref 3–6)
ALPHA 2 PERCENT: 11 % (ref 6–13)
ALPHA1 GLOB SERPL ELPH-MCNC: 0.2 G/DL (ref 0.1–0.4)
ALPHA2 GLOB SERPL ELPH-MCNC: 0.8 G/DL (ref 0.5–0.9)
ANION GAP SERPL CALCULATED.3IONS-SCNC: 11 MMOL/L (ref 9–17)
B-GLOBULIN SERPL ELPH-MCNC: 0.8 G/DL (ref 0.5–1.1)
BETA PERCENT: 11 % (ref 11–19)
BUN SERPL-MCNC: 33 MG/DL (ref 8–23)
CALCIUM SERPL-MCNC: 8.6 MG/DL (ref 8.6–10.4)
CHLORIDE SERPL-SCNC: 102 MMOL/L (ref 98–107)
CO2 SERPL-SCNC: 28 MMOL/L (ref 20–31)
CREAT SERPL-MCNC: 1.26 MG/DL (ref 0.7–1.2)
FREE KAPPA/LAMBDA RATIO: 1.06 (ref 0.26–1.65)
GAMMA GLOB SERPL ELPH-MCNC: 1.1 G/DL (ref 0.5–1.5)
GAMMA GLOBULIN %: 15 % (ref 9–20)
GFR SERPL CREATININE-BSD FRML MDRD: >60 ML/MIN/1.73M2
GLUCOSE SERPL-MCNC: 114 MG/DL (ref 70–99)
KAPPA LC FREE SER-MCNC: 4.18 MG/DL (ref 0.37–1.94)
LAMBDA LC FREE SERPL-MCNC: 3.96 MG/DL (ref 0.57–2.63)
PATHOLOGIST: NORMAL
PHOSPHATE SERPL-MCNC: 4 MG/DL (ref 2.5–4.5)
POTASSIUM SERPL-SCNC: 3.9 MMOL/L (ref 3.7–5.3)
PROT SERPL-MCNC: 7.2 G/DL (ref 6.4–8.3)
PROTEIN ELECTROPHORESIS, SERUM: NORMAL
PTH-INTACT SERPL-MCNC: 78.8 PG/ML (ref 14–72)
SODIUM SERPL-SCNC: 141 MMOL/L (ref 135–144)
TOTAL PROT. SUM,%: 101 % (ref 98–102)
TOTAL PROT. SUM: 7.3 G/DL (ref 6.3–8.2)

## 2023-03-06 ENCOUNTER — TELEPHONE (OUTPATIENT)
Dept: ONCOLOGY | Age: 68
End: 2023-03-06

## 2023-03-06 ENCOUNTER — OFFICE VISIT (OUTPATIENT)
Dept: ONCOLOGY | Age: 68
End: 2023-03-06
Payer: COMMERCIAL

## 2023-03-06 VITALS
DIASTOLIC BLOOD PRESSURE: 40 MMHG | HEART RATE: 69 BPM | WEIGHT: 226 LBS | RESPIRATION RATE: 18 BRPM | SYSTOLIC BLOOD PRESSURE: 91 MMHG | BODY MASS INDEX: 38.79 KG/M2 | TEMPERATURE: 97.9 F

## 2023-03-06 DIAGNOSIS — D47.2 MGUS (MONOCLONAL GAMMOPATHY OF UNKNOWN SIGNIFICANCE): Primary | ICD-10-CM

## 2023-03-06 PROCEDURE — 99213 OFFICE O/P EST LOW 20 MIN: CPT | Performed by: INTERNAL MEDICINE

## 2023-03-06 PROCEDURE — 3074F SYST BP LT 130 MM HG: CPT | Performed by: INTERNAL MEDICINE

## 2023-03-06 PROCEDURE — 1123F ACP DISCUSS/DSCN MKR DOCD: CPT | Performed by: INTERNAL MEDICINE

## 2023-03-06 PROCEDURE — 3078F DIAST BP <80 MM HG: CPT | Performed by: INTERNAL MEDICINE

## 2023-03-06 PROCEDURE — 99211 OFF/OP EST MAY X REQ PHY/QHP: CPT | Performed by: INTERNAL MEDICINE

## 2023-03-06 NOTE — PROGRESS NOTES
DIAGNOSIS:   MGUS  CURRENT THERAPY:  Observation      REASON FOR VISIT:  Follow-up visit on MGUS. SUMMARY OF THE CASE:  He is a 77 y.o. patient who has multiple comorbidities in the form of cardiomyopathy, hypertension, diabetes, morbid obesity, and mild renal insufficiency that resolved completely. 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 INTERIM HISTORY: Patient is here today for the annual follow-up regarding MGUS and to review lab work. He reports he is doing well currently with no issues. He is doing well. He developed shortness of breath last August and had an extensive work-up. He thinks that this was a cardiac reason as the shortness of breath happened after exertion. They have done some basic work-up and especially pulmonary work-up that was essentially negative. He continues to follow-up with cardiology. He denies any fever or chills or night sweats. No weight loss. No bone pain. PAST MEDICAL HISTORY: Significant for coronary artery disease, MI, ischemic cardiomyopathy, sleep apnea, diabetes, hypertension and resolved acute renal failure and hyperkalemia. CURRENT MEDICATIONS: Outpatient medications including Lasix, lisinopril, Zocor, allopurinol, aspirin, Plavix, iron, Lopressor, Prilosec, trazodone. SOCIAL HISTORY:  He is nonsmoker or drinker at this point but he quit a few years ago. He used to work as a  in the past. He is unemployed. He lives with a roommate. FAMILY HISTORY: Significant for coronary artery disease and diabetes as well. REVIEW OF SYSTEMS:     Constitutional: No fever or chills.  No night sweats, no weight loss   HEENT: negative for sore mouth, sore throat, hoarseness and voice change   Respiratory: negative for cough , sputum, dyspnea, wheezing, hemoptysis, chest pain   Cardiovascular: negative for chest pain, dyspnea, palpitations, orthopnea, PND   Gastrointestinal: negative for nausea, vomiting, diarrhea, constipation, abdominal pain,   Genitourinary: negative for frequency, dysuria, nocturia, urinary incontinence, and hematuria   Hematologic/Lymphatic: negative for easy bruising, bleeding, lymphadenopathy, petechiae and swelling/edema   Endocrine: negative for heat or cold intolerance, tremor, weight changes, change in bowel habits and hair loss   Musculoskeletal: negative for myalgias, arthralgias, joint swelling,and bone pain  Neurological: negative for headaches, dizziness, seizures, weakness, numbness  Integument: negative for rash, skin lesions, bruises. +boil behind right ear    Physical Exam   BP (!) 91/40   Pulse 69   Temp 97.9 °F (36.6 °C) (Oral)   Resp 18   Wt 226 lb (102.5 kg)   BMI 38.79 kg/m²     General appearance - well appearing, no in pain or distress,  He is overweight  Mental status - alert and cooperative   Neck - supple, no palpable  adenopathy , trach midline   Lymphatics - no palpable lymphadenopathy, no hepatosplenomegaly   Chest - clear to auscultation, no wheezes, rales or rhonchi, decreased air entry   Heart - Afib  Abdomen - soft, nontender, nondistended, no masses or organomegaly   Neurological - alert, oriented, normal speech, no focal findings or movement disorder noted   Musculoskeletal - no joint tenderness, deformity or swelling   Extremities - peripheral pulses normal, no pedal edema, no clubbing or cyanosis   Skin - normal coloration and turgor, no rashes, no suspicious skin lesions noted +boil behind right ear    REVIEW OF LABORATORY DATA:  [facility] 1/27/2020  IGM LAMBDA. THE APPROXIMATE DENSITOMETRIC QUANTITATION OF PARAPROTEIN IS   0.21 G/DL. SPEP 2/2021  IgM, Lambda. THE APPROXIMATE DENSITOMETRIC QUANTITATION OF PARAPROTEIN IS   0.17 G/DL. SPEP 2/2022  IgM,   Lambda. THE APPROXIMATE DENSITOMETRIC QUANTITATION OF PARAPROTEIN IS   0.17 G/DL.   Lab Results   Component Value Date    WBC 6.8 03/02/2023    HGB 12.0 (L) 03/02/2023    HCT 38.8 (L) 03/02/2023    MCV 90.4 03/02/2023     03/02/2023       Chemistry        Component Value Date/Time     03/02/2023 1043    K 3.9 03/02/2023 1043     03/02/2023 1043    CO2 29 03/02/2023 1043    BUN 33 (H) 03/02/2023 1043    CREATININE 1.19 03/02/2023 1043        Component Value Date/Time    CALCIUM 8.8 03/02/2023 1043    ALKPHOS 75 03/02/2023 1043    AST 11 03/02/2023 1043    ALT 8 03/02/2023 1043    BILITOT 0.5 03/02/2023 1043         Latest Reference Range & Units 1/18/17 11:10 7/24/17 11:46 1/22/18 12:30 1/25/19 11:56 1/27/20 13:05 2/5/21 11:25 3/2/23 10:43   IgA 70 - 400 mg/dL 262 254 255 279 307 262 270   Total IgG 700 - 1600 mg/dL 1047 1058 1009 1149 1162 1090 1021   IgM 40 - 230 mg/dL 306 (H) 360 (H) 345 (H) 312 (H) 318 (H) 277 (H) 244 (H)   (H): Data is abnormally high         Latest Reference Range & Units 1/18/16 10:45 1/18/17 11:10 7/24/17 11:46 1/22/18 12:30 1/25/19 11:56 1/27/20 13:05 2/5/21 11:25 2/7/22 11:17 3/2/23 10:43   [place] Free Light Chains QNT 0.37 - 1.94 mg/dL 3.77 (H) 3.02 (H) 2.61 (H) 3.08 (H) 2.96 (H) 3.07 (H) 4.38 (H) 4.30 (H) 4.18 (H)   Lambda Free Light Chains QNT 0.57 - 2.63 mg/dL 2.42 3.06 (H) 2.80 (H) 2.69 (H) 3.19 (H) 2.26 3.19 (H) 3.85 (H) 3.96 (H)   Free Kappa/Lambda Ratio 0.26 - 1.65  1.56 0.99 0.93 1.14 0.93 1.36 1.37 1.12 1.06   (H): Data is abnormally high    IMPRESSION:    A 70year-old patient with IgM MGUS, without evidence of end organ damage or any progression by blood testing today, he is asymptomatic, no cytopenia. Multiple comorbidities including diabetes and coronary artery disease: stable over the last year    Paroxysmal atrial fibrillation  Shortness of breath with exertion, likely card      Plan  The patient MGUS is stable without any signs of progression. Hemoglobin and kidney function test are okay.   We will continue observation and annual follow-up as discussed  I think he needs to follow-up with cardiology regarding his exertional dyspnea  No evidence of worsening anemia or renal dysfunction  Return in 1 year        AMERICA AGUILAR [facility] MD [place]  Hematologist/Medical Oncologist

## 2023-03-06 NOTE — TELEPHONE ENCOUNTER
[facility] ARRIVES AMBULATORY FOR MD VISIT  DR Dickson Hernández INTO SEE PATIENT  ORDERS RECEIVED  RV 1 YEAR  NEED CBC CMP IMMUNOGLOBULIN & LIGHT CHAINS BEFORE  LABS CDP CMP IMMUNOGLOBULIN PANEL [place] LAMBDA FREE LIGHT CHAINS 03/04/24, ORDERS GIVEN TO PT  MD VISIT 03/11/24 @ 1PM  AVS PRINTED AND GIVEN TO PATIENT WITH INSTRUCTIONS  PATIENT DISCHARGED AMBULATORY

## 2023-03-12 DIAGNOSIS — I25.10 CORONARY ARTERY DISEASE INVOLVING NATIVE CORONARY ARTERY OF NATIVE HEART WITHOUT ANGINA PECTORIS: ICD-10-CM

## 2023-03-13 RX ORDER — ASPIRIN 81 MG/1
TABLET ORAL
Qty: 60 TABLET | Refills: 3 | Status: SHIPPED | OUTPATIENT
Start: 2023-03-13

## 2023-03-13 NOTE — TELEPHONE ENCOUNTER
LAST VISIT:   11/14/2022     Future Appointments   Date Time Provider Keerthi [place]   3/14/2023 12:45 PM LIDIA Post CNP [facility]   3/15/2023  1:00 PM [facility]INIC  [facility]   4/10/2023 12:45 PM Steffany Howard DPM [facility] Podiatry [facility]   8/8/2023  1:30 PM LIDIA Glass CNP [facility] Neph [facility] None   8/28/2023  1:15 PM Niko Peterson DO Resp Spec [facility]   3/11/2024  1:00 PM Teresa Gillis MD [address] [place] [address]

## 2023-03-15 ENCOUNTER — HOSPITAL ENCOUNTER (OUTPATIENT)
Dept: OTHER | Age: 68
Discharge: HOME | End: 2023-03-15
Payer: COMMERCIAL

## 2023-03-15 VITALS
OXYGEN SATURATION: 98 % | HEART RATE: 56 BPM | RESPIRATION RATE: 16 BRPM | WEIGHT: 224.6 LBS | BODY MASS INDEX: 38.55 KG/M2 | DIASTOLIC BLOOD PRESSURE: 70 MMHG | SYSTOLIC BLOOD PRESSURE: 110 MMHG

## 2023-03-15 PROCEDURE — 99212 OFFICE O/P EST SF 10 MIN: CPT

## 2023-03-15 NOTE — PROGRESS NOTES
Date:  3/15/2023  Time:  1:33 PM    CHF Clinic at [address] [facility]    Office: [phone] Fax: [phone]    Re:  Feliberto Elliott   Patient : 1955    Vital Signs: /70   Pulse 56   Resp 16   Wt 224 lb 9.6 oz (101.9 kg)   SpO2 98%   BMI 38.55 kg/m²                                                   No results for input(s): CBC, HGB, HCT, WBC, PLATELET, NA, K, CL, CO2, BUN, CREATININE, GLUCOSE, BNP, INR in the last 72 hours. Respiratory:    Assessment  Charting Type: Reassessment    Breath Sounds  Right Upper Lobe: Clear  Right Middle Lobe: Clear  Right Lower Lobe: Clear  Left Upper Lobe: Clear  Left Lower Lobe: Clear              Peripheral Vascular  RLE Edema: None  LLE Edema: None    Future Appointments   Date Time Provider Keerthi [place]   4/10/2023 12:45 PM Debora Webb DPM [facility]   2023  1:00 PM [facility]INIC RM 1 [facility] CHF [facility]   2023  1:30 PM LIDIA Nunez - CNP AFL Ne[facility] None   2023  1:15 PM Jose G Pinto DO Resp Spec [facility]   3/11/2024  1:00  Matias Norris MD [facility]      Complaints: No new complaints     Physician Orders No new orders     Comment : Weight is up 1.6 pounds from last visit. Denies any SOB or chest pain. See's  23 last seen 3 months ago. Discussed in detail low sodium diet 2000mg and 64 ounces of fluid per day. We will see in 1 month 2023.      Electronically signed by Dedrick Roberto RN on 3/15/2023 at 1:33 PM

## 2023-03-15 NOTE — PROGRESS NOTES
Medication Management  Pharmacist  Heart Failure    [address], [address] [facility]  Ph:  [phone]  Fax:  [phone]    NAME: Feliberto Elliott  MEDICAL RECORD NUMBER:  [id number]  AGE: 77 y.o. GENDER: male  : 1955  EPISODE DATE:  3/15/2023       ECHO EF%: 18 Date: 3/19/2020           Thorough Medication Assessment        Current Heart Failure Medications:      Get With The Guidelines:  Mr. Scottie Rapp is on a Beta-Blocker for (HFrEF) (systolic) EF </= 78%  Yes    Mr. Scottie Rapp is on an Ace-Inhibitor / ARB / Entresto for (HFrEF) (systolic) EF </= 83% Yes      Mr. Scottie Rapp is on a Aldosterone Receptor Antagonist for (HFrEF) (systolic) EF </= 29% or EF </= 40% with MI (Okay to use if SCr </= 2.5mg/dL in men, SCr </= 2mg/dL in women; Potassium < 5.0meq/L) No: No new medications due to low BP      Mr. Scottie Rapp is on a Diuretic Yes    Medication Reconciliation Discrepancies:  None    Medication Reconciliation comments:   None    Recommendations / Notes to the physician:    No new recommendations due to low blood pressure. CLINICAL PHARMACY CONSULT: MED RECONCILIATION/REVIEW ADDENDUM    For Pharmacy Admin Tracking Only    CPA in place:  Yes  Recommendation Provided To:    Intervention Detail:   Gap Closed?: No   Total # of Interventions Recommended: 0  Total # of Interventions Accepted: 0  Intervention Accepted By:   Time Spent (min): 20

## 2023-03-17 DIAGNOSIS — M1A.00X0 IDIOPATHIC CHRONIC GOUT WITHOUT TOPHUS, UNSPECIFIED SITE: ICD-10-CM

## 2023-03-17 RX ORDER — ALLOPURINOL 300 MG/1
TABLET ORAL
Qty: 30 TABLET | Refills: 5 | Status: SHIPPED | OUTPATIENT
Start: 2023-03-17

## 2023-03-22 ENCOUNTER — OFFICE VISIT (OUTPATIENT)
Dept: PRIMARY CARE CLINIC | Age: 68
End: 2023-03-22
Payer: COMMERCIAL

## 2023-03-22 VITALS
SYSTOLIC BLOOD PRESSURE: 97 MMHG | WEIGHT: 222.4 LBS | HEART RATE: 66 BPM | BODY MASS INDEX: 38.17 KG/M2 | OXYGEN SATURATION: 99 % | DIASTOLIC BLOOD PRESSURE: 65 MMHG

## 2023-03-22 DIAGNOSIS — I50.42 CHRONIC COMBINED SYSTOLIC AND DIASTOLIC HEART FAILURE (HCC): ICD-10-CM

## 2023-03-22 DIAGNOSIS — I48.0 PAF (PAROXYSMAL ATRIAL FIBRILLATION) (HCC): ICD-10-CM

## 2023-03-22 DIAGNOSIS — I25.10 CORONARY ARTERY DISEASE INVOLVING NATIVE CORONARY ARTERY OF NATIVE HEART WITHOUT ANGINA PECTORIS: ICD-10-CM

## 2023-03-22 DIAGNOSIS — E11.9 TYPE 2 DIABETES MELLITUS WITHOUT COMPLICATION, WITHOUT LONG-TERM CURRENT USE OF INSULIN (HCC): ICD-10-CM

## 2023-03-22 DIAGNOSIS — M1A.00X0 IDIOPATHIC CHRONIC GOUT WITHOUT TOPHUS, UNSPECIFIED SITE: Primary | ICD-10-CM

## 2023-03-22 PROCEDURE — 3078F DIAST BP <80 MM HG: CPT | Performed by: NURSE PRACTITIONER

## 2023-03-22 PROCEDURE — 3044F HG A1C LEVEL LT 7.0%: CPT | Performed by: NURSE PRACTITIONER

## 2023-03-22 PROCEDURE — 3074F SYST BP LT 130 MM HG: CPT | Performed by: NURSE PRACTITIONER

## 2023-03-22 PROCEDURE — 1123F ACP DISCUSS/DSCN MKR DOCD: CPT | Performed by: NURSE PRACTITIONER

## 2023-03-22 PROCEDURE — 99213 OFFICE O/P EST LOW 20 MIN: CPT | Performed by: NURSE PRACTITIONER

## 2023-03-22 SDOH — ECONOMIC STABILITY: INCOME INSECURITY: HOW HARD IS IT FOR YOU TO PAY FOR THE VERY BASICS LIKE FOOD, HOUSING, MEDICAL CARE, AND HEATING?: NOT VERY HARD

## 2023-03-22 SDOH — ECONOMIC STABILITY: FOOD INSECURITY: WITHIN THE PAST 12 MONTHS, YOU WORRIED THAT YOUR FOOD WOULD RUN OUT BEFORE YOU GOT MONEY TO BUY MORE.: NEVER TRUE

## 2023-03-22 SDOH — ECONOMIC STABILITY: FOOD INSECURITY: WITHIN THE PAST 12 MONTHS, THE FOOD YOU BOUGHT JUST DIDN'T LAST AND YOU DIDN'T HAVE MONEY TO GET MORE.: NEVER TRUE

## 2023-03-22 ASSESSMENT — PATIENT HEALTH QUESTIONNAIRE - PHQ9
SUM OF ALL RESPONSES TO PHQ9 QUESTIONS 1 & 2: 0
2. FEELING DOWN, DEPRESSED OR HOPELESS: 0
SUM OF ALL RESPONSES TO PHQ QUESTIONS 1-9: 0
1. LITTLE INTEREST OR PLEASURE IN DOING THINGS: 0
SUM OF ALL RESPONSES TO PHQ QUESTIONS 1-9: 0

## 2023-03-22 ASSESSMENT — ENCOUNTER SYMPTOMS
SHORTNESS OF BREATH: 1
BLURRED VISION: 1

## 2023-03-22 NOTE — PROGRESS NOTES
09:43 AM       BMP:    Lab Results   Component Value Date/Time     03/02/2023 10:43 AM    K 3.9 03/02/2023 10:43 AM     03/02/2023 10:43 AM    CO2 29 03/02/2023 10:43 AM    BUN 33 03/02/2023 10:43 AM    CREATININE 1.19 03/02/2023 10:43 AM    GLUCOSE 113 03/02/2023 10:43 AM    GLUCOSE 123 03/19/2012 10:46 AM       HEMOGLOBIN A1C:   Lab Results   Component Value Date/Time    LABA1C 6.5 03/02/2023 10:40 AM       FASTING LIPID PANEL:  Lab Results   Component Value Date    CHOL 124 06/23/2021    HDL 44 07/06/2022    TRIG 129 06/23/2021       Physical Exam  Vitals and nursing note reviewed. Constitutional:       General: He is not in acute distress. Appearance: He is well-developed. He is obese. HENT:      Head: Normocephalic. Mouth/Throat:      Mouth: Mucous membranes are moist.   Eyes:      General: No scleral icterus. Right eye: No discharge. Left eye: No discharge. Pupils: Pupils are equal, round, and reactive to light. Cardiovascular:      Rate and Rhythm: Normal rate and regular rhythm. Pulmonary:      Effort: Pulmonary effort is normal.      Breath sounds: Normal breath sounds. Abdominal:      General: Abdomen is protuberant. Palpations: Abdomen is soft. Musculoskeletal:      Right shoulder: Normal range of motion. Normal strength. Right elbow: No effusion. No tenderness. Cervical back: Normal range of motion and neck supple. Skin:     General: Skin is warm and dry. Neurological:      Mental Status: He is alert and oriented to person, place, and time. Coordination: Coordination normal.   Psychiatric:         Behavior: Behavior normal. Behavior is cooperative. Thought Content: Thought content normal.         Judgment: Judgment normal.          An electronic signature was used to authenticate this note.     --LIDIA Zhang - CNP

## 2023-04-16 DIAGNOSIS — I48.0 PAF (PAROXYSMAL ATRIAL FIBRILLATION) (HCC): ICD-10-CM

## 2023-04-17 ENCOUNTER — OFFICE VISIT (OUTPATIENT)
Dept: PODIATRY | Age: 68
End: 2023-04-17
Payer: COMMERCIAL

## 2023-04-17 VITALS
BODY MASS INDEX: 38.76 KG/M2 | SYSTOLIC BLOOD PRESSURE: 112 MMHG | HEART RATE: 62 BPM | HEIGHT: 64 IN | DIASTOLIC BLOOD PRESSURE: 70 MMHG | WEIGHT: 227 LBS

## 2023-04-17 DIAGNOSIS — B35.1 ONYCHOMYCOSIS: ICD-10-CM

## 2023-04-17 DIAGNOSIS — I25.10 CORONARY ARTERY DISEASE INVOLVING NATIVE CORONARY ARTERY OF NATIVE HEART WITHOUT ANGINA PECTORIS: ICD-10-CM

## 2023-04-17 DIAGNOSIS — E11.8 CONTROLLED TYPE 2 DIABETES MELLITUS WITH COMPLICATION, WITHOUT LONG-TERM CURRENT USE OF INSULIN (HCC): ICD-10-CM

## 2023-04-17 DIAGNOSIS — E11.42 DIABETIC POLYNEUROPATHY ASSOCIATED WITH TYPE 2 DIABETES MELLITUS (HCC): ICD-10-CM

## 2023-04-17 DIAGNOSIS — B35.1 OM (ONYCHOMYCOSIS): Primary | ICD-10-CM

## 2023-04-17 PROCEDURE — 99999 PR OFFICE/OUTPT VISIT,PROCEDURE ONLY: CPT

## 2023-04-17 PROCEDURE — 11721 DEBRIDE NAIL 6 OR MORE: CPT

## 2023-04-17 PROCEDURE — 11055 PARING/CUTG B9 HYPRKER LES 1: CPT

## 2023-04-17 PROCEDURE — 99212 OFFICE O/P EST SF 10 MIN: CPT

## 2023-04-17 RX ORDER — ATORVASTATIN CALCIUM 40 MG/1
40 TABLET, FILM COATED ORAL DAILY
Qty: 90 TABLET | Refills: 0 | Status: SHIPPED | OUTPATIENT
Start: 2023-04-17

## 2023-04-20 ENCOUNTER — HOSPITAL ENCOUNTER (OUTPATIENT)
Dept: GENERAL RADIOLOGY | Age: 68
Discharge: HOME | End: 2023-04-22
Payer: COMMERCIAL

## 2023-04-20 ENCOUNTER — HOSPITAL ENCOUNTER (OUTPATIENT)
Age: 68
Discharge: HOME | End: 2023-04-22
Payer: COMMERCIAL

## 2023-04-20 ENCOUNTER — OFFICE VISIT (OUTPATIENT)
Dept: PRIMARY CARE CLINIC | Age: 68
End: 2023-04-20
Payer: COMMERCIAL

## 2023-04-20 VITALS
OXYGEN SATURATION: 99 % | SYSTOLIC BLOOD PRESSURE: 115 MMHG | WEIGHT: 221.2 LBS | DIASTOLIC BLOOD PRESSURE: 73 MMHG | HEART RATE: 69 BPM | BODY MASS INDEX: 37.97 KG/M2

## 2023-04-20 DIAGNOSIS — M25.462 KNEE EFFUSION, LEFT: ICD-10-CM

## 2023-04-20 DIAGNOSIS — M25.562 ACUTE PAIN OF LEFT KNEE: Primary | ICD-10-CM

## 2023-04-20 DIAGNOSIS — M25.562 ACUTE PAIN OF LEFT KNEE: ICD-10-CM

## 2023-04-20 PROCEDURE — 3074F SYST BP LT 130 MM HG: CPT | Performed by: NURSE PRACTITIONER

## 2023-04-20 PROCEDURE — 73562 X-RAY EXAM OF KNEE 3: CPT

## 2023-04-20 PROCEDURE — 99213 OFFICE O/P EST LOW 20 MIN: CPT | Performed by: NURSE PRACTITIONER

## 2023-04-20 PROCEDURE — 3078F DIAST BP <80 MM HG: CPT | Performed by: NURSE PRACTITIONER

## 2023-04-20 PROCEDURE — 1123F ACP DISCUSS/DSCN MKR DOCD: CPT | Performed by: NURSE PRACTITIONER

## 2023-04-20 NOTE — PROGRESS NOTES
[address] PRIMARY CARE  [address] [address]  Dept: [phone]  Dept Fax: [phone]    Miroslava Patterson (:  1955) is a 77 y.o. male,Established patient, here for evaluation of the following chief complaint(s): Other (Patient is here for his left knee/leg pain; patient states it has been going for a few days. Patient denies injury. )         ASSESSMENT/PLAN:  1. Acute pain of left knee  -     XR KNEE LEFT (3 VIEWS); Future  2. Knee effusion, left  -     XR KNEE LEFT (3 VIEWS); Future    Provided home care instructions for knee effusion, intermittent pain over the last 2 weeks with now persistent pain over the last 5 days. Will evaluate with x-ray given intermittent symptoms of pain and swelling    Return for Follow up after labs resulted. Subjective   SUBJECTIVE/OBJECTIVE:  Was walking to an appt a few days ago, felt a click and had pain. Resolved after a day. Then went for a walk in the park where a similar situation occurred after a click in his left knee which went away, over 1 week ago. This past Saturday at [employer] [facility] and started having pain after he took a nap. Knee was swollen. No redness. Still hurts to walk on it.        Review of Systems       Objective     DIAGNOSTIC FINDINGS:  CBC:  Lab Results   Component Value Date/Time    WBC 6.8 2023 10:43 AM    HGB 12.0 2023 10:43 AM     2023 10:43 AM     2011 09:43 AM       BMP:    Lab Results   Component Value Date/Time     2023 10:43 AM    K 3.9 2023 10:43 AM     2023 10:43 AM    CO2 29 2023 10:43 AM    BUN 33 2023 10:43 AM    CREATININE 1.19 2023 10:43 AM    GLUCOSE 113 2023 10:43 AM    GLUCOSE 123 2012 10:46 AM       HEMOGLOBIN A1C:   Lab Results   Component Value Date/Time    LABA1C 6.5 2023 10:40 AM       FASTING LIPID PANEL:  Lab Results

## 2023-05-10 ENCOUNTER — HOSPITAL ENCOUNTER (OUTPATIENT)
Dept: OTHER | Age: 68
Discharge: HOME | End: 2023-05-10
Payer: COMMERCIAL

## 2023-05-10 VITALS
WEIGHT: 220.8 LBS | SYSTOLIC BLOOD PRESSURE: 120 MMHG | BODY MASS INDEX: 37.9 KG/M2 | DIASTOLIC BLOOD PRESSURE: 80 MMHG | RESPIRATION RATE: 20 BRPM | OXYGEN SATURATION: 99 % | HEART RATE: 83 BPM

## 2023-05-10 PROCEDURE — 99212 OFFICE O/P EST SF 10 MIN: CPT

## 2023-05-10 NOTE — PROGRESS NOTES
Date:  5/10/2023  Time:  1:34 PM    CHF Clinic at [address] [place] [address]    Office: [phone] Fax: [phone]    Re:  Melyssa Lane   Patient : 1955    Vital Signs: /80   Pulse 83   Resp 20   Wt 220 lb 12.8 oz (100.2 kg)   SpO2 99%   BMI 37.90 kg/m²                       O2 Device: None (Room air)                           No results for input(s): CBC, HGB, HCT, WBC, PLATELET, NA, K, CL, CO2, BUN, CREATININE, GLUCOSE, BNP, INR in the last 72 hours. Respiratory:         Breath Sounds  Right Upper Lobe: Clear  Right Middle Lobe: Clear  Right Lower Lobe: Clear  Left Upper Lobe: Clear  Left Lower Lobe: Clear    Cough/Sputum  Cough: None         Peripheral Vascular  RLE Edema: None  LLE Edema: None    Future Appointments   Date Time Provider Keerthi [place]   2023  1:30 PM STV CHF CLINIC RM 2 [facility] CHF CLI [facility]   2023 12:45 PM LIDIA Eldridge - CNP ST [facility]   2023  1:30 PM Herson Herrmann APRN - CNP AFL Neph [facility] None   2023  1:15 PM Karly Knox,  Resp Spec [facility]   3/11/2024  1:00 PM Jaret2 Matias Norris MD SV Cancer Ct [facility]      Complaints:  no new complaints      Comment : Pt ambulated with a cane from registration to the CHF Clinic. No c/o or EDGE noted. Wt today is down 6.8 lbs from last visit here one month ago. At last months visit he was up 3 lbs. He feels good. He has mild fatigue, usual shortness of breath with exertion but Denies any increase in shortness of breath, or any chest pain, dizziness, palpitations. No acute S/S of CHF noted on assessment today. LE measurements are down from last visit. He is following low sodium diet and making his own soup with no added salt broth. He also follows fluid restriction of 64 ounces daily. He had to cancel his appt last month with Dr Heide Fitch because \" my knees were hurting so bad then, I couldn't walk\". He states rescheduled appt with Dr Heide Fitch on 23.  F/u with nephrology on

## 2023-05-12 DIAGNOSIS — E03.2 HYPOTHYROIDISM DUE TO MEDICATION: ICD-10-CM

## 2023-05-15 RX ORDER — LEVOTHYROXINE SODIUM 0.07 MG/1
TABLET ORAL
Qty: 90 TABLET | Refills: 1 | Status: SHIPPED | OUTPATIENT
Start: 2023-05-15

## 2023-06-07 ENCOUNTER — HOSPITAL ENCOUNTER (OUTPATIENT)
Dept: OTHER | Age: 68
Discharge: HOME | End: 2023-06-07
Payer: COMMERCIAL

## 2023-06-07 ENCOUNTER — TELEPHONE (OUTPATIENT)
Dept: PRIMARY CARE CLINIC | Age: 68
End: 2023-06-07

## 2023-06-07 VITALS
HEART RATE: 65 BPM | OXYGEN SATURATION: 99 % | DIASTOLIC BLOOD PRESSURE: 68 MMHG | WEIGHT: 217.4 LBS | RESPIRATION RATE: 19 BRPM | BODY MASS INDEX: 37.32 KG/M2 | SYSTOLIC BLOOD PRESSURE: 112 MMHG

## 2023-06-07 PROCEDURE — 99212 OFFICE O/P EST SF 10 MIN: CPT

## 2023-06-07 ASSESSMENT — PAIN SCALES - GENERAL: PAINLEVEL_OUTOF10: 2

## 2023-06-07 ASSESSMENT — PAIN DESCRIPTION - LOCATION: LOCATION: LEG

## 2023-06-07 ASSESSMENT — PAIN DESCRIPTION - PAIN TYPE: TYPE: CHRONIC PAIN

## 2023-06-07 ASSESSMENT — PAIN DESCRIPTION - DESCRIPTORS: DESCRIPTORS: ACHING

## 2023-06-07 ASSESSMENT — PAIN DESCRIPTION - ORIENTATION: ORIENTATION: LEFT

## 2023-06-07 NOTE — PROGRESS NOTES
Date:  2023  Time:  2:19 PM    CHF Clinic at [address] [facility]    Office: [phone] Fax: [phone]    Re:  Al Avery   Patient : 1955    Vital Signs: /68   Pulse 65   Resp 19   Wt 217 lb 6.4 oz (98.6 kg)   SpO2 99%   BMI 37.32 kg/m²                       O2 Device: None (Room air)                           No results for input(s): CBC, HGB, HCT, WBC, PLATELET, NA, K, CL, CO2, BUN, CREATININE, GLUCOSE, BNP, INR in the last 72 hours. Respiratory:    Assessment  Charting Type: Reassessment    Breath Sounds  Right Upper Lobe: Clear  Right Middle Lobe: Clear  Right Lower Lobe: Clear  Left Upper Lobe: Clear  Left Lower Lobe: Clear    Cough/Sputum  Cough: None         Peripheral Vascular  RLE Edema: None  LLE Edema: None    Future Appointments   Date Time Provider [place]   2023 12:45 PM LIDIA Russo - CNP [facility]   2023  2:30 PM ST[facility] CHF CLINIC RM 2 [facility] CHF CLI [facility]   2023  1:30 PM LIDIA Littlejohn - CNP AFL Neph [facility] None   2023  1:15 PM Judit Israel,  Resp Spec [facility]   3/11/2024  1:00 PM Elder Corley MD SV Cancer Ct [facility]      Complaints: chronic left leg/knee pain currently treated per pcp    Comment : Reviewed 2 gm sodium diet and 64 oz fluid restriction which Savi Iosco. Recent weight loss of 3 1/2 lbs since last months visit. He states loss of appetite. Lungs are clear throughout. Leg measurements are in line with previous recordings. Allergies and medications were reviewed and states compliancy. He has future f/u appointments with cardiology, urology,nephrology and his PCP.    Next CHF appointment 23     Electronically signed by Jaelyn Ibarra RN on 2023 at 2:19 PM

## 2023-06-09 DIAGNOSIS — D50.9 IRON DEFICIENCY ANEMIA, UNSPECIFIED IRON DEFICIENCY ANEMIA TYPE: ICD-10-CM

## 2023-06-09 RX ORDER — FERROUS SULFATE 325(65) MG
1 TABLET ORAL
Qty: 30 TABLET | Refills: 3 | Status: SHIPPED | OUTPATIENT
Start: 2023-06-09

## 2023-07-05 ENCOUNTER — HOSPITAL ENCOUNTER (OUTPATIENT)
Dept: OTHER | Age: 68
Discharge: HOME | End: 2023-07-05
Payer: COMMERCIAL

## 2023-07-05 VITALS
BODY MASS INDEX: 37.66 KG/M2 | HEART RATE: 68 BPM | OXYGEN SATURATION: 98 % | WEIGHT: 219.4 LBS | DIASTOLIC BLOOD PRESSURE: 60 MMHG | SYSTOLIC BLOOD PRESSURE: 100 MMHG | RESPIRATION RATE: 20 BRPM

## 2023-07-05 DIAGNOSIS — I25.10 CORONARY ARTERY DISEASE INVOLVING NATIVE CORONARY ARTERY OF NATIVE HEART WITHOUT ANGINA PECTORIS: ICD-10-CM

## 2023-07-05 PROCEDURE — 99212 OFFICE O/P EST SF 10 MIN: CPT

## 2023-07-05 RX ORDER — ASPIRIN 81 MG/1
TABLET ORAL
Qty: 60 TABLET | Refills: 3 | Status: SHIPPED | OUTPATIENT
Start: 2023-07-05

## 2023-07-05 NOTE — TELEPHONE ENCOUNTER
Santiago Ewing is calling to request a refill on the following medication(s):    Medication Request:  Requested Prescriptions     Pending Prescriptions Disp Refills    aspirin (ASPIRIN LOW DOSE) 81 MG EC tablet [Pharmacy Med Name: ASPIRIN EC 81 MG TABLET] 60 tablet 3     Sig: take 1 tablet by mouth twice a day       Last Visit Date (If Applicable):  5/67/3848    Next Visit Date:    9/18/2023

## 2023-08-03 ENCOUNTER — HOSPITAL ENCOUNTER (OUTPATIENT)
Dept: OTHER | Age: 68
Discharge: HOME | End: 2023-08-03
Payer: COMMERCIAL

## 2023-08-03 VITALS
OXYGEN SATURATION: 97 % | RESPIRATION RATE: 20 BRPM | DIASTOLIC BLOOD PRESSURE: 60 MMHG | SYSTOLIC BLOOD PRESSURE: 102 MMHG | BODY MASS INDEX: 38.69 KG/M2 | WEIGHT: 225.4 LBS | HEART RATE: 74 BPM

## 2023-08-03 PROCEDURE — 99212 OFFICE O/P EST SF 10 MIN: CPT

## 2023-08-05 DIAGNOSIS — I48.0 PAF (PAROXYSMAL ATRIAL FIBRILLATION) (HCC): ICD-10-CM

## 2023-08-05 DIAGNOSIS — E11.8 CONTROLLED TYPE 2 DIABETES MELLITUS WITH COMPLICATION, WITHOUT LONG-TERM CURRENT USE OF INSULIN (HCC): ICD-10-CM

## 2023-08-15 PROBLEM — I20.9 ANGINA PECTORIS, UNSPECIFIED (HCC): Status: ACTIVE | Noted: 2023-08-15

## 2023-08-15 PROBLEM — I25.119 ATHEROSCLEROTIC HEART DISEASE OF NATIVE CORONARY ARTERY WITH UNSPECIFIED ANGINA PECTORIS (HCC): Status: ACTIVE | Noted: 2023-08-15

## 2023-08-23 ENCOUNTER — HOSPITAL ENCOUNTER (OUTPATIENT)
Age: 68
Discharge: HOME | End: 2023-08-23
Payer: COMMERCIAL

## 2023-08-23 DIAGNOSIS — I50.42 CHRONIC COMBINED SYSTOLIC AND DIASTOLIC HEART FAILURE (HCC): ICD-10-CM

## 2023-08-23 DIAGNOSIS — N18.31 STAGE 3A CHRONIC KIDNEY DISEASE (HCC): ICD-10-CM

## 2023-08-23 DIAGNOSIS — N18.31 TYPE 2 DIABETES MELLITUS WITH STAGE 3A CHRONIC KIDNEY DISEASE, WITHOUT LONG-TERM CURRENT USE OF INSULIN (HCC): ICD-10-CM

## 2023-08-23 DIAGNOSIS — I25.5 ISCHEMIC CARDIOMYOPATHY: ICD-10-CM

## 2023-08-23 DIAGNOSIS — E66.01 SEVERE OBESITY (BMI 35.0-39.9) WITH COMORBIDITY (HCC): ICD-10-CM

## 2023-08-23 DIAGNOSIS — I10 ESSENTIAL (PRIMARY) HYPERTENSION: ICD-10-CM

## 2023-08-23 DIAGNOSIS — E11.22 TYPE 2 DIABETES MELLITUS WITH STAGE 3A CHRONIC KIDNEY DISEASE, WITHOUT LONG-TERM CURRENT USE OF INSULIN (HCC): ICD-10-CM

## 2023-08-23 LAB
25(OH)D3 SERPL-MCNC: 25.8 NG/ML
ALBUMIN SERPL-MCNC: 3.8 G/DL (ref 3.5–5.2)
ANION GAP SERPL CALCULATED.3IONS-SCNC: 14 MMOL/L (ref 9–17)
BUN SERPL-MCNC: 24 MG/DL (ref 8–23)
CALCIUM SERPL-MCNC: 8.8 MG/DL (ref 8.6–10.4)
CHLORIDE SERPL-SCNC: 101 MMOL/L (ref 98–107)
CO2 SERPL-SCNC: 24 MMOL/L (ref 20–31)
CREAT SERPL-MCNC: 1.3 MG/DL (ref 0.7–1.2)
CREAT UR-MCNC: 112.3 MG/DL (ref 39–259)
ERYTHROCYTE [DISTWIDTH] IN BLOOD BY AUTOMATED COUNT: 16.6 % (ref 11.8–14.4)
GFR SERPL CREATININE-BSD FRML MDRD: 59 ML/MIN/1.73M2
GLUCOSE SERPL-MCNC: 119 MG/DL (ref 70–99)
HCT VFR BLD AUTO: 37.4 % (ref 40.7–50.3)
HGB BLD-MCNC: 11.8 G/DL (ref 13–17)
MCH RBC QN AUTO: 28.5 PG (ref 25.2–33.5)
MCHC RBC AUTO-ENTMCNC: 31.6 G/DL (ref 28.4–34.8)
MCV RBC AUTO: 90.3 FL (ref 82.6–102.9)
NRBC BLD-RTO: 0 PER 100 WBC
PHOSPHATE SERPL-MCNC: 3 MG/DL (ref 2.5–4.5)
PLATELET # BLD AUTO: 167 K/UL (ref 138–453)
PMV BLD AUTO: 11.3 FL (ref 8.1–13.5)
POTASSIUM SERPL-SCNC: 4.3 MMOL/L (ref 3.7–5.3)
PTH-INTACT SERPL-MCNC: 84.5 PG/ML (ref 14–72)
RBC # BLD AUTO: 4.14 M/UL (ref 4.21–5.77)
SODIUM SERPL-SCNC: 139 MMOL/L (ref 135–144)
TOTAL PROTEIN, URINE: 11 MG/DL
URINE TOTAL PROTEIN CREATININE RATIO: 0.1 (ref 0–0.2)
WBC OTHER # BLD: 6.3 K/UL (ref 3.5–11.3)

## 2023-08-23 PROCEDURE — 84100 ASSAY OF PHOSPHORUS: CPT

## 2023-08-23 PROCEDURE — 83970 ASSAY OF PARATHORMONE: CPT

## 2023-08-23 PROCEDURE — 85027 COMPLETE CBC AUTOMATED: CPT

## 2023-08-23 PROCEDURE — 82040 ASSAY OF SERUM ALBUMIN: CPT

## 2023-08-23 PROCEDURE — 36415 COLL VENOUS BLD VENIPUNCTURE: CPT

## 2023-08-23 PROCEDURE — 84156 ASSAY OF PROTEIN URINE: CPT

## 2023-08-23 PROCEDURE — 82306 VITAMIN D 25 HYDROXY: CPT

## 2023-08-23 PROCEDURE — 82570 ASSAY OF URINE CREATININE: CPT

## 2023-08-23 PROCEDURE — 80048 BASIC METABOLIC PNL TOTAL CA: CPT

## 2023-08-28 ENCOUNTER — OFFICE VISIT (OUTPATIENT)
Dept: PULMONOLOGY | Age: 68
End: 2023-08-28
Payer: COMMERCIAL

## 2023-08-28 VITALS
OXYGEN SATURATION: 96 % | WEIGHT: 226 LBS | HEIGHT: 64 IN | BODY MASS INDEX: 38.58 KG/M2 | DIASTOLIC BLOOD PRESSURE: 54 MMHG | SYSTOLIC BLOOD PRESSURE: 88 MMHG | HEART RATE: 58 BPM | RESPIRATION RATE: 14 BRPM

## 2023-08-28 DIAGNOSIS — Z99.89 OSA ON CPAP: Primary | ICD-10-CM

## 2023-08-28 DIAGNOSIS — I50.22 CHRONIC SYSTOLIC CONGESTIVE HEART FAILURE (HCC): ICD-10-CM

## 2023-08-28 DIAGNOSIS — I48.0 PAF (PAROXYSMAL ATRIAL FIBRILLATION) (HCC): ICD-10-CM

## 2023-08-28 DIAGNOSIS — E66.01 CLASS 2 SEVERE OBESITY DUE TO EXCESS CALORIES WITH SERIOUS COMORBIDITY AND BODY MASS INDEX (BMI) OF 38.0 TO 38.9 IN ADULT (HCC): ICD-10-CM

## 2023-08-28 DIAGNOSIS — D68.69 SECONDARY HYPERCOAGULABLE STATE (HCC): ICD-10-CM

## 2023-08-28 DIAGNOSIS — M17.32 POST-TRAUMATIC OSTEOARTHRITIS OF LEFT KNEE: ICD-10-CM

## 2023-08-28 DIAGNOSIS — G47.33 OSA ON CPAP: Primary | ICD-10-CM

## 2023-08-28 PROCEDURE — 99213 OFFICE O/P EST LOW 20 MIN: CPT | Performed by: INTERNAL MEDICINE

## 2023-08-28 PROCEDURE — E1399 DURABLE MEDICAL EQUIPMENT MI: HCPCS | Performed by: INTERNAL MEDICINE

## 2023-08-28 PROCEDURE — 1123F ACP DISCUSS/DSCN MKR DOCD: CPT | Performed by: INTERNAL MEDICINE

## 2023-08-28 PROCEDURE — 3074F SYST BP LT 130 MM HG: CPT | Performed by: INTERNAL MEDICINE

## 2023-08-28 PROCEDURE — 3078F DIAST BP <80 MM HG: CPT | Performed by: INTERNAL MEDICINE

## 2023-08-28 ASSESSMENT — SLEEP AND FATIGUE QUESTIONNAIRES
HOW LIKELY ARE YOU TO NOD OFF OR FALL ASLEEP WHILE SITTING QUIETLY AFTER LUNCH WITHOUT ALCOHOL: 1
HOW LIKELY ARE YOU TO NOD OFF OR FALL ASLEEP WHILE SITTING AND READING: 1
HOW LIKELY ARE YOU TO NOD OFF OR FALL ASLEEP WHILE SITTING AND TALKING TO SOMEONE: 0
HOW LIKELY ARE YOU TO NOD OFF OR FALL ASLEEP IN A CAR, WHILE STOPPED FOR A FEW MINUTES IN TRAFFIC: 0
HOW LIKELY ARE YOU TO NOD OFF OR FALL ASLEEP WHILE SITTING INACTIVE IN A PUBLIC PLACE: 0
HOW LIKELY ARE YOU TO NOD OFF OR FALL ASLEEP WHILE WATCHING TV: 0
HOW LIKELY ARE YOU TO NOD OFF OR FALL ASLEEP WHEN YOU ARE A PASSENGER IN A CAR FOR AN HOUR WITHOUT A BREAK: 0
HOW LIKELY ARE YOU TO NOD OFF OR FALL ASLEEP WHILE LYING DOWN TO REST IN THE AFTERNOON WHEN CIRCUMSTANCES PERMIT: 1
ESS TOTAL SCORE: 3

## 2023-08-28 ASSESSMENT — ENCOUNTER SYMPTOMS
EYES NEGATIVE: 1
SHORTNESS OF BREATH: 1

## 2023-08-29 NOTE — PROGRESS NOTES
person, place, and time. Wt Readings from Last 3 Encounters:   08/28/23 226 lb (102.5 kg)   08/15/23 224 lb (101.6 kg)   08/03/23 225 lb 6.4 oz (102.2 kg)     Results for orders placed or performed during the hospital encounter of 26/68/25   Basic Metabolic Panel   Result Value Ref Range    Glucose 119 (H) 70 - 99 mg/dL    BUN 24 (H) 8 - 23 mg/dL    Creatinine 1.3 (H) 0.7 - 1.2 mg/dL    Est, Glom Filt Rate 59 (L) >60 mL/min/1.73m2    Calcium 8.8 8.6 - 10.4 mg/dL    Sodium 139 135 - 144 mmol/L    Potassium 4.3 3.7 - 5.3 mmol/L    Chloride 101 98 - 107 mmol/L    CO2 24 20 - 31 mmol/L    Anion Gap 14 9 - 17 mmol/L   Albumin   Result Value Ref Range    Albumin 3.8 3.5 - 5.2 g/dL   CBC   Result Value Ref Range    WBC 6.3 3.5 - 11.3 k/uL    RBC 4.14 (L) 4.21 - 5.77 m/uL    Hemoglobin 11.8 (L) 13.0 - 17.0 g/dL    Hematocrit 37.4 (L) 40.7 - 50.3 %    MCV 90.3 82.6 - 102.9 fL    MCH 28.5 25.2 - 33.5 pg    MCHC 31.6 28.4 - 34.8 g/dL    RDW 16.6 (H) 11.8 - 14.4 %    Platelets 152 997 - 351 k/uL    MPV 11.3 8.1 - 13.5 fL    NRBC Automated 0.0 0.0 per 100 WBC   Phosphorus   Result Value Ref Range    Phosphorus 3.0 2.5 - 4.5 mg/dL   PTH, Intact   Result Value Ref Range    Pth Intact 84.5 (H) 14.0 - 72.0 pg/mL   Vitamin D 25 Hydroxy   Result Value Ref Range    Vit D, 25-Hydroxy 25.8 (L) >29.9 ng/mL   Protein / creatinine ratio, urine   Result Value Ref Range    Total Protein, Urine 11 mg/dL    Creatinine, Ur 112.3 39.0 - 259.0 mg/dL    Urine Total Protein Creatinine Ratio 0.10 0.00 - 0.20       :      1. TANNER variably compliant with BiPAP    2. Post-traumatic osteoarthritis of left knee    3. Chronic systolic congestive heart failure (HCC)    4. Class 2 severe obesity due to excess calories with serious comorbidity and body mass index (BMI) of 38.0 to 38.9 in adult ([address])    5. PAF (paroxysmal atrial fibrillation) ([address])    6.  Secondary hypercoagulable state [facility])      Patient Active Problem List   Diagnosis    Chronic kidney

## 2023-09-01 DIAGNOSIS — M1A.00X0 IDIOPATHIC CHRONIC GOUT WITHOUT TOPHUS, UNSPECIFIED SITE: ICD-10-CM

## 2023-09-01 RX ORDER — ALLOPURINOL 300 MG/1
TABLET ORAL
Qty: 30 TABLET | Refills: 5 | Status: SHIPPED | OUTPATIENT
Start: 2023-09-01

## 2023-09-14 ENCOUNTER — HOSPITAL ENCOUNTER (OUTPATIENT)
Dept: OTHER | Age: 68
Discharge: HOME | End: 2023-09-14
Payer: COMMERCIAL

## 2023-09-14 VITALS
WEIGHT: 221.8 LBS | OXYGEN SATURATION: 98 % | HEART RATE: 63 BPM | DIASTOLIC BLOOD PRESSURE: 62 MMHG | RESPIRATION RATE: 20 BRPM | SYSTOLIC BLOOD PRESSURE: 108 MMHG | BODY MASS INDEX: 38.07 KG/M2

## 2023-09-14 PROCEDURE — 99212 OFFICE O/P EST SF 10 MIN: CPT

## 2023-09-14 NOTE — PROGRESS NOTES
Verbally reviewed medication list with patient; patient verbalized understanding. Discussed 2000mg/day sodium restricted diet; patient verbalized understanding. Moderate daily exercise encouraged as tolerated. Discussed rest breaks as needed; patient verbalized understanding. Patient instructed to weigh self at the same time of each day, using same clothes and same scale; reinforced teaching to monitor for 3-5 lb weight increase over 1-2 days, and to notify the CHF clinic at [phone] or physician office if weight change noted. Patient verbalized understanding. Risks of smoking discussed with the patient if applicable; patient strongly discouraged to smoke. Patient verbalized understanding. Signs and symptoms of CHF discussed with patient, such as feeling more tired than normal, feeling short of breath, coughing that increases when you lie down, sudden weight gain, swelling of your feet, legs or belly. Patient verbalized understanding to notify the CHF clinic at [phone] or physician office if these symptoms occur. Compliance with plan of care and further disease process causes discussed with patient, patient encouraged to keep all follow up appointments. Patient verbalized understanding.

## 2023-09-18 ENCOUNTER — OFFICE VISIT (OUTPATIENT)
Dept: PRIMARY CARE CLINIC | Age: 68
End: 2023-09-18
Payer: COMMERCIAL

## 2023-09-18 VITALS
SYSTOLIC BLOOD PRESSURE: 93 MMHG | OXYGEN SATURATION: 98 % | WEIGHT: 221.8 LBS | BODY MASS INDEX: 38.07 KG/M2 | DIASTOLIC BLOOD PRESSURE: 57 MMHG | HEART RATE: 58 BPM

## 2023-09-18 DIAGNOSIS — I25.10 CORONARY ARTERY DISEASE INVOLVING NATIVE CORONARY ARTERY OF NATIVE HEART WITHOUT ANGINA PECTORIS: ICD-10-CM

## 2023-09-18 DIAGNOSIS — I50.42 CHRONIC COMBINED SYSTOLIC AND DIASTOLIC HEART FAILURE (HCC): ICD-10-CM

## 2023-09-18 DIAGNOSIS — E11.8 CONTROLLED TYPE 2 DIABETES MELLITUS WITH COMPLICATION, WITHOUT LONG-TERM CURRENT USE OF INSULIN (HCC): Primary | ICD-10-CM

## 2023-09-18 DIAGNOSIS — M1A.00X0 IDIOPATHIC CHRONIC GOUT WITHOUT TOPHUS, UNSPECIFIED SITE: ICD-10-CM

## 2023-09-18 PROCEDURE — 99214 OFFICE O/P EST MOD 30 MIN: CPT | Performed by: NURSE PRACTITIONER

## 2023-09-18 PROCEDURE — 3044F HG A1C LEVEL LT 7.0%: CPT | Performed by: NURSE PRACTITIONER

## 2023-09-18 PROCEDURE — 1123F ACP DISCUSS/DSCN MKR DOCD: CPT | Performed by: NURSE PRACTITIONER

## 2023-09-18 PROCEDURE — 3078F DIAST BP <80 MM HG: CPT | Performed by: NURSE PRACTITIONER

## 2023-09-18 PROCEDURE — 3074F SYST BP LT 130 MM HG: CPT | Performed by: NURSE PRACTITIONER

## 2023-09-18 ASSESSMENT — ENCOUNTER SYMPTOMS
SHORTNESS OF BREATH: 1
BLURRED VISION: 1

## 2023-09-18 NOTE — PROGRESS NOTES
[address] [facility] PRIMARY CARE  [address] [address] [facility] [address]  Dept: [phone]  Dept Fax: [phone]    Elva Anderson (:  1955) is a 77 y.o. male,Established patient, here for evaluation of the following chief complaint(s):  Congestive Heart Failure (Patient is here for 3 month follow up.)         ASSESSMENT/PLAN:  1. Controlled type 2 diabetes mellitus with complication, without long-term current use of insulin (HCC)  -     Microalbumin, Ur; Future  -     Lipid Panel; Future  -     dapagliflozin (FARXIGA) 10 MG tablet; Take 1 tablet by mouth every morning, Disp-90 tablet, R-1Normal  2. Chronic combined systolic and diastolic heart failure (HCC)  -     Lipid Panel; Future  -     dapagliflozin (FARXIGA) 10 MG tablet; Take 1 tablet by mouth every morning, Disp-90 tablet, R-1Normal  3. Coronary artery disease involving native coronary artery of native heart without angina pectoris  4. Idiopathic chronic gout without tophus, unspecified site    Diabetes well controlled. Discussed adding SGLT2 such as Rosita Mealy given history of CHF and comorbid diabetes. ADRs reviewed. Patient agreeable at this time. Discussed influenza vaccine, patient wishes to obtain with [facility] vaccination from outpatient pharmacy when available    No follow-ups on file. Subjective   SUBJECTIVE/OBJECTIVE:  64-year-old  male is here for a routine follow up. He reports a history of diabetes mellitus, hypertension, he has a history of ischemic cardiomyopathy with multiple stent placed in 2015, ejection fraction recent 15 to 20% status post AICD, patient has a history of atrial fibrillation on Eliquis 5 mg twice a day, is following cardiology regularly, Dr Evans . For diabetes mellitus he is on metformin 1 g twice daily. FBS running 100-110 normally  He denies any shortness of breath, leg swelling, dizziness.   He does attend heart failure

## 2023-09-21 ENCOUNTER — HOSPITAL ENCOUNTER (OUTPATIENT)
Age: 68
Discharge: HOME | End: 2023-09-21
Payer: COMMERCIAL

## 2023-09-21 DIAGNOSIS — I50.42 CHRONIC COMBINED SYSTOLIC AND DIASTOLIC HEART FAILURE (HCC): ICD-10-CM

## 2023-09-21 DIAGNOSIS — E11.8 CONTROLLED TYPE 2 DIABETES MELLITUS WITH COMPLICATION, WITHOUT LONG-TERM CURRENT USE OF INSULIN (HCC): ICD-10-CM

## 2023-09-21 LAB
CHOLEST SERPL-MCNC: 126 MG/DL (ref 0–199)
CHOLESTEROL/HDL RATIO: 2
CREAT UR-MCNC: 38.6 MG/DL (ref 39–259)
HDLC SERPL-MCNC: 55 MG/DL (ref 0–40)
LDLC SERPL CALC-MCNC: 54 MG/DL (ref 0–100)
MICROALBUMIN UR-MCNC: <12 MG/L (ref 0–20)
MICROALBUMIN/CREAT UR-RTO: ABNORMAL MCG/MG CREAT (ref 0–17)
TRIGL SERPL-MCNC: 86 MG/DL (ref 0–149)
VLDLC SERPL CALC-MCNC: 17 MG/DL

## 2023-09-21 PROCEDURE — 82043 UR ALBUMIN QUANTITATIVE: CPT

## 2023-09-21 PROCEDURE — 36415 COLL VENOUS BLD VENIPUNCTURE: CPT

## 2023-09-21 PROCEDURE — 80061 LIPID PANEL: CPT

## 2023-09-21 PROCEDURE — 82570 ASSAY OF URINE CREATININE: CPT

## 2023-09-23 DIAGNOSIS — D50.9 IRON DEFICIENCY ANEMIA, UNSPECIFIED IRON DEFICIENCY ANEMIA TYPE: ICD-10-CM

## 2023-09-25 RX ORDER — FERROUS SULFATE 325(65) MG
1 TABLET ORAL
Qty: 30 TABLET | Refills: 3 | Status: SHIPPED | OUTPATIENT
Start: 2023-09-25

## 2023-10-22 DIAGNOSIS — E11.8 CONTROLLED TYPE 2 DIABETES MELLITUS WITH COMPLICATION, WITHOUT LONG-TERM CURRENT USE OF INSULIN (HCC): ICD-10-CM

## 2023-10-22 DIAGNOSIS — I25.10 CORONARY ARTERY DISEASE INVOLVING NATIVE CORONARY ARTERY OF NATIVE HEART WITHOUT ANGINA PECTORIS: ICD-10-CM

## 2023-10-23 ENCOUNTER — HOSPITAL ENCOUNTER (EMERGENCY)
Age: 68
Discharge: HOME | End: 2023-10-23
Attending: EMERGENCY MEDICINE
Payer: COMMERCIAL

## 2023-10-23 ENCOUNTER — APPOINTMENT (OUTPATIENT)
Dept: CT IMAGING | Age: 68
End: 2023-10-23
Payer: COMMERCIAL

## 2023-10-23 ENCOUNTER — HOSPITAL ENCOUNTER (OUTPATIENT)
Dept: OTHER | Age: 68
Discharge: HOME | End: 2023-10-23
Payer: COMMERCIAL

## 2023-10-23 VITALS
OXYGEN SATURATION: 100 % | BODY MASS INDEX: 37.45 KG/M2 | SYSTOLIC BLOOD PRESSURE: 102 MMHG | HEART RATE: 57 BPM | RESPIRATION RATE: 20 BRPM | WEIGHT: 218.2 LBS | DIASTOLIC BLOOD PRESSURE: 62 MMHG

## 2023-10-23 VITALS
RESPIRATION RATE: 16 BRPM | TEMPERATURE: 97.9 F | DIASTOLIC BLOOD PRESSURE: 56 MMHG | SYSTOLIC BLOOD PRESSURE: 106 MMHG | HEART RATE: 79 BPM | OXYGEN SATURATION: 98 %

## 2023-10-23 DIAGNOSIS — E11.9 TYPE 2 DIABETES MELLITUS WITHOUT COMPLICATION, WITHOUT LONG-TERM CURRENT USE OF INSULIN (HCC): ICD-10-CM

## 2023-10-23 DIAGNOSIS — S01.01XA LACERATION OF SCALP WITHOUT FOREIGN BODY, INITIAL ENCOUNTER: Primary | ICD-10-CM

## 2023-10-23 PROCEDURE — 99284 EMERGENCY DEPT VISIT MOD MDM: CPT

## 2023-10-23 PROCEDURE — 12001 RPR S/N/AX/GEN/TRNK 2.5CM/<: CPT

## 2023-10-23 PROCEDURE — 99212 OFFICE O/P EST SF 10 MIN: CPT

## 2023-10-23 PROCEDURE — 6370000000 HC RX 637 (ALT 250 FOR IP): Performed by: STUDENT IN AN ORGANIZED HEALTH CARE EDUCATION/TRAINING PROGRAM

## 2023-10-23 PROCEDURE — 70450 CT HEAD/BRAIN W/O DYE: CPT

## 2023-10-23 RX ORDER — ASPIRIN 81 MG/1
TABLET ORAL
Qty: 60 TABLET | Refills: 3 | Status: SHIPPED | OUTPATIENT
Start: 2023-10-23

## 2023-10-23 RX ADMIN — Medication 3 ML: at 17:16

## 2023-10-23 ASSESSMENT — ENCOUNTER SYMPTOMS
ABDOMINAL PAIN: 0
SHORTNESS OF BREATH: 0

## 2023-10-23 ASSESSMENT — PAIN - FUNCTIONAL ASSESSMENT: PAIN_FUNCTIONAL_ASSESSMENT: NONE - DENIES PAIN

## 2023-10-23 NOTE — PROGRESS NOTES
Verbally reviewed medication list with patient; patient verbalized understanding. Discussed 2000mg/day sodium restricted diet; patient verbalized understanding. Moderate daily exercise encouraged as tolerated. Discussed rest breaks as needed; patient verbalized understanding. Patient instructed to weigh self at the same time of each day, using same clothes and same scale; reinforced teaching to monitor for 3-5 lb weight increase over 1-2 days, and to notify the CHF clinic at [phone] or physician office if weight change noted. Patient verbalized understanding. Risks of smoking discussed with the patient if applicable; patient strongly discouraged to smoke. Patient verbalized understanding. Signs and symptoms of CHF discussed with patient, such as feeling more tired than normal, feeling short of breath, coughing that increases when you lie down, sudden weight gain, swelling of your feet, legs or belly. Patient verbalized understanding to notify the CHF clinic at [phone] or physician office if these symptoms occur. Compliance with plan of care and further disease process causes discussed with patient, patient encouraged to keep all follow up appointments. Patient verbalized understanding.

## 2023-10-23 NOTE — TELEPHONE ENCOUNTER
Arjun aCdet is calling to request a refill on the following medication(s):    Medication Request:  Requested Prescriptions     Pending Prescriptions Disp Refills    aspirin (ASPIRIN LOW DOSE) 81 MG EC tablet [Pharmacy Med Name: ASPIRIN EC 81 MG TABLET] 60 tablet 3     Sig: take 1 tablet by mouth twice a day    metFORMIN (GLUCOPHAGE) 1000 MG tablet [Pharmacy Med Name: METFORMIN HCL 1,000 MG TABLET] 180 tablet 0     Sig: take 1 tablet by mouth twice a day       Last Visit Date (If Applicable):  8/30/9043    Next Visit Date:    12/18/2023

## 2023-10-23 NOTE — DISCHARGE INSTRUCTIONS
Thank you for coming to [facility] emergency department! You were seen today for cut on your head. Go to your primary care doctor to get your sutures removed in 1 week. If they cannot see you please return to the emergency department. Follow up with your primary care doctor. If you have any worsening of symptoms or any other concerns, please return to the emergency department. We are always available!

## 2023-10-24 DIAGNOSIS — I25.10 CORONARY ARTERY DISEASE INVOLVING NATIVE CORONARY ARTERY OF NATIVE HEART WITHOUT ANGINA PECTORIS: ICD-10-CM

## 2023-10-24 RX ORDER — LANCETS 30 GAUGE
EACH MISCELLANEOUS
Qty: 100 EACH | Refills: 3 | Status: SHIPPED | OUTPATIENT
Start: 2023-10-24

## 2023-10-24 RX ORDER — ATORVASTATIN CALCIUM 40 MG/1
40 TABLET, FILM COATED ORAL DAILY
Qty: 90 TABLET | Refills: 0 | Status: SHIPPED | OUTPATIENT
Start: 2023-10-24

## 2023-10-24 NOTE — ED PROVIDER NOTES
imaging. Jose Davidson.  Fahad Oakley MD, [facility]  Attending Emergency  Physician                Amisha Noyola MD  10/23/23 3607
Punctate area of bright red bleeding however not pulsatile on the parietal scalp     Right Ear: External ear normal.      Left Ear: External ear normal.      Nose: Nose normal.      Mouth/Throat:      Mouth: Mucous membranes are moist.   Eyes:      Extraocular Movements: Extraocular movements intact. Conjunctiva/sclera: Conjunctivae normal.      Pupils: Pupils are equal, round, and reactive to light. Cardiovascular:      Rate and Rhythm: Normal rate. Pulses: Normal pulses. Pulmonary:      Effort: Pulmonary effort is normal. No respiratory distress. Abdominal:      General: Abdomen is flat. There is no distension. Palpations: Abdomen is soft. Tenderness: There is no abdominal tenderness. There is no guarding or rebound. Musculoskeletal:         General: No swelling. Cervical back: No tenderness. Skin:     General: Skin is warm. Capillary Refill: Capillary refill takes less than 2 seconds. Neurological:      Mental Status: He is alert and oriented to person, place, and time. Cranial Nerves: No cranial nerve deficit. Motor: No weakness. Gait: Gait normal.   Psychiatric:         Mood and Affect: Mood normal.           DDX/DIAGNOSTIC RESULTS / EMERGENCY DEPARTMENT COURSE / MDM     Medical Decision Making  This is a 75-year-old male with a history of atrial fibrillation on Eliquis presenting with bleeding. Patient does not remember the mechanism of injury which is concerning and due to him being on blood thinners cannot be ruled out with [place] CT head rules. Will obtain a CT of his head for further evaluation and place a figure of 8 stitch to stop bleeding. Amount and/or Complexity of Data Reviewed  Independent Historian: caregiver  External Data Reviewed: notes. Radiology: ordered. Decision-making details documented in ED Course. Risk  Prescription drug management.         EMERGENCY DEPARTMENT COURSE:    ED Course as of 10/23/23 2300   Mon Oct 23, 2023

## 2023-10-31 ENCOUNTER — OFFICE VISIT (OUTPATIENT)
Dept: PRIMARY CARE CLINIC | Age: 68
End: 2023-10-31
Payer: COMMERCIAL

## 2023-10-31 VITALS
OXYGEN SATURATION: 100 % | HEART RATE: 56 BPM | SYSTOLIC BLOOD PRESSURE: 98 MMHG | WEIGHT: 218 LBS | BODY MASS INDEX: 37.42 KG/M2 | DIASTOLIC BLOOD PRESSURE: 63 MMHG

## 2023-10-31 DIAGNOSIS — Z48.02 VISIT FOR SUTURE REMOVAL: Primary | ICD-10-CM

## 2023-10-31 PROCEDURE — 3074F SYST BP LT 130 MM HG: CPT | Performed by: NURSE PRACTITIONER

## 2023-10-31 PROCEDURE — 99213 OFFICE O/P EST LOW 20 MIN: CPT | Performed by: NURSE PRACTITIONER

## 2023-10-31 PROCEDURE — 1123F ACP DISCUSS/DSCN MKR DOCD: CPT | Performed by: NURSE PRACTITIONER

## 2023-10-31 PROCEDURE — 3078F DIAST BP <80 MM HG: CPT | Performed by: NURSE PRACTITIONER

## 2023-10-31 NOTE — PROGRESS NOTES
[address] [facility] PRIMARY CARE  [address] [address] [address] [facility] [address]  Dept: [phone]  Dept Fax: [phone]    Santiago Ewing (:  1955) is a 77 y.o. male,Established patient, here for evaluation of the following chief complaint(s):  Suture / Staple Removal (Patient is here today for suture removal )    Santiago Ewing is a 59-year-old male here today for suture removal.  Presented to the ER on 10/23/23 for evaluation of head laceration. Unknown mechanism of injury. Due to treatment of Eliquis, patient was bleeding and did have one figure-of-eight stitch placed to stop bleeding. ASSESSMENT/PLAN:  1. Visit for suture removal    Suture removed without complication. Patient did initially have mild bleeding due to use of blood thinners. Pressure applied for 2 minutes, no further bleeding observed. No follow-ups on file. Subjective   SUBJECTIVE/OBJECTIVE:  Terri Burgess presents today for suture removal from his scalp. He now recalls how he injured his head, that 1 day prior to his ER visit he was outside and recalls getting close to a bush or tree that did scrape his head. He was wearing a hat at the time and did not recall injuring himself but believes this caused a laceration.         Review of Systems       Objective     DIAGNOSTIC FINDINGS:  CBC:  Lab Results   Component Value Date/Time    WBC 6.3 2023 09:50 AM    HGB 11.8 2023 09:50 AM     2023 09:50 AM     2011 09:43 AM       BMP:    Lab Results   Component Value Date/Time     2023 09:50 AM    K 4.3 2023 09:50 AM     2023 09:50 AM    CO2 24 2023 09:50 AM    BUN 24 2023 09:50 AM    CREATININE 1.3 2023 09:50 AM    GLUCOSE 119 2023 09:50 AM    GLUCOSE 123 2012 10:46 AM       HEMOGLOBIN A1C:   Lab Results   Component Value Date/Time    LABA1C 6.5 2023 10:40 AM

## 2023-11-19 DIAGNOSIS — I50.42 CHRONIC COMBINED SYSTOLIC AND DIASTOLIC HEART FAILURE (HCC): ICD-10-CM

## 2023-11-20 RX ORDER — ISOSORBIDE MONONITRATE 30 MG/1
TABLET, EXTENDED RELEASE ORAL
Qty: 30 TABLET | Refills: 5 | Status: SHIPPED | OUTPATIENT
Start: 2023-11-20

## 2023-11-25 ENCOUNTER — HOSPITAL ENCOUNTER (EMERGENCY)
Age: 68
Discharge: HOME | End: 2023-11-25
Attending: EMERGENCY MEDICINE
Payer: COMMERCIAL

## 2023-11-25 ENCOUNTER — APPOINTMENT (OUTPATIENT)
Dept: GENERAL RADIOLOGY | Age: 68
End: 2023-11-25
Payer: COMMERCIAL

## 2023-11-25 VITALS
DIASTOLIC BLOOD PRESSURE: 68 MMHG | HEART RATE: 78 BPM | RESPIRATION RATE: 13 BRPM | BODY MASS INDEX: 37.05 KG/M2 | OXYGEN SATURATION: 96 % | WEIGHT: 217 LBS | TEMPERATURE: 97.6 F | SYSTOLIC BLOOD PRESSURE: 118 MMHG | HEIGHT: 64 IN

## 2023-11-25 DIAGNOSIS — R00.2 HEART PALPITATIONS: Primary | ICD-10-CM

## 2023-11-25 DIAGNOSIS — I48.0 PAF (PAROXYSMAL ATRIAL FIBRILLATION) (HCC): ICD-10-CM

## 2023-11-25 LAB
ANION GAP SERPL CALCULATED.3IONS-SCNC: 15 MMOL/L (ref 9–17)
BASOPHILS # BLD: 0.05 K/UL (ref 0–0.2)
BASOPHILS NFR BLD: 1 % (ref 0–2)
BNP SERPL-MCNC: 2795 PG/ML
BUN SERPL-MCNC: 19 MG/DL (ref 8–23)
CALCIUM SERPL-MCNC: 9.2 MG/DL (ref 8.6–10.4)
CHLORIDE SERPL-SCNC: 98 MMOL/L (ref 98–107)
CO2 SERPL-SCNC: 27 MMOL/L (ref 20–31)
CREAT SERPL-MCNC: 1.1 MG/DL (ref 0.7–1.2)
EOSINOPHIL # BLD: 0.27 K/UL (ref 0–0.44)
EOSINOPHILS RELATIVE PERCENT: 4 % (ref 1–4)
ERYTHROCYTE [DISTWIDTH] IN BLOOD BY AUTOMATED COUNT: 16 % (ref 11.8–14.4)
GFR SERPL CREATININE-BSD FRML MDRD: >60 ML/MIN/1.73M2
GLUCOSE SERPL-MCNC: 168 MG/DL (ref 70–99)
HCT VFR BLD AUTO: 45.1 % (ref 40.7–50.3)
HGB BLD-MCNC: 14 G/DL (ref 13–17)
IMM GRANULOCYTES # BLD AUTO: <0.03 K/UL (ref 0–0.3)
IMM GRANULOCYTES NFR BLD: 0 %
LYMPHOCYTES NFR BLD: 1.84 K/UL (ref 1.1–3.7)
LYMPHOCYTES RELATIVE PERCENT: 27 % (ref 24–43)
MCH RBC QN AUTO: 27.9 PG (ref 25.2–33.5)
MCHC RBC AUTO-ENTMCNC: 31 G/DL (ref 28.4–34.8)
MCV RBC AUTO: 89.8 FL (ref 82.6–102.9)
MONOCYTES NFR BLD: 0.68 K/UL (ref 0.1–1.2)
MONOCYTES NFR BLD: 10 % (ref 3–12)
NEUTROPHILS NFR BLD: 58 % (ref 36–65)
NEUTS SEG NFR BLD: 3.88 K/UL (ref 1.5–8.1)
NRBC BLD-RTO: 0 PER 100 WBC
PLATELET # BLD AUTO: ABNORMAL K/UL (ref 138–453)
PLATELET, FLUORESCENCE: 167 K/UL (ref 138–453)
PLATELETS.RETICULATED NFR BLD AUTO: 3.4 % (ref 1.1–10.3)
POTASSIUM SERPL-SCNC: 3.5 MMOL/L (ref 3.7–5.3)
RBC # BLD AUTO: 5.02 M/UL (ref 4.21–5.77)
RBC # BLD: ABNORMAL 10*6/UL
SODIUM SERPL-SCNC: 140 MMOL/L (ref 135–144)
TROPONIN I SERPL HS-MCNC: 18 NG/L (ref 0–22)
TROPONIN I SERPL HS-MCNC: 20 NG/L (ref 0–22)
WBC OTHER # BLD: 6.7 K/UL (ref 3.5–11.3)

## 2023-11-25 PROCEDURE — 84484 ASSAY OF TROPONIN QUANT: CPT

## 2023-11-25 PROCEDURE — 71045 X-RAY EXAM CHEST 1 VIEW: CPT

## 2023-11-25 PROCEDURE — 99285 EMERGENCY DEPT VISIT HI MDM: CPT

## 2023-11-25 PROCEDURE — 85055 RETICULATED PLATELET ASSAY: CPT

## 2023-11-25 PROCEDURE — 93005 ELECTROCARDIOGRAM TRACING: CPT | Performed by: STUDENT IN AN ORGANIZED HEALTH CARE EDUCATION/TRAINING PROGRAM

## 2023-11-25 PROCEDURE — 83880 ASSAY OF NATRIURETIC PEPTIDE: CPT

## 2023-11-25 PROCEDURE — 85025 COMPLETE CBC W/AUTO DIFF WBC: CPT

## 2023-11-25 PROCEDURE — 80048 BASIC METABOLIC PNL TOTAL CA: CPT

## 2023-11-25 ASSESSMENT — LIFESTYLE VARIABLES
HOW MANY STANDARD DRINKS CONTAINING ALCOHOL DO YOU HAVE ON A TYPICAL DAY: PATIENT DOES NOT DRINK
HOW OFTEN DO YOU HAVE A DRINK CONTAINING ALCOHOL: NEVER

## 2023-11-25 ASSESSMENT — ENCOUNTER SYMPTOMS
NAUSEA: 0
ABDOMINAL PAIN: 0
SHORTNESS OF BREATH: 0
VOMITING: 0
BACK PAIN: 0

## 2023-11-25 ASSESSMENT — HEART SCORE: ECG: 1

## 2023-11-25 ASSESSMENT — PAIN - FUNCTIONAL ASSESSMENT: PAIN_FUNCTIONAL_ASSESSMENT: NONE - DENIES PAIN

## 2023-11-25 NOTE — ED NOTES
Pt came in to ER via triage with c/o elevated BP accompanied by palpitations and pounding chest started an hour ago. Patient states that he has history of Afib and has internal defibrillator. Pt denies chest pain, n/v/d. Pt alert and oriented x 4. Pt was seen and examined by resident and Attending doctor. Pt placed on continuous cardiac monitoring, BP, Pulse ox. EKG obtained. IV established and labs drawn. Call light within reach.       Álvaro Polanco RN  11/25/23 5374

## 2023-11-25 NOTE — DISCHARGE INSTRUCTIONS
You were evaluated in the emergency department for heart palpitations. Your cardiac work-up did not reveal any acute abnormalities. Given your moderate risk for potential out of hospital adverse cardiac events, admission to observation service for evaluation by cardiology team was offered to you. You stated that you would like to be discharged home and follow-up with your cardiologist on an outpatient basis. Please follow-up with your cardiologist as soon as possible. Please follow-up with your primary care physician. Please return to the emergency department for any worsening symptoms, questions or concerns.

## 2023-11-25 NOTE — ED NOTES
The following labs were labeled with appropriate pt sticker and tubed to lab:     [x] Blue     [x] Lavender   [] on ice  [x] Green/yellow  [x] Green/black [] on ice  [] [place] Sly  [] on ice  [] Yellow  [] Red  [] Pink  [] Type/ Screen  [] ABG  [] VBG    [] COVID-19 swab    [] Rapid  [] PCR  [] Flu swab  [] Peds Viral Panel     [] Urine Sample  [] Fecal Sample  [] Pelvic Cultures  [] Blood Cultures  [] X 2  [] STREP Cultures      Meek Holt, RN  11/25/23 7142

## 2023-11-25 NOTE — ED PROVIDER NOTES
Decision-making details documented in ED Course. ECG/medicine tests: ordered. EKG  Rhythm: normal sinus   Rate: tachycardia  Axis: left  Ectopy: premature supraventricular complexes  Conduction: nonspecific interventricular conduction block  ST Segments: no acute change  T Waves: no acute change  Q Waves: nonspecific    EKG  Impression: non-specific EKG  All EKG's are interpreted by the Emergency Department Physician who either signs or Co-signs this chart in the absence of a cardiologist.    EMERGENCY DEPARTMENT COURSE:    ED Course as of 11/25/23 1609   Sat Nov 25, 2023   0941 CBC with Auto Differential(!):    WBC 6.7   RBC 5.02   Hemoglobin Quant 14.0   Hematocrit 45.1   MCV 89.8   MCH 27.9   MCHC 31.0   RDW 16.0(!)   Platelet Count See Reflexed IPF Result   Platelet, Fluorescence PENDING   Platelet, Immature Fraction PENDING   NRBC Automated 0.0   RBC Morphology ANISOCYTOSIS PRESENT   Neutrophils % 58   Lymphocyte % 27   Monocytes % 10   Eosinophils % 4   Basophils % 1   Immature Granulocytes 0   Neutrophils Absolute 3.88   Lymphocytes Absolute 1.84   Monocytes Absolute 0.68   Eosinophils Absolute 0.27   Basophils Absolute 0.05   Absolute Immature Granulocyte <0.03 [AR]   0954 Pro-BNP(!): 2,795  Baseline, will correlate with chest x-ray for concerns for pulmonary vascular congestion [AR]   0955 Troponin, High Sensitivity: 20  Slightly higher than baseline, however last troponin on record was 2 years ago. Patient denying any chest pain, shortness of breath, back pain, nausea or vomiting, diaphoresis. Currently not endorsing any symptoms. We will continue to trend. No concern for ischemia on EKG.  [AR]   0958 XR CHEST PORTABLE [AR]   1067 Basic Metabolic Panel(!):    Sodium 140   Potassium 3.5(!)   Chloride 98   CO2 27   Anion Gap 15   Glucose, Random 168(!)   BUN,BUNPL 19   Creatinine 1.1   Est, Glom Filt Rate >60   CALCIUM, SERUM, 306713 9.2 [AR]   1026 Received call from [facility] for interrogated Resident    (Please note that portions of this note were completed with a voice recognition program.  Efforts were made to edit the dictations but occasionally words are mis-transcribed.)       Jose Smith,   Resident  11/25/23 1201

## 2023-11-25 NOTE — ED PROVIDER NOTES
Care  None          Ernesto Samano MD    Attending Emergency Medicine Physician            Alexi Low MD  11/25/23 8260

## 2023-11-25 NOTE — ED NOTES
Portable xray at bedside. Tolerated well.  Will continue to monitor      Meek Holt RN  11/25/23 5674

## 2023-11-25 NOTE — ED NOTES
Internal defibrillator interrogation was done. Informed Dr. Riley Needs about it.       Pebbles Brantley, RN  11/25/23 6453

## 2023-11-28 LAB
EKG ATRIAL RATE: 101 BPM
EKG P AXIS: 29 DEGREES
EKG P-R INTERVAL: 208 MS
EKG Q-T INTERVAL: 334 MS
EKG QRS DURATION: 138 MS
EKG QTC CALCULATION (BAZETT): 433 MS
EKG R AXIS: -60 DEGREES
EKG T AXIS: 106 DEGREES
EKG VENTRICULAR RATE: 101 BPM

## 2023-12-11 ENCOUNTER — HOSPITAL ENCOUNTER (OUTPATIENT)
Dept: OTHER | Age: 68
Discharge: HOME | End: 2023-12-11
Payer: COMMERCIAL

## 2023-12-11 VITALS
RESPIRATION RATE: 20 BRPM | HEART RATE: 65 BPM | WEIGHT: 214.2 LBS | SYSTOLIC BLOOD PRESSURE: 116 MMHG | OXYGEN SATURATION: 98 % | DIASTOLIC BLOOD PRESSURE: 72 MMHG | BODY MASS INDEX: 36.77 KG/M2

## 2023-12-11 PROCEDURE — 99212 OFFICE O/P EST SF 10 MIN: CPT

## 2024-01-06 DIAGNOSIS — I50.42 CHRONIC COMBINED SYSTOLIC AND DIASTOLIC HEART FAILURE (HCC): ICD-10-CM

## 2024-01-08 RX ORDER — FUROSEMIDE 40 MG/1
40 TABLET ORAL 2 TIMES DAILY
Qty: 180 TABLET | Refills: 1 | Status: SHIPPED | OUTPATIENT
Start: 2024-01-08

## 2024-01-10 ENCOUNTER — HOSPITAL ENCOUNTER (OUTPATIENT)
Dept: OTHER | Age: 69
Discharge: HOME | End: 2024-01-10
Payer: COMMERCIAL

## 2024-01-10 VITALS — HEART RATE: 74 BPM

## 2024-01-10 DIAGNOSIS — D50.9 IRON DEFICIENCY ANEMIA, UNSPECIFIED IRON DEFICIENCY ANEMIA TYPE: ICD-10-CM

## 2024-01-10 PROCEDURE — 99212 OFFICE O/P EST SF 10 MIN: CPT

## 2024-01-10 RX ORDER — FERROUS SULFATE 325(65) MG
1 TABLET ORAL
Qty: 30 TABLET | Refills: 3 | Status: SHIPPED | OUTPATIENT
Start: 2024-01-10

## 2024-01-10 NOTE — PROGRESS NOTES
Date:  1/10/2024  Time:  2:08 PM    CHF Clinic at [facility]    Office: [phone] Fax: [phone]    Re:  Claudio Graham   Patient : 1955    Vital Signs: Pulse 74                                                   No results for input(s): \"CBC\", \"HGB\", \"HCT\", \"WBC\", \"PLATELET\", \"NA\", \"K\", \"CL\", \"CO2\", \"BUN\", \"CREATININE\", \"GLUCOSE\", \"BNP\", \"INR\" in the last 72 hours.     Respiratory:    Assessment  Charting Type: Reassessment    Breath Sounds  Right Upper Lobe: Clear  Right Middle Lobe: Clear  Right Lower Lobe: Clear  Left Upper Lobe: Clear  Left Lower Lobe: Clear    Cough/Sputum  Cough: Dry  Frequency: Infrequent         Peripheral Vascular  RLE Edema: None  LLE Edema: None    Future Appointments   Date Time Provider Department Center   2024  1:10 PM Yadira Guidry, LIDIA - CNP [facility] None   2024  1:00 PM [facility] CHF CLINIC  1 [facility]   3/11/2024  1:00 PM [facility]   2024  3:15 PM [facility] D[facility]   2024  1:30 PM Vanessa Mckenna APRN - CNP [facility]      Complaints: Nothing new      Comment : Patient here ambulatory for routine visit. Weight incresad 2 lbs in one month. No new or acute s/s CHF. States compliance with low salt diet, fluid limits and medications. Lasix is 40 mg 2x day. Follows with Dr Nj for cardiology. Next visit here 6 weeks. Encroached to phone here for any new s/s CHF.     Electronically signed by DEVIKA BENDER RN on 1/10/2024 at 2:08 PM

## 2024-01-15 DIAGNOSIS — E11.8 CONTROLLED TYPE 2 DIABETES MELLITUS WITH COMPLICATION, WITHOUT LONG-TERM CURRENT USE OF INSULIN (HCC): ICD-10-CM

## 2024-01-15 DIAGNOSIS — I25.10 CORONARY ARTERY DISEASE INVOLVING NATIVE CORONARY ARTERY OF NATIVE HEART WITHOUT ANGINA PECTORIS: ICD-10-CM

## 2024-01-15 RX ORDER — ATORVASTATIN CALCIUM 40 MG/1
40 TABLET, FILM COATED ORAL DAILY
Qty: 90 TABLET | Refills: 0 | Status: SHIPPED | OUTPATIENT
Start: 2024-01-15

## 2024-02-06 DIAGNOSIS — M1A.00X0 IDIOPATHIC CHRONIC GOUT WITHOUT TOPHUS, UNSPECIFIED SITE: ICD-10-CM

## 2024-02-06 DIAGNOSIS — I25.10 CORONARY ARTERY DISEASE INVOLVING NATIVE CORONARY ARTERY OF NATIVE HEART WITHOUT ANGINA PECTORIS: ICD-10-CM

## 2024-02-06 RX ORDER — ALLOPURINOL 300 MG/1
TABLET ORAL
Qty: 30 TABLET | Refills: 5 | Status: SHIPPED | OUTPATIENT
Start: 2024-02-06

## 2024-02-06 RX ORDER — ASPIRIN 81 MG/1
TABLET ORAL
Qty: 60 TABLET | Refills: 3 | Status: SHIPPED | OUTPATIENT
Start: 2024-02-06

## 2024-03-02 DIAGNOSIS — E11.9 TYPE 2 DIABETES MELLITUS WITHOUT COMPLICATION, WITHOUT LONG-TERM CURRENT USE OF INSULIN (HCC): ICD-10-CM

## 2024-03-02 DIAGNOSIS — E11.8 CONTROLLED TYPE 2 DIABETES MELLITUS WITH COMPLICATION, WITHOUT LONG-TERM CURRENT USE OF INSULIN (HCC): ICD-10-CM

## 2024-03-02 DIAGNOSIS — I50.42 CHRONIC COMBINED SYSTOLIC AND DIASTOLIC HEART FAILURE (HCC): ICD-10-CM

## 2024-03-04 RX ORDER — DAPAGLIFLOZIN 10 MG/1
10 TABLET, FILM COATED ORAL EVERY MORNING
Qty: 90 TABLET | Refills: 1 | Status: SHIPPED | OUTPATIENT
Start: 2024-03-04

## 2024-03-04 RX ORDER — BLOOD SUGAR DIAGNOSTIC
STRIP MISCELLANEOUS
Qty: 100 STRIP | Refills: 2 | Status: SHIPPED | OUTPATIENT
Start: 2024-03-04

## 2024-03-05 ENCOUNTER — HOSPITAL ENCOUNTER (OUTPATIENT)
Age: 69
Setting detail: SPECIMEN
Discharge: HOME | End: 2024-03-05
Payer: COMMERCIAL

## 2024-03-05 DIAGNOSIS — D47.2 MGUS (MONOCLONAL GAMMOPATHY OF UNKNOWN SIGNIFICANCE): ICD-10-CM

## 2024-03-05 LAB
ALBUMIN SERPL-MCNC: 3.9 G/DL (ref 3.5–5.2)
ALP SERPL-CCNC: 91 U/L (ref 40–129)
ALT SERPL-CCNC: 11 U/L (ref 5–41)
ANION GAP SERPL CALCULATED.3IONS-SCNC: 9 MMOL/L (ref 9–17)
AST SERPL-CCNC: 20 U/L
BASOPHILS # BLD: 0.06 K/UL (ref 0–0.2)
BASOPHILS NFR BLD: 1 % (ref 0–2)
BILIRUB SERPL-MCNC: 0.3 MG/DL (ref 0.3–1.2)
BUN SERPL-MCNC: 25 MG/DL (ref 8–23)
BUN/CREAT SERPL: 21 (ref 9–20)
CALCIUM SERPL-MCNC: 8.9 MG/DL (ref 8.6–10.4)
CHLORIDE SERPL-SCNC: 103 MMOL/L (ref 98–107)
CO2 SERPL-SCNC: 29 MMOL/L (ref 20–31)
CREAT SERPL-MCNC: 1.2 MG/DL (ref 0.7–1.2)
EOSINOPHIL # BLD: 0.24 K/UL (ref 0–0.44)
EOSINOPHILS RELATIVE PERCENT: 4 % (ref 1–4)
ERYTHROCYTE [DISTWIDTH] IN BLOOD BY AUTOMATED COUNT: 16.9 % (ref 11.8–14.4)
FREE KAPPA/LAMBDA RATIO: 1.16 (ref 0.26–1.65)
GFR SERPL CREATININE-BSD FRML MDRD: >60 ML/MIN/1.73M2
GLUCOSE SERPL-MCNC: 110 MG/DL (ref 70–99)
HCT VFR BLD AUTO: 41.8 % (ref 40.7–50.3)
HGB BLD-MCNC: 12.7 G/DL (ref 13–17)
IMM GRANULOCYTES # BLD AUTO: 0.02 K/UL (ref 0–0.3)
IMM GRANULOCYTES NFR BLD: 0 %
KAPPA LC FREE SER-MCNC: 49.9 MG/L (ref 3.7–19.4)
LAMBDA LC FREE SERPL-MCNC: 43.1 MG/L (ref 5.7–26.3)
LYMPHOCYTES NFR BLD: 1.18 K/UL (ref 1.1–3.7)
LYMPHOCYTES RELATIVE PERCENT: 18 % (ref 24–43)
MCH RBC QN AUTO: 27.6 PG (ref 25.2–33.5)
MCHC RBC AUTO-ENTMCNC: 30.4 G/DL (ref 28.4–34.8)
MCV RBC AUTO: 90.9 FL (ref 82.6–102.9)
MONOCYTES NFR BLD: 0.63 K/UL (ref 0.1–1.2)
MONOCYTES NFR BLD: 10 % (ref 3–12)
NEUTROPHILS NFR BLD: 67 % (ref 36–65)
NEUTS SEG NFR BLD: 4.43 K/UL (ref 1.5–8.1)
NRBC BLD-RTO: 0 PER 100 WBC
PLATELET # BLD AUTO: 161 K/UL (ref 138–453)
PMV BLD AUTO: 10.7 FL (ref 8.1–13.5)
POTASSIUM SERPL-SCNC: 4.6 MMOL/L (ref 3.7–5.3)
PROT SERPL-MCNC: 7.3 G/DL (ref 6.4–8.3)
RBC # BLD AUTO: 4.6 M/UL (ref 4.21–5.77)
RBC # BLD: ABNORMAL 10*6/UL
SODIUM SERPL-SCNC: 141 MMOL/L (ref 135–144)
WBC OTHER # BLD: 6.6 K/UL (ref 3.5–11.3)

## 2024-03-05 PROCEDURE — 85025 COMPLETE CBC W/AUTO DIFF WBC: CPT

## 2024-03-05 PROCEDURE — 36415 COLL VENOUS BLD VENIPUNCTURE: CPT

## 2024-03-05 PROCEDURE — 83521 IG LIGHT CHAINS FREE EACH: CPT

## 2024-03-05 PROCEDURE — 80053 COMPREHEN METABOLIC PANEL: CPT

## 2024-03-11 ENCOUNTER — OFFICE VISIT (OUTPATIENT)
Dept: ONCOLOGY | Age: 69
End: 2024-03-11
Payer: COMMERCIAL

## 2024-03-11 ENCOUNTER — TELEPHONE (OUTPATIENT)
Dept: ONCOLOGY | Age: 69
End: 2024-03-11

## 2024-03-11 VITALS
TEMPERATURE: 96.9 F | SYSTOLIC BLOOD PRESSURE: 82 MMHG | DIASTOLIC BLOOD PRESSURE: 49 MMHG | RESPIRATION RATE: 16 BRPM | BODY MASS INDEX: 37.2 KG/M2 | WEIGHT: 216.7 LBS | HEART RATE: 56 BPM

## 2024-03-11 DIAGNOSIS — Q64.31 URETHRA OR BLADDER NECK ATRESIA OR STENOSIS: ICD-10-CM

## 2024-03-11 DIAGNOSIS — I50.22 CHRONIC SYSTOLIC (CONGESTIVE) HEART FAILURE (HCC): ICD-10-CM

## 2024-03-11 DIAGNOSIS — D47.2 MGUS (MONOCLONAL GAMMOPATHY OF UNKNOWN SIGNIFICANCE): Primary | ICD-10-CM

## 2024-03-11 PROBLEM — D68.69 SECONDARY HYPERCOAGULABLE STATE (HCC): Status: RESOLVED | Noted: 2023-08-28 | Resolved: 2024-03-11

## 2024-03-11 PROCEDURE — 3078F DIAST BP <80 MM HG: CPT | Performed by: INTERNAL MEDICINE

## 2024-03-11 PROCEDURE — 99211 OFF/OP EST MAY X REQ PHY/QHP: CPT

## 2024-03-11 PROCEDURE — 99214 OFFICE O/P EST MOD 30 MIN: CPT | Performed by: INTERNAL MEDICINE

## 2024-03-11 PROCEDURE — 1123F ACP DISCUSS/DSCN MKR DOCD: CPT | Performed by: INTERNAL MEDICINE

## 2024-03-11 PROCEDURE — 3074F SYST BP LT 130 MM HG: CPT | Performed by: INTERNAL MEDICINE

## 2024-03-11 NOTE — TELEPHONE ENCOUNTER
AUDREY HERE FOR FOLLOW UP   Return in 1 year, needs CBC CMP SPEP and light chains before next visit  WE WILL CALL PATIENT TO SCHEDULE AN APPOINTMENT   LABS MAILED TO PT   AVS PRINTED AND GIVEN ON EXIT

## 2024-03-11 NOTE — PROGRESS NOTES
DIAGNOSIS:   MGUS  CURRENT THERAPY:  Observation      REASON FOR VISIT:  Follow-up visit on MGUS.     SUMMARY OF THE CASE:  He is a 69 y.o. patient who has multiple comorbidities in the form of cardiomyopathy, hypertension, diabetes, morbid obesity, and mild renal insufficiency that resolved completely.  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      INTERIM HISTORY: Patient is here today for the annual follow-up regarding MGUS and to review lab work. He reports he is doing well currently with no issues.  He is doing well.  Cardiac condition remains stable.  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  He is going to urology what the plan is to proceed with cystoscopy and possibly dilatation of the stenosis.  He is very nervous about it and he postponed the procedure a couple of times.  I encouraged him to proceed with the testing and intervention for the urethral stenosis.      He denies any fever or chills or night sweats.  No weight loss.  No bone pain.  PAST MEDICAL HISTORY: Significant for coronary artery disease, MI, ischemic cardiomyopathy, sleep apnea, diabetes, hypertension and resolved acute renal failure and hyperkalemia.     CURRENT MEDICATIONS: Outpatient medications including Lasix, lisinopril, Zocor, allopurinol, aspirin, Plavix, iron, Lopressor, Prilosec, trazodone.     SOCIAL HISTORY:  He is nonsmoker or drinker at this point but he quit a few years ago. He used to work as a  in the past. He is unemployed. He lives with a roommate.     FAMILY HISTORY: Significant for coronary artery disease and diabetes as well.     REVIEW OF SYSTEMS:     Constitutional: No fever or chills. No night sweats, no weight loss   HEENT: negative for sore mouth, sore throat, hoarseness and voice change   Respiratory: negative for cough , sputum, dyspnea, wheezing, hemoptysis, chest pain   Cardiovascular: negative for

## 2024-03-13 DIAGNOSIS — I48.0 PAF (PAROXYSMAL ATRIAL FIBRILLATION) (HCC): ICD-10-CM

## 2024-03-15 ENCOUNTER — HOSPITAL ENCOUNTER (OUTPATIENT)
Dept: OTHER | Age: 69
Discharge: HOME | End: 2024-03-15
Payer: COMMERCIAL

## 2024-03-15 VITALS
HEART RATE: 67 BPM | SYSTOLIC BLOOD PRESSURE: 104 MMHG | DIASTOLIC BLOOD PRESSURE: 70 MMHG | WEIGHT: 214.2 LBS | BODY MASS INDEX: 36.77 KG/M2 | RESPIRATION RATE: 20 BRPM | OXYGEN SATURATION: 99 %

## 2024-03-15 PROCEDURE — 99212 OFFICE O/P EST SF 10 MIN: CPT

## 2024-03-15 NOTE — PROGRESS NOTES
Date:  3/15/2024  Time:  1:26 PM    CHF Clinic at [facility]    Office: [phone] Fax: [phone]    Re:  Claudio Graham   Patient : 1955    Vital Signs: /70   Pulse 67   Resp 20   Wt 97.2 kg (214 lb 3.2 oz)   SpO2 99%   BMI 36.77 kg/m²                       O2 Device: None (Room air)                           No results for input(s): \"CBC\", \"HGB\", \"HCT\", \"WBC\", \"PLATELET\", \"NA\", \"K\", \"CL\", \"CO2\", \"BUN\", \"CREATININE\", \"GLUCOSE\", \"BNP\", \"INR\" in the last 72 hours.     Respiratory:    Assessment  Charting Type: Reassessment    Breath Sounds  Right Upper Lobe: Clear  Right Middle Lobe: Clear  Right Lower Lobe: Clear  Left Upper Lobe: Clear  Left Lower Lobe: Clear    Cough/Sputum  Cough: Dry  Frequency: Infrequent         Peripheral Vascular  RLE Edema: None  LLE Edema: None    Future Appointments   Date Time Provider Department Center   2024  3:15 PM Venecia Ordonez DPM [facility] Podiatry [facility]   2024  1:30 PM Vanessa Mckenna APRN - CNP [facility]   2024  1:00 PM [facility] CHF CLINIC RM 1 [facility] CHF CLI [facility]   2024  1:30 PM Yadira Guidry APRN - CNP [facility] None   2024  1:30 PM Radha Shankar MD Resp Spec [id number]      Complaints: Nothing new      Comment : Patient here ambulatory for routine visit. Weight down 2 lbs in one month. No new or acute s/s CHF. States compliance with low salt diet, fluid limits and medications. See's Dr Nj for cardiology. Next visit here 6 weeks. Encouraged to phone CHF clinic for any any s/s CHF.     Electronically signed by DEVIKA BENDER RN on 3/15/2024 at 1:26 PM

## 2024-03-29 NOTE — PROGRESS NOTES
Patient instructed to remove shoes and socks and instructed to sit in exam chair.  Current PCP is Vanessa Mckenna APRN - CNP and date of last visit was 12/18/2023.   Do you have a follow up visit scheduled?  Yes  If yes, the date is 04/22/2024.      Diabetic visit information    Blood pressure (Control is BP <140/90)  BP Readings from Last 3 Encounters:   03/15/24 104/70   03/11/24 (!) 82/49   02/13/24 100/60       BP taken with correct size cuff? - Yes   Repeated if > 140/90 Yes      Tobacco use:  Patient  reports that he quit smoking about 50 years ago. His smoking use included cigarettes. He started smoking about 53 years ago. He has a 3.0 pack-year smoking history. He has never used smokeless tobacco.  If Smoker - Cessation materials given?- Yes       Diabetic Health Maintenance Items due  Diabetes Management   Topic Date Due    Diabetic retinal exam  11/15/2020    Diabetic foot exam  01/06/2022       Diabetic retinal exam done in last year? - Yes   If No: remind patient that it is due and they should schedule an exam    Medications  Is patient taking any medications for diabetes? -   Yes  Have blood sugars been controlled?   Fasting blood sugars under 120   -   Yes   Random home sugars or today's POCT glucose is under 180 -   Yes   []  If No to the above then patient should schedule appt with PCP.     Diabetic Plan    A1C Plan  Lab Results   Component Value Date    LABA1C 6.1 (H) 12/18/2023    LABA1C 6.5 (H) 03/02/2023    LABA1C 6.0 06/28/2022      []  If A1C over 8 and last result >3 months ago - Order A1C and refer to PCP   []  If last A1C over 6 months ago - Order A1C and refer to PCP for follow up   []  If elevated blood sugars > 180 - refer to PCP for follow up    []  Blood sugar controlled - A1C under 8 and last check was < 6 months      Cholesterol Plan   Lab Results   Component Value Date    LDLCHOLESTEROL 54 09/21/2023      []  If LDL > 100 and last result >3 months ago - order Fasting lipids and

## 2024-04-08 ENCOUNTER — OFFICE VISIT (OUTPATIENT)
Dept: PODIATRY | Age: 69
End: 2024-04-08
Payer: COMMERCIAL

## 2024-04-08 VITALS
HEART RATE: 65 BPM | HEIGHT: 64 IN | BODY MASS INDEX: 36.54 KG/M2 | WEIGHT: 214 LBS | DIASTOLIC BLOOD PRESSURE: 71 MMHG | SYSTOLIC BLOOD PRESSURE: 133 MMHG

## 2024-04-08 DIAGNOSIS — M21.622 TAILOR'S BUNION OF BOTH FEET: ICD-10-CM

## 2024-04-08 DIAGNOSIS — E11.42 DIABETIC POLYNEUROPATHY ASSOCIATED WITH TYPE 2 DIABETES MELLITUS (HCC): ICD-10-CM

## 2024-04-08 DIAGNOSIS — M21.621 TAILOR'S BUNION OF BOTH FEET: ICD-10-CM

## 2024-04-08 DIAGNOSIS — B35.1 OM (ONYCHOMYCOSIS): Primary | ICD-10-CM

## 2024-04-08 PROCEDURE — 3075F SYST BP GE 130 - 139MM HG: CPT

## 2024-04-08 PROCEDURE — 11055 PARING/CUTG B9 HYPRKER LES 1: CPT

## 2024-04-08 PROCEDURE — 3078F DIAST BP <80 MM HG: CPT

## 2024-04-08 PROCEDURE — 99213 OFFICE O/P EST LOW 20 MIN: CPT

## 2024-04-08 PROCEDURE — 99213 OFFICE O/P EST LOW 20 MIN: CPT | Performed by: PODIATRIST

## 2024-04-08 PROCEDURE — 1123F ACP DISCUSS/DSCN MKR DOCD: CPT

## 2024-04-08 NOTE — PROGRESS NOTES
[facility]  [address]   [address] [place] [address]  Tel: [phone]   Fax: [phone]    Subjective     CC: 1 year follow-up    Interval history:  Patient seen and evaluated at 1 year follow-up.  Patient states that diabetic footcare has been going well, he has not noticed any new wounds or open ulcerations to the lower extremities.  Patient states that nails are well-kept, but does state that he has calluses on form on the lateral side of the foot.  Patient rates these as irritating.  Patient denies all other pedal complaints at this time.    HPI:  Claudio Graham is a 69 y.o. year old male who presents to clinic today for painful nails and calluses. Reports he has a hard time bending over to trim his nails. His nails are elongated and thickened which rub against his shoegear and socks. Denies numbness, burning, or tingling sensation in his feet. Patient has DM2, states his blood sugar well controlled, with sugars in the low 100s regularly, sometimes tired at night. Reports he dropped something on his right big toe causing a split in his hallux nail about 1 year ago.  Hallux nail has since grown back out.  No pain noted to the hallux.  Also admits to pain subfifth MTPJ on left foot where there is a hyperkeratotic lesion noted.  No other pedal complaints.    Primary care physician is Vanessa Mckenna, LIDIA - CNP.    ROS:    Constitutional: Denies nausea, vomiting, fever, chills.  Neurologic: Denies numbness, tingling, and burning in the feet.    Vascular: Denies symptoms of lower extremity claudication.    Skin: Denies open wounds.  Otherwise negative except as noted in the HPI.     PMH:  Past Medical History:   Diagnosis Date    Acute HFrEF (heart failure with reduced ejection fraction) ([facility]) 06/22/2021    Acute on chronic combined systolic and diastolic congestive heart failure ([facility]) 09/25/2011    Acute on chronic congestive heart failure (HCC)     Acute respiratory failure with

## 2024-04-11 DIAGNOSIS — E11.8 CONTROLLED TYPE 2 DIABETES MELLITUS WITH COMPLICATION, WITHOUT LONG-TERM CURRENT USE OF INSULIN (HCC): ICD-10-CM

## 2024-04-11 DIAGNOSIS — I25.10 CORONARY ARTERY DISEASE INVOLVING NATIVE CORONARY ARTERY OF NATIVE HEART WITHOUT ANGINA PECTORIS: ICD-10-CM

## 2024-04-11 RX ORDER — ATORVASTATIN CALCIUM 40 MG/1
40 TABLET, FILM COATED ORAL DAILY
Qty: 90 TABLET | Refills: 0 | Status: SHIPPED | OUTPATIENT
Start: 2024-04-11

## 2024-04-19 SDOH — ECONOMIC STABILITY: FOOD INSECURITY: WITHIN THE PAST 12 MONTHS, YOU WORRIED THAT YOUR FOOD WOULD RUN OUT BEFORE YOU GOT MONEY TO BUY MORE.: NEVER TRUE

## 2024-04-19 SDOH — ECONOMIC STABILITY: FOOD INSECURITY: WITHIN THE PAST 12 MONTHS, THE FOOD YOU BOUGHT JUST DIDN'T LAST AND YOU DIDN'T HAVE MONEY TO GET MORE.: NEVER TRUE

## 2024-04-19 SDOH — ECONOMIC STABILITY: INCOME INSECURITY: HOW HARD IS IT FOR YOU TO PAY FOR THE VERY BASICS LIKE FOOD, HOUSING, MEDICAL CARE, AND HEATING?: NOT HARD AT ALL

## 2024-04-19 ASSESSMENT — PATIENT HEALTH QUESTIONNAIRE - PHQ9
2. FEELING DOWN, DEPRESSED OR HOPELESS: NOT AT ALL
SUM OF ALL RESPONSES TO PHQ9 QUESTIONS 1 & 2: 0
SUM OF ALL RESPONSES TO PHQ QUESTIONS 1-9: 0
SUM OF ALL RESPONSES TO PHQ9 QUESTIONS 1 & 2: 0
1. LITTLE INTEREST OR PLEASURE IN DOING THINGS: NOT AT ALL
SUM OF ALL RESPONSES TO PHQ QUESTIONS 1-9: 0
SUM OF ALL RESPONSES TO PHQ QUESTIONS 1-9: 0
2. FEELING DOWN, DEPRESSED OR HOPELESS: NOT AT ALL
SUM OF ALL RESPONSES TO PHQ QUESTIONS 1-9: 0
1. LITTLE INTEREST OR PLEASURE IN DOING THINGS: NOT AT ALL

## 2024-04-22 ENCOUNTER — OFFICE VISIT (OUTPATIENT)
Dept: PRIMARY CARE CLINIC | Age: 69
End: 2024-04-22
Payer: COMMERCIAL

## 2024-04-22 ENCOUNTER — HOSPITAL ENCOUNTER (OUTPATIENT)
Dept: GENERAL RADIOLOGY | Age: 69
Discharge: HOME | End: 2024-04-24
Payer: COMMERCIAL

## 2024-04-22 ENCOUNTER — HOSPITAL ENCOUNTER (OUTPATIENT)
Age: 69
Discharge: HOME | End: 2024-04-24
Payer: COMMERCIAL

## 2024-04-22 VITALS
DIASTOLIC BLOOD PRESSURE: 58 MMHG | OXYGEN SATURATION: 99 % | HEART RATE: 57 BPM | WEIGHT: 216 LBS | SYSTOLIC BLOOD PRESSURE: 92 MMHG | BODY MASS INDEX: 37.06 KG/M2

## 2024-04-22 DIAGNOSIS — I25.119 ATHEROSCLEROSIS OF NATIVE CORONARY ARTERY OF NATIVE HEART WITH ANGINA PECTORIS (HCC): ICD-10-CM

## 2024-04-22 DIAGNOSIS — I48.0 PAF (PAROXYSMAL ATRIAL FIBRILLATION) (HCC): ICD-10-CM

## 2024-04-22 DIAGNOSIS — E11.8 CONTROLLED TYPE 2 DIABETES MELLITUS WITH COMPLICATION, WITHOUT LONG-TERM CURRENT USE OF INSULIN (HCC): Primary | ICD-10-CM

## 2024-04-22 DIAGNOSIS — M21.621 TAILOR'S BUNION OF BOTH FEET: ICD-10-CM

## 2024-04-22 DIAGNOSIS — I50.42 CHRONIC COMBINED SYSTOLIC AND DIASTOLIC HEART FAILURE (HCC): ICD-10-CM

## 2024-04-22 DIAGNOSIS — M21.622 TAILOR'S BUNION OF BOTH FEET: ICD-10-CM

## 2024-04-22 DIAGNOSIS — E08.311 DIABETIC RETINOPATHY OF BOTH EYES WITH MACULAR EDEMA ASSOCIATED WITH DIABETES MELLITUS DUE TO UNDERLYING CONDITION, UNSPECIFIED RETINOPATHY SEVERITY (HCC): ICD-10-CM

## 2024-04-22 PROBLEM — N40.1 BENIGN PROSTATIC HYPERPLASIA WITH NOCTURIA: Status: ACTIVE | Noted: 2023-11-27

## 2024-04-22 PROBLEM — N28.89 BILATERAL KIDNEY MASSES: Status: ACTIVE | Noted: 2020-08-11

## 2024-04-22 PROBLEM — Q64.33 CONGENITAL MEATAL STENOSIS: Status: ACTIVE | Noted: 2023-12-01

## 2024-04-22 PROBLEM — N99.115: Status: ACTIVE | Noted: 2020-08-11

## 2024-04-22 PROBLEM — N28.1 COMPLEX RENAL CYST: Status: ACTIVE | Noted: 2022-10-12

## 2024-04-22 PROBLEM — R35.1 BENIGN PROSTATIC HYPERPLASIA WITH NOCTURIA: Status: ACTIVE | Noted: 2023-11-27

## 2024-04-22 PROBLEM — R31.0 GROSS HEMATURIA: Status: ACTIVE | Noted: 2020-07-15

## 2024-04-22 PROBLEM — N39.9 UROLOGIC DISORDERS: Status: ACTIVE | Noted: 2023-10-17

## 2024-04-22 PROBLEM — I46.9 CARDIOPULMONARY ARREST (HCC): Status: ACTIVE | Noted: 2020-07-05

## 2024-04-22 LAB — HBA1C MFR BLD: 5.9 %

## 2024-04-22 PROCEDURE — 99213 OFFICE O/P EST LOW 20 MIN: CPT | Performed by: NURSE PRACTITIONER

## 2024-04-22 PROCEDURE — 3074F SYST BP LT 130 MM HG: CPT | Performed by: NURSE PRACTITIONER

## 2024-04-22 PROCEDURE — 73630 X-RAY EXAM OF FOOT: CPT

## 2024-04-22 PROCEDURE — 3078F DIAST BP <80 MM HG: CPT | Performed by: NURSE PRACTITIONER

## 2024-04-22 PROCEDURE — 83036 HEMOGLOBIN GLYCOSYLATED A1C: CPT | Performed by: NURSE PRACTITIONER

## 2024-04-22 PROCEDURE — 1123F ACP DISCUSS/DSCN MKR DOCD: CPT | Performed by: NURSE PRACTITIONER

## 2024-04-22 PROCEDURE — 3044F HG A1C LEVEL LT 7.0%: CPT | Performed by: NURSE PRACTITIONER

## 2024-04-22 ASSESSMENT — ENCOUNTER SYMPTOMS
BLURRED VISION: 1
SHORTNESS OF BREATH: 1

## 2024-04-22 NOTE — PROGRESS NOTES
[facility]  [facility]  [address] [facility] [address] MAIN FLOOR  [facility] [address]  Dept: [phone]  Dept Fax: [phone]    Claudio Graham (:  1955) is a 69 y.o. male,Established patient, here for evaluation of the following chief complaint(s):  Congestive Heart Failure, Hypertension, Diabetes, and Follow-up (4 month follow up )    Claudio Graham is a 69-year-old male here today for routine follow-up, last seen by PCP on 2023.  Known history of hypothyroidism, type 2 diabetes with CHF and CAD.  He follows with oncology for MGUS.  Current notes state that stable without progression.  Can follow-up annually with oncology.  Following with urology for urethral stenosis.  To have cystoscopy and dilation, currently waiting on Urology to schedule. See's Dr Nj, also with ophthalmology      Assessment & Plan   ASSESSMENT/PLAN:  1. Controlled type 2 diabetes mellitus with complication, without long-term current use of insulin ([facility])  -     POCT glycosylated hemoglobin (Hb A1C)  2. PAF (paroxysmal atrial fibrillation) ([facility])  3. Atherosclerosis of native coronary artery of native heart with angina pectoris ([facility])  4. Diabetic retinopathy of both eyes with macular edema associated with diabetes mellitus due to underlying condition, unspecified retinopathy severity ([facility])  5. Chronic combined systolic and diastolic heart failure ([facility])    A1c remains at goal, no episodes of hypoglycemia  Blood pressure mildly low today, patient notes that he is not having any issues or feeling dizzy or lightheaded at home  Is following routinely with his ophthalmologist for diabetic retinopathy and sees podiatry at least once a year for his feet    Return in about 4 months (around 2024) for AMW, DM.         Subjective   SUBJECTIVE/OBJECTIVE:  69-year-old  male is here for a routine follow up.  He reports a history of diabetes mellitus, hypertension, he has a

## 2024-04-30 DIAGNOSIS — D50.9 IRON DEFICIENCY ANEMIA, UNSPECIFIED IRON DEFICIENCY ANEMIA TYPE: ICD-10-CM

## 2024-04-30 DIAGNOSIS — I50.42 CHRONIC COMBINED SYSTOLIC AND DIASTOLIC HEART FAILURE (HCC): ICD-10-CM

## 2024-04-30 RX ORDER — FERROUS SULFATE 325(65) MG
1 TABLET ORAL
Qty: 30 TABLET | Refills: 3 | Status: SHIPPED | OUTPATIENT
Start: 2024-04-30

## 2024-04-30 RX ORDER — ISOSORBIDE MONONITRATE 30 MG/1
TABLET, EXTENDED RELEASE ORAL
Qty: 30 TABLET | Refills: 5 | Status: SHIPPED | OUTPATIENT
Start: 2024-04-30

## 2024-05-28 DIAGNOSIS — I25.10 CORONARY ARTERY DISEASE INVOLVING NATIVE CORONARY ARTERY OF NATIVE HEART WITHOUT ANGINA PECTORIS: ICD-10-CM

## 2024-05-28 RX ORDER — ASPIRIN 81 MG/1
TABLET ORAL
Qty: 60 TABLET | Refills: 3 | Status: SHIPPED | OUTPATIENT
Start: 2024-05-28

## 2024-05-31 DIAGNOSIS — E03.2 HYPOTHYROIDISM DUE TO MEDICATION: ICD-10-CM

## 2024-05-31 RX ORDER — LEVOTHYROXINE SODIUM 0.07 MG/1
75 TABLET ORAL DAILY
Qty: 90 TABLET | Refills: 1 | Status: SHIPPED | OUTPATIENT
Start: 2024-05-31

## 2024-06-03 DIAGNOSIS — I50.22 CHRONIC SYSTOLIC (CONGESTIVE) HEART FAILURE (HCC): Primary | ICD-10-CM

## 2024-06-04 ENCOUNTER — HOSPITAL ENCOUNTER (OUTPATIENT)
Dept: OTHER | Age: 69
Discharge: HOME | End: 2024-06-04
Payer: COMMERCIAL

## 2024-06-04 VITALS
BODY MASS INDEX: 36.97 KG/M2 | SYSTOLIC BLOOD PRESSURE: 132 MMHG | DIASTOLIC BLOOD PRESSURE: 70 MMHG | RESPIRATION RATE: 16 BRPM | OXYGEN SATURATION: 98 % | WEIGHT: 215.5 LBS | HEART RATE: 74 BPM

## 2024-06-04 PROCEDURE — 99212 OFFICE O/P EST SF 10 MIN: CPT

## 2024-06-04 NOTE — PROGRESS NOTES
Date:  2024  Time:  2:55 PM    CHF Clinic at [facility]    Office: [phone] Fax: [phone]    Re:  Claudio Graham   Patient : 1955    Vital Signs: /70   Pulse 74   Resp 16   Wt 97.8 kg (215 lb 8 oz)   SpO2 98%   BMI 36.97 kg/m²                                                   No results for input(s): \"CBC\", \"HGB\", \"HCT\", \"WBC\", \"PLATELET\", \"NA\", \"K\", \"CL\", \"CO2\", \"BUN\", \"CREATININE\", \"GLUCOSE\", \"BNP\", \"INR\" in the last 72 hours.     Respiratory:    Assessment  Charting Type: Reassessment    Breath Sounds  Right Upper Lobe: Clear  Right Middle Lobe: Clear  Right Lower Lobe: Clear  Left Upper Lobe: Clear  Left Lower Lobe: Clear    Cough/Sputum  Cough: Non-productive         Peripheral Vascular  RLE Edema: None  LLE Edema: None    Future Appointments   Date Time Provider Department Center   2024  1:30 PM Yadira Guidry, APRN - CNP [facility] None   2024  1:00 PM ST[facility] CHF CLINIC  1 [facility]   2024  9:30 AM Radha Shankar MD Resp Spec [facility]   2024  1:45 PM Vanessa Mckenna APRN - CNP [facility]      Complaints: No new complaints     Physician Orders No new orders     Comment : Weight is up 1 pound from last visit, denies any chest pain occasionally has SOB with to much activity. Has no pedal edema, lungs are clear. Saw  last month no medication changes, Discussed in detail low sodium diet 2000mg per day and 64 ounces of fluid per day. We will see in 2 months per patient request 24. Having cystoscopy this Thursday @ [facility] blood thinners on hold will resume following testing.     Electronically signed by Rika Lucero RN on 2024 at 2:55 PM

## 2024-06-12 DIAGNOSIS — I50.42 CHRONIC COMBINED SYSTOLIC AND DIASTOLIC HEART FAILURE (HCC): ICD-10-CM

## 2024-06-12 RX ORDER — FUROSEMIDE 40 MG/1
40 TABLET ORAL 2 TIMES DAILY
Qty: 180 TABLET | Refills: 1 | Status: SHIPPED | OUTPATIENT
Start: 2024-06-12

## 2024-07-01 DIAGNOSIS — E11.9 TYPE 2 DIABETES MELLITUS WITHOUT COMPLICATION, WITHOUT LONG-TERM CURRENT USE OF INSULIN (HCC): ICD-10-CM

## 2024-07-01 RX ORDER — LANCETS 30 GAUGE
EACH MISCELLANEOUS
Qty: 100 EACH | Refills: 3 | Status: SHIPPED | OUTPATIENT
Start: 2024-07-01

## 2024-07-06 DIAGNOSIS — I48.0 PAF (PAROXYSMAL ATRIAL FIBRILLATION) (HCC): ICD-10-CM

## 2024-07-11 DIAGNOSIS — E11.8 CONTROLLED TYPE 2 DIABETES MELLITUS WITH COMPLICATION, WITHOUT LONG-TERM CURRENT USE OF INSULIN (HCC): ICD-10-CM

## 2024-07-11 DIAGNOSIS — I25.10 CORONARY ARTERY DISEASE INVOLVING NATIVE CORONARY ARTERY OF NATIVE HEART WITHOUT ANGINA PECTORIS: ICD-10-CM

## 2024-07-11 RX ORDER — ATORVASTATIN CALCIUM 40 MG/1
40 TABLET, FILM COATED ORAL DAILY
Qty: 90 TABLET | Refills: 0 | Status: SHIPPED | OUTPATIENT
Start: 2024-07-11

## 2024-07-18 ENCOUNTER — HOSPITAL ENCOUNTER (INPATIENT)
Age: 69
LOS: 8 days | Discharge: NURSING, REHAB OR LONG-TERM CARE | End: 2024-07-26
Attending: EMERGENCY MEDICINE | Admitting: INTERNAL MEDICINE
Payer: COMMERCIAL

## 2024-07-18 ENCOUNTER — APPOINTMENT (OUTPATIENT)
Dept: GENERAL RADIOLOGY | Age: 69
End: 2024-07-18
Payer: COMMERCIAL

## 2024-07-18 DIAGNOSIS — R07.9 CHEST PAIN, UNSPECIFIED TYPE: ICD-10-CM

## 2024-07-18 DIAGNOSIS — J96.02 ACUTE RESPIRATORY FAILURE WITH HYPOXIA AND HYPERCAPNIA (HCC): ICD-10-CM

## 2024-07-18 DIAGNOSIS — R57.0 CARDIOGENIC SHOCK (HCC): ICD-10-CM

## 2024-07-18 DIAGNOSIS — I49.01 VENTRICULAR FIBRILLATION (HCC): ICD-10-CM

## 2024-07-18 DIAGNOSIS — I47.20 VENTRICULAR TACHYCARDIA (HCC): Primary | ICD-10-CM

## 2024-07-18 DIAGNOSIS — J96.01 ACUTE RESPIRATORY FAILURE WITH HYPOXIA AND HYPERCAPNIA (HCC): ICD-10-CM

## 2024-07-18 LAB
ANION GAP SERPL CALCULATED.3IONS-SCNC: 17 MMOL/L (ref 9–16)
ANTI-XA UNFRAC HEPARIN: 1.77 IU/L
BASOPHILS # BLD: 0.05 K/UL (ref 0–0.2)
BASOPHILS NFR BLD: 1 % (ref 0–2)
BILIRUB UR QL STRIP: NEGATIVE
BNP SERPL-MCNC: 2337 PG/ML (ref 0–300)
BUN BLD-MCNC: 44 MG/DL (ref 8–26)
BUN SERPL-MCNC: 43 MG/DL (ref 8–23)
CA-I BLD-SCNC: 1.03 MMOL/L (ref 1.13–1.33)
CA-I BLD-SCNC: 1.03 MMOL/L (ref 1.15–1.33)
CA-I BLD-SCNC: 1.04 MMOL/L (ref 1.15–1.33)
CALCIUM SERPL-MCNC: 8.6 MG/DL (ref 8.6–10.4)
CASTS #/AREA URNS LPF: NORMAL /LPF (ref 0–2)
CASTS #/AREA URNS LPF: NORMAL /LPF (ref 0–2)
CHLORIDE BLD-SCNC: 101 MMOL/L (ref 98–107)
CHLORIDE SERPL-SCNC: 98 MMOL/L (ref 98–107)
CLARITY UR: CLEAR
CO2 BLD CALC-SCNC: 24 MMOL/L (ref 22–30)
CO2 SERPL-SCNC: 21 MMOL/L (ref 20–31)
COLOR UR: YELLOW
CREAT SERPL-MCNC: 1.6 MG/DL (ref 0.7–1.2)
EGFR, POC: 46 ML/MIN/1.73M2
EOSINOPHIL # BLD: 0.3 K/UL (ref 0–0.44)
EOSINOPHILS RELATIVE PERCENT: 3 % (ref 1–4)
EPI CELLS #/AREA URNS HPF: NORMAL /HPF (ref 0–5)
ERYTHROCYTE [DISTWIDTH] IN BLOOD BY AUTOMATED COUNT: 17.6 % (ref 11.8–14.4)
FIO2: 60
GFR, ESTIMATED: 48 ML/MIN/1.73M2
GLUCOSE BLD-MCNC: 215 MG/DL (ref 74–100)
GLUCOSE BLD-MCNC: 257 MG/DL (ref 74–100)
GLUCOSE SERPL-MCNC: 196 MG/DL (ref 74–99)
GLUCOSE UR STRIP-MCNC: ABNORMAL MG/DL
HCO3 VENOUS: 24.3 MMOL/L (ref 22–29)
HCT VFR BLD AUTO: 42.7 % (ref 40.7–50.3)
HCT VFR BLD AUTO: 49 % (ref 41–53)
HGB BLD-MCNC: 13.7 G/DL (ref 13–17)
HGB UR QL STRIP.AUTO: NEGATIVE
IMM GRANULOCYTES # BLD AUTO: <0.03 K/UL (ref 0–0.3)
IMM GRANULOCYTES NFR BLD: 0 %
INR PPP: 4.1
KETONES UR STRIP-MCNC: ABNORMAL MG/DL
LACTIC ACID, SEPSIS WHOLE BLOOD: 3.4 MMOL/L (ref 0.5–1.9)
LEUKOCYTE ESTERASE UR QL STRIP: NEGATIVE
LYMPHOCYTES NFR BLD: 3.4 K/UL (ref 1.1–3.7)
LYMPHOCYTES RELATIVE PERCENT: 32 % (ref 24–43)
MAGNESIUM SERPL-MCNC: 2.1 MG/DL (ref 1.6–2.4)
MCH RBC QN AUTO: 27.7 PG (ref 25.2–33.5)
MCHC RBC AUTO-ENTMCNC: 32.1 G/DL (ref 28.4–34.8)
MCV RBC AUTO: 86.4 FL (ref 82.6–102.9)
MODE: ABNORMAL
MONOCYTES NFR BLD: 1.02 K/UL (ref 0.1–1.2)
MONOCYTES NFR BLD: 10 % (ref 3–12)
NEGATIVE BASE EXCESS, ART: 1.7 MMOL/L (ref 0–2)
NEUTROPHILS NFR BLD: 54 % (ref 36–65)
NEUTS SEG NFR BLD: 5.71 K/UL (ref 1.5–8.1)
NITRITE UR QL STRIP: NEGATIVE
NRBC BLD-RTO: 0 PER 100 WBC
O2 DELIVERY DEVICE: ABNORMAL
O2 SAT, VEN: 91.1 % (ref 60–85)
PARTIAL THROMBOPLASTIN TIME: 61.8 SEC (ref 23–36.5)
PARTIAL THROMBOPLASTIN TIME: 90.6 SEC (ref 23–36.5)
PCO2 VENOUS: 32.2 MM HG (ref 41–51)
PH UR STRIP: 6 [PH] (ref 5–8)
PH VENOUS: 7.49 (ref 7.32–7.43)
PLATELET # BLD AUTO: 199 K/UL (ref 138–453)
PMV BLD AUTO: 11.5 FL (ref 8.1–13.5)
PO2 VENOUS: 55.7 MM HG (ref 30–50)
POC ANION GAP: 15 MMOL/L (ref 7–16)
POC CREATININE: 1.6 MG/DL (ref 0.51–1.19)
POC HCO3: 23.7 MMOL/L (ref 21–28)
POC HEMOGLOBIN (CALC): 16.5 G/DL (ref 13.5–17.5)
POC LACTIC ACID: 3.3 MMOL/L (ref 0.56–1.39)
POC O2 SATURATION: 99.6 % (ref 94–98)
POC PCO2: 41.4 MM HG (ref 35–48)
POC PH: 7.37 (ref 7.35–7.45)
POC PO2: 188.3 MM HG (ref 83–108)
POSITIVE BASE EXCESS, VEN: 1.7 MMOL/L (ref 0–3)
POTASSIUM BLD-SCNC: 3.7 MMOL/L (ref 3.5–4.5)
POTASSIUM SERPL-SCNC: 3.8 MMOL/L (ref 3.7–5.3)
PROT UR STRIP-MCNC: ABNORMAL MG/DL
PROTHROMBIN TIME: 38.1 SEC (ref 11.7–14.9)
RBC # BLD AUTO: 4.94 M/UL (ref 4.21–5.77)
RBC # BLD: ABNORMAL 10*6/UL
RBC #/AREA URNS HPF: NORMAL /HPF (ref 0–2)
SAMPLE SITE: ABNORMAL
SODIUM BLD-SCNC: 139 MMOL/L (ref 138–146)
SODIUM SERPL-SCNC: 136 MMOL/L (ref 136–145)
SP GR UR STRIP: 1.02 (ref 1–1.03)
TROPONIN I SERPL HS-MCNC: 49 NG/L (ref 0–22)
TROPONIN I SERPL HS-MCNC: 79 NG/L (ref 0–22)
UROBILINOGEN UR STRIP-ACNC: NORMAL EU/DL (ref 0–1)
WBC #/AREA URNS HPF: NORMAL /HPF (ref 0–5)
WBC OTHER # BLD: 10.5 K/UL (ref 3.5–11.3)

## 2024-07-18 PROCEDURE — 2500000003 HC RX 250 WO HCPCS: Performed by: STUDENT IN AN ORGANIZED HEALTH CARE EDUCATION/TRAINING PROGRAM

## 2024-07-18 PROCEDURE — 83735 ASSAY OF MAGNESIUM: CPT

## 2024-07-18 PROCEDURE — 87154 CUL TYP ID BLD PTHGN 6+ TRGT: CPT

## 2024-07-18 PROCEDURE — 82947 ASSAY GLUCOSE BLOOD QUANT: CPT

## 2024-07-18 PROCEDURE — 37799 UNLISTED PX VASCULAR SURGERY: CPT

## 2024-07-18 PROCEDURE — C1725 CATH, TRANSLUMIN NON-LASER: HCPCS | Performed by: INTERNAL MEDICINE

## 2024-07-18 PROCEDURE — 2580000003 HC RX 258: Performed by: EMERGENCY MEDICINE

## 2024-07-18 PROCEDURE — C1769 GUIDE WIRE: HCPCS | Performed by: INTERNAL MEDICINE

## 2024-07-18 PROCEDURE — 6370000000 HC RX 637 (ALT 250 FOR IP): Performed by: INTERNAL MEDICINE

## 2024-07-18 PROCEDURE — 83605 ASSAY OF LACTIC ACID: CPT

## 2024-07-18 PROCEDURE — 85014 HEMATOCRIT: CPT

## 2024-07-18 PROCEDURE — 027034Z DILATION OF CORONARY ARTERY, ONE ARTERY WITH DRUG-ELUTING INTRALUMINAL DEVICE, PERCUTANEOUS APPROACH: ICD-10-PCS | Performed by: INTERNAL MEDICINE

## 2024-07-18 PROCEDURE — 5A1955Z RESPIRATORY VENTILATION, GREATER THAN 96 CONSECUTIVE HOURS: ICD-10-PCS | Performed by: INTERNAL MEDICINE

## 2024-07-18 PROCEDURE — 87205 SMEAR GRAM STAIN: CPT

## 2024-07-18 PROCEDURE — 0BH17EZ INSERTION OF ENDOTRACHEAL AIRWAY INTO TRACHEA, VIA NATURAL OR ARTIFICIAL OPENING: ICD-10-PCS | Performed by: INTERNAL MEDICINE

## 2024-07-18 PROCEDURE — 80048 BASIC METABOLIC PNL TOTAL CA: CPT

## 2024-07-18 PROCEDURE — 36011 PLACE CATHETER IN VEIN: CPT | Performed by: INTERNAL MEDICINE

## 2024-07-18 PROCEDURE — 85025 COMPLETE CBC W/AUTO DIFF WBC: CPT

## 2024-07-18 PROCEDURE — 33967 INSERT I-AORT PERCUT DEVICE: CPT | Performed by: INTERNAL MEDICINE

## 2024-07-18 PROCEDURE — 2700000000 HC OXYGEN THERAPY PER DAY

## 2024-07-18 PROCEDURE — 84484 ASSAY OF TROPONIN QUANT: CPT

## 2024-07-18 PROCEDURE — 82803 BLOOD GASES ANY COMBINATION: CPT

## 2024-07-18 PROCEDURE — 6360000002 HC RX W HCPCS: Performed by: INTERNAL MEDICINE

## 2024-07-18 PROCEDURE — 92941 PRQ TRLML REVSC TOT OCCL AMI: CPT | Performed by: INTERNAL MEDICINE

## 2024-07-18 PROCEDURE — 82565 ASSAY OF CREATININE: CPT

## 2024-07-18 PROCEDURE — C9600 PERC DRUG-EL COR STENT SING: HCPCS | Performed by: INTERNAL MEDICINE

## 2024-07-18 PROCEDURE — 2709999900 HC NON-CHARGEABLE SUPPLY: Performed by: INTERNAL MEDICINE

## 2024-07-18 PROCEDURE — 6360000002 HC RX W HCPCS: Performed by: EMERGENCY MEDICINE

## 2024-07-18 PROCEDURE — 6360000002 HC RX W HCPCS: Performed by: STUDENT IN AN ORGANIZED HEALTH CARE EDUCATION/TRAINING PROGRAM

## 2024-07-18 PROCEDURE — 2500000003 HC RX 250 WO HCPCS

## 2024-07-18 PROCEDURE — 96374 THER/PROPH/DIAG INJ IV PUSH: CPT

## 2024-07-18 PROCEDURE — 31500 INSERT EMERGENCY AIRWAY: CPT

## 2024-07-18 PROCEDURE — 99222 1ST HOSP IP/OBS MODERATE 55: CPT | Performed by: STUDENT IN AN ORGANIZED HEALTH CARE EDUCATION/TRAINING PROGRAM

## 2024-07-18 PROCEDURE — 80051 ELECTROLYTE PANEL: CPT

## 2024-07-18 PROCEDURE — 2500000003 HC RX 250 WO HCPCS: Performed by: INTERNAL MEDICINE

## 2024-07-18 PROCEDURE — 5A02210 ASSISTANCE WITH CARDIAC OUTPUT USING BALLOON PUMP, CONTINUOUS: ICD-10-PCS | Performed by: INTERNAL MEDICINE

## 2024-07-18 PROCEDURE — 2580000003 HC RX 258

## 2024-07-18 PROCEDURE — 99285 EMERGENCY DEPT VISIT HI MDM: CPT

## 2024-07-18 PROCEDURE — 33970 AORTIC CIRCULATION ASSIST: CPT | Performed by: INTERNAL MEDICINE

## 2024-07-18 PROCEDURE — 36415 COLL VENOUS BLD VENIPUNCTURE: CPT

## 2024-07-18 PROCEDURE — 85730 THROMBOPLASTIN TIME PARTIAL: CPT

## 2024-07-18 PROCEDURE — 96375 TX/PRO/DX INJ NEW DRUG ADDON: CPT

## 2024-07-18 PROCEDURE — 6360000004 HC RX CONTRAST MEDICATION: Performed by: INTERNAL MEDICINE

## 2024-07-18 PROCEDURE — 85520 HEPARIN ASSAY: CPT

## 2024-07-18 PROCEDURE — B2151ZZ FLUOROSCOPY OF LEFT HEART USING LOW OSMOLAR CONTRAST: ICD-10-PCS | Performed by: INTERNAL MEDICINE

## 2024-07-18 PROCEDURE — C1874 STENT, COATED/COV W/DEL SYS: HCPCS | Performed by: INTERNAL MEDICINE

## 2024-07-18 PROCEDURE — 82330 ASSAY OF CALCIUM: CPT

## 2024-07-18 PROCEDURE — 71045 X-RAY EXAM CHEST 1 VIEW: CPT

## 2024-07-18 PROCEDURE — 94761 N-INVAS EAR/PLS OXIMETRY MLT: CPT

## 2024-07-18 PROCEDURE — 87040 BLOOD CULTURE FOR BACTERIA: CPT

## 2024-07-18 PROCEDURE — 92928 PRQ TCAT PLMT NTRAC ST 1 LES: CPT | Performed by: INTERNAL MEDICINE

## 2024-07-18 PROCEDURE — C1887 CATHETER, GUIDING: HCPCS | Performed by: INTERNAL MEDICINE

## 2024-07-18 PROCEDURE — 93458 L HRT ARTERY/VENTRICLE ANGIO: CPT | Performed by: INTERNAL MEDICINE

## 2024-07-18 PROCEDURE — 2000000000 HC ICU R&B

## 2024-07-18 PROCEDURE — 5A2204Z RESTORATION OF CARDIAC RHYTHM, SINGLE: ICD-10-PCS | Performed by: INTERNAL MEDICINE

## 2024-07-18 PROCEDURE — C1889 IMPLANT/INSERT DEVICE, NOC: HCPCS | Performed by: INTERNAL MEDICINE

## 2024-07-18 PROCEDURE — 94002 VENT MGMT INPAT INIT DAY: CPT

## 2024-07-18 PROCEDURE — 99222 1ST HOSP IP/OBS MODERATE 55: CPT | Performed by: INTERNAL MEDICINE

## 2024-07-18 PROCEDURE — 2580000003 HC RX 258: Performed by: INTERNAL MEDICINE

## 2024-07-18 PROCEDURE — C1894 INTRO/SHEATH, NON-LASER: HCPCS | Performed by: INTERNAL MEDICINE

## 2024-07-18 PROCEDURE — 81001 URINALYSIS AUTO W/SCOPE: CPT

## 2024-07-18 PROCEDURE — 36620 INSERTION CATHETER ARTERY: CPT

## 2024-07-18 PROCEDURE — 93005 ELECTROCARDIOGRAM TRACING: CPT | Performed by: STUDENT IN AN ORGANIZED HEALTH CARE EDUCATION/TRAINING PROGRAM

## 2024-07-18 PROCEDURE — 6360000002 HC RX W HCPCS

## 2024-07-18 PROCEDURE — 84520 ASSAY OF UREA NITROGEN: CPT

## 2024-07-18 PROCEDURE — 83880 ASSAY OF NATRIURETIC PEPTIDE: CPT

## 2024-07-18 PROCEDURE — 85610 PROTHROMBIN TIME: CPT

## 2024-07-18 PROCEDURE — 4A023N7 MEASUREMENT OF CARDIAC SAMPLING AND PRESSURE, LEFT HEART, PERCUTANEOUS APPROACH: ICD-10-PCS | Performed by: INTERNAL MEDICINE

## 2024-07-18 DEVICE — STENT ONYXNG35022UX ONYX 3.50X22RX
Type: IMPLANTABLE DEVICE | Status: FUNCTIONAL
Brand: ONYX FRONTIER™

## 2024-07-18 RX ORDER — MIDAZOLAM HYDROCHLORIDE 2 MG/2ML
2 INJECTION, SOLUTION INTRAMUSCULAR; INTRAVENOUS ONCE
Status: COMPLETED | OUTPATIENT
Start: 2024-07-18 | End: 2024-07-18

## 2024-07-18 RX ORDER — SODIUM CHLORIDE 0.9 % (FLUSH) 0.9 %
5-40 SYRINGE (ML) INJECTION PRN
Status: DISCONTINUED | OUTPATIENT
Start: 2024-07-18 | End: 2024-07-26 | Stop reason: HOSPADM

## 2024-07-18 RX ORDER — AMIODARONE HYDROCHLORIDE 150 MG/3ML
300 INJECTION, SOLUTION INTRAVENOUS ONCE
Status: DISCONTINUED | OUTPATIENT
Start: 2024-07-18 | End: 2024-07-19

## 2024-07-18 RX ORDER — NALOXONE HYDROCHLORIDE 0.4 MG/ML
INJECTION, SOLUTION INTRAMUSCULAR; INTRAVENOUS; SUBCUTANEOUS
Status: COMPLETED
Start: 2024-07-18 | End: 2024-07-18

## 2024-07-18 RX ORDER — MAGNESIUM SULFATE IN WATER 40 MG/ML
2000 INJECTION, SOLUTION INTRAVENOUS ONCE
Status: COMPLETED | OUTPATIENT
Start: 2024-07-18 | End: 2024-07-18

## 2024-07-18 RX ORDER — POTASSIUM CHLORIDE 7.45 MG/ML
10 INJECTION INTRAVENOUS PRN
Status: DISCONTINUED | OUTPATIENT
Start: 2024-07-18 | End: 2024-07-22 | Stop reason: SDUPTHER

## 2024-07-18 RX ORDER — HEPARIN SODIUM 1000 [USP'U]/ML
2000 INJECTION, SOLUTION INTRAVENOUS; SUBCUTANEOUS PRN
Status: DISCONTINUED | OUTPATIENT
Start: 2024-07-18 | End: 2024-07-23

## 2024-07-18 RX ORDER — NOREPINEPHRINE BITARTRATE 0.06 MG/ML
INJECTION, SOLUTION INTRAVENOUS CONTINUOUS PRN
Status: COMPLETED | OUTPATIENT
Start: 2024-07-18 | End: 2024-07-18

## 2024-07-18 RX ORDER — PHENYLEPHRINE HCL IN 0.9% NACL 50MG/250ML
10-300 PLASTIC BAG, INJECTION (ML) INTRAVENOUS CONTINUOUS
Status: DISCONTINUED | OUTPATIENT
Start: 2024-07-18 | End: 2024-07-23

## 2024-07-18 RX ORDER — EPINEPHRINE 0.1 MG/ML
INJECTION INTRAVENOUS
Status: DISCONTINUED
Start: 2024-07-18 | End: 2024-07-18

## 2024-07-18 RX ORDER — HEPARIN SODIUM 1000 [USP'U]/ML
4000 INJECTION, SOLUTION INTRAVENOUS; SUBCUTANEOUS ONCE
Status: DISCONTINUED | OUTPATIENT
Start: 2024-07-18 | End: 2024-07-22

## 2024-07-18 RX ORDER — MIDAZOLAM HYDROCHLORIDE 1 MG/ML
1-10 INJECTION, SOLUTION INTRAVENOUS CONTINUOUS
Status: DISCONTINUED | OUTPATIENT
Start: 2024-07-18 | End: 2024-07-18

## 2024-07-18 RX ORDER — MAGNESIUM SULFATE IN WATER 40 MG/ML
2000 INJECTION, SOLUTION INTRAVENOUS PRN
Status: DISCONTINUED | OUTPATIENT
Start: 2024-07-18 | End: 2024-07-26 | Stop reason: HOSPADM

## 2024-07-18 RX ORDER — ACETAMINOPHEN 325 MG/1
650 TABLET ORAL EVERY 6 HOURS PRN
Status: DISCONTINUED | OUTPATIENT
Start: 2024-07-18 | End: 2024-07-26 | Stop reason: HOSPADM

## 2024-07-18 RX ORDER — LIDOCAINE HYDROCHLORIDE ANHYDROUS AND DEXTROSE MONOHYDRATE 5; 400 G/100ML; MG/100ML
INJECTION, SOLUTION INTRAVENOUS CONTINUOUS PRN
Status: COMPLETED | OUTPATIENT
Start: 2024-07-18 | End: 2024-07-18

## 2024-07-18 RX ORDER — FENTANYL CITRATE 50 UG/ML
100 INJECTION, SOLUTION INTRAMUSCULAR; INTRAVENOUS ONCE
Status: COMPLETED | OUTPATIENT
Start: 2024-07-18 | End: 2024-07-18

## 2024-07-18 RX ORDER — MAGNESIUM SULFATE IN WATER 40 MG/ML
INJECTION, SOLUTION INTRAVENOUS
Status: COMPLETED
Start: 2024-07-18 | End: 2024-07-18

## 2024-07-18 RX ORDER — POTASSIUM CHLORIDE 20 MEQ/1
40 TABLET, EXTENDED RELEASE ORAL PRN
Status: DISCONTINUED | OUTPATIENT
Start: 2024-07-18 | End: 2024-07-22 | Stop reason: SDUPTHER

## 2024-07-18 RX ORDER — ASPIRIN 325 MG
TABLET ORAL PRN
Status: DISCONTINUED | OUTPATIENT
Start: 2024-07-18 | End: 2024-07-18 | Stop reason: HOSPADM

## 2024-07-18 RX ORDER — LIDOCAINE HYDROCHLORIDE 20 MG/ML
INJECTION, SOLUTION INTRAVENOUS
Status: COMPLETED
Start: 2024-07-18 | End: 2024-07-18

## 2024-07-18 RX ORDER — MIDAZOLAM HYDROCHLORIDE 1 MG/ML
INJECTION INTRAMUSCULAR; INTRAVENOUS
Status: COMPLETED
Start: 2024-07-18 | End: 2024-07-18

## 2024-07-18 RX ORDER — ONDANSETRON 4 MG/1
4 TABLET, ORALLY DISINTEGRATING ORAL EVERY 8 HOURS PRN
Status: DISCONTINUED | OUTPATIENT
Start: 2024-07-18 | End: 2024-07-26 | Stop reason: HOSPADM

## 2024-07-18 RX ORDER — FENTANYL CITRATE 50 UG/ML
50 INJECTION, SOLUTION INTRAMUSCULAR; INTRAVENOUS
Status: DISCONTINUED | OUTPATIENT
Start: 2024-07-18 | End: 2024-07-26 | Stop reason: HOSPADM

## 2024-07-18 RX ORDER — HEPARIN SODIUM 10000 [USP'U]/100ML
5-30 INJECTION, SOLUTION INTRAVENOUS CONTINUOUS
Status: DISCONTINUED | OUTPATIENT
Start: 2024-07-18 | End: 2024-07-23

## 2024-07-18 RX ORDER — ASPIRIN 81 MG/1
81 TABLET ORAL DAILY
Status: DISCONTINUED | OUTPATIENT
Start: 2024-07-19 | End: 2024-07-22

## 2024-07-18 RX ORDER — LIDOCAINE HYDROCHLORIDE ANHYDROUS AND DEXTROSE MONOHYDRATE 5; 400 G/100ML; MG/100ML
0.5 INJECTION, SOLUTION INTRAVENOUS CONTINUOUS
Status: DISCONTINUED | OUTPATIENT
Start: 2024-07-18 | End: 2024-07-23

## 2024-07-18 RX ORDER — ONDANSETRON 2 MG/ML
4 INJECTION INTRAMUSCULAR; INTRAVENOUS EVERY 6 HOURS PRN
Status: DISCONTINUED | OUTPATIENT
Start: 2024-07-18 | End: 2024-07-26 | Stop reason: HOSPADM

## 2024-07-18 RX ORDER — PROPOFOL 10 MG/ML
5-50 INJECTION, EMULSION INTRAVENOUS CONTINUOUS
Status: DISCONTINUED | OUTPATIENT
Start: 2024-07-18 | End: 2024-07-22

## 2024-07-18 RX ORDER — MIDAZOLAM HYDROCHLORIDE 2 MG/2ML
5 INJECTION, SOLUTION INTRAMUSCULAR; INTRAVENOUS ONCE
Status: DISCONTINUED | OUTPATIENT
Start: 2024-07-18 | End: 2024-07-18

## 2024-07-18 RX ORDER — FENTANYL CITRATE 50 UG/ML
INJECTION, SOLUTION INTRAMUSCULAR; INTRAVENOUS
Status: COMPLETED
Start: 2024-07-18 | End: 2024-07-18

## 2024-07-18 RX ORDER — 0.9 % SODIUM CHLORIDE 0.9 %
1000 INTRAVENOUS SOLUTION INTRAVENOUS ONCE
Status: COMPLETED | OUTPATIENT
Start: 2024-07-18 | End: 2024-07-18

## 2024-07-18 RX ORDER — ASPIRIN 81 MG/1
324 TABLET, CHEWABLE ORAL ONCE
Status: DISCONTINUED | OUTPATIENT
Start: 2024-07-18 | End: 2024-07-19

## 2024-07-18 RX ORDER — ACETAMINOPHEN 650 MG/1
650 SUPPOSITORY RECTAL EVERY 6 HOURS PRN
Status: DISCONTINUED | OUTPATIENT
Start: 2024-07-18 | End: 2024-07-26 | Stop reason: HOSPADM

## 2024-07-18 RX ORDER — BIVALIRUDIN 250 MG/5ML
INJECTION, POWDER, LYOPHILIZED, FOR SOLUTION INTRAVENOUS PRN
Status: DISCONTINUED | OUTPATIENT
Start: 2024-07-18 | End: 2024-07-18 | Stop reason: HOSPADM

## 2024-07-18 RX ORDER — POLYETHYLENE GLYCOL 3350 17 G/17G
17 POWDER, FOR SOLUTION ORAL DAILY PRN
Status: DISCONTINUED | OUTPATIENT
Start: 2024-07-18 | End: 2024-07-26 | Stop reason: HOSPADM

## 2024-07-18 RX ORDER — AMIODARONE HYDROCHLORIDE 150 MG/3ML
150 INJECTION, SOLUTION INTRAVENOUS ONCE
Status: DISCONTINUED | OUTPATIENT
Start: 2024-07-18 | End: 2024-07-18

## 2024-07-18 RX ORDER — SODIUM CHLORIDE 9 MG/ML
INJECTION, SOLUTION INTRAVENOUS PRN
Status: DISCONTINUED | OUTPATIENT
Start: 2024-07-18 | End: 2024-07-26 | Stop reason: HOSPADM

## 2024-07-18 RX ORDER — SODIUM CHLORIDE 0.9 % (FLUSH) 0.9 %
5-40 SYRINGE (ML) INJECTION EVERY 12 HOURS SCHEDULED
Status: DISCONTINUED | OUTPATIENT
Start: 2024-07-18 | End: 2024-07-26 | Stop reason: HOSPADM

## 2024-07-18 RX ORDER — CALCIUM GLUCONATE 20 MG/ML
2000 INJECTION, SOLUTION INTRAVENOUS ONCE
Status: COMPLETED | OUTPATIENT
Start: 2024-07-18 | End: 2024-07-18

## 2024-07-18 RX ORDER — HEPARIN SODIUM 1000 [USP'U]/ML
4000 INJECTION, SOLUTION INTRAVENOUS; SUBCUTANEOUS PRN
Status: DISCONTINUED | OUTPATIENT
Start: 2024-07-18 | End: 2024-07-23

## 2024-07-18 RX ORDER — NOREPINEPHRINE BITARTRATE 0.06 MG/ML
1-100 INJECTION, SOLUTION INTRAVENOUS CONTINUOUS
Status: DISCONTINUED | OUTPATIENT
Start: 2024-07-18 | End: 2024-07-23

## 2024-07-18 RX ADMIN — ONDANSETRON 4 MG: 2 INJECTION INTRAMUSCULAR; INTRAVENOUS at 21:15

## 2024-07-18 RX ADMIN — LIDOCAINE HYDROCHLORIDE 100 MG: 20 INJECTION INTRAVENOUS at 11:08

## 2024-07-18 RX ADMIN — Medication 50 MCG/HR: at 20:55

## 2024-07-18 RX ADMIN — Medication 450 MG: at 10:21

## 2024-07-18 RX ADMIN — Medication 2 MG/HR: at 10:38

## 2024-07-18 RX ADMIN — MAGNESIUM SULFATE HEPTAHYDRATE 2000 MG: 40 INJECTION, SOLUTION INTRAVENOUS at 09:58

## 2024-07-18 RX ADMIN — NALXONE HYDROCHLORIDE 0.4 MG: 0.4 INJECTION INTRAMUSCULAR; INTRAVENOUS; SUBCUTANEOUS at 10:14

## 2024-07-18 RX ADMIN — FENTANYL CITRATE 100 MCG: 50 INJECTION, SOLUTION INTRAMUSCULAR; INTRAVENOUS at 10:34

## 2024-07-18 RX ADMIN — MIDAZOLAM HYDROCHLORIDE 2 MG: 2 INJECTION, SOLUTION INTRAMUSCULAR; INTRAVENOUS at 10:08

## 2024-07-18 RX ADMIN — Medication 200 MCG/MIN: at 14:21

## 2024-07-18 RX ADMIN — Medication 1 MG/MIN: at 18:17

## 2024-07-18 RX ADMIN — Medication 5 MCG/MIN: at 20:13

## 2024-07-18 RX ADMIN — FENTANYL CITRATE 50 MCG: 50 INJECTION, SOLUTION INTRAMUSCULAR; INTRAVENOUS at 10:09

## 2024-07-18 RX ADMIN — PHENYLEPHRINE HYDROCHLORIDE 30 MCG/MIN: 10 INJECTION INTRAVENOUS at 10:43

## 2024-07-18 RX ADMIN — MAGNESIUM SULFATE IN WATER 2000 MG: 40 INJECTION, SOLUTION INTRAVENOUS at 09:58

## 2024-07-18 RX ADMIN — HEPARIN SODIUM 10 UNITS/KG/HR: 10000 INJECTION, SOLUTION INTRAVENOUS at 21:32

## 2024-07-18 RX ADMIN — SODIUM CHLORIDE 1000 ML: 9 INJECTION, SOLUTION INTRAVENOUS at 10:00

## 2024-07-18 RX ADMIN — PROPOFOL 10 MCG/KG/MIN: 10 INJECTION, EMULSION INTRAVENOUS at 19:44

## 2024-07-18 RX ADMIN — MIDAZOLAM 2 MG: 1 INJECTION INTRAMUSCULAR; INTRAVENOUS at 10:08

## 2024-07-18 RX ADMIN — CALCIUM GLUCONATE 2000 MG: 20 INJECTION, SOLUTION INTRAVENOUS at 18:23

## 2024-07-18 ASSESSMENT — PULMONARY FUNCTION TESTS
PIF_VALUE: 19
PIF_VALUE: 21
PIF_VALUE: 19
PIF_VALUE: 21
PIF_VALUE: 20
PIF_VALUE: 21
PIF_VALUE: 20
PIF_VALUE: 21
PIF_VALUE: 21
PIF_VALUE: 20
PIF_VALUE: 22
PIF_VALUE: 16
PIF_VALUE: 21
PIF_VALUE: 20
PIF_VALUE: 22
PIF_VALUE: 21
PIF_VALUE: 19
PIF_VALUE: 22
PIF_VALUE: 20
PIF_VALUE: 24
PIF_VALUE: 20
PIF_VALUE: 20
PIF_VALUE: 22
PIF_VALUE: 19
PIF_VALUE: 20

## 2024-07-18 ASSESSMENT — LIFESTYLE VARIABLES
HOW OFTEN DO YOU HAVE A DRINK CONTAINING ALCOHOL: NEVER
HOW MANY STANDARD DRINKS CONTAINING ALCOHOL DO YOU HAVE ON A TYPICAL DAY: PATIENT DOES NOT DRINK

## 2024-07-18 NOTE — PROGRESS NOTES
[facility]     Emergency/Trauma Note    PATIENT NAME: Claudio Graham    Shift date: 7/18/2024   Shift day: Thursday   Shift # 1    Room # Cath Pool Room/PL   Name: Claudio Graham            Age: 69 y.o.  Gender: male          L[employer] day: Non-Ad[employer]ist   Place of Ep[employer]opalian:     Trauma/Incident type: Full Arrest  Admit Date & Time: 7/18/2024  9:53 AM    PATIENT/EVENT DESCRIPTION:  Claudio Graham is a 69 y.o. male who arrived via ground ambulance as a post arrest. Per EMS, pt had called EMS from his car at his home.  Pt lost pulses briefly while with EMS.  Per doctor note, pt was able to communicate when he arrived.  Pt was intubated and taken to the cath lab. Pt to be admitted to Cath Pool Room/PL.         SPIRITUAL ASSESSMENT-INTERVENTION-OUTCOME:  Writer unable to assess pt. Writer left message for emergency contact Cathy and notified her later that pt was going to the cath lab and that a doctor would call to update her when they are available. She expressed appreciation for update.      PATIENT BELONGINGS:  No belongings noted    ANY BELONGINGS OF SIGNIFICANT VALUE NOTED:      REGISTRATION STAFF NOTIFIED?  No      WHAT IS YOUR SPIRITUAL CARE PLAN FOR THIS PATIENT?:   Spiritual health team will remain available for spiritual and emotional support.     Electronically signed by Chaplain Scooby, on 7/18/2024 at 12:41 PM.  [facility]  [phone]

## 2024-07-18 NOTE — CONSULTS
Vital Sign     Unstable Housing in the Last Year: No       Immunization History   Administered Date(s) Administered    COVID-19, PFIZER Bivalent, DO NOT Dilute, (age 12y+), IM, 30 mcg/0.3 mL 09/14/2022    COVID-19, PFIZER GRAY top, DO NOT Dilute, (age 12 y+), IM, 30 mcg/0.3 mL 07/01/2022    COVID-19, PFIZER PURPLE top, DILUTE for use, (age 12 y+), 30mcg/0.3mL 02/17/2021, 03/10/2021, 11/30/2021    COVID-19, PFIZER, (2023-24 formula), (age 12y+), IM, 30mcg/0.3mL 10/03/2023    COVID-19, US Vaccine, Vaccine Unspecified 03/29/2021, 04/26/2021    Influenza 10/04/2012, 11/21/2013    Influenza Vaccine, unspecified formulation 11/21/2013, 10/30/2015    Influenza Virus Vaccine 11/21/2014, 10/01/2016, 10/03/2017    Influenza Whole 10/01/2016    Influenza, AFLURIA (age 3 yrs+), FLUZONE, (age 6 mo+), MDV, 0.5mL 10/03/2017    Influenza, FLUAD, (age 65 y+), Adjuvanted, 0.5mL 09/25/2020, 10/27/2021, 09/12/2022, 10/03/2023    Influenza, FLUARIX, FLULAVAL, FLUZONE (age 6 mo+) AND AFLURIA, (age 3 y+), PF, 0.5mL 10/05/2016, 10/26/2018, 09/30/2019    Pneumococcal, PCV-13, PREVNAR 13, (age 6w+), IM, 0.5mL 06/21/2018    Pneumococcal, PPSV23, PNEUMOVAX 23, (age 2y+), SC/IM, 0.5mL 06/30/2016, 09/25/2020    RSV, ABRYSVO, (Pregnant or age 60y+), PF, IM, 0.5mL 11/01/2023    TDaP, ADACEL (age 10y-64y), BOOSTRIX (age 10y+), IM, 0.5mL 06/30/2016    Unknown Immunization 09/25/2020    Zoster Live (Zostavax) 04/22/2015    Zoster Recombinant (Shingrix) 03/09/2020, 08/18/2020         Family History   Problem Relation Age of Onset    Cancer Mother     Heart Disease Mother         due to smoking    Heart Disease Maternal Uncle     Heart Disease Maternal Grandmother          Past Surgical History:   Procedure Laterality Date    BONE MARROW BIOPSY  2012 approx    CARDIAC CATHETERIZATION  08/2007    severe stenosis pre-stent area    CARDIAC CATHETERIZATION  09/11/2013    Very peripheral stenosis, not amendable to PCI, stents patent    CARDIAC DEFIBRILLATOR  cardiology.  -Continue amiodarone and lidocaine drip for now.  -Continue heparin drip and dual antiplatelet therapy.  -Family at bedside was updated about the patient's situation.  -Rest of management as per primary team.  -Please call us for any question or concern.    Thank you so much for for letting us participate in the management of .       Radha Shankar MD  Pulmonary and critical care attending physician.

## 2024-07-18 NOTE — ED NOTES
Dr. Estrada intubating   Positive color change  Bilateral breath sounds given     7.5 ETT 25cm at the lip

## 2024-07-18 NOTE — CONSULTS
Comprehensive Nutrition Assessment    Type and Reason for Visit:  Initial, Consult (Vent/TF recs)    Nutrition Recommendations/Plan:   If tube feeds are initiated, recommend Peptide Based (Vital AF 1.2) with goal rate of 40 ml/hr + Protein Modular TID while the patient is vent dependent with no propofol.  To monitor nutrition intake/tolerance, labs, weight, and plan of care.     Malnutrition Assessment:  Malnutrition Status:  Insufficient data (07/18/24 1445)    Context:  Acute Illness     Findings of the 6 clinical characteristics of malnutrition:  Energy Intake:  Unable to assess  Weight Loss:  Unable to assess     Body Fat Loss:  Unable to assess     Muscle Mass Loss:  Unable to assess    Fluid Accumulation:  Unable to assess     Strength:  Not Performed    Nutrition Assessment:    68 yo male admitted for V-tach. PMH includes: HFrEF, anemia, CAD, CHF, CKD, HTN, HLD, DJD, DM, diverticulitis, thyroid disease. Patient is a recent admit and currently intubated. No sedation, patient on phenylephrine for pressor per chart review. Received order for tube feed recomendation and management. Per chart, patient does not have enteral access at this time. No documentation of nausea/emesis, any wounds, or edema at this time. Reviewed documented weight history- would indicate no significant weight loss in a year. Meds reviewed. Labs include POC  mg/dL.    Nutrition Related Findings:    No edema per chart. Wound Type: None       Current Nutrition Intake & Therapies:    Average Meal Intake: NPO  Average Supplements Intake: NPO  ADULT DIET; Regular    Anthropometric Measures:  Height: 175.3 cm (5' 9.02\")  Ideal Body Weight (IBW): 160 lbs (73 kg)       Current Body Weight: 94.3 kg (207 lb 14.3 oz), 129.9 % IBW. Weight Source: Stated  Current BMI (kg/m2): 30.7        Weight Adjustment For: No Adjustment                 BMI Categories: Overweight (BMI 25.0-29.9)    Estimated Daily Nutrient Needs:  Energy Requirements Based

## 2024-07-18 NOTE — H&P
[facility]    H&P               Today's Date: 7/18/2024  Patient Name: Claudio Graham  Date of admission: 7/18/2024  9:53 AM  Patient's age: 69 y.o., 1955  Admission Dx: No admission diagnoses are documented for this encounter.    Requesting Physician: No admitting provider for patient encounter.    Cardiac Evaluation Reason:  VT    History Obtained From: patient/chart review     History of Present Illness:    This patient 69 y.o. years old male who we have been asked to evaluate for syncope.  Patient was brought in by EMS.  Patient was tachycardic with EMS.  Sustained VT.  Apparently patient received ICD shocks, received adenosine and procainamide by EMS without any response.  ER, patient was noted to be in sustained VT with drop in blood pressure.  Given amiodarone and lidocaine bolus with no significant response.  Decided to proceed with urgent cardioversion.  Initial synchronized cardioversion at 200J without any significant change in rhythm.  This was followed by 300J with no significant response.  Patient required another emergent 300 J synchronized cardioversion due to sudden increase in heart rate to around 300 bpm.  No change in rhythm after third synchronized cardioversion although heart rate slowed down.  At this point, patient was intubated by the ER physicians.  Patient does have significant previous history of coronary artery disease with previous stenting, severe ischemic cardiomyopathy with in situ ICD, previous history of atrial fibrillation on Eliquis.    Past Medical History:   has a past medical history of Acute HFrEF (heart failure with reduced ejection fraction) ([facility]), Acute on chronic combined systolic and diastolic congestive heart failure ([facility]), Acute on chronic congestive heart failure ([facility]), Acute respiratory failure with hypoxia ([facility]), Anemia, Anxiety, CAD (coronary artery disease), Cardiac defibrillator in place, Cardiomyopathy ([facility]), CHF (congestive heart

## 2024-07-18 NOTE — ED PROVIDER NOTES
[facility]     Emergency Department     Faculty Note/ Attestation      Pt Name: Claudio Graham                                       MRN: [id number]  Birthdate 1955  Date of evaluation: 7/18/2024  Note Started: 10:55 AM EDT    Patients PCP:    Vanessa Mckenna, LIDIA - CNP    Attestation  I performed a history and physical examination of the patient and discussed management with the resident. I reviewed the resident’s note and agree with the documented findings and plan of care. Any areas of disagreement are noted on the chart. I was personally present for the key portions of any procedures. I have documented in the chart those procedures where I was not present during the key portions. I have reviewed the emergency nurses triage note. I agree with the chief complaint, past medical history, past surgical history, allergies, medications, social and family history as documented unless otherwise noted below.    For Physician Assistant/ Nurse Practitioner cases/documentation I have personally evaluated this patient and have completed at least one if not all key elements of the E/M (history, physical exam, and MDM). Additional findings are as noted.    Initial Screens:             Vitals:    Vitals:    07/18/24 1105 07/18/24 1110 07/18/24 1115 07/18/24 1119   BP: (!) 48/34 (!) 54/20 (!) 86/62    Pulse: (!) 162 (!) 156 (!) 156    Resp: 16 16 16    Temp:    97.5 °F (36.4 °C)   TempSrc:    Core   SpO2: 98% 97% 96%    Weight:       Height:           CHIEF COMPLAINT       Chief Complaint   Patient presents with    Pacemaker Problem     Patient is a 69-year-old male with known CAD extremely poor ejection fraction last 1 of 15% patient has AICD in place was not feeling well today 911 was called when patient had his AICD fire 15 times before EMS arrived and fired again with EMS patient did require chest compressions during EMS care as he had lost pulses briefly after cardioversion by the patient's

## 2024-07-18 NOTE — PROCEDURES
[facility]        Date:   7/18/2024  Patient name: Claudio Graham  Date of admission:  7/18/2024  9:53 AM  MRN:   [id number]  YOB: 1955    CARDIAC CATHETERIZATION    Operators:  Primary: Satinder Siddiqui MD.  Assistant:     Indications for cath: Persistent ventricular tachycardia    Procedure performed: Cardiac cath.    Access: Right Femoral artery      Procedure: After informed consent was obtained with explanation of the risks and benefits, patient was brought to the cath lab. The right groin were prepped and draped in sterile fashion. 1% lidocaine was used for local block. The Femoral artery was cannulated with 6  Fr sheath with brisk arterial blood return. The side port was frequently flushed and aspirated with normal saline.    Dominance is right    EBL is 15 mL    Findings:    Left main: Normal with 0% stenosis    LAD: Acute ostial occlusion 100%, length is 18 mm, THIAGO 0 flow, underwent PCI with TIFFANY using 3.5 x 22 mm Lupillo stent with 0% residual stenosis and restoration of THIAGO-3 flow    LCX: Luminal parities of 20 to 30%    RCA: Luminal irregularities of 20 to 30%    The LV gram was performed in the FARIAS 30 position.   LVEF: 10%. LV Wall Motion: Severe global hypokinesis    Conclusions:  Acute occlusion of ostial LAD  Successful PCI with TIFFANY to ostial LAD with restoration of THIAGO-3 flow  Severely reduced LV systolic function  Persistent ventricular tachycardia    Recommendation:  Routine post PCI/MI orders  Medical treatments.  Risk factors modifications.  Patient was loaded with amiodarone in addition to lidocaine and another shock was attempted after completion of the procedure but patient remains in persistent ventricular tachycardia  Intra-aortic balloon pump was inserted via right femoral approach  Right femoral central line was inserted        Electronically signed by Satinder Siddiqui MD on 7/18/2024 at 2:46 PM      [facility]

## 2024-07-18 NOTE — ED PROVIDER NOTES
[facility]  Emergency Department Encounter  Emergency Medicine Resident     Pt Name:Claudio Graham  MRN: [id number]  Birthdate 1955  Date of evaluation: 7/18/24  PCP:  Vanessa Mckenna APRN - CNP  Note Started: 12:22 PM EDT      CHIEF COMPLAINT       Chief Complaint   Patient presents with    Pacemaker Problem       HISTORY OF PRESENT ILLNESS  (Location/Symptom, Timing/Onset, Context/Setting, Quality, Duration, Modifying Factors, Severity.)      Claudio Graham is a 69 y.o. male with a past medical history of CAD and ejection fraction of 15% who presents with status post cardiac arrest.  Patient underwent defibrillation 15 times by AICD.  Patient called 911.  EMS performed compressions as he had lost pulses briefly.  Patient underwent cardioversion by AICD and was able to regain pulses and normal mental status.  Patient was given procainamide and adenosine en route.  Upon arrival patient was noted to have wide-complex tachycardia and speaking full sentences complaining of extreme fatigue.  PAST MEDICAL / SURGICAL / SOCIAL / FAMILY HISTORY      has a past medical history of Acute HFrEF (heart failure with reduced ejection fraction) ([facility]), Acute on chronic combined systolic and diastolic congestive heart failure ([facility]), Acute on chronic congestive heart failure ([facility]), Acute respiratory failure with hypoxia ([facility]), Anemia, Anxiety, CAD (coronary artery disease), Cardiac defibrillator in place, Cardiomyopathy ([facility]), CHF (congestive heart failure) ([facility]), Chronic combined systolic and diastolic heart failure ([facility]) s/[ AICD placed, Chronic kidney disease, Complex sleep apnea syndrome, Diabetic retinopathy ([facility]), Diverticulitis, DJD (degenerative joint disease), Edentulous, Gout, HTN (hypertension), Hyperlipidemia, Hypertension, Iron deficiency anemia, Ischemic cardiomyopathy, Morbid obesity ([facility]), Obesity, TANNER variably compliant with BiPAP, Osteoarthritis, PAF (paroxysmal atrial fibrillation) ([facility]),  PRAPARE - Transportation     Lack of Transportation (Medical): Not on file     Lack of Transportation (Non-Medical): No   Physical Activity: Inactive (9/3/2021)    Exercise Vital Sign     Days of Exercise per Week: 0 days     Minutes of Exercise per Session: 0 min   Stress: No Stress Concern Present (9/3/2021)    [employer] - Occupational Stress Questionnaire     Feeling of Stress : Only a little   Social Connections: Socially Isolated (9/3/2021)    Social Connection and Isolation Panel [NHANES]     Frequency of Communication with Friends and Family: More than three times a week     Frequency of Social Gatherings with Friends and Family: Once a week     Attends [employer] Services: Never     Active Member of Clubs or Organizations: No     Attends Club or Organization Meetings: Never     Marital Status: Never    Intimate Partner Violence: Not on file   Housing Stability: Unknown (4/19/2024)    Housing Stability Vital Sign     Unable to Pay for Housing in the Last Year: Not on file     Number of Places Lived in the Last Year: Not on file     Unstable Housing in the Last Year: No       Family History   Problem Relation Age of Onset    Cancer Mother     Heart Disease Mother         due to smoking    Heart Disease Maternal Uncle     Heart Disease Maternal Grandmother        Allergies:  Zetia [ezetimibe], Bactrim, Cephalexin, Cephalexin, and Sulfamethoxazole-trimethoprim    Home Medications:  Prior to Admission medications    Medication Sig Start Date End Date Taking? Authorizing Provider   metFORMIN (GLUCOPHAGE) 1000 MG tablet take 1 tablet by mouth twice a day with meals 7/11/24   Mignon Nielson APRN - CNP   atorvastatin (LIPITOR) 40 MG tablet take 1 tablet by mouth once daily 7/11/24   Mignon Nielson APRN - CNP   apixaban (ELIQUIS) 5 MG TABS tablet take 1 tablet by mouth twice a day 7/9/24   Mignon Nielson APRN - CNP   Lancets (ONETOUCH DELICA PLUS [id number]) MISC use 1 LANCET to TEST

## 2024-07-18 NOTE — PLAN OF CARE
Problem: Chronic Conditions and Co-morbidities  Goal: Patient's chronic conditions and co-morbidity symptoms are monitored and maintained or improved  Outcome: Progressing  Flowsheets (Taken 7/18/2024 1400)  Care Plan - Patient's Chronic Conditions and Co-Morbidity Symptoms are Monitored and Maintained or Improved:   Monitor and assess patient's chronic conditions and comorbid symptoms for stability, deterioration, or improvement   Collaborate with multidisciplinary team to address chronic and comorbid conditions and prevent exacerbation or deterioration   Update acute care plan with appropriate goals if chronic or comorbid symptoms are exacerbated and prevent overall improvement and discharge     Problem: Discharge Planning  Goal: Discharge to home or other facility with appropriate resources  Outcome: Progressing     Problem: Respiratory - Adult  Goal: Achieves optimal ventilation and oxygenation  Outcome: Progressing  Flowsheets  Taken 7/18/2024 1400 by Cam Larose RN  Achieves optimal ventilation and oxygenation:   Assess for changes in mentation and behavior   Position to facilitate oxygenation and minimize respiratory effort   Oxygen supplementation based on oxygen saturation or arterial blood gases   Assess the need for suctioning and aspirate as needed   Respiratory therapy support as indicated  Taken 7/18/2024 1326 by Chasidy Alvarez RCP  Achieves optimal ventilation and oxygenation:   Assess for changes in respiratory status   Assess for changes in mentation and behavior   Position to facilitate oxygenation and minimize respiratory effort   Oxygen supplementation based on oxygen saturation or arterial blood gases   Encourage broncho-pulmonary hygiene including cough, deep breathe, incentive spirometry   Assess the need for suctioning and aspirate as needed   Assess and instruct to report shortness of breath or any respiratory difficulty   Respiratory therapy support as indicated     Problem:

## 2024-07-18 NOTE — PROGRESS NOTES
07/18/24 3519   Care Plan - Respiratory Goals   Achieves optimal ventilation and oxygenation Assess for changes in respiratory status;Assess for changes in mentation and behavior;Position to facilitate oxygenation and minimize respiratory effort;Oxygen supplementation based on oxygen saturation or arterial blood gases;Encourage broncho-pulmonary hygiene including cough, deep breathe, incentive spirometry;Assess the need for suctioning and aspirate as needed;Assess and instruct to report shortness of breath or any respiratory difficulty;Respiratory therapy support as indicated

## 2024-07-18 NOTE — ED NOTES
Pt arrived via EMS  PTA ems gave 6mg and 12mg of adenosine  PTA ems gave 1g in 50ml bag of procamide  PTA Pt was defib 15 times Personal pacemaker  PTA pt was last shocked at 0949

## 2024-07-18 NOTE — CONSULTS
[facility]    Consult / H&P               Today's Date: 7/18/2024  Patient Name: Claudio Graham  Date of admission: 7/18/2024  9:53 AM  Patient's age: 69 y.o., 1955  Admission Dx: No admission diagnoses are documented for this encounter.    Requesting Physician: No admitting provider for patient encounter.    Cardiac Evaluation Reason:  VT    History Obtained From: patient/chart review     History of Present Illness:    This patient 69 y.o. years old male who we have been asked to evaluate for syncope.  Patient was brought in by EMS.  Patient was tachycardic with EMS.  Sustained VT.  Apparently patient received ICD shocks, received adenosine and procainamide by EMS without any response.  ER, patient was noted to be in sustained VT with drop in blood pressure.  Given amiodarone and lidocaine bolus with no significant response.  Decided to proceed with urgent cardioversion.  Initial synchronized cardioversion at 200J without any significant change in rhythm.  This was followed by 300J with no significant response.  Patient required another emergent 300 J synchronized cardioversion due to sudden increase in heart rate to around 300 bpm.  No change in rhythm after third synchronized cardioversion although heart rate slowed down.  At this point, patient was intubated by the ER physicians.  Patient does have significant previous history of coronary artery disease with previous stenting, severe ischemic cardiomyopathy with in situ ICD, previous history of atrial fibrillation on Eliquis.    Past Medical History:   has a past medical history of Acute HFrEF (heart failure with reduced ejection fraction) ([facility]), Acute on chronic combined systolic and diastolic congestive heart failure ([facility]), Acute on chronic congestive heart failure ([facility]), Acute respiratory failure with hypoxia ([facility]), Anemia, Anxiety, CAD (coronary artery disease), Cardiac defibrillator in place, Cardiomyopathy ([facility]), CHF  and sedated   HEENT: atraumatic, normocephalic.   Respiratory:  Bilateral ventilatory breath sounds present  Cardiovascular:  Regular S1 and S2.  No JVD  Abdomen:   Soft obese abdomen  Extremities:  No LE edema or cyanosis   Neurological:  Intubated and sedated      DATA:    EKG:   Results for orders placed or performed during the hospital encounter of 11/25/23   EKG 12 Lead   Result Value Ref Range    Ventricular Rate 101 BPM    Atrial Rate 101 BPM    P-R Interval 208 ms    QRS Duration 138 ms    Q-T Interval 334 ms    QTc Calculation (Bazett) 433 ms    P Axis 29 degrees    R Axis -60 degrees    T Axis 106 degrees    Narrative    Sinus tachycardia with Premature supraventricular complexes  Left axis deviation  Non-specific intra-ventricular conduction block  T wave abnormality, consider lateral ischemia  Abnormal ECG  When compared with ECG of 11-FEB-2022 09:17,  Premature ventricular complexes are no longer Present  Premature supraventricular complexes are now Present  Non-specific intra-ventricular conduction block has replaced Left bundle branch block       Echo:  3/2020  Summary  Left ventricle is enlarged. Global left ventricular systolic function is  severely reduced.  Calculated ejection fraction is 13 % by Negron's method.  Visually estimated EF 15-20%.  [place] akinetic.  Definity used to optimize left ventricular systolic function and rule out  thrombus.  No thrombus seen via Definity.  Normal right ventricular size and function.  No significant valvular regurgitation or stenosis seen.  No pericardial effusion seen.       LABS:   CBC:   Recent Labs     07/18/24  1017   WBC 10.5   HGB 13.7   HCT 42.7        BMP:   Recent Labs     07/18/24  1000   CREATININE 1.6*     BNP: No results for input(s): \"BNP\" in the last 72 hours.  PT/INR: No results for input(s): \"PROTIME\", \"INR\" in the last 72 hours.  APTT:No results for input(s): \"APTT\" in the last 72 hours.  CARDIAC ENZYMES:No results for input(s):

## 2024-07-19 ENCOUNTER — APPOINTMENT (OUTPATIENT)
Age: 69
End: 2024-07-19
Payer: COMMERCIAL

## 2024-07-19 ENCOUNTER — APPOINTMENT (OUTPATIENT)
Dept: GENERAL RADIOLOGY | Age: 69
End: 2024-07-19
Payer: COMMERCIAL

## 2024-07-19 PROBLEM — E87.20 METABOLIC ACIDOSIS: Status: ACTIVE | Noted: 2024-07-19

## 2024-07-19 PROBLEM — R57.0 CARDIOGENIC SHOCK (HCC): Status: ACTIVE | Noted: 2024-07-19

## 2024-07-19 PROBLEM — I49.01 VENTRICULAR FIBRILLATION (HCC): Status: ACTIVE | Noted: 2024-07-19

## 2024-07-19 LAB
ALBUMIN SERPL-MCNC: 3.5 G/DL (ref 3.5–5.2)
ALBUMIN/GLOB SERPL: 1 {RATIO} (ref 1–2.5)
ALP SERPL-CCNC: 89 U/L (ref 40–129)
ALT SERPL-CCNC: 70 U/L (ref 10–50)
ANION GAP SERPL CALCULATED.3IONS-SCNC: 14 MMOL/L (ref 9–16)
ANION GAP SERPL CALCULATED.3IONS-SCNC: 15 MMOL/L (ref 9–16)
AST SERPL-CCNC: 123 U/L (ref 10–50)
BASOPHILS # BLD: 0 K/UL (ref 0–0.2)
BASOPHILS # BLD: 0.03 K/UL (ref 0–0.2)
BASOPHILS NFR BLD: 0 % (ref 0–2)
BASOPHILS NFR BLD: 0 % (ref 0–2)
BILIRUB SERPL-MCNC: 0.6 MG/DL (ref 0–1.2)
BUN SERPL-MCNC: 39 MG/DL (ref 8–23)
BUN SERPL-MCNC: 41 MG/DL (ref 8–23)
CALCIUM SERPL-MCNC: 8.6 MG/DL (ref 8.6–10.4)
CALCIUM SERPL-MCNC: 8.7 MG/DL (ref 8.6–10.4)
CHLORIDE SERPL-SCNC: 99 MMOL/L (ref 98–107)
CHLORIDE SERPL-SCNC: 99 MMOL/L (ref 98–107)
CO2 SERPL-SCNC: 20 MMOL/L (ref 20–31)
CO2 SERPL-SCNC: 21 MMOL/L (ref 20–31)
CREAT SERPL-MCNC: 1.5 MG/DL (ref 0.7–1.2)
CREAT SERPL-MCNC: 1.5 MG/DL (ref 0.7–1.2)
EOSINOPHIL # BLD: 0 K/UL (ref 0–0.4)
EOSINOPHIL # BLD: <0.03 K/UL (ref 0–0.44)
EOSINOPHILS RELATIVE PERCENT: 0 % (ref 1–4)
EOSINOPHILS RELATIVE PERCENT: 0 % (ref 1–4)
ERYTHROCYTE [DISTWIDTH] IN BLOOD BY AUTOMATED COUNT: 17.5 % (ref 11.8–14.4)
ERYTHROCYTE [DISTWIDTH] IN BLOOD BY AUTOMATED COUNT: 17.8 % (ref 11.8–14.4)
FIO2: 50
GFR, ESTIMATED: 49 ML/MIN/1.73M2
GFR, ESTIMATED: 50 ML/MIN/1.73M2
GLUCOSE BLD-MCNC: 177 MG/DL (ref 74–100)
GLUCOSE SERPL-MCNC: 191 MG/DL (ref 74–99)
GLUCOSE SERPL-MCNC: 197 MG/DL (ref 74–99)
HCT VFR BLD AUTO: 39.4 % (ref 40.7–50.3)
HCT VFR BLD AUTO: 40.9 % (ref 40.7–50.3)
HGB BLD-MCNC: 12.9 G/DL (ref 13–17)
HGB BLD-MCNC: 13.2 G/DL (ref 13–17)
IMM GRANULOCYTES # BLD AUTO: 0 K/UL (ref 0–0.3)
IMM GRANULOCYTES # BLD AUTO: 0.06 K/UL (ref 0–0.3)
IMM GRANULOCYTES NFR BLD: 0 %
IMM GRANULOCYTES NFR BLD: 0 %
LACTIC ACID, WHOLE BLOOD: 1.1 MMOL/L (ref 0.7–2.1)
LYMPHOCYTES NFR BLD: 0.85 K/UL (ref 1–4.8)
LYMPHOCYTES NFR BLD: 1.09 K/UL (ref 1.1–3.7)
LYMPHOCYTES RELATIVE PERCENT: 8 % (ref 24–43)
LYMPHOCYTES RELATIVE PERCENT: 8 % (ref 24–44)
MAGNESIUM SERPL-MCNC: 2.4 MG/DL (ref 1.6–2.4)
MCH RBC QN AUTO: 27.6 PG (ref 25.2–33.5)
MCH RBC QN AUTO: 27.9 PG (ref 25.2–33.5)
MCHC RBC AUTO-ENTMCNC: 32.3 G/DL (ref 28.4–34.8)
MCHC RBC AUTO-ENTMCNC: 32.7 G/DL (ref 28.4–34.8)
MCV RBC AUTO: 85.3 FL (ref 82.6–102.9)
MCV RBC AUTO: 85.6 FL (ref 82.6–102.9)
MONOCYTES NFR BLD: 0.64 K/UL (ref 0.1–0.8)
MONOCYTES NFR BLD: 0.72 K/UL (ref 0.1–1.2)
MONOCYTES NFR BLD: 5 % (ref 3–12)
MONOCYTES NFR BLD: 6 % (ref 1–7)
MORPHOLOGY: ABNORMAL
NEUTROPHILS NFR BLD: 86 % (ref 36–66)
NEUTROPHILS NFR BLD: 87 % (ref 36–65)
NEUTS SEG NFR BLD: 11.99 K/UL (ref 1.5–8.1)
NEUTS SEG NFR BLD: 9.11 K/UL (ref 1.8–7.7)
NRBC BLD-RTO: 0 PER 100 WBC
NRBC BLD-RTO: 0 PER 100 WBC
PARTIAL THROMBOPLASTIN TIME: 57.4 SEC (ref 23–36.5)
PARTIAL THROMBOPLASTIN TIME: 58.3 SEC (ref 23–36.5)
PARTIAL THROMBOPLASTIN TIME: 70.7 SEC (ref 23–36.5)
PATIENT TEMP: 37.6
PLATELET # BLD AUTO: 131 K/UL (ref 138–453)
PLATELET # BLD AUTO: 166 K/UL (ref 138–453)
PMV BLD AUTO: 11 FL (ref 8.1–13.5)
PMV BLD AUTO: 11.3 FL (ref 8.1–13.5)
POC HCO3: 23.6 MMOL/L (ref 21–28)
POC LACTIC ACID: 0.9 MMOL/L (ref 0.56–1.39)
POC O2 SATURATION: 99 % (ref 94–98)
POC PCO2 TEMP: 32.1 MM HG
POC PCO2: 31.3 MM HG (ref 35–48)
POC PH TEMP: 7.48
POC PH: 7.49 (ref 7.35–7.45)
POC PO2 TEMP: 124.3 MM HG
POC PO2: 120.5 MM HG (ref 83–108)
POSITIVE BASE EXCESS, ART: 0.9 MMOL/L (ref 0–3)
POTASSIUM SERPL-SCNC: 4.2 MMOL/L (ref 3.7–5.3)
POTASSIUM SERPL-SCNC: 4.2 MMOL/L (ref 3.7–5.3)
PROT SERPL-MCNC: 6.4 G/DL (ref 6.6–8.7)
RBC # BLD AUTO: 4.62 M/UL (ref 4.21–5.77)
RBC # BLD AUTO: 4.78 M/UL (ref 4.21–5.77)
RBC # BLD: ABNORMAL 10*6/UL
SODIUM SERPL-SCNC: 134 MMOL/L (ref 136–145)
SODIUM SERPL-SCNC: 134 MMOL/L (ref 136–145)
WBC OTHER # BLD: 10.6 K/UL (ref 3.5–11.3)
WBC OTHER # BLD: 13.9 K/UL (ref 3.5–11.3)

## 2024-07-19 PROCEDURE — 82803 BLOOD GASES ANY COMBINATION: CPT

## 2024-07-19 PROCEDURE — 71045 X-RAY EXAM CHEST 1 VIEW: CPT

## 2024-07-19 PROCEDURE — 93005 ELECTROCARDIOGRAM TRACING: CPT | Performed by: EMERGENCY MEDICINE

## 2024-07-19 PROCEDURE — 6360000002 HC RX W HCPCS: Performed by: INTERNAL MEDICINE

## 2024-07-19 PROCEDURE — 6370000000 HC RX 637 (ALT 250 FOR IP): Performed by: STUDENT IN AN ORGANIZED HEALTH CARE EDUCATION/TRAINING PROGRAM

## 2024-07-19 PROCEDURE — 36415 COLL VENOUS BLD VENIPUNCTURE: CPT

## 2024-07-19 PROCEDURE — 83605 ASSAY OF LACTIC ACID: CPT

## 2024-07-19 PROCEDURE — 2500000003 HC RX 250 WO HCPCS: Performed by: STUDENT IN AN ORGANIZED HEALTH CARE EDUCATION/TRAINING PROGRAM

## 2024-07-19 PROCEDURE — 6360000002 HC RX W HCPCS: Performed by: STUDENT IN AN ORGANIZED HEALTH CARE EDUCATION/TRAINING PROGRAM

## 2024-07-19 PROCEDURE — 99291 CRITICAL CARE FIRST HOUR: CPT | Performed by: STUDENT IN AN ORGANIZED HEALTH CARE EDUCATION/TRAINING PROGRAM

## 2024-07-19 PROCEDURE — C8929 TTE W OR WO FOL WCON,DOPPLER: HCPCS

## 2024-07-19 PROCEDURE — 2700000000 HC OXYGEN THERAPY PER DAY

## 2024-07-19 PROCEDURE — 2580000003 HC RX 258: Performed by: STUDENT IN AN ORGANIZED HEALTH CARE EDUCATION/TRAINING PROGRAM

## 2024-07-19 PROCEDURE — 94003 VENT MGMT INPAT SUBQ DAY: CPT

## 2024-07-19 PROCEDURE — 85025 COMPLETE CBC W/AUTO DIFF WBC: CPT

## 2024-07-19 PROCEDURE — 2000000000 HC ICU R&B

## 2024-07-19 PROCEDURE — 85730 THROMBOPLASTIN TIME PARTIAL: CPT

## 2024-07-19 PROCEDURE — 83735 ASSAY OF MAGNESIUM: CPT

## 2024-07-19 PROCEDURE — 94761 N-INVAS EAR/PLS OXIMETRY MLT: CPT

## 2024-07-19 PROCEDURE — 80048 BASIC METABOLIC PNL TOTAL CA: CPT

## 2024-07-19 PROCEDURE — 99233 SBSQ HOSP IP/OBS HIGH 50: CPT | Performed by: INTERNAL MEDICINE

## 2024-07-19 PROCEDURE — 93306 TTE W/DOPPLER COMPLETE: CPT | Performed by: INTERNAL MEDICINE

## 2024-07-19 PROCEDURE — 82330 ASSAY OF CALCIUM: CPT

## 2024-07-19 PROCEDURE — 37799 UNLISTED PX VASCULAR SURGERY: CPT

## 2024-07-19 PROCEDURE — 93005 ELECTROCARDIOGRAM TRACING: CPT | Performed by: INTERNAL MEDICINE

## 2024-07-19 PROCEDURE — 82947 ASSAY GLUCOSE BLOOD QUANT: CPT

## 2024-07-19 PROCEDURE — 80053 COMPREHEN METABOLIC PANEL: CPT

## 2024-07-19 RX ORDER — ATORVASTATIN CALCIUM 80 MG/1
80 TABLET, FILM COATED ORAL DAILY
Status: DISCONTINUED | OUTPATIENT
Start: 2024-07-19 | End: 2024-07-26 | Stop reason: HOSPADM

## 2024-07-19 RX ORDER — MAGNESIUM SULFATE IN WATER 40 MG/ML
2000 INJECTION, SOLUTION INTRAVENOUS ONCE
Status: COMPLETED | OUTPATIENT
Start: 2024-07-19 | End: 2024-07-19

## 2024-07-19 RX ADMIN — VANCOMYCIN HYDROCHLORIDE 2000 MG: 1 INJECTION, POWDER, LYOPHILIZED, FOR SOLUTION INTRAVENOUS at 18:09

## 2024-07-19 RX ADMIN — PERFLUTREN 1.5 ML: 6.52 INJECTION, SUSPENSION INTRAVENOUS at 15:21

## 2024-07-19 RX ADMIN — ATORVASTATIN CALCIUM 80 MG: 80 TABLET, FILM COATED ORAL at 09:47

## 2024-07-19 RX ADMIN — PROPOFOL 10 MCG/KG/MIN: 10 INJECTION, EMULSION INTRAVENOUS at 12:37

## 2024-07-19 RX ADMIN — PROPOFOL 25 MCG/KG/MIN: 10 INJECTION, EMULSION INTRAVENOUS at 21:10

## 2024-07-19 RX ADMIN — Medication 30 MCG/MIN: at 23:49

## 2024-07-19 RX ADMIN — Medication 1 MG/MIN: at 23:53

## 2024-07-19 RX ADMIN — ACETAMINOPHEN 650 MG: 325 TABLET ORAL at 23:16

## 2024-07-19 RX ADMIN — ASPIRIN 81 MG: 81 TABLET, COATED ORAL at 09:47

## 2024-07-19 RX ADMIN — SODIUM CHLORIDE, PRESERVATIVE FREE 10 ML: 5 INJECTION INTRAVENOUS at 21:10

## 2024-07-19 RX ADMIN — TICAGRELOR 90 MG: 90 TABLET ORAL at 21:09

## 2024-07-19 RX ADMIN — Medication 75 MCG/HR: at 20:09

## 2024-07-19 RX ADMIN — HEPARIN SODIUM 2000 UNITS: 1000 INJECTION INTRAVENOUS; SUBCUTANEOUS at 11:15

## 2024-07-19 RX ADMIN — Medication 0.5 MG/MIN: at 10:40

## 2024-07-19 RX ADMIN — HEPARIN SODIUM 14 UNITS/KG/HR: 10000 INJECTION, SOLUTION INTRAVENOUS at 21:09

## 2024-07-19 RX ADMIN — MAGNESIUM SULFATE HEPTAHYDRATE 2000 MG: 40 INJECTION, SOLUTION INTRAVENOUS at 01:29

## 2024-07-19 RX ADMIN — Medication 150 MG: at 23:35

## 2024-07-19 RX ADMIN — HEPARIN SODIUM 2000 UNITS: 1000 INJECTION INTRAVENOUS; SUBCUTANEOUS at 18:01

## 2024-07-19 RX ADMIN — LIDOCAINE HYDROCHLORIDE 1 MG/MIN: 4 INJECTION, SOLUTION INTRAVENOUS at 09:19

## 2024-07-19 ASSESSMENT — PULMONARY FUNCTION TESTS
PIF_VALUE: 13
PIF_VALUE: 18
PIF_VALUE: 20
PIF_VALUE: 17
PIF_VALUE: 18
PIF_VALUE: 20
PIF_VALUE: 20
PIF_VALUE: 17
PIF_VALUE: 20
PIF_VALUE: 19
PIF_VALUE: 13
PIF_VALUE: 12
PIF_VALUE: 17
PIF_VALUE: 17
PIF_VALUE: 19
PIF_VALUE: 18
PIF_VALUE: 6
PIF_VALUE: 16
PIF_VALUE: 20
PIF_VALUE: 16
PIF_VALUE: 6
PIF_VALUE: 17
PIF_VALUE: 15
PIF_VALUE: 17
PIF_VALUE: 17
PIF_VALUE: 14
PIF_VALUE: 20
PIF_VALUE: 15
PIF_VALUE: 17
PIF_VALUE: 19
PIF_VALUE: 17
PIF_VALUE: 13
PIF_VALUE: 17
PIF_VALUE: 19
PIF_VALUE: 18
PIF_VALUE: 14
PIF_VALUE: 21
PIF_VALUE: 2
PIF_VALUE: 20
PIF_VALUE: 20
PIF_VALUE: 18
PIF_VALUE: 17
PIF_VALUE: 17
PIF_VALUE: 16
PIF_VALUE: 17
PIF_VALUE: 18
PIF_VALUE: 21
PIF_VALUE: 17
PIF_VALUE: 20
PIF_VALUE: 17
PIF_VALUE: 17
PIF_VALUE: 18
PIF_VALUE: 20
PIF_VALUE: 17
PIF_VALUE: 20
PIF_VALUE: 18
PIF_VALUE: 13
PIF_VALUE: 18
PIF_VALUE: 17
PIF_VALUE: 19
PIF_VALUE: 18
PIF_VALUE: 20
PIF_VALUE: 19
PIF_VALUE: 16
PIF_VALUE: 11
PIF_VALUE: 10
PIF_VALUE: 17
PIF_VALUE: 18
PIF_VALUE: 20
PIF_VALUE: 17
PIF_VALUE: 4
PIF_VALUE: 15
PIF_VALUE: 13
PIF_VALUE: 14
PIF_VALUE: 19

## 2024-07-19 ASSESSMENT — PAIN SCALES - WONG BAKER: WONGBAKER_NUMERICALRESPONSE: NO HURT

## 2024-07-19 ASSESSMENT — PAIN SCALES - GENERAL: PAINLEVEL_OUTOF10: 0

## 2024-07-19 NOTE — PROGRESS NOTES
07/18/24 2233   ETT    Placement Date/Time: 07/18/24 1020   Present on Admission/Arrival: No  Placed By: In ED;Licensed provider  Placement Verified By: Auscultation;Capnometry;Chest X-ray;Colorimetric ETCO2 device  Preoxygenation: Yes  Mask Ventilation: Ventilated by mask ...   Secured At (S)  23 cm  (per CXR)   Measured From Lips   ETT Placement Right   Secured By Commercial tube hedrick   Site Assessment Dry

## 2024-07-19 NOTE — PROGRESS NOTES
Progress Note    Patient - Claudio Graham  Date of Admission -  2024  9:53 AM  Date of Evaluation -  2024  Room and Bed Number -  1023/1023-01   Hospital Day - 1    CHIEF COMPLAINT : CHIEF COMPLAINT : ACUTE HYPOXIC RESPIRATORY FAILURE DUE SUSTAINED V TACH.    HPI:  Claudio Graham  69 y.o. male with past medical history of heart failure with reduced ejection fraction, diabetes status post AICD, hyperlipidemia, hypertension, morbid obesity, TANNER presented to the emergency room this morning [2024 medically for sustaining V. tach requiring multiple time shock and medical cardioversion. The pt was emergently underwent cardiac cath and was found to have acute ostial occlusion 100% and TIFFANY was placed also the LVEF was 10%. LV Wall Motion: Severe global hypokinesis and IABP was placed.   Pulmonary/CC was called for Acute hypoxemic respiratory failure and requiring MV support.     SUBJECTIVE:        The pt was seen and examined at bedside   He is currently intubated with full suppoirt mechanical on the setting ( PRVC 490/14/5/30%) the ABG from this morning is 7.47/32/124/23).  He is sedation with fentanyl 50 mcg/hr and following command   The Inta-aortic ballon pump still inserted and managed by cardiologist.  He is currently on Levophed 7 mcg/min which is titrating down in comparison with yesterday.  The lab and imaging was reviewed in the chart.   The patient care was communicated with primary team, Nurse and RT     OBJECTIVE:     VITAL SIGNS:  BP (!) 103/52   Pulse 63   Temp 99.9 °F (37.7 °C)   Resp 14   Ht 1.753 m (5' 9.02\")   Wt 95.3 kg (210 lb 1.6 oz)   SpO2 100%   BMI 31.01 kg/m²   Tmax over 24 hours:  Temp (24hrs), Av.2 °F (36.8 °C), Min:95.9 °F (35.5 °C), Max:99.9 °F (37.7 °C)      Patient Vitals for the past 8 hrs:   BP Temp Temp src Pulse Resp SpO2 Weight   24 1215 -- 99.9 °F (37.7 °C) -- 63 14 100 % --   24 1200 (!) 103/52 99.9 °F (37.7 °C) Bladder 57 14 99 % --  unknown significance) 01/22/2016    NSTEMI (non-ST elevated myocardial infarction) ([facility]) 09/10/2013    HTN (hypertension) 03/30/2012    Chronic kidney disease, stage II (mild) 09/25/2011    Chronic gout 09/25/2011    Morbid obesity ([facility]) 09/25/2011    Normocytic normochromic anemia 09/25/2011    Hyperlipidemia 09/25/2011    Iron deficiency anemia 09/25/2011    Anxiety disorder  09/25/2011    DJD (degenerative joint disease) 09/25/2011    Diabetic retinopathy of both eyes with macular edema associated with diabetes mellitus due to underlying condition, unspecified retinopathy severity ([facility]) 09/25/2011         PLAN:     #1-acute hypoxemic respiratory failure requiring mechanical ventilation support.  #2-Sustained V. tach status post synchronized cardioversion X3 and chemical cardioversion by amiodarone and lidocaine with no significant response.  #3-acute MI with complete occlusion ostial LAD with Successful PCI with TIFFANY to ostial LAD with restoration of THIAGO-3 flow  #4-severely reduced LV systolic function status Intra-aortic balloon pump   #5-cardiogenic shock most likely secondary to above  #6-metabolic acidosis with elevated anion gap and lactic acidosis.  #7- SKYLER on CKD   #8- Mild Hygponatremia  #9-Mild Transaminitis.  10- Postive Blood culture GRAM POSITIVE COCCI IN CLUSTERS, Staphylococcus epidermidis Detected: mecA/C Gene Detected, cough be contaminant vs real bacteremia      Plan:  -Continue mechanical ventilation support, wean as tolerated, Daily SBT,   -Sedation as needed to maintain the RASS 0 to -1.  CW Propofol and fentanyl..    - Daily ABG.  -Chest x-ray as needed.  -Correct electrolytes as needed.  -Continue vasopressors to maintain the MAP more than 60-65.  -Started on Abx ( vancomycin) for Positive blood culture, for gram positive cocci (Staph epidermidis)  -Monitor I&O.  -Intra-aortic balloon pump management as per cardiology.  -Continue amiodarone and lidocaine drip for now.  -Continue heparin drip

## 2024-07-19 NOTE — PLAN OF CARE
Problem: Chronic Conditions and Co-morbidities  Goal: Patient's chronic conditions and co-morbidity symptoms are monitored and maintained or improved  7/19/2024 1622 by Kj Hamm RN  Outcome: Progressing     Problem: Discharge Planning  Goal: Discharge to home or other facility with appropriate resources  7/19/2024 1622 by Kj Hamm RN  Outcome: Progressing     Problem: Respiratory - Adult  Goal: Achieves optimal ventilation and oxygenation  7/19/2024 1622 by Kj Hamm RN  Outcome: Progressing  7/19/2024 1621 by Kj Hamm RN  Outcome: Progressing  7/19/2024 1621 by Kj Hamm RN  Outcome: Progressing  Flowsheets (Taken 7/19/2024 0820 by Chasidy Alvarez RCP)  Achieves optimal ventilation and oxygenation:   Assess for changes in respiratory status   Assess for changes in mentation and behavior   Position to facilitate oxygenation and minimize respiratory effort   Oxygen supplementation based on oxygen saturation or arterial blood gases   Encourage broncho-pulmonary hygiene including cough, deep breathe, incentive spirometry   Assess the need for suctioning and aspirate as needed   Assess and instruct to report shortness of breath or any respiratory difficulty   Respiratory therapy support as indicated  7/19/2024 0243 by Marissa Mendez RN  Outcome: Progressing     Problem: Safety - Adult  Goal: Free from fall injury  7/19/2024 1622 by Kj Hamm RN  Outcome: Progressing     Problem: Pain  Goal: Verbalizes/displays adequate comfort level or baseline comfort level  7/19/2024 1622 by Kj Hamm RN  Outcome: Progressing     Problem: Safety - Medical Restraint  Goal: Remains free of injury from restraints (Restraint for Interference with Medical Device)  Description: INTERVENTIONS:  1. Determine that other, less restrictive measures have been tried or would not be effective before applying the restraint  2. Evaluate the patient's condition at the time of

## 2024-07-19 NOTE — PLAN OF CARE
Problem: Chronic Conditions and Co-morbidities  Goal: Patient's chronic conditions and co-morbidity symptoms are monitored and maintained or improved  7/19/2024 0243 by Marissa Mendez RN  Outcome: Progressing  Flowsheets (Taken 7/18/2024 2000)  Care Plan - Patient's Chronic Conditions and Co-Morbidity Symptoms are Monitored and Maintained or Improved:   Monitor and assess patient's chronic conditions and comorbid symptoms for stability, deterioration, or improvement   Collaborate with multidisciplinary team to address chronic and comorbid conditions and prevent exacerbation or deterioration   Update acute care plan with appropriate goals if chronic or comorbid symptoms are exacerbated and prevent overall improvement and discharge  7/18/2024 1940 by Cam Larose RN  Outcome: Progressing  Flowsheets (Taken 7/18/2024 1400)  Care Plan - Patient's Chronic Conditions and Co-Morbidity Symptoms are Monitored and Maintained or Improved:   Monitor and assess patient's chronic conditions and comorbid symptoms for stability, deterioration, or improvement   Collaborate with multidisciplinary team to address chronic and comorbid conditions and prevent exacerbation or deterioration   Update acute care plan with appropriate goals if chronic or comorbid symptoms are exacerbated and prevent overall improvement and discharge     Problem: Respiratory - Adult  Goal: Achieves optimal ventilation and oxygenation  7/19/2024 0243 by Marissa Mendez RN  Outcome: Progressing  7/18/2024 2023 by Maria Guadalupe Bravo RCP  Flowsheets (Taken 7/18/2024 2023)  Achieves optimal ventilation and oxygenation:   Respiratory therapy support as indicated   Assess the need for suctioning and aspirate as needed   Assess for changes in respiratory status   Assess for changes in mentation and behavior   Oxygen supplementation based on oxygen saturation or arterial blood gases  7/18/2024 1940 by Cam Larose RN  Outcome: Progressing  Flowsheets  Taken 7/18/2024

## 2024-07-19 NOTE — PROGRESS NOTES
07/19/24 0820   Care Plan - Respiratory Goals   Achieves optimal ventilation and oxygenation Assess for changes in respiratory status;Assess for changes in mentation and behavior;Position to facilitate oxygenation and minimize respiratory effort;Oxygen supplementation based on oxygen saturation or arterial blood gases;Encourage broncho-pulmonary hygiene including cough, deep breathe, incentive spirometry;Assess the need for suctioning and aspirate as needed;Assess and instruct to report shortness of breath or any respiratory difficulty;Respiratory therapy support as indicated

## 2024-07-19 NOTE — CARE COORDINATION
Writer to room to conduct intake assessment, patient sound asleep and did not wake to verbal stimuli, will re attempt as time allows

## 2024-07-19 NOTE — PLAN OF CARE
Problem: Respiratory - Adult  Goal: Achieves optimal ventilation and oxygenation  7/18/2024 2023 by Maria Guadalupe Bravo, BAUTISTA  Flowsheets (Taken 7/18/2024 2023)  Achieves optimal ventilation and oxygenation:   Respiratory therapy support as indicated   Assess the need for suctioning and aspirate as needed   Assess for changes in respiratory status   Assess for changes in mentation and behavior   Oxygen supplementation based on oxygen saturation or arterial blood gases

## 2024-07-19 NOTE — PROGRESS NOTES
[facility]   Pharmacy Pharmacokinetic Monitoring Service - Vancomycin     Claudio Graham is a 69 y.o. male starting on vancomycin therapy for bloodstream infection. Pharmacy consulted by GINI CHAMBERS  for monitoring and adjustment.    Target Concentration: Goal AUC/MERI 400-600 mg*hr/L      Pertinent Laboratory Values:   Wt Readings from Last 1 Encounters:   07/19/24 95.3 kg (210 lb 1.6 oz)     Temp Readings from Last 1 Encounters:   07/19/24 99.7 °F (37.6 °C)     Estimated Creatinine Clearance: 51 mL/min (A) (based on SCr of 1.5 mg/dL (H)).  Recent Labs     07/18/24  1017 07/19/24  0045 07/19/24  0327   CREATININE 1.6* 1.5* 1.5*   BUN 43* 41* 39*   WBC 10.5  --  10.6       Pertinent Cultures:  Culture Date Source Results   7/18/24 Blood 1/2 MRSE   MRSA Nasal Swab: N/A. Non-respiratory infection.    Plan:  Will initiate vancomycin with a single dose given current SCr of 1.5 mg/dL and estimated CrCl = 45 mL/min on IBW.  Start vancomycin 2000 mg IV x1 dose  Renal labs as indicated   Vancomycin concentration ordered for 7/20/24 @ 0600  A single positive blood culture for Staph. Epidermidis may indicate contaminant. Please consider additional evaluation to determine necessity of continued vancomycin therapy.   Pharmacy will continue to monitor patient and adjust therapy as indicated    Thank you for the consult,  Tani Alford, Pharm.D., ASHANTI, BCCCP  7/19/2024 2:42 PM

## 2024-07-19 NOTE — CONSULTS
stools. No change in bowel or bladder habits.  Genitourinary: No dysuria, trouble voiding, or hematuria.  Musculoskeletal:  No gait disturbance, No weakness or joint complaints.  Integumentary: No rash or pruritis.  Neurological: No headache, diplopia, change in muscle strength, numbness or tingling. No change in gait, balance, coordination, mood, affect, memory, mentation, behavior.  Psychiatric: No anxiety, or depression.  Endocrine: No temperature intolerance. No excessive thirst, fluid intake, or urination. No tremor.  Hematologic/Lymphatic: No abnormal bruising or bleeding, blood clots or swollen lymph nodes.  Allergic/Immunologic: No nasal congestion or hives.    PHYSICAL EXAM:    Physical Examination:    BP (!) 130/55   Pulse 65   Temp 99.7 °F (37.6 °C)   Resp 14   Ht 1.753 m (5' 9.02\")   Wt 94.3 kg (208 lb)   SpO2 99%   BMI 30.70 kg/m²    Constitutional and General Appearance:  Intubated and sedated  HEENT: PERRL, no cervical lymphadenopathy. No masses palpable. Normal oral mucosa  Respiratory:  Normal excursion and expansion without use of accessory muscles  Resp Auscultation: Good respiratory effort. No for increased work of breathing. On auscultation: clear to auscultation bilaterally  Cardiovascular:  The apical impulse is not displaced  Heart tones are crisp and normal. regular S1 and S2. Murmurs:  1/6 systolic murmur at LLSB  Jugular venous pulsation Normal  The carotid upstroke is normal in amplitude and contour without delay or bruit  Peripheral pulses are symmetrical and full   Abdomen:  No masses or tenderness  Bowel sounds present  Extremities:   No Cyanosis or Clubbing   Lower extremity edema: Yes   Skin: Warm and dry  Neurological:  Intubated and sedated  Moves all extremities well  No abnormalities of mood, affect, memory, mentation, or behavior are noted    DATA:    Diagnostics:      EKG: Ventricular tachycardia, 141 bpm;  RBSA morphology      CARDIAC CATHETERIZATION ( 7/18/24)    Left  main: Normal with 0% stenosis     LAD: Acute ostial occlusion 100%, length is 18 mm, THIAGO 0 flow, underwent PCI with TIFFANY using 3.5 x 22 mm [place] stent with 0% residual stenosis and restoration of THIAGO-3 flow     LCX: Luminal parities of 20 to 30%     RCA: Luminal irregularities of 20 to 30%     The LV gram was performed in the FARIAS 30 position.   LVEF: 10%. LV Wall Motion: Severe global hypokinesis       Labs:     CBC:   Recent Labs     07/18/24  1017 07/19/24  0327   WBC 10.5 10.6   HGB 13.7 12.9*   HCT 42.7 39.4*    166     BMP:   Recent Labs     07/19/24  0045 07/19/24  0327   * 134*   K 4.2 4.2   CO2 21 20   BUN 41* 39*   CREATININE 1.5* 1.5*   LABGLOM 49* 50*   GLUCOSE 197* 191*     BNP: No results for input(s): \"BNP\" in the last 72 hours.  PT/INR:   Recent Labs     07/18/24  1519   PROTIME 38.1*   INR 4.1     APTT:  Recent Labs     07/18/24  2104 07/19/24  0327   APTT 61.8* 70.7*     CARDIAC ENZYMES:No results for input(s): \"CKTOTAL\", \"CKMB\", \"CKMBINDEX\", \"TROPONINI\" in the last 72 hours.  FASTING LIPID PANEL:  Lab Results   Component Value Date/Time    HDL 55 09/21/2023 09:45 AM    TRIG 86 09/21/2023 09:45 AM     LIVER PROFILE:  Recent Labs     07/19/24  0327   *   ALT 70*         IMPRESSION:      Acute MI with acute occlusion of the ostial LAD, s/p PCI with TIFFANY  Severe ischemic cardiomyopathy, LVEF 10%, with cardiogenic shock  Belen-ischemic VT and VF, recurrent  H/o VT/ VF arrest in 2016 with subsequent implantation of [place] [employer] Ellipse VR ICD  PAF, on chronic amiodarone and Eliquis  Normal ICD function on interrogation with all shocks terminating VT/ VF  Type II DM  Essential HTN        RECOMMENDATIONS:     Continue IV amiodarone and lidocaine   Continue pressors and IABP   Reprogram ICD to 3-zone tachyarrhythmia detection when more stable   Continue IV heparin, ASA and Brilina      Discussed with patient and nursing.    Electronically signed by Darryl Soto MD on 7/19/2024 at

## 2024-07-20 ENCOUNTER — APPOINTMENT (OUTPATIENT)
Dept: GENERAL RADIOLOGY | Age: 69
End: 2024-07-20
Payer: COMMERCIAL

## 2024-07-20 LAB
ALBUMIN SERPL-MCNC: 3.5 G/DL (ref 3.5–5.2)
ALBUMIN/GLOB SERPL: 1 {RATIO} (ref 1–2.5)
ALLEN TEST: ABNORMAL
ALP SERPL-CCNC: 90 U/L (ref 40–129)
ALT SERPL-CCNC: 52 U/L (ref 10–50)
ANION GAP SERPL CALCULATED.3IONS-SCNC: 13 MMOL/L (ref 9–16)
ANION GAP SERPL CALCULATED.3IONS-SCNC: 13 MMOL/L (ref 9–16)
AST SERPL-CCNC: 81 U/L (ref 10–50)
BASOPHILS # BLD: 0.04 K/UL (ref 0–0.2)
BASOPHILS NFR BLD: 0 % (ref 0–2)
BILIRUB SERPL-MCNC: 0.6 MG/DL (ref 0–1.2)
BUN SERPL-MCNC: 29 MG/DL (ref 8–23)
BUN SERPL-MCNC: 29 MG/DL (ref 8–23)
CA-I BLD-SCNC: 1.06 MMOL/L (ref 1.13–1.33)
CALCIUM SERPL-MCNC: 8.4 MG/DL (ref 8.6–10.4)
CALCIUM SERPL-MCNC: 8.4 MG/DL (ref 8.6–10.4)
CHLORIDE SERPL-SCNC: 100 MMOL/L (ref 98–107)
CHLORIDE SERPL-SCNC: 101 MMOL/L (ref 98–107)
CO2 SERPL-SCNC: 21 MMOL/L (ref 20–31)
CO2 SERPL-SCNC: 21 MMOL/L (ref 20–31)
CREAT SERPL-MCNC: 1.3 MG/DL (ref 0.7–1.2)
CREAT SERPL-MCNC: 1.4 MG/DL (ref 0.7–1.2)
ECHO AO ROOT DIAM: 3.5 CM
ECHO AO ROOT INDEX: 1.66 CM/M2
ECHO AV AREA PEAK VELOCITY: 1.6 CM2
ECHO AV AREA VTI: 1.5 CM2
ECHO AV AREA/BSA PEAK VELOCITY: 0.8 CM2/M2
ECHO AV AREA/BSA VTI: 0.7 CM2/M2
ECHO AV MEAN GRADIENT: 8 MMHG
ECHO AV MEAN VELOCITY: 1.3 M/S
ECHO AV PEAK GRADIENT: 13 MMHG
ECHO AV PEAK VELOCITY: 1.8 M/S
ECHO AV VELOCITY RATIO: 0.5
ECHO AV VTI: 31 CM
ECHO BSA: 2.15 M2
ECHO EST RA PRESSURE: 3 MMHG
ECHO LA AREA 2C: 22.4 CM2
ECHO LA AREA 4C: 23.8 CM2
ECHO LA DIAMETER INDEX: 1.71 CM/M2
ECHO LA DIAMETER: 3.6 CM
ECHO LA MAJOR AXIS: 5.6 CM
ECHO LA MINOR AXIS: 5.8 CM
ECHO LA TO AORTIC ROOT RATIO: 1.03
ECHO LA VOL BP: 77 ML (ref 18–58)
ECHO LA VOL MOD A2C: 73 ML (ref 18–58)
ECHO LA VOL MOD A4C: 80 ML (ref 18–58)
ECHO LA VOL/BSA BIPLANE: 36 ML/M2 (ref 16–34)
ECHO LA VOLUME INDEX MOD A2C: 35 ML/M2 (ref 16–34)
ECHO LA VOLUME INDEX MOD A4C: 38 ML/M2 (ref 16–34)
ECHO LV E' LATERAL VELOCITY: 5 CM/S
ECHO LV E' SEPTAL VELOCITY: 5 CM/S
ECHO LV EDV A2C: 226 ML
ECHO LV EDV A4C: 206 ML
ECHO LV EDV INDEX A4C: 98 ML/M2
ECHO LV EDV NDEX A2C: 107 ML/M2
ECHO LV EJECTION FRACTION A2C: 26 %
ECHO LV EJECTION FRACTION A4C: 14 %
ECHO LV EJECTION FRACTION BIPLANE: 19 % (ref 55–100)
ECHO LV ESV A2C: 168 ML
ECHO LV ESV A4C: 177 ML
ECHO LV ESV INDEX A2C: 80 ML/M2
ECHO LV ESV INDEX A4C: 84 ML/M2
ECHO LV FRACTIONAL SHORTENING: 8 % (ref 28–44)
ECHO LV INTERNAL DIMENSION DIASTOLE INDEX: 3.55 CM/M2
ECHO LV INTERNAL DIMENSION DIASTOLIC: 7.5 CM (ref 4.2–5.9)
ECHO LV INTERNAL DIMENSION SYSTOLIC INDEX: 3.27 CM/M2
ECHO LV INTERNAL DIMENSION SYSTOLIC: 6.9 CM
ECHO LV IVSD: 0.8 CM (ref 0.6–1)
ECHO LV MASS 2D: 236.1 G (ref 88–224)
ECHO LV MASS INDEX 2D: 111.9 G/M2 (ref 49–115)
ECHO LV POSTERIOR WALL DIASTOLIC: 0.6 CM (ref 0.6–1)
ECHO LV RELATIVE WALL THICKNESS RATIO: 0.16
ECHO LVOT AREA: 3.5 CM2
ECHO LVOT AV VTI INDEX: 0.44
ECHO LVOT DIAM: 2.1 CM
ECHO LVOT MEAN GRADIENT: 1 MMHG
ECHO LVOT PEAK GRADIENT: 3 MMHG
ECHO LVOT PEAK VELOCITY: 0.9 M/S
ECHO LVOT STROKE VOLUME INDEX: 22.3 ML/M2
ECHO LVOT SV: 47.1 ML
ECHO LVOT VTI: 13.6 CM
ECHO MV A VELOCITY: 0.85 M/S
ECHO MV AREA VTI: 1.9 CM2
ECHO MV E DECELERATION TIME (DT): 136 MS
ECHO MV E VELOCITY: 0.74 M/S
ECHO MV E/A RATIO: 0.87
ECHO MV E/E' LATERAL: 14.8
ECHO MV E/E' RATIO (AVERAGED): 14.8
ECHO MV E/E' SEPTAL: 14.8
ECHO MV LVOT VTI INDEX: 1.79
ECHO MV MAX VELOCITY: 1 M/S
ECHO MV MEAN GRADIENT: 2 MMHG
ECHO MV MEAN VELOCITY: 0.6 M/S
ECHO MV PEAK GRADIENT: 4 MMHG
ECHO MV VTI: 24.4 CM
ECHO PV MAX VELOCITY: 0.9 M/S
ECHO PV PEAK GRADIENT: 3 MMHG
ECHO RA AREA 4C: 13.5 CM2
ECHO RA END SYSTOLIC VOLUME APICAL 4 CHAMBER INDEX BSA: 14 ML/M2
ECHO RA VOLUME: 29 ML
ECHO RIGHT VENTRICULAR SYSTOLIC PRESSURE (RVSP): 27 MMHG
ECHO RV BASAL DIMENSION: 3.6 CM
ECHO RV FREE WALL PEAK S': 14 CM/S
ECHO RV TAPSE: 1.8 CM (ref 1.7–?)
ECHO TV REGURGITANT MAX VELOCITY: 2.46 M/S
ECHO TV REGURGITANT PEAK GRADIENT: 24 MMHG
EKG ATRIAL RATE: 133 BPM
EKG ATRIAL RATE: 136 BPM
EKG ATRIAL RATE: 241 BPM
EKG ATRIAL RATE: 63 BPM
EKG ATRIAL RATE: 82 BPM
EKG ATRIAL RATE: 96 BPM
EKG P AXIS: 41 DEGREES
EKG P AXIS: 61 DEGREES
EKG P-R INTERVAL: 224 MS
EKG P-R INTERVAL: 234 MS
EKG Q-T INTERVAL: 336 MS
EKG Q-T INTERVAL: 396 MS
EKG Q-T INTERVAL: 404 MS
EKG Q-T INTERVAL: 408 MS
EKG Q-T INTERVAL: 438 MS
EKG Q-T INTERVAL: 470 MS
EKG QRS DURATION: 148 MS
EKG QRS DURATION: 150 MS
EKG QRS DURATION: 162 MS
EKG QRS DURATION: 186 MS
EKG QRS DURATION: 202 MS
EKG QRS DURATION: 92 MS
EKG QTC CALCULATION (BAZETT): 480 MS
EKG QTC CALCULATION (BAZETT): 511 MS
EKG QTC CALCULATION (BAZETT): 532 MS
EKG QTC CALCULATION (BAZETT): 602 MS
EKG QTC CALCULATION (BAZETT): 627 MS
EKG QTC CALCULATION (BAZETT): 638 MS
EKG R AXIS: -102 DEGREES
EKG R AXIS: -103 DEGREES
EKG R AXIS: -107 DEGREES
EKG R AXIS: -116 DEGREES
EKG R AXIS: -82 DEGREES
EKG R AXIS: -84 DEGREES
EKG T AXIS: 28 DEGREES
EKG T AXIS: 46 DEGREES
EKG T AXIS: 48 DEGREES
EKG T AXIS: 53 DEGREES
EKG T AXIS: 84 DEGREES
EKG T AXIS: 92 DEGREES
EKG VENTRICULAR RATE: 139 BPM
EKG VENTRICULAR RATE: 145 BPM
EKG VENTRICULAR RATE: 147 BPM
EKG VENTRICULAR RATE: 151 BPM
EKG VENTRICULAR RATE: 63 BPM
EKG VENTRICULAR RATE: 82 BPM
EOSINOPHIL # BLD: <0.03 K/UL (ref 0–0.44)
EOSINOPHILS RELATIVE PERCENT: 0 % (ref 1–4)
ERYTHROCYTE [DISTWIDTH] IN BLOOD BY AUTOMATED COUNT: 18.3 % (ref 11.8–14.4)
FIO2: 30
GFR, ESTIMATED: 54 ML/MIN/1.73M2
GFR, ESTIMATED: 59 ML/MIN/1.73M2
GLUCOSE BLD-MCNC: 174 MG/DL (ref 74–100)
GLUCOSE SERPL-MCNC: 156 MG/DL (ref 74–99)
GLUCOSE SERPL-MCNC: 187 MG/DL (ref 74–99)
HCT VFR BLD AUTO: 41.3 % (ref 40.7–50.3)
HGB BLD-MCNC: 13.4 G/DL (ref 13–17)
IMM GRANULOCYTES # BLD AUTO: 0.08 K/UL (ref 0–0.3)
IMM GRANULOCYTES NFR BLD: 1 %
LYMPHOCYTES NFR BLD: 0.83 K/UL (ref 1.1–3.7)
LYMPHOCYTES RELATIVE PERCENT: 5 % (ref 24–43)
MAGNESIUM SERPL-MCNC: 2.8 MG/DL (ref 1.6–2.4)
MCH RBC QN AUTO: 27.7 PG (ref 25.2–33.5)
MCHC RBC AUTO-ENTMCNC: 32.4 G/DL (ref 28.4–34.8)
MCV RBC AUTO: 85.3 FL (ref 82.6–102.9)
MICROORGANISM SPEC CULT: ABNORMAL
MODE: ABNORMAL
MONOCYTES NFR BLD: 0.99 K/UL (ref 0.1–1.2)
MONOCYTES NFR BLD: 6 % (ref 3–12)
NEUTROPHILS NFR BLD: 88 % (ref 36–65)
NEUTS SEG NFR BLD: 13.85 K/UL (ref 1.5–8.1)
NRBC BLD-RTO: 0 PER 100 WBC
O2 DELIVERY DEVICE: ABNORMAL
PARTIAL THROMBOPLASTIN TIME: 76.6 SEC (ref 23–36.5)
PARTIAL THROMBOPLASTIN TIME: 78.7 SEC (ref 23–36.5)
PLATELET # BLD AUTO: 146 K/UL (ref 138–453)
PMV BLD AUTO: 11.1 FL (ref 8.1–13.5)
POC HCO3: 25.1 MMOL/L (ref 21–28)
POC LACTIC ACID: 1 MMOL/L (ref 0.56–1.39)
POC O2 SATURATION: 96.7 % (ref 94–98)
POC PCO2: 36.3 MM HG (ref 35–48)
POC PH: 7.45 (ref 7.35–7.45)
POC PO2: 82.9 MM HG (ref 83–108)
POSITIVE BASE EXCESS, ART: 1.4 MMOL/L (ref 0–3)
POTASSIUM SERPL-SCNC: 3.3 MMOL/L (ref 3.7–5.3)
POTASSIUM SERPL-SCNC: 3.6 MMOL/L (ref 3.7–5.3)
PROT SERPL-MCNC: 6.5 G/DL (ref 6.6–8.7)
RBC # BLD AUTO: 4.84 M/UL (ref 4.21–5.77)
RBC # BLD: ABNORMAL 10*6/UL
SAMPLE SITE: ABNORMAL
SERVICE CMNT-IMP: ABNORMAL
SODIUM SERPL-SCNC: 134 MMOL/L (ref 136–145)
SODIUM SERPL-SCNC: 135 MMOL/L (ref 136–145)
SPECIMEN DESCRIPTION: ABNORMAL
VANCOMYCIN SERPL-MCNC: 5 UG/ML (ref 5–40)
WBC OTHER # BLD: 15.8 K/UL (ref 3.5–11.3)

## 2024-07-20 PROCEDURE — 6360000002 HC RX W HCPCS: Performed by: STUDENT IN AN ORGANIZED HEALTH CARE EDUCATION/TRAINING PROGRAM

## 2024-07-20 PROCEDURE — 2000000000 HC ICU R&B

## 2024-07-20 PROCEDURE — 85730 THROMBOPLASTIN TIME PARTIAL: CPT

## 2024-07-20 PROCEDURE — 6360000002 HC RX W HCPCS

## 2024-07-20 PROCEDURE — 2580000003 HC RX 258: Performed by: STUDENT IN AN ORGANIZED HEALTH CARE EDUCATION/TRAINING PROGRAM

## 2024-07-20 PROCEDURE — 6370000000 HC RX 637 (ALT 250 FOR IP): Performed by: STUDENT IN AN ORGANIZED HEALTH CARE EDUCATION/TRAINING PROGRAM

## 2024-07-20 PROCEDURE — 82803 BLOOD GASES ANY COMBINATION: CPT

## 2024-07-20 PROCEDURE — 37799 UNLISTED PX VASCULAR SURGERY: CPT

## 2024-07-20 PROCEDURE — 94003 VENT MGMT INPAT SUBQ DAY: CPT

## 2024-07-20 PROCEDURE — 2500000003 HC RX 250 WO HCPCS: Performed by: STUDENT IN AN ORGANIZED HEALTH CARE EDUCATION/TRAINING PROGRAM

## 2024-07-20 PROCEDURE — 82947 ASSAY GLUCOSE BLOOD QUANT: CPT

## 2024-07-20 PROCEDURE — 80053 COMPREHEN METABOLIC PANEL: CPT

## 2024-07-20 PROCEDURE — 80202 ASSAY OF VANCOMYCIN: CPT

## 2024-07-20 PROCEDURE — 99291 CRITICAL CARE FIRST HOUR: CPT | Performed by: INTERNAL MEDICINE

## 2024-07-20 PROCEDURE — 85025 COMPLETE CBC W/AUTO DIFF WBC: CPT

## 2024-07-20 PROCEDURE — 83605 ASSAY OF LACTIC ACID: CPT

## 2024-07-20 PROCEDURE — 71045 X-RAY EXAM CHEST 1 VIEW: CPT

## 2024-07-20 PROCEDURE — 94761 N-INVAS EAR/PLS OXIMETRY MLT: CPT

## 2024-07-20 PROCEDURE — 2700000000 HC OXYGEN THERAPY PER DAY

## 2024-07-20 RX ORDER — POTASSIUM CHLORIDE 7.45 MG/ML
10 INJECTION INTRAVENOUS PRN
Status: DISCONTINUED | OUTPATIENT
Start: 2024-07-20 | End: 2024-07-26 | Stop reason: HOSPADM

## 2024-07-20 RX ORDER — MIDAZOLAM HYDROCHLORIDE 1 MG/ML
1-10 INJECTION, SOLUTION INTRAVENOUS CONTINUOUS
Status: DISCONTINUED | OUTPATIENT
Start: 2024-07-20 | End: 2024-07-24

## 2024-07-20 RX ORDER — CALCIUM GLUCONATE 20 MG/ML
1000 INJECTION, SOLUTION INTRAVENOUS ONCE
Status: COMPLETED | OUTPATIENT
Start: 2024-07-20 | End: 2024-07-20

## 2024-07-20 RX ORDER — FUROSEMIDE 10 MG/ML
20 INJECTION INTRAMUSCULAR; INTRAVENOUS 2 TIMES DAILY
Status: DISCONTINUED | OUTPATIENT
Start: 2024-07-20 | End: 2024-07-25

## 2024-07-20 RX ORDER — POTASSIUM CHLORIDE 29.8 MG/ML
20 INJECTION INTRAVENOUS PRN
Status: DISCONTINUED | OUTPATIENT
Start: 2024-07-20 | End: 2024-07-26 | Stop reason: HOSPADM

## 2024-07-20 RX ORDER — MAGNESIUM SULFATE IN WATER 40 MG/ML
2000 INJECTION, SOLUTION INTRAVENOUS PRN
Status: DISCONTINUED | OUTPATIENT
Start: 2024-07-20 | End: 2024-07-22 | Stop reason: SDUPTHER

## 2024-07-20 RX ADMIN — Medication 0.5 MG/MIN: at 11:43

## 2024-07-20 RX ADMIN — FUROSEMIDE 20 MG: 10 INJECTION, SOLUTION INTRAMUSCULAR; INTRAVENOUS at 11:41

## 2024-07-20 RX ADMIN — MAGNESIUM SULFATE HEPTAHYDRATE 2000 MG: 40 INJECTION, SOLUTION INTRAVENOUS at 00:50

## 2024-07-20 RX ADMIN — POTASSIUM CHLORIDE 20 MEQ: 29.8 INJECTION, SOLUTION INTRAVENOUS at 00:51

## 2024-07-20 RX ADMIN — ASPIRIN 81 MG: 81 TABLET, COATED ORAL at 08:33

## 2024-07-20 RX ADMIN — Medication 2 MG/HR: at 12:58

## 2024-07-20 RX ADMIN — Medication 7 MCG/MIN: at 19:38

## 2024-07-20 RX ADMIN — FUROSEMIDE 20 MG: 10 INJECTION, SOLUTION INTRAMUSCULAR; INTRAVENOUS at 18:29

## 2024-07-20 RX ADMIN — SODIUM CHLORIDE, PRESERVATIVE FREE 10 ML: 5 INJECTION INTRAVENOUS at 20:27

## 2024-07-20 RX ADMIN — CALCIUM GLUCONATE 1000 MG: 20 INJECTION, SOLUTION INTRAVENOUS at 06:50

## 2024-07-20 RX ADMIN — PROPOFOL 20 MCG/KG/MIN: 10 INJECTION, EMULSION INTRAVENOUS at 10:14

## 2024-07-20 RX ADMIN — VANCOMYCIN HYDROCHLORIDE 1500 MG: 1.5 INJECTION, POWDER, LYOPHILIZED, FOR SOLUTION INTRAVENOUS at 08:33

## 2024-07-20 RX ADMIN — ATORVASTATIN CALCIUM 80 MG: 80 TABLET, FILM COATED ORAL at 08:33

## 2024-07-20 RX ADMIN — POTASSIUM CHLORIDE 20 MEQ: 29.8 INJECTION, SOLUTION INTRAVENOUS at 01:51

## 2024-07-20 RX ADMIN — HEPARIN SODIUM 14 UNITS/KG/HR: 10000 INJECTION, SOLUTION INTRAVENOUS at 16:37

## 2024-07-20 RX ADMIN — TICAGRELOR 90 MG: 90 TABLET ORAL at 20:27

## 2024-07-20 RX ADMIN — Medication 100 MCG/MIN: at 08:29

## 2024-07-20 RX ADMIN — TICAGRELOR 90 MG: 90 TABLET ORAL at 08:34

## 2024-07-20 RX ADMIN — SODIUM CHLORIDE, PRESERVATIVE FREE 10 ML: 5 INJECTION INTRAVENOUS at 08:34

## 2024-07-20 RX ADMIN — POTASSIUM CHLORIDE 20 MEQ: 29.8 INJECTION, SOLUTION INTRAVENOUS at 06:49

## 2024-07-20 RX ADMIN — PROPOFOL 35 MCG/KG/MIN: 10 INJECTION, EMULSION INTRAVENOUS at 02:49

## 2024-07-20 RX ADMIN — LIDOCAINE HYDROCHLORIDE 1 MG/MIN: 4 INJECTION, SOLUTION INTRAVENOUS at 20:31

## 2024-07-20 RX ADMIN — ACETAMINOPHEN 650 MG: 325 TABLET ORAL at 20:27

## 2024-07-20 ASSESSMENT — PULMONARY FUNCTION TESTS
PIF_VALUE: 19
PIF_VALUE: 19
PIF_VALUE: 23
PIF_VALUE: 19
PIF_VALUE: 19
PIF_VALUE: 18
PIF_VALUE: 23
PIF_VALUE: 16
PIF_VALUE: 19
PIF_VALUE: 20
PIF_VALUE: 19
PIF_VALUE: 17
PIF_VALUE: 21
PIF_VALUE: 19
PIF_VALUE: 19
PIF_VALUE: 18
PIF_VALUE: 25
PIF_VALUE: 19
PIF_VALUE: 23
PIF_VALUE: 19
PIF_VALUE: 26
PIF_VALUE: 9
PIF_VALUE: 20
PIF_VALUE: 20
PIF_VALUE: 19
PIF_VALUE: 19
PIF_VALUE: 20
PIF_VALUE: 21
PIF_VALUE: 19
PIF_VALUE: 20
PIF_VALUE: 20
PIF_VALUE: 23
PIF_VALUE: 20
PIF_VALUE: 19
PIF_VALUE: 23
PIF_VALUE: 21
PIF_VALUE: 23
PIF_VALUE: 18
PIF_VALUE: 20
PIF_VALUE: 19
PIF_VALUE: 20
PIF_VALUE: 19
PIF_VALUE: 20

## 2024-07-20 ASSESSMENT — PAIN SCALES - WONG BAKER
WONGBAKER_NUMERICALRESPONSE: NO HURT

## 2024-07-20 ASSESSMENT — PAIN SCALES - GENERAL: PAINLEVEL_OUTOF10: 0

## 2024-07-20 NOTE — PLAN OF CARE
Problem: Chronic Conditions and Co-morbidities  Goal: Patient's chronic conditions and co-morbidity symptoms are monitored and maintained or improved  Outcome: Progressing  Flowsheets (Taken 7/20/2024 0400 by Kyle Doty, RN)  Care Plan - Patient's Chronic Conditions and Co-Morbidity Symptoms are Monitored and Maintained or Improved:   Monitor and assess patient's chronic conditions and comorbid symptoms for stability, deterioration, or improvement   Collaborate with multidisciplinary team to address chronic and comorbid conditions and prevent exacerbation or deterioration   Update acute care plan with appropriate goals if chronic or comorbid symptoms are exacerbated and prevent overall improvement and discharge     Problem: Discharge Planning  Goal: Discharge to home or other facility with appropriate resources  Outcome: Progressing  Flowsheets (Taken 7/20/2024 0400 by Kyle Doty, RN)  Discharge to home or other facility with appropriate resources:   Identify barriers to discharge with patient and caregiver   Arrange for needed discharge resources and transportation as appropriate   Identify discharge learning needs (meds, wound care, etc)   Refer to discharge planning if patient needs post-hospital services based on physician order or complex needs related to functional status, cognitive ability or social support system   Arrange for interpreters to assist at discharge as needed     Problem: Respiratory - Adult  Goal: Achieves optimal ventilation and oxygenation  7/20/2024 1507 by Kj Hamm, RN  Outcome: Progressing  7/20/2024 0848 by Nika Quan, RCP  Outcome: Progressing  Flowsheets (Taken 7/20/2024 0000 by Kyle Doty, RN)  Achieves optimal ventilation and oxygenation:   Assess for changes in respiratory status   Position to facilitate oxygenation and minimize respiratory effort   Assess for changes in mentation and behavior   Oxygen supplementation based on oxygen saturation or

## 2024-07-20 NOTE — PLAN OF CARE
Problem: Chronic Conditions and Co-morbidities  Goal: Patient's chronic conditions and co-morbidity symptoms are monitored and maintained or improved  7/19/2024 2035 by Kyle Doty RN  Outcome: Progressing  7/19/2024 1622 by Kj Hamm RN  Outcome: Progressing  7/19/2024 1621 by Kj Hamm RN  Outcome: Progressing  7/19/2024 1621 by Kj Hamm RN  Outcome: Progressing     Problem: Discharge Planning  Goal: Discharge to home or other facility with appropriate resources  7/19/2024 2035 by Kyle Doty RN  Outcome: Progressing  7/19/2024 1622 by Kj Hamm RN  Outcome: Progressing  7/19/2024 1621 by Kj Hamm RN  Outcome: Progressing  7/19/2024 1621 by Kj Hamm RN  Outcome: Progressing     Problem: Respiratory - Adult  Goal: Achieves optimal ventilation and oxygenation  7/19/2024 2035 by Kyle Doty RN  Outcome: Progressing  7/19/2024 1622 by Kj Hamm RN  Outcome: Progressing  7/19/2024 1621 by Kj Hamm RN  Outcome: Progressing  7/19/2024 1621 by Kj Hamm RN  Outcome: Progressing  Flowsheets (Taken 7/19/2024 0820 by Chasidy Alvarez, [facility])  Achieves optimal ventilation and oxygenation:   Assess for changes in respiratory status   Assess for changes in mentation and behavior   Position to facilitate oxygenation and minimize respiratory effort   Oxygen supplementation based on oxygen saturation or arterial blood gases   Encourage broncho-pulmonary hygiene including cough, deep breathe, incentive spirometry   Assess the need for suctioning and aspirate as needed   Assess and instruct to report shortness of breath or any respiratory difficulty   Respiratory therapy support as indicated     Problem: Safety - Adult  Goal: Free from fall injury  7/19/2024 2035 by Kyle Doty RN  Outcome: Progressing  7/19/2024 1622 by Kj Hamm RN  Outcome: Progressing  7/19/2024 1621 by Kj Hamm RN  Outcome:  condition at the time of restraint application   Inform patient/family regarding the reason for restraint   Every 2 hours: Monitor safety, psychosocial status, comfort, nutrition and hydration  Taken 7/19/2024 0600 by Marissa Mendez RN  Remains free of injury from restraints (restraint for interference with medical device):   Determine that other, less restrictive measures have been tried or would not be effective before applying the restraint   Evaluate the patient's condition at the time of restraint application   Inform patient/family regarding the reason for restraint   Every 2 hours: Monitor safety, psychosocial status, comfort, nutrition and hydration  Taken 7/19/2024 0400 by Marissa Mendez RN  Remains free of injury from restraints (restraint for interference with medical device):   Determine that other, less restrictive measures have been tried or would not be effective before applying the restraint   Evaluate the patient's condition at the time of restraint application   Inform patient/family regarding the reason for restraint   Every 2 hours: Monitor safety, psychosocial status, comfort, nutrition and hydration

## 2024-07-20 NOTE — PROGRESS NOTES
[facility]  PROGRESS NOTE    Shift date: 7.19.2024  Shift day: Friday   Shift # 2    Room # 1023/1023-01        Name: Claudio Graham MRN: [id number]    Age: 69 y.o.     Sex: male   Language: English   Yarsani: Non-Mosque   Ventricular tachycardia (HCC)     Referral: Rapid Response    Date: 7/20/2024            Total Time Calculated: (P) 25 min              Spiritual Assessment began in [facility]- SICU        Referral/Consult From: (P) Multi-disciplinary team   Encounter Overview/Reason: (P) Crisis  Service Provided For: (P) Patient    Admit Date & Time: 7/18/2024  9:53 AM    Emergency Contacts Listed:  Extended Emergency Contact Information  Primary Emergency Contact: Cathy Ingram   [facility]  Home Phone: [phone]  Work Phone: [phone]  Mobile Phone: [phone]  Relation: Other Relative  Hearing or visual needs: None  Other needs: None  Preferred language: English   needed? No  Secondary Emergency Contact: Jason sherman  Home Phone: [phone]  Work Phone: [phone]  Mobile Phone: [phone]  Relation: Healthcare Decision Maker      Assessment:  Claudio Graham is a 69 y.o. male in the hospital because Ventricular tachycardia (HCC).     Upon entering the room writer observes Patient appearing  unable to respond at this time but able to respond to questions through head nod.  Patient wanted to write but unable at this time due to active heart issues . No family present at this time.      Perceived Need:  Experiencing a sustaining presence    Intervention:  Writer introduced self and title as  and provided  a sustaining presence .      Outcome:  Patient is now sedated      Plan:  Chaplains will follow up as needed       Electronically signed by MARCELO Carballo on 7/20/2024 at 12:13 AM  [facility]  [phone]     07/20/24 0012   Encounter Summary   Encounter Overview/Reason

## 2024-07-20 NOTE — PROGRESS NOTES
PULMONARY PROGRESS NOTE      Patient:  Claudio Graham  YOB: 1955    MRN: [id number]     Acct: [id number]     Admit date: 7/18/2024    REASON FOR CONSULT:- Acute hypoxemic respiratory failure    Pt seen and Chart reviewed.    Subjective:       Claudio Graham  69 y.o. male with past medical history of heart failure with reduced ejection fraction, diabetes status post AICD, hyperlipidemia, hypertension, morbid obesity, TANNER presented to the emergency room on 7/18/2024 medically for sustaining V. tach requiring multiple time shock and medical cardioversion. The pt was emergently underwent cardiac cath and was found to have acute ostial occlusion 100% and TIFFANY was placed also the LVEF was 10%. LV Wall Motion: Severe global hypokinesis and IABP was placed.   Pulmonary/CC was called for Acute hypoxemic respiratory failure and requiring MV support.       Interval History  7/20/2024    Patient still intubated and sedated requiring pressor support  Vent settings are PRVC 490/14/5/30    Intra-aortic balloon pump patient is still requiring full support  He is currently on Levophed for blood pressure support  He continues to be on lidocaine and amiodarone    Review of Systems -   Review of Systems   Reason unable to perform ROS: Intubated and sedate.           Physical Exam:  Vitals: BP (!) 148/60   Pulse (!) 49   Temp 99.5 °F (37.5 °C)   Resp 14   Ht 1.753 m (5' 9.02\")   Wt 95.3 kg (210 lb 1.6 oz)   SpO2 100%   BMI 31.01 kg/m²   24 hour intake/output:  Intake/Output Summary (Last 24 hours) at 7/20/2024 1426  Last data filed at 7/20/2024 1400  Gross per 24 hour   Intake 3307.55 ml   Output 1837 ml   Net 1470.55 ml     Last 3 weights:  Wt Readings from Last 3 Encounters:   07/20/24 95.3 kg (210 lb 1.6 oz)   06/19/24 94.3 kg (208 lb)   06/04/24 97.8 kg (215 lb 8 oz)       General appearance: alert and cooperative with exam  Physical Examination:   Physical Exam  Constitutional:       Interventions: He  is sedated and intubated.   Cardiovascular:      Rate and Rhythm: Normal rate and regular rhythm.      Heart sounds: Normal heart sounds.   Pulmonary:      Effort: He is intubated.      Breath sounds: No decreased breath sounds, wheezing or rhonchi.   Abdominal:      General: Abdomen is flat. Bowel sounds are normal.      Palpations: Abdomen is soft.   Neurological:      Comments: Sedated           Diet:  ADULT DIET; Regular    Medications:Current Inpatient    Scheduled Meds:   vancomycin  1,500 mg IntraVENous Q18H    furosemide  20 mg IntraVENous BID    atorvastatin  80 mg Oral Daily    vancomycin (VANCOCIN) intermittent dosing (placeholder)   Other RX Placeholder    sodium chloride flush  5-40 mL IntraVENous 2 times per day    aspirin  81 mg Oral Daily    ticagrelor  90 mg Oral BID    heparin (porcine)  4,000 Units IntraVENous Once     Continuous Infusions:   midazolam 2 mg/hr (07/20/24 1258)    sodium chloride      Phenylephrine 90 mcg/min (07/20/24 1132)    norepinephrine 3 mcg/min (07/20/24 1132)    amiodarone 0.5 mg/min (07/20/24 1143)    heparin (PORCINE) Infusion 14 Units/kg/hr (07/20/24 1132)    propofol 20 mcg/kg/min (07/20/24 1132)    lidocaine 1 mg/min (07/20/24 1132)    fentaNYL 50 mcg/hr (07/20/24 1132)     PRN Meds:potassium chloride **OR** potassium chloride, magnesium sulfate, sodium phosphate 15 mmol in sodium chloride 0.9 % 250 mL IVPB, sodium chloride flush, sodium chloride, potassium chloride **OR** potassium alternative oral replacement **OR** potassium chloride, magnesium sulfate, ondansetron **OR** ondansetron, polyethylene glycol, acetaminophen **OR** acetaminophen, heparin (porcine), heparin (porcine), fentanNYL    Objective:    CBC:   Recent Labs     07/19/24 0327 07/19/24  2336 07/20/24  0410   WBC 10.6 13.9* 15.8*   HGB 12.9* 13.2 13.4    131* 146     BMP:    Recent Labs     07/19/24  0327 07/19/24  2336 07/20/24  0410   * 135* 134*   K 4.2 3.3* 3.6*   CL 99 101 100   CO2

## 2024-07-20 NOTE — PROGRESS NOTES
[facility]   Pharmacy Pharmacokinetic Monitoring Service - Vancomycin    Consulting Provider: Dr. Radha Shankar   Indication: bacteremia   Target Concentration: Goal AUC/MERI 400-600 mg*hr/L  Day of Therapy: 2  Additional Antimicrobials: /    Pertinent Laboratory Values:   Wt Readings from Last 1 Encounters:   07/20/24 95.3 kg (210 lb 1.6 oz)     Temp Readings from Last 1 Encounters:   07/20/24 98.4 °F (36.9 °C)     Estimated Creatinine Clearance: 55 mL/min (A) (based on SCr of 1.4 mg/dL (H)).  Recent Labs     07/19/24  2336 07/20/24  0410   CREATININE 1.3* 1.4*   BUN 29* 29*   WBC 13.9* 15.8*       Pertinent Cultures:  Culture Date Source Results   7/18 Blood x2 Staph epi x1     Assessment:  Date/Time Current Dose Concentration Timing of Concentration (h)   7/20 0400 Dosing per levels 5 ~10 h after last dose   Note: Serum concentrations collected for AUC dosing may appear elevated if collected in close proximity to the dose administered, this is not necessarily an indication of toxicity    Plan:  SCr stable (patient's baseline ~1.2)  Vancomycin level 5 mcg/ml, collected 10 h after last dose  Based on level assessment and renal function will order Vancomycin 1500 mg IV q18h for now, and follow closely   Pharmacy will continue to monitor patient and adjust therapy as indicated    Thank you for the consult,  Higinio Zimmerman, PharmD, 7/20/2024 7:52 AM

## 2024-07-20 NOTE — SIGNIFICANT EVENT
Responded to Rapid Response, RRT called for Vtach. Patient has been having Vtach and bigemony since he was admitted, has previously been shocked without improvement, Was on Amiodarone @ 0.5 and lidocaine @ 0.5 when I entered room. Obvious Vtach on tele, patient was stable, was moving extremities miming that he wanted to write something down. Pt was tachy up to 150s-160s. Initial treatment, lidocaine rate increased to 1, additionally amiodarone also increased to one. As patient was stable, and he has been resistant to cardioversion, cardioversion deferred, cardiology follow called and discussed, recommended to give amio 150mg bolus. Prior to receiving bolus, patient's AICD was witnessed shocking the patient 4 times, each shock followed by brief conversion with re-initiation of Vtach after couple of seconds. After amio bolus, patient converted and HR was in the 80s, recommended to wean of levo and re-start aliya d/t levophed's arrhythmogenic properties.    Pepe Blake MD  Internal Medicine Resident, PGY-3  [facility]  07/20/24  12:12 AM

## 2024-07-20 NOTE — PLAN OF CARE
Problem: Respiratory - Adult  Goal: Achieves optimal ventilation and oxygenation  7/20/2024 0848 by Nika Quan RCP  Outcome: Progressing  Flowsheets (Taken 7/20/2024 0000 by Kyle Doty, RN)  Achieves optimal ventilation and oxygenation:   Assess for changes in respiratory status   Position to facilitate oxygenation and minimize respiratory effort   Assess for changes in mentation and behavior   Oxygen supplementation based on oxygen saturation or arterial blood gases   Encourage broncho-pulmonary hygiene including cough, deep breathe, incentive spirometry   Assess the need for suctioning and aspirate as needed   Assess and instruct to report shortness of breath or any respiratory difficulty   Respiratory therapy support as indicated  7/19/2024 2035 by Kyle Doty, RN  Outcome: Progressing

## 2024-07-20 NOTE — FLOWSHEET NOTE
19:44 RN reached out to Cardiac resident regarding pts Ventricular Bigeminy.     20:37 Cardiac resident at bedside gave RN verbal orders to increase pts Amiodarone to 1 mg/min.

## 2024-07-20 NOTE — CARE COORDINATION
Case Management Assessment  Initial Evaluation    Date/Time of Evaluation: 7/20/2024 5:57 PM  Assessment Completed by: Padmini Mcnamara RN    If patient is discharged prior to next notation, then this note serves as note for discharge by case management.    Patient Name: Claudio Graham                   YOB: 1955  Diagnosis: Ventricular fibrillation (HCC) [I49.01]  Cardiogenic shock (HCC) [R57.0]  Ventricular tachycardia (HCC) [I47.20]  Acute respiratory failure with hypoxia and hypercapnia (HCC) [J96.01, J96.02]  Chest pain, unspecified type [R07.9]  V-tach (HCC) [I47.20]                   Date / Time: 7/18/2024  9:53 AM    Patient Admission Status: Inpatient   Readmission Risk (Low < 19, Mod (19-27), High > 27): Readmission Risk Score: 16.2    Current PCP: Vanessa Mckenna, LIDIA - CNP  PCP verified by CM? No (Cathy unsure who PCP is)    Chart Reviewed: Yes      History Provided by: Child/Family  Patient Orientation: Sedated    Patient Cognition: Other (see comment)    Hospitalization in the last 30 days (Readmission):  No    If yes, Readmission Assessment in CM Navigator will be completed.    Advance Directives:      Code Status: Full Code   Patient's Primary Decision Maker is: Legal Next of Kin    Primary Decision Maker: Cathy Ingram - Other Relative - [phone]    Discharge Planning:    Patient lives with: Friends Type of Home: House  Primary Care Giver: Self  Patient Support Systems include: Family Members, Friends/Neighbors   Current Financial resources: Medicare, Medicaid  Current community resources:    Current services prior to admission: None            Current DME:              Type of Home Care services:  (P) None    ADLS  Prior functional level: Independent in ADLs/IADLs  Current functional level: Assistance with the following:    PT AM-PAC:   /24  OT AM-PAC:   /24    Family can provide assistance at DC: No  Would you like Case Management to discuss the discharge plan with any other

## 2024-07-20 NOTE — PROGRESS NOTES
[facility]   Progress Note                   Date:   7/20/2024  Patient name: Claudio Graham  Date of admission:  7/18/2024  9:53 AM  MRN:   [id number]  YOB: 1955  PCP: Vanessa Mckenna, LIDIA - CNP    Reason for Admission: VT    Subjective:       Patient seen and examined at bedside: Patient had VT overnight after titrating down dose of lidocaine, currently patient is intubated, on IV lidocaine and IV amiodarone, on pressor support with levo 3 and aliya 100.    Medications:   Scheduled Meds:   atorvastatin  80 mg Oral Daily    vancomycin (VANCOCIN) intermittent dosing (placeholder)   Other RX Placeholder    sodium chloride flush  5-40 mL IntraVENous 2 times per day    aspirin  81 mg Oral Daily    ticagrelor  90 mg Oral BID    heparin (porcine)  4,000 Units IntraVENous Once     Continuous Infusions:   sodium chloride      Phenylephrine 30 mcg/min (07/19/24 2349)    norepinephrine 7 mcg/min (07/19/24 2311)    amiodarone 1 mg/min (07/19/24 2353)    heparin (PORCINE) Infusion 14 Units/kg/hr (07/19/24 2311)    propofol 35 mcg/kg/min (07/20/24 0249)    lidocaine 0.5 mg/min (07/19/24 2311)    fentaNYL 75 mcg/hr (07/19/24 2311)     CBC:   Recent Labs     07/19/24 0327 07/19/24 2336 07/20/24  0410   WBC 10.6 13.9* 15.8*   HGB 12.9* 13.2 13.4    131* 146     BMP:    Recent Labs     07/19/24  0327 07/19/24  2336 07/20/24  0410   * 135* 134*   K 4.2 3.3* 3.6*   CL 99 101 100   CO2 20 21 21   BUN 39* 29* 29*   CREATININE 1.5* 1.3* 1.4*   GLUCOSE 191* 156* 187*     Hepatic:   Recent Labs     07/19/24  0327 07/20/24  0410   * 81*   ALT 70* 52*   BILITOT 0.6 0.6   ALKPHOS 89 90     Troponin: No results for input(s): \"TROPONINI\" in the last 72 hours.  BNP: No results for input(s): \"BNP\" in the last 72 hours.  Lipids: No results for input(s): \"CHOL\", \"HDL\" in the last 72 hours.    Invalid input(s): \"LDLCALCU\"  INR:   Recent Labs     07/18/24  1519   INR 4.1       Objective:

## 2024-07-21 ENCOUNTER — APPOINTMENT (OUTPATIENT)
Dept: GENERAL RADIOLOGY | Age: 69
End: 2024-07-21
Payer: COMMERCIAL

## 2024-07-21 LAB
ALBUMIN SERPL-MCNC: 3 G/DL (ref 3.5–5.2)
ALBUMIN/GLOB SERPL: 1 {RATIO} (ref 1–2.5)
ALP SERPL-CCNC: 81 U/L (ref 40–129)
ALT SERPL-CCNC: 35 U/L (ref 10–50)
ANION GAP SERPL CALCULATED.3IONS-SCNC: 11 MMOL/L (ref 9–16)
AST SERPL-CCNC: 46 U/L (ref 10–50)
BASOPHILS # BLD: 0.08 K/UL (ref 0–0.2)
BASOPHILS NFR BLD: 1 % (ref 0–2)
BILIRUB SERPL-MCNC: 0.6 MG/DL (ref 0–1.2)
BUN SERPL-MCNC: 26 MG/DL (ref 8–23)
CALCIUM SERPL-MCNC: 8.1 MG/DL (ref 8.6–10.4)
CHLORIDE SERPL-SCNC: 100 MMOL/L (ref 98–107)
CO2 SERPL-SCNC: 22 MMOL/L (ref 20–31)
CREAT SERPL-MCNC: 1.4 MG/DL (ref 0.7–1.2)
EOSINOPHIL # BLD: 0.08 K/UL (ref 0–0.44)
EOSINOPHILS RELATIVE PERCENT: 1 % (ref 1–4)
ERYTHROCYTE [DISTWIDTH] IN BLOOD BY AUTOMATED COUNT: 17.8 % (ref 11.8–14.4)
FIO2: 30
GFR, ESTIMATED: 55 ML/MIN/1.73M2
GLUCOSE BLD-MCNC: 142 MG/DL (ref 74–100)
GLUCOSE SERPL-MCNC: 154 MG/DL (ref 74–99)
HCT VFR BLD AUTO: 37.3 % (ref 40.7–50.3)
HGB BLD-MCNC: 12.1 G/DL (ref 13–17)
IMM GRANULOCYTES # BLD AUTO: 0 K/UL (ref 0–0.3)
IMM GRANULOCYTES NFR BLD: 0 %
LYMPHOCYTES NFR BLD: 0.65 K/UL (ref 1.1–3.7)
LYMPHOCYTES RELATIVE PERCENT: 8 % (ref 24–43)
MAGNESIUM SERPL-MCNC: 2.6 MG/DL (ref 1.6–2.4)
MCH RBC QN AUTO: 27.8 PG (ref 25.2–33.5)
MCHC RBC AUTO-ENTMCNC: 32.4 G/DL (ref 28.4–34.8)
MCV RBC AUTO: 85.7 FL (ref 82.6–102.9)
MONOCYTES NFR BLD: 0.57 K/UL (ref 0.1–1.2)
MONOCYTES NFR BLD: 7 % (ref 3–12)
MORPHOLOGY: ABNORMAL
NEGATIVE BASE EXCESS, ART: 0.6 MMOL/L (ref 0–2)
NEUTROPHILS NFR BLD: 83 % (ref 36–65)
NEUTS SEG NFR BLD: 6.72 K/UL (ref 1.5–8.1)
NRBC BLD-RTO: 0 PER 100 WBC
PARTIAL THROMBOPLASTIN TIME: 70.9 SEC (ref 23–36.5)
PLATELET # BLD AUTO: ABNORMAL K/UL (ref 138–453)
PLATELET, FLUORESCENCE: 93 K/UL (ref 138–453)
PLATELETS.RETICULATED NFR BLD AUTO: 2.2 % (ref 1.1–10.3)
POC HCO3: 22.7 MMOL/L (ref 21–28)
POC LACTIC ACID: 0.7 MMOL/L (ref 0.56–1.39)
POC O2 SATURATION: 98.8 % (ref 94–98)
POC PCO2: 32.3 MM HG (ref 35–48)
POC PH: 7.45 (ref 7.35–7.45)
POC PO2: 115.5 MM HG (ref 83–108)
POTASSIUM SERPL-SCNC: 3.4 MMOL/L (ref 3.7–5.3)
POTASSIUM SERPL-SCNC: 3.6 MMOL/L (ref 3.7–5.3)
PROT SERPL-MCNC: 5.9 G/DL (ref 6.6–8.7)
RBC # BLD AUTO: 4.35 M/UL (ref 4.21–5.77)
SODIUM SERPL-SCNC: 133 MMOL/L (ref 136–145)
WBC OTHER # BLD: 8.1 K/UL (ref 3.5–11.3)

## 2024-07-21 PROCEDURE — 85055 RETICULATED PLATELET ASSAY: CPT

## 2024-07-21 PROCEDURE — 2700000000 HC OXYGEN THERAPY PER DAY

## 2024-07-21 PROCEDURE — 2580000003 HC RX 258: Performed by: STUDENT IN AN ORGANIZED HEALTH CARE EDUCATION/TRAINING PROGRAM

## 2024-07-21 PROCEDURE — 6360000002 HC RX W HCPCS: Performed by: STUDENT IN AN ORGANIZED HEALTH CARE EDUCATION/TRAINING PROGRAM

## 2024-07-21 PROCEDURE — 2000000000 HC ICU R&B

## 2024-07-21 PROCEDURE — 2500000003 HC RX 250 WO HCPCS: Performed by: STUDENT IN AN ORGANIZED HEALTH CARE EDUCATION/TRAINING PROGRAM

## 2024-07-21 PROCEDURE — 94761 N-INVAS EAR/PLS OXIMETRY MLT: CPT

## 2024-07-21 PROCEDURE — 85730 THROMBOPLASTIN TIME PARTIAL: CPT

## 2024-07-21 PROCEDURE — 82947 ASSAY GLUCOSE BLOOD QUANT: CPT

## 2024-07-21 PROCEDURE — 94003 VENT MGMT INPAT SUBQ DAY: CPT

## 2024-07-21 PROCEDURE — 99291 CRITICAL CARE FIRST HOUR: CPT | Performed by: INTERNAL MEDICINE

## 2024-07-21 PROCEDURE — 71045 X-RAY EXAM CHEST 1 VIEW: CPT

## 2024-07-21 PROCEDURE — 80053 COMPREHEN METABOLIC PANEL: CPT

## 2024-07-21 PROCEDURE — 6370000000 HC RX 637 (ALT 250 FOR IP): Performed by: STUDENT IN AN ORGANIZED HEALTH CARE EDUCATION/TRAINING PROGRAM

## 2024-07-21 PROCEDURE — 82803 BLOOD GASES ANY COMBINATION: CPT

## 2024-07-21 PROCEDURE — 6360000002 HC RX W HCPCS

## 2024-07-21 PROCEDURE — 83735 ASSAY OF MAGNESIUM: CPT

## 2024-07-21 PROCEDURE — 84132 ASSAY OF SERUM POTASSIUM: CPT

## 2024-07-21 PROCEDURE — 83605 ASSAY OF LACTIC ACID: CPT

## 2024-07-21 PROCEDURE — 37799 UNLISTED PX VASCULAR SURGERY: CPT

## 2024-07-21 PROCEDURE — 85025 COMPLETE CBC W/AUTO DIFF WBC: CPT

## 2024-07-21 RX ADMIN — ATORVASTATIN CALCIUM 80 MG: 80 TABLET, FILM COATED ORAL at 08:47

## 2024-07-21 RX ADMIN — ASPIRIN 81 MG: 81 TABLET, COATED ORAL at 08:47

## 2024-07-21 RX ADMIN — Medication 4 MG/HR: at 11:50

## 2024-07-21 RX ADMIN — POTASSIUM CHLORIDE 10 MEQ: 7.46 INJECTION, SOLUTION INTRAVENOUS at 06:06

## 2024-07-21 RX ADMIN — POTASSIUM CHLORIDE 10 MEQ: 7.46 INJECTION, SOLUTION INTRAVENOUS at 05:00

## 2024-07-21 RX ADMIN — TICAGRELOR 90 MG: 90 TABLET ORAL at 08:48

## 2024-07-21 RX ADMIN — SODIUM CHLORIDE, PRESERVATIVE FREE 10 ML: 5 INJECTION INTRAVENOUS at 08:48

## 2024-07-21 RX ADMIN — FUROSEMIDE 20 MG: 10 INJECTION, SOLUTION INTRAMUSCULAR; INTRAVENOUS at 17:56

## 2024-07-21 RX ADMIN — TICAGRELOR 90 MG: 90 TABLET ORAL at 20:58

## 2024-07-21 RX ADMIN — SODIUM CHLORIDE, PRESERVATIVE FREE 10 ML: 5 INJECTION INTRAVENOUS at 20:58

## 2024-07-21 RX ADMIN — FUROSEMIDE 20 MG: 10 INJECTION, SOLUTION INTRAMUSCULAR; INTRAVENOUS at 08:47

## 2024-07-21 RX ADMIN — Medication 0.5 MG/MIN: at 17:56

## 2024-07-21 RX ADMIN — ACETAMINOPHEN 650 MG: 325 TABLET ORAL at 20:57

## 2024-07-21 RX ADMIN — POTASSIUM CHLORIDE 20 MEQ: 29.8 INJECTION, SOLUTION INTRAVENOUS at 15:50

## 2024-07-21 RX ADMIN — Medication 50 MCG/HR: at 11:51

## 2024-07-21 RX ADMIN — HEPARIN SODIUM 14 UNITS/KG/HR: 10000 INJECTION, SOLUTION INTRAVENOUS at 11:50

## 2024-07-21 RX ADMIN — VANCOMYCIN HYDROCHLORIDE 1500 MG: 1.5 INJECTION, POWDER, LYOPHILIZED, FOR SOLUTION INTRAVENOUS at 21:11

## 2024-07-21 RX ADMIN — VANCOMYCIN HYDROCHLORIDE 1500 MG: 1.5 INJECTION, POWDER, LYOPHILIZED, FOR SOLUTION INTRAVENOUS at 03:21

## 2024-07-21 RX ADMIN — Medication 0.5 MG/MIN: at 01:38

## 2024-07-21 ASSESSMENT — PULMONARY FUNCTION TESTS
PIF_VALUE: 19
PIF_VALUE: 20
PIF_VALUE: 19
PIF_VALUE: 20
PIF_VALUE: 19
PIF_VALUE: 20
PIF_VALUE: 20
PIF_VALUE: 22
PIF_VALUE: 20
PIF_VALUE: 20
PIF_VALUE: 22
PIF_VALUE: 21
PIF_VALUE: 20
PIF_VALUE: 19
PIF_VALUE: 19
PIF_VALUE: 20
PIF_VALUE: 20
PIF_VALUE: 19
PIF_VALUE: 20
PIF_VALUE: 20
PIF_VALUE: 19
PIF_VALUE: 20
PIF_VALUE: 19
PIF_VALUE: 20
PIF_VALUE: 22
PIF_VALUE: 20
PIF_VALUE: 19
PIF_VALUE: 20
PIF_VALUE: 19
PIF_VALUE: 20
PIF_VALUE: 19
PIF_VALUE: 20
PIF_VALUE: 20
PIF_VALUE: 23
PIF_VALUE: 20
PIF_VALUE: 22
PIF_VALUE: 20
PIF_VALUE: 19
PIF_VALUE: 20

## 2024-07-21 ASSESSMENT — PAIN SCALES - WONG BAKER
WONGBAKER_NUMERICALRESPONSE: NO HURT

## 2024-07-21 NOTE — PLAN OF CARE
Problem: Respiratory - Adult  Goal: Achieves optimal ventilation and oxygenation  7/21/2024 1938 by Janeth Villatoro RCP  Outcome: Progressing     Problem: OXYGENATION/RESPIRATORY FUNCTION  Goal: Patient will maintain patent airway  Outcome: Ongoing  Goal: Patient will achieve/maintain normal respiratory rate/effort  Respiratory rate and effort will be within normal limits for the patient  Outcome: Ongoing    Problem: MECHANICAL VENTILATION  Goal: Patient will maintain patent airway  Outcome: Ongoing  Goal: Oral health is maintained or improved  Outcome: Ongoing  Goal: ET tube will be managed safely  Outcome: Ongoing  Goal: Ability to express needs and understand communication  Outcome: Ongoing  Goal: Mobility/activity is maintained at optimum level for patient  Outcome: Ongoing    Problem: ASPIRATION PRECAUTIONS  Goal: Patient’s risk of aspiration is minimized  Outcome: Ongoing    Problem: SKIN INTEGRITY  Goal: Skin integrity is maintained or improved  Outcome: Ongoing

## 2024-07-21 NOTE — PLAN OF CARE
Problem: Respiratory - Adult  Goal: Achieves optimal ventilation and oxygenation  7/20/2024 2030 by Maria Guadalupe Bravo, BAUTISTA  Flowsheets (Taken 7/20/2024 2030)  Achieves optimal ventilation and oxygenation:   Respiratory therapy support as indicated   Assess the need for suctioning and aspirate as needed   Assess for changes in respiratory status   Assess for changes in mentation and behavior   Oxygen supplementation based on oxygen saturation or arterial blood gases

## 2024-07-21 NOTE — PROGRESS NOTES
[facility]   Progress Note                   Date:   7/21/2024  Patient name: Claudio Graham  Date of admission:  7/18/2024  9:53 AM  MRN:   [id number]  YOB: 1955  PCP: Vanessa Mckenna, LIDIA - CNP    Reason for Admission: VT    Subjective:       Patient seen and examined at bedside: No acute events overnight, currently intubated, IABP in situ, on pressor support with levo.    Medications:   Scheduled Meds:   vancomycin  1,500 mg IntraVENous Q18H    furosemide  20 mg IntraVENous BID    atorvastatin  80 mg Oral Daily    vancomycin (VANCOCIN) intermittent dosing (placeholder)   Other RX Placeholder    sodium chloride flush  5-40 mL IntraVENous 2 times per day    aspirin  81 mg Oral Daily    ticagrelor  90 mg Oral BID    heparin (porcine)  4,000 Units IntraVENous Once     Continuous Infusions:   midazolam 3 mg/hr (07/20/24 1817)    sodium chloride      Phenylephrine Stopped (07/20/24 1642)    norepinephrine 7 mcg/min (07/20/24 1938)    amiodarone 0.5 mg/min (07/21/24 0138)    heparin (PORCINE) Infusion 14 Units/kg/hr (07/20/24 1817)    propofol Stopped (07/20/24 1642)    lidocaine 1 mg/min (07/20/24 2031)    fentaNYL 50 mcg/hr (07/20/24 1817)     CBC:   Recent Labs     07/19/24  2336 07/20/24  0410 07/21/24  0258   WBC 13.9* 15.8* 8.1   HGB 13.2 13.4 12.1*   * 146 See Reflexed IPF Result     BMP:    Recent Labs     07/19/24  2336 07/20/24  0410 07/21/24  0258   * 134* 133*   K 3.3* 3.6* 3.4*    100 100   CO2 21 21 22   BUN 29* 29* 26*   CREATININE 1.3* 1.4* 1.4*   GLUCOSE 156* 187* 154*     Hepatic:   Recent Labs     07/19/24  0327 07/20/24  0410 07/21/24  0258   * 81* 46   ALT 70* 52* 35   BILITOT 0.6 0.6 0.6   ALKPHOS 89 90 81     Troponin: No results for input(s): \"TROPONINI\" in the last 72 hours.  BNP: No results for input(s): \"BNP\" in the last 72 hours.  Lipids: No results for input(s): \"CHOL\", \"HDL\" in the last 72 hours.    Invalid input(s):

## 2024-07-21 NOTE — PROGRESS NOTES
PULMONARY PROGRESS NOTE      Patient:  Claudio Graham  YOB: 1955    MRN: [id number]     Acct: [id number]     Admit date: 7/18/2024    REASON FOR CONSULT:- Acute hypoxemic respiratory failure    Pt seen and Chart reviewed.    Subjective:       Claudio Graham  69 y.o. male with past medical history of heart failure with reduced ejection fraction, diabetes status post AICD, hyperlipidemia, hypertension, morbid obesity, TANNER presented to the emergency room on 7/18/2024 medically for sustaining V. tach requiring multiple time shock and medical cardioversion. The pt was emergently underwent cardiac cath and was found to have acute ostial occlusion 100% and TIFFANY was placed also the LVEF was 10%. LV Wall Motion: Severe global hypokinesis and IABP was placed.   Pulmonary/CC was called for Acute hypoxemic respiratory failure and requiring MV support.       Interval History  7/21/2024    Patient still intubated and sedated requiring pressor support and support with intra-aortic balloon pump  He continues to be on lidocaine and amiodarone  Having small tracheal secretions  Fluid balance is -0.7 L    Review of Systems -   Review of Systems   Reason unable to perform ROS: Intubated and sedate.           Physical Exam:  Vitals: BP (!) 148/60   Pulse (!) 49   Temp 99.3 °F (37.4 °C)   Resp 14   Ht 1.753 m (5' 9.02\")   Wt 95.3 kg (210 lb 1.6 oz)   SpO2 99%   BMI 31.01 kg/m²   24 hour intake/output:  Intake/Output Summary (Last 24 hours) at 7/21/2024 0927  Last data filed at 7/21/2024 0900  Gross per 24 hour   Intake 2324.18 ml   Output 1692 ml   Net 632.18 ml       Last 3 weights:  Wt Readings from Last 3 Encounters:   07/20/24 95.3 kg (210 lb 1.6 oz)   06/19/24 94.3 kg (208 lb)   06/04/24 97.8 kg (215 lb 8 oz)       General appearance: alert and cooperative with exam  Physical Examination:   Physical Exam  Constitutional:       Interventions: He is sedated and intubated.   Cardiovascular:      Rate and

## 2024-07-21 NOTE — FLOWSHEET NOTE
0246 RN reached out to Dr. Willson regarding patients urine output decrease within 3 hours. Asked if we could get an X-Ray ordered STAT for verification of balloon pump placement. CBC and CMP labs were also sent to lab.     0255 Dr. Willson placed orders for STAT X-Ray

## 2024-07-21 NOTE — PLAN OF CARE
Problem: Chronic Conditions and Co-morbidities  Goal: Patient's chronic conditions and co-morbidity symptoms are monitored and maintained or improved  Outcome: Progressing  Flowsheets (Taken 7/21/2024 0400 by Kyle Doty, RN)  Care Plan - Patient's Chronic Conditions and Co-Morbidity Symptoms are Monitored and Maintained or Improved:   Monitor and assess patient's chronic conditions and comorbid symptoms for stability, deterioration, or improvement   Collaborate with multidisciplinary team to address chronic and comorbid conditions and prevent exacerbation or deterioration   Update acute care plan with appropriate goals if chronic or comorbid symptoms are exacerbated and prevent overall improvement and discharge     Problem: Discharge Planning  Goal: Discharge to home or other facility with appropriate resources  Outcome: Progressing  Flowsheets (Taken 7/21/2024 0400 by Kyle Doty, RN)  Discharge to home or other facility with appropriate resources:   Identify barriers to discharge with patient and caregiver   Arrange for needed discharge resources and transportation as appropriate   Identify discharge learning needs (meds, wound care, etc)   Arrange for interpreters to assist at discharge as needed   Refer to discharge planning if patient needs post-hospital services based on physician order or complex needs related to functional status, cognitive ability or social support system     Problem: Respiratory - Adult  Goal: Achieves optimal ventilation and oxygenation  7/21/2024 1556 by Kj Hamm, RN  Outcome: Progressing  7/21/2024 0810 by Nika Quan, NESS  Outcome: Progressing     Problem: Safety - Adult  Goal: Free from fall injury  Outcome: Progressing     Problem: Pain  Goal: Verbalizes/displays adequate comfort level or baseline comfort level  Outcome: Progressing     Problem: Safety - Medical Restraint  Goal: Remains free of injury from restraints (Restraint for Interference with Medical  with medical device):   Determine that other, less restrictive measures have been tried or would not be effective before applying the restraint   Evaluate the patient's condition at the time of restraint application   Inform patient/family regarding the reason for restraint   Every 2 hours: Monitor safety, psychosocial status, comfort, nutrition and hydration

## 2024-07-22 ENCOUNTER — APPOINTMENT (OUTPATIENT)
Dept: GENERAL RADIOLOGY | Age: 69
End: 2024-07-22
Payer: COMMERCIAL

## 2024-07-22 PROBLEM — R07.9 CHEST PAIN: Status: ACTIVE | Noted: 2023-08-15

## 2024-07-22 LAB
ALBUMIN SERPL-MCNC: 3 G/DL (ref 3.5–5.2)
ALBUMIN/GLOB SERPL: 1 {RATIO} (ref 1–2.5)
ALP SERPL-CCNC: 74 U/L (ref 40–129)
ALT SERPL-CCNC: 27 U/L (ref 10–50)
ANION GAP SERPL CALCULATED.3IONS-SCNC: 9 MMOL/L (ref 9–16)
AST SERPL-CCNC: 33 U/L (ref 10–50)
BASOPHILS # BLD: 0 K/UL (ref 0–0.2)
BASOPHILS NFR BLD: 0 % (ref 0–2)
BILIRUB SERPL-MCNC: 0.5 MG/DL (ref 0–1.2)
BUN SERPL-MCNC: 24 MG/DL (ref 8–23)
CALCIUM SERPL-MCNC: 8.3 MG/DL (ref 8.6–10.4)
CHLORIDE SERPL-SCNC: 103 MMOL/L (ref 98–107)
CO2 SERPL-SCNC: 23 MMOL/L (ref 20–31)
CREAT SERPL-MCNC: 1.3 MG/DL (ref 0.7–1.2)
EOSINOPHIL # BLD: 0.06 K/UL (ref 0–0.44)
EOSINOPHILS RELATIVE PERCENT: 1 % (ref 1–4)
ERYTHROCYTE [DISTWIDTH] IN BLOOD BY AUTOMATED COUNT: 17.5 % (ref 11.8–14.4)
FIO2: 30
GFR, ESTIMATED: 58 ML/MIN/1.73M2
GLUCOSE BLD-MCNC: 108 MG/DL (ref 74–100)
GLUCOSE SERPL-MCNC: 109 MG/DL (ref 74–99)
HCT VFR BLD AUTO: 33.9 % (ref 40.7–50.3)
HGB BLD-MCNC: 11.2 G/DL (ref 13–17)
IMM GRANULOCYTES # BLD AUTO: 0 K/UL (ref 0–0.3)
IMM GRANULOCYTES NFR BLD: 0 %
LYMPHOCYTES NFR BLD: 0.49 K/UL (ref 1.1–3.7)
LYMPHOCYTES RELATIVE PERCENT: 8 % (ref 24–43)
MCH RBC QN AUTO: 28 PG (ref 25.2–33.5)
MCHC RBC AUTO-ENTMCNC: 33 G/DL (ref 28.4–34.8)
MCV RBC AUTO: 84.8 FL (ref 82.6–102.9)
MONOCYTES NFR BLD: 0.49 K/UL (ref 0.1–1.2)
MONOCYTES NFR BLD: 8 % (ref 3–12)
MORPHOLOGY: ABNORMAL
NEGATIVE BASE EXCESS, ART: 0.1 MMOL/L (ref 0–2)
NEUTROPHILS NFR BLD: 83 % (ref 36–65)
NEUTS SEG NFR BLD: 5.06 K/UL (ref 1.5–8.1)
NRBC BLD-RTO: 0 PER 100 WBC
O2 DELIVERY DEVICE: NORMAL
PARTIAL THROMBOPLASTIN TIME: 67.7 SEC (ref 23–36.5)
PATIENT TEMP: 38
PLATELET # BLD AUTO: 97 K/UL (ref 138–453)
PMV BLD AUTO: 12.4 FL (ref 8.1–13.5)
POC HCO3: 23.8 MMOL/L (ref 21–28)
POC LACTIC ACID: 0.7 MMOL/L (ref 0.56–1.39)
POC O2 SATURATION: 97.8 % (ref 94–98)
POC PCO2 TEMP: 37 MM HG
POC PCO2: 35.4 MM HG (ref 35–48)
POC PH TEMP: 7.42
POC PH: 7.43 (ref 7.35–7.45)
POC PO2 TEMP: 103.2 MM HG
POC PO2: 97.1 MM HG (ref 83–108)
POTASSIUM SERPL-SCNC: 3.7 MMOL/L (ref 3.7–5.3)
PROT SERPL-MCNC: 5.6 G/DL (ref 6.6–8.7)
RBC # BLD AUTO: 4 M/UL (ref 4.21–5.77)
SAMPLE SITE: NORMAL
SODIUM SERPL-SCNC: 135 MMOL/L (ref 136–145)
VANCOMYCIN SERPL-MCNC: 28.7 UG/ML (ref 5–40)
WBC OTHER # BLD: 6.1 K/UL (ref 3.5–11.3)

## 2024-07-22 PROCEDURE — 6360000002 HC RX W HCPCS

## 2024-07-22 PROCEDURE — 2500000003 HC RX 250 WO HCPCS: Performed by: STUDENT IN AN ORGANIZED HEALTH CARE EDUCATION/TRAINING PROGRAM

## 2024-07-22 PROCEDURE — 82803 BLOOD GASES ANY COMBINATION: CPT

## 2024-07-22 PROCEDURE — 83605 ASSAY OF LACTIC ACID: CPT

## 2024-07-22 PROCEDURE — 80202 ASSAY OF VANCOMYCIN: CPT

## 2024-07-22 PROCEDURE — 36415 COLL VENOUS BLD VENIPUNCTURE: CPT

## 2024-07-22 PROCEDURE — 80053 COMPREHEN METABOLIC PANEL: CPT

## 2024-07-22 PROCEDURE — 85025 COMPLETE CBC W/AUTO DIFF WBC: CPT

## 2024-07-22 PROCEDURE — 99233 SBSQ HOSP IP/OBS HIGH 50: CPT | Performed by: INTERNAL MEDICINE

## 2024-07-22 PROCEDURE — 85730 THROMBOPLASTIN TIME PARTIAL: CPT

## 2024-07-22 PROCEDURE — 2000000000 HC ICU R&B

## 2024-07-22 PROCEDURE — 94003 VENT MGMT INPAT SUBQ DAY: CPT

## 2024-07-22 PROCEDURE — 99291 CRITICAL CARE FIRST HOUR: CPT | Performed by: INTERNAL MEDICINE

## 2024-07-22 PROCEDURE — 82947 ASSAY GLUCOSE BLOOD QUANT: CPT

## 2024-07-22 PROCEDURE — 6360000002 HC RX W HCPCS: Performed by: STUDENT IN AN ORGANIZED HEALTH CARE EDUCATION/TRAINING PROGRAM

## 2024-07-22 PROCEDURE — 6370000000 HC RX 637 (ALT 250 FOR IP): Performed by: STUDENT IN AN ORGANIZED HEALTH CARE EDUCATION/TRAINING PROGRAM

## 2024-07-22 PROCEDURE — 2580000003 HC RX 258: Performed by: STUDENT IN AN ORGANIZED HEALTH CARE EDUCATION/TRAINING PROGRAM

## 2024-07-22 PROCEDURE — 37799 UNLISTED PX VASCULAR SURGERY: CPT

## 2024-07-22 PROCEDURE — 71045 X-RAY EXAM CHEST 1 VIEW: CPT

## 2024-07-22 PROCEDURE — 2700000000 HC OXYGEN THERAPY PER DAY

## 2024-07-22 PROCEDURE — 94761 N-INVAS EAR/PLS OXIMETRY MLT: CPT

## 2024-07-22 PROCEDURE — 80176 ASSAY OF LIDOCAINE: CPT

## 2024-07-22 RX ORDER — ASPIRIN 81 MG/1
81 TABLET, CHEWABLE ORAL DAILY
Status: DISCONTINUED | OUTPATIENT
Start: 2024-07-23 | End: 2024-07-26 | Stop reason: HOSPADM

## 2024-07-22 RX ADMIN — TICAGRELOR 90 MG: 90 TABLET ORAL at 21:49

## 2024-07-22 RX ADMIN — SODIUM CHLORIDE, PRESERVATIVE FREE 10 ML: 5 INJECTION INTRAVENOUS at 08:36

## 2024-07-22 RX ADMIN — Medication 0.5 MG/MIN: at 05:04

## 2024-07-22 RX ADMIN — FENTANYL CITRATE 50 MCG: 50 INJECTION, SOLUTION INTRAMUSCULAR; INTRAVENOUS at 21:47

## 2024-07-22 RX ADMIN — HEPARIN SODIUM 14 UNITS/KG/HR: 10000 INJECTION, SOLUTION INTRAVENOUS at 05:07

## 2024-07-22 RX ADMIN — Medication 0.5 MG/MIN: at 19:35

## 2024-07-22 RX ADMIN — LIDOCAINE HYDROCHLORIDE 0.5 MG/MIN: 4 INJECTION, SOLUTION INTRAVENOUS at 05:05

## 2024-07-22 RX ADMIN — ASPIRIN 81 MG: 81 TABLET, COATED ORAL at 08:35

## 2024-07-22 RX ADMIN — ATORVASTATIN CALCIUM 80 MG: 80 TABLET, FILM COATED ORAL at 08:36

## 2024-07-22 RX ADMIN — Medication 50 MCG/HR: at 04:50

## 2024-07-22 RX ADMIN — Medication 50 MCG/HR: at 23:13

## 2024-07-22 RX ADMIN — TICAGRELOR 90 MG: 90 TABLET ORAL at 08:36

## 2024-07-22 RX ADMIN — FUROSEMIDE 20 MG: 10 INJECTION, SOLUTION INTRAMUSCULAR; INTRAVENOUS at 18:08

## 2024-07-22 RX ADMIN — FENTANYL CITRATE 50 MCG: 50 INJECTION, SOLUTION INTRAMUSCULAR; INTRAVENOUS at 13:12

## 2024-07-22 RX ADMIN — Medication 3 MG/HR: at 04:49

## 2024-07-22 RX ADMIN — FUROSEMIDE 20 MG: 10 INJECTION, SOLUTION INTRAMUSCULAR; INTRAVENOUS at 08:36

## 2024-07-22 ASSESSMENT — PULMONARY FUNCTION TESTS
PIF_VALUE: 44
PIF_VALUE: 17
PIF_VALUE: 18
PIF_VALUE: 18
PIF_VALUE: 20
PIF_VALUE: 19
PIF_VALUE: 19
PIF_VALUE: 23
PIF_VALUE: 21
PIF_VALUE: 19
PIF_VALUE: 22
PIF_VALUE: 16
PIF_VALUE: 19
PIF_VALUE: 19
PIF_VALUE: 22

## 2024-07-22 NOTE — PROGRESS NOTES
[facility]   Progress Note                   Date:   7/22/2024  Patient name: Claudio Graham  Date of admission:  7/18/2024  9:53 AM  MRN:   [id number]  YOB: 1955  PCP: Vanessa Mckenna, APRN - CNP    Reason for Admission: VT    Subjective:       Patient seen and examined at bedside.  Remains intubated and on mechanical ventilation.  Following commands off sedation.  IABP in place with one-to-one augmentation.  Blood pressure stable without any vasopressor requirement.  Currently normal sinus rhythm with PVCs.  Currently on IV amiodarone and IV lidocaine.    Medications:   Scheduled Meds:   vancomycin  1,500 mg IntraVENous Q18H    furosemide  20 mg IntraVENous BID    atorvastatin  80 mg Oral Daily    vancomycin (VANCOCIN) intermittent dosing (placeholder)   Other RX Placeholder    sodium chloride flush  5-40 mL IntraVENous 2 times per day    aspirin  81 mg Oral Daily    ticagrelor  90 mg Oral BID    heparin (porcine)  4,000 Units IntraVENous Once     Continuous Infusions:   midazolam 2 mg/hr (07/22/24 0910)    sodium chloride      Phenylephrine Stopped (07/20/24 1642)    norepinephrine Stopped (07/21/24 1544)    amiodarone 0.5 mg/min (07/22/24 0910)    heparin (PORCINE) Infusion 14 Units/kg/hr (07/22/24 0910)    propofol Stopped (07/20/24 1642)    lidocaine 0.5 mg/min (07/22/24 0910)    fentaNYL 25 mcg/hr (07/22/24 0910)     CBC:   Recent Labs     07/20/24  0410 07/21/24  0258 07/22/24  0332   WBC 15.8* 8.1 6.1   HGB 13.4 12.1* 11.2*    See Reflexed IPF Result 97*       BMP:    Recent Labs     07/20/24  0410 07/21/24  0258 07/21/24  1429 07/22/24  0332   * 133*  --  135*   K 3.6* 3.4* 3.6* 3.7    100  --  103   CO2 21 22  --  23   BUN 29* 26*  --  24*   CREATININE 1.4* 1.4*  --  1.3*   GLUCOSE 187* 154*  --  109*       Hepatic:   Recent Labs     07/20/24  0410 07/21/24  0258 07/22/24  0332   AST 81* 46 33   ALT 52* 35 27   BILITOT 0.6 0.6 0.5   ALKPHOS 90 81 74        Troponin: No results for input(s): \"TROPONINI\" in the last 72 hours.  BNP: No results for input(s): \"BNP\" in the last 72 hours.  Lipids: No results for input(s): \"CHOL\", \"HDL\" in the last 72 hours.    Invalid input(s): \"LDLCALCU\"  INR:   No results for input(s): \"INR\" in the last 72 hours.      Objective:   Vitals: BP (!) 148/60   Pulse 95   Temp 100.2 °F (37.9 °C)   Resp 19   Ht 1.753 m (5' 9.02\")   Wt 99.9 kg (220 lb 3.8 oz)   SpO2 100%   BMI 32.51 kg/m²   Constitutional and General Appearance: Intubated and sedated   HEENT: atraumatic, normocephalic.   Respiratory:  Bilateral ventilatory breath sounds present  Cardiovascular:  Regular S1 and S2.  No JVD  Abdomen:   Soft obese abdomen  Bowel sounds present  Extremities:  No LE edema or cyanosis   Neurological:  Intubated and sedated      EKG:   Sustained monomorphic VT    Echo:  7/2024    Left Ventricle: Severely reduced left ventricular systolic function with a visually estimated EF of 10 -15%. EF by 2D Simpsons Biplane is 19%. Left ventricle is severely dilated. Normal wall thickness. See diagram for wall motion findings. Abnormal diastolic function.    Aortic Valve: Trileaflet valve. Mildly calcified aortic valve leaflets. Mild regurgitation.    Mitral Valve: Mild regurgitation.    Tricuspid Valve: Mild regurgitation. Normal RVSP. The estimated RVSP is 27 mmHg.    Image quality is technically difficult. Contrast used: Definity. Technically difficult study due to patient's body habitus and procedure performed with the patient in a supine position.    LAST CATH:     07/18/24    CARDIAC PROCEDURE 07/18/2024  2:57 PM (Final)    Conclusion  Images from the original result were not included.  [facility]        Date:                            7/18/2024  Patient name:  Claudio Graham  Date of admission:      7/18/2024  9:53 AM  MRN:                           [id number]  YOB: 1955    CARDIAC

## 2024-07-22 NOTE — PROGRESS NOTES
[facility]   Pharmacy Pharmacokinetic Monitoring Service - Vancomycin    Consulting Provider: Dr. Radha Shankar   Indication: bacteremia   Target Concentration: Goal AUC/MERI 400-600 mg*hr/L  Day of Therapy: 4  Additional Antimicrobials: /    Pertinent Laboratory Values:   Wt Readings from Last 1 Encounters:   07/22/24 99.9 kg (220 lb 3.8 oz)     Temp Readings from Last 1 Encounters:   07/22/24 100.2 °F (37.9 °C)     Estimated Creatinine Clearance: 61 mL/min (A) (based on SCr of 1.3 mg/dL (H)).  Recent Labs     07/21/24  0258 07/22/24  0332   CREATININE 1.4* 1.3*   BUN 26* 24*   WBC 8.1 6.1       Pertinent Cultures:  Culture Date Source Results   7/18 Blood x2 Staph epi x1     Assessment:  Date/Time Current Dose Concentration Timing of Concentration (h)   7/22 0330 1500 mg IV q18h 28.7 6.5 h after last dose   Note: Serum concentrations collected for AUC dosing may appear elevated if collected in close proximity to the dose administered, this is not necessarily an indication of toxicity    Plan:  Renal function stable  Vancomycin level 28.7 mcg/ml, collected 6.5 h after last dose on a q18h regimen; AUC at steady state projected >600  Based on level assessment will adjust the dose to Vancomycin 1500 mg IV q24h with anticipated trough of 15 and AUC of 530  Pharmacy will continue to monitor patient and adjust therapy as indicated    Thank you for the consult,  Higinio Zimmerman, PharmD, 7/22/2024 12:07 PM

## 2024-07-22 NOTE — PROGRESS NOTES
Comprehensive Nutrition Assessment    Type and Reason for Visit:  Reassess    Nutrition Recommendations/Plan:   Continue NPO.  Consider starting nutrition support as pt has been NPO x 4-5 days.  If Tube Feedings requested, suggest Peptide Based formula goal 55 mL/hr.     Malnutrition Assessment:  Malnutrition Status:  Insufficient data (07/18/24 1445)    Context:  Acute Illness       Nutrition Assessment:    Pt remains intubated and sedated.  Remains NPO.  Will monitor plans for nutrition and plan of care.  Labs reviewed: Na 135 mmol/L.  Meds include: Lasix.    Nutrition Related Findings:    BM 7/19. Wound Type: None       Current Nutrition Intake & Therapies:    Average Meal Intake: NPO  Average Supplements Intake: NPO  No diet orders on file  Additional Calorie Sources:  None    Anthropometric Measures:  Height: 175.3 cm (5' 9.02\")  Ideal Body Weight (IBW): 160 lbs (73 kg)    Admission Body Weight: 94.3 kg (207 lb 14.3 oz)  Current Body Weight: 99.9 kg (220 lb 3.8 oz), 137.6 % IBW. Weight Source: Bed Scale  Current BMI (kg/m2): 32.5        Weight Adjustment For: No Adjustment                 BMI Categories: Obese Class 1 (BMI 30.0-34.9)    Estimated Daily Nutrient Needs:  Energy Requirements Based On: Kcal/kg  Weight Used for Energy Requirements: Admission  Energy (kcal/day): 3212-4602 kcals/day  Weight Used for Protein Requirements: Ideal  Protein (g/day): 100-150 gm pro/day  Method Used for Fluid Requirements: 1 ml/kcal  Fluid (ml/day): Per MD    Nutrition Diagnosis:   Inadequate oral intake related to impaired respiratory function as evidenced by NPO or clear liquid status due to medical condition    Nutrition Interventions:   Food and/or Nutrient Delivery: Continue NPO (Monitor plans for start of nutrition support.)  Nutrition Education/Counseling: No recommendation at this time  Coordination of Nutrition Care: Continue to monitor while inpatient       Goals:  Previous Goal Met: No Progress toward

## 2024-07-22 NOTE — CARE COORDINATION
Transition Planning    HIPAA compliant voice mail left with pt's cousin Cathy Wynn requesting call back. Call to pt's friend, Jason Lewis. He is listed as Healthcare Decision Maker in Epic. Mr. Lewis will be visiting pt this evening. Spoke with him concerning SNF and LTAC levels of care. Left lists in pt's room for him to review.     Post Acute Facility/Agency List     Provided  Friend/Healthcare decision maker  with the following list, the list includes the overall star ratings obtained from CMS per the Medicare Web site (www.Medicare.gov):     [x] Long Term Acute Care Facilities  [] Acute Inpatient Rehabilitation Facilities  [x] Skilled Nursing Facilities  [] Home Care  [] Patient's Insurance specific coverage list for SNF    Provided verbal instructions on how to utilize the QR Code to obtain additional detailed star ratings from www.Medicare.gov

## 2024-07-22 NOTE — PROGRESS NOTES
PULMONARY PROGRESS NOTE      Patient:  Claudio Graham  YOB: 1955    MRN: [id number]     Acct: [id number]     Admit date: 7/18/2024    REASON FOR CONSULT:- Acute hypoxemic respiratory failure    Pt seen and Chart reviewed.    Subjective:       Claudio Graham  69 y.o. male with past medical history of heart failure with reduced ejection fraction, diabetes status post AICD, hyperlipidemia, hypertension, morbid obesity, TANNER presented to the emergency room on 7/18/2024 medically for sustaining V. tach requiring multiple time shock and medical cardioversion. The pt was emergently underwent cardiac cath and was found to have acute ostial occlusion 100% and TIFFANY was placed also the LVEF was 10%. LV Wall Motion: Severe global hypokinesis and IABP was placed.   Pulmonary/CC was called for Acute hypoxemic respiratory failure and requiring MV support.       Interval History  7/22/2024    Patient still intubated and sedated requiring support with intra-aortic balloon pump  He continues to be on lidocaine and amiodarone  Patient came off the norepinephrine  Having small tracheal secretions  Fluid balance is -0.35 L    Review of Systems -   Review of Systems   Reason unable to perform ROS: Intubated and sedate.           Physical Exam:  Vitals: BP (!) 148/60   Pulse 68   Temp 100.2 °F (37.9 °C)   Resp 15   Ht 1.753 m (5' 9.02\")   Wt 99.9 kg (220 lb 3.8 oz)   SpO2 100%   BMI 32.51 kg/m²   24 hour intake/output:  Intake/Output Summary (Last 24 hours) at 7/22/2024 0722  Last data filed at 7/22/2024 0719  Gross per 24 hour   Intake 2059.76 ml   Output 1535 ml   Net 524.76 ml       Last 3 weights:  Wt Readings from Last 3 Encounters:   07/22/24 99.9 kg (220 lb 3.8 oz)   06/19/24 94.3 kg (208 lb)   06/04/24 97.8 kg (215 lb 8 oz)       General appearance: alert and cooperative with exam  Physical Examination:   Physical Exam  Constitutional:       Interventions: He is sedated and intubated.   Cardiovascular:

## 2024-07-22 NOTE — PLAN OF CARE
Problem: Chronic Conditions and Co-morbidities  Goal: Patient's chronic conditions and co-morbidity symptoms are monitored and maintained or improved  7/22/2024 0915 by Elizabeth Mcintosh RN  Outcome: Progressing  Flowsheets (Taken 7/22/2024 0400 by Kyle Doty, RN)  Care Plan - Patient's Chronic Conditions and Co-Morbidity Symptoms are Monitored and Maintained or Improved:   Monitor and assess patient's chronic conditions and comorbid symptoms for stability, deterioration, or improvement   Collaborate with multidisciplinary team to address chronic and comorbid conditions and prevent exacerbation or deterioration   Update acute care plan with appropriate goals if chronic or comorbid symptoms are exacerbated and prevent overall improvement and discharge  7/21/2024 2046 by Kyle Doty, RN  Outcome: Progressing  Flowsheets (Taken 7/21/2024 2000)  Care Plan - Patient's Chronic Conditions and Co-Morbidity Symptoms are Monitored and Maintained or Improved:   Monitor and assess patient's chronic conditions and comorbid symptoms for stability, deterioration, or improvement   Collaborate with multidisciplinary team to address chronic and comorbid conditions and prevent exacerbation or deterioration   Update acute care plan with appropriate goals if chronic or comorbid symptoms are exacerbated and prevent overall improvement and discharge     Problem: Discharge Planning  Goal: Discharge to home or other facility with appropriate resources  7/22/2024 0915 by Elizabeth Mcintosh RN  Outcome: Progressing  Flowsheets (Taken 7/22/2024 0400 by Kyle Doty, RN)  Discharge to home or other facility with appropriate resources:   Identify barriers to discharge with patient and caregiver   Arrange for needed discharge resources and transportation as appropriate   Identify discharge learning needs (meds, wound care, etc)   Arrange for interpreters to assist at discharge as needed   Refer to discharge planning if patient needs  patient/family regarding the reason for restraint  4. Q2H: Monitor safety, psychosocial status, comfort, nutrition and hydration  7/22/2024 0915 by Elizabeth Mcintosh RN  Outcome: Progressing  Flowsheets  Taken 7/22/2024 0600 by Kyle Doty RN  Remains free of injury from restraints (restraint for interference with medical device):   Determine that other, less restrictive measures have been tried or would not be effective before applying the restraint   Evaluate the patient's condition at the time of restraint application   Inform patient/family regarding the reason for restraint   Every 2 hours: Monitor safety, psychosocial status, comfort, nutrition and hydration  Taken 7/22/2024 0400 by Kyle Doty RN  Remains free of injury from restraints (restraint for interference with medical device):   Determine that other, less restrictive measures have been tried or would not be effective before applying the restraint   Evaluate the patient's condition at the time of restraint application   Inform patient/family regarding the reason for restraint   Every 2 hours: Monitor safety, psychosocial status, comfort, nutrition and hydration  Taken 7/22/2024 0200 by Kyle Doty RN  Remains free of injury from restraints (restraint for interference with medical device):   Determine that other, less restrictive measures have been tried or would not be effective before applying the restraint   Evaluate the patient's condition at the time of restraint application   Inform patient/family regarding the reason for restraint   Every 2 hours: Monitor safety, psychosocial status, comfort, nutrition and hydration  Taken 7/22/2024 0000 by Kyle Doty RN  Remains free of injury from restraints (restraint for interference with medical device):   Determine that other, less restrictive measures have been tried or would not be effective before applying the restraint   Evaluate the patient's condition at the time of restraint

## 2024-07-22 NOTE — FLOWSHEET NOTE
0031 RN reached out to Cardiology resident about pts urine output decrease less than 30 mL/hour.     No orders at this time.

## 2024-07-22 NOTE — PLAN OF CARE
Problem: Respiratory - Adult  Goal: Achieves optimal ventilation and oxygenation  7/22/2024 0831 by Nika Quan RCP  Outcome: Progressing  7/21/2024 2046 by Kyle Doty RN  Outcome: Progressing  7/21/2024 1938 by Janeth Villatoro RCP  Outcome: Progressing

## 2024-07-22 NOTE — PLAN OF CARE
IABP removed. Manual pressure held for >20 minutes. Distal perfusion intact. Groin site soft and clean. Dressing applied.

## 2024-07-22 NOTE — PLAN OF CARE
Problem: Chronic Conditions and Co-morbidities  Goal: Patient's chronic conditions and co-morbidity symptoms are monitored and maintained or improved  7/21/2024 2046 by Kyle Doty RN  Outcome: Progressing  7/21/2024 1556 by Kj Hamm RN  Outcome: Progressing  Flowsheets (Taken 7/21/2024 0400 by Kyle Doty RN)  Care Plan - Patient's Chronic Conditions and Co-Morbidity Symptoms are Monitored and Maintained or Improved:   Monitor and assess patient's chronic conditions and comorbid symptoms for stability, deterioration, or improvement   Collaborate with multidisciplinary team to address chronic and comorbid conditions and prevent exacerbation or deterioration   Update acute care plan with appropriate goals if chronic or comorbid symptoms are exacerbated and prevent overall improvement and discharge     Problem: Discharge Planning  Goal: Discharge to home or other facility with appropriate resources  7/21/2024 2046 by Kyle Doty RN  Outcome: Progressing  7/21/2024 1556 by Kj Hamm RN  Outcome: Progressing  Flowsheets (Taken 7/21/2024 0400 by Kyle Doty RN)  Discharge to home or other facility with appropriate resources:   Identify barriers to discharge with patient and caregiver   Arrange for needed discharge resources and transportation as appropriate   Identify discharge learning needs (meds, wound care, etc)   Arrange for interpreters to assist at discharge as needed   Refer to discharge planning if patient needs post-hospital services based on physician order or complex needs related to functional status, cognitive ability or social support system     Problem: Respiratory - Adult  Goal: Achieves optimal ventilation and oxygenation  7/21/2024 2046 by Kyle Doty RN  Outcome: Progressing  7/21/2024 1938 by Janeth Villatoro RCP  Outcome: Progressing  7/21/2024 1556 by Kj Hamm RN  Outcome: Progressing  7/21/2024 0810 by Nika Quan RCP  Outcome:

## 2024-07-23 ENCOUNTER — APPOINTMENT (OUTPATIENT)
Dept: GENERAL RADIOLOGY | Age: 69
End: 2024-07-23
Payer: COMMERCIAL

## 2024-07-23 LAB
ALBUMIN SERPL-MCNC: 3 G/DL (ref 3.5–5.2)
ALBUMIN/GLOB SERPL: 1 {RATIO} (ref 1–2.5)
ALLEN TEST: ABNORMAL
ALP SERPL-CCNC: 75 U/L (ref 40–129)
ALT SERPL-CCNC: 23 U/L (ref 10–50)
ANION GAP SERPL CALCULATED.3IONS-SCNC: 14 MMOL/L (ref 9–16)
AST SERPL-CCNC: 31 U/L (ref 10–50)
BASOPHILS # BLD: 0 K/UL (ref 0–0.2)
BASOPHILS NFR BLD: 0 % (ref 0–2)
BILIRUB SERPL-MCNC: 0.5 MG/DL (ref 0–1.2)
BUN SERPL-MCNC: 22 MG/DL (ref 8–23)
CA-I BLD-SCNC: 1.1 MMOL/L (ref 1.13–1.33)
CALCIUM SERPL-MCNC: 8.5 MG/DL (ref 8.6–10.4)
CHLORIDE SERPL-SCNC: 100 MMOL/L (ref 98–107)
CO2 SERPL-SCNC: 21 MMOL/L (ref 20–31)
CREAT SERPL-MCNC: 1.3 MG/DL (ref 0.7–1.2)
EOSINOPHIL # BLD: 0.07 K/UL (ref 0–0.4)
EOSINOPHILS RELATIVE PERCENT: 1 % (ref 1–4)
ERYTHROCYTE [DISTWIDTH] IN BLOOD BY AUTOMATED COUNT: 17.6 % (ref 11.8–14.4)
FIO2: 30
GFR, ESTIMATED: 60 ML/MIN/1.73M2
GLUCOSE BLD-MCNC: 138 MG/DL (ref 74–100)
GLUCOSE SERPL-MCNC: 132 MG/DL (ref 74–99)
HCT VFR BLD AUTO: 35.5 % (ref 40.7–50.3)
HGB BLD-MCNC: 11.4 G/DL (ref 13–17)
IMM GRANULOCYTES # BLD AUTO: 0 K/UL (ref 0–0.3)
IMM GRANULOCYTES NFR BLD: 0 %
LIDOCAIN SERPL-MCNC: 2.9 UG/ML (ref 1.2–5)
LYMPHOCYTES NFR BLD: 0.37 K/UL (ref 1–4.8)
LYMPHOCYTES RELATIVE PERCENT: 5 % (ref 24–44)
MAGNESIUM SERPL-MCNC: 1.9 MG/DL (ref 1.6–2.4)
MCH RBC QN AUTO: 27.6 PG (ref 25.2–33.5)
MCHC RBC AUTO-ENTMCNC: 32.1 G/DL (ref 28.4–34.8)
MCV RBC AUTO: 86 FL (ref 82.6–102.9)
MICROORGANISM SPEC CULT: NORMAL
MODE: ABNORMAL
MONOCYTES NFR BLD: 0.88 K/UL (ref 0.1–0.8)
MONOCYTES NFR BLD: 12 % (ref 1–7)
MORPHOLOGY: ABNORMAL
NEGATIVE BASE EXCESS, ART: 1.3 MMOL/L (ref 0–2)
NEUTROPHILS NFR BLD: 82 % (ref 36–66)
NEUTS SEG NFR BLD: 5.98 K/UL (ref 1.8–7.7)
NRBC BLD-RTO: 0 PER 100 WBC
O2 DELIVERY DEVICE: ABNORMAL
PARTIAL THROMBOPLASTIN TIME: 28.9 SEC (ref 23–36.5)
PHOSPHATE SERPL-MCNC: 3.8 MG/DL (ref 2.5–4.5)
PLATELET # BLD AUTO: 117 K/UL (ref 138–453)
PMV BLD AUTO: 10.7 FL (ref 8.1–13.5)
POC HCO3: 22.5 MMOL/L (ref 21–28)
POC LACTIC ACID: 0.7 MMOL/L (ref 0.56–1.39)
POC O2 SATURATION: 94.9 % (ref 94–98)
POC PCO2: 34.2 MM HG (ref 35–48)
POC PH: 7.43 (ref 7.35–7.45)
POC PO2: 72.6 MM HG (ref 83–108)
POTASSIUM SERPL-SCNC: 3.4 MMOL/L (ref 3.7–5.3)
PROT SERPL-MCNC: 5.9 G/DL (ref 6.6–8.7)
RBC # BLD AUTO: 4.13 M/UL (ref 4.21–5.77)
SAMPLE SITE: ABNORMAL
SERVICE CMNT-IMP: NORMAL
SODIUM SERPL-SCNC: 135 MMOL/L (ref 136–145)
SPECIMEN DESCRIPTION: NORMAL
WBC OTHER # BLD: 7.3 K/UL (ref 3.5–11.3)

## 2024-07-23 PROCEDURE — 85730 THROMBOPLASTIN TIME PARTIAL: CPT

## 2024-07-23 PROCEDURE — 82803 BLOOD GASES ANY COMBINATION: CPT

## 2024-07-23 PROCEDURE — 2700000000 HC OXYGEN THERAPY PER DAY

## 2024-07-23 PROCEDURE — 85025 COMPLETE CBC W/AUTO DIFF WBC: CPT

## 2024-07-23 PROCEDURE — 2500000003 HC RX 250 WO HCPCS: Performed by: STUDENT IN AN ORGANIZED HEALTH CARE EDUCATION/TRAINING PROGRAM

## 2024-07-23 PROCEDURE — 71045 X-RAY EXAM CHEST 1 VIEW: CPT

## 2024-07-23 PROCEDURE — 37799 UNLISTED PX VASCULAR SURGERY: CPT

## 2024-07-23 PROCEDURE — 99233 SBSQ HOSP IP/OBS HIGH 50: CPT | Performed by: INTERNAL MEDICINE

## 2024-07-23 PROCEDURE — 82330 ASSAY OF CALCIUM: CPT

## 2024-07-23 PROCEDURE — 94761 N-INVAS EAR/PLS OXIMETRY MLT: CPT

## 2024-07-23 PROCEDURE — 6370000000 HC RX 637 (ALT 250 FOR IP)

## 2024-07-23 PROCEDURE — 2000000000 HC ICU R&B

## 2024-07-23 PROCEDURE — 83605 ASSAY OF LACTIC ACID: CPT

## 2024-07-23 PROCEDURE — 6360000002 HC RX W HCPCS: Performed by: STUDENT IN AN ORGANIZED HEALTH CARE EDUCATION/TRAINING PROGRAM

## 2024-07-23 PROCEDURE — 84100 ASSAY OF PHOSPHORUS: CPT

## 2024-07-23 PROCEDURE — 82947 ASSAY GLUCOSE BLOOD QUANT: CPT

## 2024-07-23 PROCEDURE — 94003 VENT MGMT INPAT SUBQ DAY: CPT

## 2024-07-23 PROCEDURE — 99291 CRITICAL CARE FIRST HOUR: CPT | Performed by: INTERNAL MEDICINE

## 2024-07-23 PROCEDURE — 93005 ELECTROCARDIOGRAM TRACING: CPT | Performed by: INTERNAL MEDICINE

## 2024-07-23 PROCEDURE — 80053 COMPREHEN METABOLIC PANEL: CPT

## 2024-07-23 PROCEDURE — 83735 ASSAY OF MAGNESIUM: CPT

## 2024-07-23 PROCEDURE — 6370000000 HC RX 637 (ALT 250 FOR IP): Performed by: STUDENT IN AN ORGANIZED HEALTH CARE EDUCATION/TRAINING PROGRAM

## 2024-07-23 PROCEDURE — 2580000003 HC RX 258: Performed by: STUDENT IN AN ORGANIZED HEALTH CARE EDUCATION/TRAINING PROGRAM

## 2024-07-23 RX ORDER — NOREPINEPHRINE BITARTRATE 0.06 MG/ML
1-100 INJECTION, SOLUTION INTRAVENOUS CONTINUOUS
Status: DISCONTINUED | OUTPATIENT
Start: 2024-07-23 | End: 2024-07-24

## 2024-07-23 RX ADMIN — ASPIRIN 81 MG 81 MG: 81 TABLET ORAL at 08:43

## 2024-07-23 RX ADMIN — ACETAMINOPHEN 650 MG: 325 TABLET ORAL at 20:48

## 2024-07-23 RX ADMIN — Medication 5 MCG/MIN: at 01:09

## 2024-07-23 RX ADMIN — SODIUM CHLORIDE, PRESERVATIVE FREE 10 ML: 5 INJECTION INTRAVENOUS at 20:49

## 2024-07-23 RX ADMIN — ACETAMINOPHEN 650 MG: 325 TABLET ORAL at 00:19

## 2024-07-23 RX ADMIN — Medication 0.5 MG/MIN: at 23:57

## 2024-07-23 RX ADMIN — Medication 0.5 MG/MIN: at 11:16

## 2024-07-23 RX ADMIN — APIXABAN 5 MG: 5 TABLET, FILM COATED ORAL at 20:47

## 2024-07-23 RX ADMIN — FUROSEMIDE 20 MG: 10 INJECTION, SOLUTION INTRAMUSCULAR; INTRAVENOUS at 08:43

## 2024-07-23 RX ADMIN — SODIUM CHLORIDE, PRESERVATIVE FREE 10 ML: 5 INJECTION INTRAVENOUS at 08:49

## 2024-07-23 RX ADMIN — Medication 5 MCG/MIN: at 14:54

## 2024-07-23 RX ADMIN — TICAGRELOR 90 MG: 90 TABLET ORAL at 20:47

## 2024-07-23 RX ADMIN — POTASSIUM BICARBONATE 40 MEQ: 782 TABLET, EFFERVESCENT ORAL at 05:02

## 2024-07-23 RX ADMIN — ATORVASTATIN CALCIUM 80 MG: 80 TABLET, FILM COATED ORAL at 08:43

## 2024-07-23 RX ADMIN — FUROSEMIDE 20 MG: 10 INJECTION, SOLUTION INTRAMUSCULAR; INTRAVENOUS at 16:57

## 2024-07-23 RX ADMIN — TICAGRELOR 90 MG: 90 TABLET ORAL at 08:43

## 2024-07-23 ASSESSMENT — PULMONARY FUNCTION TESTS
PIF_VALUE: 20
PIF_VALUE: 25
PIF_VALUE: 19
PIF_VALUE: 18
PIF_VALUE: 13
PIF_VALUE: 21
PIF_VALUE: 17
PIF_VALUE: 24
PIF_VALUE: 25
PIF_VALUE: 25
PIF_VALUE: 18
PIF_VALUE: 18
PIF_VALUE: 21
PIF_VALUE: 24
PIF_VALUE: 14
PIF_VALUE: 19
PIF_VALUE: 23
PIF_VALUE: 25
PIF_VALUE: 20
PIF_VALUE: 22
PIF_VALUE: 21
PIF_VALUE: 21
PIF_VALUE: 24
PIF_VALUE: 20
PIF_VALUE: 20
PIF_VALUE: 19
PIF_VALUE: 24
PIF_VALUE: 19
PIF_VALUE: 20

## 2024-07-23 NOTE — PROGRESS NOTES
PULMONARY PROGRESS NOTE      Patient:  Claudio Graham  YOB: 1955    MRN: [id number]     Acct: [id number]     Admit date: 7/18/2024    REASON FOR CONSULT:- Acute hypoxemic respiratory failure    Pt seen and Chart reviewed.    Subjective:       Claudio Graham  69 y.o. male with past medical history of heart failure with reduced ejection fraction, diabetes status post AICD, hyperlipidemia, hypertension, morbid obesity, TANNER presented to the emergency room on 7/18/2024 medically for sustaining V. tach requiring multiple time shock and medical cardioversion. The pt was emergently underwent cardiac cath and was found to have acute ostial occlusion 100% and TIFFANY was placed also the LVEF was 10%. LV Wall Motion: Severe global hypokinesis and IABP was placed.   Pulmonary/CC was called for Acute hypoxemic respiratory failure and requiring MV support.       Interval History  7/23/2024    Patient still intubated and sedated  Patient came off the balloon pump  He continues to be on lidocaine and amiodarone  Patient remains off the norepinephrine  Having small tracheal secretions  Awake and following commands  Fluid balance is - 1.5 L    Review of Systems -   Review of Systems -   Constitutional ROS: negative  ENT ROS: negative  Allergy and Immunology ROS: negative  Respiratory ROS: negative  Cardiovascular ROS: negative  Gastrointestinal ROS: negative  Musculoskeletal ROS: negative         Physical Exam:  Vitals: /70   Pulse 83   Temp 99.5 °F (37.5 °C)   Resp 15   Ht 1.753 m (5' 9.02\")   Wt 96 kg (211 lb 10.3 oz)   SpO2 96%   BMI 31.24 kg/m²   24 hour intake/output:  Intake/Output Summary (Last 24 hours) at 7/23/2024 0716  Last data filed at 7/23/2024 0640  Gross per 24 hour   Intake 663.25 ml   Output 2520 ml   Net -1856.75 ml       Last 3 weights:  Wt Readings from Last 3 Encounters:   07/22/24 96 kg (211 lb 10.3 oz)   06/19/24 94.3 kg (208 lb)   06/04/24 97.8 kg (215 lb 8 oz)       General  Shallow breathing index was 26.  We will extubate the patient  ABG was reviewed  Chest x-ray was reviewed and on 7/23/2024 lxrtod-Xhwny-yohptr balloon pump is no longer visualized.  Endotracheal and enteric tubes remain in place. Interval improved aeration of the lung bases.  No new airspace disease identified.  Patient on Eliquis    Total critical care time caring for this patient with life threatening, unstable organ failure, including direct patient contact, management of life support systems, review of data including imaging and labs, discussions with other team members and physicians at least 30  Min so far today, excluding procedures.      Raf Garibay MD  [facility], [place]         7/23/2024, 7:16 AM

## 2024-07-23 NOTE — PLAN OF CARE
Problem: Respiratory - Adult  Goal: Achieves optimal ventilation and oxygenation  7/23/2024 0747 by Jovita Rico RCP  Outcome: Progressing

## 2024-07-23 NOTE — PROGRESS NOTES
Order obtained for extubation.  SpO2 of 100 on 30% FiO2.   Patient extubated and placed on 2 liters/min via nasal cannula.   Post extubation SpO2 is 98% with HR  103 bpm and RR 24 breaths/min.    Patient had strong cough that was productive of clear  sputum.  Extubation Well tolerated by patient..       Jovita Rico RCP   11:16 AM

## 2024-07-23 NOTE — PLAN OF CARE
Problem: Chronic Conditions and Co-morbidities  Goal: Patient's chronic conditions and co-morbidity symptoms are monitored and maintained or improved  Outcome: Progressing  Flowsheets (Taken 7/22/2024 2000)  Care Plan - Patient's Chronic Conditions and Co-Morbidity Symptoms are Monitored and Maintained or Improved:   Monitor and assess patient's chronic conditions and comorbid symptoms for stability, deterioration, or improvement   Collaborate with multidisciplinary team to address chronic and comorbid conditions and prevent exacerbation or deterioration   Update acute care plan with appropriate goals if chronic or comorbid symptoms are exacerbated and prevent overall improvement and discharge     Problem: Discharge Planning  Goal: Discharge to home or other facility with appropriate resources  Outcome: Progressing  Flowsheets (Taken 7/22/2024 2000)  Discharge to home or other facility with appropriate resources:   Identify barriers to discharge with patient and caregiver   Arrange for needed discharge resources and transportation as appropriate   Identify discharge learning needs (meds, wound care, etc)   Refer to discharge planning if patient needs post-hospital services based on physician order or complex needs related to functional status, cognitive ability or social support system     Problem: Respiratory - Adult  Goal: Achieves optimal ventilation and oxygenation  Outcome: Progressing     Problem: Safety - Adult  Goal: Free from fall injury  Outcome: Progressing  Flowsheets (Taken 7/22/2024 2000)  Free From Fall Injury:   Instruct family/caregiver on patient safety   Based on caregiver fall risk screen, instruct family/caregiver to ask for assistance with transferring infant if caregiver noted to have fall risk factors     Problem: Pain  Goal: Verbalizes/displays adequate comfort level or baseline comfort level  Outcome: Progressing  Flowsheets (Taken 7/22/2024 2000)  Verbalizes/displays adequate comfort  level or baseline comfort level:   Encourage patient to monitor pain and request assistance   Assess pain using appropriate pain scale   Administer analgesics based on type and severity of pain and evaluate response   Implement non-pharmacological measures as appropriate and evaluate response   Consider cultural and social influences on pain and pain management   Notify Licensed Independent Practitioner if interventions unsuccessful or patient reports new pain     Problem: Safety - Medical Restraint  Goal: Remains free of injury from restraints (Restraint for Interference with Medical Device)  Description: INTERVENTIONS:  1. Determine that other, less restrictive measures have been tried or would not be effective before applying the restraint  2. Evaluate the patient's condition at the time of restraint application  3. Inform patient/family regarding the reason for restraint  4. Q2H: Monitor safety, psychosocial status, comfort, nutrition and hydration  Outcome: Progressing  Flowsheets  Taken 7/22/2024 2200 by Marissa Mendez RN  Remains free of injury from restraints (restraint for interference with medical device):   Determine that other, less restrictive measures have been tried or would not be effective before applying the restraint   Evaluate the patient's condition at the time of restraint application   Inform patient/family regarding the reason for restraint   Every 2 hours: Monitor safety, psychosocial status, comfort, nutrition and hydration  Taken 7/22/2024 2000 by Marissa Mendez RN  Remains free of injury from restraints (restraint for interference with medical device):   Determine that other, less restrictive measures have been tried or would not be effective before applying the restraint   Evaluate the patient's condition at the time of restraint application   Inform patient/family regarding the reason for restraint   Every 2 hours: Monitor safety, psychosocial status, comfort, nutrition and

## 2024-07-23 NOTE — PLAN OF CARE
Problem: Chronic Conditions and Co-morbidities  Goal: Patient's chronic conditions and co-morbidity symptoms are monitored and maintained or improved  7/23/2024 0926 by Elizabeth Mcintosh RN  Outcome: Progressing  7/22/2024 2351 by Marissa Mendez RN  Outcome: Progressing  Flowsheets (Taken 7/22/2024 2000)  Care Plan - Patient's Chronic Conditions and Co-Morbidity Symptoms are Monitored and Maintained or Improved:   Monitor and assess patient's chronic conditions and comorbid symptoms for stability, deterioration, or improvement   Collaborate with multidisciplinary team to address chronic and comorbid conditions and prevent exacerbation or deterioration   Update acute care plan with appropriate goals if chronic or comorbid symptoms are exacerbated and prevent overall improvement and discharge     Problem: Discharge Planning  Goal: Discharge to home or other facility with appropriate resources  7/23/2024 0926 by Elizabeth Mcintosh RN  Outcome: Progressing  7/22/2024 2351 by Marissa Mendez RN  Outcome: Progressing  Flowsheets (Taken 7/22/2024 2000)  Discharge to home or other facility with appropriate resources:   Identify barriers to discharge with patient and caregiver   Arrange for needed discharge resources and transportation as appropriate   Identify discharge learning needs (meds, wound care, etc)   Refer to discharge planning if patient needs post-hospital services based on physician order or complex needs related to functional status, cognitive ability or social support system     Problem: Respiratory - Adult  Goal: Achieves optimal ventilation and oxygenation  7/23/2024 0926 by Elizabeth Mcintosh RN  Outcome: Progressing  7/23/2024 0747 by Jovita Rico RCP  Outcome: Progressing  7/22/2024 2351 by Marissa Mendez RN  Outcome: Progressing     Problem: Safety - Adult  Goal: Free from fall injury  7/23/2024 0926 by Elizabeth Mcintosh RN  Outcome: Progressing  7/22/2024 2351 by Marissa Mendez RN  Outcome:  device):   Determine that other, less restrictive measures have been tried or would not be effective before applying the restraint   Evaluate the patient's condition at the time of restraint application   Inform patient/family regarding the reason for restraint   Every 2 hours: Monitor safety, psychosocial status, comfort, nutrition and hydration  Taken 7/23/2024 0400 by Marissa Mendez RN  Remains free of injury from restraints (restraint for interference with medical device):   Determine that other, less restrictive measures have been tried or would not be effective before applying the restraint   Evaluate the patient's condition at the time of restraint application   Inform patient/family regarding the reason for restraint   Every 2 hours: Monitor safety, psychosocial status, comfort, nutrition and hydration  Taken 7/23/2024 0200 by Marissa Mendez RN  Remains free of injury from restraints (restraint for interference with medical device):   Determine that other, less restrictive measures have been tried or would not be effective before applying the restraint   Evaluate the patient's condition at the time of restraint application   Inform patient/family regarding the reason for restraint   Every 2 hours: Monitor safety, psychosocial status, comfort, nutrition and hydration  Taken 7/23/2024 0000 by Marissa Mendez RN  Remains free of injury from restraints (restraint for interference with medical device):   Determine that other, less restrictive measures have been tried or would not be effective before applying the restraint   Evaluate the patient's condition at the time of restraint application   Inform patient/family regarding the reason for restraint   Every 2 hours: Monitor safety, psychosocial status, comfort, nutrition and hydration  7/22/2024 2351 by Marissa Mendez RN  Outcome: Progressing  Flowsheets  Taken 7/22/2024 2200 by Marissa Mendez RN  Remains free of injury from restraints (restraint for interference with

## 2024-07-23 NOTE — PROGRESS NOTES
07/23/24 0743   Vent Information   Vent Mode (S)  CPAP/PS   Ventilator Settings   Pressure Support (cm H2O) (S)  8 cm H2O

## 2024-07-23 NOTE — PROGRESS NOTES
[facility]   Progress Note                   Date:   7/23/2024  Patient name: Claudio Graham  Date of admission:  7/18/2024  9:53 AM  MRN:   [id number]  YOB: 1955  PCP: Vanessa Mckenna, APRN - CNP    Reason for Admission: VT    Subjective:       Patient seen and examined at bedside.  Remains intubated and on mechanical ventilation.  Following commands off sedation.  IABP was removed yesterday.  Right groin soft without any hematoma.  Required low-dose Levophed for brief period of time overnight.  Levophed is off at this point.  Currently normal sinus rhythm. Currently on IV amiodarone and IV lidocaine.  Lidocaine level in process    Medications:   Scheduled Meds:   aspirin  81 mg Oral Daily    furosemide  20 mg IntraVENous BID    atorvastatin  80 mg Oral Daily    sodium chloride flush  5-40 mL IntraVENous 2 times per day    ticagrelor  90 mg Oral BID     Continuous Infusions:   midazolam 1 mg/hr (07/23/24 0640)    sodium chloride      Phenylephrine Stopped (07/20/24 1642)    norepinephrine Stopped (07/23/24 0529)    amiodarone 0.5 mg/min (07/23/24 0640)    heparin (PORCINE) Infusion Stopped (07/22/24 0917)    lidocaine 0.5 mg/min (07/23/24 0640)    fentaNYL 50 mcg/hr (07/23/24 0640)     CBC:   Recent Labs     07/21/24  0258 07/22/24  0332 07/23/24  0310   WBC 8.1 6.1 7.3   HGB 12.1* 11.2* 11.4*   PLT See Reflexed IPF Result 97* 117*       BMP:    Recent Labs     07/21/24  0258 07/21/24  1429 07/22/24  0332 07/23/24  0310   *  --  135* 135*   K 3.4* 3.6* 3.7 3.4*     --  103 100   CO2 22  --  23 21   BUN 26*  --  24* 22   CREATININE 1.4*  --  1.3* 1.3*   GLUCOSE 154*  --  109* 132*       Hepatic:   Recent Labs     07/21/24  0258 07/22/24  0332 07/23/24  0310   AST 46 33 31   ALT 35 27 23   BILITOT 0.6 0.5 0.5   ALKPHOS 81 74 75       Troponin: No results for input(s): \"TROPONINI\" in the last 72 hours.  BNP: No results for input(s): \"BNP\" in the last 72 hours.  Lipids:  No results for input(s): \"CHOL\", \"HDL\" in the last 72 hours.    Invalid input(s): \"LDLCALCU\"  INR:   No results for input(s): \"INR\" in the last 72 hours.      Objective:   Vitals: /70   Pulse 85   Temp 99.5 °F (37.5 °C)   Resp 15   Ht 1.753 m (5' 9.02\")   Wt 96 kg (211 lb 10.3 oz)   SpO2 100%   BMI 31.24 kg/m²   Constitutional and General Appearance: Intubated and sedated   HEENT: atraumatic, normocephalic.   Respiratory:  Bilateral ventilatory breath sounds present  Cardiovascular:  Regular S1 and S2.  No JVD  Abdomen:   Soft obese abdomen  Bowel sounds present  Extremities:  No LE edema or cyanosis   Neurological:  Intubated and sedated      EKG:   Sustained monomorphic VT    Echo:  7/2024    Left Ventricle: Severely reduced left ventricular systolic function with a visually estimated EF of 10 -15%. EF by 2D Simpsons Biplane is 19%. Left ventricle is severely dilated. Normal wall thickness. See diagram for wall motion findings. Abnormal diastolic function.    Aortic Valve: Trileaflet valve. Mildly calcified aortic valve leaflets. Mild regurgitation.    Mitral Valve: Mild regurgitation.    Tricuspid Valve: Mild regurgitation. Normal RVSP. The estimated RVSP is 27 mmHg.    Image quality is technically difficult. Contrast used: Definity. Technically difficult study due to patient's body habitus and procedure performed with the patient in a supine position.    LAST CATH:     07/18/24    CARDIAC PROCEDURE 07/18/2024  2:57 PM (Final)    Conclusion  Images from the original result were not included.  [facility]        Date:                            7/18/2024  Patient name:  Claudio Graham  Date of admission:      7/18/2024  9:53 AM  MRN:                           [id number]  YOB: 1955    CARDIAC CATHETERIZATION    Operators:  Primary: Satinder Siddiqui MD.  Assistant:    Indications for cath: Persistent ventricular tachycardia    Procedure performed: Cardiac

## 2024-07-24 ENCOUNTER — APPOINTMENT (OUTPATIENT)
Dept: GENERAL RADIOLOGY | Age: 69
End: 2024-07-24
Payer: COMMERCIAL

## 2024-07-24 LAB
ANION GAP SERPL CALCULATED.3IONS-SCNC: 12 MMOL/L (ref 9–16)
BASOPHILS # BLD: 0 K/UL (ref 0–0.2)
BASOPHILS NFR BLD: 0 % (ref 0–2)
BUN SERPL-MCNC: 22 MG/DL (ref 8–23)
CA-I BLD-SCNC: 1.12 MMOL/L (ref 1.13–1.33)
CALCIUM SERPL-MCNC: 8.6 MG/DL (ref 8.6–10.4)
CHLORIDE SERPL-SCNC: 104 MMOL/L (ref 98–107)
CO2 SERPL-SCNC: 23 MMOL/L (ref 20–31)
CREAT SERPL-MCNC: 1.1 MG/DL (ref 0.7–1.2)
EOSINOPHIL # BLD: 0.14 K/UL (ref 0–0.44)
EOSINOPHILS RELATIVE PERCENT: 2 % (ref 1–4)
ERYTHROCYTE [DISTWIDTH] IN BLOOD BY AUTOMATED COUNT: 17.6 % (ref 11.8–14.4)
GFR, ESTIMATED: 69 ML/MIN/1.73M2
GLUCOSE BLD-MCNC: 149 MG/DL (ref 75–110)
GLUCOSE SERPL-MCNC: 100 MG/DL (ref 74–99)
HCT VFR BLD AUTO: 34.9 % (ref 40.7–50.3)
HGB BLD-MCNC: 11.5 G/DL (ref 13–17)
IMM GRANULOCYTES # BLD AUTO: 0.07 K/UL (ref 0–0.3)
IMM GRANULOCYTES NFR BLD: 1 %
LYMPHOCYTES NFR BLD: 0.48 K/UL (ref 1.1–3.7)
LYMPHOCYTES RELATIVE PERCENT: 7 % (ref 24–43)
MAGNESIUM SERPL-MCNC: 2 MG/DL (ref 1.6–2.4)
MCH RBC QN AUTO: 27.8 PG (ref 25.2–33.5)
MCHC RBC AUTO-ENTMCNC: 33 G/DL (ref 28.4–34.8)
MCV RBC AUTO: 84.5 FL (ref 82.6–102.9)
MONOCYTES NFR BLD: 0.61 K/UL (ref 0.1–1.2)
MONOCYTES NFR BLD: 9 % (ref 3–12)
MORPHOLOGY: ABNORMAL
NEUTROPHILS NFR BLD: 81 % (ref 36–65)
NEUTS SEG NFR BLD: 5.5 K/UL (ref 1.5–8.1)
NRBC BLD-RTO: 0 PER 100 WBC
PHOSPHATE SERPL-MCNC: 2.8 MG/DL (ref 2.5–4.5)
PLATELET # BLD AUTO: 130 K/UL (ref 138–453)
PMV BLD AUTO: 10.4 FL (ref 8.1–13.5)
POTASSIUM SERPL-SCNC: 3 MMOL/L (ref 3.7–5.3)
POTASSIUM SERPL-SCNC: 3.8 MMOL/L (ref 3.7–5.3)
RBC # BLD AUTO: 4.13 M/UL (ref 4.21–5.77)
SODIUM SERPL-SCNC: 139 MMOL/L (ref 136–145)
WBC OTHER # BLD: 6.8 K/UL (ref 3.5–11.3)

## 2024-07-24 PROCEDURE — 2060000000 HC ICU INTERMEDIATE R&B

## 2024-07-24 PROCEDURE — 2580000003 HC RX 258: Performed by: STUDENT IN AN ORGANIZED HEALTH CARE EDUCATION/TRAINING PROGRAM

## 2024-07-24 PROCEDURE — 80048 BASIC METABOLIC PNL TOTAL CA: CPT

## 2024-07-24 PROCEDURE — 74230 X-RAY XM SWLNG FUNCJ C+: CPT

## 2024-07-24 PROCEDURE — 6360000002 HC RX W HCPCS: Performed by: STUDENT IN AN ORGANIZED HEALTH CARE EDUCATION/TRAINING PROGRAM

## 2024-07-24 PROCEDURE — 82947 ASSAY GLUCOSE BLOOD QUANT: CPT

## 2024-07-24 PROCEDURE — 99232 SBSQ HOSP IP/OBS MODERATE 35: CPT | Performed by: INTERNAL MEDICINE

## 2024-07-24 PROCEDURE — 6370000000 HC RX 637 (ALT 250 FOR IP): Performed by: STUDENT IN AN ORGANIZED HEALTH CARE EDUCATION/TRAINING PROGRAM

## 2024-07-24 PROCEDURE — 84100 ASSAY OF PHOSPHORUS: CPT

## 2024-07-24 PROCEDURE — 92611 MOTION FLUOROSCOPY/SWALLOW: CPT

## 2024-07-24 PROCEDURE — 36415 COLL VENOUS BLD VENIPUNCTURE: CPT

## 2024-07-24 PROCEDURE — 2500000003 HC RX 250 WO HCPCS: Performed by: STUDENT IN AN ORGANIZED HEALTH CARE EDUCATION/TRAINING PROGRAM

## 2024-07-24 PROCEDURE — 99233 SBSQ HOSP IP/OBS HIGH 50: CPT | Performed by: INTERNAL MEDICINE

## 2024-07-24 PROCEDURE — 85025 COMPLETE CBC W/AUTO DIFF WBC: CPT

## 2024-07-24 PROCEDURE — 84132 ASSAY OF SERUM POTASSIUM: CPT

## 2024-07-24 PROCEDURE — 83735 ASSAY OF MAGNESIUM: CPT

## 2024-07-24 PROCEDURE — 82330 ASSAY OF CALCIUM: CPT

## 2024-07-24 PROCEDURE — 92610 EVALUATE SWALLOWING FUNCTION: CPT

## 2024-07-24 RX ORDER — POTASSIUM CHLORIDE 29.8 MG/ML
20 INJECTION INTRAVENOUS
Status: COMPLETED | OUTPATIENT
Start: 2024-07-24 | End: 2024-07-24

## 2024-07-24 RX ADMIN — FUROSEMIDE 20 MG: 10 INJECTION, SOLUTION INTRAMUSCULAR; INTRAVENOUS at 09:41

## 2024-07-24 RX ADMIN — POTASSIUM CHLORIDE 20 MEQ: 29.8 INJECTION, SOLUTION INTRAVENOUS at 11:22

## 2024-07-24 RX ADMIN — SODIUM CHLORIDE: 9 INJECTION, SOLUTION INTRAVENOUS at 11:19

## 2024-07-24 RX ADMIN — APIXABAN 5 MG: 5 TABLET, FILM COATED ORAL at 09:41

## 2024-07-24 RX ADMIN — APIXABAN 5 MG: 5 TABLET, FILM COATED ORAL at 19:52

## 2024-07-24 RX ADMIN — Medication 150 MG: at 07:37

## 2024-07-24 RX ADMIN — Medication 0.5 MG/MIN: at 14:18

## 2024-07-24 RX ADMIN — SODIUM CHLORIDE, PRESERVATIVE FREE 10 ML: 5 INJECTION INTRAVENOUS at 19:52

## 2024-07-24 RX ADMIN — POTASSIUM CHLORIDE 20 MEQ: 29.8 INJECTION, SOLUTION INTRAVENOUS at 12:37

## 2024-07-24 RX ADMIN — TICAGRELOR 90 MG: 90 TABLET ORAL at 09:40

## 2024-07-24 RX ADMIN — ATORVASTATIN CALCIUM 80 MG: 80 TABLET, FILM COATED ORAL at 09:40

## 2024-07-24 RX ADMIN — ASPIRIN 81 MG 81 MG: 81 TABLET ORAL at 09:41

## 2024-07-24 RX ADMIN — SODIUM CHLORIDE, PRESERVATIVE FREE 10 ML: 5 INJECTION INTRAVENOUS at 09:44

## 2024-07-24 RX ADMIN — FUROSEMIDE 20 MG: 10 INJECTION, SOLUTION INTRAMUSCULAR; INTRAVENOUS at 19:51

## 2024-07-24 RX ADMIN — POTASSIUM BICARBONATE 40 MEQ: 782 TABLET, EFFERVESCENT ORAL at 09:41

## 2024-07-24 RX ADMIN — TICAGRELOR 90 MG: 90 TABLET ORAL at 19:52

## 2024-07-24 NOTE — PROGRESS NOTES
Comprehensive Nutrition Assessment    Type and Reason for Visit:  Reassess    Nutrition Recommendations/Plan:   Continue NPO.  Monitor Speech Therapy evaluation and plans for start of oral diet as appropriate  If unable to start oral diet and nutrition support requested, suggest TF of Standard with Fiber formula at 55 mL/hr.     Malnutrition Assessment:  Malnutrition Status:  Insufficient data (07/24/24 1120)    Context:  Acute Illness     Findings of the 6 clinical characteristics of malnutrition:  Energy Intake:  75% or less of estimated energy requirements for 7 or more days  Weight Loss:  Unable to assess - Weight fluctuations noted.    Body Fat Loss:  Unable to assess   Muscle Mass Loss:  Unable to assess  Fluid Accumulation:  No significant fluid accumulation   Strength:  Not Performed    Nutrition Assessment:    Pt extubated yesterday (7/23).  Remains NPO currently.  At visit, Speech Therapy in room working with pt.  RN reports yesterday trying several bedside swallows but pt coughing some.  Will monitor plans for oral diet as appropriate.  Last BM 7/24.  Weight fluctuations noted.  Labs reviewed: K 3.0 mmol/L.  Meds include: Lasix, Effer-K, 20 mEq KCl replacement.    Nutrition Related Findings:    BM 7/24. Wound Type: None       Current Nutrition Intake & Therapies:    Average Meal Intake: NPO  Average Supplements Intake: NPO  Diet NPO  Additional Calorie Sources:  None    Anthropometric Measures:  Height: 175.3 cm (5' 9.02\")  Ideal Body Weight (IBW): 160 lbs (73 kg)    Admission Body Weight: 94.3 kg (207 lb 14.3 oz)  Current Body Weight: 96 kg (211 lb 10.3 oz), 132.3 % IBW. Weight Source: Bed Scale  Current BMI (kg/m2): 31.2        Weight Adjustment For: No Adjustment                 BMI Categories: Obese Class 1 (BMI 30.0-34.9)    Estimated Daily Nutrient Needs:  Energy Requirements Based On: Formula  Weight Used for Energy Requirements: Admission  Energy (kcal/day): 5304-2398 kcals/day  Weight Used  for Protein Requirements: Ideal  Protein (g/day): 100-130 gm pro/day  Method Used for Fluid Requirements: 1 ml/kcal  Fluid (ml/day): Per MD    Nutrition Diagnosis:   Inadequate oral intake related to  (recent extubation) as evidenced by NPO or clear liquid status due to medical condition (pending swallow evaluation)    Nutrition Interventions:   Food and/or Nutrient Delivery:  (Monitor for start of oral diet as appropriate per SLP vs need for nutrition support.)  Nutrition Education/Counseling: No recommendation at this time  Coordination of Nutrition Care: Continue to monitor while inpatient  Plan of Care discussed with: RN    Goals:  Previous Goal Met: Progressing toward Goal(s)  Goals: Initiate PO diet, by next RD assessment       Nutrition Monitoring and Evaluation:   Behavioral-Environmental Outcomes: None Identified  Food/Nutrient Intake Outcomes: Diet Advancement/Tolerance  Physical Signs/Symptoms Outcomes: Biochemical Data, Chewing or Swallowing, GI Status, Fluid Status or Edema, Weight, Nutrition Focused Physical Findings, Skin, Hemodynamic Status    Discharge Planning:    Too soon to determine     Kisha Diaz RD, LD  Contact: [phone]   never true

## 2024-07-24 NOTE — PROGRESS NOTES
PULMONARY PROGRESS NOTE      Patient:  Claudio Graham  YOB: 1955    MRN: [id number]     Acct: [id number]     Admit date: 7/18/2024    REASON FOR CONSULT:- Acute hypoxemic respiratory failure    Pt seen and Chart reviewed.    Subjective:       Claudio Graham  69 y.o. male with past medical history of heart failure with reduced ejection fraction, diabetes status post AICD, hyperlipidemia, hypertension, morbid obesity, TANNER presented to the emergency room on 7/18/2024 medically for sustaining V. tach requiring multiple time shock and medical cardioversion. The pt was emergently underwent cardiac cath and was found to have acute ostial occlusion 100% and TIFFANY was placed also the LVEF was 10%. LV Wall Motion: Severe global hypokinesis and IABP was placed.   Pulmonary/CC was called for Acute hypoxemic respiratory failure and requiring MV support.       Interval History  7/24/2024    Patient tolerated extubation  On room air  He continues to be on amiodarone  Came off of lidocaine  Has a good cough if needed  Awake and following commands  Fluid balance is -3.1 L    Review of Systems -   Review of Systems -   Constitutional ROS: negative  ENT ROS: negative  Allergy and Immunology ROS: negative  Respiratory ROS: negative  Cardiovascular ROS: negative  Gastrointestinal ROS: negative  Musculoskeletal ROS: negative         Physical Exam:  Vitals: /68   Pulse 77   Temp 99 °F (37.2 °C) (Core)   Resp 19   Ht 1.753 m (5' 9.02\")   Wt 96 kg (211 lb 10.3 oz)   SpO2 100%   BMI 31.24 kg/m²   24 hour intake/output:  Intake/Output Summary (Last 24 hours) at 7/24/2024 0730  Last data filed at 7/24/2024 0600  Gross per 24 hour   Intake 285.57 ml   Output 1650 ml   Net -1364.43 ml       Last 3 weights:  Wt Readings from Last 3 Encounters:   07/22/24 96 kg (211 lb 10.3 oz)   06/19/24 94.3 kg (208 lb)   06/04/24 97.8 kg (215 lb 8 oz)       General appearance: alert and cooperative with exam  Physical

## 2024-07-24 NOTE — PROGRESS NOTES
[facility]   Progress Note                   Date:   7/24/2024  Patient name: Claudio Graham  Date of admission:  7/18/2024  9:53 AM  MRN:   [id number]  YOB: 1955  PCP: Vanessa Mckenna, LIDIA - CNP    Reason for Admission: VT    Subjective:       Patient seen and examined at bedside.  A-fib with RVR this morning, which converted to NSR after amiodarone bolus.  Otherwise doing well.  Right groin soft.     Medications:   Scheduled Meds:   potassium bicarb-citric acid  40 mEq Oral Daily    apixaban  5 mg Oral BID    aspirin  81 mg Oral Daily    furosemide  20 mg IntraVENous BID    atorvastatin  80 mg Oral Daily    sodium chloride flush  5-40 mL IntraVENous 2 times per day    ticagrelor  90 mg Oral BID     Continuous Infusions:   norepinephrine Stopped (07/23/24 1701)    midazolam Stopped (07/23/24 0741)    sodium chloride      amiodarone 0.5 mg/min (07/23/24 2357)    fentaNYL Stopped (07/23/24 1024)     CBC:   Recent Labs     07/22/24  0332 07/23/24  0310 07/24/24  0600   WBC 6.1 7.3 6.8   HGB 11.2* 11.4* 11.5*   PLT 97* 117* 130*       BMP:    Recent Labs     07/22/24  0332 07/23/24  0310 07/24/24  0600   * 135* 139   K 3.7 3.4* 3.0*    100 104   CO2 23 21 23   BUN 24* 22 22   CREATININE 1.3* 1.3* 1.1   GLUCOSE 109* 132* 100*       Hepatic:   Recent Labs     07/22/24  0332 07/23/24  0310   AST 33 31   ALT 27 23   BILITOT 0.5 0.5   ALKPHOS 74 75       Troponin: No results for input(s): \"TROPONINI\" in the last 72 hours.  BNP: No results for input(s): \"BNP\" in the last 72 hours.  Lipids: No results for input(s): \"CHOL\", \"HDL\" in the last 72 hours.    Invalid input(s): \"LDLCALCU\"  INR:   No results for input(s): \"INR\" in the last 72 hours.      Objective:   Vitals: /68   Pulse 77   Temp 99 °F (37.2 °C) (Core)   Resp 19   Ht 1.753 m (5' 9.02\")   Wt 96 kg (211 lb 10.3 oz)   SpO2 100%   BMI 31.24 kg/m²   Constitutional and General Appearance: Intubated and sedated

## 2024-07-24 NOTE — PROCEDURES
INSTRUMENTAL SWALLOW REPORT  MODIFIED BARIUM SWALLOW    NAME: Claudio Graham   : 1955  MRN: [id number]       Date of Eval: 2024              Referring Diagnosis(es):      Past Medical History:  has a past medical history of Acute HFrEF (heart failure with reduced ejection fraction) ([facility]), Acute on chronic combined systolic and diastolic congestive heart failure ([facility]), Acute on chronic congestive heart failure ([facility]), Acute respiratory failure with hypoxia ([facility]), Anemia, Anxiety, CAD (coronary artery disease), Cardiac defibrillator in place, Cardiomyopathy ([facility]), CHF (congestive heart failure) ([facility]), Chronic combined systolic and diastolic heart failure ([facility]) s/[ AICD placed, Chronic kidney disease, Complex sleep apnea syndrome, Diabetic retinopathy ([facility]), Diverticulitis, DJD (degenerative joint disease), Edentulous, Gout, HTN (hypertension), Hyperlipidemia, Hypertension, Iron deficiency anemia, Ischemic cardiomyopathy, Morbid obesity ([facility]), Obesity, TANNER variably compliant with BiPAP, Osteoarthritis, PAF (paroxysmal atrial fibrillation) ([facility]), Psychophysiologic insomnia, Thyroid disease, Type II or unspecified type diabetes mellitus without mention of complication, not stated as uncontrolled, and Unspecified sleep apnea.  Past Surgical History:  has a past surgical history that includes Colonoscopy (2007); Cardiac catheterization (2007); Cardiac catheterization (2013); Coronary angioplasty (, ); Cardiac defibrillator placement (2015); bone marrow biopsy (2012); pr colsc flx w/rmvl of tumor polyp lesion snare tq (N/A, 2017); Colonoscopy (2021); Colonoscopy (N/A, 2021); and Colonoscopy (N/A, 2022).               Type of Study: Initial MBS       Recent CXR/CT of Chest: 24  IMPRESSION:  Intra-aortic balloon pump is no longer visualized.  Endotracheal and enteric  tubes remain in place.     Interval improved aeration of the lung bases.

## 2024-07-24 NOTE — PLAN OF CARE
Problem: Chronic Conditions and Co-morbidities  Goal: Patient's chronic conditions and co-morbidity symptoms are monitored and maintained or improved  Outcome: Completed     Problem: Discharge Planning  Goal: Discharge to home or other facility with appropriate resources  Outcome: Completed     Problem: Respiratory - Adult  Goal: Achieves optimal ventilation and oxygenation  Outcome: Completed     Problem: Safety - Adult  Goal: Free from fall injury  Outcome: Completed     Problem: Pain  Goal: Verbalizes/displays adequate comfort level or baseline comfort level  Outcome: Completed     Problem: Safety - Medical Restraint  Goal: Remains free of injury from restraints (Restraint for Interference with Medical Device)  Description: INTERVENTIONS:  1. Determine that other, less restrictive measures have been tried or would not be effective before applying the restraint  2. Evaluate the patient's condition at the time of restraint application  3. Inform patient/family regarding the reason for restraint  4. Q2H: Monitor safety, psychosocial status, comfort, nutrition and hydration  Outcome: Completed     Problem: Skin/Tissue Integrity  Goal: Absence of new skin breakdown  Description: 1.  Monitor for areas of redness and/or skin breakdown  2.  Assess vascular access sites hourly  3.  Every 4-6 hours minimum:  Change oxygen saturation probe site  4.  Every 4-6 hours:  If on nasal continuous positive airway pressure, respiratory therapy assess nares and determine need for appliance change or resting period.  Outcome: Completed     Problem: ABCDS Injury Assessment  Goal: Absence of physical injury  Outcome: Completed

## 2024-07-24 NOTE — PLAN OF CARE
Problem: Chronic Conditions and Co-morbidities  Goal: Patient's chronic conditions and co-morbidity symptoms are monitored and maintained or improved  7/23/2024 2150 by Francheska Contreras RN  Outcome: Progressing  7/23/2024 0926 by Elizabeth Mcintosh RN  Outcome: Progressing     Problem: Discharge Planning  Goal: Discharge to home or other facility with appropriate resources  7/23/2024 2150 by Francheska Contreras RN  Outcome: Progressing  7/23/2024 0926 by Elizabeth Mcintosh RN  Outcome: Progressing     Problem: Respiratory - Adult  Goal: Achieves optimal ventilation and oxygenation  7/23/2024 2150 by Francheska Contreras RN  Outcome: Progressing  7/23/2024 0926 by Elizabeth Mcintosh RN  Outcome: Progressing     Problem: Safety - Adult  Goal: Free from fall injury  7/23/2024 2150 by Francheska Contreras RN  Outcome: Progressing  7/23/2024 0926 by Elizabeth Mcintosh RN  Outcome: Progressing     Problem: Pain  Goal: Verbalizes/displays adequate comfort level or baseline comfort level  7/23/2024 2150 by Francheska Contreras RN  Outcome: Progressing  7/23/2024 0926 by Elizabeth Mcintosh RN  Outcome: Progressing     Problem: Skin/Tissue Integrity  Goal: Absence of new skin breakdown  Description: 1.  Monitor for areas of redness and/or skin breakdown  2.  Assess vascular access sites hourly  3.  Every 4-6 hours minimum:  Change oxygen saturation probe site  4.  Every 4-6 hours:  If on nasal continuous positive airway pressure, respiratory therapy assess nares and determine need for appliance change or resting period.  7/23/2024 2150 by Francheska Contreras RN  Outcome: Progressing  7/23/2024 0926 by Elizabeth Mcintosh RN  Outcome: Progressing     Problem: ABCDS Injury Assessment  Goal: Absence of physical injury  7/23/2024 2150 by Francheska Contreras RN  Outcome: Progressing  7/23/2024 0926 by Elizabeth Mcintosh RN  Outcome: Progressing

## 2024-07-24 NOTE — CARE COORDINATION
Transitional Planning  Called pt's friend health care decision maker Jason Lewsi to see if he made any choices for SNF  He states he chose  #1 [facility]  #2 [facility]  Referrals sent

## 2024-07-24 NOTE — PROGRESS NOTES
SLP ALL NOTES  Facility/Department: [facility] CAR 1- SICU   CLINICAL BEDSIDE SWALLOW EVALUATION    NAME: Claudio Graham  : 1955  MRN: [id number]    ADMISSION DATE: 2024  ADMITTING DIAGNOSIS: has Chronic kidney disease, stage II (mild); Chronic gout; Morbid obesity (HCC); Normocytic normochromic anemia; Hyperlipidemia; Iron deficiency anemia; Anxiety disorder ; DJD (degenerative joint disease); TANNER variably compliant with BiPAP; CAD (coronary artery disease) s/p stenting of L anterior descending artery ; Diabetic retinopathy of both eyes with macular edema associated with diabetes mellitus due to underlying condition, unspecified retinopathy severity (HCC); Ischemic cardiomyopathy; HTN (hypertension); ST elevation myocardial infarction involving left anterior descending (LAD) coronary artery (HCC); MGUS (monoclonal gammopathy of unknown significance); Type 2 diabetes mellitus with chronic kidney disease (HCC); AICD (automatic cardioverter/defibrillator) present; PAF (paroxysmal atrial fibrillation) (HCC); Acute respiratory failure with hypoxia and hypercapnia (HCC); Coronary angioplasty status; Hypokalemia; Chronic systolic (congestive) heart failure (HCC); Hypothyroidism due to medication; Acute cardiac pulmonary edema (HCC); Adenomatous polyp of ascending colon; Chest pain; Atherosclerotic heart disease of native coronary artery with unspecified angina pectoris; Diabetic polyneuropathy associated with type 2 diabetes mellitus (HCC); OM (onychomycosis); Benign prostatic hyperplasia with nocturia; Bilateral kidney masses; Cardiopulmonary arrest (HCC); Complex renal cyst; Congenital meatal stenosis; Gross hematuria; Postprocedural stricture of urethra at fossa navicularis; Urologic disorders; Ventricular tachycardia (HCC); Cardiogenic shock (HCC); Ventricular fibrillation (HCC); and Metabolic acidosis on their problem list.    Recent Chest Xray/CT of Chest: 24      FINDINGS:  The endotracheal and

## 2024-07-25 LAB
BUN SERPL-MCNC: 23 MG/DL (ref 8–23)
CA-I BLD-SCNC: 1.1 MMOL/L (ref 1.13–1.33)
CREAT SERPL-MCNC: 1.2 MG/DL (ref 0.7–1.2)
EKG ATRIAL RATE: 121 BPM
EKG P-R INTERVAL: 270 MS
EKG Q-T INTERVAL: 384 MS
EKG QRS DURATION: 140 MS
EKG QTC CALCULATION (BAZETT): 545 MS
EKG R AXIS: -88 DEGREES
EKG T AXIS: 91 DEGREES
EKG VENTRICULAR RATE: 121 BPM
GFR, ESTIMATED: 65 ML/MIN/1.73M2
MAGNESIUM SERPL-MCNC: 1.8 MG/DL (ref 1.6–2.4)
PHOSPHATE SERPL-MCNC: 3 MG/DL (ref 2.5–4.5)
POTASSIUM SERPL-SCNC: 3.5 MMOL/L (ref 3.7–5.3)

## 2024-07-25 PROCEDURE — 36415 COLL VENOUS BLD VENIPUNCTURE: CPT

## 2024-07-25 PROCEDURE — 99232 SBSQ HOSP IP/OBS MODERATE 35: CPT | Performed by: INTERNAL MEDICINE

## 2024-07-25 PROCEDURE — 97162 PT EVAL MOD COMPLEX 30 MIN: CPT

## 2024-07-25 PROCEDURE — 97535 SELF CARE MNGMENT TRAINING: CPT

## 2024-07-25 PROCEDURE — 99233 SBSQ HOSP IP/OBS HIGH 50: CPT | Performed by: NURSE PRACTITIONER

## 2024-07-25 PROCEDURE — 6370000000 HC RX 637 (ALT 250 FOR IP): Performed by: STUDENT IN AN ORGANIZED HEALTH CARE EDUCATION/TRAINING PROGRAM

## 2024-07-25 PROCEDURE — 84520 ASSAY OF UREA NITROGEN: CPT

## 2024-07-25 PROCEDURE — 83735 ASSAY OF MAGNESIUM: CPT

## 2024-07-25 PROCEDURE — 6360000002 HC RX W HCPCS: Performed by: STUDENT IN AN ORGANIZED HEALTH CARE EDUCATION/TRAINING PROGRAM

## 2024-07-25 PROCEDURE — 6370000000 HC RX 637 (ALT 250 FOR IP): Performed by: NURSE PRACTITIONER

## 2024-07-25 PROCEDURE — 84100 ASSAY OF PHOSPHORUS: CPT

## 2024-07-25 PROCEDURE — 82565 ASSAY OF CREATININE: CPT

## 2024-07-25 PROCEDURE — 2580000003 HC RX 258: Performed by: STUDENT IN AN ORGANIZED HEALTH CARE EDUCATION/TRAINING PROGRAM

## 2024-07-25 PROCEDURE — 82330 ASSAY OF CALCIUM: CPT

## 2024-07-25 PROCEDURE — 97116 GAIT TRAINING THERAPY: CPT

## 2024-07-25 PROCEDURE — 2060000000 HC ICU INTERMEDIATE R&B

## 2024-07-25 PROCEDURE — 84132 ASSAY OF SERUM POTASSIUM: CPT

## 2024-07-25 PROCEDURE — 97166 OT EVAL MOD COMPLEX 45 MIN: CPT

## 2024-07-25 RX ORDER — METOPROLOL SUCCINATE 25 MG/1
50 TABLET, EXTENDED RELEASE ORAL NIGHTLY
Status: DISCONTINUED | OUTPATIENT
Start: 2024-07-25 | End: 2024-07-26 | Stop reason: HOSPADM

## 2024-07-25 RX ORDER — AMIODARONE HYDROCHLORIDE 200 MG/1
200 TABLET ORAL DAILY
Status: DISCONTINUED | OUTPATIENT
Start: 2024-07-25 | End: 2024-07-26 | Stop reason: HOSPADM

## 2024-07-25 RX ORDER — FUROSEMIDE 40 MG/1
40 TABLET ORAL 2 TIMES DAILY
Status: DISCONTINUED | OUTPATIENT
Start: 2024-07-25 | End: 2024-07-26 | Stop reason: HOSPADM

## 2024-07-25 RX ADMIN — POTASSIUM BICARBONATE 40 MEQ: 782 TABLET, EFFERVESCENT ORAL at 08:54

## 2024-07-25 RX ADMIN — ASPIRIN 81 MG 81 MG: 81 TABLET ORAL at 08:54

## 2024-07-25 RX ADMIN — POTASSIUM CHLORIDE 10 MEQ: 7.46 INJECTION, SOLUTION INTRAVENOUS at 19:19

## 2024-07-25 RX ADMIN — TICAGRELOR 90 MG: 90 TABLET ORAL at 20:28

## 2024-07-25 RX ADMIN — POTASSIUM CHLORIDE 10 MEQ: 7.46 INJECTION, SOLUTION INTRAVENOUS at 16:48

## 2024-07-25 RX ADMIN — TICAGRELOR 90 MG: 90 TABLET ORAL at 08:54

## 2024-07-25 RX ADMIN — SODIUM CHLORIDE, PRESERVATIVE FREE 10 ML: 5 INJECTION INTRAVENOUS at 08:54

## 2024-07-25 RX ADMIN — AMIODARONE HYDROCHLORIDE 200 MG: 200 TABLET ORAL at 10:18

## 2024-07-25 RX ADMIN — ATORVASTATIN CALCIUM 80 MG: 80 TABLET, FILM COATED ORAL at 08:54

## 2024-07-25 RX ADMIN — APIXABAN 5 MG: 5 TABLET, FILM COATED ORAL at 08:54

## 2024-07-25 RX ADMIN — POTASSIUM CHLORIDE 10 MEQ: 7.46 INJECTION, SOLUTION INTRAVENOUS at 22:48

## 2024-07-25 RX ADMIN — FUROSEMIDE 40 MG: 40 TABLET ORAL at 16:40

## 2024-07-25 RX ADMIN — APIXABAN 5 MG: 5 TABLET, FILM COATED ORAL at 20:28

## 2024-07-25 RX ADMIN — FUROSEMIDE 20 MG: 10 INJECTION, SOLUTION INTRAMUSCULAR; INTRAVENOUS at 08:54

## 2024-07-25 ASSESSMENT — PAIN SCALES - GENERAL
PAINLEVEL_OUTOF10: 0
PAINLEVEL_OUTOF10: 0

## 2024-07-25 NOTE — CARE COORDINATION
Spoke to Zoila from [facility]. They are reviewing. Spoke to Cat from [facility]. They are able to accept. [facility] is first choice    1602 notified by Zoila from [facility] that they are able to accept. She will submit for precert    1810 patient updated and agreeable with plan. Updated Cat from [facility]

## 2024-07-25 NOTE — PROGRESS NOTES
Occupational Therapy  Facility/Department: [facility] CAR 2- STEPDOWN  Occupational Therapy Initial Assessment    Name: Claudio Graham  : 1955  MRN: [id number]  Date of Service: 2024  Chief Complaint   Patient presents with    Pacemaker Problem       Discharge Recommendations:  Patient would benefit from continued therapy after discharge  OT Equipment Recommendations  Equipment Needed: No       Patient Diagnosis(es): The primary encounter diagnosis was Ventricular tachycardia (HCC). Diagnoses of Ventricular fibrillation (HCC), Cardiogenic shock (HCC), Acute respiratory failure with hypoxia and hypercapnia (HCC), and Chest pain, unspecified type were also pertinent to this visit.  Past Medical History:  has a past medical history of Acute HFrEF (heart failure with reduced ejection fraction) (HCC), Acute on chronic combined systolic and diastolic congestive heart failure (HCC), Acute on chronic congestive heart failure (HCC), Acute respiratory failure with hypoxia (HCC), Anemia, Anxiety, CAD (coronary artery disease), Cardiac defibrillator in place, Cardiomyopathy (HCC), CHF (congestive heart failure) (HCC), Chronic combined systolic and diastolic heart failure (HCC) s/[ AICD placed, Chronic kidney disease, Complex sleep apnea syndrome, Diabetic retinopathy (HCC), Diverticulitis, DJD (degenerative joint disease), Edentulous, Gout, HTN (hypertension), Hyperlipidemia, Hypertension, Iron deficiency anemia, Ischemic cardiomyopathy, Morbid obesity (HCC), Obesity, TANNER variably compliant with BiPAP, Osteoarthritis, PAF (paroxysmal atrial fibrillation) ([facility]), Psychophysiologic insomnia, Thyroid disease, Type II or unspecified type diabetes mellitus without mention of complication, not stated as uncontrolled, and Unspecified sleep apnea.  Past Surgical History:  has a past surgical history that includes Colonoscopy (2007); Cardiac catheterization (2007); Cardiac catheterization (2013); Coronary  Verbalized understanding;Continued education needed           Hand Dominance  Hand Dominance: Right      AM-PAC - ADL  AM-PAC Daily Activity - Inpatient   How much help is needed for putting on and taking off regular lower body clothing?: A Lot  How much help is needed for bathing (which includes washing, rinsing, drying)?: A Lot  How much help is needed for toileting (which includes using toilet, bedpan, or urinal)?: A Lot  How much help is needed for putting on and taking off regular upper body clothing?: A Little  How much help is needed for taking care of personal grooming?: A Little  How much help for eating meals?: None  AM-PAC Inpatient Daily Activity Raw Score: 16  AM-PAC Inpatient ADL T-Scale Score : 35.96  ADL Inpatient CMS 0-100% Score: 53.32  ADL Inpatient CMS G-Code Modifier : CK    Goals  Short Term Goals  Time Frame for Short Term Goals: By discharge, pt will  Short Term Goal 1: demo UB ADLs independently.  Short Term Goal 2: demo LB ADLs with CGA, adaptive techniques PRN.  Short Term Goal 3: demo functional transfers/mobility with SBA, LRAD PRN for engagement in ADLs.  Short Term Goal 4: demo dynamic standing during functional activities for 12+ mins with SBA, LRAD PRN.  Short Term Goal 5: engage in 25+ mins of functional activities to improve overall functional endurance and independence.       Therapy Time   Individual Concurrent Group Co-treatment   Time In 1056         Time Out 1127         Minutes 31         Timed Code Treatment Minutes: 10 Minutes (co-eval w/PT)       ARNOLDO Shah/CLEMENTINE

## 2024-07-25 NOTE — PROGRESS NOTES
unsteady with inital walking  Gait Deviations: Slow Brianne;Shuffles  Distance: 10 ft  More Ambulation?: Yes  Ambulation 2  Surface - 2: level tile  Device 2: Rolling Walker  Assistance 2: Minimal assistance  Gait Deviations: Slow Brianne;Shuffles  Distance: 15 ft     Balance  Posture: Fair  Sitting - Static: Good  Sitting - Dynamic: Fair  Standing - Static: Fair  Standing - Dynamic: Fair;-  Comments: standing with B UE support  A/AROM Exercises: Seated LAQ, marches, ankle pumps  x 10-15 reps to improve strength and LE function        OutComes Score    AM-PAC - Mobility    AM-PAC Basic Mobility - Inpatient   How much help is needed turning from your back to your side while in a flat bed without using bedrails?: A Little  How much help is needed moving from lying on your back to sitting on the side of a flat bed without using bedrails?: A Little  How much help is needed moving to and from a bed to a chair?: A Little  How much help is needed standing up from a chair using your arms?: A Little  How much help is needed walking in hospital room?: A Little  How much help is needed climbing 3-5 steps with a railing?: A Lot  AM-PAC Inpatient Mobility Raw Score : 17  AM-PAC Inpatient T-Scale Score : 42.13  Mobility Inpatient CMS 0-100% Score: 50.57  Mobility Inpatient CMS G-Code Modifier : CK       Goals  Short Term Goals  Time Frame for Short Term Goals: 14 visits  Short Term Goal 1: Pt indep with bed mobility  Short Term Goal 2: Pt indep with transfers with proper safety awareness  Short Term Goal 3: Pt amb 200 ft with least restrictive device independently, no loss of balance  Short Term Goal 4: Pt tolerate 25 minutes ther ex and activity to improve balance and strength for functional mobility  Patient Goals   Patient Goals : To return home       Education  Patient Education  Education Given To: Patient  Education Provided: Role of Therapy;Plan of Care  Education Method: Verbal  Barriers to Learning: Cognition  Education  Outcome: Continued education needed      Therapy Time   Individual Concurrent Group Co-treatment   Time In 1055         Time Out 1128         Minutes 33         Timed Code Treatment Minutes: 8 Minutes       Cathy Hutchison PT

## 2024-07-25 NOTE — PROGRESS NOTES
PULMONARY PROGRESS NOTE      Patient:  Claudio Graham  YOB: 1955    MRN: [id number]     Acct: [id number]     Admit date: 7/18/2024    REASON FOR CONSULT:- Acute hypoxemic respiratory failure    Pt seen and Chart reviewed.    Subjective:       Claudio Graham  69 y.o. male with past medical history of heart failure with reduced ejection fraction, diabetes status post AICD, hyperlipidemia, hypertension, morbid obesity, TANNER presented to the emergency room on 7/18/2024 medically for sustaining V. tach requiring multiple time shock and medical cardioversion. The pt was emergently underwent cardiac cath and was found to have acute ostial occlusion 100% and TIFFANY was placed also the LVEF was 10%. LV Wall Motion: Severe global hypokinesis and IABP was placed.   Pulmonary/CC was called for Acute hypoxemic respiratory failure and requiring MV support.       Interval History  7/25/2024    On room air  He continues to be on amiodarone  Has a good cough if needed  Awake and following commands  Fluid balance is -3.6 L    Review of Systems -   Review of Systems -   Constitutional ROS: negative  ENT ROS: negative  Allergy and Immunology ROS: negative  Respiratory ROS: negative  Cardiovascular ROS: negative  Gastrointestinal ROS: negative  Musculoskeletal ROS: negative         Physical Exam:  Vitals: BP (!) 123/102   Pulse 86   Temp 97.9 °F (36.6 °C) (Oral)   Resp 20   Ht 1.753 m (5' 9.02\")   Wt 96 kg (211 lb 10.3 oz)   SpO2 98%   BMI 31.24 kg/m²   24 hour intake/output:  Intake/Output Summary (Last 24 hours) at 7/25/2024 0737  Last data filed at 7/25/2024 0451  Gross per 24 hour   Intake --   Output 1120 ml   Net -1120 ml       Last 3 weights:  Wt Readings from Last 3 Encounters:   07/22/24 96 kg (211 lb 10.3 oz)   06/19/24 94.3 kg (208 lb)   06/04/24 97.8 kg (215 lb 8 oz)       General appearance: alert and cooperative with exam  Physical Examination:   Physical Exam  Constitutional:       Interventions:

## 2024-07-25 NOTE — PROGRESS NOTES
Congestive Heart Failure Education completed and charted. CHF booklet given. Patient was receptive to education.    Discussed the  importance of medication compliance.    Discussed the importance of a heart healthy diet. Discussed 2000 mg sodium-restricted daily diet.  Patient instructed to limit fluid intake to  1.5 to 2 liters per day.    Patient instructed to weigh self at the same time of each day each morning, reinforced teaching to monitor for 3-5 lb weight increase over 1-2 days notify physician if change noted.      Signs and symptoms of CHF discussed with patient, such as feeling more tired than normal, feeling short of breath, coughing that increases when lying down, sudden weight gain, swelling of the feet, legs or belly.  Patient verbalized understanding to notify physician office if these symptoms occur.  7/2024  EF of 10 -15%. EF by 2D [employer] Biplane is 19%.   Pt is established with  [facility]F Clinic  Next Appt 7/30/24

## 2024-07-25 NOTE — PROGRESS NOTES
[facility]   Progress Note                   Date:   7/25/2024  Patient name: Claudio Graham  Date of admission:  7/18/2024  9:53 AM  MRN:   [id number]  YOB: 1955  PCP: Vanessa Mckenna, LIDIA - CNP    Reason for Admission: VT    Subjective:       Patient seen and examined in room. Transferred to step-donw bed. No further episodes of  A-fib with RVR or VT. Frequent at times PVCs with episodes of couplets.  Otherwise no CV issues/concerns. Vitals, & tele reviewed. AM labs pending    Medications:   Scheduled Meds:   potassium bicarb-citric acid  40 mEq Oral Daily    apixaban  5 mg Oral BID    aspirin  81 mg Oral Daily    furosemide  20 mg IntraVENous BID    atorvastatin  80 mg Oral Daily    sodium chloride flush  5-40 mL IntraVENous 2 times per day    ticagrelor  90 mg Oral BID     Continuous Infusions:   sodium chloride 5 mL/hr at 07/24/24 1119    amiodarone 0.5 mg/min (07/24/24 1418)     CBC:   Recent Labs     07/23/24  0310 07/24/24  0600   WBC 7.3 6.8   HGB 11.4* 11.5*   * 130*       BMP:    Recent Labs     07/23/24  0310 07/24/24  0600 07/24/24  1548   * 139  --    K 3.4* 3.0* 3.8    104  --    CO2 21 23  --    BUN 22 22  --    CREATININE 1.3* 1.1  --    GLUCOSE 132* 100*  --        Hepatic:   Recent Labs     07/23/24  0310   AST 31   ALT 23   BILITOT 0.5   ALKPHOS 75       Troponin: No results for input(s): \"TROPONINI\" in the last 72 hours.  BNP: No results for input(s): \"BNP\" in the last 72 hours.  Lipids: No results for input(s): \"CHOL\", \"HDL\" in the last 72 hours.    Invalid input(s): \"LDLCALCU\"  INR:   No results for input(s): \"INR\" in the last 72 hours.      Objective:   Vitals: /79   Pulse 87   Temp 97.5 °F (36.4 °C) (Oral)   Resp 20   Ht 1.753 m (5' 9.02\")   Wt 96 kg (211 lb 10.3 oz)   SpO2 96%   BMI 31.24 kg/m²   Constitutional and General Appearance: Alert and cooperative with exam  HEENT: atraumatic, normocephalic.

## 2024-07-26 VITALS
RESPIRATION RATE: 20 BRPM | SYSTOLIC BLOOD PRESSURE: 119 MMHG | WEIGHT: 211.64 LBS | TEMPERATURE: 98.1 F | BODY MASS INDEX: 31.35 KG/M2 | OXYGEN SATURATION: 96 % | HEIGHT: 69 IN | HEART RATE: 91 BPM | DIASTOLIC BLOOD PRESSURE: 75 MMHG

## 2024-07-26 LAB
ANION GAP SERPL CALCULATED.3IONS-SCNC: 13 MMOL/L (ref 9–16)
BUN SERPL-MCNC: 21 MG/DL (ref 8–23)
CA-I BLD-SCNC: 1.12 MMOL/L (ref 1.13–1.33)
CALCIUM SERPL-MCNC: 8.6 MG/DL (ref 8.6–10.4)
CHLORIDE SERPL-SCNC: 103 MMOL/L (ref 98–107)
CO2 SERPL-SCNC: 21 MMOL/L (ref 20–31)
CREAT SERPL-MCNC: 1.2 MG/DL (ref 0.7–1.2)
EKG ATRIAL RATE: 80 BPM
EKG ATRIAL RATE: 80 BPM
EKG P AXIS: 44 DEGREES
EKG P AXIS: 44 DEGREES
EKG P-R INTERVAL: 194 MS
EKG P-R INTERVAL: 194 MS
EKG Q-T INTERVAL: 426 MS
EKG Q-T INTERVAL: 426 MS
EKG QRS DURATION: 188 MS
EKG QRS DURATION: 188 MS
EKG QTC CALCULATION (BAZETT): 491 MS
EKG QTC CALCULATION (BAZETT): 491 MS
EKG R AXIS: -78 DEGREES
EKG R AXIS: -78 DEGREES
EKG T AXIS: 71 DEGREES
EKG T AXIS: 71 DEGREES
EKG VENTRICULAR RATE: 80 BPM
EKG VENTRICULAR RATE: 80 BPM
GFR, ESTIMATED: 66 ML/MIN/1.73M2
GLUCOSE SERPL-MCNC: 114 MG/DL (ref 74–99)
MAGNESIUM SERPL-MCNC: 1.9 MG/DL (ref 1.6–2.4)
PHOSPHATE SERPL-MCNC: 2.7 MG/DL (ref 2.5–4.5)
POTASSIUM SERPL-SCNC: 3.8 MMOL/L (ref 3.7–5.3)
SODIUM SERPL-SCNC: 137 MMOL/L (ref 136–145)

## 2024-07-26 PROCEDURE — 94761 N-INVAS EAR/PLS OXIMETRY MLT: CPT

## 2024-07-26 PROCEDURE — 84100 ASSAY OF PHOSPHORUS: CPT

## 2024-07-26 PROCEDURE — 6370000000 HC RX 637 (ALT 250 FOR IP): Performed by: NURSE PRACTITIONER

## 2024-07-26 PROCEDURE — 80048 BASIC METABOLIC PNL TOTAL CA: CPT

## 2024-07-26 PROCEDURE — 82330 ASSAY OF CALCIUM: CPT

## 2024-07-26 PROCEDURE — 93005 ELECTROCARDIOGRAM TRACING: CPT | Performed by: INTERNAL MEDICINE

## 2024-07-26 PROCEDURE — 36415 COLL VENOUS BLD VENIPUNCTURE: CPT

## 2024-07-26 PROCEDURE — 97112 NEUROMUSCULAR REEDUCATION: CPT

## 2024-07-26 PROCEDURE — 99233 SBSQ HOSP IP/OBS HIGH 50: CPT

## 2024-07-26 PROCEDURE — 97116 GAIT TRAINING THERAPY: CPT

## 2024-07-26 PROCEDURE — 2580000003 HC RX 258: Performed by: STUDENT IN AN ORGANIZED HEALTH CARE EDUCATION/TRAINING PROGRAM

## 2024-07-26 PROCEDURE — 99232 SBSQ HOSP IP/OBS MODERATE 35: CPT | Performed by: INTERNAL MEDICINE

## 2024-07-26 PROCEDURE — 6370000000 HC RX 637 (ALT 250 FOR IP): Performed by: STUDENT IN AN ORGANIZED HEALTH CARE EDUCATION/TRAINING PROGRAM

## 2024-07-26 PROCEDURE — 6360000002 HC RX W HCPCS: Performed by: STUDENT IN AN ORGANIZED HEALTH CARE EDUCATION/TRAINING PROGRAM

## 2024-07-26 PROCEDURE — 83735 ASSAY OF MAGNESIUM: CPT

## 2024-07-26 PROCEDURE — 97110 THERAPEUTIC EXERCISES: CPT

## 2024-07-26 PROCEDURE — 97530 THERAPEUTIC ACTIVITIES: CPT

## 2024-07-26 RX ORDER — ASPIRIN 81 MG/1
81 TABLET, CHEWABLE ORAL DAILY
Qty: 14 TABLET | Refills: 0 | Status: SHIPPED | OUTPATIENT
Start: 2024-07-27

## 2024-07-26 RX ORDER — AMIODARONE HYDROCHLORIDE 200 MG/1
200 TABLET ORAL DAILY
Qty: 30 TABLET | Refills: 3 | Status: SHIPPED | OUTPATIENT
Start: 2024-07-27

## 2024-07-26 RX ORDER — FUROSEMIDE 40 MG/1
40 TABLET ORAL 2 TIMES DAILY
Qty: 60 TABLET | Refills: 3 | Status: SHIPPED | OUTPATIENT
Start: 2024-07-26

## 2024-07-26 RX ORDER — ATORVASTATIN CALCIUM 80 MG/1
80 TABLET, FILM COATED ORAL DAILY
Qty: 30 TABLET | Refills: 3 | Status: SHIPPED | OUTPATIENT
Start: 2024-07-27

## 2024-07-26 RX ADMIN — AMIODARONE HYDROCHLORIDE 200 MG: 200 TABLET ORAL at 09:08

## 2024-07-26 RX ADMIN — POTASSIUM CHLORIDE 10 MEQ: 7.46 INJECTION, SOLUTION INTRAVENOUS at 02:32

## 2024-07-26 RX ADMIN — ASPIRIN 81 MG 81 MG: 81 TABLET ORAL at 09:09

## 2024-07-26 RX ADMIN — ATORVASTATIN CALCIUM 80 MG: 80 TABLET, FILM COATED ORAL at 09:08

## 2024-07-26 RX ADMIN — TICAGRELOR 90 MG: 90 TABLET ORAL at 09:08

## 2024-07-26 RX ADMIN — FUROSEMIDE 40 MG: 40 TABLET ORAL at 09:09

## 2024-07-26 RX ADMIN — APIXABAN 5 MG: 5 TABLET, FILM COATED ORAL at 09:09

## 2024-07-26 RX ADMIN — SODIUM CHLORIDE, PRESERVATIVE FREE 10 ML: 5 INJECTION INTRAVENOUS at 09:09

## 2024-07-26 RX ADMIN — POTASSIUM BICARBONATE 40 MEQ: 782 TABLET, EFFERVESCENT ORAL at 09:08

## 2024-07-26 NOTE — DISCHARGE SUMMARY
[facility]  Discharge Note                 Name:  Claudio Graham  YOB: 1955  Social Security Number:  [id number]  Medical Record Number:  [id number]    Date of Admission:  7/18/2024  Date of Discharge:  7/26/2024    Admitting physician: Satinder Siddiqui MD    Discharge Attending: LIDIA Blackmon CNP, CNP  Primary Care Physician: Vanessa Mckenna APRN - CNP  Consultants: Cardiology  Discharge to Rehab/SNF in stable condition     HOSPITAL ADMISSION PROBLEM LIST:  Patient Active Problem List   Diagnosis    Chronic kidney disease, stage II (mild)    Chronic gout    Morbid obesity (HCC)    Normocytic normochromic anemia    Hyperlipidemia    Iron deficiency anemia    Anxiety disorder     DJD (degenerative joint disease)    TANNER variably compliant with BiPAP    CAD (coronary artery disease) s/p stenting of L anterior descending artery 2004    Diabetic retinopathy of both eyes with macular edema associated with diabetes mellitus due to underlying condition, unspecified retinopathy severity (HCC)    Ischemic cardiomyopathy    HTN (hypertension)    ST elevation myocardial infarction involving left anterior descending (LAD) coronary artery (HCC)    MGUS (monoclonal gammopathy of unknown significance)    Type 2 diabetes mellitus with chronic kidney disease (HCC)    AICD (automatic cardioverter/defibrillator) present    PAF (paroxysmal atrial fibrillation) (HCC)    Acute respiratory failure with hypoxia and hypercapnia (HCC)    Coronary angioplasty status    Hypokalemia    Chronic systolic (congestive) heart failure (HCC)    Hypothyroidism due to medication    Acute cardiac pulmonary edema (HCC)    Adenomatous polyp of ascending colon    Chest pain    Atherosclerotic heart disease of native coronary artery with unspecified angina pectoris    Diabetic polyneuropathy associated with type 2 diabetes mellitus (HCC)    OM (onychomycosis)    Benign prostatic hyperplasia with nocturia     Bilateral kidney masses    Cardiopulmonary arrest (HCC)    Complex renal cyst    Congenital meatal stenosis    Gross hematuria    Postprocedural stricture of urethra at fossa navicularis    Urologic disorders    Ventricular tachycardia (HCC)    Cardiogenic shock (HCC)    Ventricular fibrillation (HCC)    Metabolic acidosis         Procedures:cardiac catheterization    HOSPITAL COURSE :           The patient was admitted for: VT    Hospital Procedures if any: LHC, TTE  Medications changes recommendation: see meds below  Follow Up Plan: as scheduled       Discharge exam:   Vitals:    07/26/24 1200   BP:    Pulse: 86   Resp:    Temp:    SpO2:      Neuro: normal  Chest: Clear to ausculation. No wheezing.  Cardiac: Regular rate. s1 and s2 auscultated. No murmur noted.  Abdomen/groin: soft, non-tender, without masses or organomegaly  Lower extremity edema: none    Discharge Medications:     Medication List      START taking these medications     amiodarone 200 MG tablet; Commonly known as: CORDARONE; Take 1 tablet by   mouth daily; Start taking on: July 27, 2024   aspirin 81 MG chewable tablet; Take 1 tablet by mouth daily; Start   taking on: July 27, 2024; Replaces: aspirin 81 MG EC tablet   ticagrelor 90 MG Tabs tablet; Commonly known as: BRILINTA; Take 1 tablet   by mouth 2 times daily     CHANGE how you take these medications     apixaban 5 MG Tabs tablet; Commonly known as: Eliquis; Take 1 tablet by   mouth 2 times daily; What changed: how much to take, how to take this,   when to take this, additional instructions   atorvastatin 80 MG tablet; Commonly known as: LIPITOR; Take 1 tablet by   mouth daily; Start taking on: July 27, 2024; What changed: medication   strength, how much to take   furosemide 40 MG tablet; Commonly known as: LASIX; Take 1 tablet by   mouth in the morning and 1 tablet in the evening.; What changed: when to   take this     CONTINUE taking these medications     Alcohol Prep 70 % Pads; USE AS

## 2024-07-26 NOTE — PROGRESS NOTES
Patient's extended release metoprolol not given because he requires pills to be crushed in applesauce and this med cannot be crushed.  Dr. Robertson notified via [employer] at 2033 and an alternative was requested. Awaiting orders. Care ongoing.    2048: Dr. Robertson declined to order a replacement medication at this time and stated it is ok to not give metoprolol for now due to the extended release coating. Care ongoing.

## 2024-07-26 NOTE — PROGRESS NOTES
PULMONARY PROGRESS NOTE      Patient:  Claudio Graham  YOB: 1955    MRN: [id number]     Acct: [id number]     Admit date: 7/18/2024    REASON FOR CONSULT:- Acute hypoxemic respiratory failure    Pt seen and Chart reviewed.    Subjective:       Claudio Graham  69 y.o. male with past medical history of heart failure with reduced ejection fraction, diabetes status post AICD, hyperlipidemia, hypertension, morbid obesity, TANNER presented to the emergency room on 7/18/2024 medically for sustaining V. tach requiring multiple time shock and medical cardioversion. The pt was emergently underwent cardiac cath and was found to have acute ostial occlusion 100% and TIFFANY was placed also the LVEF was 10%. LV Wall Motion: Severe global hypokinesis and IABP was placed.   Pulmonary/CC was called for Acute hypoxemic respiratory failure and requiring MV support.       Interval History  7/26/2024    On room air  He continues to be on amiodarone  Has a good cough if needed  Awake and following commands  Fluid balance is -3.2 L    Review of Systems -   Review of Systems -   Constitutional ROS: negative  ENT ROS: negative  Allergy and Immunology ROS: negative  Respiratory ROS: negative  Cardiovascular ROS: negative  Gastrointestinal ROS: negative  Musculoskeletal ROS: negative         Physical Exam:  Vitals: /75   Pulse 84   Temp 98.5 °F (36.9 °C) (Oral)   Resp 18   Ht 1.753 m (5' 9.02\")   Wt 96 kg (211 lb 10.3 oz)   SpO2 94%   BMI 31.24 kg/m²   24 hour intake/output:  Intake/Output Summary (Last 24 hours) at 7/26/2024 0810  Last data filed at 7/26/2024 0555  Gross per 24 hour   Intake 392.68 ml   Output 350 ml   Net 42.68 ml       Last 3 weights:  Wt Readings from Last 3 Encounters:   07/22/24 96 kg (211 lb 10.3 oz)   06/19/24 94.3 kg (208 lb)   06/04/24 97.8 kg (215 lb 8 oz)       General appearance: alert and cooperative with exam  Physical Examination:   Physical Exam  Constitutional:        cardioverter/defibrillator) present    PAF (paroxysmal atrial fibrillation) (HCC)    Acute respiratory failure with hypoxia and hypercapnia (HCC)    Coronary angioplasty status    Hypokalemia    Chronic systolic (congestive) heart failure (HCC)    Hypothyroidism due to medication    Acute cardiac pulmonary edema (HCC)    Adenomatous polyp of ascending colon    Chest pain    Atherosclerotic heart disease of native coronary artery with unspecified angina pectoris    Diabetic polyneuropathy associated with type 2 diabetes mellitus (HCC)    OM (onychomycosis)    Benign prostatic hyperplasia with nocturia    Bilateral kidney masses    Cardiopulmonary arrest (HCC)    Complex renal cyst    Congenital meatal stenosis    Gross hematuria    Postprocedural stricture of urethra at fossa navicularis    Urologic disorders    Ventricular tachycardia (HCC)    Cardiogenic shock (HCC)    Ventricular fibrillation (HCC)    Metabolic acidosis       Assessment and Plan:     Acute hypoxemic respiratory failure requiring mechanical ventilator  Sustained ventricular tachycardia status post synchronized cardioversion x 3 and chemical cardioversion  Acute MI with complete occlusion of ostial LAD with successful PCI with TIFFANY to ostial LAD  Severely reduced LV systolic function status post intra-aortic balloon  Cardiogenic shock  Severe ischemic cardiomyopathy s/p ICD  Paroxysmal A-fib  Acute kidney injury, resolved  Hyperglycemia, acceptable  Mild anemia  Thrombocytopenia    Plan:    Patient tolerated extubation on 7/23/2024  Patient on Eliquis  On diuretics and amiodarone  Increase activity as permitted and tolerated        Raf Garibay MD  [facility], [place]         7/26/2024, 8:10 AM

## 2024-07-26 NOTE — PROGRESS NOTES
Noticed slight EKG change with patient where ST segment seems more elevated than before, but still a ventricular rhythm.  Patient AOx4 denies chest pain or shortness of breath but does state he felt his \"heart reset\" a little bit ago.  Patient unable to elaborate on what he means but does clarify it was not his AICD firing.  12 lead EKG obtained per protocol, vitals as charted.  Dr. Robertson notified via [employer] at 0421.  No new orders received. Care ongoing.

## 2024-07-26 NOTE — PLAN OF CARE
Problem: Safety - Adult  Goal: Free from fall injury  Outcome: Completed     Problem: Nutrition Deficit:  Goal: Optimize nutritional status  7/26/2024 1857 by Carol Lino RN  Outcome: Completed  7/26/2024 1359 by Yoly Argueta RD  Outcome: Progressing  Flowsheets (Taken 7/26/2024 1352)  Nutrient intake appropriate for improving, restoring, or maintaining nutritional needs:   Assess nutritional status and recommend course of action   Monitor oral intake, labs, and treatment plans   Recommend appropriate diets, oral nutritional supplements, and vitamin/mineral supplements

## 2024-07-26 NOTE — PROGRESS NOTES
Multiple attempts made to [facility] to give report. Transferred to voicemails. RN name & number left.   Script has been re-sent.

## 2024-07-26 NOTE — PLAN OF CARE
Problem: Safety - Adult  Goal: Free from fall injury  7/26/2024 0440 by Jimmie León, RN  Outcome: Progressing  7/25/2024 1844 by Lita Whaley, RN  Outcome: Progressing

## 2024-07-26 NOTE — DISCHARGE INSTR - COC
Continuity of Care Form    Patient Name: Claudio Graham   :  1955  MRN:  [id number]    Admit date:  2024  Discharge date:  2024    Code Status Order: Full Code   Advance Directives:     Admitting Physician:  Satinder Siddiqui MD  PCP: Vanessa Mckenna, APRN - CNP    Discharging Nurse: CANDE Medina  Discharging Hospital Unit/Room#:   Discharging Unit Phone Number: [phone]    Emergency Contact:   Extended Emergency Contact Information  Primary Emergency Contact: Cathy Ingram   [facility]  Home Phone: [phone]  Work Phone: [phone]  Mobile Phone: [phone]  Relation: Other Relative  Hearing or visual needs: None  Other needs: None  Preferred language: English   needed? No  Secondary Emergency Contact: Jason sherman  Home Phone: [phone]  Work Phone: [phone]  Mobile Phone: [phone]  Relation: Healthcare Decision Maker    Past Surgical History:  Past Surgical History:   Procedure Laterality Date    BONE MARROW BIOPSY   approx    CARDIAC CATHETERIZATION  2007    severe stenosis pre-stent area    CARDIAC CATHETERIZATION  2013    Very peripheral stenosis, not amendable to PCI, stents patent    CARDIAC DEFIBRILLATOR PLACEMENT  2015    [facility]    CARDIAC PROCEDURE N/A 2024    Left heart cath / coronary angiography performed by Satinder Siddiqui MD at [facility] CARDIAC CATH LAB    CARDIAC PROCEDURE N/A 2024    Percutaneous coronary intervention performed by Satinder Siddiqui MD at [facility] CARDIAC CATH LAB    CARDIAC PROCEDURE N/A 2024    Intra-aortic balloon pump insertion performed by Satinder Siddiqui MD at [facility] CARDIAC CATH LAB    COLONOSCOPY  2007    COLONOSCOPY  2021    COLONOSCOPY N/A 2021    COLONOSCOPY DIAGNOSTIC performed by Coleen Vidales MD at [facility] Endoscopy    COLONOSCOPY N/A 2022    COLONOSCOPY WITH BIOPSY performed by Coleen Vidales MD at [facility] OR    CORONARY ANGIOPLASTY  ,     stent  RN on 7/26/24 at 2:11 PM EDT    CASE MANAGEMENT/SOCIAL WORK SECTION    Inpatient Status Date: ***    Readmission Risk Assessment Score:  Readmission Risk              Risk of Unplanned Readmission:  19           Discharging to Facility/ Agency   Name: [facility]    / signature: Electronically signed by Padmini Mcnamara RN on 7/26/24 at 3:34 PM EDT    PHYSICIAN SECTION    Prognosis: Fair    Condition at Discharge: Stable    Rehab Potential (if transferring to Rehab): Fair    Recommended Labs or Other Treatments After Discharge: See discharge AVS    Physician Certification: I certify the above information and transfer of Claudio Graham  is necessary for the continuing treatment of the diagnosis listed and that he requires Skilled Nursing Facility for less 30 days.     Update Admission H&P: No change in H&P    PHYSICIAN SIGNATURE:  Electronically signed by LIDIA Almeida CNP on 7/26/24 at 4:25 PM EDT

## 2024-07-26 NOTE — PROGRESS NOTES
Comprehensive Nutrition Assessment    Type and Reason for Visit:  Reassess    Nutrition Recommendations/Plan:   Continue current diet  Start frozen ONS BID  Monitor intakes, ONS, chewing and swallowing, weight, labs, GI status.     Malnutrition Assessment:  Malnutrition Status:  At risk for malnutrition (Comment) (07/26/24 7508)    Context:  Acute Illness     Findings of the 6 clinical characteristics of malnutrition:  Energy Intake:  75% or less of estimated energy requirements for 7 or more days  Weight Loss:  Unable to assess (weight fluctuations)     Body Fat Loss:  No significant body fat loss     Muscle Mass Loss:  Unable to assess    Fluid Accumulation:  Moderate to Severe Extremities   Strength:  Not Performed    Nutrition Assessment:    Pt seen for follow up. Diet advanced to minced and moist with mildly thick liquids. Pt sitting in chair at time of visit. Reports appetite is \"dio\", consuming 26-50% of meals r/t diet texture is \"not appealing\". Pt agreeable to frozen ONS at meals. Labs reviewed. LBM 7/25. +2 BLE edema. Meds reviewed.    Nutrition Related Findings:    Meds/labs reviewed Wound Type: None       Current Nutrition Intake & Therapies:    Average Meal Intake: 26-50%  Average Supplements Intake: None Ordered  ADULT DIET; Dysphagia - Minced and Moist; Mildly Thick (Nectar)  Additional Calorie Sources:  None    Anthropometric Measures:  Height: 175.3 cm (5' 9.02\")  Ideal Body Weight (IBW): 160 lbs (73 kg)    Admission Body Weight: 94.3 kg (207 lb 14.3 oz)  Current Body Weight: 96 kg (211 lb 10.3 oz), 132.3 % IBW. Weight Source: Bed Scale  Current BMI (kg/m2): 31.2  Weight Adjustment For: No Adjustment  BMI Categories: Obese Class 1 (BMI 30.0-34.9)    Estimated Daily Nutrient Needs:  Energy Requirements Based On: Formula  Weight Used for Energy Requirements: Admission  Energy (kcal/day): 1111-2701 kcals/day  Weight Used for Protein Requirements: Ideal  Protein (g/day): 100-130 gm pro/day  Method

## 2024-07-26 NOTE — PROGRESS NOTES
Physical Therapy  Facility/Department: [facility] [[email or web address] CAR 2- STEPDOWN  Physical Therapy daily treatment note     Name: Claudio Graham  : 1955  MRN: [id number]  Date of Service: 2024    Chief Complaint   Patient presents with    Pacemaker Problem    AICD, S/P cardiac cath 24      Discharge Recommendations:  Therapy recommended at discharge          Patient Diagnosis(es): The primary encounter diagnosis was Ventricular tachycardia (HCC). Diagnoses of Ventricular fibrillation (HCC), Cardiogenic shock (HCC), Acute respiratory failure with hypoxia and hypercapnia (HCC), and Chest pain, unspecified type were also pertinent to this visit.  Past Medical History:  has a past medical history of Acute HFrEF (heart failure with reduced ejection fraction) (HCC), Acute on chronic combined systolic and diastolic congestive heart failure (HCC), Acute on chronic congestive heart failure (HCC), Acute respiratory failure with hypoxia (HCC), Anemia, Anxiety, CAD (coronary artery disease), Cardiac defibrillator in place, Cardiomyopathy (HCC), CHF (congestive heart failure) (HCC), Chronic combined systolic and diastolic heart failure (HCC) s/[ AICD placed, Chronic kidney disease, Complex sleep apnea syndrome, Diabetic retinopathy (HCC), Diverticulitis, DJD (degenerative joint disease), Edentulous, Gout, HTN (hypertension), Hyperlipidemia, Hypertension, Iron deficiency anemia, Ischemic cardiomyopathy, Morbid obesity (HCC), Obesity, TANNER variably compliant with BiPAP, Osteoarthritis, PAF (paroxysmal atrial fibrillation) (HCC), Psychophysiologic insomnia, Thyroid disease, Type II or unspecified type diabetes mellitus without mention of complication, not stated as uncontrolled, and Unspecified sleep apnea.  Past Surgical History:  has a past surgical history that includes Colonoscopy (2007); Cardiac catheterization (2007); Cardiac catheterization (2013); Coronary angioplasty (, ); Cardiac defibrillator

## 2024-07-26 NOTE — CARE COORDINATION
Spoke to Tabby from [facility]. Precert is pending. Zoila is weekend contact    1506 notified by Tabby that precert has been approved. Transportation request faxed to [facility]    1513 received call from Diane from [facility]. Patient scheduled for 5 pm. Voicemail left for Tabby to update. Patient updated and agreeable. HENS completed    1635 AVS faxed    Discharge Report    [facility]  Clinical Case Management Department  Written by: Padmini Mcnamara RN    Patient Name: Claudio Graham  Attending Provider: Satinder Siddiqui MD  Admit Date: 2024  9:53 AM  MRN: [id number]  Account: [id number]                     : 1955  Discharge Date: 2024      Disposition: [facility]    Padmini Mcnamara RN

## 2024-07-26 NOTE — PROGRESS NOTES
CLINICAL PHARMACY NOTE: MEDS TO BEDS    Total # of Prescriptions Filled: 4   The following medications were delivered to the patient:  Atorvastatin 80mg tabs  Brilinta 90mg tabs  Aspirin 81mg chew tabs  Amiodarone 200mg tabs    Additional Documentation:  Delivered to pt in room 2017 on 7/26 at 2:29P. No copay.

## 2024-07-26 NOTE — PROGRESS NOTES
[facility]   Progress Note                   Date:   7/26/2024  Patient name: Claudio Graham  Date of admission:  7/18/2024  9:53 AM  MRN:   [id number]  YOB: 1955  PCP: Vanessa Mckenna, APRN - CNP    Reason for Admission: VT    Subjective:       Patient seen and examined in room. Denies CP or SOB. No acute CV events overnight. Labs, vitals, and tele reviewed. Up to chair. Reports going to Rehab once insurance approves. He is on RA - no acute distress. He states he has been working with PT/OT daily         Medications:   Scheduled Meds:   furosemide  40 mg Oral BID    amiodarone  200 mg Oral Daily    metoprolol succinate  50 mg Oral Nightly    potassium bicarb-citric acid  40 mEq Oral Daily    apixaban  5 mg Oral BID    aspirin  81 mg Oral Daily    atorvastatin  80 mg Oral Daily    sodium chloride flush  5-40 mL IntraVENous 2 times per day    ticagrelor  90 mg Oral BID     Continuous Infusions:   sodium chloride 5 mL/hr at 07/24/24 1119     CBC:   Recent Labs     07/24/24  0600   WBC 6.8   HGB 11.5*   *       BMP:    Recent Labs     07/24/24  0600 07/24/24  1548 07/25/24  0520 07/26/24  0548     --   --  137   K 3.0* 3.8 3.5* 3.8     --   --  103   CO2 23  --   --  21   BUN 22  --  23 21   CREATININE 1.1  --  1.2 1.2   GLUCOSE 100*  --   --  114*       Hepatic:   No results for input(s): \"AST\", \"ALT\", \"BILITOT\", \"ALKPHOS\" in the last 72 hours.    Invalid input(s): \"ALB\"    Troponin: No results for input(s): \"TROPONINI\" in the last 72 hours.  BNP: No results for input(s): \"BNP\" in the last 72 hours.  Lipids: No results for input(s): \"CHOL\", \"HDL\" in the last 72 hours.    Invalid input(s): \"LDLCALCU\"  INR:   No results for input(s): \"INR\" in the last 72 hours.      Objective:   Vitals: /60   Pulse 88   Temp 98.4 °F (36.9 °C) (Oral)   Resp 21   Ht 1.753 m (5' 9.02\")   Wt 96 kg (211 lb 10.3 oz)   SpO2 97%   BMI 31.24 kg/m²   Constitutional and General

## 2024-07-28 DIAGNOSIS — M1A.00X0 IDIOPATHIC CHRONIC GOUT WITHOUT TOPHUS, UNSPECIFIED SITE: ICD-10-CM

## 2024-07-29 RX ORDER — ALLOPURINOL 300 MG/1
TABLET ORAL
Qty: 30 TABLET | Refills: 5 | Status: SHIPPED | OUTPATIENT
Start: 2024-07-29

## 2024-07-30 ENCOUNTER — HOSPITAL ENCOUNTER (OUTPATIENT)
Age: 69
Setting detail: SPECIMEN
Discharge: HOME | End: 2024-07-30

## 2024-07-30 ENCOUNTER — TELEPHONE (OUTPATIENT)
Dept: FAMILY MEDICINE CLINIC | Age: 69
End: 2024-07-30

## 2024-07-30 NOTE — TELEPHONE ENCOUNTER
Attempted to call patient. He called the on call service last night and the call was forwarded to the office this morning. Unsure what his needs are from the call.     No answer, no VM

## 2024-07-31 ENCOUNTER — HOSPITAL ENCOUNTER (OUTPATIENT)
Age: 69
Setting detail: SPECIMEN
Discharge: HOME | End: 2024-07-31

## 2024-07-31 LAB
25(OH)D3 SERPL-MCNC: 19.2 NG/ML (ref 30–100)
ANION GAP SERPL CALCULATED.3IONS-SCNC: 14 MMOL/L (ref 9–17)
BUN SERPL-MCNC: 23 MG/DL (ref 8–23)
BUN/CREAT SERPL: 18 (ref 9–20)
CALCIUM SERPL-MCNC: 8.7 MG/DL (ref 8.6–10.4)
CHLORIDE SERPL-SCNC: 102 MMOL/L (ref 98–107)
CO2 SERPL-SCNC: 25 MMOL/L (ref 20–31)
CREAT SERPL-MCNC: 1.3 MG/DL (ref 0.7–1.2)
ERYTHROCYTE [DISTWIDTH] IN BLOOD BY AUTOMATED COUNT: 17.4 % (ref 11.8–14.4)
EST. AVERAGE GLUCOSE BLD GHB EST-MCNC: 137 MG/DL
GFR, ESTIMATED: 59 ML/MIN/1.73M2
GLUCOSE SERPL-MCNC: 100 MG/DL (ref 70–99)
HBA1C MFR BLD: 6.4 % (ref 4–6)
HCT VFR BLD AUTO: 38.1 % (ref 40.7–50.3)
HGB BLD-MCNC: 12 G/DL (ref 13–17)
MCH RBC QN AUTO: 28.1 PG (ref 25.2–33.5)
MCHC RBC AUTO-ENTMCNC: 31.5 G/DL (ref 28.4–34.8)
MCV RBC AUTO: 89.2 FL (ref 82.6–102.9)
NRBC BLD-RTO: 0 PER 100 WBC
PLATELET # BLD AUTO: 330 K/UL (ref 138–453)
PMV BLD AUTO: 11 FL (ref 8.1–13.5)
POTASSIUM SERPL-SCNC: 3.3 MMOL/L (ref 3.7–5.3)
RBC # BLD AUTO: 4.27 M/UL (ref 4.21–5.77)
SODIUM SERPL-SCNC: 141 MMOL/L (ref 135–144)
T4 SERPL-MCNC: 8.6 UG/DL (ref 4.5–11.7)
TSH SERPL DL<=0.05 MIU/L-ACNC: 11.79 UIU/ML (ref 0.3–5)
WBC OTHER # BLD: 9 K/UL (ref 3.5–11.3)

## 2024-07-31 PROCEDURE — 84443 ASSAY THYROID STIM HORMONE: CPT

## 2024-07-31 PROCEDURE — 36415 COLL VENOUS BLD VENIPUNCTURE: CPT

## 2024-07-31 PROCEDURE — 82306 VITAMIN D 25 HYDROXY: CPT

## 2024-07-31 PROCEDURE — 84436 ASSAY OF TOTAL THYROXINE: CPT

## 2024-07-31 PROCEDURE — P9603 ONE-WAY ALLOW PRORATED MILES: HCPCS

## 2024-07-31 PROCEDURE — 80048 BASIC METABOLIC PNL TOTAL CA: CPT

## 2024-07-31 PROCEDURE — 83036 HEMOGLOBIN GLYCOSYLATED A1C: CPT

## 2024-07-31 PROCEDURE — 85027 COMPLETE CBC AUTOMATED: CPT

## 2024-08-14 ENCOUNTER — OFFICE VISIT (OUTPATIENT)
Dept: PRIMARY CARE CLINIC | Age: 69
End: 2024-08-14
Payer: COMMERCIAL

## 2024-08-14 VITALS
TEMPERATURE: 97.2 F | WEIGHT: 189 LBS | HEART RATE: 61 BPM | DIASTOLIC BLOOD PRESSURE: 60 MMHG | BODY MASS INDEX: 27.9 KG/M2 | OXYGEN SATURATION: 96 % | SYSTOLIC BLOOD PRESSURE: 100 MMHG

## 2024-08-14 DIAGNOSIS — I50.42 CHRONIC COMBINED SYSTOLIC AND DIASTOLIC HEART FAILURE (HCC): Primary | ICD-10-CM

## 2024-08-14 DIAGNOSIS — D68.69 SECONDARY HYPERCOAGULABLE STATE (HCC): ICD-10-CM

## 2024-08-14 DIAGNOSIS — Z09 HOSPITAL DISCHARGE FOLLOW-UP: ICD-10-CM

## 2024-08-14 DIAGNOSIS — E03.2 HYPOTHYROIDISM DUE TO MEDICATION: ICD-10-CM

## 2024-08-14 DIAGNOSIS — I48.0 PAF (PAROXYSMAL ATRIAL FIBRILLATION) (HCC): ICD-10-CM

## 2024-08-14 DIAGNOSIS — R13.11 ORAL PHASE DYSPHAGIA: ICD-10-CM

## 2024-08-14 DIAGNOSIS — I95.2 HYPOTENSION DUE TO DRUGS: ICD-10-CM

## 2024-08-14 DIAGNOSIS — I25.119 ATHEROSCLEROSIS OF NATIVE CORONARY ARTERY OF NATIVE HEART WITH ANGINA PECTORIS (HCC): ICD-10-CM

## 2024-08-14 PROCEDURE — 3074F SYST BP LT 130 MM HG: CPT | Performed by: NURSE PRACTITIONER

## 2024-08-14 PROCEDURE — 1111F DSCHRG MED/CURRENT MED MERGE: CPT | Performed by: NURSE PRACTITIONER

## 2024-08-14 PROCEDURE — 99214 OFFICE O/P EST MOD 30 MIN: CPT | Performed by: NURSE PRACTITIONER

## 2024-08-14 PROCEDURE — 3078F DIAST BP <80 MM HG: CPT | Performed by: NURSE PRACTITIONER

## 2024-08-14 PROCEDURE — 1123F ACP DISCUSS/DSCN MKR DOCD: CPT | Performed by: NURSE PRACTITIONER

## 2024-08-14 RX ORDER — LEVOTHYROXINE SODIUM 88 UG/1
88 TABLET ORAL DAILY
Qty: 90 TABLET | Refills: 1 | Status: ON HOLD | OUTPATIENT
Start: 2024-08-14

## 2024-08-14 RX ORDER — FUROSEMIDE 40 MG/1
40 TABLET ORAL 2 TIMES DAILY
Qty: 60 TABLET | Refills: 3 | Status: ON HOLD
Start: 2024-08-14

## 2024-08-14 NOTE — PROGRESS NOTES
Post-Discharge Transitional Care  Follow Up      Claudio Graham   YOB: 1955    Date of Office Visit:  8/14/2024  Date of Hospital Admission: 7/18/24  Date of Hospital Discharge: 7/26/24  Risk of hospital readmission (high >=14%. Medium >=10%) :Readmission Risk Score: 14.3      Care management risk score Rising risk (score 2-5) and Complex Care (Scores >=6): No Risk Score On File     Non face to face  following discharge, date last encounter closed (first attempt may have been earlier): *No documented post hospital discharge outreach found in the last 14 days    Call initiated 2 business days of discharge: *No response recorded in the last 14 days    ASSESSMENT/PLAN:   Hospital discharge follow-up  -     MS DISCHARGE MEDS RECONCILED W/ CURRENT OUTPATIENT MED LIST  Chronic combined systolic and diastolic heart failure (HCC)  -     Basic Metabolic Panel; Future  Atherosclerosis of native coronary artery of native heart with angina pectoris (HCC)  PAF (paroxysmal atrial fibrillation) (HCC)  Secondary hypercoagulable state (HCC)  Hypothyroidism due to medication  -     levothyroxine (SYNTHROID) 88 MCG tablet; Take 1 tablet by mouth daily, Disp-90 tablet, R-1Normal  -     TSH With Reflex Ft4; Future  Hypotension due to drugs  Oral phase dysphagia  -     [facility] Speech Therapy - [facility]    Discussed lowering evening dose of Lasix to 20 mg due to hypotension. Keep appt at CHF clinic to tomorrow  Reviewed hospital notes, patient did have barium swallow with speech therapy notes stating there was mild oral phase dysphagia.  Patient advised on nectar thick liquids and to eat more minced food.  Patient agreeable to speech therapy referral for continued therapy  Patient's TSH over 7 while in the hospital, previously euthyroid on dosing.  Given addition of amiodarone I am increasing his levothyroxine to 88 mcg a day and into repeat labs in 4 weeks    Medical Decision Making: moderate complexity  Return in 6

## 2024-08-15 ENCOUNTER — HOSPITAL ENCOUNTER (OUTPATIENT)
Dept: OTHER | Age: 69
Discharge: HOME | End: 2024-08-15
Payer: COMMERCIAL

## 2024-08-15 VITALS
OXYGEN SATURATION: 98 % | HEART RATE: 59 BPM | SYSTOLIC BLOOD PRESSURE: 115 MMHG | WEIGHT: 191 LBS | DIASTOLIC BLOOD PRESSURE: 58 MMHG | RESPIRATION RATE: 18 BRPM | BODY MASS INDEX: 28.19 KG/M2

## 2024-08-15 PROCEDURE — 99213 OFFICE O/P EST LOW 20 MIN: CPT

## 2024-08-15 NOTE — PROGRESS NOTES
Date:  8/15/2024  Time:  1:50 PM    CHF Clinic at [facility]    Office: [phone] Fax: [phone]    Re:  Claduio Graham   Patient : 1955    Vital Signs: BP (!) 115/58   Pulse 59   Resp 18   Wt 86.6 kg (191 lb)   SpO2 98%   BMI 28.19 kg/m²                       O2 Device: None (Room air)                           No results for input(s): \"CBC\", \"HGB\", \"HCT\", \"WBC\", \"PLATELET\", \"NA\", \"K\", \"CL\", \"CO2\", \"BUN\", \"CREATININE\", \"GLUCOSE\", \"BNP\", \"INR\" in the last 72 hours.     Respiratory:    Assessment  Charting Type: Reassessment    Breath Sounds  Right Upper Lobe: Clear  Right Middle Lobe: Clear  Right Lower Lobe: Clear, Diminished  Left Upper Lobe: Clear  Left Lower Lobe: Clear, Diminished    Cough/Sputum  Cough: Non-productive  Frequency: Occasional         Peripheral Vascular  RLE Edema: Trace (trace edema noted to ankle)  LLE Edema: Trace (trace edema noted to ankle)    Future Appointments   Date Time Provider Department Center   2024  9:30 AM Radha Shankar MD Resp Spec [id number]   2024  1:00 PM [facility] CHF CLINIC  1 STZ CHF I [facility]   2024  2:00 PM Vanessa Mckenna APRN - CNP [facility] ECC DEP   10/16/2024  1:30 PM Yadira Guidry APRN - CNP [facility] None      Complaints: Pt denies any concerns or complaints at this time.    Comment : Pt arrived ambulatory using walker to follow up appt. Pt denies SOB at this time. Lungs clear, slightly diminished bilateral bases. Pt had recent hospital admission and was discharged to [facility] on 24. Pts weight down 24 lbs since CHF appt 24. Pt states he has been feeling better since discharge but does not have much of an appetite lately. Ankle and calf measurements all down since . Pt verbalized compliance with medications, 2000 mg low sodium diet and 64 oz fluid restriction. Pt encouraged to call clinic, PCP or Cardiologist for any edema, weight gain or increased SOB. Pt verbalized

## 2024-08-16 ENCOUNTER — HOSPITAL ENCOUNTER (INPATIENT)
Age: 69
LOS: 6 days | Discharge: HOME HEALTH | End: 2024-08-22
Attending: EMERGENCY MEDICINE
Payer: COMMERCIAL

## 2024-08-16 ENCOUNTER — APPOINTMENT (OUTPATIENT)
Dept: ULTRASOUND IMAGING | Age: 69
End: 2024-08-16
Payer: COMMERCIAL

## 2024-08-16 ENCOUNTER — HOSPITAL ENCOUNTER (OUTPATIENT)
Age: 69
Discharge: HOME | End: 2024-08-16
Payer: COMMERCIAL

## 2024-08-16 DIAGNOSIS — N17.9 AKI (ACUTE KIDNEY INJURY) (HCC): Primary | ICD-10-CM

## 2024-08-16 DIAGNOSIS — I50.42 CHRONIC COMBINED SYSTOLIC AND DIASTOLIC HEART FAILURE (HCC): ICD-10-CM

## 2024-08-16 DIAGNOSIS — E03.2 HYPOTHYROIDISM DUE TO MEDICATION: ICD-10-CM

## 2024-08-16 PROBLEM — N18.9 ACUTE KIDNEY INJURY SUPERIMPOSED ON CKD (HCC): Status: ACTIVE | Noted: 2024-08-16

## 2024-08-16 LAB
ANION GAP SERPL CALCULATED.3IONS-SCNC: 16 MMOL/L (ref 9–16)
ANION GAP SERPL CALCULATED.3IONS-SCNC: 20 MMOL/L (ref 9–16)
BASOPHILS # BLD: 0.07 K/UL (ref 0–0.2)
BASOPHILS NFR BLD: 1 % (ref 0–2)
BNP SERPL-MCNC: 5100 PG/ML (ref 0–300)
BUN SERPL-MCNC: 41 MG/DL (ref 8–23)
BUN SERPL-MCNC: 41 MG/DL (ref 8–23)
CALCIUM SERPL-MCNC: 8.2 MG/DL (ref 8.6–10.4)
CALCIUM SERPL-MCNC: 8.2 MG/DL (ref 8.6–10.4)
CHLORIDE SERPL-SCNC: 91 MMOL/L (ref 98–107)
CHLORIDE SERPL-SCNC: 94 MMOL/L (ref 98–107)
CK SERPL-CCNC: 60 U/L (ref 39–308)
CO2 SERPL-SCNC: 23 MMOL/L (ref 20–31)
CO2 SERPL-SCNC: 25 MMOL/L (ref 20–31)
CREAT SERPL-MCNC: 4 MG/DL (ref 0.7–1.2)
CREAT SERPL-MCNC: 4.1 MG/DL (ref 0.7–1.2)
CREAT UR-MCNC: 236 MG/DL (ref 39–259)
EOSINOPHIL # BLD: 0.24 K/UL (ref 0–0.44)
EOSINOPHIL,URINE: NORMAL
EOSINOPHILS RELATIVE PERCENT: 2 % (ref 1–4)
ERYTHROCYTE [DISTWIDTH] IN BLOOD BY AUTOMATED COUNT: 17.9 % (ref 11.8–14.4)
GFR, ESTIMATED: 15 ML/MIN/1.73M2
GFR, ESTIMATED: 15 ML/MIN/1.73M2
GLUCOSE BLD-MCNC: 100 MG/DL (ref 75–110)
GLUCOSE BLD-MCNC: 102 MG/DL (ref 75–110)
GLUCOSE SERPL-MCNC: 138 MG/DL (ref 74–99)
GLUCOSE SERPL-MCNC: 142 MG/DL (ref 74–99)
HCT VFR BLD AUTO: 42.5 % (ref 40.7–50.3)
HGB BLD-MCNC: 13.8 G/DL (ref 13–17)
IMM GRANULOCYTES # BLD AUTO: 0.03 K/UL (ref 0–0.3)
IMM GRANULOCYTES NFR BLD: 0 %
LACTIC ACID, WHOLE BLOOD: 1.2 MMOL/L (ref 0.7–2.1)
LYMPHOCYTES NFR BLD: 1.5 K/UL (ref 1.1–3.7)
LYMPHOCYTES RELATIVE PERCENT: 15 % (ref 24–43)
MAGNESIUM SERPL-MCNC: 1.7 MG/DL (ref 1.6–2.4)
MCH RBC QN AUTO: 28 PG (ref 25.2–33.5)
MCHC RBC AUTO-ENTMCNC: 32.5 G/DL (ref 28.4–34.8)
MCV RBC AUTO: 86.4 FL (ref 82.6–102.9)
MONOCYTES NFR BLD: 0.52 K/UL (ref 0.1–1.2)
MONOCYTES NFR BLD: 5 % (ref 3–12)
NEUTROPHILS NFR BLD: 77 % (ref 36–65)
NEUTS SEG NFR BLD: 7.47 K/UL (ref 1.5–8.1)
NRBC BLD-RTO: 0 PER 100 WBC
PHOSPHATE SERPL-MCNC: 4.9 MG/DL (ref 2.5–4.5)
PLATELET # BLD AUTO: 211 K/UL (ref 138–453)
PMV BLD AUTO: 12.2 FL (ref 8.1–13.5)
POTASSIUM SERPL-SCNC: 3.7 MMOL/L (ref 3.7–5.3)
POTASSIUM SERPL-SCNC: 3.8 MMOL/L (ref 3.7–5.3)
RBC # BLD AUTO: 4.92 M/UL (ref 4.21–5.77)
RBC # BLD: ABNORMAL 10*6/UL
SODIUM SERPL-SCNC: 134 MMOL/L (ref 136–145)
SODIUM SERPL-SCNC: 135 MMOL/L (ref 136–145)
T3FREE SERPL-MCNC: 2 PG/ML (ref 2–4.4)
T4 FREE SERPL-MCNC: 2.1 NG/DL (ref 0.92–1.68)
TROPONIN I SERPL HS-MCNC: 72 NG/L (ref 0–22)
TSH SERPL DL<=0.05 MIU/L-ACNC: 6.49 UIU/ML (ref 0.27–4.2)
UUN UR-MCNC: 225 MG/DL (ref 800–1666)
WBC OTHER # BLD: 9.8 K/UL (ref 3.5–11.3)

## 2024-08-16 PROCEDURE — 84481 FREE ASSAY (FT-3): CPT

## 2024-08-16 PROCEDURE — 84100 ASSAY OF PHOSPHORUS: CPT

## 2024-08-16 PROCEDURE — 84540 ASSAY OF URINE/UREA-N: CPT

## 2024-08-16 PROCEDURE — 87205 SMEAR GRAM STAIN: CPT

## 2024-08-16 PROCEDURE — 99285 EMERGENCY DEPT VISIT HI MDM: CPT

## 2024-08-16 PROCEDURE — 84300 ASSAY OF URINE SODIUM: CPT

## 2024-08-16 PROCEDURE — 85025 COMPLETE CBC W/AUTO DIFF WBC: CPT

## 2024-08-16 PROCEDURE — 36415 COLL VENOUS BLD VENIPUNCTURE: CPT

## 2024-08-16 PROCEDURE — 83935 ASSAY OF URINE OSMOLALITY: CPT

## 2024-08-16 PROCEDURE — 76770 US EXAM ABDO BACK WALL COMP: CPT

## 2024-08-16 PROCEDURE — 6370000000 HC RX 637 (ALT 250 FOR IP): Performed by: HOSPITALIST

## 2024-08-16 PROCEDURE — 83880 ASSAY OF NATRIURETIC PEPTIDE: CPT

## 2024-08-16 PROCEDURE — 84484 ASSAY OF TROPONIN QUANT: CPT

## 2024-08-16 PROCEDURE — 6370000000 HC RX 637 (ALT 250 FOR IP)

## 2024-08-16 PROCEDURE — 84443 ASSAY THYROID STIM HORMONE: CPT

## 2024-08-16 PROCEDURE — 1200000000 HC SEMI PRIVATE

## 2024-08-16 PROCEDURE — 2580000003 HC RX 258: Performed by: HOSPITALIST

## 2024-08-16 PROCEDURE — 83036 HEMOGLOBIN GLYCOSYLATED A1C: CPT

## 2024-08-16 PROCEDURE — 82947 ASSAY GLUCOSE BLOOD QUANT: CPT

## 2024-08-16 PROCEDURE — 84439 ASSAY OF FREE THYROXINE: CPT

## 2024-08-16 PROCEDURE — 83735 ASSAY OF MAGNESIUM: CPT

## 2024-08-16 PROCEDURE — 83605 ASSAY OF LACTIC ACID: CPT

## 2024-08-16 PROCEDURE — 82550 ASSAY OF CK (CPK): CPT

## 2024-08-16 PROCEDURE — 3E033XZ INTRODUCTION OF VASOPRESSOR INTO PERIPHERAL VEIN, PERCUTANEOUS APPROACH: ICD-10-PCS | Performed by: HOSPITALIST

## 2024-08-16 PROCEDURE — 80048 BASIC METABOLIC PNL TOTAL CA: CPT

## 2024-08-16 PROCEDURE — 82570 ASSAY OF URINE CREATININE: CPT

## 2024-08-16 PROCEDURE — 99223 1ST HOSP IP/OBS HIGH 75: CPT | Performed by: HOSPITALIST

## 2024-08-16 RX ORDER — ONDANSETRON 2 MG/ML
4 INJECTION INTRAMUSCULAR; INTRAVENOUS EVERY 6 HOURS PRN
Status: DISCONTINUED | OUTPATIENT
Start: 2024-08-16 | End: 2024-08-22 | Stop reason: HOSPADM

## 2024-08-16 RX ORDER — ASPIRIN 81 MG/1
81 TABLET, CHEWABLE ORAL DAILY
Status: DISCONTINUED | OUTPATIENT
Start: 2024-08-16 | End: 2024-08-18

## 2024-08-16 RX ORDER — ALBUMIN (HUMAN) 12.5 G/50ML
25 SOLUTION INTRAVENOUS ONCE
Status: DISCONTINUED | OUTPATIENT
Start: 2024-08-16 | End: 2024-08-17

## 2024-08-16 RX ORDER — ACETAMINOPHEN 650 MG/1
650 SUPPOSITORY RECTAL EVERY 6 HOURS PRN
Status: DISCONTINUED | OUTPATIENT
Start: 2024-08-16 | End: 2024-08-22 | Stop reason: HOSPADM

## 2024-08-16 RX ORDER — INSULIN LISPRO 100 [IU]/ML
0-4 INJECTION, SOLUTION INTRAVENOUS; SUBCUTANEOUS
Status: DISCONTINUED | OUTPATIENT
Start: 2024-08-16 | End: 2024-08-17

## 2024-08-16 RX ORDER — ONDANSETRON 4 MG/1
4 TABLET, ORALLY DISINTEGRATING ORAL EVERY 8 HOURS PRN
Status: DISCONTINUED | OUTPATIENT
Start: 2024-08-16 | End: 2024-08-22 | Stop reason: HOSPADM

## 2024-08-16 RX ORDER — AMIODARONE HYDROCHLORIDE 200 MG/1
200 TABLET ORAL DAILY
Status: DISCONTINUED | OUTPATIENT
Start: 2024-08-16 | End: 2024-08-22 | Stop reason: HOSPADM

## 2024-08-16 RX ORDER — DEXTROSE MONOHYDRATE 100 MG/ML
INJECTION, SOLUTION INTRAVENOUS CONTINUOUS PRN
Status: DISCONTINUED | OUTPATIENT
Start: 2024-08-16 | End: 2024-08-19 | Stop reason: SDUPTHER

## 2024-08-16 RX ORDER — SODIUM CHLORIDE 9 MG/ML
INJECTION, SOLUTION INTRAVENOUS PRN
Status: DISCONTINUED | OUTPATIENT
Start: 2024-08-16 | End: 2024-08-22 | Stop reason: HOSPADM

## 2024-08-16 RX ORDER — INSULIN LISPRO 100 [IU]/ML
0-4 INJECTION, SOLUTION INTRAVENOUS; SUBCUTANEOUS NIGHTLY
Status: DISCONTINUED | OUTPATIENT
Start: 2024-08-16 | End: 2024-08-17

## 2024-08-16 RX ORDER — METOPROLOL SUCCINATE 25 MG/1
50 TABLET, EXTENDED RELEASE ORAL DAILY
Status: DISCONTINUED | OUTPATIENT
Start: 2024-08-16 | End: 2024-08-20

## 2024-08-16 RX ORDER — SODIUM CHLORIDE 0.9 % (FLUSH) 0.9 %
5-40 SYRINGE (ML) INJECTION EVERY 12 HOURS SCHEDULED
Status: DISCONTINUED | OUTPATIENT
Start: 2024-08-16 | End: 2024-08-22 | Stop reason: HOSPADM

## 2024-08-16 RX ORDER — ATORVASTATIN CALCIUM 80 MG/1
80 TABLET, FILM COATED ORAL DAILY
Status: DISCONTINUED | OUTPATIENT
Start: 2024-08-16 | End: 2024-08-18

## 2024-08-16 RX ORDER — FUROSEMIDE 40 MG
40 TABLET ORAL 2 TIMES DAILY
Status: DISCONTINUED | OUTPATIENT
Start: 2024-08-16 | End: 2024-08-20

## 2024-08-16 RX ORDER — MIDODRINE HYDROCHLORIDE 5 MG/1
10 TABLET ORAL ONCE
Status: COMPLETED | OUTPATIENT
Start: 2024-08-16 | End: 2024-08-16

## 2024-08-16 RX ORDER — ALLOPURINOL 300 MG/1
300 TABLET ORAL DAILY
Status: DISCONTINUED | OUTPATIENT
Start: 2024-08-16 | End: 2024-08-20

## 2024-08-16 RX ORDER — SODIUM CHLORIDE 0.9 % (FLUSH) 0.9 %
5-40 SYRINGE (ML) INJECTION PRN
Status: DISCONTINUED | OUTPATIENT
Start: 2024-08-16 | End: 2024-08-22 | Stop reason: HOSPADM

## 2024-08-16 RX ORDER — LEVOTHYROXINE SODIUM 88 UG/1
88 TABLET ORAL DAILY
Status: DISCONTINUED | OUTPATIENT
Start: 2024-08-16 | End: 2024-08-22 | Stop reason: HOSPADM

## 2024-08-16 RX ORDER — ACETAMINOPHEN 325 MG/1
650 TABLET ORAL EVERY 6 HOURS PRN
Status: DISCONTINUED | OUTPATIENT
Start: 2024-08-16 | End: 2024-08-22 | Stop reason: HOSPADM

## 2024-08-16 RX ORDER — ISOSORBIDE MONONITRATE 30 MG/1
30 TABLET, EXTENDED RELEASE ORAL DAILY
Status: DISCONTINUED | OUTPATIENT
Start: 2024-08-16 | End: 2024-08-20

## 2024-08-16 RX ORDER — GLUCAGON 1 MG/ML
1 KIT INJECTION PRN
Status: DISCONTINUED | OUTPATIENT
Start: 2024-08-16 | End: 2024-08-19 | Stop reason: SDUPTHER

## 2024-08-16 RX ORDER — SODIUM CHLORIDE 9 MG/ML
INJECTION, SOLUTION INTRAVENOUS CONTINUOUS
Status: ACTIVE | OUTPATIENT
Start: 2024-08-16 | End: 2024-08-17

## 2024-08-16 RX ORDER — POLYETHYLENE GLYCOL 3350 17 G/17G
17 POWDER, FOR SOLUTION ORAL DAILY PRN
Status: DISCONTINUED | OUTPATIENT
Start: 2024-08-16 | End: 2024-08-22 | Stop reason: HOSPADM

## 2024-08-16 RX ADMIN — TICAGRELOR 90 MG: 90 TABLET ORAL at 19:49

## 2024-08-16 RX ADMIN — ALLOPURINOL 300 MG: 300 TABLET ORAL at 18:30

## 2024-08-16 RX ADMIN — ASPIRIN 81 MG 81 MG: 81 TABLET ORAL at 18:29

## 2024-08-16 RX ADMIN — ATORVASTATIN CALCIUM 80 MG: 80 TABLET, FILM COATED ORAL at 18:29

## 2024-08-16 RX ADMIN — APIXABAN 5 MG: 5 TABLET, FILM COATED ORAL at 19:49

## 2024-08-16 RX ADMIN — MIDODRINE HYDROCHLORIDE 10 MG: 5 TABLET ORAL at 19:49

## 2024-08-16 RX ADMIN — ALBUMIN (HUMAN) 25 G: 12.5 SOLUTION INTRAVENOUS at 19:59

## 2024-08-16 RX ADMIN — EMPAGLIFLOZIN 10 MG: 10 TABLET, FILM COATED ORAL at 18:28

## 2024-08-16 RX ADMIN — SODIUM CHLORIDE: 9 INJECTION, SOLUTION INTRAVENOUS at 18:34

## 2024-08-16 ASSESSMENT — PAIN SCALES - GENERAL: PAINLEVEL_OUTOF10: 0

## 2024-08-16 ASSESSMENT — PAIN - FUNCTIONAL ASSESSMENT: PAIN_FUNCTIONAL_ASSESSMENT: NONE - DENIES PAIN

## 2024-08-16 NOTE — ED TRIAGE NOTES
Patient presents to the ED via self. Patient states that he was referred to the ed following out patient lab results today. Patient was placed on bedside monitor and IV established. Patient is in NAD, respirations are even and unlabored, bed in lowest position and call light with in reach. Resident is bedside for evaluation. Writer will continue with plan of care.

## 2024-08-16 NOTE — ED PROVIDER NOTES
[facility]  Emergency Department  Faculty Attestation     I performed a history and physical examination of the patient and discussed management with the resident. I reviewed the resident’s note and agree with the documented findings and plan of care. Any areas of disagreement are noted on the chart. I was personally present for the key portions of any procedures. I have documented in the chart those procedures where I was not present during the key portions. I have reviewed the emergency nurses triage note. I agree with the chief complaint, past medical history, past surgical history, allergies, medications, social and family history as documented unless otherwise noted below.    For Physician Assistant/ Nurse Practitioner cases/documentation I have personally evaluated this patient and have completed at least one if not all key elements of the E/M (history, physical exam, and MDM). Additional findings are as noted.    Preliminary note started at 2:00 PM EDT    Primary Care Physician:  Vanessa Mckenna, LIDIA - CNP    Screenings:  [unfilled]    CHIEF COMPLAINT       Chief Complaint   Patient presents with    Abnormal Lab       RECENT VITALS:   Pulse 67   Temp 97.9 °F (36.6 °C) (Oral)   Resp 17   SpO2 100%     LABS:  Labs Reviewed   CBC WITH AUTO DIFFERENTIAL   BASIC METABOLIC PANEL W/ REFLEX TO MG FOR LOW K   URINALYSIS WITH REFLEX TO CULTURE   UREA NITROGEN, URINE   CREATININE, RANDOM URINE       Radiology  No orders to display       CRITICAL CARE: There was a high probability of clinically significant/life threatening deterioration in this patient's condition which required my urgent intervention.  Total critical care time was none minutes.  This excludes any time for separately reportable procedures.     EKG:    Attending Physician Additional  Notes    Patient had recent blood work, was called by his PCP to come to the ED for evaluation of worsening renal function.  His

## 2024-08-16 NOTE — ED PROVIDER NOTES
[facility] ED  Emergency Department Encounter  Emergency Medicine Resident     Pt Name:Claudio Graham  MRN: [id number]  Birthdate 1955  Date of evaluation: 8/16/24  PCP:  Vanessa Mckenna APRN - CNP  Note Started: 1:59 PM EDT      CHIEF COMPLAINT       Chief Complaint   Patient presents with    Abnormal Lab       HISTORY OF PRESENT ILLNESS  (Location/Symptom, Timing/Onset, Context/Setting, Quality, Duration, Modifying Factors, Severity.)      Claudio Graham is a 69 y.o. male who presents after being called by CHF clinic and told to come to ED for SKYLER.  States he was recently discharged from physical rehab facility, was not eating much there as they had only greasy/salty food.  Reports compliance with all medications.  States he still has some shortness of breath with small amounts of exertion, rarely mild shortness of breath at rest.  Denies fever, chills, nausea, vomiting, chest pain, cough, abdominal pain.  States today he has not urinated yet, waited to do so until he came to ED after being told his kidney labs were abnormal.  Denies NSAID use besides prescribed aspirin.    PAST MEDICAL / SURGICAL / SOCIAL / FAMILY HISTORY      has a past medical history of Acute HFrEF (heart failure with reduced ejection fraction) ([facility]), Acute on chronic combined systolic and diastolic congestive heart failure (HCC), Acute on chronic congestive heart failure (HCC), Acute respiratory failure with hypoxia (HCC), Anemia, Anxiety, CAD (coronary artery disease), Cardiac defibrillator in place, Cardiomyopathy (HCC), CHF (congestive heart failure) ([facility]), Chronic combined systolic and diastolic heart failure (HCC) s/[ AICD placed, Chronic kidney disease, Complex sleep apnea syndrome, Diabetic retinopathy (HCC), Diverticulitis, DJD (degenerative joint disease), Edentulous, Gout, HTN (hypertension), Hyperlipidemia, Hypertension, Iron deficiency anemia, Ischemic cardiomyopathy, Morbid obesity (HCC),  Obesity, TANNER variably compliant with BiPAP, Osteoarthritis, PAF (paroxysmal atrial fibrillation) (HCC), Psychophysiologic insomnia, Thyroid disease, Type II or unspecified type diabetes mellitus without mention of complication, not stated as uncontrolled, and Unspecified sleep apnea.       has a past surgical history that includes Colonoscopy (2007); Cardiac catheterization (2007); Cardiac catheterization (2013); Coronary angioplasty (, ); Cardiac defibrillator placement (2015); bone marrow biopsy (2012); pr colsc flx w/rmvl of tumor polyp lesion snare tq (N/A, 2017); Colonoscopy (2021); Colonoscopy (N/A, 2021); Colonoscopy (N/A, 2022); Cardiac procedure (N/A, 2024); Cardiac procedure (N/A, 2024); and Cardiac procedure (N/A, 2024).      Social History     Socioeconomic History    Marital status: Single     Spouse name: Not on file    Number of children: 0    Years of education: 12    Highest education level: High school graduate   Occupational History    Occupation: retired   Tobacco Use    Smoking status: Former     Current packs/day: 0.00     Average packs/day: 1 pack/day for 3.0 years (3.0 ttl pk-yrs)     Types: Cigarettes     Start date: 1971     Quit date: 1974     Years since quittin.6    Smokeless tobacco: Never   Vaping Use    Vaping status: Never Used   Substance and Sexual Activity    Alcohol use: Not Currently     Alcohol/week: 0.0 standard drinks of alcohol     Comment: 15 years ago    Drug use: Not Currently     Types: Marijuana (Weed)     Comment: hx THC quit approx     Sexual activity: Not Currently     Partners: Female   Other Topics Concern    Not on file   Social History Narrative    Not on file     Social Determinants of Health     Financial Resource Strain: Low Risk  (2024)    Overall Financial Resource Strain (CARDIA)     Difficulty of Paying Living Expenses: Not hard at all   Food Insecurity: No

## 2024-08-16 NOTE — ED NOTES
ED to inpatient nurses report      Chief Complaint:  Chief Complaint   Patient presents with   • Abnormal Lab     Present to ED from: home    MOA:     LOC: alert and orientated to name, place, date  Mobility: Independent  Oxygen Baseline: room air    Current needs required: room air   Pending ED orders: none  Present condition: Stable    Why did the patient come to the ED? Abnormal Lab Values  What is the plan? Admit for Nephrology Consult.  Any procedures or intervention occur? I.V, Labs, Urine.  Any safety concerns?? None    Mental Status:  Level of Consciousness: Alert (0)    Psych Assessment:   Psychosocial  Psychosocial (WDL): Within Defined Limits  Vital signs   Vitals:    08/16/24 1430 08/16/24 1445 08/16/24 1500 08/16/24 1515   BP: (!) 84/48 (!) 83/44 (!) 81/57    Pulse: 58 57 79 63   Resp: 11 15 22 12   Temp:       TempSrc:       SpO2: 97% 98% 99% 97%        Vitals:  Patient Vitals for the past 24 hrs:   BP Temp Temp src Pulse Resp SpO2   08/16/24 1515 -- -- -- 63 12 97 %   08/16/24 1500 (!) 81/57 -- -- 79 22 99 %   08/16/24 1445 (!) 83/44 -- -- 57 15 98 %   08/16/24 1430 (!) 84/48 -- -- 58 11 97 %   08/16/24 1415 (!) 89/51 -- -- 65 13 96 %   08/16/24 1400 (!) 109/59 -- -- 64 21 97 %   08/16/24 1358 109/71 97.9 °F (36.6 °C) Oral 67 17 100 %      Visit Vitals  BP (!) 81/57   Pulse 63   Temp 97.9 °F (36.6 °C) (Oral)   Resp 12   SpO2 97%        LDAs:   Peripheral IV 08/16/24 Left Antecubital (Active)       Ambulatory Status:  Presents to emergency department  because of falls (Syncope, seizure, or loss of consciousness): No, Age > 70: Yes, Altered Mental Status, Intoxication with alcohol or substance confusion (Disorientation, impaired judgment, poor safety awaremess, or inability to follow instructions): No, Impaired Mobility: Ambulates or transfers with assistive devices or assistance; Unable to ambulate or transer.: Yes, Nursing Judgement: Yes    Diagnosis:  DISPOSITION Decision To Admit 08/16/2024 03:11:12

## 2024-08-17 ENCOUNTER — APPOINTMENT (OUTPATIENT)
Dept: GENERAL RADIOLOGY | Age: 69
End: 2024-08-17
Payer: COMMERCIAL

## 2024-08-17 PROBLEM — N18.31 STAGE 3A CHRONIC KIDNEY DISEASE (HCC): Status: ACTIVE | Noted: 2024-08-17

## 2024-08-17 PROBLEM — Z86.39 HISTORY OF TYPE 2 DIABETES MELLITUS: Status: ACTIVE | Noted: 2024-08-17

## 2024-08-17 LAB
ANION GAP SERPL CALCULATED.3IONS-SCNC: 15 MMOL/L (ref 9–16)
BASOPHILS # BLD: 0.04 K/UL (ref 0–0.2)
BASOPHILS NFR BLD: 0 % (ref 0–2)
BILIRUB UR QL STRIP: NEGATIVE
BODY TEMPERATURE: 37
BUN SERPL-MCNC: 40 MG/DL (ref 8–23)
C3 SERPL-MCNC: 129 MG/DL (ref 90–180)
C4 SERPL-MCNC: 34 MG/DL (ref 10–40)
CALCIUM SERPL-MCNC: 7.5 MG/DL (ref 8.6–10.4)
CHLORIDE SERPL-SCNC: 100 MMOL/L (ref 98–107)
CHLORIDE UR-SCNC: 26 MMOL/L
CLARITY UR: CLEAR
CO2 SERPL-SCNC: 21 MMOL/L (ref 20–31)
COHGB MFR BLD: 1.5 % (ref 0–5)
COLOR UR: YELLOW
COMMENT: ABNORMAL
CREAT SERPL-MCNC: 3.2 MG/DL (ref 0.7–1.2)
CREAT UR-MCNC: 49.3 MG/DL (ref 39–259)
EOSINOPHIL # BLD: 0.27 K/UL (ref 0–0.44)
EOSINOPHILS RELATIVE PERCENT: 3 % (ref 1–4)
ERYTHROCYTE [DISTWIDTH] IN BLOOD BY AUTOMATED COUNT: 18.1 % (ref 11.8–14.4)
EST. AVERAGE GLUCOSE BLD GHB EST-MCNC: 140 MG/DL
EST. AVERAGE GLUCOSE BLD GHB EST-MCNC: 140 MG/DL
FIO2 ON VENT: ABNORMAL %
GFR, ESTIMATED: 20 ML/MIN/1.73M2
GLUCOSE BLD-MCNC: 185 MG/DL (ref 75–110)
GLUCOSE BLD-MCNC: 195 MG/DL (ref 75–110)
GLUCOSE BLD-MCNC: 200 MG/DL (ref 75–110)
GLUCOSE BLD-MCNC: 227 MG/DL (ref 75–110)
GLUCOSE SERPL-MCNC: 205 MG/DL (ref 74–99)
GLUCOSE UR STRIP-MCNC: ABNORMAL MG/DL
HBA1C MFR BLD: 6.5 % (ref 4–6)
HBA1C MFR BLD: 6.5 % (ref 4–6)
HCO3 VENOUS: 23.9 MMOL/L (ref 24–30)
HCT VFR BLD AUTO: 39.8 % (ref 40.7–50.3)
HGB BLD-MCNC: 12.5 G/DL (ref 13–17)
HGB UR QL STRIP.AUTO: NEGATIVE
IMM GRANULOCYTES # BLD AUTO: 0.03 K/UL (ref 0–0.3)
IMM GRANULOCYTES NFR BLD: 0 %
KETONES UR STRIP-MCNC: NEGATIVE MG/DL
LEUKOCYTE ESTERASE UR QL STRIP: NEGATIVE
LYMPHOCYTES NFR BLD: 1.22 K/UL (ref 1.1–3.7)
LYMPHOCYTES RELATIVE PERCENT: 11 % (ref 24–43)
MAGNESIUM SERPL-MCNC: 1.9 MG/DL (ref 1.6–2.4)
MCH RBC QN AUTO: 28.2 PG (ref 25.2–33.5)
MCHC RBC AUTO-ENTMCNC: 31.4 G/DL (ref 28.4–34.8)
MCV RBC AUTO: 89.8 FL (ref 82.6–102.9)
MONOCYTES NFR BLD: 0.54 K/UL (ref 0.1–1.2)
MONOCYTES NFR BLD: 5 % (ref 3–12)
MRSA, DNA, NASAL: NEGATIVE
NEGATIVE BASE EXCESS, VEN: 0.3 MMOL/L (ref 0–2)
NEUTROPHILS NFR BLD: 81 % (ref 36–65)
NEUTS SEG NFR BLD: 8.63 K/UL (ref 1.5–8.1)
NITRITE UR QL STRIP: NEGATIVE
NRBC BLD-RTO: 0 PER 100 WBC
O2 SAT, VEN: 79 % (ref 60–85)
OSMOLALITY UR: 280 MOSM/KG (ref 80–1300)
PCO2 VENOUS: 39.8 MM HG (ref 39–55)
PH UR STRIP: 6 [PH] (ref 5–8)
PH VENOUS: 7.4 (ref 7.32–7.42)
PLATELET # BLD AUTO: 209 K/UL (ref 138–453)
PMV BLD AUTO: 12.1 FL (ref 8.1–13.5)
PO2 VENOUS: 45.6 MM HG (ref 30–50)
POTASSIUM SERPL-SCNC: 3.1 MMOL/L (ref 3.7–5.3)
POTASSIUM, UR: 19.4 MMOL/L
PROT UR STRIP-MCNC: NEGATIVE MG/DL
RBC # BLD AUTO: 4.43 M/UL (ref 4.21–5.77)
RBC # BLD: ABNORMAL 10*6/UL
SODIUM SERPL-SCNC: 136 MMOL/L (ref 136–145)
SODIUM UR-SCNC: 30 MMOL/L
SODIUM UR-SCNC: 31 MMOL/L
SP GR UR STRIP: 1.01 (ref 1–1.03)
SPECIMEN DESCRIPTION: NORMAL
TOTAL PROTEIN, URINE: 11 MG/DL
UROBILINOGEN UR STRIP-ACNC: NORMAL EU/DL (ref 0–1)
WBC OTHER # BLD: 10.7 K/UL (ref 3.5–11.3)

## 2024-08-17 PROCEDURE — 80048 BASIC METABOLIC PNL TOTAL CA: CPT

## 2024-08-17 PROCEDURE — 2580000003 HC RX 258: Performed by: EMERGENCY MEDICINE

## 2024-08-17 PROCEDURE — 2580000003 HC RX 258

## 2024-08-17 PROCEDURE — 81003 URINALYSIS AUTO W/O SCOPE: CPT

## 2024-08-17 PROCEDURE — 82947 ASSAY GLUCOSE BLOOD QUANT: CPT

## 2024-08-17 PROCEDURE — 85025 COMPLETE CBC W/AUTO DIFF WBC: CPT

## 2024-08-17 PROCEDURE — 84165 PROTEIN E-PHORESIS SERUM: CPT

## 2024-08-17 PROCEDURE — 86160 COMPLEMENT ANTIGEN: CPT

## 2024-08-17 PROCEDURE — 84133 ASSAY OF URINE POTASSIUM: CPT

## 2024-08-17 PROCEDURE — 83516 IMMUNOASSAY NONANTIBODY: CPT

## 2024-08-17 PROCEDURE — 71045 X-RAY EXAM CHEST 1 VIEW: CPT

## 2024-08-17 PROCEDURE — 99222 1ST HOSP IP/OBS MODERATE 55: CPT | Performed by: INTERNAL MEDICINE

## 2024-08-17 PROCEDURE — 86225 DNA ANTIBODY NATIVE: CPT

## 2024-08-17 PROCEDURE — 2500000003 HC RX 250 WO HCPCS

## 2024-08-17 PROCEDURE — 6370000000 HC RX 637 (ALT 250 FOR IP)

## 2024-08-17 PROCEDURE — 6360000002 HC RX W HCPCS: Performed by: EMERGENCY MEDICINE

## 2024-08-17 PROCEDURE — 83036 HEMOGLOBIN GLYCOSYLATED A1C: CPT

## 2024-08-17 PROCEDURE — 84166 PROTEIN E-PHORESIS/URINE/CSF: CPT

## 2024-08-17 PROCEDURE — 2000000000 HC ICU R&B

## 2024-08-17 PROCEDURE — 84155 ASSAY OF PROTEIN SERUM: CPT

## 2024-08-17 PROCEDURE — 6370000000 HC RX 637 (ALT 250 FOR IP): Performed by: HOSPITALIST

## 2024-08-17 PROCEDURE — 83735 ASSAY OF MAGNESIUM: CPT

## 2024-08-17 PROCEDURE — 87641 MR-STAPH DNA AMP PROBE: CPT

## 2024-08-17 PROCEDURE — 86335 IMMUNFIX E-PHORSIS/URINE/CSF: CPT

## 2024-08-17 PROCEDURE — 87040 BLOOD CULTURE FOR BACTERIA: CPT

## 2024-08-17 PROCEDURE — 84300 ASSAY OF URINE SODIUM: CPT

## 2024-08-17 PROCEDURE — 84156 ASSAY OF PROTEIN URINE: CPT

## 2024-08-17 PROCEDURE — 82436 ASSAY OF URINE CHLORIDE: CPT

## 2024-08-17 PROCEDURE — 36415 COLL VENOUS BLD VENIPUNCTURE: CPT

## 2024-08-17 PROCEDURE — 82805 BLOOD GASES W/O2 SATURATION: CPT

## 2024-08-17 PROCEDURE — 86038 ANTINUCLEAR ANTIBODIES: CPT

## 2024-08-17 PROCEDURE — 82570 ASSAY OF URINE CREATININE: CPT

## 2024-08-17 PROCEDURE — 99291 CRITICAL CARE FIRST HOUR: CPT | Performed by: INTERNAL MEDICINE

## 2024-08-17 RX ORDER — INSULIN LISPRO 100 [IU]/ML
0-4 INJECTION, SOLUTION INTRAVENOUS; SUBCUTANEOUS NIGHTLY
Status: DISCONTINUED | OUTPATIENT
Start: 2024-08-17 | End: 2024-08-22 | Stop reason: HOSPADM

## 2024-08-17 RX ORDER — GLUCAGON 1 MG/ML
1 KIT INJECTION PRN
Status: DISCONTINUED | OUTPATIENT
Start: 2024-08-17 | End: 2024-08-22 | Stop reason: HOSPADM

## 2024-08-17 RX ORDER — NOREPINEPHRINE BITARTRATE 0.06 MG/ML
1-100 INJECTION, SOLUTION INTRAVENOUS CONTINUOUS
Status: DISCONTINUED | OUTPATIENT
Start: 2024-08-17 | End: 2024-08-17

## 2024-08-17 RX ORDER — INSULIN LISPRO 100 [IU]/ML
0-8 INJECTION, SOLUTION INTRAVENOUS; SUBCUTANEOUS
Status: DISCONTINUED | OUTPATIENT
Start: 2024-08-17 | End: 2024-08-22 | Stop reason: HOSPADM

## 2024-08-17 RX ORDER — POTASSIUM CHLORIDE 1500 MG/1
40 TABLET, EXTENDED RELEASE ORAL ONCE
Status: COMPLETED | OUTPATIENT
Start: 2024-08-17 | End: 2024-08-17

## 2024-08-17 RX ORDER — MIDODRINE HYDROCHLORIDE 5 MG/1
10 TABLET ORAL
Status: DISCONTINUED | OUTPATIENT
Start: 2024-08-17 | End: 2024-08-20

## 2024-08-17 RX ORDER — SODIUM CHLORIDE 9 MG/ML
INJECTION, SOLUTION INTRAVENOUS CONTINUOUS
Status: DISCONTINUED | OUTPATIENT
Start: 2024-08-17 | End: 2024-08-22

## 2024-08-17 RX ORDER — NOREPINEPHRINE BITARTRATE 0.06 MG/ML
1-100 INJECTION, SOLUTION INTRAVENOUS CONTINUOUS
Status: DISCONTINUED | OUTPATIENT
Start: 2024-08-17 | End: 2024-08-22 | Stop reason: HOSPADM

## 2024-08-17 RX ORDER — MAGNESIUM SULFATE 1 G/100ML
1000 INJECTION INTRAVENOUS ONCE
Status: COMPLETED | OUTPATIENT
Start: 2024-08-17 | End: 2024-08-17

## 2024-08-17 RX ORDER — SODIUM CHLORIDE 9 MG/ML
INJECTION, SOLUTION INTRAVENOUS CONTINUOUS
Status: DISCONTINUED | OUTPATIENT
Start: 2024-08-17 | End: 2024-08-17

## 2024-08-17 RX ORDER — DEXTROSE MONOHYDRATE 100 MG/ML
INJECTION, SOLUTION INTRAVENOUS CONTINUOUS PRN
Status: DISCONTINUED | OUTPATIENT
Start: 2024-08-17 | End: 2024-08-22 | Stop reason: HOSPADM

## 2024-08-17 RX ADMIN — APIXABAN 5 MG: 5 TABLET, FILM COATED ORAL at 20:47

## 2024-08-17 RX ADMIN — ASPIRIN 81 MG 81 MG: 81 TABLET ORAL at 08:08

## 2024-08-17 RX ADMIN — TICAGRELOR 90 MG: 90 TABLET ORAL at 20:47

## 2024-08-17 RX ADMIN — MAGNESIUM SULFATE HEPTAHYDRATE 1000 MG: 1 INJECTION, SOLUTION INTRAVENOUS at 09:24

## 2024-08-17 RX ADMIN — ALLOPURINOL 300 MG: 300 TABLET ORAL at 09:32

## 2024-08-17 RX ADMIN — MIDODRINE HYDROCHLORIDE 10 MG: 5 TABLET ORAL at 17:55

## 2024-08-17 RX ADMIN — INSULIN LISPRO 1 UNITS: 100 INJECTION, SOLUTION INTRAVENOUS; SUBCUTANEOUS at 12:22

## 2024-08-17 RX ADMIN — AMIODARONE HYDROCHLORIDE 200 MG: 200 TABLET ORAL at 08:08

## 2024-08-17 RX ADMIN — SODIUM CHLORIDE, PRESERVATIVE FREE 10 ML: 5 INJECTION INTRAVENOUS at 08:09

## 2024-08-17 RX ADMIN — MIDODRINE HYDROCHLORIDE 10 MG: 5 TABLET ORAL at 12:13

## 2024-08-17 RX ADMIN — NOREPINEPHRINE BITARTRATE 11 MCG/MIN: 0.06 INJECTION, SOLUTION INTRAVENOUS at 21:21

## 2024-08-17 RX ADMIN — LEVOTHYROXINE SODIUM 88 MCG: 0.09 TABLET ORAL at 08:03

## 2024-08-17 RX ADMIN — Medication 5 MCG/MIN: at 00:06

## 2024-08-17 RX ADMIN — TICAGRELOR 90 MG: 90 TABLET ORAL at 08:03

## 2024-08-17 RX ADMIN — APIXABAN 5 MG: 5 TABLET, FILM COATED ORAL at 08:08

## 2024-08-17 RX ADMIN — EMPAGLIFLOZIN 10 MG: 10 TABLET, FILM COATED ORAL at 08:08

## 2024-08-17 RX ADMIN — SODIUM CHLORIDE: 9 INJECTION, SOLUTION INTRAVENOUS at 09:22

## 2024-08-17 RX ADMIN — ATORVASTATIN CALCIUM 80 MG: 80 TABLET, FILM COATED ORAL at 08:08

## 2024-08-17 RX ADMIN — POTASSIUM CHLORIDE 40 MEQ: 1500 TABLET, EXTENDED RELEASE ORAL at 08:08

## 2024-08-17 ASSESSMENT — PAIN SCALES - GENERAL
PAINLEVEL_OUTOF10: 0

## 2024-08-17 NOTE — CONSULTS
Renal Consult Note    Patient :  Claudio Graham; 69 y.o. MRN# [id number]  Location:  3016/3016-01  Attending:  Nik Adams MD  Admit Date:  8/16/2024   Hospital Day: 1    Reason for Consult:     Asked by Nik Haskins MD to see for SKYLER/Elevated Creatinine.    History Obtained From:     patient, electronic medical record    History of Present Illness:     Claudio Graham; 69 y.o. male with past medical history of CKD stage IIIa, AICD placement with EF of 10 to 15% as of the most recent echo in July 2024, hypertension, hyperlipidemia, MGUS, amiodarone induced thyroid dysfunction, BPH, congenital meatal stenosis,complex renal cyst, CAD status post stenting x 5, cardiomyopathy, type 2 diabetes, paroxysmal A-fib, and paroxysmal V. Tach/V-fib who presented to the hospital with the chief complaint of acute on chronic renal failure.    Patient had presented to his PCP office for follow-up visit on 8/14/2024 where it was discovered via recent lab work he had acutely worsened renal function.  Patient had a recent admission for left heart catheterization on 7/18/2024, ultimately requiring insertion of intra-aortic balloon pump and intubation, and was discharged to [facility] on 7/26/2024.  Follow-up lab work obtained on 7/31/24 revealed his creatinine had risen to 1.3.  Apparently patient had repeat lab work done sometime prior to his visit to his PCP on 8/14/2024 which indicated a creatinine of 4.1 and patient was asked to present to the ED that day, however did not end up presenting until 8/16/2024.  Baseline creatinine prior to this admission appears to be in the 1.1-1.3 range    Upon admission to the emergency department, it was discovered that patient was hypotensive with a systolic blood pressure in the 80s and heart rate in the 60s.  Patient was initially given 1 L of IV fluid, 10 of ProAmatine, started on normal saline at 75 mL/hr and was admitted to the floor, however blood pressure was not able to be  creatinine was around 1.3 around the time of discharge.  He tells me that he had very poor oral intake for the last several days and has lost about 18 pounds of weight.  He finally got out of the rehab but continued to have very poor oral intake went to his PCP, had some labs drawn and PCP called him to rush to the ER because his serum creatinine had gone up to 4.1.  In the ER labs were repeated and his creatinine did was 4.0 but his potassium was okay.  He was quite hypotensive.  He was started on IV fluids and was admitted to the floor.  Blood pressure went down again in the 80s and he was transferred to the ICU and Levophed was started.  Pinto was inserted.  Apparently his postvoid residual on a bladder scan was less than 50  Following improvement in his blood pressures and hydration his urine output has steadily picked up and his serum creatinine has gone down to 3.2.  Kidney ultrasound was done and it did not show any evidence of hydronephrosis.  Prior serology has been reviewed and it was relatively unremarkable including paraprotein labs which did not show any evidence of monoclonal protein.  Vitals show a blood pressure of 106 systolic on low-dose Levophed.  On exam he sounds relatively clear.  He does not have a JVD.  He does not exhibit any significant edema.  Labs show serum creatinine down to 3.2, potassium was 3.1 and that is being replaced.  His serum creatinine on admission was 4.0.  His baseline serum creatinine is around 1.1-1.3 from available records.  Assessment SKYLER on CKD likely from prerenal insult from decreased oral intake, ongoing diuretic use and hypoperfusion with hypotension.  Serum creatinine peaked to 4.0, improving with hydration and stabilizing blood pressures.  Imaging studies have not shown any evidence of obstruction or retention.  Plan:  Urine studies reviewed, serology not to be duplicated as previously has been checked x 2.  Encouraging improvement in serum creatinine with

## 2024-08-17 NOTE — CONSULTS
[facility]    Consult / H&P               Today's Date: 8/17/2024  Patient Name: Claudio Graham  Date of admission: 8/16/2024  1:31 PM  Patient's age: 69 y.o., 1955  Admission Dx: SKYLER (acute kidney injury) (HCC) [N17.9]  Acute kidney injury superimposed on CKD (HCC) [N17.9, N18.9]    Requesting Physician: Patrick Newman MD    Cardiac Evaluation Reason:  Persistent hypotension with HFrEF    History Obtained From: patient/chart review     History of Present Illness:    This patient 69 y.o. years old male with past medical history as below.     Patient with PMH of paroxysmal A-fib, CAD s/p PCI, ischemic cardiomyopathy s/p ICD, Hx of VT/V-fib cardiac arrest, HTN, HLD, T2DM, TANNER, hypothyroidism, CKD stage III and MGUS, came to ER after he received a call from PCP asking him to go to ER for evaluation of SKYLER.    Patient was recently admitted to [facility] with VT storm and multiple ICD shocks at which time he had a left heart cath and TIFFANY to ostial LAD with balloon pump.  Later he was discharged to SNF on dual antiplatelets, Eliquis and amiodarone.  He was seen at CHF clinic 3 days ago where he reported not using Brilinta and amiodarone for 2 days since his discharge from SNF as he was not given those medications.  Patient was provided refills.    Yesterday in ER he reported some shortness of breath with exertion but denied chest pain, dizziness and lightheadedness.  On evaluation patient was noted to be hypotensive with systolic blood pressure in 80s and pulse in 60s.  Initial workup found SKYLER with creatinine of 4.1 from a baseline 1.2-1.3, proBNP 5100 and high-sensitivity troponin elevated at baseline 72.  Initial EKG showed sinus rhythm with right bundle branch block.  Chest x-ray showed mild vascular congestion.  Patient was initially admitted with medicine floor but later transferred to MICU after rapid response was called for hypotension.    Cardiology consulted for    metoprolol succinate (TOPROL XL) 50 MG extended release tablet Take 1 tablet by mouth daily    Provider, Historical, MD   Handicap Placard MISC by Does not apply route Exp: 1/2025 12/23/20   Vanessa Mckenna APRN - CNP   Alcohol Swabs (ALCOHOL PREP) 70 % PADS USE AS DIRECTED 5/31/19   Cande Perez MD       Allergies:  Zetia [ezetimibe], Bactrim, Cephalexin, Cephalexin, and Sulfamethoxazole-trimethoprim    Social History:   reports that he quit smoking about 50 years ago. His smoking use included cigarettes. He started smoking about 53 years ago. He has a 3 pack-year smoking history. He has never used smokeless tobacco. He reports that he does not currently use alcohol. He reports that he does not currently use drugs after having used the following drugs: Marijuana (Weed).     Family History: family history includes Cancer in his mother; Heart Disease in his maternal grandmother, maternal uncle, and mother.     REVIEW OF SYSTEMS:    Constitutional: Negative for fatigue, weight loss, loss of appetite   Cardiovascular: as per HPI  Respiratory: as per HPI  Gastrointestinal: Negative for abdominal pain, N/V  Genitourinary: No dysuria, trouble voiding, or hematuria.  Musculoskeletal:  No gait disturbance, No weakness or joint complaints.  Neurological: No headache, diplopia, change in muscle strength, numbness or tingling. No change in gate.   Endocrine: No temperature intolerance. No excessive thirst, fluid intake, or urination. No tremor.  Hematologic/Lymphatic: No abnormal bruising or bleeding    PHYSICAL EXAM:      BP (!) 102/46   Pulse 59   Temp 97.3 °F (36.3 °C)   Resp 15   Ht 1.626 m (5' 4\")   Wt 85 kg (187 lb 6.3 oz)   SpO2 97%   BMI 32.17 kg/m²    Constitutional and General Appearance: alert, cooperative, in no distress   HEENT: atraumatic, normocephalic.   Respiratory:  Clear to auscultation bilaterally  Cardiovascular:  Regular S1 and S2.  No JVD  Peripheral pulses are symmetrical and full

## 2024-08-17 NOTE — PLAN OF CARE
Problem: Chronic Conditions and Co-morbidities  Goal: Patient's chronic conditions and co-morbidity symptoms are monitored and maintained or improved  8/17/2024 0951 by Jules Escobedo RN  Outcome: Progressing  8/17/2024 0341 by Sarah Gross RN  Outcome: Progressing  Flowsheets (Taken 8/16/2024 2000 by Tammy Weber, RN)  Care Plan - Patient's Chronic Conditions and Co-Morbidity Symptoms are Monitored and Maintained or Improved: Monitor and assess patient's chronic conditions and comorbid symptoms for stability, deterioration, or improvement     Problem: Safety - Adult  Goal: Free from fall injury  8/17/2024 0951 by Jules Escobedo RN  Outcome: Progressing  8/17/2024 0341 by Sarah Gross RN  Outcome: Progressing     Problem: Discharge Planning  Goal: Discharge to home or other facility with appropriate resources  Outcome: Progressing  Flowsheets (Taken 8/16/2024 2000 by Tammy Weber, RN)  Discharge to home or other facility with appropriate resources:   Identify barriers to discharge with patient and caregiver   Arrange for needed discharge resources and transportation as appropriate   Identify discharge learning needs (meds, wound care, etc)     Problem: Pain  Goal: Verbalizes/displays adequate comfort level or baseline comfort level  Outcome: Progressing     Problem: Skin/Tissue Integrity  Goal: Absence of new skin breakdown  8/17/2024 0951 by Jules Escobedo RN  Outcome: Progressing  8/17/2024 0341 by Sarah Gross RN  Outcome: Progressing

## 2024-08-17 NOTE — SIGNIFICANT EVENT
Response called to bedside, patient is a 69-year-old male with SKYLER with CKD, hyponatremic, hypochloremic metabolic acidosis, as well as CHF with an EF of 10% and AICD, as well as diabetes, hypertension.  Patient was noted to have maps in the low 60s, patient was put into Trendelenburg, and a small fluid bolus was given.  There was also albumin that was given.  Patient was unable to have midodrine.    Per primary team, the patient has been relatively soft on pressures all day, and they were not able to manage this with conservative management.  Given that the patient does have a low ejection fraction, in the setting of hypotension, the patient will need Levophed.  The patient was started on Levophed, and will be transferred to the intensive care unit.

## 2024-08-17 NOTE — H&P
Heart Disease Maternal Grandmother        PHYSICAL EXAM     Vitals: BP (!) 105/48   Pulse 67   Temp 97.8 °F (36.6 °C) (Oral)   Resp 13   Ht 1.626 m (5' 4\")   Wt 87 kg (191 lb 12.8 oz)   SpO2 98%   BMI 32.92 kg/m²   Tmax: Temp (24hrs), Av.8 °F (36.6 °C), Min:97.7 °F (36.5 °C), Max:97.9 °F (36.6 °C)    Last Body weight:   Wt Readings from Last 3 Encounters:   24 87 kg (191 lb 12.8 oz)   08/15/24 86.6 kg (191 lb)   24 85.7 kg (189 lb)     Body Mass Index : Body mass index is 32.92 kg/m².      PHYSICAL EXAMINATION:  Constitutional: This is a well developed, well nourished, 30-34.9 - Obesity Grade I 69 y.o. year old male who is alert, oriented, cooperative and in no apparent distress.  Head:normocephalic and atraumatic.    Respiratory: Chest was symmetrical without dullness to percussion.  Breath sounds bilaterally were clear to auscultation. There were no wheezes, rhonchi or rales. There is no intercostal retraction or use of accessory muscles. No egophony noted.   Cardiovascular: Regular without murmur, clicks, gallops or rubs.   Abdomen: Slightly rounded and soft without organomegaly. No rebound, rigidity or guarding was appreciated.    Lymphatic: No lymphadenopathy.  Musculoskeletal: Normal curvature of the spine.  No gross muscle weakness.    Extremities:  No lower extremity edema, ulcerations, tenderness, varicosities or erythema.  Muscle size, tone and strength are normal.  No involuntary movements are noted.    Skin:  Warm and dry.  Good color, turgor and pigmentation. No lesions or scars.  No cyanosis or clubbing  Neurological/Psychiatric: The patient's general behavior, level of consciousness, thought content and emotional status is normal.          INVESTIGATIONS     Laboratory Testing:     Recent Results (from the past 24 hour(s))   Basic Metabolic Panel    Collection Time: 24 11:58 AM   Result Value Ref Range    Sodium 135 (L) 136 - 145 mmol/L    Potassium 3.8 3.7 - 5.3    SODIUM, URINE, RANDOM    Collection Time: 08/16/24 11:25 PM   Result Value Ref Range    Sodium, Ur 30 mmol/L       Imaging:   US RENAL COMPLETE    Result Date: 8/16/2024  No hydronephrosis.       ASSESSMENT & PLAN     ASSESSMENT / PLAN:     IMPRESSION  This is a 69 y.o. male who presented with abnormal renal function tests and found to have acute on chronic renal failure. Patient admitted to inpatient status for monitoring because of acute on chronic kidney failure.    Principal Problem:    Acute kidney injury superimposed on CKD (HCC)  Plan:   Monitor input and output.  Avoid ACE inhibitors/ARB's.  Monitor blood pressure.    Avoid nephrotoxic drugs    Congestive heart failure with reduced ejection fraction  Last echo on 7/19/2024 showed EF 10 to 15%.  Cardiology is consulted  Hold Lasix  Continue Jardiance 10 Mg.  Metoprolol.  Hold isosorbide mononitrate.  DVT ppx: Patient is on Eliquis 5 Mg  GI ppx:     PT/OT/SW to be consulted  Discharge Planning: To be decided    Meri Tellez MD  Internal Medicine Resident, PGY-1  [facility]; [place], [place]  8/16/24, 07:30 AM

## 2024-08-17 NOTE — PROGRESS NOTES
Attending Physician Statement  I have discussed the case, including pertinent history and exam findings with the resident and the team.  I have seen and examined the patient and the key elements of the encounter have been performed by me.  I agree with the assessment, plan and orders as documented by the resident.      In Brief:  Claudio Graham is a 69 y.o. male, who is on day 0 of hospital stay, presented with Abnormal Lab  , found to have Acute kidney injury superimposed on CKD (HCC).    Principal Problem:    Acute kidney injury superimposed on CKD (HCC)  Resolved Problems:    * No resolved hospital problems. *      Plan:       SKYLER on CKD  - Sec to hypoperfusion from low BP  - Avoid all HTN meds  - Gentle hydration given low EF  - Avoid nephrotoxic agents  - Renal US done and normal  - Check FeNa  - Nephrology consulted from ED and will await input  - Monitor BMP daily  - Expect renal recovery    2. Hyponatremia, Hypochloremia and Metabolic acidosis  - Likely sec to diuretics and SKYLER  - Monitor BMP daily  - Stable    3. Elevated TSH and Free T4  - Normal T4 and T3  - Pt has amiodarone induced hypothyroidism  - Continue Levothyroxine as prescribed  - Continue Amiodarone due to life-threatening arrhythmia    4. 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 DVT and GI prophylaxis        REG SPARKS MD   Attending Physician, Internal Medicine Residency Program  8/16/2024, 9:43 PM

## 2024-08-17 NOTE — H&P
Critical Care - History and Physical Examination    Patient's name:  Claudio Graham  Medical Record Number: [id number]  Patient's account/billing number: [id number]  Patient's YOB: 1955  Age: 69 y.o.  Date of Admission: 8/16/2024  1:31 PM  Reason of ICU admission:   Date of History and Physical Examination: 8/17/2024      Primary Care Physician: Vanessa Mckenna APRN - CNP  Attending Physician:    Code Status: Full Code    Chief complaint: Abnormal labs SKYLER on CKD    Reason for ICU admission: Hypotension      History Of Present Illness:   History was obtained from chart review.      Claudio Graham is a 69 y.o. with significant past medical history of CKD stage IIIa, CHF with 5 stents, AICD placement, EF of 10 to 15% from echo post-stenting in July 2024, hypertension, hyperlipidemia, MGUS, most recent stent was on 7/18, LAD was 100% stenosed.  Patient follows with CHF clinic.  Seems to be on Lasix 40 mg twice daily at home.  Patient does have a history of amiodarone induced thyroid dysfunction amiodarone was stopped a few years back however had since recent onset of V-fib patient was restarted on amiodarone.  Patient's last TSH was 6.49, T4 was 2.1.  Patient is on 88 of Synthroid at home.    Patient presented to the emergency department after abnormal lab found to have SKYLER on CKD.  While in emergency department he was found to have low blood pressure, per report his blood pressure is normally systolic of 90s and diastolic of 60s.  Patient was given 1 L of IV fluids, 10 of midodrine and started on 75 mL of normal saline.  Patient was admitted initially by the internal medicine teaching service, team 1.  proBNP was completed was 5100, baseline seems to be around 2000.  No crackles on physical exam are noted.    Rapid response was called around midnight for hypotension.  Blood pressures were 80s over 30s.  Patient was placed in the Trendelenburg position maps did come up to the low 60s.   Take 1 tablet by mouth 2 times daily 7/26/24   Marianne Smith APRN - CNP   aspirin 81 MG chewable tablet Take 1 tablet by mouth daily 7/27/24   Marianne Smith APRN - CNP   atorvastatin (LIPITOR) 80 MG tablet Take 1 tablet by mouth daily 7/27/24   Marianne Smith APRN - CNP   ticagrelor (BRILINTA) 90 MG TABS tablet Take 1 tablet by mouth 2 times daily 7/26/24   Marianne Smith APRN - CNP   metFORMIN (GLUCOPHAGE) 1000 MG tablet take 1 tablet by mouth twice a day with meals 7/11/24   Mignon Nielson APRN - CNP   Lancets (ONETOUCH DELICA PLUS [id number]) MISC use 1 LANCET to TEST BLOOD SUGAR daily 7/1/24   Vanessa Mckenna APRN - CNP   isosorbide mononitrate (IMDUR) 30 MG extended release tablet take 1 tablet by mouth once daily 4/30/24   Vanessa Mckenna APRN - CNP   FEROSUL 325 (65 Fe) MG tablet take 1 tablet by mouth once daily with breakfast 4/30/24   Vanessa Mckenna APRN - CNP   ONETOUCH VERIO strip use 1 TEST STRIP to TEST BLOOD SUGAR once daily and if needed for SYMPTOMS OF IRREGULAR BLOOD SUGAR 3/4/24   Vanessa Mceknna APRN - CNP   FARXIGA 10 MG tablet take 1 tablet by mouth every morning 3/4/24   Vanessa Mckenna APRN - CNP   metoprolol succinate (TOPROL XL) 50 MG extended release tablet Take 1 tablet by mouth daily    Provider, Historical, MD   Handicap Placard MISC by Does not apply route Exp: 1/2025 12/23/20   Vanessa Mckenna APRN - CNP   Alcohol Swabs (ALCOHOL PREP) 70 % PADS USE AS DIRECTED 5/31/19   Cande Perez MD       Social History:   TOBACCO:   reports that he quit smoking about 50 years ago. His smoking use included cigarettes. He started smoking about 53 years ago. He has a 3 pack-year smoking history. He has never used smokeless tobacco.  ETOH:   reports that he does not currently use alcohol.  DRUGS:  reports that he does not currently use drugs after having used the following drugs: Marijuana (Weed).  OCCUPATION:      Family History:       Problem Relation Age of Onset    Cancer  amiodarone 200 mg daily for rhythm control and prevention of A-fib/V. tach/V-fib  On Jardiance, continuing at this time, follow-up nephrology recommendations due to GFR less than 20  Trend troponins, initial troponin 72, initiate heparin GTT if necessary  Follow-up on chest x-ray  Cardiology consulted, follow-up recommendations    GI/Nutrition: Renal diet  Renal diet, diabetic diet, cardiac diet  Adjust diet as necessary  No PPI at this time    /Fluids/Electrolytes: SKYLER on CKD, elevated anion gap, likely secondary to overdiuresis leading to hypotension.  Avoid nephrotoxic medications  Gentle hydration due to low EF  Renal diet, diabetic diet  Nephrology consulted, follow-up recommendations    Heme:  Daily CBC, no concerning findings    ID: No current concern for sepsis  Follow-up urine and blood cultures, ordered by primary team  No antibiotic therapy at this time, no concern for sepsis    Endo: History of type 2 diabetes melitis, hypothyroidism  Continue Synthroid 88 mcg daily  Continue low-dose sliding scale insulin  Continue Jardiance  Adjust meds as necessary    MSK: History of gout  Allopurinol    Skin: No concerning findings  Daily physical exam    Prophylaxis:  DVT: Eliquis  GI: None    Dispo:  To remain in the ICU      Manish Richards M.D.  Internal Medicine Chief Resident PGY-3  [facility],  [place], [place].    8/17/2024, 12:25 AM

## 2024-08-17 NOTE — FLOWSHEET NOTE
08/16/24 2344   Vitals   Pulse 56   Respirations 17   BP (!) 76/31   MAP (Calculated) 46   MAP (mmHg) (!) 45     Rapid response called for low BP/MAPS. Patient stated he also felt different, and patient was becoming stupor. Residents notified via [employer] w/ no reply. RAPID response called. Writer placed patient in trendelenburg's position position with minimal improvement of BP.  Nursing and IM team came to bedside, additional IV access established, Levo started (See MAR). Transfer Orders placed by care team for MICU. Patient understanding and agreeable with plan of care.     0100 patient transported off unit with MICU staff

## 2024-08-17 NOTE — PLAN OF CARE
Problem: Chronic Conditions and Co-morbidities  Goal: Patient's chronic conditions and co-morbidity symptoms are monitored and maintained or improved  Outcome: Progressing  Flowsheets (Taken 8/16/2024 2000 by Tammy Weber RN)  Care Plan - Patient's Chronic Conditions and Co-Morbidity Symptoms are Monitored and Maintained or Improved: Monitor and assess patient's chronic conditions and comorbid symptoms for stability, deterioration, or improvement     Problem: Safety - Adult  Goal: Free from fall injury  Outcome: Progressing     Problem: Skin/Tissue Integrity  Goal: Absence of new skin breakdown  Description: 1.  Monitor for areas of redness and/or skin breakdown  2.  Assess vascular access sites hourly  3.  Every 4-6 hours minimum:  Change oxygen saturation probe site  4.  Every 4-6 hours:  If on nasal continuous positive airway pressure, respiratory therapy assess nares and determine need for appliance change or resting period.  Outcome: Progressing

## 2024-08-18 LAB
ANION GAP SERPL CALCULATED.3IONS-SCNC: 14 MMOL/L (ref 9–16)
BASOPHILS # BLD: 0.04 K/UL (ref 0–0.2)
BASOPHILS NFR BLD: 1 % (ref 0–2)
BUN SERPL-MCNC: 32 MG/DL (ref 8–23)
CALCIUM SERPL-MCNC: 7.7 MG/DL (ref 8.6–10.4)
CHLORIDE SERPL-SCNC: 104 MMOL/L (ref 98–107)
CO2 SERPL-SCNC: 22 MMOL/L (ref 20–31)
CREAT SERPL-MCNC: 2.3 MG/DL (ref 0.7–1.2)
EOSINOPHIL # BLD: 0.3 K/UL (ref 0–0.44)
EOSINOPHILS RELATIVE PERCENT: 4 % (ref 1–4)
ERYTHROCYTE [DISTWIDTH] IN BLOOD BY AUTOMATED COUNT: 18.4 % (ref 11.8–14.4)
GFR, ESTIMATED: 30 ML/MIN/1.73M2
GLUCOSE BLD-MCNC: 109 MG/DL (ref 75–110)
GLUCOSE BLD-MCNC: 132 MG/DL (ref 75–110)
GLUCOSE BLD-MCNC: 153 MG/DL (ref 75–110)
GLUCOSE BLD-MCNC: 199 MG/DL (ref 75–110)
GLUCOSE SERPL-MCNC: 131 MG/DL (ref 74–99)
HCT VFR BLD AUTO: 41.8 % (ref 40.7–50.3)
HGB BLD-MCNC: 13.7 G/DL (ref 13–17)
IMM GRANULOCYTES # BLD AUTO: 0.03 K/UL (ref 0–0.3)
IMM GRANULOCYTES NFR BLD: 0 %
LYMPHOCYTES NFR BLD: 1.06 K/UL (ref 1.1–3.7)
LYMPHOCYTES RELATIVE PERCENT: 13 % (ref 24–43)
MAGNESIUM SERPL-MCNC: 1.8 MG/DL (ref 1.6–2.4)
MCH RBC QN AUTO: 28.2 PG (ref 25.2–33.5)
MCHC RBC AUTO-ENTMCNC: 32.8 G/DL (ref 28.4–34.8)
MCV RBC AUTO: 86 FL (ref 82.6–102.9)
MONOCYTES NFR BLD: 0.47 K/UL (ref 0.1–1.2)
MONOCYTES NFR BLD: 6 % (ref 3–12)
NEUTROPHILS NFR BLD: 77 % (ref 36–65)
NEUTS SEG NFR BLD: 6.32 K/UL (ref 1.5–8.1)
NRBC BLD-RTO: 0 PER 100 WBC
PLATELET # BLD AUTO: ABNORMAL K/UL (ref 138–453)
PLATELET, FLUORESCENCE: 114 K/UL (ref 138–453)
PLATELETS.RETICULATED NFR BLD AUTO: 3.4 % (ref 1.1–10.3)
POTASSIUM SERPL-SCNC: 3.7 MMOL/L (ref 3.7–5.3)
RBC # BLD AUTO: 4.86 M/UL (ref 4.21–5.77)
RBC # BLD: ABNORMAL 10*6/UL
SODIUM SERPL-SCNC: 140 MMOL/L (ref 136–145)
WBC OTHER # BLD: 8.2 K/UL (ref 3.5–11.3)

## 2024-08-18 PROCEDURE — 2000000000 HC ICU R&B

## 2024-08-18 PROCEDURE — 2580000003 HC RX 258

## 2024-08-18 PROCEDURE — 6370000000 HC RX 637 (ALT 250 FOR IP)

## 2024-08-18 PROCEDURE — 94761 N-INVAS EAR/PLS OXIMETRY MLT: CPT

## 2024-08-18 PROCEDURE — 99232 SBSQ HOSP IP/OBS MODERATE 35: CPT | Performed by: INTERNAL MEDICINE

## 2024-08-18 PROCEDURE — 6360000002 HC RX W HCPCS: Performed by: EMERGENCY MEDICINE

## 2024-08-18 PROCEDURE — 83735 ASSAY OF MAGNESIUM: CPT

## 2024-08-18 PROCEDURE — 85025 COMPLETE CBC W/AUTO DIFF WBC: CPT

## 2024-08-18 PROCEDURE — 82947 ASSAY GLUCOSE BLOOD QUANT: CPT

## 2024-08-18 PROCEDURE — 6370000000 HC RX 637 (ALT 250 FOR IP): Performed by: EMERGENCY MEDICINE

## 2024-08-18 PROCEDURE — 36415 COLL VENOUS BLD VENIPUNCTURE: CPT

## 2024-08-18 PROCEDURE — 99291 CRITICAL CARE FIRST HOUR: CPT | Performed by: INTERNAL MEDICINE

## 2024-08-18 PROCEDURE — 6370000000 HC RX 637 (ALT 250 FOR IP): Performed by: HOSPITALIST

## 2024-08-18 PROCEDURE — 99223 1ST HOSP IP/OBS HIGH 75: CPT | Performed by: INTERNAL MEDICINE

## 2024-08-18 PROCEDURE — 80048 BASIC METABOLIC PNL TOTAL CA: CPT

## 2024-08-18 PROCEDURE — 85055 RETICULATED PLATELET ASSAY: CPT

## 2024-08-18 PROCEDURE — 2700000000 HC OXYGEN THERAPY PER DAY

## 2024-08-18 RX ORDER — MAGNESIUM SULFATE 1 G/100ML
1000 INJECTION INTRAVENOUS ONCE
Status: COMPLETED | OUTPATIENT
Start: 2024-08-18 | End: 2024-08-18

## 2024-08-18 RX ORDER — POTASSIUM CHLORIDE 7.45 MG/ML
10 INJECTION INTRAVENOUS PRN
Status: DISCONTINUED | OUTPATIENT
Start: 2024-08-18 | End: 2024-08-18

## 2024-08-18 RX ORDER — ATORVASTATIN CALCIUM 80 MG/1
80 TABLET, FILM COATED ORAL NIGHTLY
Status: DISCONTINUED | OUTPATIENT
Start: 2024-08-18 | End: 2024-08-22 | Stop reason: HOSPADM

## 2024-08-18 RX ORDER — POTASSIUM CHLORIDE 1500 MG/1
40 TABLET, EXTENDED RELEASE ORAL PRN
Status: DISCONTINUED | OUTPATIENT
Start: 2024-08-18 | End: 2024-08-18

## 2024-08-18 RX ORDER — MAGNESIUM SULFATE IN WATER 40 MG/ML
2000 INJECTION, SOLUTION INTRAVENOUS PRN
Status: DISCONTINUED | OUTPATIENT
Start: 2024-08-18 | End: 2024-08-18

## 2024-08-18 RX ADMIN — AMIODARONE HYDROCHLORIDE 200 MG: 200 TABLET ORAL at 08:07

## 2024-08-18 RX ADMIN — EMPAGLIFLOZIN 10 MG: 10 TABLET, FILM COATED ORAL at 08:07

## 2024-08-18 RX ADMIN — ALLOPURINOL 300 MG: 300 TABLET ORAL at 08:08

## 2024-08-18 RX ADMIN — TICAGRELOR 90 MG: 90 TABLET ORAL at 08:07

## 2024-08-18 RX ADMIN — SODIUM CHLORIDE, PRESERVATIVE FREE 10 ML: 5 INJECTION INTRAVENOUS at 08:08

## 2024-08-18 RX ADMIN — ASPIRIN 81 MG 81 MG: 81 TABLET ORAL at 08:07

## 2024-08-18 RX ADMIN — MIDODRINE HYDROCHLORIDE 10 MG: 5 TABLET ORAL at 16:29

## 2024-08-18 RX ADMIN — POTASSIUM BICARBONATE 40 MEQ: 782 TABLET, EFFERVESCENT ORAL at 11:04

## 2024-08-18 RX ADMIN — ATORVASTATIN CALCIUM 80 MG: 80 TABLET, FILM COATED ORAL at 21:02

## 2024-08-18 RX ADMIN — APIXABAN 5 MG: 5 TABLET, FILM COATED ORAL at 21:02

## 2024-08-18 RX ADMIN — MIDODRINE HYDROCHLORIDE 10 MG: 5 TABLET ORAL at 11:04

## 2024-08-18 RX ADMIN — POLYETHYLENE GLYCOL 3350 17 G: 17 POWDER, FOR SOLUTION ORAL at 08:06

## 2024-08-18 RX ADMIN — MIDODRINE HYDROCHLORIDE 10 MG: 5 TABLET ORAL at 08:07

## 2024-08-18 RX ADMIN — LEVOTHYROXINE SODIUM 88 MCG: 0.09 TABLET ORAL at 08:08

## 2024-08-18 RX ADMIN — MAGNESIUM SULFATE HEPTAHYDRATE 1000 MG: 1 INJECTION, SOLUTION INTRAVENOUS at 11:07

## 2024-08-18 RX ADMIN — TICAGRELOR 90 MG: 90 TABLET ORAL at 21:02

## 2024-08-18 RX ADMIN — APIXABAN 5 MG: 5 TABLET, FILM COATED ORAL at 08:07

## 2024-08-18 ASSESSMENT — PAIN SCALES - GENERAL: PAINLEVEL_OUTOF10: 0

## 2024-08-18 NOTE — PLAN OF CARE
Problem: Chronic Conditions and Co-morbidities  Goal: Patient's chronic conditions and co-morbidity symptoms are monitored and maintained or improved  Outcome: Progressing  Flowsheets (Taken 8/18/2024 0800)  Care Plan - Patient's Chronic Conditions and Co-Morbidity Symptoms are Monitored and Maintained or Improved:   Monitor and assess patient's chronic conditions and comorbid symptoms for stability, deterioration, or improvement   Collaborate with multidisciplinary team to address chronic and comorbid conditions and prevent exacerbation or deterioration   Update acute care plan with appropriate goals if chronic or comorbid symptoms are exacerbated and prevent overall improvement and discharge     Problem: Safety - Adult  Goal: Free from fall injury  Outcome: Progressing  Flowsheets (Taken 8/18/2024 0800)  Free From Fall Injury:   Instruct family/caregiver on patient safety   Based on caregiver fall risk screen, instruct family/caregiver to ask for assistance with transferring infant if caregiver noted to have fall risk factors     Problem: Discharge Planning  Goal: Discharge to home or other facility with appropriate resources  Outcome: Progressing  Flowsheets (Taken 8/18/2024 0800)  Discharge to home or other facility with appropriate resources:   Identify barriers to discharge with patient and caregiver   Refer to discharge planning if patient needs post-hospital services based on physician order or complex needs related to functional status, cognitive ability or social support system     Problem: Pain  Goal: Verbalizes/displays adequate comfort level or baseline comfort level  Outcome: Progressing  Flowsheets  Taken 8/18/2024 1200  Verbalizes/displays adequate comfort level or baseline comfort level: Encourage patient to monitor pain and request assistance  Taken 8/18/2024 0800  Verbalizes/displays adequate comfort level or baseline comfort level:   Encourage patient to monitor pain and request assistance

## 2024-08-18 NOTE — PROGRESS NOTES
Comprehensive Nutrition Assessment    Type and Reason for Visit:  Initial, Positive Nutrition Screen (Weight Loss; Poor Intakes/Appetite)    Nutrition Recommendations/Plan:   Liberalize diet as able.  Encourage intakes as tolerated.  Monitor need for oral supplements - continue to offer.  Will monitor labs, weights, and plan of care.     Malnutrition Assessment:  Malnutrition Status:  Moderate malnutrition (08/18/24 1256)    Context:  Acute Illness     Findings of the 6 clinical characteristics of malnutrition:  Energy Intake:  75% or less of estimated energy requirements for 7 or more days  Weight Loss:  Greater than 5% over 1 month     Body Fat Loss:  No significant body fat loss   Muscle Mass Loss:  No significant muscle mass loss  Fluid Accumulation:  No significant fluid accumulation   Strength:  Not Performed    Nutrition Assessment:    Pt admitted for SKYLER.  PMH: CAD, CHF, CKD, DM, HTN.  Pt reports having a good appetite but unable to finish his meals.  Lunch at bedside which pt had eaten less than 50% of turkey, dressing, carrots, and fruit.  Pt states he can't eat a lot at meals but has been eating what he can.  Pt states he was eating less before admission as the facility he was at was serving salty/greasy foods which do not fit with the low salt diet he follows.  Reports he does like Ensure supplements but does not want to receive any at this time.  Weights reviewed - pt reports he has lost weight since being at facility but he was also losing weight before then as he was making dietary changes and trying to lose weight.  Labs reviewed: Glucose 131-199 mg/dL, BUN 32 mg/dL, CR 2.3 mg/dL.  Meds include: Glycolax PRN.    Nutrition Related Findings:    Meds/Labs reviewed.  Hypoactive bowel sounds.  Decreased appetite. Wound Type: None       Current Nutrition Intake & Therapies:    Average Meal Intake: 26-50%, 51-75%  Average Supplements Intake: None Ordered  ADULT DIET; Regular; 4 carb choices (60  gm/meal); Low Fat/Low Chol/High Fiber/2 gm Na; Low Potassium (Less than 3000 mg/day); Low Phosphorus (Less than 1000 mg); 1500 ml    Anthropometric Measures:  Height: 162.6 cm (5' 4.02\")  Ideal Body Weight (IBW): 130 lbs (59 kg)    Admission Body Weight: 87 kg (191 lb 12.8 oz)  Current Body Weight: 85 kg (187 lb 6.3 oz), 144.1 % IBW. Weight Source: Bed Scale  Current BMI (kg/m2): 32.1  Usual Body Weight: 95.3 kg (210 lb 2 oz) (7/19/24 bed scale per chart review)  % Weight Change (Calculated): -10.8  Weight Adjustment For: No Adjustment                 BMI Categories: Obese Class 1 (BMI 30.0-34.9)    Estimated Daily Nutrient Needs:  Energy Requirements Based On: Formula  Weight Used for Energy Requirements: Current  Energy (kcal/day): 2785-4214 kcals/day  Weight Used for Protein Requirements: Ideal  Protein (g/day):  gm pro/day  Method Used for Fluid Requirements: Other (Comment)  Fluid (ml/day): per MD/diet order: 1500 mL/day    Nutrition Diagnosis:   Inadequate oral intake related to  (decreased appetite; current condition) as evidenced by intake 26-50%, intake 51-75%    Nutrition Interventions:   Food and/or Nutrient Delivery: Continue Current Diet (Liberalize diet as able.  Offer oral supplements as/if needed.)  Nutrition Education/Counseling: No recommendation at this time  Coordination of Nutrition Care: Continue to monitor while inpatient  Plan of Care discussed with: Patient    Goals:  Previous Goal Met:  (Goal Set)  Goals: Meet at least 75% of estimated needs, prior to discharge       Nutrition Monitoring and Evaluation:   Behavioral-Environmental Outcomes: None Identified  Food/Nutrient Intake Outcomes: Food and Nutrient Intake  Physical Signs/Symptoms Outcomes: Biochemical Data, GI Status, Fluid Status or Edema, Weight, Skin, Nutrition Focused Physical Findings, Hemodynamic Status    Discharge Planning:    Too soon to determine     Kisha Diaz RD, LD  Contact: [phone]

## 2024-08-18 NOTE — PROGRESS NOTES
[facility]   Progress Note                   Date:   8/18/2024  Patient name: Claudio Graham  Date of admission:  8/16/2024  1:31 PM  MRN:   [id number]  YOB: 1955  PCP: Vanessa Mckenna, LIDIA - CNP    Reason for Admission: Hypotension and SKYLER    Subjective:   Patient was seen and examined.  Remain on IV Levophed 4 mics.  Normal sinus rhythm.  Blood pressure in 120s.  Denied any active complaint.  Creatinine improved to 2.3 with gentle hydration.  Nephrology following.    Medications:   Scheduled Meds:   midodrine  10 mg Oral TID WC    insulin lispro  0-8 Units SubCUTAneous TID WC    insulin lispro  0-4 Units SubCUTAneous Nightly    amiodarone  200 mg Oral Daily    apixaban  5 mg Oral BID    [Held by provider] furosemide  40 mg Oral BID    [Held by provider] isosorbide mononitrate  30 mg Oral Daily    levothyroxine  88 mcg Oral Daily    ticagrelor  90 mg Oral BID    atorvastatin  80 mg Oral Daily    sodium chloride flush  5-40 mL IntraVENous 2 times per day    aspirin  81 mg Oral Daily    allopurinol  300 mg Oral Daily    empagliflozin  10 mg Oral Daily    [Held by provider] metoprolol succinate  50 mg Oral Daily     Continuous Infusions:   norepinephrine 4 mcg/min (08/18/24 0445)    dextrose      sodium chloride 50 mL/hr at 08/17/24 1529    sodium chloride      dextrose       CBC:   Recent Labs     08/16/24  1402 08/17/24  0630 08/18/24  0504   WBC 9.8 10.7 8.2   HGB 13.8 12.5* 13.7    209 See Reflexed IPF Result     BMP:    Recent Labs     08/16/24  1402 08/17/24  0630 08/18/24  0504   * 136 140   K 3.7 3.1* 3.7   CL 91* 100 104   CO2 23 21 22   BUN 41* 40* 32*   CREATININE 4.0* 3.2* 2.3*   GLUCOSE 138* 205* 131*     Hepatic: No results for input(s): \"AST\", \"ALT\", \"BILITOT\", \"ALKPHOS\" in the last 72 hours.    Invalid input(s): \"ALB\"  Troponin: No results for input(s): \"TROPONINI\" in the last 72 hours.  BNP: No results for input(s): \"BNP\" in the last 72

## 2024-08-18 NOTE — CARE COORDINATION
Case Management Assessment  Initial Evaluation    Date/Time of Evaluation: 8/18/2024 7:49 AM  Assessment Completed by: Luann Garcia RN    If patient is discharged prior to next notation, then this note serves as note for discharge by case management.    Patient Name: Claudio Graham                   YOB: 1955  Diagnosis: SKYLER (acute kidney injury) (HCC) [N17.9]  Acute kidney injury superimposed on CKD (HCC) [N17.9, N18.9]                   Date / Time: 8/16/2024  1:31 PM    Patient Admission Status: Inpatient   Readmission Risk (Low < 19, Mod (19-27), High > 27): Readmission Risk Score: 22.3    Current PCP: Vanessa Mckenna, LIDIA - CNP  PCP verified by CM? (P) Yes    Chart Reviewed: Yes      History Provided by: (P) Patient  Patient Orientation: (P) Alert and Oriented    Patient Cognition: (P) Alert    Hospitalization in the last 30 days (Readmission):  Yes    If yes, Readmission Assessment in CM Navigator will be completed.    Advance Directives:      Code Status: Full Code   Patient's Primary Decision Maker is: (P) Legal Next of Kin    Primary Decision Maker (Active): Cathy Ingram - Other Relative - [phone]    Discharge Planning:    Patient lives with: (P) Alone Type of Home: (P) House  Primary Care Giver: (P) Self  Patient Support Systems include: (P) Family Members, Friends/Neighbors   Current Financial resources: (P) Medicare, Medicaid  Current community resources: (P) None  Current services prior to admission: (P) Durable Medical Equipment, Home Care            Current DME: (P) Walker, [employer]            Type of Home Care services:  (P) None    ADLS  Prior functional level: (P) Independent in ADLs/IADLs  Current functional level: (P) Independent in ADLs/IADLs    PT AM-PAC:   /24  OT AM-PAC:   /24    Family can provide assistance at DC: (P) No  Would you like Case Management to discuss the discharge plan with any other family members/significant others, and if so, who? (P) No  Plans to

## 2024-08-18 NOTE — CARE COORDINATION
08/18/24 0751   Readmission Assessment   Number of Days since last admission? 8-30 days   Previous Disposition Home with Home Health   Who is being Interviewed Patient   What was the patient's/caregiver's perception as to why they think they needed to return back to the hospital? Other (Comment)  (health complication)   Did you visit your Primary Care Physician after you left the hospital, before you returned this time? Yes   Did you see a specialist, such as Cardiac, Pulmonary, Orthopedic Physician, etc. after you left the hospital? No   Who advised the patient to return to the hospital? Physician   Does the patient report anything that got in the way of taking their medications? No   In our efforts to provide the best possible care to you and others like you, can you think of anything that we could have done to help you after you left the hospital the first time, so that you might not have needed to return so soon? Other (Comment)  (none)

## 2024-08-18 NOTE — PROGRESS NOTES
INTENSIVE CARE UNIT  Resident Physician Progress Note    Patient - Claudio Graham  Date of Admission -  8/16/2024  1:31 PM  Date of Evaluation -  8/18/2024  Room and Bed Number -  3016/3016-01   Hospital Day - 2      SUBJECTIVE:     OVERNIGHT EVENTS:      NAEON.      TODAY:    Afebrile  42-79  Remains hypotensive requiring levo - weaning, currentlly @ 2; continue midodrine  O2 stable on RA    UOP 0.9 ml/kg/hr  Net -535    AM labs showing  WBC 8.2  Hb 13.7  Plt 114    Na stable @ 140  K 3.7  Bicarb 22  BUN 32 (40)/Cr 2.3 (3.2) - improving with gentle hydration; continue 0.9 @ 50/hr    Bgl stable    Brief History:   Claudio Graham is a 69 y.o. with significant past medical history of CKD stage IIIa, CHF with 5 stents, AICD placement, EF of 10 to 15% from echo post-stenting in July 2024, hypertension, hyperlipidemia, MGUS, most recent stent was on 7/18, LAD was 100% stenosed.  Patient follows with CHF clinic.  Seems to be on Lasix 40 mg twice daily at home.  Patient does have a history of amiodarone induced thyroid dysfunction amiodarone was stopped a few years back however had since recent onset of V-fib patient was restarted on amiodarone.  Patient's last TSH was 6.49, T4 was 2.1.  Patient is on 88 of Synthroid at home.     Patient presented to the emergency department after abnormal lab found to have SKYLER on CKD.  While in emergency department he was found to have low blood pressure, per report his blood pressure is normally systolic of 90s and diastolic of 60s.  Patient was given 1 L of IV fluids, 10 of midodrine and started on 75 mL of normal saline.  Patient was admitted initially by the internal medicine teaching service, team 1.  proBNP was completed was 5100, baseline seems to be around 2000.  No crackles on physical exam are noted.     Rapid response was called around midnight for hypotension.  Blood pressures were 80s over 30s.  Patient was placed in the Trendelenburg position maps did come up to the  status given patient's low ejection fraction  Follow-up chest x-ray     CV: History of V-fib/V. tach, status post AICD, history of A-fib, history of amiodarone induced hypothyroidism, hypertension likely secondary to overdiuresis.  Last echo shows EF of 10 to 15% in July 2024 with severe left ventricular dilation and normal wall thickness.  Patient has AICD placed.  Left heart cath from July 18, 2024 with 100% ostial occlusion of the LAD with [place] stent placement.  Severely reduced LV systolic function 10%.  This does not resolved with stent placement.  On Eliquis, continuing  On aspirin and Brilinta, continuing  Continue Lipitor 80 mg daily  Holding metoprolol succinate 50 mg daily due to low BP  Continue amiodarone 200 mg daily for rhythm control and prevention of A-fib/V. tach/V-fib  On Jardiance, continuing at this time, follow-up nephrology recommendations due to GFR less than 20  Trend troponins, initial troponin 72, initiate heparin GTT if necessary  Follow-up on chest x-ray  Cardiology consulted, follow-up recommendations     GI/Nutrition: Renal diet  Renal diet, diabetic diet, cardiac diet  Adjust diet as necessary  No PPI at this time     /Fluids/Electrolytes: SKYLER on CKD, elevated anion gap, likely secondary to overdiuresis leading to hypotension.  Avoid nephrotoxic medications  Gentle hydration due to low EF  Renal diet, diabetic diet  Nephrology consulted, follow-up recommendations     Heme:  Daily CBC, no concerning findings     ID: No current concern for sepsis  Follow-up urine and blood cultures, ordered by primary team  No antibiotic therapy at this time, no concern for sepsis     Endo: History of type 2 diabetes melitis, hypothyroidism  Continue Synthroid 88 mcg daily  Continue low-dose sliding scale insulin  Continue Jardiance  Adjust meds as necessary     MSK: History of gout  Allopurinol     Skin: No concerning findings  Daily physical exam     Prophylaxis:  DVT: Eliquis  GI: None      Dispo:  To remain in the ICU      Rizwana Mae, DO  EmergencyMedicine PGY3  8/18/2024 7:28 AM

## 2024-08-18 NOTE — PROGRESS NOTES
Nephrology Progress Note      SUBJECTIVE       Pt was seen and examined. No acute issues overnite.  He is on low-dose Levophed at 4 mics.  Blood pressures running in the high 90s.  He himself is awake and alert.  No acute issues at this time.  Urine output seems to be adequate at 50-70 an hour.  Progressive improvement in his renal function with a creatinine now down to 2.3.  Admission creatinine was almost at 4.  Patient is without any nausea, vomiting or shortness of breath at this time.    Background history:  69-year-old gentleman with a prior history of CKD stage IIIa with a baseline creatinine of 1.1-1.3, history of AICD placement, severe ischemic cardiomyopathy with ejection fraction less than 20%, chronic hypertension, dyslipidemia, MGUS and history of amiodarone induced thyroid dysfunction, coronary artery disease with multiple stents and type 2 diabetes mellitus presented to the hospital with acute on chronic renal failure.  He received a phone call from his PCP that his serum creatinine was significantly elevated at 4.1 up from 1.3 2 weeks ago.  Patient was noted to be hypotensive with blood pressures in the 80s and he gave a history of very poor oral intake with almost 18 pound weight loss over the last 2-1/2 weeks or so.  Pinto was placed because he has had history of meatal stenosis in the past.  Creatinine was 4.0 in the ER, has improved down to 2.3      OBJECTIVE      CURRENT TEMPERATURE:  Temp: 98.4 °F (36.9 °C)  MAXIMUM TEMPERATURE OVER 24HRS:  Temp (24hrs), Av °F (36.7 °C), Min:97.5 °F (36.4 °C), Max:98.8 °F (37.1 °C)    CURRENT RESPIRATORY RATE:  Respirations: 14  CURRENT PULSE:  Pulse: 55  CURRENT BLOOD PRESSURE:  BP: (!) 89/49  24HR BLOOD PRESSURE RANGE:  Systolic (24hrs), Av , Min:78 , Max:131   ; Diastolic (24hrs), Av, Min:36, Max:98    24HR INTAKE/OUTPUT:    Intake/Output Summary (Last 24 hours) at 2024 1041  Last data filed at 2024 0911  Gross per 24 hour   Intake      1.  May discontinue fluids after current bag infusing.   2.  Wean down Levophed as tolerated.  3. Follow up labs ordered.   4. Following along       Please do not hesitate to call with questions.    This note is created with the assistance of a speech-recognition program. While intending to generate a document that actually reflects the content of the visit, no guarantees can be provided that every mistake has been identified and corrected by editing    Electronically signed by Wilfredo Villafana MD on 8/18/2024 at 10:49 AM

## 2024-08-18 NOTE — PLAN OF CARE
Problem: Safety - Adult  Goal: Free from fall injury  8/17/2024 2144 by Vera Ellis RN  Outcome: Progressing  8/17/2024 0951 by Jules Escobedo RN  Outcome: Progressing     Problem: Chronic Conditions and Co-morbidities  Goal: Patient's chronic conditions and co-morbidity symptoms are monitored and maintained or improved  8/17/2024 2144 by Vera Ellis RN  Outcome: Progressing  8/17/2024 0951 by Jules Escobedo RN  Outcome: Progressing     Problem: Discharge Planning  Goal: Discharge to home or other facility with appropriate resources  8/17/2024 2144 by Vera Ellis RN  Outcome: Progressing  8/17/2024 0951 by Jules Escobedo RN  Outcome: Progressing  Flowsheets (Taken 8/16/2024 2000 by Tammy Weber, RN)  Discharge to home or other facility with appropriate resources:   Identify barriers to discharge with patient and caregiver   Arrange for needed discharge resources and transportation as appropriate   Identify discharge learning needs (meds, wound care, etc)     Problem: Pain  Goal: Verbalizes/displays adequate comfort level or baseline comfort level  8/17/2024 2144 by Vera Ellis RN  Outcome: Progressing  8/17/2024 0951 by Jules Escobedo RN  Outcome: Progressing

## 2024-08-18 NOTE — PROGRESS NOTES
PHARMACY NOTE:    The electrolyte replacement protocol for potassium/magnesium has been discontinued per P&T guidelines because the patient has reduced renal function (CrCl < 30 mL/min).      The patient's most recent potassium & magnesium levels are:  Recent Labs     08/16/24  1402 08/16/24  2307 08/17/24  0630 08/18/24  0504   K 3.7  --  3.1* 3.7   MG  --  1.7 1.9 1.8     Estimated Creatinine Clearance: 29 mL/min (A) (based on SCr of 2.3 mg/dL (H)).    For patients with decreased renal function (below 30ml/min) needing potassium/magnesium supplementation, please order individual bolus doses with appropriate monitoring.      Please contact the inpatient pharmacy with any concerns.  Thank you.  Jonah Holder [facility].8/18/2024  10:13 AM       Awake

## 2024-08-19 PROBLEM — Z86.79 HISTORY OF CHRONIC CHF: Status: ACTIVE | Noted: 2024-08-19

## 2024-08-19 PROBLEM — Z86.79 HISTORY OF CAD (CORONARY ARTERY DISEASE): Status: ACTIVE | Noted: 2024-08-19

## 2024-08-19 PROBLEM — I50.22 CHRONIC SYSTOLIC CONGESTIVE HEART FAILURE (HCC): Status: ACTIVE | Noted: 2024-08-19

## 2024-08-19 PROBLEM — E86.1 HYPOTENSION DUE TO HYPOVOLEMIA: Status: ACTIVE | Noted: 2024-08-19

## 2024-08-19 PROBLEM — I42.0 DILATED CARDIOMYOPATHY (HCC): Status: ACTIVE | Noted: 2024-08-19

## 2024-08-19 LAB
ALBUMIN PERCENT: 53 % (ref 45–65)
ALBUMIN SERPL-MCNC: 3.3 G/DL (ref 3.2–5.2)
ALPHA 2 PERCENT: 13 % (ref 6–13)
ALPHA1 GLOB SERPL ELPH-MCNC: 0.3 G/DL (ref 0.1–0.4)
ALPHA1 GLOB SERPL ELPH-MCNC: 5 % (ref 3–6)
ALPHA2 GLOB SERPL ELPH-MCNC: 0.8 G/DL (ref 0.5–0.9)
ANA SER QL IA: NEGATIVE
ANCA MYELOPEROXIDASE: 0.3 AU/ML (ref 0–3.5)
ANCA PROTEINASE 3: <0.7 AU/ML (ref 0–2)
ANION GAP SERPL CALCULATED.3IONS-SCNC: 10 MMOL/L (ref 9–16)
B-GLOBULIN SERPL ELPH-MCNC: 0.7 G/DL (ref 0.5–1.1)
B-GLOBULIN SERPL ELPH-MCNC: 11 % (ref 11–19)
BASOPHILS # BLD: 0.04 K/UL (ref 0–0.2)
BASOPHILS NFR BLD: 1 % (ref 0–2)
BUN SERPL-MCNC: 26 MG/DL (ref 8–23)
CALCIUM SERPL-MCNC: 8.1 MG/DL (ref 8.6–10.4)
CHLORIDE SERPL-SCNC: 107 MMOL/L (ref 98–107)
CO2 SERPL-SCNC: 24 MMOL/L (ref 20–31)
CREAT SERPL-MCNC: 1.8 MG/DL (ref 0.7–1.2)
DSDNA IGG SER QL IA: <0.5 IU/ML
EOSINOPHIL # BLD: 0.24 K/UL (ref 0–0.44)
EOSINOPHILS RELATIVE PERCENT: 3 % (ref 1–4)
ERYTHROCYTE [DISTWIDTH] IN BLOOD BY AUTOMATED COUNT: 18.7 % (ref 11.8–14.4)
GAMMA GLOB SERPL ELPH-MCNC: 1.2 G/DL (ref 0.5–1.5)
GAMMA GLOBULIN %: 19 % (ref 9–20)
GFR, ESTIMATED: 40 ML/MIN/1.73M2
GLUCOSE BLD-MCNC: 123 MG/DL (ref 75–110)
GLUCOSE BLD-MCNC: 136 MG/DL (ref 75–110)
GLUCOSE BLD-MCNC: 196 MG/DL (ref 75–110)
GLUCOSE BLD-MCNC: 96 MG/DL (ref 75–110)
GLUCOSE SERPL-MCNC: 118 MG/DL (ref 74–99)
HCT VFR BLD AUTO: 41.1 % (ref 40.7–50.3)
HGB BLD-MCNC: 12.7 G/DL (ref 13–17)
IMM GRANULOCYTES # BLD AUTO: <0.03 K/UL (ref 0–0.3)
IMM GRANULOCYTES NFR BLD: 0 %
LYMPHOCYTES NFR BLD: 0.89 K/UL (ref 1.1–3.7)
LYMPHOCYTES RELATIVE PERCENT: 10 % (ref 24–43)
MAGNESIUM SERPL-MCNC: 2.2 MG/DL (ref 1.6–2.4)
MCH RBC QN AUTO: 28.1 PG (ref 25.2–33.5)
MCHC RBC AUTO-ENTMCNC: 30.9 G/DL (ref 28.4–34.8)
MCV RBC AUTO: 90.9 FL (ref 82.6–102.9)
MONOCYTES NFR BLD: 0.48 K/UL (ref 0.1–1.2)
MONOCYTES NFR BLD: 6 % (ref 3–12)
NEUTROPHILS NFR BLD: 80 % (ref 36–65)
NEUTS SEG NFR BLD: 7 K/UL (ref 1.5–8.1)
NRBC BLD-RTO: 0 PER 100 WBC
NUCLEAR IGG SER IA-RTO: 0.2 U/ML
P E INTERPRETATION, U: NORMAL
PATHOLOGIST: ABNORMAL
PATHOLOGIST: NORMAL
PLATELET # BLD AUTO: 147 K/UL (ref 138–453)
PMV BLD AUTO: 13 FL (ref 8.1–13.5)
POTASSIUM SERPL-SCNC: 3.9 MMOL/L (ref 3.7–5.3)
PROT PATTERN SERPL ELPH-IMP: ABNORMAL
PROT SERPL-MCNC: 6.2 G/DL (ref 6.6–8.7)
RBC # BLD AUTO: 4.52 M/UL (ref 4.21–5.77)
RBC # BLD: ABNORMAL 10*6/UL
SODIUM SERPL-SCNC: 141 MMOL/L (ref 136–145)
SPECIMEN TYPE: NORMAL
TOTAL PROT. SUM,%: 101 % (ref 98–102)
TOTAL PROT. SUM: 6.3 G/DL (ref 6.3–8.2)
URINE TOTAL PROTEIN: 11 MG/DL
WBC OTHER # BLD: 8.7 K/UL (ref 3.5–11.3)

## 2024-08-19 PROCEDURE — 6370000000 HC RX 637 (ALT 250 FOR IP)

## 2024-08-19 PROCEDURE — 2580000003 HC RX 258

## 2024-08-19 PROCEDURE — 82947 ASSAY GLUCOSE BLOOD QUANT: CPT

## 2024-08-19 PROCEDURE — 99233 SBSQ HOSP IP/OBS HIGH 50: CPT | Performed by: STUDENT IN AN ORGANIZED HEALTH CARE EDUCATION/TRAINING PROGRAM

## 2024-08-19 PROCEDURE — 85025 COMPLETE CBC W/AUTO DIFF WBC: CPT

## 2024-08-19 PROCEDURE — 36415 COLL VENOUS BLD VENIPUNCTURE: CPT

## 2024-08-19 PROCEDURE — 97162 PT EVAL MOD COMPLEX 30 MIN: CPT

## 2024-08-19 PROCEDURE — 99291 CRITICAL CARE FIRST HOUR: CPT | Performed by: INTERNAL MEDICINE

## 2024-08-19 PROCEDURE — 80048 BASIC METABOLIC PNL TOTAL CA: CPT

## 2024-08-19 PROCEDURE — 6370000000 HC RX 637 (ALT 250 FOR IP): Performed by: HOSPITALIST

## 2024-08-19 PROCEDURE — 97530 THERAPEUTIC ACTIVITIES: CPT

## 2024-08-19 PROCEDURE — 2060000000 HC ICU INTERMEDIATE R&B

## 2024-08-19 PROCEDURE — 97110 THERAPEUTIC EXERCISES: CPT

## 2024-08-19 PROCEDURE — 2500000003 HC RX 250 WO HCPCS

## 2024-08-19 PROCEDURE — 99232 SBSQ HOSP IP/OBS MODERATE 35: CPT | Performed by: INTERNAL MEDICINE

## 2024-08-19 PROCEDURE — 83735 ASSAY OF MAGNESIUM: CPT

## 2024-08-19 PROCEDURE — 2580000003 HC RX 258: Performed by: EMERGENCY MEDICINE

## 2024-08-19 PROCEDURE — 6370000000 HC RX 637 (ALT 250 FOR IP): Performed by: EMERGENCY MEDICINE

## 2024-08-19 RX ADMIN — NOREPINEPHRINE BITARTRATE 1 MCG/MIN: 0.06 INJECTION, SOLUTION INTRAVENOUS at 01:48

## 2024-08-19 RX ADMIN — EMPAGLIFLOZIN 10 MG: 10 TABLET, FILM COATED ORAL at 09:02

## 2024-08-19 RX ADMIN — SODIUM CHLORIDE, PRESERVATIVE FREE 10 ML: 5 INJECTION INTRAVENOUS at 22:13

## 2024-08-19 RX ADMIN — APIXABAN 5 MG: 5 TABLET, FILM COATED ORAL at 22:04

## 2024-08-19 RX ADMIN — AMIODARONE HYDROCHLORIDE 200 MG: 200 TABLET ORAL at 09:02

## 2024-08-19 RX ADMIN — MIDODRINE HYDROCHLORIDE 10 MG: 5 TABLET ORAL at 17:34

## 2024-08-19 RX ADMIN — SODIUM CHLORIDE, PRESERVATIVE FREE 10 ML: 5 INJECTION INTRAVENOUS at 09:03

## 2024-08-19 RX ADMIN — MIDODRINE HYDROCHLORIDE 10 MG: 5 TABLET ORAL at 13:34

## 2024-08-19 RX ADMIN — SODIUM CHLORIDE: 9 INJECTION, SOLUTION INTRAVENOUS at 18:53

## 2024-08-19 RX ADMIN — APIXABAN 5 MG: 5 TABLET, FILM COATED ORAL at 09:02

## 2024-08-19 RX ADMIN — MIDODRINE HYDROCHLORIDE 10 MG: 5 TABLET ORAL at 09:02

## 2024-08-19 RX ADMIN — TICAGRELOR 90 MG: 90 TABLET ORAL at 09:06

## 2024-08-19 RX ADMIN — ATORVASTATIN CALCIUM 80 MG: 80 TABLET, FILM COATED ORAL at 22:04

## 2024-08-19 RX ADMIN — LEVOTHYROXINE SODIUM 88 MCG: 0.09 TABLET ORAL at 07:47

## 2024-08-19 RX ADMIN — SODIUM CHLORIDE: 9 INJECTION, SOLUTION INTRAVENOUS at 01:49

## 2024-08-19 RX ADMIN — TICAGRELOR 90 MG: 90 TABLET ORAL at 22:04

## 2024-08-19 RX ADMIN — ALLOPURINOL 300 MG: 300 TABLET ORAL at 09:02

## 2024-08-19 ASSESSMENT — PAIN SCALES - GENERAL: PAINLEVEL_OUTOF10: 0

## 2024-08-19 NOTE — PLAN OF CARE
Problem: Safety - Adult  Goal: Free from fall injury  8/18/2024 2109 by Vera Ellis RN  Outcome: Progressing  8/18/2024 1519 by Kayla Enamorado RN  Outcome: Progressing  Flowsheets (Taken 8/18/2024 0800)  Free From Fall Injury:   Instruct family/caregiver on patient safety   Based on caregiver fall risk screen, instruct family/caregiver to ask for assistance with transferring infant if caregiver noted to have fall risk factors     Problem: Discharge Planning  Goal: Discharge to home or other facility with appropriate resources  8/18/2024 2109 by Vera Ellis RN  Outcome: Progressing  8/18/2024 1519 by Kayla Enamorado RN  Outcome: Progressing  Flowsheets (Taken 8/18/2024 0800)  Discharge to home or other facility with appropriate resources:   Identify barriers to discharge with patient and caregiver   Refer to discharge planning if patient needs post-hospital services based on physician order or complex needs related to functional status, cognitive ability or social support system     Problem: Pain  Goal: Verbalizes/displays adequate comfort level or baseline comfort level  8/18/2024 2109 by Vera Ellis RN  Outcome: Progressing  Flowsheets (Taken 8/18/2024 1600 by Kayla Enamorado RN)  Verbalizes/displays adequate comfort level or baseline comfort level: Encourage patient to monitor pain and request assistance  8/18/2024 1519 by Kayla Enamorado RN  Outcome: Progressing  Flowsheets  Taken 8/18/2024 1200  Verbalizes/displays adequate comfort level or baseline comfort level: Encourage patient to monitor pain and request assistance  Taken 8/18/2024 0800  Verbalizes/displays adequate comfort level or baseline comfort level:   Encourage patient to monitor pain and request assistance   Assess pain using appropriate pain scale   Administer analgesics based on type and severity of pain and evaluate response     Problem: Skin/Tissue Integrity  Goal: Absence of new skin breakdown  Description: 1.  Monitor for areas of

## 2024-08-19 NOTE — PROGRESS NOTES
INTENSIVE CARE UNIT  Resident Physician Progress Note    Patient - Claudio Graham  Date of Admission -  8/16/2024  1:31 PM  Date of Evaluation -  8/19/2024  Room and Bed Number -  3016/3016-01   Hospital Day - 3      SUBJECTIVE:     OVERNIGHT EVENTS:      NAEON. Weaned off pressor support overnight.     TODAY:    Afebrile    Weaned off pressor support overnight, continues on midodrine  O2 stable on RA    UOP 0.7 ml/kg/hr; 1400  Net +1516    AM labs showing  WBC 8.7  Hb 12.7  Plt 147    Na stable @ 141  K 3.9  Bicarb 24  BUN 26(32)/Cr 1.8(2.3) - improving with gentle hydration; continue 0.9 @ 50/hr    Bgl stable on MDSS    Brief History:   Claudio Graham is a 69 y.o. with significant past medical history of CKD stage IIIa, CHF with 5 stents, AICD placement, EF of 10 to 15% from echo post-stenting in July 2024, hypertension, hyperlipidemia, MGUS, most recent stent was on 7/18, LAD was 100% stenosed.  Patient follows with CHF clinic.  Seems to be on Lasix 40 mg twice daily at home.  Patient does have a history of amiodarone induced thyroid dysfunction amiodarone was stopped a few years back however had since recent onset of V-fib patient was restarted on amiodarone.  Patient's last TSH was 6.49, T4 was 2.1.  Patient is on 88 of Synthroid at home.     Patient presented to the emergency department after abnormal lab found to have SKYLER on CKD.  While in emergency department he was found to have low blood pressure, per report his blood pressure is normally systolic of 90s and diastolic of 60s.  Patient was given 1 L of IV fluids, 10 of midodrine and started on 75 mL of normal saline.  Patient was admitted initially by the internal medicine teaching service, team 1.  proBNP was completed was 5100, baseline seems to be around 2000.  No crackles on physical exam are noted.     Rapid response was called around midnight for hypotension.  Blood pressures were 80s over 30s.  Patient was placed in the Trendelenburg  findings  Daily physical exam     Prophylaxis:  DVT: Eliquis  GI: None     Dispo:  To remain in the ICU      Rizwana Mae, DO  EmergencyMedicine PGY3  8/19/2024 7:18 AM      Attending Physician Statement  I have discussed the care of Claudio Graham, including pertinent history and exam findings with the resident. I have reviewed the key elements of all parts of the encounter with the resident. I have seen and examined the patient with the resident.  I agree with the assessment and plan and status of the problem list as documented.    I saw the patient during around today, chart reviewed overnight events noted.  Patient with history of chronic kidney disease, hyperlipidemia history of hypertension, severe cardiomyopathy with ejection fraction of 15%.  His SKYLER improved while he was on the floor by gentle hydration and diuretics were on hold patient was transferred to medical ICU for hypotension requiring pressor support.  Patient has severe cardiomyopathy and blood pressure related to severe cardiomyopathy when I saw him he was off Levophed since this morning he was sitting up alert and awake systolic blood pressure was 85/54 MAP was 64.  He was alert and awake follows command denies symptoms.  He is on Eliquis and Brilinta and amiodarone will need to continue.  Continue with midodrine.  Continue to hold Imdur and beta-blocker.  Patient will have episodes of low blood pressure because of severe cardiomyopathy as long as he is not symptomatic and systolic blood pressure above 85 will continue current treatment.  Will transfer patient to stepdown unit with medicine team and critical care team will sign off once on the floor P        Discussed with nursing staff, treatment and plan discussed.    Total critical care time caring for this patient with life threatening, unstable organ failure, including direct patient contact, management of life support systems, review of data including imaging and labs, discussions  with other team members and physicians at least 35  Min so far today, excluding procedures.       Please note that this chart was generated using voice recognition Dragon dictation software. Although every effort was made to ensure the accuracy of this automated transcription, some errors in transcription may have occurred.     Nik Adams MD  8/19/2024 2:42 PM

## 2024-08-19 NOTE — PROGRESS NOTES
Nephrology Progress Note      SUBJECTIVE      Patient was seen and examined in the room in the presence of the patient's ICU nurse.  Pinto catheter is in place.  Levophed was discontinued this morning.  Patient remains on maintenance IV fluids at 50 mL an hour.  Vital signs are stable.  Patient is saturating 99% on room air.  He speaking in complete sentences.  He denies any chest pain or shortness of breath.  He would like the Pinto catheter is out as it is uncomfortable.  No urinary retention to my knowledge.    Looking at the patient's labs show sodium 141, potassium 3.9 chloride 107 and CO2 24 with BUN 26 and creatinine 1.8.       Progressive improvement in his renal function with a creatinine now down to 1.8, with baseline 1.1-1.3.  Admission creatinine was 4.1 on 2024.  Patient is without any nausea, vomiting or shortness of breath at this time.    Background history:  69-year-old gentleman with a prior history of CKD stage IIIa with a baseline creatinine of 1.1-1.3, history of AICD placement, severe ischemic cardiomyopathy with ejection fraction less than 20%, chronic hypertension, dyslipidemia, MGUS and history of amiodarone induced thyroid dysfunction, coronary artery disease with multiple stents and type 2 diabetes mellitus presented to the hospital with acute on chronic renal failure.  He received a phone call from his PCP that his serum creatinine was significantly elevated at 4.1 up from 1.3 2 weeks ago.  Patient was noted to be hypotensive with blood pressures in the 80s and he gave a history of very poor oral intake with almost 18 pound weight loss over the last 2-1/2 weeks or so.  Pinto was placed because he has had history of meatal stenosis in the past.  Creatinine was 4.0 in the ER, has improved down to 1.8      OBJECTIVE      CURRENT TEMPERATURE:  Temp: 98.1 °F (36.7 °C)  MAXIMUM TEMPERATURE OVER 24HRS:  Temp (24hrs), Av.2 °F (36.8 °C), Min:97.3 °F (36.3 °C), Max:98.8 °F (37.1  PENDING 08/17/2024 11:41 AM    GAMGLOB PENDING 08/17/2024 11:41 AM    GGPCT PENDING 08/17/2024 11:41 AM    PATH PENDING 08/17/2024 12:11 PM     UPEP:   Lab Results   Component Value Date/Time    TPU 11 08/17/2024 12:11 PM      HEPCAB:  Lab Results   Component Value Date/Time    HEPCAB NONREACTIVE 06/14/2018 10:58 AM     C3:   Lab Results   Component Value Date    C3 129 08/17/2024     C4:   Lab Results   Component Value Date    C4 34 08/17/2024     MPO ANCA:   Lab Results   Component Value Date/Time    MPO <0.3 02/01/2022 10:50 AM    .  PR3 ANCA:    Lab Results   Component Value Date/Time    PR3 <0.7 02/01/2022 10:50 AM     URINE EOSINOPHILS:   Lab Results   Component Value Date/Time    UREO NONE SEEN 08/16/2024 03:01 PM     URINE PROTEIN:    Lab Results   Component Value Date/Time    TPU 11 08/17/2024 12:11 PM     URINALYSIS:  U/A:   Lab Results   Component Value Date/Time    NITRU NEGATIVE 08/17/2024 02:21 AM    COLORU Yellow 08/17/2024 02:21 AM    PHUR 6.0 08/17/2024 02:21 AM    PHUR 5.5 02/24/2015 11:48 AM    WBCUA 0 TO 2 07/18/2024 11:25 AM    RBCUA 0 TO 2 07/18/2024 11:25 AM    MUCUS NOT REPORTED 02/24/2015 11:48 AM    TRICHOMONAS NOT REPORTED 02/24/2015 11:48 AM    YEAST NOT REPORTED 02/24/2015 11:48 AM    BACTERIA NOT REPORTED 02/24/2015 11:48 AM    LEUKOCYTESUR NEGATIVE 08/17/2024 02:21 AM    UROBILINOGEN Normal 08/17/2024 02:21 AM    BILIRUBINUR NEGATIVE 08/17/2024 02:21 AM    GLUCOSEU 2+ 08/17/2024 02:21 AM    GLUCOSEU NEGATIVE 11/11/2011 04:06 PM    KETUA NEGATIVE 08/17/2024 02:21 AM    AMORPHOUS NOT REPORTED 02/24/2015 11:48 AM       ASSESSMENT      Acute Kidney Injury on CKD stage IIIa,  baseline creatinine appears to be 1.1-1.3.  Acute renal failure most likely attributed to hypoperfusion, hypotension-the patient has a Pinto catheter in place, Levophed was initiated but stopped earlier this morning and the patient continues on 0.9% normal saline at 50 mL an hour, creatinine has trended down to 1.8,  admission creatinine was 4.1.  Severely reduced LV function with an EF of 15% - s/p AICD and stenting x5.   Hypotension -improved with Levophed discontinued.  Hypokalemia -received oral replacement with potassium 3.9 today  Amiodarone-induced thyroid dysfunction, currently on synthroid 88 mcg daily.  Type 2 diabetes, with diabetic retinopathy  Hyperlipidemia  History of A-fib/v-fib, on Eliquis, amiodarone, and brillinta  History of gout, on allopurinol  Iron deficiency anemia  History of hypertension    PLAN      1.  Continue maintenance 0.9 normal saline at 50 mL an hour and monitor blood pressure now that Levophed is off  2.  Remove Pinto catheter and monitor postvoid residual  3. Follow up labs ordered.   4.  Likely, if the blood pressure remained stable, the patient will have his IV fluids stopped tomorrow.  5.  Eventually he will need to be back on loop diuretic, although likely at a lower dose than he was on prior to coming to the hospital      Please do not hesitate to call with questions.    This note is created with the assistance of a speech-recognition program. While intending to generate a document that actually reflects the content of the visit, no guarantees can be provided that every mistake has been identified and corrected by editing    Electronically signed by Rene Hughes MD on 8/19/2024 at 10:19 AM

## 2024-08-19 NOTE — PROGRESS NOTES
Physical Therapy  Facility/Department: [facility] CAR 3- MICU  Physical Therapy Initial Assessment    Name: Claudio Graham  : 1955  MRN: [id number]  Date of Service: 2024    Chief Complaint   Patient presents with    Abnormal Lab   Patient presented to the emergency department after abnormal lab found to have SKYLER on CKD.  While in emergency department he was found to have low blood pressure, per report his blood pressure is normally systolic of 90s and diastolic of 60s.  Patient was given 1 L of IV fluids, 10 of midodrine and started on 75 mL of normal saline.  Patient was admitted initially by the internal medicine teaching service, team 1.  proBNP was completed was 5100, baseline seems to be around 2000.  No crackles on physical exam are noted.       Discharge Recommendations:  Further therapy recommended at discharge.     PT Equipment Recommendations  Equipment Needed: No      Patient Diagnosis(es): The encounter diagnosis was SKYLER (acute kidney injury) ([facility]).  Past Medical History:  has a past medical history of Acute HFrEF (heart failure with reduced ejection fraction) ([facility]), Acute on chronic combined systolic and diastolic congestive heart failure (HCC), Acute on chronic congestive heart failure (HCC), Acute respiratory failure with hypoxia (HCC), Anemia, Anxiety, CAD (coronary artery disease), Cardiac defibrillator in place, Cardiomyopathy ([facility]), CHF (congestive heart failure) ([facility]), Chronic combined systolic and diastolic heart failure ([facility]) s/[ AICD placed, Chronic kidney disease, Complex sleep apnea syndrome, Diabetic retinopathy ([facility]), Diverticulitis, DJD (degenerative joint disease), Edentulous, Gout, HTN (hypertension), Hyperlipidemia, Hypertension, Iron deficiency anemia, Ischemic cardiomyopathy, Morbid obesity (HCC), Obesity, TANNER variably compliant with BiPAP, Osteoarthritis, PAF (paroxysmal atrial fibrillation) ([facility]), Psychophysiologic insomnia, Thyroid disease, Type II or unspecified type  diabetes mellitus without mention of complication, not stated as uncontrolled, and Unspecified sleep apnea.  Past Surgical History:  has a past surgical history that includes Colonoscopy (11/07/2007); Cardiac catheterization (08/2007); Cardiac catheterization (09/11/2013); Coronary angioplasty (1998, 2004); Cardiac defibrillator placement (08/26/2015); bone marrow biopsy (2012 approx); pr colsc flx w/rmvl of tumor polyp lesion snare tq (N/A, 04/04/2017); Colonoscopy (12/17/2021); Colonoscopy (N/A, 12/17/2021); Colonoscopy (N/A, 05/16/2022); Cardiac procedure (N/A, 7/18/2024); Cardiac procedure (N/A, 7/18/2024); and Cardiac procedure (N/A, 7/18/2024).    Assessment   Assessment: The pt performed sit to stand transfers x2 with use of the Rwalker and CGA. He was able to stand for 10 minutes with use of the rwalker with CGA and no report of increased symptoms. Pt performed 10 walking marches which elicited minor shortness of breath. He was able to ambulate 4' fwd and bckwd x2 using rwalker with CGA. The pt is currently benefitting from 24/7 support and would continue to benefit from 24/7 support. He has access to 24/7 per pt report as a friend has been living with him to help. The pt is currently unsafe to discharge home to previous living situation due to weakness, lack of endurance, limited mobility, and decreased tolerance to activity. The pt will benefit from skilled PT services in order to improve mobility, strength, balance, and tolerance to activity in order to discharge home safely.  Body Structures, Functions, Activity Limitations Requiring Skilled Therapeutic Intervention: Decreased functional mobility ;Decreased body mechanics;Decreased tolerance to work activity;Decreased strength;Decreased endurance;Decreased balance  Therapy Prognosis: Good  Decision Making: Medium Complexity  Requires PT Follow-Up: Yes  Activity Tolerance  Activity Tolerance: Patient tolerated treatment well     Plan   Physical Therapy  17  AM-PAC Inpatient T-Scale Score : 42.13  Mobility Inpatient CMS 0-100% Score: 50.57  Mobility Inpatient CMS G-Code Modifier : CK         Goals  Short Term Goals  Time Frame for Short Term Goals: 14 visits  Short Term Goal 1: Independent with all bed mobility  Short Term Goal 2: Chao to transfer sit to stand using rwalker or least restrictive AD  Short Term Goal 3: Chao to ambulate 300' using rwalker or least restrictive AD  Short Term Goal 4: Independent to navigate 3 stairs with rail on L  Patient Goals   Patient Goals : Increase tolerance to activity to navigate comunity       Education  Patient Education  Education Given To: Patient  Education Provided: Role of Therapy;Plan of Care;Transfer Training;Fall Prevention Strategies;Equipment  Education Method: Demonstration;Verbal  Barriers to Learning: None  Education Outcome: Verbalized understanding;Demonstrated understanding      Therapy Time   Individual Concurrent Group Co-treatment   Time In 1405         Time Out 1446         Minutes 41         Timed Code Treatment Minutes: 30 Minutes       REDD Lucia   This evaluation was performed by student physical therapist Cam Park under the supervision of co-signing PT who has read and agrees with all documentation.

## 2024-08-19 NOTE — PLAN OF CARE
Problem: Chronic Conditions and Co-morbidities  Goal: Patient's chronic conditions and co-morbidity symptoms are monitored and maintained or improved  8/19/2024 1012 by Reina Gonzalez RN  Outcome: Progressing  8/18/2024 2109 by Vera Ellis RN  Outcome: Progressing     Problem: Safety - Adult  Goal: Free from fall injury  8/19/2024 1012 by Reina Gonzalez RN  Outcome: Progressing  8/18/2024 2109 by Vera Ellis RN  Outcome: Progressing     Problem: Discharge Planning  Goal: Discharge to home or other facility with appropriate resources  8/19/2024 1012 by Reina Gonzalez RN  Outcome: Progressing  8/18/2024 2109 by Vera Ellis RN  Outcome: Progressing     Problem: Pain  Goal: Verbalizes/displays adequate comfort level or baseline comfort level  8/19/2024 1012 by Reina Gonzalez RN  Outcome: Progressing  Flowsheets (Taken 8/19/2024 0800)  Verbalizes/displays adequate comfort level or baseline comfort level: Assess pain using appropriate pain scale  8/18/2024 2109 by Vera Ellis RN  Outcome: Progressing  Flowsheets (Taken 8/18/2024 1600 by Kayla Enamorado RN)  Verbalizes/displays adequate comfort level or baseline comfort level: Encourage patient to monitor pain and request assistance     Problem: Skin/Tissue Integrity  Goal: Absence of new skin breakdown  Description: 1.  Monitor for areas of redness and/or skin breakdown  2.  Assess vascular access sites hourly  3.  Every 4-6 hours minimum:  Change oxygen saturation probe site  4.  Every 4-6 hours:  If on nasal continuous positive airway pressure, respiratory therapy assess nares and determine need for appliance change or resting period.  8/19/2024 1012 by Reina Gonzalez RN  Outcome: Progressing  8/18/2024 2109 by Vera Ellis RN  Outcome: Progressing     Problem: Nutrition Deficit:  Goal: Optimize nutritional status  8/19/2024 1012 by Reina Gonzalez RN  Outcome: Progressing  8/18/2024 2109 by Vera Ellis RN  Outcome: Progressing

## 2024-08-19 NOTE — PROGRESS NOTES
[facility]     Department of Internal Medicine - Staff Internal Medicine Service   ICU PATIENT TRANSFER NOTE        Patient:  Claudio Graham  YOB: 1955  MRN: [id number]     Acct: [id number]     Admit date: 8/16/2024    Code Status:-  Full code     Reason for ICU Admission:-       SUPPORT DEVICES: [] Ventilator [] BIPAP  [] Nasal Cannula [x] Room Air    Consultations:- [x] Cardiology [x] Nephrology  [] Hemo onco  [] GI                               [] ID [] ENT  [] Rheum [] Endo   []Physiotherapy                                 Others:-     NUTRITION:  [] NPO [] Tube Feeding (Specify: ) [] TPN  [x] PO    Central Lines:- [x] No   [] Yes           If yes - Days/Date of Insertion.      Pt seen,examined and Chart reviewed.    ICU COURSE:    History was obtained from chart review.       Claudio Graham is a 69 y.o. with significant past medical history of CKD stage IIIa, CHF with 5 stents, AICD placement, EF of 10 to 15% from echo post-stenting in July 2024, hypertension, hyperlipidemia, MGUS, most recent stent was on 7/18, LAD was 100% stenosed.  Patient follows with CHF clinic.  Seems to be on Lasix 40 mg twice daily at home.  Patient does have a history of amiodarone induced thyroid dysfunction amiodarone was stopped a few years back however had since recent onset of V-fib patient was restarted on amiodarone.  Patient's last TSH was 6.49, T4 was 2.1.  Patient is on 88 of Synthroid at home.     Patient presented to the emergency department after abnormal lab found to have SKYLER on CKD.  While in emergency department he was found to have low blood pressure, per report his blood pressure is normally systolic of 90s and diastolic of 60s.  Patient was given 1 L of IV fluids, 10 of midodrine and started on 75 mL of normal saline.  Patient was admitted initially by the internal medicine teaching service, team 1.  proBNP was completed was 5100, baseline seems to be around 2000.  No  NEGATIVE   GLUCOSEU 2+*           Assessment:  Principal Problem:    SKYLER (acute kidney injury) ([facility])  Active Problems:    VT (ventricular tachycardia) ([facility])    Dyslipidemia    Paroxysmal A-fib (HCC)    Stage 3a chronic kidney disease (HCC)    History of type 2 diabetes mellitus  Resolved Problems:    * No resolved hospital problems. *          Plan:     Previous smoker, no O2 requirement  Patient saturating well on room air.  Will need to titrate fluid status with respiratory status given patient's low ejection fraction  Chest x-ray from 8/17 shows cardiomegaly with mild vascular congestion.     History of V-fib/V. tach, status post AICD, history of A-fib, history of amiodarone induced hypothyroidism, hypertension likely secondary to overdiuresis.  Last echo shows EF of 10 to 15% in July 2024 with severe left ventricular dilation and normal wall thickness.  Patient has AICD placed.  Left heart cath from July 18, 2024 with 100% ostial occlusion of the LAD with Lupillo stent placement.  Severely reduced LV systolic function 10%.  This does not resolved with stent placement.  On Eliquis, continuing  On aspirin and Brilinta, continuing  Continue Lipitor 80 mg daily  Holding metoprolol succinate 50 mg daily due to low BP  Continue amiodarone 200 mg daily for rhythm control and prevention of A-fib/V. tach/V-fib  On Jardiance, continuing at this time, follow-up nephrology recommendations due to GFR less than 20  troponin 79-72  Cardiology consulted, follow-up recommendations     GI/Nutrition: Renal diet  Renal diet, diabetic diet, cardiac diet  Adjust diet as necessary  No PPI at this time     SKYLER on CKD, elevated anion gap, likely secondary to overdiuresis leading to hypotension.  Avoid nephrotoxic medications  Gentle hydration due to low EF  Renal diet, diabetic diet  Nephrology consulted, follow-up recommendations     ID: No current concern for sepsis  Blood cultures negative  No antibiotic therapy at this time, no concern

## 2024-08-19 NOTE — PROGRESS NOTES
Positive (Details:  )  Urine Culture:                   [] None drawn      [] Negative             []  Positive (Details:  )  Sputum Culture:               [] None drawn       [] Negative             []  Positive (Details:  )   Endotracheal aspirate:     [] None drawn       [] Negative             []  Positive (Details:  )       Consults:     1. Cardiology  2. Nephrology    Assessment:     Patient Active Problem List    Diagnosis Date Noted    Cardiogenic shock ([facility]) 07/19/2024    VT (ventricular tachycardia) ([facility]) 07/18/2024    Ischemic cardiomyopathy 11/23/2011    TANNER variably compliant with BiPAP 09/25/2011    CAD (coronary artery disease) s/p stenting of L anterior descending artery 2004 09/25/2011    Adenomatous polyp of ascending colon     Stage 3a chronic kidney disease ([facility]) 08/17/2024    History of type 2 diabetes mellitus 08/17/2024    SKYLER (acute kidney injury) ([facility]) 08/16/2024    Secondary hypercoagulable state ([facility]) 08/14/2024    Ventricular fibrillation ([facility]) 07/19/2024    Metabolic acidosis 07/19/2024    Diabetic polyneuropathy associated with type 2 diabetes mellitus ([facility]) 04/08/2024    OM (onychomycosis) 04/08/2024    Congenital meatal stenosis 12/01/2023    Benign prostatic hyperplasia with nocturia 11/27/2023    Urologic disorders 10/17/2023    Chest pain 08/15/2023    Atherosclerotic heart disease of native coronary artery with unspecified angina pectoris 08/15/2023    Complex renal cyst 10/12/2022    Acute cardiac pulmonary edema (HCC) 08/02/2021    Hypothyroidism due to medication 07/02/2021    Bilateral kidney masses 08/11/2020    Postprocedural stricture of urethra at fossa navicularis 08/11/2020    Gross hematuria 07/15/2020    Cardiopulmonary arrest (HCC) 07/05/2020    Chronic systolic (congestive) heart failure ([facility]) 03/19/2020    Hypokalemia 09/01/2019    Coronary angioplasty status 11/07/2017    Acute respiratory failure with hypoxia and hypercapnia ([facility])     AICD (automatic  cardioverter/defibrillator) present 09/13/2017    Paroxysmal A-fib (HCC) 09/13/2017    Type 2 diabetes mellitus with chronic kidney disease ([facility]) 06/01/2017    MGUS (monoclonal gammopathy of unknown significance) 01/22/2016    ST elevation myocardial infarction involving left anterior descending (LAD) coronary artery ([facility]) 09/10/2013    HTN (hypertension) 03/30/2012    Chronic kidney disease, stage II (mild) 09/25/2011    Chronic gout 09/25/2011    Morbid obesity ([facility]) 09/25/2011    Normocytic normochromic anemia 09/25/2011    Dyslipidemia 09/25/2011    Iron deficiency anemia 09/25/2011    Anxiety disorder  09/25/2011    DJD (degenerative joint disease) 09/25/2011    Diabetic retinopathy of both eyes with macular edema associated with diabetes mellitus due to underlying condition, unspecified retinopathy severity ([facility]) 09/25/2011       Recommended Follow-up:     Cardiology recs  Nephro recs re diuresis        Above mentioned assessment and plan was discussed by me with the admitting medicine resident. The medicine team assigned to the patient by medicine admitting resident will be following up the patient from now onwards on the floor.       Jimmie De La Cruz MD  Internal Medicine Resident  Critical Care Service  [facility]  8/19/2024, 3:51 PM EDT

## 2024-08-19 NOTE — PROGRESS NOTES
[facility]   Progress Note                   Date:   8/19/2024  Patient name: Claudio Graham  Date of admission:  8/16/2024  1:31 PM  MRN:   [id number]  YOB: 1955  PCP: Vanessa Mckenna, LIDIA - CNP    Reason for Admission: Hypotension and SKYLER    Subjective:   Patient was seen and examined: No acute issues overnight, currently in normal sinus rhythm with PACs, on room air, NSR 65, 98/50.    Medications:   Scheduled Meds:   atorvastatin  80 mg Oral Nightly    midodrine  10 mg Oral TID WC    insulin lispro  0-8 Units SubCUTAneous TID WC    insulin lispro  0-4 Units SubCUTAneous Nightly    amiodarone  200 mg Oral Daily    apixaban  5 mg Oral BID    [Held by provider] furosemide  40 mg Oral BID    [Held by provider] isosorbide mononitrate  30 mg Oral Daily    levothyroxine  88 mcg Oral Daily    ticagrelor  90 mg Oral BID    sodium chloride flush  5-40 mL IntraVENous 2 times per day    allopurinol  300 mg Oral Daily    empagliflozin  10 mg Oral Daily    [Held by provider] metoprolol succinate  50 mg Oral Daily     Continuous Infusions:   norepinephrine Stopped (08/19/24 0519)    dextrose      sodium chloride 50 mL/hr at 08/19/24 0521    sodium chloride      dextrose       CBC:   Recent Labs     08/17/24  0630 08/18/24  0504 08/19/24  0331   WBC 10.7 8.2 8.7   HGB 12.5* 13.7 12.7*    See Reflexed IPF Result 147     BMP:    Recent Labs     08/17/24  0630 08/18/24  0504 08/19/24  0331    140 141   K 3.1* 3.7 3.9    104 107   CO2 21 22 24   BUN 40* 32* 26*   CREATININE 3.2* 2.3* 1.8*   GLUCOSE 205* 131* 118*     Hepatic: No results for input(s): \"AST\", \"ALT\", \"BILITOT\", \"ALKPHOS\" in the last 72 hours.    Invalid input(s): \"ALB\"  Troponin: No results for input(s): \"TROPONINI\" in the last 72 hours.  BNP: No results for input(s): \"BNP\" in the last 72 hours.  Lipids: No results for input(s): \"CHOL\", \"HDL\" in the last 72 hours.    Invalid input(s): \"LDLCALCU\"  INR: No results    LAD: Acute ostial occlusion 100%, length is 18 mm, THIAGO 0 flow, underwent PCI with TIFFANY using 3.5 x 22 mm [place] stent with 0% residual stenosis and restoration of THIAGO-3 flow     LCX: Luminal parities of 20 to 30%     RCA: Luminal irregularities of 20 to 30%     The LV gram was performed in the FARIAS 30 position.  LVEF: 10%. LV Wall Motion: Severe global hypokinesis     Conclusions:  Acute occlusion of ostial LAD  Successful PCI with TIFFANY to ostial LAD with restoration of THIAGO-3 flow  Severely reduced LV systolic function  Persistent ventricular tachycardia     Recommendation:  Routine post PCI/MI orders  Medical treatments.  Risk factors modifications.  Patient was loaded with amiodarone in addition to lidocaine and another shock was attempted after completion of the procedure but patient remains in persistent ventricular tachycardia  Intra-aortic balloon pump was inserted via right femoral approach  Right femoral central line was inserted        Electronically signed by Satinder Siddiqui MD on 7/18/2024 at 2:46 PM    Assessment:   Hypotension requiring IV pressor support likely due to poor intake and overdiuresis.   SKYLER on CKD.  Creatinine improving  Recent VT storm due to LAD occlusion s/p PCI with TIFFANY 7/18/2024.  Severe ischemic cardiomyopathy with last LVEF 19%  Chronic HFrEF.  Euvolemic.  Stable  Paroxysmal A-fib on Eliquis and amiodarone  Per lipidemia  Type II DM  TANNER    Plan:   Currently off of the pressor support  Continue Eliquis and Brilinta  On midodrine for blood pressure support. Will titrate down as tolerated.  Resume GDMT on hold due to hypotension   Follow-up on ICD interrogation  Appreciate nephrology recommendation with fluid assistance and diuretic dose.  Will continue to follow      Fredo Willson MD.  Cardiovascular Fellow,   [facility], [place], [place].        Attending Physician Statement:    I have discussed the care of  Claudio Graham , including pertinent history and exam findings,  with the Cardiology fellow/resident.     I have seen and examined the patient and the key elements of all parts of the encounter have been performed by me. I agree with the assessment, plan and orders as documented by the fellow/resident, after I modified exam findings and plan of treatments, and the final version is my approved version of the assessment.

## 2024-08-20 LAB
ANION GAP SERPL CALCULATED.3IONS-SCNC: 9 MMOL/L (ref 9–16)
BASOPHILS # BLD: 0.04 K/UL (ref 0–0.2)
BASOPHILS NFR BLD: 1 % (ref 0–2)
BUN SERPL-MCNC: 19 MG/DL (ref 8–23)
CALCIUM SERPL-MCNC: 8 MG/DL (ref 8.6–10.4)
CHLORIDE SERPL-SCNC: 110 MMOL/L (ref 98–107)
CO2 SERPL-SCNC: 22 MMOL/L (ref 20–31)
CREAT SERPL-MCNC: 1.5 MG/DL (ref 0.7–1.2)
EOSINOPHIL # BLD: 0.23 K/UL (ref 0–0.44)
EOSINOPHILS RELATIVE PERCENT: 3 % (ref 1–4)
ERYTHROCYTE [DISTWIDTH] IN BLOOD BY AUTOMATED COUNT: 18.6 % (ref 11.8–14.4)
GFR, ESTIMATED: 48 ML/MIN/1.73M2
GLUCOSE BLD-MCNC: 109 MG/DL (ref 75–110)
GLUCOSE BLD-MCNC: 119 MG/DL (ref 75–110)
GLUCOSE BLD-MCNC: 123 MG/DL (ref 75–110)
GLUCOSE BLD-MCNC: 154 MG/DL (ref 75–110)
GLUCOSE SERPL-MCNC: 90 MG/DL (ref 74–99)
HCT VFR BLD AUTO: 36.7 % (ref 40.7–50.3)
HGB BLD-MCNC: 11.5 G/DL (ref 13–17)
IMM GRANULOCYTES # BLD AUTO: <0.03 K/UL (ref 0–0.3)
IMM GRANULOCYTES NFR BLD: 0 %
ITYP INTERPRETATION: NORMAL
LYMPHOCYTES NFR BLD: 0.73 K/UL (ref 1.1–3.7)
LYMPHOCYTES RELATIVE PERCENT: 10 % (ref 24–43)
MCH RBC QN AUTO: 28.3 PG (ref 25.2–33.5)
MCHC RBC AUTO-ENTMCNC: 31.3 G/DL (ref 28.4–34.8)
MCV RBC AUTO: 90.4 FL (ref 82.6–102.9)
MONOCYTES NFR BLD: 0.38 K/UL (ref 0.1–1.2)
MONOCYTES NFR BLD: 5 % (ref 3–12)
NEUTROPHILS NFR BLD: 81 % (ref 36–65)
NEUTS SEG NFR BLD: 5.8 K/UL (ref 1.5–8.1)
NRBC BLD-RTO: 0 PER 100 WBC
PATHOLOGIST REVIEW: NORMAL
PLATELET # BLD AUTO: 128 K/UL (ref 138–453)
PMV BLD AUTO: 12.8 FL (ref 8.1–13.5)
POTASSIUM SERPL-SCNC: 3.7 MMOL/L (ref 3.7–5.3)
RBC # BLD AUTO: 4.06 M/UL (ref 4.21–5.77)
RBC # BLD: ABNORMAL 10*6/UL
SODIUM SERPL-SCNC: 141 MMOL/L (ref 136–145)
SPECIMEN TYPE: NORMAL
SPECIMEN VOL UR: NORMAL ML
TOTAL PROTEIN, URINE: 11 MG/DL
WBC OTHER # BLD: 7.2 K/UL (ref 3.5–11.3)

## 2024-08-20 PROCEDURE — 99232 SBSQ HOSP IP/OBS MODERATE 35: CPT | Performed by: INTERNAL MEDICINE

## 2024-08-20 PROCEDURE — 2580000003 HC RX 258

## 2024-08-20 PROCEDURE — 99233 SBSQ HOSP IP/OBS HIGH 50: CPT | Performed by: INTERNAL MEDICINE

## 2024-08-20 PROCEDURE — 36415 COLL VENOUS BLD VENIPUNCTURE: CPT

## 2024-08-20 PROCEDURE — 6370000000 HC RX 637 (ALT 250 FOR IP): Performed by: EMERGENCY MEDICINE

## 2024-08-20 PROCEDURE — 85025 COMPLETE CBC W/AUTO DIFF WBC: CPT

## 2024-08-20 PROCEDURE — 80048 BASIC METABOLIC PNL TOTAL CA: CPT

## 2024-08-20 PROCEDURE — 97166 OT EVAL MOD COMPLEX 45 MIN: CPT

## 2024-08-20 PROCEDURE — 99233 SBSQ HOSP IP/OBS HIGH 50: CPT | Performed by: SURGERY

## 2024-08-20 PROCEDURE — 6370000000 HC RX 637 (ALT 250 FOR IP): Performed by: HOSPITALIST

## 2024-08-20 PROCEDURE — 6370000000 HC RX 637 (ALT 250 FOR IP): Performed by: SURGERY

## 2024-08-20 PROCEDURE — 82947 ASSAY GLUCOSE BLOOD QUANT: CPT

## 2024-08-20 PROCEDURE — 6370000000 HC RX 637 (ALT 250 FOR IP)

## 2024-08-20 PROCEDURE — 97535 SELF CARE MNGMENT TRAINING: CPT

## 2024-08-20 PROCEDURE — 2060000000 HC ICU INTERMEDIATE R&B

## 2024-08-20 RX ORDER — ALLOPURINOL 300 MG/1
150 TABLET ORAL DAILY
Status: DISCONTINUED | OUTPATIENT
Start: 2024-08-21 | End: 2024-08-22 | Stop reason: HOSPADM

## 2024-08-20 RX ORDER — METOPROLOL TARTRATE 25 MG/1
25 TABLET, FILM COATED ORAL 2 TIMES DAILY
Status: DISCONTINUED | OUTPATIENT
Start: 2024-08-20 | End: 2024-08-22 | Stop reason: HOSPADM

## 2024-08-20 RX ORDER — FUROSEMIDE 40 MG
40 TABLET ORAL DAILY
Status: DISCONTINUED | OUTPATIENT
Start: 2024-08-21 | End: 2024-08-22 | Stop reason: HOSPADM

## 2024-08-20 RX ORDER — MIDODRINE HYDROCHLORIDE 5 MG/1
10 TABLET ORAL
Status: DISCONTINUED | OUTPATIENT
Start: 2024-08-20 | End: 2024-08-22 | Stop reason: HOSPADM

## 2024-08-20 RX ADMIN — APIXABAN 5 MG: 5 TABLET, FILM COATED ORAL at 19:44

## 2024-08-20 RX ADMIN — SODIUM CHLORIDE, PRESERVATIVE FREE 10 ML: 5 INJECTION INTRAVENOUS at 19:47

## 2024-08-20 RX ADMIN — SODIUM CHLORIDE, PRESERVATIVE FREE 10 ML: 5 INJECTION INTRAVENOUS at 08:37

## 2024-08-20 RX ADMIN — EMPAGLIFLOZIN 10 MG: 10 TABLET, FILM COATED ORAL at 08:36

## 2024-08-20 RX ADMIN — AMIODARONE HYDROCHLORIDE 200 MG: 200 TABLET ORAL at 08:36

## 2024-08-20 RX ADMIN — MIDODRINE HYDROCHLORIDE 10 MG: 5 TABLET ORAL at 11:57

## 2024-08-20 RX ADMIN — LEVOTHYROXINE SODIUM 88 MCG: 0.09 TABLET ORAL at 07:49

## 2024-08-20 RX ADMIN — MIDODRINE HYDROCHLORIDE 10 MG: 5 TABLET ORAL at 08:35

## 2024-08-20 RX ADMIN — METOPROLOL TARTRATE 25 MG: 25 TABLET, FILM COATED ORAL at 19:44

## 2024-08-20 RX ADMIN — APIXABAN 5 MG: 5 TABLET, FILM COATED ORAL at 08:36

## 2024-08-20 RX ADMIN — ALLOPURINOL 300 MG: 300 TABLET ORAL at 08:36

## 2024-08-20 RX ADMIN — MIDODRINE HYDROCHLORIDE 10 MG: 5 TABLET ORAL at 18:00

## 2024-08-20 RX ADMIN — METOPROLOL TARTRATE 25 MG: 25 TABLET, FILM COATED ORAL at 13:52

## 2024-08-20 RX ADMIN — POTASSIUM BICARBONATE 20 MEQ: 782 TABLET, EFFERVESCENT ORAL at 14:17

## 2024-08-20 RX ADMIN — TICAGRELOR 90 MG: 90 TABLET ORAL at 09:55

## 2024-08-20 RX ADMIN — ATORVASTATIN CALCIUM 80 MG: 80 TABLET, FILM COATED ORAL at 19:44

## 2024-08-20 RX ADMIN — TICAGRELOR 90 MG: 90 TABLET ORAL at 19:44

## 2024-08-20 NOTE — PROGRESS NOTES
[facility]   Progress Note                   Date:   8/20/2024  Patient name: Claudio Graham  Date of admission:  8/16/2024  1:31 PM  MRN:   [id number]  YOB: 1955  PCP: Vanessa Mckenna, LIDIA - CNP    Reason for Admission: Hypotension and SKYLER    Subjective:   Patient was seen and examined: No acute issues overnight, currently in normal sinus rhythm with frequent PACs, on room air, BB on hold , blood pressure improved .    Medications:   Scheduled Meds:   atorvastatin  80 mg Oral Nightly    midodrine  10 mg Oral TID WC    insulin lispro  0-8 Units SubCUTAneous TID WC    insulin lispro  0-4 Units SubCUTAneous Nightly    amiodarone  200 mg Oral Daily    apixaban  5 mg Oral BID    [Held by provider] furosemide  40 mg Oral BID    [Held by provider] isosorbide mononitrate  30 mg Oral Daily    levothyroxine  88 mcg Oral Daily    ticagrelor  90 mg Oral BID    sodium chloride flush  5-40 mL IntraVENous 2 times per day    allopurinol  300 mg Oral Daily    empagliflozin  10 mg Oral Daily    [Held by provider] metoprolol succinate  50 mg Oral Daily     Continuous Infusions:   norepinephrine Stopped (08/19/24 0519)    dextrose      sodium chloride 50 mL/hr at 08/19/24 1853    sodium chloride       CBC:   Recent Labs     08/18/24  0504 08/19/24  0331 08/20/24  0549   WBC 8.2 8.7 7.2   HGB 13.7 12.7* 11.5*   PLT See Reflexed IPF Result 147 128*     BMP:    Recent Labs     08/18/24  0504 08/19/24  0331 08/20/24  0549    141 141   K 3.7 3.9 3.7    107 110*   CO2 22 24 22   BUN 32* 26* 19   CREATININE 2.3* 1.8* 1.5*   GLUCOSE 131* 118* 90     Hepatic: No results for input(s): \"AST\", \"ALT\", \"BILITOT\", \"ALKPHOS\" in the last 72 hours.    Invalid input(s): \"ALB\"  Troponin: No results for input(s): \"TROPONINI\" in the last 72 hours.  BNP: No results for input(s): \"BNP\" in the last 72 hours.  Lipids: No results for input(s): \"CHOL\", \"HDL\" in the last 72 hours.    Invalid input(s):  \"LDLCALCU\"  INR: No results for input(s): \"INR\" in the last 72 hours.    Objective:   Vitals: /73   Pulse 75   Temp 98.4 °F (36.9 °C) (Oral)   Resp 12   Ht 1.626 m (5' 4.02\")   Wt 85 kg (187 lb 6.3 oz)   SpO2 99%   BMI 32.15 kg/m²   General appearance: alert and cooperative with exam  HEENT: Normocephalic, atraumatic   Neck: no carotid bruit, no JVD, trachea midline and thyroid not enlarged  Lungs: clear to auscultation bilaterally  Heart: regular rate and rhythm, S1, S2 normal, no murmur  Abdomen: soft, bowel sounds normal  Extremities: no edema or swelling     EKG:  Sinus rhythm with Premature ventricular complexes or Fusion complexes  Right bundle branch block  Left anterior fascicular block   Bifascicular block   Anterior infarct (cited on or before 19-JUL-2024)  Abnormal ECG  When compared with ECG of 23-JUL-2024 17:09,  Previous ECG has undetermined rhythm, needs review  QRS duration has increased  Serial changes of Anterior infarct Present         Echo:  07/18/24     ECHO (TTE) COMPLETE (PRN CONTRAST/BUBBLE/STRAIN/3D) 07/20/2024 12:19 PM (Final)     Interpretation Summary    Left Ventricle: Severely reduced left ventricular systolic function with a visually estimated EF of 10 -15%. EF by 2D Simpsons Biplane is 19%. Left ventricle is severely dilated. Normal wall thickness. See diagram for wall motion findings. Abnormal diastolic function.    Aortic Valve: Trileaflet valve. Mildly calcified aortic valve leaflets. Mild regurgitation.    Mitral Valve: Mild regurgitation.    Tricuspid Valve: Mild regurgitation. Normal RVSP. The estimated RVSP is 27 mmHg.    Image quality is technically difficult. Contrast used: Definity. Technically difficult study due to patient's body habitus and procedure performed with the patient in a supine position.     Signed by: Marco Antonio Barahona MD on 7/20/2024 12:19 PM        LAST CATH:   07/18/24     CARDIAC PROCEDURE 07/18/2024  2:57 PM (Final)     Findings:     Left main:

## 2024-08-20 NOTE — CARE COORDINATION
Transitional Planning  Per previous note pt not sure name of home care he had prior to admit.  Looked up previous admit he was discharged from [facility] to [facility] 7/26/24.  Called Zoila with [facility] she was able to look up discharge plans.  He was discharged from [facility] 8/12 home w/Maddie.  Referral placed for Maddie

## 2024-08-20 NOTE — PROGRESS NOTES
Renal Progress Note    Patient :  Claudio Graham; 69 y.o. MRN# [id number]  Location:  2005/2005-01  Attending:  Aleksandra Gooden MD  Admit Date:  8/16/2024   Hospital Day: 4    Subjective:     Patient seen and examined at bedside.  No new issues reported overnight.    Labs reviewed.  Sodium 141, potassium 3.7, chloride 110, bicarb 22, BUN 19, creatinine 1.5, calcium 8.0, WBC 7.2, hemoglobin 11.5, platelet count 128.  Urine output documented as about 1.3 L and x 3 more unmeasured 24 hours.    Brief History reviewed.  69-year-old gentleman with a prior history of CKD stage IIIa with a baseline creatinine of 1.1-1.3, history of AICD placement, severe ischemic cardiomyopathy with ejection fraction less than 20%, chronic hypertension, dyslipidemia, MGUS and history of amiodarone induced thyroid dysfunction, coronary artery disease with multiple stents and type 2 diabetes mellitus presented to the hospital with acute on chronic renal failure.  He received a phone call from his PCP that his serum creatinine was significantly elevated at 4.1 up from 1.3 2 weeks ago.  Patient was noted to be hypotensive with blood pressures in the 80s and he gave a history of very poor oral intake with almost 18 pound weight loss over the last 2-1/2 weeks or so.  Pinto was placed because he has had history of meatal stenosis in the past.  Creatinine was 4.0 in the ER, has improved down to 1.5  Outpatient Medications:     Medications Prior to Admission: levothyroxine (SYNTHROID) 88 MCG tablet, Take 1 tablet by mouth daily  furosemide (LASIX) 40 MG tablet, Take 1 tablet by mouth in the morning and 1 tablet in the evening. Take 1 tablet in the morning, and half tablet in the evening.  allopurinol (ZYLOPRIM) 300 MG tablet, take 1 tablet by mouth once daily  amiodarone (CORDARONE) 200 MG tablet, Take 1 tablet by mouth daily  apixaban (ELIQUIS) 5 MG TABS tablet, Take 1 tablet by mouth 2 times daily  aspirin 81 MG chewable tablet, Take 1 tablet by

## 2024-08-20 NOTE — PLAN OF CARE
Problem: Chronic Conditions and Co-morbidities  Goal: Patient's chronic conditions and co-morbidity symptoms are monitored and maintained or improved  8/20/2024 0038 by Janusz Howard RN  Outcome: Progressing  8/20/2024 0025 by Janusz Howard RN  Outcome: Progressing     Problem: Safety - Adult  Goal: Free from fall injury  8/20/2024 0038 by Janusz Howard RN  Outcome: Progressing  8/20/2024 0025 by Janusz Howard RN  Outcome: Progressing     Problem: Discharge Planning  Goal: Discharge to home or other facility with appropriate resources  8/20/2024 0038 by Janusz Howard RN  Outcome: Progressing  8/20/2024 0025 by Janusz Howard RN  Outcome: Progressing     Problem: Pain  Goal: Verbalizes/displays adequate comfort level or baseline comfort level  8/20/2024 0038 by Janusz Howard RN  Outcome: Progressing  8/20/2024 0025 by Janusz Howard RN  Outcome: Progressing     Problem: Skin/Tissue Integrity  Goal: Absence of new skin breakdown  Description: 1.  Monitor for areas of redness and/or skin breakdown  2.  Assess vascular access sites hourly  3.  Every 4-6 hours minimum:  Change oxygen saturation probe site  4.  Every 4-6 hours:  If on nasal continuous positive airway pressure, respiratory therapy assess nares and determine need for appliance change or resting period.  8/20/2024 0038 by Janusz Howard RN  Outcome: Progressing  8/20/2024 0025 by Janusz Howard RN  Outcome: Progressing     Problem: Nutrition Deficit:  Goal: Optimize nutritional status  8/20/2024 0038 by Janusz Howard RN  Outcome: Progressing  8/20/2024 0025 by Janusz Howard RN  Outcome: Progressing

## 2024-08-20 NOTE — PROGRESS NOTES
[facility]     Department of Internal Medicine - Staff Internal Medicine Service   ICU PATIENT TRANSFER NOTE        Patient:  Claudio Graham  YOB: 1955  MRN: [id number]     Acct: [id number]     Admit date: 8/16/2024    Code Status:-  Full code     Reason for ICU Admission:-       SUPPORT DEVICES: [] Ventilator [] BIPAP  [] Nasal Cannula [x] Room Air    Consultations:- [] Cardiology [x] Nephrology  [] Hemo onco  [] GI                               [] ID [] ENT  [] Rheum [] Endo   []Physiotherapy                                 Others:-     NUTRITION:  [] NPO [] Tube Feeding (Specify: ) [] TPN  [x] PO    Central Lines:- [x] No   [] Yes           If yes - Days/Date of Insertion.      Pt seen,examined and Chart reviewed.    ICU COURSE:    The patient is a 69 y.o. male with past medical history significant for CKD stage III AAA, CHF s/p 5 stents and AICD placement with here for 10 to 15% from last echo in July, known case of hypertension, hyperlipidemia, MGUS.  Who was initially admitted on 8/16/2024 with SKYLER (acute kidney injury) (HCC) [N17.9]  Acute kidney injury superimposed on CKD (HCC) [N17.9, N18.9] and presented with abnormal labs with SKYLER on CKD came into attention when patient was seen at CHF clinic.    In the ER patient presented with abnormal labs demonstrating SKYLER on CKD with creatinine elevation to 4.1 from 1.3.  On day 1 of admission in the medical floor patient has dropped his diastolic blood pressure from 90s to 60s.  Patient was resuscitated with 1 L of IV fluid bolus, 10 Mg of midodrine.  proBNP was elevated to 5100 from baseline of 2000.  During resuscitation chest was examined and no crackles were present.  A rapid response was called and the midnight for hypotension with blood pressure dropping from 80s to 30s.  Patient's Lasix and Imdur were held and only amiodarone and metoprolol were continued for A-fib and V-fib/V. tach history.  During the event patient  Brilinta.  Continue Lipitor 80 Mg  Resume metoprolol 25 Mg as blood pressure is stable now.  Continue Jardiance.  Pending chest x-ray.  Cardiology is on board.      V-fib/V. tach s/p AICD and history of A-fib  CHADVASC score 4  Continue amiodarone  Continue Eliquis    Amiodarone induced thyroid dysfunction  In past amiodarone was stopped due to thyroid dysfunction but amiodarone was resumed due to recent history of V-fib and V. tach.  Patient is on Synthyroid 88 mcg daily  Continue Synthyroid.    Diabetes mellitus type 2  Continue low-dose sliding scale insulin    Gout  At home on allopurinol  Will reduce the dose of allopurinol today.                Meri Tellez MD             Department of Internal Medicine  [facility], [place]           8/20/2024, 10:06 AM

## 2024-08-21 LAB
ANION GAP SERPL CALCULATED.3IONS-SCNC: 10 MMOL/L (ref 9–16)
BASOPHILS # BLD: 0.03 K/UL (ref 0–0.2)
BASOPHILS NFR BLD: 0 % (ref 0–2)
BUN SERPL-MCNC: 19 MG/DL (ref 8–23)
CALCIUM SERPL-MCNC: 8.6 MG/DL (ref 8.6–10.4)
CHLORIDE SERPL-SCNC: 107 MMOL/L (ref 98–107)
CO2 SERPL-SCNC: 24 MMOL/L (ref 20–31)
CREAT SERPL-MCNC: 1.5 MG/DL (ref 0.7–1.2)
EOSINOPHIL # BLD: 0.26 K/UL (ref 0–0.44)
EOSINOPHILS RELATIVE PERCENT: 4 % (ref 1–4)
ERYTHROCYTE [DISTWIDTH] IN BLOOD BY AUTOMATED COUNT: 18.8 % (ref 11.8–14.4)
GFR, ESTIMATED: 50 ML/MIN/1.73M2
GLUCOSE BLD-MCNC: 100 MG/DL (ref 75–110)
GLUCOSE BLD-MCNC: 127 MG/DL (ref 75–110)
GLUCOSE BLD-MCNC: 143 MG/DL (ref 75–110)
GLUCOSE BLD-MCNC: 169 MG/DL (ref 75–110)
GLUCOSE SERPL-MCNC: 94 MG/DL (ref 74–99)
HCT VFR BLD AUTO: 37.1 % (ref 40.7–50.3)
HGB BLD-MCNC: 11.5 G/DL (ref 13–17)
IMM GRANULOCYTES # BLD AUTO: 0.03 K/UL (ref 0–0.3)
IMM GRANULOCYTES NFR BLD: 0 %
LYMPHOCYTES NFR BLD: 0.83 K/UL (ref 1.1–3.7)
LYMPHOCYTES RELATIVE PERCENT: 12 % (ref 24–43)
MCH RBC QN AUTO: 27.8 PG (ref 25.2–33.5)
MCHC RBC AUTO-ENTMCNC: 31 G/DL (ref 28.4–34.8)
MCV RBC AUTO: 89.6 FL (ref 82.6–102.9)
MONOCYTES NFR BLD: 0.46 K/UL (ref 0.1–1.2)
MONOCYTES NFR BLD: 7 % (ref 3–12)
NEUTROPHILS NFR BLD: 77 % (ref 36–65)
NEUTS SEG NFR BLD: 5.39 K/UL (ref 1.5–8.1)
NRBC BLD-RTO: 0 PER 100 WBC
PLATELET # BLD AUTO: 140 K/UL (ref 138–453)
PMV BLD AUTO: 12.7 FL (ref 8.1–13.5)
POTASSIUM SERPL-SCNC: 3.7 MMOL/L (ref 3.7–5.3)
RBC # BLD AUTO: 4.14 M/UL (ref 4.21–5.77)
RBC # BLD: ABNORMAL 10*6/UL
SODIUM SERPL-SCNC: 141 MMOL/L (ref 136–145)
WBC OTHER # BLD: 7 K/UL (ref 3.5–11.3)

## 2024-08-21 PROCEDURE — 6370000000 HC RX 637 (ALT 250 FOR IP): Performed by: SURGERY

## 2024-08-21 PROCEDURE — 82947 ASSAY GLUCOSE BLOOD QUANT: CPT

## 2024-08-21 PROCEDURE — 36415 COLL VENOUS BLD VENIPUNCTURE: CPT

## 2024-08-21 PROCEDURE — 6370000000 HC RX 637 (ALT 250 FOR IP): Performed by: INTERNAL MEDICINE

## 2024-08-21 PROCEDURE — 99232 SBSQ HOSP IP/OBS MODERATE 35: CPT | Performed by: INTERNAL MEDICINE

## 2024-08-21 PROCEDURE — 85025 COMPLETE CBC W/AUTO DIFF WBC: CPT

## 2024-08-21 PROCEDURE — 97110 THERAPEUTIC EXERCISES: CPT

## 2024-08-21 PROCEDURE — 80048 BASIC METABOLIC PNL TOTAL CA: CPT

## 2024-08-21 PROCEDURE — 99233 SBSQ HOSP IP/OBS HIGH 50: CPT | Performed by: INTERNAL MEDICINE

## 2024-08-21 PROCEDURE — 6370000000 HC RX 637 (ALT 250 FOR IP): Performed by: HOSPITALIST

## 2024-08-21 PROCEDURE — 2580000003 HC RX 258

## 2024-08-21 PROCEDURE — 97116 GAIT TRAINING THERAPY: CPT

## 2024-08-21 PROCEDURE — 99233 SBSQ HOSP IP/OBS HIGH 50: CPT | Performed by: NURSE PRACTITIONER

## 2024-08-21 PROCEDURE — 2060000000 HC ICU INTERMEDIATE R&B

## 2024-08-21 PROCEDURE — 6370000000 HC RX 637 (ALT 250 FOR IP)

## 2024-08-21 PROCEDURE — 97530 THERAPEUTIC ACTIVITIES: CPT

## 2024-08-21 PROCEDURE — 6370000000 HC RX 637 (ALT 250 FOR IP): Performed by: EMERGENCY MEDICINE

## 2024-08-21 RX ADMIN — TICAGRELOR 90 MG: 90 TABLET ORAL at 20:11

## 2024-08-21 RX ADMIN — SODIUM CHLORIDE, PRESERVATIVE FREE 10 ML: 5 INJECTION INTRAVENOUS at 20:15

## 2024-08-21 RX ADMIN — FUROSEMIDE 40 MG: 40 TABLET ORAL at 07:46

## 2024-08-21 RX ADMIN — APIXABAN 5 MG: 5 TABLET, FILM COATED ORAL at 20:11

## 2024-08-21 RX ADMIN — APIXABAN 5 MG: 5 TABLET, FILM COATED ORAL at 07:47

## 2024-08-21 RX ADMIN — ALLOPURINOL 150 MG: 300 TABLET ORAL at 07:46

## 2024-08-21 RX ADMIN — METOPROLOL TARTRATE 25 MG: 25 TABLET, FILM COATED ORAL at 07:46

## 2024-08-21 RX ADMIN — SODIUM CHLORIDE, PRESERVATIVE FREE 10 ML: 5 INJECTION INTRAVENOUS at 07:51

## 2024-08-21 RX ADMIN — MIDODRINE HYDROCHLORIDE 10 MG: 5 TABLET ORAL at 16:22

## 2024-08-21 RX ADMIN — TICAGRELOR 90 MG: 90 TABLET ORAL at 07:46

## 2024-08-21 RX ADMIN — LEVOTHYROXINE SODIUM 88 MCG: 0.09 TABLET ORAL at 07:00

## 2024-08-21 RX ADMIN — METOPROLOL TARTRATE 25 MG: 25 TABLET, FILM COATED ORAL at 20:11

## 2024-08-21 RX ADMIN — EMPAGLIFLOZIN 10 MG: 10 TABLET, FILM COATED ORAL at 07:47

## 2024-08-21 RX ADMIN — MIDODRINE HYDROCHLORIDE 10 MG: 5 TABLET ORAL at 07:46

## 2024-08-21 RX ADMIN — ATORVASTATIN CALCIUM 80 MG: 80 TABLET, FILM COATED ORAL at 20:11

## 2024-08-21 RX ADMIN — POTASSIUM BICARBONATE 40 MEQ: 782 TABLET, EFFERVESCENT ORAL at 16:22

## 2024-08-21 RX ADMIN — AMIODARONE HYDROCHLORIDE 200 MG: 200 TABLET ORAL at 07:46

## 2024-08-21 NOTE — DISCHARGE INSTR - COC
Continuity of Care Form    Patient Name: Claudio Graham   :  1955  MRN:  [id number]    Admit date:  2024  Discharge date:  2024    Code Status Order: Full Code   Advance Directives:   Advance Care Flowsheet Documentation             Admitting Physician:  No admitting provider for patient encounter.  PCP: Vanessa Mckenna, APRN - CNP    Discharging Nurse: CANDE Loving  Discharging Hospital Unit/Room#:   Discharging Unit Phone Number: [phone]    Emergency Contact:   Extended Emergency Contact Information  Primary Emergency Contact: Cathy Ingram   [facility]  Home Phone: [phone]  Work Phone: [phone]  Mobile Phone: [phone]  Relation: Other Relative  Hearing or visual needs: None  Other needs: None  Preferred language: English   needed? No  Secondary Emergency Contact: Jason sherman  Home Phone: [phone]  Work Phone: [phone]  Mobile Phone: [phone]  Relation: Friend    Past Surgical History:  Past Surgical History:   Procedure Laterality Date    BONE MARROW BIOPSY  2012 approx    CARDIAC CATHETERIZATION  2007    severe stenosis pre-stent area    CARDIAC CATHETERIZATION  2013    Very peripheral stenosis, not amendable to PCI, stents patent    CARDIAC DEFIBRILLATOR PLACEMENT  2015    [facility]    CARDIAC PROCEDURE N/A 2024    Left heart cath / coronary angiography performed by Satinder Siddiqui MD at [facility] CARDIAC CATH LAB    CARDIAC PROCEDURE N/A 2024    Percutaneous coronary intervention performed by Satinder Siddiqui MD at [facility] CARDIAC CATH LAB    CARDIAC PROCEDURE N/A 2024    Intra-aortic balloon pump insertion performed by Satinder Siddiqui MD at [facility] CARDIAC CATH LAB    COLONOSCOPY  2007    COLONOSCOPY  2021    COLONOSCOPY N/A 2021    COLONOSCOPY DIAGNOSTIC performed by Coleen Vidales MD at [facility] Endoscopy    COLONOSCOPY N/A 2022    COLONOSCOPY WITH BIOPSY performed by Coleen Vidales MD at    Respiratory Treatments: none  Oxygen Therapy:  is not on home oxygen therapy.  Ventilator:    - No ventilator support    Rehab Therapies: Physical Therapy and Occupational Therapy  Weight Bearing Status/Restrictions: No weight bearing restrictions  Other Medical Equipment (for information only, NOT a DME order):    Other Treatments: skilled nursing    Patient's personal belongings (please select all that are sent with patient):  None    RN SIGNATURE:  Electronically signed by Mechelle Mendoza RN on 8/22/24 at 5:06 PM EDT    CASE MANAGEMENT/SOCIAL WORK SECTION    Inpatient Status Date: ***    Readmission Risk Assessment Score:  Readmission Risk              Risk of Unplanned Readmission:  23           Discharging to Facility/ Agency   Name: [facility]    / signature: Electronically signed by Padmini Mcnamara RN on 8/21/24 at 12:13 PM EDT    PHYSICIAN SECTION    Prognosis: Fair    Condition at Discharge: Stable    Rehab Potential (if transferring to Rehab): Fair    Recommended Labs or Other Treatments After Discharge:   Please take midodrine 5 mg if your blood pressure remains low <90 (the upper part of the blood pressure measurement), do not take it after 6 PM or 4 hours before bedtime or if upper part of the blood pressure measurement is more than 120     Physician Certification: I certify the above information and transfer of Claudio Graham  is necessary for the continuing treatment of the diagnosis listed and that he requires Home Care for greater 30 days.     Update Admission H&P: No change in H&P    PHYSICIAN SIGNATURE:  Electronically signed by Berkley Lake MD on 8/22/24 at 4:41 PM EDT

## 2024-08-21 NOTE — PLAN OF CARE
Problem: Chronic Conditions and Co-morbidities  Goal: Patient's chronic conditions and co-morbidity symptoms are monitored and maintained or improved  Outcome: Progressing     Problem: Safety - Adult  Goal: Free from fall injury  Outcome: Progressing     Problem: Discharge Planning  Goal: Discharge to home or other facility with appropriate resources  Outcome: Progressing     Problem: Pain  Goal: Verbalizes/displays adequate comfort level or baseline comfort level  Outcome: Progressing     Problem: Skin/Tissue Integrity  Goal: Absence of new skin breakdown  Description: 1.  Monitor for areas of redness and/or skin breakdown  2.  Assess vascular access sites hourly  3.  Every 4-6 hours minimum:  Change oxygen saturation probe site  4.  Every 4-6 hours:  If on nasal continuous positive airway pressure, respiratory therapy assess nares and determine need for appliance change or resting period.  Outcome: Progressing     Problem: Nutrition Deficit:  Goal: Optimize nutritional status  Outcome: Progressing

## 2024-08-21 NOTE — PROGRESS NOTES
[facility]   Progress Note                   Date:   8/21/2024  Patient name: lCaudio Graham  Date of admission:  8/16/2024  1:31 PM  MRN:   [id number]  YOB: 1955  PCP: Vanessa Mckenna, APRN - CNP    Reason for Admission: Hypotension and SKYLER    Subjective:   Pt seen and examined in the room.  Patient resting in chair. Pt denies any CP or sob.  Labs, vitals and tele reviewed- SR with PVCs.    Medications:   Scheduled Meds:   midodrine  10 mg Oral TID WC    metoprolol tartrate  25 mg Oral BID    allopurinol  150 mg Oral Daily    furosemide  40 mg Oral Daily    atorvastatin  80 mg Oral Nightly    insulin lispro  0-8 Units SubCUTAneous TID WC    insulin lispro  0-4 Units SubCUTAneous Nightly    amiodarone  200 mg Oral Daily    apixaban  5 mg Oral BID    levothyroxine  88 mcg Oral Daily    ticagrelor  90 mg Oral BID    sodium chloride flush  5-40 mL IntraVENous 2 times per day    empagliflozin  10 mg Oral Daily     Continuous Infusions:   norepinephrine Stopped (08/19/24 0519)    dextrose      sodium chloride 50 mL/hr at 08/19/24 1853    sodium chloride       CBC:   Recent Labs     08/19/24  0331 08/20/24  0549 08/21/24  0726   WBC 8.7 7.2 7.0   HGB 12.7* 11.5* 11.5*    128* 140     BMP:    Recent Labs     08/19/24  0331 08/20/24  0549 08/21/24  0726    141 141   K 3.9 3.7 3.7    110* 107   CO2 24 22 24   BUN 26* 19 19   CREATININE 1.8* 1.5* 1.5*   GLUCOSE 118* 90 94     Hepatic: No results for input(s): \"AST\", \"ALT\", \"BILITOT\", \"ALKPHOS\" in the last 72 hours.    Invalid input(s): \"ALB\"  Troponin: No results for input(s): \"TROPONINI\" in the last 72 hours.  BNP: No results for input(s): \"BNP\" in the last 72 hours.  Lipids: No results for input(s): \"CHOL\", \"HDL\" in the last 72 hours.    Invalid input(s): \"LDLCALCU\"  INR: No results for input(s): \"INR\" in the last 72 hours.    Objective:   Vitals: BP (!) 117/56   Pulse 66   Temp 98 °F (36.7 °C) (Oral)   Resp 18    residual stenosis and restoration of THIAGO-3 flow     LCX: Luminal parities of 20 to 30%     RCA: Luminal irregularities of 20 to 30%     The LV gram was performed in the FARIAS 30 position.  LVEF: 10%. LV Wall Motion: Severe global hypokinesis     Conclusions:  Acute occlusion of ostial LAD  Successful PCI with TIFFANY to ostial LAD with restoration of THIAGO-3 flow  Severely reduced LV systolic function  Persistent ventricular tachycardia     Recommendation:  Routine post PCI/MI orders  Medical treatments.  Risk factors modifications.  Patient was loaded with amiodarone in addition to lidocaine and another shock was attempted after completion of the procedure but patient remains in persistent ventricular tachycardia  Intra-aortic balloon pump was inserted via right femoral approach  Right femoral central line was inserted        Electronically signed by Satinder Siddiqui MD on 7/18/2024 at 2:46 PM    Assessment:   Hypotension requiring IV pressor support likely due to poor intake and overdiuresis.   SKYLER on CKD.  Creatinine improving  Recent VT storm due to LAD occlusion s/p PCI with TIFFANY 7/18/2024.  Severe ischemic cardiomyopathy with last LVEF 19%  Chronic HFrEF.  Euvolemic.  Stable  Paroxysmal A-fib on Eliquis and amiodarone  Per lipidemia  Type II DM  TANNER    Plan:     HR stable. Continue amiodarone and BB    Continue Eliquis and Brilinta  On midodrine for blood pressure support.  hold for SBP>120 .  GDMT as tolerated. Continue Jardinace, lasix and BB   Appreciate nephrology recommendation with fluid assistance and diuretic dose.  Ok for patient to be discharged per CV standpoint and for him to follow up outpatient in 2-4 weeks.     Electronically signed by LIDIA Cross CNP on 8/21/2024 at 10:53 AM  [facility]  [phone]

## 2024-08-21 NOTE — PROGRESS NOTES
[facility]  Internal Medicine Teaching Residency Program  Inpatient Daily Progress Note  ______________________________________________________________________________    Patient: Claudio Graham  YOB: 1955   MRN:[id number]    Acct: [id number]     Room: 2005/2005-01  Admit date: 8/16/2024  Today's date: 08/21/24  Number of days in the hospital: 5    SUBJECTIVE   Admitting Diagnosis: SKYLER (acute kidney injury) (HCC)  CC: Abnormal labs with SKYLER on CKD.  Pt examined at bedside. Chart & results reviewed.   No acute overnight events.  Patient is hemodynamically stable.  Patient is maintaining oxygen saturation at room air.  Patient is on 0.9% normal saline IV at 50 mL/h, urine output today charted 640 mL in last 24 hours.  Today's labs showed creatinine 1.5, sodium 141, potassium 3.7  Plan for discharge today        ROS:  Constitutional:  negative for chills, fevers, sweats  Respiratory:  negative for cough, dyspnea on exertion, hemoptysis, shortness of breath, wheezing  Cardiovascular:  negative for chest pain, chest pressure/discomfort, lower extremity edema, palpitations  Gastrointestinal:  negative for abdominal pain, constipation, diarrhea, nausea, vomiting  Neurological:  negative for dizziness, headache  BRIEF HISTORY     The patient is a 69 y.o. male with past medical history significant for CKD stage III AAA, CHF s/p 5 stents and AICD placement with here for 10 to 15% from last echo in July, known case of hypertension, hyperlipidemia, MGUS.  Who was initially admitted on 8/16/2024 with SKYLER (acute kidney injury) (HCC) [N17.9]  Acute kidney injury superimposed on CKD (HCC) [N17.9, N18.9] and presented with abnormal labs with SKYLER on CKD came into attention when patient was seen at CHF clinic.     In the ER patient presented with abnormal labs demonstrating SKYLER on CKD with creatinine elevation to 4.1 from 1.3.  On day 1 of admission in the medical floor

## 2024-08-21 NOTE — PROGRESS NOTES
Physical Therapy  Facility/Department: [facility] CAR 2- STEPDOWN  Physical Therapy Treatment Note    Name: Claudio Graham  : 1955  MRN: [id number]  Date of Service: 2024    Discharge Recommendations:  Patient would benefit from continued therapy after discharge   PT Equipment Recommendations  Equipment Needed: No  Other: Pt owns RW      Patient Diagnosis(es): The encounter diagnosis was SKYLER (acute kidney injury) ([facility]).  Past Medical History:  has a past medical history of Acute HFrEF (heart failure with reduced ejection fraction) ([facility]), Acute on chronic combined systolic and diastolic congestive heart failure (HCC), Acute on chronic congestive heart failure (HCC), Acute respiratory failure with hypoxia (HCC), Anemia, Anxiety, CAD (coronary artery disease), Cardiac defibrillator in place, Cardiomyopathy ([facility]), CHF (congestive heart failure) ([facility]), Chronic combined systolic and diastolic heart failure ([facility]) s/[ AICD placed, Chronic kidney disease, Complex sleep apnea syndrome, Diabetic retinopathy ([facility]), Diverticulitis, DJD (degenerative joint disease), Edentulous, Gout, HTN (hypertension), Hyperlipidemia, Hypertension, Iron deficiency anemia, Ischemic cardiomyopathy, Morbid obesity ([facility]), Obesity, TANNER variably compliant with BiPAP, Osteoarthritis, PAF (paroxysmal atrial fibrillation) ([facility]), Psychophysiologic insomnia, Thyroid disease, Type II or unspecified type diabetes mellitus without mention of complication, not stated as uncontrolled, and Unspecified sleep apnea.  Past Surgical History:  has a past surgical history that includes Colonoscopy (2007); Cardiac catheterization (2007); Cardiac catheterization (2013); Coronary angioplasty (, ); Cardiac defibrillator placement (2015); bone marrow biopsy (2012); pr colsc flx w/rmvl of tumor polyp lesion snare tq (N/A, 2017); Colonoscopy (2021); Colonoscopy (N/A, 2021); Colonoscopy (N/A, 2022); Cardiac  from lying on your back to sitting on the side of a flat bed without using bedrails?: None  How much help is needed moving to and from a bed to a chair?: None  How much help is needed standing up from a chair using your arms?: None  How much help is needed walking in hospital room?: A Little  How much help is needed climbing 3-5 steps with a railing?: A Little  AM-[facility] Inpatient Mobility Raw Score : 22  AM-PAC Inpatient T-Scale Score : 53.28  Mobility Inpatient CMS 0-100% Score: 20.91  Mobility Inpatient CMS G-Code Modifier : CJ    Goals  Short Term Goals  Time Frame for Short Term Goals: 14 visits  Short Term Goal 1: Independent with all bed mobility  Short Term Goal 2: Chao to transfer sit to stand using rwalker or least restrictive AD  Short Term Goal 3: Chao to ambulate 300' using rwalker or least restrictive AD  Short Term Goal 4: Independent to navigate 3 stairs with rail on L  Patient Goals   Patient Goals : Increase tolerance to activity to navigate comunity     Education  Patient Education  Education Given To: Patient  Education Provided: Role of Therapy;Plan of Care;Transfer Training;Fall Prevention Strategies;Equipment;Home Exercise Program  Education Provided Comments: seated and standing LE exercise packets provided with all questions answered at this time  Education Method: Demonstration;Verbal;Printed Information/Hand-outs  Barriers to Learning: None  Education Outcome: Verbalized understanding;Demonstrated understanding    Therapy Time   Individual Concurrent Group Co-treatment   Time In 1442         Time Out 1524         Minutes 42         Timed Code Treatment Minutes: 42 Minutes     Treatment performed by Student PTA under the supervision of co-signing PTA who agrees with all treatment and documentation. MATTIE Jones SPTA

## 2024-08-21 NOTE — PROGRESS NOTES
Renal Progress Note    Patient :  Claudio Graham; 69 y.o. MRN# [id number]  Location:  2005/2005-01  Attending:  Aleksandra Gooden MD  Admit Date:  8/16/2024   Hospital Day: 5    Subjective:     Patient seen and examined at bedside.  No new issues reported overnight.    Labs reviewed.  Sodium 141, potassium 3.7, chloride 107, bicarb 24, BUN 19, creatinine 1.5, calcium 8.6.      Urine output documented as about 640cc over 24 hours.    Brief History reviewed.  69-year-old gentleman with a prior history of CKD stage IIIa with a baseline creatinine of 1.1-1.3, history of AICD placement, severe ischemic cardiomyopathy with ejection fraction less than 20%, chronic hypertension, dyslipidemia, MGUS and history of amiodarone induced thyroid dysfunction, coronary artery disease with multiple stents and type 2 diabetes mellitus presented to the hospital with acute on chronic renal failure.  He received a phone call from his PCP that his serum creatinine was significantly elevated at 4.1 up from 1.3 2 weeks ago.  Patient was noted to be hypotensive with blood pressures in the 80s and he gave a history of very poor oral intake with almost 18 pound weight loss over the last 2-1/2 weeks or so.  Pinto was placed because he has had history of meatal stenosis in the past.  Creatinine was 4.0 in the ER, has improved down to 1.5  Outpatient Medications:     Medications Prior to Admission: levothyroxine (SYNTHROID) 88 MCG tablet, Take 1 tablet by mouth daily  furosemide (LASIX) 40 MG tablet, Take 1 tablet by mouth in the morning and 1 tablet in the evening. Take 1 tablet in the morning, and half tablet in the evening.  allopurinol (ZYLOPRIM) 300 MG tablet, take 1 tablet by mouth once daily  amiodarone (CORDARONE) 200 MG tablet, Take 1 tablet by mouth daily  apixaban (ELIQUIS) 5 MG TABS tablet, Take 1 tablet by mouth 2 times daily  aspirin 81 MG chewable tablet, Take 1 tablet by mouth daily  atorvastatin (LIPITOR) 80 MG tablet, Take 1  0.9% normal saline at 50 mL an hour, creatinine has trended down to 1.5, admission creatinine was 4.1.  Severely reduced LV function with an EF of 15% - s/p AICD and stenting x5.   Hypotension -improved with Levophed discontinued.  Hypokalemia -received oral replacement with potassium 3.9 today  Amiodarone-induced thyroid dysfunction, currently on synthroid 88 mcg daily.  Type 2 diabetes, with diabetic retinopathy  Hyperlipidemia  History of A-fib/v-fib, on Eliquis, amiodarone, and brillinta  History of gout, on allopurinol  Iron deficiency anemia  History of hypertension  Plan:   OK to d/c on Lasix 40 mg p.o. daily.  Patient is established in the office with Yadira Guidry, will need to arrange follow-up appointment for 2 to 4 weeks postdischarge with a BMP 1 week prior to appointment and fax at [phone].  No objection to discharge from a nephrology standpoint.    Nutrition   Please ensure that patient is on a renal diet/TF. Avoid nephrotoxic drugs/contrast exposure.    LIDIA Escobedo-CNP  [facility]     This note is created with the assistance of a speech-recognition program. While intending to generate a document that actually reflects the content of the visit, no guarantees can be provided that every mistake has been identified and corrected by editing.      Attending Physician Statement  I have discussed the care of this patient, including pertinent history and exam findings, with the Resident/CNP. I have seen and examined the patient myself. I have reviewed and edited the key elements of all parts of the encounter with the Resident/CNP.  I agree with the assessment, plan and orders as documented by the Resident/CNP. In addition Patient was seen and examined. No new issues overnight. Ok to d/c on Lasix 40 mg daily.  BMP in 1 week post discharge and fax results to Yadira Guidry CNP office; Fax # [phone].  Follow up with Yadira Guidry CNP in 3-4 weeks post d/c.

## 2024-08-22 VITALS
DIASTOLIC BLOOD PRESSURE: 70 MMHG | HEIGHT: 64 IN | SYSTOLIC BLOOD PRESSURE: 118 MMHG | BODY MASS INDEX: 31.99 KG/M2 | WEIGHT: 187.39 LBS | HEART RATE: 70 BPM | TEMPERATURE: 98 F | OXYGEN SATURATION: 95 % | RESPIRATION RATE: 18 BRPM

## 2024-08-22 LAB
ANION GAP SERPL CALCULATED.3IONS-SCNC: 10 MMOL/L (ref 9–16)
BASOPHILS # BLD: <0.03 K/UL (ref 0–0.2)
BASOPHILS NFR BLD: 0 % (ref 0–2)
BUN SERPL-MCNC: 19 MG/DL (ref 8–23)
CALCIUM SERPL-MCNC: 8.7 MG/DL (ref 8.6–10.4)
CHLORIDE SERPL-SCNC: 102 MMOL/L (ref 98–107)
CO2 SERPL-SCNC: 27 MMOL/L (ref 20–31)
CREAT SERPL-MCNC: 1.5 MG/DL (ref 0.7–1.2)
EOSINOPHIL # BLD: 0.21 K/UL (ref 0–0.44)
EOSINOPHILS RELATIVE PERCENT: 4 % (ref 1–4)
ERYTHROCYTE [DISTWIDTH] IN BLOOD BY AUTOMATED COUNT: 18.9 % (ref 11.8–14.4)
GFR, ESTIMATED: 51 ML/MIN/1.73M2
GLUCOSE BLD-MCNC: 127 MG/DL (ref 75–110)
GLUCOSE BLD-MCNC: 140 MG/DL (ref 75–110)
GLUCOSE SERPL-MCNC: 139 MG/DL (ref 74–99)
HCT VFR BLD AUTO: 37.4 % (ref 40.7–50.3)
HGB BLD-MCNC: 12.3 G/DL (ref 13–17)
IMM GRANULOCYTES # BLD AUTO: 0.03 K/UL (ref 0–0.3)
IMM GRANULOCYTES NFR BLD: 1 %
LYMPHOCYTES NFR BLD: 0.77 K/UL (ref 1.1–3.7)
LYMPHOCYTES RELATIVE PERCENT: 13 % (ref 24–43)
MCH RBC QN AUTO: 28.3 PG (ref 25.2–33.5)
MCHC RBC AUTO-ENTMCNC: 32.9 G/DL (ref 28.4–34.8)
MCV RBC AUTO: 86.2 FL (ref 82.6–102.9)
MICROORGANISM SPEC CULT: NORMAL
MICROORGANISM SPEC CULT: NORMAL
MONOCYTES NFR BLD: 0.42 K/UL (ref 0.1–1.2)
MONOCYTES NFR BLD: 7 % (ref 3–12)
NEUTROPHILS NFR BLD: 76 % (ref 36–65)
NEUTS SEG NFR BLD: 4.61 K/UL (ref 1.5–8.1)
NRBC BLD-RTO: 0 PER 100 WBC
PLATELET # BLD AUTO: ABNORMAL K/UL (ref 138–453)
PLATELET, FLUORESCENCE: ABNORMAL K/UL (ref 138–453)
PLATELETS.RETICULATED NFR BLD AUTO: 4.3 % (ref 1.1–10.3)
POTASSIUM SERPL-SCNC: 3.8 MMOL/L (ref 3.7–5.3)
RBC # BLD AUTO: 4.34 M/UL (ref 4.21–5.77)
RBC # BLD: ABNORMAL 10*6/UL
SERVICE CMNT-IMP: NORMAL
SERVICE CMNT-IMP: NORMAL
SODIUM SERPL-SCNC: 139 MMOL/L (ref 136–145)
SPECIMEN DESCRIPTION: NORMAL
SPECIMEN DESCRIPTION: NORMAL
WBC OTHER # BLD: 6.1 K/UL (ref 3.5–11.3)

## 2024-08-22 PROCEDURE — 36415 COLL VENOUS BLD VENIPUNCTURE: CPT

## 2024-08-22 PROCEDURE — 6370000000 HC RX 637 (ALT 250 FOR IP)

## 2024-08-22 PROCEDURE — 85055 RETICULATED PLATELET ASSAY: CPT

## 2024-08-22 PROCEDURE — 94761 N-INVAS EAR/PLS OXIMETRY MLT: CPT

## 2024-08-22 PROCEDURE — 99232 SBSQ HOSP IP/OBS MODERATE 35: CPT | Performed by: INTERNAL MEDICINE

## 2024-08-22 PROCEDURE — 82947 ASSAY GLUCOSE BLOOD QUANT: CPT

## 2024-08-22 PROCEDURE — 6370000000 HC RX 637 (ALT 250 FOR IP): Performed by: INTERNAL MEDICINE

## 2024-08-22 PROCEDURE — 2580000003 HC RX 258

## 2024-08-22 PROCEDURE — 6370000000 HC RX 637 (ALT 250 FOR IP): Performed by: SURGERY

## 2024-08-22 PROCEDURE — 85025 COMPLETE CBC W/AUTO DIFF WBC: CPT

## 2024-08-22 PROCEDURE — 80048 BASIC METABOLIC PNL TOTAL CA: CPT

## 2024-08-22 PROCEDURE — 6370000000 HC RX 637 (ALT 250 FOR IP): Performed by: HOSPITALIST

## 2024-08-22 PROCEDURE — 51798 US URINE CAPACITY MEASURE: CPT

## 2024-08-22 RX ORDER — 0.9 % SODIUM CHLORIDE 0.9 %
250 INTRAVENOUS SOLUTION INTRAVENOUS ONCE
Status: DISCONTINUED | OUTPATIENT
Start: 2024-08-22 | End: 2024-08-22 | Stop reason: HOSPADM

## 2024-08-22 RX ORDER — MIDODRINE HYDROCHLORIDE 10 MG/1
5 TABLET ORAL 2 TIMES DAILY PRN
Qty: 90 TABLET | Refills: 3 | Status: SHIPPED | OUTPATIENT
Start: 2024-08-22

## 2024-08-22 RX ORDER — ALLOPURINOL 300 MG/1
150 TABLET ORAL DAILY
Qty: 30 TABLET | Refills: 3 | Status: SHIPPED | OUTPATIENT
Start: 2024-08-23

## 2024-08-22 RX ADMIN — TICAGRELOR 90 MG: 90 TABLET ORAL at 08:41

## 2024-08-22 RX ADMIN — MIDODRINE HYDROCHLORIDE 10 MG: 5 TABLET ORAL at 11:47

## 2024-08-22 RX ADMIN — SODIUM CHLORIDE, PRESERVATIVE FREE 10 ML: 5 INJECTION INTRAVENOUS at 08:44

## 2024-08-22 RX ADMIN — LEVOTHYROXINE SODIUM 88 MCG: 0.09 TABLET ORAL at 07:32

## 2024-08-22 RX ADMIN — ALLOPURINOL 150 MG: 300 TABLET ORAL at 08:41

## 2024-08-22 RX ADMIN — APIXABAN 5 MG: 5 TABLET, FILM COATED ORAL at 08:44

## 2024-08-22 RX ADMIN — FUROSEMIDE 40 MG: 40 TABLET ORAL at 08:43

## 2024-08-22 RX ADMIN — MIDODRINE HYDROCHLORIDE 10 MG: 5 TABLET ORAL at 08:41

## 2024-08-22 RX ADMIN — AMIODARONE HYDROCHLORIDE 200 MG: 200 TABLET ORAL at 08:41

## 2024-08-22 RX ADMIN — EMPAGLIFLOZIN 10 MG: 10 TABLET, FILM COATED ORAL at 08:44

## 2024-08-22 NOTE — CARE COORDINATION
Met with patient to discuss transitional planning. He is returning home with [facility]. He denies needs. He will drive himself home. Notified Opal from [facility] of discharge today    Discharge Report    [facility]  Clinical Case Management Department  Written by: Padmini Mcnamara RN    Patient Name: Claudio Graham  Attending Provider: Aleksandra Gooden MD  Admit Date: 2024  1:31 PM  MRN: [id number]  Account: [id number]                     : 1955  Discharge Date: 2024      Disposition: home    Padmini Mcnamara RN

## 2024-08-22 NOTE — DISCHARGE INSTRUCTIONS
You were admitted because of abnormal labs due to worsening kidney function and found to be having low blood pressures on admission so you required brief ICU admission for 2 days your blood pressures improved.  On the floor your kidney function also improved with IV fluids and blood pressures remained stable.    Please take midodrine 5 mg if your blood pressure remains low <90 (the upper part of the blood pressure measurement), do not take it after 6 PM or 4 hours before bedtime or if upper part of the blood pressure measurement is more than 120    Please follow-up with your PCP within 5 to 7 days of discharge for continued care  Please call cardiology and nephrology and make an appointment within 1 to 2 weeks of discharge  Please follow-up CHF clinic for your heart failure management    If you begin to experience chest pain, shortness of breath, dizziness, loss of consciousness, palpitations or worsening of current symptoms feel free to come back to ER for further evaluation

## 2024-08-22 NOTE — DISCHARGE SUMMARY
IV taken out. Patient given discharge instructions and verbalized understanding. Patient gathered all belongings and was wheeled down to car.

## 2024-08-22 NOTE — PROGRESS NOTES
[facility]  Internal Medicine Teaching Residency Program  Inpatient Daily Progress Note  ______________________________________________________________________________    Patient: Claudio Graham  YOB: 1955   MRN:[id number]    Acct: [id number]     Room: 2005/2005-01  Admit date: 8/16/2024  Today's date: 08/22/24  Number of days in the hospital: 6    SUBJECTIVE   Admitting Diagnosis: SKYLER (acute kidney injury) (HCC)  CC: Abnormal labs with SKYLER on CKD.      Pt examined at bedside. Chart & results reviewed.   No overnight acute event  Patient is alert, awake, AOx4, oriented, afebrile, hemodynamically stable, saturating appropriately on room air  Labs reviewed this a.m. BMP showed return of creatinine to baseline 1.5, CBC is unremarkable  Patient remains asymptomatic    ROS:  Constitutional:  negative for chills, fevers, sweats  Respiratory:  negative for cough, dyspnea on exertion, hemoptysis, shortness of breath, wheezing  Cardiovascular:  negative for chest pain, chest pressure/discomfort, lower extremity edema, palpitations  Gastrointestinal:  negative for abdominal pain, constipation, diarrhea, nausea, vomiting  Neurological:  negative for dizziness, headache  BRIEF HISTORY     The patient is a 69 y.o. male with past medical history significant for CKD stage III AAA, CHF s/p 5 stents and AICD placement with here for 10 to 15% from last echo in July, known case of hypertension, hyperlipidemia, MGUS.  Who was initially admitted on 8/16/2024 with SKYLER (acute kidney injury) (HCC) N17.9  Acute kidney injury superimposed on CKD (HCC) N17.9, N18.9 And presented with abnormal labs with SKYLER on CKD came into attention when patient was seen at CHF clinic.     In the ER patient presented with abnormal labs demonstrating SKYLER on CKD with creatinine elevation to 4.1 from 1.3.  On day 1 of admission in the medical floor patient has dropped his diastolic blood pressure  Mood normal.       Medications:  Scheduled Medications:    midodrine  10 mg Oral TID     metoprolol tartrate  25 mg Oral BID    allopurinol  150 mg Oral Daily    furosemide  40 mg Oral Daily    atorvastatin  80 mg Oral Nightly    insulin lispro  0-8 Units SubCUTAneous TID WC    insulin lispro  0-4 Units SubCUTAneous Nightly    amiodarone  200 mg Oral Daily    apixaban  5 mg Oral BID    levothyroxine  88 mcg Oral Daily    ticagrelor  90 mg Oral BID    sodium chloride flush  5-40 mL IntraVENous 2 times per day    empagliflozin  10 mg Oral Daily     Continuous Infusions:    norepinephrine Stopped (08/19/24 0519)    dextrose      sodium chloride       PRN Medicationsglucose, 4 tablet, PRN  dextrose bolus, 125 mL, PRN   Or  dextrose bolus, 250 mL, PRN  glucagon (rDNA), 1 mg, PRN  dextrose, , Continuous PRN  sodium chloride flush, 5-40 mL, PRN  sodium chloride, , PRN  ondansetron, 4 mg, Q8H PRN   Or  ondansetron, 4 mg, Q6H PRN  polyethylene glycol, 17 g, Daily PRN  acetaminophen, 650 mg, Q6H PRN   Or  acetaminophen, 650 mg, Q6H PRN        Diagnostic Labs:  CBC:   Recent Labs     08/20/24  0549 08/21/24  0726   WBC 7.2 7.0   RBC 4.06* 4.14*   HGB 11.5* 11.5*   HCT 36.7* 37.1*   MCV 90.4 89.6   RDW 18.6* 18.8*   * 140     BMP:   Recent Labs     08/20/24  0549 08/21/24  0726    141   K 3.7 3.7   * 107   CO2 22 24   BUN 19 19   CREATININE 1.5* 1.5*     BNP: No results for input(s): \"BNP\" in the last 72 hours.  PT/INR: No results for input(s): \"PROTIME\", \"INR\" in the last 72 hours.  APTT: No results for input(s): \"APTT\" in the last 72 hours.  CARDIAC ENZYMES: No results for input(s): \"CKMB\", \"CKMBINDEX\", \"TROPONINI\" in the last 72 hours.    Invalid input(s): \"CKTOTAL;3\"  FASTING LIPID PANEL:  Lab Results   Component Value Date    CHOL 126 09/21/2023    HDL 55 (H) 09/21/2023    TRIG 86 09/21/2023     LIVER PROFILE: No results for input(s): \"AST\", \"ALT\", \"BILIDIR\", \"BILITOT\", \"ALKPHOS\" in the last 72  resumed due to recent history of V-fib and V. tach.  Patient is on Synthyroid 88 mcg daily  Continue Synthyroid.     Diabetes mellitus type 2  Continue low-dose sliding scale insulin     Gout  At home on allopurinol  Will reduce the dose of allopurinol today.        DVT ppx : Eliquis  GI ppx: Not indicated  Diet: Regular diet with 5 carb choice    PT/OT: On board  Discharge Planning / SW: Plan to discharge today with home health care    Kristopher Garibay MD  Internal Medicine Resident, PGY-2  [facility], [place], [place].  8/22/2024, 7:27 AM

## 2024-08-22 NOTE — PLAN OF CARE
Problem: Chronic Conditions and Co-morbidities  Goal: Patient's chronic conditions and co-morbidity symptoms are monitored and maintained or improved  Outcome: Progressing     Problem: Safety - Adult  Goal: Free from fall injury  Outcome: Progressing     Problem: Discharge Planning  Goal: Discharge to home or other facility with appropriate resources  Outcome: Progressing     Problem: Pain  Goal: Verbalizes/displays adequate comfort level or baseline comfort level  Outcome: Progressing     Problem: Skin/Tissue Integrity  Goal: Absence of new skin breakdown  Description: 1.  Monitor for areas of redness and/or skin breakdown  2.  Assess vascular access sites hourly  3.  Every 4-6 hours minimum:  Change oxygen saturation probe site  4.  Every 4-6 hours:  If on nasal continuous positive airway pressure, respiratory therapy assess nares and determine need for appliance change or resting period.  Outcome: Progressing     Problem: Nutrition Deficit:  Goal: Optimize nutritional status  8/22/2024 1813 by Mechelle Mendoza, RN  Outcome: Progressing  8/22/2024 1524 by Yoly Argueta, RD  Outcome: Progressing  Flowsheets (Taken 8/22/2024 1518)  Nutrient intake appropriate for improving, restoring, or maintaining nutritional needs:   Assess nutritional status and recommend course of action   Monitor oral intake, labs, and treatment plans   Recommend appropriate diets, oral nutritional supplements, and vitamin/mineral supplements

## 2024-08-22 NOTE — PLAN OF CARE
Problem: Chronic Conditions and Co-morbidities  Goal: Patient's chronic conditions and co-morbidity symptoms are monitored and maintained or improved  8/21/2024 2152 by Janusz Howard RN  Outcome: Progressing  8/21/2024 1602 by Mechelle Mendoza RN  Outcome: Progressing     Problem: Safety - Adult  Goal: Free from fall injury  8/21/2024 2152 by Janusz Howard RN  Outcome: Progressing  8/21/2024 1602 by Mechelle Mendoza RN  Outcome: Progressing     Problem: Discharge Planning  Goal: Discharge to home or other facility with appropriate resources  8/21/2024 2152 by Janusz Howard RN  Outcome: Progressing  8/21/2024 1602 by Mechelle Mendoza RN  Outcome: Progressing     Problem: Pain  Goal: Verbalizes/displays adequate comfort level or baseline comfort level  8/21/2024 2152 by Janusz Howard RN  Outcome: Progressing  8/21/2024 1602 by Mechelle Mendoza RN  Outcome: Progressing     Problem: Skin/Tissue Integrity  Goal: Absence of new skin breakdown  Description: 1.  Monitor for areas of redness and/or skin breakdown  2.  Assess vascular access sites hourly  3.  Every 4-6 hours minimum:  Change oxygen saturation probe site  4.  Every 4-6 hours:  If on nasal continuous positive airway pressure, respiratory therapy assess nares and determine need for appliance change or resting period.  8/21/2024 2152 by Janusz Howard RN  Outcome: Progressing  8/21/2024 1602 by Mechelle Mendoza RN  Outcome: Progressing     Problem: Nutrition Deficit:  Goal: Optimize nutritional status  8/21/2024 2152 by Janusz Howard RN  Outcome: Progressing  8/21/2024 1602 by Mechelle Mendoza RN  Outcome: Progressing

## 2024-08-22 NOTE — PROGRESS NOTES
Renal Progress Note    Patient :  Claudio Graham; 69 y.o. MRN# [id number]  Location:  2005/2005-01  Attending:  Aleksandra Gooden MD  Admit Date:  8/16/2024   Hospital Day: 6    Subjective:     Patient seen and examined at bedside.  No new issues reported overnight.    Labs reviewed.  Sodium 139, potassium 3.8, chloride 102, bicarb 27, BUN 19, creatinine 1.5, calcium 8.7.      Urine output documented as about 1878cc over 24 hours.    Brief History reviewed.  69-year-old gentleman with a prior history of CKD stage IIIa with a baseline creatinine of 1.1-1.3, history of AICD placement, severe ischemic cardiomyopathy with ejection fraction less than 20%, chronic hypertension, dyslipidemia, MGUS and history of amiodarone induced thyroid dysfunction, coronary artery disease with multiple stents and type 2 diabetes mellitus presented to the hospital with acute on chronic renal failure.  He received a phone call from his PCP that his serum creatinine was significantly elevated at 4.1 up from 1.3 2 weeks ago.  Patient was noted to be hypotensive with blood pressures in the 80s and he gave a history of very poor oral intake with almost 18 pound weight loss over the last 2-1/2 weeks or so.  Pinto was placed because he has had history of meatal stenosis in the past.  Creatinine was 4.0 in the ER, has improved down to 1.5  Outpatient Medications:     Medications Prior to Admission: levothyroxine (SYNTHROID) 88 MCG tablet, Take 1 tablet by mouth daily  furosemide (LASIX) 40 MG tablet, Take 1 tablet by mouth in the morning and 1 tablet in the evening. Take 1 tablet in the morning, and half tablet in the evening.  allopurinol (ZYLOPRIM) 300 MG tablet, take 1 tablet by mouth once daily  amiodarone (CORDARONE) 200 MG tablet, Take 1 tablet by mouth daily  apixaban (ELIQUIS) 5 MG TABS tablet, Take 1 tablet by mouth 2 times daily  aspirin 81 MG chewable tablet, Take 1 tablet by mouth daily  atorvastatin (LIPITOR) 80 MG tablet, Take 1  discharged later today.  Nothing further to add from a nephrology standpoint so nephrology will sign off.      Rene Hughes MD  Nephrology Attending Physician  [facility]  8/22/2024

## 2024-08-23 ENCOUNTER — CARE COORDINATION (OUTPATIENT)
Dept: CARE COORDINATION | Age: 69
End: 2024-08-23

## 2024-08-23 DIAGNOSIS — N18.2 CHRONIC KIDNEY DISEASE, STAGE II (MILD): ICD-10-CM

## 2024-08-23 DIAGNOSIS — I50.22 CHRONIC SYSTOLIC (CONGESTIVE) HEART FAILURE (HCC): ICD-10-CM

## 2024-08-23 DIAGNOSIS — N17.9 AKI (ACUTE KIDNEY INJURY) (HCC): Primary | ICD-10-CM

## 2024-08-23 DIAGNOSIS — E11.22 TYPE 2 DIABETES MELLITUS WITH STAGE 3 CHRONIC KIDNEY DISEASE, WITHOUT LONG-TERM CURRENT USE OF INSULIN, UNSPECIFIED WHETHER STAGE 3A OR 3B CKD (HCC): Primary | ICD-10-CM

## 2024-08-23 DIAGNOSIS — N18.30 TYPE 2 DIABETES MELLITUS WITH STAGE 3 CHRONIC KIDNEY DISEASE, WITHOUT LONG-TERM CURRENT USE OF INSULIN, UNSPECIFIED WHETHER STAGE 3A OR 3B CKD (HCC): Primary | ICD-10-CM

## 2024-08-23 PROCEDURE — 1111F DSCHRG MED/CURRENT MED MERGE: CPT | Performed by: NURSE PRACTITIONER

## 2024-08-23 NOTE — PROGRESS NOTES
Remote Patient Monitoring Treatment Plan    Received request from ACM/CTKelly Fajardo RN   to order remote patient monitoring for in home monitoring of Kidney Disease; Condition managed by LIDIA Kinney CNP ([facility]).  CHF; Condition managed by LIDIA Davison CNP ([facility]).  Diabetes; Condition managed by PCP.  and order completed.     Patient will be monitoring activity  blood pressure   glucose  pulse ox   weight.      Patient will engage in Remote Patient Monitoring each day to develop the skills necessary for self management.       RPM Care Team Responsibilities:   Alerts will be reviewed daily and addressed within 2-4 hours during operational hours (Monday -Friday 9 am-4 pm)  Alert response and intervention documented in patient medical record  Alert response escalated to PCP per protocol and documented in patient medical record  Patient monitored over approximately  days  Discharge from program based on self-management readiness    See care coordination encounters for additional details.

## 2024-08-23 NOTE — CARE COORDINATION
Care Transitions Note    Initial Call - Call within 2 business days of discharge: Yes    Patient Current Location:  Home: [address] [place] [address]    Care Transition Nurse contacted the patient by telephone to perform post hospital discharge assessment, verified name and  as identifiers. Provided introduction to self, and explanation of the Care Transition Nurse role.     Patient: Claudio Graham    Patient : 1955   MRN: [id number]    Reason for Admission: SKYLER  Discharge Date: 24  RURS: Readmission Risk Score: 21.2      Last Discharge Facility       Date Complaint Diagnosis Description Type Department Provider    24 Abnormal Lab SKYLER (acute kidney injury) (HCC) ED to Hosp-Admission (Discharged) (ADMITTED) [facility] Aleksandra Gooden MD; Danial Martinez...            Was this an external facility discharge? No    Additional needs identified to be addressed with provider   No needs identified             Method of communication with provider: none.    Patients top risk factors for readmission: medical condition-multiple medical conditions    Interventions to address risk factors:   Education: midodrine take for SPB <90 twice daily, do not take after 6 pm or 4 hr before bed  Review of patient management of conditions/medications: symptoms, medications  Home Health: [facility] patient updated CTN confirmed will be to home tomorrow.     Care Summary Note: Patient reached for NELDA call. States doing good after IP discharge. Confirms has AVS, confirms new midodrine in home. Discussed [facility] order, updated CTN has followed up with Noys who will be out tomorrow. Reviewed med instruction: Education: midodrine take for SPB <90 twice daily, do not take after 6 pm or 4 hr before bed. States understanding of new dose zyloprim. PCP HFU appt scheduled for 24. Patient states saw cardiology today. Reviewed instruction to follow up with CHF clinic in 1 week, nephrology 3 weeks, states will complete.

## 2024-08-23 NOTE — CARE COORDINATION
Care Transitions Note    Initial Call - Call within 2 business days of discharge: Yes    Attempted to reach patient for transitions of care follow up. Unable to reach patient.    Outreach Attempts:   Multiple attempts to contact patient at phone numbers on file.   Unable to leave message. Attempted home and cell, no VM on either line    Patient: Claudio Graham    Patient : 1955   MRN: [id number]    Reason for Admission: SKYLER  Discharge Date: 24  RURS: Readmission Risk Score: 21.2    Last Discharge Facility       Date Complaint Diagnosis Description Type Department Provider    24 Abnormal Lab SKYLER (acute kidney injury) (HCC) ED to Hosp-Admission (Discharged) (ADMITTED) [facility] Aleksandra Gooden MD; Danial Martinez...            Was this an external facility discharge? No    Follow Up Appointment:   Patient does not have a follow up appointment scheduled at time of call.   Future Appointments         Provider Specialty Dept Phone    2024 1:00 PM [facility]  1 Cardiology [phone]    2024 2:00 PM Vanessa Mckenna, [facility] [phone]    2024 1:45 PM Arielle Blum, APRN - CNP Cardiology [phone]    10/16/2024 1:30 PM Yadira Guidry, APRN [facility] Nephrology [phone]    3/17/2025 1:00 PM Elder Corley MD Oncology [phone]            Plan for follow-up on next business day.      Kelly Escobar RN

## 2024-08-23 NOTE — PROGRESS NOTES
CLINICAL PHARMACY NOTE: MEDS TO BEDS    Total # of Prescriptions Filled: 1   The following medications were delivered to the patient:  midodrine    Additional Documentation:

## 2024-08-26 ENCOUNTER — CARE COORDINATION (OUTPATIENT)
Dept: PRIMARY CARE CLINIC | Age: 69
End: 2024-08-26

## 2024-08-26 NOTE — CARE COORDINATION
Remote Patient Kit Ordering Note      Date/Time:  8/26/2024 9:43 AM      [facility] placed phone call to patient/family today to notify of RPM kit order; patient/family was unavailable to leave a message,the phone states please enter your remote access code.    [] [facility]  [] Patient will receive package over the next 1-3 business days. Someone 21 years or older must be present to sign for UPS delivery.  [] HRS will contact patient within 24 hours, an HRS  will call the patient directly: If the patient does not answer, HRS will follow up with the clinical team notifying them about the unsuccessful attempt to contact the patient. HRS will make three call attempts to the patient.Provide patient with [facility] Virtual install number is: [phone].  [] ACM will contact patient once equipment is active to welcome them to the program.                                                         [] Hours of RPM monitoring - Monday-Friday [phone]; encourage patient to get vitals entered by Noon each day to have the alert addressed same day.  [x][facility] mailed RPM Patient flyer to patient.                      ACM made aware the RPM kit has been ordered.

## 2024-08-27 ENCOUNTER — OFFICE VISIT (OUTPATIENT)
Dept: PRIMARY CARE CLINIC | Age: 69
End: 2024-08-27

## 2024-08-27 VITALS
OXYGEN SATURATION: 97 % | SYSTOLIC BLOOD PRESSURE: 110 MMHG | HEART RATE: 66 BPM | TEMPERATURE: 98.1 F | WEIGHT: 193 LBS | BODY MASS INDEX: 33.11 KG/M2 | DIASTOLIC BLOOD PRESSURE: 66 MMHG

## 2024-08-27 DIAGNOSIS — I50.42 CHRONIC COMBINED SYSTOLIC AND DIASTOLIC HEART FAILURE (HCC): ICD-10-CM

## 2024-08-27 DIAGNOSIS — N18.30 CKD STAGE 3 DUE TO TYPE 2 DIABETES MELLITUS (HCC): ICD-10-CM

## 2024-08-27 DIAGNOSIS — Z09 HOSPITAL DISCHARGE FOLLOW-UP: Primary | ICD-10-CM

## 2024-08-27 DIAGNOSIS — E03.2 HYPOTHYROIDISM DUE TO MEDICATION: ICD-10-CM

## 2024-08-27 DIAGNOSIS — E11.22 CKD STAGE 3 DUE TO TYPE 2 DIABETES MELLITUS (HCC): ICD-10-CM

## 2024-08-27 NOTE — PROGRESS NOTES
Post-Discharge Transitional Care  Follow Up      Claudio Graham   YOB: 1955    Date of Office Visit:  8/27/2024  Date of Hospital Admission: 8/16/24  Date of Hospital Discharge: 8/22/24  Risk of hospital readmission (high >=14%. Medium >=10%) :Readmission Risk Score: 21.2      Care management risk score Rising risk (score 2-5) and Complex Care (Scores >=6): No Risk Score On File     Non face to face  following discharge, date last encounter closed (first attempt may have been earlier): 08/23/2024    Call initiated 2 business days of discharge: Yes    ASSESSMENT/PLAN:   Hospital discharge follow-up  -     RI DISCHARGE MEDS RECONCILED W/ CURRENT OUTPATIENT MED LIST  Hypothyroidism due to medication  -     TSH With Reflex Ft4; Future  Chronic combined systolic and diastolic heart failure (HCC)  CKD stage 3 due to type 2 diabetes mellitus (HCC)    Acute kidney injury resolved, patient did plate labs in roughly 4 weeks to monitor.  Thyroxine was increased to 88 mcg last visit, however patient was recently hospitalized and unsure if he is currently taking that dose.  Repeat labs in 4 to 6 weeks along with BMP to monitor kidney function  Has nephrology appointment in October  Patient was encouraged to contact Dr. Nj as he is his primary cardiologist to see if he needs a follow-up    Medical Decision Making: moderate complexity  No follow-ups on file.           Subjective:   HPI:  Follow up of Hospital problems/diagnosis(es): Slowly improving since discharge, overall admits he is generally weak.  However he is improving as far as exercise tolerance and able to walk further distances, was surprised he was able to ambulate as well today to our office for his appointment.  Blood pressures at home have not been low, heart rate also not below 60.  Not feeling dizzy or lightheaded.    Inpatient course: Discharge summary reviewed- see chart.    Claudio Graham is a 69 y.o. male  who was admitted for the  atorvastatin (LIPITOR) 80 MG tablet Take 1 tablet by mouth daily 30 tablet 3    ticagrelor (BRILINTA) 90 MG TABS tablet Take 1 tablet by mouth 2 times daily 60 tablet 5    metFORMIN (GLUCOPHAGE) 1000 MG tablet take 1 tablet by mouth twice a day with meals 180 tablet 0    Lancets (ONETOUCH DELICA PLUS [id number]) MISC use 1 LANCET to TEST BLOOD SUGAR daily 100 each 3    isosorbide mononitrate (IMDUR) 30 MG extended release tablet take 1 tablet by mouth once daily 30 tablet 5    FEROSUL 325 (65 Fe) MG tablet take 1 tablet by mouth once daily with breakfast 30 tablet 3    FARXIGA 10 MG tablet take 1 tablet by mouth every morning 90 tablet 1    metoprolol succinate (TOPROL XL) 50 MG extended release tablet Take 1 tablet by mouth daily          Medications patient taking as of now reconciled against medications ordered at time of hospital discharge: Yes    A comprehensive review of systems was negative except for what was noted in the HPI.    Objective:    /66 (Site: Right Upper Arm, Position: Sitting, Cuff Size: Medium Adult)   Pulse 66   Temp 98.1 °F (36.7 °C) (Temporal)   Wt 87.5 kg (193 lb)   SpO2 97%   BMI 33.11 kg/m²         An electronic signature was used to authenticate this note.  --Vanessa Mckenna, LIDIA - CNP

## 2024-08-28 ENCOUNTER — CARE COORDINATION (OUTPATIENT)
Dept: CARE COORDINATION | Age: 69
End: 2024-08-28

## 2024-08-28 NOTE — CARE COORDINATION
Care Transitions Note    Follow Up Call     Patient Current Location:  Home: [address] [address]    Care Transition Nurse contacted the patient by telephone. Verified name and  as identifiers.    Additional needs identified to be addressed with provider   No needs identified                 Method of communication with provider: none.    Care Summary Note: Patient reached for follow up/ RPM welcome. PCP HFU completed, no changes. [facility] has been out and PT will continue twice weekly. CHF clinic appt . [facility] nurse has scheduled several appts for him, is unsure about nephrology appt. Reports did feel tired and weak but symptoms have resolved. RPM welcome call completed, patient verbalizes understanding. Has started using system and discussed benefit for chronic condition management. Denies further concerns at this time. Agrees to follow up next week.      Plan of care updates since last contact:  Education  Review of patient management of conditions/medications  RPM  Home Health: [facility]       Advance Care Planning:   Does patient have an Advance Directive: deferred at this time, will discuss on future follow up. .    Medication Review:  Full medication reconciliation completed during previous call.    Remote Patient Monitoring:  Offered patient enrollment in the Remote Patient Monitoring (RPM) program for in-home monitoring: Yes, patient enrolled; current status is activated and monitoring.    Assessments:  Care Transitions Subsequent and Final Call    Subsequent and Final Calls  Do you have any ongoing symptoms?: No  Have your medications changed?: No  Do you have any questions related to your medications?: No  Do you currently have any active services?: Yes  Are you currently active with any services?: Outpatient/Community Services, Home Health  Do you have any needs or concerns that I can assist you with?: No  Identified Barriers: None  Care Transitions Interventions

## 2024-09-02 ENCOUNTER — APPOINTMENT (OUTPATIENT)
Dept: GENERAL RADIOLOGY | Age: 69
End: 2024-09-02
Payer: COMMERCIAL

## 2024-09-02 ENCOUNTER — HOSPITAL ENCOUNTER (EMERGENCY)
Age: 69
Discharge: HOME | End: 2024-09-02
Attending: EMERGENCY MEDICINE
Payer: COMMERCIAL

## 2024-09-02 VITALS
TEMPERATURE: 97.3 F | SYSTOLIC BLOOD PRESSURE: 138 MMHG | DIASTOLIC BLOOD PRESSURE: 86 MMHG | OXYGEN SATURATION: 99 % | RESPIRATION RATE: 16 BRPM | HEART RATE: 94 BPM

## 2024-09-02 DIAGNOSIS — I10 ASYMPTOMATIC HYPERTENSION: Primary | ICD-10-CM

## 2024-09-02 PROCEDURE — 93005 ELECTROCARDIOGRAM TRACING: CPT

## 2024-09-02 PROCEDURE — 99283 EMERGENCY DEPT VISIT LOW MDM: CPT

## 2024-09-02 ASSESSMENT — ENCOUNTER SYMPTOMS
VOMITING: 0
SHORTNESS OF BREATH: 0
BACK PAIN: 0
NAUSEA: 0
DIARRHEA: 0
ABDOMINAL PAIN: 0
COUGH: 0

## 2024-09-02 ASSESSMENT — PAIN - FUNCTIONAL ASSESSMENT: PAIN_FUNCTIONAL_ASSESSMENT: NONE - DENIES PAIN

## 2024-09-02 NOTE — ED NOTES
Pt presented to ED via EMS for the complaint of checking his bp and hr which were high. Pt denies chest pain. Pt denies shortness of breath. Pt denies falling. Pt vitals normal. Pt asymptomatic.

## 2024-09-02 NOTE — DISCHARGE INSTRUCTIONS
INSTRUCTIONS  Please continue taking your home medications as prescribed.    FOLLOW-up  Please follow-up with your PCP, cardiologist and CHF clinic as scheduled.    RETURN  Please return to ED if experience any of the following symptoms-chest pain, shortness of breath, blurry vision or headaches.

## 2024-09-02 NOTE — ED PROVIDER NOTES
Addressed:  Asymptomatic hypertension: self-limited or minor problem            (Please note that portions of this note were completed with a voice recognition program.  Efforts were made to edit the dictations but occasionally words are mis-transcribed.)    Mason Pederson MD, FACEP  Attending Emergency Medicine Physician        Mason Pederson MD  09/02/24 1121    
Normal range of motion.   Skin:     General: Skin is warm and dry.      Findings: No rash.   Neurological:      Mental Status: He is alert and oriented to person, place, and time.           DDX/DIAGNOSTIC RESULTS / EMERGENCY DEPARTMENT COURSE / MDM     Medical Decision Making  Amount and/or Complexity of Data Reviewed  ECG/medicine tests: ordered.    69-year-old male with multiple medical conditions presenting to the ED for elevated blood pressure.  Patient has blood pressure of 138/86.  No complaints at this time.  Plan for discharge    EKG      All EKG's are interpreted by the Emergency Department Physician who either signs or Co-signs this chart in the absence of a cardiologist.    EMERGENCY DEPARTMENT COURSE:           PROCEDURES:      CONSULTS:  None    CRITICAL CARE:  There was significant risk of life threatening deterioration of patient's condition requiring my direct management. Critical care time  minutes, excluding any documented procedures.    FINAL IMPRESSION      1. Asymptomatic hypertension          DISPOSITION / PLAN     DISPOSITION Decision To Discharge 09/02/2024 11:22:39 AM  Condition at Disposition: Stable      PATIENT REFERRED TO:  No follow-up provider specified.    DISCHARGE MEDICATIONS:  Discharge Medication List as of 9/2/2024 11:24 AM          Rojas Estrada MD  Emergency Medicine Resident    (Please note that portions of thisnote were completed with a voice recognition program.  Efforts were made to edit the dictations but occasionally words are mis-transcribed.)

## 2024-09-03 ENCOUNTER — CARE COORDINATION (OUTPATIENT)
Dept: CASE MANAGEMENT | Age: 69
End: 2024-09-03

## 2024-09-03 ENCOUNTER — TELEPHONE (OUTPATIENT)
Dept: PRIMARY CARE CLINIC | Age: 69
End: 2024-09-03

## 2024-09-03 ENCOUNTER — CARE COORDINATION (OUTPATIENT)
Dept: CARE COORDINATION | Age: 69
End: 2024-09-03

## 2024-09-03 DIAGNOSIS — E11.8 CONTROLLED TYPE 2 DIABETES MELLITUS WITH COMPLICATION, WITHOUT LONG-TERM CURRENT USE OF INSULIN (HCC): Primary | ICD-10-CM

## 2024-09-03 LAB
EKG ATRIAL RATE: 81 BPM
EKG P AXIS: 50 DEGREES
EKG P-R INTERVAL: 234 MS
EKG Q-T INTERVAL: 458 MS
EKG QRS DURATION: 204 MS
EKG QTC CALCULATION (BAZETT): 532 MS
EKG R AXIS: -76 DEGREES
EKG T AXIS: 96 DEGREES
EKG VENTRICULAR RATE: 81 BPM

## 2024-09-03 NOTE — CARE COORDINATION
Care Transitions Note    Follow Up Call     Patient Current Location:  Home: [address] [address]    Care Transition Nurse contacted the patient by telephone. Verified name and  as identifiers.    Additional needs identified to be addressed with provider   No needs identified                 Method of communication with provider: none.    Care Summary Note: Patient reached for ER follow up. Reviewed ER visit for asymptomatic hypertension, recovered without intervention. Noted / 190's. Reports attempted to reach [facility] staff without success. Ended up calling EMS who decided to transport after placing patient on ECG monitor. Educated can contact cardiology ofc after-hours for non-emergent concerns as well, verbalizes understanding. No provider follow up specified, patient will see CHF clinic , cardiology , PCP . No med changes. Agrees to follow up next week.     Plan of care updates since last contact:  Education  Review of patient management of conditions/medications       Advance Care Planning:   Does patient have an Advance Directive: reviewed during previous call, see note. .    Medication Review:  Full medication reconciliation completed during previous call.    Remote Patient Monitoring:  Offered patient enrollment in the Remote Patient Monitoring (RPM) program for in-home monitoring: Yes, patient enrolled; current status is activated and monitoring.    Assessments:  Care Transitions Subsequent and Final Call    Schedule Follow Up Appointment with PCP: Completed  Subsequent and Final Calls  Do you have any ongoing symptoms?: No  Have your medications changed?: No  Do you have any questions related to your medications?: No  Do you currently have any active services?: Yes  Are you currently active with any services?: Outpatient/Community Services, Home Health  Do you have any needs or concerns that I can assist you with?: No  Identified Barriers: None  Care Transitions

## 2024-09-03 NOTE — CARE COORDINATION
Date/Time:  9/3/2024 2:17 PM  LPN attempted to reach patient by telephone regarding red alert NO BG READINGS IN 3 DAYS  in remote patient monitoring program. Left HIPPA compliant message requesting a return call. Will attempt to reach patient again.

## 2024-09-04 ENCOUNTER — CARE COORDINATION (OUTPATIENT)
Dept: CASE MANAGEMENT | Age: 69
End: 2024-09-04

## 2024-09-04 NOTE — CARE COORDINATION
Date/Time:  9/4/2024 11:53 AM  LPN attempted to reach patient by telephone regarding yellow alert no BG for 4 days in remote patient monitoring program. Left HIPPA compliant message requesting a return call. Will attempt to reach patient again.

## 2024-09-05 ENCOUNTER — CARE COORDINATION (OUTPATIENT)
Dept: CASE MANAGEMENT | Age: 69
End: 2024-09-05

## 2024-09-05 NOTE — CARE COORDINATION
-Remote Alert Monitoring Note      Date/Time:  2024 10:27 AM  Patient Current Location: Home: [address] [address]  Verified patients name and  as identifiers.    Rpm alert to be reviewed by the provider   red alert  blood pressure heart rate (30) and glucose reading (na)  Vitals Recheck blood pressure heart rate (67)  Additional needs to be addressed by provider: No                   LPN contacted patient by telephone regarding red alert received   Background: KD, CHF, DM  Refer to 911 immediately if:  Patient unresponsive or unable to provide history  Change in cognition or sudden confusion  Patient unable to respond in complete sentences  Intense chest pain/tightness  Any concern for any clinical emergency  Red Alert: Provider response time of 1 hr required for any red alert requiring intervention  Yellow Alert: Provider response time of 3hr required for any escalated yellow alert  Patient Chief Complaint:  HR Triage  Are you having any Chest Pain? no   Are you having any Shortness of Breath? no   Are you having any dizziness? no   Are you feeling more fatigued or tired than normal? no   Are you having any other health concerns or issues? no     Clinical Interventions: Reviewed and followed up on alerts and treatments-Spoke to patient he denies chest pain, SOB, dizziness fatigue states he is feeling much better, he did receive BG equipment and just keeps forgetting to enter the readings he will do that today, recheck of  BP HR is now 67 weight is stable and he has no swelling in feet, legs, hands or abdomen    Plan/Follow Up: Will continue to review, monitor and address alerts with follow up based on severity of symptoms and risk factors.  **For any new or worsening symptoms or you are concerned in anyway, please contact your Provider or report to the nearest Emergency Room.**

## 2024-09-06 ENCOUNTER — TELEPHONE (OUTPATIENT)
Dept: PRIMARY CARE CLINIC | Age: 69
End: 2024-09-06

## 2024-09-06 VITALS
BODY MASS INDEX: 32.53 KG/M2 | DIASTOLIC BLOOD PRESSURE: 64 MMHG | HEART RATE: 69 BPM | OXYGEN SATURATION: 99 % | WEIGHT: 189.6 LBS | SYSTOLIC BLOOD PRESSURE: 113 MMHG

## 2024-09-06 DIAGNOSIS — G47.33 OSA ON CPAP: ICD-10-CM

## 2024-09-06 DIAGNOSIS — R06.02 SHORTNESS OF BREATH: Primary | ICD-10-CM

## 2024-09-06 NOTE — TELEPHONE ENCOUNTER
Patient came into the office to see if he can get a referral to a pulmonologist due to he is having some difficultly breathing

## 2024-09-06 NOTE — TELEPHONE ENCOUNTER
Referral placed, please provide patient with scheduling information.  I also suggest that he verify where he was initially tested for sleep apnea as they are going to want to results of his sleep apnea testing if available

## 2024-09-09 ENCOUNTER — HOSPITAL ENCOUNTER (OUTPATIENT)
Dept: SPEECH THERAPY | Age: 69
Setting detail: THERAPIES SERIES
Discharge: HOME | End: 2024-09-09

## 2024-09-10 ENCOUNTER — HOSPITAL ENCOUNTER (EMERGENCY)
Age: 69
Discharge: HOME | End: 2024-09-10
Attending: EMERGENCY MEDICINE
Payer: COMMERCIAL

## 2024-09-10 VITALS
TEMPERATURE: 99.1 F | DIASTOLIC BLOOD PRESSURE: 66 MMHG | OXYGEN SATURATION: 98 % | SYSTOLIC BLOOD PRESSURE: 125 MMHG | HEART RATE: 83 BPM | RESPIRATION RATE: 15 BRPM

## 2024-09-10 DIAGNOSIS — R00.9 HEART RATE PROBLEM: Primary | ICD-10-CM

## 2024-09-10 LAB
ANION GAP SERPL CALCULATED.3IONS-SCNC: 17 MMOL/L (ref 9–16)
BASOPHILS # BLD: 0 K/UL (ref 0–0.2)
BASOPHILS NFR BLD: 0 % (ref 0–2)
BUN SERPL-MCNC: 35 MG/DL (ref 8–23)
CALCIUM SERPL-MCNC: 9.2 MG/DL (ref 8.6–10.4)
CHLORIDE SERPL-SCNC: 95 MMOL/L (ref 98–107)
CO2 SERPL-SCNC: 26 MMOL/L (ref 20–31)
CREAT SERPL-MCNC: 1.9 MG/DL (ref 0.7–1.2)
EOSINOPHIL # BLD: 0.09 K/UL (ref 0–0.44)
EOSINOPHILS RELATIVE PERCENT: 1 % (ref 1–4)
ERYTHROCYTE [DISTWIDTH] IN BLOOD BY AUTOMATED COUNT: 20 % (ref 11.8–14.4)
GFR, ESTIMATED: 38 ML/MIN/1.73M2
GLUCOSE SERPL-MCNC: 154 MG/DL (ref 74–99)
HCT VFR BLD AUTO: 36.4 % (ref 40.7–50.3)
HGB BLD-MCNC: 11.6 G/DL (ref 13–17)
IMM GRANULOCYTES # BLD AUTO: 0.09 K/UL (ref 0–0.3)
IMM GRANULOCYTES NFR BLD: 1 %
LYMPHOCYTES NFR BLD: 0.6 K/UL (ref 1.1–3.7)
LYMPHOCYTES RELATIVE PERCENT: 7 % (ref 24–43)
MCH RBC QN AUTO: 28.9 PG (ref 25.2–33.5)
MCHC RBC AUTO-ENTMCNC: 31.9 G/DL (ref 28.4–34.8)
MCV RBC AUTO: 90.8 FL (ref 82.6–102.9)
MONOCYTES NFR BLD: 0.43 K/UL (ref 0.1–1.2)
MONOCYTES NFR BLD: 5 % (ref 3–12)
MORPHOLOGY: ABNORMAL
NEUTROPHILS NFR BLD: 86 % (ref 36–65)
NEUTS SEG NFR BLD: 7.29 K/UL (ref 1.5–8.1)
NRBC BLD-RTO: 0 PER 100 WBC
PLATELET # BLD AUTO: 218 K/UL (ref 138–453)
PMV BLD AUTO: 12.5 FL (ref 8.1–13.5)
POTASSIUM SERPL-SCNC: 3.5 MMOL/L (ref 3.7–5.3)
RBC # BLD AUTO: 4.01 M/UL (ref 4.21–5.77)
SODIUM SERPL-SCNC: 138 MMOL/L (ref 136–145)
WBC OTHER # BLD: 8.5 K/UL (ref 3.5–11.3)

## 2024-09-10 PROCEDURE — 6370000000 HC RX 637 (ALT 250 FOR IP)

## 2024-09-10 PROCEDURE — 85025 COMPLETE CBC W/AUTO DIFF WBC: CPT

## 2024-09-10 PROCEDURE — 93005 ELECTROCARDIOGRAM TRACING: CPT

## 2024-09-10 PROCEDURE — 80048 BASIC METABOLIC PNL TOTAL CA: CPT

## 2024-09-10 PROCEDURE — 99284 EMERGENCY DEPT VISIT MOD MDM: CPT

## 2024-09-10 RX ADMIN — POTASSIUM BICARBONATE 20 MEQ: 782 TABLET, EFFERVESCENT ORAL at 14:50

## 2024-09-11 ENCOUNTER — CARE COORDINATION (OUTPATIENT)
Dept: CARE COORDINATION | Age: 69
End: 2024-09-11

## 2024-09-12 ENCOUNTER — HOSPITAL ENCOUNTER (OUTPATIENT)
Dept: OTHER | Age: 69
Discharge: HOME | End: 2024-09-12
Payer: COMMERCIAL

## 2024-09-12 VITALS
SYSTOLIC BLOOD PRESSURE: 112 MMHG | OXYGEN SATURATION: 100 % | BODY MASS INDEX: 31.88 KG/M2 | HEART RATE: 66 BPM | DIASTOLIC BLOOD PRESSURE: 74 MMHG | WEIGHT: 185.8 LBS

## 2024-09-12 LAB
EKG ATRIAL RATE: 93 BPM
EKG P AXIS: 43 DEGREES
EKG P-R INTERVAL: 220 MS
EKG Q-T INTERVAL: 424 MS
EKG QRS DURATION: 198 MS
EKG QTC CALCULATION (BAZETT): 527 MS
EKG R AXIS: -82 DEGREES
EKG T AXIS: 78 DEGREES
EKG VENTRICULAR RATE: 93 BPM

## 2024-09-12 PROCEDURE — 99212 OFFICE O/P EST SF 10 MIN: CPT

## 2024-09-18 ENCOUNTER — CARE COORDINATION (OUTPATIENT)
Dept: CARE COORDINATION | Age: 69
End: 2024-09-18

## 2024-09-22 SDOH — HEALTH STABILITY: PHYSICAL HEALTH: ON AVERAGE, HOW MANY DAYS PER WEEK DO YOU ENGAGE IN MODERATE TO STRENUOUS EXERCISE (LIKE A BRISK WALK)?: 7 DAYS

## 2024-09-22 SDOH — HEALTH STABILITY: PHYSICAL HEALTH: ON AVERAGE, HOW MANY MINUTES DO YOU ENGAGE IN EXERCISE AT THIS LEVEL?: 10 MIN

## 2024-09-22 ASSESSMENT — PATIENT HEALTH QUESTIONNAIRE - PHQ9
SUM OF ALL RESPONSES TO PHQ QUESTIONS 1-9: 0
SUM OF ALL RESPONSES TO PHQ QUESTIONS 1-9: 0
2. FEELING DOWN, DEPRESSED OR HOPELESS: NOT AT ALL
SUM OF ALL RESPONSES TO PHQ9 QUESTIONS 1 & 2: 0
SUM OF ALL RESPONSES TO PHQ QUESTIONS 1-9: 0
1. LITTLE INTEREST OR PLEASURE IN DOING THINGS: NOT AT ALL
SUM OF ALL RESPONSES TO PHQ QUESTIONS 1-9: 0

## 2024-09-22 ASSESSMENT — LIFESTYLE VARIABLES
HOW OFTEN DO YOU HAVE A DRINK CONTAINING ALCOHOL: 1
HOW MANY STANDARD DRINKS CONTAINING ALCOHOL DO YOU HAVE ON A TYPICAL DAY: 0
HOW OFTEN DO YOU HAVE SIX OR MORE DRINKS ON ONE OCCASION: 1
HOW MANY STANDARD DRINKS CONTAINING ALCOHOL DO YOU HAVE ON A TYPICAL DAY: PATIENT DOES NOT DRINK
HOW OFTEN DO YOU HAVE A DRINK CONTAINING ALCOHOL: NEVER

## 2024-09-23 ENCOUNTER — CARE COORDINATION (OUTPATIENT)
Dept: CARE COORDINATION | Age: 69
End: 2024-09-23

## 2024-09-25 ENCOUNTER — OFFICE VISIT (OUTPATIENT)
Dept: PRIMARY CARE CLINIC | Age: 69
End: 2024-09-25

## 2024-09-25 VITALS
TEMPERATURE: 97.7 F | DIASTOLIC BLOOD PRESSURE: 68 MMHG | SYSTOLIC BLOOD PRESSURE: 115 MMHG | BODY MASS INDEX: 31.91 KG/M2 | WEIGHT: 186 LBS | OXYGEN SATURATION: 100 % | HEART RATE: 51 BPM

## 2024-09-25 DIAGNOSIS — R06.02 SHORTNESS OF BREATH: ICD-10-CM

## 2024-09-25 DIAGNOSIS — E11.22 CKD STAGE 3 DUE TO TYPE 2 DIABETES MELLITUS (HCC): ICD-10-CM

## 2024-09-25 DIAGNOSIS — I50.42 CHRONIC COMBINED SYSTOLIC AND DIASTOLIC HEART FAILURE (HCC): ICD-10-CM

## 2024-09-25 DIAGNOSIS — E11.8 CONTROLLED TYPE 2 DIABETES MELLITUS WITH COMPLICATION, WITHOUT LONG-TERM CURRENT USE OF INSULIN (HCC): ICD-10-CM

## 2024-09-25 DIAGNOSIS — Z00.00 INITIAL MEDICARE ANNUAL WELLNESS VISIT: Primary | ICD-10-CM

## 2024-09-25 DIAGNOSIS — N18.30 CKD STAGE 3 DUE TO TYPE 2 DIABETES MELLITUS (HCC): ICD-10-CM

## 2024-09-27 ENCOUNTER — CARE COORDINATION (OUTPATIENT)
Dept: CARE COORDINATION | Age: 69
End: 2024-09-27

## 2024-09-27 ENCOUNTER — OFFICE VISIT (OUTPATIENT)
Dept: PULMONOLOGY | Age: 69
End: 2024-09-27
Payer: COMMERCIAL

## 2024-09-27 VITALS
HEIGHT: 64 IN | WEIGHT: 181 LBS | TEMPERATURE: 97.2 F | DIASTOLIC BLOOD PRESSURE: 70 MMHG | RESPIRATION RATE: 12 BRPM | OXYGEN SATURATION: 100 % | HEART RATE: 58 BPM | BODY MASS INDEX: 30.9 KG/M2 | SYSTOLIC BLOOD PRESSURE: 121 MMHG

## 2024-09-27 DIAGNOSIS — J45.30 MILD PERSISTENT ASTHMA WITHOUT COMPLICATION: ICD-10-CM

## 2024-09-27 DIAGNOSIS — I25.10 CORONARY ARTERY DISEASE WITHOUT ANGINA PECTORIS, UNSPECIFIED VESSEL OR LESION TYPE, UNSPECIFIED WHETHER NATIVE OR TRANSPLANTED HEART: ICD-10-CM

## 2024-09-27 DIAGNOSIS — I48.0 PAROXYSMAL A-FIB (HCC): ICD-10-CM

## 2024-09-27 DIAGNOSIS — I50.22 CHRONIC SYSTOLIC (CONGESTIVE) HEART FAILURE (HCC): ICD-10-CM

## 2024-09-27 DIAGNOSIS — G47.33 OSA (OBSTRUCTIVE SLEEP APNEA): Primary | ICD-10-CM

## 2024-09-27 DIAGNOSIS — I10 HYPERTENSION, ESSENTIAL: ICD-10-CM

## 2024-09-27 PROCEDURE — 99213 OFFICE O/P EST LOW 20 MIN: CPT | Performed by: STUDENT IN AN ORGANIZED HEALTH CARE EDUCATION/TRAINING PROGRAM

## 2024-09-27 PROCEDURE — 3074F SYST BP LT 130 MM HG: CPT | Performed by: STUDENT IN AN ORGANIZED HEALTH CARE EDUCATION/TRAINING PROGRAM

## 2024-09-27 PROCEDURE — 3078F DIAST BP <80 MM HG: CPT | Performed by: STUDENT IN AN ORGANIZED HEALTH CARE EDUCATION/TRAINING PROGRAM

## 2024-09-27 PROCEDURE — 1123F ACP DISCUSS/DSCN MKR DOCD: CPT | Performed by: STUDENT IN AN ORGANIZED HEALTH CARE EDUCATION/TRAINING PROGRAM

## 2024-09-27 RX ORDER — ALBUTEROL SULFATE 90 UG/1
2 INHALANT RESPIRATORY (INHALATION) 4 TIMES DAILY PRN
Qty: 54 G | Refills: 1 | Status: SHIPPED | OUTPATIENT
Start: 2024-09-27

## 2024-09-27 ASSESSMENT — SLEEP AND FATIGUE QUESTIONNAIRES
HOW LIKELY ARE YOU TO NOD OFF OR FALL ASLEEP WHILE SITTING INACTIVE IN A PUBLIC PLACE: WOULD NEVER DOZE
HOW LIKELY ARE YOU TO NOD OFF OR FALL ASLEEP WHILE WATCHING TV: SLIGHT CHANCE OF DOZING
HOW LIKELY ARE YOU TO NOD OFF OR FALL ASLEEP IN A CAR, WHILE STOPPED FOR A FEW MINUTES IN TRAFFIC: WOULD NEVER DOZE
ESS TOTAL SCORE: 6
HOW LIKELY ARE YOU TO NOD OFF OR FALL ASLEEP WHEN YOU ARE A PASSENGER IN A CAR FOR AN HOUR WITHOUT A BREAK: WOULD NEVER DOZE
HOW LIKELY ARE YOU TO NOD OFF OR FALL ASLEEP WHILE SITTING AND READING: SLIGHT CHANCE OF DOZING
HOW LIKELY ARE YOU TO NOD OFF OR FALL ASLEEP WHILE SITTING QUIETLY AFTER LUNCH WITHOUT ALCOHOL: SLIGHT CHANCE OF DOZING
HOW LIKELY ARE YOU TO NOD OFF OR FALL ASLEEP WHILE SITTING AND TALKING TO SOMEONE: WOULD NEVER DOZE
HOW LIKELY ARE YOU TO NOD OFF OR FALL ASLEEP WHILE LYING DOWN TO REST IN THE AFTERNOON WHEN CIRCUMSTANCES PERMIT: HIGH CHANCE OF DOZING

## 2024-09-27 NOTE — PATIENT INSTRUCTIONS
[email or web address]        YOU ARE BEING REFERRED TO:    [facility] (a department of [facility])    [address] [place] [address] [address]  [place] [address]    Main Phone Number: [phone]    Phone number for scheduling sleep study: [phone]      Please contact the above numbers to schedule your sleep study.     Once you have completed the FIRST NIGHT you may be asked to return for a SECOND NIGHT if necessary.   After the SECOND NIGHT the sleep center will order a CPAP/BiPAP machine for you.     An order for the machine will be sent to a durable medical equipment company ([employer]).   The sleep center will provide you with the [employer] companies information.     Once you have your machine please contact our office to schedule a follow up appointment.   Your follow up will be scheduled for a date 30-90 days after you have received your machine.   At that follow up visit we will need to have a compliance download from your DME company.   Please contact your [employer] company to have the compliance download faxed to our office at   [phone] for your follow up appointment.       IF YOU HAVE ANY QUESTIONS ABOUT YOUR SLEEP STUDY   PLEASE CONTACT THE SLEEP CENTER.

## 2024-10-01 NOTE — PROGRESS NOTES
[facility]  Office visit follow-up  ______________________________________________________________________________    Patient: Claudio Graham  YOB: 1955   MRN: [id number]    Acct:      Today's date: 9/27/2023    Chief complaint   Dyspnea on exertion, obstructive sleep apnea    History of present illness     A 69 y.o. male with PMHx of CAD, CHF, hypertension, TANNER, asthma presented today to the pulmonary clinic for TANNER follow-up.  The patient was last time seen by  in August 2023  The patient is not compliant with using CPAP as he cannot tolerate the mask and cannot sleep on it.    He has been admitted to the hospital multiple times for CHF in last 1 year.  Which could be correlated with the uncontrolled underlying obstructive sleep apnea.  Patient reported significant weight loss in the last few years.  He reports that he sleeps well and he denies significant tiredness/fatigue/sleepiness during the day.  He uses albuterol very seldomly as needed.    ROS:     Constitutional:no fever, no chills  HEENT: no runny nose, no sore throat  Respiratory: Dyspnea on exertion, no cough, no wheeze, no sputum production  CVS: no chest pain, no palpitations, no syncope  Gi: no abdominal pain, no nausea, no vomiting, no diarrhea.  MSK: no myalgia, no joint pain.  Skin: no rash  Neurological: no weakness    Severity: MRCA grading:   [] Grade I Not troubled by breathlessness except on strenuous exercise   [x] Grade II Short of breath when hurrying on a level or when walking up a slight hill   [] Grade III Walks slower than most people on the level, stops after a mile (1.6 km or so, or stops after 15 minutes walking at own pace   [] Grade IV Stops for breath after walking 100 yards (90 m), or after a few minutes on level ground   [] Grade V Too breathless to leave the house, or breathless when dressing/undressing.    OBJECTIVE     Vital Signs:  /70 (Site: Right Upper

## 2024-10-02 ENCOUNTER — CARE COORDINATION (OUTPATIENT)
Dept: CARE COORDINATION | Age: 69
End: 2024-10-02

## 2024-10-02 DIAGNOSIS — I50.42 CHRONIC COMBINED SYSTOLIC AND DIASTOLIC HEART FAILURE (HCC): ICD-10-CM

## 2024-10-02 DIAGNOSIS — E11.8 CONTROLLED TYPE 2 DIABETES MELLITUS WITH COMPLICATION, WITHOUT LONG-TERM CURRENT USE OF INSULIN (HCC): ICD-10-CM

## 2024-10-02 DIAGNOSIS — D50.9 IRON DEFICIENCY ANEMIA, UNSPECIFIED IRON DEFICIENCY ANEMIA TYPE: ICD-10-CM

## 2024-10-02 RX ORDER — DAPAGLIFLOZIN 10 MG/1
10 TABLET, FILM COATED ORAL EVERY MORNING
Qty: 90 TABLET | Refills: 1 | Status: SHIPPED | OUTPATIENT
Start: 2024-10-02

## 2024-10-02 RX ORDER — FUROSEMIDE 40 MG
40 TABLET ORAL 2 TIMES DAILY
Qty: 60 TABLET | Refills: 3 | Status: SHIPPED | OUTPATIENT
Start: 2024-10-02

## 2024-10-02 RX ORDER — MIDODRINE HYDROCHLORIDE 10 MG/1
5 TABLET ORAL 2 TIMES DAILY PRN
Qty: 90 TABLET | Refills: 3 | Status: SHIPPED | OUTPATIENT
Start: 2024-10-02

## 2024-10-02 RX ORDER — ISOSORBIDE MONONITRATE 30 MG/1
30 TABLET, EXTENDED RELEASE ORAL DAILY
Qty: 30 TABLET | Refills: 5 | Status: SHIPPED | OUTPATIENT
Start: 2024-10-02

## 2024-10-02 RX ORDER — FERROUS SULFATE 325(65) MG
1 TABLET ORAL
Qty: 30 TABLET | Refills: 3 | Status: SHIPPED | OUTPATIENT
Start: 2024-10-02

## 2024-10-02 RX ORDER — AMIODARONE HYDROCHLORIDE 200 MG/1
200 TABLET ORAL DAILY
Qty: 30 TABLET | Refills: 3 | OUTPATIENT
Start: 2024-10-02

## 2024-10-02 NOTE — CARE COORDINATION
Ambulatory Care Coordination Note     10/2/2024 3:01 PM     ACM outreach attempt by this ACM today to perform care management follow up . ACM was unable to reach the patient by telephone today; left voice message requesting a return phone call to this ACM.     ACM: Kelly Escobar RN         PCP/Specialist follow up:   Future Appointments         Provider Specialty Dept Phone    10/16/2024 1:30 PM Yadira Guidry APRN [facility] Nephrology [phone]    10/24/2024 1:30 PM [facility]  1 Cardiology [phone]    12/26/2024 1:30 PM Vanessa Mckenna, APRN [facility] Primary Care [phone]    3/17/2025 1:00 PM Elder Corley MD Oncology [phone]    3/31/2025 1:00 PM Bhavna Mar APRN - CNP Cardiology [phone]    4/25/2025 1:00 PM Radha Shankar MD Pulmonology [phone]            Follow Up:   Plan for next ACM outreach in approximately 1 week to complete:  - disease specific assessments  - SDOH assessments  - medication review  - advance care planning  - goal progression  - education .

## 2024-10-07 ENCOUNTER — TELEPHONE (OUTPATIENT)
Dept: PRIMARY CARE CLINIC | Age: 69
End: 2024-10-07

## 2024-10-07 NOTE — TELEPHONE ENCOUNTER
Clinton  with [facility] pharmacy called to clarify if the pt is to take the Midofrine is above or below 120. Please advise

## 2024-10-10 ENCOUNTER — CARE COORDINATION (OUTPATIENT)
Dept: CARE COORDINATION | Age: 69
End: 2024-10-10

## 2024-10-10 ENCOUNTER — HOSPITAL ENCOUNTER (OUTPATIENT)
Age: 69
Discharge: HOME | End: 2024-10-10
Payer: COMMERCIAL

## 2024-10-10 SDOH — HEALTH STABILITY: MENTAL HEALTH
STRESS IS WHEN SOMEONE FEELS TENSE, NERVOUS, ANXIOUS, OR CAN'T SLEEP AT NIGHT BECAUSE THEIR MIND IS TROUBLED. HOW STRESSED ARE YOU?: NOT AT ALL

## 2024-10-10 SDOH — ECONOMIC STABILITY: INCOME INSECURITY: IN THE LAST 12 MONTHS, WAS THERE A TIME WHEN YOU WERE NOT ABLE TO PAY THE MORTGAGE OR RENT ON TIME?: NO

## 2024-10-10 SDOH — SOCIAL STABILITY: SOCIAL NETWORK: IN A TYPICAL WEEK, HOW MANY TIMES DO YOU TALK ON THE PHONE WITH FAMILY, FRIENDS, OR NEIGHBORS?: ONCE A WEEK

## 2024-10-10 SDOH — HEALTH STABILITY: PHYSICAL HEALTH: ON AVERAGE, HOW MANY DAYS PER WEEK DO YOU ENGAGE IN MODERATE TO STRENUOUS EXERCISE (LIKE A BRISK WALK)?: 5 DAYS

## 2024-10-10 SDOH — HEALTH STABILITY: PHYSICAL HEALTH: ON AVERAGE, HOW MANY MINUTES DO YOU ENGAGE IN EXERCISE AT THIS LEVEL?: 60 MIN

## 2024-10-10 SDOH — SOCIAL STABILITY: SOCIAL INSECURITY: WITHIN THE LAST YEAR, HAVE YOU BEEN AFRAID OF YOUR PARTNER OR EX-PARTNER?: NO

## 2024-10-10 SDOH — SOCIAL STABILITY: SOCIAL INSECURITY: WITHIN THE LAST YEAR, HAVE YOU BEEN HUMILIATED OR EMOTIONALLY ABUSED IN OTHER WAYS BY YOUR PARTNER OR EX-PARTNER?: NO

## 2024-10-10 SDOH — SOCIAL STABILITY: SOCIAL INSECURITY
WITHIN THE LAST YEAR, HAVE YOU BEEN KICKED, HIT, SLAPPED, OR OTHERWISE PHYSICALLY HURT BY YOUR PARTNER OR EX-PARTNER?: NO

## 2024-10-10 SDOH — SOCIAL STABILITY: SOCIAL INSECURITY
WITHIN THE LAST YEAR, HAVE TO BEEN RAPED OR FORCED TO HAVE ANY KIND OF SEXUAL ACTIVITY BY YOUR PARTNER OR EX-PARTNER?: NO

## 2024-10-10 SDOH — ECONOMIC STABILITY: FOOD INSECURITY: WITHIN THE PAST 12 MONTHS, YOU WORRIED THAT YOUR FOOD WOULD RUN OUT BEFORE YOU GOT MONEY TO BUY MORE.: NEVER TRUE

## 2024-10-10 SDOH — SOCIAL STABILITY: SOCIAL NETWORK: HOW OFTEN DO YOU ATTEND CHURCH OR RELIGIOUS SERVICES?: NEVER

## 2024-10-10 SDOH — SOCIAL STABILITY: SOCIAL NETWORK: ARE YOU MARRIED, WIDOWED, DIVORCED, SEPARATED, NEVER MARRIED, OR LIVING WITH A PARTNER?: PATIENT DECLINED

## 2024-10-10 SDOH — ECONOMIC STABILITY: FOOD INSECURITY: WITHIN THE PAST 12 MONTHS, THE FOOD YOU BOUGHT JUST DIDN'T LAST AND YOU DIDN'T HAVE MONEY TO GET MORE.: NEVER TRUE

## 2024-10-10 SDOH — SOCIAL STABILITY: SOCIAL NETWORK
DO YOU BELONG TO ANY CLUBS OR ORGANIZATIONS SUCH AS CHURCH GROUPS UNIONS, FRATERNAL OR ATHLETIC GROUPS, OR SCHOOL GROUPS?: NO

## 2024-10-10 SDOH — ECONOMIC STABILITY: INCOME INSECURITY: HOW HARD IS IT FOR YOU TO PAY FOR THE VERY BASICS LIKE FOOD, HOUSING, MEDICAL CARE, AND HEATING?: NOT HARD AT ALL

## 2024-10-10 SDOH — SOCIAL STABILITY: SOCIAL NETWORK: HOW OFTEN DO YOU GET TOGETHER WITH FRIENDS OR RELATIVES?: ONCE A WEEK

## 2024-10-10 SDOH — SOCIAL STABILITY: SOCIAL NETWORK: HOW OFTEN DO YOU ATTENT MEETINGS OF THE CLUB OR ORGANIZATION YOU BELONG TO?: NEVER

## 2024-10-10 SDOH — HEALTH STABILITY: MENTAL HEALTH: HOW OFTEN DO YOU HAVE A DRINK CONTAINING ALCOHOL?: NEVER

## 2024-10-10 SDOH — HEALTH STABILITY: MENTAL HEALTH: HOW MANY STANDARD DRINKS CONTAINING ALCOHOL DO YOU HAVE ON A TYPICAL DAY?: PATIENT DOES NOT DRINK

## 2024-10-10 NOTE — CARE COORDINATION
Current or Ex-Partner: No     Emotionally Abused: No     Physically Abused: No     Sexually Abused: No   Utilities: Not At Risk (10/10/2024)    [facility] Utilities     Threatened with loss of utilities: No        Medications Reviewed:   Completed during this call    Advance Care Planning:   Not on file; education provided; educated to submit to MD mcgee for upload     Care Planning:    Goals Addressed                   This Visit's Progress     Understand CHF action plan.        Patient will notify MD for weight gain of 3 lbs in a day or 5 lbs in week, increase in shortness of breath over baseline, increase in swelling.     Patient will continue to participate in RPM daily    Adhere to 1800 ml fluid restriction and 2000 mg sodium restriction               PCP/Specialist follow up:   Future Appointments         Provider Specialty Dept Phone    10/15/2024 2:00 PM [facility] [phone]    10/16/2024 1:30 PM Yadira Guidry APRN [facility] Nephrology [phone]    10/24/2024 1:30 PM [facility] Cardiology [phone]    12/26/2024 1:30 PM Vanessa Mckenna, APRN [facility] Primary Care [phone]    3/17/2025 1:00 PM Elder Corley MD Oncology [phone]    3/31/2025 1:00 PM Bhavna Mar APRN - CNP Cardiology [phone]    4/25/2025 1:00 PM Radha Shankar MD Pulmonology [phone]            Follow Up:   Plan for next [facility] outreach in approximately 1 month to complete:  - disease specific assessments  - goal progression  - education .   Patient  is agreeable to this plan.

## 2024-10-15 ENCOUNTER — HOSPITAL ENCOUNTER (OUTPATIENT)
Dept: SLEEP CENTER | Age: 69
Discharge: HOME | End: 2024-10-17
Attending: STUDENT IN AN ORGANIZED HEALTH CARE EDUCATION/TRAINING PROGRAM
Payer: COMMERCIAL

## 2024-10-15 DIAGNOSIS — G47.33 OSA (OBSTRUCTIVE SLEEP APNEA): ICD-10-CM

## 2024-10-15 PROCEDURE — G0399 HOME SLEEP TEST/TYPE 3 PORTA: HCPCS

## 2024-10-16 ENCOUNTER — CARE COORDINATION (OUTPATIENT)
Dept: CASE MANAGEMENT | Age: 69
End: 2024-10-16

## 2024-10-16 NOTE — CARE COORDINATION
Date/Time:  10/16/2024 1:11 PM  LPN attempted to reach patient by telephone regarding red alert hr 48 in remote patient monitoring program. Left HIPPA compliant message requesting a return call. Will attempt to reach patient again.

## 2024-10-17 ENCOUNTER — CARE COORDINATION (OUTPATIENT)
Dept: CARE COORDINATION | Age: 69
End: 2024-10-17

## 2024-10-17 NOTE — CARE COORDINATION
Date/Time:  10/17/2024 3:32 PM  LPN attempted to reach patient by telephone regarding red alert weight increase 3 pounds in a day in remote patient monitoring program. VM not set up  Will attempt to reach patient again. Writer left  requesting a return call regarding weight.  Balbina Boykin LPN   Care Transitions Nurse  Cell

## 2024-10-23 LAB — STATUS: NORMAL

## 2024-10-31 ENCOUNTER — HOSPITAL ENCOUNTER (OUTPATIENT)
Dept: OTHER | Age: 69
Discharge: HOME | End: 2024-10-31
Payer: COMMERCIAL

## 2024-10-31 VITALS
HEART RATE: 63 BPM | OXYGEN SATURATION: 100 % | RESPIRATION RATE: 20 BRPM | WEIGHT: 179.4 LBS | SYSTOLIC BLOOD PRESSURE: 118 MMHG | BODY MASS INDEX: 30.79 KG/M2 | DIASTOLIC BLOOD PRESSURE: 70 MMHG

## 2024-10-31 PROCEDURE — 99212 OFFICE O/P EST SF 10 MIN: CPT

## 2024-10-31 NOTE — PROGRESS NOTES
Date:  10/31/2024  Time:  3:13 PM    CHF Clinic at [facility]    Office: [phone] Fax: [phone]    Re:  Claudio Graham   Patient : 1955    Vital Signs: /70   Pulse 63   Resp 20   Wt 81.4 kg (179 lb 6.4 oz)   SpO2 100%   BMI 30.79 kg/m²                       O2 Device: None (Room air)                           No results for input(s): \"CBC\", \"HGB\", \"HCT\", \"WBC\", \"PLATELET\", \"NA\", \"K\", \"CL\", \"CO2\", \"BUN\", \"CREATININE\", \"GLUCOSE\", \"BNP\", \"INR\" in the last 72 hours.     Respiratory:    Assessment  Charting Type: Reassessment    Breath Sounds  Right Upper Lobe: Clear  Right Middle Lobe: Clear  Right Lower Lobe: Clear  Left Upper Lobe: Clear  Left Lower Lobe: Clear    Cough/Sputum  Cough: Dry  Frequency: Occasional         Peripheral Vascular  RLE Edema: None  LLE Edema: None    Future Appointments   Date Time Provider Department Center   2024  1:00 PM [facility]  1 [facility] CHF I [facility]   2024  1:30 PM Vanessa Mckenna APRN - CNP [facility] ECC DEP   3/11/2025  1:50 PM Yadira Guidry APRN - CNP AFL Neph [place] None   3/17/2025  1:00 PM Elder Corley MD [facility]   3/31/2025  1:00 PM Bhavna Mar APRN - CNP AFL TCC TOLE AFL [facility]EL C   2025  1:00 PM Radha Shankar MD Resp Malorie RUST      Complaints: No new complaints at this time.    Physician Orders: None    Comment : Patient here for scheduled visit per ambulatory with cane assist. His weight is down 6.4 lbs from last month. Denies any increase in dyspnea with exertion. No lower leg swelling noted. Medication list reviewed with patient and updated. Saw VERONICA Guidry CNP with [facility] 2 weeks ago and K+ level 3.3. Started on KCL 20 meq twice daily. Follow up lab shows K+ level 3.5. Reinforced low sodium diet and fluid restrictions. No s/s acute chf at this time. Has device check with Dr. Nj in December. Next chf clinic visit

## 2024-11-01 ENCOUNTER — CARE COORDINATION (OUTPATIENT)
Dept: CASE MANAGEMENT | Age: 69
End: 2024-11-01

## 2024-11-01 NOTE — CARE COORDINATION
2024 2:06 PM  *  Alert and Triage   -Remote Alert Monitoring Note      Date/Time:  2024 2:06 PM  Patient Current Location: Home: [address] [place] [address]  Verified patients name and  as identifiers.    Rpm alert to be reviewed by the provider   red alert  blood pressure heart rate (43)  Vitals Recheck blood pressure heart rate (NA)  Additional needs to be addressed by provider: No                   LPN contacted patient by telephone regarding red alert received   Background: KD,CHF,DM  Refer to 911 immediately if:  Patient unresponsive or unable to provide history  Change in cognition or sudden confusion  Patient unable to respond in complete sentences  Intense chest pain/tightness  Any concern for any clinical emergency  Red Alert: Provider response time of 1 hr required for any red alert requiring intervention  Yellow Alert: Provider response time of 3hr required for any escalated yellow alert  Patient Chief Complaint:  Heart Rate HR Triage  Are you having any Chest Pain? no   Are you having any Shortness of Breath? no   Are you having any dizziness? no   Are you feeling more fatigued or tired than normal? no   Are you having any other health concerns or issues? no     Clinical Interventions: Reviewed and followed up on alerts and treatments-Spoke to patient he states he is doing well he is currently eating lunch, denies chest pain, SOB dizziness. States [facility] nurse was out this am everything was good, he does have history of AFIB. Patient declines recheck at present time asks for 15 minutes then he will recheck     Plan/Follow Up: Will continue to review, monitor and address alerts with follow up based on severity of symptoms and risk factors.  **For any new or worsening symptoms or you are concerned in anyway, please contact your Provider or report to the nearest Emergency Room.**

## 2024-11-01 NOTE — CARE COORDINATION
Date/Time:  11/1/2024 2:03 PM  LPN attempted to reach patient by telephone regarding red alert BP 92/51 in remote patient monitoring program. Left HIPPA compliant message requesting a return call. Will attempt to reach patient again.

## 2024-11-04 ENCOUNTER — CARE COORDINATION (OUTPATIENT)
Dept: CASE MANAGEMENT | Age: 69
End: 2024-11-04

## 2024-11-04 NOTE — CARE COORDINATION
-Remote Alert Monitoring Note      Date/Time:  2024 8:23 AM  Patient Current Location: Home: [address] [place] [address]  Verified patients name and  as identifiers.    Rpm alert to be reviewed by the provider   red alert  pulse ox heart rate (39)  Vitals Recheck pulse ox heart rate (45,)  Additional needs to be addressed by provider: No                   LPN contacted patient by telephone regarding red alert received   Background: KD, CHF, DM  Refer to 911 immediately if:  Patient unresponsive or unable to provide history  Change in cognition or sudden confusion  Patient unable to respond in complete sentences  Intense chest pain/tightness  Any concern for any clinical emergency  Red Alert: Provider response time of 1 hr required for any red alert requiring intervention  Yellow Alert: Provider response time of 3hr required for any escalated yellow alert  Patient Chief Complaint:  Heart Rate HR Triage  Are you having any Chest Pain? no   Are you having any Shortness of Breath? no   Are you having any dizziness? no   Are you feeling more fatigued or tired than normal? no   Are you having any other health concerns or issues? no     Clinical Interventions: Reviewed and followed up on alerts and treatments-Spoke to patient he is speaking in clear full sentences, denies chest pain, SOB, dizziness fatigue, recheck of Pulse ox HR is 45 we changed the batteries in his pulse oximeter now meter is not working, patient was given technical support number to trouble shoot device, if HR continues to be low after trouble shoot will reach out to cardiology    Plan/Follow Up: Will continue to review, monitor and address alerts with follow up based on severity of symptoms and risk factors.  **For any new or worsening symptoms or you are concerned in anyway, please contact your Provider or report to the nearest Emergency Room.**

## 2024-11-05 ENCOUNTER — CARE COORDINATION (OUTPATIENT)
Dept: CASE MANAGEMENT | Age: 69
End: 2024-11-05

## 2024-11-05 ENCOUNTER — CARE COORDINATION (OUTPATIENT)
Dept: CARE COORDINATION | Age: 69
End: 2024-11-05

## 2024-11-05 NOTE — CARE COORDINATION
Date/Time:  11/5/2024 11:03 AM  LPN attempted to reach patient by telephone regarding red alert pulse ox HR 48 in remote patient monitoring program. No mailbox set up unable to leave HIPAA compliant message requesting a return call. Will attempt to reach patient again.

## 2024-11-05 NOTE — CARE COORDINATION
Ambulatory Care Coordination Note     11/5/2024 12:20 PM     ACM outreach attempt by this ACM today to perform care management follow up . ACM was unable to reach the patient by telephone today;  VM not set up     ACM: Kelly Escobar RN         PCP/Specialist follow up:   Future Appointments         Provider Specialty Dept Phone    11/26/2024 1:00 PM [facility] Cardiology [phone]    12/26/2024 1:30 PM Vanessa Mckenna, APRN - CNP Primary Care [phone]    3/11/2025 1:50 PM Yadira Guidry APRN - CNP Nephrology [phone]    3/17/2025 1:00 PM Elder Corley MD Oncology [phone]    3/31/2025 1:00 PM Bhavna Mar APRN - CNP Cardiology [phone]    4/25/2025 1:00 PM Radha Shankar MD Pulmonology [phone]            Follow Up:   Plan for next ACM outreach in approximately 1 week to complete:  - disease specific assessments  - goal progression  - education .

## 2024-11-05 NOTE — CARE COORDINATION
Date/Time:  11/5/2024 2:01 PM  LPN attempted to reach patient by telephone regarding red alert in remote patient monitoring program. Unable to leave message HIPAA compliant message requesting a return call. Will attempt to reach patient again.

## 2024-11-12 ENCOUNTER — CARE COORDINATION (OUTPATIENT)
Dept: CARE COORDINATION | Age: 69
End: 2024-11-12

## 2024-11-12 NOTE — CARE COORDINATION
Ambulatory Care Coordination Note     11/12/2024 12:11 PM     ACM outreach attempt by this ACM today to perform care management follow up . ACM was unable to reach the patient by telephone today; left voice message requesting a return phone call to this ACM.     ACM: Kelly Escobar RN     Care Summary Note: Left voice main on home number, letter sent to home.     PCP/Specialist follow up:   Future Appointments         Provider Specialty Dept Phone    11/26/2024 1:00 PM [facility] Cardiology [phone]    12/26/2024 1:30 PM Vanessa Mckenna, APRN - CNP Primary Care [phone]    3/11/2025 1:50 PM Yadira Guidry APRN - CNP Nephrology [phone]    3/17/2025 1:00 PM Elder Corley MD Oncology [phone]    3/31/2025 1:00 PM Bhavna Mar APRN - CNP Cardiology [phone]    4/25/2025 1:00 PM Radha Shankar MD Pulmonology [phone]            Follow Up:   Plan for next ACM outreach in approximately 3 weeks to complete:  - disease specific assessments  - goal progression  - education

## 2024-11-13 DIAGNOSIS — E03.2 HYPOTHYROIDISM DUE TO MEDICATION: ICD-10-CM

## 2024-11-13 RX ORDER — LEVOTHYROXINE SODIUM 88 UG/1
88 TABLET ORAL DAILY
Qty: 90 TABLET | Refills: 1 | Status: SHIPPED | OUTPATIENT
Start: 2024-11-13

## 2024-11-13 RX ORDER — BLOOD-GLUCOSE METER
KIT MISCELLANEOUS
Qty: 1 KIT | Refills: 0 | Status: SHIPPED | OUTPATIENT
Start: 2024-11-13

## 2024-11-18 RX ORDER — LEVOTHYROXINE SODIUM 75 UG/1
75 TABLET ORAL DAILY
Qty: 90 TABLET | OUTPATIENT
Start: 2024-11-18

## 2024-11-21 DIAGNOSIS — E11.8 CONTROLLED TYPE 2 DIABETES MELLITUS WITH COMPLICATION, WITHOUT LONG-TERM CURRENT USE OF INSULIN (HCC): ICD-10-CM

## 2024-11-21 DIAGNOSIS — E11.9 TYPE 2 DIABETES MELLITUS WITHOUT COMPLICATION, WITHOUT LONG-TERM CURRENT USE OF INSULIN (HCC): ICD-10-CM

## 2024-11-21 RX ORDER — METOPROLOL SUCCINATE 50 MG/1
50 TABLET, EXTENDED RELEASE ORAL DAILY
Qty: 90 TABLET | Refills: 1 | Status: SHIPPED | OUTPATIENT
Start: 2024-11-21

## 2024-11-21 RX ORDER — LANCETS 30 GAUGE
EACH MISCELLANEOUS
Qty: 100 EACH | Refills: 3 | Status: SHIPPED | OUTPATIENT
Start: 2024-11-21

## 2024-11-22 ENCOUNTER — CARE COORDINATION (OUTPATIENT)
Dept: CASE MANAGEMENT | Age: 69
End: 2024-11-22

## 2024-11-22 NOTE — CARE COORDINATION
-Remote Alert Monitoring Note      Date/Time:  2024 9:37 AM  Patient Current Location: Home: [address] [place] [address]  Verified patients name and  as identifiers.    Rpm alert to be reviewed by the provider   red alert  blood pressure heart rate (47) and pulse ox reading (40)  Vitals Recheck blood pressure heart rate (59) and pulse ox heart rate (63)  Additional needs to be addressed by provider: No                   LPN contacted patient by telephone regarding red alert received   Background: KD,CHF,DM  Refer to 911 immediately if:  Patient unresponsive or unable to provide history  Change in cognition or sudden confusion  Patient unable to respond in complete sentences  Intense chest pain/tightness  Any concern for any clinical emergency  Red Alert: Provider response time of 1 hr required for any red alert requiring intervention  Yellow Alert: Provider response time of 3hr required for any escalated yellow alert  Patient Chief Complaint:  Heart Rate HR Triage  Are you having any Chest Pain? no   Are you having any Shortness of Breath? no   Are you having any dizziness? no   Are you feeling more fatigued or tired than normal? no   Are you having any other health concerns or issues? no     Clinical Interventions: Reviewed and followed up on alerts and treatments-Spoke to patient he denies chest pain, SOB, dizziness fatigue states he is feeling well. Repeat vitals are now WNL patient does have a history of AFIB    Plan/Follow Up: Will continue to review, monitor and address alerts with follow up based on severity of symptoms and risk factors.  **For any new or worsening symptoms or you are concerned in anyway, please contact your Provider or report to the nearest Emergency Room.**

## 2024-11-29 RX ORDER — ALLOPURINOL 300 MG/1
150 TABLET ORAL DAILY
Qty: 15 TABLET | Refills: 0 | Status: SHIPPED | OUTPATIENT
Start: 2024-11-29 | End: 2024-12-29

## 2024-12-02 ENCOUNTER — CARE COORDINATION (OUTPATIENT)
Dept: CASE MANAGEMENT | Age: 69
End: 2024-12-02

## 2024-12-02 NOTE — CARE COORDINATION
-Remote Alert Monitoring Note      Date/Time:  2024 9:26 AM  Patient Current Location: Home: [address] [place] [address]  Verified patients name and  as identifiers.    Rpm alert to be reviewed by the provider   red alert  weight (3 lb in 1 day )  Vitals Recheck weight (na)  Additional needs to be addressed by provider: No                   LPN contacted patient by telephone regarding red alert received   Background: KD,CHF,DM  Refer to 911 immediately if:  Patient unresponsive or unable to provide history  Change in cognition or sudden confusion  Patient unable to respond in complete sentences  Intense chest pain/tightness  Any concern for any clinical emergency  Red Alert: Provider response time of 1 hr required for any red alert requiring intervention  Yellow Alert: Provider response time of 3hr required for any escalated yellow alert  Patient Chief Complaint:  Weight Weight Scale Triage  Was your weight obtained upon rising/waking today? yes   Was your weight obtained after voiding and/or use of the bathroom today? yes   Did you weigh yourself in the same amount of clothing today, compared to how you typically do? yes   Was the scale bumped or moved prior to today's weight? no   Is your scale on a flat/hard surface? yes   Did you obtain your weight with shoes on? no   If yes, is this something you normally do during your daily weights? no   Were you standing up straight on the scale today? yes   Were you leaning on anything while obtaining your weight today? no     Clinical Interventions: Reviewed and followed up on alerts and treatments-Spoke to patient he denies ches tpain, SOB, dizziness has no unusual swelling in feet, legs, hands or abdomen, patient states they turned on the heat yesterday and he was very thirsty and drank a lot more water then normal. He has no concerns we will watch weight closely    Plan/Follow Up: Will continue to review, monitor and address alerts with follow up based

## 2024-12-02 NOTE — CARE COORDINATION
Date/Time:  12/2/2024 9:24 AM  LPN attempted to reach patient by telephone regarding red alert in remote patient monitoring program.no voicemail unable to leave HIPAA compliant message requesting a return call. Will attempt to reach patient again.

## 2024-12-03 ENCOUNTER — CARE COORDINATION (OUTPATIENT)
Dept: CASE MANAGEMENT | Age: 69
End: 2024-12-03

## 2024-12-03 NOTE — CARE COORDINATION
-Remote Alert Monitoring Note      Date/Time:  12/3/2024 10:22 AM  Patient Current Location: Home: [address] [place] [address]  Verified patients name and  as identifiers.    Rpm alert to be reviewed by the provider   red alert  weight (5 lbs in 7 days)    Additional needs to be addressed by provider: No                   LPN contacted patient by telephone regarding red alert received   Background: KD, CHF,DM  Refer to 911 immediately if:  Patient unresponsive or unable to provide history  Change in cognition or sudden confusion  Patient unable to respond in complete sentences  Intense chest pain/tightness  Any concern for any clinical emergency  Red Alert: Provider response time of 1 hr required for any red alert requiring intervention  Yellow Alert: Provider response time of 3hr required for any escalated yellow alert  Patient Chief Complaint:  Weight Weight Scale Triage  Was your weight obtained upon rising/waking today? YES   Was your weight obtained after voiding and/or use of the bathroom today? yes   Did you weigh yourself in the same amount of clothing today, compared to how you typically do? yes   Was the scale bumped or moved prior to today's weight? no   Is your scale on a flat/hard surface? yes   Did you obtain your weight with shoes on? no   If yes, is this something you normally do during your daily weights? no   Were you standing up straight on the scale today? yes   Were you leaning on anything while obtaining your weight today? no     Clinical Interventions: Reviewed and followed up on alerts and treatments-Spoke to patient he denies concerns states he feels good and [facility] has discharged him. Weight has not increased since yesterdays outreach will continue to monitor daily    Plan/Follow Up: Will continue to review, monitor and address alerts with follow up based on severity of symptoms and risk factors.  **For any new or worsening symptoms or you are concerned in anyway, please

## 2024-12-04 ENCOUNTER — CARE COORDINATION (OUTPATIENT)
Dept: CARE COORDINATION | Age: 69
End: 2024-12-04

## 2024-12-04 ENCOUNTER — CARE COORDINATION (OUTPATIENT)
Dept: CASE MANAGEMENT | Age: 69
End: 2024-12-04

## 2024-12-04 NOTE — CARE COORDINATION
-Remote Alert Monitoring Note      Date/Time:  2024 9:29 AM  Patient Current Location: Home: [address] [place] [address]  Verified patients name and  as identifiers.    Rpm alert to be reviewed by the provider   red alert  weight (increase )    Additional needs to be addressed by provider: No                   LPN contacted patient by telephone regarding red alert received   Background: KD,CHF,DM  Refer to 911 immediately if:  Patient unresponsive or unable to provide history  Change in cognition or sudden confusion  Patient unable to respond in complete sentences  Intense chest pain/tightness  Any concern for any clinical emergency  Red Alert: Provider response time of 1 hr required for any red alert requiring intervention  Yellow Alert: Provider response time of 3hr required for any escalated yellow alert  Patient Chief Complaint:  Weight Weight Scale Triage  Was your weight obtained upon rising/waking today? YES   Was your weight obtained after voiding and/or use of the bathroom today? yes   Did you weigh yourself in the same amount of clothing today, compared to how you typically do? yes   Was the scale bumped or moved prior to today's weight? yes   Is your scale on a flat/hard surface? yes   Did you obtain your weight with shoes on? no   If yes, is this something you normally do during your daily weights? no   Were you standing up straight on the scale today? yes   Were you leaning on anything while obtaining your weight today? no     Clinical Interventions: Reviewed and followed up on alerts and treatments-Spoke to patient he denies chest pain, SOB, dizziness states he is doing well. Denies swelling in feet, legs, hands and abdomen, patient does move scale daily, educated patient on keeping scale in one place again, he has no concerns will continue to monitor    Plan/Follow Up: Will continue to review, monitor and address alerts with follow up based on severity of symptoms and risk  patient/family

## 2024-12-04 NOTE — CARE COORDINATION
angina: Neg, CHF associated dyspnea on exertion: Neg, CHF associated fatigue: Neg, CHF associated leg swelling: Neg, CHF associated orthostatic hypotension: Neg, CHF associated PND: Neg, CHF associated shortness of breath: Neg, CHF associated weakness: Neg      Symptom course: stable  Patient-reported weight (lb): 183.4  Weight trend: fluctuating minimally  Salt intake watch compared to last visit: worse      ,   General Assessment    Do you have any symptoms that are causing concern?: No          Medications Reviewed:   Patient denies any changes with medications and reports taking all medications as prescribed.    Advance Care Planning:   Not reviewed during this call     Care Planning:   Education Documentation  Educate physical activity cessation, taught by Kelly Escobar RN at 12/4/2024  4:07 PM.  Learner: Patient  Readiness: Acceptance  Method: Explanation, Teachback  Response: Verbalizes Understanding    Diet, taught by Kelly Escobar RN at 12/4/2024  4:07 PM.  Learner: Patient  Readiness: Acceptance  Method: Explanation, Teachback  Response: Verbalizes Understanding    Identify Meds, Dose, and Schedule, taught by Kelly Escobar RN at 12/4/2024  4:07 PM.  Learner: Patient  Readiness: Acceptance  Method: Explanation, Teachback  Response: Verbalizes Understanding    General medication information, taught by Kelly Escobar RN at 12/4/2024  4:07 PM.  Learner: Patient  Readiness: Acceptance  Method: Explanation, Teachback  Response: Verbalizes Understanding    Diuretics, taught by Kelly Escobar RN at 12/4/2024  4:07 PM.  Learner: Patient  Readiness: Acceptance  Method: Explanation, Teachback  Response: Verbalizes Understanding    WHEN TO CALL THE DOCTOR CHF, taught by Kelly Escobar RN at 12/4/2024  4:07 PM.  Learner: Patient  Readiness: Acceptance  Method: Explanation, Teachback  Response: Verbalizes Understanding    Monitoring Weight, taught by Kelly Escobar RN at 12/4/2024  4:07

## 2024-12-05 ENCOUNTER — CARE COORDINATION (OUTPATIENT)
Dept: CASE MANAGEMENT | Age: 69
End: 2024-12-05

## 2024-12-07 ENCOUNTER — APPOINTMENT (OUTPATIENT)
Dept: GENERAL RADIOLOGY | Age: 69
DRG: 291 | End: 2024-12-07
Payer: COMMERCIAL

## 2024-12-07 ENCOUNTER — APPOINTMENT (OUTPATIENT)
Age: 69
DRG: 291 | End: 2024-12-07
Payer: COMMERCIAL

## 2024-12-07 ENCOUNTER — HOSPITAL ENCOUNTER (INPATIENT)
Age: 69
LOS: 2 days | Discharge: HOME HEALTH | DRG: 291 | End: 2024-12-09
Attending: EMERGENCY MEDICINE | Admitting: INTERNAL MEDICINE
Payer: COMMERCIAL

## 2024-12-07 DIAGNOSIS — I50.23 ACUTE ON CHRONIC SYSTOLIC HEART FAILURE (HCC): ICD-10-CM

## 2024-12-07 DIAGNOSIS — I50.9 ACUTE ON CHRONIC HEART FAILURE, UNSPECIFIED HEART FAILURE TYPE (HCC): Primary | ICD-10-CM

## 2024-12-07 DIAGNOSIS — N18.31 STAGE 3A CHRONIC KIDNEY DISEASE (HCC): ICD-10-CM

## 2024-12-07 LAB
ALBUMIN SERPL-MCNC: 4.3 G/DL (ref 3.5–5.2)
ALBUMIN/GLOB SERPL: 1.1 {RATIO} (ref 1–2.5)
ALP SERPL-CCNC: 110 U/L (ref 40–129)
ALT SERPL-CCNC: 35 U/L (ref 10–50)
ANION GAP SERPL CALCULATED.3IONS-SCNC: 11 MMOL/L (ref 9–16)
AST SERPL-CCNC: 56 U/L (ref 10–50)
BASOPHILS # BLD: 0.06 K/UL (ref 0–0.2)
BASOPHILS NFR BLD: 1 % (ref 0–2)
BILIRUB SERPL-MCNC: 0.3 MG/DL (ref 0–1.2)
BNP SERPL-MCNC: 9208 PG/ML (ref 0–125)
BODY TEMPERATURE: 37
BUN SERPL-MCNC: 30 MG/DL (ref 8–23)
CALCIUM SERPL-MCNC: 9 MG/DL (ref 8.6–10.4)
CHLORIDE SERPL-SCNC: 100 MMOL/L (ref 98–107)
CO2 SERPL-SCNC: 26 MMOL/L (ref 20–31)
COHGB MFR BLD: 1.3 % (ref 0–5)
CREAT SERPL-MCNC: 1.8 MG/DL (ref 0.7–1.2)
EOSINOPHIL # BLD: 0.24 K/UL (ref 0–0.44)
EOSINOPHILS RELATIVE PERCENT: 4 % (ref 1–4)
ERYTHROCYTE [DISTWIDTH] IN BLOOD BY AUTOMATED COUNT: 16.7 % (ref 11.8–14.4)
FIO2 ON VENT: ABNORMAL %
GFR, ESTIMATED: 40 ML/MIN/1.73M2
GLUCOSE BLD-MCNC: 104 MG/DL (ref 75–110)
GLUCOSE SERPL-MCNC: 173 MG/DL (ref 74–99)
HCO3 VENOUS: 27.2 MMOL/L (ref 24–30)
HCT VFR BLD AUTO: 40.1 % (ref 40.7–50.3)
HGB BLD-MCNC: 12.1 G/DL (ref 13–17)
IMM GRANULOCYTES # BLD AUTO: <0.03 K/UL (ref 0–0.3)
IMM GRANULOCYTES NFR BLD: 0 %
LYMPHOCYTES NFR BLD: 1.5 K/UL (ref 1.1–3.7)
LYMPHOCYTES RELATIVE PERCENT: 27 % (ref 24–43)
MCH RBC QN AUTO: 28 PG (ref 25.2–33.5)
MCHC RBC AUTO-ENTMCNC: 30.2 G/DL (ref 28.4–34.8)
MCV RBC AUTO: 92.8 FL (ref 82.6–102.9)
MONOCYTES NFR BLD: 0.5 K/UL (ref 0.1–1.2)
MONOCYTES NFR BLD: 9 % (ref 3–12)
NEUTROPHILS NFR BLD: 59 % (ref 36–65)
NEUTS SEG NFR BLD: 3.27 K/UL (ref 1.5–8.1)
NRBC BLD-RTO: 0 PER 100 WBC
O2 SAT, VEN: 47.3 % (ref 60–85)
PCO2 VENOUS: 56 MM HG (ref 39–55)
PH VENOUS: 7.31 (ref 7.32–7.42)
PLATELET # BLD AUTO: 202 K/UL (ref 138–453)
PMV BLD AUTO: 12.4 FL (ref 8.1–13.5)
PO2 VENOUS: 29.6 MM HG (ref 30–50)
POSITIVE BASE EXCESS, VEN: 0.6 MMOL/L (ref 0–2)
POTASSIUM SERPL-SCNC: 4.4 MMOL/L (ref 3.7–5.3)
PROT SERPL-MCNC: 8.1 G/DL (ref 6.6–8.7)
RBC # BLD AUTO: 4.32 M/UL (ref 4.21–5.77)
RBC # BLD: ABNORMAL 10*6/UL
SODIUM SERPL-SCNC: 137 MMOL/L (ref 136–145)
TROPONIN I SERPL HS-MCNC: 33 NG/L (ref 0–22)
TROPONIN I SERPL HS-MCNC: 33 NG/L (ref 0–22)
WBC OTHER # BLD: 5.6 K/UL (ref 3.5–11.3)

## 2024-12-07 PROCEDURE — C8929 TTE W OR WO FOL WCON,DOPPLER: HCPCS

## 2024-12-07 PROCEDURE — 84484 ASSAY OF TROPONIN QUANT: CPT

## 2024-12-07 PROCEDURE — 85025 COMPLETE CBC W/AUTO DIFF WBC: CPT

## 2024-12-07 PROCEDURE — 94761 N-INVAS EAR/PLS OXIMETRY MLT: CPT

## 2024-12-07 PROCEDURE — 36415 COLL VENOUS BLD VENIPUNCTURE: CPT

## 2024-12-07 PROCEDURE — 96374 THER/PROPH/DIAG INJ IV PUSH: CPT

## 2024-12-07 PROCEDURE — 80053 COMPREHEN METABOLIC PANEL: CPT

## 2024-12-07 PROCEDURE — 6360000002 HC RX W HCPCS: Performed by: INTERNAL MEDICINE

## 2024-12-07 PROCEDURE — 83880 ASSAY OF NATRIURETIC PEPTIDE: CPT

## 2024-12-07 PROCEDURE — 6370000000 HC RX 637 (ALT 250 FOR IP)

## 2024-12-07 PROCEDURE — 82947 ASSAY GLUCOSE BLOOD QUANT: CPT

## 2024-12-07 PROCEDURE — 82805 BLOOD GASES W/O2 SATURATION: CPT

## 2024-12-07 PROCEDURE — 93306 TTE W/DOPPLER COMPLETE: CPT | Performed by: INTERNAL MEDICINE

## 2024-12-07 PROCEDURE — 2700000000 HC OXYGEN THERAPY PER DAY

## 2024-12-07 PROCEDURE — 2060000000 HC ICU INTERMEDIATE R&B

## 2024-12-07 PROCEDURE — 2580000003 HC RX 258

## 2024-12-07 PROCEDURE — 99285 EMERGENCY DEPT VISIT HI MDM: CPT

## 2024-12-07 PROCEDURE — 94660 CPAP INITIATION&MGMT: CPT

## 2024-12-07 PROCEDURE — 71046 X-RAY EXAM CHEST 2 VIEWS: CPT

## 2024-12-07 PROCEDURE — 6360000002 HC RX W HCPCS

## 2024-12-07 PROCEDURE — 99223 1ST HOSP IP/OBS HIGH 75: CPT | Performed by: INTERNAL MEDICINE

## 2024-12-07 PROCEDURE — 93005 ELECTROCARDIOGRAM TRACING: CPT

## 2024-12-07 RX ORDER — SODIUM CHLORIDE 0.9 % (FLUSH) 0.9 %
5-40 SYRINGE (ML) INJECTION EVERY 12 HOURS SCHEDULED
Status: DISCONTINUED | OUTPATIENT
Start: 2024-12-07 | End: 2024-12-09 | Stop reason: HOSPADM

## 2024-12-07 RX ORDER — ISOSORBIDE MONONITRATE 30 MG/1
30 TABLET, EXTENDED RELEASE ORAL DAILY
Status: DISCONTINUED | OUTPATIENT
Start: 2024-12-07 | End: 2024-12-09 | Stop reason: HOSPADM

## 2024-12-07 RX ORDER — SODIUM CHLORIDE 9 MG/ML
INJECTION, SOLUTION INTRAVENOUS PRN
Status: DISCONTINUED | OUTPATIENT
Start: 2024-12-07 | End: 2024-12-09 | Stop reason: HOSPADM

## 2024-12-07 RX ORDER — INSULIN LISPRO 100 [IU]/ML
0-8 INJECTION, SOLUTION INTRAVENOUS; SUBCUTANEOUS
Status: DISCONTINUED | OUTPATIENT
Start: 2024-12-07 | End: 2024-12-09 | Stop reason: HOSPADM

## 2024-12-07 RX ORDER — ACETAMINOPHEN 325 MG/1
650 TABLET ORAL EVERY 6 HOURS PRN
Status: DISCONTINUED | OUTPATIENT
Start: 2024-12-07 | End: 2024-12-09 | Stop reason: HOSPADM

## 2024-12-07 RX ORDER — MAGNESIUM SULFATE IN WATER 40 MG/ML
2000 INJECTION, SOLUTION INTRAVENOUS PRN
Status: DISCONTINUED | OUTPATIENT
Start: 2024-12-07 | End: 2024-12-09 | Stop reason: HOSPADM

## 2024-12-07 RX ORDER — DEXTROSE MONOHYDRATE 100 MG/ML
INJECTION, SOLUTION INTRAVENOUS CONTINUOUS PRN
Status: DISCONTINUED | OUTPATIENT
Start: 2024-12-07 | End: 2024-12-09 | Stop reason: HOSPADM

## 2024-12-07 RX ORDER — FUROSEMIDE 10 MG/ML
40 INJECTION INTRAMUSCULAR; INTRAVENOUS ONCE
Status: COMPLETED | OUTPATIENT
Start: 2024-12-07 | End: 2024-12-07

## 2024-12-07 RX ORDER — AMIODARONE HYDROCHLORIDE 200 MG/1
200 TABLET ORAL DAILY
Status: DISCONTINUED | OUTPATIENT
Start: 2024-12-07 | End: 2024-12-09 | Stop reason: HOSPADM

## 2024-12-07 RX ORDER — POTASSIUM CHLORIDE 1500 MG/1
40 TABLET, EXTENDED RELEASE ORAL PRN
Status: DISCONTINUED | OUTPATIENT
Start: 2024-12-07 | End: 2024-12-09 | Stop reason: HOSPADM

## 2024-12-07 RX ORDER — ACETAMINOPHEN 650 MG/1
650 SUPPOSITORY RECTAL EVERY 6 HOURS PRN
Status: DISCONTINUED | OUTPATIENT
Start: 2024-12-07 | End: 2024-12-09 | Stop reason: HOSPADM

## 2024-12-07 RX ORDER — ONDANSETRON 2 MG/ML
4 INJECTION INTRAMUSCULAR; INTRAVENOUS EVERY 6 HOURS PRN
Status: DISCONTINUED | OUTPATIENT
Start: 2024-12-07 | End: 2024-12-07

## 2024-12-07 RX ORDER — MIDODRINE HYDROCHLORIDE 5 MG/1
5 TABLET ORAL 2 TIMES DAILY PRN
Status: DISCONTINUED | OUTPATIENT
Start: 2024-12-07 | End: 2024-12-09 | Stop reason: HOSPADM

## 2024-12-07 RX ORDER — LEVOTHYROXINE SODIUM 88 UG/1
88 TABLET ORAL DAILY
Status: DISCONTINUED | OUTPATIENT
Start: 2024-12-07 | End: 2024-12-09 | Stop reason: HOSPADM

## 2024-12-07 RX ORDER — FUROSEMIDE 10 MG/ML
20 INJECTION INTRAMUSCULAR; INTRAVENOUS ONCE
Status: DISCONTINUED | OUTPATIENT
Start: 2024-12-07 | End: 2024-12-07

## 2024-12-07 RX ORDER — FUROSEMIDE 10 MG/ML
40 INJECTION INTRAMUSCULAR; INTRAVENOUS 2 TIMES DAILY
Status: DISCONTINUED | OUTPATIENT
Start: 2024-12-08 | End: 2024-12-08

## 2024-12-07 RX ORDER — POLYETHYLENE GLYCOL 3350 17 G/17G
17 POWDER, FOR SOLUTION ORAL DAILY PRN
Status: DISCONTINUED | OUTPATIENT
Start: 2024-12-07 | End: 2024-12-09 | Stop reason: HOSPADM

## 2024-12-07 RX ORDER — ATORVASTATIN CALCIUM 80 MG/1
80 TABLET, FILM COATED ORAL DAILY
Status: DISCONTINUED | OUTPATIENT
Start: 2024-12-07 | End: 2024-12-09 | Stop reason: HOSPADM

## 2024-12-07 RX ORDER — HEPARIN SODIUM 5000 [USP'U]/ML
5000 INJECTION, SOLUTION INTRAVENOUS; SUBCUTANEOUS EVERY 8 HOURS SCHEDULED
Status: DISCONTINUED | OUTPATIENT
Start: 2024-12-07 | End: 2024-12-07

## 2024-12-07 RX ORDER — ALBUTEROL SULFATE 0.83 MG/ML
2.5 SOLUTION RESPIRATORY (INHALATION)
Status: DISCONTINUED | OUTPATIENT
Start: 2024-12-07 | End: 2024-12-07

## 2024-12-07 RX ORDER — ONDANSETRON 4 MG/1
4 TABLET, ORALLY DISINTEGRATING ORAL EVERY 8 HOURS PRN
Status: DISCONTINUED | OUTPATIENT
Start: 2024-12-07 | End: 2024-12-07

## 2024-12-07 RX ORDER — SODIUM CHLORIDE 0.9 % (FLUSH) 0.9 %
5-40 SYRINGE (ML) INJECTION PRN
Status: DISCONTINUED | OUTPATIENT
Start: 2024-12-07 | End: 2024-12-09 | Stop reason: HOSPADM

## 2024-12-07 RX ORDER — FUROSEMIDE 10 MG/ML
20 INJECTION INTRAMUSCULAR; INTRAVENOUS 2 TIMES DAILY
Status: DISCONTINUED | OUTPATIENT
Start: 2024-12-08 | End: 2024-12-07

## 2024-12-07 RX ORDER — POTASSIUM CHLORIDE 7.45 MG/ML
10 INJECTION INTRAVENOUS PRN
Status: DISCONTINUED | OUTPATIENT
Start: 2024-12-07 | End: 2024-12-09 | Stop reason: HOSPADM

## 2024-12-07 RX ORDER — GLUCAGON 1 MG/ML
1 KIT INJECTION PRN
Status: DISCONTINUED | OUTPATIENT
Start: 2024-12-07 | End: 2024-12-09 | Stop reason: HOSPADM

## 2024-12-07 RX ADMIN — FUROSEMIDE 40 MG: 10 INJECTION, SOLUTION INTRAMUSCULAR; INTRAVENOUS at 13:37

## 2024-12-07 RX ADMIN — PERFLUTREN 1.5 ML: 6.52 INJECTION, SUSPENSION INTRAVENOUS at 15:57

## 2024-12-07 RX ADMIN — SODIUM CHLORIDE, PRESERVATIVE FREE 10 ML: 5 INJECTION INTRAVENOUS at 13:38

## 2024-12-07 RX ADMIN — APIXABAN 5 MG: 5 TABLET, FILM COATED ORAL at 20:24

## 2024-12-07 RX ADMIN — FUROSEMIDE 40 MG: 10 INJECTION, SOLUTION INTRAMUSCULAR; INTRAVENOUS at 10:45

## 2024-12-07 RX ADMIN — TICAGRELOR 90 MG: 90 TABLET ORAL at 20:24

## 2024-12-07 RX ADMIN — SODIUM CHLORIDE, PRESERVATIVE FREE 10 ML: 5 INJECTION INTRAVENOUS at 20:24

## 2024-12-07 ASSESSMENT — PAIN - FUNCTIONAL ASSESSMENT: PAIN_FUNCTIONAL_ASSESSMENT: NONE - DENIES PAIN

## 2024-12-07 NOTE — ED TRIAGE NOTES
Pr arrived to ED from triage after an 8lb weight gin over the last 3 days. Pt states he does have CHF and does follow with cardiology an reports taking his medication as prescribed.   VSS, RR equal and non labored, NAD, pt is alert and oriented x4    Call light within reach, pt changed into gown, EKG obtained and pt placed on cardiac monitoring. IV line started and labs drawn      Following plan of care

## 2024-12-07 NOTE — ED PROVIDER NOTES
[facility]  Emergency Department Encounter  Emergency Medicine Resident     Pt Name:Claudio Graham  MRN: [id number]  Birthdate 1955  Date of evaluation: 12/7/24  PCP:  Vanessa Mckenna APRN - CNP  Note Started: 4:09 PM EST      CHIEF COMPLAINT       Chief Complaint   Patient presents with    Weight Gain       HISTORY OF PRESENT ILLNESS  (Location/Symptom, Timing/Onset, Context/Setting, Quality, Duration, Modifying Factors, Severity.)      Claudio Graham is a 69 y.o. male who presents with weight gain of 8+ pounds and shortness of breath.  Patient does have a history of heart failure with reduced EF of 10 to 15%.  Patient additionally has a history of CKD.  Patient states that he has been taking his Lasix as prescribed denies any increased salt consumption.  Patient did have have a 16 ounce protein shake several days ago followed by increase intake of water.  Patient is on 1800 restricted fluid intake on a daily basis.  PAST MEDICAL / SURGICAL / SOCIAL / FAMILY HISTORY      has a past medical history of Acute HFrEF (heart failure with reduced ejection fraction) ([facility]), Acute on chronic combined systolic and diastolic congestive heart failure ([facility]), Acute on chronic congestive heart failure ([facility]), Acute respiratory failure with hypoxia, Anemia, Anxiety, CAD (coronary artery disease), Cardiac defibrillator in place, Cardiomyopathy ([facility]), CHF (congestive heart failure) ([facility]), Chronic combined systolic and diastolic heart failure ([facility]) s/[ AICD placed, Chronic kidney disease, Complex sleep apnea syndrome, Diabetic retinopathy ([facility]), Diverticulitis, DJD (degenerative joint disease), Edentulous, Gout, HTN (hypertension), Hyperlipidemia, Hypertension, Iron deficiency anemia, Ischemic cardiomyopathy, Morbid obesity, Obesity, TANNER variably compliant with BiPAP, Osteoarthritis, PAF (paroxysmal atrial fibrillation) ([facility]), Psychophysiologic insomnia, Thyroid disease, Type II or unspecified type diabetes  Previously Declined (within the last year)

## 2024-12-07 NOTE — PLAN OF CARE
Problem: Chronic Conditions and Co-morbidities  Goal: Patient's chronic conditions and co-morbidity symptoms are monitored and maintained or improved  Outcome: Progressing  Flowsheets (Taken 12/7/2024 1227)  Care Plan - Patient's Chronic Conditions and Co-Morbidity Symptoms are Monitored and Maintained or Improved:   Monitor and assess patient's chronic conditions and comorbid symptoms for stability, deterioration, or improvement   Collaborate with multidisciplinary team to address chronic and comorbid conditions and prevent exacerbation or deterioration   Update acute care plan with appropriate goals if chronic or comorbid symptoms are exacerbated and prevent overall improvement and discharge     Problem: Discharge Planning  Goal: Discharge to home or other facility with appropriate resources  Outcome: Progressing  Flowsheets (Taken 12/7/2024 1227)  Discharge to home or other facility with appropriate resources: Identify barriers to discharge with patient and caregiver     Problem: Skin/Tissue Integrity  Goal: Absence of new skin breakdown  Description: 1.  Monitor for areas of redness and/or skin breakdown  2.  Assess vascular access sites hourly  3.  Every 4-6 hours minimum:  Change oxygen saturation probe site  4.  Every 4-6 hours:  If on nasal continuous positive airway pressure, respiratory therapy assess nares and determine need for appliance change or resting period.  Outcome: Progressing     Problem: Safety - Adult  Goal: Free from fall injury  Outcome: Progressing     Problem: ABCDS Injury Assessment  Goal: Absence of physical injury  Outcome: Progressing

## 2024-12-08 PROBLEM — I34.0 NONRHEUMATIC MITRAL VALVE REGURGITATION: Status: ACTIVE | Noted: 2024-12-08

## 2024-12-08 LAB
ANION GAP SERPL CALCULATED.3IONS-SCNC: 12 MMOL/L (ref 9–16)
BASOPHILS # BLD: 0.05 K/UL (ref 0–0.2)
BASOPHILS NFR BLD: 1 % (ref 0–2)
BUN SERPL-MCNC: 35 MG/DL (ref 8–23)
CALCIUM SERPL-MCNC: 8.7 MG/DL (ref 8.6–10.4)
CHLORIDE SERPL-SCNC: 104 MMOL/L (ref 98–107)
CO2 SERPL-SCNC: 24 MMOL/L (ref 20–31)
CREAT SERPL-MCNC: 1.9 MG/DL (ref 0.7–1.2)
ECHO AO ROOT DIAM: 3.5 CM
ECHO AO ROOT INDEX: 1.8 CM/M2
ECHO AV AREA PEAK VELOCITY: 2.3 CM2
ECHO AV AREA VTI: 2.5 CM2
ECHO AV AREA/BSA PEAK VELOCITY: 1.2 CM2/M2
ECHO AV AREA/BSA VTI: 1.3 CM2/M2
ECHO AV MEAN GRADIENT: 2 MMHG
ECHO AV MEAN VELOCITY: 0.7 M/S
ECHO AV PEAK GRADIENT: 5 MMHG
ECHO AV PEAK VELOCITY: 1.1 M/S
ECHO AV VELOCITY RATIO: 0.45
ECHO AV VTI: 20.6 CM
ECHO BSA: 2 M2
ECHO EST RA PRESSURE: 15 MMHG
ECHO LA AREA 4C: 26.6 CM2
ECHO LA DIAMETER INDEX: 2.06 CM/M2
ECHO LA DIAMETER: 4 CM
ECHO LA MAJOR AXIS: 5.9 CM
ECHO LA TO AORTIC ROOT RATIO: 1.14
ECHO LA VOL MOD A4C: 101 ML (ref 18–58)
ECHO LA VOLUME INDEX MOD A4C: 52 ML/M2 (ref 16–34)
ECHO LV E' LATERAL VELOCITY: 2.83 CM/S
ECHO LV E' SEPTAL VELOCITY: 4.79 CM/S
ECHO LV EDV A2C: 281 ML
ECHO LV EDV A4C: 287 ML
ECHO LV EDV INDEX A4C: 148 ML/M2
ECHO LV EDV NDEX A2C: 145 ML/M2
ECHO LV EJECTION FRACTION A2C: 12 %
ECHO LV EJECTION FRACTION A4C: 19 %
ECHO LV EJECTION FRACTION BIPLANE: 15 % (ref 55–100)
ECHO LV ESV A2C: 248 ML
ECHO LV ESV A4C: 233 ML
ECHO LV ESV INDEX A2C: 128 ML/M2
ECHO LV ESV INDEX A4C: 120 ML/M2
ECHO LV FRACTIONAL SHORTENING: 5 % (ref 28–44)
ECHO LV INTERNAL DIMENSION DIASTOLE INDEX: 3.81 CM/M2
ECHO LV INTERNAL DIMENSION DIASTOLIC: 7.4 CM (ref 4.2–5.9)
ECHO LV INTERNAL DIMENSION SYSTOLIC INDEX: 3.61 CM/M2
ECHO LV INTERNAL DIMENSION SYSTOLIC: 7 CM
ECHO LV IVSD: 0.7 CM (ref 0.6–1)
ECHO LV MASS 2D: 230.4 G (ref 88–224)
ECHO LV MASS INDEX 2D: 118.8 G/M2 (ref 49–115)
ECHO LV POSTERIOR WALL DIASTOLIC: 0.7 CM (ref 0.6–1)
ECHO LV RELATIVE WALL THICKNESS RATIO: 0.19
ECHO LVOT AREA: 5.3 CM2
ECHO LVOT AV VTI INDEX: 0.47
ECHO LVOT DIAM: 2.6 CM
ECHO LVOT MEAN GRADIENT: 0 MMHG
ECHO LVOT PEAK GRADIENT: 1 MMHG
ECHO LVOT PEAK VELOCITY: 0.5 M/S
ECHO LVOT STROKE VOLUME INDEX: 26.3 ML/M2
ECHO LVOT SV: 50.9 ML
ECHO LVOT VTI: 9.6 CM
ECHO MV A VELOCITY: 0.88 M/S
ECHO MV AREA VTI: 1.9 CM2
ECHO MV E DECELERATION TIME (DT): 169 MS
ECHO MV E VELOCITY: 0.73 M/S
ECHO MV E/A RATIO: 0.83
ECHO MV E/E' LATERAL: 25.8
ECHO MV E/E' RATIO (AVERAGED): 20.52
ECHO MV E/E' SEPTAL: 15.24
ECHO MV LVOT VTI INDEX: 2.86
ECHO MV MAX VELOCITY: 0.9 M/S
ECHO MV MEAN GRADIENT: 2 MMHG
ECHO MV MEAN VELOCITY: 0.6 M/S
ECHO MV PEAK GRADIENT: 3 MMHG
ECHO MV REGURGITANT ALIASING (NYQUIST) VELOCITY: 31 CM/S
ECHO MV REGURGITANT PEAK GRADIENT: 112 MMHG
ECHO MV REGURGITANT PEAK VELOCITY: 5.3 M/S
ECHO MV REGURGITANT RADIUS PISA: 0.9 CM
ECHO MV REGURGITANT VTIA: 205 CM
ECHO MV VTI: 27.5 CM
ECHO PV MAX VELOCITY: 0.7 M/S
ECHO PV PEAK GRADIENT: 2 MMHG
ECHO RIGHT VENTRICULAR SYSTOLIC PRESSURE (RVSP): 58 MMHG
ECHO RV BASAL DIMENSION: 5.2 CM
ECHO RV FREE WALL PEAK S': 9.9 CM/S
ECHO RV TAPSE: 1.5 CM (ref 1.7–?)
ECHO TV REGURGITANT MAX VELOCITY: 3.28 M/S
ECHO TV REGURGITANT PEAK GRADIENT: 43 MMHG
EOSINOPHIL # BLD: 0.13 K/UL (ref 0–0.44)
EOSINOPHILS RELATIVE PERCENT: 2 % (ref 1–4)
ERYTHROCYTE [DISTWIDTH] IN BLOOD BY AUTOMATED COUNT: 16.8 % (ref 11.8–14.4)
GFR, ESTIMATED: 37 ML/MIN/1.73M2
GLUCOSE BLD-MCNC: 103 MG/DL (ref 75–110)
GLUCOSE BLD-MCNC: 141 MG/DL (ref 75–110)
GLUCOSE BLD-MCNC: 165 MG/DL (ref 75–110)
GLUCOSE BLD-MCNC: 84 MG/DL (ref 75–110)
GLUCOSE SERPL-MCNC: 97 MG/DL (ref 74–99)
HCT VFR BLD AUTO: 35.3 % (ref 40.7–50.3)
HGB BLD-MCNC: 10.4 G/DL (ref 13–17)
IMM GRANULOCYTES # BLD AUTO: <0.03 K/UL (ref 0–0.3)
IMM GRANULOCYTES NFR BLD: 0 %
LYMPHOCYTES NFR BLD: 0.8 K/UL (ref 1.1–3.7)
LYMPHOCYTES RELATIVE PERCENT: 14 % (ref 24–43)
MCH RBC QN AUTO: 27.7 PG (ref 25.2–33.5)
MCHC RBC AUTO-ENTMCNC: 29.5 G/DL (ref 28.4–34.8)
MCV RBC AUTO: 94.1 FL (ref 82.6–102.9)
MONOCYTES NFR BLD: 0.49 K/UL (ref 0.1–1.2)
MONOCYTES NFR BLD: 8 % (ref 3–12)
NEUTROPHILS NFR BLD: 75 % (ref 36–65)
NEUTS SEG NFR BLD: 4.4 K/UL (ref 1.5–8.1)
NRBC BLD-RTO: 0 PER 100 WBC
PLATELET # BLD AUTO: 158 K/UL (ref 138–453)
PMV BLD AUTO: 12.1 FL (ref 8.1–13.5)
POTASSIUM SERPL-SCNC: 4.1 MMOL/L (ref 3.7–5.3)
RBC # BLD AUTO: 3.75 M/UL (ref 4.21–5.77)
RBC # BLD: ABNORMAL 10*6/UL
SODIUM SERPL-SCNC: 140 MMOL/L (ref 136–145)
WBC OTHER # BLD: 5.9 K/UL (ref 3.5–11.3)

## 2024-12-08 PROCEDURE — 99232 SBSQ HOSP IP/OBS MODERATE 35: CPT | Performed by: INTERNAL MEDICINE

## 2024-12-08 PROCEDURE — 36415 COLL VENOUS BLD VENIPUNCTURE: CPT

## 2024-12-08 PROCEDURE — 94761 N-INVAS EAR/PLS OXIMETRY MLT: CPT

## 2024-12-08 PROCEDURE — 97110 THERAPEUTIC EXERCISES: CPT

## 2024-12-08 PROCEDURE — 97166 OT EVAL MOD COMPLEX 45 MIN: CPT

## 2024-12-08 PROCEDURE — 2700000000 HC OXYGEN THERAPY PER DAY

## 2024-12-08 PROCEDURE — 94660 CPAP INITIATION&MGMT: CPT

## 2024-12-08 PROCEDURE — 85025 COMPLETE CBC W/AUTO DIFF WBC: CPT

## 2024-12-08 PROCEDURE — 6360000002 HC RX W HCPCS

## 2024-12-08 PROCEDURE — 2060000000 HC ICU INTERMEDIATE R&B

## 2024-12-08 PROCEDURE — 97535 SELF CARE MNGMENT TRAINING: CPT

## 2024-12-08 PROCEDURE — 80048 BASIC METABOLIC PNL TOTAL CA: CPT

## 2024-12-08 PROCEDURE — 2580000003 HC RX 258

## 2024-12-08 PROCEDURE — 82947 ASSAY GLUCOSE BLOOD QUANT: CPT

## 2024-12-08 PROCEDURE — 99222 1ST HOSP IP/OBS MODERATE 55: CPT | Performed by: INTERNAL MEDICINE

## 2024-12-08 PROCEDURE — 6370000000 HC RX 637 (ALT 250 FOR IP)

## 2024-12-08 RX ORDER — FUROSEMIDE 10 MG/ML
40 INJECTION INTRAMUSCULAR; INTRAVENOUS DAILY
Status: DISCONTINUED | OUTPATIENT
Start: 2024-12-08 | End: 2024-12-08

## 2024-12-08 RX ORDER — FUROSEMIDE 10 MG/ML
40 INJECTION INTRAMUSCULAR; INTRAVENOUS DAILY
Status: DISCONTINUED | OUTPATIENT
Start: 2024-12-09 | End: 2024-12-09

## 2024-12-08 RX ADMIN — ATORVASTATIN CALCIUM 80 MG: 80 TABLET, FILM COATED ORAL at 08:46

## 2024-12-08 RX ADMIN — TICAGRELOR 90 MG: 90 TABLET ORAL at 20:16

## 2024-12-08 RX ADMIN — SODIUM CHLORIDE, PRESERVATIVE FREE 10 ML: 5 INJECTION INTRAVENOUS at 20:16

## 2024-12-08 RX ADMIN — LEVOTHYROXINE SODIUM 88 MCG: 88 TABLET ORAL at 08:45

## 2024-12-08 RX ADMIN — FUROSEMIDE 40 MG: 10 INJECTION, SOLUTION INTRAMUSCULAR; INTRAVENOUS at 08:46

## 2024-12-08 RX ADMIN — APIXABAN 5 MG: 5 TABLET, FILM COATED ORAL at 20:16

## 2024-12-08 RX ADMIN — TICAGRELOR 90 MG: 90 TABLET ORAL at 08:45

## 2024-12-08 RX ADMIN — SODIUM CHLORIDE, PRESERVATIVE FREE 10 ML: 5 INJECTION INTRAVENOUS at 08:46

## 2024-12-08 RX ADMIN — APIXABAN 5 MG: 5 TABLET, FILM COATED ORAL at 08:46

## 2024-12-08 NOTE — CONSULTS
MRN:                           [id number]  YOB: 1955    CARDIAC CATHETERIZATION    Operators:  Primary: Satinder Siddiqui MD.  Assistant:    Indications for cath: Persistent ventricular tachycardia    Procedure performed: Cardiac cath.    Access: Right Femoral artery    Procedure: After informed consent was obtained with explanation of the risks and benefits, patient was brought to the cath lab. The right groin were prepped and draped in sterile fashion. 1% lidocaine was used for local block. The Femoral artery was cannulated with 6  Fr sheath with brisk arterial blood return. The side port was frequently flushed and aspirated with normal saline.    Dominance is right    EBL is 15 mL    Findings:    Left main: Normal with 0% stenosis    LAD: Acute ostial occlusion 100%, length is 18 mm, THIAGO 0 flow, underwent PCI with TIFFANY using 3.5 x 22 mm Lupillo stent with 0% residual stenosis and restoration of THIAGO-3 flow    LCX: Luminal parities of 20 to 30%    RCA: Luminal irregularities of 20 to 30%    The LV gram was performed in the FARIAS 30 position.  LVEF: 10%. LV Wall Motion: Severe global hypokinesis    Conclusions:  Acute occlusion of ostial LAD  Successful PCI with TIFFANY to ostial LAD with restoration of THIAGO-3 flow  Severely reduced LV systolic function  Persistent ventricular tachycardia    Recommendation:  Routine post PCI/MI orders  Medical treatments.  Risk factors modifications.  Patient was loaded with amiodarone in addition to lidocaine and another shock was attempted after completion of the procedure but patient remains in persistent ventricular tachycardia  Intra-aortic balloon pump was inserted via right femoral approach  Right femoral central line was inserted      LABS:   CBC:   Recent Labs     12/07/24  0910 12/08/24  0525   WBC 5.6 5.9   HGB 12.1* 10.4*   HCT 40.1* 35.3*    158     BMP:   Recent Labs     12/07/24  0910 12/08/24  0525    140   K 4.4 4.1   CO2 26 24   BUN

## 2024-12-08 NOTE — CARE COORDINATION
Case Management Assessment  Initial Evaluation    Date/Time of Evaluation: 12/8/2024 11:18 AM  Assessment Completed by: Nanci Dior RN    If patient is discharged prior to next notation, then this note serves as note for discharge by case management.    Patient Name: Claudio Graham                   YOB: 1955  Diagnosis: Acute on chronic systolic heart failure (HCC) [I50.23]  Acute on chronic heart failure, unspecified heart failure type (HCC) [I50.9]                   Date / Time: 12/7/2024  8:41 AM    Patient Admission Status: Inpatient   Readmission Risk (Low < 19, Mod (19-27), High > 27): Readmission Risk Score: 22.6    Current PCP: Vanessa Mckenna APRN - CNP  PCP verified by CM? (P) Yes    Chart Reviewed: Yes      History Provided by: Patient  Patient Orientation: Alert and Oriented    Patient Cognition: Alert    Hospitalization in the last 30 days (Readmission):  No    If yes, Readmission Assessment in CM Navigator will be completed.    Advance Directives:      Code Status: Full Code   Patient's Primary Decision Maker is: (P) Named in Scanned ACP Document    Primary Decision Maker (Active): Cathy Ingram - Other Relative - [phone]    Discharge Planning:    Patient lives with: (P) Alone Type of Home: (P) House  Primary Care Giver: Self  Patient Support Systems include: Family Members   Current Financial resources: (P) Medicare  Current community resources: (P) ECF/Home Care  Current services prior to admission: (P) Home Care, Durable Medical Equipment            Current DME: (P) Cane, Walker, Shower Chair            Type of Home Care services:  (P) Nursing Services, OT, PT    ADLS  Prior functional level: (P) Independent in ADLs/IADLs  Current functional level: (P) Independent in ADLs/IADLs    PT AM-PAC:   /24  OT AM-PAC:   /24    Family can provide assistance at DC: (P) Yes  Would you like Case Management to discuss the discharge plan with any other family members/significant

## 2024-12-08 NOTE — PLAN OF CARE
Problem: Chronic Conditions and Co-morbidities  Goal: Patient's chronic conditions and co-morbidity symptoms are monitored and maintained or improved  12/7/2024 2152 by Jami Maharaj RN  Outcome: Progressing  12/7/2024 1755 by Jillian Leos RN  Outcome: Progressing  Flowsheets (Taken 12/7/2024 1227)  Care Plan - Patient's Chronic Conditions and Co-Morbidity Symptoms are Monitored and Maintained or Improved:   Monitor and assess patient's chronic conditions and comorbid symptoms for stability, deterioration, or improvement   Collaborate with multidisciplinary team to address chronic and comorbid conditions and prevent exacerbation or deterioration   Update acute care plan with appropriate goals if chronic or comorbid symptoms are exacerbated and prevent overall improvement and discharge     Problem: Discharge Planning  Goal: Discharge to home or other facility with appropriate resources  12/7/2024 2152 by Jami Maharaj RN  Outcome: Progressing  12/7/2024 1755 by Jillian Leos RN  Outcome: Progressing  Flowsheets (Taken 12/7/2024 1227)  Discharge to home or other facility with appropriate resources: Identify barriers to discharge with patient and caregiver     Problem: Skin/Tissue Integrity  Goal: Absence of new skin breakdown  Description: 1.  Monitor for areas of redness and/or skin breakdown  2.  Assess vascular access sites hourly  3.  Every 4-6 hours minimum:  Change oxygen saturation probe site  4.  Every 4-6 hours:  If on nasal continuous positive airway pressure, respiratory therapy assess nares and determine need for appliance change or resting period.  12/7/2024 2152 by Jami Maharaj RN  Outcome: Progressing  12/7/2024 1755 by Jillian Leos RN  Outcome: Progressing     Problem: Safety - Adult  Goal: Free from fall injury  12/7/2024 2152 by Jami Maharaj RN  Outcome: Progressing  12/7/2024 1755 by Jillian Leos RN  Outcome: Progressing     Problem: ABCDS Injury Assessment  Goal: Absence

## 2024-12-08 NOTE — H&P
[facility]     Department of Internal Medicine - Staff Internal Medicine Teaching Service          ADMISSION NOTE/HISTORY AND PHYSICAL EXAMINATION   Date: 12/7/2024  Patient Name: Claudio Graham  Date of admission: 12/7/2024  8:41 AM  YOB: 1955  PCP: Vanessa Mckenna APRN - CNP  History Obtained From:  patient, electronic medical record    CHIEF COMPLAINT     Chief complaint: Weight gain, shortness of breath    HISTORY OF PRESENTING ILLNESS     The patient is a 69 y.o. male with PMH HFrEF (EF 10-15% 7/2024), CAD s/p PCI to LAD 7/2024, HTN, HLD, T2DM, CKD3a, paroxysmal A-fib, hypothyroidism presenting with 8 pound weight gain in 3 days and shortness of breath.  Patient states that he has gained approximately 17 pounds in the last 5 weeks, 8 pounds in the last 3 days.  He has associated shortness of breath.  He endorses orthopnea.  Patient states he has been compliant with all of his medications, dietary and fluid restriction.  Denies recent fever chills, sick contacts, chest pain, abdominal pain, constipation, diarrhea, lightheadedness, headache.  Denies any recent worsening cough or sputum production.    In ED, patient is afebrile, otherwise vital signs stable.  Labs notable for WBC 5.6, Hb 12.1, platelet 202.  Electrolytes grossly WNL.  BUN 30, creatinine 1.8 (baseline 1.5-1.8).  proBNP 9208.  Troponin 33, repeat 33.   VBG 7.31/56/29.6/27.2.  CXR with cardiomegaly, pulmonary vascular congestion, no consolidation.  Patient will be admitted for management of acute exacerbation of CHF.      Review of Systems:    Review of Systems   Constitutional:  Negative for chills and fever.   HENT:  Negative for sore throat and trouble swallowing.    Eyes:  Negative for visual disturbance.   Respiratory:  Positive for chest tightness and shortness of breath. Negative for cough, wheezing and stridor.    Cardiovascular:  Positive for leg swelling. Negative for chest pain and

## 2024-12-09 ENCOUNTER — CARE COORDINATION (OUTPATIENT)
Dept: CARE COORDINATION | Age: 69
End: 2024-12-09

## 2024-12-09 ENCOUNTER — CARE COORDINATION (OUTPATIENT)
Dept: OTHER | Facility: CLINIC | Age: 69
End: 2024-12-09

## 2024-12-09 VITALS
DIASTOLIC BLOOD PRESSURE: 64 MMHG | WEIGHT: 181.44 LBS | OXYGEN SATURATION: 95 % | BODY MASS INDEX: 30.98 KG/M2 | HEIGHT: 64 IN | HEART RATE: 67 BPM | SYSTOLIC BLOOD PRESSURE: 110 MMHG | RESPIRATION RATE: 18 BRPM | TEMPERATURE: 98.2 F

## 2024-12-09 LAB
ANION GAP SERPL CALCULATED.3IONS-SCNC: 16 MMOL/L (ref 9–16)
BASOPHILS # BLD: 0.05 K/UL (ref 0–0.2)
BASOPHILS NFR BLD: 1 % (ref 0–2)
BUN SERPL-MCNC: 35 MG/DL (ref 8–23)
CALCIUM SERPL-MCNC: 8.2 MG/DL (ref 8.6–10.4)
CHLORIDE SERPL-SCNC: 103 MMOL/L (ref 98–107)
CO2 SERPL-SCNC: 22 MMOL/L (ref 20–31)
CREAT SERPL-MCNC: 1.8 MG/DL (ref 0.7–1.2)
EOSINOPHIL # BLD: 0.17 K/UL (ref 0–0.44)
EOSINOPHILS RELATIVE PERCENT: 3 % (ref 1–4)
ERYTHROCYTE [DISTWIDTH] IN BLOOD BY AUTOMATED COUNT: 16.9 % (ref 11.8–14.4)
GFR, ESTIMATED: 40 ML/MIN/1.73M2
GLUCOSE BLD-MCNC: 87 MG/DL (ref 75–110)
GLUCOSE BLD-MCNC: 92 MG/DL (ref 75–110)
GLUCOSE SERPL-MCNC: 100 MG/DL (ref 74–99)
HCT VFR BLD AUTO: 37.7 % (ref 40.7–50.3)
HGB BLD-MCNC: 11 G/DL (ref 13–17)
IMM GRANULOCYTES # BLD AUTO: <0.03 K/UL (ref 0–0.3)
IMM GRANULOCYTES NFR BLD: 0 %
LYMPHOCYTES NFR BLD: 0.72 K/UL (ref 1.1–3.7)
LYMPHOCYTES RELATIVE PERCENT: 13 % (ref 24–43)
MCH RBC QN AUTO: 28.1 PG (ref 25.2–33.5)
MCHC RBC AUTO-ENTMCNC: 29.2 G/DL (ref 28.4–34.8)
MCV RBC AUTO: 96.2 FL (ref 82.6–102.9)
MONOCYTES NFR BLD: 0.54 K/UL (ref 0.1–1.2)
MONOCYTES NFR BLD: 9 % (ref 3–12)
NEUTROPHILS NFR BLD: 74 % (ref 36–65)
NEUTS SEG NFR BLD: 4.24 K/UL (ref 1.5–8.1)
NRBC BLD-RTO: 0 PER 100 WBC
PLATELET # BLD AUTO: 157 K/UL (ref 138–453)
PMV BLD AUTO: 12 FL (ref 8.1–13.5)
POTASSIUM SERPL-SCNC: 3.6 MMOL/L (ref 3.7–5.3)
RBC # BLD AUTO: 3.92 M/UL (ref 4.21–5.77)
RBC # BLD: ABNORMAL 10*6/UL
SODIUM SERPL-SCNC: 141 MMOL/L (ref 136–145)
WBC OTHER # BLD: 5.7 K/UL (ref 3.5–11.3)

## 2024-12-09 PROCEDURE — 82947 ASSAY GLUCOSE BLOOD QUANT: CPT

## 2024-12-09 PROCEDURE — 6370000000 HC RX 637 (ALT 250 FOR IP)

## 2024-12-09 PROCEDURE — 6360000002 HC RX W HCPCS

## 2024-12-09 PROCEDURE — 2700000000 HC OXYGEN THERAPY PER DAY

## 2024-12-09 PROCEDURE — 36415 COLL VENOUS BLD VENIPUNCTURE: CPT

## 2024-12-09 PROCEDURE — 99232 SBSQ HOSP IP/OBS MODERATE 35: CPT | Performed by: INTERNAL MEDICINE

## 2024-12-09 PROCEDURE — 2580000003 HC RX 258

## 2024-12-09 PROCEDURE — 99233 SBSQ HOSP IP/OBS HIGH 50: CPT | Performed by: NURSE PRACTITIONER

## 2024-12-09 PROCEDURE — 94761 N-INVAS EAR/PLS OXIMETRY MLT: CPT

## 2024-12-09 PROCEDURE — 80048 BASIC METABOLIC PNL TOTAL CA: CPT

## 2024-12-09 PROCEDURE — 85025 COMPLETE CBC W/AUTO DIFF WBC: CPT

## 2024-12-09 PROCEDURE — 94660 CPAP INITIATION&MGMT: CPT

## 2024-12-09 PROCEDURE — 6370000000 HC RX 637 (ALT 250 FOR IP): Performed by: INTERNAL MEDICINE

## 2024-12-09 RX ORDER — FUROSEMIDE 40 MG/1
40 TABLET ORAL 2 TIMES DAILY
Status: DISCONTINUED | OUTPATIENT
Start: 2024-12-09 | End: 2024-12-09 | Stop reason: HOSPADM

## 2024-12-09 RX ORDER — POTASSIUM CHLORIDE 1500 MG/1
40 TABLET, EXTENDED RELEASE ORAL ONCE
Status: COMPLETED | OUTPATIENT
Start: 2024-12-09 | End: 2024-12-09

## 2024-12-09 RX ADMIN — FUROSEMIDE 40 MG: 10 INJECTION, SOLUTION INTRAMUSCULAR; INTRAVENOUS at 08:01

## 2024-12-09 RX ADMIN — SODIUM CHLORIDE, PRESERVATIVE FREE 10 ML: 5 INJECTION INTRAVENOUS at 08:02

## 2024-12-09 RX ADMIN — POTASSIUM CHLORIDE 40 MEQ: 1500 TABLET, EXTENDED RELEASE ORAL at 14:16

## 2024-12-09 RX ADMIN — APIXABAN 5 MG: 5 TABLET, FILM COATED ORAL at 08:01

## 2024-12-09 RX ADMIN — LEVOTHYROXINE SODIUM 88 MCG: 88 TABLET ORAL at 08:03

## 2024-12-09 RX ADMIN — TICAGRELOR 90 MG: 90 TABLET ORAL at 08:01

## 2024-12-09 RX ADMIN — AMIODARONE HYDROCHLORIDE 200 MG: 200 TABLET ORAL at 08:01

## 2024-12-09 RX ADMIN — ATORVASTATIN CALCIUM 80 MG: 80 TABLET, FILM COATED ORAL at 08:01

## 2024-12-09 NOTE — PLAN OF CARE
Problem: Chronic Conditions and Co-morbidities  Goal: Patient's chronic conditions and co-morbidity symptoms are monitored and maintained or improved  12/8/2024 2145 by Janusz Howard RN  Outcome: Progressing  Flowsheets (Taken 12/8/2024 2000)  Care Plan - Patient's Chronic Conditions and Co-Morbidity Symptoms are Monitored and Maintained or Improved: Monitor and assess patient's chronic conditions and comorbid symptoms for stability, deterioration, or improvement  12/8/2024 1702 by Erica Welch RN  Outcome: Progressing     Problem: Discharge Planning  Goal: Discharge to home or other facility with appropriate resources  12/8/2024 2145 by Janusz Howard RN  Outcome: Progressing  Flowsheets (Taken 12/8/2024 2000)  Discharge to home or other facility with appropriate resources: Identify barriers to discharge with patient and caregiver  12/8/2024 1702 by Erica Welch RN  Outcome: Progressing     Problem: Skin/Tissue Integrity  Goal: Absence of new skin breakdown  Description: 1.  Monitor for areas of redness and/or skin breakdown  2.  Assess vascular access sites hourly  3.  Every 4-6 hours minimum:  Change oxygen saturation probe site  4.  Every 4-6 hours:  If on nasal continuous positive airway pressure, respiratory therapy assess nares and determine need for appliance change or resting period.  12/8/2024 2145 by Janusz Howard RN  Outcome: Progressing  12/8/2024 1702 by Erica Welch RN  Outcome: Progressing     Problem: Safety - Adult  Goal: Free from fall injury  12/8/2024 2145 by Janusz Howard RN  Outcome: Progressing  12/8/2024 1702 by Erica Welch RN  Outcome: Progressing     Problem: ABCDS Injury Assessment  Goal: Absence of physical injury  12/8/2024 2145 by Janusz Howard RN  Outcome: Progressing  12/8/2024 1702 by Erica Welch RN  Outcome: Progressing

## 2024-12-09 NOTE — DISCHARGE INSTR - COC
Continuity of Care Form    Patient Name: Claudio Graham   :  1955  MRN:  [id number]    Admit date:  2024  Discharge date:  24    Code Status Order: Full Code   Advance Directives:   Advance Care Flowsheet Documentation             Admitting Physician:  Arias Newsome MD  PCP: Vanessa Mckenna, LIDIA - CNP    Discharging Nurse: ***  Discharging Hospital Unit/Room#:   Discharging Unit Phone Number: [phone]    Emergency Contact:   Extended Emergency Contact Information  Primary Emergency Contact: Cathy Ingram   [facility]  Home Phone: [phone]  Work Phone: [phone]  Mobile Phone: [phone]  Relation: Other Relative  Hearing or visual needs: None  Other needs: None  Preferred language: English   needed? No  Secondary Emergency Contact: Jason sherman  Home Phone: [phone]  Work Phone: [phone]  Mobile Phone: [phone]  Relation: Friend    Past Surgical History:  Past Surgical History:   Procedure Laterality Date    BONE MARROW BIOPSY   [facility]    CARDIAC CATHETERIZATION  2007    severe stenosis pre-stent area    CARDIAC CATHETERIZATION  2013    Very peripheral stenosis, not amendable to PCI, stents patent    CARDIAC DEFIBRILLATOR PLACEMENT  2015    [facility]    CARDIAC PROCEDURE N/A 2024    Left heart cath / coronary angiography performed by Satinder Siddiqui MD at [facility] CARDIAC CATH LAB    CARDIAC PROCEDURE N/A 2024    Percutaneous coronary intervention performed by Satinder Siddiqui MD at [facility] CARDIAC CATH LAB    CARDIAC PROCEDURE N/A 2024    Intra-aortic balloon pump insertion performed by Satinder Siddiqui MD at [facility] CARDIAC CATH LAB    COLONOSCOPY  2007    COLONOSCOPY  2021    COLONOSCOPY N/A 2021    COLONOSCOPY DIAGNOSTIC performed by Coleen Vidales MD at [facility] Endoscopy    COLONOSCOPY N/A 2022    COLONOSCOPY WITH BIOPSY performed by Coleen Vidales MD at [facility] OR    CORONARY ANGIOPLASTY

## 2024-12-09 NOTE — CARE COORDINATION
12/9/2024 1:04 PM  *  Patient Admitted                                                                                      RPM Note    Claudio Graham has been admitted on 12/7/24 at [facility] due to acute on chronic systolic HF . Patient is active in remote patient monitoring and has been paused at this time. RN has contacted the care manager to advise.   Will continue to monitor and follow up as needed.       ALBER Reardon, RN  Associate Care Manager   Cell: [phone]  [email or web address]

## 2024-12-09 NOTE — PLAN OF CARE
Problem: Chronic Conditions and Co-morbidities  Goal: Patient's chronic conditions and co-morbidity symptoms are monitored and maintained or improved  Outcome: Completed     Problem: Discharge Planning  Goal: Discharge to home or other facility with appropriate resources  Outcome: Completed  Flowsheets (Taken 12/9/2024 0800)  Discharge to home or other facility with appropriate resources:   Identify barriers to discharge with patient and caregiver   Arrange for needed discharge resources and transportation as appropriate   Identify discharge learning needs (meds, wound care, etc)   Refer to discharge planning if patient needs post-hospital services based on physician order or complex needs related to functional status, cognitive ability or social support system     Problem: Skin/Tissue Integrity  Goal: Absence of new skin breakdown  Description: 1.  Monitor for areas of redness and/or skin breakdown  2.  Assess vascular access sites hourly  3.  Every 4-6 hours minimum:  Change oxygen saturation probe site  4.  Every 4-6 hours:  If on nasal continuous positive airway pressure, respiratory therapy assess nares and determine need for appliance change or resting period.  Outcome: Completed     Problem: Safety - Adult  Goal: Free from fall injury  Outcome: Completed     Problem: ABCDS Injury Assessment  Goal: Absence of physical injury  Outcome: Completed

## 2024-12-09 NOTE — DISCHARGE INSTRUCTIONS
You were seen here for CHF exacerbation.  -Please continue to take all the medications as previously prescribed.  -Please limit your fluid intake.  Follow-up with CHF clinic.  -Please limit your salt intake to less than 2 g a day.  -Please follow-up with your PCP for posthospitalization visit.  -Please follow-up with your cardiologist.  -Please repeat a BMP in 1 week and follow-up with your nephrologist.  -Please return to ER/call 911 if you begin to feel worsening shortness of breath, chest pain,

## 2024-12-09 NOTE — DISCHARGE SUMMARY
Writer gave pt discharge instructions and paperwork. IV's have been removed. All questions answered at this time.

## 2024-12-09 NOTE — CARE COORDINATION
Transition Planning    Call to [facility] with [facility] Able to accept. Informed pt to be discharged today. PS Dr. CHARLEE De La Cruz Resident to request completion of JOAQUIM and HHC order. Agreeable.

## 2024-12-10 ENCOUNTER — TELEPHONE (OUTPATIENT)
Dept: CARDIOLOGY CLINIC | Age: 69
End: 2024-12-10

## 2024-12-10 ENCOUNTER — CARE COORDINATION (OUTPATIENT)
Dept: CARE COORDINATION | Age: 69
End: 2024-12-10

## 2024-12-10 ENCOUNTER — TELEPHONE (OUTPATIENT)
Dept: OTHER | Age: 69
End: 2024-12-10

## 2024-12-10 LAB
EKG ATRIAL RATE: 70 BPM
EKG P AXIS: 19 DEGREES
EKG P-R INTERVAL: 236 MS
EKG Q-T INTERVAL: 456 MS
EKG QRS DURATION: 202 MS
EKG QTC CALCULATION (BAZETT): 488 MS
EKG R AXIS: -73 DEGREES
EKG T AXIS: 94 DEGREES
EKG VENTRICULAR RATE: 69 BPM

## 2024-12-10 PROCEDURE — 93010 ELECTROCARDIOGRAM REPORT: CPT | Performed by: INTERNAL MEDICINE

## 2024-12-10 NOTE — CARE COORDINATION
Care Transitions Note    Initial Call - Call within 2 business days of discharge: Yes    Attempted to reach patient for transitions of care follow up. Unable to reach patient.    Outreach Attempts:   Multiple attempts to contact patient at phone numbers on file.   HIPAA compliant voicemail left for patient.     Patient: Claudio Graham    Patient : 1955   MRN: [id number]    Reason for Admission: acute on chronic CHF  Discharge Date: 24  RURS: Readmission Risk Score: 22.4    Last Discharge Facility       Date Complaint Diagnosis Description Type Department Provider    24 Weight Gain Acute on chronic heart failure, unspecified heart failure type (HCC) ... ED to Hosp-Admission (Discharged) (ADMITTED) [facility] Arias Newsome MD; Chance Chino.            Was this an external facility discharge? No    Follow Up Appointment:   Patient does not have a follow up appointment scheduled at time of call.  PCP notified via chart route  Future Appointments         Provider Specialty Dept Phone    2024 1:00 PM [facility]  1 Cardiology [phone]    2024 1:30 PM Vanessa Mckenna, APRN - CNP Primary Care [phone]    3/11/2025 1:50 PM Yadira Guidry, APRN - CNP Nephrology [phone]    3/17/2025 1:00 PM Elder Corley MD Oncology [phone]    3/31/2025 1:00 PM Bhavna Mar APRN - CNP Cardiology [phone]    2025 12:15 PM Venecia Ordonez DPM Podiatry [phone]    2025 1:00 PM Radha Shankar MD Pulmonology [phone]            Plan for follow-up on next business day.      Kelly Escobar RN

## 2024-12-10 NOTE — TELEPHONE ENCOUNTER
Writer, from [facility] ,  was notified of pt's severe MR after last echo was perfomred, 12/7/24. Pt advised to f/u with his Cardiology provider, Dr. Nj. Dr. Nj's office, Esme LE, contacted and updated on pt's Severe MR per last echo. Esme did not have access to report in Epic in the office, but she will notify Dr. Nj of this valvular change since last echo, dated 7/2024.   Lily MURILLO CNP  [facility]

## 2024-12-10 NOTE — PROGRESS NOTES
[facility]  Internal Medicine Teaching Residency Program  Inpatient Daily Progress Note  ______________________________________________________________________________    Patient: Claudio Graham  YOB: 1955   MRN:[id number]    Acct: [id number]     Room: 2002/2002-01  Admit date: 12/7/2024  Today's date: 12/09/24  Number of days in the hospital: 2    SUBJECTIVE   Admitting Diagnosis: Acute on chronic systolic heart failure (HCC)  CC: Weight gain, shortness of breath    Patient seen and examined at bedside.    No acute events overnight  On 2L this morning, but saturating well on room air  Does not meet need for home oxygen.   Patient agreeable for discharge today     DME order placed for nocturnal pulse oximetry     BRIEF HISTORY     The patient is a 69 y.o. male with PMH HFrEF (EF 10-15% 7/2024), CAD s/p PCI to LAD 7/2024, HTN, HLD, T2DM, CKD3a, paroxysmal A-fib, hypothyroidism presenting with 8 pound weight gain in 3 days and shortness of breath.  Patient states that he has gained approximately 17 pounds in the last 5 weeks, 8 pounds in the last 3 days.  He has associated shortness of breath.  He endorses orthopnea.  Patient states he has been compliant with all of his medications, dietary and fluid restriction.  Denies recent fever chills, sick contacts, chest pain, abdominal pain, constipation, diarrhea, lightheadedness, headache.  Denies any recent worsening cough or sputum production.     In ED, patient is afebrile, otherwise vital signs stable.  Labs notable for WBC 5.6, Hb 12.1, platelet 202.  Electrolytes grossly WNL.  BUN 30, creatinine 1.8 (baseline 1.5-1.8).  proBNP 9208.  Troponin 33, repeat 33.   VBG 7.31/56/29.6/27.2.  CXR with cardiomegaly, pulmonary vascular congestion, no consolidation.  Patient will be admitted for management of acute exacerbation of CHF.      OBJECTIVE     Vital Signs:  /64   Pulse 67   Temp 98.2 °F (36.8 °C) 
    [facility]  Internal Medicine Teaching Residency Program  Inpatient Daily Progress Note  ______________________________________________________________________________    Patient: Claudio Graham  YOB: 1955   MRN:[id number]    Acct: [id number]     Room: 2002/2002-01  Admit date: 12/7/2024  Today's date: 12/08/24  Number of days in the hospital: 1    SUBJECTIVE   Admitting Diagnosis: Acute on chronic systolic heart failure (HCC)  CC: Weight gain, shortness of breath    Patient seen and examined at bedside.  No acute events overnight.  Patient has subjective improvement in shortness of breath today, has been off BiPAP on 2 L NC O2, maintaining % sat.  Good UOP with IV Lasix 40 mg twice daily.  Will continue with diuresis today.      BRIEF HISTORY     The patient is a 69 y.o. male with PMH HFrEF (EF 10-15% 7/2024), CAD s/p PCI to LAD 7/2024, HTN, HLD, T2DM, CKD3a, paroxysmal A-fib, hypothyroidism presenting with 8 pound weight gain in 3 days and shortness of breath.  Patient states that he has gained approximately 17 pounds in the last 5 weeks, 8 pounds in the last 3 days.  He has associated shortness of breath.  He endorses orthopnea.  Patient states he has been compliant with all of his medications, dietary and fluid restriction.  Denies recent fever chills, sick contacts, chest pain, abdominal pain, constipation, diarrhea, lightheadedness, headache.  Denies any recent worsening cough or sputum production.     In ED, patient is afebrile, otherwise vital signs stable.  Labs notable for WBC 5.6, Hb 12.1, platelet 202.  Electrolytes grossly WNL.  BUN 30, creatinine 1.8 (baseline 1.5-1.8).  proBNP 9208.  Troponin 33, repeat 33.   VBG 7.31/56/29.6/27.2.  CXR with cardiomegaly, pulmonary vascular congestion, no consolidation.  Patient will be admitted for management of acute exacerbation of CHF.      OBJECTIVE     Vital Signs:  /62   Pulse (!) 48   
  Physician Progress Note      PATIENT:               AUDREY SOLANO  CSN #:                  [id number]  :                       1955  ADMIT DATE:       2024 8:41 AM  DISCH DATE:        2024 5:00 PM  RESPONDING  PROVIDER #:        ELIZABETH LOPEZ          QUERY TEXT:    Patient admitted with acute exacerbation HFrEF, noted to have Paroxysmal A fib   and is maintained on Eliquis. If possible, please document if you are   evaluating and/or treating any of the following:?  ?  The medical record reflects the following:  Risk Factors: Paroxysmal A fib.    Clinical Indicators: H&P: Paroxysmal A fib, Home regimen amiodarone 200 mg   daily, Toprol-XL 50 mg daily, Eliquis 5 mg BID  Cardio consult: PAF, on chronic amiodarone and Eliquis    Treatment: Continue home Eliquis    Thank-you,  Regla Hughes RN, CDS  Options provided:  -- Secondary hypercoagulable state in a patient with atrial fibrillation  -- Other - I will add my own diagnosis  -- Disagree - Not applicable / Not valid  -- Disagree - Clinically unable to determine / Unknown  -- Refer to Clinical Documentation Reviewer    PROVIDER RESPONSE TEXT:    This patient has secondary hypercoagulable state in a patient with atrial   fibrillation.    Query created by: Regla Hughes on 2024 10:59 AM      Electronically signed by:  ELIZABETH LOPEZ 12/10/2024 11:34 AM          
  [facility]  Progress Note                   Date:   12/9/2024  Patient name: Claudio Graham  Date of admission:  12/7/2024  8:41 AM  MRN:   [id number]  YOB: 1955  PCP: Vanessa Mckenna, LDIIA - CNP    Reason for Admission: Acute on chronic systolic heart failure (HCC) [I50.23]  Acute on chronic heart failure, unspecified heart failure type (HCC) [I50.9]    Subjective:       Clinical Changes /Abnormalities: Seen & examined alone in room after discussion with RN. No new CV issues/concerns. States he is feeling better. On NC without distress, states not on oxygen at home. Did use CPAP last night and states he follows with pulmonary as OP and had a home sleep study but it \"didn't work right\" so he will need to f/u with them to get it again which he states he is planning on doing. Denies any CP/pressure. Labs, vitals, & tele reviewed.     Review of Systems    Medications:   Scheduled Meds:   furosemide  40 mg IntraVENous Daily    sodium chloride flush  5-40 mL IntraVENous 2 times per day    amiodarone  200 mg Oral Daily    atorvastatin  80 mg Oral Daily    apixaban  5 mg Oral BID    [Held by provider] empagliflozin  10 mg Oral Daily    [Held by provider] isosorbide mononitrate  30 mg Oral Daily    levothyroxine  88 mcg Oral Daily    ticagrelor  90 mg Oral BID    insulin lispro  0-8 Units SubCUTAneous 4x Daily AC & HS     Continuous Infusions:   sodium chloride      dextrose       CBC:   Recent Labs     12/07/24  0910 12/08/24  0525 12/09/24  0436   WBC 5.6 5.9 5.7   HGB 12.1* 10.4* 11.0*    158 157     BMP:    Recent Labs     12/07/24  0910 12/08/24  0525 12/09/24  0436    140 141   K 4.4 4.1 3.6*    104 103   CO2 26 24 22   BUN 30* 35* 35*   CREATININE 1.8* 1.9* 1.8*   GLUCOSE 173* 97 100*     Hepatic:  Recent Labs     12/07/24  0910   AST 56*   ALT 35   BILITOT 0.3   ALKPHOS 110     Troponin:   Recent Labs     12/07/24  0910 12/07/24  1032   TROPHS 33* 33* 
207$ in cash locked up with security, receipt taped to pt belonging bag.   
Congestive Heart Failure Education completed and charted. CHF booklet given. Patient was receptive to education.    Discussed the  importance of medication compliance.    Discussed the importance of a heart healthy diet. Discussed 2000 mg sodium-restricted daily diet.  Patient instructed to limit fluid intake to  1.5 to 2 liters per day.    Patient instructed to weigh self at the same time of each day each morning, reinforced teaching to monitor for 3-5 lb weight increase over 1-2 days notify physician if change noted.      Signs and symptoms of CHF discussed with patient, such as feeling more tired than normal, feeling short of breath, coughing that increases when lying down, sudden weight gain, swelling of the feet, legs or belly.  Patient verbalized understanding to notify physician office if these symptoms occur.  Pt established w/  CHF Clinic        [facility]    Echo  12/7/24      Left Ventricle: Severely reduced left ventricular systolic function with a visually estimated EF of 10 -15%. EF by 2D Simpsons Biplane is 15%. Left ventricle is severely dilated. Normal wall thickness. See diagram for wall motion findings. Abnormal diastolic function.    Right Ventricle: Right ventricle is moderately dilated. Reduced systolic function. AICD leads seen in the right heart.    Aortic Valve: Trileaflet valve. Mildly calcified aortic valve leaflets. Mild regurgitation.    Mitral Valve: Moderate to severe regurgitation. PISA radius of 0.9 cm suggests moderate regurgitation. MR volume of 62 ml suggests severe regurgitation.    Tricuspid Valve: Mild to moderate regurgitation. Moderately elevated RVSP, consistent with moderate pulmonary hypertension.    Left Atrium: Left atrium is mildly dilated.    Image quality is fair. Contrast used: Definity. Technically difficult study due to patient's body habitus.    Will f/u with dr ryan re: severe MR  
Home Oxygen Evaluation    Home Oxygen Evaluation completed.    Patient is on na liters per minute via na.  Resting SpO2 = na  Resting SpO2 on room air = 97%    SpO2 on room air with exercise = 95%  SpO2 on oxygen as above with exercise = na%    Nocturnal Oximetry with patient on room air is recommended is SpO2 is between 89% and 95% (requires additional order).    tylor pereira RCP  3:09 PM  
Pt arrived to unit with active bed bugs. Pt cleaned, linens bagged and sent to laundry. Personal items bagged. Hair not completely washed d/t bipap. Pt refusing at this time to shave extremely long beard- educated on bugs potentially hiding in facial hair. Pt acknowledges and refuses.   
past year?: No  Prior Level of Assist for ADLs: Independent  Prior Level of Assist for Homemaking: Independent  Homemaking Responsibilities: Yes  Meal Prep Responsibility: Primary  Laundry Responsibility: Primary (laundramat)  Cleaning Responsibility: Primary  Shopping Responsibility: Primary  Prior Level of Assist for Ambulation: Independent household ambulator, with or without device  Prior Level of Assist for Transfers: Independent  Active : Yes  Mode of Transportation: Car  Occupation: Retired  Type of Occupation:   Leisure & Hobbies: phone and computer    Vision/Hearing  Vision  Vision: Impaired  Vision Exceptions: Wears glasses for reading  Hearing  Hearing: Within functional limits    BUE Assessment  Gross Assessment  AROM: Within functional limits  Strength: Generally decreased, functional (B UE strength 4+/5)  Coordination: Within functional limits  Tone: Normal  Sensation: Intact     Objective  Cognition  Overall Cognitive Status: WFL    Activities of Daily Living  Feeding: Independent  Grooming: Independent  UE Bathing: Independent  LE Bathing: Stand by assistance  UE Dressing: Independent  UE Dressing Skilled Clinical Factors: Pt completed donning gown on backside independently  LE Dressing: Stand by assistance  LE Dressing Skilled Clinical Factors: Pt completed donning socks seated EOB at SBA with increased time.  Toileting: Stand by assistance    Balance  Balance  Sitting: Without support (EOB at Supervision participating in static and dynamic tasks with 0 LOB ~20-22 min)  Standing: With support (SBA grossly within room ~4-5 min with 0 LOB)    Transfers/Mobility  Bed mobility  Supine to Sit: Supervision  Sit to Supine: Supervision  Scooting: Supervision    Transfers  Sit to stand: Stand by assistance  Stand to sit: Supervision     Functional Mobility: Stand by assistance  Functional Mobility Skilled Clinical Factors: no device, completed functional mobility without device within room on 2L

## 2024-12-10 NOTE — TELEPHONE ENCOUNTER
Patient is following up with cardiology for recent hospitalization. Appointment scheduled 12/13/24

## 2024-12-11 ENCOUNTER — CARE COORDINATION (OUTPATIENT)
Dept: CARE COORDINATION | Age: 69
End: 2024-12-11

## 2024-12-11 NOTE — CARE COORDINATION
status is activated and monitoring.    Assessments:  Care Transitions 24 Hour Call    Schedule Follow Up Appointment with PCP: Completed  Do you have a copy of your discharge instructions?: Yes  Do you have all of your prescriptions and are they filled?: Yes  Have you been contacted by a [facility] Pharmacist?: No  Have you scheduled your follow up appointment?: Yes  How are you going to get to your appointment?: Car - drive self  Post Acute Services: Outpatient/Community Services, Home Health (Comment: CHF clinic; [facility])  Patient DME: Walker, Other, Shower chair  Patient Home Equipment: BiPAP  Do you have support at home?: Alone  Do you feel like you have everything you need to keep you well at home?: Yes  Are you an active caregiver in your home?: No  Care Transitions Interventions    Pharmacist: Completed      Registered Dietician: Completed              Follow Up Appointment:   Discussed follow up appointments. Patient has hospital follow up appointment scheduled within 7 days of discharge.   Future Appointments         Provider Specialty Dept Phone    12/13/2024 1:00 PM [facility]  1 Cardiology [phone]    12/23/2024 11:00 AM Vanessa Mckenna APRN [facility] [phone]    12/26/2024 1:30 PM Vanessa Mckenna APRN [facility] [phone]    3/11/2025 1:50 PM Yadira Guidry APRN [facility] [phone]    3/17/2025 1:00 PM Elder Corley MD Oncology [phone]    3/31/2025 1:00 PM Bhavna Mar APRN - CNP Cardiology [phone]    4/7/2025 12:15 PM Venecia Ordonez DPM Podiatry [phone]    4/25/2025 1:00 PM Radha Shankar MD Pulmonology [phone]            Care Transition Nurse provided contact information.  Plan for follow-up call in 2-5 days based on severity of symptoms and risk factors.  Plan for next call: symptom management-how is weight, any shortness of breath, chest pain?   follow-up appointment-Any changes since cardiology appt

## 2024-12-12 ENCOUNTER — TELEPHONE (OUTPATIENT)
Dept: CARDIOLOGY | Age: 69
End: 2024-12-12

## 2024-12-12 NOTE — TELEPHONE ENCOUNTER
Writer talks with Dr. Nj per telephone to discuss pt's most recent echo. Dr. Nj states pt's MR has  been mod to severe over past few echoes, not a recent change. Also, chronic low EF over several past echoes.  Pt is currently medically mangaged.   Writer shares Structural Heart pts are evaluated initially by CT Surgeon for appropriate insurance coverage. [facility] is happy to facilitate pt seeing a CT Surgeon if Dr. Nj chooses to refer. Pt's candidacy for suma clip is questionable d/t pt's extremely low EF.   [facility] if we can be of assistance with the pt in the future.     Lily MURILLO CNP  [facility]

## 2024-12-13 ENCOUNTER — HOSPITAL ENCOUNTER (OUTPATIENT)
Dept: OTHER | Age: 69
Discharge: HOME | End: 2024-12-13
Payer: COMMERCIAL

## 2024-12-13 VITALS
HEART RATE: 58 BPM | BODY MASS INDEX: 30.16 KG/M2 | WEIGHT: 175.8 LBS | RESPIRATION RATE: 16 BRPM | SYSTOLIC BLOOD PRESSURE: 140 MMHG | OXYGEN SATURATION: 98 % | DIASTOLIC BLOOD PRESSURE: 80 MMHG

## 2024-12-13 PROCEDURE — 99212 OFFICE O/P EST SF 10 MIN: CPT

## 2024-12-13 NOTE — PROGRESS NOTES
Date:  2024  Time:  1:44 PM    CHF Clinic at [facility]    Office: [phone] Fax: [phone]    Re:  Claudio Graham   Patient : 1955    Vital Signs: B/p 140/80,58,16,oxygen 98% Weight 175.8                                                  No results for input(s): \"CBC\", \"HGB\", \"HCT\", \"WBC\", \"PLATELET\", \"NA\", \"K\", \"CL\", \"CO2\", \"BUN\", \"CREATININE\", \"GLUCOSE\", \"BNP\", \"INR\" in the last 72 hours.     Respiratory:  Clear throughout      Future Appointments   Date Time Provider Department Center   2024 11:00 AM Vanessa Mckenna APRN - CNP [facility] DEP   2024  1:30 PM Vanessa Mckenna APRN - CNP [facility] DEP   1/10/2025  1:30 PM [facility]  1 [facility]F CLI [facility]   2025  2:50 PM Yadira Guidry APRN - CNP [facility] None   3/11/2025  1:50 PM Yadira Guidry APRN - CNP [facility] Neph [facility] None   3/17/2025  1:00 PM Elder Corley MD [facility]   3/31/2025  1:00 PM Bhavna Mar APRN - CNP AFL T[facility]   2025 12:15 PM Venecia Ordonez DPM [facility] Podiatry [facility]   2025  1:00 PM [facility]      Complaints: Was in hospital  to  for CHF exab.    Physician Orders Given lab slip from hospital admission to get labs 24    Comment : Weight is down 4 pounds from his ,visit with us Today has no SOB or chest pain,no pedal edema, lungs are clear. Reviewed in detail low sodium diet 2000mg per day and 64 ounces of fluid per day. Lily Velasco CNP talked to  regarding patient and mitral clip and  wants to continue to treat medically he has appt. In January with . Has [employer] defibrillator . He has home monitoring system now for his vitals told to call us if any issues.      Electronically signed by Rika Lucero RN on 2024 at 1:44 PM

## 2024-12-14 NOTE — ED PROVIDER NOTES
[facility] CAR 2- STEPDOWN  eMERGENCY dEPARTMENT eNCOUnter   Attending Attestation     Pt Name: Claudio Graham  MRN: [id number]  Birthdate 1955  Date of evaluation: 12/07/24       Claudio Graham is a 69 y.o. male who presents with Weight Gain                I performed a history and physical examination of the patient and discussed management with the resident. I reviewed the resident’s note and agree with the documented findings and plan of care. Any areas of disagreement are noted on the chart. I was personally present for the key portions of any procedures. I have documented in the chart those procedures where I was not present during the key portions. I have personally reviewed all images and agree with the resident's interpretation. I have reviewed the emergency nurses triage note. I agree with the chief complaint, past medical history, past surgical history, allergies, medications, social and family history as documented unless otherwise noted below. Documentation of the HPI, Physical Exam and Medical Decision Making performed by medical students or scribes is based on my personal performance of the HPI, PE and MDM. For Phys Assistant/ Nurse Practitioner cases/documentation I have had a face to face evaluation of this patient and have completed at least one if not all key elements of the E/M (history, physical exam, and MDM). Additional findings are as noted.    For APC cases I have personally evaluated and examined the patient in conjunction with the APC and agree with the treatment plan and disposition of the patient as recorded by the APC.    Chance Chino MD  Attending Emergency  Physician       Chance Chino MD  12/14/24 0153

## 2024-12-18 ENCOUNTER — TELEPHONE (OUTPATIENT)
Dept: OTHER | Age: 69
End: 2024-12-18

## 2024-12-18 ENCOUNTER — HOSPITAL ENCOUNTER (OUTPATIENT)
Age: 69
Discharge: HOME | End: 2024-12-18
Payer: COMMERCIAL

## 2024-12-18 ENCOUNTER — CARE COORDINATION (OUTPATIENT)
Dept: CARE COORDINATION | Age: 69
End: 2024-12-18

## 2024-12-18 LAB
ANION GAP SERPL CALCULATED.3IONS-SCNC: 12 MMOL/L (ref 9–16)
BUN SERPL-MCNC: 25 MG/DL (ref 8–23)
CALCIUM SERPL-MCNC: 9.1 MG/DL (ref 8.6–10.4)
CHLORIDE SERPL-SCNC: 101 MMOL/L (ref 98–107)
CO2 SERPL-SCNC: 28 MMOL/L (ref 20–31)
CREAT SERPL-MCNC: 2.1 MG/DL (ref 0.7–1.2)
GFR, ESTIMATED: 33 ML/MIN/1.73M2
GLUCOSE SERPL-MCNC: 177 MG/DL (ref 74–99)
POTASSIUM SERPL-SCNC: 3.6 MMOL/L (ref 3.7–5.3)
SODIUM SERPL-SCNC: 141 MMOL/L (ref 136–145)

## 2024-12-18 PROCEDURE — 80048 BASIC METABOLIC PNL TOTAL CA: CPT

## 2024-12-18 PROCEDURE — 36415 COLL VENOUS BLD VENIPUNCTURE: CPT

## 2024-12-18 NOTE — CARE COORDINATION
Care Transitions Note    Follow Up Call     Attempted to reach patient for transitions of care follow up.  Unable to reach patient.      Outreach Attempts:   Multiple attempts to contact patient at phone numbers on file.   HIPAA compliant voicemail left for patient.     Care Summary Note: Left VM on home number    Follow Up Appointment:   Future Appointments         Provider Specialty Dept Phone    12/23/2024 11:00 AM Vanessa Mckenna APRN [facility] [phone]    12/26/2024 1:30 PM Vanessa Mckenna [facility] [phone]    1/10/2025  1:30 PM [facility]  1 Cardiology [phone]    1/14/2025 2:50 PM Yadira Guidry [facility] [phone]    3/11/2025 1:50 PM Yadira Guidry [facility] [phone]    3/17/2025 1:00 PM Elder Colrey MD Oncology [phone]    3/31/2025 1:00 PM Bhavna Mar APRN - CNP Cardiology [phone]    4/7/2025 12:15 PM Venecia Ordonez DPM Podiatry [phone]    4/25/2025 1:00 PM Radha Shankar MD Pulmonology [phone]            Plan for follow-up call in 2-5 days based on severity of symptoms and risk factors. Plan for next call: symptom management-how is weight, any shortness of breath, chest pain?   follow-up appointment-Any changes since cardiology appt      Kelly Escobar RN

## 2024-12-19 ENCOUNTER — CARE COORDINATION (OUTPATIENT)
Dept: CASE MANAGEMENT | Age: 69
End: 2024-12-19

## 2024-12-19 ENCOUNTER — TELEPHONE (OUTPATIENT)
Dept: OTHER | Age: 69
End: 2024-12-19

## 2024-12-19 DIAGNOSIS — E11.9 TYPE 2 DIABETES MELLITUS WITHOUT COMPLICATION, WITHOUT LONG-TERM CURRENT USE OF INSULIN (HCC): ICD-10-CM

## 2024-12-19 NOTE — TELEPHONE ENCOUNTER
Patients labs done potassium 3.6 states on kcl 20meq po BID states may have missed 1 or2 pills,instructed to call Vanessa Mckenna PCP for directiion since our NP is off today ,and to call us back to see what she recommended.

## 2024-12-19 NOTE — CARE COORDINATION
Date/Time:  12/19/2024 2:09 PM  LPN attempted to reach patient by telephone regarding red alert BP HR 49 in remote patient monitoring program. UNABLE TO LEAVE HIPAA compliant message requesting a return call. Will attempt to reach patient again.

## 2024-12-19 NOTE — CARE COORDINATION
Date/Time:  12/19/2024 11:07 AM  LPN attempted to reach patient by telephone regarding red alert BP HR 49 in remote patient monitoring program. UNABLE TO LEAVE HIPAA compliant message requesting a return call. Will attempt to reach patient again.

## 2024-12-20 ENCOUNTER — CARE COORDINATION (OUTPATIENT)
Dept: CASE MANAGEMENT | Age: 69
End: 2024-12-20

## 2024-12-20 RX ORDER — BLOOD SUGAR DIAGNOSTIC
1 STRIP MISCELLANEOUS DAILY
Qty: 100 STRIP | Refills: 2 | Status: SHIPPED | OUTPATIENT
Start: 2024-12-20 | End: 2025-03-30

## 2024-12-20 RX ORDER — LANCETS 30 GAUGE
EACH MISCELLANEOUS
Qty: 100 EACH | Refills: 3 | Status: SHIPPED | OUTPATIENT
Start: 2024-12-20

## 2024-12-20 NOTE — CARE COORDINATION
-Remote Alert Monitoring Note      Date/Time:  2024 9:46 AM  Patient Current Location: Home: [address] [place] [address]  Verified patients name and  as identifiers.    Rpm alert to be reviewed by the provider   red alert  blood pressure heart rate (49)  Vitals Recheck blood pressure heart rate (53)  Additional needs to be addressed by provider: No                   LPN contacted patient by telephone regarding red alert received   Background: enrolled in [facility] CHF DM  Refer to 911 immediately if:  Patient unresponsive or unable to provide history  Change in cognition or sudden confusion  Patient unable to respond in complete sentences  Intense chest pain/tightness  Any concern for any clinical emergency  Red Alert: Provider response time of 1 hr required for any red alert requiring intervention  Yellow Alert: Provider response time of 3hr required for any escalated yellow alert  Patient Chief Complaint:  Heart Rate HR Triage  Are you having any Chest Pain? no   Are you having any Shortness of Breath? no   Are you having any dizziness? no   Are you feeling more fatigued or tired than normal? no   Are you having any other health concerns or issues? no     Clinical Interventions: Reviewed and followed up on alerts and treatments-SPOKE to Claudio regarding RPM red alert for low BP HR. Denies chest pain, dyspnea, dizziness or fatigue. Pt states \" I feel good\" \" I am cleaning my house\" requested pt recheck his BP HR. New reading HR 53. All metrics WNL    Plan/Follow Up: Will continue to review, monitor and address alerts with follow up based on severity of symptoms and risk factors.  **For any new or worsening symptoms or you are concerned in anyway, please contact your Provider or report to the nearest Emergency Room.**

## 2024-12-23 ENCOUNTER — CARE COORDINATION (OUTPATIENT)
Dept: OTHER | Facility: CLINIC | Age: 69
End: 2024-12-23

## 2024-12-23 ENCOUNTER — CARE COORDINATION (OUTPATIENT)
Dept: CARE COORDINATION | Age: 69
End: 2024-12-23

## 2024-12-23 ENCOUNTER — OFFICE VISIT (OUTPATIENT)
Dept: PRIMARY CARE CLINIC | Age: 69
End: 2024-12-23

## 2024-12-23 VITALS
TEMPERATURE: 97.3 F | OXYGEN SATURATION: 100 % | SYSTOLIC BLOOD PRESSURE: 109 MMHG | DIASTOLIC BLOOD PRESSURE: 65 MMHG | HEART RATE: 54 BPM | BODY MASS INDEX: 29.85 KG/M2 | WEIGHT: 174 LBS

## 2024-12-23 DIAGNOSIS — G47.33 OSA ON CPAP: Primary | ICD-10-CM

## 2024-12-23 DIAGNOSIS — E11.22 CKD STAGE 3 DUE TO TYPE 2 DIABETES MELLITUS (HCC): ICD-10-CM

## 2024-12-23 DIAGNOSIS — Z09 HOSPITAL DISCHARGE FOLLOW-UP: ICD-10-CM

## 2024-12-23 DIAGNOSIS — N18.30 CKD STAGE 3 DUE TO TYPE 2 DIABETES MELLITUS (HCC): ICD-10-CM

## 2024-12-23 DIAGNOSIS — E11.9 TYPE 2 DIABETES MELLITUS WITHOUT COMPLICATION, WITHOUT LONG-TERM CURRENT USE OF INSULIN (HCC): ICD-10-CM

## 2024-12-23 DIAGNOSIS — I50.42 CHRONIC COMBINED SYSTOLIC AND DIASTOLIC HEART FAILURE (HCC): Primary | ICD-10-CM

## 2024-12-23 RX ORDER — ALLOPURINOL 300 MG/1
150 TABLET ORAL DAILY
Qty: 45 TABLET | Refills: 1 | Status: SHIPPED | OUTPATIENT
Start: 2024-12-23 | End: 2025-03-23

## 2024-12-23 RX ORDER — FUROSEMIDE 40 MG/1
TABLET ORAL
Qty: 60 TABLET | Refills: 3
Start: 2024-12-23

## 2024-12-23 NOTE — CARE COORDINATION
Care Transitions Note    Follow Up Call     Patient Current Location:  Home: [address] [facility] [address]    Care Transition Nurse contacted the patient by telephone. Verified name and  as identifiers.    Additional needs identified to be addressed with provider   No needs identified                 Method of communication with provider: none.    Care Summary Note: Patient reached for follow up, states doing well, denies chest pain shortness of breath. Completed PCP HFU today, aware of needed labs, he will have [facility] RN draw labs, educated that lipid panel should be done fasting. States error with low weight this AM due to moved scale, has rectified this. Reports sleeping better. He is following 1.5 liter fluid restriction, verbalized understanding of 2 GM sodium restriction. He will see CHF clinic 1/10/25. Denies further questions or concerns, agrees to follow up next week.     Plan of care updates since last contact:  Review of patient management of conditions/medications       Advance Care Planning:   Does patient have an Advance Directive: reviewed during previous call, see note. .    Medication Review:  Full medication reconciliation completed during previous call.    Remote Patient Monitoring:  Offered patient enrollment in the Remote Patient Monitoring (RPM) program for in-home monitoring: Yes, patient enrolled; current status is activated and monitoring.    Assessments:  Care Transitions Subsequent and Final Call    Schedule Follow Up Appointment with PCP: Completed  Subsequent and Final Calls  Do you have any ongoing symptoms?: No  Have your medications changed?: No  Do you have any questions related to your medications?: No  Do you currently have any active services?: No  Are you currently active with any services?: Outpatient/Community Services, Home Health  Do you have any needs or concerns that I can assist you with?: No  Identified Barriers: None  Care Transitions Interventions    Pharmacist:

## 2024-12-23 NOTE — CARE COORDINATION
2024 8:20 AM  *  Alert and Triage   -Remote Alert Monitoring Note      Date/Time:  2024 8:20 AM  Patient Current Location: Home: [address] [place] [address]  Verified patients name and  as identifiers.    Rpm alert to be reviewed by the provider   red alert  weight (172.8 from 155.8)  Vitals Recheck re-check was 172.8  Additional needs to be addressed by provider: No                   ACM contacted patient by telephone regarding red alert received   Background: kidney disease, CHF, DM  Refer to 911 immediately if:  Patient unresponsive or unable to provide history  Change in cognition or sudden confusion  Patient unable to respond in complete sentences  Intense chest pain/tightness  Any concern for any clinical emergency  Red Alert: Provider response time of 1 hr required for any red alert requiring intervention  Yellow Alert: Provider response time of 3hr required for any escalated yellow alert  Patient Chief Complaint:  Patient reports he accidentally bumped the scale and got a false reading of 155.8. He re-weighed and his weigh is consistent with trends at 172.8. No significant weight gain or weight loss, no triage needed,   Clinical Interventions: Reviewed and followed up on alerts and treatments-discussed rd alert r/t weight. Patient reports falty reading at first with normal reading as secondary reading today. No significant weight gain or weight loss.     Plan/Follow Up: Will continue to review, monitor and address alerts with follow up based on severity of symptoms and risk factors.  **For any new or worsening symptoms or you are concerned in anyway, please contact your Provider or report to the nearest Emergency Room.**              ALBER Reardon, RN  Associate Care Manager   Cell: [phone]  [email or web address]

## 2024-12-23 NOTE — PROGRESS NOTES
Post-Discharge Transitional Care  Follow Up      Claudio Graham   YOB: 1955    Date of Office Visit:  12/23/2024  Date of Hospital Admission: 12/7/24  Date of Hospital Discharge: 12/9/24  Risk of hospital readmission (high >=14%. Medium >=10%) :Readmission Risk Score: 22.4      Care management risk score Rising risk (score 2-5) and Complex Care (Scores >=6): No Risk Score On File     Non face to face  following discharge, date last encounter closed (first attempt may have been earlier): 12/11/2024    Call initiated 2 business days of discharge: Yes    ASSESSMENT/PLAN:   Chronic combined systolic and diastolic heart failure (HCC)  -     furosemide (LASIX) 40 MG tablet; Take 1 tablet in the morning, and half tablet in the evening, Disp-60 tablet, R-3Adjust Sig  -     Lipid Panel; Future  CKD stage 3 due to type 2 diabetes mellitus (HCC)  -     Basic Metabolic Panel; Future  Hospital discharge follow-up  -     MO DISCHARGE MEDS RECONCILED W/ CURRENT OUTPATIENT MED LIST  Type 2 diabetes mellitus without complication, without long-term current use of insulin (HCC)  -     Albumin/Creatinine Ratio, Urine; Future  -     Hemoglobin A1C; Future  -     Lipid Panel; Future  -     Basic Metabolic Panel; Future      Patient completed posthospitalization labs, potassium at 3.6.  Last GFR prior to hospital stay at 38 with a creatinine at 1.85, today's readings with a creatinine of 2.1 and GFR of 33.  No acute concerns today, keep nephrology appointment scheduled for January 14 hospital follow-up.  Nephrology provider was also forwarded today's BMP results for posthospital follow-up.  No need to further dose adjust metformin at this time, will repeat BMP in 1 month to monitor for any further changes in GFR.  Also due for lipid panel, A1c, and microalbumin.  Keep cardiology appointment scheduled for March    Medical Decision Making: moderate complexity  Return in about 4 months (around 4/23/2025) for Diabetes,

## 2025-01-02 ENCOUNTER — CARE COORDINATION (OUTPATIENT)
Dept: CARE COORDINATION | Age: 70
End: 2025-01-02

## 2025-01-02 NOTE — CARE COORDINATION
Care Transitions Note    Follow Up Call     Patient Current Location:  Home: [address] [place] [id number]    Care Transition Nurse contacted the patient by telephone. Verified name and  as identifiers.    Additional needs identified to be addressed with provider   No needs identified                 Method of communication with provider: none.    Care Summary Note: Patient reached for follow. Reviewed weight per RPM 7 day range 174.8-176.8. Denies chest pain or shortness of breath. Patient states has obtained CPAP machine. Reports difficulty with this in the past. Educated on need to try out, keep in contact with DME respiratory therapist as can be good resource for recommendations to better adjust to this. Educated on benefit to cardiovascular system with ongoing use. Patient is aware. He is motivated to use. Encouraged to follow up with DME or pulmonary, as needed for difficulty with use for further changes or recommendations. Denies further questions or concerns at this time. Agrees to follow up next week.     Plan of care updates since last contact:  Education  Review of patient management of conditions/medications       Advance Care Planning:   Does patient have an Advance Directive: reviewed during previous call, see note. .    Medication Review:  Full medication reconciliation completed during previous call.    Remote Patient Monitoring:  Offered patient enrollment in the Remote Patient Monitoring (RPM) program for in-home monitoring: Yes, patient enrolled; current status is activated and monitoring.    Assessments:  Care Transitions Subsequent and Final Call    Schedule Follow Up Appointment with PCP: Completed  Subsequent and Final Calls  Do you have any ongoing symptoms?: No  Have your medications changed?: No  Do you have any questions related to your medications?: No  Do you currently have any active services?: No  Are you currently active with any services?: Outpatient/Community Services,

## 2025-01-06 ENCOUNTER — CARE COORDINATION (OUTPATIENT)
Dept: CASE MANAGEMENT | Age: 70
End: 2025-01-06

## 2025-01-06 NOTE — CARE COORDINATION
MEDICARE WELLNESS VISIT NOTE    HISTORY OF PRESENT ILLNESS:   Saba Chahal presents for her Subsequent Annual Medicare Wellness Visit.   She has complaints or concerns which include:    High calcium, Endo Dr Tay is concerned about possibly hyperparathyroid, got US done on 7/28.     Her weight loss has stabilized.  She does get abdominal cramps after eating which improves after a bowel movement.  Some decreased appetite when at home but she eats more when she is out of the house.  Wt Readings from Last 3 Encounters:   07/31/23 55.3 kg (122 lb)   06/20/23 56.2 kg (123 lb 14.4 oz)   02/14/23 59 kg (130 lb)     Dry throat, coughs at night.  Drinks a lot of water throughout the day.  Gets allergic rhinitis, takes 1 Claritin daily.     Patient Care Team:  April Almeida MD as PCP - General (Family Practice)  Royer Teixeira MD as Referring Provider (Orthopedic Surgery)  Serene Diaz MD as Dermatology (Dermatology)        Patient Active Problem List   Diagnosis   • OSTEOARTHRITIS-HIP   • Osteoporosis   • DUODENAL ULCER   • METATARSAL SHAFT FX, RT 5TH - DOI 8/28/10   • Restless legs syndrome (RLS)   • OPAL (generalized anxiety disorder)   • DDD (degenerative disc disease), cervical   • AK (actinic keratosis)   • Chronic neck pain   • Elevated cholesterol with elevated triglycerides   • Anxiety   • Aortic valve sclerosis   • Non-seasonal allergic rhinitis         Past Medical History:   Diagnosis Date   • Anxiety 01/29/2020   • Aortic valve sclerosis 06/30/2021   • Calculus of kidney 06/2000   • Cancer (CMD)     Basil cell CA   • Esophageal reflux    • Motion sickness    • Osteoarthrosis, unspecified whether generalized or localized, pelvic region and thigh 2003    DJD present in right hip.   • OSTEOPOROSIS 12/02/2008    Confirmed by DEXA study, 12/2008.   • Peptic ulcer, unspecified site, unspecified as acute or chronic, without mention of hemorrhage, perforation, or obstruction 1990   • PONV  -Remote Alert Monitoring Note      Date/Time:  2025 9:45 AM  Patient Current Location: Home: [address] [place] [address]  Verified patients name and  as identifiers.    Rpm alert to be reviewed by the provider   red alert  pulse ox heart rate (49)  Vitals Recheck pulse ox heart rate (62)  Additional needs to be addressed by provider: No                   LPN contacted patient by telephone regarding red alert received   Background: KD,CHF,DM  Refer to 911 immediately if:  Patient unresponsive or unable to provide history  Change in cognition or sudden confusion  Patient unable to respond in complete sentences  Intense chest pain/tightness  Any concern for any clinical emergency  Red Alert: Provider response time of 1 hr required for any red alert requiring intervention  Yellow Alert: Provider response time of 3hr required for any escalated yellow alert  Patient Chief Complaint:  Heart Rate HR Triage  Are you having any Chest Pain? no   Are you having any Shortness of Breath? no   Are you having any dizziness? no   Are you feeling more fatigued or tired than normal? no   Are you having any other health concerns or issues? no     Clinical Interventions: Reviewed and followed up on alerts and treatments-Spoke to patient he denies chest pain, OB, dizziness states he was sitting around all morning when he checked his vitals, he has since been up doing things, his new HR is 62 no concerns at present time    Plan/Follow Up: Will continue to review, monitor and address alerts with follow up based on severity of symptoms and risk factors.  **For any new or worsening symptoms or you are concerned in anyway, please contact your Provider or report to the nearest Emergency Room.**                (postoperative nausea and vomiting)    • Unspecified epilepsy without mention of intractable epilepsy 1972    Seizure disorder, not for years         Past Surgical History:   Procedure Laterality Date   • Anterior cervical discectomy w/ fusion  2013    C5-6 ACDF   • Belpharoptosis repair     • Colonoscopy diagnostic  13    Dr. Preciado, WNL, F/U 10 years   • Cystourethroscopy /lithotripsy     • Neck surgery      mini lift   • Past surgical history      liposuction of abdomen & thighs         Social History     Tobacco Use   • Smoking status: Former     Current packs/day: 0.00     Average packs/day: 0.5 packs/day for 30.0 years (15.0 ttl pk-yrs)     Types: Cigarettes     Start date: 1956     Quit date: 1986     Years since quittin.6   • Smokeless tobacco: Never   Vaping Use   • Vaping Use: never used   Substance Use Topics   • Alcohol use: Yes     Alcohol/week: 4.0 standard drinks of alcohol     Types: 4 Standard drinks or equivalent per week     Comment: 2-6 drinks per week (wine)   • Drug use: No     Drug use:    Drug Use:    No              Family History   Problem Relation Age of Onset   • Osteoarthritis Mother    • Dementia/Alzheimers Mother         diagnosed in late 70s   • Hypertension Father    • Alcohol Abuse Father         recovering alcoholic   • Diabetes Father         diet controlled   • Hyperlipidemia Father    • Cancer, Breast Sister 60   • Alcohol Abuse Brother    • Gastrointestinal Brother         Crohn's   • Alcohol Abuse Brother         drug abuser       Current Outpatient Medications   Medication Sig Dispense Refill   • montelukast (SINGULAIR) 10 MG tablet Take 1 tablet by mouth nightly. For allergies 30 tablet 1   • calcium (OYST-CHRISTOPH) 500 MG tablet Take one daily 90 tablet 0   • MAGNESIUM OXIDE PO      • alendronate (FOSAMAX) 70 MG tablet TAKE 1 TABLET BY MOUTH ONE TIME A WEEK 13 tablet 0   • rosuvastatin (CRESTOR) 5 MG tablet Take 1 tablet by mouth nightly. 90 tablet 3   •  acetaminophen-codeine (TYLENOL/CODEINE #4) 300-60 MG per tablet Take 1 tablet by mouth every 6 hours as needed for Pain. 30 tablet 0   • diazePAM (VALIUM) 5 MG tablet Take 1 tablet by mouth 2 times daily as needed for Anxiety. 60 tablet 0   • Garlic 1000 MG Cap      • Ascorbic Acid (VITAMIN C) 500 MG tablet Take 500 mg by mouth daily.     • Multiple Vitamins-Minerals ([facility] PO)      • cholecalciferol (VITAMIN D3) 25 mcg (1,000 units) tablet Take by mouth daily.     • vitamin E 400 UNIT capsule Take 1 capsule by mouth weekly 30 capsule 0   • Omega-3 Fatty Acids (FISH OIL) 1000 MG capsule 1 capsule twice daily 180 capsule 3     No current facility-administered medications for this visit.        The following items on the Medicare Health Risk Assessment were found to be positive  3.) During the past 4 weeks, how would you rate your health?: Fair     6 b.) How many servings of High Fiber / Whole Grain Foods to you have each day ( 1 serving = 1 cup cold cereal, 1/2 cup cooked cereal, 1 slice bread): 1 per day     11e.) Tiredness or Fatigue: Often     11h.) Problems with your hearing: Often         Vision and Hearing screens: No results found.    Advance care planning documents on file - yes     Cognitive/Functional Status: no evidence of cognitive dysfunction by direct observation    Opioid Review: Saba is not taking opioid medications.    Recent PHQ 2/9 Score:    PHQ 2:  PHQ 2 Score Adult PHQ 2 Score Adult PHQ 2 Interpretation Little interest or pleasure in activity?   7/31/2023  10:29 AM 0 No further screening needed 0       PHQ 9:       DEPRESSION ASSESSMENT/PLAN:  Depression screening is negative no further plan needed.     Body mass index is 23.05 kg/m².    BMI ASSESSMENT/PLAN:  Patient BMI is within normal range.     Constitutional: Appears well-developed and well-nourished. No distress.   Head: Normocephalic and atraumatic.   Cardiovascular: Normal rate, regular rhythm, normal heart sounds. No murmur  heard.  Pulmonary/Chest: Effort normal and breath sounds normal. No wheezes or respiratory distress.  Musculoskeletal: Normal Gait. Moving all extremities.  Neurological: Alert and oriented.  Skin: Skin is warm and dry.   Psychiatric: Normal mood and affect. Behavior is normal. Normal speech/language, rate, volume and coherence.    A/P:  1. Medicare annual wellness visit, subsequent  -  - Subsequent Medicare Wellness Visit - Select if billing add'l E/M    2. Non-seasonal allergic rhinitis, unspecified trigger  Trial of singulair, continue water intake.  - montelukast (SINGULAIR) 10 MG tablet; Take 1 tablet by mouth nightly. For allergies  Dispense: 30 tablet; Refill: 1    3. Unintentional weight loss  Stable, will monitor, may be due to possible hyperPTH    4. Abdominal cramping  Stable, will monitor, may be due to possible hyperPTH. Declined med or GI referral.    Needed follow up:  None    See orders.   See Patient Instructions section.   Return in about 1 year (around 7/31/2024) for Medicare Wellness Visit.

## 2025-01-07 ENCOUNTER — CARE COORDINATION (OUTPATIENT)
Dept: CASE MANAGEMENT | Age: 70
End: 2025-01-07

## 2025-01-07 NOTE — CARE COORDINATION
Date/Time:  1/7/2025 9:00 AM  LPN attempted to reach patient by telephone regarding red alert weight increase  in remote patient monitoring program. No voicemail unable to leave HIPAA compliant message requesting a return call. Will attempt to reach patient again.

## 2025-01-07 NOTE — CARE COORDINATION
Date/Time:  1/7/2025 1:02 PM  LPN attempted to reach patient by telephone regarding red alert weight increase  in remote patient monitoring program. No voicemail unable to leave HIPAA compliant message requesting a return call. Will attempt to reach patient again.

## 2025-01-10 ENCOUNTER — APPOINTMENT (OUTPATIENT)
Dept: GENERAL RADIOLOGY | Age: 70
DRG: 309 | End: 2025-01-10
Payer: COMMERCIAL

## 2025-01-10 ENCOUNTER — HOSPITAL ENCOUNTER (INPATIENT)
Age: 70
LOS: 4 days | Discharge: HOME | DRG: 309 | End: 2025-01-15
Attending: EMERGENCY MEDICINE
Payer: COMMERCIAL

## 2025-01-10 ENCOUNTER — HOSPITAL ENCOUNTER (OUTPATIENT)
Dept: OTHER | Age: 70
Discharge: HOME | End: 2025-01-10
Payer: COMMERCIAL

## 2025-01-10 ENCOUNTER — CARE COORDINATION (OUTPATIENT)
Dept: CARE COORDINATION | Age: 70
End: 2025-01-10

## 2025-01-10 VITALS
OXYGEN SATURATION: 100 % | HEART RATE: 57 BPM | SYSTOLIC BLOOD PRESSURE: 112 MMHG | RESPIRATION RATE: 16 BRPM | BODY MASS INDEX: 30.88 KG/M2 | DIASTOLIC BLOOD PRESSURE: 68 MMHG | WEIGHT: 180 LBS

## 2025-01-10 DIAGNOSIS — I47.20 VENTRICULAR TACHYCARDIA (HCC): Primary | ICD-10-CM

## 2025-01-10 DIAGNOSIS — I42.9 CARDIOMYOPATHY, UNSPECIFIED TYPE (HCC): ICD-10-CM

## 2025-01-10 LAB
ANTI-XA UNFRAC HEPARIN: >2 IU/L
BASOPHILS # BLD: 0.03 K/UL (ref 0–0.2)
BASOPHILS NFR BLD: 1 % (ref 0–2)
CA-I BLD-SCNC: 1.05 MMOL/L (ref 1.13–1.33)
EOSINOPHIL # BLD: 0.15 K/UL (ref 0–0.44)
EOSINOPHILS RELATIVE PERCENT: 3 % (ref 1–4)
ERYTHROCYTE [DISTWIDTH] IN BLOOD BY AUTOMATED COUNT: 16.5 % (ref 11.8–14.4)
HCT VFR BLD AUTO: 37.6 % (ref 40.7–50.3)
HGB BLD-MCNC: 11.6 G/DL (ref 13–17)
IMM GRANULOCYTES # BLD AUTO: <0.03 K/UL (ref 0–0.3)
IMM GRANULOCYTES NFR BLD: 0 %
INR PPP: 1.3
LYMPHOCYTES NFR BLD: 1.51 K/UL (ref 1.1–3.7)
LYMPHOCYTES RELATIVE PERCENT: 28 % (ref 24–43)
MCH RBC QN AUTO: 27 PG (ref 25.2–33.5)
MCHC RBC AUTO-ENTMCNC: 30.9 G/DL (ref 28.4–34.8)
MCV RBC AUTO: 87.6 FL (ref 82.6–102.9)
MONOCYTES NFR BLD: 0.62 K/UL (ref 0.1–1.2)
MONOCYTES NFR BLD: 11 % (ref 3–12)
NEUTROPHILS NFR BLD: 57 % (ref 36–65)
NEUTS SEG NFR BLD: 3.15 K/UL (ref 1.5–8.1)
NRBC BLD-RTO: 0 PER 100 WBC
PARTIAL THROMBOPLASTIN TIME: 30.4 SEC (ref 23–36.5)
PLATELET # BLD AUTO: 203 K/UL (ref 138–453)
PMV BLD AUTO: 11.6 FL (ref 8.1–13.5)
PROTHROMBIN TIME: 16.4 SEC (ref 11.7–14.9)
RBC # BLD AUTO: 4.29 M/UL (ref 4.21–5.77)
RBC # BLD: ABNORMAL 10*6/UL
WBC OTHER # BLD: 5.5 K/UL (ref 3.5–11.3)

## 2025-01-10 PROCEDURE — 84484 ASSAY OF TROPONIN QUANT: CPT

## 2025-01-10 PROCEDURE — 71045 X-RAY EXAM CHEST 1 VIEW: CPT

## 2025-01-10 PROCEDURE — 84100 ASSAY OF PHOSPHORUS: CPT

## 2025-01-10 PROCEDURE — 96365 THER/PROPH/DIAG IV INF INIT: CPT

## 2025-01-10 PROCEDURE — 96374 THER/PROPH/DIAG INJ IV PUSH: CPT

## 2025-01-10 PROCEDURE — 82330 ASSAY OF CALCIUM: CPT

## 2025-01-10 PROCEDURE — 85610 PROTHROMBIN TIME: CPT

## 2025-01-10 PROCEDURE — 5A2204Z RESTORATION OF CARDIAC RHYTHM, SINGLE: ICD-10-PCS

## 2025-01-10 PROCEDURE — 93005 ELECTROCARDIOGRAM TRACING: CPT | Performed by: INTERNAL MEDICINE

## 2025-01-10 PROCEDURE — 99212 OFFICE O/P EST SF 10 MIN: CPT

## 2025-01-10 PROCEDURE — 2500000003 HC RX 250 WO HCPCS

## 2025-01-10 PROCEDURE — 84443 ASSAY THYROID STIM HORMONE: CPT

## 2025-01-10 PROCEDURE — 85025 COMPLETE CBC W/AUTO DIFF WBC: CPT

## 2025-01-10 PROCEDURE — 96375 TX/PRO/DX INJ NEW DRUG ADDON: CPT

## 2025-01-10 PROCEDURE — 85730 THROMBOPLASTIN TIME PARTIAL: CPT

## 2025-01-10 PROCEDURE — 85520 HEPARIN ASSAY: CPT

## 2025-01-10 PROCEDURE — 83880 ASSAY OF NATRIURETIC PEPTIDE: CPT

## 2025-01-10 PROCEDURE — 80048 BASIC METABOLIC PNL TOTAL CA: CPT

## 2025-01-10 PROCEDURE — 02HV33Z INSERTION OF INFUSION DEVICE INTO SUPERIOR VENA CAVA, PERCUTANEOUS APPROACH: ICD-10-PCS

## 2025-01-10 PROCEDURE — 83735 ASSAY OF MAGNESIUM: CPT

## 2025-01-10 PROCEDURE — 6360000002 HC RX W HCPCS

## 2025-01-10 PROCEDURE — 2580000003 HC RX 258

## 2025-01-10 PROCEDURE — 99285 EMERGENCY DEPT VISIT HI MDM: CPT

## 2025-01-10 RX ORDER — LIDOCAINE HCL/PF 100 MG/5ML
100 SYRINGE (ML) INJECTION ONCE
Status: DISCONTINUED | OUTPATIENT
Start: 2025-01-10 | End: 2025-01-10

## 2025-01-10 RX ORDER — HEPARIN SODIUM 10000 [USP'U]/100ML
5-30 INJECTION, SOLUTION INTRAVENOUS CONTINUOUS
Status: DISCONTINUED | OUTPATIENT
Start: 2025-01-10 | End: 2025-01-10

## 2025-01-10 RX ORDER — HEPARIN SODIUM 1000 [USP'U]/ML
2000 INJECTION, SOLUTION INTRAVENOUS; SUBCUTANEOUS PRN
Status: DISCONTINUED | OUTPATIENT
Start: 2025-01-10 | End: 2025-01-12

## 2025-01-10 RX ORDER — HEPARIN SODIUM 1000 [USP'U]/ML
60 INJECTION, SOLUTION INTRAVENOUS; SUBCUTANEOUS ONCE
Status: DISCONTINUED | OUTPATIENT
Start: 2025-01-10 | End: 2025-01-10

## 2025-01-10 RX ORDER — LIDOCAINE HYDROCHLORIDE ANHYDROUS AND DEXTROSE MONOHYDRATE 5; 400 G/100ML; MG/100ML
1 INJECTION, SOLUTION INTRAVENOUS CONTINUOUS
Status: DISCONTINUED | OUTPATIENT
Start: 2025-01-10 | End: 2025-01-11

## 2025-01-10 RX ORDER — HEPARIN SODIUM 1000 [USP'U]/ML
60 INJECTION, SOLUTION INTRAVENOUS; SUBCUTANEOUS PRN
Status: DISCONTINUED | OUTPATIENT
Start: 2025-01-10 | End: 2025-01-10

## 2025-01-10 RX ORDER — HEPARIN SODIUM 1000 [USP'U]/ML
4000 INJECTION, SOLUTION INTRAVENOUS; SUBCUTANEOUS PRN
Status: DISCONTINUED | OUTPATIENT
Start: 2025-01-10 | End: 2025-01-12

## 2025-01-10 RX ORDER — MAGNESIUM SULFATE HEPTAHYDRATE 40 MG/ML
4000 INJECTION, SOLUTION INTRAVENOUS ONCE
Status: COMPLETED | OUTPATIENT
Start: 2025-01-10 | End: 2025-01-10

## 2025-01-10 RX ORDER — LIDOCAINE HCL/PF 100 MG/5ML
1 SYRINGE (ML) INJECTION ONCE
Status: COMPLETED | OUTPATIENT
Start: 2025-01-10 | End: 2025-01-10

## 2025-01-10 RX ORDER — HEPARIN SODIUM 10000 [USP'U]/100ML
5-30 INJECTION, SOLUTION INTRAVENOUS CONTINUOUS
Status: DISCONTINUED | OUTPATIENT
Start: 2025-01-10 | End: 2025-01-12

## 2025-01-10 RX ORDER — MIDODRINE HYDROCHLORIDE 5 MG/1
5 TABLET ORAL ONCE
Status: COMPLETED | OUTPATIENT
Start: 2025-01-11 | End: 2025-01-11

## 2025-01-10 RX ORDER — HEPARIN SODIUM 1000 [USP'U]/ML
30 INJECTION, SOLUTION INTRAVENOUS; SUBCUTANEOUS PRN
Status: DISCONTINUED | OUTPATIENT
Start: 2025-01-10 | End: 2025-01-10

## 2025-01-10 RX ADMIN — HEPARIN SODIUM 12 UNITS/KG/HR: 10000 INJECTION, SOLUTION INTRAVENOUS at 23:40

## 2025-01-10 RX ADMIN — LIDOCAINE HYDROCHLORIDE 1 MG/MIN: 4 INJECTION, SOLUTION INTRAVENOUS at 23:08

## 2025-01-10 RX ADMIN — LIDOCAINE HYDROCHLORIDE 81.6 MG: 20 INJECTION INTRAVENOUS at 23:15

## 2025-01-10 RX ADMIN — MAGNESIUM SULFATE HEPTAHYDRATE 4000 MG: 40 INJECTION, SOLUTION INTRAVENOUS at 22:36

## 2025-01-10 RX ADMIN — AMIODARONE HYDROCHLORIDE 150 MG: 50 INJECTION, SOLUTION INTRAVENOUS at 22:35

## 2025-01-10 RX ADMIN — Medication 1 MG/MIN: at 22:49

## 2025-01-10 NOTE — CARE COORDINATION
Care Transitions Note    Follow Up Call     Patient Current Location:  Home: [address] [place] [address]    Care Transition Nurse contacted the patient by telephone. Verified name and  as identifiers.    Additional needs identified to be addressed with provider   No needs identified                 Method of communication with provider: none.    Care Summary Note: Patient reached for follow up, discussed care management episodes and RPM. Patient has been followed by CTN/ACM since 24, active in RPM for 132 days. Discussed overall goal of independence in self-monitoring for chronic condition management. Patient concerned about diet and not eating right for HF, he has had gradual weight gain over 10 days. Confirms has own home scale. He agrees to RD consult. Will extend for consult and end at next contact. He continues under care of home health RN and CHF clinic. Agrees to follow up next week.     Plan of care updates since last contact:  Review of patient management of conditions/medications: RPM, CHF clinic follow up  Home Health: [facility]        Advance Care Planning:   Does patient have an Advance Directive: reviewed during previous call, see note. .    Medication Review:  Full medication reconciliation completed during previous call.    Remote Patient Monitoring:  Offered patient enrollment in the Remote Patient Monitoring (RPM) program for in-home monitoring: Yes, patient enrolled; current status is activated and monitoring.    Assessments:  Care Transitions Subsequent and Final Call    Schedule Follow Up Appointment with PCP: Completed  Subsequent and Final Calls  Do you have any ongoing symptoms?: No  Have your medications changed?: No  Do you have any questions related to your medications?: No  Do you currently have any active services?: No  Are you currently active with any services?: Outpatient/Community Services, Home Health  Do you have any needs or concerns that I can assist you with?:

## 2025-01-10 NOTE — PROGRESS NOTES
Date:  1/10/2025  Time:  1:50 PM    CHF Clinic at [facility]    Office: [phone] Fax: [phone]    Re:  Claudio Graham   Patient : 1955    Vital Signs: /68   Pulse 57   Resp 16   Wt 81.6 kg (180 lb)   SpO2 100%   BMI 30.88 kg/m²                                                   No results for input(s): \"CBC\", \"HGB\", \"HCT\", \"WBC\", \"PLATELET\", \"NA\", \"K\", \"CL\", \"CO2\", \"BUN\", \"CREATININE\", \"GLUCOSE\", \"BNP\", \"INR\" in the last 72 hours.     Respiratory:    Assessment  Charting Type: Reassessment    Breath Sounds  Right Upper Lobe: Clear  Right Middle Lobe: Clear  Right Lower Lobe: Clear  Left Upper Lobe: Clear  Left Lower Lobe: Clear    Cough/Sputum  Cough: Dry  Frequency: Occasional         Peripheral Vascular  RLE Edema: None  LLE Edema: None    Future Appointments   Date Time Provider Department Center   2025  2:50 PM Yadira Guidry APRN - CNP [facility] None   2025  1:00 PM [facility]F CLIN[facility]  [facility]F [facility]   3/11/2025  1:50 PM Yadira Guidry APRN - CNP [facility] None   3/17/2025  1:00 PM Elder Corley MD [facility]   3/31/2025  1:00 PM Bhavna Mar APRN - CNP [facility]   2025 12:15 PM Venecia Ordonez DPM [facility]   2025  2:30 PM Vanessa Mckenna APRN - CNP [facility]   2025  1:00 PM Radha Shankar MD Resp [facility]      Complaints: No new complaints     Physician Orders No new orders     Comment : weight up 4 pounds from last visit eating different over holidays, Discussed in detail low sodium diet 2000mg per day and 64 ounces of fluid per day. Has no pedal edema, lungs are clear. Seeing [facility] in March no longer following with , See's Vanessa Mckenna was told to continue potassium 20 meq po bid from her, We will see in 1 month 25 knows to call if weight gain 3 to 5 pounds from 1 day to the next, or increased SOB. Currently showing no

## 2025-01-11 PROBLEM — I42.9 CARDIOMYOPATHY (HCC): Status: ACTIVE | Noted: 2024-08-19

## 2025-01-11 PROBLEM — I47.20 VENTRICULAR TACHYCARDIA (HCC): Status: ACTIVE | Noted: 2025-01-11

## 2025-01-11 PROBLEM — I47.20 VENTRICULAR TACHYARRHYTHMIA (HCC): Status: ACTIVE | Noted: 2025-01-11

## 2025-01-11 LAB
ANION GAP SERPL CALCULATED.3IONS-SCNC: 11 MMOL/L (ref 9–16)
ANION GAP SERPL CALCULATED.3IONS-SCNC: 12 MMOL/L (ref 9–16)
ANTI-XA UNFRAC HEPARIN: >2 IU/L
BNP SERPL-MCNC: 4969 PG/ML (ref 0–125)
BUN SERPL-MCNC: 34 MG/DL (ref 8–23)
BUN SERPL-MCNC: 34 MG/DL (ref 8–23)
CALCIUM SERPL-MCNC: 8.4 MG/DL (ref 8.6–10.4)
CALCIUM SERPL-MCNC: 8.9 MG/DL (ref 8.6–10.4)
CHLORIDE SERPL-SCNC: 102 MMOL/L (ref 98–107)
CHLORIDE SERPL-SCNC: 98 MMOL/L (ref 98–107)
CO2 SERPL-SCNC: 25 MMOL/L (ref 20–31)
CO2 SERPL-SCNC: 25 MMOL/L (ref 20–31)
CREAT SERPL-MCNC: 1.9 MG/DL (ref 0.7–1.2)
CREAT SERPL-MCNC: 1.9 MG/DL (ref 0.7–1.2)
EKG ATRIAL RATE: 159 BPM
EKG ATRIAL RATE: 49 BPM
EKG ATRIAL RATE: 71 BPM
EKG P AXIS: 19 DEGREES
EKG P AXIS: 38 DEGREES
EKG P-R INTERVAL: 258 MS
EKG P-R INTERVAL: 278 MS
EKG Q-T INTERVAL: 316 MS
EKG Q-T INTERVAL: 480 MS
EKG Q-T INTERVAL: 536 MS
EKG QRS DURATION: 180 MS
EKG QRS DURATION: 220 MS
EKG QRS DURATION: 226 MS
EKG QTC CALCULATION (BAZETT): 484 MS
EKG QTC CALCULATION (BAZETT): 512 MS
EKG QTC CALCULATION (BAZETT): 521 MS
EKG R AXIS: -63 DEGREES
EKG R AXIS: -79 DEGREES
EKG R AXIS: 180 DEGREES
EKG T AXIS: -42 DEGREES
EKG T AXIS: 106 DEGREES
EKG T AXIS: 77 DEGREES
EKG VENTRICULAR RATE: 158 BPM
EKG VENTRICULAR RATE: 49 BPM
EKG VENTRICULAR RATE: 71 BPM
GFR, ESTIMATED: 37 ML/MIN/1.73M2
GFR, ESTIMATED: 37 ML/MIN/1.73M2
GLUCOSE BLD-MCNC: 117 MG/DL (ref 75–110)
GLUCOSE BLD-MCNC: 140 MG/DL (ref 75–110)
GLUCOSE BLD-MCNC: 192 MG/DL (ref 75–110)
GLUCOSE BLD-MCNC: 94 MG/DL (ref 75–110)
GLUCOSE SERPL-MCNC: 148 MG/DL (ref 74–99)
GLUCOSE SERPL-MCNC: 313 MG/DL (ref 74–99)
MAGNESIUM SERPL-MCNC: 3.4 MG/DL (ref 1.6–2.4)
MRSA, DNA, NASAL: NEGATIVE
PARTIAL THROMBOPLASTIN TIME: 32 SEC (ref 23–36.5)
PARTIAL THROMBOPLASTIN TIME: 38.1 SEC (ref 23–36.5)
PARTIAL THROMBOPLASTIN TIME: 59 SEC (ref 23–36.5)
PARTIAL THROMBOPLASTIN TIME: 84.1 SEC (ref 23–36.5)
PHOSPHATE SERPL-MCNC: 3.1 MG/DL (ref 2.5–4.5)
POTASSIUM SERPL-SCNC: 3.2 MMOL/L (ref 3.7–5.3)
POTASSIUM SERPL-SCNC: 3.8 MMOL/L (ref 3.7–5.3)
SODIUM SERPL-SCNC: 135 MMOL/L (ref 136–145)
SODIUM SERPL-SCNC: 138 MMOL/L (ref 136–145)
SPECIMEN DESCRIPTION: NORMAL
TROPONIN I SERPL HS-MCNC: 51 NG/L (ref 0–22)
TROPONIN I SERPL HS-MCNC: 84 NG/L (ref 0–22)
TSH SERPL DL<=0.05 MIU/L-ACNC: 1.88 UIU/ML (ref 0.27–4.2)

## 2025-01-11 PROCEDURE — 6370000000 HC RX 637 (ALT 250 FOR IP): Performed by: EMERGENCY MEDICINE

## 2025-01-11 PROCEDURE — 99291 CRITICAL CARE FIRST HOUR: CPT | Performed by: INTERNAL MEDICINE

## 2025-01-11 PROCEDURE — 36415 COLL VENOUS BLD VENIPUNCTURE: CPT

## 2025-01-11 PROCEDURE — 6360000002 HC RX W HCPCS

## 2025-01-11 PROCEDURE — 6370000000 HC RX 637 (ALT 250 FOR IP)

## 2025-01-11 PROCEDURE — 85520 HEPARIN ASSAY: CPT

## 2025-01-11 PROCEDURE — 99222 1ST HOSP IP/OBS MODERATE 55: CPT | Performed by: INTERNAL MEDICINE

## 2025-01-11 PROCEDURE — 93005 ELECTROCARDIOGRAM TRACING: CPT | Performed by: INTERNAL MEDICINE

## 2025-01-11 PROCEDURE — 2500000003 HC RX 250 WO HCPCS

## 2025-01-11 PROCEDURE — 85730 THROMBOPLASTIN TIME PARTIAL: CPT

## 2025-01-11 PROCEDURE — 2060000000 HC ICU INTERMEDIATE R&B

## 2025-01-11 PROCEDURE — 82947 ASSAY GLUCOSE BLOOD QUANT: CPT

## 2025-01-11 PROCEDURE — 87641 MR-STAPH DNA AMP PROBE: CPT

## 2025-01-11 RX ORDER — SODIUM CHLORIDE 0.9 % (FLUSH) 0.9 %
5-40 SYRINGE (ML) INJECTION EVERY 12 HOURS SCHEDULED
Status: DISCONTINUED | OUTPATIENT
Start: 2025-01-11 | End: 2025-01-15 | Stop reason: HOSPADM

## 2025-01-11 RX ORDER — CALCIUM GLUCONATE 20 MG/ML
2000 INJECTION, SOLUTION INTRAVENOUS ONCE
Status: COMPLETED | OUTPATIENT
Start: 2025-01-11 | End: 2025-01-11

## 2025-01-11 RX ORDER — POTASSIUM CHLORIDE 1500 MG/1
40 TABLET, EXTENDED RELEASE ORAL ONCE
Status: COMPLETED | OUTPATIENT
Start: 2025-01-11 | End: 2025-01-11

## 2025-01-11 RX ORDER — ALBUTEROL SULFATE 90 UG/1
2 INHALANT RESPIRATORY (INHALATION) 4 TIMES DAILY PRN
Status: DISCONTINUED | OUTPATIENT
Start: 2025-01-11 | End: 2025-01-15 | Stop reason: HOSPADM

## 2025-01-11 RX ORDER — GLUCAGON 1 MG/ML
1 KIT INJECTION PRN
Status: DISCONTINUED | OUTPATIENT
Start: 2025-01-11 | End: 2025-01-15 | Stop reason: HOSPADM

## 2025-01-11 RX ORDER — ONDANSETRON 4 MG/1
4 TABLET, ORALLY DISINTEGRATING ORAL EVERY 8 HOURS PRN
Status: DISCONTINUED | OUTPATIENT
Start: 2025-01-11 | End: 2025-01-15 | Stop reason: HOSPADM

## 2025-01-11 RX ORDER — MIDODRINE HYDROCHLORIDE 5 MG/1
10 TABLET ORAL
Status: DISCONTINUED | OUTPATIENT
Start: 2025-01-11 | End: 2025-01-11

## 2025-01-11 RX ORDER — MIDODRINE HYDROCHLORIDE 5 MG/1
15 TABLET ORAL
Status: DISCONTINUED | OUTPATIENT
Start: 2025-01-11 | End: 2025-01-15 | Stop reason: HOSPADM

## 2025-01-11 RX ORDER — ALLOPURINOL 100 MG/1
150 TABLET ORAL DAILY
Status: DISCONTINUED | OUTPATIENT
Start: 2025-01-11 | End: 2025-01-15 | Stop reason: HOSPADM

## 2025-01-11 RX ORDER — LEVOTHYROXINE SODIUM 88 UG/1
88 TABLET ORAL DAILY
Status: DISCONTINUED | OUTPATIENT
Start: 2025-01-11 | End: 2025-01-15 | Stop reason: HOSPADM

## 2025-01-11 RX ORDER — ISOSORBIDE MONONITRATE 30 MG/1
30 TABLET, EXTENDED RELEASE ORAL DAILY
Status: DISCONTINUED | OUTPATIENT
Start: 2025-01-11 | End: 2025-01-15 | Stop reason: HOSPADM

## 2025-01-11 RX ORDER — SODIUM CHLORIDE 0.9 % (FLUSH) 0.9 %
5-40 SYRINGE (ML) INJECTION PRN
Status: DISCONTINUED | OUTPATIENT
Start: 2025-01-11 | End: 2025-01-15 | Stop reason: HOSPADM

## 2025-01-11 RX ORDER — DEXTROSE MONOHYDRATE 100 MG/ML
INJECTION, SOLUTION INTRAVENOUS CONTINUOUS PRN
Status: DISCONTINUED | OUTPATIENT
Start: 2025-01-11 | End: 2025-01-15 | Stop reason: HOSPADM

## 2025-01-11 RX ORDER — MAGNESIUM SULFATE IN WATER 40 MG/ML
2000 INJECTION, SOLUTION INTRAVENOUS PRN
Status: DISCONTINUED | OUTPATIENT
Start: 2025-01-11 | End: 2025-01-15 | Stop reason: HOSPADM

## 2025-01-11 RX ORDER — ACETAMINOPHEN 325 MG/1
650 TABLET ORAL EVERY 6 HOURS PRN
Status: DISCONTINUED | OUTPATIENT
Start: 2025-01-11 | End: 2025-01-15 | Stop reason: HOSPADM

## 2025-01-11 RX ORDER — ATORVASTATIN CALCIUM 80 MG/1
80 TABLET, FILM COATED ORAL DAILY
Status: DISCONTINUED | OUTPATIENT
Start: 2025-01-11 | End: 2025-01-15 | Stop reason: HOSPADM

## 2025-01-11 RX ORDER — INSULIN LISPRO 100 [IU]/ML
0-8 INJECTION, SOLUTION INTRAVENOUS; SUBCUTANEOUS
Status: DISCONTINUED | OUTPATIENT
Start: 2025-01-11 | End: 2025-01-15 | Stop reason: HOSPADM

## 2025-01-11 RX ORDER — FUROSEMIDE 40 MG/1
40 TABLET ORAL DAILY
Status: DISCONTINUED | OUTPATIENT
Start: 2025-01-11 | End: 2025-01-12

## 2025-01-11 RX ORDER — AMIODARONE HYDROCHLORIDE 200 MG/1
200 TABLET ORAL DAILY
Status: DISCONTINUED | OUTPATIENT
Start: 2025-01-12 | End: 2025-01-12

## 2025-01-11 RX ORDER — ACETAMINOPHEN 650 MG/1
650 SUPPOSITORY RECTAL EVERY 6 HOURS PRN
Status: DISCONTINUED | OUTPATIENT
Start: 2025-01-11 | End: 2025-01-15 | Stop reason: HOSPADM

## 2025-01-11 RX ORDER — METOPROLOL SUCCINATE 50 MG/1
50 TABLET, EXTENDED RELEASE ORAL DAILY
Status: DISCONTINUED | OUTPATIENT
Start: 2025-01-11 | End: 2025-01-13

## 2025-01-11 RX ORDER — SODIUM CHLORIDE 9 MG/ML
INJECTION, SOLUTION INTRAVENOUS PRN
Status: DISCONTINUED | OUTPATIENT
Start: 2025-01-11 | End: 2025-01-15 | Stop reason: HOSPADM

## 2025-01-11 RX ORDER — POTASSIUM CHLORIDE 29.8 MG/ML
20 INJECTION INTRAVENOUS PRN
Status: DISCONTINUED | OUTPATIENT
Start: 2025-01-11 | End: 2025-01-15 | Stop reason: HOSPADM

## 2025-01-11 RX ORDER — ONDANSETRON 2 MG/ML
4 INJECTION INTRAMUSCULAR; INTRAVENOUS EVERY 6 HOURS PRN
Status: DISCONTINUED | OUTPATIENT
Start: 2025-01-11 | End: 2025-01-15 | Stop reason: HOSPADM

## 2025-01-11 RX ORDER — POLYETHYLENE GLYCOL 3350 17 G/17G
17 POWDER, FOR SOLUTION ORAL DAILY PRN
Status: DISCONTINUED | OUTPATIENT
Start: 2025-01-11 | End: 2025-01-11

## 2025-01-11 RX ORDER — POLYETHYLENE GLYCOL 3350 17 G/17G
17 POWDER, FOR SOLUTION ORAL DAILY
Status: DISCONTINUED | OUTPATIENT
Start: 2025-01-12 | End: 2025-01-15 | Stop reason: HOSPADM

## 2025-01-11 RX ORDER — POTASSIUM CHLORIDE 7.45 MG/ML
10 INJECTION INTRAVENOUS PRN
Status: DISCONTINUED | OUTPATIENT
Start: 2025-01-11 | End: 2025-01-15 | Stop reason: HOSPADM

## 2025-01-11 RX ADMIN — SODIUM CHLORIDE, PRESERVATIVE FREE 10 ML: 5 INJECTION INTRAVENOUS at 20:24

## 2025-01-11 RX ADMIN — ALLOPURINOL 150 MG: 100 TABLET ORAL at 08:33

## 2025-01-11 RX ADMIN — TICAGRELOR 90 MG: 90 TABLET ORAL at 20:24

## 2025-01-11 RX ADMIN — POTASSIUM CHLORIDE 40 MEQ: 1500 TABLET, EXTENDED RELEASE ORAL at 02:36

## 2025-01-11 RX ADMIN — MIDODRINE HYDROCHLORIDE 5 MG: 5 TABLET ORAL at 00:14

## 2025-01-11 RX ADMIN — HEPARIN SODIUM 4000 UNITS: 1000 INJECTION INTRAVENOUS; SUBCUTANEOUS at 06:47

## 2025-01-11 RX ADMIN — POLYETHYLENE GLYCOL 3350 17 G: 17 POWDER, FOR SOLUTION ORAL at 20:32

## 2025-01-11 RX ADMIN — MIDODRINE HYDROCHLORIDE 10 MG: 5 TABLET ORAL at 08:34

## 2025-01-11 RX ADMIN — HEPARIN SODIUM 18 UNITS/KG/HR: 10000 INJECTION, SOLUTION INTRAVENOUS at 22:33

## 2025-01-11 RX ADMIN — CALCIUM GLUCONATE 2000 MG: 20 INJECTION, SOLUTION INTRAVENOUS at 02:36

## 2025-01-11 RX ADMIN — MIDODRINE HYDROCHLORIDE 15 MG: 5 TABLET ORAL at 11:43

## 2025-01-11 RX ADMIN — LEVOTHYROXINE SODIUM 88 MCG: 88 TABLET ORAL at 08:34

## 2025-01-11 RX ADMIN — TICAGRELOR 90 MG: 90 TABLET ORAL at 08:34

## 2025-01-11 RX ADMIN — MIDODRINE HYDROCHLORIDE 15 MG: 5 TABLET ORAL at 17:16

## 2025-01-11 RX ADMIN — ATORVASTATIN CALCIUM 80 MG: 80 TABLET, FILM COATED ORAL at 08:34

## 2025-01-11 RX ADMIN — HEPARIN SODIUM 2000 UNITS: 1000 INJECTION INTRAVENOUS; SUBCUTANEOUS at 20:24

## 2025-01-11 RX ADMIN — SODIUM CHLORIDE, PRESERVATIVE FREE 10 ML: 5 INJECTION INTRAVENOUS at 08:39

## 2025-01-11 ASSESSMENT — PAIN SCALES - GENERAL
PAINLEVEL_OUTOF10: 0

## 2025-01-11 NOTE — PLAN OF CARE
Problem: Chronic Conditions and Co-morbidities  Goal: Patient's chronic conditions and co-morbidity symptoms are monitored and maintained or improved  1/11/2025 1541 by Katherine Pool RN  Outcome: Progressing  Flowsheets (Taken 1/11/2025 0830)  Care Plan - Patient's Chronic Conditions and Co-Morbidity Symptoms are Monitored and Maintained or Improved:   Monitor and assess patient's chronic conditions and comorbid symptoms for stability, deterioration, or improvement   Collaborate with multidisciplinary team to address chronic and comorbid conditions and prevent exacerbation or deterioration  1/11/2025 0623 by Kayla Enamorado RN  Outcome: Progressing  Flowsheets (Taken 1/11/2025 0330)  Care Plan - Patient's Chronic Conditions and Co-Morbidity Symptoms are Monitored and Maintained or Improved:   Monitor and assess patient's chronic conditions and comorbid symptoms for stability, deterioration, or improvement   Collaborate with multidisciplinary team to address chronic and comorbid conditions and prevent exacerbation or deterioration   Update acute care plan with appropriate goals if chronic or comorbid symptoms are exacerbated and prevent overall improvement and discharge     Problem: Discharge Planning  Goal: Discharge to home or other facility with appropriate resources  1/11/2025 1541 by Katherine Pool RN  Outcome: Progressing  Flowsheets (Taken 1/11/2025 0830)  Discharge to home or other facility with appropriate resources: Identify barriers to discharge with patient and caregiver  1/11/2025 0623 by Kayla Enamorado RN  Outcome: Progressing  Flowsheets  Taken 1/11/2025 0330  Discharge to home or other facility with appropriate resources:   Identify barriers to discharge with patient and caregiver   Arrange for needed discharge resources and transportation as appropriate   Identify discharge learning needs (meds, wound care, etc)   Refer to discharge planning if patient needs post-hospital services based

## 2025-01-11 NOTE — RT PROTOCOL NOTE
RT Inhaler-Nebulizer Bronchodilator Protocol Note    There is a bronchodilator order in the chart from a provider indicating to follow the RT Bronchodilator Protocol and there is an “Initiate RT Inhaler-Nebulizer Bronchodilator Protocol” order as well (see protocol at bottom of note).    CXR Findings:  XR CHEST PORTABLE    Result Date: 1/10/2025  1. Cardiomegaly with probable mild pulmonary vascular congestion. 2. No focal consolidation or pulmonary edema.       The findings from the last RT Protocol Assessment were as follows:   History Pulmonary Disease: None or smoker <15 pack years  Respiratory Pattern: Regular pattern and RR 12-20 bpm  Breath Sounds: Clear breath sounds  Cough: Strong, spontaneous, non-productive  Indication for Bronchodilator Therapy:    Bronchodilator Assessment Score: 0    Aerosolized bronchodilator medication orders have been revised according to the RT Inhaler-Nebulizer Bronchodilator Protocol below.    Respiratory Therapist to perform RT Therapy Protocol Assessment initially then follow the protocol.  Repeat RT Therapy Protocol Assessment PRN for score 0-3 or on second treatment, BID, and PRN for scores above 3.    No Indications - adjust the frequency to every 6 hours PRN wheezing or bronchospasm, if no treatments needed after 48 hours then discontinue using Per Protocol order mode.     If indication present, adjust the RT bronchodilator orders based on the Bronchodilator Assessment Score as indicated below.  Use Inhaler orders unless patient has one or more of the following: on home nebulizer, not able to hold breath for 10 seconds, is not alert and oriented, cannot activate and use MDI correctly, or respiratory rate 25 breaths per minute or more, then use the equivalent nebulizer order(s) with same Frequency and PRN reasons based on the score.  If a patient is on this medication at home then do not decrease Frequency below that used at home.    0-3 - enter or revise RT bronchodilator

## 2025-01-11 NOTE — ED NOTES
Pt brought to ED by Medic 19 for Vtach   Per EMS pt has A!CD and it has fired 3 times since 2100  Pt states that he just felt \"off\" today  Py A&O x 4 on arrival, states he takes his medications daily  Last seen cardiology on 12/9/24   Pt denies having any current chest pain or SOB   Pt denies having any dizziness, nausea or vomiting   Breathing is non labored and no acute distress is noted.   Pt resting on stretcher, call light within reach.white board updated

## 2025-01-11 NOTE — ED NOTES
Dr Gorge Aaron Rt Horacio RN and writer at beside to complete sychronized cardioversion at bedside for V Tach

## 2025-01-11 NOTE — PROGRESS NOTES
visited patient per nurse referral, indicating patient would like to complete Advance Directive paperwork. Per report, patient arrived to the hospital due to \"VTACH.\" Patient is also under \"isolation.\"  visited with patient at bedside, who shared about his well-being. Patient indicated that he has paperwork completed, however, he does not have a copy in his possession. Per patient, his cousin, Cathy, assisted him in completing paperwork this year when he was undergoing neurological testing. Cathy is named as a decision maker and patient's longtime friend and landlord, Jason, is also named. Patient was coping well and indicated no further needs at time of visit.  encouraged patient to have family bring paperwork into the hospital to be scanned into his medical record, or to have family fax it to the unit, if possible. Chaplains can make follow-up visit, per request. Chaplains can be reached 24/7 via [employer].    valarie Jean     01/11/25 0532   Encounter Summary   Encounter Overview/Reason Initial Encounter   Service Provided For Patient   Referral/Consult From Nurse   Support System Family members;Friends/neighbors   Last Encounter  01/10/25   Complexity of Encounter Low   Begin Time 0532   End Time  0558   Total Time Calculated 26 min   Spiritual/Emotional needs   Type Spiritual Support   Advance Care Planning   Type ACP conversation   Assessment/Intervention/Outcome   Assessment Calm;Coping   Intervention Sustaining Presence/Ministry of presence   Outcome Receptive   Plan and Referrals   Plan/Referrals Continue to visit, (comment)  (as needed)

## 2025-01-11 NOTE — PROGRESS NOTES
Skin is warm and dry.   Neurological:      Mental Status: He is alert and oriented to person, place, and time.   Psychiatric:         Mood and Affect: Mood normal.         Behavior: Behavior normal.          INVESTIGATIONS     Diagnostic Labs:  CBC:   Recent Labs     01/10/25  2148   WBC 5.5   RBC 4.29   HGB 11.6*   HCT 37.6*   MCV 87.6   RDW 16.5*        BMP:   Recent Labs     01/10/25  0049 01/11/25 0447   * 138   K 3.2* 3.8   CL 98 102   CO2 25 25   PHOS 3.1  --    BUN 34* 34*   CREATININE 1.9* 1.9*     BNP: No results for input(s): \"BNP\" in the last 72 hours.  PT/INR:   Recent Labs     01/10/25  2148   PROTIME 16.4*   INR 1.3     APTT:   Recent Labs     01/11/25  0447 01/11/25  1340 01/11/25  1852   APTT 38.1* 84.1* 59.0*     CARDIAC ENZYMES: No results for input(s): \"CKMB\", \"CKMBINDEX\", \"TROPONINI\" in the last 72 hours.    Invalid input(s): \"CKTOTAL;3\"  FASTING LIPID PANEL:  Lab Results   Component Value Date    CHOL 126 09/21/2023    HDL 55 (H) 09/21/2023    TRIG 86 09/21/2023     LIVER PROFILE: No results for input(s): \"AST\", \"ALT\", \"BILIDIR\", \"BILITOT\", \"ALKPHOS\" in the last 72 hours.    Invalid input(s): \"ALB\"   MICROBIOLOGY:   Lab Results   Component Value Date/Time    CULTURE NO GROWTH 5 DAYS 08/17/2024 01:40 AM    CULTURE NO GROWTH 5 DAYS 08/17/2024 01:40 AM       Imaging:   XR CHEST PORTABLE    Result Date: 1/10/2025  1. Cardiomegaly with probable mild pulmonary vascular congestion. 2. No focal consolidation or pulmonary edema.       ASSESSMENT & PLAN     ASSESSMENT / PLAN:     IMPRESSION  This is a 69 y.o. male with a PMHx significant for systolic and diastolic heart failure with EF 15 to 20%, AICD in place who presented with spontaneous firing of AICD x 3 Patient     Principal Problem:    Ventricular tachyarrhythmia (HCC)  Active Problems:    TANNER variably compliant with BiPAP    CAD (coronary artery disease) s/p stenting of L anterior descending artery 2004    Ischemic cardiomyopathy     Chronic gout    Morbid obesity    Normocytic normochromic anemia    Dyslipidemia    Primary hypertension    MGUS (monoclonal gammopathy of unknown significance)    AICD (automatic cardioverter/defibrillator) present    Paroxysmal A-fib (HCC)    Cardiomyopathy (HCC)    Ventricular tachycardia (HCC)  Resolved Problems:    * No resolved hospital problems. *       Persistent ventricular tachycardia:  -S/p amiodarone and lidocaine infusion followed by synchronized cardioversion in the ED  -Currently in normal sinus rhythm  -Cardiology on board, recommended continuing per oral amiodarone  -Avoid all AV kali blocking agent  -Follow-up with Dr. Soto    Mild systolic and diastolic heart failure:  Nonischemic cardiomyopathy:  -Last echo on 12/7/2024 revealed EF 10 to 15% with severely dilated LV and abnormal diastolic function moderate pulmonary hypertension.  -Continue Lasix 40 Mg, metoprolol succinate 50 Mg  -Strict input and output  -Fluid restriction of 1800 mL    Coronary artery disease s/p multiple PCI with TIFFANY:  -Most recent PCI in in July 2024, showed ostial LAD disease  successful PCI with TIFFANY to ostial LAD  - On Eliquis and Brilinta at home    Chronic hypotension:  -On midodrine at home  -Continue same treatment    Paroxysmal A-fib:   [id number] Score 5.   -On Eliquis and amiodarone at home  -Currently in normal sinus rhythm  -Continue p.o. amiodarone    CKD stage IIIb:  -Baseline creatinine 1.5-1.7 range, [facility]  -Monitor input and output  -Avoid nephrotoxic drugs    Diabetes mellitus type 2:  -Most recent HbA1c 6.5  -On Farxiga 10 Mg and metformin 1 g at home  - Continue medium intensity sliding scale  -ACHS glucose checks  -Hypoglycemia protocol    TANNER:   -Had sleep study in October 2024 consistent with moderate sleep apnea  -Continue CPAP while asleep    Gout:  -On allopurinol at home  -Continue same treatment    Hypothyroidism:  - Most recent TSH 1.80, on

## 2025-01-11 NOTE — PLAN OF CARE
Critical care team - Resident sign-out to medicine service      Date and time: 1/11/2025 1:02 PM  Patient's name:  Claudio Graham  Medical Record Number: [id number]  Patient's account/billing number: [id number]  Patient's YOB: 1955  Age: 69 y.o.  Date of Admission: 1/10/2025  9:41 PM  Length of stay during current admission: 0    Primary Care Physician: Vanessa Mckenna APRN - CNP    Code Status: Full Code    Mode of physician to physician communication:        [x] Via telephone   [] In person     Date and time of sign-out: 1/11/2025 1:02 PM    Accepting Internal Medicine resident: Dr. Tellez    Accepting Medicine team: IM Team 1    Accepting team's attending: Dr. Aleksandra Gooden    Patient's current ICU Bed:  3023     Patient's assigned bed on floor:  543        [] Med-Surg Monitored [x] Step-down       [] Psychiatry ICU       [] Psych floor     Reason for ICU admission:     Patient will be admitted to medical ICU due to his presentation with ventricular tachycardia requiring amiodarone and lidocaine infusions , synchronized cardioversion and high risk for decompensation.    ICU course summary:     69-year-old male with past medical history significant for combined systolic and diastolic heart failure with reduced ejection fraction with EF of 10 to 15% secondary to ischemic cardiomyopathy with AICD in place, CAD status post PCI to LAD on 7/2024, chronic hypotension secondary to heart failure on midodrine, hyperlipidemia, type 2 diabetes mellitus, CKD stage IIIa, paroxysmal A-fib on Eliquis, hypothyroidism, obstructive sleep apnea, gout, iron deficiency anemia.     Patient initially was brought in by EMS due to chief complaint of chest pain, mild shortness of breath, patient reportedly called EMS after his AICD shocked him 3 times today, en route to the hospital, patient was given 1 g of procainamide and 300 mg of aspirin by EMS.  Patient denies any chronic anginal chest pain prior to this event,  08/15/2023    Complex renal cyst 10/12/2022    Acute cardiac pulmonary edema (HCC) 08/02/2021    Hypothyroidism due to medication 07/02/2021    Bilateral kidney masses 08/11/2020    Postprocedural stricture of urethra at fossa navicularis 08/11/2020    Gross hematuria 07/15/2020    Cardiopulmonary arrest 07/05/2020    Chronic systolic (congestive) heart failure (HCC) 03/19/2020    Acute on chronic heart failure ([facility])     Hypokalemia 09/01/2019    Coronary angioplasty status 11/07/2017    Acute respiratory failure with hypoxia and hypercapnia     AICD (automatic cardioverter/defibrillator) present 09/13/2017    Paroxysmal A-fib (HCC) 09/13/2017    Type 2 diabetes mellitus with chronic kidney disease ([facility]) 06/01/2017    MGUS (monoclonal gammopathy of unknown significance) 01/22/2016    ST elevation myocardial infarction involving left anterior descending (LAD) coronary artery ([facility]) 09/10/2013    Primary hypertension 03/30/2012    Chronic kidney disease, stage II (mild) 09/25/2011    Chronic gout 09/25/2011    Morbid obesity 09/25/2011    Normocytic normochromic anemia 09/25/2011    Dyslipidemia 09/25/2011    Iron deficiency anemia 09/25/2011    Anxiety disorder  09/25/2011    DJD (degenerative joint disease) 09/25/2011    Diabetic retinopathy of both eyes with macular edema associated with diabetes mellitus due to underlying condition, unspecified retinopathy severity ([facility]) 09/25/2011       Recommended Follow-up:     F/U EP and cardiology recommendations      Above mentioned assessment and plan was discussed by me with the admitting medicine resident. The medicine team assigned to the patient by medicine admitting resident will be following up the patient from now onwards on the floor.     Jud Yun MD  PGY - 3  Department of Emergency Medicine/ Critical care  [facility], [place] ([place])             1/11/2025, 1:02 PM

## 2025-01-11 NOTE — H&P
Critical Care - History and Physical Examination    Patient's name:  Claudio Graham  Medical Record Number: [id number]  Patient's account/billing number: [id number]  Patient's YOB: 1955  Age: 69 y.o.  Date of Admission: 1/10/2025  9:41 PM  Reason of ICU admission:   Date of History and Physical Examination: 1/11/2025      Primary Care Physician: Vanessa Mckenna APRN - CNP  Attending Physician:    Code Status: Full Code    Chief complaint:     Reason for ICU admission:       History Of Present Illness:     Briefly patient is a 69-year-old male with past medical history significant for combined systolic and diastolic heart failure with reduced ejection fraction with EF of 10 to 15% secondary to ischemic cardiomyopathy with AICD in place, CAD status post PCI to LAD on 7/2024, chronic hypotension secondary to heart failure on midodrine, hyperlipidemia, type 2 diabetes mellitus, CKD stage IIIa, paroxysmal A-fib on Eliquis, hypothyroidism, obstructive sleep apnea, gout, iron deficiency anemia.    Patient initially was brought in by EMS due to chief complaint of chest pain, mild shortness of breath, patient reportedly called EMS after his AICD shocked him 3 times today, en route to the hospital, patient was given 1 g of procainamide and 300 mg of aspirin by EMS.  Patient denies any chronic anginal chest pain prior to this event, reports that his chest pain started after his AICD started firing.  After patient was brought into the emergency department, he was found to be in ventricular tachycardia with a pulse, hemodynamically stable with blood pressures at his baseline.  He was started on amiodarone bolus followed by infusion which was unsuccessful and subsequently started on lidocaine bolus and infusion.  He continued to be in V. tach, synchronized cardioversion was done along with 10 of etomidate as per cardiology recommendations and patient converted to sinus rhythm.  He was also given 4 g of

## 2025-01-11 NOTE — PLAN OF CARE
Problem: Chronic Conditions and Co-morbidities  Goal: Patient's chronic conditions and co-morbidity symptoms are monitored and maintained or improved  1/11/2025 1802 by Suha Michael RN  Outcome: Progressing  1/11/2025 1541 by Katherine Pool RN  Outcome: Progressing  1/11/2025 0623 by Kayla Enamorado RN  Outcome: Progressing     Problem: Discharge Planning  Goal: Discharge to home or other facility with appropriate resources  1/11/2025 1802 by Suha Michael RN  Outcome: Progressing  1/11/2025 1541 by Katherine Pool RN  Outcome: Progressing  1/11/2025 0623 by Kayla Enamorado RN  Outcome: Progressing     Problem: Skin/Tissue Integrity  Goal: Absence of new skin breakdown  1/11/2025 1802 by Suha Michael RN  Outcome: Progressing  1/11/2025 1541 by Katherine Pool RN  Outcome: Progressing  1/11/2025 0623 by Kayla Enamorado RN  Outcome: Progressing     Problem: Safety - Adult  Goal: Free from fall injury  1/11/2025 1802 by Suha Michael RN  Outcome: Progressing  1/11/2025 1541 by Katherine Pool RN  Outcome: Progressing  1/11/2025 0623 by Kayla Enamorado RN  Outcome: Progressing     Problem: ABCDS Injury Assessment  Goal: Absence of physical injury  1/11/2025 1802 by Suha Michael RN  Outcome: Progressing  1/11/2025 1541 by Katherine Pool RN  Outcome: Progressing  1/11/2025 0623 by Kayla Enamorado RN  Outcome: Progressing     Problem: Cardiovascular - Adult  Goal: Maintains optimal cardiac output and hemodynamic stability  Outcome: Progressing  Goal: Absence of cardiac dysrhythmias or at baseline  Outcome: Progressing     Problem: Skin/Tissue Integrity - Adult  Goal: Skin integrity remains intact  Outcome: Progressing  Goal: Incisions, wounds, or drain sites healing without S/S of infection  Outcome: Progressing  Goal: Oral mucous membranes remain intact  Outcome: Progressing     Problem: Musculoskeletal - Adult  Goal: Return mobility to safest level of function  Outcome:

## 2025-01-11 NOTE — ED PROVIDER NOTES
[facility]     Emergency Department     Faculty Attestation  9:54 PM EST      I performed a history and physical examination of the patient and discussed management with the resident. I have reviewed and agree with the resident’s findings including all diagnostic interpretations, and treatment plans as written. Any areas of disagreement are noted on the chart. I was personally present for the key portions of any procedures. I have documented in the chart those procedures where I was not present during the key portions. I have reviewed the emergency nurses triage note. I agree with the chief complaint, past medical history, past surgical history, allergies, medications, social and family history as documented unless otherwise noted below. Documentation of the HPI, Physical Exam and Medical Decision Making performed by scribes is based on my personal performance of the HPI, PE and MDM. For Physician Assistant/ Nurse Practitioner cases/documentation I have personally evaluated this patient and have completed at least one if not all key elements of the E/M (history, physical exam, and MDM). Additional findings are as noted.    Patient with h/o CAD, CHF, EF of 15%  Aicd has gone off 3 times today. V tach per ems, got 1 gram of procainamide by EMS  No infectious concerns    Patient here with wide complex tachycardia around 150s. Awake alert, pulses in place    EKG shows Ventricular tachycardia   Check labs.  Patient reports he is taking meds and is compliant  Amiodarone bolus then gtt  Cards consult  Admission  May add lidocaine.     Yola Sol D.O, M.P.H  Attending Emergency Medicine Physician         Yola Sol, DO  01/10/25 4165      Post cardioversion EKG   Sinu rhythm with rate of 71 RBBB noted  Left axis deviation, left Anterior Fascicular Block   Q waves in anterior leads with anterior infarct and q waves in lateral leads        Ebenezer

## 2025-01-11 NOTE — ED NOTES
Notified Dr Pennington of hypotension  Amio bolus completed and Amio drip started   Dr Pennington and Dr Sol at bedside to evaluate pt

## 2025-01-11 NOTE — ACP (ADVANCE CARE PLANNING)
Advance Care Planning     Advance Care Planning Inpatient Note  [facility] Department    Today's Date: 1/11/2025  Unit: [facility]- MICU    Received request from HealthCare Provider. Upon review of chart and communication with care team, patient's decision making abilities are not in question. Patient was/were present in the room during visit.    Goals of ACP Conversation:  Discuss advance care planning documents.    Health Care Decision Makers:       Primary Decision Maker (Active): Cathy Ingram - Other Relative - [phone]     Primary Decision Maker: Jason Lewis - Landlord & Longtime Friend - [phone]     Advance Care Planning Documents (Patient Wishes):  Per patient, patient's power of  paperwork is in the possession of his family member, Cathy (listed above).      Assessment:   learned from bedside nurse that patient had inquired about health care power of  paperwork. Patient was awake and watching TV, when  visited. Per patient, his cousin, Cathy, assisted him in completing paperwork and he does not currently have a copy.     Interventions:  Requested patient/family to submit existing document for our records: Healthcare Power of /Advance Directive Appointment of Health Care Agent  Provided education on documents for clarity and greater understanding    Care Preferences Communicated:   No    Outcomes/Plan:  ACP Discussion: Postponed    Electronically signed by MARCELO Muñoz on 1/11/2025 at 5:52 AM

## 2025-01-11 NOTE — FLOWSHEET NOTE
Patient transferred via wheelchair to room 543 without incident.  All personal belongings sent with patient upon transfer  - see documented belongings from 1/11/25 0121.    Electronically signed by KAN MORA RN on 1/11/2025 at 3:37 PM

## 2025-01-11 NOTE — CONSULTS
[facility]    Consult / H&P               Today's Date: 1/11/2025  Patient Name: Claudio Graham  Date of admission: 1/10/2025  9:41 PM  Patient's age: 69 y.o., 1955  Admission Dx: Ventricular tachyarrhythmia (HCC) [I47.20]  Ventricular tachycardia (HCC) [I47.20]    Requesting Physician: Gage Gooden MD    Cardiac Evaluation Reason:       V. tach swith shocks x3 s/p 1 g procainamide, Amio and Lidocaine infusion        History Obtained From: patient and chart review     History of Present Illness:    This patient 69 y.o. years old with PMH significant for HFrEF 15% December 2024 s/p ICD 10 years ago, CAD s/p TIFFANY to ostial LAD 7/2024, patient was consistently in V. tach required shock post LHC and remains in VT, moderate to severe MR, ICM, Hx of VT/VF arrest in 2016 with subsequent implantation of [employer] ellipse VR ICD, PAF on amiodarone and Eliquis, HTN, HLD, type II DM, CKD stage IIIb    Presented to ED via EMS with chief plaint of chest pain with 3 shocks from his AICD over last 45 minutes prior to arrival, states that since ED is about 10 years old and his cardiologist told him that he needs replacement in the next 6 months, on arrival patient was found to be in VT received 1 g of procainamide by EMS as well as aspirin 324, in ED patient was still in VT, received mag 4 g, trial of amnio bolus and infusion was unsuccessful, subsequently patient given lidocaine bolus and infusion and remains in VT, patient became hypotensive and ultimately underwent synchronized cardioversion with 10 mg of etomidate and patient converted to sinus rhythm, started on IV heparin drip and transferred to MICU.    At time of my eval, patient is AOx4, on 2 L NC.  Soft BP with MAP in 60s, sinus bradycardia with HR 40s-50s  Currently amiodarone and lidocaine drip on hold, remains on heparin drip.  Labs were significant for elevated proBNP 4969, troponin 51, hypokalemia presentation    Past Medical  bleeding    PHYSICAL EXAM:      BP 98/61   Pulse 56   Temp 98.2 °F (36.8 °C) (Oral)   Resp 15   Ht 1.626 m (5' 4\")   Wt 83.1 kg (183 lb 3.2 oz)   SpO2 100%   BMI 31.45 kg/m²    Constitutional and General Appearance: alert, cooperative, in no distress   HEENT: atraumatic, normocephalic.   Respiratory:  Clear to auscultation bilaterally  Cardiovascular:  Regular S1 and S2.  No JVD  Peripheral pulses are symmetrical and full   Abdomen:   Soft, non tender   Bowel sounds present  Extremities:  No Le edema or cyanosis   Neurological:  Deferred     DATA:    EKG:   Normal sinus rhythm, no acute ST-T wave changes     12/7/24     ECHO (TTE) COMPLETE (PRN CONTRAST/BUBBLE/STRAIN/3D) 07/20/2024 12:19 PM (Final)         Left Ventricle: Severely reduced left ventricular systolic function with a visually estimated EF of 10 -15%. EF by 2D Simpsons Biplane is 15%. Left ventricle is severely dilated. Normal wall thickness. See diagram for wall motion findings. Abnormal diastolic function.    Right Ventricle: Right ventricle is moderately dilated. Reduced systolic function. AICD leads seen in the right heart.    Aortic Valve: Trileaflet valve. Mildly calcified aortic valve leaflets. Mild regurgitation.    Mitral Valve: Moderate to severe regurgitation. PISA radius of 0.9 cm suggests moderate regurgitation. MR volume of 62 ml suggests severe regurgitation.    Tricuspid Valve: Mild to moderate regurgitation. Moderately elevated RVSP, consistent with moderate pulmonary hypertension.    Left Atrium: Left atrium is mildly dilated.    Image quality is fair. Contrast used: Definity. Technically difficult study due to patient's body habitus.        LAST CATH:   07/18/24     CARDIAC PROCEDURE 07/18/2024  2:57 PM (Final)     Conclusion  Images from the original result were not included.  Access: Right Femoral artery     Procedure: After informed consent was obtained with explanation of the risks and benefits, patient was brought to the

## 2025-01-11 NOTE — PLAN OF CARE
Problem: Chronic Conditions and Co-morbidities  Goal: Patient's chronic conditions and co-morbidity symptoms are monitored and maintained or improved  Outcome: Progressing  Flowsheets (Taken 1/11/2025 0330)  Care Plan - Patient's Chronic Conditions and Co-Morbidity Symptoms are Monitored and Maintained or Improved:   Monitor and assess patient's chronic conditions and comorbid symptoms for stability, deterioration, or improvement   Collaborate with multidisciplinary team to address chronic and comorbid conditions and prevent exacerbation or deterioration   Update acute care plan with appropriate goals if chronic or comorbid symptoms are exacerbated and prevent overall improvement and discharge     Problem: Discharge Planning  Goal: Discharge to home or other facility with appropriate resources  Outcome: Progressing  Flowsheets  Taken 1/11/2025 0330  Discharge to home or other facility with appropriate resources:   Identify barriers to discharge with patient and caregiver   Arrange for needed discharge resources and transportation as appropriate   Identify discharge learning needs (meds, wound care, etc)   Refer to discharge planning if patient needs post-hospital services based on physician order or complex needs related to functional status, cognitive ability or social support system  Taken 1/11/2025 0250  Discharge to home or other facility with appropriate resources:   Identify barriers to discharge with patient and caregiver   Arrange for needed discharge resources and transportation as appropriate   Identify discharge learning needs (meds, wound care, etc)   Refer to discharge planning if patient needs post-hospital services based on physician order or complex needs related to functional status, cognitive ability or social support system     Problem: Skin/Tissue Integrity  Goal: Absence of new skin breakdown  Description: 1.  Monitor for areas of redness and/or skin breakdown  2.  Assess vascular access sites

## 2025-01-11 NOTE — ED PROVIDER NOTES
[facility] EMERGENCY DEPARTMENT  Emergency Department Encounter  Emergency Medicine Resident     Pt Name:Claudio Graham  MRN: [id number]  Birthdate 1955  Date of evaluation: 1/10/25  PCP:  Vanessa Mckenna APRN - CNP  Note Started: 9:50 PM EST      CHIEF COMPLAINT       Chief Complaint   Patient presents with    Chest Pain     AICD went off 3 times in the last 45 minutes        HISTORY OF PRESENT ILLNESS  (Location/Symptom, Timing/Onset, Context/Setting, Quality, Duration, Modifying Factors, Severity.)      Claudio Graham is a 69 y.o. male presenting to ED for    CC's: Chest pain    Patient brought in via EMS for chest pain for the past 30 minutes to an hour.  Patient received 324 mg of aspirin.  Patient also received 1 g of procainamide as per protocol.  Patient has received 3 shocks today.  Patient endorses that he had this placed via Dr. Nj.  Patient endorses that his ICD is greater than 10 years old.  Patient endorses that he was told that his pacemaker will need to be replaced within the next 6 months and that was a couple months ago.  Patient denies any chest pain prior to this event.    Notable PMHx: Heart failure with reduced ejection fraction last echo performed December 2024 with an EF approximately 15%, patient's last left heart cath was performed July 2024 with Satinder Nunes MD , coronary artery disease    Notable Home Meds: Atorvastatin, Lasix, allopurinol, metformin, metoprolol 50 mg daily, Synthroid, amiodarone 200 mg daily, potassium chloride, midodrine, Eliquis 5 mg twice daily, Farxiga, Imdur, Brilinta    PAST MEDICAL / SURGICAL / SOCIAL / FAMILY HISTORY      has a past medical history of Acute HFrEF (heart failure with reduced ejection fraction) (HCC), Acute on chronic combined systolic and diastolic congestive heart failure (HCC), Acute on chronic congestive heart failure (HCC), Acute respiratory failure with hypoxia, Anemia, Anxiety, CAD (coronary artery disease),  AV block/trifascicular block, prolonged QTc without acute ST-T wave changes      Ruperto Pennington DO  Emergency Medicine Resident  [place], [place]  1/11/25 12:19 AM EST      All EKG's are interpreted by the Emergency Department Physician who either signs or Co-signs this chart in the absence of a cardiologist.    EMERGENCY DEPARTMENT COURSE:    ED Course as of 01/11/25 0038   Fri Stu 10, 2025   2158 Calcium, Ionized(!): 1.05 [NICOLE]   2208 Discussed case with cardiology.  Cardiology endorses trial of amiodarone appropriate.  If patient remains in V. tach can also add lidocaine.  If patient remains in V. tach can seek ICU level care.  Heparin bolus and infusion appropriate given recent cath. [NICOLE]   2301 XR CHEST PORTABLE  IMPRESSION:  1. Cardiomegaly with probable mild pulmonary vascular congestion.  2. No focal consolidation or pulmonary edema.   [NICOLE]   2307 Anti-XA Unfrac Heparin: >2.00 [NICOLE]   Sat Jan 11, 2025   0029 Discussed case with critical care.  Critical care amendable to admission asking for admission orders and additional orders to be placed via ICU. [NICOLE]      ED Course User Index  [NICOLE] Ruperto Pennington DO       PROCEDURES:    PROCEDURE SEDATION NOTE    PATIENT NAME: Claudio Graham  MEDICAL RECORD NO. [id number]  DATE: 1/11/2025  ATTENDING PHYSICIAN: SIMONA Bar     PREOPERATIVE DIAGNOSIS:  cardioversion  POSTOPERATIVE DIAGNOSIS:  Same  PROCEDURE PERFORMED:   Procedural Sedation  PERFORMING PHYSICIAN: Ruperto Pennington DO.  The attending physician was present and supervising this procedure.      DISCUSSION:  Claudio Graham is a 69 y.o.-year-old male who requires procedural sedation for synchronized cardioversion.  The history and physical examination were reviewed and confirmed.      CONSENT: I have discussed with the patient and/or the patient representative the indication, alternatives, and the possible risks and/or complications of the planned

## 2025-01-12 LAB
ANION GAP SERPL CALCULATED.3IONS-SCNC: 12 MMOL/L (ref 9–16)
BASOPHILS # BLD: 0.04 K/UL (ref 0–0.2)
BASOPHILS NFR BLD: 0 % (ref 0–2)
BUN SERPL-MCNC: 29 MG/DL (ref 8–23)
CALCIUM SERPL-MCNC: 8.6 MG/DL (ref 8.6–10.4)
CHLORIDE SERPL-SCNC: 106 MMOL/L (ref 98–107)
CO2 SERPL-SCNC: 23 MMOL/L (ref 20–31)
CREAT SERPL-MCNC: 1.7 MG/DL (ref 0.7–1.2)
EOSINOPHIL # BLD: 0.23 K/UL (ref 0–0.44)
EOSINOPHILS RELATIVE PERCENT: 3 % (ref 1–4)
ERYTHROCYTE [DISTWIDTH] IN BLOOD BY AUTOMATED COUNT: 16.9 % (ref 11.8–14.4)
GFR, ESTIMATED: 43 ML/MIN/1.73M2
GLUCOSE BLD-MCNC: 120 MG/DL (ref 75–110)
GLUCOSE BLD-MCNC: 154 MG/DL (ref 75–110)
GLUCOSE BLD-MCNC: 157 MG/DL (ref 75–110)
GLUCOSE BLD-MCNC: 94 MG/DL (ref 75–110)
GLUCOSE SERPL-MCNC: 106 MG/DL (ref 74–99)
HCT VFR BLD AUTO: 38.6 % (ref 40.7–50.3)
HGB BLD-MCNC: 11.6 G/DL (ref 13–17)
IMM GRANULOCYTES # BLD AUTO: 0.03 K/UL (ref 0–0.3)
IMM GRANULOCYTES NFR BLD: 0 %
LYMPHOCYTES NFR BLD: 0.77 K/UL (ref 1.1–3.7)
LYMPHOCYTES RELATIVE PERCENT: 9 % (ref 24–43)
MAGNESIUM SERPL-MCNC: 3 MG/DL (ref 1.6–2.4)
MCH RBC QN AUTO: 27.3 PG (ref 25.2–33.5)
MCHC RBC AUTO-ENTMCNC: 30.1 G/DL (ref 28.4–34.8)
MCV RBC AUTO: 90.8 FL (ref 82.6–102.9)
MONOCYTES NFR BLD: 0.49 K/UL (ref 0.1–1.2)
MONOCYTES NFR BLD: 6 % (ref 3–12)
NEUTROPHILS NFR BLD: 83 % (ref 36–65)
NEUTS SEG NFR BLD: 7.42 K/UL (ref 1.5–8.1)
NRBC BLD-RTO: 0 PER 100 WBC
PARTIAL THROMBOPLASTIN TIME: 70.3 SEC (ref 23–36.5)
PARTIAL THROMBOPLASTIN TIME: 73.7 SEC (ref 23–36.5)
PLATELET # BLD AUTO: 187 K/UL (ref 138–453)
PMV BLD AUTO: 11.6 FL (ref 8.1–13.5)
POTASSIUM SERPL-SCNC: 3.6 MMOL/L (ref 3.7–5.3)
RBC # BLD AUTO: 4.25 M/UL (ref 4.21–5.77)
RBC # BLD: ABNORMAL 10*6/UL
SODIUM SERPL-SCNC: 141 MMOL/L (ref 136–145)
WBC OTHER # BLD: 9 K/UL (ref 3.5–11.3)

## 2025-01-12 PROCEDURE — 99232 SBSQ HOSP IP/OBS MODERATE 35: CPT | Performed by: INTERNAL MEDICINE

## 2025-01-12 PROCEDURE — 6370000000 HC RX 637 (ALT 250 FOR IP): Performed by: EMERGENCY MEDICINE

## 2025-01-12 PROCEDURE — 85730 THROMBOPLASTIN TIME PARTIAL: CPT

## 2025-01-12 PROCEDURE — 85025 COMPLETE CBC W/AUTO DIFF WBC: CPT

## 2025-01-12 PROCEDURE — 6370000000 HC RX 637 (ALT 250 FOR IP)

## 2025-01-12 PROCEDURE — 2060000000 HC ICU INTERMEDIATE R&B

## 2025-01-12 PROCEDURE — 82947 ASSAY GLUCOSE BLOOD QUANT: CPT

## 2025-01-12 PROCEDURE — 6370000000 HC RX 637 (ALT 250 FOR IP): Performed by: INTERNAL MEDICINE

## 2025-01-12 PROCEDURE — 2500000003 HC RX 250 WO HCPCS

## 2025-01-12 PROCEDURE — 99233 SBSQ HOSP IP/OBS HIGH 50: CPT | Performed by: INTERNAL MEDICINE

## 2025-01-12 PROCEDURE — 36415 COLL VENOUS BLD VENIPUNCTURE: CPT

## 2025-01-12 PROCEDURE — 99233 SBSQ HOSP IP/OBS HIGH 50: CPT | Performed by: NURSE PRACTITIONER

## 2025-01-12 PROCEDURE — 83735 ASSAY OF MAGNESIUM: CPT

## 2025-01-12 PROCEDURE — 80048 BASIC METABOLIC PNL TOTAL CA: CPT

## 2025-01-12 RX ORDER — AMIODARONE HYDROCHLORIDE 200 MG/1
200 TABLET ORAL 2 TIMES DAILY
Status: DISCONTINUED | OUTPATIENT
Start: 2025-01-12 | End: 2025-01-15 | Stop reason: HOSPADM

## 2025-01-12 RX ORDER — FUROSEMIDE 40 MG/1
40 TABLET ORAL DAILY
Status: DISCONTINUED | OUTPATIENT
Start: 2025-01-12 | End: 2025-01-15 | Stop reason: HOSPADM

## 2025-01-12 RX ORDER — POTASSIUM CHLORIDE 1500 MG/1
20 TABLET, EXTENDED RELEASE ORAL ONCE
Status: COMPLETED | OUTPATIENT
Start: 2025-01-12 | End: 2025-01-12

## 2025-01-12 RX ADMIN — SODIUM CHLORIDE, PRESERVATIVE FREE 10 ML: 5 INJECTION INTRAVENOUS at 07:46

## 2025-01-12 RX ADMIN — MIDODRINE HYDROCHLORIDE 15 MG: 5 TABLET ORAL at 17:04

## 2025-01-12 RX ADMIN — ISOSORBIDE MONONITRATE 30 MG: 30 TABLET, EXTENDED RELEASE ORAL at 07:46

## 2025-01-12 RX ADMIN — MIDODRINE HYDROCHLORIDE 15 MG: 5 TABLET ORAL at 07:46

## 2025-01-12 RX ADMIN — APIXABAN 5 MG: 5 TABLET, FILM COATED ORAL at 12:25

## 2025-01-12 RX ADMIN — ALLOPURINOL 150 MG: 100 TABLET ORAL at 07:46

## 2025-01-12 RX ADMIN — APIXABAN 5 MG: 5 TABLET, FILM COATED ORAL at 21:08

## 2025-01-12 RX ADMIN — FUROSEMIDE 40 MG: 40 TABLET ORAL at 13:37

## 2025-01-12 RX ADMIN — AMIODARONE HYDROCHLORIDE 200 MG: 200 TABLET ORAL at 21:08

## 2025-01-12 RX ADMIN — LEVOTHYROXINE SODIUM 88 MCG: 88 TABLET ORAL at 07:46

## 2025-01-12 RX ADMIN — ATORVASTATIN CALCIUM 80 MG: 80 TABLET, FILM COATED ORAL at 07:46

## 2025-01-12 RX ADMIN — POLYETHYLENE GLYCOL 3350 17 G: 17 POWDER, FOR SOLUTION ORAL at 07:47

## 2025-01-12 RX ADMIN — TICAGRELOR 90 MG: 90 TABLET ORAL at 21:08

## 2025-01-12 RX ADMIN — TICAGRELOR 90 MG: 90 TABLET ORAL at 07:46

## 2025-01-12 RX ADMIN — MIDODRINE HYDROCHLORIDE 15 MG: 5 TABLET ORAL at 12:25

## 2025-01-12 RX ADMIN — POTASSIUM CHLORIDE 20 MEQ: 1500 TABLET, EXTENDED RELEASE ORAL at 05:31

## 2025-01-12 RX ADMIN — SODIUM CHLORIDE, PRESERVATIVE FREE 10 ML: 5 INJECTION INTRAVENOUS at 21:08

## 2025-01-12 RX ADMIN — AMIODARONE HYDROCHLORIDE 200 MG: 200 TABLET ORAL at 07:46

## 2025-01-12 ASSESSMENT — PAIN SCALES - GENERAL: PAINLEVEL_OUTOF10: 0

## 2025-01-12 NOTE — PLAN OF CARE
Problem: Chronic Conditions and Co-morbidities  Goal: Patient's chronic conditions and co-morbidity symptoms are monitored and maintained or improved  1/12/2025 1609 by Bette Lopes RN  Outcome: Progressing  1/12/2025 0250 by Yuridia Lacy RN  Outcome: Progressing     Problem: Discharge Planning  Goal: Discharge to home or other facility with appropriate resources  1/12/2025 1609 by Bette Lopes RN  Outcome: Progressing  1/12/2025 0250 by Yuridia Lacy RN  Outcome: Progressing     Problem: Skin/Tissue Integrity  Goal: Absence of new skin breakdown  Description: 1.  Monitor for areas of redness and/or skin breakdown  2.  Assess vascular access sites hourly  3.  Every 4-6 hours minimum:  Change oxygen saturation probe site  4.  Every 4-6 hours:  If on nasal continuous positive airway pressure, respiratory therapy assess nares and determine need for appliance change or resting period.  1/12/2025 1609 by Bette Lopes RN  Outcome: Progressing  1/12/2025 0250 by Yuridia Lacy RN  Outcome: Progressing     Problem: Safety - Adult  Goal: Free from fall injury  1/12/2025 1609 by Bette Lopes RN  Outcome: Progressing  1/12/2025 0250 by Yuridia Lacy RN  Outcome: Progressing  Flowsheets (Taken 1/11/2025 2030)  Free From Fall Injury: Instruct family/caregiver on patient safety     Problem: ABCDS Injury Assessment  Goal: Absence of physical injury  1/12/2025 1609 by Bette Lopes RN  Outcome: Progressing  1/12/2025 0250 by Yuridia Lacy RN  Outcome: Progressing  Flowsheets (Taken 1/11/2025 2030)  Absence of Physical Injury: Implement safety measures based on patient assessment     Problem: Cardiovascular - Adult  Goal: Maintains optimal cardiac output and hemodynamic stability  1/12/2025 1609 by Bette Lopes RN  Outcome: Progressing  1/12/2025 0250 by Yuridia Lacy RN  Outcome: Progressing  Goal: Absence of cardiac dysrhythmias or at baseline  1/12/2025 1609 by Marianne

## 2025-01-12 NOTE — PLAN OF CARE
Problem: Chronic Conditions and Co-morbidities  Goal: Patient's chronic conditions and co-morbidity symptoms are monitored and maintained or improved  1/12/2025 0250 by Yuridia Lacy RN  Outcome: Progressing  1/11/2025 1802 by Suha Michael RN  Outcome: Progressing  1/11/2025 1541 by Katherine Pool RN  Outcome: Progressing  Flowsheets (Taken 1/11/2025 0830)  Care Plan - Patient's Chronic Conditions and Co-Morbidity Symptoms are Monitored and Maintained or Improved:   Monitor and assess patient's chronic conditions and comorbid symptoms for stability, deterioration, or improvement   Collaborate with multidisciplinary team to address chronic and comorbid conditions and prevent exacerbation or deterioration     Problem: Discharge Planning  Goal: Discharge to home or other facility with appropriate resources  1/12/2025 0250 by Yuridia Lacy RN  Outcome: Progressing  1/11/2025 1802 by Suha Michael RN  Outcome: Progressing  1/11/2025 1541 by Katherine Pool RN  Outcome: Progressing  Flowsheets (Taken 1/11/2025 0830)  Discharge to home or other facility with appropriate resources: Identify barriers to discharge with patient and caregiver     Problem: Skin/Tissue Integrity  Goal: Absence of new skin breakdown  Description: 1.  Monitor for areas of redness and/or skin breakdown  2.  Assess vascular access sites hourly  3.  Every 4-6 hours minimum:  Change oxygen saturation probe site  4.  Every 4-6 hours:  If on nasal continuous positive airway pressure, respiratory therapy assess nares and determine need for appliance change or resting period.  1/12/2025 0250 by Yuridia Lacy RN  Outcome: Progressing  1/11/2025 1802 by Suha Michael RN  Outcome: Progressing  1/11/2025 1541 by Katherine Pool RN  Outcome: Progressing     Problem: Safety - Adult  Goal: Free from fall injury  1/12/2025 0250 by Yuridia Lacy RN  Outcome: Progressing  Flowsheets (Taken 1/11/2025 2030)  Free From Fall  Injury: Instruct family/caregiver on patient safety  1/11/2025 1802 by Suha Michael RN  Outcome: Progressing  1/11/2025 1541 by Katherine Pool RN  Outcome: Progressing     Problem: ABCDS Injury Assessment  Goal: Absence of physical injury  1/12/2025 0250 by Yuridia Lacy RN  Outcome: Progressing  Flowsheets (Taken 1/11/2025 2030)  Absence of Physical Injury: Implement safety measures based on patient assessment  1/11/2025 1802 by Suha Michael RN  Outcome: Progressing  1/11/2025 1541 by Katherine Pool RN  Outcome: Progressing     Problem: Cardiovascular - Adult  Goal: Maintains optimal cardiac output and hemodynamic stability  1/12/2025 0250 by Yuridia Lacy RN  Outcome: Progressing  1/11/2025 1802 by Suha Michael RN  Outcome: Progressing  Goal: Absence of cardiac dysrhythmias or at baseline  1/12/2025 0250 by Yuridia Lacy RN  Outcome: Progressing  1/11/2025 1802 by Suha Michael RN  Outcome: Progressing     Problem: Skin/Tissue Integrity - Adult  Goal: Skin integrity remains intact  1/12/2025 0250 by Yuridia Lacy RN  Outcome: Progressing  Flowsheets (Taken 1/11/2025 2030)  Skin Integrity Remains Intact: Monitor for areas of redness and/or skin breakdown  1/11/2025 1802 by Suha Michael RN  Outcome: Progressing  Flowsheets (Taken 1/11/2025 0830 by Katherine Pool RN)  Skin Integrity Remains Intact:   Monitor for areas of redness and/or skin breakdown   Assess vascular access sites hourly  Goal: Incisions, wounds, or drain sites healing without S/S of infection  1/12/2025 0250 by Yuridia Lacy RN  Outcome: Progressing  Flowsheets (Taken 1/11/2025 2030)  Incisions, Wounds, or Drain Sites Healing Without Sign and Symptoms of Infection:   ADMISSION and DAILY: Assess and document risk factors for pressure ulcer development   TWICE DAILY: Assess and document skin integrity  1/11/2025 1802 by Suha Michael RN  Outcome: Progressing  Goal: Oral mucous membranes

## 2025-01-12 NOTE — CARE COORDINATION
Case Management Assessment  Initial Evaluation    Date/Time of Evaluation: 1/12/2025 11:00AM  Assessment Completed by: Cathy Maradiaga RN    If patient is discharged prior to next notation, then this note serves as note for discharge by case management.    Patient Name: Claudio Graham                   YOB: 1955  Diagnosis: Ventricular tachyarrhythmia (HCC) [I47.20]  Ventricular tachycardia (HCC) [I47.20]                   Date / Time: 1/10/2025  9:41 PM    Patient Admission Status: Inpatient   Readmission Risk (Low < 19, Mod (19-27), High > 27): Readmission Risk Score: 22.4    Current PCP: Vanessa Mckenna, APRN - CNP  PCP verified by CM? Yes (Vanessa Mckenna)    Chart Reviewed: Yes      History Provided by: Patient  Patient Orientation: Alert and Oriented, Person, Place, Situation    Patient Cognition: Alert    Hospitalization in the last 30 days (Readmission):  No    If yes, Readmission Assessment in CM Navigator will be completed.    Advance Directives:      Code Status: Full Code   Patient's Primary Decision Maker is: Legal Next of Kin    Primary Decision Maker (Active): Cathy Ingram - Other Relative - [phone]    Primary Decision Maker: Jason sherman - Friend - [phone]    Discharge Planning:    Patient lives with: Friends Type of Home: House  Primary Care Giver: Self  Patient Support Systems include: Friends/Neighbors, Family Members   Current Financial resources: Medicare, Medicaid  Current community resources: ECF/Home Care  Current services prior to admission: C-pap, Durable Medical Equipment            Current DME: Cpap, Glucometer            Type of Home Care services:  Nursing Services    ADLS  Prior functional level: Independent in ADLs/IADLs  Current functional level: Independent in ADLs/IADLs    PT AM-PAC:   /24  OT AM-PAC:   /24    Family can provide assistance at DC: No  Would you like Case Management to discuss the discharge plan with any other family members/significant

## 2025-01-12 NOTE — CONSULTS
[facility]  Inpatient Cardiology Consult             Date:   1/12/2025  Patient name: Claudio Graham  Date of admission:  1/10/2025  9:41 PM  MRN:   [id number]  YOB: 1955      Reason for consultation:  Ventricular tachycardia    CHIEF COMPLAINT:  ICD shocks     History Obtained From:  Patient and medical record    HISTORY OF PRESENT ILLNESS:      The patient is a 69 y.o gentleman with h/o DM, HTN, HLP, PAF, CAD, CM, VT/ VF, on amiodarone with ICD in-situ ( [employer] Ellipse VR from 2016), admitted 1/10/25 for recurrent VT with ICD shocks.  He recently had VT storm in July 2024 associated with acute occlusion of the ostial LAD and underwent successful PCI with TIFFANY and IABP; LVEF was 10% on ventriculogram and 10-15% by echo in Dec 2024.  He was doing well until the evening of admission, when he developed chest heaviness and SOB while straining on the commode.  This was followed by 3 ICD shocks per the patient's report, and he was brought by EMS to the ER where EKG showed VT, hemodynamically stable with  bpm.  He had been given 1 gm IV procainamide en route, then IV amiodarone bolus followed by lidocaine in the ER.  He remained in VT and was successfully cardioverted.  He has had no further VT and is currently back on oral amiodarone at 200 mg daily.      Past Medical History:   has a past medical history of Acute HFrEF (heart failure with reduced ejection fraction) ([facility]), Acute on chronic combined systolic and diastolic congestive heart failure ([facility]), Acute on chronic congestive heart failure ([facility]), Acute respiratory failure with hypoxia, Anemia, Anxiety, CAD (coronary artery disease), Cardiac defibrillator in place, Cardiomyopathy ([facility]), CHF (congestive heart failure) ([facility]), Chronic combined systolic and diastolic heart failure ([facility]) s/[ AICD placed, Chronic kidney disease, Complex sleep apnea syndrome, Diabetic retinopathy ([facility]), Diverticulitis, DJD (degenerative joint

## 2025-01-12 NOTE — PROGRESS NOTES
Progress Note   Patient's name:  Claudio Graham  Medical Record Number: [id number]  Patient's account/billing number: [id number]  Patient's YOB: 1955  Age: 69 y.o.  Date of Admission: 1/10/2025  9:41 PM  Reason of ICU admission:   Date of History and Physical Examination: 1/12/2025      Primary Care Physician: Vanessa Mckenna APRN - CNP  Attending Physician:    Code Status: Full Code    Chief complaint:   Chief Complaint   Patient presents with    Chest Pain     AICD went off 3 times in the last 45 minutes           History Of Present Illness:     Briefly patient is a 69-year-old male with past medical history significant for combined systolic and diastolic heart failure with reduced ejection fraction with EF of 10 to 15% secondary to ischemic cardiomyopathy with AICD in place, CAD status post PCI to LAD on 7/2024, chronic hypotension secondary to heart failure on midodrine, hyperlipidemia, type 2 diabetes mellitus, CKD stage IIIa, paroxysmal A-fib on Eliquis, hypothyroidism, obstructive sleep apnea, gout, iron deficiency anemia.    Patient initially was brought in by EMS due to chief complaint of chest pain, mild shortness of breath, patient reportedly called EMS after his AICD shocked him 3 times today, en route to the hospital, patient was given 1 g of procainamide and 300 mg of aspirin by EMS.  Patient denies any chronic anginal chest pain prior to this event, reports that his chest pain started after his AICD started firing.  After patient was brought into the emergency department, he was found to be in ventricular tachycardia with a pulse, hemodynamically stable with blood pressures at his baseline.  He was started on amiodarone bolus followed by infusion which was unsuccessful and subsequently started on lidocaine bolus and infusion.  He continued to be in V. tach, synchronized cardioversion was done along with 10 of etomidate as per cardiology recommendations and patient converted to

## 2025-01-12 NOTE — PROGRESS NOTES
[facility]   Progress Note                   Date:   1/12/2025  Patient name: Claudoi Graham  Date of admission:  1/10/2025  9:41 PM  MRN:   [id number]  YOB: 1955  PCP: Vanessa Mckenna, APRN - CNP    Reason for Admission: Ventricular tachyarrhythmia (HCC) [I47.20]  Ventricular tachycardia (HCC) [I47.20]    Subjective:       Clinical Changes / Abnormalities: Pt seen and examined in the room.  Patient resting in bed. Pt denies any CP or sob.  Labs, vitals and tele reviewed- SB 57  Heparin drip infusing    Medications:   Scheduled Meds:   furosemide  40 mg Oral Daily    sodium chloride flush  5-40 mL IntraVENous 2 times per day    allopurinol  150 mg Oral Daily    atorvastatin  80 mg Oral Daily    isosorbide mononitrate  30 mg Oral Daily    levothyroxine  88 mcg Oral Daily    [Held by provider] metoprolol succinate  50 mg Oral Daily    ticagrelor  90 mg Oral BID    insulin lispro  0-8 Units SubCUTAneous 4x Daily AC & HS    midodrine  15 mg Oral TID WC    amiodarone  200 mg Oral Daily    polyethylene glycol  17 g Oral Daily     Continuous Infusions:   sodium chloride      dextrose      heparin (PORCINE) Infusion 18 Units/kg/hr (01/12/25 0726)     CBC:   Recent Labs     01/10/25  2148 01/12/25  0242   WBC 5.5 9.0   HGB 11.6* 11.6*    187     BMP:    Recent Labs     01/10/25  0049 01/11/25  0447 01/12/25  0242   * 138 141   K 3.2* 3.8 3.6*   CL 98 102 106   CO2 25 25 23   BUN 34* 34* 29*   CREATININE 1.9* 1.9* 1.7*   GLUCOSE 313* 148* 106*     Hepatic: No results for input(s): \"AST\", \"ALT\", \"BILITOT\", \"ALKPHOS\" in the last 72 hours.    Invalid input(s): \"ALB\"  Troponin:   Recent Labs     01/10/25  0049 01/11/25  0853   TROPHS 51* 84*     BNP: No results for input(s): \"BNP\" in the last 72 hours.  Lipids: No results for input(s): \"CHOL\", \"HDL\" in the last 72 hours.    Invalid input(s): \"LDLCALCU\"  INR:   Recent Labs     01/10/25  2148   INR 1.3       Objective:   Vitals: BP  fashion. 1% lidocaine was used for local block. The Femoral artery was cannulated with 6  Fr sheath with brisk arterial blood return. The side port was frequently flushed and aspirated with normal saline.     Dominance is right     EBL is 15 mL     Findings:     Left main: Normal with 0% stenosis     LAD: Acute ostial occlusion 100%, length is 18 mm, THIAGO 0 flow, underwent PCI with TIFFANY using 3.5 x 22 mm [place] stent with 0% residual stenosis and restoration of THIAGO-3 flow     LCX: Luminal parities of 20 to 30%     RCA: Luminal irregularities of 20 to 30%     The LV gram was performed in the FARIAS 30 position.  LVEF: 10%. LV Wall Motion: Severe global hypokinesis     Conclusions:  Acute occlusion of ostial LAD  Successful PCI with TIFFANY to ostial LAD with restoration of THIAGO-3 flow  Severely reduced LV systolic function  Persistent ventricular tachycardia     Recommendation:  Routine post PCI/MI orders  Medical treatments.  Risk factors modifications.  Patient was loaded with amiodarone in addition to lidocaine and another shock was attempted after completion of the procedure but patient remains in persistent ventricular tachycardia  Intra-aortic balloon pump was inserted via right femoral approach  Right femoral central line was inserted    Assessment / Acute Cardiac Problems:   Persistent VT s/p amiodarone and lidocaine infusion, s/p synchronized cardioversion under etomidate, currently NSR/sinus bradycardia  Per pt he was disimpacting himself when he went into VT  H/o VT/ VF arrest in 2016 with subsequent implantation of [employer] Ellipse VR ICD  Moderate to severe MR on TTE  Elevated troponin  CAD with hx of PCI/TIFFANY of LAD as above  Severe ischemic cardiomyopathy, LVEF 10-15%  PAF, on chronic amiodarone and Eliquis  Type II DM  Essential HTN  CKD stage IIIb    Patient Active Problem List:     Chronic kidney disease, stage II (mild)     Chronic gout     Morbid obesity     Normocytic normochromic anemia

## 2025-01-12 NOTE — PROGRESS NOTES
[facility]  Internal Medicine Teaching Residency Program  Inpatient Daily Progress Note  ______________________________________________________________________________    Patient: Claudio Graham  YOB: 1955   MRN:[id number]    Acct: [id number]     Room: 43/0543-01  Admit date: 1/10/2025  Today's date: 01/12/25  Number of days in the hospital: 1    SUBJECTIVE   Admitting Diagnosis: Ventricular tachyarrhythmia (HCC)  CC: AICD firing x 3  Pt examined at bedside. Chart & results reviewed.     -No acute events overnight  -Hemodynamically stable with blood pressure 114/61, pulse 53 bpm  -Maintains oxygen saturation at room air  -Urine output is 1600 mL over 24 hours  -Labs reviewed in the morning  BMP: Potassium 3.6, replaced             Creatinine trending down 1.9->1.7  CBC: Stable  Review of Systems   Constitutional:  Negative for activity change, chills and fever.   Respiratory:  Negative for cough, chest tightness and shortness of breath.    Gastrointestinal:  Positive for constipation. Negative for nausea.   Genitourinary:  Negative for dysuria and hematuria.   Musculoskeletal:  Negative for back pain.   Neurological:  Negative for dizziness and light-headedness.   Psychiatric/Behavioral:  Negative for confusion.          Plan:  -Observe for 24 hours and discharge tomorrow  -Outpatient follow-up with electrophysiology      BRIEF HISTORY     The patient is a pleasant 69 y.o. 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

## 2025-01-13 ENCOUNTER — CARE COORDINATION (OUTPATIENT)
Dept: CASE MANAGEMENT | Age: 70
End: 2025-01-13

## 2025-01-13 ENCOUNTER — APPOINTMENT (OUTPATIENT)
Age: 70
DRG: 309 | End: 2025-01-13
Payer: COMMERCIAL

## 2025-01-13 LAB
ANION GAP SERPL CALCULATED.3IONS-SCNC: 11 MMOL/L (ref 9–16)
BASOPHILS # BLD: 0.07 K/UL (ref 0–0.2)
BASOPHILS NFR BLD: 1 % (ref 0–2)
BUN SERPL-MCNC: 29 MG/DL (ref 8–23)
CALCIUM SERPL-MCNC: 8.9 MG/DL (ref 8.6–10.4)
CHLORIDE SERPL-SCNC: 104 MMOL/L (ref 98–107)
CO2 SERPL-SCNC: 25 MMOL/L (ref 20–31)
CREAT SERPL-MCNC: 1.8 MG/DL (ref 0.7–1.2)
ECHO BSA: 1.91 M2
ECHO LV EDV A2C: 252 ML
ECHO LV EDV A4C: 292 ML
ECHO LV EDV INDEX A4C: 156 ML/M2
ECHO LV EDV NDEX A2C: 135 ML/M2
ECHO LV EJECTION FRACTION A2C: 16 %
ECHO LV EJECTION FRACTION A4C: 18 %
ECHO LV EJECTION FRACTION BIPLANE: 17 % (ref 55–100)
ECHO LV ESV A2C: 212 ML
ECHO LV ESV A4C: 240 ML
ECHO LV ESV INDEX A2C: 113 ML/M2
ECHO LV ESV INDEX A4C: 128 ML/M2
ECHO MV REGURGITANT ALIASING (NYQUIST) VELOCITY: 22 CM/S
ECHO MV REGURGITANT PEAK GRADIENT: 108 MMHG
ECHO MV REGURGITANT PEAK VELOCITY: 5.2 M/S
ECHO MV REGURGITANT RADIUS PISA: 1.2 CM
ECHO MV REGURGITANT VTIA: 217 CM
ECHO TV REGURGITANT MAX VELOCITY: 2.27 M/S
ECHO TV REGURGITANT PEAK GRADIENT: 21 MMHG
EOSINOPHIL # BLD: 0.27 K/UL (ref 0–0.44)
EOSINOPHILS RELATIVE PERCENT: 4 % (ref 1–4)
ERYTHROCYTE [DISTWIDTH] IN BLOOD BY AUTOMATED COUNT: 17.2 % (ref 11.8–14.4)
GFR, ESTIMATED: 40 ML/MIN/1.73M2
GLUCOSE BLD-MCNC: 101 MG/DL (ref 75–110)
GLUCOSE BLD-MCNC: 136 MG/DL (ref 75–110)
GLUCOSE BLD-MCNC: 147 MG/DL (ref 75–110)
GLUCOSE BLD-MCNC: 173 MG/DL (ref 75–110)
GLUCOSE BLD-MCNC: 82 MG/DL (ref 75–110)
GLUCOSE SERPL-MCNC: 135 MG/DL (ref 74–99)
HCT VFR BLD AUTO: 38 % (ref 40.7–50.3)
HGB BLD-MCNC: 11.6 G/DL (ref 13–17)
IMM GRANULOCYTES # BLD AUTO: 0 K/UL (ref 0–0.3)
IMM GRANULOCYTES NFR BLD: 0 %
LYMPHOCYTES NFR BLD: 0.6 K/UL (ref 1.1–3.7)
LYMPHOCYTES RELATIVE PERCENT: 9 % (ref 24–43)
MCH RBC QN AUTO: 27.3 PG (ref 25.2–33.5)
MCHC RBC AUTO-ENTMCNC: 30.5 G/DL (ref 28.4–34.8)
MCV RBC AUTO: 89.4 FL (ref 82.6–102.9)
MONOCYTES NFR BLD: 0.4 K/UL (ref 0.1–1.2)
MONOCYTES NFR BLD: 6 % (ref 3–12)
MORPHOLOGY: ABNORMAL
NEUTROPHILS NFR BLD: 80 % (ref 36–65)
NEUTS SEG NFR BLD: 5.36 K/UL (ref 1.5–8.1)
NRBC BLD-RTO: 0 PER 100 WBC
PARTIAL THROMBOPLASTIN TIME: 30 SEC (ref 23–36.5)
PLATELET # BLD AUTO: 218 K/UL (ref 138–453)
PMV BLD AUTO: 11.9 FL (ref 8.1–13.5)
POTASSIUM SERPL-SCNC: 3.7 MMOL/L (ref 3.7–5.3)
RBC # BLD AUTO: 4.25 M/UL (ref 4.21–5.77)
SODIUM SERPL-SCNC: 140 MMOL/L (ref 136–145)
WBC OTHER # BLD: 6.7 K/UL (ref 3.5–11.3)

## 2025-01-13 PROCEDURE — 6360000002 HC RX W HCPCS

## 2025-01-13 PROCEDURE — 99232 SBSQ HOSP IP/OBS MODERATE 35: CPT | Performed by: STUDENT IN AN ORGANIZED HEALTH CARE EDUCATION/TRAINING PROGRAM

## 2025-01-13 PROCEDURE — 6370000000 HC RX 637 (ALT 250 FOR IP): Performed by: INTERNAL MEDICINE

## 2025-01-13 PROCEDURE — 80151 DRUG ASSAY AMIODARONE: CPT

## 2025-01-13 PROCEDURE — 93306 TTE W/DOPPLER COMPLETE: CPT

## 2025-01-13 PROCEDURE — 82947 ASSAY GLUCOSE BLOOD QUANT: CPT

## 2025-01-13 PROCEDURE — 93325 DOPPLER ECHO COLOR FLOW MAPG: CPT

## 2025-01-13 PROCEDURE — 6370000000 HC RX 637 (ALT 250 FOR IP)

## 2025-01-13 PROCEDURE — 93325 DOPPLER ECHO COLOR FLOW MAPG: CPT | Performed by: INTERNAL MEDICINE

## 2025-01-13 PROCEDURE — 6370000000 HC RX 637 (ALT 250 FOR IP): Performed by: EMERGENCY MEDICINE

## 2025-01-13 PROCEDURE — 93321 DOPPLER ECHO F-UP/LMTD STD: CPT | Performed by: INTERNAL MEDICINE

## 2025-01-13 PROCEDURE — 85730 THROMBOPLASTIN TIME PARTIAL: CPT

## 2025-01-13 PROCEDURE — 99233 SBSQ HOSP IP/OBS HIGH 50: CPT | Performed by: NURSE PRACTITIONER

## 2025-01-13 PROCEDURE — 93308 TTE F-UP OR LMTD: CPT | Performed by: INTERNAL MEDICINE

## 2025-01-13 PROCEDURE — 36415 COLL VENOUS BLD VENIPUNCTURE: CPT

## 2025-01-13 PROCEDURE — 2500000003 HC RX 250 WO HCPCS

## 2025-01-13 PROCEDURE — 80048 BASIC METABOLIC PNL TOTAL CA: CPT

## 2025-01-13 PROCEDURE — 99213 OFFICE O/P EST LOW 20 MIN: CPT

## 2025-01-13 PROCEDURE — 2060000000 HC ICU INTERMEDIATE R&B

## 2025-01-13 PROCEDURE — 85025 COMPLETE CBC W/AUTO DIFF WBC: CPT

## 2025-01-13 RX ADMIN — AMIODARONE HYDROCHLORIDE 200 MG: 200 TABLET ORAL at 20:36

## 2025-01-13 RX ADMIN — ATORVASTATIN CALCIUM 80 MG: 80 TABLET, FILM COATED ORAL at 09:24

## 2025-01-13 RX ADMIN — FUROSEMIDE 40 MG: 40 TABLET ORAL at 09:24

## 2025-01-13 RX ADMIN — AMIODARONE HYDROCHLORIDE 200 MG: 200 TABLET ORAL at 09:24

## 2025-01-13 RX ADMIN — TICAGRELOR 90 MG: 90 TABLET ORAL at 09:24

## 2025-01-13 RX ADMIN — ISOSORBIDE MONONITRATE 30 MG: 30 TABLET, EXTENDED RELEASE ORAL at 09:24

## 2025-01-13 RX ADMIN — MIDODRINE HYDROCHLORIDE 15 MG: 5 TABLET ORAL at 12:45

## 2025-01-13 RX ADMIN — APIXABAN 5 MG: 5 TABLET, FILM COATED ORAL at 09:24

## 2025-01-13 RX ADMIN — MIDODRINE HYDROCHLORIDE 15 MG: 5 TABLET ORAL at 09:24

## 2025-01-13 RX ADMIN — TICAGRELOR 90 MG: 90 TABLET ORAL at 20:36

## 2025-01-13 RX ADMIN — APIXABAN 5 MG: 5 TABLET, FILM COATED ORAL at 20:36

## 2025-01-13 RX ADMIN — SODIUM CHLORIDE, PRESERVATIVE FREE 10 ML: 5 INJECTION INTRAVENOUS at 20:38

## 2025-01-13 RX ADMIN — POLYETHYLENE GLYCOL 3350 17 G: 17 POWDER, FOR SOLUTION ORAL at 09:24

## 2025-01-13 RX ADMIN — PERFLUTREN 3 ML: 6.52 INJECTION, SUSPENSION INTRAVENOUS at 08:35

## 2025-01-13 RX ADMIN — MIDODRINE HYDROCHLORIDE 15 MG: 5 TABLET ORAL at 17:06

## 2025-01-13 RX ADMIN — ALLOPURINOL 150 MG: 100 TABLET ORAL at 09:24

## 2025-01-13 RX ADMIN — SODIUM CHLORIDE, PRESERVATIVE FREE 10 ML: 5 INJECTION INTRAVENOUS at 09:24

## 2025-01-13 RX ADMIN — LEVOTHYROXINE SODIUM 88 MCG: 88 TABLET ORAL at 05:45

## 2025-01-13 NOTE — PROGRESS NOTES
[facility]   Progress Note                   Date:   1/13/2025  Patient name: Claudio Graham  Date of admission:  1/10/2025  9:41 PM  MRN:   [id number]  YOB: 1955  PCP: Vanessa Mckenna, LIDIA - CNP    Reason for Admission: Ventricular tachyarrhythmia (HCC) [I47.20]  Ventricular tachycardia (HCC) [I47.20]    Subjective:       Clinical Changes / Abnormalities: Pt seen and examined in the room.  Patient resting in bed. Pt denies any CP or sob.  Labs, vitals and tele reviewed- SB 50      Medications:   Scheduled Meds:   furosemide  40 mg Oral Daily    apixaban  5 mg Oral BID    amiodarone  200 mg Oral BID    sodium chloride flush  5-40 mL IntraVENous 2 times per day    allopurinol  150 mg Oral Daily    atorvastatin  80 mg Oral Daily    isosorbide mononitrate  30 mg Oral Daily    levothyroxine  88 mcg Oral Daily    [Held by provider] metoprolol succinate  50 mg Oral Daily    ticagrelor  90 mg Oral BID    insulin lispro  0-8 Units SubCUTAneous 4x Daily AC & HS    midodrine  15 mg Oral TID WC    polyethylene glycol  17 g Oral Daily     Continuous Infusions:   sodium chloride      dextrose       CBC:   Recent Labs     01/10/25  2148 01/12/25  0242 01/13/25  0953   WBC 5.5 9.0 6.7   HGB 11.6* 11.6* 11.6*    187 218     BMP:    Recent Labs     01/11/25  0447 01/12/25  0242 01/13/25  0953    141 140   K 3.8 3.6* 3.7    106 104   CO2 25 23 25   BUN 34* 29* 29*   CREATININE 1.9* 1.7* 1.8*   GLUCOSE 148* 106* 135*     Hepatic: No results for input(s): \"AST\", \"ALT\", \"BILITOT\", \"ALKPHOS\" in the last 72 hours.    Invalid input(s): \"ALB\"  Troponin:   Recent Labs     01/11/25  0853   TROPHS 84*     BNP: No results for input(s): \"BNP\" in the last 72 hours.  Lipids: No results for input(s): \"CHOL\", \"HDL\" in the last 72 hours.    Invalid input(s): \"LDLCALCU\"  INR:   Recent Labs     01/10/25  2148   INR 1.3       Objective:   Vitals: BP (!) 98/59   Pulse 52   Temp 97.5 °F (36.4 °C)  systolic heart failure (HCC)     Nonrheumatic mitral valve regurgitation     Ventricular tachyarrhythmia (HCC)     Ventricular tachycardia (HCC)      Plan of Treatment:   HR stable. Continue amiodarone. Discontinue BB   BP stable  Keep K >4 and Mg >2   Per EP recommendations: \"Continue oral amiodarone, patient needs to follow-up with Dr. Soto, no ischemic workup for now.\"  Continue Brilinta, statin and lasix  Awaiting AICD interrogation   If HR remains stable, ok for patient to be discharged per CV standpoint and for him to follow up outpatient in 2-4 weeks,     Electronically signed by LIDIA Wiley NP on 1/13/2025 at 1:07 PM  [facility]  [phone]

## 2025-01-13 NOTE — PROGRESS NOTES
mg Oral Daily    levothyroxine  88 mcg Oral Daily    [Held by provider] metoprolol succinate  50 mg Oral Daily    ticagrelor  90 mg Oral BID    insulin lispro  0-8 Units SubCUTAneous 4x Daily AC & HS    midodrine  15 mg Oral TID WC    polyethylene glycol  17 g Oral Daily     Continuous Infusions:    sodium chloride      dextrose       PRN Medicationssodium chloride flush, 5-40 mL, PRN  sodium chloride, , PRN  potassium chloride, 20 mEq, PRN   Or  potassium chloride, 10 mEq, PRN  magnesium sulfate, 2,000 mg, PRN  ondansetron, 4 mg, Q8H PRN   Or  ondansetron, 4 mg, Q6H PRN  acetaminophen, 650 mg, Q6H PRN   Or  acetaminophen, 650 mg, Q6H PRN  albuterol sulfate HFA, 2 puff, 4x Daily PRN  glucose, 4 tablet, PRN  dextrose bolus, 125 mL, PRN   Or  dextrose bolus, 250 mL, PRN  glucagon (rDNA), 1 mg, PRN  dextrose, , Continuous PRN        Diagnostic Labs:  CBC:   Recent Labs     01/10/25  2148 01/12/25  0242   WBC 5.5 9.0   RBC 4.29 4.25   HGB 11.6* 11.6*   HCT 37.6* 38.6*   MCV 87.6 90.8   RDW 16.5* 16.9*    187     BMP:   Recent Labs     01/11/25  0447 01/12/25  0242    141   K 3.8 3.6*    106   CO2 25 23   BUN 34* 29*   CREATININE 1.9* 1.7*     BNP: No results for input(s): \"BNP\" in the last 72 hours.  PT/INR:   Recent Labs     01/10/25  2148   PROTIME 16.4*   INR 1.3     APTT:   Recent Labs     01/11/25  1852 01/12/25  0242 01/12/25  0636   APTT 59.0* 73.7* 70.3*     CARDIAC ENZYMES: No results for input(s): \"CKMB\", \"CKMBINDEX\", \"TROPONINI\" in the last 72 hours.    Invalid input(s): \"CKTOTAL;3\"  FASTING LIPID PANEL:  Lab Results   Component Value Date    CHOL 126 09/21/2023    HDL 55 (H) 09/21/2023    TRIG 86 09/21/2023     LIVER PROFILE: No results for input(s): \"AST\", \"ALT\", \"BILIDIR\", \"BILITOT\", \"ALKPHOS\" in the last 72 hours.    Invalid input(s): \"ALB\"   MICROBIOLOGY:   Lab Results   Component Value Date/Time    CULTURE NO GROWTH 5 DAYS 08/17/2024 01:40 AM    CULTURE NO GROWTH 5 DAYS 08/17/2024  nephrotoxic drugs     Diabetes mellitus type 2:  -Most recent HbA1c 6.5  -On Farxiga 10 Mg and metformin 1 g at home  - Continue medium intensity sliding scale  -ACHS glucose checks  -Hypoglycemia protocol     TANNER:   -Had sleep study in October 2024 consistent with moderate sleep apnea  -Continue CPAP while asleep     Gout:  - Continue home medication allopurinol     Hypothyroidism:  - Most recent TSH 1.80, on 1/10/2025  -On levothyroxine 88 mcg at home, will continue it      DVT ppx : Eliquis 5 mg twice daily  GI ppx: Not indicated  Diet: Carb restricted 3 carb choices, low-fat/low-cholesterol/high-fiber/JESSENIA diet      PT/OT: On board  Discharge Planning / SW: To be determined    Kristopher Garibay MD  Internal Medicine Resident, PGY-2  [facility], [place], [place].  1/13/2025, 7:29 AM

## 2025-01-13 NOTE — PLAN OF CARE
Problem: Chronic Conditions and Co-morbidities  Goal: Patient's chronic conditions and co-morbidity symptoms are monitored and maintained or improved  Outcome: Progressing     Problem: Discharge Planning  Goal: Discharge to home or other facility with appropriate resources  Outcome: Progressing     Problem: Skin/Tissue Integrity  Goal: Absence of new skin breakdown  Description: 1.  Monitor for areas of redness and/or skin breakdown  2.  Assess vascular access sites hourly  3.  Every 4-6 hours minimum:  Change oxygen saturation probe site  4.  Every 4-6 hours:  If on nasal continuous positive airway pressure, respiratory therapy assess nares and determine need for appliance change or resting period.  Outcome: Progressing     Problem: Safety - Adult  Goal: Free from fall injury  Outcome: Progressing     Problem: ABCDS Injury Assessment  Goal: Absence of physical injury  Outcome: Progressing     Problem: Cardiovascular - Adult  Goal: Maintains optimal cardiac output and hemodynamic stability  Outcome: Progressing  Goal: Absence of cardiac dysrhythmias or at baseline  Outcome: Progressing     Problem: Skin/Tissue Integrity - Adult  Goal: Skin integrity remains intact  Outcome: Progressing  Goal: Incisions, wounds, or drain sites healing without S/S of infection  Outcome: Progressing  Goal: Oral mucous membranes remain intact  Outcome: Progressing     Problem: Musculoskeletal - Adult  Goal: Return mobility to safest level of function  Outcome: Progressing  Goal: Maintain proper alignment of affected body part  Outcome: Progressing     Problem: Gastrointestinal - Adult  Goal: Maintains or returns to baseline bowel function  Outcome: Progressing  Goal: Maintains adequate nutritional intake  Outcome: Progressing     Problem: Metabolic/Fluid and Electrolytes - Adult  Goal: Electrolytes maintained within normal limits  Outcome: Progressing  Goal: Hemodynamic stability and optimal renal function maintained  Outcome:

## 2025-01-13 NOTE — PROGRESS NOTES
Comprehensive Nutrition Assessment    Type and Reason for Visit:  Initial, Positive nutrition screen    Nutrition Recommendations/Plan:   Continue current diet.  Start high xiao/high pro ONS BID, likes vanilla and strawberry.  Will monitor labs, weights, intake, GI status, and plan of care.     Malnutrition Assessment:  Malnutrition Status:  Moderate malnutrition (01/13/25 1107)    Context:  Chronic Illness     Findings of the 6 clinical characteristics of malnutrition:  Energy Intake:  Mild decrease in energy intake  Weight Loss:  Greater than 10% over 6 months     Body Fat Loss:  Mild body fat loss (to moderate) Orbital, Triceps   Muscle Mass Loss:  Mild muscle mass loss (to moderate) Clavicles (pectoralis & deltoids), Thigh (quadriceps)  Fluid Accumulation:  No fluid accumulation     Strength:  Not Performed    Nutrition Assessment:    Pt admit with [facility]. Seen for RNC of wt loss. PMH: CAD, CHF, diverticulitis, HTN, DM, CKD stage 3. Pt states his wt was around 200# and was trying to lose wt. Pt was admit to hospital for similar issue in July, then was discharged to SNF. Pt states during this time he had very low appetite and lost a significant amount of wt. Per chart review pt has lost 13% body wt x 6 months. Upon NFPH pt has moderate fat and muscle loss. Pt states he had mild decrease in appetite PTA along with some constipation. Pt states he would like to lose more wt. Discussed with pt oh having adequate nutrition to get back to baseline health then can work on losing more wt. Pt agreeable, willing to have ONS to help with nutrition.    Nutrition Related Findings:    No edema. LBM 1/11 - constipation. Labs/meds reviewed. Wound Type: Open Wounds       Current Nutrition Intake & Therapies:    Average Meal Intake: 51-75%, %  Average Supplements Intake: None Ordered  ADULT DIET; Regular; 3 carb choices (45 gm/meal); Low Fat/Low Chol/High Fiber/JESSENIA    Anthropometric Measures:  Height: 162.6 cm (5'

## 2025-01-13 NOTE — PROGRESS NOTES
[facility] Nurse  Consult Note       NAME:  Claudio Graham  MEDICAL RECORD NUMBER:  [id number]  AGE: 69 y.o.   GENDER: male  : 1955  TODAY'S DATE:  2025    Subjective:     Reason for WOCN Evaluation and Assessment: \"on back\"      Claudio Graham is a 69 y.o. male referred by:   [x] Physician  [] Nursing  [] Other:     Wound Identification:          BUE, posterior shoulders and back with linear excoriations consistent with fingernail scratches.   Large patch of non-intact skin to the mid back present. Patient unable to attribute any specific injury outside of his scratching.   He reports bedbug activity current in his home. No insects noted during care.     Objective:      BP (!) 98/59   Pulse 52   Temp 97.5 °F (36.4 °C) (Oral)   Resp 18   Ht 1.626 m (5' 4.02\")   Wt 81.2 kg (179 lb)   SpO2 100%   BMI 30.71 kg/m²   Enrico Risk Score: Enrico Scale Score: 20    LABS    CBC:   Lab Results   Component Value Date/Time    WBC 6.7 2025 09:53 AM    RBC 4.25 2025 09:53 AM    RBC 4.85 2011 09:43 AM    HGB 11.6 2025 09:53 AM    HCT 38.0 2025 09:53 AM     CMP:  Albumin:  No results found for: \"LABALBU\"  PT/INR:    Lab Results   Component Value Date/Time    PROTIME 16.4 01/10/2025 09:48 PM    INR 1.3 01/10/2025 09:48 PM     HgBA1c:    Lab Results   Component Value Date/Time    LABA1C 6.5 2024 06:30 AM     PTT: No components found for: \"LABPTT\"      Assessment:   Optifoam silicone foam dressings removed from the right upper back and mid back.     Measurements:     25 1540   Wound 25 Back Right;Mid open wound, pt has been itching from bed bug bites   Date First Assessed/Time First Assessed: 25 0800   Present on Original Admission: Yes  Primary Wound Type: Other (comment)  Location: Back  Wound Location Orientation: Right;Mid  Wound Description (Comments): open wound, pt has been itching from...   Wound Image     Wound Etiology Traumatic    Dressing Status New dressing applied   Wound Cleansed Soap and water;Cleansed with saline   Dressing/Treatment Hydrofiber Ag;Foam   Dressing Change Due 01/16/25   Wound Length (cm) 1.8 cm   Wound Width (cm) 1.7 cm   Wound Depth (cm) 0.2 cm   Wound Surface Area (cm^2) 3.06 cm^2   Wound Volume (cm^3) 0.612 cm^3   Wound Assessment Superficial;Pink/red   Drainage Amount Small (< 25%)   Drainage Description Serous   Odor None   Belen-wound Assessment Intact;Other (Comment)  (scars and superficial excoriations.)              Response to treatment:  Well tolerated by patient.         Plan:   Right upper back scratch: Optifoam silicone bordered foam. Change every 3 days.  Mid-back abrasion: [employer] gelling hydrofiber to the wound bed, cover with an Optifoam silicone bordered foam. Change every 3 days.       Specialty Bed Required : No   [] Low Air Loss   [] Pressure Redistribution  [] Fluid Immersion  [] Bariatric  [] Total Pressure Relief  [] Other:     Discharge Plan:  Placement for patient upon discharge: independent living   Hospice Care: No   Patient appropriate for Outpatient Wound Care Center: No    Patient/Caregiver Teaching:  Reviewed use of silver hydrofiber and foam dressings. May leave in place up to one week if in use at home. Keep the wound clean and covered until healed.   Level of patient/caregiver understanding:    [] Indicates understanding       [] Needs reinforcement  [] Unsuccessful      [x] Verbal Understanding  [] Demonstrated understanding       [] No evidence of learning  [] Refused teaching         [] N/A    Contact the Wound Ostomy RN on-call during working hours Monday-Friday [id number] via [employer] by searching \"wound\" under \"groups\" and selecting the on-call clinician. Sending messages via individual names will not reach a clinician.        Electronically signed by Terese Stevenson RN, CWON on 1/13/2025 at 3:41 PM

## 2025-01-13 NOTE — CARE COORDINATION
RPM Note    Claudio Graham has been admitted on 1/10/25 at [facility] due to Ventricular tachyarrhythmia . Patient is active in remote patient monitoring and has been paused at this time. LPN has contacted the care manager to advise.   Will continue to monitor and follow up as needed.

## 2025-01-13 NOTE — PROGRESS NOTES
Writer called Amber at [phone] in regards to getting patient's AICD interrogation done. Spoke with Bobbi, stated we will receive a call from [facility].      1450 - Amber at bedside and AICD was interrogated. Report faxed to Dr. Soto at [phone]

## 2025-01-13 NOTE — PLAN OF CARE
CANDE Shi  Outcome: Progressing  1/12/2025 1609 by Bette Lopes RN  Outcome: Progressing     Problem: Skin/Tissue Integrity - Adult  Goal: Skin integrity remains intact  1/13/2025 0157 by Yuridia Lacy RN  Outcome: Progressing  Flowsheets (Taken 1/12/2025 2100)  Skin Integrity Remains Intact: Monitor for areas of redness and/or skin breakdown  1/12/2025 1609 by Bette Lopes RN  Outcome: Progressing  Goal: Incisions, wounds, or drain sites healing without S/S of infection  1/13/2025 0157 by Yuridia Lacy RN  Outcome: Progressing  Flowsheets (Taken 1/12/2025 2100)  Incisions, Wounds, or Drain Sites Healing Without Sign and Symptoms of Infection:   ADMISSION and DAILY: Assess and document risk factors for pressure ulcer development   TWICE DAILY: Assess and document skin integrity  1/12/2025 1609 by Bette Lopes RN  Outcome: Progressing  Goal: Oral mucous membranes remain intact  1/13/2025 0157 by Yuridia Lacy RN  Outcome: Progressing  Flowsheets (Taken 1/12/2025 2100)  Oral Mucous Membranes Remain Intact: Assess oral mucosa and hygiene practices  1/12/2025 1609 by Bette Lopes RN  Outcome: Progressing     Problem: Musculoskeletal - Adult  Goal: Return mobility to safest level of function  1/13/2025 0157 by Yuridia Lacy RN  Outcome: Progressing  1/12/2025 1609 by Bette Lopes RN  Outcome: Progressing  Goal: Maintain proper alignment of affected body part  1/13/2025 0157 by Yuridia Lacy RN  Outcome: Progressing  1/12/2025 1609 by Bette Lopes RN  Outcome: Progressing     Problem: Gastrointestinal - Adult  Goal: Maintains or returns to baseline bowel function  1/13/2025 0157 by Yuridia Lacy RN  Outcome: Progressing  1/12/2025 1609 by Bette Lopes RN  Outcome: Progressing  Goal: Maintains adequate nutritional intake  1/13/2025 0157 by Yuridia Lacy RN  Outcome: Progressing  1/12/2025 1609 by Bette Lopes RN  Outcome: Progressing     Problem:

## 2025-01-14 LAB
ANION GAP SERPL CALCULATED.3IONS-SCNC: 12 MMOL/L (ref 9–16)
BASOPHILS # BLD: 0.05 K/UL (ref 0–0.2)
BASOPHILS NFR BLD: 1 % (ref 0–2)
BUN SERPL-MCNC: 28 MG/DL (ref 8–23)
CALCIUM SERPL-MCNC: 9 MG/DL (ref 8.6–10.4)
CHLORIDE SERPL-SCNC: 101 MMOL/L (ref 98–107)
CO2 SERPL-SCNC: 27 MMOL/L (ref 20–31)
CREAT SERPL-MCNC: 1.9 MG/DL (ref 0.7–1.2)
EOSINOPHIL # BLD: 0.25 K/UL (ref 0–0.44)
EOSINOPHILS RELATIVE PERCENT: 4 % (ref 1–4)
ERYTHROCYTE [DISTWIDTH] IN BLOOD BY AUTOMATED COUNT: 17.3 % (ref 11.8–14.4)
GFR, ESTIMATED: 37 ML/MIN/1.73M2
GLUCOSE BLD-MCNC: 130 MG/DL (ref 75–110)
GLUCOSE BLD-MCNC: 174 MG/DL (ref 75–110)
GLUCOSE BLD-MCNC: 197 MG/DL (ref 75–110)
GLUCOSE BLD-MCNC: 94 MG/DL (ref 75–110)
GLUCOSE SERPL-MCNC: 100 MG/DL (ref 74–99)
HCT VFR BLD AUTO: 37.5 % (ref 40.7–50.3)
HGB BLD-MCNC: 12 G/DL (ref 13–17)
IMM GRANULOCYTES # BLD AUTO: <0.03 K/UL (ref 0–0.3)
IMM GRANULOCYTES NFR BLD: 0 %
LYMPHOCYTES NFR BLD: 0.8 K/UL (ref 1.1–3.7)
LYMPHOCYTES RELATIVE PERCENT: 11 % (ref 24–43)
MCH RBC QN AUTO: 27.5 PG (ref 25.2–33.5)
MCHC RBC AUTO-ENTMCNC: 32 G/DL (ref 28.4–34.8)
MCV RBC AUTO: 86 FL (ref 82.6–102.9)
MONOCYTES NFR BLD: 0.57 K/UL (ref 0.1–1.2)
MONOCYTES NFR BLD: 8 % (ref 3–12)
NEUTROPHILS NFR BLD: 76 % (ref 36–65)
NEUTS SEG NFR BLD: 5.32 K/UL (ref 1.5–8.1)
NRBC BLD-RTO: 0 PER 100 WBC
PARTIAL THROMBOPLASTIN TIME: 30.9 SEC (ref 23–36.5)
PLATELET # BLD AUTO: 206 K/UL (ref 138–453)
PMV BLD AUTO: 11.8 FL (ref 8.1–13.5)
POTASSIUM SERPL-SCNC: 4 MMOL/L (ref 3.7–5.3)
RBC # BLD AUTO: 4.36 M/UL (ref 4.21–5.77)
RBC # BLD: ABNORMAL 10*6/UL
SODIUM SERPL-SCNC: 140 MMOL/L (ref 136–145)
WBC OTHER # BLD: 7 K/UL (ref 3.5–11.3)

## 2025-01-14 PROCEDURE — 6370000000 HC RX 637 (ALT 250 FOR IP)

## 2025-01-14 PROCEDURE — 6370000000 HC RX 637 (ALT 250 FOR IP): Performed by: EMERGENCY MEDICINE

## 2025-01-14 PROCEDURE — 85025 COMPLETE CBC W/AUTO DIFF WBC: CPT

## 2025-01-14 PROCEDURE — 97535 SELF CARE MNGMENT TRAINING: CPT

## 2025-01-14 PROCEDURE — 6370000000 HC RX 637 (ALT 250 FOR IP): Performed by: INTERNAL MEDICINE

## 2025-01-14 PROCEDURE — 97530 THERAPEUTIC ACTIVITIES: CPT

## 2025-01-14 PROCEDURE — 36415 COLL VENOUS BLD VENIPUNCTURE: CPT

## 2025-01-14 PROCEDURE — 97166 OT EVAL MOD COMPLEX 45 MIN: CPT

## 2025-01-14 PROCEDURE — 2500000003 HC RX 250 WO HCPCS

## 2025-01-14 PROCEDURE — 80048 BASIC METABOLIC PNL TOTAL CA: CPT

## 2025-01-14 PROCEDURE — 97162 PT EVAL MOD COMPLEX 30 MIN: CPT

## 2025-01-14 PROCEDURE — 82947 ASSAY GLUCOSE BLOOD QUANT: CPT

## 2025-01-14 PROCEDURE — 2060000000 HC ICU INTERMEDIATE R&B

## 2025-01-14 PROCEDURE — 97110 THERAPEUTIC EXERCISES: CPT

## 2025-01-14 PROCEDURE — 99233 SBSQ HOSP IP/OBS HIGH 50: CPT | Performed by: NURSE PRACTITIONER

## 2025-01-14 PROCEDURE — 85730 THROMBOPLASTIN TIME PARTIAL: CPT

## 2025-01-14 PROCEDURE — 99232 SBSQ HOSP IP/OBS MODERATE 35: CPT | Performed by: STUDENT IN AN ORGANIZED HEALTH CARE EDUCATION/TRAINING PROGRAM

## 2025-01-14 RX ORDER — MIDODRINE HYDROCHLORIDE 5 MG/1
15 TABLET ORAL
Qty: 90 TABLET | Refills: 3 | OUTPATIENT
Start: 2025-01-14

## 2025-01-14 RX ADMIN — TICAGRELOR 90 MG: 90 TABLET ORAL at 08:01

## 2025-01-14 RX ADMIN — LEVOTHYROXINE SODIUM 88 MCG: 88 TABLET ORAL at 06:12

## 2025-01-14 RX ADMIN — MIDODRINE HYDROCHLORIDE 15 MG: 5 TABLET ORAL at 12:50

## 2025-01-14 RX ADMIN — INSULIN LISPRO 2 UNITS: 100 INJECTION, SOLUTION INTRAVENOUS; SUBCUTANEOUS at 12:50

## 2025-01-14 RX ADMIN — ALLOPURINOL 150 MG: 100 TABLET ORAL at 08:02

## 2025-01-14 RX ADMIN — AMIODARONE HYDROCHLORIDE 200 MG: 200 TABLET ORAL at 20:13

## 2025-01-14 RX ADMIN — FUROSEMIDE 40 MG: 40 TABLET ORAL at 08:02

## 2025-01-14 RX ADMIN — SODIUM CHLORIDE, PRESERVATIVE FREE 10 ML: 5 INJECTION INTRAVENOUS at 08:04

## 2025-01-14 RX ADMIN — POLYETHYLENE GLYCOL 3350 17 G: 17 POWDER, FOR SOLUTION ORAL at 08:03

## 2025-01-14 RX ADMIN — TICAGRELOR 90 MG: 90 TABLET ORAL at 20:13

## 2025-01-14 RX ADMIN — APIXABAN 5 MG: 5 TABLET, FILM COATED ORAL at 08:01

## 2025-01-14 RX ADMIN — ATORVASTATIN CALCIUM 80 MG: 80 TABLET, FILM COATED ORAL at 08:01

## 2025-01-14 RX ADMIN — MIDODRINE HYDROCHLORIDE 15 MG: 5 TABLET ORAL at 08:01

## 2025-01-14 RX ADMIN — AMIODARONE HYDROCHLORIDE 200 MG: 200 TABLET ORAL at 12:50

## 2025-01-14 RX ADMIN — MIDODRINE HYDROCHLORIDE 15 MG: 5 TABLET ORAL at 17:10

## 2025-01-14 RX ADMIN — SODIUM CHLORIDE, PRESERVATIVE FREE 10 ML: 5 INJECTION INTRAVENOUS at 20:13

## 2025-01-14 RX ADMIN — ISOSORBIDE MONONITRATE 30 MG: 30 TABLET, EXTENDED RELEASE ORAL at 08:01

## 2025-01-14 RX ADMIN — APIXABAN 5 MG: 5 TABLET, FILM COATED ORAL at 20:13

## 2025-01-14 ASSESSMENT — ENCOUNTER SYMPTOMS
COUGH: 0
ABDOMINAL PAIN: 0
CONSTIPATION: 0
NAUSEA: 0
SHORTNESS OF BREATH: 0
DIARRHEA: 0

## 2025-01-14 ASSESSMENT — PAIN SCALES - GENERAL: PAINLEVEL_OUTOF10: 0

## 2025-01-14 NOTE — PROGRESS NOTES
Physical Therapy  Facility/Department: [address] STEPDOWN  Physical Therapy Initial Assessment    Name: Claudio Graham  : 1955  MRN: [id number]  Date of Service: 2025    Patient initially was brought in by EMS due to chief complaint of chest pain, mild shortness of breath, patient reportedly called EMS after his AICD shocked him 3 times today, en route to the hospital, patient was given 1 g of procainamide and 300 mg of aspirin by EMS. Patient denies any chronic anginal chest pain prior to this event, reports that his chest pain started after his AICD started firing. After patient was brought into the emergency department, he was found to be in ventricular tachycardia with a pulse, hemodynamically stable with blood pressures at his baseline. He was started on amiodarone bolus followed by infusion which was unsuccessful and subsequently started on lidocaine bolus and infusion. He continued to be in V. tach, synchronized cardioversion was done along with 10 of etomidate as per cardiology recommendations and patient converted to sinus rhythm.     Discharge Recommendations:  No further therapy required at discharge.         PT Equipment Recommendations  Equipment Needed: No  Other: discussed use of rwalker on \"bad days\"; pt agreeable , but states he normally doesn't use the rwalker      Patient Diagnosis(es): The primary encounter diagnosis was Ventricular tachycardia (HCC). A diagnosis of Cardiomyopathy, unspecified type (HCC) was also pertinent to this visit.  Past Medical History:  has a past medical history of Acute HFrEF (heart failure with reduced ejection fraction) (HCC), Acute on chronic combined systolic and diastolic congestive heart failure (HCC), Acute on chronic congestive heart failure (HCC), Acute respiratory failure with hypoxia, Anemia, Anxiety, CAD (coronary artery disease), Cardiac defibrillator in place, Cardiomyopathy (HCC), CHF (congestive heart failure) (HCC), Chronic combined systolic

## 2025-01-14 NOTE — PROGRESS NOTES
Physician Progress Note      PATIENT:               AUDREY SOLANO  [facility] #:                  [id number]  :                       1955  ADMIT DATE:       1/10/2025 9:41 PM  DISCH DATE:  RESPONDING  PROVIDER #:        MANUEL ROSS          QUERY TEXT:    Pt admitted with ventricular tachycardia and has moderate malnutrition   documented per nutrition consult/assessment.  Please further specify type of   malnutrition with documentation in the medical record.    The medical record reflects the following:  Risk Factors: Chronic illness with many comorbidities HFrEF, respiratory   failure anemia AICD CKD SKYLER HTN HDL ischemic cardiomyopathy  Clinical Indicators:  nutrition assessment-moderate malnutrition-Context:    Chronic Illness  Findings of the 6 clinical characteristics of malnutrition:  Energy Intake:  Mild decrease in energy intake  Weight Loss:  Greater than 10% over 6 months  Body Fat Loss:  Mild body fat loss (to moderate) Orbital, Triceps  Muscle Mass Loss:  Mild muscle mass loss (to moderate) Clavicles (pectoralis &   deltoids), Thigh (quadriceps)  Treatment: Nutrition consult, high-fiber/JESSENIA adult diet  ASPEN Criteria:    https://aspenjournals.onlinelibrary.eden.com/doi/full/10.1177/902281993714120  5  Options provided:  -- Moderate Malnutrition  -- Moderate Protein calorie malnutrition  -- Other - I will add my own diagnosis  -- Disagree - Not applicable / Not valid  -- Disagree - Clinically unable to determine / Unknown  -- Refer to Clinical Documentation Reviewer    PROVIDER RESPONSE TEXT:    Provider disagreed with this query.    Query created by: Antonia Chow on 2025 1:27 PM      QUERY TEXT:    Patient admitted with ventricular tachycardia, noted to have   Paroxysmal   A-fib on Eliquis and amiodarone.  If possible, please document in progress   notes and discharge summary if you are evaluating and/or treating any of the   following:?  ?  The medical record reflects the  following:  Risk Factors: Paroxysmal A-fib, HFrEF, anemia, CAD, cardiomyopathy, CKD, HTN,   HDL  Clinical Indicators: 1/14 IM PN-Paroxysmal A-fib, [id number] Score 5-on   Eliquis and amiodarone at home currently in NSR continue treatment  Treatment: Eliquis 5 mg p.o. twice daily.  Options provided:  -- Secondary hypercoagulable state in a patient with atrial fibrillation  -- Other - I will add my own diagnosis  -- Disagree - Not applicable / Not valid  -- Disagree - Clinically unable to determine / Unknown  -- Refer to Clinical Documentation Reviewer    PROVIDER RESPONSE TEXT:    This patient has secondary hypercoagulable state in a patient with atrial   fibrillation.    Query created by: Antonia Chow on 1/14/2025 1:32 PM      Electronically signed by:  MANUEL ROSS 1/14/2025 1:38 PM

## 2025-01-14 NOTE — DISCHARGE INSTR - COC
Continuity of Care Form    Patient Name: Claudio Graham   :  1955  MRN:  [id number]    Admit date:  1/10/2025  Discharge date:  1/15/2025    Code Status Order: Full Code   Advance Directives:   Advance Care Flowsheet Documentation             Admitting Physician:  No admitting provider for patient encounter.  PCP: Vanessa Mckenna, APRN - CNP    Discharging Nurse: Azra HUDDLESTON  Discharging Hospital Unit/Room#: 0543/0543-01  Discharging Unit Phone Number: [phone]    Emergency Contact:   Extended Emergency Contact Information  Primary Emergency Contact: MoodyCathy   [facility]  Home Phone: [phone]  Work Phone: [phone]  Mobile Phone: [phone]  Relation: Other Relative  Hearing or visual needs: None  Other needs: None  Preferred language: English   needed? No  Secondary Emergency Contact: Jason sherman  Home Phone: [phone]  Work Phone: [phone]  Mobile Phone: [phone]  Relation: Friend    Past Surgical History:  Past Surgical History:   Procedure Laterality Date    BONE MARROW BIOPSY  2012 approx    CARDIAC CATHETERIZATION  2007    severe stenosis pre-stent area    CARDIAC CATHETERIZATION  2013    Very peripheral stenosis, not amendable to PCI, stents patent    CARDIAC DEFIBRILLATOR PLACEMENT  2015    [facility]    CARDIAC PROCEDURE N/A 2024    Left heart cath / coronary angiography performed by Satinder Siddiqui MD at [facility] CARDIAC CATH LAB    CARDIAC PROCEDURE N/A 2024    Percutaneous coronary intervention performed by Satinder Siddiqui MD at [facility] CARDIAC CATH LAB    CARDIAC PROCEDURE N/A 2024    Intra-aortic balloon pump insertion performed by Satinder Siddiqui MD at [facility] CARDIAC CATH LAB    COLONOSCOPY  2007    COLONOSCOPY  2021    COLONOSCOPY N/A 2021    COLONOSCOPY DIAGNOSTIC performed by Coleen Vidales MD at [facility] Endoscopy    COLONOSCOPY N/A 2022    COLONOSCOPY WITH BIOPSY performed by Coleen Vidales MD at  [facility] Z86.39    Hypotension due to hypovolemia E86.1    History of chronic CHF Z86.79    History of CAD (coronary artery disease) Z86.79    Cardiomyopathy (HCC) I42.9    Chronic systolic congestive heart failure (HCC) I50.22    Acute on chronic systolic heart failure (HCC) I50.23    Nonrheumatic mitral valve regurgitation I34.0    Ventricular tachyarrhythmia (HCC) I47.20    Ventricular tachycardia (HCC) I47.20       Isolation/Infection:   Isolation            No Isolation          Patient Infection Status       None to display            Nurse Assessment:  Last Vital Signs: /69   Pulse 52   Temp 97.9 °F (36.6 °C) (Oral)   Resp 20   Ht 1.626 m (5' 4.02\")   Wt 79.4 kg (175 lb 0.7 oz)   SpO2 100%   BMI 30.03 kg/m²     Last documented pain score (0-10 scale): Pain Level: 0  Last Weight:   Wt Readings from Last 1 Encounters:   01/14/25 79.4 kg (175 lb 0.7 oz)     Mental Status:  oriented and alert    IV Access:  - None    Nursing Mobility/ADLs:  Walking   Independent  Transfer  Independent  Bathing  Independent  Dressing  Assisted  Toileting  Independent  Feeding  Independent  Med Admin  Independent  Med Delivery   whole    Wound Care Documentation and Therapy:  Wound 01/11/25 Back Right;Mid open wound, pt has been itching from bed bug bites (Active)   Wound Image    01/13/25 1540   Wound Etiology Traumatic 01/13/25 1600   Dressing Status Clean;Dry;Intact 01/14/25 0800   Wound Cleansed Not Cleansed 01/13/25 1600   Dressing/Treatment Hydrofiber Ag;Foam 01/14/25 0800   Dressing Change Due 01/16/25 01/13/25 1600   Wound Length (cm) 1.8 cm 01/13/25 1540   Wound Width (cm) 1.7 cm 01/13/25 1540   Wound Depth (cm) 0.2 cm 01/13/25 1540   Wound Surface Area (cm^2) 3.06 cm^2 01/13/25 1540   Wound Volume (cm^3) 0.612 cm^3 01/13/25 1540   Wound Assessment Other (Comment) 01/13/25 1600   Drainage Amount Small (< 25%) 01/13/25 1540   Drainage Description Serous 01/13/25 1540   Odor None 01/13/25 1540   Belen-wound

## 2025-01-14 NOTE — PROGRESS NOTES
[facility]   Progress Note                   Date:   1/14/2025  Patient name: Claudio Graham  Date of admission:  1/10/2025  9:41 PM  MRN:   [id number]  YOB: 1955  PCP: Vanessa Mckenna, LIDIA - CNP    Reason for Admission: Ventricular tachyarrhythmia (HCC) [I47.20]  Ventricular tachycardia (HCC) [I47.20]    Subjective:       Clinical Changes / Abnormalities: Pt seen and examined in the room.  Patient resting in chair. He has been up ambulating around the room.Pt denies any CP or sob.  Labs, vitals and tele reviewed- SB 57      Medications:   Scheduled Meds:   furosemide  40 mg Oral Daily    apixaban  5 mg Oral BID    amiodarone  200 mg Oral BID    sodium chloride flush  5-40 mL IntraVENous 2 times per day    allopurinol  150 mg Oral Daily    atorvastatin  80 mg Oral Daily    isosorbide mononitrate  30 mg Oral Daily    levothyroxine  88 mcg Oral Daily    ticagrelor  90 mg Oral BID    insulin lispro  0-8 Units SubCUTAneous 4x Daily AC & HS    midodrine  15 mg Oral TID WC    polyethylene glycol  17 g Oral Daily     Continuous Infusions:   sodium chloride      dextrose       CBC:   Recent Labs     01/12/25  0242 01/13/25  0953 01/14/25  0442   WBC 9.0 6.7 7.0   HGB 11.6* 11.6* 12.0*    218 206     BMP:    Recent Labs     01/12/25  0242 01/13/25  0953 01/14/25  0442    140 140   K 3.6* 3.7 4.0    104 101   CO2 23 25 27   BUN 29* 29* 28*   CREATININE 1.7* 1.8* 1.9*   GLUCOSE 106* 135* 100*     Hepatic: No results for input(s): \"AST\", \"ALT\", \"BILITOT\", \"ALKPHOS\" in the last 72 hours.    Invalid input(s): \"ALB\"  Troponin:   No results for input(s): \"TROPHS\" in the last 72 hours.    BNP: No results for input(s): \"BNP\" in the last 72 hours.  Lipids: No results for input(s): \"CHOL\", \"HDL\" in the last 72 hours.    Invalid input(s): \"LDLCALCU\"  INR:   No results for input(s): \"INR\" in the last 72 hours.      Objective:   Vitals: /71   Pulse 59   Temp 98.2 °F (36.8  °C) (Oral)   Resp 20   Ht 1.626 m (5' 4.02\")   Wt 79.4 kg (175 lb 0.7 oz)   SpO2 99%   BMI 30.03 kg/m²   General appearance: alert and cooperative with exam  HEENT: Head: Normocephalic, no lesions, without obvious abnormality.  Neck: no JVD, trachea midline, no adenopathy  Lungs: Clear to auscultation  Heart: Regular rate and rhythm, s1/s2 auscultated, no murmurs  Abdomen: soft, non-tender, bowel sounds active  Extremities: no edema  Neurologic: not done    EKG:   Normal sinus rhythm, no acute ST-T wave changes      12/7/24     ECHO (TTE) COMPLETE (PRN CONTRAST/BUBBLE/STRAIN/3D) 07/20/2024 12:19 PM (Final)         Left Ventricle: Severely reduced left ventricular systolic function with a visually estimated EF of 10 -15%. EF by 2D Simpsons Biplane is 15%. Left ventricle is severely dilated. Normal wall thickness. See diagram for wall motion findings. Abnormal diastolic function.    Right Ventricle: Right ventricle is moderately dilated. Reduced systolic function. AICD leads seen in the right heart.    Aortic Valve: Trileaflet valve. Mildly calcified aortic valve leaflets. Mild regurgitation.    Mitral Valve: Moderate to severe regurgitation. PISA radius of 0.9 cm suggests moderate regurgitation. MR volume of 62 ml suggests severe regurgitation.    Tricuspid Valve: Mild to moderate regurgitation. Moderately elevated RVSP, consistent with moderate pulmonary hypertension.    Left Atrium: Left atrium is mildly dilated.    Image quality is fair. Contrast used: Definity. Technically difficult study due to patient's body habitus.        LAST CATH:   07/18/24     CARDIAC PROCEDURE 07/18/2024  2:57 PM (Final)     Conclusion  Images from the original result were not included.  Access: Right Femoral artery     Procedure: After informed consent was obtained with explanation of the risks and benefits, patient was brought to the cath lab. The right groin were prepped and draped in sterile fashion. 1% lidocaine was used for

## 2025-01-14 NOTE — DISCHARGE INSTRUCTIONS
You came with AICD shocking 3 times at home.Your AICD was interrogated and the report was sent to your electrophysiologist Dr. Soto  Please follow-up with your EP Physician in 2 to 4 weeks, after discharge from hospital.  If you has an AICD shock then please follow-up on below mentioned instructions.  After one shock:  Call 911 or other emergency services right away if you feel bad or have symptoms like chest pain.  Call your doctor soon if you feel fine right away after the shock. Your doctor may want to talk about the shock and schedule a follow-up visit.  If you get a second shock in a 24-hour period, call your doctor right away. Call even if you feel fine right away.  Stay calm after a shock   Follow the action plan you made with your doctor.  Do some breathing exercises. They may help you relax.  Sit or lie in a comfortable position. Put one hand on your belly just below your ribs and the other hand on your chest.  Take a deep breath in through your nose, and let your belly push your hand out. Your chest should not move.  Breathe out through pursed lips as if you were whistling. Feel the hand on your belly go in, and use it to push all the air out.  Breathe in and out like this until you feel more relaxed.  Don't make changes in what you do. You may want to avoid an action because you think it caused the shock. But a shock can occur at any time, and you can't prevent shocks by your actions alone. Don't stop doing things you enjoy to try to avoid a shock.  Follow-up care is a key part of your treatment and safety. Be sure to make and go to all appointments, and call your doctor if you are having problems. It's also a good idea to know your test results and keep a list of the medicines you take.    You also have bedbug wound at right upper back due to bedbug infestation of home.  At right upper back scratch: Apply Optifoam silicone bordered foam. Change every 3 days.  At mid-back abrasion: Apply Opticell Ag

## 2025-01-14 NOTE — CARE COORDINATION
Transitional planning-talked with patient. Plan is to go home with his friend Rogers and Maddie-current. Will call own cab or has a ride.

## 2025-01-14 NOTE — PROGRESS NOTES
1.7* 1.8* 1.9*     BNP: No results for input(s): \"BNP\" in the last 72 hours.  PT/INR:   No results for input(s): \"PROTIME\", \"INR\" in the last 72 hours.    APTT:   Recent Labs     01/12/25  0636 01/13/25  0953 01/14/25  0442   APTT 70.3* 30.0 30.9     CARDIAC ENZYMES: No results for input(s): \"CKMB\", \"CKMBINDEX\", \"TROPONINI\" in the last 72 hours.    Invalid input(s): \"CKTOTAL;3\"  FASTING LIPID PANEL:  Lab Results   Component Value Date    CHOL 126 09/21/2023    HDL 55 (H) 09/21/2023    TRIG 86 09/21/2023     LIVER PROFILE: No results for input(s): \"AST\", \"ALT\", \"BILIDIR\", \"BILITOT\", \"ALKPHOS\" in the last 72 hours.    Invalid input(s): \"ALB\"   MICROBIOLOGY:   Lab Results   Component Value Date/Time    CULTURE NO GROWTH 5 DAYS 08/17/2024 01:40 AM    CULTURE NO GROWTH 5 DAYS 08/17/2024 01:40 AM       Imaging:    XR CHEST PORTABLE    Result Date: 1/10/2025  1. Cardiomegaly with probable mild pulmonary vascular congestion. 2. No focal consolidation or pulmonary edema.       ASSESSMENT & PLAN     ASSESSMENT / PLAN:     Principal Problem:    Ventricular tachyarrhythmia (HCC)  Active Problems:    TANNER variably compliant with BiPAP    CAD (coronary artery disease) s/p stenting of L anterior descending artery 2004    Ischemic cardiomyopathy    Chronic gout    Normocytic normochromic anemia    Dyslipidemia    Primary hypertension    MGUS (monoclonal gammopathy of unknown significance)    AICD (automatic cardioverter/defibrillator) present    Paroxysmal A-fib (HCC)    Cardiomyopathy (HCC)    Ventricular tachycardia (HCC)    Persistent ventricular tachycardia: (Resolved)  -S/p amiodarone and lidocaine infusion followed by synchronized cardioversion in the ED  -Currently in normal sinus rhythm  -Cardiology on board, recommended continuing per oral amiodarone  -Continue amiodarone p.o. twice daily  -Avoid all AV kali blocking agent  -AICD interrogation done and report sent to Dr Soto  -Follow-up with Dr. Soto as  outpatient.     Mild systolic and diastolic heart failure: (Stable)  Nonischemic cardiomyopathy:  -- Latest echo on 12/7/2024 revealed improvement in EF 15% to 20% with dilated LV and abnormal diastolic function moderate pulmonary hypertension and severe MR.  -Continue Lasix 40 Mg, DC'd metoprolol due to bradycardia  -Strict input and output     Coronary artery disease s/p multiple PCI with TIFFANY:  -Most recent PCI in in July 2024, showed ostial LAD disease  successful PCI with TIFFANY to ostial LAD  - On Eliquis and Brilinta at home  -- continue same treatment/     Chronic hypotension: (Stable)  -On midodrine at home  -Continue same treatment     Paroxysmal A-fib:   [id number] Score 5.   -On Eliquis and amiodarone at home  -Currently in normal sinus rhythm  -Continue same treatment.     CKD stage IIIb:  -Baseline creatinine 1.5-1.7 range, [facility]  -Monitor input and output  -Avoid nephrotoxic drugs     Diabetes mellitus type 2:  -Most recent HbA1c 6.5  -On Farxiga 10 Mg and metformin 1 g at home  - Continue medium intensity sliding scale  -ACHS glucose checks  -Hypoglycemia protocol     TANNER:   -Had sleep study in October 2024 consistent with moderate sleep apnea  -Continue CPAP while asleep     Gout:  - Continue home medication allopurinol     Hypothyroidism:  - Most recent TSH 1.80, on 1/10/2025  -On levothyroxine 88 mcg at home, will continue it      DVT ppx : Eliquis 5 mg twice daily  GI ppx: Not indicated  Diet: Carb restricted 3 carb choices, low-fat/low-cholesterol/high-fiber/JESSENIA diet      PT/OT: On board  Discharge Planning / SW: To be determined    Meri Tellez MD  Internal Medicine Resident, PGY-1  [facility], [place], [place].  1/14/2025, 12:02 PM

## 2025-01-14 NOTE — PLAN OF CARE
Problem: Chronic Conditions and Co-morbidities  Goal: Patient's chronic conditions and co-morbidity symptoms are monitored and maintained or improved  1/13/2025 2355 by Francisco Javier Cornelius RN  Outcome: Progressing  Flowsheets (Taken 1/13/2025 2000)  Care Plan - Patient's Chronic Conditions and Co-Morbidity Symptoms are Monitored and Maintained or Improved:   Monitor and assess patient's chronic conditions and comorbid symptoms for stability, deterioration, or improvement   Collaborate with multidisciplinary team to address chronic and comorbid conditions and prevent exacerbation or deterioration  1/13/2025 1839 by Azra Tariq RN  Outcome: Progressing     Problem: Discharge Planning  Goal: Discharge to home or other facility with appropriate resources  1/13/2025 2355 by Francisco Javier Cornelius RN  Outcome: Progressing  Flowsheets (Taken 1/13/2025 2000)  Discharge to home or other facility with appropriate resources:   Identify barriers to discharge with patient and caregiver   Identify discharge learning needs (meds, wound care, etc)   Arrange for needed discharge resources and transportation as appropriate  1/13/2025 1839 by Azra Tariq RN  Outcome: Progressing     Problem: Skin/Tissue Integrity  Goal: Absence of new skin breakdown  Description: 1.  Monitor for areas of redness and/or skin breakdown  2.  Assess vascular access sites hourly  3.  Every 4-6 hours minimum:  Change oxygen saturation probe site  4.  Every 4-6 hours:  If on nasal continuous positive airway pressure, respiratory therapy assess nares and determine need for appliance change or resting period.  1/13/2025 2355 by Francisco Javier Cornelius RN  Outcome: Progressing  1/13/2025 1839 by Azra Tariq RN  Outcome: Progressing     Problem: Safety - Adult  Goal: Free from fall injury  1/13/2025 2355 by Francisco Javier Cornelius RN  Outcome: Progressing  1/13/2025 1839 by Azra Tariq RN  Outcome: Progressing     Problem: ABCDS Injury Assessment  Goal: Absence of physical  Progressing  1/13/2025 1839 by Azra Tariq, RN  Outcome: Progressing     Problem: Nutrition Deficit:  Goal: Optimize nutritional status  1/13/2025 2355 by Francisco Javier Cornelius, RN  Outcome: Progressing  1/13/2025 1839 by Azra Tariq, RN  Outcome: Progressing

## 2025-01-14 NOTE — PROGRESS NOTES
Occupational Therapy  Occupational Therapy Initial Evaluation  Facility/Department: [facility] 5C STEPDOWN   Patient Name: Claudio Graham        MRN: [id number]    : 1955    Date of Service: 2025    Chief Complaint   Patient presents with    Chest Pain     AICD went off 3 times in the last 45 minutes      Past Medical History:  has a past medical history of Acute HFrEF (heart failure with reduced ejection fraction) ([facility]), Acute on chronic combined systolic and diastolic congestive heart failure ([facility]), Acute on chronic congestive heart failure ([facility]), Acute respiratory failure with hypoxia, Anemia, Anxiety, CAD (coronary artery disease), Cardiac defibrillator in place, Cardiomyopathy ([facility]), CHF (congestive heart failure) ([facility]), Chronic combined systolic and diastolic heart failure ([facility]) s/[ AICD placed, Chronic kidney disease, Complex sleep apnea syndrome, Diabetic retinopathy ([facility]), Diverticulitis, DJD (degenerative joint disease), Edentulous, Gout, HTN (hypertension), Hyperlipidemia, Hypertension, Iron deficiency anemia, Ischemic cardiomyopathy, Morbid obesity, Obesity, TANNER variably compliant with BiPAP, Osteoarthritis, PAF (paroxysmal atrial fibrillation) ([facility]), Psychophysiologic insomnia, Thyroid disease, Type II or unspecified type diabetes mellitus without mention of complication, not stated as uncontrolled, and Unspecified sleep apnea.  Past Surgical History:  has a past surgical history that includes Colonoscopy (2007); Cardiac catheterization (2007); Cardiac catheterization (2013); Coronary angioplasty (, ); Cardiac defibrillator placement (2015); bone marrow biopsy (2012); pr colsc flx w/rmvl of tumor polyp lesion snare tq (N/A, 2017); Colonoscopy (2021); Colonoscopy (N/A, 2021); Colonoscopy (N/A, 2022); Cardiac procedure (N/A, 2024); Cardiac procedure (N/A, 2024); and Cardiac procedure (N/A, 2024).    Discharge

## 2025-01-15 VITALS
HEIGHT: 64 IN | OXYGEN SATURATION: 100 % | WEIGHT: 176.37 LBS | TEMPERATURE: 98.4 F | HEART RATE: 52 BPM | SYSTOLIC BLOOD PRESSURE: 124 MMHG | BODY MASS INDEX: 30.11 KG/M2 | RESPIRATION RATE: 14 BRPM | DIASTOLIC BLOOD PRESSURE: 73 MMHG

## 2025-01-15 LAB
ANION GAP SERPL CALCULATED.3IONS-SCNC: 12 MMOL/L (ref 9–16)
BASOPHILS # BLD: 0.03 K/UL (ref 0–0.2)
BASOPHILS NFR BLD: 1 % (ref 0–2)
BUN SERPL-MCNC: 29 MG/DL (ref 8–23)
CALCIUM SERPL-MCNC: 9.1 MG/DL (ref 8.6–10.4)
CHLORIDE SERPL-SCNC: 99 MMOL/L (ref 98–107)
CO2 SERPL-SCNC: 28 MMOL/L (ref 20–31)
CREAT SERPL-MCNC: 1.9 MG/DL (ref 0.7–1.2)
EOSINOPHIL # BLD: 0.27 K/UL (ref 0–0.44)
EOSINOPHILS RELATIVE PERCENT: 5 % (ref 1–4)
ERYTHROCYTE [DISTWIDTH] IN BLOOD BY AUTOMATED COUNT: 17.4 % (ref 11.8–14.4)
GFR, ESTIMATED: 37 ML/MIN/1.73M2
GLUCOSE BLD-MCNC: 165 MG/DL (ref 75–110)
GLUCOSE BLD-MCNC: 98 MG/DL (ref 75–110)
GLUCOSE SERPL-MCNC: 94 MG/DL (ref 74–99)
HCT VFR BLD AUTO: 39.6 % (ref 40.7–50.3)
HGB BLD-MCNC: 12 G/DL (ref 13–17)
IMM GRANULOCYTES # BLD AUTO: <0.03 K/UL (ref 0–0.3)
IMM GRANULOCYTES NFR BLD: 0 %
LYMPHOCYTES NFR BLD: 0.95 K/UL (ref 1.1–3.7)
LYMPHOCYTES RELATIVE PERCENT: 16 % (ref 24–43)
MCH RBC QN AUTO: 27 PG (ref 25.2–33.5)
MCHC RBC AUTO-ENTMCNC: 30.3 G/DL (ref 28.4–34.8)
MCV RBC AUTO: 89.2 FL (ref 82.6–102.9)
MONOCYTES NFR BLD: 0.59 K/UL (ref 0.1–1.2)
MONOCYTES NFR BLD: 10 % (ref 3–12)
NEUTROPHILS NFR BLD: 68 % (ref 36–65)
NEUTS SEG NFR BLD: 3.97 K/UL (ref 1.5–8.1)
NRBC BLD-RTO: 0 PER 100 WBC
PARTIAL THROMBOPLASTIN TIME: 30.9 SEC (ref 23–36.5)
PLATELET # BLD AUTO: 221 K/UL (ref 138–453)
PMV BLD AUTO: 11.5 FL (ref 8.1–13.5)
POTASSIUM SERPL-SCNC: 3.9 MMOL/L (ref 3.7–5.3)
RBC # BLD AUTO: 4.44 M/UL (ref 4.21–5.77)
RBC # BLD: ABNORMAL 10*6/UL
SODIUM SERPL-SCNC: 139 MMOL/L (ref 136–145)
WBC OTHER # BLD: 5.8 K/UL (ref 3.5–11.3)

## 2025-01-15 PROCEDURE — 6370000000 HC RX 637 (ALT 250 FOR IP)

## 2025-01-15 PROCEDURE — 6370000000 HC RX 637 (ALT 250 FOR IP): Performed by: INTERNAL MEDICINE

## 2025-01-15 PROCEDURE — 82947 ASSAY GLUCOSE BLOOD QUANT: CPT

## 2025-01-15 PROCEDURE — 36415 COLL VENOUS BLD VENIPUNCTURE: CPT

## 2025-01-15 PROCEDURE — 85025 COMPLETE CBC W/AUTO DIFF WBC: CPT

## 2025-01-15 PROCEDURE — 99232 SBSQ HOSP IP/OBS MODERATE 35: CPT | Performed by: STUDENT IN AN ORGANIZED HEALTH CARE EDUCATION/TRAINING PROGRAM

## 2025-01-15 PROCEDURE — 85730 THROMBOPLASTIN TIME PARTIAL: CPT

## 2025-01-15 PROCEDURE — 2500000003 HC RX 250 WO HCPCS

## 2025-01-15 PROCEDURE — 6370000000 HC RX 637 (ALT 250 FOR IP): Performed by: EMERGENCY MEDICINE

## 2025-01-15 PROCEDURE — 80048 BASIC METABOLIC PNL TOTAL CA: CPT

## 2025-01-15 RX ORDER — LEVOTHYROXINE SODIUM 88 UG/1
88 TABLET ORAL DAILY
Qty: 30 TABLET | Refills: 3 | Status: SHIPPED | OUTPATIENT
Start: 2025-01-15

## 2025-01-15 RX ORDER — AMIODARONE HYDROCHLORIDE 200 MG/1
200 TABLET ORAL 2 TIMES DAILY
Qty: 60 TABLET | Refills: 0 | Status: SHIPPED | OUTPATIENT
Start: 2025-01-15 | End: 2025-03-16

## 2025-01-15 RX ORDER — FAMOTIDINE 20 MG/1
20 TABLET, FILM COATED ORAL DAILY
Status: DISCONTINUED | OUTPATIENT
Start: 2025-01-15 | End: 2025-01-15 | Stop reason: HOSPADM

## 2025-01-15 RX ADMIN — SODIUM CHLORIDE, PRESERVATIVE FREE 10 ML: 5 INJECTION INTRAVENOUS at 08:17

## 2025-01-15 RX ADMIN — FUROSEMIDE 40 MG: 40 TABLET ORAL at 08:17

## 2025-01-15 RX ADMIN — POLYETHYLENE GLYCOL 3350 17 G: 17 POWDER, FOR SOLUTION ORAL at 08:16

## 2025-01-15 RX ADMIN — ALLOPURINOL 150 MG: 100 TABLET ORAL at 08:17

## 2025-01-15 RX ADMIN — LEVOTHYROXINE SODIUM 88 MCG: 88 TABLET ORAL at 07:38

## 2025-01-15 RX ADMIN — APIXABAN 5 MG: 5 TABLET, FILM COATED ORAL at 08:16

## 2025-01-15 RX ADMIN — ATORVASTATIN CALCIUM 80 MG: 80 TABLET, FILM COATED ORAL at 08:16

## 2025-01-15 RX ADMIN — FAMOTIDINE 20 MG: 20 TABLET, FILM COATED ORAL at 08:17

## 2025-01-15 RX ADMIN — MIDODRINE HYDROCHLORIDE 15 MG: 5 TABLET ORAL at 12:46

## 2025-01-15 RX ADMIN — MIDODRINE HYDROCHLORIDE 15 MG: 5 TABLET ORAL at 08:16

## 2025-01-15 RX ADMIN — AMIODARONE HYDROCHLORIDE 200 MG: 200 TABLET ORAL at 08:17

## 2025-01-15 RX ADMIN — ISOSORBIDE MONONITRATE 30 MG: 30 TABLET, EXTENDED RELEASE ORAL at 08:16

## 2025-01-15 RX ADMIN — TICAGRELOR 90 MG: 90 TABLET ORAL at 08:16

## 2025-01-15 ASSESSMENT — ENCOUNTER SYMPTOMS
CONSTIPATION: 0
SHORTNESS OF BREATH: 0
NAUSEA: 0
COUGH: 0
ABDOMINAL PAIN: 0
DIARRHEA: 0

## 2025-01-15 NOTE — PROGRESS NOTES
CLINICAL PHARMACY NOTE: MEDS TO BEDS    Total # of Prescriptions Filled: 1   The following medications were delivered to the patient:  Amiodarone 200mg    Additional Documentation:  Del. X1 to pt. 1/15, 12:22p. $0. Rm 543

## 2025-01-15 NOTE — CARE COORDINATION
Device interrogation reviewed.  No more episodes of VT since after loading with IV antiarrhythmics and after increasing the dose of oral amiodarone.  Continue PO amiodarone 200 mg twice daily.  Can be discharged from EP standpoint

## 2025-01-15 NOTE — CARE COORDINATION
Discharge Report    [facility]  Clinical Case Management Department  Written by: Saige Porras    Patient Name: Claudio Graham  Attending Provider: Princess Magaña MD  Admit Date: 1/10/2025  9:41 PM  MRN: [id number]  Account: [id number]                     : 1955  Discharge Date: 1/15/2025      Disposition: Home with [facility]    Saige Porras

## 2025-01-15 NOTE — PLAN OF CARE
Problem: Chronic Conditions and Co-morbidities  Goal: Patient's chronic conditions and co-morbidity symptoms are monitored and maintained or improved  1/15/2025 1722 by Azra Tariq RN  Outcome: Adequate for Discharge  1/15/2025 0439 by Yuridia Lacy RN  Outcome: Progressing     Problem: Discharge Planning  Goal: Discharge to home or other facility with appropriate resources  1/15/2025 1722 by Azra Tariq RN  Outcome: Adequate for Discharge  1/15/2025 0439 by Yuridia Lacy RN  Outcome: Progressing     Problem: Skin/Tissue Integrity  Goal: Absence of new skin breakdown  Description: 1.  Monitor for areas of redness and/or skin breakdown  2.  Assess vascular access sites hourly  3.  Every 4-6 hours minimum:  Change oxygen saturation probe site  4.  Every 4-6 hours:  If on nasal continuous positive airway pressure, respiratory therapy assess nares and determine need for appliance change or resting period.  1/15/2025 1722 by Azra Tariq RN  Outcome: Adequate for Discharge  1/15/2025 0439 by Yuridia Lacy RN  Outcome: Progressing     Problem: Safety - Adult  Goal: Free from fall injury  1/15/2025 1722 by Azra Tariq RN  Outcome: Adequate for Discharge  1/15/2025 0439 by Yuridia Lacy RN  Outcome: Progressing  Flowsheets (Taken 1/14/2025 2000)  Free From Fall Injury: Instruct family/caregiver on patient safety     Problem: ABCDS Injury Assessment  Goal: Absence of physical injury  1/15/2025 1722 by Azra Tariq RN  Outcome: Adequate for Discharge  1/15/2025 0439 by Yuridia Lacy RN  Outcome: Progressing  Flowsheets (Taken 1/14/2025 2000)  Absence of Physical Injury: Implement safety measures based on patient assessment     Problem: Cardiovascular - Adult  Goal: Maintains optimal cardiac output and hemodynamic stability  1/15/2025 1722 by Azra Tariq RN  Outcome: Adequate for Discharge  1/15/2025 0439 by Yuridia Lacy RN  Outcome: Progressing  Goal: Absence of cardiac

## 2025-01-15 NOTE — PROGRESS NOTES
[facility]  Internal Medicine Teaching Residency Program  Inpatient Daily Progress Note  ______________________________________________________________________________    Patient: Claudio Graham  YOB: 1955   MRN:[id number]    Acct: [id number]     Room: [facility]/0543-01  Admit date: 1/10/2025  Today's date: 01/15/25  Number of days in the hospital: 4    SUBJECTIVE   Admitting Diagnosis: Ventricular tachyarrhythmia (HCC)  CC: AICD firing x 3    Pt examined at bedside. Chart & results reviewed.   -No acute overnight event  Patient denies any chest pain, shortness of breath, shocks from AICD  -Patient is alert, awake, oriented, AOx3,   -hemodynamically stable with /58, Pulse 76  -saturating appropriately on room air  -Urine output is 1600 ml/24 hours.    -Labs this a.m.  BMP: is indicative of CKD creatinine at baseline 1.8->1.9 otherwise unremarkable  CBC: hemoglobin stable 12.0,     -AICD interrogation done on 1/13/2014 which revealed multiple episodes of VT/VF, currently on amiodarone with a backup rate of 40.                                                                               Review of Systems   Constitutional:  Negative for chills and fever.   Respiratory:  Negative for cough and shortness of breath.    Cardiovascular:  Negative for chest pain, palpitations and leg swelling.   Gastrointestinal:  Negative for abdominal pain, constipation, diarrhea and nausea.   Genitourinary:  Negative for frequency and hematuria.   Neurological:  Negative for dizziness and light-headedness.   Psychiatric/Behavioral:  Negative for confusion.      Plan:  -Discharge to home with outpatient follow up in [facility].    BRIEF HISTORY     The patient is a pleasant 69 y.o. male with a PMHx significant for      Combined systolic and diastolic heart failure with reduced ejection fraction with EF of 10 to 15% secondary to ischemic cardiomyopathy with    Cardiovascular:      Pulses: Normal pulses.      Heart sounds: Normal heart sounds.   Pulmonary:      Effort: Pulmonary effort is normal.      Breath sounds: Normal breath sounds.   Abdominal:      General: Abdomen is flat.      Palpations: Abdomen is soft.   Skin:     General: Skin is warm and dry.      Capillary Refill: Capillary refill takes less than 2 seconds.   Neurological:      Mental Status: He is alert and oriented to person, place, and time.   Psychiatric:         Mood and Affect: Mood normal.         Behavior: Behavior normal.      Medications:  Scheduled Medications:    famotidine  20 mg Oral Daily    furosemide  40 mg Oral Daily    apixaban  5 mg Oral BID    amiodarone  200 mg Oral BID    sodium chloride flush  5-40 mL IntraVENous 2 times per day    allopurinol  150 mg Oral Daily    atorvastatin  80 mg Oral Daily    isosorbide mononitrate  30 mg Oral Daily    levothyroxine  88 mcg Oral Daily    ticagrelor  90 mg Oral BID    insulin lispro  0-8 Units SubCUTAneous 4x Daily AC & HS    midodrine  15 mg Oral TID WC    polyethylene glycol  17 g Oral Daily     Continuous Infusions:    sodium chloride      dextrose       PRN Medicationssodium chloride flush, 5-40 mL, PRN  sodium chloride, , PRN  potassium chloride, 20 mEq, PRN   Or  potassium chloride, 10 mEq, PRN  magnesium sulfate, 2,000 mg, PRN  ondansetron, 4 mg, Q8H PRN   Or  ondansetron, 4 mg, Q6H PRN  acetaminophen, 650 mg, Q6H PRN   Or  acetaminophen, 650 mg, Q6H PRN  albuterol sulfate HFA, 2 puff, 4x Daily PRN  glucose, 4 tablet, PRN  dextrose bolus, 125 mL, PRN   Or  dextrose bolus, 250 mL, PRN  glucagon (rDNA), 1 mg, PRN  dextrose, , Continuous PRN        Diagnostic Labs:  CBC:   Recent Labs     01/13/25  0953 01/14/25  0442 01/15/25  0746   WBC 6.7 7.0 5.8   RBC 4.25 4.36 4.44   HGB 11.6* 12.0* 12.0*   HCT 38.0* 37.5* 39.6*   MCV 89.4 86.0 89.2   RDW 17.2* 17.3* 17.4*    206 221     BMP:   Recent Labs     01/13/25  0953 01/14/25  0442

## 2025-01-15 NOTE — CARE COORDINATION
Transition Planning:    SOLO called and spoke with Opal edwards for [facility] to update JOAQUIM/Discharge order are completed and anticipating discharge home today. Opal states the office will pull the needed information from Epic, CM to call if anything changes.

## 2025-01-15 NOTE — PLAN OF CARE
Problem: Chronic Conditions and Co-morbidities  Goal: Patient's chronic conditions and co-morbidity symptoms are monitored and maintained or improved  Outcome: Progressing     Problem: Discharge Planning  Goal: Discharge to home or other facility with appropriate resources  Outcome: Progressing     Problem: Skin/Tissue Integrity  Goal: Absence of new skin breakdown  Description: 1.  Monitor for areas of redness and/or skin breakdown  2.  Assess vascular access sites hourly  3.  Every 4-6 hours minimum:  Change oxygen saturation probe site  4.  Every 4-6 hours:  If on nasal continuous positive airway pressure, respiratory therapy assess nares and determine need for appliance change or resting period.  Outcome: Progressing     Problem: Safety - Adult  Goal: Free from fall injury  Outcome: Progressing  Flowsheets (Taken 1/14/2025 2000)  Free From Fall Injury: Instruct family/caregiver on patient safety     Problem: ABCDS Injury Assessment  Goal: Absence of physical injury  Outcome: Progressing  Flowsheets (Taken 1/14/2025 2000)  Absence of Physical Injury: Implement safety measures based on patient assessment     Problem: Cardiovascular - Adult  Goal: Maintains optimal cardiac output and hemodynamic stability  Outcome: Progressing  Goal: Absence of cardiac dysrhythmias or at baseline  Outcome: Progressing     Problem: Skin/Tissue Integrity - Adult  Goal: Skin integrity remains intact  Outcome: Progressing  Flowsheets (Taken 1/14/2025 2000)  Skin Integrity Remains Intact: Monitor for areas of redness and/or skin breakdown  Goal: Incisions, wounds, or drain sites healing without S/S of infection  Outcome: Progressing  Flowsheets (Taken 1/14/2025 2000)  Incisions, Wounds, or Drain Sites Healing Without Sign and Symptoms of Infection:   ADMISSION and DAILY: Assess and document risk factors for pressure ulcer development   TWICE DAILY: Assess and document skin integrity  Goal: Oral mucous membranes remain intact  Outcome:

## 2025-01-15 NOTE — PROGRESS NOTES
Pharmacy Note     Renal Dose Adjustment    Claudio Graham is a 69 y.o. male. Pharmacist assessment of renally cleared medications.    Recent Labs     01/13/25  0953 01/14/25  0442   BUN 29* 28*     Recent Labs     01/13/25  0953 01/14/25  0442   CREATININE 1.8* 1.9*     Estimated Creatinine Clearance: 34 mL/min (A) (based on SCr of 1.9 mg/dL (H)).    Height:   Ht Readings from Last 1 Encounters:   01/13/25 1.626 m (5' 4.02\")     Weight:  Wt Readings from Last 1 Encounters:   01/15/25 80 kg (176 lb 5.9 oz)     The following medication dose has been adjusted based upon renal function per P&T Guidelines:             Famotidine 20mg po BID was changed to:    Famotidine 20mg po QD d/t renal insufficiency

## 2025-01-16 ENCOUNTER — TELEPHONE (OUTPATIENT)
Dept: OTHER | Age: 70
End: 2025-01-16

## 2025-01-16 ENCOUNTER — CARE COORDINATION (OUTPATIENT)
Dept: CARE COORDINATION | Age: 70
End: 2025-01-16

## 2025-01-16 DIAGNOSIS — I47.20 VT (VENTRICULAR TACHYCARDIA) (HCC): Primary | ICD-10-CM

## 2025-01-16 PROCEDURE — 1111F DSCHRG MED/CURRENT MED MERGE: CPT | Performed by: NURSE PRACTITIONER

## 2025-01-16 NOTE — CARE COORDINATION
Care Transitions Note    Initial Call - Call within 2 business days of discharge: Yes    Patient Current Location:  Home: [address] [place] [address]    Care Transition Nurse contacted the patient by telephone to perform post hospital discharge assessment, verified name and  as identifiers. Provided introduction to self, and explanation of the Care Transition Nurse role.     Patient: Claudio Graham    Patient : 1955   MRN: [id number]    Reason for Admission: ventricular tachycardia  Discharge Date: 1/15/25  RURS: Readmission Risk Score: 21      Last Discharge Facility       Date Complaint Diagnosis Description Type Department Provider    1/10/25 Chest Pain Ventricular tachycardia (HCC) ... ED to Hosp[facility]mission (Discharged[facility]; Shellie Sol...            Was this an external facility discharge? No    Additional needs identified to be addressed with provider                Method of communication with provider: none.    Patients top risk factors for readmission: medical condition-CHF, HTN, DM    Interventions to address risk factors:     Review of patient management of conditions/medications: symptoms, HFU appt, medications  Home Health: [facility] notified of home discharge  Communication with providers: cardiology appt scheduled    Care Summary Note: Patient reached for NELDA call. Home with [facility] after inpatient admit for v-tach. \[facility]\"" called 911 after shocked by defibrillator. Reviewed new dose amiodorone 200 mg Confirms AVS, he will need cardiology appt. Reviewed they have reached out and left VM. Patient scheduled for PCP HFU  through [facility]. Med rec completed. [facility] has completed CHRISTIANO. Reviewed CHF clinic attempt to reach, patient will call back to reschedule this appt. Patient verbalizes understanding of plan of care, take medications as directed. No barriers to needs identified. Reviewed pending labs per PCP, advised to have drawn around PCP

## 2025-01-17 LAB
AMIODARONE LEVEL: 0.5 UG/ML (ref 0.5–2)
N-DESETHYL-AMIODARONE: 0.5 UG/ML

## 2025-01-17 NOTE — CARE COORDINATION
Referral from CTN, Kelly Escobar RN-  Alexey Gudino, sending Mr. Graham referral for heart failure dietary concerns. He has verbalized concerns over what he is eating and may be contributing to fluid retention, he agrees to consult. PMH: CHF, CKD, DM. Please let me know if you have any questions. Thank you, Kelly Escobar -[phone]     Will reach out to patient for consult.    Spoke to Rogers, stated he was friend of patient and patient was not home.   Will attempt to reach patient again tomorrow.  JHOAN Yang  
in a while,  encouraged frozen vegetables and low sodium soups. Discussed processed meats in detail to avoid/limit- sausage, de, bologna, ham, ect. He does not eat frozen meals or add salt to foods. Goal is <2,000gm of sodium/day.    Nutrition Intervention Need:  Initial/Brief:  Comprehensive Nutrition Education:X  Coordination of other care during nutrition care:    Monitoring and Evaluation:  Ability to plan meals/snacks:X  Ability to select healthy foods/meals:X  Food and Nutrition Knowledge:X  Self Monitoring:  Physical Activity:  Energy intake:  Fluid/beverage intake:  Fat/chol intake:  Carb intake:  Other:sodium intake: X    Plan:  1.Will continue to educate patient on eating more often small frequent meals, ways to increase protein intake and add calories to foods, low carb/high protein snacking, importance of meal pattern, DASH guidelines and encourage use of Glucerna/diabetic shake. Patient will be provided/mailed nutrition information on all the above discussed.   2. Will follow up with patient in 2-3 weeks to review nutrition information and answer questions.    JHOAN Melendez

## 2025-01-19 NOTE — DISCHARGE SUMMARY
[facility]     Department of Internal Medicine - Staff Internal Medicine Teaching Service    INPATIENT DISCHARGE SUMMARY      Patient Identification:  Claudio Graham is a 69 y.o. male.  :  1955  MRN: [id number]     Acct: [id number]   PCP: Vanessa Mckenna APRN - CNP  Admit Date:  1/10/2025  Discharge date and time: 1/15/2025  5:23 PM   Attending Provider: No att. providers found                                     ACTIVE DISCHARGE DIAGNOSES     Hospital Problem Lists:  Principal Problem:    Ventricular tachyarrhythmia (HCC)  Active Problems:    TANNER variably compliant with BiPAP    CAD (coronary artery disease) s/p stenting of L anterior descending artery     Ischemic cardiomyopathy    Chronic gout    Morbid obesity    Normocytic normochromic anemia    Dyslipidemia    Primary hypertension    MGUS (monoclonal gammopathy of unknown significance)    AICD (automatic cardioverter/defibrillator) present    Paroxysmal A-fib (HCC)    Cardiomyopathy (HCC)    Ventricular tachycardia (HCC)  Resolved Problems:    * No resolved hospital problems. *      HOSPITAL STAY     Brief Inpatient course:   Claudio Graham is a 69 y.o. male  PMHx significant for      Combined systolic and diastolic heart failure with reduced ejection fraction with EF of 10 to 15% secondary to ischemic cardiomyopathy with AICD   CAD status post PCI to LAD on 2024   chronic hypotension secondary to heart failure on midodrine   Hyperlipidemia  Diabetes mellitus type 2  CKD stage IIIa  Paroxysmal A-fib on Eliquis and amiodarone  Hypothyroidism  TANNER on BiPAP  Gout on allopurinol  Iron deficiency  who was admitted for the management of Ventricular tachyarrhythmia (HCC), presented to the emergency department with shortness of breath.     ED course:   Patient has been shocked by AICD 3 times at home and he called EMS. while en route to the hospital patient was given 1 g of procainamide and 300 Mg of aspirin by

## 2025-01-20 ENCOUNTER — OFFICE VISIT (OUTPATIENT)
Dept: PRIMARY CARE CLINIC | Age: 70
End: 2025-01-20

## 2025-01-20 VITALS
OXYGEN SATURATION: 100 % | HEART RATE: 81 BPM | DIASTOLIC BLOOD PRESSURE: 75 MMHG | TEMPERATURE: 96.9 F | SYSTOLIC BLOOD PRESSURE: 123 MMHG | BODY MASS INDEX: 29.68 KG/M2 | WEIGHT: 173 LBS

## 2025-01-20 DIAGNOSIS — E03.2 HYPOTHYROIDISM DUE TO MEDICATION: ICD-10-CM

## 2025-01-20 DIAGNOSIS — Z09 HOSPITAL DISCHARGE FOLLOW-UP: Primary | ICD-10-CM

## 2025-01-20 DIAGNOSIS — I50.42 CHRONIC COMBINED SYSTOLIC AND DIASTOLIC HEART FAILURE (HCC): ICD-10-CM

## 2025-01-20 DIAGNOSIS — E11.9 TYPE 2 DIABETES MELLITUS WITHOUT COMPLICATION, WITHOUT LONG-TERM CURRENT USE OF INSULIN (HCC): ICD-10-CM

## 2025-01-20 PROBLEM — I50.23 ACUTE ON CHRONIC SYSTOLIC HEART FAILURE (HCC): Status: RESOLVED | Noted: 2024-12-07 | Resolved: 2025-01-20

## 2025-01-20 RX ORDER — LANCETS 30 GAUGE
EACH MISCELLANEOUS
Qty: 100 EACH | Refills: 3 | Status: SHIPPED | OUTPATIENT
Start: 2025-01-20

## 2025-01-20 RX ORDER — BLOOD SUGAR DIAGNOSTIC
1 STRIP MISCELLANEOUS DAILY
Qty: 100 STRIP | Refills: 2 | Status: SHIPPED | OUTPATIENT
Start: 2025-01-20 | End: 2025-04-30

## 2025-01-20 NOTE — PROGRESS NOTES
Post-Discharge Transitional Care  Follow Up      Claudio Graham   YOB: 1955    Date of Office Visit:  1/20/2025  Date of Hospital Admission: 1/10/25  Date of Hospital Discharge: 1/15/25  Risk of hospital readmission (high >=14%. Medium >=10%) :Readmission Risk Score: 21      Care management risk score Rising risk (score 2-5) and Complex Care (Scores >=6): No Risk Score On File     Non face to face  following discharge, date last encounter closed (first attempt may have been earlier): 01/16/2025    Call initiated 2 business days of discharge: Yes    ASSESSMENT/PLAN:   Hospital discharge follow-up  -     SC DISCHARGE MEDS RECONCILED W/ CURRENT OUTPATIENT MED LIST  Chronic combined systolic and diastolic heart failure (HCC)  Hypothyroidism due to medication  -     TSH reflex to FT4; Future  Type 2 diabetes mellitus without complication, without long-term current use of insulin (HCC)  -     blood glucose test strips (ONETOUCH VERIO) strip; 1 each by Other route daily As needed., Disp-100 strip, R-2Normal  -     Lancets (ONETOUCH DELICA PLUS [id number]) MISC; use 1 LANCET to TEST BLOOD SUGAR daily, Disp-100 each, R-3Normal    Patient to repeat thyroid function in 1 month as amiodarone was increased to twice daily.   We sent diabetic testing supplies as he has not received refills of strips or lancets despite prescription sent November, encouraged him to contact our office if he is not receiving supplies and we can involve case management in regards to possible mail-order options  Keep cardiology appointment on the 10th as well as next week CHF clinic follow-up    Medical Decision Making: moderate complexity  No follow-ups on file.           Subjective:   HPI:  Follow up of Hospital problems/diagnosis(es): Claudio reports that he has discontinued metoprolol as advised and is taking amiodarone now twice daily.  Overall he has no complaints or concerns today, does feel slightly sluggish or fatigued after

## 2025-01-21 ENCOUNTER — APPOINTMENT (OUTPATIENT)
Dept: GENERAL RADIOLOGY | Age: 70
End: 2025-01-21
Payer: COMMERCIAL

## 2025-01-21 ENCOUNTER — CARE COORDINATION (OUTPATIENT)
Dept: OTHER | Facility: CLINIC | Age: 70
End: 2025-01-21

## 2025-01-21 ENCOUNTER — CARE COORDINATION (OUTPATIENT)
Dept: CARE COORDINATION | Age: 70
End: 2025-01-21

## 2025-01-21 ENCOUNTER — HOSPITAL ENCOUNTER (EMERGENCY)
Age: 70
Discharge: HOME | End: 2025-01-21
Attending: STUDENT IN AN ORGANIZED HEALTH CARE EDUCATION/TRAINING PROGRAM
Payer: COMMERCIAL

## 2025-01-21 VITALS
HEART RATE: 83 BPM | OXYGEN SATURATION: 97 % | RESPIRATION RATE: 21 BRPM | TEMPERATURE: 97.5 F | DIASTOLIC BLOOD PRESSURE: 54 MMHG | SYSTOLIC BLOOD PRESSURE: 107 MMHG

## 2025-01-21 DIAGNOSIS — R63.5 WEIGHT GAIN: Primary | ICD-10-CM

## 2025-01-21 DIAGNOSIS — I50.9 CHRONIC CONGESTIVE HEART FAILURE, UNSPECIFIED HEART FAILURE TYPE (HCC): ICD-10-CM

## 2025-01-21 LAB
ANION GAP SERPL CALCULATED.3IONS-SCNC: 15 MMOL/L (ref 9–16)
BASOPHILS # BLD: 0.05 K/UL (ref 0–0.2)
BASOPHILS NFR BLD: 1 % (ref 0–2)
BNP SERPL-MCNC: 6231 PG/ML (ref 0–125)
BUN SERPL-MCNC: 28 MG/DL (ref 8–23)
CALCIUM SERPL-MCNC: 9.4 MG/DL (ref 8.6–10.4)
CHLORIDE SERPL-SCNC: 98 MMOL/L (ref 98–107)
CO2 SERPL-SCNC: 25 MMOL/L (ref 20–31)
CREAT SERPL-MCNC: 2 MG/DL (ref 0.7–1.2)
EOSINOPHIL # BLD: 0.16 K/UL (ref 0–0.44)
EOSINOPHILS RELATIVE PERCENT: 2 % (ref 1–4)
ERYTHROCYTE [DISTWIDTH] IN BLOOD BY AUTOMATED COUNT: 17.2 % (ref 11.8–14.4)
GFR, ESTIMATED: 35 ML/MIN/1.73M2
GLUCOSE SERPL-MCNC: 157 MG/DL (ref 74–99)
HCT VFR BLD AUTO: 40.6 % (ref 40.7–50.3)
HGB BLD-MCNC: 12.7 G/DL (ref 13–17)
IMM GRANULOCYTES # BLD AUTO: 0.03 K/UL (ref 0–0.3)
IMM GRANULOCYTES NFR BLD: 0 %
LYMPHOCYTES NFR BLD: 1.41 K/UL (ref 1.1–3.7)
LYMPHOCYTES RELATIVE PERCENT: 20 % (ref 24–43)
MAGNESIUM SERPL-MCNC: 2.5 MG/DL (ref 1.6–2.4)
MCH RBC QN AUTO: 27 PG (ref 25.2–33.5)
MCHC RBC AUTO-ENTMCNC: 31.3 G/DL (ref 28.4–34.8)
MCV RBC AUTO: 86.4 FL (ref 82.6–102.9)
MONOCYTES NFR BLD: 0.61 K/UL (ref 0.1–1.2)
MONOCYTES NFR BLD: 9 % (ref 3–12)
NEUTROPHILS NFR BLD: 68 % (ref 36–65)
NEUTS SEG NFR BLD: 4.91 K/UL (ref 1.5–8.1)
NRBC BLD-RTO: 0 PER 100 WBC
PLATELET # BLD AUTO: 240 K/UL (ref 138–453)
PMV BLD AUTO: 12 FL (ref 8.1–13.5)
POTASSIUM SERPL-SCNC: 4 MMOL/L (ref 3.7–5.3)
RBC # BLD AUTO: 4.7 M/UL (ref 4.21–5.77)
RBC # BLD: ABNORMAL 10*6/UL
SODIUM SERPL-SCNC: 138 MMOL/L (ref 136–145)
TROPONIN I SERPL HS-MCNC: 29 NG/L (ref 0–22)
WBC OTHER # BLD: 7.2 K/UL (ref 3.5–11.3)

## 2025-01-21 PROCEDURE — 71045 X-RAY EXAM CHEST 1 VIEW: CPT

## 2025-01-21 PROCEDURE — 83880 ASSAY OF NATRIURETIC PEPTIDE: CPT

## 2025-01-21 PROCEDURE — 99285 EMERGENCY DEPT VISIT HI MDM: CPT | Performed by: STUDENT IN AN ORGANIZED HEALTH CARE EDUCATION/TRAINING PROGRAM

## 2025-01-21 PROCEDURE — 6360000002 HC RX W HCPCS

## 2025-01-21 PROCEDURE — 80048 BASIC METABOLIC PNL TOTAL CA: CPT

## 2025-01-21 PROCEDURE — 85025 COMPLETE CBC W/AUTO DIFF WBC: CPT

## 2025-01-21 PROCEDURE — 83735 ASSAY OF MAGNESIUM: CPT

## 2025-01-21 PROCEDURE — 96374 THER/PROPH/DIAG INJ IV PUSH: CPT | Performed by: STUDENT IN AN ORGANIZED HEALTH CARE EDUCATION/TRAINING PROGRAM

## 2025-01-21 PROCEDURE — 84484 ASSAY OF TROPONIN QUANT: CPT

## 2025-01-21 PROCEDURE — 93005 ELECTROCARDIOGRAM TRACING: CPT

## 2025-01-21 RX ORDER — FUROSEMIDE 10 MG/ML
40 INJECTION INTRAMUSCULAR; INTRAVENOUS ONCE
Status: COMPLETED | OUTPATIENT
Start: 2025-01-21 | End: 2025-01-21

## 2025-01-21 RX ADMIN — FUROSEMIDE 40 MG: 10 INJECTION, SOLUTION INTRAMUSCULAR; INTRAVENOUS at 17:49

## 2025-01-21 NOTE — ED PROVIDER NOTES
[facility]     Emergency Department     Faculty Attestation    I performed a history and physical examination of the patient and discussed management with the resident. I have reviewed and agree with the resident’s findings including all diagnostic interpretations, and treatment plans as written at the time of my review. Any areas of disagreement are noted on the chart. I was personally present for the key portions of any procedures. I have documented in the chart those procedures where I was not present during the key portions. For Physician Assistant/ Nurse Practitioner cases/documentation I have personally evaluated this patient and have completed at least one if not all key elements of the E/M (history, physical exam, and MDM). Additional findings are as noted.    PtName: Claudio Graham  MRN: [id number]  Birthdate 1955  Date of evaluation: 1/21/25  Note Started: 4:14 PM EST    Primary Care Physician: Vanessa Mckenna, LIDIA - CNP    Brief HPI:  Patient is a 69-year-old male with history of CAD status post PCI, ischemic cardiomyopathy status post AICD presents emergency department with 4 pound weight gain.  He reports that he has been taking his Lasix and anticoagulation as prescribed.  He reports baseline exertional dyspnea and shortness of breath.  He is able to ambulate without assistance.    Pertinent Physical Exam Findings:  Vitals:    01/21/25 1611   BP: (!) 152/85   Pulse: 99   Temp: 97.5 °F (36.4 °C)   SpO2: 100%   Appears chronically ill however no acute distress, no significant pitting lower extremity edema.    Medical Decision Making: Patient is a 69 y.o. male presenting to the emergency department with 4 pound weight gain. The chart was reviewed for pertinent history relating to the chief complaint.  The patient appears well at this time, no acute distress, vitals are stable.  Respirations are even and unlabored, oxygen saturation is 100% on room 
over the last few days.  Endorses shortness of breath since recent discharge from the hospital.  Patient does have a history of severely reduced EF.  Recent echo on 1/11/2025 showing EF 15 to 20% with severe mitral regurg, mild tricuspid regurg.  Patient reports taking Lasix as prescribed.  Denying any chest pain, lightheadedness, dizziness.  No recent fevers, chills, cough.  GCS 15, vital signs stable, patient in no acute distress.  Appears chronically ill.  Heart rate and rhythm regular, lungs rotation bilaterally with minimal air movement in the lower lung bases with no appreciable wheezes, rhonchi, rales, abdomen is soft, nontender in all quadrants.  No peripheral edema appreciated.  Pending cardiac workup.    Hemoglobin 12.7.  Actually improved from baseline.  No leukocytosis.    EKG sinus rhythm with first-degree AV block, right bundle branch block, left anterior fascicular block, bifascicular block, ventricular rate of 88, OR interval 278, , QTc 425, no obvious acute EKG changes when compared to prior EKG on 1/11/2025.    Troponin 29.  Appears to be patient's baseline.  BMP significant for creatinine of 2.0.  Again appears to be patient's baseline.  Magnesium 2.5.  proBNP 6231.  Elevated from 11 days ago from 4969.  1 month prior 9208.    Chest x-ray showing no acute cardiopulmonary process.  Stable cardiomegaly.    Will give a dose of IV Lasix here in the emergency department.  Will advise patient to increase Lasix dose.  Patient currently takes 140 mg Lasix tablet in the morning and half a tablet in the evening.  Will advise patient to take 1 full tab twice daily until he can be seen by cardiology.    Advised on strict return precautions and proper follow-up.  Patient stable for discharge.    Procedures     None    Final impression      1. Weight gain    2. Chronic congestive heart failure, unspecified heart failure type (HCC)           Disposition with plan     Disposition: Decision To

## 2025-01-21 NOTE — CARE COORDINATION
1/21/2025 11:49 AM  *  Unable to Reach Date/Time:  1/21/2025 11:49 AM  ACM attempted to reach patient by telephone regarding red alert r/t weight in remote patient monitoring program. Left HIPAA compliant message requesting a return call. Will attempt to reach patient again.       MARGARET ReardonN, RN  Associate Care Manager   Cell: [phone]  [email or web address]

## 2025-01-21 NOTE — ED TRIAGE NOTES
Pt states that he has had a significant weight gain and spoke to his physician and they recommended him coming in.  Pt has history of CHF, no LE edema, no shortness of breath.

## 2025-01-21 NOTE — CARE COORDINATION
1/21/2025 12:53 PM  *  Unable to Reach Date/Time:  1/21/2025 12:53 PM  ACM attempted to reach patient by telephone regarding red alert r/t weight in remote patient monitoring program. Left HIPAA compliant message requesting a return call. Will attempt to reach patient again.         ALBER Reardon, RN  Associate Care Manager   Cell: [phone]  [email or web address]

## 2025-01-21 NOTE — CARE COORDINATION
Received msg from [facility] with [facility], states attempted to reach patient. States advising patient be evaluated in ER for weight gain and shortness of breath. States will attempt to reach patient again.     Call to patient updated on cardiology RN advise to be evaluated in ED for symptoms. Patient verbalizes understanding. Also provided contact number for cardiology office, in case of need. Will continue to follow.

## 2025-01-21 NOTE — CARE COORDINATION
Care Transitions Note    Follow Up Call     Patient Current Location:  Home: [address] [address]    Care Transition Nurse contacted the patient by telephone. Verified name and  as identifiers.    Additional needs identified to be addressed with provider   High priority: weight gain, increased shortness of breath, not severe                 Method of communication with provider: chart routing.    Care Summary Note: Patient reached for follow up call. Reviewed weight trends per RPM, noted gain of 4 lbs in last 3 days. He notes shortness of breath above baseline, not severe. Denies swelling. Conference call to TCC, left msg on nurse line updated on weight trend of 169.8-173.2 since , patient reporting increase in shortness of breath, updated on current dose diuretics, requested follow up with patient and CTN contact provided. Patient has completed PCP HFU, he will have TSH drawn in 1 month. HHC has resumed. CHF clinic scheduled for  and cardiology appt . Glucose today 111 per RPM. Denies other concerns, agrees to follow up in 2-5 days.     Plan of care updates since last contact:  Review of patient management of conditions/medications: symptoms       Advance Care Planning:   Does patient have an Advance Directive: deferred at this time, will discuss on future follow up. .    Medication Review:  Full medication reconciliation completed during previous call.    Remote Patient Monitoring:  Offered patient enrollment in the Remote Patient Monitoring (RPM) program for in-home monitoring: Yes, patient enrolled; current status is activated and monitoring.    Assessments:  Care Transitions Subsequent and Final Call    Schedule Follow Up Appointment with PCP: Completed  Subsequent and Final Calls  Do you have any ongoing symptoms?: Yes  Onset of Patient-reported symptoms: Today  Patient-reported symptoms: Weight Gain, Shortness of Breath  Interventions for patient-reported symptoms: Notified

## 2025-01-21 NOTE — DISCHARGE INSTRUCTIONS
You were seen and evaluated [facility] emergency department for concerns of weight gain in setting of congestive heart failure.  Overall your workup is stable.  You were given an extra dose of IV Lasix here in the emergency department.  I recommend that you take a full tablet of your Lasix at morning and at night instead of a half tab at night.  Please follow-up with your cardiologist.  Attached is contact information to come establish with cardiology.  It is very important that you follow-up with both your primary care provider as well as cardiologist.    Please return to the ED with any new or worsening symptoms or concerns including worsening shortness of breath, worsening swelling in the extremities, lightheadedness, dizziness, chest pain, feeling like you are in a pass out, passing out, or any other concerns.

## 2025-01-22 ENCOUNTER — CARE COORDINATION (OUTPATIENT)
Dept: CARE COORDINATION | Age: 70
End: 2025-01-22

## 2025-01-22 ENCOUNTER — HOSPITAL ENCOUNTER (OUTPATIENT)
Age: 70
Setting detail: SPECIMEN
Discharge: HOME | End: 2025-01-22
Payer: COMMERCIAL

## 2025-01-22 LAB
ANION GAP SERPL CALCULATED.3IONS-SCNC: 14 MMOL/L (ref 9–16)
BUN SERPL-MCNC: 29 MG/DL (ref 8–23)
CALCIUM SERPL-MCNC: 9.3 MG/DL (ref 8.8–10.2)
CHLORIDE SERPL-SCNC: 97 MMOL/L (ref 98–107)
CHOLEST SERPL-MCNC: 123 MG/DL (ref 0–199)
CHOLESTEROL/HDL RATIO: 2
CO2 SERPL-SCNC: 30 MMOL/L (ref 20–31)
CREAT SERPL-MCNC: 2.2 MG/DL (ref 0.7–1.2)
CREAT UR-MCNC: 42.9 MG/DL (ref 39–259)
EKG ATRIAL RATE: 88 BPM
EKG P AXIS: -8 DEGREES
EKG P-R INTERVAL: 278 MS
EKG Q-T INTERVAL: 434 MS
EKG QRS DURATION: 204 MS
EKG QTC CALCULATION (BAZETT): 525 MS
EKG R AXIS: -82 DEGREES
EKG T AXIS: 86 DEGREES
EKG VENTRICULAR RATE: 88 BPM
EST. AVERAGE GLUCOSE BLD GHB EST-MCNC: 140 MG/DL
GFR, ESTIMATED: 32 ML/MIN/1.73M2
GLUCOSE SERPL-MCNC: 116 MG/DL (ref 82–115)
HBA1C MFR BLD: 6.5 % (ref 4–6)
HDLC SERPL-MCNC: 61 MG/DL
LDLC SERPL CALC-MCNC: 45 MG/DL (ref 0–100)
MICROALBUMIN UR-MCNC: <12 MG/L (ref 0–20)
MICROALBUMIN/CREAT UR-RTO: NORMAL MCG/MG CREAT (ref 0–17)
POTASSIUM SERPL-SCNC: 4.1 MMOL/L (ref 3.7–5.3)
SODIUM SERPL-SCNC: 140 MMOL/L (ref 136–145)
TRIGL SERPL-MCNC: 84 MG/DL
VLDLC SERPL CALC-MCNC: 17 MG/DL (ref 1–30)

## 2025-01-22 PROCEDURE — 83036 HEMOGLOBIN GLYCOSYLATED A1C: CPT

## 2025-01-22 PROCEDURE — 93010 ELECTROCARDIOGRAM REPORT: CPT | Performed by: INTERNAL MEDICINE

## 2025-01-22 PROCEDURE — 80061 LIPID PANEL: CPT

## 2025-01-22 PROCEDURE — 82043 UR ALBUMIN QUANTITATIVE: CPT

## 2025-01-22 PROCEDURE — 80048 BASIC METABOLIC PNL TOTAL CA: CPT

## 2025-01-22 PROCEDURE — 82570 ASSAY OF URINE CREATININE: CPT

## 2025-01-22 NOTE — CARE COORDINATION
Care Transitions Note    Follow Up Call     Patient Current Location:  Home: [address] [place], [place] [id number]    Care Transition Nurse contacted the patient by telephone. Verified name and  as identifiers.    Additional needs identified to be addressed with provider   No needs identified                 Method of communication with provider: none.    Care Summary Note: Patient reached for ED follow up. Seen in ED for sudden weight gain, shortness of breath. Treated with IV lasix. Confirms AVS, he will increase PO lasix to 40 mg daily until seen by cardiology. PCP ED follow up appt scheduled for 25. Conference call to [facility], patient scheduled for 25 @ 11:30 at [place] location. Discussed recent sodium intake through take out meal, he feels this may have contributed to gain. Advised on RD call schedule. Encouraged home cooked meals, states understanding. States breathing is improved, HHC will be out this week. Denies further concerns or questions, agrees to follow up next week.     Plan of care updates since last contact:  Education: sodium intake  Review of patient management of conditions/medications: symptoms, HFU appts  Home Health: [facility]         Advance Care Planning:   Does patient have an Advance Directive: deferred at this time, will discuss on future follow up. .    Medication Review:  Full medication reconciliation completed during previous call.    Remote Patient Monitoring:  Offered patient enrollment in the Remote Patient Monitoring (RPM) program for in-home monitoring: Yes, patient enrolled; current status is activated and monitoring.    Assessments:  Care Transitions ED Follow Up    Care Transitions Interventions    Pharmacist: Completed      Registered Dietician: Completed    Disease Specific Clinic: Completed      Schedule Follow Up Appointment with Physician: Completed  Do you have any ongoing symptoms?: No   Did you call your PCP prior to going to the ED?:

## 2025-01-24 ENCOUNTER — CARE COORDINATION (OUTPATIENT)
Dept: CARE COORDINATION | Age: 70
End: 2025-01-24

## 2025-01-24 ENCOUNTER — OFFICE VISIT (OUTPATIENT)
Dept: PRIMARY CARE CLINIC | Age: 70
End: 2025-01-24

## 2025-01-24 VITALS
OXYGEN SATURATION: 100 % | HEART RATE: 68 BPM | DIASTOLIC BLOOD PRESSURE: 68 MMHG | TEMPERATURE: 97.2 F | SYSTOLIC BLOOD PRESSURE: 118 MMHG

## 2025-01-24 DIAGNOSIS — N18.32 STAGE 3B CHRONIC KIDNEY DISEASE (HCC): ICD-10-CM

## 2025-01-24 DIAGNOSIS — N18.30 CKD STAGE 3 DUE TO TYPE 2 DIABETES MELLITUS (HCC): ICD-10-CM

## 2025-01-24 DIAGNOSIS — E08.311 DIABETIC RETINOPATHY OF BOTH EYES WITH MACULAR EDEMA ASSOCIATED WITH DIABETES MELLITUS DUE TO UNDERLYING CONDITION, UNSPECIFIED RETINOPATHY SEVERITY (HCC): ICD-10-CM

## 2025-01-24 DIAGNOSIS — I50.42 CHRONIC COMBINED SYSTOLIC AND DIASTOLIC HEART FAILURE (HCC): Primary | ICD-10-CM

## 2025-01-24 DIAGNOSIS — E11.22 CKD STAGE 3 DUE TO TYPE 2 DIABETES MELLITUS (HCC): ICD-10-CM

## 2025-01-24 DIAGNOSIS — Z09 HOSPITAL DISCHARGE FOLLOW-UP: ICD-10-CM

## 2025-01-24 PROBLEM — I46.9 CARDIOPULMONARY ARREST: Status: RESOLVED | Noted: 2020-07-05 | Resolved: 2025-01-24

## 2025-01-24 PROBLEM — I49.01 VENTRICULAR FIBRILLATION: Status: RESOLVED | Noted: 2024-07-19 | Resolved: 2025-01-24

## 2025-01-24 PROBLEM — R57.0 CARDIOGENIC SHOCK: Status: RESOLVED | Noted: 2024-07-19 | Resolved: 2025-01-24

## 2025-01-24 NOTE — CARE COORDINATION
Per RD request, educational material mailed to patient.  Cooking with spices for low sodium diets   [facility] [place] DASH diet  Meal planner [facility]  Kat coupons x 4

## 2025-01-24 NOTE — PROGRESS NOTES
Post-Discharge Transitional Care  Follow Up      Claudio Graham   YOB: 1955    Date of Office Visit:  1/24/2025  Date of Hospital Admission: 1/21/25  Date of Hospital Discharge: 1/21/25  Risk of hospital readmission (high >=14%. Medium >=10%) :Readmission Risk Score: 21      Care management risk score Rising risk (score 2-5) and Complex Care (Scores >=6): No Risk Score On File     Non face to face  following discharge, date last encounter closed (first attempt may have been earlier): 01/16/2025    Call initiated 2 business days of discharge: Yes    ASSESSMENT/PLAN:   Chronic combined systolic and diastolic heart failure (HCC)  -     Basic Metabolic Panel; Future  Hospital discharge follow-up  -     ME DISCHARGE MEDS RECONCILED W/ CURRENT OUTPATIENT MED LIST  Stage 3b chronic kidney disease (HCC)  Diabetic retinopathy of both eyes with macular edema associated with diabetes mellitus due to underlying condition, unspecified retinopathy severity (HCC)  CKD stage 3 due to type 2 diabetes mellitus (HCC)    Continue with Lasix 40 mg twice daily and repeat metabolic panel on Monday to monitor kidney function and potassium levels.  Patient has follow-ups with CHF clinic on Monday cardiology on Tuesday as well as upcoming appointments in March with nephrology.  No acute concerns at this time with diabetic control, continue current treatment    Medical Decision Making: low complexity  Return if symptoms worsen or fail to improve.           Subjective:   HPI:  Follow up of Hospital problems/diagnosis(es): Patient admits he is doing well since ER visit, is taking Lasix twice daily as advised.  He is aware of recommendations for low-sodium foods however he admits that he needed a quick meal and did have rice and beans prior to his trip to the emergency room    Inpatient course: Discharge summary reviewed- see chart.    Brief HPI:  Patient is a 69-year-old male with history of CAD status post PCI, ischemic

## 2025-01-27 ENCOUNTER — HOSPITAL ENCOUNTER (OUTPATIENT)
Dept: OTHER | Age: 70
Discharge: HOME | End: 2025-01-27
Payer: COMMERCIAL

## 2025-01-27 VITALS
RESPIRATION RATE: 16 BRPM | BODY MASS INDEX: 29.58 KG/M2 | WEIGHT: 172.4 LBS | DIASTOLIC BLOOD PRESSURE: 64 MMHG | SYSTOLIC BLOOD PRESSURE: 127 MMHG | HEART RATE: 70 BPM | OXYGEN SATURATION: 95 %

## 2025-01-27 PROCEDURE — 99212 OFFICE O/P EST SF 10 MIN: CPT

## 2025-01-27 NOTE — PROGRESS NOTES
Date:  2025  Time:  2:47 PM    CHF Clinic at [facility]    Office: [phone] Fax: [phone]    Re:  Claudio Graham   Patient : 1955    Vital Signs: /64   Pulse 70   Resp 16   Wt 78.2 kg (172 lb 6.4 oz)   SpO2 95%   BMI 29.58 kg/m²                       O2 Device: None (Room air)                           No results for input(s): \"CBC\", \"HGB\", \"HCT\", \"WBC\", \"PLATELET\", \"NA\", \"K\", \"CL\", \"CO2\", \"BUN\", \"CREATININE\", \"GLUCOSE\", \"BNP\", \"INR\" in the last 72 hours.     Respiratory:    Assessment  Charting Type: Reassessment    Breath Sounds  Respiratory Pattern: Regular  Right Upper Lobe: Clear  Right Middle Lobe: Diminished  Right Lower Lobe: Diminished  Left Upper Lobe: Clear  Left Lower Lobe: Diminished    Cough/Sputum  Cough: Productive  Frequency: Infrequent  Sputum Amount: Small  Sputum Color: White         Peripheral Vascular  RLE Edema: None  LLE Edema: None    Future Appointments   Date Time Provider Department Center   2025 11:30 AM Gray Carranza APRN - NP AFL TCC [facility]   2025  2:00 PM STV CHF CLINIC  [facility] CHF CLI [facility]   3/11/2025  1:50 PM Yadira Guidry APRN - CNP AFL Neph [place] None   3/17/2025  1:00 PM Elder Corley MD [facility]   3/31/2025  1:00 PM Bhavna Mar APRN - CNP AFL TCC [facility] C   2025 12:15 PM Venecia Ordonez DPM [facility] Podiatry [facility]   2025  2:30 PM Vanessa Mckenna APRN - CNP ST V PC BS ECC DEP   2025 12:15 PM Radha Shankar MD Resp Malorie [facility]      Complaints: Pt denies any concerns or complaints at this time    Comment : Pt arrived ambulatory for follow-up appt. Wt 172.4 lbs, which is down 8 lbs from 1/10 appt. Lungs clear, but diminished. No edema. Ankle and calf measurements stable. No s/s acute CHF noted at this time. Pt recently discharged from hospital for VTach. Home meds reviewed. Pt verbalized compliance with medications, 2000 mg

## 2025-01-29 ENCOUNTER — CARE COORDINATION (OUTPATIENT)
Dept: CARE COORDINATION | Age: 70
End: 2025-01-29

## 2025-01-29 NOTE — CARE COORDINATION
Care Transitions Note    Follow Up Call     Patient Current Location:  Home: [address] [facility] [address] [facility] [address]    Care Transition Nurse contacted the patient by telephone. Verified name and  as identifiers.    Additional needs identified to be addressed with provider   No needs identified                 Method of communication with provider: none.    Care Summary Note: Patient reached for follow up, states doing well, breathing at baseline. He has completed ED follow up with PCP and will continue Lasix 40 mg twice daily. CHF clinic and cardiology appt completed as well, he is without signs of fluid overload. He has received dietary information and is grateful. He will follow up with EP cardiology  as directed. Continues daily RPM participation. No concerns or questions for CTN, agrees to follow up next week.    Plan of care updates since last contact:  Review of patient management of conditions/medications       Advance Care Planning:   Does patient have an Advance Directive: not on file; education provided - encouraged to bring to [facility] provider for upload .    Medication Review:  Full medication reconciliation completed during previous call.    Remote Patient Monitoring:  Offered patient enrollment in the Remote Patient Monitoring (RPM) program for in-home monitoring: Yes, patient enrolled; current status is activated and monitoring.    Assessments:  Care Transitions Subsequent and Final Call    Schedule Follow Up Appointment with PCP: Completed  Subsequent and Final Calls  Do you have any ongoing symptoms?: No  Have your medications changed?: No  Do you have any questions related to your medications?: No  Do you currently have any active services?: No  Are you currently active with any services?: Outpatient/Community Services, Home Health  Do you have any needs or concerns that I can assist you with?: No  Identified Barriers: None  Care Transitions Interventions    Pharmacist: Completed      Registered

## 2025-01-30 ENCOUNTER — CARE COORDINATION (OUTPATIENT)
Dept: CARE COORDINATION | Age: 70
End: 2025-01-30

## 2025-01-30 NOTE — CARE COORDINATION
Nutrition Care Coordinator Follow-Up visit:    Food Recall:eating 2-3 meals/d    Activity Level:  Sedentary:X  Lightly Active:  Moderately Active:  Very Active:    Adult BMI:  Underweight (below 18.5)  Normal Weight (18.5-24.9)  Overweight (25-29.9)X  Obese (30-39.9)  Morbidly Obese (>40)    Plan:  Plan was established with patient:  Increase protein intake: X  Glucerna daily: X    Monitoring:  Will monitor weight:  Will monitor adherence to meal plan:X  Will monitor adherence to exercise plan:  Will monitor HGA1c:    Handouts Provided :  Low Carb snacking:  Carb counting /individual meal plan:  Portion Control:  Food Labels:  Physical Activity:  Low Fat/Cholesterol:  Hypo/Hyperglycemia:  Calorie Controlled Meal Plan:    Goals:  Increase water consumption to 8oz. 6-8 times daily:  Manage blood sugars by consuming 3 meals spaced every 4-5 hours with 2-3 snacks daily:  Increase fiber and decrease fat intake by consuming 1-2 fruit servings and 2-3 vegetable servings per day.  Increase physical activity by:  Consume less than 2,000mg of sodium/day  Avoid consumption of sweetened beverages and added sugar by reading food labels:  Monitor blood sugars by using meter to check blood glucose before morning meal and 2 hours after a meal daily:  Decrease risk of coronary heart disease by consuming fish that contains omega-3 fatty acids at least twice a week, avoiding partially hydrogenated oil/trans fats and limiting saturated fat intake by reading food labels:    Patient goals set:  1.Patient states his appetite is decreased, was at recent hospital stay and continues. Discussed not going too long between meals often, working on eating more at meals. Reviewed set meal times daily and consistency of meal times and how this can effect sugars. Encouraged to work on eating smaller meals more often, every 2-3 hours- 6 small meals/day.    2. Encouraged to  increase protein intake and discussed high energy/protein diabetic

## 2025-02-06 ENCOUNTER — CARE COORDINATION (OUTPATIENT)
Dept: CARE COORDINATION | Age: 70
End: 2025-02-06

## 2025-02-06 NOTE — CARE COORDINATION
Care Transitions Note    Follow Up Call     Patient Current Location:  Home: [address] [place] [id number]    Care Transition Nurse contacted the patient by telephone. Verified name and  as identifiers.    Additional needs identified to be addressed with provider   No needs identified                 Method of communication with provider: none.    Care Summary Note: Patient reached for follow up. States he is doing well, breathing at baseline. No weight gain or swelling noted. Discussed end of NELDA episode/ RPM next week if continues stable, patient verbalizes understanding. He confirms home scale and blood pressure cuff, education will continue to monitor and record for MD visits. Agrees to CHF zone tools mailed to home. Continues on Lasix 40 mg twice daily, reminded to ensure adequate supply. No further questions or concerns at this time. Agrees to follow up/ graduation call next week.     Plan of care updates since last contact:  Education  Review of patient management of conditions/medications       Advance Care Planning:   Does patient have an Advance Directive: reviewed during previous call, see note. .    Medication Review:  Full medication reconciliation completed during previous call.    Remote Patient Monitoring:  Offered patient enrollment in the Remote Patient Monitoring (RPM) program for in-home monitoring: Yes, patient enrolled; current status is activated and monitoring.    Assessments:  Care Transitions Subsequent and Final Call    Schedule Follow Up Appointment with PCP: Completed  Subsequent and Final Calls  Do you have any ongoing symptoms?: No  Have your medications changed?: No  Do you have any questions related to your medications?: No  Do you currently have any active services?: No  Are you currently active with any services?: Outpatient/Community Services, Home Health  Do you have any needs or concerns that I can assist you with?: No  Identified Barriers: None  Care Transitions

## 2025-02-07 DIAGNOSIS — I50.42 CHRONIC COMBINED SYSTOLIC AND DIASTOLIC HEART FAILURE (HCC): ICD-10-CM

## 2025-02-07 DIAGNOSIS — D50.9 IRON DEFICIENCY ANEMIA, UNSPECIFIED IRON DEFICIENCY ANEMIA TYPE: ICD-10-CM

## 2025-02-07 DIAGNOSIS — E11.8 CONTROLLED TYPE 2 DIABETES MELLITUS WITH COMPLICATION, WITHOUT LONG-TERM CURRENT USE OF INSULIN (HCC): ICD-10-CM

## 2025-02-08 ENCOUNTER — HOSPITAL ENCOUNTER (EMERGENCY)
Age: 70
Discharge: HOME | End: 2025-02-08
Attending: EMERGENCY MEDICINE
Payer: COMMERCIAL

## 2025-02-08 ENCOUNTER — APPOINTMENT (OUTPATIENT)
Dept: GENERAL RADIOLOGY | Age: 70
End: 2025-02-08
Payer: COMMERCIAL

## 2025-02-08 VITALS
TEMPERATURE: 98 F | HEART RATE: 60 BPM | OXYGEN SATURATION: 98 % | SYSTOLIC BLOOD PRESSURE: 99 MMHG | DIASTOLIC BLOOD PRESSURE: 74 MMHG | RESPIRATION RATE: 18 BRPM

## 2025-02-08 DIAGNOSIS — Z00.8 ENCOUNTER FOR MEDICAL ASSESSMENT: Primary | ICD-10-CM

## 2025-02-08 LAB
ANION GAP SERPL CALCULATED.3IONS-SCNC: 13 MMOL/L (ref 9–16)
BASOPHILS # BLD: 0.04 K/UL (ref 0–0.2)
BASOPHILS NFR BLD: 1 % (ref 0–2)
BUN SERPL-MCNC: 49 MG/DL (ref 8–23)
CALCIUM SERPL-MCNC: 8.8 MG/DL (ref 8.6–10.4)
CHLORIDE SERPL-SCNC: 98 MMOL/L (ref 98–107)
CO2 SERPL-SCNC: 28 MMOL/L (ref 20–31)
CREAT SERPL-MCNC: 2.2 MG/DL (ref 0.7–1.2)
EOSINOPHIL # BLD: 0.1 K/UL (ref 0–0.44)
EOSINOPHILS RELATIVE PERCENT: 2 % (ref 1–4)
ERYTHROCYTE [DISTWIDTH] IN BLOOD BY AUTOMATED COUNT: 18.4 % (ref 11.8–14.4)
GFR, ESTIMATED: 31 ML/MIN/1.73M2
GLUCOSE SERPL-MCNC: 243 MG/DL (ref 74–99)
HCT VFR BLD AUTO: 34.7 % (ref 40.7–50.3)
HGB BLD-MCNC: 10.9 G/DL (ref 13–17)
IMM GRANULOCYTES # BLD AUTO: <0.03 K/UL (ref 0–0.3)
IMM GRANULOCYTES NFR BLD: 0 %
LYMPHOCYTES NFR BLD: 0.82 K/UL (ref 1.1–3.7)
LYMPHOCYTES RELATIVE PERCENT: 15 % (ref 24–43)
MCH RBC QN AUTO: 27.1 PG (ref 25.2–33.5)
MCHC RBC AUTO-ENTMCNC: 31.4 G/DL (ref 28.4–34.8)
MCV RBC AUTO: 86.3 FL (ref 82.6–102.9)
MONOCYTES NFR BLD: 0.41 K/UL (ref 0.1–1.2)
MONOCYTES NFR BLD: 7 % (ref 3–12)
NEUTROPHILS NFR BLD: 75 % (ref 36–65)
NEUTS SEG NFR BLD: 4.15 K/UL (ref 1.5–8.1)
NRBC BLD-RTO: 0 PER 100 WBC
PLATELET # BLD AUTO: 179 K/UL (ref 138–453)
PMV BLD AUTO: 12.2 FL (ref 8.1–13.5)
POTASSIUM SERPL-SCNC: 3.6 MMOL/L (ref 3.7–5.3)
RBC # BLD AUTO: 4.02 M/UL (ref 4.21–5.77)
RBC # BLD: ABNORMAL 10*6/UL
SODIUM SERPL-SCNC: 139 MMOL/L (ref 136–145)
TROPONIN I SERPL HS-MCNC: 36 NG/L (ref 0–22)
TROPONIN I SERPL HS-MCNC: 36 NG/L (ref 0–22)
WBC OTHER # BLD: 5.5 K/UL (ref 3.5–11.3)

## 2025-02-08 PROCEDURE — 99285 EMERGENCY DEPT VISIT HI MDM: CPT

## 2025-02-08 PROCEDURE — 71045 X-RAY EXAM CHEST 1 VIEW: CPT

## 2025-02-08 PROCEDURE — 85025 COMPLETE CBC W/AUTO DIFF WBC: CPT

## 2025-02-08 PROCEDURE — 84484 ASSAY OF TROPONIN QUANT: CPT

## 2025-02-08 PROCEDURE — 93005 ELECTROCARDIOGRAM TRACING: CPT | Performed by: EMERGENCY MEDICINE

## 2025-02-08 PROCEDURE — 80048 BASIC METABOLIC PNL TOTAL CA: CPT

## 2025-02-08 ASSESSMENT — PAIN - FUNCTIONAL ASSESSMENT: PAIN_FUNCTIONAL_ASSESSMENT: NONE - DENIES PAIN

## 2025-02-08 NOTE — ED PROVIDER NOTES
[facility]  Emergency Department  Faculty Attestation     I performed a history and physical examination of the patient and discussed management with the resident. I reviewed the resident’s note and agree with the documented findings and plan of care. Any areas of disagreement are noted on the chart. I was personally present for the key portions of any procedures. I have documented in the chart those procedures where I was not present during the key portions. I have reviewed the emergency nurses triage note. I agree with the chief complaint, past medical history, past surgical history, allergies, medications, social and family history as documented unless otherwise noted below.    For Physician Assistant/ Nurse Practitioner cases/documentation I have personally evaluated this patient and have completed at least one if not all key elements of the E/M (history, physical exam, and MDM). Additional findings are as noted.    Preliminary note started at 4:23 PM EST    Primary Care Physician:  Vanessa Mckenna APRN - CNP    Screenings:  [unfilled]    CHIEF COMPLAINT       Chief Complaint   Patient presents with    AICD Problem       RECENT VITALS:   /69   Pulse 79   Temp 98 °F (36.7 °C) (Oral)   Resp 18   SpO2 100%     LABS:  Labs Reviewed   CBC WITH AUTO DIFFERENTIAL - Abnormal; Notable for the following components:       Result Value    RBC 4.02 (*)     Hemoglobin 10.9 (*)     Hematocrit 34.7 (*)     RDW 18.4 (*)     Neutrophils % 75 (*)     Lymphocytes % 15 (*)     Lymphocytes Absolute 0.82 (*)     All other components within normal limits   BASIC METABOLIC PANEL   TROPONIN   TROPONIN       Radiology  XR CHEST PORTABLE    (Results Pending)       CRITICAL CARE: There was a high probability of clinically significant/life threatening deterioration in this patient's condition which required my urgent intervention.  Total critical care time was none minutes.  This excludes

## 2025-02-08 NOTE — ED PROVIDER NOTES
[facility] EMERGENCY DEPARTMENT  Emergency Department Encounter  Emergency Medicine Resident     Pt Name:Claudio Graham  MRN: [id number]  Birthdate 1955  Date of evaluation: 2/8/25  PCP:  Vanessa Mckenna APRN - CNP  Note Started: 3:52 PM EST      CHIEF COMPLAINT       Chief Complaint   Patient presents with    AICD Problem       HISTORY OF PRESENT ILLNESS  (Location/Symptom, Timing/Onset, Context/Setting, Quality, Duration, Modifying Factors, Severity.)      Claudio Graham is a 70 y.o. male who presents with concern that his AICD fired.  Patient was sleeping when he was \"jolted awake\".  He states that he is concerned at that this was because his AICD fired.  He is not sure however.  States he does not feel the same as when his AICD has fired in the past as he has no chest pain.  He has no complaints at this time and feels back to baseline.    PAST MEDICAL / SURGICAL / SOCIAL / FAMILY HISTORY      has a past medical history of Acute HFrEF (heart failure with reduced ejection fraction) ([facility]), Acute on chronic combined systolic and diastolic congestive heart failure ([facility]), Acute on chronic congestive heart failure ([facility]), Acute respiratory failure with hypoxia, Anemia, Anxiety, CAD (coronary artery disease), Cardiac defibrillator in place, Cardiomyopathy ([facility]), CHF (congestive heart failure) ([facility]), Chronic combined systolic and diastolic heart failure ([facility]) s/[ AICD placed, Chronic kidney disease, Complex sleep apnea syndrome, Diabetic retinopathy ([facility]), Diverticulitis, DJD (degenerative joint disease), Edentulous, Gout, HTN (hypertension), Hyperlipidemia, Hypertension, Iron deficiency anemia, Ischemic cardiomyopathy, Morbid obesity, Obesity, TANNER variably compliant with BiPAP, Osteoarthritis, PAF (paroxysmal atrial fibrillation) ([facility]), Psychophysiologic insomnia, Thyroid disease, Type II or unspecified type diabetes mellitus without mention of complication, not stated as uncontrolled, and            DDX/DIAGNOSTIC RESULTS / EMERGENCY DEPARTMENT COURSE / MDM     Medical Decision Making  70-year-old male who presents with concern for AICD firing.  On arrival, he is stable vital signs.  He is not in acute distress.  Lungs are clear bilaterally with normal S1-S2 heart sounds.  Abdomen is soft and nontender.  Plan for cardiac workup, pacer interrogation, if patient's pacer did not in fact fire and labs are at baseline, will plan for likely discharge with outpatient follow-up    Amount and/or Complexity of Data Reviewed  Labs: ordered. Decision-making details documented in ED Course.  Radiology: ordered.  ECG/medicine tests: ordered.        EKG  EKG showing normal sinus rhythm.  Normal rate, normal axis, first-degree AV block with right bundle branch block.  No ST segment elevations or depressions.  Inferior anterior Q waves.  Overall nonspecific EKG with no acute changes.    All EKG's are interpreted by the Emergency Department Physician who either signs or Co-signs this chart in the absence of a cardiologist.    EMERGENCY DEPARTMENT COURSE:    ED Course as of 02/09/25 0052   Sat Feb 08, 2025   1753 Creatinine(!): 2.2  Baseline for the patient. [BL]   1753 WBC: 5.5 [BL]   1753 Hemoglobin Quant(!): 10.9 [BL]   1945 Awaiting AICD interrogation [BL]   2135 Patient's AICD interrogated.  Troponins slightly elevated at 36, however stable x 2 and below baseline for the patient.  No defibrillations.  Will plan for discharge.  Discussed follow-up and return precautions with the patient.  Patient verbalized understand all questions were answered [BL]      ED Course User Index  [BL] Rizwana Mae DO       PROCEDURES:      CONSULTS:  None    CRITICAL CARE:  There was significant risk of life threatening deterioration of patient's condition requiring my direct management. Critical care time 0 minutes, excluding any documented procedures.    FINAL IMPRESSION      1. Encounter for medical assessment

## 2025-02-08 NOTE — ED NOTES
Pacemaker interrogated again after speaking with [employer] rep. Transmission did not go through the last time.

## 2025-02-08 NOTE — ED NOTES
Pt to ed from home via triage for evaluation.   Pt states he has a [employer] Pacemaker, laid down on the couch and said he felt like he was jolted awake. Pt unsure if his pacemaker fired but states he wanted to be sure. Pt denies chest pain, sob.   Pt states he has anxiety and feels a bit anxious.   Pt notes that he has had bedbugs in his home, has not seen any active bedbugs, no active bugs noted on patient when he was changed out. Pt states he woke up covered in new bug bites.   Pt is alert, oriented, speaking in full, complete sentences, no distress noted.

## 2025-02-09 LAB
EKG ATRIAL RATE: 88 BPM
EKG P AXIS: 31 DEGREES
EKG P-R INTERVAL: 276 MS
EKG Q-T INTERVAL: 422 MS
EKG QRS DURATION: 162 MS
EKG QTC CALCULATION (BAZETT): 510 MS
EKG R AXIS: -82 DEGREES
EKG T AXIS: 84 DEGREES
EKG VENTRICULAR RATE: 88 BPM

## 2025-02-09 NOTE — DISCHARGE INSTRUCTIONS
You are seen in the ER today for your concerns about your AICD went off.  We did interrogate it and did not fire.  Your labs are otherwise stable.  At this time, you are stable to be discharged.  Please return to the ER for any new or worsening symptoms.  Otherwise, please follow-up your primary care provider and your cardiology team.    PLEASE RETURN TO THE EMERGENCY DEPARTMENT IMMEDIATELY if you develop any concerning symptoms such as: chest pain, shortness of breath, feeling like your heart is racing, high fever not relieved by acetaminophen (Tylenol) and/or ibuprofen (Motrin / Advil), chills, persistent nausea and/or vomiting, loss of consciousness, numbness, weakness or tingling in the arms or legs or change in color of the extremities, changes in mental status, persistent or severe headache, blurry vision, loss of bladder / bowel control, unable to follow up with your physician, or other any other care or concern.

## 2025-02-10 ENCOUNTER — HOSPITAL ENCOUNTER (EMERGENCY)
Age: 70
Discharge: HOME | End: 2025-02-10
Attending: EMERGENCY MEDICINE
Payer: COMMERCIAL

## 2025-02-10 ENCOUNTER — CARE COORDINATION (OUTPATIENT)
Dept: CARE COORDINATION | Age: 70
End: 2025-02-10

## 2025-02-10 ENCOUNTER — APPOINTMENT (OUTPATIENT)
Dept: CT IMAGING | Age: 70
End: 2025-02-10
Payer: COMMERCIAL

## 2025-02-10 VITALS
HEART RATE: 84 BPM | OXYGEN SATURATION: 99 % | RESPIRATION RATE: 18 BRPM | TEMPERATURE: 97.3 F | SYSTOLIC BLOOD PRESSURE: 155 MMHG | DIASTOLIC BLOOD PRESSURE: 85 MMHG

## 2025-02-10 DIAGNOSIS — S00.03XA HEMATOMA OF SCALP, INITIAL ENCOUNTER: Primary | ICD-10-CM

## 2025-02-10 PROCEDURE — 72125 CT NECK SPINE W/O DYE: CPT

## 2025-02-10 PROCEDURE — 70450 CT HEAD/BRAIN W/O DYE: CPT

## 2025-02-10 PROCEDURE — 99284 EMERGENCY DEPT VISIT MOD MDM: CPT | Performed by: EMERGENCY MEDICINE

## 2025-02-10 PROCEDURE — 93010 ELECTROCARDIOGRAM REPORT: CPT | Performed by: INTERNAL MEDICINE

## 2025-02-10 RX ORDER — DAPAGLIFLOZIN 10 MG/1
10 TABLET, FILM COATED ORAL EVERY MORNING
Qty: 90 TABLET | Refills: 1 | Status: SHIPPED | OUTPATIENT
Start: 2025-02-10 | End: 2026-02-10

## 2025-02-10 RX ORDER — FERROUS SULFATE 325(65) MG
1 TABLET ORAL DAILY
Qty: 90 TABLET | Refills: 1 | Status: SHIPPED | OUTPATIENT
Start: 2025-02-10 | End: 2026-02-10

## 2025-02-10 ASSESSMENT — ENCOUNTER SYMPTOMS
ABDOMINAL PAIN: 0
SHORTNESS OF BREATH: 0

## 2025-02-10 NOTE — DISCHARGE INSTRUCTIONS
You were seen here after a fall and hitting her head.  CT head was negative for bleeding.  CT cervical spine was negative for fracture.    You need to call and schedule follow-up appointment with your primary care doctor for soon as possible.    Return to the emergency department immediately if you experience worsening symptoms, develop any other symptoms, or if you have any other concerns.

## 2025-02-10 NOTE — CARE COORDINATION
Care Transitions Note    Follow Up Call     Patient Current Location:  Home: [address] [facility] [address]    Care Transition Nurse contacted the patient by telephone. Verified name and  as identifiers.    Additional needs identified to be addressed with provider   High priority: Patient has intermittent constipation and he would like to know if ok to take generic Miralax. Please advise. Thank you I have scheduled him for ED follow up , no sooner appts available.                  Method of communication with provider: staff message.    Care Summary Note: Follow up with patient who had 2 separate ED visits on 25 and 2/10/25.   visit related to concern for AICD firing, interrogated and found this did not occur. Labs completed.  Reviewed importance of monitoring for chest pain, shortness of breath, changes to mentation, numbness tingling severe cardiac or neuro symptoms and seek emergency care.       2/10 visit for fall with head trauma, concern due to blood thinner, CT negative for bleed or neck fracture. He denies dizziness, confusion or headache, states hematoma is decreasing in size. Reviewed importance of continued monitoring of neuro symptoms and seek emergency care.     Scheduled for ED follow up with PCP , soonest available. States recent issue with resolved constipation. He has obtained generic miralax but understands need for MD  prior to use, CTN will msg provider. No further concerns or questions, agrees to call back later in week.       Plan of care updates since last contact:  Education  Review of patient management of conditions/medications       Advance Care Planning:   Does patient have an Advance Directive: reviewed during previous call, see note. .    Medication Review:  Full medication reconciliation completed during previous call.    Remote Patient Monitoring:  Offered patient enrollment in the Remote Patient Monitoring (RPM) program for in-home monitoring: Yes,

## 2025-02-10 NOTE — ED NOTES
Patient is 70/M came in via [employer] auto complaining of head injury. Patient states he was sleeping and he fell off the bed by accident and hit the left anterior side of his head. No LOC were noted when asked. Patient states his concern for his safety because his taking eliquis. Otherwise patient is A&OX4. Denies Chest pain, SOB or any type of pain. Abrasion and bump were noted on the injured side of the head. Patient is resting comfortably, NAD, Call light in reach. Plan of care on going.

## 2025-02-10 NOTE — ED PROVIDER NOTES
[facility]     Emergency Department     Faculty Attestation    I performed a history and physical examination of the patient and discussed management with the resident. I have reviewed and agree with the resident’s findings including all diagnostic interpretations, and treatment plans as written. Any areas of disagreement are noted on the chart. I was personally present for the key portions of any procedures. I have documented in the chart those procedures where I was not present during the key portions. I have reviewed the emergency nurses triage note. I agree with the chief complaint, past medical history, past surgical history, allergies, medications, social and family history as documented unless otherwise noted below. Documentation of the HPI, Physical Exam and Medical Decision Making performed by markibrosemary is based on my personal performance of the HPI, PE and MDM. For Physician Assistant/ Nurse Practitioner cases/documentation I have personally evaluated this patient and have completed at least one if not all key elements of the E/M (history, physical exam, and MDM). Additional findings are as noted.    Note Started: 3:10 AM EST     69 yo M head injury, pt rolled off couch without loc no neck pain, no nausea,   (Subconjunctival hemorrhage from prior eye dr visit)  PE vss, GCS 15, MAGED, subconjunctival hemorrhage L, no cervical tenderness, crepitus, or deformity,  --ct head / ct neck stable, talkative, ambulatory, requesting to be discharged, feels comfortable with the plan  EKG Interpretation    Interpreted by me      CRITICAL CARE: There was a high probability of clinically significant/life threatening deterioration in this patient's condition which required my urgent intervention.  Total critical care time was 0 minutes.  This excludes any time for separately reportable procedures.       Galen Holland DO  02/10/25 0316     
tablet by mouth every morning 10/2/24   Vanessa Mckenna APRN - CNP   ferrous sulfate (FEROSUL) 325 (65 Fe) MG tablet Take 1 tablet by mouth daily (with breakfast) 10/2/24   Vanessa Mckenna APRN - CNP   isosorbide mononitrate (IMDUR) 30 MG extended release tablet Take 1 tablet by mouth daily 10/2/24   Vanessa Mckenna APRN - CNP   Handicap Placard MISC by Does not apply route Exp: 1/2025 12/23/20   Vanessa Mckenna APRN - CNP   Alcohol Swabs (ALCOHOL PREP) 70 % PADS USE AS DIRECTED 5/31/19   Cande Perez MD     REVIEW OF SYSTEMS       Review of Systems   Constitutional:  Negative for fever.   Respiratory:  Negative for shortness of breath.    Cardiovascular:  Negative for chest pain.   Gastrointestinal:  Negative for abdominal pain.       PHYSICAL EXAM      INITIAL VITALS:   BP (!) 155/85   Pulse 84   Temp 97.3 °F (36.3 °C)   Resp 18   SpO2 99%     Physical Exam  HENT:      Head:      Comments: Hematoma and abrasion to left forehead  Eyes:      Pupils: Pupils are equal, round, and reactive to light.      Comments: Left subconjunctival hemorrhage   Neck:      Comments: No midline cervical tenderness to palpation.  Cardiovascular:      Rate and Rhythm: Normal rate and regular rhythm.      Pulses: Normal pulses.   Pulmonary:      Effort: Pulmonary effort is normal.      Breath sounds: Normal breath sounds.   Abdominal:      General: There is no distension.      Palpations: Abdomen is soft.      Tenderness: There is no abdominal tenderness. There is no guarding or rebound.   Musculoskeletal:      Comments: No midline thoracic or lumbar tenderness to palpation.   Skin:     General: Skin is warm.   Neurological:      Mental Status: He is alert and oriented to person, place, and time.   Psychiatric:         Mood and Affect: Mood normal.       DDX/DIAGNOSTIC RESULTS / EMERGENCY DEPARTMENT COURSE / MDM     Medical Decision Making  70-year-old male, HPI and physical exam documented above.    Differentials

## 2025-02-14 ENCOUNTER — CARE COORDINATION (OUTPATIENT)
Dept: CARE COORDINATION | Age: 70
End: 2025-02-14

## 2025-02-14 ENCOUNTER — CARE COORDINATION (OUTPATIENT)
Dept: PRIMARY CARE CLINIC | Age: 70
End: 2025-02-14

## 2025-02-14 DIAGNOSIS — N18.2 CHRONIC KIDNEY DISEASE, STAGE II (MILD): Primary | ICD-10-CM

## 2025-02-14 DIAGNOSIS — E11.22 TYPE 2 DIABETES MELLITUS WITH STAGE 3 CHRONIC KIDNEY DISEASE, UNSPECIFIED WHETHER LONG TERM INSULIN USE, UNSPECIFIED WHETHER STAGE 3A OR 3B CKD (HCC): ICD-10-CM

## 2025-02-14 DIAGNOSIS — I50.22 CHRONIC SYSTOLIC (CONGESTIVE) HEART FAILURE (HCC): ICD-10-CM

## 2025-02-14 DIAGNOSIS — N18.30 TYPE 2 DIABETES MELLITUS WITH STAGE 3 CHRONIC KIDNEY DISEASE, UNSPECIFIED WHETHER LONG TERM INSULIN USE, UNSPECIFIED WHETHER STAGE 3A OR 3B CKD (HCC): ICD-10-CM

## 2025-02-14 NOTE — PROGRESS NOTES
Remote Patient Order Discontinued    Received request from Kelly Escobar, RN   to discontinue order for remote patient monitoring of Kidney Disease , CHF, and Diabetes and order completed.

## 2025-02-14 NOTE — CARE COORDINATION
Care Transitions Note    Final Call     Patient Current Location:  Home: [address] [facility] [address]    Care Transition Nurse contacted the patient by telephone. Verified name and  as identifiers.    Patient graduated from the Care Transitions program on 25.  Patient/family progressing towards self management.  reinforced resources provided during this care transition period..      Advance Care Planning:   Does patient have an Advance Directive: reviewed during previous call, see note. .    Handoff:   Condition management for CHF.     Care Summary Note: Patient reached for final call. States doing well. He has ED follow up scheduled with PCP on . He denies chest pain, increase in baseline shortness of breath or dizziness. Discussed RPM dis-enrollment, patient agrees. Advised to calibrate home scale and BP cuff with RPM equipment prior to sending back, verbalizes understanding. He has received Glucerna coupons, unsure about CHF zone tools. Discussed weight log, he is unsure if already has this. Discussed ACM referral, patient is agreeable, will notify ACM of patient need. No further questions or concerns. Agrees to handoff, RPM dis-enrollment and graduation from [facility].    Assessments:  Care Transitions Subsequent and Final Call    Schedule Follow Up Appointment with PCP: Completed  Subsequent and Final Calls  Do you have any ongoing symptoms?: No  Have your medications changed?: No  Do you have any questions related to your medications?: No  Do you currently have any active services?: No  Are you currently active with any services?: Outpatient/Community Services, Home Health  Do you have any needs or concerns that I can assist you with?: No  Identified Barriers: None  Care Transitions Interventions    Pharmacist: Completed      Registered Dietician: Completed    Disease Specific Clinic: Completed      Other Interventions:              Upcoming Appointments:    Future Appointments         Provider

## 2025-02-14 NOTE — CARE COORDINATION
Claudio Graham  2/14/25    Care Coordination  placed call to Patient  to arrange RPM kit  through [employer].     CCSS spoke to Patient reviewed with Patient how to pack equipment in original packing. [employer] will  equipment in 2-4 days.   All questions and concerns answered.

## 2025-02-17 ENCOUNTER — CARE COORDINATION (OUTPATIENT)
Dept: INTERNAL MEDICINE | Age: 70
End: 2025-02-17

## 2025-02-18 ENCOUNTER — CARE COORDINATION (OUTPATIENT)
Dept: INTERNAL MEDICINE | Age: 70
End: 2025-02-18

## 2025-02-18 ASSESSMENT — SOCIAL DETERMINANTS OF HEALTH (SDOH)
DO YOU BELONG TO ANY CLUBS OR ORGANIZATIONS SUCH AS CHURCH GROUPS UNIONS, FRATERNAL OR ATHLETIC GROUPS, OR SCHOOL GROUPS?: NO
HOW OFTEN DO YOU GET TOGETHER WITH FRIENDS OR RELATIVES?: MORE THAN THREE TIMES A WEEK
HOW OFTEN DO YOU ATTENT MEETINGS OF THE CLUB OR ORGANIZATION YOU BELONG TO?: NEVER
HOW OFTEN DO YOU ATTEND CHURCH OR RELIGIOUS SERVICES?: NEVER
ARE YOU MARRIED, WIDOWED, DIVORCED, SEPARATED, NEVER MARRIED, OR LIVING WITH A PARTNER?: NEVER MARRIED
IN A TYPICAL WEEK, HOW MANY TIMES DO YOU TALK ON THE PHONE WITH FAMILY, FRIENDS, OR NEIGHBORS?: MORE THAN THREE TIMES A WEEK

## 2025-02-18 NOTE — CARE COORDINATION
Ambulatory Care Coordination Note     2025 2:24 PM     Patient Current Location:  Home: [address] [place] [address]     This patient was received as a referral from Ambulatory Care Manager .    ACM contacted the patient by telephone. Verified name and  with patient as identifiers. Provided introduction to self, and explanation of the ACM role.   Patient accepted care management services at this time.          ACM: Alan Turk RN     Challenges to be reviewed by the provider   Additional needs identified to be addressed with provider No  none               Method of communication with provider: none.    Utilization: Initial Call - N/A    Care Summary Note: Spoke with Claudio who is agreeable to ACM. Acm Assessment complete. Reminded of PCP appointment for tomorrow at 9:30    Offered patient enrollment in the Remote Patient Monitoring (RPM) program for in-home monitoring: Yes, but did not enroll at this time: declined to enroll in the program because. Just finished .     Assessments Completed:   Diabetes Assessment    Medic Alert ID: No  Meal Planning: Avoidance of concentrated sweets   How often do you test your blood sugar?: Daily (Comment: fasting and PRN)   Do you have barriers with adherence to non-pharmacologic self-management interventions? (Nutrition/Exercise/Self-Monitoring): Yes   Have you ever had to go to the ED for symptoms of low blood sugar?: No       No patient-reported symptoms   Do you have hyperglycemia symptoms?: No   Do you have hypoglycemia symptoms?: No   Last Blood Sugar Value: 243   Blood Sugar Monitoring Regimen: Morning Fasting, At Bedtime   Blood Sugar Trends: Fluctuating         ,   Congestive Heart Failure Assessment    Are you currently restricting fluids?: 1800cc  Do you understand a low sodium diet?: Yes  Do you understand how to read food labels?: Yes  How many restaurant meals do you eat per week?: 0  Do you salt your food before tasting it?: No     No

## 2025-02-19 ENCOUNTER — OFFICE VISIT (OUTPATIENT)
Dept: PRIMARY CARE CLINIC | Age: 70
End: 2025-02-19
Payer: COMMERCIAL

## 2025-02-19 VITALS
TEMPERATURE: 97.4 F | DIASTOLIC BLOOD PRESSURE: 63 MMHG | BODY MASS INDEX: 29.52 KG/M2 | OXYGEN SATURATION: 100 % | SYSTOLIC BLOOD PRESSURE: 114 MMHG | WEIGHT: 172 LBS | HEART RATE: 60 BPM

## 2025-02-19 DIAGNOSIS — S00.03XD HEMATOMA OF SCALP, SUBSEQUENT ENCOUNTER: Primary | ICD-10-CM

## 2025-02-19 DIAGNOSIS — I50.42 CHRONIC COMBINED SYSTOLIC AND DIASTOLIC HEART FAILURE (HCC): ICD-10-CM

## 2025-02-19 PROCEDURE — 1159F MED LIST DOCD IN RCRD: CPT | Performed by: NURSE PRACTITIONER

## 2025-02-19 PROCEDURE — 1123F ACP DISCUSS/DSCN MKR DOCD: CPT | Performed by: NURSE PRACTITIONER

## 2025-02-19 PROCEDURE — 3078F DIAST BP <80 MM HG: CPT | Performed by: NURSE PRACTITIONER

## 2025-02-19 PROCEDURE — 99213 OFFICE O/P EST LOW 20 MIN: CPT | Performed by: NURSE PRACTITIONER

## 2025-02-19 PROCEDURE — 3074F SYST BP LT 130 MM HG: CPT | Performed by: NURSE PRACTITIONER

## 2025-02-19 NOTE — PROGRESS NOTES
M20 20 MEQ extended release tablet TAKE 1 TABLET BY MOUTH 2 TIMES A DAY 30 tablet 5    amiodarone (CORDARONE) 200 MG tablet Take 1 tablet by mouth 2 times daily 60 tablet 0    ticagrelor (BRILINTA) 90 MG TABS tablet Take 1 tablet by mouth 2 times daily 60 tablet 0    levothyroxine (SYNTHROID) 88 MCG tablet Take 1 tablet by mouth daily 30 tablet 3    atorvastatin (LIPITOR) 80 MG tablet TAKE 1 TABLET BY MOUTH DAILY 30 tablet 8    furosemide (LASIX) 40 MG tablet Take 1 tablet in the morning, and half tablet in the evening 60 tablet 3    allopurinol (ZYLOPRIM) 300 MG tablet Take 0.5 tablets by mouth daily 45 tablet 1    metFORMIN (GLUCOPHAGE) 1000 MG tablet take 1/2 tablet by mouth twice a day with meals 180 tablet 0    vitamin D (ERGOCALCIFEROL) 1.25 MG (25977 UT) CAPS capsule Take 1 capsule by mouth once a week 12 capsule 1    midodrine (PROAMATINE) 10 MG tablet Take 0.5 tablets by mouth 2 times daily as needed (Do not take after 6 PM or 4 hours before bedtime, if SBP > 120) 90 tablet 3    apixaban (ELIQUIS) 5 MG TABS tablet Take 1 tablet by mouth 2 times daily 60 tablet 4    isosorbide mononitrate (IMDUR) 30 MG extended release tablet Take 1 tablet by mouth daily 30 tablet 5    Alcohol Swabs (ALCOHOL PREP) 70 % PADS USE AS DIRECTED 100 each 11    blood glucose test strips (ONETOUCH VERIO) strip 1 each by Other route daily As needed. 100 strip 2    Lancets (ONETOUCH DELICA PLUS [id number]) MISC use 1 LANCET to TEST BLOOD SUGAR daily 100 each 3    Blood Gluc Meter Disp-Strips (BLOOD GLUCOSE METER DISPOSABLE) SHAN Daily and as needed 1 each 0    Blood Glucose Monitoring Suppl (ONETOUCH VERIO REFLECT) w/Device KIT TEST BLOOD SUGAR DAILY AND AS NEEDED 1 kit 0    Handicap Placard MISC by Does not apply route Exp: 1/2025 1 each 0     No current facility-administered medications for this visit.       Allergies   Allergen Reactions    Zetia [Ezetimibe] Shortness Of Breath    Bactrim Other (See Comments)     palpitations

## 2025-02-21 RX ORDER — AMIODARONE HYDROCHLORIDE 200 MG/1
200 TABLET ORAL 2 TIMES DAILY
Qty: 60 TABLET | Refills: 0 | OUTPATIENT
Start: 2025-02-21 | End: 2025-04-22

## 2025-02-21 NOTE — TELEPHONE ENCOUNTER
This is the drug that needs to be monitored and prescribed by cardiology, please have him contact the cardiology office for refills

## 2025-02-21 NOTE — TELEPHONE ENCOUNTER
Pt would like to know if you can refill this medication ( AMIODARONE)he was recently seen on 2/19 but forgot to ask for a refill and he realizes your not the prescriber as well.please advise

## 2025-02-24 ENCOUNTER — CARE COORDINATION (OUTPATIENT)
Dept: CARE COORDINATION | Age: 70
End: 2025-02-24

## 2025-02-24 NOTE — CARE COORDINATION
Nutrition Care Coordinator Follow-Up visit:    Food Recall:eating 2-3 meals/d    Activity Level:  Sedentary:X  Lightly Active:  Moderately Active:  Very Active:    Adult BMI:  Underweight (below 18.5)  Normal Weight (18.5-24.9)  Overweight (25-29.9)X  Obese (30-39.9)  Morbidly Obese (>40)    Plan:  Plan was established with patient:  Increase dietary fiber by consuming whole grains, fruits and vegetables:  Limit dietary cholesterol to >200mg/day:  Increase water intake:  Avoid added sugar:X  Avoid sweetened beverages:X  Choose lean meats:  Increase protein intake: X      Monitoring:  Will monitor weight:  Will monitor adherence to meal plan:X  Will monitor adherence to exercise plan:  Will monitor HGA1c:    Handouts Provided :  Low Carb snacking:  Carb counting /individual meal plan:  Portion Control:  Food Labels:  Physical Activity:  Low Fat/Cholesterol:  Hypo/Hyperglycemia:  Calorie Controlled Meal Plan:    Goals:  Increase water consumption to 8oz. 6-8 times daily:  Manage blood sugars by consuming 3 meals spaced every 4-5 hours with 2-3 snacks daily:reviewed  Increase fiber and decrease fat intake by consuming 1-2 fruit servings and 2-3 vegetable servings per day.  Increase physical activity by:  Consume less than 2,000mg of sodium/day  Avoid consumption of sweetened beverages and added sugar by reading food labels:  Monitor blood sugars by using meter to check blood glucose before morning meal and 2 hours after a meal daily:reviewed  Decrease risk of coronary heart disease by consuming fish that contains omega-3 fatty acids at least twice a week, avoiding partially hydrogenated oil/trans fats and limiting saturated fat intake by reading food labels:    Patient goals set:  1.Patient states he is eating a little better. Reviewed not going too long between meals often, working on eating more at meals. Reviewed set meal times daily and consistency of meal times and how this can effect sugars. Encouraged to

## 2025-02-25 ENCOUNTER — HOSPITAL ENCOUNTER (OUTPATIENT)
Dept: OTHER | Age: 70
Discharge: HOME | End: 2025-02-25
Payer: COMMERCIAL

## 2025-02-25 ENCOUNTER — TELEPHONE (OUTPATIENT)
Dept: CARDIOLOGY | Age: 70
End: 2025-02-25

## 2025-02-25 ENCOUNTER — CARE COORDINATION (OUTPATIENT)
Dept: INTERNAL MEDICINE | Age: 70
End: 2025-02-25

## 2025-02-25 VITALS
BODY MASS INDEX: 29.63 KG/M2 | OXYGEN SATURATION: 99 % | SYSTOLIC BLOOD PRESSURE: 119 MMHG | HEART RATE: 68 BPM | RESPIRATION RATE: 16 BRPM | DIASTOLIC BLOOD PRESSURE: 59 MMHG | WEIGHT: 172.6 LBS

## 2025-02-25 DIAGNOSIS — I50.42 CHRONIC COMBINED SYSTOLIC AND DIASTOLIC HEART FAILURE (HCC): ICD-10-CM

## 2025-02-25 PROCEDURE — 99212 OFFICE O/P EST SF 10 MIN: CPT

## 2025-02-25 NOTE — CARE COORDINATION
Ambulatory Care Coordination Note     2025 10:28 AM     Patient Current Location:  Home: [address] [place] [address]     ACM contacted the patient by telephone. Verified name and  with patient as identifiers.         ACM: Alan Turk RN     Challenges to be reviewed by the provider   Additional needs identified to be addressed with provider No  none               Method of communication with provider: none.    Utilization: Patient has not had any utilization since our last call.     Care Summary Note: spoke with patient, states he's doing ok. DM, HTN, CHF all well controlled. Patient monitoring his vitals himself. Patient has an appointment at CHF clinic today.Patient feeling a little down because a friend passed away about a week and a half ago. He states that he does not know where to go to  ashes. I explained that I can not go into his friends chart due to HIPAA laws. Patient also states that his handicap placard has  and needs a new one. Will send message to PCP    Offered patient enrollment in the Remote Patient Monitoring (RPM) program for in-home monitoring: Yes, but did not enroll at this time: declined to enroll in the program becauseSelf Monitoring .     Assessments Completed:   Diabetes Assessment    Medic Alert ID: No  Meal Planning: Avoidance of concentrated sweets   How often do you test your blood sugar?: Daily, Bedtime (Comment: fasting and PRN)   Do you have barriers with adherence to non-pharmacologic self-management interventions? (Nutrition/Exercise/Self-Monitoring): No   Have you ever had to go to the ED for symptoms of low blood sugar?: No       No patient-reported symptoms   Do you have hyperglycemia symptoms?: No   Do you have hypoglycemia symptoms?: No   Last Blood Sugar Value: 89   Blood Sugar Monitoring Regimen: Morning Fasting, At Bedtime   Blood Sugar Trends: No Change         ,   Congestive Heart Failure Assessment    Are you currently restricting

## 2025-02-25 NOTE — PROGRESS NOTES
Date:  2025  Time:  3:08 PM    CHF Clinic at [facility]    Office: [phone] Fax: [phone]    Re:  Claudio Graham   Patient : 1955    Vital Signs: BP (!) 119/59   Pulse 68   Resp 16   Wt 78.3 kg (172 lb 9.6 oz)   SpO2 99%   BMI 29.63 kg/m²                       O2 Device: None (Room air)                           No results for input(s): \"CBC\", \"HGB\", \"HCT\", \"WBC\", \"PLATELET\", \"NA\", \"K\", \"CL\", \"CO2\", \"BUN\", \"CREATININE\", \"GLUCOSE\", \"BNP\", \"INR\" in the last 72 hours.     Respiratory:    Assessment  Charting Type: Reassessment    Breath Sounds  Respiratory Pattern: Regular  Right Upper Lobe: Clear  Right Middle Lobe: Clear  Right Lower Lobe: Clear, Diminished  Left Upper Lobe: Clear  Left Lower Lobe: Clear, Diminished    Cough/Sputum  Cough: Non-productive  Frequency: Occasional         Peripheral Vascular  RLE Edema: None  LLE Edema: None    Future Appointments   Date Time Provider Department Center   3/11/2025  1:50 PM Yadira Guidry APRN - CNP AFL Neph [facility] None   3/17/2025  1:00 PM Elder Corley MD [facility]   3/27/2025  2:00 PM [facility]IN[facility]  1 [facility] CHF I [facility]   2025  9:30 AM Marco Antonio Barahona MD AFL [facility]   2025 12:15 PM Venecia Ordonez DPM [facility] Podiatry [facility]   2025  2:30 PM Vanessa Mckenna APRN - CNP [facility] BS ECC DEP   2025  1:15 PM Darryl Soto MD AFL T[facility] TOLE AFL JOEL C   2025 12:15 PM [facility]      Complaints: Pt denies any concerns or complaints at this time    Comment : Pt arrived ambulatory for follow up visit. Pts wt 172.6 which is unchanged from January appt. Pt states he get \"some SOB\" at times with exertion. \"If I take my time and go slow I'm pretty good.\" Lungs clear, diminished bases. No edema BLE. Ankle and calf measurements decreased. No s/s acute CHF noted at this time. Pt verbalized compliance with daily wts, 2000mg low

## 2025-02-25 NOTE — TELEPHONE ENCOUNTER
Hello,  Can this pt please see Dr Barahona for [facility] eval instead of [facility] at the end of March? Pt has severe MR on echo, and the need for OLGA was posed. The pt is no longer seeing Dr. Nj, just [facility].      Thank you.

## 2025-02-28 ENCOUNTER — CARE COORDINATION (OUTPATIENT)
Dept: CARE COORDINATION | Age: 70
End: 2025-02-28

## 2025-02-28 ENCOUNTER — HOSPITAL ENCOUNTER (OUTPATIENT)
Age: 70
Discharge: HOME | End: 2025-02-28
Payer: COMMERCIAL

## 2025-02-28 DIAGNOSIS — E03.2 HYPOTHYROIDISM DUE TO MEDICATION: ICD-10-CM

## 2025-02-28 LAB — TSH SERPL DL<=0.05 MIU/L-ACNC: 2.97 UIU/ML (ref 0.27–4.2)

## 2025-02-28 PROCEDURE — 84443 ASSAY THYROID STIM HORMONE: CPT

## 2025-02-28 PROCEDURE — 36415 COLL VENOUS BLD VENIPUNCTURE: CPT

## 2025-03-04 ENCOUNTER — CARE COORDINATION (OUTPATIENT)
Dept: INTERNAL MEDICINE | Age: 70
End: 2025-03-04

## 2025-03-04 NOTE — CARE COORDINATION
Future Appointments         Provider Specialty Dept Phone    3/11/2025 1:50 PM Yadira Guidry APRN - CNP Nephrology [phone]    3/17/2025 1:00 PM Elder Corley MD Oncology [phone]    3/27/2025 2:00 PM [facility]IN[facility]  1 Cardiology [phone]    4/4/2025 9:30 AM Marco Antonio Barahona MD Cardiology [phone]    4/7/2025 12:15 PM Venecia Ordonez DPM Podiatry [phone]    4/23/2025 2:30 PM Vanessa Mckenna APRN - CNP Primary Care [phone]    4/24/2025 1:15 PM Darryl Soto MD Cardiology [phone]    4/25/2025 12:15 PM Radha Shankar MD Pulmonology [phone]            Follow Up:   Plan for next AC outreach in approximately 2 weeks to complete:  - disease specific assessments  - medication review   - advance care planning   - goal progression  - education   - discuss graduation .   Patient  is agreeable to this plan.

## 2025-03-05 ENCOUNTER — HOSPITAL ENCOUNTER (EMERGENCY)
Age: 70
Discharge: HOME | End: 2025-03-05
Attending: EMERGENCY MEDICINE
Payer: COMMERCIAL

## 2025-03-05 ENCOUNTER — APPOINTMENT (OUTPATIENT)
Dept: VASCULAR LAB | Age: 70
End: 2025-03-05
Payer: COMMERCIAL

## 2025-03-05 ENCOUNTER — APPOINTMENT (OUTPATIENT)
Dept: GENERAL RADIOLOGY | Age: 70
End: 2025-03-05
Payer: COMMERCIAL

## 2025-03-05 ENCOUNTER — CARE COORDINATION (OUTPATIENT)
Dept: CARE COORDINATION | Age: 70
End: 2025-03-05

## 2025-03-05 VITALS
TEMPERATURE: 97.7 F | WEIGHT: 170 LBS | HEIGHT: 64 IN | SYSTOLIC BLOOD PRESSURE: 143 MMHG | DIASTOLIC BLOOD PRESSURE: 79 MMHG | HEART RATE: 77 BPM | OXYGEN SATURATION: 100 % | BODY MASS INDEX: 29.02 KG/M2 | RESPIRATION RATE: 19 BRPM

## 2025-03-05 DIAGNOSIS — M17.10 ARTHRITIS OF KNEE: Primary | ICD-10-CM

## 2025-03-05 PROCEDURE — 93971 EXTREMITY STUDY: CPT

## 2025-03-05 PROCEDURE — 6370000000 HC RX 637 (ALT 250 FOR IP)

## 2025-03-05 PROCEDURE — 73562 X-RAY EXAM OF KNEE 3: CPT

## 2025-03-05 PROCEDURE — 99284 EMERGENCY DEPT VISIT MOD MDM: CPT

## 2025-03-05 RX ORDER — ACETAMINOPHEN 325 MG/1
650 TABLET ORAL ONCE
Status: COMPLETED | OUTPATIENT
Start: 2025-03-05 | End: 2025-03-05

## 2025-03-05 RX ORDER — ACETAMINOPHEN 500 MG
500 TABLET ORAL EVERY 6 HOURS PRN
Qty: 40 TABLET | Refills: 0 | Status: SHIPPED | OUTPATIENT
Start: 2025-03-05 | End: 2025-03-15

## 2025-03-05 RX ADMIN — ACETAMINOPHEN 650 MG: 325 TABLET ORAL at 15:38

## 2025-03-05 ASSESSMENT — PAIN SCALES - GENERAL: PAINLEVEL_OUTOF10: 0

## 2025-03-05 ASSESSMENT — PAIN - FUNCTIONAL ASSESSMENT: PAIN_FUNCTIONAL_ASSESSMENT: NONE - DENIES PAIN

## 2025-03-05 ASSESSMENT — PAIN DESCRIPTION - ORIENTATION: ORIENTATION: RIGHT;POSTERIOR

## 2025-03-05 ASSESSMENT — PAIN DESCRIPTION - LOCATION: LOCATION: KNEE

## 2025-03-05 NOTE — ED TRIAGE NOTES
Pt to ed c/o right knee pain. Per pt this started over the last 2 days. Pt states the pain is behind his knee. Pt states the pain worsens when bends his knee. Pt denies hx of dvt. Pt denies injury.     Pt is alert and oriented x4. Vitals stable. Will continue with plan of care.

## 2025-03-05 NOTE — ED PROVIDER NOTES
[facility] EMERGENCY DEPARTMENT  Emergency Department Encounter  Emergency Medicine Resident     Pt Name:Claudio Graham  MRN: [id number]  Birthdate 1955  Date of evaluation: 3/5/25  PCP:  Vanessa Mckenna APRN - CNP  Note Started: 3:20 PM EST      CHIEF COMPLAINT       Chief Complaint   Patient presents with    Knee Pain     Right, posterior       HISTORY OF PRESENT ILLNESS  (Location/Symptom, Timing/Onset, Context/Setting, Quality, Duration, Modifying Factors, Severity.)      Claudio Graham is a 70 y.o. male history of CHF, diabetes, hypertension, hyperlipidemia, CKD, coronary artery disease who presents with right knee pain that worsened this morning.  Patient states over the past couple days he has noticed a sharp pain behind his right knee.  This morning when he woke up and tried to put pressure on the right leg, he had pain shooting from his posterior knee up his leg.  No recent injury to the area.  No history of blood clots, no recent long travel.  No significant leg swelling.  Patient is able to ambulate.  He does use a cane at baseline.  Denies numbness, weakness, fevers.    PAST MEDICAL / SURGICAL / SOCIAL / FAMILY HISTORY      has a past medical history of Acute HFrEF (heart failure with reduced ejection fraction) ([facility]), Acute on chronic combined systolic and diastolic congestive heart failure (HCC), Acute on chronic congestive heart failure (HCC), Acute respiratory failure with hypoxia (HCC), Anemia, Anxiety, CAD (coronary artery disease), Cardiac defibrillator in place, Cardiomyopathy (HCC), CHF (congestive heart failure) (HCC), Chronic combined systolic and diastolic heart failure (HCC) s/[ AICD placed, Chronic kidney disease, Complex sleep apnea syndrome, Diabetic retinopathy (HCC), Diverticulitis, DJD (degenerative joint disease), Edentulous, Gout, HTN (hypertension), Hyperlipidemia, Hypertension, Iron deficiency anemia, Ischemic cardiomyopathy, Morbid obesity, Obesity, TANNER 
   [facility]     Emergency Department     Faculty Attestation    I performed a history and physical examination of the patient and discussed management with the resident. I reviewed the resident’s note and agree with the documented findings and plan of care. Any areas of disagreement are noted on the chart. I was personally present for the key portions of any procedures. I have documented in the chart those procedures where I was not present during the key portions. I have reviewed the emergency nurses triage note. I agree with the chief complaint, past medical history, past surgical history, allergies, medications, social and family history as documented unless otherwise noted below. Documentation of the HPI, Physical Exam and Medical Decision Making performed by medical students or scribes is based on my personal performance of the HPI, PE and MDM. For Physician Assistant/ Nurse Practitioner cases/documentation I have personally evaluated this patient and have completed at least one if not all key elements of the E/M (history, physical exam, and MDM). Additional findings are as noted.    Vital signs:   Vitals:    03/05/25 1459   BP: (!) 143/79   Pulse: 77   Resp: 19   Temp: 97.7 °F (36.5 °C)   SpO2: 100%      70-year-old male presents complaining of right knee pain, mostly localized to the popliteal fossa.  No prior history of DVT or pulmonary embolism.  No fall or other trauma.  On exam, there is not really any significant reproducible tenderness to palpation.  He does have pain with flexion of the knee.  No significant swelling.  No effusion.  No erythema or warmth to the joint.  Plan for x-ray, venous Doppler            Lidia Ying M.D,  Attending Emergency  Physician            Lidia Ying MD  03/05/25 0062    
M20 20 MEQ EXTENDED RELEASE TABLET    TAKE 1 TABLET BY MOUTH 2 TIMES A DAY    LANCETS (ONETOUCH DELICA PLUS [id number]) MISC    use 1 LANCET to TEST BLOOD SUGAR daily    LEVOTHYROXINE (SYNTHROID) 88 MCG TABLET    Take 1 tablet by mouth daily    METFORMIN (GLUCOPHAGE) 1000 MG TABLET    take 1/2 tablet by mouth twice a day with meals    MIDODRINE (PROAMATINE) 10 MG TABLET    Take 0.5 tablets by mouth 2 times daily as needed (Do not take after 6 PM or 4 hours before bedtime, if SBP > 120)    TICAGRELOR (BRILINTA) 90 MG TABS TABLET    Take 1 tablet by mouth 2 times daily    VITAMIN D (ERGOCALCIFEROL) 1.25 MG (53813 UT) CAPS CAPSULE    Take 1 capsule by mouth once a week       Encounter Medications  Orders Placed This Encounter   Medications    acetaminophen (TYLENOL) tablet 650 mg       ALLERGIES     is allergic to zetia [ezetimibe], bactrim, cephalexin, sulfamethoxazole-trimethoprim, and trimethoprim.      RECENT VITALS:   Temp: 97.7 °F (36.5 °C),  Pulse: 77, Respirations: 19, BP: (!) 143/79    RADIOLOGY:   Vascular duplex lower extremity venous right         XR KNEE RIGHT (3 VIEWS)    (Results Pending)       LABS:  Labs Reviewed - No data to display        PLAN/ TASKS OUTSTANDING     This patient is a 70 y.o. Male.    Patient has right knee pain, in the popliteal area, difficulty bending it, getting x-ray and Doppler    (Please note that portions of this note were completed with a voice recognition program.  Efforts were made to edit the dictations but occasionally words are mis-transcribed.)    Jimmie Jones MD,, MD  Attending Emergency Physician        Jimmie Jones MD  03/05/25 8294

## 2025-03-05 NOTE — CARE COORDINATION
Ambulatory Care Coordination Note     3/5/2025 2:29 PM     Patient Current Location:  Home: [address] [place] [address]     Patient contacted the ACM by telephone. Verified name and  with patient as identifiers.         ACM: Alan Turk RN     Challenges to be reviewed by the provider   Additional needs identified to be addressed with provider No  none               Method of communication with provider: none.    Utilization: Patient has not had any utilization since our last call.     Care Summary Note: call from patient and is describing sharp pain behind his L knee that started when he got up this morning. Area is warm with slight swelling. Unable to tell if it's red. He has not taken any type of analgesic for pain relief. Does not have 81mg ASA. I suggested that he go to ER because it could be something minor to severe  Patient will call me when Dc'd from ED or I will F/U with him tomorrow    Offered patient enrollment in the Remote Patient Monitoring (RPM) program for in-home monitoring: Yes, but did not enroll at this time: already monitoring with home equipment.     Assessments Completed:   No changes since last call    Medications Reviewed:   Completed during a previous call     Advance Care Planning:   Not reviewed during this call     Care Planning:   Not completed during this call    PCP/Specialist follow up:   Future Appointments         Provider Specialty Dept Phone    3/11/2025 1:50 PM Yadira Guidry APRN - CNP Nephrology [phone]    3/17/2025 1:00 PM Elder Corley MD Oncology [phone]    3/27/2025 2:00 PM [facility] Cardiology [phone]    2025 9:30 AM Marco Antonio Barahona MD Cardiology [phone]    2025 12:15 PM Venecia Ordonez DPM Podiatry [phone]    2025 2:30 PM Vanessa Mckenna APRN - CNP Primary Care [phone]    2025 1:15 PM Darryl Soto MD Cardiology [phone]    2025 12:15 PM Radha Shankar MD

## 2025-03-06 ENCOUNTER — CARE COORDINATION (OUTPATIENT)
Dept: INTERNAL MEDICINE | Age: 70
End: 2025-03-06

## 2025-03-06 LAB — ECHO BSA: 1.87 M2

## 2025-03-06 PROCEDURE — 93971 EXTREMITY STUDY: CPT | Performed by: SURGERY

## 2025-03-06 NOTE — DISCHARGE INSTRUCTIONS
You were seen due to concerns for right knee pain.  Your x-ray in the emergency department showed arthritis of the right knee.  You will be discharged home with Tylenol and Voltaren gel that you can use daily to help with pain you can rest, ice, elevate the knee to help with pain.  Return the emergency department immediately for any worsening severe pain, swelling, inability to walk.  You did have a DVT study performed that did not show any blood clot.  You do need to follow-up in the next week with your primary care provider for reevaluation of your pain.

## 2025-03-06 NOTE — CARE COORDINATION
Discharge Date: 3/5/25   Discharge Facility: [facility]  Reason for ED Visit: anterior knee pain  Visit Diagnosis: anterior knee pain    Number of ED visits in the last 6 months: 4      Do you have any ongoing symptoms? Yes, my symptoms have improved.   Current symptoms: pain to anterior right knee.    Did you call your PCP prior to going to the ED?  Called ACM    Review of Discharge Instructions:   [x] AVS discharge instructions  [x] Right Care, Right Place, Right Time document  [x] Medication changes  [x] Follow up appointments  [] Referral follow up   []

## 2025-03-10 ENCOUNTER — APPOINTMENT (OUTPATIENT)
Dept: CT IMAGING | Age: 70
End: 2025-03-10
Payer: COMMERCIAL

## 2025-03-10 ENCOUNTER — HOSPITAL ENCOUNTER (EMERGENCY)
Age: 70
Discharge: HOME | End: 2025-03-10
Attending: EMERGENCY MEDICINE
Payer: COMMERCIAL

## 2025-03-10 VITALS
DIASTOLIC BLOOD PRESSURE: 77 MMHG | TEMPERATURE: 97.9 F | RESPIRATION RATE: 16 BRPM | SYSTOLIC BLOOD PRESSURE: 141 MMHG | HEART RATE: 80 BPM | OXYGEN SATURATION: 97 %

## 2025-03-10 DIAGNOSIS — S00.83XA TRAUMATIC HEMATOMA OF FOREHEAD, INITIAL ENCOUNTER: Primary | ICD-10-CM

## 2025-03-10 PROCEDURE — 70450 CT HEAD/BRAIN W/O DYE: CPT

## 2025-03-10 PROCEDURE — 99284 EMERGENCY DEPT VISIT MOD MDM: CPT | Performed by: EMERGENCY MEDICINE

## 2025-03-10 ASSESSMENT — ENCOUNTER SYMPTOMS
EYES NEGATIVE: 1
ALLERGIC/IMMUNOLOGIC NEGATIVE: 1
GASTROINTESTINAL NEGATIVE: 1
RESPIRATORY NEGATIVE: 1

## 2025-03-10 ASSESSMENT — PAIN SCALES - GENERAL: PAINLEVEL_OUTOF10: 0

## 2025-03-10 NOTE — ED PROVIDER NOTES
[facility] EMERGENCY DEPARTMENT     Emergency Department     Faculty Attestation        I performed a history and physical examination of the patient and discussed management with the resident. I reviewed the resident’s note and agree with the documented findings and plan of care. Any areas of disagreement are noted on the chart. I was personally present for the key portions of any procedures. I have documented in the chart those procedures where I was not present during the key portions. I have reviewed the emergency nurses triage note. I agree with the chief complaint, past medical history, past surgical history, allergies, medications, social and family history as documented unless otherwise noted below.    For mid-level providers such as nurse practitioners as well as physicians assistants:    I have personally seen and evaluated the patient.    I find the patient's history and physical exam are consistent with NP/PA documentation.  I agree with the care provided, treatment rendered, disposition, & follow-up plan.     Additional findings are as noted.    Vital Signs: BP (!) 141/77   Pulse 80   Temp 97.9 °F (36.6 °C) (Oral)   Resp 16   SpO2 97%   PCP:  Vanessa Mckenna, APRN - CNP    Pertinent Comments:     Patient accidentally closed his trunk on his forehead and he has an abrasion to the left sesamoid he is on Eliquis he did lose conscious he is awake alert oriented with a GCS of 15 will obtain CT imaging of the head.      Critical Care  None          Nicko Sepulveda MD    Attending Emergency Medicine Physician            Kevin Sepulveda MD  03/10/25 0110

## 2025-03-10 NOTE — DISCHARGE INSTRUCTIONS
You were seen and evaluated for a left forehead bruise and swelling.  Your head scan showed no bleeding in your head.  You may take Tylenol as needed for any headaches.  Please follow-up with your primary care provider in order to discuss your symptoms, this ER visit, and to answer other questions or concerns as needed.  Please return to the ER for any sudden headache that does not go away with Tylenol, nausea/vomiting, or any fainting.

## 2025-03-10 NOTE — ED NOTES
Pt arrived to ED for laceration to head  Pt states that he hit his forehead while shutting his trunk  Pt denies having any LOC   Pt has band aid over laceration with no bleeding   Pt has no dizziness or headache  Pt has no other complaints at this time

## 2025-03-10 NOTE — ED PROVIDER NOTES
[facility] EMERGENCY DEPARTMENT  Emergency Department  Emergency Medicine Resident Turn-Over     Note Started: 3:21 AM EDT    Care of Claudio Graham was assumed from Dr. rice and is being seen for Head Laceration  .  The patient's initial evaluation and plan have been discussed with the prior provider who initially evaluated the patient.     EMERGENCY DEPARTMENT COURSE / MEDICAL DECISION MAKING:       MEDICATIONS GIVEN:  No orders of the defined types were placed in this encounter.      LABS / RADIOLOGY:     Labs Reviewed - No data to display    CT HEAD WO CONTRAST  Result Date: 3/10/2025  1. No acute intracranial abnormality. No intracranial hemorrhage. 2. Small hematoma with mild soft tissue swelling in the left upper anterolateral frontal scalp without fracture in the underlying bones.     Vascular duplex lower extremity venous right  Result Date: 3/6/2025    No evidence of deep vein or superficial vein thrombosis in the right lower extremity. Vessels demonstrate normal compressibility, color filling, and phasic and spontaneous flow.   For comparison purposes, the left common femoral vein was briefly interrogated. The vein demonstrates normal color filling and compressibility. Doppler flow was phasic and spontaneous.     XR KNEE RIGHT (3 VIEWS)  Result Date: 3/5/2025  EXAMINATION: THREE XRAY VIEWS OF THE RIGHT KNEE 3/5/2025 4:04 pm COMPARISON: None. HISTORY: ORDERING SYSTEM PROVIDED HISTORY: posterior knee pain TECHNOLOGIST PROVIDED HISTORY: posterior knee pain FINDINGS: No acute fracture, dislocation or malalignment or joint effusion.  Tibial plateau and patella appear intact.  Mild tricompartmental degenerative changes.     Mild tricompartmental degenerative changes. No acute fracture or dislocation.     CT HEAD WO CONTRAST  Result Date: 2/10/2025  EXAMINATION: CT OF THE HEAD WITHOUT CONTRAST; CT OF THE CERVICAL SPINE WITHOUT CONTRAST 2/10/2025 3:15 am TECHNIQUE: CT of the head was performed
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
## 2025-03-11 ENCOUNTER — CARE COORDINATION (OUTPATIENT)
Dept: CARE COORDINATION | Age: 70
End: 2025-03-11
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
## 2025-03-12 ENCOUNTER — CARE COORDINATION (OUTPATIENT)
Dept: CARE COORDINATION | Age: 70
End: 2025-03-12

## 2025-03-12 NOTE — CARE COORDINATION
Ambulatory Care Coordination Note     3/12/2025 9:49 AM     Patient Current Location:  Home: [address] [place] [address]     Patient contacted the ACM by telephone. Verified name and  with patient as identifiers.         ACM: Alan Turk RN     Challenges to be reviewed by the provider   Additional needs identified to be addressed with provider No  none               Method of communication with provider: none.    Utilization: Patient has not had any utilization since our last call.     Care Summary Note: message from Patient requesting a return all. Called and patient states that it's nothing urgent but he went to Nephrology yesterday and was told to stop Metformin due to worsening kidney function. He asked that I notify PCP and see if they wanted him to start on anything else. BS average about 140-150. Last A1C 605 25.  Massage sent to provider with above information    Offered patient enrollment in the Remote Patient Monitoring (RPM) program for in-home monitoring: Yes, but did not enroll at this time: already monitoring with home equipment.     Assessments Completed:   No changes since last call    Medications Reviewed:   Completed during a previous call     Advance Care Planning:   Not reviewed during this call     Care Planning:   Not completed during this call    PCP/Specialist follow up:   Future Appointments         Provider Specialty Dept Phone    3/17/2025 1:00 PM Elder Corley MD Oncology [phone]    3/27/2025 2:00 PM [facility] Cardiology [phone]    2025 9:30 AM Marco Antonio Barahona MD Cardiology [phone]    2025 12:15 PM Venecia Ordonez DPM Podiatry [phone]    2025 2:30 PM Vanessa Mckenna, APRN - CNP Primary Care [phone]    2025 1:15 PM Darryl Soto MD Cardiology [phone]    2025 12:15 PM Radha Shankar MD Pulmonology [phone]    2025 1:50 PM Yadira Guidry, APRN - CNP Nephrology [phone]

## 2025-03-12 NOTE — CARE COORDINATION
Call from patient and indicates that he needs a refill on his Lasix. States he takes 40 mg am and PM. Meds show that it should only be 40mg in am with 1/2 tab PRN at night. I reviewed last urology note and it does indicate 1 40mg tab twice a day.  Because of the discrepancy, I suggested that he call the urologists office and have them clarify dosage and call in the refill. Patient states he will call. Patient verbalized that he has the contact information

## 2025-03-14 ENCOUNTER — HOSPITAL ENCOUNTER (OUTPATIENT)
Age: 70
Discharge: HOME | End: 2025-03-14
Payer: COMMERCIAL

## 2025-03-14 DIAGNOSIS — D47.2 MGUS (MONOCLONAL GAMMOPATHY OF UNKNOWN SIGNIFICANCE): ICD-10-CM

## 2025-03-14 DIAGNOSIS — D47.2 MGUS (MONOCLONAL GAMMOPATHY OF UNKNOWN SIGNIFICANCE): Primary | ICD-10-CM

## 2025-03-14 LAB
ALBUMIN PERCENT: NORMAL %
ALBUMIN SERPL-MCNC: 4 G/DL (ref 3.5–5.2)
ALBUMIN SERPL-MCNC: NORMAL G/DL
ALBUMIN/GLOB SERPL: 1.1 {RATIO} (ref 1–2.5)
ALP SERPL-CCNC: 108 U/L (ref 40–129)
ALPHA 2 PERCENT: NORMAL %
ALPHA1 GLOB SERPL ELPH-MCNC: NORMAL %
ALPHA1 GLOB SERPL ELPH-MCNC: NORMAL G/DL
ALPHA2 GLOB SERPL ELPH-MCNC: NORMAL G/DL
ALT SERPL-CCNC: 36 U/L (ref 10–50)
ANION GAP SERPL CALCULATED.3IONS-SCNC: 12 MMOL/L (ref 9–16)
AST SERPL-CCNC: 48 U/L (ref 10–50)
B-GLOBULIN SERPL ELPH-MCNC: NORMAL %
B-GLOBULIN SERPL ELPH-MCNC: NORMAL G/DL
BASOPHILS # BLD: 0 K/UL (ref 0–0.2)
BASOPHILS NFR BLD: 0 % (ref 0–2)
BILIRUB SERPL-MCNC: 0.4 MG/DL (ref 0–1.2)
BUN SERPL-MCNC: 55 MG/DL (ref 8–23)
CALCIUM SERPL-MCNC: 9 MG/DL (ref 8.6–10.4)
CHLORIDE SERPL-SCNC: 98 MMOL/L (ref 98–107)
CO2 SERPL-SCNC: 30 MMOL/L (ref 20–31)
CREAT SERPL-MCNC: 2.4 MG/DL (ref 0.7–1.2)
EOSINOPHIL # BLD: 0.19 K/UL (ref 0–0.44)
EOSINOPHILS RELATIVE PERCENT: 4 % (ref 1–4)
ERYTHROCYTE [DISTWIDTH] IN BLOOD BY AUTOMATED COUNT: 18.6 % (ref 11.8–14.4)
FREE KAPPA/LAMBDA RATIO: 0.55 (ref 0.22–1.74)
GAMMA GLOB SERPL ELPH-MCNC: NORMAL G/DL
GAMMA GLOBULIN %: NORMAL %
GFR, ESTIMATED: 28 ML/MIN/1.73M2
GLUCOSE SERPL-MCNC: 130 MG/DL (ref 74–99)
HCT VFR BLD AUTO: 34.8 % (ref 40.7–50.3)
HGB BLD-MCNC: 10.5 G/DL (ref 13–17)
IMM GRANULOCYTES # BLD AUTO: 0.05 K/UL (ref 0–0.3)
IMM GRANULOCYTES NFR BLD: 1 %
KAPPA LC FREE SER-MCNC: 75.8 MG/L
LAMBDA LC FREE SERPL-MCNC: 137 MG/L (ref 4.2–27.7)
LYMPHOCYTES NFR BLD: 0.58 K/UL (ref 1.1–3.7)
LYMPHOCYTES RELATIVE PERCENT: 12 % (ref 24–43)
M PROTEIN 2 SERPL ELPH-MCNC: NORMAL G/DL
M PROTEIN SERPL ELPH-MCNC: NORMAL G/DL
MCH RBC QN AUTO: 26.9 PG (ref 25.2–33.5)
MCHC RBC AUTO-ENTMCNC: 30.2 G/DL (ref 28.4–34.8)
MCV RBC AUTO: 89.2 FL (ref 82.6–102.9)
MONOCYTES NFR BLD: 0.43 K/UL (ref 0.1–1.2)
MONOCYTES NFR BLD: 9 % (ref 3–12)
MORPHOLOGY: ABNORMAL
NEUTROPHILS NFR BLD: 74 % (ref 36–65)
NEUTS SEG NFR BLD: 3.55 K/UL (ref 1.5–8.1)
NRBC BLD-RTO: 0 PER 100 WBC
PATHOLOGIST: NORMAL
PLATELET # BLD AUTO: 194 K/UL (ref 138–453)
PMV BLD AUTO: 11 FL (ref 8.1–13.5)
POTASSIUM SERPL-SCNC: 4.2 MMOL/L (ref 3.7–5.3)
PROT PATTERN SERPL ELPH-IMP: NORMAL
PROT SERPL-MCNC: 6.8 G/DL (ref 6.6–8.7)
PROT SERPL-MCNC: 7.5 G/DL (ref 6.6–8.7)
RBC # BLD AUTO: 3.9 M/UL (ref 4.21–5.77)
SODIUM SERPL-SCNC: 140 MMOL/L (ref 136–145)
TOTAL PROT. SUM,%: NORMAL %
TOTAL PROT. SUM: NORMAL G/DL
WBC OTHER # BLD: 4.8 K/UL (ref 3.5–11.3)

## 2025-03-14 PROCEDURE — 85025 COMPLETE CBC W/AUTO DIFF WBC: CPT

## 2025-03-14 PROCEDURE — 36415 COLL VENOUS BLD VENIPUNCTURE: CPT

## 2025-03-14 PROCEDURE — 80053 COMPREHEN METABOLIC PANEL: CPT

## 2025-03-14 PROCEDURE — 84155 ASSAY OF PROTEIN SERUM: CPT

## 2025-03-14 PROCEDURE — 84165 PROTEIN E-PHORESIS SERUM: CPT

## 2025-03-14 PROCEDURE — 83521 IG LIGHT CHAINS FREE EACH: CPT

## 2025-03-16 DIAGNOSIS — I50.42 CHRONIC COMBINED SYSTOLIC AND DIASTOLIC HEART FAILURE (HCC): ICD-10-CM

## 2025-03-17 ENCOUNTER — OFFICE VISIT (OUTPATIENT)
Dept: ONCOLOGY | Age: 70
End: 2025-03-17
Payer: COMMERCIAL

## 2025-03-17 ENCOUNTER — HOSPITAL ENCOUNTER (EMERGENCY)
Age: 70
Discharge: HOME | End: 2025-03-17
Attending: EMERGENCY MEDICINE
Payer: COMMERCIAL

## 2025-03-17 ENCOUNTER — APPOINTMENT (OUTPATIENT)
Dept: GENERAL RADIOLOGY | Age: 70
End: 2025-03-17
Payer: COMMERCIAL

## 2025-03-17 ENCOUNTER — TELEPHONE (OUTPATIENT)
Dept: ONCOLOGY | Age: 70
End: 2025-03-17

## 2025-03-17 ENCOUNTER — CARE COORDINATION (OUTPATIENT)
Dept: CARE COORDINATION | Age: 70
End: 2025-03-17

## 2025-03-17 VITALS
TEMPERATURE: 97.7 F | HEART RATE: 62 BPM | HEIGHT: 64 IN | WEIGHT: 179.1 LBS | RESPIRATION RATE: 16 BRPM | DIASTOLIC BLOOD PRESSURE: 63 MMHG | BODY MASS INDEX: 30.58 KG/M2 | SYSTOLIC BLOOD PRESSURE: 108 MMHG

## 2025-03-17 VITALS
BODY MASS INDEX: 30.56 KG/M2 | OXYGEN SATURATION: 100 % | WEIGHT: 179.01 LBS | DIASTOLIC BLOOD PRESSURE: 61 MMHG | TEMPERATURE: 97.7 F | SYSTOLIC BLOOD PRESSURE: 105 MMHG | RESPIRATION RATE: 19 BRPM | HEART RATE: 71 BPM | HEIGHT: 64 IN

## 2025-03-17 DIAGNOSIS — D47.2 MGUS (MONOCLONAL GAMMOPATHY OF UNKNOWN SIGNIFICANCE): Primary | ICD-10-CM

## 2025-03-17 DIAGNOSIS — I50.9 CHRONIC CONGESTIVE HEART FAILURE, UNSPECIFIED HEART FAILURE TYPE (HCC): Primary | ICD-10-CM

## 2025-03-17 DIAGNOSIS — N18.4 CKD (CHRONIC KIDNEY DISEASE) STAGE 4, GFR 15-29 ML/MIN (HCC): ICD-10-CM

## 2025-03-17 DIAGNOSIS — I50.22 CHRONIC SYSTOLIC (CONGESTIVE) HEART FAILURE: ICD-10-CM

## 2025-03-17 DIAGNOSIS — Q64.31 URETHRA OR BLADDER NECK ATRESIA OR STENOSIS: ICD-10-CM

## 2025-03-17 LAB
ANION GAP SERPL CALCULATED.3IONS-SCNC: 11 MMOL/L (ref 9–16)
BASOPHILS # BLD: 0.03 K/UL (ref 0–0.2)
BASOPHILS NFR BLD: 0 % (ref 0–2)
BNP SERPL-MCNC: 5186 PG/ML (ref 0–125)
BUN SERPL-MCNC: 37 MG/DL (ref 8–23)
CALCIUM SERPL-MCNC: 8.7 MG/DL (ref 8.6–10.4)
CHLORIDE SERPL-SCNC: 101 MMOL/L (ref 98–107)
CO2 SERPL-SCNC: 30 MMOL/L (ref 20–31)
CREAT SERPL-MCNC: 2 MG/DL (ref 0.7–1.2)
EOSINOPHIL # BLD: 0.13 K/UL (ref 0–0.44)
EOSINOPHILS RELATIVE PERCENT: 2 % (ref 1–4)
ERYTHROCYTE [DISTWIDTH] IN BLOOD BY AUTOMATED COUNT: 19 % (ref 11.8–14.4)
GFR, ESTIMATED: 35 ML/MIN/1.73M2
GLUCOSE SERPL-MCNC: 130 MG/DL (ref 74–99)
HCT VFR BLD AUTO: 37.9 % (ref 40.7–50.3)
HGB BLD-MCNC: 11.5 G/DL (ref 13–17)
IMM GRANULOCYTES # BLD AUTO: <0.03 K/UL (ref 0–0.3)
IMM GRANULOCYTES NFR BLD: 0 %
LYMPHOCYTES NFR BLD: 0.78 K/UL (ref 1.1–3.7)
LYMPHOCYTES RELATIVE PERCENT: 12 % (ref 24–43)
MCH RBC QN AUTO: 27 PG (ref 25.2–33.5)
MCHC RBC AUTO-ENTMCNC: 30.3 G/DL (ref 28.4–34.8)
MCV RBC AUTO: 89 FL (ref 82.6–102.9)
MONOCYTES NFR BLD: 0.49 K/UL (ref 0.1–1.2)
MONOCYTES NFR BLD: 7 % (ref 3–12)
NEUTROPHILS NFR BLD: 79 % (ref 36–65)
NEUTS SEG NFR BLD: 5.29 K/UL (ref 1.5–8.1)
NRBC BLD-RTO: 0 PER 100 WBC
PLATELET # BLD AUTO: 205 K/UL (ref 138–453)
PMV BLD AUTO: 10.9 FL (ref 8.1–13.5)
POTASSIUM SERPL-SCNC: 4.5 MMOL/L (ref 3.7–5.3)
RBC # BLD AUTO: 4.26 M/UL (ref 4.21–5.77)
RBC # BLD: ABNORMAL 10*6/UL
SODIUM SERPL-SCNC: 142 MMOL/L (ref 136–145)
TROPONIN I SERPL HS-MCNC: 32 NG/L (ref 0–22)
WBC OTHER # BLD: 6.7 K/UL (ref 3.5–11.3)

## 2025-03-17 PROCEDURE — 93005 ELECTROCARDIOGRAM TRACING: CPT

## 2025-03-17 PROCEDURE — 84484 ASSAY OF TROPONIN QUANT: CPT

## 2025-03-17 PROCEDURE — 99214 OFFICE O/P EST MOD 30 MIN: CPT | Performed by: INTERNAL MEDICINE

## 2025-03-17 PROCEDURE — 3074F SYST BP LT 130 MM HG: CPT | Performed by: INTERNAL MEDICINE

## 2025-03-17 PROCEDURE — 99211 OFF/OP EST MAY X REQ PHY/QHP: CPT | Performed by: INTERNAL MEDICINE

## 2025-03-17 PROCEDURE — 83880 ASSAY OF NATRIURETIC PEPTIDE: CPT

## 2025-03-17 PROCEDURE — 3078F DIAST BP <80 MM HG: CPT | Performed by: INTERNAL MEDICINE

## 2025-03-17 PROCEDURE — 80048 BASIC METABOLIC PNL TOTAL CA: CPT

## 2025-03-17 PROCEDURE — 99285 EMERGENCY DEPT VISIT HI MDM: CPT

## 2025-03-17 PROCEDURE — 6370000000 HC RX 637 (ALT 250 FOR IP)

## 2025-03-17 PROCEDURE — 85025 COMPLETE CBC W/AUTO DIFF WBC: CPT

## 2025-03-17 PROCEDURE — 1123F ACP DISCUSS/DSCN MKR DOCD: CPT | Performed by: INTERNAL MEDICINE

## 2025-03-17 PROCEDURE — 1159F MED LIST DOCD IN RCRD: CPT | Performed by: INTERNAL MEDICINE

## 2025-03-17 PROCEDURE — 1126F AMNT PAIN NOTED NONE PRSNT: CPT | Performed by: INTERNAL MEDICINE

## 2025-03-17 PROCEDURE — 71045 X-RAY EXAM CHEST 1 VIEW: CPT

## 2025-03-17 RX ORDER — MULTIVITAMIN WITH MINERALS
TABLET ORAL
COMMUNITY

## 2025-03-17 RX ORDER — ISOSORBIDE MONONITRATE 30 MG/1
30 TABLET, EXTENDED RELEASE ORAL DAILY
Qty: 30 TABLET | Refills: 5 | Status: SHIPPED | OUTPATIENT
Start: 2025-03-17 | End: 2026-03-17

## 2025-03-17 RX ORDER — FUROSEMIDE 40 MG/1
TABLET ORAL
Qty: 60 TABLET | Refills: 5 | Status: SHIPPED | OUTPATIENT
Start: 2025-03-17 | End: 2026-03-17

## 2025-03-17 RX ORDER — FUROSEMIDE 20 MG/1
40 TABLET ORAL ONCE
Status: COMPLETED | OUTPATIENT
Start: 2025-03-17 | End: 2025-03-17

## 2025-03-17 RX ADMIN — FUROSEMIDE 40 MG: 20 TABLET ORAL at 18:48

## 2025-03-17 ASSESSMENT — PAIN - FUNCTIONAL ASSESSMENT: PAIN_FUNCTIONAL_ASSESSMENT: NONE - DENIES PAIN

## 2025-03-17 NOTE — CARE COORDINATION
Nutrition Care Coordinator Follow-Up visit:    Food Recall:    Activity Level:  Sedentary:  Lightly Active:  Moderately Active:  Very Active:    Adult BMI:  Underweight (below 18.5)  Normal Weight (18.5-24.9)  Overweight (25-29.9)  Obese (30-39.9)  Morbidly Obese (>40)    Weight Change:    Plan:  Plan was established with patient:  Increase dietary fiber by consuming whole grains, fruits and vegetables:  Limit dietary cholesterol to >200mg/day:  Increase water intake:  Avoid added sugar:  Avoid sweetened beverages:  Choose lean meats:      Monitoring:  Will monitor weight:  Will monitor adherence to meal plan:  Will monitor adherence to exercise plan:  Will monitor HGA1c:    Handouts Provided :  Low Carb snacking:  Carb counting /individual meal plan:  Portion Control:  Food Labels:  Physical Activity:  Low Fat/Cholesterol:  Hypo/Hyperglycemia:  Calorie Controlled Meal Plan:    Goals:  Increase water consumption to 8oz. 6-8 times daily:  Manage blood sugars by consuming 3 meals spaced every 4-5 hours with 2-3 snacks daily:  Increase fiber and decrease fat intake by consuming 1-2 fruit servings and 2-3 vegetable servings per day.  Increase physical activity by:  Consume less than 2,000mg of sodium/day  Avoid consumption of sweetened beverages and added sugar by reading food labels:  Monitor blood sugars by using meter to check blood glucose before morning meal and 2 hours after a meal daily:  Decrease risk of coronary heart disease by consuming fish that contains omega-3 fatty acids at least twice a week, avoiding partially hydrogenated oil/trans fats and limiting saturated fat intake by reading food labels:    Patient goals set:  1.  2.       JHOAN Melendez
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
## 2025-03-17 NOTE — ED NOTES
Pt arrives to ED 17 via triage.  Pt c/o weight gain. Pt reports a 7lb weight gain from Saturday morning to Sunday morning.  Pt reports having end stage heart failure. Pt states he had concerns of the weight gain due to heart condition.  Pt states he was running low on his Lasix, usually takes 1 1/2 tab but only has been taking 1 tab.  Pt denies chest pain or SOB, denies any leg/feet swelling.  No swelling noted to lower extremities.  Pt A&O x4, RR even and unlabored, call light within reach. Whiteboard updated. Will continue plan of care.

## 2025-03-17 NOTE — TELEPHONE ENCOUNTER
AUDREY HERE FOR FOLLOW UP   Rv in 12 months, need cbc, cmp , spep and light chains prior to  RV   MD VISIT: 3/16/26 @ 1PM   LABS PRINTED AND GIVEN ON EXIT   AVS PRINTED AND GIVEN ON EXIT

## 2025-03-17 NOTE — DISCHARGE INSTRUCTIONS
Take your medication as indicated and prescribed.  Make sure you take your water pills as previously indicated.  Avoid eating foods that are rich in salt (sodium).  If you use salt substitutes, be careful with the amount of extra potassium that you take in.    PLEASE RETURN TO THE EMERGENCY DEPARTMENT IMMEDIATELY for worsening symptoms of increasing pain, shortness of breath, feeling of your heart fluttering or racing, swelling to your feet, unable to lay flat or increase in the number of pillows you sleep on, or if you develop any concerning symptoms such as: high fever not relieved by acetaminophen (Tylenol) and/or ibuprofen (Motrin / Advil), chills, persistent nausea and/or vomiting, loss of consciousness, numbness, weakness or tingling in the arms or legs or change in color of the extremities, changes in mental status, persistent headache, blurry vision, loss of bladder / bowel control, unable to follow up with your physician, or other any other care or concern.

## 2025-03-17 NOTE — ED NOTES
Labeled blood specimens sent to lab via tube system.    [x] Green/yellow  [x] Lavender   [] On ice   [x] Blue   [x] Green/black [] On ice  [] Yellow  [] On ice  [] Red  [] Pink  [] Blood Cultures  [] x1 [] x2    [] Ped Green  [] On ice  [] Ped Lavender  [] On ice    [] Ped Yellow  [] On ice  [] Ped Red

## 2025-03-17 NOTE — ED PROVIDER NOTES
[facility]     Emergency Department     Faculty Attestation    I performed a history and physical examination of the patient and discussed management with the resident. I reviewed the resident’s note and agree with the documented findings and plan of care. Any areas of disagreement are noted on the chart. I was personally present for the key portions of any procedures. I have documented in the chart those procedures where I was not present during the key portions. I have reviewed the emergency nurses triage note. I agree with the chief complaint, past medical history, past surgical history, allergies, medications, social and family history as documented unless otherwise noted below. Documentation of the HPI, Physical Exam and Medical Decision Making performed by medical students or scribes is based on my personal performance of the HPI, PE and MDM. For Physician Assistant/ Nurse Practitioner cases/documentation I have personally evaluated this patient and have completed at least one if not all key elements of the E/M (history, physical exam, and MDM). Additional findings are as noted.    Vital signs:   Vitals:    03/17/25 1722   BP: 136/78   Pulse: 71   Resp: 19   Temp: 97.7 °F (36.5 °C)   SpO2: 100%      70-year-old male with a history of coronary artery disease, and congestive heart failure presents with a 7 pound weight gain.  Patient states that he was underdosing his Lasix because he was running out of it.  He states he picked up a new prescription today, and has enough of it now.  He is not any more short of breath than he typically is.  No chest pain.  No lower extremity swelling.  On exam, he is alert and afebrile.  Breath sounds are clear and equal bilaterally.  Cardiac exam with a normal rate, regular rhythm.  Abdomen is soft and nontender.  Extremities with trace edema, no calf tenderness    EKG Interpretation    Interpreted by emergency department physician    Rhythm: 1 degree AV  block  Rate: normal  Axis: left  Ectopy: none  Conduction: right bundle branch block (complete) and left anterior fasciclar block  ST Segments: nonspecific changes  T Waves: non specific changes  Q Waves: none    Clinical Impression: non-specific EKG    MD Lidia Sylvester M.D,  Attending Emergency  Physician            Lidia Ying MD  03/17/25 6895

## 2025-03-17 NOTE — PROGRESS NOTES
DIAGNOSIS:   MGUS  CURRENT THERAPY:  Observation      REASON FOR VISIT:  Follow-up visit on MGUS.     SUMMARY OF THE CASE:  He is a 70 y.o. patient who has multiple comorbidities in the form of cardiomyopathy, hypertension, diabetes, morbid obesity, and moderate renal insufficiency .  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    We decided he has a small MGUS that was followed conservatively   INTERIM HISTORY: Patient is here today for the annual follow-up regarding MGUS and to review lab work. He reports he is doing well currently with no issues.  He is doing well.  Cardiac condition remains stable.  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  He underwent dilatation and that helped  However, his creatinine has been rising steadily.  I am very concerned about the finding.  The patient is taking 60 mg of Lasix daily.  I asked him to decrease to 40 mg daily for few days and then we will recheck his labs next week    He denies any fever or chills or night sweats.  No weight loss.  No bone pain.  PAST MEDICAL HISTORY: Significant for coronary artery disease, MI, ischemic cardiomyopathy, sleep apnea, diabetes, hypertension and resolved acute renal failure and hyperkalemia.     CURRENT MEDICATIONS: Outpatient medications including Lasix, lisinopril, Zocor, allopurinol, aspirin, Plavix, iron, Lopressor, Prilosec, trazodone.     SOCIAL HISTORY:  He is nonsmoker or drinker at this point but he quit a few years ago. He used to work as a  in the past. He is unemployed. He lives with a roommate.     FAMILY HISTORY: Significant for coronary artery disease and diabetes as well.     REVIEW OF SYSTEMS:     Constitutional: No fever or chills. No night sweats, no weight loss   HEENT: negative for sore mouth, sore throat, hoarseness and voice change   Respiratory: negative for cough , sputum, dyspnea, wheezing, hemoptysis,

## 2025-03-18 ENCOUNTER — CARE COORDINATION (OUTPATIENT)
Dept: INTERNAL MEDICINE | Age: 70
End: 2025-03-18

## 2025-03-18 LAB
ALBUMIN PERCENT: 54 % (ref 56–66)
ALBUMIN SERPL-MCNC: 3.6 G/DL (ref 3.2–5.2)
ALPHA 2 PERCENT: 11 % (ref 7–12)
ALPHA1 GLOB SERPL ELPH-MCNC: 0.3 G/DL (ref 0.1–0.4)
ALPHA1 GLOB SERPL ELPH-MCNC: 5 % (ref 3–5)
ALPHA2 GLOB SERPL ELPH-MCNC: 0.7 G/DL (ref 0.5–0.9)
B-GLOBULIN SERPL ELPH-MCNC: 0.8 G/DL (ref 0.7–1.4)
B-GLOBULIN SERPL ELPH-MCNC: 12 % (ref 8–13)
EKG ATRIAL RATE: 65 BPM
EKG P AXIS: 46 DEGREES
EKG P-R INTERVAL: 226 MS
EKG Q-T INTERVAL: 498 MS
EKG QRS DURATION: 220 MS
EKG QTC CALCULATION (BAZETT): 517 MS
EKG R AXIS: -78 DEGREES
EKG T AXIS: 83 DEGREES
EKG VENTRICULAR RATE: 65 BPM
GAMMA GLOB SERPL ELPH-MCNC: 1.3 G/DL (ref 0.5–1.5)
GAMMA GLOBULIN %: 19 % (ref 11–19)
PATHOLOGIST: ABNORMAL
PROT PATTERN SERPL ELPH-IMP: ABNORMAL
PROT SERPL-MCNC: 6.8 G/DL (ref 6.6–8.7)
TOTAL PROT. SUM,%: 101 % (ref 98–102)
TOTAL PROT. SUM: 6.7 G/DL (ref 6.3–8.2)

## 2025-03-18 PROCEDURE — 93010 ELECTROCARDIOGRAM REPORT: CPT | Performed by: INTERNAL MEDICINE

## 2025-03-18 ASSESSMENT — ENCOUNTER SYMPTOMS
SHORTNESS OF BREATH: 0
NAUSEA: 0
ABDOMINAL PAIN: 0
VOMITING: 0

## 2025-03-18 NOTE — CARE COORDINATION
Ambulatory Care Coordination Note     3/18/2025 10:10 AM     Patient Current Location:  Home: [address] [place] [address]     ACM contacted the patient by telephone. Verified name and  with patient as identifiers.         ACM: Alan Turk RN     Challenges to be reviewed by the provider   Additional needs identified to be addressed with provider No  none               Method of communication with provider: none.    Utilization: Has the patient been seen in the ED since your last call? Yes,   Discharge Date: 3/17/25   Discharge Facility: [facility]  Reason for ED Visit: Weight gain   Visit Diagnosis: weight gain    Number of ED visits in the last 6 months: 5      Do you have any ongoing symptoms? No  Did you call your PCP prior to going to the ED? No, did not call the PCP office.     Review of Discharge Instructions:   [x] AVS discharge instructions  [x] Right Care, Right Place, Right Time document  [] Medication changes  [x] Follow up appointments  [x] Referral follow up   []        Care Summary Note: spoke with Claudio about ED visit. States he went because he gained about 7 lbs in a couple of days. Patient had no Sx. Just weight gain and a little swelling in lower extremities Stated that he was almost out of Lasix so he was only taking 1 a day. Picking up new Rx take 1 40mg tab am and 1/2 tab PM. Patient does not feel he needs a ED F/U with PCP    Offered patient enrollment in the Remote Patient Monitoring (RPM) program for in-home monitoring: Yes, but did not enroll at this time: already monitoring with home equipment.     Assessments Completed:   Congestive Heart Failure Assessment    Are you currently restricting fluids?: 1800cc  Do you understand a low sodium diet?: Yes  Do you understand how to read food labels?: Yes  How many restaurant meals do you eat per week?: 0  Do you salt your food before tasting it?: No     Increase in weights (more than 3lbs overnight or 5lbs

## 2025-03-18 NOTE — CARE COORDINATION
Nutrition Care Coordinator Follow-Up visit:    Food Recall:eating 3 meals/d    Activity Level:  Sedentary:X  Lightly Active:  Moderately Active:  Very Active:    Adult BMI:  Underweight (below 18.5)  Normal Weight (18.5-24.9)  Overweight (25-29.9)  Obese (30-39.9)X  Morbidly Obese (>40)    Plan:  Plan was established with patient:  Increase dietary fiber by consuming whole grains, fruits and vegetables:  Limit dietary cholesterol to >200mg/day:  Increase water intake:  Avoid added sugar:X  Avoid sweetened beverages:X  Choose lean meats:  Increase protein intake: X    Monitoring:  Will monitor weight:  Will monitor adherence to meal plan:X  Will monitor adherence to exercise plan:  Will monitor HGA1c:    Handouts Provided :  Low Carb snacking:  Carb counting /individual meal plan:  Portion Control:  Food Labels:  Physical Activity:  Low Fat/Cholesterol:  Hypo/Hyperglycemia:  Calorie Controlled Meal Plan:    Goals:  Increase water consumption to 8oz. 6-8 times daily:  Manage blood sugars by consuming 3 meals spaced every 4-5 hours with 2-3 snacks daily:reviewed  Increase fiber and decrease fat intake by consuming 1-2 fruit servings and 2-3 vegetable servings per day.  Increase physical activity by:  Consume less than 2,000mg of sodium/day:reviewed  Avoid consumption of sweetened beverages and added sugar by reading food labels:reviewed  Monitor blood sugars by using meter to check blood glucose before morning meal and 2 hours after a meal daily:  Decrease risk of coronary heart disease by consuming fish that contains omega-3 fatty acids at least twice a week, avoiding partially hydrogenated oil/trans fats and limiting saturated fat intake by reading food labels:    Patient goals set:  1.Patient states he is eating a little better. Reviewed not going too long between meals often, working on eating more at meals. Reviewed set meal times daily and consistency of meal times and how this can effect sugars. Encouraged

## 2025-03-21 ENCOUNTER — CARE COORDINATION (OUTPATIENT)
Dept: CARE COORDINATION | Age: 70
End: 2025-03-21

## 2025-03-25 ENCOUNTER — CARE COORDINATION (OUTPATIENT)
Dept: CARE COORDINATION | Age: 70
End: 2025-03-25

## 2025-03-25 NOTE — CARE COORDINATION
Ambulatory Care Coordination Note     3/25/2025 9:42 AM     Patient Current Location:  Home: [address] [place] [address]     ACM contacted the patient by telephone. Verified name and  with patient as identifiers.         ACM: Alan Turk RN     Challenges to be reviewed by the provider   Additional needs identified to be addressed with provider No  none               Method of communication with provider: none.    Utilization: Patient has not had any utilization since our last call.     Care Summary Note: spoke to patient states that he's doing really good. BP <130-90. BS mostly in the 1 teens. No lower extremity edema or weight gain. Has not received ACP documents or nutritional coupons (mailed 3/21/25). He agrees to start discussing graduation at next call.    Offered patient enrollment in the Remote Patient Monitoring (RPM) program for in-home monitoring: Yes, but did not enroll at this time: already monitoring with home equipment.     Assessments Completed:   Diabetes Assessment    Medic Alert ID: No  Meal Planning: Avoidance of concentrated sweets   How often do you test your blood sugar?: Bedtime, Meals (Comment: fasting and PRN)   Do you have barriers with adherence to non-pharmacologic self-management interventions? (Nutrition/Exercise/Self-Monitoring): No   Have you ever had to go to the ED for symptoms of low blood sugar?: No       No patient-reported symptoms   Do you have hyperglycemia symptoms?: No   Do you have hypoglycemia symptoms?: No   Last Blood Sugar Value: 147   Blood Sugar Monitoring Regimen: Before Meals, At Bedtime   Blood Sugar Trends: No Change         ,   Congestive Heart Failure Assessment    Are you currently restricting fluids?: 1800cc  Do you understand a low sodium diet?: Yes  Do you understand how to read food labels?: Yes  How many restaurant meals do you eat per week?: 0  Do you salt your food before tasting it?: No     No patient-reported symptoms

## 2025-03-26 ENCOUNTER — CARE COORDINATION (OUTPATIENT)
Dept: CARE COORDINATION | Age: 70
End: 2025-03-26

## 2025-03-27 ENCOUNTER — HOSPITAL ENCOUNTER (OUTPATIENT)
Dept: OTHER | Age: 70
Discharge: HOME | End: 2025-03-27
Payer: COMMERCIAL

## 2025-03-27 VITALS
DIASTOLIC BLOOD PRESSURE: 57 MMHG | WEIGHT: 174 LBS | OXYGEN SATURATION: 100 % | HEART RATE: 52 BPM | RESPIRATION RATE: 16 BRPM | SYSTOLIC BLOOD PRESSURE: 111 MMHG | BODY MASS INDEX: 29.87 KG/M2

## 2025-03-27 PROCEDURE — 99212 OFFICE O/P EST SF 10 MIN: CPT

## 2025-03-27 NOTE — PROGRESS NOTES
Date:  3/27/2025  Time:  2:34 PM    CHF Clinic at [facility]    Office: [phone] Fax: [phone]    Re:  Claudio Graham   Patient : 1955    Vital Signs: BP (!) 111/57   Pulse 52   Resp 16   Wt 78.9 kg (174 lb)   SpO2 100%   BMI 29.87 kg/m²                       O2 Device: None (Room air)                           No results for input(s): \"CBC\", \"HGB\", \"HCT\", \"WBC\", \"PLATELET\", \"NA\", \"K\", \"CL\", \"CO2\", \"BUN\", \"CREATININE\", \"GLUCOSE\", \"BNP\", \"INR\" in the last 72 hours.     Respiratory:    Assessment  Charting Type: Reassessment         Cough/Sputum  Cough: None         Peripheral Vascular  RLE Edema: None  LLE Edema: None    Future Appointments   Date Time Provider Department Center   2025  9:30 AM Marco Antonio Barahona MD AFL [facility]   2025 12:15 PM Venecia Ordonez DPM [facility]   2025  2:30 PM Vanessa Mckenna APRN - CNP [facility] BS E[facility] DEP   2025  1:00 PM [facility] CHF CLINIC  1 [facility] CHF CLI [facility]   2025  1:15 PM Darryl Soto MD AFL [facility]   2025 12:15 PM Radha Shankar MD Resp [facility]   2025  1:50 PM Yadira Guirdy APRN - CNP AFL Neph [facility] None   3/16/2026  1:00 PM Elder Gamez MD [facility]      Complaints: wt. 174#    Physician Orders none    Comment : Presents ambulatory today.  No S/S of acute CHF today.  Wt  ^ 2 #, relates this to recently running out of Lasix X 4 days and really gained wt, but feels has lost most of it now.  Discussed and reviewed [employer] reading, labs, upcoming appointments, daily wt with log, 2000 mg Na diet, 64 ounces fluid limit, importance of compliance and when to call.  Next appointment  @ 1300.    Electronically signed by Estrella Grider RN on 3/27/2025 at 2:34 PM

## 2025-04-03 ENCOUNTER — CARE COORDINATION (OUTPATIENT)
Dept: CARE COORDINATION | Age: 70
End: 2025-04-03

## 2025-04-03 NOTE — CARE COORDINATION
Ambulatory Care Coordination Note     4/3/2025 11:02 AM     Patient Current Location:  Home: [address] [place] [address]     ACM contacted the patient by telephone. Verified name and  with patient as identifiers.         ACM: Alan Turk RN     Challenges to be reviewed by the provider   Additional needs identified to be addressed with provider No  none               Method of communication with provider: none.    Utilization: Patient has not had any utilization since our last call.     Care Summary Note: spoke with patient and he states that he's doing ok. -200 most below 150.Patient states that he needs a scale to monitor his weight. Will provide thru scale robbi Will Drop off [facility] 4/10 at [facility]    Offered patient enrollment in the Remote Patient Monitoring (RPM) program for in-home monitoring: Yes, but did not enroll at this time: already monitoring with home equipment.     Assessments Completed:   Diabetes Assessment    Medic Alert ID: No  Meal Planning: Avoidance of concentrated sweets   How often do you test your blood sugar?: Bedtime, Meals (Comment: fasting and PRN)   Do you have barriers with adherence to non-pharmacologic self-management interventions? (Nutrition/Exercise/Self-Monitoring): No   Have you ever had to go to the ED for symptoms of low blood sugar?: No       No patient-reported symptoms         ,   Congestive Heart Failure Assessment    Are you currently restricting fluids?: 1800cc  Do you understand a low sodium diet?: Yes  Do you understand how to read food labels?: Yes  How many restaurant meals do you eat per week?: 0  Do you salt your food before tasting it?: No         Symptoms:           ,   Hypertension - Encounter Level              Medications Reviewed:   Completed during this call    Advance Care Planning:   Not on file; education provided     Care Planning:   Not completed during this call    PCP/Specialist follow up:   Future Appointments         Provider

## 2025-04-07 ENCOUNTER — OFFICE VISIT (OUTPATIENT)
Dept: PODIATRY | Age: 70
End: 2025-04-07
Payer: COMMERCIAL

## 2025-04-07 VITALS
BODY MASS INDEX: 29.87 KG/M2 | WEIGHT: 174 LBS | SYSTOLIC BLOOD PRESSURE: 133 MMHG | HEART RATE: 67 BPM | DIASTOLIC BLOOD PRESSURE: 77 MMHG

## 2025-04-07 DIAGNOSIS — B35.1 ONYCHOMYCOSIS: ICD-10-CM

## 2025-04-07 DIAGNOSIS — D23.72 BENIGN NEOPLASM OF SKIN OF FOOT, LEFT: ICD-10-CM

## 2025-04-07 DIAGNOSIS — B35.1 OM (ONYCHOMYCOSIS): Primary | ICD-10-CM

## 2025-04-07 DIAGNOSIS — E11.42 DIABETIC POLYNEUROPATHY ASSOCIATED WITH TYPE 2 DIABETES MELLITUS: ICD-10-CM

## 2025-04-07 DIAGNOSIS — M21.621 TAILOR'S BUNION OF BOTH FEET: ICD-10-CM

## 2025-04-07 DIAGNOSIS — M79.672 PAIN IN LEFT FOOT: ICD-10-CM

## 2025-04-07 DIAGNOSIS — M21.622 TAILOR'S BUNION OF BOTH FEET: ICD-10-CM

## 2025-04-07 PROCEDURE — 11055 PARING/CUTG B9 HYPRKER LES 1: CPT | Performed by: PODIATRIST

## 2025-04-07 PROCEDURE — 11721 DEBRIDE NAIL 6 OR MORE: CPT | Performed by: PODIATRIST

## 2025-04-07 PROCEDURE — 99213 OFFICE O/P EST LOW 20 MIN: CPT | Performed by: PODIATRIST

## 2025-04-07 NOTE — PROGRESS NOTES
Patient instructed to remove shoes and socks and instructed to sit in exam chair.  Current PCP is Vanessa Mckenna APRN - CNP and date of last visit was 1/24/2025.   Do you have a follow up visit scheduled?  Yes  If yes, the date is 4/23/2025    
complication, not stated as uncontrolled     Unspecified sleep apnea     uses V-pap       Allergies   Allergen Reactions    Zetia [Ezetimibe] Shortness Of Breath    Bactrim Other (See Comments)     palpitations    Cephalexin     Sulfamethoxazole-Trimethoprim     Trimethoprim      Current Outpatient Medications on File Prior to Visit   Medication Sig Dispense Refill    vitamin D (ERGOCALCIFEROL) 1.25 MG (53879 UT) CAPS capsule TAKE 1 CAPSULE BY MOUTH ONCE WEEKLY 12 capsule 1    isosorbide mononitrate (IMDUR) 30 MG extended release tablet Take 1 tablet by mouth daily 30 tablet 5    furosemide (LASIX) 40 MG tablet TAKE 1 TABLET BY MOUTH EVERY MORNING, AND ONE HALF TABLET IN THE EVENNG 60 tablet 5    Multiple Vitamins-Minerals (NO IRON MULT VITAMIN-MINERALS) TABS Take by mouth      potassium bicarbonate (EFFER-K) 25 MEQ disintegrating tablet Take 1 tablet by mouth daily 60 tablet 3    apixaban (ELIQUIS) 5 MG TABS tablet Take 1 tablet by mouth 2 times daily 60 tablet 5    Handicap Placard MISC by Does not apply route Exp: 1/2030 1 each 0    amiodarone (CORDARONE) 200 MG tablet Take 1 tablet by mouth 2 times daily 180 tablet 2    ferrous sulfate (FEROSUL) 325 (65 Fe) MG tablet Take 1 tablet by mouth daily 90 tablet 1    dapagliflozin (FARXIGA) 10 MG tablet Take 1 tablet by mouth every morning 90 tablet 1    blood glucose test strips (ONETOUCH VERIO) strip 1 each by Other route daily As needed. 100 strip 2    Lancets (ONETOUCH DELICA PLUS OSRIML48F) MISC use 1 LANCET to TEST BLOOD SUGAR daily 100 each 3    ticagrelor (BRILINTA) 90 MG TABS tablet Take 1 tablet by mouth 2 times daily 60 tablet 0    levothyroxine (SYNTHROID) 88 MCG tablet Take 1 tablet by mouth daily 30 tablet 3    atorvastatin (LIPITOR) 80 MG tablet TAKE 1 TABLET BY MOUTH DAILY 30 tablet 8    Blood Gluc Meter Disp-Strips (BLOOD GLUCOSE METER DISPOSABLE) SHAN Daily and as needed 1 each 0    midodrine (PROAMATINE) 10 MG tablet Take 0.5 tablets by mouth 2 times

## 2025-04-10 ENCOUNTER — CARE COORDINATION (OUTPATIENT)
Dept: CARE COORDINATION | Age: 70
End: 2025-04-10

## 2025-04-10 NOTE — CARE COORDINATION
while,  encouraged frozen vegetables and low sodium soups. Discussed processed meats in detail to avoid/limit- sausage, de, bologna, ham, ect. He does not eat frozen meals or add salt to foods. Goal is <2,000gm of sodium/day.  Spoke with patient who relays appetite is up and down. Reviewed a protein shake daily, he is drinking Glucerna once or twice a day. Reviewed protein sources - states eating protein at each meal. Reviewed goals. Will follow up in 3-4 weeks to review answer questions..     Mailed- Glucerna couponJHOAN Garcia

## 2025-04-11 ENCOUNTER — CARE COORDINATION (OUTPATIENT)
Dept: CARE COORDINATION | Age: 70
End: 2025-04-11

## 2025-04-11 NOTE — CARE COORDINATION
Ambulatory Care Coordination Note     2025 9:21 AM     Patient Current Location:  Home: [address] [place] [address]     ACM contacted the patient by telephone. Verified name and  with patient as identifiers.         ACM: Alan Turk RN     Challenges to be reviewed by the provider   Additional needs identified to be addressed with provider No  none               Method of communication with provider: none.    Utilization: Patient has not had any utilization since our last call.     Care Summary Note: spoke with patient, states that he's doing well. Gets a little SON when ambulating but recovers very quickly. States that he has a procedure scheduled 25 for heart valve. I asked him to call PCP office and reschedule his appointment on 25 and reschedule. He agrees to do this. Patient has been losing weight slowly which he wants to do. Currently working with RD.    Offered patient enrollment in the Remote Patient Monitoring (RPM) program for in-home monitoring: Yes, but did not enroll at this time: already monitoring with home equipment.     Assessments Completed:   Diabetes Assessment    Medic Alert ID: No  Meal Planning: Avoidance of concentrated sweets   How often do you test your blood sugar?: Bedtime, Meals (Comment: fasting and PRN)   Do you have barriers with adherence to non-pharmacologic self-management interventions? (Nutrition/Exercise/Self-Monitoring): No   Have you ever had to go to the ED for symptoms of low blood sugar?: No       No patient-reported symptoms   Do you have hyperglycemia symptoms?: No   Do you have hypoglycemia symptoms?: No   Last Blood Sugar Value: 128   Blood Sugar Monitoring Regimen: Before Meals, At Bedtime   Blood Sugar Trends: No Change         ,   Congestive Heart Failure Assessment    Are you currently restricting fluids?: 1800cc  Do you understand a low sodium diet?: Yes  Do you understand how to read food labels?: Yes  How many restaurant meals

## 2025-04-17 ENCOUNTER — CARE COORDINATION (OUTPATIENT)
Dept: CARE COORDINATION | Age: 70
End: 2025-04-17

## 2025-04-18 ENCOUNTER — CARE COORDINATION (OUTPATIENT)
Dept: INTERNAL MEDICINE | Age: 70
End: 2025-04-18

## 2025-04-18 NOTE — CARE COORDINATION
Ambulatory Care Coordination Note     2025 11:03 AM     Patient Current Location:  Home: [address] [place] [address]     ACM contacted the patient by telephone. Verified name and  with patient as identifiers.         ACM: Alan Turk RN     Challenges to be reviewed by the provider   Additional needs identified to be addressed with provider No  none               Method of communication with provider: none.    Utilization: Patient has not had any utilization since our last call.     Care Summary Note: spoke with patient, states that he picked his scale at PCP office. Patient states that he had to cancel his appointment with PCP because his procedure the same day. Has not rescheduled. Message sent to [facility] clinical support and COLBY Ratliff to reschedule. Will F/U on the Monday after surgery.  -120 none over 140  B/P stable, weight stable    Offered patient enrollment in the Remote Patient Monitoring (RPM) program for in-home monitoring: Yes, but did not enroll at this time: already monitoring with home equipment.     Assessments Completed:   Diabetes Assessment    Medic Alert ID: No  Meal Planning: Avoidance of concentrated sweets   How often do you test your blood sugar?: Bedtime, Meals (Comment: fasting and PRN)   Do you have barriers with adherence to non-pharmacologic self-management interventions? (Nutrition/Exercise/Self-Monitoring): No   Have you ever had to go to the ED for symptoms of low blood sugar?: No       No patient-reported symptoms   Do you have hyperglycemia symptoms?: No   Do you have hypoglycemia symptoms?: No   Last Blood Sugar Value: 116   Blood Sugar Monitoring Regimen: Before Meals, At Bedtime   Blood Sugar Trends: No Change         ,   Congestive Heart Failure Assessment    Are you currently restricting fluids?: 1800cc  Do you understand a low sodium diet?: Yes  Do you understand how to read food labels?: Yes  How many restaurant meals do you eat per week?: 0  Do

## 2025-04-23 ENCOUNTER — APPOINTMENT (OUTPATIENT)
Age: 70
End: 2025-04-23
Attending: INTERNAL MEDICINE
Payer: COMMERCIAL

## 2025-04-23 ENCOUNTER — HOSPITAL ENCOUNTER (OUTPATIENT)
Age: 70
Setting detail: OUTPATIENT SURGERY
Discharge: HOME | End: 2025-04-23
Attending: INTERNAL MEDICINE | Admitting: INTERNAL MEDICINE
Payer: COMMERCIAL

## 2025-04-23 VITALS
BODY MASS INDEX: 28.85 KG/M2 | TEMPERATURE: 98.3 F | SYSTOLIC BLOOD PRESSURE: 102 MMHG | HEIGHT: 64 IN | OXYGEN SATURATION: 100 % | RESPIRATION RATE: 15 BRPM | HEART RATE: 50 BPM | DIASTOLIC BLOOD PRESSURE: 53 MMHG | WEIGHT: 169 LBS

## 2025-04-23 DIAGNOSIS — I35.1 NONRHEUMATIC AORTIC VALVE INSUFFICIENCY: Primary | ICD-10-CM

## 2025-04-23 DIAGNOSIS — I34.0 MITRAL VALVE INSUFFICIENCY, UNSPECIFIED ETIOLOGY: ICD-10-CM

## 2025-04-23 LAB
BUN BLD-MCNC: 29 MG/DL (ref 8–26)
CHLORIDE BLD-SCNC: 104 MMOL/L (ref 98–107)
ECHO BSA: 1.86 M2
EGFR, POC: 29 ML/MIN/1.73M2
GLUCOSE BLD-MCNC: 115 MG/DL (ref 74–100)
HCT VFR BLD AUTO: 42 % (ref 41–53)
PLATELET # BLD AUTO: 190 K/UL (ref 138–453)
POC CREATININE: 2.3 MG/DL (ref 0.51–1.19)
POC HEMOGLOBIN (CALC): 14.4 G/DL (ref 13.5–17.5)
POTASSIUM BLD-SCNC: 3.8 MMOL/L (ref 3.5–4.5)
SODIUM BLD-SCNC: 144 MMOL/L (ref 138–146)

## 2025-04-23 PROCEDURE — 93319 3D ECHO IMG CGEN CAR ANOMAL: CPT | Performed by: INTERNAL MEDICINE

## 2025-04-23 PROCEDURE — 85049 AUTOMATED PLATELET COUNT: CPT

## 2025-04-23 PROCEDURE — 99152 MOD SED SAME PHYS/QHP 5/>YRS: CPT | Performed by: INTERNAL MEDICINE

## 2025-04-23 PROCEDURE — 84295 ASSAY OF SERUM SODIUM: CPT

## 2025-04-23 PROCEDURE — 93325 DOPPLER ECHO COLOR FLOW MAPG: CPT | Performed by: INTERNAL MEDICINE

## 2025-04-23 PROCEDURE — 6360000002 HC RX W HCPCS: Performed by: INTERNAL MEDICINE

## 2025-04-23 PROCEDURE — 82435 ASSAY OF BLOOD CHLORIDE: CPT

## 2025-04-23 PROCEDURE — C1725 CATH, TRANSLUMIN NON-LASER: HCPCS | Performed by: INTERNAL MEDICINE

## 2025-04-23 PROCEDURE — 2580000003 HC RX 258: Performed by: INTERNAL MEDICINE

## 2025-04-23 PROCEDURE — 2709999900 HC NON-CHARGEABLE SUPPLY: Performed by: INTERNAL MEDICINE

## 2025-04-23 PROCEDURE — 82947 ASSAY GLUCOSE BLOOD QUANT: CPT

## 2025-04-23 PROCEDURE — 93320 DOPPLER ECHO COMPLETE: CPT

## 2025-04-23 PROCEDURE — 85014 HEMATOCRIT: CPT

## 2025-04-23 PROCEDURE — C1769 GUIDE WIRE: HCPCS | Performed by: INTERNAL MEDICINE

## 2025-04-23 PROCEDURE — 6370000000 HC RX 637 (ALT 250 FOR IP): Performed by: INTERNAL MEDICINE

## 2025-04-23 PROCEDURE — 82565 ASSAY OF CREATININE: CPT

## 2025-04-23 PROCEDURE — 84132 ASSAY OF SERUM POTASSIUM: CPT

## 2025-04-23 PROCEDURE — 93312 ECHO TRANSESOPHAGEAL: CPT | Performed by: INTERNAL MEDICINE

## 2025-04-23 PROCEDURE — 84520 ASSAY OF UREA NITROGEN: CPT

## 2025-04-23 PROCEDURE — 93320 DOPPLER ECHO COMPLETE: CPT | Performed by: INTERNAL MEDICINE

## 2025-04-23 RX ORDER — SODIUM CHLORIDE 0.9 % (FLUSH) 0.9 %
5-40 SYRINGE (ML) INJECTION EVERY 12 HOURS SCHEDULED
Status: DISCONTINUED | OUTPATIENT
Start: 2025-04-23 | End: 2025-04-23 | Stop reason: HOSPADM

## 2025-04-23 RX ORDER — LIDOCAINE HYDROCHLORIDE 20 MG/ML
SOLUTION OROPHARYNGEAL PRN
Status: DISCONTINUED | OUTPATIENT
Start: 2025-04-23 | End: 2025-04-23 | Stop reason: HOSPADM

## 2025-04-23 RX ORDER — SODIUM CHLORIDE 9 MG/ML
INJECTION, SOLUTION INTRAVENOUS PRN
Status: DISCONTINUED | OUTPATIENT
Start: 2025-04-23 | End: 2025-04-23 | Stop reason: HOSPADM

## 2025-04-23 RX ORDER — SODIUM CHLORIDE 0.9 % (FLUSH) 0.9 %
5-40 SYRINGE (ML) INJECTION PRN
Status: DISCONTINUED | OUTPATIENT
Start: 2025-04-23 | End: 2025-04-23 | Stop reason: HOSPADM

## 2025-04-23 RX ORDER — SODIUM CHLORIDE 9 MG/ML
INJECTION, SOLUTION INTRAVENOUS CONTINUOUS
Status: DISCONTINUED | OUTPATIENT
Start: 2025-04-23 | End: 2025-04-23 | Stop reason: HOSPADM

## 2025-04-23 RX ORDER — FENTANYL CITRATE 50 UG/ML
INJECTION, SOLUTION INTRAMUSCULAR; INTRAVENOUS PRN
Status: DISCONTINUED | OUTPATIENT
Start: 2025-04-23 | End: 2025-04-23 | Stop reason: HOSPADM

## 2025-04-23 RX ORDER — MIDAZOLAM HYDROCHLORIDE 1 MG/ML
INJECTION, SOLUTION INTRAMUSCULAR; INTRAVENOUS PRN
Status: DISCONTINUED | OUTPATIENT
Start: 2025-04-23 | End: 2025-04-23 | Stop reason: HOSPADM

## 2025-04-23 RX ADMIN — SODIUM CHLORIDE: 0.9 INJECTION, SOLUTION INTRAVENOUS at 11:28

## 2025-04-23 NOTE — INTERVAL H&P NOTE
Update History & Physical    The patient's History and Physical of April 4, 2025 was reviewed with the patient and I examined the patient. There was no change. The surgical site was confirmed by the patient and me.     Plan: The risks, benefits, expected outcome, and alternative to the recommended procedure have been discussed with the patient. Patient understands and wants to proceed with the procedure.     Electronically signed by Sukhdev Villatoro MD on 4/23/2025 at 1:48 PM

## 2025-04-23 NOTE — PROGRESS NOTES
Patient admitted, consent signed and questions answered. Patient ready for procedure. Call light to reach with side rails up 2 of 2. Bilat groin hair clipped with writer and Shruti present. Generalized scabs noted. Patient stated he had bed bugs in July. Dr Villatoro visited at bedside. Dr Villatoro informed of elevated Bun Crt elevated.

## 2025-04-23 NOTE — PROGRESS NOTES
Received post procedure to PCC to room 9. Assessment obtained. Restrictions reviewed with patient. Post procedure pathway initiated.

## 2025-04-23 NOTE — DISCHARGE INSTRUCTIONS
TRANSESOPHAGEAL ECHOCARDIOGRAM / OLGA DISCHARGE INSTRUCTIONS    If the following occurs call your physician or seek help    -trouble with swallowing    -spitting or coughing up blood    -severe pain in the throat region / your throat can be sore for several days but pain should not increase as days continue    Do not drive or operate heavy machinery today due to sedation            Transesophageal Echocardiogram:  What is a transesophageal echocardiogram?     A transesophageal echocardiogram is a test to help your doctor look at the inside of your heart. A small device called a transducer directs sound waves toward your heart. The sound waves make a picture of the heart's valves and chambers.  Your doctor may do this test to look for certain types of heart disease. Or it may be done to see how disease is affecting your heart.  You will be given medicine to make you sleepy and comfortable during the test.  The doctor puts a small, flexible tube into your throat and guides it to the esophagus. This is the tube that connects your mouth to your stomach. The doctor will ask you to swallow as the tube goes down.  The transducer is at the tip of the tube. It gets close to your heart to make clear pictures. The doctor will look at the ultrasound pictures on a screen.  You will not be able to eat or drink until the numbness from the throat spray wears off. Your throat may be sore for a few days after the test.  Follow-up care is a key part of your treatment and safety. Be sure to make and go to all appointments, and call your doctor if you are having problems. It's also a good idea to know your test results and keep a list of your current medications.  Going home  Be sure you have someone to drive you home. Anesthesia and pain medicine make it unsafe for you to drive.      Where can you learn more?    Go to [email or web address] and sign in to your [employer] account. Enter K500 in the [employer]

## 2025-04-23 NOTE — PROGRESS NOTES
Ambulated in [place],tolerated well. Gag reflex present. No c/o voiced.Discharge instructions fiven. Patient verbalized understanding.Patient waiting for his ride home.

## 2025-04-24 LAB
ECHO BSA: 1.86 M2
ECHO MV MAX VELOCITY: 1 M/S
ECHO MV MEAN GRADIENT: 1 MMHG
ECHO MV MEAN VELOCITY: 0.5 M/S
ECHO MV PEAK GRADIENT: 4 MMHG
ECHO MV REGURGITANT ALIASING (NYQUIST) VELOCITY: 29 CM/S
ECHO MV REGURGITANT PEAK GRADIENT: 144 MMHG
ECHO MV REGURGITANT PEAK VELOCITY: 6 M/S
ECHO MV REGURGITANT RADIUS PISA: 1 CM
ECHO MV REGURGITANT VTIA: 241 CM
ECHO MV VTI: 26.8 CM

## 2025-04-25 ENCOUNTER — OFFICE VISIT (OUTPATIENT)
Dept: PULMONOLOGY | Age: 70
End: 2025-04-25
Payer: COMMERCIAL

## 2025-04-25 VITALS
SYSTOLIC BLOOD PRESSURE: 121 MMHG | HEIGHT: 64 IN | OXYGEN SATURATION: 100 % | RESPIRATION RATE: 16 BRPM | BODY MASS INDEX: 29.19 KG/M2 | HEART RATE: 60 BPM | WEIGHT: 171 LBS | DIASTOLIC BLOOD PRESSURE: 70 MMHG

## 2025-04-25 DIAGNOSIS — G47.33 OSA (OBSTRUCTIVE SLEEP APNEA): Primary | ICD-10-CM

## 2025-04-25 DIAGNOSIS — J45.30 MILD PERSISTENT ASTHMA WITHOUT COMPLICATION: ICD-10-CM

## 2025-04-25 DIAGNOSIS — I48.0 PAROXYSMAL A-FIB (HCC): ICD-10-CM

## 2025-04-25 DIAGNOSIS — I50.22 CHRONIC SYSTOLIC (CONGESTIVE) HEART FAILURE (HCC): ICD-10-CM

## 2025-04-25 PROCEDURE — 1123F ACP DISCUSS/DSCN MKR DOCD: CPT | Performed by: STUDENT IN AN ORGANIZED HEALTH CARE EDUCATION/TRAINING PROGRAM

## 2025-04-25 PROCEDURE — 99214 OFFICE O/P EST MOD 30 MIN: CPT | Performed by: STUDENT IN AN ORGANIZED HEALTH CARE EDUCATION/TRAINING PROGRAM

## 2025-04-25 PROCEDURE — 3078F DIAST BP <80 MM HG: CPT | Performed by: STUDENT IN AN ORGANIZED HEALTH CARE EDUCATION/TRAINING PROGRAM

## 2025-04-25 PROCEDURE — 3074F SYST BP LT 130 MM HG: CPT | Performed by: STUDENT IN AN ORGANIZED HEALTH CARE EDUCATION/TRAINING PROGRAM

## 2025-04-25 PROCEDURE — 1159F MED LIST DOCD IN RCRD: CPT | Performed by: STUDENT IN AN ORGANIZED HEALTH CARE EDUCATION/TRAINING PROGRAM

## 2025-04-25 ASSESSMENT — SLEEP AND FATIGUE QUESTIONNAIRES
HOW LIKELY ARE YOU TO NOD OFF OR FALL ASLEEP WHILE SITTING AND TALKING TO SOMEONE: SLIGHT CHANCE OF DOZING
ESS TOTAL SCORE: 12
HOW LIKELY ARE YOU TO NOD OFF OR FALL ASLEEP WHEN YOU ARE A PASSENGER IN A CAR FOR AN HOUR WITHOUT A BREAK: SLIGHT CHANCE OF DOZING
HOW LIKELY ARE YOU TO NOD OFF OR FALL ASLEEP WHILE SITTING QUIETLY AFTER LUNCH WITHOUT ALCOHOL: MODERATE CHANCE OF DOZING
HOW LIKELY ARE YOU TO NOD OFF OR FALL ASLEEP WHILE LYING DOWN TO REST IN THE AFTERNOON WHEN CIRCUMSTANCES PERMIT: HIGH CHANCE OF DOZING
HOW LIKELY ARE YOU TO NOD OFF OR FALL ASLEEP WHILE WATCHING TV: SLIGHT CHANCE OF DOZING
HOW LIKELY ARE YOU TO NOD OFF OR FALL ASLEEP IN A CAR, WHILE STOPPED FOR A FEW MINUTES IN TRAFFIC: SLIGHT CHANCE OF DOZING
HOW LIKELY ARE YOU TO NOD OFF OR FALL ASLEEP WHILE SITTING INACTIVE IN A PUBLIC PLACE: SLIGHT CHANCE OF DOZING

## 2025-04-26 NOTE — PROGRESS NOTES
Lab Results   Component Value Date     03/17/2025    K 4.5 03/17/2025     03/17/2025    CO2 30 03/17/2025    BUN 37 (H) 03/17/2025    CREATININE 2.3 (H) 04/23/2025    GLUCOSE 130 (H) 03/17/2025    MG 2.5 (H) 01/21/2025    CALCIUM 8.7 03/17/2025    PHOS 3.1 01/10/2025    URICACID 4.7 02/24/2020    BILITOT 0.4 03/14/2025    BILIDIR <0.08 02/24/2015    IBILI CANNOT BE CALCULATED 02/24/2015    ALT 36 03/14/2025    AST 48 03/14/2025    ALKPHOS 108 03/14/2025    LIPASE 27 02/24/2015     Coagulation Profile:   Lab Results   Component Value Date    INR 1.3 01/10/2025    PROTIME 16.4 (H) 01/10/2025    APTT 30.9 01/15/2025     BNP:   Lab Results   Component Value Date    BNP 78 02/17/2012    PROBNP 5,186 (H) 03/17/2025     D-Dimer:  No results found for: \"DDIMER\"        IMAGING DATA:    CHEST X-RAY:  XR CHEST PORTABLE 03/17/2025    Narrative  EXAMINATION:  ONE XRAY VIEW OF THE CHEST    3/17/2025 5:57 pm    COMPARISON:  02/08/2025    HISTORY:  ORDERING SYSTEM PROVIDED HISTORY: SOB  TECHNOLOGIST PROVIDED HISTORY:  SOB    FINDINGS:  No change in the mild pulmonary vascular congestion.  Cardiomegaly is stable  status post single lead ICD.  There is no pneumothorax or pleural effusion.    Impression  1. Cardiomegaly with mild pulmonary vascular congestion.      XR CHEST PORTABLE 02/08/2025    Narrative  EXAMINATION:  ONE XRAY VIEW OF THE CHEST    2/8/2025 4:08 pm    COMPARISON:  January 21, 2025    HISTORY:  ORDERING SYSTEM PROVIDED HISTORY: Difibrillator firing  TECHNOLOGIST PROVIDED HISTORY:  Difibrillator firing    FINDINGS:  ICD overlying the chest.    No confluent infiltrate, effusion, or pneumothorax identified.  Enlargement  of the heart.  Mediastinal silhouette is within normal limits.  Atherosclerotic calcifications involving the thoracic aorta.  Stent in the  left anterior descending artery.    No acute osseous abnormality identified.    Impression  No acute pulmonary process

## 2025-04-29 ENCOUNTER — CARE COORDINATION (OUTPATIENT)
Dept: CARE COORDINATION | Age: 70
End: 2025-04-29

## 2025-04-29 NOTE — CARE COORDINATION
Heart Failure Education outreach Date/Time: 2025 4:44 PM    Ambulatory Care Manager (ACM) contacted the patient by telephone to perform Ambulatory Care Coordination. Verified name and  with patient as identifiers. Provided introduction to self, and explanation of the Ambulatory Care Manager's role.     ACM reviewed that a Heart Healthy tips for the Summer packet has been sent to [employer]. ACM reviewed CHF zones, daily weights, fluid restriction, the importance of low sodium diet, and healthy tips packet with the patient. Instructed patient to call their PCP if they have a weight gain of 3 lbs in 2 days or 5 lbs in a week.     Patient reminded that there is a physician on call 24 hours a day / 7 days a week should the patient have questions or concerns. The patient verbalized understanding.

## 2025-04-29 NOTE — CARE COORDINATION
Ambulatory Care Coordination Note     2025 3:01 PM     Patient Current Location:  Home: [address] [place] [address]     ACM contacted the patient by telephone. Verified name and  with patient as identifiers.         ACM: Alan Turk RN     Challenges to be reviewed by the provider   Additional needs identified to be addressed with provider No  none               Method of communication with provider: none.    Utilization: Has the patient been discharged from the hospital since your last call? yes -   Call within 2 business days of discharge: Yes    Patient: Claudio Graham    Patient : 1955   MRN: [id number]    Reason for Admission: procedure  Discharge Date: 25  RURS: Readmission Risk Score: 21      Last Discharge Facility       Date Complaint Diagnosis Description Type Department Provider    25  Nonrheumatic aortic valve insufficiency ... Admission (Discharged) Marco Antonio Dozier MD            Was this an external facility discharge? No    Ambulatory Care Manager reviewed discharge instructions with patient. The patient was given an opportunity to ask questions; no further questions or concerns at this time.. The patient verbalized understanding.   Were discharge instructions available to patient? Yes.   Reviewed appropriate site of care based on symptoms and resources available to patient including: When to call 911. The patient agrees to contact the primary care provider and/or specialist office for questions related to their healthcare.     Patients top risk factors for readmission:  none.  Out patient procedure    Hospital follow up appointment: Discussed follow up appointments. Patient has hospital follow up appointment scheduled within 7 days of discharge.     Care Summary Note: called and spoke with Claudio. States that he feels ok. We reviewed DC instructions. Patient was encouraged to use his CPAP but hasn't because it keeps waking him up. I asked him if

## 2025-05-01 ENCOUNTER — CARE COORDINATION (OUTPATIENT)
Dept: CARE COORDINATION | Age: 70
End: 2025-05-01

## 2025-05-01 ENCOUNTER — OFFICE VISIT (OUTPATIENT)
Dept: PRIMARY CARE CLINIC | Age: 70
End: 2025-05-01
Payer: COMMERCIAL

## 2025-05-01 VITALS
DIASTOLIC BLOOD PRESSURE: 62 MMHG | BODY MASS INDEX: 28.84 KG/M2 | WEIGHT: 168 LBS | TEMPERATURE: 97.2 F | HEART RATE: 60 BPM | OXYGEN SATURATION: 99 % | SYSTOLIC BLOOD PRESSURE: 113 MMHG

## 2025-05-01 DIAGNOSIS — E11.8 CONTROLLED TYPE 2 DIABETES MELLITUS WITH COMPLICATION, WITHOUT LONG-TERM CURRENT USE OF INSULIN (HCC): Primary | ICD-10-CM

## 2025-05-01 DIAGNOSIS — I50.42 CHRONIC COMBINED SYSTOLIC AND DIASTOLIC HEART FAILURE (HCC): ICD-10-CM

## 2025-05-01 DIAGNOSIS — N18.30 CKD STAGE 3 DUE TO TYPE 2 DIABETES MELLITUS (HCC): ICD-10-CM

## 2025-05-01 DIAGNOSIS — Z86.0100 HISTORY OF COLON POLYPS: ICD-10-CM

## 2025-05-01 DIAGNOSIS — E11.22 CKD STAGE 3 DUE TO TYPE 2 DIABETES MELLITUS (HCC): ICD-10-CM

## 2025-05-01 DIAGNOSIS — Z12.11 COLON CANCER SCREENING: ICD-10-CM

## 2025-05-01 PROCEDURE — 3044F HG A1C LEVEL LT 7.0%: CPT | Performed by: NURSE PRACTITIONER

## 2025-05-01 PROCEDURE — 99213 OFFICE O/P EST LOW 20 MIN: CPT | Performed by: NURSE PRACTITIONER

## 2025-05-01 PROCEDURE — 1123F ACP DISCUSS/DSCN MKR DOCD: CPT | Performed by: NURSE PRACTITIONER

## 2025-05-01 PROCEDURE — 1159F MED LIST DOCD IN RCRD: CPT | Performed by: NURSE PRACTITIONER

## 2025-05-01 PROCEDURE — 3078F DIAST BP <80 MM HG: CPT | Performed by: NURSE PRACTITIONER

## 2025-05-01 PROCEDURE — 3074F SYST BP LT 130 MM HG: CPT | Performed by: NURSE PRACTITIONER

## 2025-05-01 ASSESSMENT — PATIENT HEALTH QUESTIONNAIRE - PHQ9
1. LITTLE INTEREST OR PLEASURE IN DOING THINGS: NOT AT ALL
SUM OF ALL RESPONSES TO PHQ QUESTIONS 1-9: 0
SUM OF ALL RESPONSES TO PHQ QUESTIONS 1-9: 0
2. FEELING DOWN, DEPRESSED OR HOPELESS: NOT AT ALL
1. LITTLE INTEREST OR PLEASURE IN DOING THINGS: NOT AT ALL
2. FEELING DOWN, DEPRESSED OR HOPELESS: NOT AT ALL
SUM OF ALL RESPONSES TO PHQ9 QUESTIONS 1 & 2: 0
SUM OF ALL RESPONSES TO PHQ QUESTIONS 1-9: 0
SUM OF ALL RESPONSES TO PHQ QUESTIONS 1-9: 0

## 2025-05-01 NOTE — CARE COORDINATION
Nutrition Care Coordinator Follow-Up visit:    Food Recall:eating 3 meals/d    Activity Level:  Sedentary:X  Lightly Active:  Moderately Active:  Very Active:    Adult BMI:  Underweight (below 18.5)  Normal Weight (18.5-24.9)  Overweight (25-29.9)X  Obese (30-39.9)  Morbidly Obese (>40)    Plan:  Plan was established with patient:  Increase dietary fiber by consuming whole grains, fruits and vegetables:  Limit dietary cholesterol to >200mg/day:  Increase water intake:  Avoid added sugar:X  Avoid sweetened beverages:  Choose lean meats:  Increase protein intake: X  Glucerna daily: X      Monitoring:  Will monitor weight:  Will monitor adherence to meal plan:X  Will monitor adherence to exercise plan:  Will monitor HGA1c:    Handouts Provided :  Low Carb snacking:  Carb counting /individual meal plan:  Portion Control:  Food Labels:  Physical Activity:  Low Fat/Cholesterol:  Hypo/Hyperglycemia:  Calorie Controlled Meal Plan:    Goals:  Increase water consumption to 8oz. 6-8 times daily:  Manage blood sugars by consuming 3 meals spaced every 4-5 hours with 2-3 snacks daily:reviewed  Increase fiber and decrease fat intake by consuming 1-2 fruit servings and 2-3 vegetable servings per day.  Increase physical activity by:  Consume less than 2,000mg of sodium/day  Avoid consumption of sweetened beverages and added sugar by reading food labels:reviewed  Monitor blood sugars by using meter to check blood glucose before morning meal and 2 hours after a meal daily:  Decrease risk of coronary heart disease by consuming fish that contains omega-3 fatty acids at least twice a week, avoiding partially hydrogenated oil/trans fats and limiting saturated fat intake by reading food labels:    Patient goals set:  1.Patient states he is eating a little better. Reviewed not going too long between meals often, working on eating more at meals. Reviewed set meal times daily and consistency of meal times and how this can effect sugars.

## 2025-05-06 ENCOUNTER — CARE COORDINATION (OUTPATIENT)
Dept: CARE COORDINATION | Age: 70
End: 2025-05-06

## 2025-05-06 NOTE — CARE COORDINATION
Verbalizes Understanding    [employer], taught by Alan Turk, RN at 5/6/2025  1:05 PM.  Learner: Patient  Readiness: Acceptance  Method: Explanation  Response: Verbalizes Understanding    Education Comments  No comments found.     ,    Goals Addressed                   This Visit's Progress     Conditions and Symptoms   On track     I will schedule office visits, as directed by my provider.  I will keep my appointment or reschedule if I have to cancel.  I will notify my provider of any barriers to my plan of care.  I will follow my Zone Management tool to seek urgent or emergent care.  I will notify my provider of any symptoms that indicate a worsening of my condition.    Barriers: lack of education  Plan for overcoming my barriers: Office staff, Friends, Family, ACM  Confidence: 9/10  Anticipated Goal Completion Date: 4/30/25         Nutrition Plan   On track     I will read food labels to limit sodium intake, increase lean protein sources and use nutrition supplement/ protein shake daily due to recent decreased appetite    Barriers: lack of education  Plan for overcoming my barriers: CC dietitian to educate on high sodium foods and label reading and encouraged lean protein sources, protein shake daily  Confidence: 6/10  Anticipated Goal Completion Date: 5/30/25       Understand CHF action plan.   On track     Patient will notify MD for weight gain of 3 lbs in a day or 5 lbs in week, increase in shortness of breath over baseline, increase in swelling.     Patient will continue to participate in RPM daily    Adhere to 1800 ml fluid restriction and 2000 mg sodium restriction               PCP/Specialist follow up:   Future Appointments         Provider Specialty Dept Phone    5/8/2025 1:00 PM [facility] Cardiology [phone]    7/8/2025 1:50 PM Yadira Guidry, APRN - CNP Nephrology [phone]    8/14/2025 1:30 PM SCHEDULE, [facility] SCHEDULE Cardiology [phone]

## 2025-05-07 DIAGNOSIS — I25.5 ISCHEMIC CARDIOMYOPATHY: Primary | ICD-10-CM

## 2025-05-07 LAB — ECHO BSA: 1.86 M2

## 2025-05-13 ENCOUNTER — CARE COORDINATION (OUTPATIENT)
Dept: CARE COORDINATION | Age: 70
End: 2025-05-13

## 2025-05-13 NOTE — CARE COORDINATION
Ambulatory Care Coordination Note     2025 3:58 PM     Patient Current Location:  Home: [address] [place] [address]     ACM contacted the patient by telephone. Verified name and  with patient as identifiers.         ACM: Alan Turk RN     Challenges to be reviewed by the provider   Additional needs identified to be addressed with provider No  none               Method of communication with provider: none.    Utilization: Patient has not had any utilization since our last call.     Care Summary Note: spoke with Claudio, states that he's doing good. His SOB has increased a little but not bad at all. We discussed his upcoming appt with gastro to set up colonoscopy and with cardiology about his tricuspid valve. Will call in about 10 days and start graduation    Offered patient enrollment in the Remote Patient Monitoring (RPM) program for in-home monitoring: Yes, but did not enroll at this time: already monitoring with home equipment.     Assessments Completed:   No changes since last call    Medications Reviewed:   Completed during a previous call     Advance Care Planning:   Not reviewed during this call     Care Planning:   Education Documentation  No documentation found.  Education Comments  No comments found.     ,    Goals Addressed                   This Visit's Progress     Conditions and Symptoms   On track     I will schedule office visits, as directed by my provider.  I will keep my appointment or reschedule if I have to cancel.  I will notify my provider of any barriers to my plan of care.  I will follow my Zone Management tool to seek urgent or emergent care.  I will notify my provider of any symptoms that indicate a worsening of my condition.    Barriers: lack of education  Plan for overcoming my barriers: Office staff, Friends, Family, ACM  Confidence: 9/10  Anticipated Goal Completion Date: 25         Understand CHF action plan.   On track     Patient will notify MD for

## 2025-05-21 ENCOUNTER — CARE COORDINATION (OUTPATIENT)
Dept: CARE COORDINATION | Age: 70
End: 2025-05-21

## 2025-05-21 NOTE — CARE COORDINATION
Patient goals reviewed-  1.Patient states he is eating a little better. Reviewed not going too long between meals often, working on eating more at meals. Reviewed set meal times daily and consistency of meal times and how this can effect sugars. Encouraged to work on eating smaller meals more often, every 2-3 hours- 6 small meals/day.    2. Encouraged to  increase protein intake and discussed high energy/protein diabetic guidelines, patient has history of malnutrition 1/13/25 and weight loss. Discussed protein sources in detail and encouraged at each small meal. The main food sources of protein include meat, poultry, fish, eggs, dairy products, legumes (dried beans, peas, lentils), nuts, tofu, and soy.    Discussed ways to add calories to protein sources-   Spread nut butters on whole grain bagels, bread, and crackers.   Use pesto sauce on fish and chicken.   Add nuts and seeds to salads, stir-sofia, cereals, and low sugar yogurts and puddings.   Choose high-calorie options, like fatty fish (salmon, trout, herring, mackerel) and chicken legs or thighs.   Use extra eggs when baking or cooking   We also discussed ways to add extra calories to vegetables-      Use avocado in sandwiches, salads, and dips.   Choose fruit packed in juice instead of water.   Try raw vegetables dipped in salad dressing or hummus.   Add whipped cream or Greek yogurt on top of cut-up fruit.   Add skim milk powder to mashed vegetables or potatoes.         3. Reviewed using nutritional supplement daily low sugar, high protein-like Glucerna. Discussed/ encouraged to use 2 times/day between meals. Discussed types of nutrition supplements- Boost Glucose control, Glucerna and other high protein low carb nutrition supplements. He states he does have protein powder and adds it to milk- encouraged to do 2 times/day or use other supplements discussed.   4.Reviewed DASH guidelines- lowfat/cholesterol and low sodium. Reviewed reading food labels-what to

## 2025-05-27 ENCOUNTER — CARE COORDINATION (OUTPATIENT)
Dept: CARE COORDINATION | Age: 70
End: 2025-05-27

## 2025-05-27 DIAGNOSIS — N18.32 STAGE 3B CHRONIC KIDNEY DISEASE (HCC): ICD-10-CM

## 2025-05-27 DIAGNOSIS — N18.31 TYPE 2 DIABETES MELLITUS WITH STAGE 3A CHRONIC KIDNEY DISEASE, WITHOUT LONG-TERM CURRENT USE OF INSULIN (HCC): ICD-10-CM

## 2025-05-27 DIAGNOSIS — I10 ESSENTIAL (PRIMARY) HYPERTENSION: ICD-10-CM

## 2025-05-27 DIAGNOSIS — E87.6 HYPOKALEMIA: ICD-10-CM

## 2025-05-27 DIAGNOSIS — E55.9 HYPOVITAMINOSIS D: ICD-10-CM

## 2025-05-27 DIAGNOSIS — E11.22 TYPE 2 DIABETES MELLITUS WITH STAGE 3A CHRONIC KIDNEY DISEASE, WITHOUT LONG-TERM CURRENT USE OF INSULIN (HCC): ICD-10-CM

## 2025-05-27 DIAGNOSIS — I25.5 ISCHEMIC CARDIOMYOPATHY: ICD-10-CM

## 2025-05-27 DIAGNOSIS — E11.8 CONTROLLED TYPE 2 DIABETES MELLITUS WITH COMPLICATION, WITHOUT LONG-TERM CURRENT USE OF INSULIN (HCC): ICD-10-CM

## 2025-05-27 NOTE — TELEPHONE ENCOUNTER
Message from patient. States that he can't get a refill on his K+.  [facility] do [facility] on [facility].

## 2025-05-27 NOTE — CARE COORDINATION
Ambulatory Care Coordination Note     5/27/2025 1:05 PM     Patient outreach attempt by this ACM today to perform care management follow up . ACM was unable to reach the patient by telephone today;   voicemail full and unable to leave a message.      ACM: Alan Turk RN     Care Summary Note: Message from patient requesting a return call. Called but received a busy signal. On message patient states that he can not get a refill on K+  Called [facility]. States that they do not have refills left..  Message sent to PCP requesting a refill.  A text message was sent to patient requesting a return call.    PCP/Specialist follow up:   Future Appointments         Provider Specialty Dept Phone    5/29/2025 1:30 PM [facility] Cardiology [phone]    7/8/2025 1:50 PM Yadira Guidry APRN - CNP Nephrology [phone]    7/21/2025 10:45 AM Beltran Miller MD Gastroenterology [phone]    8/14/2025 1:30 PM SCHEDULE, [facility] SCHEDULE Cardiology [phone]    8/14/2025 1:45 PM Darryl Soto MD Cardiology [phone]    9/2/2025 2:45 PM Vanessa Mckenna, APRN - CNP Primary Care [phone]    3/16/2026 1:00 PM Elder Gamez MD Oncology [phone]    5/8/2026 11:15 AM Radha Shankar MD Pulmonology [phone]            Follow Up:   Plan for next ACM outreach in approximately 1 week to complete:  - disease specific assessments  - medication review  - advance care planning  - goal progression  - education .

## 2025-05-29 ENCOUNTER — HOSPITAL ENCOUNTER (OUTPATIENT)
Dept: OTHER | Age: 70
Discharge: HOME | End: 2025-05-29
Payer: COMMERCIAL

## 2025-05-29 ENCOUNTER — CARE COORDINATION (OUTPATIENT)
Dept: CARE COORDINATION | Age: 70
End: 2025-05-29

## 2025-05-29 VITALS
HEART RATE: 58 BPM | OXYGEN SATURATION: 100 % | SYSTOLIC BLOOD PRESSURE: 118 MMHG | DIASTOLIC BLOOD PRESSURE: 75 MMHG | WEIGHT: 167.6 LBS | RESPIRATION RATE: 18 BRPM | BODY MASS INDEX: 28.77 KG/M2

## 2025-05-29 PROCEDURE — 99212 OFFICE O/P EST SF 10 MIN: CPT

## 2025-05-29 NOTE — CARE COORDINATION
Ambulatory Care Coordination Note     2025 9:28 AM     Patient Current Location:  Home: [address] [place] [address]     ACM contacted the patient by telephone. Verified name and  with patient as identifiers.         ACM: Alan Turk RN     Challenges to be reviewed by the provider   Additional needs identified to be addressed with provider No  N/A               Method of communication with provider: none.    Utilization: Patient has not had any utilization since our last call.     Care Summary Note: patient states that he's feeling really good. Still some SOB when he walk a great distance but recovers quickly.  I explained that the K+ he requested was sent to Cardiology. PCP wasn't sure if he was supposed to be taking. We discussed foods high in K+. No other sx at this time. He goes to CHF clinic today    Offered patient enrollment in the Remote Patient Monitoring (RPM) program for in-home monitoring: Yes, but did not enroll at this time: already monitoring with home equipment.     Assessments Completed:   Diabetes Assessment    Medic Alert ID: No  Meal Planning: Avoidance of concentrated sweets   How often do you test your blood sugar?: Bedtime, Meals (Comment: fasting and PRN)   Do you have barriers with adherence to non-pharmacologic self-management interventions? (Nutrition/Exercise/Self-Monitoring): No   Have you ever had to go to the ED for symptoms of low blood sugar?: No       No patient-reported symptoms         ,   Congestive Heart Failure Assessment    Are you currently restricting fluids?: 1800cc  Do you understand a low sodium diet?: Yes  Do you understand how to read food labels?: Yes  How many restaurant meals do you eat per week?: 0  Do you salt your food before tasting it?: No     No patient-reported symptoms      Symptoms:  CHF associated dyspnea on exertion: Pos      Symptom course: stable  Salt intake watch compared to last visit: stable      ,   Hypertension - Encounter

## 2025-05-29 NOTE — PROGRESS NOTES
Date:  2025  Time:  1:52 PM    CHF Clinic at [facility]    Office: [phone] Fax: [phone]    Re:  Claudio Graham   Patient : 1955    Vital Signs: /75   Pulse 58   Resp 18   Wt 76 kg (167 lb 9.6 oz)   SpO2 100%   BMI 28.77 kg/m²                       O2 Device: None (Room air)                           No results for input(s): \"CBC\", \"HGB\", \"HCT\", \"WBC\", \"PLATELET\", \"NA\", \"K\", \"CL\", \"CO2\", \"BUN\", \"CREATININE\", \"GLUCOSE\", \"BNP\", \"INR\" in the last 72 hours.     Respiratory:    Assessment  Charting Type: Reassessment    Breath Sounds  Respiratory Pattern: Regular  Right Upper Lobe: Clear  Right Middle Lobe: Clear  Right Lower Lobe: Clear  Left Upper Lobe: Clear  Left Lower Lobe: Clear    Cough/Sputum  Cough: None         Peripheral Vascular  RLE Edema: None  LLE Edema: None    Future Appointments   Date Time Provider Department Center   2025  1:00 PM [facility]   2025  1:50 PM Yadira Guidry APRN - CNP [facility] Neph Apncho None   2025 10:45 AM Beltran Miller MD [facility]   2025  1:30 PM SCHEDULE, [facility] SCHEDULE [facility]   2025  1:45 PM Darryl Soto MD [facility]   2025  2:45 PM Vanessa Mckenna APRN - CNP [facility] DEP   3/16/2026  1:00 PM Elder Gamez MD [facility]   2026 11:15 AM [facility] Paris [facility]      Complaints: Pt denies any c/o    Physician Orders No new orders    Comment : Pt ambulatory to clinic. Pt's weight 167.6, down 7 lbs from prior visit 2 months ago. Pt states he has SOB with activity. Pt states SOB disappears with rest. No swelling noted. Measurements decreased, lungs clear. Pt states he is weighing himself on daily basis and states they are stable. Pt verbalizes compliance with 2000 mg low sodium diet and 64 oz fluid restriction. Pt states he does have an occasional can of

## 2025-06-11 ENCOUNTER — HOSPITAL ENCOUNTER (EMERGENCY)
Age: 70
Discharge: HOME | End: 2025-06-11
Attending: EMERGENCY MEDICINE
Payer: COMMERCIAL

## 2025-06-11 VITALS
HEART RATE: 71 BPM | OXYGEN SATURATION: 100 % | DIASTOLIC BLOOD PRESSURE: 69 MMHG | SYSTOLIC BLOOD PRESSURE: 136 MMHG | RESPIRATION RATE: 17 BRPM | TEMPERATURE: 98.1 F

## 2025-06-11 DIAGNOSIS — R04.0 EPISTAXIS: Primary | ICD-10-CM

## 2025-06-11 PROCEDURE — 99283 EMERGENCY DEPT VISIT LOW MDM: CPT

## 2025-06-11 PROCEDURE — 2500000003 HC RX 250 WO HCPCS

## 2025-06-11 PROCEDURE — 6370000000 HC RX 637 (ALT 250 FOR IP)

## 2025-06-11 RX ORDER — CETIRIZINE HYDROCHLORIDE 10 MG/1
10 TABLET ORAL DAILY
Status: DISCONTINUED | OUTPATIENT
Start: 2025-06-11 | End: 2025-06-11 | Stop reason: HOSPADM

## 2025-06-11 RX ADMIN — PHENYLEPHRINE HYDROCHLORIDE 2 SPRAY: 0.5 SPRAY NASAL at 20:15

## 2025-06-11 RX ADMIN — CETIRIZINE HYDROCHLORIDE 10 MG: 10 TABLET ORAL at 20:15

## 2025-06-11 NOTE — ED NOTES
Pt arrived to ED through triage with c/o of epistaxis   Pt stated he has not had any blood come out but when he sticks tissue up in his nose he sees blood  Pt stated he has had this since 10am this morning  Pt stated he has a cardiac history and takes blood thinners   Pt VS charted below

## 2025-06-12 ENCOUNTER — CARE COORDINATION (OUTPATIENT)
Dept: CARE COORDINATION | Age: 70
End: 2025-06-12

## 2025-06-12 NOTE — DISCHARGE INSTRUCTIONS
Your nosebleed is likely just due to dry nasal mucosa.  Use the nose spray that you are given and over-the-counter saline nose sprays as well as Zyrtec to help with the symptoms.    Call today / tomorrow for a follow up with PCP  in 3 days.    Nonprescription saline nasal sprays and nose drops can be used to keep your nasal tissues moist, relieve nasal irritation and help thick or dried mucus to drain.  You can also use Vaseline to the inside of your nose.    Use the saline spray, or obtain a humidifier for your bed room.    Return to the emergency department for worsening of pain, return of nose bleed, fever > 101.5, excessive nausea or vomiting, any other care or concern.

## 2025-06-12 NOTE — CARE COORDINATION
Ambulatory Care Coordination Note     6/12/2025 9:10 AM     Patient outreach attempt by this ACM today to perform care management follow up . ACM was unable to reach the patient by telephone today;   left voice message requesting a return phone call to this ACM.     ACM: Alan Turk, RN     Care Summary Note: Patient in ED yesterday due to bloody nose. OTC saline nasal spray and Zertec.     PCP/Specialist follow up:   Future Appointments         Provider Specialty Dept Phone    6/27/2025 1:00 PM [facility] Cardiology [phone]    7/8/2025 1:50 PM Yadira Guidry, APRN - CNP Nephrology [phone]    7/21/2025 10:45 AM Beltran Miller MD Gastroenterology [phone]    8/14/2025 1:30 PM SCHEDULE, [facility] SCHEDULE Cardiology [phone]    8/14/2025 1:45 PM Darryl Soto MD Cardiology [phone]    9/2/2025 2:45 PM Vanessa Mckenna, APRN - CNP Primary Care [phone]    3/16/2026 1:00 PM Elder Gamez MD Oncology [phone]    5/8/2026 11:15 AM Radha Paris MD Pulmonology [phone]            Follow Up:   Plan for next AC outreach in approximately 1-2 days  to complete:  ED F/U .

## 2025-06-12 NOTE — ED PROVIDER NOTES
[facility] EMERGENCY DEPARTMENT  Emergency Department Encounter  Emergency Medicine Resident     Pt Name:Claudio Graham  MRN: [id number]  Birthdate 1955  Date of evaluation: 6/11/25  PCP:  Vanessa Mckenna APRN - CNP  Note Started: 9:04 PM EDT      CHIEF COMPLAINT       Chief Complaint   Patient presents with    Epistaxis     Frequent; no active bleeding + blood thinners       HISTORY OF PRESENT ILLNESS  (Location/Symptom, Timing/Onset, Context/Setting, Quality, Duration, Modifying Factors, Severity.)      Claudio Graham is a 70 y.o. male who presents with complaints of a bloody nose that started this morning when the patient blew his nose.  Patient reports that he is having nasal congestion and dryness.  Patient denies any dripping of blood from his nose, however he states that there is just blood on the tissue when he wipes.  Patient states that he is on blood thinners and wanted to come to the emergency department to be evaluated for his bloody nose.  Patient denies any acute traumatic injuries to the nose or face, headache, vision changes, difficulty swallowing, shortness of breath, chest pain, lightheadedness, nausea, vomiting, hematemesis, or any other concerning symptoms.  All other review of systems are negative.    PAST MEDICAL / SURGICAL / SOCIAL / FAMILY HISTORY      has a past medical history of Acute HFrEF (heart failure with reduced ejection fraction) ([facility]), Acute on chronic combined systolic and diastolic congestive heart failure (HCC), Acute on chronic congestive heart failure (HCC), Acute respiratory failure with hypoxia (HCC), Anemia, Anxiety, CAD (coronary artery disease), Cardiac defibrillator in place, Cardiomyopathy (HCC), CHF (congestive heart failure) (HCC), Chronic combined systolic and diastolic heart failure (HCC) s/[ AICD placed, Chronic kidney disease, Complex sleep apnea syndrome, Diabetic retinopathy (HCC), Diverticulitis, DJD (degenerative joint disease), 
the return precautions and that if bleeding is continuing will need to return to the ER for packing      EMERGENCY DEPARTMENT COURSE:     -------------------------  BP: 136/69, Temp: 98.1 °F (36.7 °C), Pulse: 71, Respirations: 17      Comments  Medical Decision Making  Risk  OTC drugs.               Brett West DO, RDMS.  Attending Emergency Physician         Brett West DO  06/11/25 4551

## 2025-06-13 ENCOUNTER — CARE COORDINATION (OUTPATIENT)
Dept: CARE COORDINATION | Age: 70
End: 2025-06-13

## 2025-06-13 NOTE — CARE COORDINATION
8/14/2025 1:45 PM Darryl Soto MD Cardiology [phone]    9/2/2025 2:45 PM Vanessa Mckenna, APRN - CNP Primary Care [phone]    3/16/2026 1:00 PM Elder Gamez MD Oncology [phone]    5/8/2026 11:15 AM Radha Paris MD Pulmonology [phone]            Follow Up:   Plan for next ACM outreach in approximately 2 weeks to complete:  - disease specific assessments  - SDOH assessments  - medication review   - advance care planning   - goal progression  - education .   Patient  is agreeable to this plan.

## 2025-06-23 RX ORDER — ALLOPURINOL 300 MG/1
TABLET ORAL
Qty: 45 TABLET | Refills: 1 | Status: SHIPPED | OUTPATIENT
Start: 2025-06-23

## 2025-07-01 ENCOUNTER — TELEPHONE (OUTPATIENT)
Dept: PRIMARY CARE CLINIC | Age: 70
End: 2025-07-01

## 2025-07-01 NOTE — TELEPHONE ENCOUNTER
Our records indicate that Claudio Graham may benefit from medication optimization to meet the three pillars of CHF care (ACE/ARB/ARNI, Beta Blocker, and MRA therapy). The ask is to change the patient's medications below, as appropriate. If the patient is being followed by a cardiologist, please ensure they have a follow up appointment with that provider to discuss further.     The measure definition, denominator, inclusions, and exclusions are below:

## 2025-07-03 ENCOUNTER — HOSPITAL ENCOUNTER (OUTPATIENT)
Dept: OTHER | Age: 70
Discharge: HOME | End: 2025-07-03
Payer: COMMERCIAL

## 2025-07-03 VITALS
OXYGEN SATURATION: 97 % | SYSTOLIC BLOOD PRESSURE: 124 MMHG | BODY MASS INDEX: 28.63 KG/M2 | HEART RATE: 65 BPM | DIASTOLIC BLOOD PRESSURE: 63 MMHG | RESPIRATION RATE: 18 BRPM | WEIGHT: 166.8 LBS

## 2025-07-03 DIAGNOSIS — I50.20 HFREF (HEART FAILURE WITH REDUCED EJECTION FRACTION) (HCC): Primary | ICD-10-CM

## 2025-07-03 DIAGNOSIS — E87.6 HYPOKALEMIA: ICD-10-CM

## 2025-07-03 DIAGNOSIS — N18.32 CKD STAGE 3B, GFR 30-44 ML/MIN (HCC): ICD-10-CM

## 2025-07-03 PROCEDURE — 99212 OFFICE O/P EST SF 10 MIN: CPT

## 2025-07-03 ASSESSMENT — ENCOUNTER SYMPTOMS
ABDOMINAL PAIN: 0
BLOOD IN STOOL: 0
SHORTNESS OF BREATH: 1
EYE DISCHARGE: 0
COUGH: 1

## 2025-07-03 NOTE — PROGRESS NOTES
ml/min ([facility])    3. Hypokalemia        :Plan      1. HFrEF (heart failure with reduced ejection fraction) ([facility])    2. CKD stage 3b, GFR 30-44 ml/min ([facility])    3. Hypokalemia      Wt down 1 lbs   leg measurements stable    Traditionally not on GDMT : \"Not on full GDMT due to CKD and soft blood pressure \" per CARDIO    HFrEF     On Guideline-Directed Medical Therapy:     ACEI / ARB / ARNi:  No, will consider starting low dose ACE I if ok w/ Nephrologist     Beta - blocker  \"  Avoid all AV kali blocking agents \"  per CARDIO 12/7/24 dr Flaherty consult not   Mineralocorticoid receptor antagonists (MRAs): check labs, will suggest starting and this will benefit his K+ level as well.   SGLT2  Y   Diuretic  Lasix 40  qd and 20 mg pm     #2  See suggestions above, under consideration of pt's renal fx.     #3 on KCl   Will consider MRA , which may assist in K+ level management       Labs prior to Nephrology appt on 7/8     Orders Placed This Encounter   Procedures    Basic Metabolic Panel     Standing Status:   Future     Expected Date:   7/7/2025     Expiration Date:   7/3/2026     No orders of the defined types were placed in this encounter.      Patientgiven verbal and/or written educational instructions.    Follow up as directed.  I have reviewed and agree with the nursing documentation.  Verbally reviewed medication list with patient; patient verbalized understanding.     Discussed 2000mg/day sodium restricted diet; patient verbalized understanding.     Moderate daily exercise encouraged as tolerated. Discussed rest breaks as needed; patient verbalized understanding.     Patient instructed to weigh self at the same time of each day, using same clothes and same scale; reinforced teaching to monitor for 3-5 lb weight increase over 1-2 days, and to notify the CHF clinic at [phone] or physician office if weight change noted.  Patient verbalized understanding.     Risks of smoking discussed with the patient if

## 2025-07-10 ENCOUNTER — HOSPITAL ENCOUNTER (OUTPATIENT)
Age: 70
Discharge: HOME | End: 2025-07-10
Payer: COMMERCIAL

## 2025-07-10 DIAGNOSIS — I50.20 HFREF (HEART FAILURE WITH REDUCED EJECTION FRACTION) (HCC): ICD-10-CM

## 2025-07-10 LAB
ANION GAP SERPL CALCULATED.3IONS-SCNC: 13 MMOL/L (ref 9–16)
BUN SERPL-MCNC: 49 MG/DL (ref 8–23)
CALCIUM SERPL-MCNC: 9 MG/DL (ref 8.6–10.4)
CHLORIDE SERPL-SCNC: 99 MMOL/L (ref 98–107)
CO2 SERPL-SCNC: 28 MMOL/L (ref 20–31)
CREAT SERPL-MCNC: 2.4 MG/DL (ref 0.7–1.2)
GFR, ESTIMATED: 28 ML/MIN/1.73M2
GLUCOSE SERPL-MCNC: 182 MG/DL (ref 74–99)
POTASSIUM SERPL-SCNC: 3.9 MMOL/L (ref 3.7–5.3)
SODIUM SERPL-SCNC: 140 MMOL/L (ref 136–145)

## 2025-07-10 PROCEDURE — 36415 COLL VENOUS BLD VENIPUNCTURE: CPT

## 2025-07-10 PROCEDURE — 80048 BASIC METABOLIC PNL TOTAL CA: CPT

## 2025-07-13 ENCOUNTER — HOSPITAL ENCOUNTER (EMERGENCY)
Age: 70
Discharge: HOME | End: 2025-07-13
Attending: EMERGENCY MEDICINE
Payer: COMMERCIAL

## 2025-07-13 VITALS
TEMPERATURE: 97.7 F | DIASTOLIC BLOOD PRESSURE: 70 MMHG | OXYGEN SATURATION: 100 % | RESPIRATION RATE: 15 BRPM | HEART RATE: 64 BPM | SYSTOLIC BLOOD PRESSURE: 126 MMHG

## 2025-07-13 DIAGNOSIS — R19.5 DARK STOOLS: Primary | ICD-10-CM

## 2025-07-13 LAB
BASOPHILS # BLD: 0.04 K/UL (ref 0–0.2)
BASOPHILS NFR BLD: 1 % (ref 0–2)
EOSINOPHIL # BLD: 0.19 K/UL (ref 0–0.44)
EOSINOPHILS RELATIVE PERCENT: 3 % (ref 1–4)
ERYTHROCYTE [DISTWIDTH] IN BLOOD BY AUTOMATED COUNT: 16.4 % (ref 11.8–14.4)
HCT VFR BLD AUTO: 39.2 % (ref 40.7–50.3)
HGB BLD-MCNC: 12.3 G/DL (ref 13–17)
IMM GRANULOCYTES # BLD AUTO: <0.03 K/UL (ref 0–0.3)
IMM GRANULOCYTES NFR BLD: 0 %
LYMPHOCYTES NFR BLD: 0.72 K/UL (ref 1.1–3.7)
LYMPHOCYTES RELATIVE PERCENT: 10 % (ref 24–43)
MCH RBC QN AUTO: 29.1 PG (ref 25.2–33.5)
MCHC RBC AUTO-ENTMCNC: 31.4 G/DL (ref 28.4–34.8)
MCV RBC AUTO: 92.7 FL (ref 82.6–102.9)
MONOCYTES NFR BLD: 0.66 K/UL (ref 0.1–1.2)
MONOCYTES NFR BLD: 10 % (ref 3–12)
NEUTROPHILS NFR BLD: 76 % (ref 36–65)
NEUTS SEG NFR BLD: 5.32 K/UL (ref 1.5–8.1)
NRBC BLD-RTO: 0 PER 100 WBC
PLATELET # BLD AUTO: 156 K/UL (ref 138–453)
PMV BLD AUTO: 12.9 FL (ref 8.1–13.5)
RBC # BLD AUTO: 4.23 M/UL (ref 4.21–5.77)
RBC # BLD: ABNORMAL 10*6/UL
WBC OTHER # BLD: 7 K/UL (ref 3.5–11.3)

## 2025-07-13 PROCEDURE — 96374 THER/PROPH/DIAG INJ IV PUSH: CPT

## 2025-07-13 PROCEDURE — 6360000002 HC RX W HCPCS

## 2025-07-13 PROCEDURE — 85025 COMPLETE CBC W/AUTO DIFF WBC: CPT

## 2025-07-13 PROCEDURE — 2500000003 HC RX 250 WO HCPCS

## 2025-07-13 PROCEDURE — 99284 EMERGENCY DEPT VISIT MOD MDM: CPT

## 2025-07-13 RX ADMIN — SODIUM CHLORIDE 40 MG: 9 INJECTION INTRAMUSCULAR; INTRAVENOUS; SUBCUTANEOUS at 18:44

## 2025-07-13 ASSESSMENT — PAIN SCALES - GENERAL: PAINLEVEL_OUTOF10: 7

## 2025-07-13 ASSESSMENT — LIFESTYLE VARIABLES
HOW OFTEN DO YOU HAVE A DRINK CONTAINING ALCOHOL: PATIENT DECLINED
HOW MANY STANDARD DRINKS CONTAINING ALCOHOL DO YOU HAVE ON A TYPICAL DAY: PATIENT DECLINED

## 2025-07-13 NOTE — ED PROVIDER NOTES
[facility] EMERGENCY DEPARTMENT     Emergency Department     Faculty Attestation    I performed a history and physical examination of the patient and discussed management with the resident. I reviewed the resident’s note and agree with the documented findings and plan of care. Any areas of disagreement are noted on the chart. I was personally present for the key portions of any procedures. I have documented in the chart those procedures where I was not present during the key portions. I have reviewed the emergency nurses triage note. I agree with the chief complaint, past medical history, past surgical history, allergies, medications, social and family history as documented unless otherwise noted below. For Physician Assistant/ Nurse Practitioner cases/documentation I have personally evaluated this patient and have completed at least one if not all key elements of the E/M (history, physical exam, and MDM). Additional findings are as noted.    Note Started: 6:37 PM EDT    Patient here with concerns for blood in his stool.  States he had 3 bowel movements earlier today with black stools.  He is anticoagulated on Eliquis as well as Brilinta.  No abdominal pain.  Denies any history of GI bleeds in the past.  Has had anemia and is on iron.  No recent illnesses.  Well-appearing on exam watching TV.  Abdomen soft nontender.  See resident note for rectal exam.  Will check labs reevaluate      Critical Care     none    Claudio Rios MD, FACEP  Attending Emergency  Physician           Claudio Rios MD  07/13/25 [id number]

## 2025-07-13 NOTE — ED PROVIDER NOTES
[facility] EMERGENCY DEPARTMENT  Emergency Department Encounter  Emergency Medicine Resident     Pt Name:Claudio Graham  MRN: [id number]  Birthdate 1955  Date of evaluation: 7/13/25  PCP:  Vanessa Mckenna APRN - CNP  Note Started: 5:54 PM EDT      CHIEF COMPLAINT       No chief complaint on file.      HISTORY OF PRESENT ILLNESS  (Location/Symptom, Timing/Onset, Context/Setting, Quality, Duration, Modifying Factors, Severity.)      Claudio Graham is a 70 y.o. male who presents with 1 episode of dark stool.  Patient states he usually has constipation, had a dark stool.  Denies any diarrhea, abdominal pain, nausea, vomiting, fever, chills, chest pain, shortness of breath.  Patient said that he has been compliant with his medication he including Eliquis and iron pills.    PAST MEDICAL / SURGICAL / SOCIAL / FAMILY HISTORY      has a past medical history of Acute HFrEF (heart failure with reduced ejection fraction) ([facility]), Acute on chronic combined systolic and diastolic congestive heart failure ([facility]), Acute on chronic congestive heart failure ([facility]), Acute respiratory failure with hypoxia ([facility]), Anemia, Anxiety, CAD (coronary artery disease), Cardiac defibrillator in place, Cardiomyopathy ([facility]), CHF (congestive heart failure) ([facility]), Chronic combined systolic and diastolic heart failure ([facility]) s/[ AICD placed, Chronic kidney disease, Complex sleep apnea syndrome, Diabetic retinopathy ([facility]), Diverticulitis, DJD (degenerative joint disease), Edentulous, Gout, HTN (hypertension), Hyperlipidemia, Hypertension, Iron deficiency anemia, Ischemic cardiomyopathy, Morbid obesity ([facility]), Obesity, TANNER variably compliant with BiPAP, Osteoarthritis, PAF (paroxysmal atrial fibrillation) ([facility]), Psychophysiologic insomnia, Thyroid disease, Type II or unspecified type diabetes mellitus without mention of complication, not stated as uncontrolled, and Unspecified sleep apnea.       has a past surgical history that

## 2025-07-13 NOTE — ED NOTES
Patient requested SENA call his friend, Alvarez #[phone], to come and pick him up.  SW was able to reach Alvarez who is on his way to get patient.  Patient updated and is waiting in the ED Lobby.  JEREMIE Gonzalez

## 2025-07-13 NOTE — DISCHARGE INSTRUCTIONS
You came in after having 1 episode of dark stool.  You denied any nausea, vomiting, fever, urinary symptoms.  We did a blood test and your hemoglobin level is within normal limit.  We think this is more likely due to the iron pills you are taking.    Please follow-up with your primary care physician to discuss your visit today into the ED.    If you have worsening of symptoms or develop any new symptom like chest pain, shortness of breath, abdominal pain, nausea, vomiting, diarrhea, constipation, fever, chills please come back to the emergency department.

## 2025-07-14 ENCOUNTER — CARE COORDINATION (OUTPATIENT)
Dept: CARE COORDINATION | Age: 70
End: 2025-07-14

## 2025-07-14 NOTE — CARE COORDINATION
Ambulatory Care Coordination Note     2025 1:18 PM     Patient Current Location:  Home: [address] [place] [address]     ACM contacted the patient by telephone. Verified name and  with patient as identifiers.         ACM: Alan Turk RN     Challenges to be reviewed by the provider   Additional needs identified to be addressed with provider Yes  ED F/U appointment               Method of communication with provider: staff message.    Utilization: Has the patient been seen in the ED since your last call? Yes,   Discharge Date: 25   Discharge Facility: [facility]  Reason for ED Visit: black stool/Blood  Visit Diagnosis: black stool    Number of ED visits in the last 6 months: 9      Do you have any ongoing symptoms? No  Did you call your PCP prior to going to the ED? No, did not call the PCP office.     Review of Discharge Instructions:   [x] AVS discharge instructions  [x] Right Care, Right Place, Right Time document  [] Medication changes  [x] Follow up appointments  [x] Referral follow up   []        Care Summary Note: patient called for ed f/u. States that his stool was black and he had blood in the toilet. No episodes today  Message sent to [facility] to schedule ED F/U  Appointment scheduled for  at 4 PM  Called and informed patient    Offered patient enrollment in the Remote Patient Monitoring (RPM) program for in-home monitoring: Yes, but did not enroll at this time: previously declined.     Assessments Completed:   No changes since last call    Medications Reviewed:   Completed during this call    Advance Care Planning:   Not reviewed during this call     Care Planning:   Not completed during this call    PCP/Specialist follow up:   Future Appointments         Provider Specialty Dept Phone    2025 10:45 AM Beltran Miller MD Gastroenterology [phone]    2025 1:00 PM [facility]  1 Cardiology [phone]    2025 1:20 PM Brea

## 2025-07-16 RX ORDER — LEVOTHYROXINE SODIUM 88 UG/1
88 TABLET ORAL DAILY
Qty: 30 TABLET | Refills: 3 | Status: SHIPPED | OUTPATIENT
Start: 2025-07-16

## 2025-07-18 PROBLEM — R73.09 BLOOD GLUCOSE ABNORMAL: Status: ACTIVE | Noted: 2024-05-23

## 2025-07-18 PROBLEM — N18.9 CHRONIC KIDNEY DISEASE: Status: ACTIVE | Noted: 2024-10-24

## 2025-07-18 PROBLEM — E11.42 DIABETIC PERIPHERAL NEUROPATHY (HCC): Status: ACTIVE | Noted: 2024-07-31

## 2025-07-18 PROBLEM — H43.811 VITREOUS DEGENERATION OF RIGHT EYE: Status: ACTIVE | Noted: 2025-07-18

## 2025-07-18 PROBLEM — B39.9 HISTOPLASMOSIS, UNSPECIFIED: Status: ACTIVE | Noted: 2024-10-02

## 2025-07-18 PROBLEM — I13.0 HYPERTENSIVE HEART AND CHRONIC KIDNEY DISEASE WITH HEART FAILURE AND STAGE 1 THROUGH STAGE 4 CHRONIC KIDNEY DISEASE, OR UNSPECIFIED CHRONIC KIDNEY DISEASE (HCC): Status: ACTIVE | Noted: 2024-09-19

## 2025-07-18 PROBLEM — N28.1 CYST OF KIDNEY, ACQUIRED: Status: ACTIVE | Noted: 2022-10-12

## 2025-07-18 PROBLEM — H35.059 RETINAL NEOVASCULARIZATION: Status: ACTIVE | Noted: 2024-10-02

## 2025-07-18 PROBLEM — E11.319 DIABETIC RETINOPATHY ASSOCIATED WITH TYPE 2 DIABETES MELLITUS (HCC): Status: ACTIVE | Noted: 2021-11-08

## 2025-07-18 RX ORDER — OXYCODONE AND ACETAMINOPHEN 5; 325 MG/1; MG/1
TABLET ORAL
Status: ON HOLD | COMMUNITY

## 2025-07-18 RX ORDER — LANCETS 30 GAUGE
EACH MISCELLANEOUS
Status: ON HOLD | COMMUNITY

## 2025-07-21 ENCOUNTER — OFFICE VISIT (OUTPATIENT)
Dept: GASTROENTEROLOGY | Age: 70
End: 2025-07-21
Payer: COMMERCIAL

## 2025-07-21 VITALS
BODY MASS INDEX: 27.49 KG/M2 | HEIGHT: 64 IN | SYSTOLIC BLOOD PRESSURE: 101 MMHG | WEIGHT: 161 LBS | OXYGEN SATURATION: 97 % | HEART RATE: 58 BPM | DIASTOLIC BLOOD PRESSURE: 54 MMHG

## 2025-07-21 DIAGNOSIS — D64.9 ANEMIA, UNSPECIFIED TYPE: ICD-10-CM

## 2025-07-21 DIAGNOSIS — K21.9 GASTROESOPHAGEAL REFLUX DISEASE, UNSPECIFIED WHETHER ESOPHAGITIS PRESENT: ICD-10-CM

## 2025-07-21 DIAGNOSIS — K59.04 CHRONIC IDIOPATHIC CONSTIPATION: Primary | ICD-10-CM

## 2025-07-21 DIAGNOSIS — R10.9 ABDOMINAL PAIN, UNSPECIFIED ABDOMINAL LOCATION: ICD-10-CM

## 2025-07-21 PROCEDURE — 3074F SYST BP LT 130 MM HG: CPT | Performed by: STUDENT IN AN ORGANIZED HEALTH CARE EDUCATION/TRAINING PROGRAM

## 2025-07-21 PROCEDURE — 1123F ACP DISCUSS/DSCN MKR DOCD: CPT | Performed by: STUDENT IN AN ORGANIZED HEALTH CARE EDUCATION/TRAINING PROGRAM

## 2025-07-21 PROCEDURE — 3078F DIAST BP <80 MM HG: CPT | Performed by: STUDENT IN AN ORGANIZED HEALTH CARE EDUCATION/TRAINING PROGRAM

## 2025-07-21 PROCEDURE — 99204 OFFICE O/P NEW MOD 45 MIN: CPT | Performed by: STUDENT IN AN ORGANIZED HEALTH CARE EDUCATION/TRAINING PROGRAM

## 2025-07-21 PROCEDURE — 1126F AMNT PAIN NOTED NONE PRSNT: CPT | Performed by: STUDENT IN AN ORGANIZED HEALTH CARE EDUCATION/TRAINING PROGRAM

## 2025-07-21 PROCEDURE — 1159F MED LIST DOCD IN RCRD: CPT | Performed by: STUDENT IN AN ORGANIZED HEALTH CARE EDUCATION/TRAINING PROGRAM

## 2025-07-21 RX ORDER — PANTOPRAZOLE SODIUM 40 MG/1
40 TABLET, DELAYED RELEASE ORAL
Qty: 90 TABLET | Refills: 1 | Status: ON HOLD | OUTPATIENT
Start: 2025-07-21

## 2025-07-21 RX ORDER — AMOXICILLIN 250 MG
2 CAPSULE ORAL DAILY PRN
Qty: 60 TABLET | Refills: 0 | Status: ON HOLD | OUTPATIENT
Start: 2025-07-21

## 2025-07-21 NOTE — PROGRESS NOTES
Reason for Referral:     Vanessa Mckenna, APRN - CNP  [address] [place] [address]    Chief Complaint   Patient presents with    New Patient    Follow-Up from Hospital     ER 7/13/25-Dark stool       1. Chronic idiopathic constipation    2. Anemia, unspecified type    3. Gastroesophageal reflux disease, unspecified whether esophagitis present    4. Abdominal pain, unspecified abdominal location        HISTORY OF PRESENT ILLNESS: Mr.Michael DM Graham is a very pleasant 70 y.o. male with a past history remarkable for diabetes, HFrEF 15% on Brilinta and Eliquis, referred as new consultation for evaluation of constipation, melena, iron deficiency anemia    Constipated and dark stool always and he is on iron pill.  Only 1 time he had diarrhea and some bleed only scant amount on wiping that has not recurred.  Used to have acid coming up however these days only when he bends food comes up.  No medications for GERD or laxatives. On ASA and brilinta and eliquis.  No dysphagia.  He has HFrEF 15 to 20% and he has AICD    Previous Endoscopies  [facility] a while ago.  Never had EGD     Previous GI workup     Patient's PMH/PSH,SH,PSYCH Hx, MEDs, ALLERGIES, and ROS were all reviewed and updated in the appropriate sections.  I did review all the labs results available for the labs which were ordered by the primary care physician, and the other consultants, we search on [employer] at [facility] and all the available care everywhere epic  I did review all the imaging studies of the abdomen available on EMR, ordered by the primary care physician and the other consultant  I did review all the pathology from the biopsies done on the previous endoscopies    PAST MEDICAL HISTORY:  Past Medical History:   Diagnosis Date    Acute HFrEF (heart failure with reduced ejection fraction) (HCC) 06/22/2021    Acute on chronic combined systolic and diastolic congestive heart failure (HCC) 09/25/2011    Acute on chronic congestive heart failure

## 2025-07-24 ENCOUNTER — CARE COORDINATION (OUTPATIENT)
Dept: CARE COORDINATION | Age: 70
End: 2025-07-24

## 2025-07-24 ENCOUNTER — APPOINTMENT (OUTPATIENT)
Dept: GENERAL RADIOLOGY | Age: 70
DRG: 308 | End: 2025-07-24
Payer: COMMERCIAL

## 2025-07-24 ENCOUNTER — HOSPITAL ENCOUNTER (INPATIENT)
Age: 70
LOS: 5 days | Discharge: HOME | DRG: 308 | End: 2025-07-29
Attending: EMERGENCY MEDICINE | Admitting: INTERNAL MEDICINE
Payer: COMMERCIAL

## 2025-07-24 DIAGNOSIS — R00.0 TACHYCARDIA: Primary | ICD-10-CM

## 2025-07-24 DIAGNOSIS — R74.01 TRANSAMINITIS: ICD-10-CM

## 2025-07-24 DIAGNOSIS — I50.42 CHRONIC COMBINED SYSTOLIC AND DIASTOLIC HEART FAILURE (HCC): ICD-10-CM

## 2025-07-24 DIAGNOSIS — R55 NEAR SYNCOPE: ICD-10-CM

## 2025-07-24 DIAGNOSIS — I95.89 OTHER SPECIFIED HYPOTENSION: ICD-10-CM

## 2025-07-24 DIAGNOSIS — N18.9 CHRONIC KIDNEY DISEASE, UNSPECIFIED CKD STAGE: ICD-10-CM

## 2025-07-24 LAB
ALBUMIN SERPL-MCNC: 3.4 G/DL (ref 3.5–5.2)
ALBUMIN SERPL-MCNC: 3.5 G/DL (ref 3.5–5.2)
ALBUMIN/GLOB SERPL: 1 {RATIO} (ref 1–2.5)
ALBUMIN/GLOB SERPL: 1.2 {RATIO} (ref 1–2.5)
ALP SERPL-CCNC: 102 U/L (ref 40–129)
ALP SERPL-CCNC: 88 U/L (ref 40–129)
ALT SERPL-CCNC: 517 U/L (ref 10–50)
ALT SERPL-CCNC: 548 U/L (ref 10–50)
ANION GAP SERPL CALCULATED.3IONS-SCNC: 14 MMOL/L (ref 9–16)
ANION GAP SERPL CALCULATED.3IONS-SCNC: 26 MMOL/L (ref 9–16)
AST SERPL-CCNC: 495 U/L (ref 10–50)
AST SERPL-CCNC: 505 U/L (ref 10–50)
BASOPHILS # BLD: 0 K/UL (ref 0–0.2)
BASOPHILS NFR BLD: 0 % (ref 0–2)
BILIRUB DIRECT SERPL-MCNC: 0.4 MG/DL (ref 0–0.2)
BILIRUB INDIRECT SERPL-MCNC: 0.4 MG/DL (ref 0–1)
BILIRUB SERPL-MCNC: 0.8 MG/DL (ref 0–1.2)
BILIRUB SERPL-MCNC: 0.9 MG/DL (ref 0–1.2)
BODY TEMPERATURE: 37
BUN BLD-MCNC: 46 MG/DL (ref 8–26)
BUN SERPL-MCNC: 38 MG/DL (ref 8–23)
BUN SERPL-MCNC: 39 MG/DL (ref 8–23)
CA-I BLD-SCNC: 1.04 MMOL/L (ref 1.15–1.33)
CA-I BLD-SCNC: 1.12 MMOL/L (ref 1.13–1.33)
CALCIUM SERPL-MCNC: 8.8 MG/DL (ref 8.6–10.4)
CALCIUM SERPL-MCNC: 9 MG/DL (ref 8.6–10.4)
CHLORIDE BLD-SCNC: 102 MMOL/L (ref 98–107)
CHLORIDE SERPL-SCNC: 104 MMOL/L (ref 98–107)
CHLORIDE SERPL-SCNC: 99 MMOL/L (ref 98–107)
CO2 BLD CALC-SCNC: 27 MMOL/L (ref 22–30)
CO2 SERPL-SCNC: 14 MMOL/L (ref 20–31)
CO2 SERPL-SCNC: 25 MMOL/L (ref 20–31)
COHGB MFR BLD: 0.6 % (ref 0–5)
CREAT SERPL-MCNC: 2.9 MG/DL (ref 0.7–1.2)
CREAT SERPL-MCNC: 3.1 MG/DL (ref 0.7–1.2)
EGFR, POC: 20 ML/MIN/1.73M2
EOSINOPHIL # BLD: 0 K/UL (ref 0–0.4)
EOSINOPHILS RELATIVE PERCENT: 0 % (ref 1–4)
ERYTHROCYTE [DISTWIDTH] IN BLOOD BY AUTOMATED COUNT: 17.3 % (ref 11.8–14.4)
FIO2 ON VENT: ABNORMAL %
GFR, ESTIMATED: 21 ML/MIN/1.73M2
GFR, ESTIMATED: 22 ML/MIN/1.73M2
GLUCOSE BLD-MCNC: 114 MG/DL (ref 75–110)
GLUCOSE BLD-MCNC: 251 MG/DL (ref 74–100)
GLUCOSE BLD-MCNC: 271 MG/DL (ref 75–110)
GLUCOSE SERPL-MCNC: 120 MG/DL (ref 74–99)
GLUCOSE SERPL-MCNC: 242 MG/DL (ref 74–99)
HCO3 VENOUS: 26.2 MMOL/L (ref 24–30)
HCO3 VENOUS: 28.2 MMOL/L (ref 22–29)
HCT VFR BLD AUTO: 39.2 % (ref 40.7–50.3)
HCT VFR BLD AUTO: 46 % (ref 41–53)
HGB BLD-MCNC: 12.6 G/DL (ref 13–17)
IMM GRANULOCYTES # BLD AUTO: 0 K/UL (ref 0–0.3)
IMM GRANULOCYTES NFR BLD: 0 %
INR PPP: 2.7
LACTIC ACID, WHOLE BLOOD: 2 MMOL/L (ref 0.7–2.1)
LYMPHOCYTES NFR BLD: 1.12 K/UL (ref 1–4.8)
LYMPHOCYTES RELATIVE PERCENT: 16 % (ref 24–44)
MAGNESIUM SERPL-MCNC: 2.6 MG/DL (ref 1.6–2.4)
MCH RBC QN AUTO: 29.3 PG (ref 25.2–33.5)
MCHC RBC AUTO-ENTMCNC: 32.1 G/DL (ref 28.4–34.8)
MCV RBC AUTO: 91.2 FL (ref 82.6–102.9)
MONOCYTES NFR BLD: 0.42 K/UL (ref 0.1–0.8)
MONOCYTES NFR BLD: 6 % (ref 1–7)
MORPHOLOGY: ABNORMAL
NEUTROPHILS NFR BLD: 78 % (ref 36–66)
NEUTS SEG NFR BLD: 5.46 K/UL (ref 1.8–7.7)
NRBC BLD-RTO: 0 PER 100 WBC
O2 SAT, VEN: 23.7 % (ref 60–85)
O2 SAT, VEN: 47.6 % (ref 60–85)
PARTIAL THROMBOPLASTIN TIME: 32.2 SEC (ref 23–36.5)
PCO2 VENOUS: 42.8 MM HG (ref 39–55)
PCO2 VENOUS: 53.4 MM HG (ref 41–51)
PH VENOUS: 7.33 (ref 7.32–7.43)
PH VENOUS: 7.41 (ref 7.32–7.42)
PLATELET # BLD AUTO: 206 K/UL (ref 138–453)
PMV BLD AUTO: 12.3 FL (ref 8.1–13.5)
PO2 VENOUS: 18.3 MM HG (ref 30–50)
PO2 VENOUS: 25.9 MM HG (ref 30–50)
POC ANION GAP: 9 MMOL/L (ref 7–16)
POC CREATININE: 3.2 MG/DL (ref 0.51–1.19)
POC HEMOGLOBIN (CALC): 15.6 G/DL (ref 13.5–17.5)
POC LACTIC ACID: 4.4 MMOL/L (ref 0.56–1.39)
POSITIVE BASE EXCESS, VEN: 1 MMOL/L (ref 0–3)
POSITIVE BASE EXCESS, VEN: 1.3 MMOL/L (ref 0–2)
POTASSIUM BLD-SCNC: 7.4 MMOL/L (ref 3.5–4.5)
POTASSIUM SERPL-SCNC: 3.9 MMOL/L (ref 3.7–5.3)
POTASSIUM SERPL-SCNC: 4.5 MMOL/L (ref 3.7–5.3)
PROT SERPL-MCNC: 6.2 G/DL (ref 6.6–8.7)
PROT SERPL-MCNC: 7 G/DL (ref 6.6–8.7)
PROTHROMBIN TIME: 29.8 SEC (ref 11.7–14.9)
RBC # BLD AUTO: 4.3 M/UL (ref 4.21–5.77)
SODIUM BLD-SCNC: 137 MMOL/L (ref 138–146)
SODIUM SERPL-SCNC: 139 MMOL/L (ref 136–145)
SODIUM SERPL-SCNC: 143 MMOL/L (ref 136–145)
TROPONIN I SERPL HS-MCNC: 110 NG/L (ref 0–22)
TROPONIN I SERPL HS-MCNC: 113 NG/L (ref 0–22)
TROPONIN I SERPL HS-MCNC: 114 NG/L (ref 0–22)
TROPONIN I SERPL HS-MCNC: 93 NG/L (ref 0–22)
TROPONIN I SERPL HS-MCNC: NORMAL NG/L (ref 0–22)
TSH SERPL DL<=0.05 MIU/L-ACNC: 2.51 UIU/ML (ref 0.27–4.2)
WBC OTHER # BLD: 7 K/UL (ref 3.5–11.3)

## 2025-07-24 PROCEDURE — 2500000003 HC RX 250 WO HCPCS: Performed by: STUDENT IN AN ORGANIZED HEALTH CARE EDUCATION/TRAINING PROGRAM

## 2025-07-24 PROCEDURE — 80053 COMPREHEN METABOLIC PANEL: CPT

## 2025-07-24 PROCEDURE — 82330 ASSAY OF CALCIUM: CPT

## 2025-07-24 PROCEDURE — 83605 ASSAY OF LACTIC ACID: CPT

## 2025-07-24 PROCEDURE — 82803 BLOOD GASES ANY COMBINATION: CPT

## 2025-07-24 PROCEDURE — 82565 ASSAY OF CREATININE: CPT

## 2025-07-24 PROCEDURE — 83735 ASSAY OF MAGNESIUM: CPT

## 2025-07-24 PROCEDURE — 96375 TX/PRO/DX INJ NEW DRUG ADDON: CPT

## 2025-07-24 PROCEDURE — 2000000000 HC ICU R&B

## 2025-07-24 PROCEDURE — 82805 BLOOD GASES W/O2 SATURATION: CPT

## 2025-07-24 PROCEDURE — 87641 MR-STAPH DNA AMP PROBE: CPT

## 2025-07-24 PROCEDURE — 71045 X-RAY EXAM CHEST 1 VIEW: CPT

## 2025-07-24 PROCEDURE — 6360000002 HC RX W HCPCS

## 2025-07-24 PROCEDURE — 84443 ASSAY THYROID STIM HORMONE: CPT

## 2025-07-24 PROCEDURE — 85025 COMPLETE CBC W/AUTO DIFF WBC: CPT

## 2025-07-24 PROCEDURE — 82947 ASSAY GLUCOSE BLOOD QUANT: CPT

## 2025-07-24 PROCEDURE — 96365 THER/PROPH/DIAG IV INF INIT: CPT

## 2025-07-24 PROCEDURE — 85014 HEMATOCRIT: CPT

## 2025-07-24 PROCEDURE — 2700000000 HC OXYGEN THERAPY PER DAY

## 2025-07-24 PROCEDURE — 84484 ASSAY OF TROPONIN QUANT: CPT

## 2025-07-24 PROCEDURE — 5A2204Z RESTORATION OF CARDIAC RHYTHM, SINGLE: ICD-10-PCS | Performed by: EMERGENCY MEDICINE

## 2025-07-24 PROCEDURE — 36415 COLL VENOUS BLD VENIPUNCTURE: CPT

## 2025-07-24 PROCEDURE — 85730 THROMBOPLASTIN TIME PARTIAL: CPT

## 2025-07-24 PROCEDURE — 6370000000 HC RX 637 (ALT 250 FOR IP): Performed by: EMERGENCY MEDICINE

## 2025-07-24 PROCEDURE — 80051 ELECTROLYTE PANEL: CPT

## 2025-07-24 PROCEDURE — 4B02XTZ MEASUREMENT OF CARDIAC DEFIBRILLATOR, EXTERNAL APPROACH: ICD-10-PCS | Performed by: EMERGENCY MEDICINE

## 2025-07-24 PROCEDURE — 2500000003 HC RX 250 WO HCPCS

## 2025-07-24 PROCEDURE — 84520 ASSAY OF UREA NITROGEN: CPT

## 2025-07-24 PROCEDURE — 82248 BILIRUBIN DIRECT: CPT

## 2025-07-24 PROCEDURE — 99222 1ST HOSP IP/OBS MODERATE 55: CPT | Performed by: INTERNAL MEDICINE

## 2025-07-24 PROCEDURE — 99285 EMERGENCY DEPT VISIT HI MDM: CPT

## 2025-07-24 PROCEDURE — 99291 CRITICAL CARE FIRST HOUR: CPT | Performed by: INTERNAL MEDICINE

## 2025-07-24 PROCEDURE — 2580000003 HC RX 258

## 2025-07-24 PROCEDURE — 2500000003 HC RX 250 WO HCPCS: Performed by: EMERGENCY MEDICINE

## 2025-07-24 PROCEDURE — 85610 PROTHROMBIN TIME: CPT

## 2025-07-24 PROCEDURE — 6360000002 HC RX W HCPCS: Performed by: EMERGENCY MEDICINE

## 2025-07-24 PROCEDURE — 93005 ELECTROCARDIOGRAM TRACING: CPT

## 2025-07-24 PROCEDURE — 94761 N-INVAS EAR/PLS OXIMETRY MLT: CPT

## 2025-07-24 PROCEDURE — 5A09457 ASSISTANCE WITH RESPIRATORY VENTILATION, 24-96 CONSECUTIVE HOURS, CONTINUOUS POSITIVE AIRWAY PRESSURE: ICD-10-PCS | Performed by: STUDENT IN AN ORGANIZED HEALTH CARE EDUCATION/TRAINING PROGRAM

## 2025-07-24 PROCEDURE — 6370000000 HC RX 637 (ALT 250 FOR IP)

## 2025-07-24 RX ORDER — INSULIN LISPRO 100 [IU]/ML
0-16 INJECTION, SOLUTION INTRAVENOUS; SUBCUTANEOUS
Status: DISCONTINUED | OUTPATIENT
Start: 2025-07-24 | End: 2025-07-27

## 2025-07-24 RX ORDER — SODIUM CHLORIDE 0.9 % (FLUSH) 0.9 %
5-40 SYRINGE (ML) INJECTION PRN
Status: DISCONTINUED | OUTPATIENT
Start: 2025-07-24 | End: 2025-07-29 | Stop reason: HOSPADM

## 2025-07-24 RX ORDER — ENOXAPARIN SODIUM 100 MG/ML
30 INJECTION SUBCUTANEOUS DAILY
Status: DISCONTINUED | OUTPATIENT
Start: 2025-07-24 | End: 2025-07-24

## 2025-07-24 RX ORDER — CALCIUM GLUCONATE 20 MG/ML
1000 INJECTION, SOLUTION INTRAVENOUS ONCE
Status: COMPLETED | OUTPATIENT
Start: 2025-07-24 | End: 2025-07-24

## 2025-07-24 RX ORDER — MIDODRINE HYDROCHLORIDE 10 MG/1
10 TABLET ORAL
Status: DISCONTINUED | OUTPATIENT
Start: 2025-07-24 | End: 2025-07-25

## 2025-07-24 RX ORDER — FENTANYL CITRATE 50 UG/ML
50 INJECTION, SOLUTION INTRAMUSCULAR; INTRAVENOUS EVERY 4 HOURS PRN
Status: DISCONTINUED | OUTPATIENT
Start: 2025-07-24 | End: 2025-07-29 | Stop reason: HOSPADM

## 2025-07-24 RX ORDER — INDOMETHACIN 25 MG/1
50 CAPSULE ORAL ONCE
Status: COMPLETED | OUTPATIENT
Start: 2025-07-24 | End: 2025-07-24

## 2025-07-24 RX ORDER — HEPARIN SODIUM 10000 [USP'U]/100ML
5-30 INJECTION, SOLUTION INTRAVENOUS CONTINUOUS
Status: DISPENSED | OUTPATIENT
Start: 2025-07-24 | End: 2025-07-26

## 2025-07-24 RX ORDER — HEPARIN SODIUM 1000 [USP'U]/ML
4000 INJECTION, SOLUTION INTRAVENOUS; SUBCUTANEOUS ONCE
Status: DISCONTINUED | OUTPATIENT
Start: 2025-07-24 | End: 2025-07-24

## 2025-07-24 RX ORDER — TICAGRELOR 90 MG/1
90 TABLET, FILM COATED ORAL 2 TIMES DAILY
Status: DISCONTINUED | OUTPATIENT
Start: 2025-07-24 | End: 2025-07-29 | Stop reason: HOSPADM

## 2025-07-24 RX ORDER — DEXTROSE MONOHYDRATE 100 MG/ML
INJECTION, SOLUTION INTRAVENOUS CONTINUOUS PRN
Status: DISCONTINUED | OUTPATIENT
Start: 2025-07-24 | End: 2025-07-29 | Stop reason: HOSPADM

## 2025-07-24 RX ORDER — ONDANSETRON 2 MG/ML
4 INJECTION INTRAMUSCULAR; INTRAVENOUS EVERY 6 HOURS PRN
Status: DISCONTINUED | OUTPATIENT
Start: 2025-07-24 | End: 2025-07-29 | Stop reason: HOSPADM

## 2025-07-24 RX ORDER — POLYETHYLENE GLYCOL 3350 17 G/17G
17 POWDER, FOR SOLUTION ORAL DAILY PRN
Status: DISCONTINUED | OUTPATIENT
Start: 2025-07-24 | End: 2025-07-29 | Stop reason: HOSPADM

## 2025-07-24 RX ORDER — ETOMIDATE 2 MG/ML
10 INJECTION INTRAVENOUS ONCE
Status: COMPLETED | OUTPATIENT
Start: 2025-07-24 | End: 2025-07-24

## 2025-07-24 RX ORDER — SODIUM CHLORIDE 0.9 % (FLUSH) 0.9 %
5-40 SYRINGE (ML) INJECTION EVERY 12 HOURS SCHEDULED
Status: DISCONTINUED | OUTPATIENT
Start: 2025-07-24 | End: 2025-07-29 | Stop reason: HOSPADM

## 2025-07-24 RX ORDER — GLUCAGON 1 MG/ML
1 KIT INJECTION PRN
Status: DISCONTINUED | OUTPATIENT
Start: 2025-07-24 | End: 2025-07-29 | Stop reason: HOSPADM

## 2025-07-24 RX ORDER — HEPARIN SODIUM 1000 [USP'U]/ML
4000 INJECTION, SOLUTION INTRAVENOUS; SUBCUTANEOUS PRN
Status: DISCONTINUED | OUTPATIENT
Start: 2025-07-24 | End: 2025-07-29 | Stop reason: HOSPADM

## 2025-07-24 RX ORDER — HEPARIN SODIUM 1000 [USP'U]/ML
2000 INJECTION, SOLUTION INTRAVENOUS; SUBCUTANEOUS PRN
Status: DISCONTINUED | OUTPATIENT
Start: 2025-07-24 | End: 2025-07-24

## 2025-07-24 RX ORDER — SODIUM CHLORIDE 9 MG/ML
INJECTION, SOLUTION INTRAVENOUS CONTINUOUS
Status: ACTIVE | OUTPATIENT
Start: 2025-07-24 | End: 2025-07-24

## 2025-07-24 RX ORDER — LEVOTHYROXINE SODIUM 88 UG/1
88 TABLET ORAL DAILY
Status: DISCONTINUED | OUTPATIENT
Start: 2025-07-24 | End: 2025-07-29 | Stop reason: HOSPADM

## 2025-07-24 RX ORDER — ACETAMINOPHEN 650 MG/1
650 SUPPOSITORY RECTAL EVERY 6 HOURS PRN
Status: DISCONTINUED | OUTPATIENT
Start: 2025-07-24 | End: 2025-07-29 | Stop reason: HOSPADM

## 2025-07-24 RX ORDER — ETOMIDATE 2 MG/ML
INJECTION INTRAVENOUS
Status: COMPLETED
Start: 2025-07-24 | End: 2025-07-24

## 2025-07-24 RX ORDER — PANTOPRAZOLE SODIUM 40 MG/1
40 TABLET, DELAYED RELEASE ORAL
Status: DISCONTINUED | OUTPATIENT
Start: 2025-07-25 | End: 2025-07-29 | Stop reason: HOSPADM

## 2025-07-24 RX ORDER — ONDANSETRON 4 MG/1
4 TABLET, ORALLY DISINTEGRATING ORAL EVERY 8 HOURS PRN
Status: DISCONTINUED | OUTPATIENT
Start: 2025-07-24 | End: 2025-07-29 | Stop reason: HOSPADM

## 2025-07-24 RX ORDER — HEPARIN SODIUM 1000 [USP'U]/ML
2000 INJECTION, SOLUTION INTRAVENOUS; SUBCUTANEOUS PRN
Status: DISCONTINUED | OUTPATIENT
Start: 2025-07-24 | End: 2025-07-29 | Stop reason: HOSPADM

## 2025-07-24 RX ORDER — HEPARIN SODIUM 10000 [USP'U]/100ML
5-30 INJECTION, SOLUTION INTRAVENOUS CONTINUOUS
Status: DISCONTINUED | OUTPATIENT
Start: 2025-07-24 | End: 2025-07-24

## 2025-07-24 RX ORDER — HEPARIN SODIUM 1000 [USP'U]/ML
4000 INJECTION, SOLUTION INTRAVENOUS; SUBCUTANEOUS PRN
Status: DISCONTINUED | OUTPATIENT
Start: 2025-07-24 | End: 2025-07-24

## 2025-07-24 RX ORDER — ACETAMINOPHEN 325 MG/1
650 TABLET ORAL EVERY 6 HOURS PRN
Status: DISCONTINUED | OUTPATIENT
Start: 2025-07-24 | End: 2025-07-29 | Stop reason: HOSPADM

## 2025-07-24 RX ORDER — SODIUM CHLORIDE 9 MG/ML
INJECTION, SOLUTION INTRAVENOUS CONTINUOUS
Status: DISCONTINUED | OUTPATIENT
Start: 2025-07-24 | End: 2025-07-24

## 2025-07-24 RX ORDER — ATORVASTATIN CALCIUM 80 MG/1
80 TABLET, FILM COATED ORAL DAILY
Status: DISCONTINUED | OUTPATIENT
Start: 2025-07-24 | End: 2025-07-29 | Stop reason: HOSPADM

## 2025-07-24 RX ORDER — DEXTROSE MONOHYDRATE 25 G/50ML
25 INJECTION, SOLUTION INTRAVENOUS ONCE
Status: COMPLETED | OUTPATIENT
Start: 2025-07-24 | End: 2025-07-24

## 2025-07-24 RX ORDER — SODIUM CHLORIDE 9 MG/ML
INJECTION, SOLUTION INTRAVENOUS PRN
Status: DISCONTINUED | OUTPATIENT
Start: 2025-07-24 | End: 2025-07-29 | Stop reason: HOSPADM

## 2025-07-24 RX ORDER — HEPARIN SODIUM 1000 [USP'U]/ML
4000 INJECTION, SOLUTION INTRAVENOUS; SUBCUTANEOUS ONCE
Status: COMPLETED | OUTPATIENT
Start: 2025-07-24 | End: 2025-07-24

## 2025-07-24 RX ADMIN — INSULIN HUMAN 10 UNITS: 100 INJECTION, SOLUTION PARENTERAL at 09:04

## 2025-07-24 RX ADMIN — DEXTROSE MONOHYDRATE 25 G: 25 INJECTION, SOLUTION INTRAVENOUS at 09:01

## 2025-07-24 RX ADMIN — ETOMIDATE 10 MG: 2 INJECTION INTRAVENOUS at 09:21

## 2025-07-24 RX ADMIN — Medication 2 MCG/MIN: at 17:15

## 2025-07-24 RX ADMIN — HEPARIN SODIUM AND DEXTROSE 12 UNITS/KG/HR: 10000; 5 INJECTION INTRAVENOUS at 20:50

## 2025-07-24 RX ADMIN — SODIUM CHLORIDE: 0.9 INJECTION, SOLUTION INTRAVENOUS at 14:19

## 2025-07-24 RX ADMIN — AMIODARONE HYDROCHLORIDE 1 MG/MIN: 1.8 INJECTION, SOLUTION INTRAVENOUS at 10:36

## 2025-07-24 RX ADMIN — SODIUM BICARBONATE 50 MEQ: 84 INJECTION INTRAVENOUS at 09:04

## 2025-07-24 RX ADMIN — AMIODARONE HYDROCHLORIDE 1 MG/MIN: 1.8 INJECTION, SOLUTION INTRAVENOUS at 16:04

## 2025-07-24 RX ADMIN — MIDODRINE HYDROCHLORIDE 10 MG: 10 TABLET ORAL at 16:00

## 2025-07-24 RX ADMIN — HEPARIN SODIUM 4000 UNITS: 1000 INJECTION, SOLUTION INTRAVENOUS; SUBCUTANEOUS at 20:49

## 2025-07-24 RX ADMIN — ATORVASTATIN CALCIUM 80 MG: 80 TABLET, FILM COATED ORAL at 17:57

## 2025-07-24 RX ADMIN — LEVOTHYROXINE SODIUM 88 MCG: 0.09 TABLET ORAL at 17:57

## 2025-07-24 RX ADMIN — SODIUM CHLORIDE, PRESERVATIVE FREE 10 ML: 5 INJECTION INTRAVENOUS at 19:32

## 2025-07-24 RX ADMIN — CALCIUM GLUCONATE 1000 MG: 20 INJECTION, SOLUTION INTRAVENOUS at 09:04

## 2025-07-24 ASSESSMENT — PAIN SCALES - GENERAL: PAINLEVEL_OUTOF10: 0

## 2025-07-24 ASSESSMENT — PAIN - FUNCTIONAL ASSESSMENT: PAIN_FUNCTIONAL_ASSESSMENT: 0-10

## 2025-07-24 NOTE — ED PROVIDER NOTES
[facility] EMERGENCY DEPARTMENT     Emergency Department     Faculty Attestation    I performed a history and physical examination of the patient and discussed management with the resident. I reviewed the resident’s note and agree with the documented findings and plan of care. Any areas of disagreement are noted on the chart. I was personally present for the key portions of any procedures. I have documented in the chart those procedures where I was not present during the key portions. I have reviewed the emergency nurses triage note. I agree with the chief complaint, past medical history, past surgical history, allergies, medications, social and family history as documented unless otherwise noted below. For Physician Assistant/ Nurse Practitioner cases/documentation I have personally evaluated this patient and have completed at least one if not all key elements of the E/M (history, physical exam, and MDM). Additional findings are as noted.    Note Started: 8:51 AM EDT    Patient arrives by EMS concern for possible STEMI.  EMS provides independent history.  Transmitted EKG does show significant changes but not meeting full STEMI criteria for activation prior to arrival.  Patient states did not feel well this morning called 911.  Does have an implanted device that did not go off.  Did require supplemental oxygen from EMS.  Given amiodarone 150 bolus by EMS for wide-complex tachycardia.  On arrival patient is awake alert and orient x 4 answering questions blood pressures 100 systolic with palpable radial pulses.  Lungs clear no rales heart regular.  Given wide-complex tachycardia hyperkalemia seen on point-of-care labs given hyperkalemia treatment.  EKG was sent to interventional cardiology who agreed is not a STEMI.  Will proceed with full workup, interrogate device, admit possible ICU    9:16 AM EDT  Patient's blood pressure has been steadily decreasing 70s over 50s with palpable radial pulses.  Still

## 2025-07-24 NOTE — CARE COORDINATION
Case Management Assessment  Initial Evaluation    Date/Time of Evaluation: 7/24/2025 4:34 PM  Assessment Completed by: MARIA LUZ GRAHAM RN    If patient is discharged prior to next notation, then this note serves as note for discharge by case management.    Patient Name: Claudio Graham                   YOB: 1955  Diagnosis: Other specified hypotension [I95.89]  Ventricular tachycardia (HCC) [I47.20]  Tachycardia [R00.0]  Near syncope [R55]                   Date / Time: 7/24/2025  8:45 AM    Patient Admission Status: Inpatient   Readmission Risk (Low < 19, Mod (19-27), High > 27): Readmission Risk Score: 22    Current PCP: Vanessa Mckenna, LIDIA - CNP  PCP verified by CM? (P) Yes    Chart Reviewed: Yes      History Provided by: (P) Patient  Patient Orientation: (P) Alert and Oriented    Patient Cognition: (P) Alert    Hospitalization in the last 30 days (Readmission):  No    If yes, Readmission Assessment in CM Navigator will be completed.    Advance Directives:      Code Status: Full Code   Patient's Primary Decision Maker is: (P) Legal Next of Kin    Primary Decision Maker (Active): Cathy Ingram - Other Relative - [phone]    Primary Decision Maker: Jason sherman - Friend - [phone]    Discharge Planning:    Patient lives with: (P) Friends Type of Home: (P) House  Primary Care Giver: (P) Self  Patient Support Systems include: (P) Family Members, Friends/Neighbors   Current Financial resources: (P) Medicare  Current community resources: (P) None  Current services prior to admission: (P) Durable Medical Equipment            Current DME: (P) [employer] (state prn use)            Type of Home Care services:  (P) None    ADLS  Prior functional level: (P) Independent in ADLs/IADLs  Current functional level: (P) Independent in ADLs/IADLs    PT AM-PAC:   /24  OT AM-PAC:   /24    Family can provide assistance at DC: (P) Yes  Would you like Case Management to discuss the discharge plan with any other

## 2025-07-24 NOTE — CARE COORDINATION
Ambulatory Care Coordination Note     7/24/2025 3:21 PM     admitted outreach attempt by this ACM today to perform care management follow up . ACM was unable to reach the Patient admitted by telephone today;   Epic message for Hospital Admission     ACM: Alan Turk RN     Care Summary Note: Epic In basket message. Patient admitted for C/P cardiac arrest. Possible cardiac cath. F/U at discharge    PCP/Specialist follow up:   Future Appointments         Provider Specialty Dept Phone    7/28/2025 1:00 PM [facility] RM 1 Cardiology [phone]    7/28/2025 4:00 PM Vanessa Mckenna [facility] Primary Care [phone]    8/12/2025 1:20 PM Yadira Guidry APRN [facility] Nephrology [phone]    9/2/2025 2:45 PM Vanessa Mckenna [facility] Primary Care [phone]    10/9/2025 1:45 PM SCHEDULE, [facility] SCHEDULE Cardiology [phone]    10/9/2025 2:00 PM Darryl Soto MD Cardiology [phone]    12/22/2025 10:45 AM Beltran Miller MD Gastroenterology [phone]    3/16/2026 1:00 PM Elder Gamez MD Oncology [phone]    5/8/2026 11:15 AM Radha Paris MD Pulmonology [phone]            Follow Up:   Plan for next AC outreach in approximately 3-5 days to complete:  - hospital follow up.

## 2025-07-24 NOTE — CONSULTS
[facility] Card[facility]    Consult               Today's Date: 7/24/2025  Patient Name: Claudio Graham  Date of admission: 7/24/2025  8:45 AM  Patient's age: 70 y.o., 1955  Admission Dx: No admission diagnoses are documented for this encounter.    Requesting Physician: No admitting provider for patient encounter.    Cardiac Evaluation Reason: Ventricular Tachycardia    History Obtained From: patient and chart review     History of Present Illness:    This patient 70 y.o. years old with past medical history given below.  Patient presented to the hospital with presyncope.  On arrival of the EMS patient was found to be hypotensive and tachycardic.  EMS tried adenosine x 2 with no improvement of heart rate and then loaded with amiodarone 150 mg.  Patient became hypotensive in the emergency department.  EKG showed wide-complex tachycardia versus sine wave due to underlying hyperkalemia.  Patient was ultimately cardioverted and the EKG post cardioversion shows RBB with sinus bradycardia and first-degree AV block.  Labs showed potassium of 7.4 and creatinine of 3.2 with baseline creatinine of 2.2.  Cardiology consulted for further evaluation.    Past Medical History:   has a past medical history of Acute HFrEF (heart failure with reduced ejection fraction) ([facility]), Acute on chronic combined systolic and diastolic congestive heart failure ([facility]), Acute on chronic congestive heart failure ([facility]), Acute respiratory failure with hypoxia ([facility]), Anemia, Anxiety, Atrial fibrillation ([facility]), CAD (coronary artery disease), Cardiac defibrillator in place, Cardiomyopathy ([facility]), CHF (congestive heart failure) ([facility]), Chronic combined systolic and diastolic heart failure ([facility]) s/[ AICD placed, Chronic kidney disease, Complex sleep apnea syndrome, Diabetic retinopathy (HCC), Diverticulitis, DJD (degenerative joint disease), Edentulous, Gout, HTN (hypertension), Hyperlipidemia, Hypertension, Iron deficiency anemia, Ischemic  strip USE 1 TEST STRIP TO TEST BLOOD SUGAR ONCE DAILY AND IF NEEDED FOR SYMPTOMS OF IRREGULAR BLOOD SUGAR    Provider, MD Susie   levothyroxine (SYNTHROID) 88 MCG tablet Take 1 tablet by mouth daily 7/16/25   Vanessa Mckenna APRN - CNP   allopurinol (ZYLOPRIM) 300 MG tablet TAKE A HALF TABLET BY MOUTH DAILY 6/23/25   Vanessa Mckenna APRN - CNP   potassium chloride (KLOR-CON M) 20 MEQ extended release tablet Take 1 tablet by mouth daily 5/30/25   Yadira Guidry APRN - CNP   vitamin D (ERGOCALCIFEROL) 1.25 MG (84958 UT) CAPS capsule TAKE 1 CAPSULE BY MOUTH ONCE WEEKLY 4/2/25   Yadira Guidry APRN - CNP   isosorbide mononitrate (IMDUR) 30 MG extended release tablet Take 1 tablet by mouth daily 3/17/25 3/17/26  Vanessa Mckenna APRN - CNP   furosemide (LASIX) 40 MG tablet TAKE 1 TABLET BY MOUTH EVERY MORNING, AND ONE HALF TABLET IN THE EVENNG 3/17/25 3/17/26  Vanessa Mckenna APRN - CNP   Multiple Vitamins-Minerals (NO IRON MULT VITAMIN-MINERALS) TABS Take by mouth    Provider, MD Susie   apixaban (ELIQUIS) 5 MG TABS tablet Take 1 tablet by mouth 2 times daily 3/11/25 3/11/26  Vanessa Mckenna APRN - CNP   acetaminophen (TYLENOL) 500 MG tablet Take 1 tablet by mouth every 6 hours as needed for Pain 3/5/25 4/23/25  Daisy Pope DO   Handicap Placard MISC by Does not apply route Exp: 1/2030 2/25/25   Vanessa Mckenna APRN - CNP   amiodarone (CORDARONE) 200 MG tablet Take 1 tablet by mouth 2 times daily 2/21/25   Carin Mcdaniel APRN - CNP   ferrous sulfate (FEROSUL) 325 (65 Fe) MG tablet Take 1 tablet by mouth daily 2/10/25 2/10/26  Vanessa Mckenna APRN - CNP   dapagliflozin (FARXIGA) 10 MG tablet Take 1 tablet by mouth every morning 2/10/25 2/10/26  Vanessa Mckenna APRN - CNP   blood glucose test strips (ONETOUCH VERIO) strip 1 each by Other route daily As needed. 1/20/25 4/30/25  Vanessa Mckenna, LIDIA - CNP   Lancets (ONETOUCH DELICA PLUS [id number]) MISC use 1 LANCET to TEST BLOOD

## 2025-07-24 NOTE — ED NOTES
ED to inpatient nurses report      Chief Complaint:  Chief Complaint   Patient presents with    Chest Pain     Present to ED from: Home    MOA:     LOC: alert and orientated to name, place, date  Mobility: Requires assistance * 1  Oxygen Baseline: 3L NC    Current needs required: 3L NC  Pending ED orders: None  Present condition: Stable    Why did the patient come to the ED? SOB and Neck Pain  What is the plan? Cardiology consult, pain mgmt, amiodarone   Any procedures or intervention occur? Cardioversion, potassium lowering medications (sodium bicarb, insulin, D5W), amiodarone, supplemental oxygen    Any safety concerns?? None    Mental Status:  Level of Consciousness: Alert (0)    Psych Assessment:   Psychosocial  Psychosocial (WDL): Within Defined Limits  Vital signs   Vitals:    07/24/25 1145 07/24/25 1215 07/24/25 1230 07/24/25 1330   BP: (!) 89/53 (!) 78/53  (!) 85/54   Pulse: 54 52 55 50   Resp: 12 13 16 15   Temp:       SpO2: 100% 100% 100% 96%   Weight:       Height:            Vitals:  Patient Vitals for the past 24 hrs:   BP Temp Pulse Resp SpO2 Height Weight   07/24/25 1330 (!) 85/54 -- 50 15 96 % -- --   07/24/25 1230 -- -- 55 16 100 % -- --   07/24/25 1215 (!) 78/53 -- 52 13 100 % -- --   07/24/25 1145 (!) 89/53 -- 54 12 100 % -- --   07/24/25 1114 (!) 89/60 -- -- 11 100 % -- --   07/24/25 1113 -- -- 52 12 100 % -- --   07/24/25 1035 (!) 95/57 -- 57 15 100 % -- --   07/24/25 1030 -- -- 57 18 100 % -- --   07/24/25 1025 (!) 86/48 -- 58 12 -- -- --   07/24/25 1020 96/60 -- 57 16 100 % -- --   07/24/25 0955 (!) 97/52 -- 54 18 100 % -- --   07/24/25 0950 (!) 78/41 -- 55 12 -- -- --   07/24/25 0927 108/64 -- -- -- -- -- --   07/24/25 0926 -- -- 64 13 100 % -- --   07/24/25 0924 -- -- 64 -- 99 % -- --   07/24/25 0923 (!) 82/62 -- (!) 136 22 99 % -- --   07/24/25 0921 -- -- (!) 137 20 99 % -- --   07/24/25 0920 (!) 83/70 -- (!) 144 21 -- -- --   07/24/25 0915 (!) 71/55 -- (!) 135 15 95 % -- --   07/24/25 0900

## 2025-07-24 NOTE — ED NOTES
Writer called lab d/t second troponin not being resulted yet. Lab informed writer that the second redraw for the second troponin was contaminated again. Dr. Aleman notified.

## 2025-07-24 NOTE — ED NOTES
Pt brought in by EMS to ED RM 14. Pt c/o SOB & neck pain x 24 hrs. EMS reported possible STEMI pre pt arrival. EMS gave adenosine (6 mg & 12 mg) and a dose of amiodarone en route. Pt has an extensive cardiac hx. Pt reports he has a [employer] pacemaker. Pt was on lifepack upon arrival. Pt denies chest pain, SOB, and N/V upon arrival. Pt states he took all of his daily medications this morning. Pt is on Eliquis.     Pt is A&Ox4, respirations even and unlabored, line established, labs drawn, Dr. Rios & Dr. Aleman at bedside, & pt is on bedside monitor

## 2025-07-24 NOTE — ED PROVIDER NOTES
[facility]- [facility]  Emergency Department Encounter  Emergency Medicine Resident     Pt Name:Claudio Graham  MRN: [id number]  Birthdate 1955  Date of evaluation: 7/24/25  PCP:  Vanessa Mckenna APRN - CNP  Note Started: 9:48 AM EDT      CHIEF COMPLAINT       Chief Complaint   Patient presents with    Chest Pain       HISTORY OF PRESENT ILLNESS  (Location/Symptom, Timing/Onset, Context/Setting, Quality, Duration, Modifying Factors, Severity.)      Claudio Graham is a 70 y.o. male who presents with shortness of breath with near syncope.  Patient states that he has felt short of breath over the last 24 hours or so.  And while outside with his pets this morning, he bent over and felt a sharp sensation in the back of his neck and has felt off since.  He did state that he felt like he was lightheaded about the passout at that time.  EMS was called to his residence who assessed vitals and a twelve-lead and it was concerning for tachycardia mentioning ventricular tachycardia with a pulse concerning for a acute MI.  He was initially tachycardic to the 130s to 140s for EMS and was given adenosine 6 mg and subsequently given 12 mg without improvement either time.  Due to this wide-complex tachycardia, he was given 150 mg of amiodarone IV bolus and transported to the emergency department on supplemental oxygen via nasal cannula.  During this time, he was hypotensive mildly but on arrival he had improved blood pressure readings.  He has been alert and oriented the entirety of his encounter.  He does state that he has a previous history of a cardiac arrest, and MI 20 years ago, and has a AICD in place.  He states that he has been shocked in the past but does not feel that he has been shocked in the last 24 to 48 hours given his episode.  He denies any chest pain on arrival, radiation of pain, nausea, vomiting, abdominal pain, fever, chills.    PAST MEDICAL / SURGICAL / SOCIAL / FAMILY HISTORY      has a past  place, and time.      Sensory: No sensory deficit.   Psychiatric:         Mood and Affect: Mood normal.         Behavior: Behavior normal.         Thought Content: Thought content normal.           DDX/DIAGNOSTIC RESULTS / EMERGENCY DEPARTMENT COURSE / MDM     Medical Decision Making  70-year-old male presenting to the emergency department with complaint of shortness of breath and tachycardia.  Patient called EMS after having a sharp pain in the back of his neck after bending over and having shortness of breath over the last 24 hours or so.  Patient was found to be tachycardic and have potential evidence of acute MI based on EMS EKG findings.  He was given 6 mg and 12 mg of adenosine by EMS without improvement in his cardiac rhythm.  Due to the wide-complex tachycardia he was given 150 mg amiodarone IV bolus.  On arrival, he is alert and oriented and ill-appearing however not in acute distress.  His blood pressure was only mildly hypotensive with systolic pressure in the 100s.  He was saturating well on room air at the time.  After obtaining EPOC labs there was concern for hyperkalemia given results of potassium showing 7.4 and creatinine being elevated from his baseline at 3.2.  He was given 10 units insulin, dextrose, bicarb, and calcium gluconate for potential hyperkalemia which cardiology service who was contacted agreed with given potential for sinusoidal appearance of his rhythm.  EKGs were also sent to cardiology who agreed that this did not appear to be a STEMI and was likely a atrial flutter with right bundle branch block.     During ongoing evaluation and workup, patient started to have more hypotensive blood pressures with systolics in the 70s and diastolics in the 50s.  While he was mentating appropriately, he did not have evidence of peripheral perfusion based on decreased radial pulses compared to initial evaluation.  It was decided to sedate the patient with 10 mg etomidate and perform electrical

## 2025-07-25 PROBLEM — R55 NEAR SYNCOPE: Status: ACTIVE | Noted: 2025-07-25

## 2025-07-25 PROBLEM — R74.01 TRANSAMINITIS: Status: ACTIVE | Noted: 2025-07-25

## 2025-07-25 PROBLEM — R00.0 TACHYCARDIA: Status: ACTIVE | Noted: 2025-07-25

## 2025-07-25 LAB
ALBUMIN SERPL-MCNC: 3.3 G/DL (ref 3.5–5.2)
ALBUMIN/GLOB SERPL: 1.3 {RATIO} (ref 1–2.5)
ALP SERPL-CCNC: 92 U/L (ref 40–129)
ALT SERPL-CCNC: 591 U/L (ref 10–50)
ANION GAP SERPL CALCULATED.3IONS-SCNC: 15 MMOL/L (ref 9–16)
AST SERPL-CCNC: 528 U/L (ref 10–50)
BASOPHILS # BLD: 0 K/UL (ref 0–0.2)
BASOPHILS NFR BLD: 0 % (ref 0–2)
BILIRUB SERPL-MCNC: 0.7 MG/DL (ref 0–1.2)
BUN SERPL-MCNC: 36 MG/DL (ref 8–23)
CA-I BLD-SCNC: 0.88 MMOL/L (ref 1.13–1.33)
CALCIUM SERPL-MCNC: 8.3 MG/DL (ref 8.6–10.4)
CHLORIDE SERPL-SCNC: 100 MMOL/L (ref 98–107)
CO2 SERPL-SCNC: 23 MMOL/L (ref 20–31)
CREAT SERPL-MCNC: 2.8 MG/DL (ref 0.7–1.2)
EOSINOPHIL # BLD: 0.16 K/UL (ref 0–0.44)
EOSINOPHILS RELATIVE PERCENT: 2 % (ref 1–4)
ERYTHROCYTE [DISTWIDTH] IN BLOOD BY AUTOMATED COUNT: 17.2 % (ref 11.8–14.4)
GFR, ESTIMATED: 23 ML/MIN/1.73M2
GLUCOSE BLD-MCNC: 117 MG/DL (ref 75–110)
GLUCOSE BLD-MCNC: 137 MG/DL (ref 75–110)
GLUCOSE BLD-MCNC: 159 MG/DL (ref 75–110)
GLUCOSE BLD-MCNC: 237 MG/DL (ref 75–110)
GLUCOSE SERPL-MCNC: 272 MG/DL (ref 74–99)
HCT VFR BLD AUTO: 33.2 % (ref 40.7–50.3)
HGB BLD-MCNC: 10.6 G/DL (ref 13–17)
IMM GRANULOCYTES # BLD AUTO: 0 K/UL (ref 0–0.3)
IMM GRANULOCYTES NFR BLD: 0 %
LYMPHOCYTES NFR BLD: 0.71 K/UL (ref 1.1–3.7)
LYMPHOCYTES RELATIVE PERCENT: 9 % (ref 24–43)
MAGNESIUM SERPL-MCNC: 2.3 MG/DL (ref 1.6–2.4)
MCH RBC QN AUTO: 29.3 PG (ref 25.2–33.5)
MCHC RBC AUTO-ENTMCNC: 31.9 G/DL (ref 28.4–34.8)
MCV RBC AUTO: 91.7 FL (ref 82.6–102.9)
MONOCYTES NFR BLD: 0.47 K/UL (ref 0.1–1.2)
MONOCYTES NFR BLD: 6 % (ref 3–12)
MORPHOLOGY: ABNORMAL
MRSA, DNA, NASAL: NEGATIVE
NEUTROPHILS NFR BLD: 83 % (ref 36–65)
NEUTS SEG NFR BLD: 6.56 K/UL (ref 1.5–8.1)
NRBC BLD-RTO: 0 PER 100 WBC
PARTIAL THROMBOPLASTIN TIME: 103.4 SEC (ref 23–36.5)
PARTIAL THROMBOPLASTIN TIME: 49.1 SEC (ref 23–36.5)
PARTIAL THROMBOPLASTIN TIME: >180 SEC (ref 23–36.5)
PLATELET # BLD AUTO: ABNORMAL K/UL (ref 138–453)
PLATELET, FLUORESCENCE: 171 K/UL (ref 138–453)
PLATELETS.RETICULATED NFR BLD AUTO: 3.5 % (ref 1.1–10.3)
POTASSIUM SERPL-SCNC: 3.4 MMOL/L (ref 3.7–5.3)
PROT SERPL-MCNC: 5.9 G/DL (ref 6.6–8.7)
RBC # BLD AUTO: 3.62 M/UL (ref 4.21–5.77)
SODIUM SERPL-SCNC: 138 MMOL/L (ref 136–145)
SPECIMEN DESCRIPTION: NORMAL
TROPONIN I SERPL HS-MCNC: 104 NG/L (ref 0–22)
TROPONIN I SERPL HS-MCNC: 86 NG/L (ref 0–22)
TROPONIN I SERPL HS-MCNC: 88 NG/L (ref 0–22)
TROPONIN I SERPL HS-MCNC: 96 NG/L (ref 0–22)
WBC OTHER # BLD: 7.9 K/UL (ref 3.5–11.3)

## 2025-07-25 PROCEDURE — 6370000000 HC RX 637 (ALT 250 FOR IP)

## 2025-07-25 PROCEDURE — 99291 CRITICAL CARE FIRST HOUR: CPT | Performed by: INTERNAL MEDICINE

## 2025-07-25 PROCEDURE — 80053 COMPREHEN METABOLIC PANEL: CPT

## 2025-07-25 PROCEDURE — 2500000003 HC RX 250 WO HCPCS

## 2025-07-25 PROCEDURE — 83735 ASSAY OF MAGNESIUM: CPT

## 2025-07-25 PROCEDURE — 82330 ASSAY OF CALCIUM: CPT

## 2025-07-25 PROCEDURE — 84484 ASSAY OF TROPONIN QUANT: CPT

## 2025-07-25 PROCEDURE — 85055 RETICULATED PLATELET ASSAY: CPT

## 2025-07-25 PROCEDURE — 85025 COMPLETE CBC W/AUTO DIFF WBC: CPT

## 2025-07-25 PROCEDURE — 99233 SBSQ HOSP IP/OBS HIGH 50: CPT | Performed by: STUDENT IN AN ORGANIZED HEALTH CARE EDUCATION/TRAINING PROGRAM

## 2025-07-25 PROCEDURE — 6360000002 HC RX W HCPCS

## 2025-07-25 PROCEDURE — 2580000003 HC RX 258

## 2025-07-25 PROCEDURE — 85730 THROMBOPLASTIN TIME PARTIAL: CPT

## 2025-07-25 PROCEDURE — 2500000003 HC RX 250 WO HCPCS: Performed by: STUDENT IN AN ORGANIZED HEALTH CARE EDUCATION/TRAINING PROGRAM

## 2025-07-25 PROCEDURE — 82947 ASSAY GLUCOSE BLOOD QUANT: CPT

## 2025-07-25 PROCEDURE — 2000000000 HC ICU R&B

## 2025-07-25 PROCEDURE — 6370000000 HC RX 637 (ALT 250 FOR IP): Performed by: STUDENT IN AN ORGANIZED HEALTH CARE EDUCATION/TRAINING PROGRAM

## 2025-07-25 PROCEDURE — 36415 COLL VENOUS BLD VENIPUNCTURE: CPT

## 2025-07-25 PROCEDURE — 6370000000 HC RX 637 (ALT 250 FOR IP): Performed by: INTERNAL MEDICINE

## 2025-07-25 PROCEDURE — 94761 N-INVAS EAR/PLS OXIMETRY MLT: CPT

## 2025-07-25 RX ORDER — AMIODARONE HYDROCHLORIDE 200 MG/1
200 TABLET ORAL DAILY
Status: DISCONTINUED | OUTPATIENT
Start: 2025-07-25 | End: 2025-07-25

## 2025-07-25 RX ORDER — MEXILETINE HYDROCHLORIDE 200 MG/1
200 CAPSULE ORAL EVERY 8 HOURS SCHEDULED
Status: DISCONTINUED | OUTPATIENT
Start: 2025-07-25 | End: 2025-07-25

## 2025-07-25 RX ORDER — FUROSEMIDE 10 MG/ML
20 INJECTION INTRAMUSCULAR; INTRAVENOUS ONCE
Status: COMPLETED | OUTPATIENT
Start: 2025-07-25 | End: 2025-07-25

## 2025-07-25 RX ORDER — AMIODARONE HYDROCHLORIDE 200 MG/1
400 TABLET ORAL 2 TIMES DAILY
Status: DISCONTINUED | OUTPATIENT
Start: 2025-07-25 | End: 2025-07-28

## 2025-07-25 RX ORDER — AMIODARONE HYDROCHLORIDE 200 MG/1
200 TABLET ORAL 2 TIMES DAILY
Status: DISCONTINUED | OUTPATIENT
Start: 2025-07-25 | End: 2025-07-25

## 2025-07-25 RX ORDER — CALCIUM GLUCONATE 20 MG/ML
2000 INJECTION, SOLUTION INTRAVENOUS ONCE
Status: COMPLETED | OUTPATIENT
Start: 2025-07-25 | End: 2025-07-25

## 2025-07-25 RX ADMIN — MIDODRINE HYDROCHLORIDE 10 MG: 10 TABLET ORAL at 06:16

## 2025-07-25 RX ADMIN — SODIUM CHLORIDE: 0.9 INJECTION, SOLUTION INTRAVENOUS at 06:15

## 2025-07-25 RX ADMIN — FUROSEMIDE 20 MG: 10 INJECTION, SOLUTION INTRAMUSCULAR; INTRAVENOUS at 13:41

## 2025-07-25 RX ADMIN — HEPARIN SODIUM 4000 UNITS: 1000 INJECTION, SOLUTION INTRAVENOUS; SUBCUTANEOUS at 13:33

## 2025-07-25 RX ADMIN — ATORVASTATIN CALCIUM 80 MG: 80 TABLET, FILM COATED ORAL at 09:06

## 2025-07-25 RX ADMIN — MEXILETINE HYDROCHLORIDE 200 MG: 200 CAPSULE ORAL at 10:04

## 2025-07-25 RX ADMIN — SODIUM CHLORIDE, PRESERVATIVE FREE 10 ML: 5 INJECTION INTRAVENOUS at 09:14

## 2025-07-25 RX ADMIN — AMIODARONE HYDROCHLORIDE 200 MG: 200 TABLET ORAL at 09:06

## 2025-07-25 RX ADMIN — MIDODRINE HYDROCHLORIDE 15 MG: 10 TABLET ORAL at 18:09

## 2025-07-25 RX ADMIN — LEVOTHYROXINE SODIUM 88 MCG: 0.09 TABLET ORAL at 09:00

## 2025-07-25 RX ADMIN — AMIODARONE HYDROCHLORIDE 0.5 MG/MIN: 1.8 INJECTION, SOLUTION INTRAVENOUS at 03:26

## 2025-07-25 RX ADMIN — MIDODRINE HYDROCHLORIDE 15 MG: 10 TABLET ORAL at 13:41

## 2025-07-25 RX ADMIN — CALCIUM GLUCONATE 2000 MG: 20 INJECTION, SOLUTION INTRAVENOUS at 06:18

## 2025-07-25 RX ADMIN — AMIODARONE HYDROCHLORIDE 400 MG: 200 TABLET ORAL at 20:08

## 2025-07-25 RX ADMIN — INSULIN LISPRO 4 UNITS: 100 INJECTION, SOLUTION INTRAVENOUS; SUBCUTANEOUS at 09:05

## 2025-07-25 RX ADMIN — POTASSIUM BICARBONATE 40 MEQ: 782 TABLET, EFFERVESCENT ORAL at 03:58

## 2025-07-25 RX ADMIN — PANTOPRAZOLE SODIUM 40 MG: 40 TABLET, DELAYED RELEASE ORAL at 06:16

## 2025-07-25 RX ADMIN — SODIUM CHLORIDE, PRESERVATIVE FREE 10 ML: 5 INJECTION INTRAVENOUS at 20:07

## 2025-07-25 NOTE — CONSULTS
[facility]  Inpatient Cardiology Consult             Date:   7/25/2025  Patient name: Claudio Graham  Date of admission:  7/24/2025  8:45 AM  MRN:   [id number]  YOB: 1955      Reason for consultation:   Wide-complex tachycardia    CHIEF COMPLAINT:  Weakness     History Obtained From:  Patient and medical record    HISTORY OF PRESENT ILLNESS:      The patient is a 69 y.o gentleman with h/o DM, HTN, CKD, HLP, PAF, CAD, CM, VT/ VF, on amiodarone with ICD in-situ ( [employer] Ell[employer]se VR from 2016), who presented via EMS to the ED yesterday morning after the sudden onset of weakness and wide-complex tachycardia.  EKG showed wide-complex tachycardia c/w VT,  bpm.  IV adenosine was given with no effect.  He developed progressive hypotension in the ER and was cardioverted successfully.  He was started on IV amiodarone and has remained stable with no recurrent NSVT or VT.  He has required IV Levophed for persistent hypotension.    His [employer] Ellipse VR ICD was interrogated, programmed with two-zone detection ( 160/ 188 bpm) and showing no recent VT/VF detections, with normal battery and lead function.      Past Medical History:   has a past medical history of Acute HFrEF (heart failure with reduced ejection fraction) ([facility]), Acute on chronic combined systolic and diastolic congestive heart failure ([facility]), Acute on chronic congestive heart failure ([facility]), Acute respiratory failure with hypoxia ([facility]), Anemia, Anxiety, Atrial fibrillation ([facility]), CAD (coronary artery disease), Cardiac defibrillator in place, Cardiomyopathy ([facility]), CHF (congestive heart failure) ([facility]), Chronic combined systolic and diastolic heart failure ([facility]) s/[ AICD placed, Chronic kidney disease, Complex sleep apnea syndrome, Diabetic retinopathy ([facility]), Diverticulitis, DJD (degenerative joint disease), Edentulous, Gout, HTN (hypertension), Hyperlipidemia, Hypertension, Iron deficiency anemia, Ischemic  balance, coordination, mood, affect, memory, mentation, behavior.  Psychiatric: No anxiety, or depression.  Endocrine: No temperature intolerance. No excessive thirst, fluid intake, or urination. No tremor.  Hematologic/Lymphatic: No abnormal bruising or bleeding, blood clots or swollen lymph nodes.  Allergic/Immunologic: No nasal congestion or hives.    PHYSICAL EXAM:    Physical Examination:    BP (!) 112/52   Pulse (!) 49   Temp 98.1 °F (36.7 °C) (Oral)   Resp (!) 9   Ht 1.626 m (5' 4\")   Wt 70 kg (154 lb 5.2 oz)   SpO2 100%   BMI 26.49 kg/m²    Constitutional and General Appearance: alert, cooperative, no distress and appears stated age  HEENT: PERRL, no cervical lymphadenopathy. No masses palpable. Normal oral mucosa  Respiratory:  Normal excursion and expansion without use of accessory muscles  Resp Auscultation: Good respiratory effort. No for increased work of breathing. On auscultation: clear to auscultation bilaterally  Cardiovascular:  The apical impulse is not displaced  Heart tones are crisp and normal. regular S1 and S2. Murmurs:  1/6 holosystolic murmur at LLSB  Jugular venous pulsation Normal  The carotid upstroke is normal in amplitude and contour without delay or bruit  Peripheral pulses are symmetrical and full   Abdomen:  No masses or tenderness  Bowel sounds present  Extremities:   No Cyanosis or Clubbing   Lower extremity edema: None   Skin: Warm and dry  Neurological:  Alert and oriented.  Moves all extremities well  No abnormalities of mood, affect, memory, mentation, or behavior are noted    DATA:    Diagnostics:      EKG:  Sinus bradycardia, RBBB, left axis deviation      2D ECHO ( 12/7/24)       Left Ventricle: Severely reduced left ventricular systolic function with a visually estimated EF of 10 -15%. EF by 2D Simpsons Biplane is 15%. Left ventricle is severely dilated. Normal wall thickness. See diagram for wall motion findings. Abnormal diastolic function.    Right Ventricle:

## 2025-07-25 NOTE — PROGRESS NOTES
Physician Progress Note      PATIENT:               AUDREY SOLANO  CSN #:                  [id number]  :                       1955  ADMIT DATE:       2025 8:45 AM  DISCH DATE:  RESPONDING  PROVIDER #:        MIRACLE REDDY          QUERY TEXT:    Based on your medical judgment, please clarify these findings and document if   any of the following are being evaluated and/or treated:    The clinical indicators include:  70 y.o. male with hx CHF and severe ischemic cardiomyopathy presents with   shortness of breath with near syncope  -per ED notes \"initially tachycardic to the 130s to 140s for EMS and was given   adenosine. Due to this wide-complex tachycardia, he was given 150 mg of   amiodarone IV bolus. BP steadily decreasing 70s over 50s. Electrical   cardioversion done\"  -per critical care H&P \"Ventricular tachycardia-IV amiodarone. Concerns for   AICD malfunctioning. Patient hypotensive in the ED and was cardioverted on   return 5 J. Home lasix on hold due to hypotension\"  -noted lowest BP 69/27 with MAP 41, HR max 144  Treatment: Levophed gtt, Proamatine, Amiodarone gtt, IV fluids 50/hr, s/p   cardioversion, cardiology consult, pend ICD interrogation  Options provided:  -- Cardiogenic Shock  -- Hypovolemic Shock  -- Other - I will add my own diagnosis  -- Disagree - Not applicable / Not valid  -- Disagree - Clinically unable to determine / Unknown  -- Refer to Clinical Documentation Reviewer    PROVIDER RESPONSE TEXT:    Provider disagreed with this query.    Query created by: Marilyn Brown on 2025 8:19 AM      Electronically signed by:  MIRACLE REDDY 2025 8:40 AM

## 2025-07-25 NOTE — PROGRESS NOTES
Critical Care Team - Daily Progress Note    Patient's Name: Claudio Graham  Patient's YOB: 1955  Age / Sex: 70 y.o. male  Medical Record Number: [id number]  Patient's Acct/billing number: [id number]    Date of Admission: 7/24/2025  8:45 AM  Length of Stay: 1 Days    Primary Care Physician: Vanessa Mckenna APRN - CNP  Attending Physician:       Code Status: Full Code     Chief complaint: SOB    Reason for ICU Admission: Vtach, pressor support     Date and Time: 7/25/2025 7:19 AM    Subjective:         BRIEF HPI:        Claudio Graham is a 70 y.o. male  who presented to ED after a near syncopal episode.  3 days of worsening SOB, decreased oral intake, concerns for constipation. EMS was concerned for STEMI as well as wide complex tachycardia.  EMS did attempt chemical cardioversion with adenosine 6 mg with a subsequent 12 mg dose.  Patient was in wide-complex tachycardia and given 150 mg amiodarone bolus with no improvement in heart rates.  Patient alert and oriented in the emergency department, initially normotensive.  Patient subsequently developed hypotension and with wide-complex tachycardia decision made to cardiovert the patient 10 mg of etomidate and 125 J.  There were initial concerns for hyperkalemia on point-of-care labs and patient received calcium, dextrose, insulin.  Patient also received 1 amp of bicarb.  AICD interrogated in the emergency department.  Patient was also seen by cardiology in the emergency department.  Cardiology plans on electrophysiology to see the patient who is recommending amnio drip with trending of troponins.  EP study planned at some point during admission.           Notable PMHx: vtach, TANNER, ischemic cardiomyopathy, AICD,  CAD, CHF, CKDIII, anemia, HTN, DM2, valve disease      Notable Home Meds: Protonix, Synthroid, Brilinta, Lipitor, amiodarone, midodrine, Farxiga, Lasix     Interval HPI:        7/24: vtach, amio gtt, seen by cardiology, heparin gtt, low

## 2025-07-25 NOTE — PROGRESS NOTES
[facility]   Progress Note                   Date:   7/25/2025  Patient name: Claudio Graham  Date of admission:  7/24/2025  8:45 AM  MRN:   [id number]  YOB: 1955  PCP: Vanessa Mckenna, LIDIA - CNP    Reason for Admission: Other specified hypotension [I95.89]  Ventricular tachycardia (HCC) [I47.20]  Tachycardia [R00.0]  Near syncope [R55]   Reason for Consult: Concern for ventricular tachycardia    Subjective:       - Patient seen at bedside, chart and results reviewed.  Afebrile, on amiodarone, norepinephrine, heparin infusions, on 2 L via nasal cannula, heart rate has been around 48-49.  - Labs showing potassium of 3.4, creatinine of 2.8 decreasing from 3.1, mag of 2.3, troponin downtrending from 113-96, LFTs elevated with ALT and AST in the 500s, hemoglobin 10.6    Brief History:     This patient 70 y.o. years old with past medical history given below.  Patient presented to the hospital with presyncope.  On arrival of the EMS patient was found to be hypotensive and tachycardic.  EMS tried adenosine x 2 with no improvement of heart rate and then loaded with amiodarone 150 mg.  Patient became hypotensive in the emergency department.  EKG showed wide-complex tachycardia versus sine wave due to underlying hyperkalemia.  Patient was ultimately cardioverted and the EKG post cardioversion shows RBB with sinus bradycardia and first-degree AV block.  Labs showed potassium of 7.4 and creatinine of 3.2 with baseline creatinine of 2.2.  Cardiology consulted for further evaluation.     Review Of Systems:   Review of Systems   Constitutional:  Negative for fever.   HENT:  Negative for congestion.    Respiratory:  Negative for shortness of breath.    Cardiovascular:  Negative for chest pain.   Gastrointestinal:  Negative for abdominal pain.   Genitourinary:  Negative for dysuria.   Neurological:  Negative for headaches.   Psychiatric/Behavioral:  Negative for behavioral problems.

## 2025-07-25 NOTE — PROGRESS NOTES
Writer introduced self as . Pt did not appear mind  presence and engaged in conversation about past and recent health. Writer provided a supportive presence through active listening and words of affirmation.    Electronically signed by Tai Bell on 7/25/2025 at 7:44 PM

## 2025-07-25 NOTE — PROGRESS NOTES
Comprehensive Nutrition Assessment    Type and Reason for Visit:  Initial, Positive nutrition screen (Weight Loss; Poor Intakes/Appetite)    Nutrition Recommendations/Plan:   Continue current diet.  Encourage/monitor intakes as tolerated.  Will provide Glucerna ONS x 1 per day.  Monitor intakes, GI status, labs, weights, and plan of care.     Malnutrition Assessment:  Malnutrition Status:  Moderate malnutrition (07/25/25 1424)    Context:  Acute Illness     Findings of the 6 clinical characteristics of malnutrition:  Energy Intake:  75% or less of estimated energy requirements for 7 or more days  Weight Loss:  Greater than 7.5% over 3 months     Body Fat Loss:  Mild body fat loss Orbital   Muscle Mass Loss:  Mild muscle mass loss Temples (temporalis), Clavicles (pectoralis & deltoids)  Fluid Accumulation:  No fluid accumulation   Strength:  Not Performed    Nutrition Assessment:    Admitted for SOB, near syncope, and chest pain.  PMH: CAD, CHF, diverticulitis, HTN, DM, CKD.  Pt reports his appetite and PO intakes have been decreased recently.  Observed lunch tray which pt ate at least 50% of meal.  Pt reports h/o trying to lose weight - per chart review, weight loss of 7.9% x 3 months.  Pt with visible fat and muscle wasting.  Will to try ONS for additional nutrition.  Labs reviewed: K 3.4 mmol/L, Glucose 156-272 mg/dL.  Meds include: Humalog SS.    Nutrition Related Findings:    labs/meds reviewed.  no edema. Wound Type: None       Current Nutrition Intake & Therapies:    Average Meal Intake: 26-50%, 51-75%  Average Supplements Intake: None Ordered  ADULT DIET; Regular; Low Fat/Low Chol/High Fiber/JESSENIA; Low Sodium (2 gm); 1800 ml  Additional Calorie Sources:  None    Anthropometric Measures:  Height: 162.6 cm (5' 4.02\")  Ideal Body Weight (IBW): 130 lbs (59 kg)    Admission Body Weight: 71.1 kg (156 lb 12 oz)  Current Body Weight: 70 kg (154 lb 5.2 oz), 118.7 % IBW. Weight Source: Bed scale  Current BMI (kg/m2):

## 2025-07-25 NOTE — ACP (ADVANCE CARE PLANNING)
Advance Care Planning     Advance Care Planning Inpatient Note  [facility] Department    Today's Date: 2025  Unit: [facility]- MICU    Received request from HealthCare Provider and patient.  Upon review of chart and communication with care team, patient's decision making abilities are not in question.. Patient was present in the room during visit.    Goals of ACP Conversation:  Discuss advance care planning documents    Health Care Decision Makers:       Primary Decision Maker (Active): Nataly,Cathy - Other Relative - [phone]  Summary:  Documented Next of Kin, per patient report    Advance Care Planning Documents (Patient Wishes):  Pt unsure is he has completed any advance directives paperwork. Pt would like to speak with cousin, Cathy Ingram, first.      Assessment:    Order received for advance directives. Pt resting in bed, appropriately conversational. Pt agreeable to this visit.     Per pt, he remembers completing \"a bunch of paperwork\" about his wishes but does not remember if one of those pertains to advance directives. Pt notified that if he has advance directives paperwork, to provide us a copy for our records.    State's [place] decision-making hierarchy explained to pt. Per pt: he is unsure if he has a court-appointed legal guardian (pt [facility]); pt does not have a spouse; has no children; parents are ; no siblings; pt has a cousin, Cathy Ingram. Explained to pt that since Cathy is the next available kin, she is health care decision maker per state's [place] decision-maker hierarchy. Pt agreeable to this. Cathy is an  and is already listed as pt's emergency contact.     Pt gave this writer permission to contact Cathy. Called the number listed in EPIC ([phone]), could not leave a message due to voicemail box being full. Pt notified. This writer then assisted in providing a room phone for pt. Pt then attempted to call

## 2025-07-26 LAB
ALBUMIN SERPL-MCNC: 2.9 G/DL (ref 3.5–5.2)
ALBUMIN/GLOB SERPL: 1 {RATIO} (ref 1–2.5)
ALP SERPL-CCNC: 96 U/L (ref 40–129)
ALT SERPL-CCNC: 491 U/L (ref 10–50)
ANION GAP SERPL CALCULATED.3IONS-SCNC: 11 MMOL/L (ref 9–16)
AST SERPL-CCNC: 374 U/L (ref 10–50)
BASOPHILS # BLD: 0 K/UL (ref 0–0.2)
BASOPHILS NFR BLD: 0 % (ref 0–2)
BILIRUB SERPL-MCNC: 0.5 MG/DL (ref 0–1.2)
BUN SERPL-MCNC: 34 MG/DL (ref 8–23)
CA-I BLD-SCNC: 1.08 MMOL/L (ref 1.13–1.33)
CALCIUM SERPL-MCNC: 8.3 MG/DL (ref 8.6–10.4)
CHLORIDE SERPL-SCNC: 102 MMOL/L (ref 98–107)
CO2 SERPL-SCNC: 26 MMOL/L (ref 20–31)
CREAT SERPL-MCNC: 2.5 MG/DL (ref 0.7–1.2)
EOSINOPHIL # BLD: 0.07 K/UL (ref 0–0.4)
EOSINOPHILS RELATIVE PERCENT: 1 % (ref 1–4)
ERYTHROCYTE [DISTWIDTH] IN BLOOD BY AUTOMATED COUNT: 17.2 % (ref 11.8–14.4)
GFR, ESTIMATED: 27 ML/MIN/1.73M2
GLUCOSE BLD-MCNC: 114 MG/DL (ref 75–110)
GLUCOSE BLD-MCNC: 122 MG/DL (ref 75–110)
GLUCOSE BLD-MCNC: 144 MG/DL (ref 75–110)
GLUCOSE SERPL-MCNC: 124 MG/DL (ref 74–99)
HCT VFR BLD AUTO: 31.9 % (ref 40.7–50.3)
HGB BLD-MCNC: 10.1 G/DL (ref 13–17)
IMM GRANULOCYTES # BLD AUTO: 0 K/UL (ref 0–0.3)
IMM GRANULOCYTES NFR BLD: 0 %
LYMPHOCYTES NFR BLD: 0.86 K/UL (ref 1–4.8)
LYMPHOCYTES RELATIVE PERCENT: 13 % (ref 24–44)
MAGNESIUM SERPL-MCNC: 2.2 MG/DL (ref 1.6–2.4)
MCH RBC QN AUTO: 29.1 PG (ref 25.2–33.5)
MCHC RBC AUTO-ENTMCNC: 31.7 G/DL (ref 28.4–34.8)
MCV RBC AUTO: 91.9 FL (ref 82.6–102.9)
MONOCYTES NFR BLD: 0.26 K/UL (ref 0.1–0.8)
MONOCYTES NFR BLD: 4 % (ref 1–7)
MORPHOLOGY: ABNORMAL
NEUTROPHILS NFR BLD: 82 % (ref 36–66)
NEUTS SEG NFR BLD: 5.41 K/UL (ref 1.8–7.7)
NRBC BLD-RTO: 0 PER 100 WBC
PARTIAL THROMBOPLASTIN TIME: 60.4 SEC (ref 23–36.5)
PARTIAL THROMBOPLASTIN TIME: 71.2 SEC (ref 23–36.5)
PLATELET # BLD AUTO: 169 K/UL (ref 138–453)
PMV BLD AUTO: 11.7 FL (ref 8.1–13.5)
POTASSIUM SERPL-SCNC: 3.8 MMOL/L (ref 3.7–5.3)
PROT SERPL-MCNC: 5.8 G/DL (ref 6.6–8.7)
RBC # BLD AUTO: 3.47 M/UL (ref 4.21–5.77)
SODIUM SERPL-SCNC: 139 MMOL/L (ref 136–145)
TROPONIN I SERPL HS-MCNC: 77 NG/L (ref 0–22)
TROPONIN I SERPL HS-MCNC: 85 NG/L (ref 0–22)
WBC OTHER # BLD: 6.6 K/UL (ref 3.5–11.3)

## 2025-07-26 PROCEDURE — 83735 ASSAY OF MAGNESIUM: CPT

## 2025-07-26 PROCEDURE — 36415 COLL VENOUS BLD VENIPUNCTURE: CPT

## 2025-07-26 PROCEDURE — 2500000003 HC RX 250 WO HCPCS

## 2025-07-26 PROCEDURE — 82330 ASSAY OF CALCIUM: CPT

## 2025-07-26 PROCEDURE — 2060000000 HC ICU INTERMEDIATE R&B

## 2025-07-26 PROCEDURE — 6360000002 HC RX W HCPCS

## 2025-07-26 PROCEDURE — 6370000000 HC RX 637 (ALT 250 FOR IP)

## 2025-07-26 PROCEDURE — 6370000000 HC RX 637 (ALT 250 FOR IP): Performed by: STUDENT IN AN ORGANIZED HEALTH CARE EDUCATION/TRAINING PROGRAM

## 2025-07-26 PROCEDURE — 97166 OT EVAL MOD COMPLEX 45 MIN: CPT

## 2025-07-26 PROCEDURE — 84484 ASSAY OF TROPONIN QUANT: CPT

## 2025-07-26 PROCEDURE — 97535 SELF CARE MNGMENT TRAINING: CPT

## 2025-07-26 PROCEDURE — 85730 THROMBOPLASTIN TIME PARTIAL: CPT

## 2025-07-26 PROCEDURE — 80053 COMPREHEN METABOLIC PANEL: CPT

## 2025-07-26 PROCEDURE — 85025 COMPLETE CBC W/AUTO DIFF WBC: CPT

## 2025-07-26 PROCEDURE — 99291 CRITICAL CARE FIRST HOUR: CPT | Performed by: INTERNAL MEDICINE

## 2025-07-26 PROCEDURE — 97530 THERAPEUTIC ACTIVITIES: CPT

## 2025-07-26 PROCEDURE — 82947 ASSAY GLUCOSE BLOOD QUANT: CPT

## 2025-07-26 PROCEDURE — 97162 PT EVAL MOD COMPLEX 30 MIN: CPT

## 2025-07-26 PROCEDURE — 99233 SBSQ HOSP IP/OBS HIGH 50: CPT | Performed by: INTERNAL MEDICINE

## 2025-07-26 RX ADMIN — AMIODARONE HYDROCHLORIDE 400 MG: 200 TABLET ORAL at 21:20

## 2025-07-26 RX ADMIN — AMIODARONE HYDROCHLORIDE 400 MG: 200 TABLET ORAL at 08:36

## 2025-07-26 RX ADMIN — HEPARIN SODIUM AND DEXTROSE 11 UNITS/KG/HR: 10000; 5 INJECTION INTRAVENOUS at 06:06

## 2025-07-26 RX ADMIN — MIDODRINE HYDROCHLORIDE 15 MG: 10 TABLET ORAL at 12:12

## 2025-07-26 RX ADMIN — PANTOPRAZOLE SODIUM 40 MG: 40 TABLET, DELAYED RELEASE ORAL at 08:36

## 2025-07-26 RX ADMIN — SODIUM CHLORIDE, PRESERVATIVE FREE 10 ML: 5 INJECTION INTRAVENOUS at 08:36

## 2025-07-26 RX ADMIN — MIDODRINE HYDROCHLORIDE 15 MG: 10 TABLET ORAL at 08:36

## 2025-07-26 RX ADMIN — TICAGRELOR 90 MG: 90 TABLET ORAL at 08:38

## 2025-07-26 RX ADMIN — TICAGRELOR 90 MG: 90 TABLET ORAL at 21:20

## 2025-07-26 RX ADMIN — ATORVASTATIN CALCIUM 80 MG: 80 TABLET, FILM COATED ORAL at 08:36

## 2025-07-26 RX ADMIN — ACETAMINOPHEN 650 MG: 325 TABLET ORAL at 19:48

## 2025-07-26 RX ADMIN — MIDODRINE HYDROCHLORIDE 15 MG: 10 TABLET ORAL at 17:22

## 2025-07-26 RX ADMIN — LEVOTHYROXINE SODIUM 88 MCG: 0.09 TABLET ORAL at 08:36

## 2025-07-26 RX ADMIN — APIXABAN 5 MG: 5 TABLET, FILM COATED ORAL at 21:20

## 2025-07-26 ASSESSMENT — PAIN DESCRIPTION - LOCATION: LOCATION: HEAD

## 2025-07-26 ASSESSMENT — PAIN DESCRIPTION - DESCRIPTORS: DESCRIPTORS: ACHING

## 2025-07-26 ASSESSMENT — PAIN SCALES - GENERAL
PAINLEVEL_OUTOF10: 1
PAINLEVEL_OUTOF10: 3
PAINLEVEL_OUTOF10: 0
PAINLEVEL_OUTOF10: 3
PAINLEVEL_OUTOF10: 0

## 2025-07-26 ASSESSMENT — PAIN DESCRIPTION - ORIENTATION: ORIENTATION: MID;ANTERIOR;POSTERIOR

## 2025-07-26 NOTE — PROGRESS NOTES
Report given to CAR2 RN.   Awaiting MD to MD report prior to transfer to [facility] 2027.

## 2025-07-26 NOTE — PROGRESS NOTES
Pt tx'ed via W/C to room 2027 with RN, IV pump, chart and all belongings. Heparin gtt infusing at therapeutic dose of 11units/kg/hr. No new questions from receiving RN. Pt transferred into new bed from W/C without complications.

## 2025-07-26 NOTE — PROGRESS NOTES
Physical Therapy  Facility/Department: [facility] CAR 3- MICU   Physical Therapy Initial Evaluation    Patient Name: Claudio Graham        MRN: [id number]    : 1955    Date of Service: 2025    Chief Complaint   Patient presents with    Chest Pain     Past Medical History:  has a past medical history of Acute HFrEF (heart failure with reduced ejection fraction) ([facility]), Acute on chronic combined systolic and diastolic congestive heart failure ([facility]), Acute on chronic congestive heart failure (HCC), Acute respiratory failure with hypoxia ([facility]), Anemia, Anxiety, Atrial fibrillation ([facility]), CAD (coronary artery disease), Cardiac defibrillator in place, Cardiomyopathy ([facility]), CHF (congestive heart failure) ([facility]), Chronic combined systolic and diastolic heart failure ([facility]) s/[ AICD placed, Chronic kidney disease, Complex sleep apnea syndrome, Diabetic retinopathy ([facility]), Diverticulitis, DJD (degenerative joint disease), Edentulous, Gout, HTN (hypertension), Hyperlipidemia, Hypertension, Iron deficiency anemia, Ischemic cardiomyopathy, MI (myocardial infarction) ([facility]), Morbid obesity ([facility]), Obesity, TANNER variably compliant with BiPAP, Osteoarthritis, PAF (paroxysmal atrial fibrillation) ([facility]), Psychophysiologic insomnia, Thyroid disease, Type II or unspecified type diabetes mellitus without mention of complication, not stated as uncontrolled, and Unspecified sleep apnea.  Past Surgical History:  has a past surgical history that includes Colonoscopy (2007); Cardiac catheterization (2007); Cardiac catheterization (2013); Coronary angioplasty (, ); Cardiac defibrillator placement (2015); bone marrow biopsy (2012); pr colsc flx w/rmvl of tumor polyp lesion snare tq (N/A, 2017); Colonoscopy (2021); Colonoscopy (N/A, 2021); Colonoscopy (N/A, 2022); Cardiac procedure (N/A, 2024); Cardiac procedure (N/A, 2024); Cardiac procedure (N/A, 2024); Cardiac

## 2025-07-26 NOTE — PROGRESS NOTES
Occupational Therapy  Occupational Therapy Initial Evaluation  Facility/Department: [facility]   Patient Name: Claudio Graham        MRN: [id number]    : 1955    Date of Service: 2025    Chief Complaint   Patient presents with    Chest Pain     Past Medical History:  has a past medical history of Acute HFrEF (heart failure with reduced ejection fraction) ([facility]), Acute on chronic combined systolic and diastolic congestive heart failure ([facility]), Acute on chronic congestive heart failure (HCC), Acute respiratory failure with hypoxia ([facility]), Anemia, Anxiety, Atrial fibrillation ([facility]), CAD (coronary artery disease), Cardiac defibrillator in place, Cardiomyopathy ([facility]), CHF (congestive heart failure) ([facility]), Chronic combined systolic and diastolic heart failure ([facility]) s/[ AICD placed, Chronic kidney disease, Complex sleep apnea syndrome, Diabetic retinopathy ([facility]), Diverticulitis, DJD (degenerative joint disease), Edentulous, Gout, HTN (hypertension), Hyperlipidemia, Hypertension, Iron deficiency anemia, Ischemic cardiomyopathy, MI (myocardial infarction) ([facility]), Morbid obesity ([facility]), Obesity, TANNER variably compliant with BiPAP, Osteoarthritis, PAF (paroxysmal atrial fibrillation) ([facility]), Psychophysiologic insomnia, Thyroid disease, Type II or unspecified type diabetes mellitus without mention of complication, not stated as uncontrolled, and Unspecified sleep apnea.  Past Surgical History:  has a past surgical history that includes Colonoscopy (2007); Cardiac catheterization (2007); Cardiac catheterization (2013); Coronary angioplasty (, ); Cardiac defibrillator placement (2015); bone marrow biopsy (2012); pr colsc flx w/rmvl of tumor polyp lesion snare tq (N/A, 2017); Colonoscopy (2021); Colonoscopy (N/A, 2021); Colonoscopy (N/A, 2022); Cardiac procedure (N/A, 2024); Cardiac procedure (N/A, 2024); Cardiac procedure (N/A, 2024);  Cardiac catheterization (Right, 04/23/2025); transesophageal echocardiogram (04/23/2025); and Cardiac pacemaker placement.    Discharge Recommendations  Discharge Recommendations: Patient would benefit from continued therapy after discharge  OT Equipment Recommendations  Equipment Needed: Yes  Mobility Devices: ADL Assistive Devices  ADL Assistive Devices: Shower Chair with back    Assessment  Performance deficits / Impairments: Decreased functional mobility ;Decreased ADL status;Decreased safe awareness;Decreased cognition;Decreased endurance;Decreased balance;Decreased high-level IADLs  Assessment: Pt agreed to OT this date. Pt completed bed mobility with SBA. Pt engaged in static and dynamic sitting activities with SUP for ~15 minutes. ADLs completed included toileting, UB dressing, and hand hygiene, noting mild balance impairments to decrease safety and independent completion of tasks. Functional transfers and functional mobility were performed with use of RW and CGA d/t balance and activity tolerance impairments. Pt will benefit from continued OT services to address deficits in cognition, balance, endurance, and safety awareness through use of skilled therapeutic interventions to increase functional independence and safety with ADLs/IADLs and functional transfers/mobility.  Prognosis: Good  Decision Making: Medium Complexity  REQUIRES OT FOLLOW-UP: Yes  Activity Tolerance  Activity Tolerance: Patient tolerated treatment well, Patient limited by fatigue, Patient limited by endurance  Safety Devices  Type of Devices: Call light within reach, Gait belt, Nurse notified, Left in chair (RN ok'd no chair alarm)    AM-PAC  AM-PAC Daily Activity - Inpatient   How much help is needed for putting on and taking off regular lower body clothing?: A Little  How much help is needed for bathing (which includes washing, rinsing, drying)?: A Little  How much help is needed for toileting (which includes using toilet, bedpan, or

## 2025-07-26 NOTE — PROGRESS NOTES
[facility]   Progress Note                   Date:   7/26/2025  Patient name: Claudio Graham  Date of admission:  7/24/2025  8:45 AM  MRN:   [id number]  YOB: 1955  PCP: Vanessa Mckenna, LIDIA - CNP    Reason for Admission:     Subjective:       Patient seen and examined at bedside. Currently in NSR with heart rate in 50s. Hemodynamics are stable. Labs reviewed.  He denies any current symptoms.    Medications:   Scheduled Meds:   midodrine  15 mg Oral TID WC    amiodarone  400 mg Oral BID    sodium chloride flush  5-40 mL IntraVENous 2 times per day    atorvastatin  80 mg Oral Daily    levothyroxine  88 mcg Oral Daily    pantoprazole  40 mg Oral QAM AC    ticagrelor  90 mg Oral BID    insulin lispro  0-16 Units SubCUTAneous 4x Daily AC & HS     Continuous Infusions:   sodium chloride Stopped (07/25/25 1337)    norepinephrine Stopped (07/25/25 1433)    heparin (PORCINE) Infusion 11 Units/kg/hr (07/26/25 1029)    dextrose       CBC:   Recent Labs     07/24/25  0918 07/25/25  0257 07/26/25  0303   WBC 7.0 7.9 6.6   HGB 12.6* 10.6* 10.1*    See Reflexed IPF Result 169     BMP:    Recent Labs     07/24/25  1601 07/25/25  0249 07/26/25  0303    138 139   K 3.9 3.4* 3.8    100 102   CO2 25 23 26   BUN 38* 36* 34*   CREATININE 2.9* 2.8* 2.5*   GLUCOSE 120* 272* 124*     Hepatic:   Recent Labs     07/24/25  1601 07/25/25  0249 07/26/25  0303   * 528* 374*   * 591* 491*   BILITOT 0.8 0.7 0.5   ALKPHOS 88 92 96     Troponin: No results for input(s): \"TROPONINI\" in the last 72 hours.  BNP: No results for input(s): \"BNP\" in the last 72 hours.  Lipids: No results for input(s): \"CHOL\", \"HDL\" in the last 72 hours.    Invalid input(s): \"LDLCALCU\"  INR:   Recent Labs     07/24/25  1421   INR 2.7       Objective:   Vitals: /62   Pulse (!) 49   Temp 97.5 °F (36.4 °C) (Oral)   Resp 19   Ht 1.626 m (5' 4.02\")   Wt 71.7 kg (158 lb 1.1 oz)   SpO2 100%   BMI

## 2025-07-26 NOTE — TRANSITION OF CARE
Critical care team - Resident sign-out to medicine service      Date and time: 7/26/2025 2:35 PM  Patient's name:  Claudio Graham  Medical Record Number: [id number]  Patient's account/billing number: [id number]  Patient's YOB: 1955  Age: 70 y.o.  Date of Admission: 7/24/2025  8:45 AM  Length of stay during current admission: 2    Primary Care Physician: Vanessa Mckenna APRN - CNP    Code Status: Full Code    Mode of physician to physician communication:        [x] Via telephone   [] In person     Date and time of sign-out: 7/26/2025 2:35 PM    Accepting Internal Medicine: Dr. Najera     Accepting Medicine team: [facility]     Accepting team's attending: Dr. Najera    Patient's current ICU Bed:  3006     Patient's assigned bed on floor:  2027        [] Med-Surg Monitored [x] Step-down       [] Psychiatry ICU       [] Psych floor     Reason for ICU admission:     Vtach   Hypotension requiring pressor support     ICU course summary:     Cardioverted in ED, amio bolus + infusion , requiring Levophed initially   Known severely reduced EF  SBP goal >90   Seen by cardiology and EP   Weaned off levophed   Transitioned to oral amio   Plan to transition to eliquis tonight , orders in     Procedures during patient's ICU stay:         Current Vitals:     BP (!) 96/48   Pulse (!) 47   Temp 97.5 °F (36.4 °C) (Oral)   Resp 16   Ht 1.626 m (5' 4.02\")   Wt 71.7 kg (158 lb 1.1 oz)   SpO2 100%   BMI 27.12 kg/m²       Cultures:       Blood cultures:      [] None drawn      [] Negative             []  Positive (Details:  )  Urine Culture:        [] None drawn      [] Negative             []  Positive (Details:  )  Sputum Culture:   [] None drawn       [] Negative             []  Positive (Details:  )       Consults:     1. Cardiology   2. EP     Assessment:     Patient Active Problem List    Diagnosis Date Noted    Transaminitis 07/25/2025    Near syncope 07/25/2025    Tachycardia 07/25/2025    Nonrheumatic aortic

## 2025-07-26 NOTE — PROGRESS NOTES
Critical Care Team - Daily Progress Note    Patient's Name: Claudio Graham  Patient's YOB: 1955  Age / Sex: 70 y.o. male  Medical Record Number: [id number]  Patient's Acct/billing number: [id number]    Date of Admission: 7/24/2025  8:45 AM  Length of Stay: 2 Days    Primary Care Physician: Vanessa Mckenna APRN - CNP  Attending Physician:       Code Status: Full Code     Chief complaint: SOB    Reason for ICU Admission: Vtach, pressor support     Date and Time: 7/26/2025 8:16 AM    Subjective:         BRIEF HPI:        Claudio Graham is a 70 y.o. male  who presented to ED after a near syncopal episode.  3 days of worsening SOB, decreased oral intake, concerns for constipation. EMS was concerned for STEMI as well as wide complex tachycardia.  EMS did attempt chemical cardioversion with adenosine 6 mg with a subsequent 12 mg dose.  Patient was in wide-complex tachycardia and given 150 mg amiodarone bolus with no improvement in heart rates.  Patient alert and oriented in the emergency department, initially normotensive.  Patient subsequently developed hypotension and with wide-complex tachycardia decision made to cardiovert the patient 10 mg of etomidate and 125 J.  There were initial concerns for hyperkalemia on point-of-care labs and patient received calcium, dextrose, insulin.  Patient also received 1 amp of bicarb.  AICD interrogated in the emergency department.  Patient was also seen by cardiology in the emergency department.  Cardiology plans on electrophysiology to see the patient who is recommending amnio drip with trending of troponins.  EP study planned at some point during admission.           Notable PMHx: vtach, TANNER, ischemic cardiomyopathy, AICD,  CAD, CHF, CKDIII, anemia, HTN, DM2, valve disease      Notable Home Meds: Protonix, Synthroid, Brilinta, Lipitor, amiodarone, midodrine, Farxiga, Lasix     Interval HPI:        7/24: vtach, amio gtt, seen by cardiology, heparin gtt, low  bolus, 250 mL, PRN  glucagon (rDNA), 1 mg, PRN  dextrose, , Continuous PRN        SUPPORT DEVICES:     [] Ventilator     [] BIPAP     [] High Flow Nasal Cannula     [] Low Flow Nasal Cannula     [x] Room Air      O2 Device: None (Room air) (07/26/25 0400)   O2 Flow Rate (L/min): 2 L/min (07/24/25 2000)           ASSESSMENT AND PLAN:     Principal Problem:    Ventricular tachycardia (HCC)  Active Problems:    Transaminitis    Near syncope    Tachycardia  Resolved Problems:    * No resolved hospital problems. *    N:           [id number]  As needed Fentanyl  As needed Tylenol  Neurochecks per protocol     CV:        Ventricular tachycardia                 CAD                 History of myocardial infarction                 Severe ischemic cardiomyopathy                 History of recurrent ventricular tachycardia                 Paroxysmal A-fib                 Hyperlipidemia                 Concerns for AICD malfunctioning   Received adenosine as well amiodarone by EMS  Patient hypotensive in the emergency department and was cardioverted with 125J  Has been seen evaluated by cardiology team                 Recommending continuation of IV amiodarone                 Device reprogramming                  Trending of troponins-elevated, stable   oral amiodarone 200 mg BID  Mexiletine 200 mg q8 Dc'd due to elevated LFTs      Echo 4/23/25: ED 15-20% , Left ventricle is dilated. Severe global hypokinesis present.      Heparin gtt -> likely transition to eliquis   SBP>90   Midodrine 15 mg TID   Home statin   Brilinta   Off pressor support                    P:           Former smoker                  TANNER                 Room air                  Does not wear O2 @ home                  CXR showing no acute process         GI:         Regular Diet                  Constipation                  Scheduled miralax                  Senokot   No BM since admission    Consider suppository      R:           SKYLER -improving   CKD   Na/K:

## 2025-07-27 ENCOUNTER — APPOINTMENT (OUTPATIENT)
Dept: CT IMAGING | Age: 70
DRG: 308 | End: 2025-07-27
Payer: COMMERCIAL

## 2025-07-27 PROBLEM — I95.9 ARTERIAL HYPOTENSION: Status: ACTIVE | Noted: 2024-08-19

## 2025-07-27 PROBLEM — R00.0 TACHYCARDIA: Status: RESOLVED | Noted: 2025-07-25 | Resolved: 2025-07-27

## 2025-07-27 PROBLEM — I50.22 CHRONIC SYSTOLIC (CONGESTIVE) HEART FAILURE (HCC): Status: RESOLVED | Noted: 2020-03-19 | Resolved: 2025-07-27

## 2025-07-27 PROBLEM — I50.22 CHRONIC SYSTOLIC CONGESTIVE HEART FAILURE (HCC): Status: RESOLVED | Noted: 2024-08-19 | Resolved: 2025-07-27

## 2025-07-27 PROBLEM — I50.1 ACUTE CARDIAC PULMONARY EDEMA (HCC): Status: RESOLVED | Noted: 2021-08-02 | Resolved: 2025-07-27

## 2025-07-27 LAB
ALBUMIN SERPL-MCNC: 3.1 G/DL (ref 3.5–5.2)
ALBUMIN/GLOB SERPL: 1 {RATIO} (ref 1–2.5)
ALP SERPL-CCNC: 95 U/L (ref 40–129)
ALT SERPL-CCNC: 366 U/L (ref 10–50)
ANION GAP SERPL CALCULATED.3IONS-SCNC: 9 MMOL/L (ref 9–16)
AST SERPL-CCNC: 205 U/L (ref 10–50)
BASOPHILS # BLD: 0.05 K/UL (ref 0–0.2)
BASOPHILS NFR BLD: 1 % (ref 0–2)
BILIRUB SERPL-MCNC: 0.7 MG/DL (ref 0–1.2)
BUN SERPL-MCNC: 31 MG/DL (ref 8–23)
CA-I BLD-SCNC: 1.11 MMOL/L (ref 1.13–1.33)
CALCIUM SERPL-MCNC: 8.4 MG/DL (ref 8.6–10.4)
CHLORIDE SERPL-SCNC: 100 MMOL/L (ref 98–107)
CO2 SERPL-SCNC: 27 MMOL/L (ref 20–31)
CREAT SERPL-MCNC: 2.3 MG/DL (ref 0.7–1.2)
EKG ATRIAL RATE: 535 BPM
EKG ATRIAL RATE: 63 BPM
EKG P AXIS: -64 DEGREES
EKG P AXIS: 44 DEGREES
EKG P-R INTERVAL: 304 MS
EKG Q-T INTERVAL: 342 MS
EKG Q-T INTERVAL: 508 MS
EKG QRS DURATION: 188 MS
EKG QRS DURATION: 196 MS
EKG QTC CALCULATION (BAZETT): 519 MS
EKG QTC CALCULATION (BAZETT): 522 MS
EKG R AXIS: -76 DEGREES
EKG R AXIS: 115 DEGREES
EKG T AXIS: -53 DEGREES
EKG T AXIS: 88 DEGREES
EKG VENTRICULAR RATE: 140 BPM
EKG VENTRICULAR RATE: 63 BPM
EOSINOPHIL # BLD: 0.15 K/UL (ref 0–0.44)
EOSINOPHILS RELATIVE PERCENT: 3 % (ref 1–4)
ERYTHROCYTE [DISTWIDTH] IN BLOOD BY AUTOMATED COUNT: 17.4 % (ref 11.8–14.4)
GFR, ESTIMATED: 29 ML/MIN/1.73M2
GLUCOSE BLD-MCNC: 111 MG/DL (ref 75–110)
GLUCOSE BLD-MCNC: 112 MG/DL (ref 75–110)
GLUCOSE BLD-MCNC: 114 MG/DL (ref 75–110)
GLUCOSE BLD-MCNC: 136 MG/DL (ref 75–110)
GLUCOSE BLD-MCNC: 173 MG/DL (ref 75–110)
GLUCOSE SERPL-MCNC: 94 MG/DL (ref 74–99)
HCT VFR BLD AUTO: 35.4 % (ref 40.7–50.3)
HGB BLD-MCNC: 11.2 G/DL (ref 13–17)
IMM GRANULOCYTES # BLD AUTO: 0 K/UL (ref 0–0.3)
IMM GRANULOCYTES NFR BLD: 0 %
LYMPHOCYTES NFR BLD: 0.56 K/UL (ref 1.1–3.7)
LYMPHOCYTES RELATIVE PERCENT: 11 % (ref 24–43)
MAGNESIUM SERPL-MCNC: 2.3 MG/DL (ref 1.6–2.4)
MCH RBC QN AUTO: 29 PG (ref 25.2–33.5)
MCHC RBC AUTO-ENTMCNC: 31.6 G/DL (ref 28.4–34.8)
MCV RBC AUTO: 91.7 FL (ref 82.6–102.9)
MONOCYTES NFR BLD: 0.36 K/UL (ref 0.1–1.2)
MONOCYTES NFR BLD: 7 % (ref 3–12)
MORPHOLOGY: ABNORMAL
NEUTROPHILS NFR BLD: 78 % (ref 36–65)
NEUTS SEG NFR BLD: 3.98 K/UL (ref 1.5–8.1)
NRBC BLD-RTO: 0 PER 100 WBC
PARTIAL THROMBOPLASTIN TIME: 28.9 SEC (ref 23–36.5)
PLATELET # BLD AUTO: 166 K/UL (ref 138–453)
PMV BLD AUTO: 12.3 FL (ref 8.1–13.5)
POTASSIUM SERPL-SCNC: 3.7 MMOL/L (ref 3.7–5.3)
PROT SERPL-MCNC: 6.2 G/DL (ref 6.6–8.7)
RBC # BLD AUTO: 3.86 M/UL (ref 4.21–5.77)
SODIUM SERPL-SCNC: 136 MMOL/L (ref 136–145)
WBC OTHER # BLD: 5.1 K/UL (ref 3.5–11.3)

## 2025-07-27 PROCEDURE — 2060000000 HC ICU INTERMEDIATE R&B

## 2025-07-27 PROCEDURE — 85730 THROMBOPLASTIN TIME PARTIAL: CPT

## 2025-07-27 PROCEDURE — 83735 ASSAY OF MAGNESIUM: CPT

## 2025-07-27 PROCEDURE — 99232 SBSQ HOSP IP/OBS MODERATE 35: CPT | Performed by: STUDENT IN AN ORGANIZED HEALTH CARE EDUCATION/TRAINING PROGRAM

## 2025-07-27 PROCEDURE — 2500000003 HC RX 250 WO HCPCS

## 2025-07-27 PROCEDURE — 85025 COMPLETE CBC W/AUTO DIFF WBC: CPT

## 2025-07-27 PROCEDURE — 80053 COMPREHEN METABOLIC PANEL: CPT

## 2025-07-27 PROCEDURE — 0HQ1XZZ REPAIR FACE SKIN, EXTERNAL APPROACH: ICD-10-PCS | Performed by: SURGERY

## 2025-07-27 PROCEDURE — 6370000000 HC RX 637 (ALT 250 FOR IP)

## 2025-07-27 PROCEDURE — 99222 1ST HOSP IP/OBS MODERATE 55: CPT | Performed by: SURGERY

## 2025-07-27 PROCEDURE — 70450 CT HEAD/BRAIN W/O DYE: CPT

## 2025-07-27 PROCEDURE — 36415 COLL VENOUS BLD VENIPUNCTURE: CPT

## 2025-07-27 PROCEDURE — 82947 ASSAY GLUCOSE BLOOD QUANT: CPT

## 2025-07-27 PROCEDURE — 99233 SBSQ HOSP IP/OBS HIGH 50: CPT | Performed by: SURGERY

## 2025-07-27 PROCEDURE — 93010 ELECTROCARDIOGRAM REPORT: CPT | Performed by: INTERNAL MEDICINE

## 2025-07-27 PROCEDURE — 6370000000 HC RX 637 (ALT 250 FOR IP): Performed by: STUDENT IN AN ORGANIZED HEALTH CARE EDUCATION/TRAINING PROGRAM

## 2025-07-27 PROCEDURE — 72125 CT NECK SPINE W/O DYE: CPT

## 2025-07-27 PROCEDURE — 82330 ASSAY OF CALCIUM: CPT

## 2025-07-27 PROCEDURE — 6360000002 HC RX W HCPCS

## 2025-07-27 PROCEDURE — 6360000002 HC RX W HCPCS: Performed by: STUDENT IN AN ORGANIZED HEALTH CARE EDUCATION/TRAINING PROGRAM

## 2025-07-27 RX ORDER — CALCIUM GLUCONATE 20 MG/ML
1000 INJECTION, SOLUTION INTRAVENOUS ONCE
Status: COMPLETED | OUTPATIENT
Start: 2025-07-27 | End: 2025-07-27

## 2025-07-27 RX ORDER — POTASSIUM CHLORIDE 1500 MG/1
20 TABLET, EXTENDED RELEASE ORAL ONCE
Status: COMPLETED | OUTPATIENT
Start: 2025-07-27 | End: 2025-07-27

## 2025-07-27 RX ORDER — LIDOCAINE HYDROCHLORIDE AND EPINEPHRINE 10; 10 MG/ML; UG/ML
20 INJECTION, SOLUTION INFILTRATION; PERINEURAL ONCE
Status: COMPLETED | OUTPATIENT
Start: 2025-07-27 | End: 2025-07-27

## 2025-07-27 RX ORDER — INSULIN LISPRO 100 [IU]/ML
0-4 INJECTION, SOLUTION INTRAVENOUS; SUBCUTANEOUS
Status: DISCONTINUED | OUTPATIENT
Start: 2025-07-27 | End: 2025-07-29 | Stop reason: HOSPADM

## 2025-07-27 RX ADMIN — CALCIUM GLUCONATE 1000 MG: 20 INJECTION, SOLUTION INTRAVENOUS at 14:06

## 2025-07-27 RX ADMIN — TICAGRELOR 90 MG: 90 TABLET ORAL at 09:24

## 2025-07-27 RX ADMIN — LEVOTHYROXINE SODIUM 88 MCG: 0.09 TABLET ORAL at 09:24

## 2025-07-27 RX ADMIN — MIDODRINE HYDROCHLORIDE 15 MG: 10 TABLET ORAL at 17:24

## 2025-07-27 RX ADMIN — AMIODARONE HYDROCHLORIDE 400 MG: 200 TABLET ORAL at 09:24

## 2025-07-27 RX ADMIN — MIDODRINE HYDROCHLORIDE 15 MG: 10 TABLET ORAL at 09:24

## 2025-07-27 RX ADMIN — SODIUM CHLORIDE, PRESERVATIVE FREE 10 ML: 5 INJECTION INTRAVENOUS at 09:42

## 2025-07-27 RX ADMIN — APIXABAN 5 MG: 5 TABLET, FILM COATED ORAL at 09:24

## 2025-07-27 RX ADMIN — APIXABAN 5 MG: 5 TABLET, FILM COATED ORAL at 20:07

## 2025-07-27 RX ADMIN — ATORVASTATIN CALCIUM 80 MG: 80 TABLET, FILM COATED ORAL at 09:24

## 2025-07-27 RX ADMIN — POTASSIUM CHLORIDE 20 MEQ: 1500 TABLET, EXTENDED RELEASE ORAL at 13:00

## 2025-07-27 RX ADMIN — AMIODARONE HYDROCHLORIDE 400 MG: 200 TABLET ORAL at 20:07

## 2025-07-27 RX ADMIN — ACETAMINOPHEN 650 MG: 325 TABLET ORAL at 20:07

## 2025-07-27 RX ADMIN — TICAGRELOR 90 MG: 90 TABLET ORAL at 20:07

## 2025-07-27 RX ADMIN — SODIUM CHLORIDE, PRESERVATIVE FREE 10 ML: 5 INJECTION INTRAVENOUS at 20:08

## 2025-07-27 RX ADMIN — PANTOPRAZOLE SODIUM 40 MG: 40 TABLET, DELAYED RELEASE ORAL at 09:24

## 2025-07-27 RX ADMIN — LIDOCAINE HYDROCHLORIDE,EPINEPHRINE BITARTRATE 20 ML: 10; .01 INJECTION, SOLUTION INFILTRATION; PERINEURAL at 17:23

## 2025-07-27 RX ADMIN — MIDODRINE HYDROCHLORIDE 15 MG: 10 TABLET ORAL at 13:00

## 2025-07-27 ASSESSMENT — PAIN DESCRIPTION - DESCRIPTORS: DESCRIPTORS: ACHING

## 2025-07-27 ASSESSMENT — PAIN SCALES - GENERAL
PAINLEVEL_OUTOF10: 0
PAINLEVEL_OUTOF10: 2
PAINLEVEL_OUTOF10: 0
PAINLEVEL_OUTOF10: 4

## 2025-07-27 ASSESSMENT — PAIN DESCRIPTION - ORIENTATION: ORIENTATION: MID

## 2025-07-27 ASSESSMENT — PAIN DESCRIPTION - LOCATION: LOCATION: HEAD

## 2025-07-27 ASSESSMENT — PAIN - FUNCTIONAL ASSESSMENT: PAIN_FUNCTIONAL_ASSESSMENT: ACTIVITIES ARE NOT PREVENTED

## 2025-07-27 NOTE — CARE COORDINATION
Case Management   Daily Progress Note       Patient Name: Claudio Graham                   YOB: 1955  Diagnosis: Other specified hypotension [I95.89]  Ventricular tachycardia (HCC) [I47.20]  Tachycardia [R00.0]  Near syncope [R55]                       GMLOS: 2.3 days  Length of Stay: 3  days    Anticipated Discharge Date: To be determined    Readmission Risk (Low < 19, Mod (19-27), High > 27): Readmission Risk Score: 25        Current Transitional Plan    [x] Home Independently    [] Home with HC    [] Skilled Nursing Facility    [] Acute Rehabilitation    [] Long Term Acute Care (LTAC)    [] Other:     Plan for the Stay (Medical Management) :          Workflow Continuation (Additional Notes) :    Spoke to patient about plan for discharge. Plan is home. He has a friend who is staying with him. He has a walker at home. His neighbor can provide transportation    Bela Billingsley RN  July 27, 2025

## 2025-07-27 NOTE — PROGRESS NOTES
[facility]   Progress Note                   Date:   7/27/2025  Patient name: Claudio Graham  Date of admission:  7/24/2025  8:45 AM  MRN:   [id number]  YOB: 1955  PCP: Vanessa Mckenna, LIDIA - CNP    Reason for Admission:     Subjective:       Patient seen and examined at bedside. Currently in NSR with heart rate in 50s. Hemodynamics are stable. Labs reviewed.  He denies any current symptoms.    Medications:   Scheduled Meds:   calcium gluconate-NaCl  1,000 mg IntraVENous Once    potassium chloride  20 mEq Oral Once    apixaban  5 mg Oral BID    midodrine  15 mg Oral TID WC    amiodarone  400 mg Oral BID    sodium chloride flush  5-40 mL IntraVENous 2 times per day    atorvastatin  80 mg Oral Daily    levothyroxine  88 mcg Oral Daily    pantoprazole  40 mg Oral QAM AC    ticagrelor  90 mg Oral BID    insulin lispro  0-16 Units SubCUTAneous 4x Daily AC & HS     Continuous Infusions:   sodium chloride Stopped (07/25/25 1337)    dextrose       CBC:   Recent Labs     07/25/25  0257 07/26/25  0303 07/27/25  0852   WBC 7.9 6.6 5.1   HGB 10.6* 10.1* 11.2*   PLT See Reflexed IPF Result 169 166     BMP:    Recent Labs     07/25/25  0249 07/26/25  0303 07/27/25  0852    139 136   K 3.4* 3.8 3.7    102 100   CO2 23 26 27   BUN 36* 34* 31*   CREATININE 2.8* 2.5* 2.3*   GLUCOSE 272* 124* 94     Hepatic:   Recent Labs     07/25/25  0249 07/26/25  0303 07/27/25  0852   * 374* 205*   * 491* 366*   BILITOT 0.7 0.5 0.7   ALKPHOS 92 96 95     Troponin: No results for input(s): \"TROPONINI\" in the last 72 hours.  BNP: No results for input(s): \"BNP\" in the last 72 hours.  Lipids: No results for input(s): \"CHOL\", \"HDL\" in the last 72 hours.    Invalid input(s): \"LDLCALCU\"  INR:   Recent Labs     07/24/25  1421   INR 2.7       Objective:   Vitals: BP (!) 110/53   Pulse (!) 44   Temp 97.9 °F (36.6 °C) (Oral)   Resp 18   Ht 1.626 m (5' 4.02\")   Wt 70.1 kg (154 lb 8.7 oz)

## 2025-07-27 NOTE — CONSULTS
TRAUMA H&P/CONSULT    PATIENT NAME: Claudio Graham  YOB: 1955  MEDICAL RECORD NO. [id number]   DATE: 7/27/2025  PRIMARY CARE PHYSICIAN: Vanessa Mckenna APRN - CNP  PATIENT EVALUATED AT THE REQUEST OF DR.: Dr. Jairo RENAE   Trauma Consult-Time at bedside 11:23am      IMPRESSION AND PLAN:   Diagnosis: left eyebrow laceration  Plan:   CT head/CT cervical spine negative  C-spine cleared  Laceration repaired with Vicryl x3      CONSULT SERVICES    Other: cardiology and electrophysiology      HISTORY:     Chief Complaint:  \"Slipped in showed and hit head\"    GENERAL DATA  Patient information was obtained from patient.  History/Exam limitations: none.  Injury Date: 7/27/25   Approximate Injury Time: this morning      Transport mode: already admitted to hospital    SETTING OF TRAUMATIC EVENT   Location : Other: hospital  Specific Details of Location: Bathroom    MECHANISM OF INJURY    Slipped in shower, hit his head    HISTORY:     Claudio Graham is a male that was admitted for Vtach who slipped in the hospital shower due to the floor being wet after his shower. He hit his head and has a left eyebrow laceration, but did not lose consciousness. Denies any nausea, vomiting, headaches. He is on blood thinners. He was placed in a C-collar.    Traumatic loss of Consciousness: No    Total Fluids Given Prior To Arrival  mL    MEDICATIONS:   []  None     []  Information not available due to exam limitations documented above  Prior to Admission medications    Medication Sig Start Date End Date Taking? Authorizing Provider   pantoprazole (PROTONIX) 40 MG tablet Take 1 tablet by mouth every morning (before breakfast) 7/21/25   Beltran Miller MD   senna-docusate (SENOKOT S) 8.6-50 MG per tablet Take 2 tablets by mouth daily as needed for Constipation 7/21/25   Beltran Miller MD   Lancets (ONETOUCH DELICA PLUS [id number]) MISC use 1 LANCET to TEST BLOOD SUGAR daily    Provider, MD Susie

## 2025-07-27 NOTE — PROGRESS NOTES
Patient wanted to shower. Writer called to room, when light began to go off. Aide stated, \" he fell and he is bleeding.\" Writer entered room and saw patient on bottom being supported with hands behind him. Patient told writer he was reaching for grab bar when his feet came out from under him. Another nurse on floor assisting got MD Gill who came and assessed patient. MD Gill added orders and trauma team consulted. Care ongoing.

## 2025-07-27 NOTE — H&P
[facility]  Office: [phone]  Philipp Woods, DO, Anurag Godinez, DO, Nelson Lucas DO, Tyler Peña, DO, Chelsea Quiroga MD, Kim Castillo MD, Tiffany Vidales MD, Kailey Finley MD,  Frantz Taylor MD, Arias Newsome MD, Laurel Elena MD,  Elder Aldana DO, Claudio Woods DO, Tena Lund MD,  Jimmie Gill DO, Aleksandra Gooden MD, Princess Magaña MD, Merlin Packer MD,  Patrick Newman MD, Aneudy Benton MD, Don Junior MD, Myles Sumner MD, Ezra Najera MD, Jeremiah Green MD, Kyle Muro DO, Elayne Castro MD, Lauro Joyce DO, Tani Marcelino MD, Gera Bryan MD, Elder Whitehead MD, Mohsin Reza, MD, Kirill Escobar MD, Shirley Waterhouse, CNP,  Liat Burkett, CNP, Kyle Lipscomb, CNP,  Daisy Doran, DNP, Alison Alfaro, CNP, Jewell Nieves, CNP, Negar Ty, CNP, Roxy Grant, CNP, Ariane Diane, PA-C, Khloe Narayanan, CNP, Edgar Tolbert, CNP,  Jessica Carias, CNP, Cathy Self, CNP, Carlos Rodriguez, PA-C, Brandee Serna PA-C, Marissa Lion, CNP,        Deanna Garrett, CNS, Arielle Blum, CNP, Jessy Frey, CNP         [facility]   IN-PATIENT SERVICE   [facility]    HISTORY AND PHYSICAL EXAMINATION            Date:   7/27/2025  Patient name:  Claudio Graham  Date of admission:  7/24/2025  8:45 AM  MRN:   [id number]  Account:  [id number]  YOB: 1955  PCP:    Vanessa Mckenna APRN - CNP  Room:   2027/2027-01  Code Status:    Full Code    Chief Complaint:     Chief Complaint   Patient presents with    Chest Pain     History Obtained From:     patient, electronic medical record    History of Present Illness:     This is a 70-year-old male with a significant past medi chronic diastolic congestive heart failure xiao history of hypertension, hyperlipidemia, type 2 diabetes mellitus, chronic diastolic congestive heart failure with AICD in place, severe mitral regurgitation, atrial fibrillation on chronic anticoagulation, CAD status 
  Denia   Medium dose sliding scale   B        PPX:  AC: heparin gtt, Home AC/AP: eliquis, GI:  PPI  CODE: Full   LDA: PIVs    CONS:   IP CONSULT TO CARDIOLOGY  IP CONSULT TO INTERNAL MEDICINE  IP CONSULT TO CRITICAL CARE  IP CONSULT TO VASCULAR ACCESS TEAM    DISPO:  - to remain in ICU     TRANSFER OUT OF ICU:   [x] No  [] Yes          Evens Hernandez DO  Critical Care Resident Physician  [place], [place]  2025 5:11 PM  Attending Physician Statement  I have discussed the care of Claudio Graham, including pertinent history and exam findings,  with the resident. I have seen and examined the patient and the key elements of all parts of the encounter have been performed by me.  I agree with the assessment, plan and orders as documented by the resident with additions .      Total critical care time caring for this patient with life threatening, unstable organ failure, including direct patient contact, management of life support systems, review of data including imaging and labs, discussions with other team members and physicians at least 35   Min so far today, excluding procedures.       Electronically signed by MARK CARDENAS MD on   25 at 6:02 PM EDT    Please note that this chart was generated using voice recognition Dragon dictation software.  Although every effort was made to ensure the accuracy of this automated transcription, some errors in transcription may have occurred.

## 2025-07-28 ENCOUNTER — HOSPITAL ENCOUNTER (OUTPATIENT)
Dept: OTHER | Age: 70
Discharge: HOME | End: 2025-07-28

## 2025-07-28 LAB
ALBUMIN SERPL-MCNC: 3 G/DL (ref 3.5–5.2)
ALBUMIN/GLOB SERPL: 1 {RATIO} (ref 1–2.5)
ALP SERPL-CCNC: 96 U/L (ref 40–129)
ALT SERPL-CCNC: 282 U/L (ref 10–50)
ANION GAP SERPL CALCULATED.3IONS-SCNC: 11 MMOL/L (ref 9–16)
AST SERPL-CCNC: 137 U/L (ref 10–50)
BASOPHILS # BLD: 0.06 K/UL (ref 0–0.2)
BASOPHILS NFR BLD: 1 % (ref 0–2)
BILIRUB SERPL-MCNC: 0.7 MG/DL (ref 0–1.2)
BUN SERPL-MCNC: 27 MG/DL (ref 8–23)
CA-I BLD-SCNC: 1.16 MMOL/L (ref 1.13–1.33)
CALCIUM SERPL-MCNC: 8.7 MG/DL (ref 8.6–10.4)
CHLORIDE SERPL-SCNC: 103 MMOL/L (ref 98–107)
CO2 SERPL-SCNC: 24 MMOL/L (ref 20–31)
CREAT SERPL-MCNC: 2 MG/DL (ref 0.7–1.2)
EOSINOPHIL # BLD: 0.25 K/UL (ref 0–0.44)
EOSINOPHILS RELATIVE PERCENT: 4 % (ref 1–4)
ERYTHROCYTE [DISTWIDTH] IN BLOOD BY AUTOMATED COUNT: 17.4 % (ref 11.8–14.4)
GFR, ESTIMATED: 35 ML/MIN/1.73M2
GLUCOSE BLD-MCNC: 114 MG/DL (ref 75–110)
GLUCOSE BLD-MCNC: 117 MG/DL (ref 75–110)
GLUCOSE BLD-MCNC: 117 MG/DL (ref 75–110)
GLUCOSE BLD-MCNC: 132 MG/DL (ref 75–110)
GLUCOSE BLD-MCNC: 149 MG/DL (ref 75–110)
GLUCOSE SERPL-MCNC: 109 MG/DL (ref 74–99)
HCT VFR BLD AUTO: 34.6 % (ref 40.7–50.3)
HGB BLD-MCNC: 11.2 G/DL (ref 13–17)
IMM GRANULOCYTES # BLD AUTO: 0 K/UL (ref 0–0.3)
IMM GRANULOCYTES NFR BLD: 0 %
LYMPHOCYTES NFR BLD: 0.5 K/UL (ref 1.1–3.7)
LYMPHOCYTES RELATIVE PERCENT: 8 % (ref 24–43)
MAGNESIUM SERPL-MCNC: 2.4 MG/DL (ref 1.6–2.4)
MCH RBC QN AUTO: 29.3 PG (ref 25.2–33.5)
MCHC RBC AUTO-ENTMCNC: 32.4 G/DL (ref 28.4–34.8)
MCV RBC AUTO: 90.6 FL (ref 82.6–102.9)
MONOCYTES NFR BLD: 0.57 K/UL (ref 0.1–1.2)
MONOCYTES NFR BLD: 9 % (ref 3–12)
MORPHOLOGY: ABNORMAL
NEUTROPHILS NFR BLD: 78 % (ref 36–65)
NEUTS SEG NFR BLD: 4.92 K/UL (ref 1.5–8.1)
NRBC BLD-RTO: 0 PER 100 WBC
PLATELET # BLD AUTO: 146 K/UL (ref 138–453)
PMV BLD AUTO: 12.4 FL (ref 8.1–13.5)
POTASSIUM SERPL-SCNC: 4.4 MMOL/L (ref 3.7–5.3)
PROT SERPL-MCNC: 6 G/DL (ref 6.6–8.7)
RBC # BLD AUTO: 3.82 M/UL (ref 4.21–5.77)
SODIUM SERPL-SCNC: 138 MMOL/L (ref 136–145)
WBC OTHER # BLD: 6.3 K/UL (ref 3.5–11.3)

## 2025-07-28 PROCEDURE — 80053 COMPREHEN METABOLIC PANEL: CPT

## 2025-07-28 PROCEDURE — 99233 SBSQ HOSP IP/OBS HIGH 50: CPT | Performed by: NURSE PRACTITIONER

## 2025-07-28 PROCEDURE — 6370000000 HC RX 637 (ALT 250 FOR IP)

## 2025-07-28 PROCEDURE — 83735 ASSAY OF MAGNESIUM: CPT

## 2025-07-28 PROCEDURE — 6370000000 HC RX 637 (ALT 250 FOR IP): Performed by: NURSE PRACTITIONER

## 2025-07-28 PROCEDURE — 6370000000 HC RX 637 (ALT 250 FOR IP): Performed by: STUDENT IN AN ORGANIZED HEALTH CARE EDUCATION/TRAINING PROGRAM

## 2025-07-28 PROCEDURE — 85025 COMPLETE CBC W/AUTO DIFF WBC: CPT

## 2025-07-28 PROCEDURE — 82330 ASSAY OF CALCIUM: CPT

## 2025-07-28 PROCEDURE — 36415 COLL VENOUS BLD VENIPUNCTURE: CPT

## 2025-07-28 PROCEDURE — 2060000000 HC ICU INTERMEDIATE R&B

## 2025-07-28 PROCEDURE — 82947 ASSAY GLUCOSE BLOOD QUANT: CPT

## 2025-07-28 PROCEDURE — 99232 SBSQ HOSP IP/OBS MODERATE 35: CPT | Performed by: STUDENT IN AN ORGANIZED HEALTH CARE EDUCATION/TRAINING PROGRAM

## 2025-07-28 PROCEDURE — 2500000003 HC RX 250 WO HCPCS

## 2025-07-28 RX ORDER — AMIODARONE HYDROCHLORIDE 200 MG/1
400 TABLET ORAL 2 TIMES DAILY
Status: DISCONTINUED | OUTPATIENT
Start: 2025-07-28 | End: 2025-07-29 | Stop reason: HOSPADM

## 2025-07-28 RX ORDER — AMIODARONE HYDROCHLORIDE 200 MG/1
200 TABLET ORAL 2 TIMES DAILY
Status: DISCONTINUED | OUTPATIENT
Start: 2025-08-04 | End: 2025-07-29 | Stop reason: HOSPADM

## 2025-07-28 RX ADMIN — MIDODRINE HYDROCHLORIDE 15 MG: 10 TABLET ORAL at 15:50

## 2025-07-28 RX ADMIN — ACETAMINOPHEN 650 MG: 325 TABLET ORAL at 08:58

## 2025-07-28 RX ADMIN — MIDODRINE HYDROCHLORIDE 15 MG: 10 TABLET ORAL at 08:58

## 2025-07-28 RX ADMIN — TICAGRELOR 90 MG: 90 TABLET ORAL at 08:59

## 2025-07-28 RX ADMIN — TICAGRELOR 90 MG: 90 TABLET ORAL at 20:18

## 2025-07-28 RX ADMIN — SODIUM CHLORIDE, PRESERVATIVE FREE 10 ML: 5 INJECTION INTRAVENOUS at 20:18

## 2025-07-28 RX ADMIN — MIDODRINE HYDROCHLORIDE 15 MG: 10 TABLET ORAL at 12:37

## 2025-07-28 RX ADMIN — LEVOTHYROXINE SODIUM 88 MCG: 0.09 TABLET ORAL at 09:00

## 2025-07-28 RX ADMIN — AMIODARONE HYDROCHLORIDE 400 MG: 200 TABLET ORAL at 20:18

## 2025-07-28 RX ADMIN — PANTOPRAZOLE SODIUM 40 MG: 40 TABLET, DELAYED RELEASE ORAL at 08:58

## 2025-07-28 RX ADMIN — APIXABAN 5 MG: 5 TABLET, FILM COATED ORAL at 08:59

## 2025-07-28 RX ADMIN — APIXABAN 5 MG: 5 TABLET, FILM COATED ORAL at 20:18

## 2025-07-28 RX ADMIN — ATORVASTATIN CALCIUM 80 MG: 80 TABLET, FILM COATED ORAL at 08:58

## 2025-07-28 RX ADMIN — SODIUM CHLORIDE, PRESERVATIVE FREE 10 ML: 5 INJECTION INTRAVENOUS at 09:00

## 2025-07-28 ASSESSMENT — PAIN DESCRIPTION - ORIENTATION: ORIENTATION: RIGHT;LEFT

## 2025-07-28 ASSESSMENT — PAIN SCALES - GENERAL
PAINLEVEL_OUTOF10: 4
PAINLEVEL_OUTOF10: 0

## 2025-07-28 ASSESSMENT — PAIN - FUNCTIONAL ASSESSMENT: PAIN_FUNCTIONAL_ASSESSMENT: ACTIVITIES ARE NOT PREVENTED

## 2025-07-28 ASSESSMENT — PAIN DESCRIPTION - FREQUENCY: FREQUENCY: INTERMITTENT

## 2025-07-28 ASSESSMENT — PAIN DESCRIPTION - ONSET: ONSET: ON-GOING

## 2025-07-28 ASSESSMENT — PAIN DESCRIPTION - LOCATION: LOCATION: HEAD

## 2025-07-28 ASSESSMENT — PAIN DESCRIPTION - PAIN TYPE: TYPE: ACUTE PAIN

## 2025-07-28 ASSESSMENT — PAIN DESCRIPTION - DESCRIPTORS: DESCRIPTORS: DISCOMFORT

## 2025-07-28 NOTE — PROGRESS NOTES
[facility]  Office: [phone]  Philipp Woods, DO, Anurag Godinez, DO, Nelson Lucas DO, Tyler Peña, DO, Chelsea Quiroga MD, Kim Castillo MD, Tiffany Vidales MD, Kailey Finley MD,  Frantz Taylor MD, Arias Newsome MD, Laurel Elena MD,  Elder Aldana DO, Claudio Woods DO, Tena Lund MD,  Jimmie Gill DO, Aleksandra Gooden MD, Princess Magaña MD, Merlin Packer MD,  Patrick Newman MD, Aneudy Benton MD, Don Junior MD, Myles Sumner MD, Ezra Najera MD, Jeremiah Green MD, Kyle Muro DO, Elayne Castro MD, Lauro Joyce DO, Tani Marcelino MD, Gera Bryan MD, Elder Whitehead MD, Mohsin Reza, MD, Kirill Escobar MD, Shirley Waterhouse, CNP,  Liat Burkett, CNP, Kyle Lipscomb, CNP,  Daisy Doran, DNP, Alison Alfaro, CNP, Jewell Nieves, CNP, Negar Ty, CNP, Roxy Grant, CNP, Ariane Diane, PA-C, Khloe Narayanan, CNP, Edgar Tolbert, CNP,  Jessica Carias, CNP, Cathy Self, CNP, Carlos Rodriguez, PA-C, Brandee Serna, PA-C, Marissa Lion, CNP,        Deanna Garrett, CNS, Arielle Blum, CNP, Jessy Frey, CNP         [facility]   IN-PATIENT SERVICE   [facility]    Progress Note    7/28/2025    8:32 AM    Name:   Claudio Graham  MRN:     [id number]     Acct:      [id number]   Room:   2027/2027-01  IP Day:  4  Admit Date:  7/24/2025  8:45 AM    PCP:   Vanessa Mckenna APRN - CNP  Code Status:  Full Code    Subjective:     C/C:   Chief Complaint   Patient presents with    Chest Pain     Interval History Status: improved.     Vitals reviewed, afebrile but remains bradycardic otherwise hemodynamically stable.  Saturating well on room air.  Labs reviewed, elevated BUN and creatinine 27 and 2.0 respectively, hypocalcemia has resolved, LFTs continue to improve, no leukocytosis, hemoglobin stable, platelets are stable.    CT head and cervical spine reviewed from yesterday post fall with no acute process.  Trauma signed off.    Overnight  0832  Last data filed at 7/28/2025 0440  Gross per 24 hour   Intake --   Output 325 ml   Net -325 ml       Labs:  Hematology:  Recent Labs     07/26/25 0303 07/27/25  0852 07/28/25  0536   WBC 6.6 5.1 6.3   RBC 3.47* 3.86* 3.82*   HGB 10.1* 11.2* 11.2*   HCT 31.9* 35.4* 34.6*   MCV 91.9 91.7 90.6   MCH 29.1 29.0 29.3   MCHC 31.7 31.6 32.4   RDW 17.2* 17.4* 17.4*    166 146   MPV 11.7 12.3 12.4     Chemistry:  Recent Labs     07/25/25 1954 07/26/25 0303 07/26/25 0921 07/27/25 0852 07/28/25  0536   NA  --  139  --  136 138   K  --  3.8  --  3.7 4.4   CL  --  102  --  100 103   CO2  --  26  --  27 24   GLUCOSE  --  124*  --  94 109*   BUN  --  34*  --  31* 27*   CREATININE  --  2.5*  --  2.3* 2.0*   MG  --  2.2  --  2.3 2.4   ANIONGAP  --  11  --  9 11   LABGLOM  --  27*  --  29* 35*   CALCIUM  --  8.3*  --  8.4* 8.7   CAION  --  1.08*  --  1.11* 1.16   TROPHS 86* 85* 77*  --   --      Recent Labs     07/26/25 0303 07/26/25  1153 07/27/25  0852 07/27/25  1140 07/27/25  1334 07/27/25  1617 07/27/25  1944 07/28/25  0051 07/28/25  0517 07/28/25  0536   *  --  205*  --   --   --   --   --   --  137*   *  --  366*  --   --   --   --   --   --  282*   ALKPHOS 96  --  95  --   --   --   --   --   --  96   BILITOT 0.5  --  0.7  --   --   --   --   --   --  0.7   POCGLU  --    < >  --  114* 173* 136* 112* 117* 132*  --     < > = values in this interval not displayed.     ABG:  Lab Results   Component Value Date/Time    POCPH 7.427 07/23/2024 05:54 AM    POCPCO2 34.2 07/23/2024 05:54 AM    POCPO2 72.6 07/23/2024 05:54 AM    POCHCO3 22.5 07/23/2024 05:54 AM    NBEA 1.3 07/23/2024 05:54 AM    PBEA 1.4 07/20/2024 05:46 AM    IFQU2YKW 94.9 07/23/2024 05:54 AM    FIO2 INFORMATION NOT PROVIDED 07/24/2025 04:01 PM     Lab Results   Component Value Date/Time    SPECIAL LEFT HAND 0.5 ML 08/17/2024 01:40 AM    SPECIAL RIGHT HAND 1ML 08/17/2024 01:40 AM     Lab Results   Component Value Date/Time    CULTURE NO

## 2025-07-28 NOTE — PROGRESS NOTES
Trauma Tertiary Survey    Admit Date: 7/24/2025  Hospital day 3      Chief Complaint: \"I feel good\"    Subjective:     Patient fell in shower due to the floor being wet, he hit his head denies LOC. Tertiary exam performed without any other painful areas identified. Patient states \"I just have a headache\".     Objective:   PHYSICAL EXAM:   Physical Exam  Constitutional:       General: He is not in acute distress.     Appearance: He is not ill-appearing or toxic-appearing.   HENT:      Head: Normocephalic.      Comments: Lac repaired on Left temple      Nose: Nose normal.   Eyes:      Extraocular Movements: Extraocular movements intact.   Cardiovascular:      Rate and Rhythm: Normal rate.      Pulses: Normal pulses.   Pulmonary:      Effort: Pulmonary effort is normal. No respiratory distress.   Abdominal:      General: There is no distension.      Palpations: Abdomen is soft.      Tenderness: There is no abdominal tenderness. There is no guarding.   Musculoskeletal:         General: No swelling, tenderness, deformity or signs of injury. Normal range of motion.      Cervical back: Normal range of motion. No rigidity or tenderness.      Thoracic back: No deformity, signs of trauma, lacerations or tenderness.      Lumbar back: No deformity, signs of trauma, lacerations or tenderness.      Right lower leg: Edema present.      Left lower leg: Edema present.   Skin:     General: Skin is warm and dry.      Findings: Bruising present.      Comments: Old ecchymosis of left shoulder non tender   Neurological:      General: No focal deficit present.      Mental Status: He is alert and oriented to person, place, and time.   Psychiatric:         Mood and Affect: Mood normal.         Behavior: Behavior normal.           Spine:     Spine Tenderness ROM   Cervical 0 /10 Normal   Thoracic 0 /10 Normal   Lumbar 0 /10 Normal     Musculoskeletal    Joint Tenderness Swelling ROM   Right shoulder absent absent normal   Left shoulder

## 2025-07-28 NOTE — PROGRESS NOTES
Congestive Heart Failure Education completed and charted. CHF booklet given. Patient was receptive to education.    Discussed the  importance of medication compliance.    Discussed the importance of a heart healthy diet. Discussed 2000 mg sodium-restricted daily diet.  Patient instructed to limit fluid intake to  1.5 to 2 liters per day.    Patient instructed to weigh self at the same time of each day each morning, reinforced teaching to monitor for 3-5 lb weight increase over 1-2 days notify physician if change noted.      Signs and symptoms of CHF discussed with patient, such as feeling more tired than normal, feeling short of breath, coughing that increases when lying down, sudden weight gain, swelling of the feet, legs or belly.  Patient verbalized understanding to notify physician office if these symptoms occur.  EF 15-20%   Pt is established with  CHF Clinic  Appt will be made for pt following this admission.

## 2025-07-28 NOTE — PROGRESS NOTES
[facility]   Progress Note                   Date:   7/28/2025  Patient name: Claudio Graham  Date of admission:  7/24/2025  8:45 AM  MRN:   [id number]  YOB: 1955  PCP: Vanessa Mckenna, LIDIA - CNP    Reason for Admission: Other specified hypotension [I95.89]  Ventricular tachycardia (HCC) [I47.20]  Tachycardia [R00.0]  Near syncope [R55]    Subjective:       Clinical Changes / Abnormalities: Pt seen and examined in the room.  Patient resting in bed. Pt denies any CP or sob.  Labs, vitals and tele reviewed- SB 47     Medications:   Scheduled Meds:   insulin lispro  0-4 Units SubCUTAneous 4x Daily AC & HS    apixaban  5 mg Oral BID    midodrine  15 mg Oral TID WC    amiodarone  400 mg Oral BID    sodium chloride flush  5-40 mL IntraVENous 2 times per day    atorvastatin  80 mg Oral Daily    levothyroxine  88 mcg Oral Daily    pantoprazole  40 mg Oral QAM AC    ticagrelor  90 mg Oral BID     Continuous Infusions:   sodium chloride Stopped (07/25/25 1337)    dextrose       CBC:   Recent Labs     07/26/25 0303 07/27/25  0852 07/28/25  0536   WBC 6.6 5.1 6.3   HGB 10.1* 11.2* 11.2*    166 146     BMP:    Recent Labs     07/26/25 0303 07/27/25  0852 07/28/25  0536    136 138   K 3.8 3.7 4.4    100 103   CO2 26 27 24   BUN 34* 31* 27*   CREATININE 2.5* 2.3* 2.0*   GLUCOSE 124* 94 109*     Hepatic:   Recent Labs     07/26/25 0303 07/27/25  0852 07/28/25  0536   * 205* 137*   * 366* 282*   BILITOT 0.5 0.7 0.7   ALKPHOS 96 95 96     Troponin:   Recent Labs     07/25/25 1954 07/26/25 0303 07/26/25  0921   TROPHS 86* 85* 77*     BNP: No results for input(s): \"BNP\" in the last 72 hours.  Lipids: No results for input(s): \"CHOL\", \"HDL\" in the last 72 hours.    Invalid input(s): \"LDLCALCU\"  INR: No results for input(s): \"INR\" in the last 72 hours.    Objective:   Vitals: BP (!) 115/57   Pulse (!) 46   Temp 98.2 °F (36.8 °C) (Oral)   Resp 15   Ht 1.626 m

## 2025-07-29 VITALS
TEMPERATURE: 98 F | BODY MASS INDEX: 28.64 KG/M2 | SYSTOLIC BLOOD PRESSURE: 110 MMHG | OXYGEN SATURATION: 98 % | HEART RATE: 51 BPM | HEIGHT: 64 IN | DIASTOLIC BLOOD PRESSURE: 56 MMHG | RESPIRATION RATE: 16 BRPM | WEIGHT: 167.77 LBS

## 2025-07-29 LAB
ALBUMIN SERPL-MCNC: 3.2 G/DL (ref 3.5–5.2)
ALBUMIN/GLOB SERPL: 1.1 {RATIO} (ref 1–2.5)
ALP SERPL-CCNC: 98 U/L (ref 40–129)
ALT SERPL-CCNC: 240 U/L (ref 10–50)
ANION GAP SERPL CALCULATED.3IONS-SCNC: 9 MMOL/L (ref 9–16)
AST SERPL-CCNC: 117 U/L (ref 10–50)
BASOPHILS # BLD: 0.06 K/UL (ref 0–0.2)
BASOPHILS NFR BLD: 1 % (ref 0–2)
BILIRUB SERPL-MCNC: 0.8 MG/DL (ref 0–1.2)
BUN SERPL-MCNC: 25 MG/DL (ref 8–23)
CALCIUM SERPL-MCNC: 8.8 MG/DL (ref 8.6–10.4)
CHLORIDE SERPL-SCNC: 103 MMOL/L (ref 98–107)
CO2 SERPL-SCNC: 24 MMOL/L (ref 20–31)
CREAT SERPL-MCNC: 1.9 MG/DL (ref 0.7–1.2)
EOSINOPHIL # BLD: 0.18 K/UL (ref 0–0.44)
EOSINOPHILS RELATIVE PERCENT: 3 % (ref 1–4)
ERYTHROCYTE [DISTWIDTH] IN BLOOD BY AUTOMATED COUNT: 17.2 % (ref 11.8–14.4)
GFR, ESTIMATED: 37 ML/MIN/1.73M2
GLUCOSE BLD-MCNC: 124 MG/DL (ref 75–110)
GLUCOSE BLD-MCNC: 250 MG/DL (ref 75–110)
GLUCOSE SERPL-MCNC: 103 MG/DL (ref 74–99)
HCT VFR BLD AUTO: 36.1 % (ref 40.7–50.3)
HGB BLD-MCNC: 11.3 G/DL (ref 13–17)
IMM GRANULOCYTES # BLD AUTO: 0 K/UL (ref 0–0.3)
IMM GRANULOCYTES NFR BLD: 0 %
LYMPHOCYTES NFR BLD: 0.42 K/UL (ref 1.1–3.7)
LYMPHOCYTES RELATIVE PERCENT: 7 % (ref 24–43)
MAGNESIUM SERPL-MCNC: 2.3 MG/DL (ref 1.6–2.4)
MCH RBC QN AUTO: 28.8 PG (ref 25.2–33.5)
MCHC RBC AUTO-ENTMCNC: 31.3 G/DL (ref 28.4–34.8)
MCV RBC AUTO: 92.1 FL (ref 82.6–102.9)
MONOCYTES NFR BLD: 0.48 K/UL (ref 0.1–1.2)
MONOCYTES NFR BLD: 8 % (ref 3–12)
MORPHOLOGY: ABNORMAL
NEUTROPHILS NFR BLD: 81 % (ref 36–65)
NEUTS SEG NFR BLD: 4.86 K/UL (ref 1.5–8.1)
NRBC BLD-RTO: 0 PER 100 WBC
PLATELET # BLD AUTO: 153 K/UL (ref 138–453)
PMV BLD AUTO: 12.7 FL (ref 8.1–13.5)
POTASSIUM SERPL-SCNC: 4.5 MMOL/L (ref 3.7–5.3)
PROT SERPL-MCNC: 6.2 G/DL (ref 6.6–8.7)
RBC # BLD AUTO: 3.92 M/UL (ref 4.21–5.77)
SODIUM SERPL-SCNC: 136 MMOL/L (ref 136–145)
WBC OTHER # BLD: 6 K/UL (ref 3.5–11.3)

## 2025-07-29 PROCEDURE — 83735 ASSAY OF MAGNESIUM: CPT

## 2025-07-29 PROCEDURE — 6370000000 HC RX 637 (ALT 250 FOR IP)

## 2025-07-29 PROCEDURE — 97535 SELF CARE MNGMENT TRAINING: CPT

## 2025-07-29 PROCEDURE — 99232 SBSQ HOSP IP/OBS MODERATE 35: CPT | Performed by: FAMILY MEDICINE

## 2025-07-29 PROCEDURE — 99233 SBSQ HOSP IP/OBS HIGH 50: CPT | Performed by: NURSE PRACTITIONER

## 2025-07-29 PROCEDURE — 97116 GAIT TRAINING THERAPY: CPT

## 2025-07-29 PROCEDURE — 80053 COMPREHEN METABOLIC PANEL: CPT

## 2025-07-29 PROCEDURE — 97530 THERAPEUTIC ACTIVITIES: CPT

## 2025-07-29 PROCEDURE — 85025 COMPLETE CBC W/AUTO DIFF WBC: CPT

## 2025-07-29 PROCEDURE — 6370000000 HC RX 637 (ALT 250 FOR IP): Performed by: NURSE PRACTITIONER

## 2025-07-29 PROCEDURE — 36415 COLL VENOUS BLD VENIPUNCTURE: CPT

## 2025-07-29 PROCEDURE — 82947 ASSAY GLUCOSE BLOOD QUANT: CPT

## 2025-07-29 PROCEDURE — 97110 THERAPEUTIC EXERCISES: CPT

## 2025-07-29 PROCEDURE — 6370000000 HC RX 637 (ALT 250 FOR IP): Performed by: STUDENT IN AN ORGANIZED HEALTH CARE EDUCATION/TRAINING PROGRAM

## 2025-07-29 PROCEDURE — 2500000003 HC RX 250 WO HCPCS

## 2025-07-29 RX ORDER — FUROSEMIDE 40 MG/1
40 TABLET ORAL DAILY
Qty: 30 TABLET | Refills: 0 | Status: SHIPPED | OUTPATIENT
Start: 2025-07-29

## 2025-07-29 RX ORDER — MIDODRINE HYDROCHLORIDE 5 MG/1
15 TABLET ORAL
Qty: 270 TABLET | Refills: 0 | Status: SHIPPED | OUTPATIENT
Start: 2025-07-29

## 2025-07-29 RX ORDER — AMIODARONE HYDROCHLORIDE 200 MG/1
TABLET ORAL
Qty: 38 TABLET | Refills: 0 | Status: SHIPPED | OUTPATIENT
Start: 2025-07-29 | End: 2025-08-11

## 2025-07-29 RX ORDER — POTASSIUM CHLORIDE 1500 MG/1
10 TABLET, EXTENDED RELEASE ORAL DAILY
Qty: 60 TABLET | Refills: 0 | Status: SHIPPED | OUTPATIENT
Start: 2025-07-29

## 2025-07-29 RX ADMIN — AMIODARONE HYDROCHLORIDE 400 MG: 200 TABLET ORAL at 08:32

## 2025-07-29 RX ADMIN — ACETAMINOPHEN 650 MG: 325 TABLET ORAL at 08:39

## 2025-07-29 RX ADMIN — PANTOPRAZOLE SODIUM 40 MG: 40 TABLET, DELAYED RELEASE ORAL at 08:32

## 2025-07-29 RX ADMIN — MIDODRINE HYDROCHLORIDE 15 MG: 10 TABLET ORAL at 08:32

## 2025-07-29 RX ADMIN — SODIUM CHLORIDE, PRESERVATIVE FREE 10 ML: 5 INJECTION INTRAVENOUS at 08:33

## 2025-07-29 RX ADMIN — MIDODRINE HYDROCHLORIDE 15 MG: 10 TABLET ORAL at 11:37

## 2025-07-29 RX ADMIN — INSULIN LISPRO 2 UNITS: 100 INJECTION, SOLUTION INTRAVENOUS; SUBCUTANEOUS at 08:31

## 2025-07-29 RX ADMIN — TICAGRELOR 90 MG: 90 TABLET ORAL at 08:32

## 2025-07-29 RX ADMIN — LEVOTHYROXINE SODIUM 88 MCG: 0.09 TABLET ORAL at 08:33

## 2025-07-29 RX ADMIN — APIXABAN 5 MG: 5 TABLET, FILM COATED ORAL at 08:32

## 2025-07-29 RX ADMIN — ATORVASTATIN CALCIUM 80 MG: 80 TABLET, FILM COATED ORAL at 08:32

## 2025-07-29 NOTE — CARE COORDINATION
Case Management   Daily Progress Note       Patient Name: Claudio Graham                   YOB: 1955  Diagnosis: Other specified hypotension [I95.89]  Ventricular tachycardia (HCC) [I47.20]  Tachycardia [R00.0]  Near syncope [R55]                       GMLOS: 3.4 days  Length of Stay: 5  days    Anticipated Discharge Date: Ready for discharge    Readmission Risk (Low < 19, Mod (19-27), High > 27): Readmission Risk Score: 23.9        Current Transitional Plan    [x] Home Independently    [] Home with HC    [] Skilled Nursing Facility    [] Acute Rehabilitation    [] Long Term Acute Care (LTAC)    [] Other:     Plan for the Stay (Medical Management) :          Workflow Continuation (Additional Notes) :Patient is going home with friends, has walker at home. Will have orion GRAHAM RN  July 29, 2025

## 2025-07-29 NOTE — PLAN OF CARE
Problem: Chronic Conditions and Co-morbidities  Goal: Patient's chronic conditions and co-morbidity symptoms are monitored and maintained or improved  7/24/2025 1927 by Toya Aleman RN  Outcome: Progressing  7/24/2025 1627 by Tali Bergman RN  Outcome: Progressing     Problem: Discharge Planning  Goal: Discharge to home or other facility with appropriate resources  7/24/2025 1927 by Toya Aleman RN  Outcome: Progressing  7/24/2025 1627 by Tali Bergman RN  Outcome: Progressing     Problem: Pain  Goal: Verbalizes/displays adequate comfort level or baseline comfort level  7/24/2025 1927 by Toya Aleman RN  Outcome: Progressing  7/24/2025 1627 by Tali Bergman RN  Outcome: Progressing     Problem: Safety - Adult  Goal: Free from fall injury  7/24/2025 1927 by Toya Aleman RN  Outcome: Progressing  7/24/2025 1627 by Tali Bergman RN  Outcome: Progressing     Problem: ABCDS Injury Assessment  Goal: Absence of physical injury  7/24/2025 1927 by Toya Aleman RN  Outcome: Progressing  7/24/2025 1627 by Tali Bergman RN  Outcome: Progressing     Problem: Skin/Tissue Integrity  Goal: Skin integrity remains intact  Description: 1.  Monitor for areas of redness and/or skin breakdown  2.  Assess vascular access sites hourly  3.  Every 4-6 hours minimum:  Change oxygen saturation probe site  4.  Every 4-6 hours:  If on nasal continuous positive airway pressure, respiratory therapy assess nares and determine need for appliance change or resting period  7/24/2025 1927 by Toya Aleman RN  Outcome: Progressing  7/24/2025 1627 by Tali Bergman RN  Outcome: Progressing     
  Problem: Chronic Conditions and Co-morbidities  Goal: Patient's chronic conditions and co-morbidity symptoms are monitored and maintained or improved  7/26/2025 2248 by Cora John RN  Outcome: Progressing  Flowsheets (Taken 7/26/2025 2000)  Care Plan - Patient's Chronic Conditions and Co-Morbidity Symptoms are Monitored and Maintained or Improved: Monitor and assess patient's chronic conditions and comorbid symptoms for stability, deterioration, or improvement  7/26/2025 1537 by Kate Light  Outcome: Progressing     Problem: Discharge Planning  Goal: Discharge to home or other facility with appropriate resources  7/26/2025 2248 by Cora John RN  Outcome: Progressing  Flowsheets (Taken 7/26/2025 2000)  Discharge to home or other facility with appropriate resources:   Identify barriers to discharge with patient and caregiver   Arrange for needed discharge resources and transportation as appropriate  7/26/2025 1537 by Kate Light  Outcome: Progressing     Problem: Pain  Goal: Verbalizes/displays adequate comfort level or baseline comfort level  7/26/2025 2248 by Cora John RN  Outcome: Progressing  7/26/2025 1537 by Kate Light  Outcome: Progressing     Problem: Safety - Adult  Goal: Free from fall injury  7/26/2025 2248 by Cora John RN  Outcome: Progressing  7/26/2025 1537 by Kate Light  Outcome: Progressing     Problem: ABCDS Injury Assessment  Goal: Absence of physical injury  7/26/2025 2248 by Cora John RN  Outcome: Progressing  7/26/2025 1537 by Kate Light  Outcome: Progressing     Problem: Skin/Tissue Integrity  Goal: Skin integrity remains intact  Description: 1.  Monitor for areas of redness and/or skin breakdown  2.  Assess vascular access sites hourly  3.  Every 4-6 hours minimum:  Change oxygen saturation probe site  4.  Every 4-6 hours:  If on nasal continuous positive airway pressure, respiratory therapy assess 
  Problem: Chronic Conditions and Co-morbidities  Goal: Patient's chronic conditions and co-morbidity symptoms are monitored and maintained or improved  7/28/2025 1023 by Yoly Johnson RN  Outcome: Progressing  7/27/2025 2324 by Cora John RN  Outcome: Progressing  Flowsheets (Taken 7/27/2025 2000)  Care Plan - Patient's Chronic Conditions and Co-Morbidity Symptoms are Monitored and Maintained or Improved:   Monitor and assess patient's chronic conditions and comorbid symptoms for stability, deterioration, or improvement   Collaborate with multidisciplinary team to address chronic and comorbid conditions and prevent exacerbation or deterioration     Problem: Discharge Planning  Goal: Discharge to home or other facility with appropriate resources  7/28/2025 1023 by Yoly Johnson RN  Outcome: Progressing  7/27/2025 2324 by Cora John RN  Outcome: Progressing  Flowsheets (Taken 7/27/2025 2000)  Discharge to home or other facility with appropriate resources:   Identify barriers to discharge with patient and caregiver   Arrange for needed discharge resources and transportation as appropriate     Problem: Pain  Goal: Verbalizes/displays adequate comfort level or baseline comfort level  7/28/2025 1023 by Yoly Johnson RN  Outcome: Progressing  7/27/2025 2324 by Cora John RN  Outcome: Progressing     Problem: Safety - Adult  Goal: Free from fall injury  7/28/2025 1023 by Yoly Johnson RN  Outcome: Progressing  7/27/2025 2324 by Cora John RN  Outcome: Progressing  Flowsheets (Taken 7/27/2025 2323)  Free From Fall Injury:   Based on caregiver fall risk screen, instruct family/caregiver to ask for assistance with transferring infant if caregiver noted to have fall risk factors   Instruct family/caregiver on patient safety     Problem: ABCDS Injury Assessment  Goal: Absence of physical injury  7/28/2025 1023 by Yoly Johnson RN  Outcome: Progressing  7/27/2025 2324 by Alvaro 
  Problem: Chronic Conditions and Co-morbidities  Goal: Patient's chronic conditions and co-morbidity symptoms are monitored and maintained or improved  7/28/2025 2218 by Harish Sargent RN  Outcome: Progressing     Problem: Discharge Planning  Goal: Discharge to home or other facility with appropriate resources  7/28/2025 2218 by Harish Sargent RN  Outcome: Progressing     Problem: Pain  Goal: Verbalizes/displays adequate comfort level or baseline comfort level  7/28/2025 2218 by Harish Sargent RN  Outcome: Progressing     Problem: Safety - Adult  Goal: Free from fall injury  7/28/2025 2218 by Harish Sargent RN  Outcome: Progressing     Problem: ABCDS Injury Assessment  Goal: Absence of physical injury  7/28/2025 2218 by Harish Sargent RN  Outcome: Progressing     Problem: Skin/Tissue Integrity  Goal: Skin integrity remains intact  Description: 1.  Monitor for areas of redness and/or skin breakdown  2.  Assess vascular access sites hourly  3.  Every 4-6 hours minimum:  Change oxygen saturation probe site  4.  Every 4-6 hours:  If on nasal continuous positive airway pressure, respiratory therapy assess nares and determine need for appliance change or resting period  7/28/2025 2218 by Harish Sargent, RN  Outcome: Progressing     Problem: Nutrition Deficit:  Goal: Optimize nutritional status  7/28/2025 2218 by Harish Sargent RN  Outcome: Progressing     
  Problem: Chronic Conditions and Co-morbidities  Goal: Patient's chronic conditions and co-morbidity symptoms are monitored and maintained or improved  Outcome: Completed     Problem: Discharge Planning  Goal: Discharge to home or other facility with appropriate resources  Outcome: Completed  Flowsheets (Taken 7/29/2025 0800)  Discharge to home or other facility with appropriate resources:   Identify barriers to discharge with patient and caregiver   Arrange for needed discharge resources and transportation as appropriate   Identify discharge learning needs (meds, wound care, etc)   Refer to discharge planning if patient needs post-hospital services based on physician order or complex needs related to functional status, cognitive ability or social support system     Problem: Pain  Goal: Verbalizes/displays adequate comfort level or baseline comfort level  Outcome: Completed     Problem: Safety - Adult  Goal: Free from fall injury  Outcome: Completed     Problem: ABCDS Injury Assessment  Goal: Absence of physical injury  Outcome: Completed     Problem: Skin/Tissue Integrity  Goal: Skin integrity remains intact  Description: 1.  Monitor for areas of redness and/or skin breakdown  2.  Assess vascular access sites hourly  3.  Every 4-6 hours minimum:  Change oxygen saturation probe site  4.  Every 4-6 hours:  If on nasal continuous positive airway pressure, respiratory therapy assess nares and determine need for appliance change or resting period  Outcome: Completed     Problem: Nutrition Deficit:  Goal: Optimize nutritional status  Outcome: Completed     
  Problem: Chronic Conditions and Co-morbidities  Goal: Patient's chronic conditions and co-morbidity symptoms are monitored and maintained or improved  Outcome: Progressing     Problem: Discharge Planning  Goal: Discharge to home or other facility with appropriate resources  Outcome: Progressing     Problem: Pain  Goal: Verbalizes/displays adequate comfort level or baseline comfort level  Outcome: Progressing     Problem: Safety - Adult  Goal: Free from fall injury  Outcome: Progressing     Problem: ABCDS Injury Assessment  Goal: Absence of physical injury  Outcome: Progressing     Problem: Skin/Tissue Integrity  Goal: Skin integrity remains intact  Description: 1.  Monitor for areas of redness and/or skin breakdown  2.  Assess vascular access sites hourly  3.  Every 4-6 hours minimum:  Change oxygen saturation probe site  4.  Every 4-6 hours:  If on nasal continuous positive airway pressure, respiratory therapy assess nares and determine need for appliance change or resting period  Outcome: Progressing     Problem: Nutrition Deficit:  Goal: Optimize nutritional status  Outcome: Progressing     
  Problem: Chronic Conditions and Co-morbidities  Goal: Patient's chronic conditions and co-morbidity symptoms are monitored and maintained or improved  Outcome: Progressing     Problem: Discharge Planning  Goal: Discharge to home or other facility with appropriate resources  Outcome: Progressing     Problem: Pain  Goal: Verbalizes/displays adequate comfort level or baseline comfort level  Outcome: Progressing     Problem: Safety - Adult  Goal: Free from fall injury  Outcome: Progressing     Problem: ABCDS Injury Assessment  Goal: Absence of physical injury  Outcome: Progressing     Problem: Skin/Tissue Integrity  Goal: Skin integrity remains intact  Description: 1.  Monitor for areas of redness and/or skin breakdown  2.  Assess vascular access sites hourly  3.  Every 4-6 hours minimum:  Change oxygen saturation probe site  4.  Every 4-6 hours:  If on nasal continuous positive airway pressure, respiratory therapy assess nares and determine need for appliance change or resting period  Outcome: Progressing     Problem: Nutrition Deficit:  Goal: Optimize nutritional status  Outcome: Progressing     
  Problem: Chronic Conditions and Co-morbidities  Goal: Patient's chronic conditions and co-morbidity symptoms are monitored and maintained or improved  Outcome: Progressing     Problem: Discharge Planning  Goal: Discharge to home or other facility with appropriate resources  Outcome: Progressing     Problem: Safety - Adult  Goal: Free from fall injury  Outcome: Progressing     Problem: Skin/Tissue Integrity  Goal: Skin integrity remains intact  Description: 1.  Monitor for areas of redness and/or skin breakdown  2.  Assess vascular access sites hourly  3.  Every 4-6 hours minimum:  Change oxygen saturation probe site  4.  Every 4-6 hours:  If on nasal continuous positive airway pressure, respiratory therapy assess nares and determine need for appliance change or resting period  Outcome: Progressing     
  Problem: Chronic Conditions and Co-morbidities  Goal: Patient's chronic conditions and co-morbidity symptoms are monitored and maintained or improved  Outcome: Progressing  Flowsheets (Taken 7/25/2025 0830)  Care Plan - Patient's Chronic Conditions and Co-Morbidity Symptoms are Monitored and Maintained or Improved:   Monitor and assess patient's chronic conditions and comorbid symptoms for stability, deterioration, or improvement   Collaborate with multidisciplinary team to address chronic and comorbid conditions and prevent exacerbation or deterioration     Problem: Discharge Planning  Goal: Discharge to home or other facility with appropriate resources  Outcome: Progressing  Flowsheets (Taken 7/25/2025 0830)  Discharge to home or other facility with appropriate resources:   Identify barriers to discharge with patient and caregiver   Arrange for needed discharge resources and transportation as appropriate   Identify discharge learning needs (meds, wound care, etc)     Problem: Pain  Goal: Verbalizes/displays adequate comfort level or baseline comfort level  Outcome: Progressing     Problem: Safety - Adult  Goal: Free from fall injury  Outcome: Progressing  Flowsheets (Taken 7/25/2025 1900)  Free From Fall Injury: Instruct family/caregiver on patient safety     Problem: ABCDS Injury Assessment  Goal: Absence of physical injury  Outcome: Progressing  Flowsheets (Taken 7/25/2025 1900)  Absence of Physical Injury: Implement safety measures based on patient assessment     Problem: Skin/Tissue Integrity  Goal: Skin integrity remains intact  Description: 1.  Monitor for areas of redness and/or skin breakdown  2.  Assess vascular access sites hourly  3.  Every 4-6 hours minimum:  Change oxygen saturation probe site  4.  Every 4-6 hours:  If on nasal continuous positive airway pressure, respiratory therapy assess nares and determine need for appliance change or resting period  Outcome: Progressing  Flowsheets  Taken 
Cardiology fellow, Dr. Zulema Isabel, perfectserved about patients elevated LFTs.  Is on amiodarone drip.  Asked if a different antiarrhythmic would be preferred.  Message read.  No response.  
PATIENT REFUSES TO WEAR BIPAP     [x] Risks and benefits explained to patient   [x] Patient refuses to wear Bipap stating no.   [x] Patient verbalizes understanding of information presented.   
RN  Outcome: Progressing  7/27/2025 1609 by Donna Casas RN  Outcome: Progressing     Problem: Safety - Adult  Goal: Free from fall injury  7/27/2025 2324 by Cora John RN  Outcome: Progressing  Flowsheets (Taken 7/27/2025 2323)  Free From Fall Injury:   Based on caregiver fall risk screen, instruct family/caregiver to ask for assistance with transferring infant if caregiver noted to have fall risk factors   Instruct family/caregiver on patient safety  7/27/2025 1609 by Donna Casas RN  Outcome: Progressing  Flowsheets (Taken 7/27/2025 0800)  Free From Fall Injury: Instruct family/caregiver on patient safety     Problem: ABCDS Injury Assessment  Goal: Absence of physical injury  7/27/2025 2324 by Cora John RN  Outcome: Progressing  7/27/2025 1609 by Donna Casas RN  Outcome: Progressing  Flowsheets (Taken 7/27/2025 0800)  Absence of Physical Injury: Implement safety measures based on patient assessment     Problem: Skin/Tissue Integrity  Goal: Skin integrity remains intact  Description: 1.  Monitor for areas of redness and/or skin breakdown  2.  Assess vascular access sites hourly  3.  Every 4-6 hours minimum:  Change oxygen saturation probe site  4.  Every 4-6 hours:  If on nasal continuous positive airway pressure, respiratory therapy assess nares and determine need for appliance change or resting period  7/27/2025 2324 by Cora John RN  Outcome: Progressing  Flowsheets  Taken 7/27/2025 2323  Skin Integrity Remains Intact:   Monitor for areas of redness and/or skin breakdown   Turn and reposition as indicated  Taken 7/27/2025 2000  Skin Integrity Remains Intact:   Monitor for areas of redness and/or skin breakdown   Turn and reposition as indicated  7/27/2025 1609 by Donna Casas RN  Outcome: Progressing  Flowsheets (Taken 7/27/2025 0800)  Skin Integrity Remains Intact:   Monitor for areas of redness and/or skin breakdown   Assess vascular access sites hourly   Every

## 2025-07-29 NOTE — PROGRESS NOTES
[facility]  Office: [phone]  Philipp Woods, DO, Anurag Godinez, DO, Nelson Lucas DO, Tyler Peña, DO, Chelsea Quiroga MD, Kim Castillo MD, Tiffany Vidales MD, Kailey Finley MD,  Frantz Taylor MD, Arias Newsome MD, Laurel Elena MD,  Elder Aldana DO, Claudio Woods DO, Tena Lund MD,  Jimmie Gill DO, Aleksandra Gooden MD, Princess Magaña MD, Merlin Packer MD,  Patrick Newman MD, Aneudy Benton MD, Don Junior MD, Myles Sumner MD, Ezra Najera MD, Jeremiah Green MD, Kyle Muro DO, Elayne Castro MD, Lauro Joyce DO, Tani Marcelino MD, Gera Bryan MD, Elder Whitehead MD, Mohsin Reza, MD, Kirill Escobar MD, Shirley Waterhouse, CNP,  Liat Burkett, CNP, Kyle Lipscomb, CNP,  Daisy Doran, DNP, Alison Alfaro, CNP, Jewell Nieves, CNP, Negar Ty, CNP, Roxy Grant, CNP, Ariane Diane, PA-C, Khloe Narayanan, CNP, Edgar Tolbert, CNP,  Jessica Carias, CNP, Cathy Self, CNP, Carlos Rodriguez, PA-C, Brandee Serna, PA-C, Marissa Lino, CNP,        Deanna Garrett, CNS, Arielle Blum, CNP, Jessy Frey, CNP         [facility]   IN-PATIENT SERVICE   [facility]    Progress Note    7/29/2025    10:26 AM    Name:   Claudio Graham  MRN:     [id number]     Acct:      [id number]   Room:   2027/2027-01  IP Day:  5  Admit Date:  7/24/2025  8:45 AM    PCP:   Vanessa Mckenna APRN - CNP  Code Status:  Full Code    Subjective:     C/C:   Chief Complaint   Patient presents with    Chest Pain     Interval History Status: improved.     Patient seen and examined at bedside, no acute events overnight.    Patient vitals, labs and all providers notes were reviewed,from overnight shift and morning updates were noted and discussed with the nurse    Brief History:     This is a 70-year-old male with a significant past medi chronic diastolic congestive heart failure xiao history of hypertension, hyperlipidemia, type 2 diabetes mellitus, chronic diastolic

## 2025-07-29 NOTE — PROGRESS NOTES
Physical Therapy  Facility/Department: [facility] CAR 2- STEPDOWN   Physical Therapy Daily Treatment Note    Patient Name: Claudio Graham        MRN: [id number]    : 1955    Date of Service: 2025    Chief Complaint   Patient presents with    Chest Pain     Past Medical History:  has a past medical history of Acute HFrEF (heart failure with reduced ejection fraction) ([facility]), Acute on chronic combined systolic and diastolic congestive heart failure ([facility]), Acute on chronic congestive heart failure ([facility]), Acute respiratory failure with hypoxia ([facility]), Anemia, Anxiety, Atrial fibrillation ([facility]), CAD (coronary artery disease), Cardiac defibrillator in place, Cardiomyopathy ([facility]), CHF (congestive heart failure) (Formerly [facility]), Chronic combined systolic and diastolic heart failure ([facility]) s/[ AICD placed, Chronic kidney disease, Complex sleep apnea syndrome, Diabetic retinopathy ([facility]), Diverticulitis, DJD (degenerative joint disease), Edentulous, Gout, HTN (hypertension), Hyperlipidemia, Hypertension, Iron deficiency anemia, Ischemic cardiomyopathy, MI (myocardial infarction) ([facility]), Morbid obesity ([facility]), Obesity, TANNER variably compliant with BiPAP, Osteoarthritis, PAF (paroxysmal atrial fibrillation) (Formerly [facility]), Psychophysiologic insomnia, Thyroid disease, Type II or unspecified type diabetes mellitus without mention of complication, not stated as uncontrolled, and Unspecified sleep apnea.  Past Surgical History:  has a past surgical history that includes Colonoscopy (2007); Cardiac catheterization (2007); Cardiac catheterization (2013); Coronary angioplasty (, ); Cardiac defibrillator placement (2015); bone marrow biopsy (2012); pr colsc flx w/rmvl of tumor polyp lesion snare tq (N/A, 2017); Colonoscopy (2021); Colonoscopy (N/A, 2021); Colonoscopy (N/A, 2022); Cardiac procedure (N/A, 2024); Cardiac procedure (N/A, 2024); Cardiac procedure (N/A, 2024);

## 2025-07-29 NOTE — PROGRESS NOTES
[facility]   Progress Note                   Date:   7/29/2025  Patient name: Claudio Graham  Date of admission:  7/24/2025  8:45 AM  MRN:   [id number]  YOB: 1955  PCP: Vanessa Mckenna, APRN - CNP    Reason for Admission: Other specified hypotension [I95.89]  Ventricular tachycardia (HCC) [I47.20]  Tachycardia [R00.0]  Near syncope [R55]    Subjective:       Clinical Changes / Abnormalities: Pt seen and examined in the room.  Patient resting in bed. Pt denies any CP or sob.  Labs, vitals and tele reviewed- SB 54    Medications:   Scheduled Meds:   amiodarone  400 mg Oral BID    Followed by    [START ON 8/4/2025] amiodarone  200 mg Oral BID    insulin lispro  0-4 Units SubCUTAneous 4x Daily AC & HS    apixaban  5 mg Oral BID    midodrine  15 mg Oral TID WC    sodium chloride flush  5-40 mL IntraVENous 2 times per day    atorvastatin  80 mg Oral Daily    levothyroxine  88 mcg Oral Daily    pantoprazole  40 mg Oral QAM AC    ticagrelor  90 mg Oral BID     Continuous Infusions:   sodium chloride Stopped (07/25/25 1337)    dextrose       CBC:   Recent Labs     07/27/25  0852 07/28/25  0536 07/29/25  0718   WBC 5.1 6.3 6.0   HGB 11.2* 11.2* 11.3*    146 153     BMP:    Recent Labs     07/27/25  0852 07/28/25  0536 07/29/25  0718    138 136   K 3.7 4.4 4.5    103 103   CO2 27 24 24   BUN 31* 27* 25*   CREATININE 2.3* 2.0* 1.9*   GLUCOSE 94 109* 103*     Hepatic:   Recent Labs     07/27/25  0852 07/28/25  0536 07/29/25  0718   * 137* 117*   * 282* 240*   BILITOT 0.7 0.7 0.8   ALKPHOS 95 96 98     Troponin:   No results for input(s): \"TROPHS\" in the last 72 hours.    BNP: No results for input(s): \"BNP\" in the last 72 hours.  Lipids: No results for input(s): \"CHOL\", \"HDL\" in the last 72 hours.    Invalid input(s): \"LDLCALCU\"  INR: No results for input(s): \"INR\" in the last 72 hours.    Objective:   Vitals: /62   Pulse 54   Temp 98 °F (36.7 °C)

## 2025-07-29 NOTE — PROGRESS NOTES
Occupational Therapy Daily Treatment Note  Facility/Department: [facility] CAR 2- STEPDOWN   Patient Name: Claudio Graham        MRN: [id number]    : 1955    Date of Service: 2025    Chief Complaint   Patient presents with    Chest Pain     Past Medical History:  has a past medical history of Acute HFrEF (heart failure with reduced ejection fraction) ([facility]), Acute on chronic combined systolic and diastolic congestive heart failure ([facility]), Acute on chronic congestive heart failure ([facility]), Acute respiratory failure with hypoxia ([facility]), Anemia, Anxiety, Atrial fibrillation ([facility]), CAD (coronary artery disease), Cardiac defibrillator in place, Cardiomyopathy ([facility]), CHF (congestive heart failure) ([facility]), Chronic combined systolic and diastolic heart failure ([facility]) s/[ AICD placed, Chronic kidney disease, Complex sleep apnea syndrome, Diabetic retinopathy ([facility]), Diverticulitis, DJD (degenerative joint disease), Edentulous, Gout, HTN (hypertension), Hyperlipidemia, Hypertension, Iron deficiency anemia, Ischemic cardiomyopathy, MI (myocardial infarction) ([facility]), Morbid obesity ([facility]), Obesity, TANNER variably compliant with BiPAP, Osteoarthritis, PAF (paroxysmal atrial fibrillation) ([facility]), Psychophysiologic insomnia, Thyroid disease, Type II or unspecified type diabetes mellitus without mention of complication, not stated as uncontrolled, and Unspecified sleep apnea.  Past Surgical History:  has a past surgical history that includes Colonoscopy (2007); Cardiac catheterization (2007); Cardiac catheterization (2013); Coronary angioplasty (, ); Cardiac defibrillator placement (2015); bone marrow biopsy (2012); pr colsc flx w/rmvl of tumor polyp lesion snare tq (N/A, 2017); Colonoscopy (2021); Colonoscopy (N/A, 2021); Colonoscopy (N/A, 2022); Cardiac procedure (N/A, 2024); Cardiac procedure (N/A, 2024); Cardiac procedure (N/A, 2024); Cardiac

## 2025-07-29 NOTE — DISCHARGE SUMMARY
Writer gave pt discharge education and paperwork. All questions asked and answered. IV and telemetry has been removed. Pt discharging with all of his belongings. Pt wheeled down by RN to car.

## 2025-07-29 NOTE — DISCHARGE SUMMARY
[facility]  Office: [phone]  Philipp Woods DO, Anurag Godinez, DO, Nelson Lucas DO, Tyler Peña, DO, Chelsea Quiroga MD, Kim Castillo MD, Tiffnay Vidales MD, Kailey Finley MD,  Frantz Taylor MD, Arias Newsome MD, Laurel Elena MD,  Elder Aldana DO, Claudio Woods DO, Tena Lund MD,  Jimmie Gill DO, Aleksandra Gooden MD, Princess Magaña MD, Merlin Packer MD,  Patrick Newman MD, Aneudy Benton MD, Don Junior MD, Myles Sumner MD, Ezra Najera MD, Jeremiah Green MD, Kyle Muro DO, Elayne Castro MD, Lauro Joyce DO, Tani Marcelino MD, Gera Bryan MD, Elder Whitehead MD, Mohsin Reza, MD, Kirill Escobar MD, Shirley Waterhouse, CNP,  Liat Burkett, CNP, Kyle Lipscomb, CNP,  Daisy Doran, DNP, Alison Alfaro, CNP, Jewell Nieves, CNP, Negar Ty, CNP, Roxy Grant, CNP, Ariane Diane, PA-C, Khloe Narayanan, CNP, Edgar Tolbert, CNP,  Jessica Carias, CNP, Cathy Self, CNP, Carlos Rodriguez, PA-C, Brandee Serna, PA-C, Marissa Lion, CNP,        Deanna Garrett, CNS, Arielle Blum, CNP, Jessy Frey, CNP         [facility]   IN-PATIENT SERVICE   [facility]    Discharge Summary     Patient ID: Claudio Graham  :  1955   MRN: [id number]     ACCOUNT:  [id number]   Patient's PCP: Vanessa Mckenna APRN - CNP  Admit Date: 2025   Discharge Date: 2025  Length of Stay: 5  Code Status:  Prior  Admitting Physician: Gage Gooden MD  Discharge Physician: Kailey Finley MD     Active Discharge Diagnoses:     Hospital Problem Lists:  Principal Problem:    Ventricular tachycardia (HCC)  Active Problems:    TANNER variably compliant with BiPAP    CAD (coronary artery disease) s/p stenting of L anterior descending artery 2004    Ischemic cardiomyopathy    Hypotension    Transaminitis    Near syncope    Dyslipidemia    Primary hypertension    Type 2 diabetes mellitus with chronic kidney disease (HCC)    AICD (automatic

## 2025-07-30 ENCOUNTER — CARE COORDINATION (OUTPATIENT)
Dept: CARE COORDINATION | Age: 70
End: 2025-07-30

## 2025-07-30 ENCOUNTER — TELEPHONE (OUTPATIENT)
Dept: PRIMARY CARE CLINIC | Age: 70
End: 2025-07-30

## 2025-07-30 NOTE — CARE COORDINATION
Ambulatory Care Coordination Note     7/30/2025 11:58 AM     Patient outreach attempt by this AC today to perform hospital follow up. ACM was unable to reach the patient by telephone today;   left voice message requesting a return phone call to this ACM.     ACM: Alan Turk RN     Care Summary Note: called, phone answered but just a movement in the background  Attempted to contact again but was only able to leave a message. If no return call will reach out later this after noon.  This is the first attempt at hospital F/U.  Called, phone busy will reach out tomorrow    PCP/Specialist follow up:   Future Appointments         Provider Specialty Dept Phone    8/4/2025 3:30 PM [facility] RM [facility] Cardiology [phone]    8/12/2025 1:20 PM Yadira Guidry APRN  CNP Nephrology [phone]    8/19/2025 3:00 PM Vanessa Mckenna [facility] [phone]    8/25/2025 2:30 PM Bhavna Mar APRKARO - CNP Cardiology [phone]    9/2/2025 2:45 PM Vanessa Mckenna [facility] [phone]    10/9/2025 1:45 PM SCHEDULE, [facility] SCHEDULE Cardiology [phone]    10/9/2025 2:00 PM Darryl Soto MD Cardiology [phone]    12/22/2025 10:45 AM Beltran Miller MD Gastroenterology [phone]    3/16/2026 1:00 PM Elder Gamez MD Oncology [phone]    5/8/2026 11:15 AM Radha Paris MD Pulmonology [phone]            Follow Up:   Plan for next [facility] outreach in approximately 1-2 days  to complete:  - hospital follow up.

## 2025-07-31 ENCOUNTER — CARE COORDINATION (OUTPATIENT)
Dept: CARE COORDINATION | Age: 70
End: 2025-07-31

## 2025-07-31 DIAGNOSIS — R07.9 CHEST PAIN AT REST: Primary | ICD-10-CM

## 2025-07-31 PROCEDURE — 1111F DSCHRG MED/CURRENT MED MERGE: CPT | Performed by: NURSE PRACTITIONER

## 2025-07-31 NOTE — CARE COORDINATION
Ambulatory Care Coordination Note     2025 9:38 AM     Patient Current Location:  Home: [address] [place] [address]     ACM contacted the patient by telephone. Verified name and  with patient as identifiers.         ACM: Alan Turk RN     Challenges to be reviewed by the provider   Additional needs identified to be addressed with provider Yes  Hospital F/U appointment               Method of communication with provider: staff message.    Utilization: Has the patient been discharged from the hospital since your last call? yes -   Call within 2 business days of discharge: Yes    Patient: Claudio Graham    Patient : 1955   MRN: [id number]    Reason for Admission: COPD, Chest pain  Discharge Date: 25  RURS: Readmission Risk Score: 23.9      Last Discharge Facility       Date Complaint Diagnosis Description Type Department Provider    25 Chest Pain Tachycardia ... ED to Hosp-Admission (Discharged) (ADMITTED) [facility] CAR 2 Kailey Finley MD; Danial Rios...            Was this an external facility discharge? No    Ambulatory Care Manager reviewed discharge instructions with patient. The patient was given an opportunity to ask questions; all questions answered at this time.. The patient verbalized understanding.   Were discharge instructions available to patient? Yes.   Reviewed appropriate site of care based on symptoms and resources available to patient including: PCP  Benefits related nurse triage line  When to call 911  UpDownaging. The patient agrees to contact the primary care provider and/or specialist office for questions related to their healthcare.     Patients top risk factors for readmission: functional cognitive ability, lack of knowledge about disease, medication management, and utilization of services    Hospital follow up appointment: Discussed follow up appointments. Patient has hospital follow up appointment scheduled within 7 days of discharge.

## 2025-08-01 ENCOUNTER — CARE COORDINATION (OUTPATIENT)
Dept: CARE COORDINATION | Age: 70
End: 2025-08-01

## 2025-08-01 NOTE — CARE COORDINATION
Ambulatory Care Coordination Note     2025 11:36 AM     Patient Current Location:  Home: [address] [place] [address]     ACM contacted the patient by telephone. Verified name and  with patient as identifiers.         ACM: Alan Turk RN     Challenges to be reviewed by the provider   Additional needs identified to be addressed with provider No  N/A               Method of communication with provider: none.    Utilization: Patient has not had any utilization since our last call.     Care Summary Note: Called patient and informed him that [facility] has no Hospital appointment until his regularly scheduled appointment on 25. The office has put him on the waiting list if something comes up sooner.. Patient VU    Offered patient enrollment in the Remote Patient Monitoring (RPM) program for in-home monitoring: Yes, but did not enroll at this time: Previously declined.     Assessments Completed:   No changes since last call    Medications Reviewed:   Completed during a previous call     Advance Care Planning:   Reviewed and current     Care Planning:   Not completed during this call    PCP/Specialist follow up:   Future Appointments         Provider Specialty Dept Phone    2025 3:30 PM [facility] Cardiology [phone]    2025 1:20 PM Yadira Guidry APRN [facility] Nephrology [phone]    2025 3:00 PM Vanessa Mckenna APRKARO [facility] Primary Care [phone]    2025 2:30 PM Bhavna Mar APRN - CNP Cardiology [phone]    2025 2:45 PM Vanessa Mckenna [facility] [phone]    10/9/2025 1:45 PM SCHEDULE, [facility] SCHEDULE Cardiology [phone]    10/9/2025 2:00 PM Darryl Soto MD Cardiology [phone]    2025 10:45 AM Beltran Miller MD Gastroenterology [phone]    3/16/2026 1:00 PM Elder Gamez MD Oncology [phone]    2026 11:15 AM Radha Paris MD Pulmonology [phone]

## 2025-08-04 ENCOUNTER — HOSPITAL ENCOUNTER (OUTPATIENT)
Dept: OTHER | Age: 70
Discharge: HOME | End: 2025-08-04
Payer: COMMERCIAL

## 2025-08-04 VITALS
RESPIRATION RATE: 16 BRPM | OXYGEN SATURATION: 99 % | WEIGHT: 159 LBS | BODY MASS INDEX: 27.29 KG/M2 | HEART RATE: 54 BPM | DIASTOLIC BLOOD PRESSURE: 60 MMHG | SYSTOLIC BLOOD PRESSURE: 115 MMHG

## 2025-08-04 DIAGNOSIS — Z95.810 AICD (AUTOMATIC CARDIOVERTER/DEFIBRILLATOR) PRESENT: ICD-10-CM

## 2025-08-04 DIAGNOSIS — E87.6 HYPOKALEMIA: ICD-10-CM

## 2025-08-04 DIAGNOSIS — N18.32 CKD STAGE 3B, GFR 30-44 ML/MIN (HCC): ICD-10-CM

## 2025-08-04 DIAGNOSIS — I50.20 HFREF (HEART FAILURE WITH REDUCED EJECTION FRACTION) (HCC): Primary | ICD-10-CM

## 2025-08-04 DIAGNOSIS — I34.0 NONRHEUMATIC MITRAL VALVE REGURGITATION: ICD-10-CM

## 2025-08-04 PROBLEM — N18.31 STAGE 3A CHRONIC KIDNEY DISEASE (HCC): Status: RESOLVED | Noted: 2024-08-17 | Resolved: 2025-08-04

## 2025-08-04 PROCEDURE — 99214 OFFICE O/P EST MOD 30 MIN: CPT | Performed by: NURSE PRACTITIONER

## 2025-08-04 PROCEDURE — 99212 OFFICE O/P EST SF 10 MIN: CPT

## 2025-08-04 ASSESSMENT — ENCOUNTER SYMPTOMS
COUGH: 1
ABDOMINAL PAIN: 0
BLOOD IN STOOL: 0
SHORTNESS OF BREATH: 1
EYE DISCHARGE: 0

## 2025-08-05 ENCOUNTER — HOSPITAL ENCOUNTER (INPATIENT)
Age: 70
LOS: 9 days | Discharge: HOME HEALTH | DRG: 277 | End: 2025-08-14
Attending: EMERGENCY MEDICINE
Payer: COMMERCIAL

## 2025-08-05 ENCOUNTER — APPOINTMENT (OUTPATIENT)
Dept: GENERAL RADIOLOGY | Age: 70
DRG: 277 | End: 2025-08-05
Payer: COMMERCIAL

## 2025-08-05 DIAGNOSIS — D50.9 IRON DEFICIENCY ANEMIA, UNSPECIFIED IRON DEFICIENCY ANEMIA TYPE: ICD-10-CM

## 2025-08-05 DIAGNOSIS — I21.3 STEMI (ST ELEVATION MYOCARDIAL INFARCTION) (HCC): ICD-10-CM

## 2025-08-05 DIAGNOSIS — T82.110S PACEMAKER LEAD MALFUNCTION, SEQUELA: ICD-10-CM

## 2025-08-05 DIAGNOSIS — T82.120A DISLODGEMENT OF VENTRICULAR ELECTRODE LEAD OF IMPLANTABLE CARDIOVERTER-DEFIBRILLATOR (ICD): ICD-10-CM

## 2025-08-05 DIAGNOSIS — R55 NEAR SYNCOPE: ICD-10-CM

## 2025-08-05 DIAGNOSIS — I47.20 VENTRICULAR TACHYCARDIA (HCC): ICD-10-CM

## 2025-08-05 DIAGNOSIS — I47.29 OTHER VENTRICULAR TACHYCARDIA (HCC): Primary | ICD-10-CM

## 2025-08-05 LAB
ALBUMIN SERPL-MCNC: 3.4 G/DL (ref 3.5–5.2)
ALBUMIN/GLOB SERPL: 1.1 {RATIO} (ref 1–2.5)
ALP SERPL-CCNC: 86 U/L (ref 40–129)
ALT SERPL-CCNC: 186 U/L (ref 10–50)
ANION GAP SERPL CALCULATED.3IONS-SCNC: 19 MMOL/L (ref 9–16)
AST SERPL-CCNC: 187 U/L (ref 10–50)
BASOPHILS # BLD: 0.06 K/UL (ref 0–0.2)
BASOPHILS NFR BLD: 1 % (ref 0–2)
BILIRUB DIRECT SERPL-MCNC: 0.4 MG/DL (ref 0–0.2)
BILIRUB INDIRECT SERPL-MCNC: 0.4 MG/DL (ref 0–1)
BILIRUB SERPL-MCNC: 0.8 MG/DL (ref 0–1.2)
BILIRUB UR QL STRIP: NEGATIVE
BODY TEMPERATURE: 37
BUN SERPL-MCNC: 38 MG/DL (ref 8–23)
CALCIUM SERPL-MCNC: 8.7 MG/DL (ref 8.6–10.4)
CHLORIDE SERPL-SCNC: 99 MMOL/L (ref 98–107)
CLARITY UR: CLEAR
CO2 SERPL-SCNC: 22 MMOL/L (ref 20–31)
COHGB MFR BLD: 0.3 % (ref 0–5)
COLOR UR: YELLOW
COMMENT: ABNORMAL
CREAT SERPL-MCNC: 2.5 MG/DL (ref 0.7–1.2)
EOSINOPHIL # BLD: 0.1 K/UL (ref 0–0.44)
EOSINOPHILS RELATIVE PERCENT: 1 % (ref 1–4)
ERYTHROCYTE [DISTWIDTH] IN BLOOD BY AUTOMATED COUNT: 17.6 % (ref 11.8–14.4)
FIO2 ON VENT: ABNORMAL %
GFR, ESTIMATED: 27 ML/MIN/1.73M2
GLOBULIN SER CALC-MCNC: 3.2 G/DL
GLUCOSE BLD-MCNC: 124 MG/DL (ref 75–110)
GLUCOSE SERPL-MCNC: 117 MG/DL (ref 74–99)
GLUCOSE UR STRIP-MCNC: ABNORMAL MG/DL
HCO3 VENOUS: 26.9 MMOL/L (ref 24–30)
HCT VFR BLD AUTO: 35.8 % (ref 40.7–50.3)
HGB BLD-MCNC: 11.2 G/DL (ref 13–17)
HGB UR QL STRIP.AUTO: NEGATIVE
IMM GRANULOCYTES # BLD AUTO: <0.03 K/UL (ref 0–0.3)
IMM GRANULOCYTES NFR BLD: 0 %
KETONES UR STRIP-MCNC: NEGATIVE MG/DL
LACTIC ACID, WHOLE BLOOD: 0.9 MMOL/L (ref 0.7–2.1)
LEUKOCYTE ESTERASE UR QL STRIP: NEGATIVE
LYMPHOCYTES NFR BLD: 1.37 K/UL (ref 1.1–3.7)
LYMPHOCYTES RELATIVE PERCENT: 19 % (ref 24–43)
MAGNESIUM SERPL-MCNC: 2.3 MG/DL (ref 1.6–2.4)
MCH RBC QN AUTO: 29.1 PG (ref 25.2–33.5)
MCHC RBC AUTO-ENTMCNC: 31.3 G/DL (ref 28.4–34.8)
MCV RBC AUTO: 93 FL (ref 82.6–102.9)
MONOCYTES NFR BLD: 0.65 K/UL (ref 0.1–1.2)
MONOCYTES NFR BLD: 9 % (ref 3–12)
NEUTROPHILS NFR BLD: 70 % (ref 36–65)
NEUTS SEG NFR BLD: 4.91 K/UL (ref 1.5–8.1)
NITRITE UR QL STRIP: NEGATIVE
NRBC BLD-RTO: 0 PER 100 WBC
O2 SAT, VEN: 38.6 % (ref 60–85)
PCO2 VENOUS: 48.8 MM HG (ref 39–55)
PH UR STRIP: 6.5 [PH] (ref 5–8)
PH VENOUS: 7.36 (ref 7.32–7.42)
PHOSPHATE SERPL-MCNC: 3.9 MG/DL (ref 2.5–4.5)
PLATELET # BLD AUTO: 239 K/UL (ref 138–453)
PMV BLD AUTO: 11.4 FL (ref 8.1–13.5)
PO2 VENOUS: 23.5 MM HG (ref 30–50)
POSITIVE BASE EXCESS, VEN: 0.9 MMOL/L (ref 0–2)
POTASSIUM SERPL-SCNC: 3.8 MMOL/L (ref 3.7–5.3)
PROT SERPL-MCNC: 6.6 G/DL (ref 6.6–8.7)
PROT UR STRIP-MCNC: NEGATIVE MG/DL
RBC # BLD AUTO: 3.85 M/UL (ref 4.21–5.77)
RBC # BLD: ABNORMAL 10*6/UL
SODIUM SERPL-SCNC: 140 MMOL/L (ref 136–145)
SP GR UR STRIP: 1.01 (ref 1–1.03)
TROPONIN I SERPL HS-MCNC: 42 NG/L (ref 0–22)
TROPONIN I SERPL HS-MCNC: 48 NG/L (ref 0–22)
TSH SERPL DL<=0.05 MIU/L-ACNC: 2.38 UIU/ML (ref 0.27–4.2)
UROBILINOGEN UR STRIP-ACNC: NORMAL EU/DL (ref 0–1)
WBC OTHER # BLD: 7.1 K/UL (ref 3.5–11.3)

## 2025-08-05 PROCEDURE — 93005 ELECTROCARDIOGRAM TRACING: CPT | Performed by: STUDENT IN AN ORGANIZED HEALTH CARE EDUCATION/TRAINING PROGRAM

## 2025-08-05 PROCEDURE — 2500000003 HC RX 250 WO HCPCS: Performed by: NURSE PRACTITIONER

## 2025-08-05 PROCEDURE — 82746 ASSAY OF FOLIC ACID SERUM: CPT

## 2025-08-05 PROCEDURE — 36415 COLL VENOUS BLD VENIPUNCTURE: CPT

## 2025-08-05 PROCEDURE — 6360000002 HC RX W HCPCS: Performed by: EMERGENCY MEDICINE

## 2025-08-05 PROCEDURE — 99285 EMERGENCY DEPT VISIT HI MDM: CPT

## 2025-08-05 PROCEDURE — 84484 ASSAY OF TROPONIN QUANT: CPT

## 2025-08-05 PROCEDURE — 85025 COMPLETE CBC W/AUTO DIFF WBC: CPT

## 2025-08-05 PROCEDURE — 51798 US URINE CAPACITY MEASURE: CPT

## 2025-08-05 PROCEDURE — 5A2204Z RESTORATION OF CARDIAC RHYTHM, SINGLE: ICD-10-PCS

## 2025-08-05 PROCEDURE — 94761 N-INVAS EAR/PLS OXIMETRY MLT: CPT

## 2025-08-05 PROCEDURE — 83735 ASSAY OF MAGNESIUM: CPT

## 2025-08-05 PROCEDURE — 84443 ASSAY THYROID STIM HORMONE: CPT

## 2025-08-05 PROCEDURE — 82607 VITAMIN B-12: CPT

## 2025-08-05 PROCEDURE — 83605 ASSAY OF LACTIC ACID: CPT

## 2025-08-05 PROCEDURE — 81003 URINALYSIS AUTO W/O SCOPE: CPT

## 2025-08-05 PROCEDURE — 99223 1ST HOSP IP/OBS HIGH 75: CPT | Performed by: INTERNAL MEDICINE

## 2025-08-05 PROCEDURE — 82805 BLOOD GASES W/O2 SATURATION: CPT

## 2025-08-05 PROCEDURE — 82947 ASSAY GLUCOSE BLOOD QUANT: CPT

## 2025-08-05 PROCEDURE — 80048 BASIC METABOLIC PNL TOTAL CA: CPT

## 2025-08-05 PROCEDURE — 84100 ASSAY OF PHOSPHORUS: CPT

## 2025-08-05 PROCEDURE — 96374 THER/PROPH/DIAG INJ IV PUSH: CPT

## 2025-08-05 PROCEDURE — 93005 ELECTROCARDIOGRAM TRACING: CPT | Performed by: EMERGENCY MEDICINE

## 2025-08-05 PROCEDURE — 71045 X-RAY EXAM CHEST 1 VIEW: CPT

## 2025-08-05 PROCEDURE — 2700000000 HC OXYGEN THERAPY PER DAY

## 2025-08-05 PROCEDURE — 96375 TX/PRO/DX INJ NEW DRUG ADDON: CPT

## 2025-08-05 PROCEDURE — 6370000000 HC RX 637 (ALT 250 FOR IP): Performed by: NURSE PRACTITIONER

## 2025-08-05 PROCEDURE — 80076 HEPATIC FUNCTION PANEL: CPT

## 2025-08-05 PROCEDURE — 2060000000 HC ICU INTERMEDIATE R&B

## 2025-08-05 PROCEDURE — 6370000000 HC RX 637 (ALT 250 FOR IP): Performed by: INTERNAL MEDICINE

## 2025-08-05 RX ORDER — SODIUM CHLORIDE 0.9 % (FLUSH) 0.9 %
5-40 SYRINGE (ML) INJECTION EVERY 12 HOURS SCHEDULED
Status: DISCONTINUED | OUTPATIENT
Start: 2025-08-05 | End: 2025-08-14 | Stop reason: HOSPADM

## 2025-08-05 RX ORDER — FERROUS SULFATE 325(65) MG
325 TABLET, DELAYED RELEASE (ENTERIC COATED) ORAL DAILY
Status: DISCONTINUED | OUTPATIENT
Start: 2025-08-05 | End: 2025-08-14 | Stop reason: HOSPADM

## 2025-08-05 RX ORDER — ERGOCALCIFEROL 1.25 MG/1
50000 CAPSULE, LIQUID FILLED ORAL WEEKLY
Status: DISCONTINUED | OUTPATIENT
Start: 2025-08-08 | End: 2025-08-14 | Stop reason: HOSPADM

## 2025-08-05 RX ORDER — PNV NO.95/FERROUS FUM/FOLIC AC 28MG-0.8MG
1 TABLET ORAL DAILY
Qty: 90 TABLET | Refills: 1 | Status: ON HOLD | OUTPATIENT
Start: 2025-08-05

## 2025-08-05 RX ORDER — M-VIT,TX,IRON,MINS/CALC/FOLIC 27MG-0.4MG
1 TABLET ORAL DAILY
Status: DISCONTINUED | OUTPATIENT
Start: 2025-08-05 | End: 2025-08-14 | Stop reason: HOSPADM

## 2025-08-05 RX ORDER — DEXTROSE MONOHYDRATE 100 MG/ML
INJECTION, SOLUTION INTRAVENOUS CONTINUOUS PRN
Status: DISCONTINUED | OUTPATIENT
Start: 2025-08-05 | End: 2025-08-14 | Stop reason: HOSPADM

## 2025-08-05 RX ORDER — AMIODARONE HYDROCHLORIDE 200 MG/1
400 TABLET ORAL 2 TIMES DAILY
Status: DISCONTINUED | OUTPATIENT
Start: 2025-08-05 | End: 2025-08-07

## 2025-08-05 RX ORDER — SODIUM CHLORIDE 9 MG/ML
INJECTION, SOLUTION INTRAVENOUS PRN
Status: DISCONTINUED | OUTPATIENT
Start: 2025-08-05 | End: 2025-08-14 | Stop reason: HOSPADM

## 2025-08-05 RX ORDER — FUROSEMIDE 40 MG/1
40 TABLET ORAL DAILY
Status: DISCONTINUED | OUTPATIENT
Start: 2025-08-05 | End: 2025-08-14 | Stop reason: HOSPADM

## 2025-08-05 RX ORDER — ACETAMINOPHEN 325 MG/1
650 TABLET ORAL EVERY 6 HOURS PRN
Status: DISCONTINUED | OUTPATIENT
Start: 2025-08-05 | End: 2025-08-14 | Stop reason: HOSPADM

## 2025-08-05 RX ORDER — LEVOTHYROXINE SODIUM 88 UG/1
88 TABLET ORAL DAILY
Status: DISCONTINUED | OUTPATIENT
Start: 2025-08-05 | End: 2025-08-14 | Stop reason: HOSPADM

## 2025-08-05 RX ORDER — POLYETHYLENE GLYCOL 3350 17 G/17G
17 POWDER, FOR SOLUTION ORAL DAILY PRN
Status: DISCONTINUED | OUTPATIENT
Start: 2025-08-05 | End: 2025-08-14 | Stop reason: HOSPADM

## 2025-08-05 RX ORDER — ATORVASTATIN CALCIUM 80 MG/1
80 TABLET, FILM COATED ORAL DAILY
Status: DISCONTINUED | OUTPATIENT
Start: 2025-08-05 | End: 2025-08-14 | Stop reason: HOSPADM

## 2025-08-05 RX ORDER — ALLOPURINOL 300 MG/1
150 TABLET ORAL DAILY
Status: DISCONTINUED | OUTPATIENT
Start: 2025-08-05 | End: 2025-08-14 | Stop reason: HOSPADM

## 2025-08-05 RX ORDER — ISOSORBIDE MONONITRATE 30 MG/1
30 TABLET, EXTENDED RELEASE ORAL DAILY
Status: DISCONTINUED | OUTPATIENT
Start: 2025-08-05 | End: 2025-08-14

## 2025-08-05 RX ORDER — POTASSIUM CHLORIDE 1500 MG/1
10 TABLET, EXTENDED RELEASE ORAL DAILY
Status: DISCONTINUED | OUTPATIENT
Start: 2025-08-05 | End: 2025-08-14 | Stop reason: HOSPADM

## 2025-08-05 RX ORDER — INSULIN LISPRO 100 [IU]/ML
0-4 INJECTION, SOLUTION INTRAVENOUS; SUBCUTANEOUS
Status: DISCONTINUED | OUTPATIENT
Start: 2025-08-05 | End: 2025-08-14 | Stop reason: HOSPADM

## 2025-08-05 RX ORDER — ONDANSETRON 2 MG/ML
4 INJECTION INTRAMUSCULAR; INTRAVENOUS EVERY 6 HOURS PRN
Status: DISCONTINUED | OUTPATIENT
Start: 2025-08-05 | End: 2025-08-14 | Stop reason: HOSPADM

## 2025-08-05 RX ORDER — AMIODARONE HYDROCHLORIDE 200 MG/1
200 TABLET ORAL 2 TIMES DAILY
Status: DISCONTINUED | OUTPATIENT
Start: 2025-08-11 | End: 2025-08-07

## 2025-08-05 RX ORDER — PANTOPRAZOLE SODIUM 40 MG/1
40 TABLET, DELAYED RELEASE ORAL
Status: DISCONTINUED | OUTPATIENT
Start: 2025-08-06 | End: 2025-08-14 | Stop reason: HOSPADM

## 2025-08-05 RX ORDER — FENTANYL CITRATE 50 UG/ML
100 INJECTION, SOLUTION INTRAMUSCULAR; INTRAVENOUS ONCE
Status: COMPLETED | OUTPATIENT
Start: 2025-08-05 | End: 2025-08-05

## 2025-08-05 RX ORDER — ONDANSETRON 4 MG/1
4 TABLET, ORALLY DISINTEGRATING ORAL EVERY 8 HOURS PRN
Status: DISCONTINUED | OUTPATIENT
Start: 2025-08-05 | End: 2025-08-14 | Stop reason: HOSPADM

## 2025-08-05 RX ORDER — ACETAMINOPHEN 650 MG/1
650 SUPPOSITORY RECTAL EVERY 6 HOURS PRN
Status: DISCONTINUED | OUTPATIENT
Start: 2025-08-05 | End: 2025-08-14 | Stop reason: HOSPADM

## 2025-08-05 RX ORDER — MIDAZOLAM HYDROCHLORIDE 2 MG/2ML
2 INJECTION, SOLUTION INTRAMUSCULAR; INTRAVENOUS ONCE
Status: COMPLETED | OUTPATIENT
Start: 2025-08-05 | End: 2025-08-05

## 2025-08-05 RX ORDER — SODIUM CHLORIDE 0.9 % (FLUSH) 0.9 %
10 SYRINGE (ML) INJECTION PRN
Status: DISCONTINUED | OUTPATIENT
Start: 2025-08-05 | End: 2025-08-14 | Stop reason: HOSPADM

## 2025-08-05 RX ORDER — GLUCAGON 1 MG/ML
1 KIT INJECTION PRN
Status: DISCONTINUED | OUTPATIENT
Start: 2025-08-05 | End: 2025-08-14 | Stop reason: HOSPADM

## 2025-08-05 RX ORDER — MIDODRINE HYDROCHLORIDE 10 MG/1
10 TABLET ORAL 3 TIMES DAILY PRN
Status: DISCONTINUED | OUTPATIENT
Start: 2025-08-05 | End: 2025-08-14 | Stop reason: HOSPADM

## 2025-08-05 RX ORDER — TICAGRELOR 90 MG/1
90 TABLET, FILM COATED ORAL 2 TIMES DAILY
Status: DISCONTINUED | OUTPATIENT
Start: 2025-08-05 | End: 2025-08-14 | Stop reason: HOSPADM

## 2025-08-05 RX ADMIN — FENTANYL CITRATE 100 MCG: 50 INJECTION, SOLUTION INTRAMUSCULAR; INTRAVENOUS at 17:15

## 2025-08-05 RX ADMIN — MIDODRINE HYDROCHLORIDE 10 MG: 10 TABLET ORAL at 23:22

## 2025-08-05 RX ADMIN — AMIODARONE HYDROCHLORIDE 200 MG: 200 TABLET ORAL at 21:38

## 2025-08-05 RX ADMIN — MIDAZOLAM 1 MG: 1 INJECTION INTRAMUSCULAR; INTRAVENOUS at 18:21

## 2025-08-05 RX ADMIN — APIXABAN 5 MG: 5 TABLET, FILM COATED ORAL at 21:07

## 2025-08-05 RX ADMIN — TICAGRELOR 90 MG: 90 TABLET ORAL at 21:07

## 2025-08-05 RX ADMIN — SODIUM CHLORIDE, PRESERVATIVE FREE 10 ML: 5 INJECTION INTRAVENOUS at 20:57

## 2025-08-06 ENCOUNTER — CARE COORDINATION (OUTPATIENT)
Dept: CARE COORDINATION | Age: 70
End: 2025-08-06

## 2025-08-06 PROBLEM — Z95.5 STATUS POST CORONARY ARTERY STENT PLACEMENT: Status: ACTIVE | Noted: 2025-08-06

## 2025-08-06 PROBLEM — E44.0 MODERATE MALNUTRITION: Status: ACTIVE | Noted: 2025-08-06

## 2025-08-06 PROBLEM — I47.29 OTHER VENTRICULAR TACHYCARDIA (HCC): Status: ACTIVE | Noted: 2025-01-11

## 2025-08-06 LAB
ANION GAP SERPL CALCULATED.3IONS-SCNC: 12 MMOL/L (ref 9–16)
BNP SERPL-MCNC: ABNORMAL PG/ML (ref 0–125)
BUN SERPL-MCNC: 36 MG/DL (ref 8–23)
CALCIUM SERPL-MCNC: 8.2 MG/DL (ref 8.6–10.4)
CHLORIDE SERPL-SCNC: 103 MMOL/L (ref 98–107)
CO2 SERPL-SCNC: 26 MMOL/L (ref 20–31)
CREAT SERPL-MCNC: 2.5 MG/DL (ref 0.7–1.2)
EKG ATRIAL RATE: 40 BPM
EKG ATRIAL RATE: 41 BPM
EKG P AXIS: 12 DEGREES
EKG P AXIS: 38 DEGREES
EKG P-R INTERVAL: 240 MS
EKG P-R INTERVAL: 250 MS
EKG Q-T INTERVAL: 580 MS
EKG Q-T INTERVAL: 636 MS
EKG QRS DURATION: 232 MS
EKG QRS DURATION: 246 MS
EKG QTC CALCULATION (BAZETT): 478 MS
EKG QTC CALCULATION (BAZETT): 518 MS
EKG R AXIS: -69 DEGREES
EKG R AXIS: -72 DEGREES
EKG T AXIS: 100 DEGREES
EKG T AXIS: 94 DEGREES
EKG VENTRICULAR RATE: 40 BPM
EKG VENTRICULAR RATE: 41 BPM
FOLATE SERPL-MCNC: 11.5 NG/ML (ref 4.8–24.2)
GFR, ESTIMATED: 27 ML/MIN/1.73M2
GLUCOSE BLD-MCNC: 150 MG/DL (ref 75–110)
GLUCOSE BLD-MCNC: 153 MG/DL (ref 75–110)
GLUCOSE BLD-MCNC: 175 MG/DL (ref 75–110)
GLUCOSE BLD-MCNC: 95 MG/DL (ref 75–110)
GLUCOSE SERPL-MCNC: 100 MG/DL (ref 74–99)
INR PPP: 2.1
MAGNESIUM SERPL-MCNC: 2.3 MG/DL (ref 1.6–2.4)
POTASSIUM SERPL-SCNC: 2.9 MMOL/L (ref 3.7–5.3)
PROTHROMBIN TIME: 24.1 SEC (ref 11.7–14.9)
SODIUM SERPL-SCNC: 141 MMOL/L (ref 136–145)
VIT B12 SERPL-MCNC: 783 PG/ML (ref 232–1245)

## 2025-08-06 PROCEDURE — 93010 ELECTROCARDIOGRAM REPORT: CPT | Performed by: INTERNAL MEDICINE

## 2025-08-06 PROCEDURE — 6370000000 HC RX 637 (ALT 250 FOR IP): Performed by: NURSE PRACTITIONER

## 2025-08-06 PROCEDURE — 6360000002 HC RX W HCPCS: Performed by: STUDENT IN AN ORGANIZED HEALTH CARE EDUCATION/TRAINING PROGRAM

## 2025-08-06 PROCEDURE — 99223 1ST HOSP IP/OBS HIGH 75: CPT | Performed by: INTERNAL MEDICINE

## 2025-08-06 PROCEDURE — 80048 BASIC METABOLIC PNL TOTAL CA: CPT

## 2025-08-06 PROCEDURE — 6370000000 HC RX 637 (ALT 250 FOR IP): Performed by: STUDENT IN AN ORGANIZED HEALTH CARE EDUCATION/TRAINING PROGRAM

## 2025-08-06 PROCEDURE — 2060000000 HC ICU INTERMEDIATE R&B

## 2025-08-06 PROCEDURE — 36415 COLL VENOUS BLD VENIPUNCTURE: CPT

## 2025-08-06 PROCEDURE — 93005 ELECTROCARDIOGRAM TRACING: CPT | Performed by: STUDENT IN AN ORGANIZED HEALTH CARE EDUCATION/TRAINING PROGRAM

## 2025-08-06 PROCEDURE — 83735 ASSAY OF MAGNESIUM: CPT

## 2025-08-06 PROCEDURE — 99232 SBSQ HOSP IP/OBS MODERATE 35: CPT | Performed by: STUDENT IN AN ORGANIZED HEALTH CARE EDUCATION/TRAINING PROGRAM

## 2025-08-06 PROCEDURE — 85610 PROTHROMBIN TIME: CPT

## 2025-08-06 PROCEDURE — 83880 ASSAY OF NATRIURETIC PEPTIDE: CPT

## 2025-08-06 PROCEDURE — 2500000003 HC RX 250 WO HCPCS: Performed by: NURSE PRACTITIONER

## 2025-08-06 PROCEDURE — 82947 ASSAY GLUCOSE BLOOD QUANT: CPT

## 2025-08-06 PROCEDURE — 93005 ELECTROCARDIOGRAM TRACING: CPT | Performed by: NURSE PRACTITIONER

## 2025-08-06 RX ORDER — POTASSIUM CHLORIDE 1500 MG/1
40 TABLET, EXTENDED RELEASE ORAL ONCE
Status: COMPLETED | OUTPATIENT
Start: 2025-08-06 | End: 2025-08-06

## 2025-08-06 RX ORDER — POTASSIUM CHLORIDE 7.45 MG/ML
10 INJECTION INTRAVENOUS
Status: COMPLETED | OUTPATIENT
Start: 2025-08-06 | End: 2025-08-06

## 2025-08-06 RX ORDER — MIDODRINE HYDROCHLORIDE 5 MG/1
5 TABLET ORAL ONCE
Status: COMPLETED | OUTPATIENT
Start: 2025-08-06 | End: 2025-08-06

## 2025-08-06 RX ADMIN — AMIODARONE HYDROCHLORIDE 400 MG: 200 TABLET ORAL at 10:31

## 2025-08-06 RX ADMIN — ALLOPURINOL 150 MG: 300 TABLET ORAL at 10:33

## 2025-08-06 RX ADMIN — POTASSIUM CHLORIDE 40 MEQ: 1500 TABLET, EXTENDED RELEASE ORAL at 10:33

## 2025-08-06 RX ADMIN — AMIODARONE HYDROCHLORIDE 400 MG: 200 TABLET ORAL at 20:32

## 2025-08-06 RX ADMIN — PANTOPRAZOLE SODIUM 40 MG: 40 TABLET, DELAYED RELEASE ORAL at 05:48

## 2025-08-06 RX ADMIN — POTASSIUM CHLORIDE 10 MEQ: 1500 TABLET, EXTENDED RELEASE ORAL at 10:32

## 2025-08-06 RX ADMIN — LEVOTHYROXINE SODIUM 88 MCG: 0.09 TABLET ORAL at 10:33

## 2025-08-06 RX ADMIN — POTASSIUM CHLORIDE 10 MEQ: 7.45 INJECTION INTRAVENOUS at 14:55

## 2025-08-06 RX ADMIN — APIXABAN 5 MG: 5 TABLET, FILM COATED ORAL at 10:33

## 2025-08-06 RX ADMIN — MIDODRINE HYDROCHLORIDE 15 MG: 10 TABLET ORAL at 10:31

## 2025-08-06 RX ADMIN — SODIUM CHLORIDE, PRESERVATIVE FREE 10 ML: 5 INJECTION INTRAVENOUS at 10:34

## 2025-08-06 RX ADMIN — Medication 1 TABLET: at 20:32

## 2025-08-06 RX ADMIN — APIXABAN 5 MG: 5 TABLET, FILM COATED ORAL at 20:32

## 2025-08-06 RX ADMIN — ATORVASTATIN CALCIUM 80 MG: 80 TABLET, FILM COATED ORAL at 10:33

## 2025-08-06 RX ADMIN — POTASSIUM CHLORIDE 10 MEQ: 7.45 INJECTION INTRAVENOUS at 12:34

## 2025-08-06 RX ADMIN — TICAGRELOR 90 MG: 90 TABLET ORAL at 10:31

## 2025-08-06 RX ADMIN — TICAGRELOR 90 MG: 90 TABLET ORAL at 20:32

## 2025-08-06 RX ADMIN — POTASSIUM CHLORIDE 10 MEQ: 7.45 INJECTION INTRAVENOUS at 16:42

## 2025-08-06 RX ADMIN — MIDODRINE HYDROCHLORIDE 5 MG: 5 TABLET ORAL at 03:49

## 2025-08-06 RX ADMIN — FERROUS SULFATE TAB EC 325 MG (65 MG FE EQUIVALENT) 325 MG: 325 (65 FE) TABLET DELAYED RESPONSE at 10:32

## 2025-08-06 RX ADMIN — POTASSIUM CHLORIDE 10 MEQ: 7.45 INJECTION INTRAVENOUS at 10:45

## 2025-08-07 PROBLEM — Z51.5 ENCOUNTER FOR PALLIATIVE CARE: Status: ACTIVE | Noted: 2025-08-07

## 2025-08-07 PROBLEM — Z92.89 HISTORY OF CARDIOVERSION: Status: ACTIVE | Noted: 2025-08-07

## 2025-08-07 PROBLEM — Z71.89 GOALS OF CARE, COUNSELING/DISCUSSION: Status: ACTIVE | Noted: 2025-08-07

## 2025-08-07 LAB
ANION GAP SERPL CALCULATED.3IONS-SCNC: 12 MMOL/L (ref 9–16)
ANTI-XA UNFRAC HEPARIN: >2 IU/L
BUN SERPL-MCNC: 35 MG/DL (ref 8–23)
CALCIUM SERPL-MCNC: 8.5 MG/DL (ref 8.6–10.4)
CHLORIDE SERPL-SCNC: 104 MMOL/L (ref 98–107)
CO2 SERPL-SCNC: 23 MMOL/L (ref 20–31)
CREAT SERPL-MCNC: 2.4 MG/DL (ref 0.7–1.2)
EKG ATRIAL RATE: 500 BPM
EKG ATRIAL RATE: 69 BPM
EKG P AXIS: 116 DEGREES
EKG P-R INTERVAL: 80 MS
EKG Q-T INTERVAL: 342 MS
EKG Q-T INTERVAL: 450 MS
EKG QRS DURATION: 176 MS
EKG QRS DURATION: 200 MS
EKG QTC CALCULATION (BAZETT): 482 MS
EKG QTC CALCULATION (BAZETT): 531 MS
EKG R AXIS: -73 DEGREES
EKG R AXIS: 112 DEGREES
EKG T AXIS: -62 DEGREES
EKG T AXIS: 103 DEGREES
EKG VENTRICULAR RATE: 145 BPM
EKG VENTRICULAR RATE: 69 BPM
ERYTHROCYTE [DISTWIDTH] IN BLOOD BY AUTOMATED COUNT: 17.7 % (ref 11.8–14.4)
GFR, ESTIMATED: 28 ML/MIN/1.73M2
GLUCOSE BLD-MCNC: 126 MG/DL (ref 75–110)
GLUCOSE BLD-MCNC: 126 MG/DL (ref 75–110)
GLUCOSE BLD-MCNC: 144 MG/DL (ref 75–110)
GLUCOSE BLD-MCNC: 155 MG/DL (ref 75–110)
GLUCOSE SERPL-MCNC: 105 MG/DL (ref 74–99)
HCT VFR BLD AUTO: 33.3 % (ref 40.7–50.3)
HGB BLD-MCNC: 10.6 G/DL (ref 13–17)
INR PPP: 1.8
MCH RBC QN AUTO: 29.5 PG (ref 25.2–33.5)
MCHC RBC AUTO-ENTMCNC: 31.8 G/DL (ref 28.4–34.8)
MCV RBC AUTO: 92.8 FL (ref 82.6–102.9)
NRBC BLD-RTO: 0 PER 100 WBC
PARTIAL THROMBOPLASTIN TIME: 30.8 SEC (ref 23–36.5)
PARTIAL THROMBOPLASTIN TIME: 67.9 SEC (ref 23–36.5)
PLATELET # BLD AUTO: 157 K/UL (ref 138–453)
PMV BLD AUTO: 11.4 FL (ref 8.1–13.5)
POTASSIUM SERPL-SCNC: 3.8 MMOL/L (ref 3.7–5.3)
PROTHROMBIN TIME: 21.7 SEC (ref 11.7–14.9)
RBC # BLD AUTO: 3.59 M/UL (ref 4.21–5.77)
SODIUM SERPL-SCNC: 139 MMOL/L (ref 136–145)
WBC OTHER # BLD: 4.4 K/UL (ref 3.5–11.3)

## 2025-08-07 PROCEDURE — 2500000003 HC RX 250 WO HCPCS: Performed by: NURSE PRACTITIONER

## 2025-08-07 PROCEDURE — 99222 1ST HOSP IP/OBS MODERATE 55: CPT

## 2025-08-07 PROCEDURE — 99233 SBSQ HOSP IP/OBS HIGH 50: CPT | Performed by: NURSE PRACTITIONER

## 2025-08-07 PROCEDURE — 99232 SBSQ HOSP IP/OBS MODERATE 35: CPT | Performed by: STUDENT IN AN ORGANIZED HEALTH CARE EDUCATION/TRAINING PROGRAM

## 2025-08-07 PROCEDURE — 82947 ASSAY GLUCOSE BLOOD QUANT: CPT

## 2025-08-07 PROCEDURE — 6370000000 HC RX 637 (ALT 250 FOR IP): Performed by: NURSE PRACTITIONER

## 2025-08-07 PROCEDURE — 85027 COMPLETE CBC AUTOMATED: CPT

## 2025-08-07 PROCEDURE — 36415 COLL VENOUS BLD VENIPUNCTURE: CPT

## 2025-08-07 PROCEDURE — 85520 HEPARIN ASSAY: CPT

## 2025-08-07 PROCEDURE — 80048 BASIC METABOLIC PNL TOTAL CA: CPT

## 2025-08-07 PROCEDURE — 6370000000 HC RX 637 (ALT 250 FOR IP)

## 2025-08-07 PROCEDURE — 93010 ELECTROCARDIOGRAM REPORT: CPT | Performed by: INTERNAL MEDICINE

## 2025-08-07 PROCEDURE — 85610 PROTHROMBIN TIME: CPT

## 2025-08-07 PROCEDURE — 2060000000 HC ICU INTERMEDIATE R&B

## 2025-08-07 PROCEDURE — 6360000002 HC RX W HCPCS: Performed by: NURSE PRACTITIONER

## 2025-08-07 PROCEDURE — 85730 THROMBOPLASTIN TIME PARTIAL: CPT

## 2025-08-07 RX ORDER — HEPARIN SODIUM 1000 [USP'U]/ML
2000 INJECTION, SOLUTION INTRAVENOUS; SUBCUTANEOUS PRN
Status: DISCONTINUED | OUTPATIENT
Start: 2025-08-07 | End: 2025-08-14 | Stop reason: HOSPADM

## 2025-08-07 RX ORDER — HEPARIN SODIUM 1000 [USP'U]/ML
4000 INJECTION, SOLUTION INTRAVENOUS; SUBCUTANEOUS ONCE
Status: COMPLETED | OUTPATIENT
Start: 2025-08-07 | End: 2025-08-07

## 2025-08-07 RX ORDER — AMIODARONE HYDROCHLORIDE 200 MG/1
400 TABLET ORAL 3 TIMES DAILY
Status: DISCONTINUED | OUTPATIENT
Start: 2025-08-07 | End: 2025-08-10

## 2025-08-07 RX ORDER — HEPARIN SODIUM 1000 [USP'U]/ML
4000 INJECTION, SOLUTION INTRAVENOUS; SUBCUTANEOUS PRN
Status: DISCONTINUED | OUTPATIENT
Start: 2025-08-07 | End: 2025-08-14 | Stop reason: HOSPADM

## 2025-08-07 RX ORDER — HEPARIN SODIUM 10000 [USP'U]/100ML
5-30 INJECTION, SOLUTION INTRAVENOUS CONTINUOUS
Status: DISCONTINUED | OUTPATIENT
Start: 2025-08-07 | End: 2025-08-14 | Stop reason: HOSPADM

## 2025-08-07 RX ADMIN — Medication 1 TABLET: at 20:51

## 2025-08-07 RX ADMIN — TICAGRELOR 90 MG: 90 TABLET ORAL at 10:16

## 2025-08-07 RX ADMIN — AMIODARONE HYDROCHLORIDE 400 MG: 200 TABLET ORAL at 20:51

## 2025-08-07 RX ADMIN — LEVOTHYROXINE SODIUM 88 MCG: 0.09 TABLET ORAL at 10:15

## 2025-08-07 RX ADMIN — AMIODARONE HYDROCHLORIDE 400 MG: 200 TABLET ORAL at 14:47

## 2025-08-07 RX ADMIN — HEPARIN SODIUM 4000 UNITS: 1000 INJECTION, SOLUTION INTRAVENOUS; SUBCUTANEOUS at 14:47

## 2025-08-07 RX ADMIN — SODIUM CHLORIDE, PRESERVATIVE FREE 10 ML: 5 INJECTION INTRAVENOUS at 20:52

## 2025-08-07 RX ADMIN — SODIUM CHLORIDE, PRESERVATIVE FREE 10 ML: 5 INJECTION INTRAVENOUS at 10:16

## 2025-08-07 RX ADMIN — ALLOPURINOL 150 MG: 300 TABLET ORAL at 10:15

## 2025-08-07 RX ADMIN — HEPARIN SODIUM 12 UNITS/KG/HR: 10000 INJECTION, SOLUTION INTRAVENOUS at 14:51

## 2025-08-07 RX ADMIN — TICAGRELOR 90 MG: 90 TABLET ORAL at 20:52

## 2025-08-07 RX ADMIN — POTASSIUM CHLORIDE 10 MEQ: 1500 TABLET, EXTENDED RELEASE ORAL at 10:16

## 2025-08-07 RX ADMIN — AMIODARONE HYDROCHLORIDE 400 MG: 200 TABLET ORAL at 10:16

## 2025-08-07 RX ADMIN — APIXABAN 5 MG: 5 TABLET, FILM COATED ORAL at 10:15

## 2025-08-07 RX ADMIN — ATORVASTATIN CALCIUM 80 MG: 80 TABLET, FILM COATED ORAL at 10:15

## 2025-08-07 RX ADMIN — FERROUS SULFATE TAB EC 325 MG (65 MG FE EQUIVALENT) 325 MG: 325 (65 FE) TABLET DELAYED RESPONSE at 10:15

## 2025-08-08 ENCOUNTER — CARE COORDINATION (OUTPATIENT)
Dept: CARE COORDINATION | Age: 70
End: 2025-08-08

## 2025-08-08 LAB
GLUCOSE BLD-MCNC: 136 MG/DL (ref 75–110)
GLUCOSE BLD-MCNC: 137 MG/DL (ref 75–110)
GLUCOSE BLD-MCNC: 140 MG/DL (ref 75–110)
GLUCOSE BLD-MCNC: 153 MG/DL (ref 75–110)
PARTIAL THROMBOPLASTIN TIME: 60.9 SEC (ref 23–36.5)
PARTIAL THROMBOPLASTIN TIME: 66.2 SEC (ref 23–36.5)
PARTIAL THROMBOPLASTIN TIME: 74.7 SEC (ref 23–36.5)

## 2025-08-08 PROCEDURE — 97535 SELF CARE MNGMENT TRAINING: CPT

## 2025-08-08 PROCEDURE — 85730 THROMBOPLASTIN TIME PARTIAL: CPT

## 2025-08-08 PROCEDURE — 6370000000 HC RX 637 (ALT 250 FOR IP)

## 2025-08-08 PROCEDURE — 6370000000 HC RX 637 (ALT 250 FOR IP): Performed by: NURSE PRACTITIONER

## 2025-08-08 PROCEDURE — 2060000000 HC ICU INTERMEDIATE R&B

## 2025-08-08 PROCEDURE — 99233 SBSQ HOSP IP/OBS HIGH 50: CPT | Performed by: NURSE PRACTITIONER

## 2025-08-08 PROCEDURE — 6360000002 HC RX W HCPCS: Performed by: NURSE PRACTITIONER

## 2025-08-08 PROCEDURE — 97166 OT EVAL MOD COMPLEX 45 MIN: CPT

## 2025-08-08 PROCEDURE — 97530 THERAPEUTIC ACTIVITIES: CPT

## 2025-08-08 PROCEDURE — 99232 SBSQ HOSP IP/OBS MODERATE 35: CPT | Performed by: STUDENT IN AN ORGANIZED HEALTH CARE EDUCATION/TRAINING PROGRAM

## 2025-08-08 PROCEDURE — 97162 PT EVAL MOD COMPLEX 30 MIN: CPT

## 2025-08-08 PROCEDURE — 6370000000 HC RX 637 (ALT 250 FOR IP): Performed by: STUDENT IN AN ORGANIZED HEALTH CARE EDUCATION/TRAINING PROGRAM

## 2025-08-08 PROCEDURE — 2500000003 HC RX 250 WO HCPCS: Performed by: NURSE PRACTITIONER

## 2025-08-08 PROCEDURE — 36415 COLL VENOUS BLD VENIPUNCTURE: CPT

## 2025-08-08 PROCEDURE — 82947 ASSAY GLUCOSE BLOOD QUANT: CPT

## 2025-08-08 PROCEDURE — 97116 GAIT TRAINING THERAPY: CPT

## 2025-08-08 RX ORDER — ROPINIROLE 0.25 MG/1
0.5 TABLET, FILM COATED ORAL 3 TIMES DAILY
Status: DISCONTINUED | OUTPATIENT
Start: 2025-08-08 | End: 2025-08-08

## 2025-08-08 RX ORDER — ROPINIROLE 0.25 MG/1
0.25 TABLET, FILM COATED ORAL NIGHTLY
Status: DISCONTINUED | OUTPATIENT
Start: 2025-08-09 | End: 2025-08-14 | Stop reason: HOSPADM

## 2025-08-08 RX ADMIN — MIDODRINE HYDROCHLORIDE 15 MG: 10 TABLET ORAL at 17:04

## 2025-08-08 RX ADMIN — ROPINIROLE HYDROCHLORIDE 0.5 MG: 0.25 TABLET, FILM COATED ORAL at 10:44

## 2025-08-08 RX ADMIN — HEPARIN SODIUM 14 UNITS/KG/HR: 10000 INJECTION, SOLUTION INTRAVENOUS at 19:30

## 2025-08-08 RX ADMIN — ALLOPURINOL 150 MG: 300 TABLET ORAL at 10:41

## 2025-08-08 RX ADMIN — SODIUM CHLORIDE, PRESERVATIVE FREE 10 ML: 5 INJECTION INTRAVENOUS at 10:42

## 2025-08-08 RX ADMIN — LEVOTHYROXINE SODIUM 88 MCG: 0.09 TABLET ORAL at 10:43

## 2025-08-08 RX ADMIN — ACETAMINOPHEN 650 MG: 325 TABLET ORAL at 03:52

## 2025-08-08 RX ADMIN — HEPARIN SODIUM 2000 UNITS: 1000 INJECTION, SOLUTION INTRAVENOUS; SUBCUTANEOUS at 04:39

## 2025-08-08 RX ADMIN — SODIUM CHLORIDE, PRESERVATIVE FREE 10 ML: 5 INJECTION INTRAVENOUS at 21:15

## 2025-08-08 RX ADMIN — FERROUS SULFATE TAB EC 325 MG (65 MG FE EQUIVALENT) 325 MG: 325 (65 FE) TABLET DELAYED RESPONSE at 10:43

## 2025-08-08 RX ADMIN — Medication 1 TABLET: at 21:18

## 2025-08-08 RX ADMIN — ERGOCALCIFEROL 50000 UNITS: 1.25 CAPSULE ORAL at 17:05

## 2025-08-08 RX ADMIN — ATORVASTATIN CALCIUM 80 MG: 80 TABLET, FILM COATED ORAL at 10:44

## 2025-08-08 RX ADMIN — MIDODRINE HYDROCHLORIDE 15 MG: 10 TABLET ORAL at 10:43

## 2025-08-08 RX ADMIN — TICAGRELOR 90 MG: 90 TABLET ORAL at 21:15

## 2025-08-08 RX ADMIN — AMIODARONE HYDROCHLORIDE 400 MG: 200 TABLET ORAL at 10:44

## 2025-08-08 RX ADMIN — TICAGRELOR 90 MG: 90 TABLET ORAL at 10:42

## 2025-08-08 RX ADMIN — POTASSIUM CHLORIDE 10 MEQ: 1500 TABLET, EXTENDED RELEASE ORAL at 10:40

## 2025-08-09 LAB
ANION GAP SERPL CALCULATED.3IONS-SCNC: 13 MMOL/L (ref 9–16)
BUN SERPL-MCNC: 32 MG/DL (ref 8–23)
CALCIUM SERPL-MCNC: 8.9 MG/DL (ref 8.6–10.4)
CHLORIDE SERPL-SCNC: 106 MMOL/L (ref 98–107)
CO2 SERPL-SCNC: 21 MMOL/L (ref 20–31)
CREAT SERPL-MCNC: 1.9 MG/DL (ref 0.7–1.2)
ERYTHROCYTE [DISTWIDTH] IN BLOOD BY AUTOMATED COUNT: 17.9 % (ref 11.8–14.4)
GFR, ESTIMATED: 37 ML/MIN/1.73M2
GLUCOSE BLD-MCNC: 125 MG/DL (ref 75–110)
GLUCOSE BLD-MCNC: 139 MG/DL (ref 75–110)
GLUCOSE BLD-MCNC: 145 MG/DL (ref 75–110)
GLUCOSE BLD-MCNC: 146 MG/DL (ref 75–110)
GLUCOSE SERPL-MCNC: 134 MG/DL (ref 74–99)
HCT VFR BLD AUTO: 33.4 % (ref 40.7–50.3)
HGB BLD-MCNC: 10.8 G/DL (ref 13–17)
MCH RBC QN AUTO: 29.8 PG (ref 25.2–33.5)
MCHC RBC AUTO-ENTMCNC: 32.3 G/DL (ref 28.4–34.8)
MCV RBC AUTO: 92.3 FL (ref 82.6–102.9)
NRBC BLD-RTO: 0 PER 100 WBC
PARTIAL THROMBOPLASTIN TIME: 80.5 SEC (ref 23–36.5)
PLATELET # BLD AUTO: 142 K/UL (ref 138–453)
PMV BLD AUTO: 11.8 FL (ref 8.1–13.5)
POTASSIUM SERPL-SCNC: 4.9 MMOL/L (ref 3.7–5.3)
RBC # BLD AUTO: 3.62 M/UL (ref 4.21–5.77)
SODIUM SERPL-SCNC: 140 MMOL/L (ref 136–145)
WBC OTHER # BLD: 11.2 K/UL (ref 3.5–11.3)

## 2025-08-09 PROCEDURE — 6370000000 HC RX 637 (ALT 250 FOR IP): Performed by: STUDENT IN AN ORGANIZED HEALTH CARE EDUCATION/TRAINING PROGRAM

## 2025-08-09 PROCEDURE — 2500000003 HC RX 250 WO HCPCS: Performed by: NURSE PRACTITIONER

## 2025-08-09 PROCEDURE — 36415 COLL VENOUS BLD VENIPUNCTURE: CPT

## 2025-08-09 PROCEDURE — 99231 SBSQ HOSP IP/OBS SF/LOW 25: CPT | Performed by: STUDENT IN AN ORGANIZED HEALTH CARE EDUCATION/TRAINING PROGRAM

## 2025-08-09 PROCEDURE — 85027 COMPLETE CBC AUTOMATED: CPT

## 2025-08-09 PROCEDURE — 82947 ASSAY GLUCOSE BLOOD QUANT: CPT

## 2025-08-09 PROCEDURE — 6370000000 HC RX 637 (ALT 250 FOR IP)

## 2025-08-09 PROCEDURE — 85730 THROMBOPLASTIN TIME PARTIAL: CPT

## 2025-08-09 PROCEDURE — 6370000000 HC RX 637 (ALT 250 FOR IP): Performed by: NURSE PRACTITIONER

## 2025-08-09 PROCEDURE — 94761 N-INVAS EAR/PLS OXIMETRY MLT: CPT

## 2025-08-09 PROCEDURE — 6360000002 HC RX W HCPCS: Performed by: NURSE PRACTITIONER

## 2025-08-09 PROCEDURE — 80048 BASIC METABOLIC PNL TOTAL CA: CPT

## 2025-08-09 PROCEDURE — 2060000000 HC ICU INTERMEDIATE R&B

## 2025-08-09 PROCEDURE — 6370000000 HC RX 637 (ALT 250 FOR IP): Performed by: INTERNAL MEDICINE

## 2025-08-09 PROCEDURE — 99233 SBSQ HOSP IP/OBS HIGH 50: CPT | Performed by: NURSE PRACTITIONER

## 2025-08-09 RX ADMIN — ALLOPURINOL 150 MG: 300 TABLET ORAL at 08:28

## 2025-08-09 RX ADMIN — Medication 1 TABLET: at 21:02

## 2025-08-09 RX ADMIN — TICAGRELOR 90 MG: 90 TABLET ORAL at 08:28

## 2025-08-09 RX ADMIN — ISOSORBIDE MONONITRATE 30 MG: 30 TABLET, EXTENDED RELEASE ORAL at 08:28

## 2025-08-09 RX ADMIN — MIDODRINE HYDROCHLORIDE 15 MG: 10 TABLET ORAL at 08:27

## 2025-08-09 RX ADMIN — FERROUS SULFATE TAB EC 325 MG (65 MG FE EQUIVALENT) 325 MG: 325 (65 FE) TABLET DELAYED RESPONSE at 08:27

## 2025-08-09 RX ADMIN — PANTOPRAZOLE SODIUM 40 MG: 40 TABLET, DELAYED RELEASE ORAL at 06:59

## 2025-08-09 RX ADMIN — POTASSIUM CHLORIDE 10 MEQ: 1500 TABLET, EXTENDED RELEASE ORAL at 08:27

## 2025-08-09 RX ADMIN — FUROSEMIDE 40 MG: 40 TABLET ORAL at 08:27

## 2025-08-09 RX ADMIN — ATORVASTATIN CALCIUM 80 MG: 80 TABLET, FILM COATED ORAL at 08:28

## 2025-08-09 RX ADMIN — MIDODRINE HYDROCHLORIDE 15 MG: 10 TABLET ORAL at 11:46

## 2025-08-09 RX ADMIN — LEVOTHYROXINE SODIUM 88 MCG: 0.09 TABLET ORAL at 08:29

## 2025-08-09 RX ADMIN — ROPINIROLE HYDROCHLORIDE 0.25 MG: 0.25 TABLET, FILM COATED ORAL at 21:02

## 2025-08-09 RX ADMIN — AMIODARONE HYDROCHLORIDE 400 MG: 200 TABLET ORAL at 08:28

## 2025-08-09 RX ADMIN — SODIUM CHLORIDE, PRESERVATIVE FREE 10 ML: 5 INJECTION INTRAVENOUS at 21:02

## 2025-08-09 RX ADMIN — MIDODRINE HYDROCHLORIDE 15 MG: 10 TABLET ORAL at 17:54

## 2025-08-09 RX ADMIN — TICAGRELOR 90 MG: 90 TABLET ORAL at 21:01

## 2025-08-09 RX ADMIN — HEPARIN SODIUM 14 UNITS/KG/HR: 10000 INJECTION, SOLUTION INTRAVENOUS at 19:45

## 2025-08-09 ASSESSMENT — PAIN SCALES - GENERAL
PAINLEVEL_OUTOF10: 0
PAINLEVEL_OUTOF10: 0

## 2025-08-10 LAB
ANION GAP SERPL CALCULATED.3IONS-SCNC: 10 MMOL/L (ref 9–16)
BUN SERPL-MCNC: 35 MG/DL (ref 8–23)
CALCIUM SERPL-MCNC: 8.5 MG/DL (ref 8.6–10.4)
CHLORIDE SERPL-SCNC: 103 MMOL/L (ref 98–107)
CO2 SERPL-SCNC: 25 MMOL/L (ref 20–31)
CREAT SERPL-MCNC: 2.2 MG/DL (ref 0.7–1.2)
GFR, ESTIMATED: 31 ML/MIN/1.73M2
GLUCOSE BLD-MCNC: 126 MG/DL (ref 75–110)
GLUCOSE BLD-MCNC: 130 MG/DL (ref 75–110)
GLUCOSE BLD-MCNC: 131 MG/DL (ref 75–110)
GLUCOSE BLD-MCNC: 150 MG/DL (ref 75–110)
GLUCOSE BLD-MCNC: 91 MG/DL (ref 75–110)
GLUCOSE SERPL-MCNC: 103 MG/DL (ref 74–99)
PARTIAL THROMBOPLASTIN TIME: 80.9 SEC (ref 23–36.5)
POTASSIUM SERPL-SCNC: 3.8 MMOL/L (ref 3.7–5.3)
SODIUM SERPL-SCNC: 138 MMOL/L (ref 136–145)

## 2025-08-10 PROCEDURE — 99231 SBSQ HOSP IP/OBS SF/LOW 25: CPT | Performed by: STUDENT IN AN ORGANIZED HEALTH CARE EDUCATION/TRAINING PROGRAM

## 2025-08-10 PROCEDURE — 6360000002 HC RX W HCPCS: Performed by: NURSE PRACTITIONER

## 2025-08-10 PROCEDURE — 80048 BASIC METABOLIC PNL TOTAL CA: CPT

## 2025-08-10 PROCEDURE — 6370000000 HC RX 637 (ALT 250 FOR IP): Performed by: INTERNAL MEDICINE

## 2025-08-10 PROCEDURE — 2500000003 HC RX 250 WO HCPCS: Performed by: NURSE PRACTITIONER

## 2025-08-10 PROCEDURE — 36415 COLL VENOUS BLD VENIPUNCTURE: CPT

## 2025-08-10 PROCEDURE — 6370000000 HC RX 637 (ALT 250 FOR IP): Performed by: NURSE PRACTITIONER

## 2025-08-10 PROCEDURE — 85730 THROMBOPLASTIN TIME PARTIAL: CPT

## 2025-08-10 PROCEDURE — 82947 ASSAY GLUCOSE BLOOD QUANT: CPT

## 2025-08-10 PROCEDURE — 2060000000 HC ICU INTERMEDIATE R&B

## 2025-08-10 PROCEDURE — 99233 SBSQ HOSP IP/OBS HIGH 50: CPT | Performed by: NURSE PRACTITIONER

## 2025-08-10 PROCEDURE — 6370000000 HC RX 637 (ALT 250 FOR IP): Performed by: STUDENT IN AN ORGANIZED HEALTH CARE EDUCATION/TRAINING PROGRAM

## 2025-08-10 RX ORDER — AMIODARONE HYDROCHLORIDE 200 MG/1
400 TABLET ORAL 2 TIMES DAILY
Status: DISCONTINUED | OUTPATIENT
Start: 2025-08-10 | End: 2025-08-14

## 2025-08-10 RX ADMIN — FUROSEMIDE 40 MG: 40 TABLET ORAL at 08:42

## 2025-08-10 RX ADMIN — ALLOPURINOL 150 MG: 300 TABLET ORAL at 08:42

## 2025-08-10 RX ADMIN — SODIUM CHLORIDE, PRESERVATIVE FREE 10 ML: 5 INJECTION INTRAVENOUS at 21:21

## 2025-08-10 RX ADMIN — LEVOTHYROXINE SODIUM 88 MCG: 0.09 TABLET ORAL at 08:42

## 2025-08-10 RX ADMIN — ATORVASTATIN CALCIUM 80 MG: 80 TABLET, FILM COATED ORAL at 08:42

## 2025-08-10 RX ADMIN — MIDODRINE HYDROCHLORIDE 15 MG: 10 TABLET ORAL at 08:41

## 2025-08-10 RX ADMIN — TICAGRELOR 90 MG: 90 TABLET ORAL at 21:20

## 2025-08-10 RX ADMIN — Medication 1 TABLET: at 21:20

## 2025-08-10 RX ADMIN — HEPARIN SODIUM 14 UNITS/KG/HR: 10000 INJECTION, SOLUTION INTRAVENOUS at 19:14

## 2025-08-10 RX ADMIN — TICAGRELOR 90 MG: 90 TABLET ORAL at 08:41

## 2025-08-10 RX ADMIN — POTASSIUM CHLORIDE 10 MEQ: 1500 TABLET, EXTENDED RELEASE ORAL at 08:41

## 2025-08-10 RX ADMIN — PANTOPRAZOLE SODIUM 40 MG: 40 TABLET, DELAYED RELEASE ORAL at 06:28

## 2025-08-10 RX ADMIN — FERROUS SULFATE TAB EC 325 MG (65 MG FE EQUIVALENT) 325 MG: 325 (65 FE) TABLET DELAYED RESPONSE at 08:42

## 2025-08-10 RX ADMIN — ROPINIROLE HYDROCHLORIDE 0.25 MG: 0.25 TABLET, FILM COATED ORAL at 21:20

## 2025-08-10 RX ADMIN — ISOSORBIDE MONONITRATE 30 MG: 30 TABLET, EXTENDED RELEASE ORAL at 08:42

## 2025-08-10 ASSESSMENT — PAIN SCALES - GENERAL: PAINLEVEL_OUTOF10: 0

## 2025-08-11 LAB
GLUCOSE BLD-MCNC: 131 MG/DL (ref 75–110)
GLUCOSE BLD-MCNC: 142 MG/DL (ref 75–110)
GLUCOSE BLD-MCNC: 156 MG/DL (ref 75–110)
GLUCOSE BLD-MCNC: 156 MG/DL (ref 75–110)

## 2025-08-11 PROCEDURE — 76937 US GUIDE VASCULAR ACCESS: CPT | Performed by: HOSPITALIST

## 2025-08-11 PROCEDURE — 6370000000 HC RX 637 (ALT 250 FOR IP): Performed by: NURSE PRACTITIONER

## 2025-08-11 PROCEDURE — 97110 THERAPEUTIC EXERCISES: CPT

## 2025-08-11 PROCEDURE — 99152 MOD SED SAME PHYS/QHP 5/>YRS: CPT | Performed by: HOSPITALIST

## 2025-08-11 PROCEDURE — 2720000010 HC SURG SUPPLY STERILE: Performed by: HOSPITALIST

## 2025-08-11 PROCEDURE — C1898 LEAD, PMKR, OTHER THAN TRANS: HCPCS | Performed by: HOSPITALIST

## 2025-08-11 PROCEDURE — C1882 AICD, OTHER THAN SING/DUAL: HCPCS | Performed by: HOSPITALIST

## 2025-08-11 PROCEDURE — 33262 RMVL& REPLC PULSE GEN 1 LEAD: CPT | Performed by: HOSPITALIST

## 2025-08-11 PROCEDURE — C1887 CATHETER, GUIDING: HCPCS | Performed by: HOSPITALIST

## 2025-08-11 PROCEDURE — 6370000000 HC RX 637 (ALT 250 FOR IP): Performed by: HOSPITALIST

## 2025-08-11 PROCEDURE — 2500000003 HC RX 250 WO HCPCS: Performed by: HOSPITALIST

## 2025-08-11 PROCEDURE — 33207 INSERT HEART PM VENTRICULAR: CPT | Performed by: HOSPITALIST

## 2025-08-11 PROCEDURE — 2060000000 HC ICU INTERMEDIATE R&B

## 2025-08-11 PROCEDURE — C1769 GUIDE WIRE: HCPCS | Performed by: HOSPITALIST

## 2025-08-11 PROCEDURE — 99232 SBSQ HOSP IP/OBS MODERATE 35: CPT | Performed by: STUDENT IN AN ORGANIZED HEALTH CARE EDUCATION/TRAINING PROGRAM

## 2025-08-11 PROCEDURE — 6360000002 HC RX W HCPCS: Performed by: HOSPITALIST

## 2025-08-11 PROCEDURE — 6370000000 HC RX 637 (ALT 250 FOR IP): Performed by: INTERNAL MEDICINE

## 2025-08-11 PROCEDURE — 33227 REMOVE&REPLACE PM GEN SINGL: CPT | Performed by: HOSPITALIST

## 2025-08-11 PROCEDURE — 97530 THERAPEUTIC ACTIVITIES: CPT

## 2025-08-11 PROCEDURE — 2709999900 HC NON-CHARGEABLE SUPPLY: Performed by: HOSPITALIST

## 2025-08-11 PROCEDURE — 0JPT0PZ REMOVAL OF CARDIAC RHYTHM RELATED DEVICE FROM TRUNK SUBCUTANEOUS TISSUE AND FASCIA, OPEN APPROACH: ICD-10-PCS | Performed by: HOSPITALIST

## 2025-08-11 PROCEDURE — 99153 MOD SED SAME PHYS/QHP EA: CPT | Performed by: HOSPITALIST

## 2025-08-11 PROCEDURE — 33264 RMVL & RPLCMT DFB GEN MLT LD: CPT | Performed by: HOSPITALIST

## 2025-08-11 PROCEDURE — 02HK3KZ INSERTION OF DEFIBRILLATOR LEAD INTO RIGHT VENTRICLE, PERCUTANEOUS APPROACH: ICD-10-PCS | Performed by: HOSPITALIST

## 2025-08-11 PROCEDURE — 82947 ASSAY GLUCOSE BLOOD QUANT: CPT

## 2025-08-11 PROCEDURE — 36005 INJECTION EXT VENOGRAPHY: CPT | Performed by: HOSPITALIST

## 2025-08-11 PROCEDURE — 97535 SELF CARE MNGMENT TRAINING: CPT

## 2025-08-11 PROCEDURE — C1892 INTRO/SHEATH,FIXED,PEEL-AWAY: HCPCS | Performed by: HOSPITALIST

## 2025-08-11 PROCEDURE — 0JH608Z INSERTION OF DEFIBRILLATOR GENERATOR INTO CHEST SUBCUTANEOUS TISSUE AND FASCIA, OPEN APPROACH: ICD-10-PCS | Performed by: HOSPITALIST

## 2025-08-11 PROCEDURE — 2500000003 HC RX 250 WO HCPCS: Performed by: NURSE PRACTITIONER

## 2025-08-11 PROCEDURE — 6360000004 HC RX CONTRAST MEDICATION: Performed by: HOSPITALIST

## 2025-08-11 PROCEDURE — 2580000003 HC RX 258: Performed by: HOSPITALIST

## 2025-08-11 PROCEDURE — 97116 GAIT TRAINING THERAPY: CPT

## 2025-08-11 PROCEDURE — 6370000000 HC RX 637 (ALT 250 FOR IP): Performed by: STUDENT IN AN ORGANIZED HEALTH CARE EDUCATION/TRAINING PROGRAM

## 2025-08-11 DEVICE — IMPLANTABLE DEVICE: Type: IMPLANTABLE DEVICE | Site: HEART | Status: FUNCTIONAL

## 2025-08-11 DEVICE — IMPLANTABLE DEVICE: Type: IMPLANTABLE DEVICE | Site: CHEST | Status: FUNCTIONAL

## 2025-08-11 RX ORDER — TRAMADOL HYDROCHLORIDE 50 MG/1
50 TABLET ORAL EVERY 6 HOURS PRN
Status: ACTIVE | OUTPATIENT
Start: 2025-08-11 | End: 2025-08-13

## 2025-08-11 RX ORDER — MIDAZOLAM HYDROCHLORIDE 1 MG/ML
INJECTION, SOLUTION INTRAMUSCULAR; INTRAVENOUS PRN
Status: DISCONTINUED | OUTPATIENT
Start: 2025-08-11 | End: 2025-08-11 | Stop reason: HOSPADM

## 2025-08-11 RX ORDER — IOPAMIDOL 755 MG/ML
INJECTION, SOLUTION INTRAVASCULAR PRN
Status: DISCONTINUED | OUTPATIENT
Start: 2025-08-11 | End: 2025-08-11 | Stop reason: HOSPADM

## 2025-08-11 RX ORDER — SODIUM CHLORIDE 9 MG/ML
INJECTION, SOLUTION INTRAVENOUS CONTINUOUS PRN
Status: COMPLETED | OUTPATIENT
Start: 2025-08-11 | End: 2025-08-11

## 2025-08-11 RX ORDER — LIDOCAINE HYDROCHLORIDE AND EPINEPHRINE 10; 10 MG/ML; UG/ML
INJECTION, SOLUTION INFILTRATION; PERINEURAL PRN
Status: DISCONTINUED | OUTPATIENT
Start: 2025-08-11 | End: 2025-08-11 | Stop reason: HOSPADM

## 2025-08-11 RX ORDER — TRAMADOL HYDROCHLORIDE 50 MG/1
100 TABLET ORAL EVERY 6 HOURS PRN
Status: ACTIVE | OUTPATIENT
Start: 2025-08-11 | End: 2025-08-13

## 2025-08-11 RX ORDER — CEFAZOLIN SODIUM 1 G/3ML
INJECTION, POWDER, FOR SOLUTION INTRAMUSCULAR; INTRAVENOUS PRN
Status: DISCONTINUED | OUTPATIENT
Start: 2025-08-11 | End: 2025-08-11 | Stop reason: HOSPADM

## 2025-08-11 RX ORDER — SODIUM CHLORIDE 9 MG/ML
INJECTION, SOLUTION INTRAVENOUS PRN
Status: DISCONTINUED | OUTPATIENT
Start: 2025-08-11 | End: 2025-08-14 | Stop reason: HOSPADM

## 2025-08-11 RX ORDER — FENTANYL CITRATE 50 UG/ML
INJECTION, SOLUTION INTRAMUSCULAR; INTRAVENOUS PRN
Status: DISCONTINUED | OUTPATIENT
Start: 2025-08-11 | End: 2025-08-11 | Stop reason: HOSPADM

## 2025-08-11 RX ORDER — SODIUM CHLORIDE 0.9 % (FLUSH) 0.9 %
5-40 SYRINGE (ML) INJECTION EVERY 12 HOURS SCHEDULED
Status: DISCONTINUED | OUTPATIENT
Start: 2025-08-11 | End: 2025-08-14 | Stop reason: HOSPADM

## 2025-08-11 RX ORDER — SODIUM CHLORIDE 0.9 % (FLUSH) 0.9 %
5-40 SYRINGE (ML) INJECTION PRN
Status: DISCONTINUED | OUTPATIENT
Start: 2025-08-11 | End: 2025-08-14 | Stop reason: HOSPADM

## 2025-08-11 RX ADMIN — SODIUM CHLORIDE, PRESERVATIVE FREE 10 ML: 5 INJECTION INTRAVENOUS at 10:32

## 2025-08-11 RX ADMIN — SODIUM CHLORIDE, PRESERVATIVE FREE 10 ML: 5 INJECTION INTRAVENOUS at 22:00

## 2025-08-11 RX ADMIN — LEVOTHYROXINE SODIUM 88 MCG: 0.09 TABLET ORAL at 10:31

## 2025-08-11 RX ADMIN — AMIODARONE HYDROCHLORIDE 400 MG: 200 TABLET ORAL at 22:01

## 2025-08-11 RX ADMIN — ALLOPURINOL 150 MG: 300 TABLET ORAL at 10:31

## 2025-08-11 RX ADMIN — SODIUM CHLORIDE, PRESERVATIVE FREE 10 ML: 5 INJECTION INTRAVENOUS at 22:02

## 2025-08-11 RX ADMIN — TICAGRELOR 90 MG: 90 TABLET ORAL at 22:00

## 2025-08-11 RX ADMIN — ISOSORBIDE MONONITRATE 30 MG: 30 TABLET, EXTENDED RELEASE ORAL at 10:30

## 2025-08-11 RX ADMIN — FUROSEMIDE 40 MG: 40 TABLET ORAL at 10:31

## 2025-08-11 RX ADMIN — AMIODARONE HYDROCHLORIDE 400 MG: 200 TABLET ORAL at 10:30

## 2025-08-11 RX ADMIN — FERROUS SULFATE TAB EC 325 MG (65 MG FE EQUIVALENT) 325 MG: 325 (65 FE) TABLET DELAYED RESPONSE at 10:31

## 2025-08-11 RX ADMIN — ROPINIROLE HYDROCHLORIDE 0.25 MG: 0.25 TABLET, FILM COATED ORAL at 22:00

## 2025-08-11 RX ADMIN — ATORVASTATIN CALCIUM 80 MG: 80 TABLET, FILM COATED ORAL at 10:31

## 2025-08-11 RX ADMIN — POTASSIUM CHLORIDE 10 MEQ: 1500 TABLET, EXTENDED RELEASE ORAL at 10:30

## 2025-08-11 RX ADMIN — Medication 1 TABLET: at 22:00

## 2025-08-11 RX ADMIN — MIDODRINE HYDROCHLORIDE 15 MG: 10 TABLET ORAL at 10:31

## 2025-08-11 RX ADMIN — TICAGRELOR 90 MG: 90 TABLET ORAL at 10:31

## 2025-08-11 ASSESSMENT — PULMONARY FUNCTION TESTS
PIF_VALUE: 0

## 2025-08-11 ASSESSMENT — PAIN SCALES - GENERAL
PAINLEVEL_OUTOF10: 0
PAINLEVEL_OUTOF10: 0

## 2025-08-11 ASSESSMENT — ENCOUNTER SYMPTOMS: RESPIRATORY NEGATIVE: 1

## 2025-08-12 ENCOUNTER — APPOINTMENT (OUTPATIENT)
Dept: GENERAL RADIOLOGY | Age: 70
DRG: 277 | End: 2025-08-12
Payer: COMMERCIAL

## 2025-08-12 PROBLEM — T82.110A PACEMAKER LEAD MALFUNCTION: Status: ACTIVE | Noted: 2025-08-12

## 2025-08-12 LAB
ANION GAP SERPL CALCULATED.3IONS-SCNC: 12 MMOL/L (ref 9–16)
BUN SERPL-MCNC: 43 MG/DL (ref 8–23)
CALCIUM SERPL-MCNC: 8.7 MG/DL (ref 8.6–10.4)
CHLORIDE SERPL-SCNC: 101 MMOL/L (ref 98–107)
CO2 SERPL-SCNC: 27 MMOL/L (ref 20–31)
CREAT SERPL-MCNC: 2.1 MG/DL (ref 0.7–1.2)
GFR, ESTIMATED: 33 ML/MIN/1.73M2
GLUCOSE BLD-MCNC: 127 MG/DL (ref 75–110)
GLUCOSE BLD-MCNC: 127 MG/DL (ref 75–110)
GLUCOSE BLD-MCNC: 130 MG/DL (ref 75–110)
GLUCOSE BLD-MCNC: 156 MG/DL (ref 75–110)
GLUCOSE SERPL-MCNC: 119 MG/DL (ref 74–99)
PARTIAL THROMBOPLASTIN TIME: 26.5 SEC (ref 23–36.5)
POTASSIUM SERPL-SCNC: 3.8 MMOL/L (ref 3.7–5.3)
SODIUM SERPL-SCNC: 140 MMOL/L (ref 136–145)

## 2025-08-12 PROCEDURE — 6370000000 HC RX 637 (ALT 250 FOR IP): Performed by: HOSPITALIST

## 2025-08-12 PROCEDURE — 2500000003 HC RX 250 WO HCPCS: Performed by: HOSPITALIST

## 2025-08-12 PROCEDURE — 97116 GAIT TRAINING THERAPY: CPT

## 2025-08-12 PROCEDURE — 2060000000 HC ICU INTERMEDIATE R&B

## 2025-08-12 PROCEDURE — 80048 BASIC METABOLIC PNL TOTAL CA: CPT

## 2025-08-12 PROCEDURE — 99233 SBSQ HOSP IP/OBS HIGH 50: CPT | Performed by: NURSE PRACTITIONER

## 2025-08-12 PROCEDURE — 99232 SBSQ HOSP IP/OBS MODERATE 35: CPT | Performed by: STUDENT IN AN ORGANIZED HEALTH CARE EDUCATION/TRAINING PROGRAM

## 2025-08-12 PROCEDURE — 82947 ASSAY GLUCOSE BLOOD QUANT: CPT

## 2025-08-12 PROCEDURE — 85730 THROMBOPLASTIN TIME PARTIAL: CPT

## 2025-08-12 PROCEDURE — 71046 X-RAY EXAM CHEST 2 VIEWS: CPT

## 2025-08-12 PROCEDURE — 97530 THERAPEUTIC ACTIVITIES: CPT

## 2025-08-12 PROCEDURE — 36415 COLL VENOUS BLD VENIPUNCTURE: CPT

## 2025-08-12 RX ADMIN — ALLOPURINOL 150 MG: 300 TABLET ORAL at 12:03

## 2025-08-12 RX ADMIN — AMIODARONE HYDROCHLORIDE 400 MG: 200 TABLET ORAL at 12:05

## 2025-08-12 RX ADMIN — TICAGRELOR 90 MG: 90 TABLET ORAL at 21:36

## 2025-08-12 RX ADMIN — SODIUM CHLORIDE, PRESERVATIVE FREE 10 ML: 5 INJECTION INTRAVENOUS at 12:07

## 2025-08-12 RX ADMIN — TICAGRELOR 90 MG: 90 TABLET ORAL at 12:04

## 2025-08-12 RX ADMIN — ROPINIROLE HYDROCHLORIDE 0.25 MG: 0.25 TABLET, FILM COATED ORAL at 21:36

## 2025-08-12 RX ADMIN — FERROUS SULFATE TAB EC 325 MG (65 MG FE EQUIVALENT) 325 MG: 325 (65 FE) TABLET DELAYED RESPONSE at 12:04

## 2025-08-12 RX ADMIN — POTASSIUM CHLORIDE 10 MEQ: 1500 TABLET, EXTENDED RELEASE ORAL at 12:05

## 2025-08-12 RX ADMIN — LEVOTHYROXINE SODIUM 88 MCG: 0.09 TABLET ORAL at 12:05

## 2025-08-12 RX ADMIN — AMIODARONE HYDROCHLORIDE 400 MG: 200 TABLET ORAL at 21:36

## 2025-08-12 RX ADMIN — MIDODRINE HYDROCHLORIDE 15 MG: 10 TABLET ORAL at 12:04

## 2025-08-12 RX ADMIN — SODIUM CHLORIDE, PRESERVATIVE FREE 10 ML: 5 INJECTION INTRAVENOUS at 12:05

## 2025-08-12 RX ADMIN — PANTOPRAZOLE SODIUM 40 MG: 40 TABLET, DELAYED RELEASE ORAL at 06:43

## 2025-08-12 RX ADMIN — ATORVASTATIN CALCIUM 80 MG: 80 TABLET, FILM COATED ORAL at 12:04

## 2025-08-12 RX ADMIN — ISOSORBIDE MONONITRATE 30 MG: 30 TABLET, EXTENDED RELEASE ORAL at 12:05

## 2025-08-12 RX ADMIN — FUROSEMIDE 40 MG: 40 TABLET ORAL at 12:03

## 2025-08-12 RX ADMIN — Medication 1 TABLET: at 21:36

## 2025-08-12 RX ADMIN — SODIUM CHLORIDE, PRESERVATIVE FREE 10 ML: 5 INJECTION INTRAVENOUS at 21:37

## 2025-08-12 RX ADMIN — MIDODRINE HYDROCHLORIDE 15 MG: 10 TABLET ORAL at 16:26

## 2025-08-12 ASSESSMENT — PAIN SCALES - GENERAL: PAINLEVEL_OUTOF10: 0

## 2025-08-13 LAB
ERYTHROCYTE [DISTWIDTH] IN BLOOD BY AUTOMATED COUNT: 17.3 % (ref 11.8–14.4)
GLUCOSE BLD-MCNC: 122 MG/DL (ref 75–110)
GLUCOSE BLD-MCNC: 133 MG/DL (ref 75–110)
GLUCOSE BLD-MCNC: 149 MG/DL (ref 75–110)
GLUCOSE BLD-MCNC: 153 MG/DL (ref 75–110)
HCT VFR BLD AUTO: 33.3 % (ref 40.7–50.3)
HGB BLD-MCNC: 10.9 G/DL (ref 13–17)
MCH RBC QN AUTO: 29.9 PG (ref 25.2–33.5)
MCHC RBC AUTO-ENTMCNC: 32.7 G/DL (ref 28.4–34.8)
MCV RBC AUTO: 91.2 FL (ref 82.6–102.9)
NRBC BLD-RTO: 0 PER 100 WBC
PLATELET # BLD AUTO: 173 K/UL (ref 138–453)
PMV BLD AUTO: 11.7 FL (ref 8.1–13.5)
RBC # BLD AUTO: 3.65 M/UL (ref 4.21–5.77)
WBC OTHER # BLD: 5.1 K/UL (ref 3.5–11.3)

## 2025-08-13 PROCEDURE — 99232 SBSQ HOSP IP/OBS MODERATE 35: CPT | Performed by: STUDENT IN AN ORGANIZED HEALTH CARE EDUCATION/TRAINING PROGRAM

## 2025-08-13 PROCEDURE — 36415 COLL VENOUS BLD VENIPUNCTURE: CPT

## 2025-08-13 PROCEDURE — 2500000003 HC RX 250 WO HCPCS: Performed by: HOSPITALIST

## 2025-08-13 PROCEDURE — C1769 GUIDE WIRE: HCPCS | Performed by: HOSPITALIST

## 2025-08-13 PROCEDURE — 6360000002 HC RX W HCPCS: Performed by: HOSPITALIST

## 2025-08-13 PROCEDURE — 6370000000 HC RX 637 (ALT 250 FOR IP): Performed by: HOSPITALIST

## 2025-08-13 PROCEDURE — 2060000000 HC ICU INTERMEDIATE R&B

## 2025-08-13 PROCEDURE — 02WA3MZ REVISION OF CARDIAC LEAD IN HEART, PERCUTANEOUS APPROACH: ICD-10-PCS | Performed by: HOSPITALIST

## 2025-08-13 PROCEDURE — 33999 UNLISTED PX CARDIAC SURGERY: CPT | Performed by: HOSPITALIST

## 2025-08-13 PROCEDURE — 85027 COMPLETE CBC AUTOMATED: CPT

## 2025-08-13 PROCEDURE — 76937 US GUIDE VASCULAR ACCESS: CPT | Performed by: HOSPITALIST

## 2025-08-13 PROCEDURE — 33244 REMOVE ELCTRD TRANSVENOUSLY: CPT | Performed by: HOSPITALIST

## 2025-08-13 PROCEDURE — 99152 MOD SED SAME PHYS/QHP 5/>YRS: CPT | Performed by: HOSPITALIST

## 2025-08-13 PROCEDURE — C1892 INTRO/SHEATH,FIXED,PEEL-AWAY: HCPCS | Performed by: HOSPITALIST

## 2025-08-13 PROCEDURE — C1898 LEAD, PMKR, OTHER THAN TRANS: HCPCS | Performed by: HOSPITALIST

## 2025-08-13 PROCEDURE — 2709999900 HC NON-CHARGEABLE SUPPLY: Performed by: HOSPITALIST

## 2025-08-13 PROCEDURE — 2720000010 HC SURG SUPPLY STERILE: Performed by: HOSPITALIST

## 2025-08-13 PROCEDURE — 82947 ASSAY GLUCOSE BLOOD QUANT: CPT

## 2025-08-13 PROCEDURE — C1887 CATHETER, GUIDING: HCPCS | Performed by: HOSPITALIST

## 2025-08-13 PROCEDURE — 99153 MOD SED SAME PHYS/QHP EA: CPT | Performed by: HOSPITALIST

## 2025-08-13 PROCEDURE — 33216 INSERT 1 ELECTRODE PM-DEFIB: CPT | Performed by: HOSPITALIST

## 2025-08-13 DEVICE — IMPLANTABLE DEVICE: Type: IMPLANTABLE DEVICE | Site: HEART | Status: FUNCTIONAL

## 2025-08-13 RX ORDER — FENTANYL CITRATE 50 UG/ML
INJECTION, SOLUTION INTRAMUSCULAR; INTRAVENOUS PRN
Status: DISCONTINUED | OUTPATIENT
Start: 2025-08-13 | End: 2025-08-13 | Stop reason: HOSPADM

## 2025-08-13 RX ORDER — TRAMADOL HYDROCHLORIDE 50 MG/1
50 TABLET ORAL EVERY 6 HOURS PRN
Status: DISCONTINUED | OUTPATIENT
Start: 2025-08-13 | End: 2025-08-14 | Stop reason: HOSPADM

## 2025-08-13 RX ORDER — MIDAZOLAM HYDROCHLORIDE 1 MG/ML
INJECTION, SOLUTION INTRAMUSCULAR; INTRAVENOUS PRN
Status: DISCONTINUED | OUTPATIENT
Start: 2025-08-13 | End: 2025-08-13 | Stop reason: HOSPADM

## 2025-08-13 RX ORDER — LIDOCAINE HYDROCHLORIDE AND EPINEPHRINE 10; 10 MG/ML; UG/ML
INJECTION, SOLUTION INFILTRATION; PERINEURAL PRN
Status: DISCONTINUED | OUTPATIENT
Start: 2025-08-13 | End: 2025-08-13 | Stop reason: HOSPADM

## 2025-08-13 RX ORDER — CEFAZOLIN SODIUM 1 G/3ML
INJECTION, POWDER, FOR SOLUTION INTRAMUSCULAR; INTRAVENOUS PRN
Status: DISCONTINUED | OUTPATIENT
Start: 2025-08-13 | End: 2025-08-13 | Stop reason: HOSPADM

## 2025-08-13 RX ADMIN — MIDODRINE HYDROCHLORIDE 15 MG: 10 TABLET ORAL at 09:08

## 2025-08-13 RX ADMIN — ATORVASTATIN CALCIUM 80 MG: 80 TABLET, FILM COATED ORAL at 09:09

## 2025-08-13 RX ADMIN — MIDODRINE HYDROCHLORIDE 15 MG: 10 TABLET ORAL at 11:37

## 2025-08-13 RX ADMIN — SODIUM CHLORIDE, PRESERVATIVE FREE 10 ML: 5 INJECTION INTRAVENOUS at 09:12

## 2025-08-13 RX ADMIN — SODIUM CHLORIDE, PRESERVATIVE FREE 10 ML: 5 INJECTION INTRAVENOUS at 22:10

## 2025-08-13 RX ADMIN — ROPINIROLE HYDROCHLORIDE 0.25 MG: 0.25 TABLET, FILM COATED ORAL at 21:26

## 2025-08-13 RX ADMIN — AMIODARONE HYDROCHLORIDE 400 MG: 200 TABLET ORAL at 09:10

## 2025-08-13 RX ADMIN — TICAGRELOR 90 MG: 90 TABLET ORAL at 21:37

## 2025-08-13 RX ADMIN — POTASSIUM CHLORIDE 10 MEQ: 1500 TABLET, EXTENDED RELEASE ORAL at 09:09

## 2025-08-13 RX ADMIN — ALLOPURINOL 150 MG: 300 TABLET ORAL at 09:11

## 2025-08-13 RX ADMIN — MIDODRINE HYDROCHLORIDE 15 MG: 10 TABLET ORAL at 17:08

## 2025-08-13 RX ADMIN — PANTOPRAZOLE SODIUM 40 MG: 40 TABLET, DELAYED RELEASE ORAL at 06:21

## 2025-08-13 RX ADMIN — ISOSORBIDE MONONITRATE 30 MG: 30 TABLET, EXTENDED RELEASE ORAL at 09:11

## 2025-08-13 RX ADMIN — FERROUS SULFATE TAB EC 325 MG (65 MG FE EQUIVALENT) 325 MG: 325 (65 FE) TABLET DELAYED RESPONSE at 09:10

## 2025-08-13 RX ADMIN — LEVOTHYROXINE SODIUM 88 MCG: 0.09 TABLET ORAL at 09:11

## 2025-08-13 RX ADMIN — FUROSEMIDE 40 MG: 40 TABLET ORAL at 09:10

## 2025-08-13 RX ADMIN — Medication 1 TABLET: at 21:26

## 2025-08-13 ASSESSMENT — PULMONARY FUNCTION TESTS
PIF_VALUE: 0

## 2025-08-13 ASSESSMENT — PAIN SCALES - GENERAL
PAINLEVEL_OUTOF10: 0
PAINLEVEL_OUTOF10: 0

## 2025-08-14 ENCOUNTER — APPOINTMENT (OUTPATIENT)
Dept: GENERAL RADIOLOGY | Age: 70
DRG: 277 | End: 2025-08-14
Payer: COMMERCIAL

## 2025-08-14 VITALS
RESPIRATION RATE: 16 BRPM | HEIGHT: 64 IN | OXYGEN SATURATION: 97 % | BODY MASS INDEX: 27.55 KG/M2 | SYSTOLIC BLOOD PRESSURE: 79 MMHG | WEIGHT: 161.38 LBS | HEART RATE: 75 BPM | DIASTOLIC BLOOD PRESSURE: 66 MMHG | TEMPERATURE: 97.9 F

## 2025-08-14 LAB
GLUCOSE BLD-MCNC: 149 MG/DL (ref 75–110)
GLUCOSE BLD-MCNC: 176 MG/DL (ref 75–110)

## 2025-08-14 PROCEDURE — 6370000000 HC RX 637 (ALT 250 FOR IP): Performed by: HOSPITALIST

## 2025-08-14 PROCEDURE — 71046 X-RAY EXAM CHEST 2 VIEWS: CPT

## 2025-08-14 PROCEDURE — 2500000003 HC RX 250 WO HCPCS: Performed by: HOSPITALIST

## 2025-08-14 PROCEDURE — 82947 ASSAY GLUCOSE BLOOD QUANT: CPT

## 2025-08-14 PROCEDURE — 99233 SBSQ HOSP IP/OBS HIGH 50: CPT | Performed by: NURSE PRACTITIONER

## 2025-08-14 PROCEDURE — 99238 HOSP IP/OBS DSCHRG MGMT 30/<: CPT | Performed by: STUDENT IN AN ORGANIZED HEALTH CARE EDUCATION/TRAINING PROGRAM

## 2025-08-14 RX ORDER — TRAMADOL HYDROCHLORIDE 50 MG/1
50 TABLET ORAL EVERY 6 HOURS PRN
Qty: 6 TABLET | Refills: 0 | Status: SHIPPED | OUTPATIENT
Start: 2025-08-14 | End: 2025-08-16

## 2025-08-14 RX ORDER — AMIODARONE HYDROCHLORIDE 200 MG/1
400 TABLET ORAL 3 TIMES DAILY
Status: DISCONTINUED | OUTPATIENT
Start: 2025-08-14 | End: 2025-08-14 | Stop reason: HOSPADM

## 2025-08-14 RX ORDER — ISOSORBIDE MONONITRATE 30 MG/1
15 TABLET, EXTENDED RELEASE ORAL DAILY
Qty: 30 TABLET | Refills: 3 | Status: SHIPPED | OUTPATIENT
Start: 2025-08-15

## 2025-08-14 RX ORDER — AMIODARONE HYDROCHLORIDE 200 MG/1
200 TABLET ORAL 2 TIMES DAILY
Status: DISCONTINUED | OUTPATIENT
Start: 2025-08-14 | End: 2025-08-14

## 2025-08-14 RX ORDER — AMIODARONE HYDROCHLORIDE 400 MG/1
400 TABLET ORAL 2 TIMES DAILY
Qty: 28 TABLET | Refills: 0 | Status: SHIPPED | OUTPATIENT
Start: 2025-08-14 | End: 2025-08-14 | Stop reason: HOSPADM

## 2025-08-14 RX ORDER — AMIODARONE HYDROCHLORIDE 400 MG/1
400 TABLET ORAL 3 TIMES DAILY
Qty: 30 TABLET | Refills: 1 | Status: SHIPPED | OUTPATIENT
Start: 2025-08-14

## 2025-08-14 RX ORDER — ROPINIROLE 0.25 MG/1
0.25 TABLET, FILM COATED ORAL NIGHTLY
Qty: 90 TABLET | Refills: 3 | Status: SHIPPED | OUTPATIENT
Start: 2025-08-14

## 2025-08-14 RX ORDER — ISOSORBIDE MONONITRATE 30 MG/1
15 TABLET, EXTENDED RELEASE ORAL DAILY
Status: DISCONTINUED | OUTPATIENT
Start: 2025-08-15 | End: 2025-08-14 | Stop reason: HOSPADM

## 2025-08-14 RX ADMIN — POTASSIUM CHLORIDE 10 MEQ: 1500 TABLET, EXTENDED RELEASE ORAL at 08:34

## 2025-08-14 RX ADMIN — ALLOPURINOL 150 MG: 300 TABLET ORAL at 08:33

## 2025-08-14 RX ADMIN — TRAMADOL HYDROCHLORIDE 50 MG: 50 TABLET, COATED ORAL at 03:16

## 2025-08-14 RX ADMIN — MIDODRINE HYDROCHLORIDE 10 MG: 10 TABLET ORAL at 00:09

## 2025-08-14 RX ADMIN — SODIUM CHLORIDE, PRESERVATIVE FREE 10 ML: 5 INJECTION INTRAVENOUS at 08:34

## 2025-08-14 RX ADMIN — ATORVASTATIN CALCIUM 80 MG: 80 TABLET, FILM COATED ORAL at 08:34

## 2025-08-14 RX ADMIN — ISOSORBIDE MONONITRATE 30 MG: 30 TABLET, EXTENDED RELEASE ORAL at 08:33

## 2025-08-14 RX ADMIN — FERROUS SULFATE TAB EC 325 MG (65 MG FE EQUIVALENT) 325 MG: 325 (65 FE) TABLET DELAYED RESPONSE at 08:33

## 2025-08-14 RX ADMIN — FUROSEMIDE 40 MG: 40 TABLET ORAL at 08:34

## 2025-08-14 RX ADMIN — AMIODARONE HYDROCHLORIDE 400 MG: 200 TABLET ORAL at 08:32

## 2025-08-14 RX ADMIN — TICAGRELOR 90 MG: 90 TABLET ORAL at 08:34

## 2025-08-14 RX ADMIN — LEVOTHYROXINE SODIUM 88 MCG: 0.09 TABLET ORAL at 08:33

## 2025-08-14 RX ADMIN — PANTOPRAZOLE SODIUM 40 MG: 40 TABLET, DELAYED RELEASE ORAL at 05:39

## 2025-08-14 ASSESSMENT — PAIN SCALES - GENERAL
PAINLEVEL_OUTOF10: 2
PAINLEVEL_OUTOF10: 4

## 2025-08-14 ASSESSMENT — PAIN - FUNCTIONAL ASSESSMENT
PAIN_FUNCTIONAL_ASSESSMENT: 0-10
PAIN_FUNCTIONAL_ASSESSMENT: ACTIVITIES ARE NOT PREVENTED
PAIN_FUNCTIONAL_ASSESSMENT: 0-10

## 2025-08-14 ASSESSMENT — PAIN DESCRIPTION - DESCRIPTORS: DESCRIPTORS: SORE;TENDER

## 2025-08-14 ASSESSMENT — PAIN DESCRIPTION - LOCATION: LOCATION: SHOULDER

## 2025-08-14 ASSESSMENT — PAIN DESCRIPTION - ORIENTATION: ORIENTATION: LEFT

## 2025-08-15 ENCOUNTER — CARE COORDINATION (OUTPATIENT)
Dept: CARE COORDINATION | Age: 70
End: 2025-08-15

## 2025-08-15 DIAGNOSIS — I47.20 VT (VENTRICULAR TACHYCARDIA) (HCC): Primary | ICD-10-CM

## 2025-08-15 LAB
ECHO BSA: 1.82 M2
ECHO BSA: 1.82 M2

## 2025-08-15 PROCEDURE — 1111F DSCHRG MED/CURRENT MED MERGE: CPT | Performed by: NURSE PRACTITIONER

## 2025-08-18 ENCOUNTER — CARE COORDINATION (OUTPATIENT)
Dept: CARE COORDINATION | Age: 70
End: 2025-08-18

## 2025-08-19 ENCOUNTER — APPOINTMENT (OUTPATIENT)
Dept: GENERAL RADIOLOGY | Age: 70
DRG: 441 | End: 2025-08-19
Payer: COMMERCIAL

## 2025-08-19 ENCOUNTER — APPOINTMENT (OUTPATIENT)
Dept: CT IMAGING | Age: 70
DRG: 441 | End: 2025-08-19
Payer: COMMERCIAL

## 2025-08-19 ENCOUNTER — HOSPITAL ENCOUNTER (INPATIENT)
Age: 70
LOS: 1 days | Discharge: ANOTHER HOSPITAL | DRG: 441 | End: 2025-08-20
Attending: EMERGENCY MEDICINE | Admitting: INTERNAL MEDICINE
Payer: COMMERCIAL

## 2025-08-19 DIAGNOSIS — R79.89 ELEVATED TROPONIN: ICD-10-CM

## 2025-08-19 DIAGNOSIS — R06.00 DYSPNEA, UNSPECIFIED TYPE: ICD-10-CM

## 2025-08-19 DIAGNOSIS — I95.9 HYPOTENSION, UNSPECIFIED HYPOTENSION TYPE: ICD-10-CM

## 2025-08-19 DIAGNOSIS — R74.8 ELEVATED LIVER ENZYMES: ICD-10-CM

## 2025-08-19 DIAGNOSIS — I50.9: Primary | ICD-10-CM

## 2025-08-19 LAB
ALBUMIN SERPL-MCNC: 3.5 G/DL (ref 3.5–5.2)
ALBUMIN/GLOB SERPL: 1 {RATIO} (ref 1–2.5)
ALP SERPL-CCNC: 162 U/L (ref 40–129)
ALT SERPL-CCNC: 1572 U/L (ref 10–50)
ANION GAP SERPL CALCULATED.3IONS-SCNC: 21 MMOL/L (ref 9–16)
APAP SERPL-MCNC: <5 UG/ML (ref 10–30)
AST SERPL-CCNC: 2367 U/L (ref 10–50)
BASOPHILS # BLD: 0 K/UL (ref 0–0.2)
BASOPHILS NFR BLD: 0 % (ref 0–2)
BILIRUB DIRECT SERPL-MCNC: 3.8 MG/DL (ref 0–0.2)
BILIRUB INDIRECT SERPL-MCNC: 1.6 MG/DL (ref 0–1)
BILIRUB SERPL-MCNC: 5.4 MG/DL (ref 0–1.2)
BNP SERPL-MCNC: ABNORMAL PG/ML (ref 0–125)
BUN SERPL-MCNC: 106 MG/DL (ref 8–23)
CALCIUM SERPL-MCNC: 8.8 MG/DL (ref 8.6–10.4)
CHLORIDE SERPL-SCNC: 86 MMOL/L (ref 98–107)
CO2 SERPL-SCNC: 20 MMOL/L (ref 20–31)
CREAT SERPL-MCNC: 4.4 MG/DL (ref 0.7–1.2)
EOSINOPHIL # BLD: 0 K/UL (ref 0–0.4)
EOSINOPHILS RELATIVE PERCENT: 0 % (ref 1–4)
ERYTHROCYTE [DISTWIDTH] IN BLOOD BY AUTOMATED COUNT: 16.9 % (ref 11.8–14.4)
GFR, ESTIMATED: 14 ML/MIN/1.73M2
GLOBULIN SER CALC-MCNC: 3.4 G/DL
GLUCOSE SERPL-MCNC: 150 MG/DL (ref 74–99)
HAV IGM SERPL QL IA: NONREACTIVE
HBV CORE IGM SERPL QL IA: NONREACTIVE
HBV SURFACE AG SERPL QL IA: NONREACTIVE
HCT VFR BLD AUTO: 34.1 % (ref 40.7–50.3)
HCV AB SERPL QL IA: NONREACTIVE
HGB BLD-MCNC: 11.7 G/DL (ref 13–17)
IMM GRANULOCYTES # BLD AUTO: 0.12 K/UL (ref 0–0.3)
IMM GRANULOCYTES NFR BLD: 1 %
INR PPP: 2.9
LACTIC ACID, SEPSIS WHOLE BLOOD: 1.2 MMOL/L (ref 0.5–1.9)
LYMPHOCYTES NFR BLD: 0.12 K/UL (ref 1–4.8)
LYMPHOCYTES RELATIVE PERCENT: 1 % (ref 24–44)
MAGNESIUM SERPL-MCNC: 3 MG/DL (ref 1.6–2.4)
MCH RBC QN AUTO: 29.2 PG (ref 25.2–33.5)
MCHC RBC AUTO-ENTMCNC: 34.3 G/DL (ref 28.4–34.8)
MCV RBC AUTO: 85 FL (ref 82.6–102.9)
MONOCYTES NFR BLD: 0.23 K/UL (ref 0.1–0.8)
MONOCYTES NFR BLD: 2 % (ref 1–7)
MORPHOLOGY: ABNORMAL
MORPHOLOGY: ABNORMAL
NEUTROPHILS NFR BLD: 96 % (ref 36–66)
NEUTS SEG NFR BLD: 11.03 K/UL (ref 1.8–7.7)
NRBC BLD-RTO: 0 PER 100 WBC
PHOSPHATE SERPL-MCNC: 6.6 MG/DL (ref 2.5–4.5)
PLATELET # BLD AUTO: 303 K/UL (ref 138–453)
PMV BLD AUTO: 12.5 FL (ref 8.1–13.5)
POTASSIUM SERPL-SCNC: 4.6 MMOL/L (ref 3.7–5.3)
PROT SERPL-MCNC: 6.9 G/DL (ref 6.6–8.7)
PROTHROMBIN TIME: 31.4 SEC (ref 11.7–14.9)
RBC # BLD AUTO: 4.01 M/UL (ref 4.21–5.77)
SODIUM SERPL-SCNC: 127 MMOL/L (ref 136–145)
TROPONIN I SERPL HS-MCNC: 110 NG/L (ref 0–22)
TROPONIN I SERPL HS-MCNC: 88 NG/L (ref 0–22)
WBC OTHER # BLD: 11.5 K/UL (ref 3.5–11.3)

## 2025-08-19 PROCEDURE — 71045 X-RAY EXAM CHEST 1 VIEW: CPT

## 2025-08-19 PROCEDURE — 84100 ASSAY OF PHOSPHORUS: CPT

## 2025-08-19 PROCEDURE — 80076 HEPATIC FUNCTION PANEL: CPT

## 2025-08-19 PROCEDURE — 85610 PROTHROMBIN TIME: CPT

## 2025-08-19 PROCEDURE — 2500000003 HC RX 250 WO HCPCS

## 2025-08-19 PROCEDURE — 6360000002 HC RX W HCPCS

## 2025-08-19 PROCEDURE — 6370000000 HC RX 637 (ALT 250 FOR IP): Performed by: STUDENT IN AN ORGANIZED HEALTH CARE EDUCATION/TRAINING PROGRAM

## 2025-08-19 PROCEDURE — 80048 BASIC METABOLIC PNL TOTAL CA: CPT

## 2025-08-19 PROCEDURE — 83735 ASSAY OF MAGNESIUM: CPT

## 2025-08-19 PROCEDURE — 70450 CT HEAD/BRAIN W/O DYE: CPT

## 2025-08-19 PROCEDURE — 2000000000 HC ICU R&B

## 2025-08-19 PROCEDURE — 6370000000 HC RX 637 (ALT 250 FOR IP)

## 2025-08-19 PROCEDURE — G0378 HOSPITAL OBSERVATION PER HR: HCPCS

## 2025-08-19 PROCEDURE — 83605 ASSAY OF LACTIC ACID: CPT

## 2025-08-19 PROCEDURE — 96361 HYDRATE IV INFUSION ADD-ON: CPT

## 2025-08-19 PROCEDURE — 2580000003 HC RX 258: Performed by: STUDENT IN AN ORGANIZED HEALTH CARE EDUCATION/TRAINING PROGRAM

## 2025-08-19 PROCEDURE — 96375 TX/PRO/DX INJ NEW DRUG ADDON: CPT

## 2025-08-19 PROCEDURE — 71250 CT THORAX DX C-: CPT

## 2025-08-19 PROCEDURE — 80074 ACUTE HEPATITIS PANEL: CPT

## 2025-08-19 PROCEDURE — 84484 ASSAY OF TROPONIN QUANT: CPT

## 2025-08-19 PROCEDURE — 2700000000 HC OXYGEN THERAPY PER DAY

## 2025-08-19 PROCEDURE — 84165 PROTEIN E-PHORESIS SERUM: CPT

## 2025-08-19 PROCEDURE — 51702 INSERT TEMP BLADDER CATH: CPT

## 2025-08-19 PROCEDURE — 93005 ELECTROCARDIOGRAM TRACING: CPT | Performed by: INTERNAL MEDICINE

## 2025-08-19 PROCEDURE — 51798 US URINE CAPACITY MEASURE: CPT

## 2025-08-19 PROCEDURE — 84155 ASSAY OF PROTEIN SERUM: CPT

## 2025-08-19 PROCEDURE — 87641 MR-STAPH DNA AMP PROBE: CPT

## 2025-08-19 PROCEDURE — 96368 THER/DIAG CONCURRENT INF: CPT

## 2025-08-19 PROCEDURE — 2580000003 HC RX 258

## 2025-08-19 PROCEDURE — 2500000003 HC RX 250 WO HCPCS: Performed by: STUDENT IN AN ORGANIZED HEALTH CARE EDUCATION/TRAINING PROGRAM

## 2025-08-19 PROCEDURE — 87040 BLOOD CULTURE FOR BACTERIA: CPT

## 2025-08-19 PROCEDURE — 81001 URINALYSIS AUTO W/SCOPE: CPT

## 2025-08-19 PROCEDURE — 94761 N-INVAS EAR/PLS OXIMETRY MLT: CPT

## 2025-08-19 PROCEDURE — 72125 CT NECK SPINE W/O DYE: CPT

## 2025-08-19 PROCEDURE — 85025 COMPLETE CBC W/AUTO DIFF WBC: CPT

## 2025-08-19 PROCEDURE — 83880 ASSAY OF NATRIURETIC PEPTIDE: CPT

## 2025-08-19 PROCEDURE — 80143 DRUG ASSAY ACETAMINOPHEN: CPT

## 2025-08-19 PROCEDURE — 96367 TX/PROPH/DG ADDL SEQ IV INF: CPT

## 2025-08-19 PROCEDURE — 74176 CT ABD & PELVIS W/O CONTRAST: CPT

## 2025-08-19 PROCEDURE — 6360000002 HC RX W HCPCS: Performed by: STUDENT IN AN ORGANIZED HEALTH CARE EDUCATION/TRAINING PROGRAM

## 2025-08-19 PROCEDURE — 96365 THER/PROPH/DIAG IV INF INIT: CPT

## 2025-08-19 PROCEDURE — 99285 EMERGENCY DEPT VISIT HI MDM: CPT

## 2025-08-19 RX ORDER — ONDANSETRON 4 MG/1
4 TABLET, ORALLY DISINTEGRATING ORAL EVERY 8 HOURS PRN
Status: DISCONTINUED | OUTPATIENT
Start: 2025-08-19 | End: 2025-08-20 | Stop reason: HOSPADM

## 2025-08-19 RX ORDER — ROPINIROLE 0.25 MG/1
0.25 TABLET, FILM COATED ORAL NIGHTLY
Status: DISCONTINUED | OUTPATIENT
Start: 2025-08-19 | End: 2025-08-20 | Stop reason: HOSPADM

## 2025-08-19 RX ORDER — FUROSEMIDE 10 MG/ML
80 INJECTION INTRAMUSCULAR; INTRAVENOUS ONCE
Status: COMPLETED | OUTPATIENT
Start: 2025-08-19 | End: 2025-08-19

## 2025-08-19 RX ORDER — AMIODARONE HYDROCHLORIDE 200 MG/1
400 TABLET ORAL 3 TIMES DAILY
Status: CANCELLED | OUTPATIENT
Start: 2025-08-19

## 2025-08-19 RX ORDER — VASOPRESSIN IN DEXTROSE 5 % 20/100 ML
0.04 PLASTIC BAG, INJECTION (ML) INTRAVENOUS CONTINUOUS
Status: DISCONTINUED | OUTPATIENT
Start: 2025-08-20 | End: 2025-08-20 | Stop reason: HOSPADM

## 2025-08-19 RX ORDER — SODIUM CHLORIDE 0.9 % (FLUSH) 0.9 %
5-40 SYRINGE (ML) INJECTION PRN
Status: DISCONTINUED | OUTPATIENT
Start: 2025-08-19 | End: 2025-08-20 | Stop reason: HOSPADM

## 2025-08-19 RX ORDER — ALBUTEROL SULFATE 0.83 MG/ML
2.5 SOLUTION RESPIRATORY (INHALATION) EVERY 4 HOURS PRN
Status: DISCONTINUED | OUTPATIENT
Start: 2025-08-19 | End: 2025-08-20

## 2025-08-19 RX ORDER — ONDANSETRON 2 MG/ML
4 INJECTION INTRAMUSCULAR; INTRAVENOUS EVERY 6 HOURS PRN
Status: DISCONTINUED | OUTPATIENT
Start: 2025-08-19 | End: 2025-08-20 | Stop reason: HOSPADM

## 2025-08-19 RX ORDER — SODIUM CHLORIDE 9 MG/ML
INJECTION, SOLUTION INTRAVENOUS PRN
Status: DISCONTINUED | OUTPATIENT
Start: 2025-08-19 | End: 2025-08-20 | Stop reason: HOSPADM

## 2025-08-19 RX ORDER — MIDODRINE HYDROCHLORIDE 5 MG/1
5 TABLET ORAL ONCE
Status: COMPLETED | OUTPATIENT
Start: 2025-08-19 | End: 2025-08-19

## 2025-08-19 RX ORDER — LEVOTHYROXINE SODIUM 88 UG/1
88 TABLET ORAL DAILY
Status: DISCONTINUED | OUTPATIENT
Start: 2025-08-20 | End: 2025-08-20 | Stop reason: HOSPADM

## 2025-08-19 RX ORDER — POLYETHYLENE GLYCOL 3350 17 G/17G
17 POWDER, FOR SOLUTION ORAL DAILY PRN
Status: DISCONTINUED | OUTPATIENT
Start: 2025-08-19 | End: 2025-08-20 | Stop reason: HOSPADM

## 2025-08-19 RX ORDER — 0.9 % SODIUM CHLORIDE 0.9 %
500 INTRAVENOUS SOLUTION INTRAVENOUS ONCE
Status: COMPLETED | OUTPATIENT
Start: 2025-08-19 | End: 2025-08-19

## 2025-08-19 RX ORDER — ASPIRIN 81 MG/1
324 TABLET, CHEWABLE ORAL ONCE
Status: COMPLETED | OUTPATIENT
Start: 2025-08-19 | End: 2025-08-19

## 2025-08-19 RX ORDER — INSULIN LISPRO 100 [IU]/ML
0-8 INJECTION, SOLUTION INTRAVENOUS; SUBCUTANEOUS
Status: DISCONTINUED | OUTPATIENT
Start: 2025-08-19 | End: 2025-08-20 | Stop reason: HOSPADM

## 2025-08-19 RX ORDER — SODIUM CHLORIDE 0.9 % (FLUSH) 0.9 %
5-40 SYRINGE (ML) INJECTION EVERY 12 HOURS SCHEDULED
Status: DISCONTINUED | OUTPATIENT
Start: 2025-08-19 | End: 2025-08-20 | Stop reason: HOSPADM

## 2025-08-19 RX ADMIN — PIPERACILLIN AND TAZOBACTAM 3375 MG: 3; .375 INJECTION, POWDER, LYOPHILIZED, FOR SOLUTION INTRAVENOUS at 19:23

## 2025-08-19 RX ADMIN — SODIUM CHLORIDE, PRESERVATIVE FREE 10 ML: 5 INJECTION INTRAVENOUS at 21:44

## 2025-08-19 RX ADMIN — VASOPRESSIN 0.04 UNITS/MIN: 0.2 INJECTION INTRAVENOUS at 23:55

## 2025-08-19 RX ADMIN — Medication 5 MCG/MIN: at 21:03

## 2025-08-19 RX ADMIN — MIDODRINE HYDROCHLORIDE 5 MG: 5 TABLET ORAL at 17:27

## 2025-08-19 RX ADMIN — SODIUM CHLORIDE 500 ML: 0.9 INJECTION, SOLUTION INTRAVENOUS at 17:21

## 2025-08-19 RX ADMIN — FUROSEMIDE 80 MG: 10 INJECTION, SOLUTION INTRAMUSCULAR; INTRAVENOUS at 21:54

## 2025-08-19 RX ADMIN — ASPIRIN 324 MG: 81 TABLET, CHEWABLE ORAL at 19:19

## 2025-08-19 RX ADMIN — ROPINIROLE HYDROCHLORIDE 0.25 MG: 0.25 TABLET, FILM COATED ORAL at 22:57

## 2025-08-19 RX ADMIN — SODIUM CHLORIDE 0.05 MCG/KG/MIN: 0.9 INJECTION, SOLUTION INTRAVENOUS at 20:36

## 2025-08-19 ASSESSMENT — PAIN SCALES - GENERAL
PAINLEVEL_OUTOF10: 0

## 2025-08-19 ASSESSMENT — PAIN - FUNCTIONAL ASSESSMENT
PAIN_FUNCTIONAL_ASSESSMENT: 0-10
PAIN_FUNCTIONAL_ASSESSMENT: 0-10

## 2025-08-20 ENCOUNTER — APPOINTMENT (OUTPATIENT)
Dept: ULTRASOUND IMAGING | Age: 70
DRG: 441 | End: 2025-08-20
Payer: COMMERCIAL

## 2025-08-20 ENCOUNTER — CARE COORDINATION (OUTPATIENT)
Dept: CARE COORDINATION | Age: 70
End: 2025-08-20

## 2025-08-20 ENCOUNTER — APPOINTMENT (OUTPATIENT)
Dept: GENERAL RADIOLOGY | Age: 70
DRG: 441 | End: 2025-08-20
Payer: COMMERCIAL

## 2025-08-20 VITALS
TEMPERATURE: 99.9 F | OXYGEN SATURATION: 99 % | SYSTOLIC BLOOD PRESSURE: 94 MMHG | HEART RATE: 85 BPM | WEIGHT: 151 LBS | BODY MASS INDEX: 25.78 KG/M2 | RESPIRATION RATE: 17 BRPM | HEIGHT: 64 IN | DIASTOLIC BLOOD PRESSURE: 34 MMHG

## 2025-08-20 PROBLEM — K72.00 SHOCK LIVER: Status: ACTIVE | Noted: 2025-08-20

## 2025-08-20 PROBLEM — R06.00 DYSPNEA: Status: ACTIVE | Noted: 2025-08-20

## 2025-08-20 PROBLEM — R74.8 ELEVATED LIVER ENZYMES: Status: ACTIVE | Noted: 2025-08-20

## 2025-08-20 PROBLEM — I50.9 MULTIPLE ORGAN FAILURE WITH HEART FAILURE (HCC): Status: ACTIVE | Noted: 2025-08-20

## 2025-08-20 PROBLEM — I50.23 ACUTE ON CHRONIC SYSTOLIC CONGESTIVE HEART FAILURE (HCC): Status: ACTIVE | Noted: 2025-08-20

## 2025-08-20 PROBLEM — I50.21: Status: ACTIVE | Noted: 2025-08-20

## 2025-08-20 LAB
ABO + RH BLD: NORMAL
ALBUMIN SERPL-MCNC: 3.2 G/DL (ref 3.5–5.2)
ALBUMIN/GLOB SERPL: 1 {RATIO} (ref 1–2.5)
ALP SERPL-CCNC: 160 U/L (ref 40–129)
ALT SERPL-CCNC: 1372 U/L (ref 10–50)
AMMONIA PLAS-SCNC: 23 UMOL/L (ref 16–60)
ANION GAP SERPL CALCULATED.3IONS-SCNC: 22 MMOL/L (ref 9–16)
ANION GAP SERPL CALCULATED.3IONS-SCNC: 22 MMOL/L (ref 9–16)
ARM BAND NUMBER: NORMAL
AST SERPL-CCNC: 2098 U/L (ref 10–50)
BASOPHILS # BLD: 0 K/UL (ref 0–0.2)
BASOPHILS NFR BLD: 0 % (ref 0–2)
BILIRUB DIRECT SERPL-MCNC: 4.1 MG/DL (ref 0–0.2)
BILIRUB INDIRECT SERPL-MCNC: 1.3 MG/DL (ref 0–1)
BILIRUB SERPL-MCNC: 5.4 MG/DL (ref 0–1.2)
BILIRUB UR QL STRIP: ABNORMAL
BILIRUB UR QL STRIP: ABNORMAL
BLOOD BANK SAMPLE EXPIRATION: NORMAL
BLOOD GROUP ANTIBODIES SERPL: NEGATIVE
BUN SERPL-MCNC: 104 MG/DL (ref 8–23)
BUN SERPL-MCNC: 104 MG/DL (ref 8–23)
C3 SERPL-MCNC: 51 MG/DL (ref 90–180)
C4 SERPL-MCNC: 15 MG/DL (ref 10–40)
CALCIUM SERPL-MCNC: 7.5 MG/DL (ref 8.6–10.4)
CALCIUM SERPL-MCNC: 8 MG/DL (ref 8.6–10.4)
CASTS #/AREA URNS LPF: ABNORMAL /LPF (ref 0–2)
CHLORIDE SERPL-SCNC: 88 MMOL/L (ref 98–107)
CHLORIDE SERPL-SCNC: 90 MMOL/L (ref 98–107)
CHLORIDE UR-SCNC: <20 MMOL/L
CK SERPL-CCNC: 656 U/L (ref 39–308)
CLARITY UR: ABNORMAL
CLARITY UR: ABNORMAL
CO2 SERPL-SCNC: 15 MMOL/L (ref 20–31)
CO2 SERPL-SCNC: 16 MMOL/L (ref 20–31)
COLOR UR: ABNORMAL
COLOR UR: ABNORMAL
CREAT SERPL-MCNC: 4.2 MG/DL (ref 0.7–1.2)
CREAT SERPL-MCNC: 4.3 MG/DL (ref 0.7–1.2)
CREAT UR-MCNC: 84.9 MG/DL (ref 39–259)
CREAT UR-MCNC: 84.9 MG/DL (ref 39–259)
EKG ATRIAL RATE: 58 BPM
EKG ATRIAL RATE: 72 BPM
EKG Q-T INTERVAL: 476 MS
EKG Q-T INTERVAL: 596 MS
EKG QRS DURATION: 116 MS
EKG QRS DURATION: 208 MS
EKG QTC CALCULATION (BAZETT): 579 MS
EKG QTC CALCULATION (BAZETT): 665 MS
EKG R AXIS: -50 DEGREES
EKG R AXIS: -66 DEGREES
EKG T AXIS: 35 DEGREES
EKG T AXIS: 72 DEGREES
EKG VENTRICULAR RATE: 75 BPM
EKG VENTRICULAR RATE: 89 BPM
EOSINOPHIL # BLD: 0 K/UL (ref 0–0.4)
EOSINOPHILS RELATIVE PERCENT: 0 % (ref 1–4)
EPI CELLS #/AREA URNS HPF: ABNORMAL /HPF (ref 0–5)
EPI CELLS #/AREA URNS HPF: ABNORMAL /HPF (ref 0–5)
ERYTHROCYTE [DISTWIDTH] IN BLOOD BY AUTOMATED COUNT: 17 % (ref 11.8–14.4)
GFR, ESTIMATED: 14 ML/MIN/1.73M2
GFR, ESTIMATED: 14 ML/MIN/1.73M2
GLOBULIN SER CALC-MCNC: 3.3 G/DL
GLUCOSE BLD-MCNC: 212 MG/DL (ref 75–110)
GLUCOSE SERPL-MCNC: 165 MG/DL (ref 74–99)
GLUCOSE SERPL-MCNC: 223 MG/DL (ref 74–99)
GLUCOSE UR STRIP-MCNC: ABNORMAL MG/DL
GLUCOSE UR STRIP-MCNC: ABNORMAL MG/DL
HCT VFR BLD AUTO: 30.2 % (ref 40.7–50.3)
HGB BLD-MCNC: 10 G/DL (ref 13–17)
HGB UR QL STRIP.AUTO: ABNORMAL
HGB UR QL STRIP.AUTO: ABNORMAL
IMM GRANULOCYTES # BLD AUTO: 0 K/UL (ref 0–0.3)
IMM GRANULOCYTES NFR BLD: 0 %
INR PPP: 2.2
INR PPP: 2.3
KETONES UR STRIP-MCNC: ABNORMAL MG/DL
KETONES UR STRIP-MCNC: ABNORMAL MG/DL
LACTIC ACID, SEPSIS WHOLE BLOOD: 1.1 MMOL/L (ref 0.5–1.9)
LACTIC ACID, SEPSIS WHOLE BLOOD: 1.2 MMOL/L (ref 0.5–1.9)
LEUKOCYTE ESTERASE UR QL STRIP: ABNORMAL
LEUKOCYTE ESTERASE UR QL STRIP: NEGATIVE
LYMPHOCYTES NFR BLD: 0.76 K/UL (ref 1–4.8)
LYMPHOCYTES RELATIVE PERCENT: 5 % (ref 24–44)
MCH RBC QN AUTO: 28.6 PG (ref 25.2–33.5)
MCHC RBC AUTO-ENTMCNC: 33.1 G/DL (ref 28.4–34.8)
MCV RBC AUTO: 86.3 FL (ref 82.6–102.9)
MONOCYTES NFR BLD: 0.3 K/UL (ref 0.1–0.8)
MONOCYTES NFR BLD: 2 % (ref 1–7)
MORPHOLOGY: ABNORMAL
MORPHOLOGY: ABNORMAL
MRSA, DNA, NASAL: NEGATIVE
NEGATIVE BASE EXCESS, ART: 7.3 MMOL/L (ref 0–2)
NEUTROPHILS NFR BLD: 93 % (ref 36–66)
NEUTS SEG NFR BLD: 14.04 K/UL (ref 1.8–7.7)
NITRITE UR QL STRIP: NEGATIVE
NITRITE UR QL STRIP: NEGATIVE
NRBC BLD-RTO: 0 PER 100 WBC
O2 DELIVERY DEVICE: ABNORMAL
OSMOLALITY SERPL: 310 MOSM/KG (ref 275–295)
PH UR STRIP: 5 [PH] (ref 5–8)
PH UR STRIP: 5 [PH] (ref 5–8)
PLATELET # BLD AUTO: 336 K/UL (ref 138–453)
PMV BLD AUTO: 12 FL (ref 8.1–13.5)
POC HCO3: 16.6 MMOL/L (ref 21–28)
POC O2 SATURATION: 99 % (ref 94–98)
POC PCO2: 27.6 MM HG (ref 35–48)
POC PH: 7.39 (ref 7.35–7.45)
POC PO2: 130.8 MM HG (ref 83–108)
POTASSIUM SERPL-SCNC: 3.9 MMOL/L (ref 3.7–5.3)
POTASSIUM SERPL-SCNC: 4 MMOL/L (ref 3.7–5.3)
PROT SERPL-MCNC: 6.5 G/DL (ref 6.6–8.7)
PROT UR STRIP-MCNC: ABNORMAL MG/DL
PROT UR STRIP-MCNC: ABNORMAL MG/DL
PROTHROMBIN TIME: 25.6 SEC (ref 11.7–14.9)
PROTHROMBIN TIME: 26.7 SEC (ref 11.7–14.9)
RBC # BLD AUTO: 3.5 M/UL (ref 4.21–5.77)
RBC #/AREA URNS HPF: ABNORMAL /HPF (ref 0–2)
RBC #/AREA URNS HPF: ABNORMAL /HPF (ref 0–2)
SODIUM SERPL-SCNC: 126 MMOL/L (ref 136–145)
SODIUM SERPL-SCNC: 127 MMOL/L (ref 136–145)
SODIUM UR-SCNC: <20 MMOL/L
SP GR UR STRIP: 1.01 (ref 1–1.03)
SP GR UR STRIP: 1.02 (ref 1–1.03)
SPECIMEN DESCRIPTION: NORMAL
TOTAL PROTEIN, URINE: 59 MG/DL
TOTAL PROTEIN, URINE: 59 MG/DL
TROPONIN I SERPL HS-MCNC: 101 NG/L (ref 0–22)
URINE TOTAL PROTEIN CREATININE RATIO: 0.69 (ref 0–0.2)
UROBILINOGEN UR STRIP-ACNC: NORMAL EU/DL (ref 0–1)
UROBILINOGEN UR STRIP-ACNC: NORMAL EU/DL (ref 0–1)
WBC #/AREA URNS HPF: ABNORMAL /HPF (ref 0–5)
WBC #/AREA URNS HPF: ABNORMAL /HPF (ref 0–5)
WBC OTHER # BLD: 15.1 K/UL (ref 3.5–11.3)

## 2025-08-20 PROCEDURE — 2500000003 HC RX 250 WO HCPCS

## 2025-08-20 PROCEDURE — 36556 INSERT NON-TUNNEL CV CATH: CPT | Performed by: INTERNAL MEDICINE

## 2025-08-20 PROCEDURE — 71045 X-RAY EXAM CHEST 1 VIEW: CPT

## 2025-08-20 PROCEDURE — 96366 THER/PROPH/DIAG IV INF ADDON: CPT

## 2025-08-20 PROCEDURE — 2580000003 HC RX 258: Performed by: INTERNAL MEDICINE

## 2025-08-20 PROCEDURE — 82570 ASSAY OF URINE CREATININE: CPT

## 2025-08-20 PROCEDURE — 82803 BLOOD GASES ANY COMBINATION: CPT

## 2025-08-20 PROCEDURE — 2500000003 HC RX 250 WO HCPCS: Performed by: INTERNAL MEDICINE

## 2025-08-20 PROCEDURE — 36556 INSERT NON-TUNNEL CV CATH: CPT

## 2025-08-20 PROCEDURE — 2500000003 HC RX 250 WO HCPCS: Performed by: STUDENT IN AN ORGANIZED HEALTH CARE EDUCATION/TRAINING PROGRAM

## 2025-08-20 PROCEDURE — G0378 HOSPITAL OBSERVATION PER HR: HCPCS

## 2025-08-20 PROCEDURE — 99223 1ST HOSP IP/OBS HIGH 75: CPT | Performed by: INTERNAL MEDICINE

## 2025-08-20 PROCEDURE — 99291 CRITICAL CARE FIRST HOUR: CPT | Performed by: INTERNAL MEDICINE

## 2025-08-20 PROCEDURE — 2580000003 HC RX 258

## 2025-08-20 PROCEDURE — 2700000000 HC OXYGEN THERAPY PER DAY

## 2025-08-20 PROCEDURE — 84166 PROTEIN E-PHORESIS/URINE/CSF: CPT

## 2025-08-20 PROCEDURE — 82436 ASSAY OF URINE CHLORIDE: CPT

## 2025-08-20 PROCEDURE — 80048 BASIC METABOLIC PNL TOTAL CA: CPT

## 2025-08-20 PROCEDURE — 82140 ASSAY OF AMMONIA: CPT

## 2025-08-20 PROCEDURE — 83930 ASSAY OF BLOOD OSMOLALITY: CPT

## 2025-08-20 PROCEDURE — 6360000002 HC RX W HCPCS

## 2025-08-20 PROCEDURE — 84300 ASSAY OF URINE SODIUM: CPT

## 2025-08-20 PROCEDURE — 81001 URINALYSIS AUTO W/SCOPE: CPT

## 2025-08-20 PROCEDURE — 2580000003 HC RX 258: Performed by: STUDENT IN AN ORGANIZED HEALTH CARE EDUCATION/TRAINING PROGRAM

## 2025-08-20 PROCEDURE — 36415 COLL VENOUS BLD VENIPUNCTURE: CPT

## 2025-08-20 PROCEDURE — 96367 TX/PROPH/DG ADDL SEQ IV INF: CPT

## 2025-08-20 PROCEDURE — 85025 COMPLETE CBC W/AUTO DIFF WBC: CPT

## 2025-08-20 PROCEDURE — 84156 ASSAY OF PROTEIN URINE: CPT

## 2025-08-20 PROCEDURE — 03HY32Z INSERTION OF MONITORING DEVICE INTO UPPER ARTERY, PERCUTANEOUS APPROACH: ICD-10-PCS | Performed by: INTERNAL MEDICINE

## 2025-08-20 PROCEDURE — 6360000002 HC RX W HCPCS: Performed by: STUDENT IN AN ORGANIZED HEALTH CARE EDUCATION/TRAINING PROGRAM

## 2025-08-20 PROCEDURE — 82947 ASSAY GLUCOSE BLOOD QUANT: CPT

## 2025-08-20 PROCEDURE — 6370000000 HC RX 637 (ALT 250 FOR IP)

## 2025-08-20 PROCEDURE — 84484 ASSAY OF TROPONIN QUANT: CPT

## 2025-08-20 PROCEDURE — 37799 UNLISTED PX VASCULAR SURGERY: CPT

## 2025-08-20 PROCEDURE — 36430 TRANSFUSION BLD/BLD COMPNT: CPT

## 2025-08-20 PROCEDURE — 96368 THER/DIAG CONCURRENT INF: CPT

## 2025-08-20 PROCEDURE — 86901 BLOOD TYPING SEROLOGIC RH(D): CPT

## 2025-08-20 PROCEDURE — 6360000002 HC RX W HCPCS: Performed by: INTERNAL MEDICINE

## 2025-08-20 PROCEDURE — 6370000000 HC RX 637 (ALT 250 FOR IP): Performed by: STUDENT IN AN ORGANIZED HEALTH CARE EDUCATION/TRAINING PROGRAM

## 2025-08-20 PROCEDURE — 82550 ASSAY OF CK (CPK): CPT

## 2025-08-20 PROCEDURE — 36620 INSERTION CATHETER ARTERY: CPT

## 2025-08-20 PROCEDURE — 30233L1 TRANSFUSION OF NONAUTOLOGOUS FRESH PLASMA INTO PERIPHERAL VEIN, PERCUTANEOUS APPROACH: ICD-10-PCS | Performed by: INTERNAL MEDICINE

## 2025-08-20 PROCEDURE — 96375 TX/PRO/DX INJ NEW DRUG ADDON: CPT

## 2025-08-20 PROCEDURE — 86900 BLOOD TYPING SEROLOGIC ABO: CPT

## 2025-08-20 PROCEDURE — 80076 HEPATIC FUNCTION PANEL: CPT

## 2025-08-20 PROCEDURE — 86850 RBC ANTIBODY SCREEN: CPT

## 2025-08-20 PROCEDURE — 99222 1ST HOSP IP/OBS MODERATE 55: CPT | Performed by: INTERNAL MEDICINE

## 2025-08-20 PROCEDURE — P9017 PLASMA 1 DONOR FRZ W/IN 8 HR: HCPCS

## 2025-08-20 PROCEDURE — 85610 PROTHROMBIN TIME: CPT

## 2025-08-20 PROCEDURE — 83605 ASSAY OF LACTIC ACID: CPT

## 2025-08-20 PROCEDURE — 94761 N-INVAS EAR/PLS OXIMETRY MLT: CPT

## 2025-08-20 PROCEDURE — 93010 ELECTROCARDIOGRAM REPORT: CPT | Performed by: INTERNAL MEDICINE

## 2025-08-20 PROCEDURE — 02HV33Z INSERTION OF INFUSION DEVICE INTO SUPERIOR VENA CAVA, PERCUTANEOUS APPROACH: ICD-10-PCS | Performed by: INTERNAL MEDICINE

## 2025-08-20 PROCEDURE — 86160 COMPLEMENT ANTIGEN: CPT

## 2025-08-20 PROCEDURE — 86927 PLASMA FRESH FROZEN: CPT

## 2025-08-20 RX ORDER — INDOMETHACIN 25 MG/1
CAPSULE ORAL
Status: COMPLETED
Start: 2025-08-20 | End: 2025-08-20

## 2025-08-20 RX ORDER — HEPARIN SODIUM 1000 [USP'U]/ML
1400 INJECTION, SOLUTION INTRAVENOUS; SUBCUTANEOUS PRN
Status: DISCONTINUED | OUTPATIENT
Start: 2025-08-20 | End: 2025-08-20 | Stop reason: HOSPADM

## 2025-08-20 RX ORDER — IPRATROPIUM BROMIDE AND ALBUTEROL SULFATE 2.5; .5 MG/3ML; MG/3ML
1 SOLUTION RESPIRATORY (INHALATION)
Status: DISCONTINUED | OUTPATIENT
Start: 2025-08-20 | End: 2025-08-20 | Stop reason: HOSPADM

## 2025-08-20 RX ORDER — PHENYLEPHRINE HCL IN 0.9% NACL 50MG/250ML
10-300 PLASTIC BAG, INJECTION (ML) INTRAVENOUS CONTINUOUS
Status: DISCONTINUED | OUTPATIENT
Start: 2025-08-20 | End: 2025-08-20

## 2025-08-20 RX ORDER — LINEZOLID 2 MG/ML
600 INJECTION, SOLUTION INTRAVENOUS EVERY 12 HOURS
Status: DISCONTINUED | OUTPATIENT
Start: 2025-08-20 | End: 2025-08-20

## 2025-08-20 RX ORDER — INDOMETHACIN 25 MG/1
25 CAPSULE ORAL ONCE
Status: COMPLETED | OUTPATIENT
Start: 2025-08-20 | End: 2025-08-20

## 2025-08-20 RX ORDER — HEPARIN SODIUM 1000 [USP'U]/ML
1300 INJECTION, SOLUTION INTRAVENOUS; SUBCUTANEOUS PRN
Status: DISCONTINUED | OUTPATIENT
Start: 2025-08-20 | End: 2025-08-20 | Stop reason: HOSPADM

## 2025-08-20 RX ORDER — PHENYLEPHRINE HCL IN 0.9% NACL 50MG/250ML
10-300 PLASTIC BAG, INJECTION (ML) INTRAVENOUS CONTINUOUS
Status: DISCONTINUED | OUTPATIENT
Start: 2025-08-20 | End: 2025-08-20 | Stop reason: HOSPADM

## 2025-08-20 RX ORDER — LACTULOSE 10 G/15ML
10 SOLUTION ORAL 3 TIMES DAILY
Status: DISCONTINUED | OUTPATIENT
Start: 2025-08-20 | End: 2025-08-20 | Stop reason: HOSPADM

## 2025-08-20 RX ORDER — MILRINONE LACTATE 0.2 MG/ML
.0625-.375 INJECTION, SOLUTION INTRAVENOUS CONTINUOUS
Status: DISCONTINUED | OUTPATIENT
Start: 2025-08-20 | End: 2025-08-20 | Stop reason: HOSPADM

## 2025-08-20 RX ORDER — MIDODRINE HYDROCHLORIDE 10 MG/1
10 TABLET ORAL
Status: DISCONTINUED | OUTPATIENT
Start: 2025-08-20 | End: 2025-08-20

## 2025-08-20 RX ORDER — SODIUM CHLORIDE 9 MG/ML
INJECTION, SOLUTION INTRAVENOUS PRN
Status: DISCONTINUED | OUTPATIENT
Start: 2025-08-20 | End: 2025-08-20 | Stop reason: HOSPADM

## 2025-08-20 RX ADMIN — SODIUM CHLORIDE: 0.9 INJECTION, SOLUTION INTRAVENOUS at 06:47

## 2025-08-20 RX ADMIN — VASOPRESSIN 0.04 UNITS/MIN: 0.2 INJECTION INTRAVENOUS at 09:54

## 2025-08-20 RX ADMIN — INDOMETHACIN 25 MEQ: 25 CAPSULE ORAL at 10:53

## 2025-08-20 RX ADMIN — SODIUM BICARBONATE: 84 INJECTION, SOLUTION INTRAVENOUS at 12:48

## 2025-08-20 RX ADMIN — Medication 70 MCG/MIN: at 04:08

## 2025-08-20 RX ADMIN — SODIUM CHLORIDE, PRESERVATIVE FREE 10 ML: 5 INJECTION INTRAVENOUS at 08:04

## 2025-08-20 RX ADMIN — SODIUM CHLORIDE 100 MCG/MIN: 0.9 INJECTION, SOLUTION INTRAVENOUS at 12:45

## 2025-08-20 RX ADMIN — HEPARIN SODIUM 1400 UNITS: 1000 INJECTION, SOLUTION INTRAVENOUS; SUBCUTANEOUS at 13:37

## 2025-08-20 RX ADMIN — VASOPRESSIN 0.04 UNITS/MIN: 0.2 INJECTION INTRAVENOUS at 04:09

## 2025-08-20 RX ADMIN — INSULIN LISPRO 2 UNITS: 100 INJECTION, SOLUTION INTRAVENOUS; SUBCUTANEOUS at 10:22

## 2025-08-20 RX ADMIN — EPINEPHRINE 1 MCG/MIN: 1 INJECTION INTRAMUSCULAR; INTRAVENOUS; SUBCUTANEOUS at 12:12

## 2025-08-20 RX ADMIN — PHYTONADIONE 10 MG: 10 INJECTION, EMULSION INTRAMUSCULAR; INTRAVENOUS; SUBCUTANEOUS at 08:31

## 2025-08-20 RX ADMIN — MIDODRINE HYDROCHLORIDE 15 MG: 10 TABLET ORAL at 10:53

## 2025-08-20 RX ADMIN — Medication 70 MCG/MIN: at 03:21

## 2025-08-20 RX ADMIN — MILRINONE LACTATE 0.12 MCG/KG/MIN: 0.2 INJECTION, SOLUTION INTRAVENOUS at 10:53

## 2025-08-20 RX ADMIN — Medication 70 MCG/MIN: at 05:54

## 2025-08-20 RX ADMIN — HEPARIN SODIUM 1300 UNITS: 1000 INJECTION, SOLUTION INTRAVENOUS; SUBCUTANEOUS at 13:37

## 2025-08-20 RX ADMIN — PIPERACILLIN AND TAZOBACTAM 3375 MG: 3; .375 INJECTION, POWDER, LYOPHILIZED, FOR SOLUTION INTRAVENOUS at 10:26

## 2025-08-20 RX ADMIN — NOREPINEPHRINE BITARTRATE 80 MCG/MIN: 1 INJECTION INTRAVENOUS at 07:43

## 2025-08-20 RX ADMIN — LEVOTHYROXINE SODIUM 88 MCG: 0.09 TABLET ORAL at 10:24

## 2025-08-20 RX ADMIN — SODIUM BICARBONATE: 84 INJECTION, SOLUTION INTRAVENOUS at 10:59

## 2025-08-20 RX ADMIN — SODIUM BICARBONATE 25 MEQ: 84 INJECTION, SOLUTION INTRAVENOUS at 10:53

## 2025-08-20 RX ADMIN — Medication 300 MCG/MIN: at 12:43

## 2025-08-20 RX ADMIN — LACTULOSE 10 G: 20 SOLUTION ORAL at 08:03

## 2025-08-20 RX ADMIN — SODIUM BICARBONATE: 84 INJECTION, SOLUTION INTRAVENOUS at 12:01

## 2025-08-20 RX ADMIN — VASOPRESSIN 0.04 UNITS/MIN: 0.2 INJECTION INTRAVENOUS at 16:39

## 2025-08-20 RX ADMIN — Medication 30 MCG/MIN: at 08:12

## 2025-08-20 RX ADMIN — Medication 300 MCG/MIN: at 15:31

## 2025-08-20 RX ADMIN — PANTOPRAZOLE SODIUM 40 MG: 40 INJECTION, POWDER, FOR SOLUTION INTRAVENOUS at 10:22

## 2025-08-20 RX ADMIN — MIDODRINE HYDROCHLORIDE 15 MG: 10 TABLET ORAL at 08:03

## 2025-08-20 ASSESSMENT — ENCOUNTER SYMPTOMS
CONSTIPATION: 1
VOMITING: 0
NAUSEA: 0
RECTAL PAIN: 0
ABDOMINAL PAIN: 0
ABDOMINAL DISTENTION: 0
ANAL BLEEDING: 0
BLOOD IN STOOL: 0
COLOR CHANGE: 1
DIARRHEA: 0

## 2025-08-20 ASSESSMENT — PAIN SCALES - GENERAL
PAINLEVEL_OUTOF10: 0

## 2025-08-21 LAB
ALBUMIN PERCENT: 53 % (ref 56–66)
ALBUMIN SERPL-MCNC: 3.6 G/DL (ref 3.2–5.2)
ALPHA 2 PERCENT: 7 % (ref 7–12)
ALPHA1 GLOB SERPL ELPH-MCNC: 0.4 G/DL (ref 0.1–0.4)
ALPHA1 GLOB SERPL ELPH-MCNC: 6 % (ref 3–5)
ALPHA2 GLOB SERPL ELPH-MCNC: 0.5 G/DL (ref 0.5–0.9)
B-GLOBULIN SERPL ELPH-MCNC: 0.8 G/DL (ref 0.7–1.4)
B-GLOBULIN SERPL ELPH-MCNC: 12 % (ref 8–13)
BLOOD BANK BLOOD PRODUCT EXPIRATION DATE: NORMAL
BLOOD BANK DISPENSE STATUS: NORMAL
BLOOD BANK ISBT PRODUCT BLOOD TYPE: 6200
BLOOD BANK PRODUCT CODE: NORMAL
BLOOD BANK UNIT TYPE AND RH: NORMAL
BPU ID: NORMAL
COMPONENT: NORMAL
GAMMA GLOB SERPL ELPH-MCNC: 1.5 G/DL (ref 0.5–1.5)
GAMMA GLOBULIN %: 22 % (ref 11–19)
MRSA, DNA, NASAL: NORMAL
P E INTERPRETATION, U: NORMAL
PATHOLOGIST: ABNORMAL
PATHOLOGIST: NORMAL
PROT PATTERN SERPL ELPH-IMP: ABNORMAL
PROT SERPL-MCNC: 6.9 G/DL (ref 6.6–8.7)
SPECIMEN DESCRIPTION: NORMAL
SPECIMEN TYPE: NORMAL
TOTAL PROT. SUM,%: 100 % (ref 98–102)
TOTAL PROT. SUM: 6.8 G/DL (ref 6.3–8.2)
TRANSFUSION STATUS: NORMAL
UNIT DIVISION: 0
UNIT ISSUE DATE/TIME: NORMAL
URINE TOTAL PROTEIN: NORMAL MG/DL

## 2025-08-24 LAB
MICROORGANISM SPEC CULT: NORMAL
MICROORGANISM SPEC CULT: NORMAL
SERVICE CMNT-IMP: NORMAL
SERVICE CMNT-IMP: NORMAL
SPECIMEN DESCRIPTION: NORMAL
SPECIMEN DESCRIPTION: NORMAL

## 2025-08-26 NOTE — PROGRESS NOTES
Physician Progress Note      PATIENT:               AUDREY SOLANO  CSN #:                  [id number]  :                       1955  ADMIT DATE:       2025 8:45 AM  DISCH DATE:        2025 5:27 PM  RESPONDING  PROVIDER #:        Kailey Finley MD          QUERY TEXT:    Please clarify the patient?s nutritional status:    The clinical indicators include:  71 yo M admitted with Ventricular tachycardia, pt c/o decreased appetite and   PO intake  -per Comprehensive Nutrition Assessment note , patient meets criteria for   Moderate malnutrition: Energy Intake: 75% or less of estimated energy   requirements for 7 or more days, Weight Loss: Greater than 7.5% over 3 months,   Mild body fat loss Orbital, Mild muscle mass loss Temples (temporalis),   Clavicles (pectoralis & deltoids), albumin 2.9, total protein 5.8  Treatment: dietician consult, Glucerna ONS x 1 per day, Encourage/monitor   intakes as tolerated, monitor GI status, labs, weights  Options provided:  -- Protein calorie malnutrition moderate  -- Other - I will add my own diagnosis  -- Disagree - Not applicable / Not valid  -- Disagree - Clinically unable to determine / Unknown  -- Refer to Clinical Documentation Reviewer    PROVIDER RESPONSE TEXT:    This patient has moderate protein calorie malnutrition.    Query created by: Marilyn Brown on 2025 1:35 PM      Electronically signed by:  Kailey Finley MD 2025 10:05 AM

## 2025-08-29 DIAGNOSIS — I50.42 CHRONIC COMBINED SYSTOLIC AND DIASTOLIC HEART FAILURE (HCC): ICD-10-CM

## 2025-08-29 DIAGNOSIS — E11.8 CONTROLLED TYPE 2 DIABETES MELLITUS WITH COMPLICATION, WITHOUT LONG-TERM CURRENT USE OF INSULIN (HCC): ICD-10-CM

## 2025-08-29 RX ORDER — DAPAGLIFLOZIN 10 MG/1
10 TABLET, FILM COATED ORAL EVERY MORNING
Qty: 90 TABLET | Refills: 1 | Status: SHIPPED | OUTPATIENT
Start: 2025-08-29

## (undated) DEVICE — SNARE ENDOSCP L240CM SHTH DIA2.4MM LOOP W30MM MIN WRK CHN

## (undated) DEVICE — SNARE ENDOSCP M L240CM LOOP W27MM SHTH DIA2.4MM OVL FLX

## (undated) DEVICE — GUIDEWIRE WITH ICE™ HYDROPHILIC COATING: Brand: LUGE™

## (undated) DEVICE — CORDIS 6FR XB3.5 GUIDING CATHETER

## (undated) DEVICE — SUTURE DEV SZ 2-0 WND CLSR ABSRB GS-22 VLOC COVIDIEN VLOCM2145

## (undated) DEVICE — Device

## (undated) DEVICE — GUIDEWIRE 35/260/FC/PTFE/3J: Brand: GUIDEWIRE

## (undated) DEVICE — ANGIOGRAPHIC CATHETER: Brand: EXPO™

## (undated) DEVICE — ELECTRODE PT RET AD L9FT HI MOIST COND ADH HYDRGEL CORDED

## (undated) DEVICE — SUTURE ETHIBOND EXCEL SZ 0 L30IN NONABSORBABLE GRN L26MM CT-2 X412H

## (undated) DEVICE — SURGICAL PROCEDURE TRAY CRD CATH SVMMC

## (undated) DEVICE — DRAPE SURG W13XL13IN PLAS ANTIMIC INCIS LNR FULL W HNDL

## (undated) DEVICE — FORCEPS BX L240CM WRK CHN 2.8MM STD CAP W/ NDL MIC MESH

## (undated) DEVICE — SCALPEL SURG SFTY S STL FUTURA DISP ST NO 11

## (undated) DEVICE — ADHESIVE SKIN CLOSURE TOP 0.8 CC PREM PUR LIQUIBAND RAPID LF

## (undated) DEVICE — DRESSING TRNSP TEGADERM 1ST AID STYL 4X4.75IN

## (undated) DEVICE — CATH BLLN ANGIO 2.50X15MM SC EUPHORA RX

## (undated) DEVICE — INTRODUCER SHTH 6FR L11CM 0.038IN STD SIDEPRT EXTN 3 W

## (undated) DEVICE — STRAP ARMBRD W1.5XL32IN FOAM STR YET SFT W/ HK AND LOOP

## (undated) DEVICE — SPONGE LAP W18XL18IN WHT COT 4 PLY FLD STRUNG RADPQ DISP ST 2 PER PACK

## (undated) DEVICE — CATHETER IABP 8FR 50CC FBROPT CONN W INSRT KT TWO STATLOK

## (undated) DEVICE — ELECTRODE DEFIB AD RADIOTRANSPLANTED AD MULTIFUNCTIONAL PHY CTRL

## (undated) DEVICE — SUTURE PERMA-HAND SZ 0 L30IN NONABSORBABLE BLK L36MM CT-1 424H

## (undated) DEVICE — SUTURE VICRYL 2-0 L27IN ABSRB CT BRAID COAT UD J275H

## (undated) DEVICE — Z DISCONTINUED USE 2220190 SUTURE VICRYL SZ 3-0 L27IN ABSRB UD L26MM SH 1/2 CIR J416H

## (undated) DEVICE — DEVICE VASC CLSR 24CM FEM OR RAD ART EXT MECH PRSS DSG POST

## (undated) DEVICE — KIT MICRO INTRO 4FR STIFF 40CM NIGHTENALL TUNGSTEN 7SMT

## (undated) DEVICE — GLOVE ORTHO 8   MSG9480

## (undated) DEVICE — SUTURE VICRYL SZ 4-0 L18IN ABSRB UD L19MM PS-2 3/8 CIR PRIM J496H

## (undated) DEVICE — Z DISCONTINUED USE 2859063 SUTURE VICRYL 3-0 L27IN ABSRB UD PSL L30MM 1/2 CIR TAPERPOINT J502H

## (undated) DEVICE — SLITTER

## (undated) DEVICE — INTRODUCER SPLIT SHEATH PRELUDE SNAP 9FR X 13CM

## (undated) DEVICE — TRAP SPEC RETRV CLR PLAS POLYP IN LN SUCT QUIK CTCH

## (undated) DEVICE — SUTURE ETHIBOND EXCEL SZ 0 L30IN NONABSORBABLE GRN CT1 L36MM X424H

## (undated) DEVICE — Device: Brand: PROWATER

## (undated) DEVICE — SUTURE VICRYL + SZ 3-0 L36IN ABSRB UD L36MM CT-1 1/2 CIR VCP944H

## (undated) DEVICE — STRIP SKIN CLSR W0.5XL4IN WHT SPUNBOUND FBR NYL STERIL HI ADH